# Patient Record
Sex: MALE | Race: WHITE | Employment: OTHER | URBAN - NONMETROPOLITAN AREA
[De-identification: names, ages, dates, MRNs, and addresses within clinical notes are randomized per-mention and may not be internally consistent; named-entity substitution may affect disease eponyms.]

---

## 2017-01-30 ENCOUNTER — OFFICE VISIT (OUTPATIENT)
Dept: PHYSICAL MEDICINE AND REHAB | Age: 62
End: 2017-01-30

## 2017-01-30 VITALS
BODY MASS INDEX: 30.93 KG/M2 | HEIGHT: 67 IN | HEART RATE: 103 BPM | DIASTOLIC BLOOD PRESSURE: 88 MMHG | WEIGHT: 197.09 LBS | SYSTOLIC BLOOD PRESSURE: 143 MMHG

## 2017-01-30 DIAGNOSIS — G82.20 PARAPARESIS OF BOTH LOWER LIMBS (HCC): Primary | ICD-10-CM

## 2017-01-30 DIAGNOSIS — G35 MULTIPLE SCLEROSIS (HCC): ICD-10-CM

## 2017-01-30 PROCEDURE — 99213 OFFICE O/P EST LOW 20 MIN: CPT | Performed by: PHYSICAL MEDICINE & REHABILITATION

## 2017-01-30 RX ORDER — TIZANIDINE 2 MG/1
2 TABLET ORAL 3 TIMES DAILY
COMMUNITY
Start: 2016-12-15

## 2017-01-30 ASSESSMENT — ENCOUNTER SYMPTOMS
BACK PAIN: 0
RESPIRATORY NEGATIVE: 1
GASTROINTESTINAL NEGATIVE: 1
EYES NEGATIVE: 1

## 2017-12-30 ENCOUNTER — APPOINTMENT (OUTPATIENT)
Dept: INTERVENTIONAL RADIOLOGY/VASCULAR | Age: 62
DRG: 175 | End: 2017-12-30
Payer: COMMERCIAL

## 2017-12-30 ENCOUNTER — APPOINTMENT (OUTPATIENT)
Dept: CT IMAGING | Age: 62
DRG: 175 | End: 2017-12-30
Payer: COMMERCIAL

## 2017-12-30 ENCOUNTER — HOSPITAL ENCOUNTER (INPATIENT)
Age: 62
LOS: 2 days | Discharge: NURSING, REHAB OR LONG-TERM CARE | DRG: 175 | End: 2018-01-01
Attending: HOSPITALIST | Admitting: HOSPITALIST
Payer: COMMERCIAL

## 2017-12-30 DIAGNOSIS — I26.99 OTHER PULMONARY EMBOLISM WITHOUT ACUTE COR PULMONALE, UNSPECIFIED CHRONICITY (HCC): ICD-10-CM

## 2017-12-30 DIAGNOSIS — R55 SYNCOPE AND COLLAPSE: Primary | ICD-10-CM

## 2017-12-30 LAB
ANION GAP SERPL CALCULATED.3IONS-SCNC: 19 MEQ/L (ref 8–16)
ANISOCYTOSIS: ABNORMAL
BACTERIA: ABNORMAL /HPF
BASOPHILS # BLD: 0.4 %
BASOPHILS ABSOLUTE: 0 THOU/MM3 (ref 0–0.1)
BILIRUBIN URINE: NEGATIVE
BLOOD, URINE: ABNORMAL
BUN BLDV-MCNC: 26 MG/DL (ref 7–22)
CALCIUM SERPL-MCNC: 9.2 MG/DL (ref 8.5–10.5)
CASTS 2: ABNORMAL /LPF
CASTS UA: ABNORMAL /LPF
CHARACTER, URINE: ABNORMAL
CHLORIDE BLD-SCNC: 101 MEQ/L (ref 98–111)
CO2: 22 MEQ/L (ref 23–33)
COLOR: ABNORMAL
CREAT SERPL-MCNC: 0.6 MG/DL (ref 0.4–1.2)
CRYSTALS, UA: ABNORMAL
D-DIMER QUANTITATIVE: 919 NG/ML FEU (ref 0–500)
EKG ATRIAL RATE: 100 BPM
EKG P AXIS: 48 DEGREES
EKG P-R INTERVAL: 138 MS
EKG Q-T INTERVAL: 368 MS
EKG QRS DURATION: 88 MS
EKG QTC CALCULATION (BAZETT): 474 MS
EKG R AXIS: 35 DEGREES
EKG T AXIS: 55 DEGREES
EKG VENTRICULAR RATE: 100 BPM
EOSINOPHIL # BLD: 2.6 %
EOSINOPHILS ABSOLUTE: 0.2 THOU/MM3 (ref 0–0.4)
EPITHELIAL CELLS, UA: ABNORMAL /HPF
GFR SERPL CREATININE-BSD FRML MDRD: > 90 ML/MIN/1.73M2
GLUCOSE BLD-MCNC: 211 MG/DL (ref 70–108)
GLUCOSE URINE: NEGATIVE MG/DL
HCT VFR BLD CALC: 42.8 % (ref 42–52)
HEMOGLOBIN: 14.6 GM/DL (ref 14–18)
KETONES, URINE: ABNORMAL
LACTIC ACID: 0.7 MMOL/L (ref 0.5–2.2)
LACTIC ACID: 2.9 MMOL/L (ref 0.5–2.2)
LEUKOCYTE ESTERASE, URINE: ABNORMAL
LYMPHOCYTES # BLD: 12.2 %
LYMPHOCYTES ABSOLUTE: 1 THOU/MM3 (ref 1–4.8)
MCH RBC QN AUTO: 29.9 PG (ref 27–31)
MCHC RBC AUTO-ENTMCNC: 34.2 GM/DL (ref 33–37)
MCV RBC AUTO: 87.5 FL (ref 80–94)
MISCELLANEOUS 2: ABNORMAL
MONOCYTES # BLD: 7.6 %
MONOCYTES ABSOLUTE: 0.7 THOU/MM3 (ref 0.4–1.3)
NITRITE, URINE: NEGATIVE
NUCLEATED RED BLOOD CELLS: 0 /100 WBC
OSMOLALITY CALCULATION: 294.1 MOSMOL/KG (ref 275–300)
PDW BLD-RTO: 15.2 % (ref 11.5–14.5)
PH UA: 7.5
PLATELET # BLD: 250 THOU/MM3 (ref 130–400)
PMV BLD AUTO: 7.2 MCM (ref 7.4–10.4)
POTASSIUM SERPL-SCNC: 3.4 MEQ/L (ref 3.5–5.2)
PROCALCITONIN: 0.14 NG/ML (ref 0.01–0.09)
PROTEIN UA: 300
RBC # BLD: 4.89 MILL/MM3 (ref 4.7–6.1)
RBC URINE: ABNORMAL /HPF
RENAL EPITHELIAL, UA: ABNORMAL
SEG NEUTROPHILS: 77.2 %
SEGMENTED NEUTROPHILS ABSOLUTE COUNT: 6.6 THOU/MM3 (ref 1.8–7.7)
SODIUM BLD-SCNC: 142 MEQ/L (ref 135–145)
SPECIFIC GRAVITY, URINE: 1.03 (ref 1–1.03)
TROPONIN T: < 0.01 NG/ML
TSH SERPL DL<=0.05 MIU/L-ACNC: 1.34 UIU/ML (ref 0.4–4.2)
UROBILINOGEN, URINE: 1 EU/DL
WBC # BLD: 8.6 THOU/MM3 (ref 4.8–10.8)
WBC UA: > 200 /HPF
YEAST: ABNORMAL

## 2017-12-30 PROCEDURE — 71275 CT ANGIOGRAPHY CHEST: CPT

## 2017-12-30 PROCEDURE — 84484 ASSAY OF TROPONIN QUANT: CPT

## 2017-12-30 PROCEDURE — 85379 FIBRIN DEGRADATION QUANT: CPT

## 2017-12-30 PROCEDURE — 87040 BLOOD CULTURE FOR BACTERIA: CPT

## 2017-12-30 PROCEDURE — 80048 BASIC METABOLIC PNL TOTAL CA: CPT

## 2017-12-30 PROCEDURE — 6370000000 HC RX 637 (ALT 250 FOR IP): Performed by: HOSPITALIST

## 2017-12-30 PROCEDURE — 81001 URINALYSIS AUTO W/SCOPE: CPT

## 2017-12-30 PROCEDURE — 6360000002 HC RX W HCPCS: Performed by: EMERGENCY MEDICINE

## 2017-12-30 PROCEDURE — 93970 EXTREMITY STUDY: CPT

## 2017-12-30 PROCEDURE — 84145 PROCALCITONIN (PCT): CPT

## 2017-12-30 PROCEDURE — 6360000004 HC RX CONTRAST MEDICATION: Performed by: EMERGENCY MEDICINE

## 2017-12-30 PROCEDURE — 6360000002 HC RX W HCPCS: Performed by: FAMILY MEDICINE

## 2017-12-30 PROCEDURE — 36415 COLL VENOUS BLD VENIPUNCTURE: CPT

## 2017-12-30 PROCEDURE — 6360000002 HC RX W HCPCS: Performed by: HOSPITALIST

## 2017-12-30 PROCEDURE — 2580000003 HC RX 258: Performed by: HOSPITALIST

## 2017-12-30 PROCEDURE — B32T1ZZ COMPUTERIZED TOMOGRAPHY (CT SCAN) OF LEFT PULMONARY ARTERY USING LOW OSMOLAR CONTRAST: ICD-10-PCS | Performed by: RADIOLOGY

## 2017-12-30 PROCEDURE — 87086 URINE CULTURE/COLONY COUNT: CPT

## 2017-12-30 PROCEDURE — 99285 EMERGENCY DEPT VISIT HI MDM: CPT

## 2017-12-30 PROCEDURE — 70450 CT HEAD/BRAIN W/O DYE: CPT

## 2017-12-30 PROCEDURE — 99223 1ST HOSP IP/OBS HIGH 75: CPT | Performed by: HOSPITALIST

## 2017-12-30 PROCEDURE — B32S1ZZ COMPUTERIZED TOMOGRAPHY (CT SCAN) OF RIGHT PULMONARY ARTERY USING LOW OSMOLAR CONTRAST: ICD-10-PCS | Performed by: RADIOLOGY

## 2017-12-30 PROCEDURE — 84443 ASSAY THYROID STIM HORMONE: CPT

## 2017-12-30 PROCEDURE — 85025 COMPLETE CBC W/AUTO DIFF WBC: CPT

## 2017-12-30 PROCEDURE — 93005 ELECTROCARDIOGRAM TRACING: CPT

## 2017-12-30 PROCEDURE — 83605 ASSAY OF LACTIC ACID: CPT

## 2017-12-30 PROCEDURE — 2140000000 HC CCU INTERMEDIATE R&B

## 2017-12-30 RX ORDER — SODIUM CHLORIDE 0.9 % (FLUSH) 0.9 %
10 SYRINGE (ML) INJECTION EVERY 12 HOURS SCHEDULED
Status: DISCONTINUED | OUTPATIENT
Start: 2017-12-30 | End: 2018-01-01 | Stop reason: HOSPADM

## 2017-12-30 RX ORDER — FLUOXETINE HYDROCHLORIDE 20 MG/1
20 CAPSULE ORAL DAILY
Status: DISCONTINUED | OUTPATIENT
Start: 2017-12-31 | End: 2017-12-31

## 2017-12-30 RX ORDER — OXYBUTYNIN CHLORIDE 10 MG/1
5 TABLET, EXTENDED RELEASE ORAL 2 TIMES DAILY
Status: ON HOLD | COMMUNITY
End: 2019-07-08 | Stop reason: ALTCHOICE

## 2017-12-30 RX ORDER — ONDANSETRON 2 MG/ML
4 INJECTION INTRAMUSCULAR; INTRAVENOUS EVERY 6 HOURS PRN
Status: DISCONTINUED | OUTPATIENT
Start: 2017-12-30 | End: 2018-01-01 | Stop reason: HOSPADM

## 2017-12-30 RX ORDER — DOCUSATE SODIUM 100 MG/1
100 CAPSULE, LIQUID FILLED ORAL 2 TIMES DAILY
Status: DISCONTINUED | OUTPATIENT
Start: 2017-12-30 | End: 2018-01-01 | Stop reason: HOSPADM

## 2017-12-30 RX ORDER — BACLOFEN 10 MG/1
20 TABLET ORAL 3 TIMES DAILY
Status: DISCONTINUED | OUTPATIENT
Start: 2017-12-30 | End: 2018-01-01 | Stop reason: HOSPADM

## 2017-12-30 RX ORDER — HYDROCODONE BITARTRATE AND ACETAMINOPHEN 5; 325 MG/1; MG/1
1 TABLET ORAL EVERY 6 HOURS PRN
Status: DISCONTINUED | OUTPATIENT
Start: 2017-12-30 | End: 2018-01-01 | Stop reason: HOSPADM

## 2017-12-30 RX ORDER — ACETAMINOPHEN 325 MG/1
650 TABLET ORAL EVERY 4 HOURS PRN
Status: DISCONTINUED | OUTPATIENT
Start: 2017-12-30 | End: 2018-01-01 | Stop reason: HOSPADM

## 2017-12-30 RX ORDER — DOCUSATE SODIUM 100 MG/1
100 CAPSULE, LIQUID FILLED ORAL DAILY
COMMUNITY

## 2017-12-30 RX ORDER — AMPICILLIN 500 MG/1
500 CAPSULE ORAL 4 TIMES DAILY
Status: ON HOLD | COMMUNITY
End: 2017-12-30 | Stop reason: ALTCHOICE

## 2017-12-30 RX ORDER — GENTAMICIN SULFATE 1 MG/G
CREAM TOPICAL 2 TIMES DAILY
Status: ON HOLD | COMMUNITY
End: 2018-06-08 | Stop reason: ALTCHOICE

## 2017-12-30 RX ORDER — SODIUM CHLORIDE 0.9 % (FLUSH) 0.9 %
10 SYRINGE (ML) INJECTION PRN
Status: DISCONTINUED | OUTPATIENT
Start: 2017-12-30 | End: 2018-01-01 | Stop reason: HOSPADM

## 2017-12-30 RX ORDER — SODIUM CHLORIDE 9 MG/ML
INJECTION, SOLUTION INTRAVENOUS CONTINUOUS
Status: DISCONTINUED | OUTPATIENT
Start: 2017-12-30 | End: 2017-12-30

## 2017-12-30 RX ORDER — HYDROCODONE BITARTRATE AND ACETAMINOPHEN 5; 325 MG/1; MG/1
1 TABLET ORAL EVERY 6 HOURS PRN
Status: ON HOLD | COMMUNITY
End: 2018-06-08 | Stop reason: CLARIF

## 2017-12-30 RX ORDER — AMPICILLIN AND SULBACTAM 2; 1 G/1; G/1
INJECTION, POWDER, FOR SOLUTION INTRAMUSCULAR; INTRAVENOUS EVERY 6 HOURS
Status: ON HOLD | COMMUNITY
Start: 2017-12-30 | End: 2018-01-23

## 2017-12-30 RX ORDER — OXYBUTYNIN CHLORIDE 10 MG/1
10 TABLET, EXTENDED RELEASE ORAL DAILY
Status: DISCONTINUED | OUTPATIENT
Start: 2017-12-31 | End: 2018-01-01 | Stop reason: HOSPADM

## 2017-12-30 RX ORDER — WHEY PROTEIN ISOLATE 6 G-25/7 G
POWDER (GRAM) ORAL 2 TIMES DAILY
Status: ON HOLD | COMMUNITY
End: 2018-11-14

## 2017-12-30 RX ORDER — MORPHINE SULFATE 2 MG/ML
1 INJECTION, SOLUTION INTRAMUSCULAR; INTRAVENOUS ONCE
Status: COMPLETED | OUTPATIENT
Start: 2017-12-30 | End: 2017-12-30

## 2017-12-30 RX ORDER — LACTOBACILLUS RHAMNOSUS GG 10B CELL
1 CAPSULE ORAL 2 TIMES DAILY WITH MEALS
Status: DISCONTINUED | OUTPATIENT
Start: 2017-12-31 | End: 2018-01-01 | Stop reason: HOSPADM

## 2017-12-30 RX ADMIN — AMPICILLIN SODIUM AND SULBACTAM SODIUM 1.5 G: 1; .5 INJECTION, POWDER, FOR SOLUTION INTRAMUSCULAR; INTRAVENOUS at 22:25

## 2017-12-30 RX ADMIN — MORPHINE SULFATE 1 MG: 2 INJECTION, SOLUTION INTRAMUSCULAR; INTRAVENOUS at 22:25

## 2017-12-30 RX ADMIN — ENOXAPARIN SODIUM 90 MG: 100 INJECTION SUBCUTANEOUS at 18:17

## 2017-12-30 RX ADMIN — DOCUSATE SODIUM 100 MG: 100 CAPSULE ORAL at 22:25

## 2017-12-30 RX ADMIN — IOPAMIDOL 85 ML: 755 INJECTION, SOLUTION INTRAVENOUS at 16:06

## 2017-12-30 RX ADMIN — BACLOFEN 20 MG: 10 TABLET ORAL at 22:25

## 2017-12-30 ASSESSMENT — ENCOUNTER SYMPTOMS
RHINORRHEA: 0
EYE REDNESS: 0
NAUSEA: 0
DIARRHEA: 0
ABDOMINAL PAIN: 0
SORE THROAT: 0
WHEEZING: 0
EYE DISCHARGE: 0
SHORTNESS OF BREATH: 0
VOMITING: 0
BACK PAIN: 0
COUGH: 0

## 2017-12-30 ASSESSMENT — PAIN SCALES - GENERAL: PAINLEVEL_OUTOF10: 4

## 2017-12-30 NOTE — H&P
Syncopal episode while waiting for his food earlier today. Patient denied associated headache, dizziness, localized weakness or numbness. No speech or visual disturbances. Patient denied chest pain, shortness of breath. No fever, chills or cough. Patient medical history significant for multiple sclerosis with neurogenic bladder,  Paraparesis. He is currently treated for chronic osteomyelitis in the right ischial wound and urinary tract infection with Unasyn. No  record with the patient reports that he had the PICC line for about a month. In the emergency room d-dimer was elevated, CT of the chest revealed left main  Pulmonary artery thromboembolism extended to the lingula and lower lobe branches. No prior hx of DVT or PE. Past Medical History:          Diagnosis Date    MS (multiple sclerosis) ([facility] [address]     Neurogenic bladder feb. 2012    Dr. Nanci Blackwell placed cather    UTI (urinary tract infection)        Past Surgical History:          Procedure Laterality Date    ANKLE SURGERY  1996    broken ankle    BLADDER SURGERY  2-    Suprapubic catheter placement    TONSILLECTOMY  child       Medications Prior to Admission:      Prior to Admission medications    Medication Sig Start Date End Date Taking? Authorizing Provider   docusate sodium (COLACE) 100 MG capsule Take 100 mg by mouth 2 times daily   Yes Historical Provider, MD   oxybutynin (DITROPAN-XL) 10 MG extended release tablet Take 10 mg by mouth daily   Yes Historical Provider, MD   Resource Instant Protein POWD Take by mouth   Yes Historical Provider, MD   gentamicin (GARAMYCIN) 0.1 % cream Apply topically 3 times daily Apply topically 3 times daily.    Yes Historical Provider, MD   ampicillin (PRINCIPEN) 500 MG capsule Take 500 mg by mouth 4 times daily   Yes Historical Provider, MD   ampicillin-sulbactam (UNASYN) 3 (2-1) g SOLR Infuse intravenously every 6 hours   Yes Historical Provider, MD   sodium chloride 0.9 % SOLN 50 mL with ertapenem 1 GM SOLR 1 g Infuse 1 g intravenously every 24 hours   Yes Historical Provider, MD   HYDROcodone-acetaminophen (NORCO) 5-325 MG per tablet Take 1 tablet by mouth every 6 hours as needed for Pain.    Yes Historical Provider, MD   Diaper Rash Products (PINXAV) OINT Apply topically   Yes Historical Provider, MD   tiZANidine (ZANAFLEX) 2 MG tablet Take 2 mg by mouth every 8 hours as needed  12/15/16  Yes Historical Provider, MD   ascorbic acid (VITAMIN C) 500 MG tablet Take 1 tablet by mouth daily 3/28/16  Yes Estella Nichole MD   Multiple Vitamin (MULTIVITAMIN) tablet Take 1 tablet by mouth daily 3/28/16  Yes Estella Nichole MD   potassium chloride (K-DUR) 10 MEQ tablet Take 1 tablet by mouth daily 3/28/16  Yes Estella Nichole MD   Acetaminophen 650 MG TABS Take 650 mg by mouth every 4 hours as needed 3/28/16  Yes Estella Nichole MD   ondansetron (ZOFRAN) 4 MG/2ML injection Infuse 2 mLs intravenously every 6 hours as needed for Nausea 3/28/16  Yes Estella Nichole MD   lactobacillus (CULTURELLE) capsule Take 1 capsule by mouth 2 times daily (with meals) 3/28/16  Yes Estella Nichole MD   vitamin A 63491 UNITS capsule Take 2 capsules by mouth daily  Patient taking differently: Take 10,000 Units by mouth daily  3/28/16  Yes Estella Nichole MD   zinc sulfate (ZINCATE) 220 MG capsule Take 1 capsule by mouth daily 3/28/16  Yes Estella Nichole MD   vitamin E 400 UNIT capsule Take 1 capsule by mouth daily 3/28/16  Yes Estella Nichole MD   baclofen (LIORESAL) 20 MG tablet Take 1 tablet by mouth 3 times daily 3/28/16  Yes Estella Nichole MD   acetic acid 0.25 % irrigation Irrigate with 100 mLs as directed 3 times daily    Yes Historical Provider, MD   FLUoxetine (PROZAC) 20 MG capsule Take 20 mg by mouth daily   Yes Historical Provider, MD   loperamide (IMODIUM) 2 MG capsule Take 2 mg by mouth 4 times daily as needed for Diarrhea   Yes Historical Provider, No results for input(s): AST, ALT, BILIDIR, BILITOT, ALKPHOS in the last 72 hours. No results for input(s): INR in the last 72 hours. No results for input(s): Yinka Hem in the last 72 hours. Urinalysis:      Lab Results   Component Value Date    NITRU NEGATIVE 12/30/2017    WBCUA > 200 12/30/2017    WBCUA >200 01/20/2012    BACTERIA MODERATE 12/30/2017    RBCUA 3-5 12/30/2017    BLOODU MODERATE 12/30/2017    SPECGRAV 1.020 12/17/2012    GLUCOSEU NEGATIVE 12/30/2017       Radiology:     CXR: I have reviewed the CXR with the following interpretation:   EKG:  I have reviewed the EKG with the following interpretation: sinus non specific T waves abnormalities ? Prolonged     CTA CHEST W WO CONTRAST   Final Result   1. Thromboembolism in the left main pulmonary artery extending into lingula and lower lobe branches. 2. Minimal left basilar atelectasis/infiltrate. Critical findings were discussed with Chica Gagnon on 12/30/2017 at 4:45 PM.      Final report electronically signed by Dr. Ivonne Buckley on 12/30/2017 4:48 PM      CT HEAD WO CONTRAST   Final Result   1. No mass effect or acute hemorrhage. 2. Cerebral atrophy. **This report has been created using voice recognition software. It may contain minor errors which are inherent in voice recognition technology. **      Final report electronically signed by Dr. Ivonne Buckley on 12/30/2017 4:37 PM      VL DUP LOWER EXTREMITY VENOUS BILATERAL    (Results Pending)   VL DUP UPPER EXTREMITY ARTERIES BILATERAL    (Results Pending)            Thank you Denis Vance MD for the opportunity to be involved in this patient's care.     Electronically signed by Vanda Cooper MD on 12/30/2017 at 6:27 PM

## 2017-12-30 NOTE — ED TRIAGE NOTES
Pt to room 7 via EMS. Pt presents to ED following have a brief syncopal episode at [facility] during lunch. Pt states that he was sitting and eating lunch and the next thing he remembers was staff taking pt back to room. Currently pt is alert, oriented. Respirations easy, unlabored. No c/o pain. PICC present in right upper arm .

## 2017-12-30 NOTE — ED PROVIDER NOTES
[facility]  eMERGENCY dEPARTMENT eNCOUnter          CHIEF COMPLAINT       Chief Complaint   Patient presents with    Loss of Consciousness       Nurses Notes reviewed and I agree except as noted in the HPI. HISTORY OF PRESENT ILLNESS    Mary Sweeney is a 63 y.o. male who presents to the Emergency Department for the evaluation of a syncopal episode. The patient states he was sitting up in his wheelchair when he passed out. His nurse took him back to his room and called the squad to pick him up. The nurse states that the patient's left side of his face was drooping and he looked out of it, and it could possibly be a TIA. He denies lightheadedness or dizziness prior to the syncopal episode. He denies nausea or vomiting afterwards. Patient denies chest pain, shortness of breath or headaches at this time. The patient has had MS for 20+ years and has spent the last 5 years at [facility]. He is in a wheel chair and uses a whit lift. The patient has a PICC line in an is getting Unasyn IV medication for his pressure ulcer on his buttocks and his UTI. The patient also has osteomyelitis. The patient states he has sensation in his legs and he denies pain in his legs. The HPI was provided by the patient. REVIEW OF SYSTEMS     Review of Systems   Constitutional: Negative for appetite change, chills, fatigue and fever. HENT: Negative for congestion, ear pain, rhinorrhea and sore throat. Eyes: Negative for discharge, redness and visual disturbance. Respiratory: Negative for cough, shortness of breath and wheezing. Cardiovascular: Negative for chest pain, palpitations and leg swelling. Gastrointestinal: Negative for abdominal pain, diarrhea, nausea and vomiting. Genitourinary: Negative for decreased urine volume, difficulty urinating and dysuria. Musculoskeletal: Negative for arthralgias, back pain, joint swelling and neck pain.    Skin: Positive for wound (pressure ulcer on Take 1 tablet by mouth daily    ONDANSETRON (ZOFRAN) 4 MG/2ML INJECTION    Infuse 2 mLs intravenously every 6 hours as needed for Nausea    OXYBUTYNIN (DITROPAN-XL) 10 MG EXTENDED RELEASE TABLET    Take 10 mg by mouth daily    POTASSIUM CHLORIDE (K-DUR) 10 MEQ TABLET    Take 1 tablet by mouth daily    RESOURCE INSTANT PROTEIN POWD    Take by mouth    SODIUM CHLORIDE 0.9 % SOLN 50 ML WITH ERTAPENEM 1 GM SOLR 1 G    Infuse 1 g intravenously every 24 hours    TIZANIDINE (ZANAFLEX) 2 MG TABLET    Take 2 mg by mouth every 8 hours as needed     VITAMIN A 58784 UNITS CAPSULE    Take 2 capsules by mouth daily    VITAMIN E 400 UNIT CAPSULE    Take 1 capsule by mouth daily    ZINC SULFATE (ZINCATE) 220 MG CAPSULE    Take 1 capsule by mouth daily       ALLERGIES     has No Known Allergies. FAMILY HISTORY     indicated that his mother is . He indicated that his father is . He indicated that his sister is alive. family history includes Arthritis in his sister; Cancer in his mother; Heart Disease in his father. SOCIAL HISTORY      reports that he quit smoking about 36 years ago. He has quit using smokeless tobacco. He reports that he does not drink alcohol or use drugs. PHYSICAL EXAM     INITIAL VITALS:  height is 5' 7\" (1.702 m) and weight is 205 lb (93 kg). His oral temperature is 97.7 °F (36.5 °C). His blood pressure is 114/78 and his pulse is 98. His respiration is 18 and oxygen saturation is 94%. Physical Exam   Constitutional: He is oriented to person, place, and time. He appears well-developed and well-nourished. HENT:   Head: Normocephalic and atraumatic. Right Ear: External ear normal.   Left Ear: External ear normal.   Eyes: Conjunctivae are normal. Right eye exhibits no discharge. Left eye exhibits no discharge. No scleral icterus. Neck: Normal range of motion. Neck supple. No JVD present. Cardiovascular: Normal rate, regular rhythm and normal heart sounds.   Exam reveals no gallop on 12/30/2017 4:48 PM      CT HEAD WO CONTRAST   Final Result   1. No mass effect or acute hemorrhage. 2. Cerebral atrophy. **This report has been created using voice recognition software. It may contain minor errors which are inherent in voice recognition technology. **      Final report electronically signed by Dr. Arianna Wheeler on 12/30/2017 4:37 PM          LABS:     Labs Reviewed   CBC WITH AUTO DIFFERENTIAL - Abnormal; Notable for the following:        Result Value    RDW 15.2 (*)     MPV 7.2 (*)     All other components within normal limits   BASIC METABOLIC PANEL - Abnormal; Notable for the following:     Potassium 3.4 (*)     CO2 22 (*)     Glucose 211 (*)     BUN 26 (*)     All other components within normal limits   LACTIC ACID, PLASMA - Abnormal; Notable for the following:     Lactic Acid 2.9 (*)     All other components within normal limits   PROCALCITONIN - Abnormal; Notable for the following:     Procalcitonin 0.14 (*)     All other components within normal limits   D-DIMER, QUANTITATIVE - Abnormal; Notable for the following:     D-Dimer, Quant 919.00 (*)     All other components within normal limits   ANION GAP - Abnormal; Notable for the following:      Anion Gap 19.0 (*)     All other components within normal limits   URINE WITH REFLEXED MICRO - Abnormal; Notable for the following:     Ketones, Urine TRACE (*)     Blood, Urine MODERATE (*)     Protein,  (*)     Leukocyte Esterase, Urine LARGE (*)     Color, UA DK YELLOW (*)     Character, Urine TURBID (*)     All other components within normal limits   URINE CULTURE, REFLEXED    Narrative:     Source: urine, clean catch       Site:           Current Antibiotics: not stated   TROPONIN   TSH WITHOUT REFLEX   GLOMERULAR FILTRATION RATE, ESTIMATED   OSMOLALITY       EMERGENCY DEPARTMENT COURSE:   Vitals:    Vitals:    12/30/17 1220 12/30/17 1434   BP: 109/81 114/78   Pulse: 94 98   Resp: 18 18   Temp: 97.7 °F (36.5 °C)    TempSrc: Oral    SpO2: 94% 94%   Weight: 205 lb (93 kg)    Height: 5' 7\" (1.702 m)        12:33 PM: The patient was seen and evaluated. Patient is alert and oriented on arrival.  He appeared in no acute distress. Appropriate labs and EKG were ordered. A pulmonary embolism was low on my differential diagnosis but he does have limited mobility and a PICC line . I did not want to inject him with IV dye is not necessary. I did order a d-dimer which was elevated at 919. A CTA was ordered of the chest and this did show pulmonary embolism of the left main pulmonary artery Extending into lingula and lower lobe branches. The patient's white blood cell count is 8.6, his sacral wound is chronic in nature, and his urine most likely has a chronic UTI from the indwelling catheter. I discussed the patient with Dr. Abigail Mariano who graciously accepted to admit the patient. Subcutaneous Lovenox was ordered and Dr. Abigail Mariano will assume care of the patient. MDM:  The patient will be admitted to the hospital for pulmonary embolism    CRITICAL CARE:   None     CONSULTS:  Dr. Bunch Short:  None    FINAL IMPRESSION      1. Syncope and collapse    2. Other pulmonary embolism without acute cor pulmonale, unspecified chronicity ([facility] [address]          DISPOSITION/PLAN   Admit    PATIENT REFERRED TO:  Mandeep Canales MD  [address]  [phone]            DISCHARGE MEDICATIONS:  New Prescriptions    No medications on file       (Please note that portions of this note were completed with a voice recognition program.  Efforts were made to edit the dictations but occasionally words are mis-transcribed.)    The patient was given an opportunity to see the Emergency Attending. The patient voiced understanding that I was a Mid-Level Provider and was in agreement with being seen independently by myself.      Scribe:  Shameka Sin 12/23/16 10:31 AM Scribing for and in the presence of Amanda Rivas CNP.     Signed by: Piper Huff

## 2017-12-31 LAB
ALBUMIN SERPL-MCNC: 3.6 G/DL (ref 3.5–5.1)
AMORPHOUS: ABNORMAL
ANION GAP SERPL CALCULATED.3IONS-SCNC: 13 MEQ/L (ref 8–16)
ANISOCYTOSIS: ABNORMAL
BACTERIA: ABNORMAL /HPF
BASOPHILS # BLD: 0.7 %
BASOPHILS ABSOLUTE: 0 THOU/MM3 (ref 0–0.1)
BILIRUBIN URINE: NEGATIVE
BLOOD, URINE: ABNORMAL
BUN BLDV-MCNC: 24 MG/DL (ref 7–22)
CALCIUM SERPL-MCNC: 8.8 MG/DL (ref 8.5–10.5)
CASTS UA: ABNORMAL /LPF
CHARACTER, URINE: ABNORMAL
CHLORIDE BLD-SCNC: 102 MEQ/L (ref 98–111)
CO2: 25 MEQ/L (ref 23–33)
COLOR: ABNORMAL
CREAT SERPL-MCNC: 0.4 MG/DL (ref 0.4–1.2)
CRYSTALS, UA: ABNORMAL
EOSINOPHIL # BLD: 4.4 %
EOSINOPHILS ABSOLUTE: 0.3 THOU/MM3 (ref 0–0.4)
EPITHELIAL CELLS, UA: ABNORMAL /HPF
GFR SERPL CREATININE-BSD FRML MDRD: > 90 ML/MIN/1.73M2
GLUCOSE BLD-MCNC: 102 MG/DL (ref 70–108)
GLUCOSE BLD-MCNC: 110 MG/DL (ref 70–108)
GLUCOSE BLD-MCNC: 157 MG/DL (ref 70–108)
GLUCOSE URINE: NEGATIVE MG/DL
HCT VFR BLD CALC: 40.2 % (ref 42–52)
HEMOGLOBIN: 13.7 GM/DL (ref 14–18)
KETONES, URINE: NEGATIVE
LEUKOCYTE ESTERASE, URINE: ABNORMAL
LYMPHOCYTES # BLD: 21.2 %
LYMPHOCYTES ABSOLUTE: 1.4 THOU/MM3 (ref 1–4.8)
MCH RBC QN AUTO: 29.7 PG (ref 27–31)
MCHC RBC AUTO-ENTMCNC: 34 GM/DL (ref 33–37)
MCV RBC AUTO: 87.2 FL (ref 80–94)
MONOCYTES # BLD: 9.5 %
MONOCYTES ABSOLUTE: 0.6 THOU/MM3 (ref 0.4–1.3)
MUCUS: ABNORMAL
NITRITE, URINE: NEGATIVE
NUCLEATED RED BLOOD CELLS: 0 /100 WBC
ORGANISM: ABNORMAL
OSMOLALITY CALCULATION: 283.6 MOSMOL/KG (ref 275–300)
PDW BLD-RTO: 15.1 % (ref 11.5–14.5)
PH UA: 6
PHOSPHORUS: 2.9 MG/DL (ref 2.4–4.7)
PLATELET # BLD: 259 THOU/MM3 (ref 130–400)
PMV BLD AUTO: 7.3 MCM (ref 7.4–10.4)
POTASSIUM SERPL-SCNC: 3.1 MEQ/L (ref 3.5–5.2)
PROTEIN UA: 100
RBC # BLD: 4.61 MILL/MM3 (ref 4.7–6.1)
RBC URINE: ABNORMAL /HPF
SEG NEUTROPHILS: 64.2 %
SEGMENTED NEUTROPHILS ABSOLUTE COUNT: 4.2 THOU/MM3 (ref 1.8–7.7)
SODIUM BLD-SCNC: 140 MEQ/L (ref 135–145)
SPECIFIC GRAVITY, URINE: > 1.03 (ref 1–1.03)
TROPONIN T: < 0.01 NG/ML
TROPONIN T: < 0.01 NG/ML
URINE CULTURE REFLEX: ABNORMAL
UROBILINOGEN, URINE: 1 EU/DL
WBC # BLD: 6.6 THOU/MM3 (ref 4.8–10.8)
WBC UA: > 100 /HPF

## 2017-12-31 PROCEDURE — 6370000000 HC RX 637 (ALT 250 FOR IP): Performed by: HOSPITALIST

## 2017-12-31 PROCEDURE — 2580000003 HC RX 258: Performed by: HOSPITALIST

## 2017-12-31 PROCEDURE — 87081 CULTURE SCREEN ONLY: CPT

## 2017-12-31 PROCEDURE — 6370000000 HC RX 637 (ALT 250 FOR IP): Performed by: FAMILY MEDICINE

## 2017-12-31 PROCEDURE — 87086 URINE CULTURE/COLONY COUNT: CPT

## 2017-12-31 PROCEDURE — 81001 URINALYSIS AUTO W/SCOPE: CPT

## 2017-12-31 PROCEDURE — 80069 RENAL FUNCTION PANEL: CPT

## 2017-12-31 PROCEDURE — 51705 CHANGE OF BLADDER TUBE: CPT

## 2017-12-31 PROCEDURE — 36415 COLL VENOUS BLD VENIPUNCTURE: CPT

## 2017-12-31 PROCEDURE — 2140000000 HC CCU INTERMEDIATE R&B

## 2017-12-31 PROCEDURE — 84484 ASSAY OF TROPONIN QUANT: CPT

## 2017-12-31 PROCEDURE — 85025 COMPLETE CBC W/AUTO DIFF WBC: CPT

## 2017-12-31 PROCEDURE — 87147 CULTURE TYPE IMMUNOLOGIC: CPT

## 2017-12-31 PROCEDURE — 99233 SBSQ HOSP IP/OBS HIGH 50: CPT | Performed by: HOSPITALIST

## 2017-12-31 PROCEDURE — 82948 REAGENT STRIP/BLOOD GLUCOSE: CPT

## 2017-12-31 PROCEDURE — 6360000002 HC RX W HCPCS: Performed by: HOSPITALIST

## 2017-12-31 RX ORDER — FLUOXETINE 10 MG/1
10 CAPSULE ORAL DAILY
Status: DISCONTINUED | OUTPATIENT
Start: 2017-12-31 | End: 2018-01-01 | Stop reason: HOSPADM

## 2017-12-31 RX ORDER — POTASSIUM CHLORIDE 750 MG/1
10 TABLET, FILM COATED, EXTENDED RELEASE ORAL DAILY
Status: DISCONTINUED | OUTPATIENT
Start: 2017-12-31 | End: 2018-01-01 | Stop reason: HOSPADM

## 2017-12-31 RX ORDER — POTASSIUM CHLORIDE 750 MG/1
40 TABLET, FILM COATED, EXTENDED RELEASE ORAL
Status: COMPLETED | OUTPATIENT
Start: 2017-12-31 | End: 2017-12-31

## 2017-12-31 RX ADMIN — OXYBUTYNIN CHLORIDE 10 MG: 10 TABLET, FILM COATED, EXTENDED RELEASE ORAL at 09:16

## 2017-12-31 RX ADMIN — AMPICILLIN SODIUM AND SULBACTAM SODIUM 1.5 G: 1; .5 INJECTION, POWDER, FOR SOLUTION INTRAMUSCULAR; INTRAVENOUS at 14:00

## 2017-12-31 RX ADMIN — ENOXAPARIN SODIUM 90 MG: 100 INJECTION SUBCUTANEOUS at 19:31

## 2017-12-31 RX ADMIN — DOCUSATE SODIUM 100 MG: 100 CAPSULE ORAL at 09:16

## 2017-12-31 RX ADMIN — Medication 1 CAPSULE: at 09:14

## 2017-12-31 RX ADMIN — Medication 10 ML: at 19:38

## 2017-12-31 RX ADMIN — FLUOXETINE 10 MG: 20 CAPSULE ORAL at 09:15

## 2017-12-31 RX ADMIN — AMPICILLIN SODIUM AND SULBACTAM SODIUM 1.5 G: 1; .5 INJECTION, POWDER, FOR SOLUTION INTRAMUSCULAR; INTRAVENOUS at 23:19

## 2017-12-31 RX ADMIN — Medication 10 ML: at 09:19

## 2017-12-31 RX ADMIN — BACLOFEN 20 MG: 10 TABLET ORAL at 09:15

## 2017-12-31 RX ADMIN — BACLOFEN 20 MG: 10 TABLET ORAL at 19:37

## 2017-12-31 RX ADMIN — DOCUSATE SODIUM 100 MG: 100 CAPSULE ORAL at 19:37

## 2017-12-31 RX ADMIN — AMPICILLIN SODIUM AND SULBACTAM SODIUM 1.5 G: 1; .5 INJECTION, POWDER, FOR SOLUTION INTRAMUSCULAR; INTRAVENOUS at 09:21

## 2017-12-31 RX ADMIN — POTASSIUM CHLORIDE 40 MEQ: 750 TABLET, FILM COATED, EXTENDED RELEASE ORAL at 12:00

## 2017-12-31 RX ADMIN — POTASSIUM CHLORIDE 10 MEQ: 750 TABLET, FILM COATED, EXTENDED RELEASE ORAL at 09:21

## 2017-12-31 RX ADMIN — Medication 1 CAPSULE: at 15:32

## 2017-12-31 RX ADMIN — ENOXAPARIN SODIUM 90 MG: 100 INJECTION SUBCUTANEOUS at 09:16

## 2017-12-31 RX ADMIN — POTASSIUM CHLORIDE 40 MEQ: 750 TABLET, FILM COATED, EXTENDED RELEASE ORAL at 13:57

## 2017-12-31 RX ADMIN — BACLOFEN 20 MG: 10 TABLET ORAL at 13:12

## 2017-12-31 ASSESSMENT — PAIN SCALES - GENERAL: PAINLEVEL_OUTOF10: 0

## 2017-12-31 NOTE — PROGRESS NOTES
major overnight events. No recurrence of syncope. Patient denied chest pain, shortness of breath. Diet: DIET GENERAL;      Medications:  Reviewed    Infusion Medications    Scheduled Medications    FLUoxetine  10 mg Oral Daily    potassium chloride  10 mEq Oral Daily    potassium replacement protocol   Other RX Placeholder    potassium chloride  40 mEq Oral Q2H    sodium chloride flush  10 mL Intravenous 2 times per day    enoxaparin  1 mg/kg Subcutaneous Q12H    baclofen  20 mg Oral TID    docusate sodium  100 mg Oral BID    lactobacillus  1 capsule Oral BID WC    oxybutynin  10 mg Oral Daily    ampicillin-sulbactam  1.5 g Intravenous Q6H     PRN Meds: sodium chloride flush, acetaminophen, magnesium hydroxide, ondansetron, HYDROcodone-acetaminophen      Intake/Output Summary (Last 24 hours) at 12/31/17 1242  Last data filed at 12/31/17 0351   Gross per 24 hour   Intake              217 ml   Output              300 ml   Net              -83 ml       Diet:  DIET GENERAL;    Exam:  /76   Pulse 103   Temp 99.4 °F (37.4 °C) (Oral)   Resp 17   Ht 5' 7\" (1.702 m)   Wt 208 lb 11.2 oz (94.7 kg)   SpO2 94%   BMI 32.69 kg/m²     Gen: Not in distress. Alert. Head: Normocephalic. Atraumatic. Eyes: Conjunctivae/corneas clear. ENT: Oral mucosa moist  Neck: No JVD. No obvious thyromegaly. CVS: Nml S1S2, no MRG, RRR  Pulmomary: Clear bilaterally. No crackles. No wheezes. Gastrointestinal: Soft, non tender, non distend,  Positive bowel sounds. Musculoskeletal: No edema. Warm, PICC line in th right arm , no swelling  Neuro: paraparesis with atrophy in the LE  Psychiatry: Appropriate affect. Not agitated.           Labs:   Recent Labs      12/30/17   1319  12/31/17   0642   WBC  8.6  6.6   HGB  14.6  13.7*   HCT  42.8  40.2*   PLT  250  259     Recent Labs      12/30/17   1320  12/31/17   0642   NA  142  140   K  3.4*  3.1*   CL  101  102   CO2  22*  25   BUN  26*  24*   CREATININE  0.6  0.4 CALCIUM  9.2  8.8   PHOS   --   2.9     No results for input(s): AST, ALT, BILIDIR, BILITOT, ALKPHOS in the last 72 hours. No results for input(s): INR in the last 72 hours. No results for input(s): Jackie Crane in the last 72 hours. Urinalysis:    Lab Results   Component Value Date    NITRU NEGATIVE 12/30/2017    WBCUA > 200 12/30/2017    WBCUA >200 01/20/2012    BACTERIA MODERATE 12/30/2017    RBCUA 3-5 12/30/2017    BLOODU MODERATE 12/30/2017    SPECGRAV 1.020 12/17/2012    GLUCOSEU NEGATIVE 12/30/2017       Radiology:  VL DUP UPPER EXTREMITY VENOUS BILATERAL   Final Result   Nonocclusive DVT in the right subclavian and axillary veins around the PICC line. No evidence of left upper extremity DVT. No evidence of bilateral upper extremity superficial venous thrombosis. Results were discussed with the patient's nurse, [address] [place] [address] ([facility]), on 12/30/2017 at 11:27 PM.         **This report has been created using voice recognition software. It may contain minor errors which are inherent in voice recognition technology. **      Final report electronically signed by Dr. Terri Braga on 12/30/2017 11:28 PM      VL DUP LOWER EXTREMITY VENOUS BILATERAL   Final Result   No evidence of bilateral lower extremity DVT. Reflux in the left posterior tibial vein. Final report electronically signed by Dr. Terri Braga on 12/30/2017 11:19 PM      CTA CHEST [address]   Final Result   1. Thromboembolism in the left main pulmonary artery extending into lingula and lower lobe branches. 2. Minimal left basilar atelectasis/infiltrate. Critical findings were discussed with Annelise Garrett on 12/30/2017 at 4:45 PM.      Final report electronically signed by Dr. Reyna Buckner on 12/30/2017 4:48 PM      CT HEAD WO CONTRAST   Final Result   1. No mass effect or acute hemorrhage. 2. Cerebral atrophy. **This report has been created using voice recognition software.  It may contain minor errors which are

## 2018-01-01 VITALS
BODY MASS INDEX: 32.8 KG/M2 | HEIGHT: 67 IN | RESPIRATION RATE: 18 BRPM | TEMPERATURE: 98.3 F | WEIGHT: 209 LBS | OXYGEN SATURATION: 97 % | DIASTOLIC BLOOD PRESSURE: 89 MMHG | HEART RATE: 87 BPM | SYSTOLIC BLOOD PRESSURE: 154 MMHG

## 2018-01-01 LAB
ALBUMIN SERPL-MCNC: 3.3 G/DL (ref 3.5–5.1)
ANION GAP SERPL CALCULATED.3IONS-SCNC: 12 MEQ/L (ref 8–16)
ANISOCYTOSIS: ABNORMAL
BASOPHILS # BLD: 1.3 %
BASOPHILS ABSOLUTE: 0.1 THOU/MM3 (ref 0–0.1)
BUN BLDV-MCNC: 22 MG/DL (ref 7–22)
CALCIUM SERPL-MCNC: 8.9 MG/DL (ref 8.5–10.5)
CHLORIDE BLD-SCNC: 107 MEQ/L (ref 98–111)
CO2: 24 MEQ/L (ref 23–33)
CREAT SERPL-MCNC: 0.3 MG/DL (ref 0.4–1.2)
EOSINOPHIL # BLD: 7.2 %
EOSINOPHILS ABSOLUTE: 0.3 THOU/MM3 (ref 0–0.4)
GFR SERPL CREATININE-BSD FRML MDRD: > 90 ML/MIN/1.73M2
GLUCOSE BLD-MCNC: 116 MG/DL (ref 70–108)
GLUCOSE BLD-MCNC: 93 MG/DL (ref 70–108)
GLUCOSE BLD-MCNC: 95 MG/DL (ref 70–108)
HCT VFR BLD CALC: 41.6 % (ref 42–52)
HEMOGLOBIN: 13.8 GM/DL (ref 14–18)
LYMPHOCYTES # BLD: 31 %
LYMPHOCYTES ABSOLUTE: 1.4 THOU/MM3 (ref 1–4.8)
MCH RBC QN AUTO: 29 PG (ref 27–31)
MCHC RBC AUTO-ENTMCNC: 33.2 GM/DL (ref 33–37)
MCV RBC AUTO: 87.4 FL (ref 80–94)
MONOCYTES # BLD: 11.8 %
MONOCYTES ABSOLUTE: 0.5 THOU/MM3 (ref 0.4–1.3)
MRSA SCREEN: NORMAL
NUCLEATED RED BLOOD CELLS: 0 /100 WBC
PDW BLD-RTO: 15.5 % (ref 11.5–14.5)
PHOSPHORUS: 2.7 MG/DL (ref 2.4–4.7)
PLATELET # BLD: 271 THOU/MM3 (ref 130–400)
PMV BLD AUTO: 6.9 MCM (ref 7.4–10.4)
POTASSIUM SERPL-SCNC: 3.8 MEQ/L (ref 3.5–5.2)
RBC # BLD: 4.76 MILL/MM3 (ref 4.7–6.1)
SEG NEUTROPHILS: 48.7 %
SEGMENTED NEUTROPHILS ABSOLUTE COUNT: 2.2 THOU/MM3 (ref 1.8–7.7)
SODIUM BLD-SCNC: 143 MEQ/L (ref 135–145)
WBC # BLD: 4.6 THOU/MM3 (ref 4.8–10.8)

## 2018-01-01 PROCEDURE — 85025 COMPLETE CBC W/AUTO DIFF WBC: CPT

## 2018-01-01 PROCEDURE — 99239 HOSP IP/OBS DSCHRG MGMT >30: CPT | Performed by: HOSPITALIST

## 2018-01-01 PROCEDURE — 6370000000 HC RX 637 (ALT 250 FOR IP): Performed by: FAMILY MEDICINE

## 2018-01-01 PROCEDURE — 80069 RENAL FUNCTION PANEL: CPT

## 2018-01-01 PROCEDURE — 6370000000 HC RX 637 (ALT 250 FOR IP): Performed by: HOSPITALIST

## 2018-01-01 PROCEDURE — 82948 REAGENT STRIP/BLOOD GLUCOSE: CPT

## 2018-01-01 PROCEDURE — 2580000003 HC RX 258: Performed by: HOSPITALIST

## 2018-01-01 PROCEDURE — 36415 COLL VENOUS BLD VENIPUNCTURE: CPT

## 2018-01-01 PROCEDURE — 6360000002 HC RX W HCPCS: Performed by: HOSPITALIST

## 2018-01-01 RX ADMIN — AMPICILLIN SODIUM AND SULBACTAM SODIUM 1.5 G: 1; .5 INJECTION, POWDER, FOR SOLUTION INTRAMUSCULAR; INTRAVENOUS at 15:05

## 2018-01-01 RX ADMIN — Medication 10 ML: at 10:47

## 2018-01-01 RX ADMIN — AMPICILLIN SODIUM AND SULBACTAM SODIUM 1.5 G: 1; .5 INJECTION, POWDER, FOR SOLUTION INTRAMUSCULAR; INTRAVENOUS at 10:48

## 2018-01-01 RX ADMIN — BACLOFEN 20 MG: 10 TABLET ORAL at 15:05

## 2018-01-01 RX ADMIN — POTASSIUM CHLORIDE 10 MEQ: 750 TABLET, FILM COATED, EXTENDED RELEASE ORAL at 10:48

## 2018-01-01 RX ADMIN — Medication 1 CAPSULE: at 10:48

## 2018-01-01 RX ADMIN — DOCUSATE SODIUM 100 MG: 100 CAPSULE ORAL at 10:48

## 2018-01-01 RX ADMIN — BACLOFEN 20 MG: 10 TABLET ORAL at 10:49

## 2018-01-01 RX ADMIN — FLUOXETINE 10 MG: 20 CAPSULE ORAL at 10:49

## 2018-01-01 RX ADMIN — AMPICILLIN SODIUM AND SULBACTAM SODIUM 1.5 G: 1; .5 INJECTION, POWDER, FOR SOLUTION INTRAMUSCULAR; INTRAVENOUS at 04:27

## 2018-01-01 RX ADMIN — ENOXAPARIN SODIUM 90 MG: 100 INJECTION SUBCUTANEOUS at 10:48

## 2018-01-01 RX ADMIN — OXYBUTYNIN CHLORIDE 10 MG: 10 TABLET, FILM COATED, EXTENDED RELEASE ORAL at 10:48

## 2018-01-01 RX ADMIN — Medication 10 ML: at 04:27

## 2018-01-01 ASSESSMENT — PAIN SCALES - GENERAL: PAINLEVEL_OUTOF10: 0

## 2018-01-01 NOTE — DISCHARGE SUMMARY
01/01/2018    CALCIUM 8.9 01/01/2018    PHOS 2.7 01/01/2018         Significant Diagnostic Studies    Radiology:   VL DUP UPPER EXTREMITY VENOUS BILATERAL   Final Result   Nonocclusive DVT in the right subclavian and axillary veins around the PICC line. No evidence of left upper extremity DVT. No evidence of bilateral upper extremity superficial venous thrombosis. Results were discussed with the patient's nurse, [address] [place] [address] ([facility]), on 12/30/2017 at 11:27 PM.         **This report has been created using voice recognition software. It may contain minor errors which are inherent in voice recognition technology. **      Final report electronically signed by Dr. Josefa Will on 12/30/2017 11:28 PM      VL DUP LOWER EXTREMITY VENOUS BILATERAL   Final Result   No evidence of bilateral lower extremity DVT. Reflux in the left posterior tibial vein. Final report electronically signed by Dr. Josefa Will on 12/30/2017 11:19 PM      CTA CHEST [address]   Final Result   1. Thromboembolism in the left main pulmonary artery extending into lingula and lower lobe branches. 2. Minimal left basilar atelectasis/infiltrate. Critical findings were discussed with Liza Hoyt on 12/30/2017 at 4:45 PM.      Final report electronically signed by Dr. Adilia Busby on 12/30/2017 4:48 PM      CT HEAD WO CONTRAST   Final Result   1. No mass effect or acute hemorrhage. 2. Cerebral atrophy. **This report has been created using voice recognition software. It may contain minor errors which are inherent in voice recognition technology. **      Final report electronically signed by Dr. Adilia Busby on 12/30/2017 4:37 PM             Consults:     IP CONSULT TO INFECTIOUS DISEASES    Disposition:  ecf      Condition at Discharge: Stable    Code Status:  Full Code     Patient Instructions:    Discharge lab/important testing/finding that need follow up :     Removal of PICC line as soon as antibiotics Completed  CBC, renal panel in 1 week. Activity: activity as tolerated  Diet: DIET GENERAL;  Dietary Nutrition Supplements: Wound Healing Oral Supplement      Follow-up visits:   MD Tree Carvalho [address]  [phone]               Discharge Medications:      Aline Skiff   Home Medication Instructions WPB:[id number]    Printed on:01/01/18 8839   Medication Information                      Acetaminophen 650 MG TABS  Take 650 mg by mouth every 4 hours as needed             acetic acid 0.25 % irrigation  Irrigate with 100 mLs as directed 3 times daily              ampicillin-sulbactam (UNASYN) 3 (2-1) g SOLR  Infuse intravenously every 6 hours             ascorbic acid (VITAMIN C) 500 MG tablet  Take 1 tablet by mouth daily             baclofen (LIORESAL) 20 MG tablet  Take 1 tablet by mouth 3 times daily             Diaper Rash Products (PINXAV) OINT  Apply topically             docusate sodium (COLACE) 100 MG capsule  Take 100 mg by mouth 2 times daily             FLUoxetine (PROZAC) 20 MG capsule  Take 10 mg by mouth daily              gentamicin (GARAMYCIN) 0.1 % cream  Apply topically 2 times daily Apply topically 2 times daily. HYDROcodone-acetaminophen (NORCO) 5-325 MG per tablet  Take 1 tablet by mouth every 6 hours as needed for Pain.              Incontinence Supplies (BEDSIDE DRAINAGE BAG) MISC  Large 2000cc bedside drainage bag             lactobacillus (CULTURELLE) capsule  Take 1 capsule by mouth 2 times daily (with meals)             loperamide (IMODIUM) 2 MG capsule  Take 2 mg by mouth 4 times daily as needed for Diarrhea             Multiple Vitamin (MULTIVITAMIN) tablet  Take 1 tablet by mouth daily             ondansetron (ZOFRAN) 4 MG/2ML injection  Infuse 2 mLs intravenously every 6 hours as needed for Nausea             oxybutynin (DITROPAN-XL) 10 MG extended release tablet  Take 10 mg by mouth daily             potassium chloride (K-DUR) 10 MEQ tablet  Take 1 tablet by mouth daily             Resource Instant Protein POWD  Take by mouth             rivaroxaban (XARELTO STARTER PACK) 15 & 20 MG Starter Pack  Take 15 mg twice daily for 21 days then take 20 mg daily for total of 3 months             sodium chloride 0.9 % SOLN 50 mL with ertapenem 1 GM SOLR 1 g  Infuse 1 g intravenously every 24 hours             tiZANidine (ZANAFLEX) 2 MG tablet  Take 2 mg by mouth 3 times daily              vitamin A 83230 UNITS capsule  Take 2 capsules by mouth daily             vitamin E 400 UNIT capsule  Take 1 capsule by mouth daily             zinc sulfate (ZINCATE) 220 MG capsule  Take 1 capsule by mouth daily                 Time Spent on discharge is 35 in the examination, evaluation, counseling and review of medications and discharge plan. Signed: Thank you Silver Mascorro MD for the opportunity to be involved in this patient's care. This dictation was generated by voice recognition computer software.  Although all attempts are made to edit the dictation for accuracy, there may be errors in the transcription that are not intended    Electronically signed by Hong Ruelas MD on 1/1/2018 at 2:48 PM

## 2018-01-02 LAB
MRSA SCREEN: NORMAL
URINE CULTURE REFLEX: NORMAL

## 2018-01-03 LAB — VRE CULTURE: NORMAL

## 2018-01-05 LAB
BLOOD CULTURE, ROUTINE: NORMAL
BLOOD CULTURE, ROUTINE: NORMAL

## 2018-01-19 ENCOUNTER — HOSPITAL ENCOUNTER (INPATIENT)
Age: 63
LOS: 4 days | Discharge: NURSING, REHAB OR LONG-TERM CARE | DRG: 070 | End: 2018-01-23
Attending: EMERGENCY MEDICINE | Admitting: INTERNAL MEDICINE
Payer: COMMERCIAL

## 2018-01-19 ENCOUNTER — HOSPITAL ENCOUNTER (EMERGENCY)
Age: 63
Discharge: HOME | DRG: 070 | End: 2018-01-19
Attending: FAMILY MEDICINE
Payer: COMMERCIAL

## 2018-01-19 ENCOUNTER — APPOINTMENT (OUTPATIENT)
Dept: GENERAL RADIOLOGY | Age: 63
DRG: 070 | End: 2018-01-19
Payer: COMMERCIAL

## 2018-01-19 ENCOUNTER — APPOINTMENT (OUTPATIENT)
Dept: CT IMAGING | Age: 63
DRG: 070 | End: 2018-01-19
Payer: COMMERCIAL

## 2018-01-19 VITALS
OXYGEN SATURATION: 96 % | HEART RATE: 85 BPM | TEMPERATURE: 98.2 F | BODY MASS INDEX: 32.89 KG/M2 | SYSTOLIC BLOOD PRESSURE: 153 MMHG | RESPIRATION RATE: 17 BRPM | WEIGHT: 210 LBS | DIASTOLIC BLOOD PRESSURE: 98 MMHG

## 2018-01-19 DIAGNOSIS — R41.82 ALTERED MENTAL STATUS, UNSPECIFIED ALTERED MENTAL STATUS TYPE: Primary | ICD-10-CM

## 2018-01-19 DIAGNOSIS — R47.9 SPEECH DISTURBANCE, UNSPECIFIED TYPE: ICD-10-CM

## 2018-01-19 PROBLEM — G93.41 METABOLIC ENCEPHALOPATHY: Status: ACTIVE | Noted: 2018-01-19

## 2018-01-19 LAB
ALBUMIN SERPL-MCNC: 3.8 G/DL (ref 3.5–5.1)
ALBUMIN SERPL-MCNC: 4 G/DL (ref 3.5–5.1)
ALLEN TEST: POSITIVE
ALP BLD-CCNC: 164 U/L (ref 38–126)
ALP BLD-CCNC: 170 U/L (ref 38–126)
ALT SERPL-CCNC: 16 U/L (ref 11–66)
ALT SERPL-CCNC: 16 U/L (ref 11–66)
AMMONIA: 42 UMOL/L (ref 11–60)
ANION GAP SERPL CALCULATED.3IONS-SCNC: 15 MEQ/L (ref 8–16)
ANION GAP SERPL CALCULATED.3IONS-SCNC: 16 MEQ/L (ref 8–16)
ANISOCYTOSIS: ABNORMAL
ANISOCYTOSIS: ABNORMAL
APTT: 43.2 SECONDS (ref 22–38)
AST SERPL-CCNC: 15 U/L (ref 5–40)
AST SERPL-CCNC: 15 U/L (ref 5–40)
BACTERIA: ABNORMAL
BACTERIA: ABNORMAL
BASE EXCESS (CALCULATED): 0.4 MMOL/L (ref -2.5–2.5)
BASOPHILS # BLD: 0.4 %
BASOPHILS # BLD: 0.6 %
BASOPHILS ABSOLUTE: 0 THOU/MM3 (ref 0–0.1)
BASOPHILS ABSOLUTE: 0 THOU/MM3 (ref 0–0.1)
BILIRUB SERPL-MCNC: 0.3 MG/DL (ref 0.3–1.2)
BILIRUB SERPL-MCNC: 0.3 MG/DL (ref 0.3–1.2)
BILIRUBIN DIRECT: < 0.2 MG/DL (ref 0–0.3)
BILIRUBIN URINE: NEGATIVE
BILIRUBIN URINE: NEGATIVE
BLOOD, URINE: ABNORMAL
BLOOD, URINE: ABNORMAL
BUN BLDV-MCNC: 13 MG/DL (ref 7–22)
BUN BLDV-MCNC: 15 MG/DL (ref 7–22)
CALCIUM SERPL-MCNC: 8.9 MG/DL (ref 8.5–10.5)
CALCIUM SERPL-MCNC: 9.3 MG/DL (ref 8.5–10.5)
CASTS: ABNORMAL /LPF
CHARACTER, URINE: ABNORMAL
CHARACTER, URINE: ABNORMAL
CHLORIDE BLD-SCNC: 101 MEQ/L (ref 98–111)
CHLORIDE BLD-SCNC: 98 MEQ/L (ref 98–111)
CO2: 23 MEQ/L (ref 23–33)
CO2: 24 MEQ/L (ref 23–33)
COLLECTED BY:: NORMAL
COLOR: YELLOW
COLOR: YELLOW
CREAT SERPL-MCNC: 0.3 MG/DL (ref 0.4–1.2)
CREAT SERPL-MCNC: 0.4 MG/DL (ref 0.4–1.2)
CRYSTALS: ABNORMAL
CRYSTALS: ABNORMAL
DEVICE: NORMAL
EKG ATRIAL RATE: 74 BPM
EKG P AXIS: 46 DEGREES
EKG P-R INTERVAL: 142 MS
EKG Q-T INTERVAL: 400 MS
EKG QRS DURATION: 84 MS
EKG QTC CALCULATION (BAZETT): 444 MS
EKG R AXIS: 28 DEGREES
EKG T AXIS: 29 DEGREES
EKG VENTRICULAR RATE: 74 BPM
EOSINOPHIL # BLD: 4 %
EOSINOPHIL # BLD: 4.1 %
EOSINOPHILS ABSOLUTE: 0.2 THOU/MM3 (ref 0–0.4)
EOSINOPHILS ABSOLUTE: 0.3 THOU/MM3 (ref 0–0.4)
EPITHELIAL CELLS, UA: ABNORMAL /HPF
EPITHELIAL CELLS, UA: ABNORMAL /HPF
GFR SERPL CREATININE-BSD FRML MDRD: > 90 ML/MIN/1.73M2
GFR SERPL CREATININE-BSD FRML MDRD: > 90 ML/MIN/1.73M2
GLUCOSE BLD-MCNC: 111 MG/DL (ref 70–108)
GLUCOSE BLD-MCNC: 97 MG/DL (ref 70–108)
GLUCOSE, URINE: NEGATIVE MG/DL
GLUCOSE, URINE: NEGATIVE MG/DL
HCO3: 24 MMOL/L (ref 23–28)
HCT VFR BLD CALC: 44.4 % (ref 42–52)
HCT VFR BLD CALC: 45 % (ref 42–52)
HEMOGLOBIN: 14.5 GM/DL (ref 14–18)
HEMOGLOBIN: 14.7 GM/DL (ref 14–18)
INR BLD: 2.18 (ref 0.85–1.13)
KETONES, URINE: 15
KETONES, URINE: ABNORMAL
LACTIC ACID: 0.8 MMOL/L (ref 0.5–2.2)
LACTIC ACID: 1.3 MMOL/L (ref 0.5–2.2)
LEUKOCYTE ESTERASE, URINE: ABNORMAL
LEUKOCYTE ESTERASE, URINE: ABNORMAL
LIPASE: 33.6 U/L (ref 5.6–51.3)
LYMPHOCYTES # BLD: 21.5 %
LYMPHOCYTES # BLD: 26.7 %
LYMPHOCYTES ABSOLUTE: 1.5 THOU/MM3 (ref 1–4.8)
LYMPHOCYTES ABSOLUTE: 1.6 THOU/MM3 (ref 1–4.8)
MCH RBC QN AUTO: 28.3 PG (ref 27–31)
MCH RBC QN AUTO: 28.5 PG (ref 27–31)
MCHC RBC AUTO-ENTMCNC: 32.6 GM/DL (ref 33–37)
MCHC RBC AUTO-ENTMCNC: 32.6 GM/DL (ref 33–37)
MCV RBC AUTO: 86.8 FL (ref 80–94)
MCV RBC AUTO: 87.5 FL (ref 80–94)
MISCELLANEOUS LAB TEST RESULT: ABNORMAL
MISCELLANEOUS LAB TEST RESULT: ABNORMAL
MONOCYTES # BLD: 10.1 %
MONOCYTES # BLD: 7.7 %
MONOCYTES ABSOLUTE: 0.5 THOU/MM3 (ref 0.4–1.3)
MONOCYTES ABSOLUTE: 0.6 THOU/MM3 (ref 0.4–1.3)
MUCUS: ABNORMAL
NITRITE, URINE: NEGATIVE
NITRITE, URINE: NEGATIVE
NUCLEATED RED BLOOD CELLS: 0 /100 WBC
NUCLEATED RED BLOOD CELLS: 0 /100 WBC
O2 SATURATION: 96 %
OSMOLALITY CALCULATION: 276.4 MOSMOL/KG (ref 275–300)
OSMOLALITY CALCULATION: 278.3 MOSMOL/KG (ref 275–300)
PCO2: 37 MMHG (ref 35–45)
PDW BLD-RTO: 15.6 % (ref 11.5–14.5)
PDW BLD-RTO: 15.7 % (ref 11.5–14.5)
PH BLOOD GAS: 7.43 (ref 7.35–7.45)
PH UA: 6
PH UA: 6
PLATELET # BLD: 349 THOU/MM3 (ref 130–400)
PLATELET # BLD: 354 THOU/MM3 (ref 130–400)
PMV BLD AUTO: 6.5 MCM (ref 7.4–10.4)
PMV BLD AUTO: 6.6 MCM (ref 7.4–10.4)
PO2: 79 MMHG (ref 71–104)
POTASSIUM SERPL-SCNC: 3.7 MEQ/L (ref 3.5–5.2)
POTASSIUM SERPL-SCNC: 3.9 MEQ/L (ref 3.5–5.2)
PROCALCITONIN: 0.06 NG/ML (ref 0.01–0.09)
PROTEIN UA: 100 MG/DL
PROTEIN UA: 100 MG/DL
RBC # BLD: 5.12 MILL/MM3 (ref 4.7–6.1)
RBC # BLD: 5.15 MILL/MM3 (ref 4.7–6.1)
RBC URINE: > 100 /HPF
RBC URINE: > 200 /HPF
RENAL EPITHELIAL, UA: ABNORMAL
SEG NEUTROPHILS: 58.6 %
SEG NEUTROPHILS: 66.3 %
SEGMENTED NEUTROPHILS ABSOLUTE COUNT: 3.5 THOU/MM3 (ref 1.8–7.7)
SEGMENTED NEUTROPHILS ABSOLUTE COUNT: 4.7 THOU/MM3 (ref 1.8–7.7)
SODIUM BLD-SCNC: 138 MEQ/L (ref 135–145)
SODIUM BLD-SCNC: 139 MEQ/L (ref 135–145)
SOURCE, BLOOD GAS: NORMAL
SPECIFIC GRAVITY UA: 1.03 (ref 1–1.03)
SPECIFIC GRAVITY UA: >= 1.03 (ref 1–1.03)
TOTAL CK: 35 U/L (ref 55–170)
TOTAL PROTEIN: 7.2 G/DL (ref 6.1–8)
TOTAL PROTEIN: 7.8 G/DL (ref 6.1–8)
TROPONIN T: < 0.01 NG/ML
UROBILINOGEN, URINE: 0.2 EU/DL
UROBILINOGEN, URINE: 1 EU/DL
WBC # BLD: 5.9 THOU/MM3 (ref 4.8–10.8)
WBC # BLD: 7.1 THOU/MM3 (ref 4.8–10.8)
WBC UA: > 200 /HPF
WBC UA: > 200 /HPF
YEAST: ABNORMAL
YEAST: ABNORMAL

## 2018-01-19 PROCEDURE — A6258 TRANSPARENT FILM >16<=48 IN: HCPCS

## 2018-01-19 PROCEDURE — 70450 CT HEAD/BRAIN W/O DYE: CPT

## 2018-01-19 PROCEDURE — 80053 COMPREHEN METABOLIC PANEL: CPT

## 2018-01-19 PROCEDURE — 82140 ASSAY OF AMMONIA: CPT

## 2018-01-19 PROCEDURE — 84484 ASSAY OF TROPONIN QUANT: CPT

## 2018-01-19 PROCEDURE — 82803 BLOOD GASES ANY COMBINATION: CPT

## 2018-01-19 PROCEDURE — 36600 WITHDRAWAL OF ARTERIAL BLOOD: CPT

## 2018-01-19 PROCEDURE — 85025 COMPLETE CBC W/AUTO DIFF WBC: CPT

## 2018-01-19 PROCEDURE — 71045 X-RAY EXAM CHEST 1 VIEW: CPT

## 2018-01-19 PROCEDURE — 2580000003 HC RX 258: Performed by: FAMILY MEDICINE

## 2018-01-19 PROCEDURE — 82248 BILIRUBIN DIRECT: CPT

## 2018-01-19 PROCEDURE — 99285 EMERGENCY DEPT VISIT HI MDM: CPT

## 2018-01-19 PROCEDURE — 99223 1ST HOSP IP/OBS HIGH 75: CPT | Performed by: INTERNAL MEDICINE

## 2018-01-19 PROCEDURE — 93005 ELECTROCARDIOGRAM TRACING: CPT

## 2018-01-19 PROCEDURE — 85730 THROMBOPLASTIN TIME PARTIAL: CPT

## 2018-01-19 PROCEDURE — 2500000003 HC RX 250 WO HCPCS: Performed by: EMERGENCY MEDICINE

## 2018-01-19 PROCEDURE — 36415 COLL VENOUS BLD VENIPUNCTURE: CPT

## 2018-01-19 PROCEDURE — 81001 URINALYSIS AUTO W/SCOPE: CPT

## 2018-01-19 PROCEDURE — 96361 HYDRATE IV INFUSION ADD-ON: CPT

## 2018-01-19 PROCEDURE — 96374 THER/PROPH/DIAG INJ IV PUSH: CPT

## 2018-01-19 PROCEDURE — 83605 ASSAY OF LACTIC ACID: CPT

## 2018-01-19 PROCEDURE — 6360000002 HC RX W HCPCS: Performed by: FAMILY MEDICINE

## 2018-01-19 PROCEDURE — 85610 PROTHROMBIN TIME: CPT

## 2018-01-19 PROCEDURE — 96365 THER/PROPH/DIAG IV INF INIT: CPT

## 2018-01-19 PROCEDURE — 87081 CULTURE SCREEN ONLY: CPT

## 2018-01-19 PROCEDURE — 83690 ASSAY OF LIPASE: CPT

## 2018-01-19 PROCEDURE — 87147 CULTURE TYPE IMMUNOLOGIC: CPT

## 2018-01-19 PROCEDURE — 87040 BLOOD CULTURE FOR BACTERIA: CPT

## 2018-01-19 PROCEDURE — 1210000002 HC MED SURG R&B - NEUROSCIENCE

## 2018-01-19 PROCEDURE — 87086 URINE CULTURE/COLONY COUNT: CPT

## 2018-01-19 PROCEDURE — 84145 PROCALCITONIN (PCT): CPT

## 2018-01-19 PROCEDURE — 82550 ASSAY OF CK (CPK): CPT

## 2018-01-19 RX ORDER — SODIUM CHLORIDE 9 MG/ML
INJECTION, SOLUTION INTRAVENOUS CONTINUOUS
Status: DISCONTINUED | OUTPATIENT
Start: 2018-01-19 | End: 2018-01-19 | Stop reason: HOSPADM

## 2018-01-19 RX ORDER — ACETIC ACID 0.25 G/100ML
100 IRRIGANT IRRIGATION 3 TIMES DAILY
Status: DISCONTINUED | OUTPATIENT
Start: 2018-01-20 | End: 2018-01-23 | Stop reason: HOSPADM

## 2018-01-19 RX ORDER — BACLOFEN 10 MG/1
20 TABLET ORAL 3 TIMES DAILY
Status: DISCONTINUED | OUTPATIENT
Start: 2018-01-20 | End: 2018-01-23 | Stop reason: HOSPADM

## 2018-01-19 RX ORDER — GENTAMICIN SULFATE 1 MG/G
CREAM TOPICAL 2 TIMES DAILY
Status: DISCONTINUED | OUTPATIENT
Start: 2018-01-20 | End: 2018-01-23 | Stop reason: HOSPADM

## 2018-01-19 RX ORDER — ACETAMINOPHEN 325 MG/1
650 TABLET ORAL EVERY 4 HOURS PRN
Status: DISCONTINUED | OUTPATIENT
Start: 2018-01-19 | End: 2018-01-23 | Stop reason: HOSPADM

## 2018-01-19 RX ORDER — LABETALOL HYDROCHLORIDE 5 MG/ML
10 INJECTION, SOLUTION INTRAVENOUS ONCE
Status: COMPLETED | OUTPATIENT
Start: 2018-01-19 | End: 2018-01-19

## 2018-01-19 RX ORDER — ACETAMINOPHEN 325 MG/1
650 TABLET ORAL EVERY 4 HOURS PRN
Status: DISCONTINUED | OUTPATIENT
Start: 2018-01-19 | End: 2018-01-19 | Stop reason: SDUPTHER

## 2018-01-19 RX ORDER — SODIUM CHLORIDE 0.9 % (FLUSH) 0.9 %
10 SYRINGE (ML) INJECTION EVERY 12 HOURS SCHEDULED
Status: DISCONTINUED | OUTPATIENT
Start: 2018-01-19 | End: 2018-01-23 | Stop reason: HOSPADM

## 2018-01-19 RX ORDER — AMPICILLIN AND SULBACTAM 2; 1 G/1; G/1
3 INJECTION, POWDER, FOR SOLUTION INTRAMUSCULAR; INTRAVENOUS EVERY 6 HOURS
Status: DISCONTINUED | OUTPATIENT
Start: 2018-01-20 | End: 2018-01-19 | Stop reason: ALTCHOICE

## 2018-01-19 RX ORDER — DOCUSATE SODIUM 100 MG/1
100 CAPSULE, LIQUID FILLED ORAL 2 TIMES DAILY
Status: DISCONTINUED | OUTPATIENT
Start: 2018-01-20 | End: 2018-01-23 | Stop reason: HOSPADM

## 2018-01-19 RX ORDER — FLUOXETINE 10 MG/1
10 CAPSULE ORAL DAILY
Status: DISCONTINUED | OUTPATIENT
Start: 2018-01-20 | End: 2018-01-23 | Stop reason: HOSPADM

## 2018-01-19 RX ORDER — 0.9 % SODIUM CHLORIDE 0.9 %
500 INTRAVENOUS SOLUTION INTRAVENOUS ONCE
Status: COMPLETED | OUTPATIENT
Start: 2018-01-19 | End: 2018-01-19

## 2018-01-19 RX ORDER — SODIUM CHLORIDE 0.9 % (FLUSH) 0.9 %
10 SYRINGE (ML) INJECTION PRN
Status: DISCONTINUED | OUTPATIENT
Start: 2018-01-19 | End: 2018-01-23 | Stop reason: HOSPADM

## 2018-01-19 RX ORDER — ONDANSETRON 2 MG/ML
4 INJECTION INTRAMUSCULAR; INTRAVENOUS EVERY 6 HOURS PRN
Status: DISCONTINUED | OUTPATIENT
Start: 2018-01-19 | End: 2018-01-23 | Stop reason: HOSPADM

## 2018-01-19 RX ORDER — LACTOBACILLUS RHAMNOSUS GG 10B CELL
1 CAPSULE ORAL 2 TIMES DAILY WITH MEALS
Status: DISCONTINUED | OUTPATIENT
Start: 2018-01-20 | End: 2018-01-23 | Stop reason: HOSPADM

## 2018-01-19 RX ORDER — ZINC SULFATE 50(220)MG
220 CAPSULE ORAL DAILY
Status: DISCONTINUED | OUTPATIENT
Start: 2018-01-20 | End: 2018-01-23 | Stop reason: HOSPADM

## 2018-01-19 RX ADMIN — SODIUM CHLORIDE: 900 INJECTION INTRAVENOUS at 05:08

## 2018-01-19 RX ADMIN — PIPERACILLIN SODIUM,TAZOBACTAM SODIUM 3.38 G: 3; .375 INJECTION, POWDER, FOR SOLUTION INTRAVENOUS at 05:08

## 2018-01-19 RX ADMIN — LABETALOL HYDROCHLORIDE 10 MG: 5 INJECTION INTRAVENOUS at 20:32

## 2018-01-19 RX ADMIN — SODIUM CHLORIDE 500 ML: 900 INJECTION INTRAVENOUS at 03:57

## 2018-01-19 ASSESSMENT — ENCOUNTER SYMPTOMS
ABDOMINAL PAIN: 0
EYE DISCHARGE: 0
DIARRHEA: 0
COUGH: 1

## 2018-01-19 NOTE — ED PROVIDER NOTES
ADDENDUM:  Care of this patient was assumed from Lexi Felipe MD.  The patient's initial presenting complaint was Altered Mental Status     The patient's initial evaluation and plan have been discussed with the prior provider who initially evaluated the patient. Nursing Notes, Past Medical Hx, Past Surgical Hx, Social Hx, Allergies, and Family Hx were all reviewed. Blood pressure (!) 153/98, pulse 85, temperature 98.2 °F (36.8 °C), temperature source Oral, resp. rate 17, weight 210 lb (95.3 kg), SpO2 96 %.     RESULTS:  Results for orders placed or performed during the hospital encounter of 01/19/18   CBC auto differential   Result Value Ref Range    WBC 7.1 4.8 - 10.8 thou/mm3    RBC 5.15 4.70 - 6.10 mill/mm3    Hemoglobin 14.7 14.0 - 18.0 gm/dl    Hematocrit 45.0 42.0 - 52.0 %    MCV 87.5 80.0 - 94.0 fL    MCH 28.5 27.0 - 31.0 pg    MCHC 32.6 (L) 33.0 - 37.0 gm/dl    RDW 15.6 (H) 11.5 - 14.5 %    Platelets 004 664 - 048 thou/mm3    MPV 6.6 (L) 7.4 - 10.4 mcm    Seg Neutrophils 66.3 %    Lymphocytes 21.5 %    Monocytes 7.7 %    Eosinophils 4.1 %    Basophils 0.4 %    nRBC 0 /100 wbc    Anisocytosis 1+ Absent    Segs Absolute 4.7 1.8 - 7.7 thou/mm3    Lymphocytes # 1.5 1.0 - 4.8 thou/mm3    Monocytes # 0.5 0.4 - 1.3 thou/mm3    Eosinophils # 0.3 0.0 - 0.4 thou/mm3    Basophils # 0.0 0.0 - 0.1 thou/mm3   Protime-INR   Result Value Ref Range    INR 2.18 (H) 0.85 - 1.13   APTT   Result Value Ref Range    aPTT 43.2 (H) 22.0 - 38.0 seconds   Basic Metabolic Panel   Result Value Ref Range    Sodium 138 135 - 145 meq/L    Potassium 3.7 3.5 - 5.2 meq/L    Chloride 98 98 - 111 meq/L    CO2 24 23 - 33 meq/L    Glucose 97 70 - 108 mg/dL    BUN 15 7 - 22 mg/dL    CREATININE 0.3 (L) 0.4 - 1.2 mg/dL    Calcium 9.3 8.5 - 10.5 mg/dL   Hepatic function panel   Result Value Ref Range    Alb 3.8 3.5 - 5.1 g/dL    Total Bilirubin 0.3 0.3 - 1.2 mg/dL    Bilirubin, Direct <0.2 0.0 - 0.3 mg/dL    Alkaline Phosphatase 164 (H)
TempSrc: Oral      SpO2: 96% 96% 98% 98%   Weight: 210 lb (95.3 kg)        Nursing notes reviewed    IV hydration    Shearin from the suprapubic catheter is cloudy and does show pyuria    Given Zosyn IV    WBC normal 7100    Hemoglobin 14.7    Renal function normal    Blood sugar 97    Troponin and CK were both normal    procalcitonin normal    Lactic acid normal    ABG showed good oxygenation and ventilation with normal pH    Chest x-ray shows no evidence of pneumonia    CT scan of the head revealed sinusitis    At this point seems to be medically stable    Return to the nursing home    Continue on his IV antibiotics      CRITICAL CARE:   none    CONSULTS:  None      PROCEDURES:  None    FINAL IMPRESSION      1.  Altered mental status, unspecified altered mental status type          DISPOSITION/PLAN     Discharge    PATIENT REFERRED TO:  Desiree Stacy, [address] [place] [address]  [phone]    In 1 week        DISCHARGE MEDICATIONS:  New Prescriptions    No medications on file       (Please note that portions of this note were completed with a voice recognition program.  Efforts were made to edit the dictations but occasionally words are mis-transcribed.)    MD Carol Oswald MD  01/19/18 [id number]

## 2018-01-19 NOTE — ED NOTES
Pt resting on cot in semi-niño's position. Pt's eyes closed. Breathing easy and unlabored. No visible signs of distress noted. Will continue to monitor for safety and comfort.       Michael Matthews RN  01/19/18 6839

## 2018-01-20 ENCOUNTER — APPOINTMENT (OUTPATIENT)
Dept: MRI IMAGING | Age: 63
DRG: 070 | End: 2018-01-20
Payer: COMMERCIAL

## 2018-01-20 LAB
ANION GAP SERPL CALCULATED.3IONS-SCNC: 12 MEQ/L (ref 8–16)
BUN BLDV-MCNC: 11 MG/DL (ref 7–22)
CALCIUM SERPL-MCNC: 8.9 MG/DL (ref 8.5–10.5)
CHLORIDE BLD-SCNC: 101 MEQ/L (ref 98–111)
CO2: 26 MEQ/L (ref 23–33)
CREAT SERPL-MCNC: 0.3 MG/DL (ref 0.4–1.2)
GFR SERPL CREATININE-BSD FRML MDRD: > 90 ML/MIN/1.73M2
GLUCOSE BLD-MCNC: 102 MG/DL (ref 70–108)
ORGANISM: ABNORMAL
POTASSIUM SERPL-SCNC: 3.4 MEQ/L (ref 3.5–5.2)
SODIUM BLD-SCNC: 139 MEQ/L (ref 135–145)
URINE CULTURE, ROUTINE: ABNORMAL
URINE CULTURE, ROUTINE: ABNORMAL

## 2018-01-20 PROCEDURE — 36415 COLL VENOUS BLD VENIPUNCTURE: CPT

## 2018-01-20 PROCEDURE — 6360000002 HC RX W HCPCS: Performed by: INTERNAL MEDICINE

## 2018-01-20 PROCEDURE — 99232 SBSQ HOSP IP/OBS MODERATE 35: CPT | Performed by: INTERNAL MEDICINE

## 2018-01-20 PROCEDURE — 2500000003 HC RX 250 WO HCPCS: Performed by: INTERNAL MEDICINE

## 2018-01-20 PROCEDURE — 2580000003 HC RX 258: Performed by: INTERNAL MEDICINE

## 2018-01-20 PROCEDURE — 80048 BASIC METABOLIC PNL TOTAL CA: CPT

## 2018-01-20 PROCEDURE — 6360000004 HC RX CONTRAST MEDICATION: Performed by: INTERNAL MEDICINE

## 2018-01-20 PROCEDURE — A9579 GAD-BASE MR CONTRAST NOS,1ML: HCPCS | Performed by: INTERNAL MEDICINE

## 2018-01-20 PROCEDURE — 70553 MRI BRAIN STEM W/O & W/DYE: CPT

## 2018-01-20 PROCEDURE — 1210000002 HC MED SURG R&B - NEUROSCIENCE

## 2018-01-20 PROCEDURE — A6223 GAUZE >16<=48 NO W/SAL W/O B: HCPCS

## 2018-01-20 PROCEDURE — 6370000000 HC RX 637 (ALT 250 FOR IP): Performed by: INTERNAL MEDICINE

## 2018-01-20 RX ORDER — HYDRALAZINE HYDROCHLORIDE 20 MG/ML
10 INJECTION INTRAMUSCULAR; INTRAVENOUS EVERY 6 HOURS PRN
Status: DISCONTINUED | OUTPATIENT
Start: 2018-01-20 | End: 2018-01-23 | Stop reason: HOSPADM

## 2018-01-20 RX ADMIN — Medication 10 ML: at 14:57

## 2018-01-20 RX ADMIN — Medication 1 CAPSULE: at 17:17

## 2018-01-20 RX ADMIN — FLUOXETINE 10 MG: 10 CAPSULE ORAL at 09:31

## 2018-01-20 RX ADMIN — BACLOFEN 20 MG: 10 TABLET ORAL at 21:16

## 2018-01-20 RX ADMIN — GENTAMICIN SULFATE: 1 CREAM TOPICAL at 21:16

## 2018-01-20 RX ADMIN — Medication 10 ML: at 21:17

## 2018-01-20 RX ADMIN — Medication 3 G: at 14:57

## 2018-01-20 RX ADMIN — Medication 3 G: at 03:30

## 2018-01-20 RX ADMIN — GENTAMICIN SULFATE: 1 CREAM TOPICAL at 09:32

## 2018-01-20 RX ADMIN — GADOTERIDOL 20 ML: 279.3 INJECTION, SOLUTION INTRAVENOUS at 14:24

## 2018-01-20 RX ADMIN — BACLOFEN 20 MG: 10 TABLET ORAL at 14:53

## 2018-01-20 RX ADMIN — DOCUSATE SODIUM 100 MG: 100 CAPSULE ORAL at 09:31

## 2018-01-20 RX ADMIN — DOCUSATE SODIUM 100 MG: 100 CAPSULE ORAL at 21:16

## 2018-01-20 RX ADMIN — ZINC SULFATE 220 MG (50 MG) CAPSULE 220 MG: CAPSULE at 09:31

## 2018-01-20 RX ADMIN — Medication 10 ML: at 03:31

## 2018-01-20 RX ADMIN — ACETIC ACID 100 ML: 250 IRRIGANT IRRIGATION at 21:17

## 2018-01-20 RX ADMIN — ACETIC ACID 100 ML: 250 IRRIGANT IRRIGATION at 10:36

## 2018-01-20 RX ADMIN — Medication 10 ML: at 09:31

## 2018-01-20 RX ADMIN — RIVAROXABAN 15 MG: 15 TABLET, FILM COATED ORAL at 09:31

## 2018-01-20 RX ADMIN — Medication 1 CAPSULE: at 09:31

## 2018-01-20 RX ADMIN — ACETIC ACID 100 ML: 250 IRRIGANT IRRIGATION at 16:10

## 2018-01-20 RX ADMIN — Medication 3 G: at 21:16

## 2018-01-20 RX ADMIN — BACLOFEN 20 MG: 10 TABLET ORAL at 09:30

## 2018-01-20 RX ADMIN — RIVAROXABAN 15 MG: 15 TABLET, FILM COATED ORAL at 17:17

## 2018-01-20 RX ADMIN — Medication 3 G: at 09:31

## 2018-01-20 ASSESSMENT — PAIN SCALES - GENERAL: PAINLEVEL_OUTOF10: 0

## 2018-01-20 NOTE — ED PROVIDER NOTES
[facility] 4A      CHIEF COMPLAINT       Chief Complaint   Patient presents with    Altered Mental Status       Nurses Notes reviewed and I agree except as noted in the HPI. HISTORY OF PRESENT ILLNESS   Alvarado Jasso is a 63 y.o. male with a past medical history of MS, PE, chronic osteomyelitis, and recurrent UTIs who presents to the ED via EMS for evaluation of AMS. He currently takes Unasyn q6hrs via his PICC line secondary to chronic osteomyelitis. The patient is on Xarelto due to a PE diagnosed on 12/30. The patient was seen in the ED this morning for similar symptoms. An extensive workup was completed at that time and unremarkable. He was discharged back to the nursing home after the workup was completed. I spoke with the nurse who initiated the patient's current visit and she stated since patient was worked up initially today and discharge back to facility he has had episodes of speech slurring, difficulty getting words out, and erratic behavior, which is not typical of himself. No associated focal neurological deficits were noted. The patient reports that he is feeling confusion, however he denies any symptoms associated with this. He denies any pain at this time, no chest pain, abdominal pain, back pain, or urinary symptoms. No shortness of breath, headaches, or visual changes.          REVIEW OF SYSTEMS     (Bold = Positive)  Constitutional:  Fever, Chills, Anorexia, Weight Loss, Malaise,  Respiratory:  Cough, Hemoptysis, Wheezing, Shortness of Breath   Cardiac:  Chest Pain, Dyspnea on Exertion, Orthopnea, PND, Dyspnea, Palpitations  Gastrointestinal:  Nausea, Vomiting, Diarrhea, Abd Pain, Constipation, GI Bleeding   Musculoskeletal:  Decreased ROM, Pain, Swelling, Stiffness, Weakness  Neurological:  Dizziness, Confusion, Headache, Seizures, Syncope, LOC  Genitourinary:  Discharge, Dysuria, Flank Pain, Frequency, Hematuria  Eyes:  Blurry Vision, Diploplia, Redness, Vision Loss or changes  ENT:  Nasal Congestion, Ear Pain, Mouth Pain, Throat Pain  Psych: Agitation, Confusion, Altered State    PAST MEDICAL HISTORY    has a past medical history of MS (multiple sclerosis) ([facility] [address] Neurogenic bladder; and UTI (urinary tract infection). SURGICAL HISTORY      has a past surgical history that includes Tonsillectomy (child); Ankle surgery (1996); Bladder surgery (2-); and Colonoscopy. CURRENT MEDICATIONS       Current Discharge Medication List      CONTINUE these medications which have NOT CHANGED    Details   rivaroxaban (XARELTO STARTER PACK) 15 & 20 MG Starter Pack Take 15 mg twice daily for 21 days then take 20 mg daily for total of 3 months  Qty: 1 Package, Refills: 0      docusate sodium (COLACE) 100 MG capsule Take 100 mg by mouth 2 times daily      oxybutynin (DITROPAN-XL) 10 MG extended release tablet Take 10 mg by mouth daily      Resource Instant Protein POWD Take by mouth      gentamicin (GARAMYCIN) 0.1 % cream Apply topically 2 times daily Apply topically 2 times daily. ampicillin-sulbactam (UNASYN) 3 (2-1) g SOLR Infuse intravenously every 6 hours      sodium chloride 0.9 % SOLN 50 mL with ertapenem 1 GM SOLR 1 g Infuse 1 g intravenously every 24 hours      HYDROcodone-acetaminophen (NORCO) 5-325 MG per tablet Take 1 tablet by mouth every 6 hours as needed for Pain.       Diaper Rash Products (PINXAV) OINT Apply topically      tiZANidine (ZANAFLEX) 2 MG tablet Take 2 mg by mouth 3 times daily       ascorbic acid (VITAMIN C) 500 MG tablet Take 1 tablet by mouth daily  Qty: 30 tablet, Refills: 3      Multiple Vitamin (MULTIVITAMIN) tablet Take 1 tablet by mouth daily  Refills: 0      potassium chloride (K-DUR) 10 MEQ tablet Take 1 tablet by mouth daily  Qty: 60 tablet, Refills: 3      Acetaminophen 650 MG TABS Take 650 mg by mouth every 4 hours as needed  Qty: 120 tablet, Refills: 3      ondansetron (ZOFRAN) 4 MG/2ML injection Infuse 2 mLs NEGATIVE    pH, UA 6.0 5.0 - 9.0    Protein,  (A) NEGATIVE mg/dl    Urobilinogen, Urine 0.2 0.0 - 1.0 eu/dl    Nitrite, Urine NEGATIVE NEGATIVE    Leukocyte Esterase, Urine MODERATE (A) NEGATIVE    Color, UA YELLOW YELLOW-STR    Character, Urine CLOUDY (A) [id number]-SL.[id number]    RBC, UA > 200 0-2/hpf /hpf    WBC, UA > 200 0-4/hpf /hpf    Epi Cells 0-2 3-5/hpf /hpf    Mucus, UA THREADS NONE SEEN/    Bacteria, UA NONE FEW/NONE S    Casts NONE SEEN NONE SEEN /lpf    Crystals NONE SEEN NONE SEEN    Yeast, UA MOD BUDDING NONE SEEN   Lactic Acid, Plasma   Result Value Ref Range    Lactic Acid 1.3 0.5 - 2.2 mmol/L   Anion Gap   Result Value Ref Range    Anion Gap 15.0 8.0 - 16.0 meq/L   Glomerular Filtration Rate, Estimated   Result Value Ref Range    Est, Glom Filt Rate >90 ml/min/1.73m2   Osmolality   Result Value Ref Range    Osmolality Calc 278.3 275.0 - 300 mOsmol/kg   Ammonia   Result Value Ref Range    Ammonia 42 11 - 60 umol/L       EMERGENCY DEPARTMENT COURSE/ MEDICAL DECISION MAKING   Vitals:    Vitals:    01/19/18 1846 01/19/18 2030 01/19/18 2135 01/19/18 2245   BP: (!) 181/79 (!) 175/98 139/87 (!) 160/79   Pulse: 88 92 80 76   Resp: 18 20 20 18   Temp: 98.8 °F (37.1 °C)   97.9 °F (36.6 °C)   TempSrc: Oral   Oral   SpO2: 96% 97% 94% 96%   Weight: 210 lb (95.3 kg)   205 lb 8 oz (93.2 kg)   Height: 5' 7\" (1.702 m)          Patient was seen and evaluated upon presentation to the emergency department for AMS. The patient presented to the ED in no acute distress, he was alert and orientated on arrival. The patient did not have any focal neurological deficits on exam, however was slluggish to respond but appropriate with a mild speech abnormality. The patient's nursing home staff was contacted, who reported the patient had an episode speech slurring, difficulty getting words out, and erratic behavior. The patient was hypertensive in the ED, due to the speech changes and hypertension, a head CT was completed.  He was given 10mg Labetalol due to his elevated blood pressure with good improvement. His head CT was negative for acute intracranial etiologies. There is no signs of infection present on his lab results. Due to the speech change, the patient will be admitted for additional workup including an MRI with concern for possible CVA. The patient was admitted under the care of Dr. Melvin You [facility]) in stable condition. CRITICAL CARE:   None    CONSULTS:  Dr. Melvin You (Hospitalist)     PROCEDURES:  None    FINAL IMPRESSION      1. Altered mental status, unspecified altered mental status type    2. Speech disturbance, unspecified type          DISPOSITION/PLAN     DISPOSITION     PATIENT REFERRED TO:  MD Tree Castano [address]  [phone]            DISCHARGE MEDICATIONS:  Current Discharge Medication List          (Please note that portions of this note were completed with a voice recognition program.  Efforts were made to edit the dictations but occasionally words are mis-transcribed.)      Scribe: Judy Gonzales 1/19/18 7:05 PM Scribing for and in the presence of DO Nicky. Signed by: Taj Juan, 01/19/18 11:42 PM    Provider:  I personally performed the services described in the documentation, reviewed and edited the documentation which was dictated to the scribe in my presence, and it accurately records my words and actions.     DO Nicky 1/19/18 11:42 PM             DO Nicky  Resident  01/19/18 [id number]

## 2018-01-20 NOTE — PROGRESS NOTES
motion without deformity. Skin: Sacral decubitus  Neurologic:  Grossly non-focal.  Psychiatric: Alert and oriented, thought content appropriate, normal insight  Capillary Refill: Brisk,< 3 seconds   Peripheral Pulses: +2 palpable, equal bilaterally       Labs:   Recent Labs      01/19/18 0358 01/19/18 2032   WBC  7.1  5.9   HGB  14.7  14.5   HCT  45.0  44.4   PLT  349  354     Recent Labs      01/19/18 0358  01/19/18 2032 01/20/18   0827   NA  138  139  139   K  3.7  3.9  3.4*   CL  98  101  101   CO2  24  23  26   BUN  15  13  11   CREATININE  0.3*  0.4  0.3*   CALCIUM  9.3  8.9  8.9     Recent Labs      01/19/18 0358 01/19/18 2032   AST  15  15   ALT  16  16   BILIDIR  <0.2   --    BILITOT  0.3  0.3   ALKPHOS  164*  170*     Recent Labs      01/19/18 0358   INR  2.18*     Recent Labs      01/19/18 0358   CKTOTAL  35*       Urinalysis:      Lab Results   Component Value Date    NITRU NEGATIVE 01/19/2018    WBCUA > 200 01/19/2018    WBCUA >200 01/20/2012    BACTERIA NONE 01/19/2018    RBCUA > 200 01/19/2018    BLOODU LARGE 01/19/2018    SPECGRAV >=1.030 01/19/2018    GLUCOSEU NEGATIVE 12/31/2017       Radiology:  CT Head WO Contrast   Final Result   1. No acute intracranial process. No change from prior study. 2. Pansinusitis which may have both acute and chronic components. Findings also unchanged from earlier study. **This report has been created using voice recognition software. It may contain minor errors which are inherent in voice recognition technology. **      Final report electronically signed by Dr. Nesha Javed on 1/19/2018 8:27 PM      XR CHEST PORTABLE   Final Result   1. Very poor inflation of lungs. Mild/moderate cardiomegaly. Moderate gas distention of the colon, suggesting possible colonic ileus. 2. No acute infiltrates or effusions are seen. **This report has been created using voice recognition software.   It may contain minor errors which are

## 2018-01-20 NOTE — PROGRESS NOTES
Nutrition Assessment    Type and Reason for Visit: Initial, Positive Nutrition Screen (non healing wound)    Nutrition Recommendations: Continue diet,ONS and zinc as ordered. Malnutrition Assessment:  · Malnutrition Status: No malnutrition    Nutrition Diagnosis:   · Problem: Increased nutrient needs  · Etiology: related to Increased demand for energy/nutrients due to     Signs and symptoms:  as evidenced by Presence of wounds    Nutrition Assessment:  · Subjective Assessment: Pt. seen; appetite is good per pt.; chronic stage 3 on buttocks that is not healing well per RN; admit from ECF with AMS and slurred speech; Hx MS; ammonia 42, glucose 102; meds include ATB,colace,culturelle,zinc; BM x2; discussed ONS options with pt. and encourgaed protein for wound healing  · Wound Type: Stage III, Pressure Ulcer (buttocks)  · Current Nutrition Therapies:  · Oral Diet Orders: General   · Oral Diet intake: 51-75%  · Oral Nutrition Supplement (ONS) Orders: Low Calorie, High Protein (Ensure HIgh Protein BID (160 kcals, 16 grams protein/serving))  · ONS intake: Unable to assess (starting today)  · Anthropometric Measures:  · Ht: 5' 7\" (170.2 cm)   · Current Body Wt: 205 lb (93 kg) (1/19 with +2 edema)  · Admission Body Wt: 205 lb (93 kg) (1/19 with +2 edema)  · Usual Body Wt: 209 lb (94.8 kg) (12/30/17 per EMR)  · Ideal Body Wt: 148 lb (67.1 kg), % Ideal Body 141%  · Adjusted Body Wt: 162 lb (73.5 kg), body weight adjusted for Obesity  · BMI Classification: BMI 30.0 - 34.9 Obese Class I (32.3)  · Comparative Standards (Estimated Nutrition Needs):  · Estimated Daily Total Kcal: 2044  · Estimated Daily Protein (g):     Estimated Intake vs Estimated Needs: Intake Less Than Needs    Nutrition Risk Level:  Moderate    Nutrition Interventions:   Continue current diet, Start ONS  Continued Inpatient Monitoring, Education Initiated (encouraged protein sources for wound healing)    Nutrition Evaluation:   · Evaluation: Goals

## 2018-01-20 NOTE — H&P
intravenously every 6 hours    Historical Provider, MD   sodium chloride 0.9 % SOLN 50 mL with ertapenem 1 GM SOLR 1 g Infuse 1 g intravenously every 24 hours    Historical Provider, MD   HYDROcodone-acetaminophen (NORCO) 5-325 MG per tablet Take 1 tablet by mouth every 6 hours as needed for Pain.     Historical Provider, MD   Diaper Rash Products (PINXAV) OINT Apply topically    Historical Provider, MD   tiZANidine (ZANAFLEX) 2 MG tablet Take 2 mg by mouth 3 times daily  12/15/16   Historical Provider, MD   ascorbic acid (VITAMIN C) 500 MG tablet Take 1 tablet by mouth daily 3/28/16   Zack Philip MD   Multiple Vitamin (MULTIVITAMIN) tablet Take 1 tablet by mouth daily 3/28/16   Zack Philip MD   potassium chloride (K-DUR) 10 MEQ tablet Take 1 tablet by mouth daily 3/28/16   Zack Philip MD   Acetaminophen 650 MG TABS Take 650 mg by mouth every 4 hours as needed 3/28/16   Zack Philip MD   ondansetron [facility]) 4 MG/2ML injection Infuse 2 mLs intravenously every 6 hours as needed for Nausea 3/28/16   Zack Philip MD   lactobacillus (CULTURELLE) capsule Take 1 capsule by mouth 2 times daily (with meals) 3/28/16   Zack Philip MD   vitamin A 52465 UNITS capsule Take 2 capsules by mouth daily  Patient taking differently: Take 10,000 Units by mouth daily  3/28/16   Zack Philip MD   zinc sulfate (ZINCATE) 220 MG capsule Take 1 capsule by mouth daily 3/28/16   Zack Philip MD   vitamin E 400 UNIT capsule Take 1 capsule by mouth daily 3/28/16   Zack Philip MD   baclofen (LIORESAL) 20 MG tablet Take 1 tablet by mouth 3 times daily 3/28/16   Zack Philip MD   acetic acid 0.25 % irrigation Irrigate with 100 mLs as directed 3 times daily     Historical Provider, MD   FLUoxetine (PROZAC) 20 MG capsule Take 10 mg by mouth daily     Historical Provider, MD   loperamide (IMODIUM) 2 MG capsule Take 2 mg by mouth 4 times daily as needed for NA  138  139   K  3.7  3.9   CL  98  101   CO2  24  23   BUN  15  13   CREATININE  0.3*  0.4   CALCIUM  9.3  8.9     Recent Labs      01/19/18   0358  01/19/18 2032   AST  15  15   ALT  16  16   BILIDIR  <0.2   --    BILITOT  0.3  0.3   ALKPHOS  164*  170*     Recent Labs      01/19/18   0358   INR  2.18*     Recent Labs      01/19/18   0358   CKTOTAL  35*       Urinalysis:      Lab Results   Component Value Date    NITRU NEGATIVE 01/19/2018    WBCUA > 200 01/19/2018    WBCUA >200 01/20/2012    BACTERIA NONE 01/19/2018    RBCUA > 200 01/19/2018    BLOODU LARGE 01/19/2018    SPECGRAV >=1.030 01/19/2018    GLUCOSEU NEGATIVE 12/31/2017       Radiology:     CXR: I have reviewed the CXR with the following interpretation: no acute disease  EKG:  I have reviewed the EKG with the following interpretation: no acute change    CT Head WO Contrast   Final Result   1. No acute intracranial process. No change from prior study. 2. Pansinusitis which may have both acute and chronic components. Findings also unchanged from earlier study. **This report has been created using voice recognition software. It may contain minor errors which are inherent in voice recognition technology. **      Final report electronically signed by Dr. Edda Gerard on 1/19/2018 8:27 PM      XR CHEST PORTABLE   Final Result   1. Very poor inflation of lungs. Mild/moderate cardiomegaly. Moderate gas distention of the colon, suggesting possible colonic ileus. 2. No acute infiltrates or effusions are seen. **This report has been created using voice recognition software. It may contain minor errors which are inherent in voice recognition technology. **      Final report electronically signed by Dr. Edda Gerard on 1/19/2018 7:58 PM               DVT prophylaxis: [] Lovenox                                 [] SCDs                                 [] SQ Heparin                                 [] Encourage ambulation           [x] Already on Anticoagulation    Code Status: Prior      Disposition:    [] Home       [] TCU       [] Rehab       [] Psych       [x] SNF       [] [place]       [] Other-    ASSESSMENT:    C/Christina Whitley 110[address] Problems    Diagnosis Date Noted    Metabolic encephalopathy [F81.14] 01/19/2018    MS (multiple sclerosis) ([facility] [address] Dorina Santacruz 03/14/2012       PLAN:  1. Altered mental status and speech changes; r/o metabolic encephalopathy vs structural/vascular cause- MRI, MRA, EEG, NEURO CONSULT  2. MS    Thank you Denis Vance MD for the opportunity to be involved in this patient's care.     Electronically signed by Abdulkadir Tapia MD on 1/19/2018 at 10:36 PM

## 2018-01-20 NOTE — ED NOTES
Patient transported to  [facility] by cart in stable condition. Patient monitored on cardiac telemetry. .   IV line is patent.         Jose Wheeler, EMT-P  01/19/18 2931

## 2018-01-21 LAB
ANION GAP SERPL CALCULATED.3IONS-SCNC: 11 MEQ/L (ref 8–16)
ANISOCYTOSIS: ABNORMAL
BASOPHILS # BLD: 1.1 %
BASOPHILS ABSOLUTE: 0.1 THOU/MM3 (ref 0–0.1)
BUN BLDV-MCNC: 11 MG/DL (ref 7–22)
CALCIUM SERPL-MCNC: 8.8 MG/DL (ref 8.5–10.5)
CHLORIDE BLD-SCNC: 103 MEQ/L (ref 98–111)
CO2: 27 MEQ/L (ref 23–33)
CREAT SERPL-MCNC: 0.3 MG/DL (ref 0.4–1.2)
EOSINOPHIL # BLD: 6 %
EOSINOPHILS ABSOLUTE: 0.4 THOU/MM3 (ref 0–0.4)
GFR SERPL CREATININE-BSD FRML MDRD: > 90 ML/MIN/1.73M2
GLUCOSE BLD-MCNC: 100 MG/DL (ref 70–108)
HCT VFR BLD CALC: 42.2 % (ref 42–52)
HEMOGLOBIN: 14.5 GM/DL (ref 14–18)
LYMPHOCYTES # BLD: 26.1 %
LYMPHOCYTES ABSOLUTE: 1.6 THOU/MM3 (ref 1–4.8)
MCH RBC QN AUTO: 30 PG (ref 27–31)
MCHC RBC AUTO-ENTMCNC: 34.4 GM/DL (ref 33–37)
MCV RBC AUTO: 87.2 FL (ref 80–94)
MONOCYTES # BLD: 9.7 %
MONOCYTES ABSOLUTE: 0.6 THOU/MM3 (ref 0.4–1.3)
MRSA SCREEN: NORMAL
NUCLEATED RED BLOOD CELLS: 0 /100 WBC
PDW BLD-RTO: 15.6 % (ref 11.5–14.5)
PLATELET # BLD: 249 THOU/MM3 (ref 130–400)
PMV BLD AUTO: 7.2 MCM (ref 7.4–10.4)
POTASSIUM SERPL-SCNC: 3.4 MEQ/L (ref 3.5–5.2)
RBC # BLD: 4.84 MILL/MM3 (ref 4.7–6.1)
SEG NEUTROPHILS: 57.1 %
SEGMENTED NEUTROPHILS ABSOLUTE COUNT: 3.5 THOU/MM3 (ref 1.8–7.7)
SODIUM BLD-SCNC: 141 MEQ/L (ref 135–145)
WBC # BLD: 6.1 THOU/MM3 (ref 4.8–10.8)

## 2018-01-21 PROCEDURE — 6370000000 HC RX 637 (ALT 250 FOR IP): Performed by: INTERNAL MEDICINE

## 2018-01-21 PROCEDURE — 6360000002 HC RX W HCPCS: Performed by: INTERNAL MEDICINE

## 2018-01-21 PROCEDURE — 2500000003 HC RX 250 WO HCPCS: Performed by: INTERNAL MEDICINE

## 2018-01-21 PROCEDURE — 1210000002 HC MED SURG R&B - NEUROSCIENCE

## 2018-01-21 PROCEDURE — 36415 COLL VENOUS BLD VENIPUNCTURE: CPT

## 2018-01-21 PROCEDURE — 80048 BASIC METABOLIC PNL TOTAL CA: CPT

## 2018-01-21 PROCEDURE — 85025 COMPLETE CBC W/AUTO DIFF WBC: CPT

## 2018-01-21 PROCEDURE — 2580000003 HC RX 258: Performed by: INTERNAL MEDICINE

## 2018-01-21 PROCEDURE — 99232 SBSQ HOSP IP/OBS MODERATE 35: CPT | Performed by: INTERNAL MEDICINE

## 2018-01-21 PROCEDURE — 99223 1ST HOSP IP/OBS HIGH 75: CPT | Performed by: PSYCHIATRY & NEUROLOGY

## 2018-01-21 RX ADMIN — Medication 3 G: at 01:43

## 2018-01-21 RX ADMIN — Medication 1 CAPSULE: at 08:38

## 2018-01-21 RX ADMIN — Medication 3 G: at 16:21

## 2018-01-21 RX ADMIN — ACETIC ACID 100 ML: 250 IRRIGANT IRRIGATION at 21:31

## 2018-01-21 RX ADMIN — ACETIC ACID 100 ML: 250 IRRIGANT IRRIGATION at 16:21

## 2018-01-21 RX ADMIN — DOCUSATE SODIUM 100 MG: 100 CAPSULE ORAL at 21:31

## 2018-01-21 RX ADMIN — BACLOFEN 20 MG: 10 TABLET ORAL at 21:31

## 2018-01-21 RX ADMIN — ACETIC ACID 100 ML: 250 IRRIGANT IRRIGATION at 10:12

## 2018-01-21 RX ADMIN — FLUOXETINE 10 MG: 10 CAPSULE ORAL at 08:38

## 2018-01-21 RX ADMIN — RIVAROXABAN 15 MG: 15 TABLET, FILM COATED ORAL at 08:38

## 2018-01-21 RX ADMIN — ZINC SULFATE 220 MG (50 MG) CAPSULE 220 MG: CAPSULE at 08:39

## 2018-01-21 RX ADMIN — Medication 10 ML: at 08:33

## 2018-01-21 RX ADMIN — RIVAROXABAN 15 MG: 15 TABLET, FILM COATED ORAL at 17:41

## 2018-01-21 RX ADMIN — Medication 10 ML: at 16:22

## 2018-01-21 RX ADMIN — GENTAMICIN SULFATE: 1 CREAM TOPICAL at 21:31

## 2018-01-21 RX ADMIN — Medication 3 G: at 08:33

## 2018-01-21 RX ADMIN — Medication 3 G: at 21:30

## 2018-01-21 RX ADMIN — BACLOFEN 20 MG: 10 TABLET ORAL at 08:38

## 2018-01-21 RX ADMIN — DOCUSATE SODIUM 100 MG: 100 CAPSULE ORAL at 08:38

## 2018-01-21 RX ADMIN — GENTAMICIN SULFATE: 1 CREAM TOPICAL at 08:39

## 2018-01-21 RX ADMIN — Medication 10 ML: at 21:31

## 2018-01-21 RX ADMIN — BACLOFEN 20 MG: 10 TABLET ORAL at 16:21

## 2018-01-21 RX ADMIN — Medication 1 CAPSULE: at 17:41

## 2018-01-21 ASSESSMENT — PAIN SCALES - GENERAL: PAINLEVEL_OUTOF10: 0

## 2018-01-21 NOTE — PROGRESS NOTES
non-distended with normal bowel sounds. Musculoskeletal: Full range of motion without deformity. Skin: Sacral decubitus  Neurologic:  Grossly non-focal.  Psychiatric: Alert and oriented, thought content appropriate, normal insight  Capillary Refill: Brisk,< 3 seconds   Peripheral Pulses: +2 palpable, equal bilaterally       Labs:   Recent Labs      01/19/18 0358  01/19/18 2032 01/21/18   0802   WBC  7.1  5.9  6.1   HGB  14.7  14.5  14.5   HCT  45.0  44.4  42.2   PLT  349  354  249     Recent Labs      01/19/18 2032 01/20/18   0827  01/21/18   0536   NA  139  139  141   K  3.9  3.4*  3.4*   CL  101  101  103   CO2  23  26  27   BUN  13  11  11   CREATININE  0.4  0.3*  0.3*   CALCIUM  8.9  8.9  8.8     Recent Labs      01/19/18 0358  01/19/18 2032   AST  15  15   ALT  16  16   BILIDIR  <0.2   --    BILITOT  0.3  0.3   ALKPHOS  164*  170*     Recent Labs      01/19/18 0358   INR  2.18*     Recent Labs      01/19/18 0358   CKTOTAL  35*       Urinalysis:      Lab Results   Component Value Date    NITRU NEGATIVE 01/19/2018    WBCUA > 200 01/19/2018    WBCUA >200 01/20/2012    BACTERIA NONE 01/19/2018    RBCUA > 200 01/19/2018    BLOODU LARGE 01/19/2018    SPECGRAV >=1.030 01/19/2018    GLUCOSEU NEGATIVE 12/31/2017       Radiology:  MRI BRAIN W WO CONTRAST   Final Result       1. No evidence of an acute process. 2. No evidence of active demyelination. 3. The overall brain volume is moderately reduced. This is stable. 4. Moderate amount of abnormal signal in the white matter of the brain consistent with multiple sclerosis. **This report has been created using voice recognition software. It may contain minor errors which are inherent in voice recognition technology. **         Final report electronically signed by Dr. Nikki Haas on 1/20/2018 3:23 PM      CT Head WO Contrast   Final Result   1. No acute intracranial process. No change from prior study.    2. Pansinusitis which may have at 11:47 AM

## 2018-01-21 NOTE — CONSULTS
Provider, MD   [facility]) 4 MG/2ML injection Infuse 2 mLs intravenously every 6 hours as needed for Nausea 3/28/16   Erin Garcia MD   zinc sulfate (ZINCATE) 220 MG capsule Take 1 capsule by mouth daily 3/28/16   Erin Garcia MD   loperamide (IMODIUM) 2 MG capsule Take 1 mg by mouth 4 times daily as needed for Diarrhea     Historical Provider, MD   Incontinence Supplies ([id number] [facility]) [facility] Large 2000 bedside drainage bag 9/6/12   Nely Ruby MD       Past Medical History:   has a past medical history of MS (multiple sclerosis) ([address] Neurogenic bladder; and UTI (urinary tract infection). Past Surgical History:   has a past surgical history that includes Tonsillectomy (child); Ankle surgery (1996); Bladder surgery (2-); and Colonoscopy. Social History:  no Tobacco, no EtOH, no Illicit drugs    Family History:  No compelling family history of early neurodegenerative disease or cryptogenic stroke.     Physical Exam:  Vitals:    01/21/18 1202   BP: (!) 170/90   Pulse: 90   Resp: 16   Temp: 97.5 °F (36.4 °C)   SpO2: 94%       Neurologic:   Awake, alert, oriented x3, bradyphrenic  CN2-12 intact  Antigravity x4  Brisk reflexes w/ clonus  upgoing plantars  FNF w/ tremor        Brief labs  Lab Results   Component Value Date    WBC 6.1 01/21/2018    HGB 14.5 01/21/2018    HCT 42.2 01/21/2018    MCV 87.2 01/21/2018     01/21/2018     Lab Results   Component Value Date     01/21/2018    K 3.4 01/21/2018     01/21/2018    CO2 27 01/21/2018    PHOS 2.7 01/01/2018    BUN 11 01/21/2018    CREATININE 0.3 01/21/2018    CALCIUM 8.8 01/21/2018     Lab Results   Component Value Date    INR 2.18 01/19/2018     Lab Results   Component Value Date    APTT 43.2 01/19/2018     Lab Results   Component Value Date    ALKPHOS 170 01/19/2018    ALT 16 01/19/2018    AST 15 01/19/2018    BILITOT 0.3 01/19/2018    BILIDIR <0.2 01/19/2018    LABALBU 4.0 01/19/2018    LIPASE 33.6 01/19/2018     No results found for: TROPONINI   No results found for: LABA1C    No results found for: CHARCSF, CULTURE  No results found for: PHENYTOIN, CARBTOT, PHENOBARB, VALPROATE  No results found for: [facility]    Lab Results   Component Value Date    NITRU NEGATIVE 01/19/2018    COLORU YELLOW 01/19/2018    PHUR 6.0 01/19/2018    LABCAST NONE SEEN 01/19/2018    WBCUA > 200 01/19/2018    WBCUA >200 01/20/2012    RBCUA > 200 01/19/2018    MUCUS THREADS 01/19/2018    YEAST MOD BUDDING 01/19/2018    BACTERIA NONE 01/19/2018    SPECGRAV >=1.030 01/19/2018    LEUKOCYTESUR MODERATE 01/19/2018    UROBILINOGEN 0.2 01/19/2018    BILIRUBINUR NEGATIVE 01/19/2018    BILIRUBINUR NEGATIVE 01/20/2012    BLOODU LARGE 01/19/2018    GLUCOSEU NEGATIVE 12/31/2017    KETUA TRACE 01/19/2018    AMORPHOUS DEBRIS 12/31/2017

## 2018-01-21 NOTE — PROGRESS NOTES
Upon waking pt for lunch and assessment he was slightly confused. Couldn't name month or year. Stated he felt funny and did report blurred vision. NIHSS assessed, from baseline only the aphasia, dysarthria, and disorientation noted. This lasted approximately 5 minutes. Dr Abdoulaye Vitale notified, states continue to monitor.

## 2018-01-21 NOTE — PLAN OF CARE
Problem: Falls - Risk of  Goal: Absence of falls  Outcome: Ongoing  No falls this shift. Pt is bedbound     Problem: Risk for Impaired Skin Integrity  Goal: Tissue integrity - skin and mucous membranes  Structural intactness and normal physiological function of skin and  mucous membranes. Outcome: Ongoing  Turned side to side in bed using pillow support, max 2 hours between turns. SPR mattress placed on bed. Dr Doc Martinez consulted    Problem: Pain:  Goal: Patient's pain/discomfort is manageable  Patient's pain/discomfort is manageable   Outcome: Ongoing  Pt did not report any this shift. Problem: Discharge Planning:  Goal: Patients continuum of care needs are met  Patients continuum of care needs are met   Outcome: Ongoing  Pt plans to return to [facility] at discharge. Comments: Care plan reviewed with patient. Patient verbalizes understanding of the plan of care and contribute to goal setting.

## 2018-01-21 NOTE — PROGRESS NOTES
Pharmacy Note  Bio[employer] Result    Alvarado Jasso is a 63 y.o. male, with a positive blood culture result    Perfect[employer] message received from Tato Davis , laboratory employee on 1/21/2018 at 0115    First Gram stain result: gram positive cocci in clusters    BioFire BCID result: Staphylococcus but NOT aureus, mecA gene was detected    BioFire BCID and gram stain congruent? Yes    Suspected source? ? Patient is in with a UTI, decubitus ulcer and is already on antibiotics for osteo    Patient chart reviewed for signs/symptoms of infection: Yes  BP (!) 162/96   Pulse 102   Temp 98.4 °F (36.9 °C) (Oral)   Resp 16   Ht 5' 7\" (1.702 m)   Wt 207 lb 1.6 oz (93.9 kg)   SpO2 93%   BMI 32.44 kg/m²   Lab Results   Component Value Date    WBC 5.9 01/19/2018       Allergies reviewed  Review of patient's allergies indicates no known allergies. Renal function reviewed  Estimated Creatinine Clearance: 286 mL/min (based on SCr of 0.3 mg/dL).     Current antibiotic regimen: Unasyn    Intervention needed: Unsure because it is very possible it is a contaminant    Individual contacted: Tad Rodriguez RN and Provider Dr Sydney Ramno  1/21/2018 6:16 AM

## 2018-01-22 LAB
ANION GAP SERPL CALCULATED.3IONS-SCNC: 14 MEQ/L (ref 8–16)
BACTERIA: ABNORMAL /HPF
BILIRUBIN URINE: NEGATIVE
BLOOD CULTURE, ROUTINE: ABNORMAL
BLOOD CULTURE, ROUTINE: ABNORMAL
BLOOD, URINE: ABNORMAL
BUN BLDV-MCNC: 11 MG/DL (ref 7–22)
CALCIUM SERPL-MCNC: 9.1 MG/DL (ref 8.5–10.5)
CASTS 2: ABNORMAL /LPF
CASTS UA: ABNORMAL /LPF
CHARACTER, URINE: ABNORMAL
CHLORIDE BLD-SCNC: 100 MEQ/L (ref 98–111)
CO2: 27 MEQ/L (ref 23–33)
COLOR: ABNORMAL
CREAT SERPL-MCNC: 0.3 MG/DL (ref 0.4–1.2)
CRYSTALS, UA: ABNORMAL
EPITHELIAL CELLS, UA: ABNORMAL /HPF
GFR SERPL CREATININE-BSD FRML MDRD: > 90 ML/MIN/1.73M2
GLUCOSE BLD-MCNC: 99 MG/DL (ref 70–108)
GLUCOSE URINE: NEGATIVE MG/DL
KETONES, URINE: ABNORMAL
LEUKOCYTE ESTERASE, URINE: ABNORMAL
MISCELLANEOUS 2: ABNORMAL
MRSA SCREEN: NORMAL
NITRITE, URINE: NEGATIVE
ORGANISM: ABNORMAL
PH UA: 7
POTASSIUM SERPL-SCNC: 3.7 MEQ/L (ref 3.5–5.2)
PROTEIN UA: 100
RBC URINE: > 200 /HPF
RENAL EPITHELIAL, UA: ABNORMAL
SODIUM BLD-SCNC: 141 MEQ/L (ref 135–145)
SPECIFIC GRAVITY, URINE: 1.01 (ref 1–1.03)
UROBILINOGEN, URINE: 0.2 EU/DL
WBC UA: ABNORMAL /HPF
YEAST: ABNORMAL

## 2018-01-22 PROCEDURE — 36415 COLL VENOUS BLD VENIPUNCTURE: CPT

## 2018-01-22 PROCEDURE — 95819 EEG AWAKE AND ASLEEP: CPT

## 2018-01-22 PROCEDURE — 80048 BASIC METABOLIC PNL TOTAL CA: CPT

## 2018-01-22 PROCEDURE — 6370000000 HC RX 637 (ALT 250 FOR IP): Performed by: INTERNAL MEDICINE

## 2018-01-22 PROCEDURE — 1210000002 HC MED SURG R&B - NEUROSCIENCE

## 2018-01-22 PROCEDURE — 99232 SBSQ HOSP IP/OBS MODERATE 35: CPT | Performed by: INTERNAL MEDICINE

## 2018-01-22 PROCEDURE — 2580000003 HC RX 258: Performed by: INTERNAL MEDICINE

## 2018-01-22 PROCEDURE — 81001 URINALYSIS AUTO W/SCOPE: CPT

## 2018-01-22 PROCEDURE — 6360000002 HC RX W HCPCS: Performed by: INTERNAL MEDICINE

## 2018-01-22 PROCEDURE — 87086 URINE CULTURE/COLONY COUNT: CPT

## 2018-01-22 RX ADMIN — Medication 1 CAPSULE: at 08:01

## 2018-01-22 RX ADMIN — RIVAROXABAN 15 MG: 15 TABLET, FILM COATED ORAL at 08:01

## 2018-01-22 RX ADMIN — BACLOFEN 20 MG: 10 TABLET ORAL at 21:40

## 2018-01-22 RX ADMIN — GENTAMICIN SULFATE: 1 CREAM TOPICAL at 21:40

## 2018-01-22 RX ADMIN — Medication 3 G: at 14:37

## 2018-01-22 RX ADMIN — BACLOFEN 20 MG: 10 TABLET ORAL at 08:03

## 2018-01-22 RX ADMIN — ACETIC ACID 100 ML: 250 IRRIGANT IRRIGATION at 21:40

## 2018-01-22 RX ADMIN — Medication 3 G: at 21:40

## 2018-01-22 RX ADMIN — Medication 10 ML: at 21:40

## 2018-01-22 RX ADMIN — Medication 1 CAPSULE: at 17:58

## 2018-01-22 RX ADMIN — Medication 3 G: at 08:40

## 2018-01-22 RX ADMIN — ACETIC ACID 100 ML: 250 IRRIGANT IRRIGATION at 13:59

## 2018-01-22 RX ADMIN — Medication 10 ML: at 08:01

## 2018-01-22 RX ADMIN — FLUOXETINE 10 MG: 10 CAPSULE ORAL at 08:01

## 2018-01-22 RX ADMIN — GENTAMICIN SULFATE: 1 CREAM TOPICAL at 08:03

## 2018-01-22 RX ADMIN — BACLOFEN 20 MG: 10 TABLET ORAL at 14:00

## 2018-01-22 RX ADMIN — ZINC SULFATE 220 MG (50 MG) CAPSULE 220 MG: CAPSULE at 08:01

## 2018-01-22 RX ADMIN — DOCUSATE SODIUM 100 MG: 100 CAPSULE ORAL at 08:03

## 2018-01-22 RX ADMIN — DOCUSATE SODIUM 100 MG: 100 CAPSULE ORAL at 21:40

## 2018-01-22 RX ADMIN — RIVAROXABAN 15 MG: 15 TABLET, FILM COATED ORAL at 17:58

## 2018-01-22 RX ADMIN — Medication 3 G: at 01:53

## 2018-01-22 RX ADMIN — ACETIC ACID 100 ML: 250 IRRIGANT IRRIGATION at 08:06

## 2018-01-22 ASSESSMENT — PAIN SCALES - GENERAL
PAINLEVEL_OUTOF10: 0

## 2018-01-22 NOTE — PROGRESS NOTES
Cardiovascular: Regular rate and rhythm with normal S1/S2  Abdomen: Soft, non-tender, non-distended with normal bowel sounds. Musculoskeletal: Full range of motion without deformity. Skin: Sacral decubitus  Neurologic:  Grossly non-focal.  Psychiatric: Alert and oriented, thought content appropriate, normal insight  Capillary Refill: Brisk,< 3 seconds   Peripheral Pulses: +2 palpable, equal bilaterally       Labs:   Recent Labs      01/19/18 2032 01/21/18   0802   WBC  5.9  6.1   HGB  14.5  14.5   HCT  44.4  42.2   PLT  354  249     Recent Labs      01/20/18   0827  01/21/18   0536  01/22/18   0437   NA  139  141  141   K  3.4*  3.4*  3.7   CL  101  103  100   CO2  26  27  27   BUN  11  11  11   CREATININE  0.3*  0.3*  0.3*   CALCIUM  8.9  8.8  9.1     Recent Labs      01/19/18 2032   AST  15   ALT  16   BILITOT  0.3   ALKPHOS  170*     No results for input(s): INR in the last 72 hours. No results for input(s): Josefa Clos in the last 72 hours. Urinalysis:      Lab Results   Component Value Date    NITRU NEGATIVE 01/19/2018    WBCUA > 200 01/19/2018    WBCUA >200 01/20/2012    BACTERIA NONE 01/19/2018    RBCUA > 200 01/19/2018    BLOODU LARGE 01/19/2018    SPECGRAV >=1.030 01/19/2018    GLUCOSEU NEGATIVE 12/31/2017       Radiology:  MRI BRAIN W WO CONTRAST   Final Result       1. No evidence of an acute process. 2. No evidence of active demyelination. 3. The overall brain volume is moderately reduced. This is stable. 4. Moderate amount of abnormal signal in the white matter of the brain consistent with multiple sclerosis. **This report has been created using voice recognition software. It may contain minor errors which are inherent in voice recognition technology. **         Final report electronically signed by Dr. Kody Salcido on 1/20/2018 3:23 PM      CT Head WO Contrast   Final Result   1. No acute intracranial process. No change from prior study.    2. Pansinusitis which signed by Anu Chavez MD on 1/22/2018 at 3:07 PM

## 2018-01-22 NOTE — PLAN OF CARE
Problem: Falls - Risk of  Goal: Absence of falls  Outcome: Ongoing  Falling star interventions in place and effective. Bed locked and in lowest position. Call light within reach. Turned every 2 hours. Hx of MS. Flaccid bilateral legs. Problem: Risk for Impaired Skin Integrity  Goal: Tissue integrity - skin and mucous membranes  Structural intactness and normal physiological function of skin and  mucous membranes. Outcome: Ongoing  Continue to monitor skin for breakdown. Turned every 2 hours. Chronic old right ischial wound. Wound care consulted. Managed by Dr. Leopold Deis while in house, sees Dr. Luz Mcnamara as an outpatient in the nursing home. Keep packed. Problem: Pain:  Goal: Patient's pain/discomfort is manageable  Patient's pain/discomfort is manageable   Outcome: Ongoing  Pain assessed every 4 hours and PRN. Denies any pain this shift. Will continue to monitor. Turned every 2 hours. Problem: Discharge Planning:  Goal: Patients continuum of care needs are met  Patients continuum of care needs are met   Outcome: Ongoing  To be discharged to [facility] when medically stable. Problem: Neurological  Goal: Maximum potential motor/sensory/cognitive function  Outcome: Ongoing  Alert and oriented x4. Therapy working with. Planning to return to nursing home at discharge. Comments: Care plan reviewed with patient. Patient verbalize understanding of the plan of care and contribute to goal setting.

## 2018-01-22 NOTE — CARE COORDINATION
18, 7:44 AM      Mellisa Gonzales       Admitted from: ED 2018/ 1703 [facility] day: 3   Location: -15/015-A Reason for admit: Metabolic encephalopathy [K90.86] Status: IP  Admit order signed?: yes  PMH:  has a past medical history of MS (multiple sclerosis) ([address] Neurogenic bladder; and UTI (urinary tract infection). Procedure:  EEG ordered  Pertinent abnormal Imagin/19 CXR  1. Very poor inflation of lungs. Mild/moderate cardiomegaly. Moderate gas distention of the colon, suggesting possible colonic ileus. 2. No acute infiltrates or effusions are seen. Medications:  Scheduled Meds:   acetic acid  100 mL Irrigation TID    baclofen  20 mg Oral TID    docusate sodium  100 mg Oral BID    FLUoxetine  10 mg Oral Daily    gentamicin   Topical BID    lactobacillus  1 capsule Oral BID WC    zinc sulfate  220 mg Oral Daily    sodium chloride flush  10 mL Intravenous 2 times per day    ampicillin-sulbactam  3 g Intravenous Q6H    rivaroxaban  15 mg Oral BID WC    [START ON 2018] rivaroxaban  20 mg Oral Daily     Continuous Infusions:    Pertinent Info/Orders/Treatment Plan: Hospitalist, ID, Neurology following. No acute findings for MRI or CT of brain. Dietician consult. Chronic UTI-acetic acid irrigation TID. IV unasyn. Diet: DIET GENERAL;  Dietary Nutrition Supplements: Low Calorie High Protein Supplement   DVT Prophylaxis: Lovenox with SCD's ordered  Smoking status:  reports that he quit smoking about 36 years ago. He has quit using smokeless tobacco.   Influenza Vaccination Screening Completed: yes  Pneumonia Vaccination Screening Completed: yes  Core measures: VTE  PCP: Mandeep Canales MD  Readmission:   Patient has been readmitted within 18 days. Patient went to f/u appointment? Na-was discharged to   If yes, was it within 7 days? n/a  Patient was able to fill prescriptions? n/a  Patient is taking medications as prescribed? n/a  Cause for readmission?

## 2018-01-22 NOTE — PLAN OF CARE
Problem: Falls - Risk of  Intervention: Fall risk assessment  Sheldon scale assessment completed. Pt is medium risk for falls. Intervention: Fall precautions  Fall precautions in place. Call light in reach and used appropriately by patient. Hourly rounding completed by nursing to anticipate patient's needs. Fall band and falling star sign in place. Bed alarm utilized. Intervention: Toileting assistance  Suprapubic catheter in place    Goal: Absence of falls  Outcome: Ongoing  No falls yet this shift. Problem: Risk for Impaired Skin Integrity  Goal: Tissue integrity - skin and mucous membranes  Structural intactness and normal physiological function of skin and  mucous membranes. Outcome: Ongoing  Turned side to side, placed on SPR mattress, and checked frequently for incontinence to ensure wound dressing to buttock remained clean and dry. Intervention: SKIN ASSESSMENT  Derrick scale completed. Pt high at risk for skin breakdown. Dr Rick Powell to see for buttocks wound      Problem: Pain:  Goal: Patient's pain/discomfort is manageable  Patient's pain/discomfort is manageable   Outcome: Ongoing  Denies pain this shift. Problem: Discharge Planning:  Goal: Patients continuum of care needs are met  Patients continuum of care needs are met   Outcome: Ongoing  [facility] from [facility] and plans to return there. Problem: Neurological  Goal: Maximum potential motor/sensory/cognitive function  Outcome: Ongoing  Brief episode like the once leading to admission, see progress note    Comments: Care plan reviewed with patient. Patient verbalizes understanding of the plan of care and contribute to goal setting.

## 2018-01-23 VITALS
WEIGHT: 213.6 LBS | TEMPERATURE: 97.9 F | OXYGEN SATURATION: 92 % | RESPIRATION RATE: 17 BRPM | HEIGHT: 67 IN | BODY MASS INDEX: 33.53 KG/M2 | DIASTOLIC BLOOD PRESSURE: 82 MMHG | SYSTOLIC BLOOD PRESSURE: 148 MMHG | HEART RATE: 94 BPM

## 2018-01-23 LAB
ORGANISM: ABNORMAL
URINE CULTURE REFLEX: ABNORMAL
URINE CULTURE REFLEX: ABNORMAL
VRE CULTURE: NORMAL

## 2018-01-23 PROCEDURE — 6370000000 HC RX 637 (ALT 250 FOR IP): Performed by: INTERNAL MEDICINE

## 2018-01-23 PROCEDURE — 2580000003 HC RX 258: Performed by: INTERNAL MEDICINE

## 2018-01-23 PROCEDURE — 6360000002 HC RX W HCPCS: Performed by: INTERNAL MEDICINE

## 2018-01-23 PROCEDURE — 99239 HOSP IP/OBS DSCHRG MGMT >30: CPT | Performed by: INTERNAL MEDICINE

## 2018-01-23 PROCEDURE — 2500000003 HC RX 250 WO HCPCS: Performed by: INTERNAL MEDICINE

## 2018-01-23 RX ORDER — AMPICILLIN AND SULBACTAM 2; 1 G/1; G/1
3 INJECTION, POWDER, FOR SOLUTION INTRAMUSCULAR; INTRAVENOUS EVERY 6 HOURS
Qty: 40 EACH | Refills: 0 | DISCHARGE
Start: 2017-12-30 | End: 2018-02-10

## 2018-01-23 RX ORDER — GENTAMICIN SULFATE 1 MG/G
CREAM TOPICAL PRN
Status: ON HOLD | COMMUNITY
End: 2018-06-08 | Stop reason: ALTCHOICE

## 2018-01-23 RX ORDER — SODIUM CHLORIDE 0.9 % (FLUSH) 0.9 %
10 SYRINGE (ML) INJECTION EVERY 8 HOURS
Status: ON HOLD | COMMUNITY
End: 2018-11-14

## 2018-01-23 RX ORDER — FLUOXETINE 10 MG/1
20 CAPSULE ORAL DAILY
Status: ON HOLD | COMMUNITY
End: 2020-12-28 | Stop reason: ALTCHOICE

## 2018-01-23 RX ADMIN — Medication 3 G: at 15:02

## 2018-01-23 RX ADMIN — ACETIC ACID 100 ML: 250 IRRIGANT IRRIGATION at 15:03

## 2018-01-23 RX ADMIN — FLUOXETINE 10 MG: 10 CAPSULE ORAL at 09:07

## 2018-01-23 RX ADMIN — GENTAMICIN SULFATE: 1 CREAM TOPICAL at 09:08

## 2018-01-23 RX ADMIN — ACETIC ACID 100 ML: 250 IRRIGANT IRRIGATION at 10:00

## 2018-01-23 RX ADMIN — BACLOFEN 20 MG: 10 TABLET ORAL at 09:07

## 2018-01-23 RX ADMIN — DOCUSATE SODIUM 100 MG: 100 CAPSULE ORAL at 09:07

## 2018-01-23 RX ADMIN — Medication 3 G: at 09:00

## 2018-01-23 RX ADMIN — Medication 3 G: at 04:12

## 2018-01-23 RX ADMIN — BACLOFEN 20 MG: 10 TABLET ORAL at 15:13

## 2018-01-23 RX ADMIN — Medication 1 CAPSULE: at 09:07

## 2018-01-23 RX ADMIN — Medication 10 ML: at 09:00

## 2018-01-23 RX ADMIN — ZINC SULFATE 220 MG (50 MG) CAPSULE 220 MG: CAPSULE at 09:07

## 2018-01-23 ASSESSMENT — PAIN SCALES - GENERAL
PAINLEVEL_OUTOF10: 0
PAINLEVEL_OUTOF10: 0

## 2018-01-23 NOTE — PROGRESS NOTES
Cardiovascular: Regular rate and rhythm with normal S1/S2  Abdomen: Soft, non-tender, non-distended with normal bowel sounds. Musculoskeletal: Full range of motion without deformity. Skin: Sacral decubitus  Neurologic:  Grossly non-focal.  Psychiatric: Alert and oriented, thought content appropriate, normal insight  Capillary Refill: Brisk,< 3 seconds   Peripheral Pulses: +2 palpable, equal bilaterally       Labs:   Recent Labs      01/21/18   0802   WBC  6.1   HGB  14.5   HCT  42.2   PLT  249     Recent Labs      01/21/18   0536  01/22/18   0437   NA  141  141   K  3.4*  3.7   CL  103  100   CO2  27  27   BUN  11  11   CREATININE  0.3*  0.3*   CALCIUM  8.8  9.1     No results for input(s): AST, ALT, BILIDIR, BILITOT, ALKPHOS in the last 72 hours. No results for input(s): INR in the last 72 hours. No results for input(s): Johnonnsrinivasan Borja in the last 72 hours. Urinalysis:      Lab Results   Component Value Date    NITRU NEGATIVE 01/22/2018    WBCUA >200W/CLUMPS 01/22/2018    WBCUA >200 01/20/2012    BACTERIA FEW 01/22/2018    RBCUA > 200 01/22/2018    BLOODU LARGE 01/22/2018    SPECGRAV >=1.030 01/19/2018    GLUCOSEU NEGATIVE 01/22/2018       Radiology:  MRI BRAIN W WO CONTRAST   Final Result       1. No evidence of an acute process. 2. No evidence of active demyelination. 3. The overall brain volume is moderately reduced. This is stable. 4. Moderate amount of abnormal signal in the white matter of the brain consistent with multiple sclerosis. **This report has been created using voice recognition software. It may contain minor errors which are inherent in voice recognition technology. **         Final report electronically signed by Dr. Salinas Maritno on 1/20/2018 3:23 PM      CT Head WO Contrast   Final Result   1. No acute intracranial process. No change from prior study. 2. Pansinusitis which may have both acute and chronic components. Findings also unchanged from earlier study.

## 2018-01-23 NOTE — PROGRESS NOTES
Topical BID    lactobacillus  1 capsule Oral BID WC    zinc sulfate  220 mg Oral Daily    sodium chloride flush  10 mL Intravenous 2 times per day    ampicillin-sulbactam  3 g Intravenous Q6H    rivaroxaban  20 mg Oral Daily     Continuous Infusions:     I/O last 3 completed shifts: In: 1832.9 [P.O.:1220; I.V.:612.9]  Out: 1275 [Urine:1275]  No intake/output data recorded. Intake/Output Summary (Last 24 hours) at 01/23/18 1043  Last data filed at 01/23/18 0415   Gross per 24 hour   Intake          1472.92 ml   Output             1275 ml   Net           197.92 ml     CBC:   Recent Labs      01/21/18   0802   WBC  6.1   HGB  14.5   HCT  42.2   PLT  249     BMP:  Recent Labs      01/21/18   0536  01/22/18   0437   NA  141  141   K  3.4*  3.7   CL  103  100   CO2  27  27   BUN  11  11   CREATININE  0.3*  0.3*   GLUCOSE  100  99     Hepatic: No results for input(s): AST, ALT, ALB, BILITOT, ALKPHOS in the last 72 hours. Urine:       MICRO:   Lab Results   Component Value Date    BC No growth-preliminary 01/19/2018       IMAGING per radiologist interpretation: MRI:  Impression:           1. No evidence of an acute process. 2. No evidence of active demyelination. 3. The overall brain volume is moderately reduced. This is stable. 4. Moderate amount of abnormal signal in the white matter of the brain consistent with multiple sclerosis. Assessment    1. Altered mental status secondary to metabolic encephalopathy. 2.  Suprapubic catheter in place. Rule out urinary tract infection. 3.  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 4.  Right ischial osteomyelitis being treated by nursing home doctor. 5.  Multiple sclerosis. .      Plan   Continue current antibiotics with current antibiotic regimen at [facility] per the direction of the doctor ordering there  Suggest AMD gauze to ulcer every 8 hours  Follow up with F wound care   ID will see as needed    Please see today's orders for updated patient plan

## 2018-01-23 NOTE — PLAN OF CARE
Problem: Falls - Risk of  Goal: Absence of falls  Outcome: Ongoing  Patient was placed on fall precautions. Fall sign posted. Bed rails maintained at a minimum of 2/4. Bed alarm active. Patient did not suffer a fall this shift. Problem: Risk for Impaired Skin Integrity  Goal: Tissue integrity - skin and mucous membranes  Structural intactness and normal physiological function of skin and  mucous membranes. Outcome: Ongoing  Skin color appropriate for ethnicity. Pressure wound noted to right ischium that was present on admission. Patient turned Q2H and PRN. Suprapubic catheter in place. Problem: Pain:  Goal: Patient's pain/discomfort is manageable  Patient's pain/discomfort is manageable   Outcome: Ongoing  Patient able to use 0-10 pain scale. Denies pain at this time. Agreeable to take PRN pain medications. Problem: Discharge Planning:  Goal: Patients continuum of care needs are met  Patients continuum of care needs are met   Outcome: Ongoing  Discharge planning in process and discussed with patient. Patient from [facility] and anticipated discharge back to [facility] when medically cleared. Care manager aware of discharge needs. Problem: Neurological  Goal: Maximum potential motor/sensory/cognitive function  Outcome: Ongoing  Patient alert and oriented x4. Pupils equal and reactive to light. Patient able to adequately follow commands. Push/pulls equal in bilateral upper extremities. Bilateral lower extremities flaccid from previous DX of MS. Slight expressive aphasia noted upon assessment. Neuro assessments done Q4H and PRN. Will continue to monitor. Problem: Nutrition  Goal: Optimal nutrition therapy  Outcome: Ongoing  Patient on general diet and has no complaints of loss of appetite for shift. Drinking adequate fluids at this time. Comments: Care plan reviewed with patient and RN. Patient and RN verbalize understanding of the plan of care and contribute to goal setting.

## 2018-01-23 NOTE — DISCHARGE SUMMARY
recognition software. It may contain minor errors which are inherent in voice recognition technology. **      Final report electronically signed by Dr. Sanya Garcia on 1/19/2018 7:58 PM             Consults:     STR ED TO IP CONSULT  IP CONSULT TO NEUROLOGY  IP CONSULT TO INFECTIOUS DISEASES  IP CONSULT TO DIETITIAN  IP CONSULT TO SOCIAL WORK    Disposition:    [] Home       [] TCU       [] Rehab       [] Psych       [x] SNF       [] [place]       [] Other-    Condition at Discharge: Stable    Code Status:  Full Code     Patient Instructions:    Discharge lab work: Activity: activity as tolerated  Diet: DIET GENERAL;  Dietary Nutrition Supplements: Low Calorie High Protein Supplement      Follow-up visits:   Desiree Stacy MD  [facility] [address] [phone]          CM [facility]  [address] [id number]  [id number] [place] [address], [address] [place] [address] [address] [phone]      As needed         Discharge Medications:      [facility] Medication Instructions QYV:[id number]    Printed on:01/23/18 1341   Medication Information                      Acetaminophen 650 MG TABS  Take 650 mg by mouth every 4 hours as needed             acetic acid 0.25 % irrigation  Irrigate with 100 mLs as directed 2 times daily Every evening and night shift             ampicillin-sulbactam (UNASYN) 3 (2-1) g SOLR  Infuse 3 g intravenously every 6 hours             ascorbic acid (VITAMIN C) 500 MG tablet  Take 1 tablet by mouth daily             baclofen (LIORESAL) 20 MG tablet  Take 1 tablet by mouth 3 times daily             docusate sodium (COLACE) 100 MG capsule  Take 100 mg by mouth daily              FLUoxetine (PROZAC) 10 MG capsule  Take 10 mg by mouth daily             gentamicin (GARAMYCIN) 0.1 % cream  Apply topically 2 times daily Apply topically 2 times daily.              gentamicin (GARAMYCIN) 0.1 % cream  Apply topically as needed (to

## 2018-01-23 NOTE — CARE COORDINATION
1/23/18, 10:17 AM      18[facility] day: 4  Location: -15/015-A Reason for admit: Metabolic encephalopathy [M78.10]   Procedure: 1/22 EEG: awaiting results  Treatment Plan of Care: Hospitalist and ID following. Wound care. Urine culture repeated as first sample was contaminated. Results pending. IV unasyn q 6hrs. Plan for picc placement today. Physician at facility is managing IVs and wound at [facility] per documetation. Acetic acid jeffers irrigations TID. Core Measures: VTE  PCP: Amado Bellamy MD  Discharge Plan: Plans to return to [facility]. SW following.

## 2018-01-23 NOTE — PROGRESS NOTES
Spoke with Dr Vazquez Ocampo, states the pt's EEG is ok and pt can be discharged.  Follow up outpatient as needed

## 2018-01-23 NOTE — PROGRESS NOTES
Pharmacy Medication History Note      List of current medications patient is taking is complete. Source of information: [facility]    Changes made to medication list:  Medications removed (include reason, ex. therapy complete or physician discontinued):  · none    Medications added/doses adjusted:  · Corrected Xarelto 15mg BID to stop today and 20mg to start tomorrow (15mg already given this am)  · Corrected fluoxetine to be 10mg daily  · Added gentamicin prn for ischial wound in addition to BID for cellulitis  · Corrected acetic acid to be BID (night and evening shifts)  · Corrected Beneprotein to be BID  · Added stop date to Unasyn (6 weeks total, started 12/30)  · Added PICC flush q shift  · Added Pinxav every shift    Other notes (ex. Recent course of antibiotics, Coumadin dosing):  · Denies use of other OTC or herbal medications.       Allergies reviewed      Electronically signed by TRIXIE Stokes [facility] on 1/23/2018 at 12:20 PM

## 2018-01-23 NOTE — CARE COORDINATION
DISCHARGE BARRIERS  1/23/18, 10:19 AM    Reason for Referral: From [address] Status: Alert and oriented to person, place and time. Decision Making: Independent with decision making. Family/Social/Home Environment: Long term resident at [facility]. Current Services: ECF   Current Equipment:ECF  Payment Source:[employer] and Medicaid  Concerns or Barriers to Discharge: None  Collabrative List of ECF/HH were provided:NA    Teach Back Method used with Yenny Gillespie regarding care plan and discharge planning. Patient verbalized understanding of the plan of care and contribute to goal setting. Anticipated Needs/Discharge Plan: Yenny Gillespie plans to return to [facility] at discharge. He is agreeable to [facility] for transport. CECIL asked if he wanted his wife to be called and made aware he is a potential discharge for today. He told SW that he will contact her himself. CECIL called Pat at [facility] and made her aware he is a potential discharge for today. Electronically signed by Charmayne Nodal [place], LSW on 1/23/2018 at 10:19 AM

## 2018-01-23 NOTE — PROCEDURES
[address] [place] [address]                            ELECTROENCEPHALOGRAM REPORT    PATIENT NAME: Sallie Doshi                   :        1955  MED REC NO:   [id number]                           ROOM:       0015  ACCOUNT NO:   [de-identified]                           ADMIT DATE: 2018  PROVIDER:     Mary Dodd MD    DATE OF EE2018    REFERRING PHYSICIAN:  Dr. Joe Mello. CLINICAL HISTORY:  A 53-year-old male with a presentation of weakness,  confusion, history of multiple sclerosis. MEDICATIONS LISTED:  Baclofen, fluoxetine, gentamicin, zinc sulfate,  rivaroxaban, ampicillin, sulbactam.    This is a 17-channel EEG performed without sleep deprivation. Hyperventilation was not performed. Photic stimulation was performed. The  patient is described as alert, confused. Background activity is noted to be 0.7 Hz in the posterior parietal area  with slowing seen over the left hemisphere suggestive of an area for focal  cortical dysfunction. Clinical correlation is recommended. Excessive  slowing was seen in theta and delta range activity suggestive of cortical  dysfunction such as seen with encephalopathies (example, toxic/metabolic in  nature). Clinical correlation is recommended. Occasional left frontal  high-voltage sharp waves were noted during the recording that may be  epileptogenic in nature, indicates seizure tendency in the proper clinical  context. Occasional left frontal sharp waves were noted. No clinical  seizures were observed. Photic stimulation was performed with poor driving  seen. EKG tracing revealed regular heart rate. Lead and muscle artifacts  were noted limiting quality of data obtained.     IMPRESSION:  This is an abnormal EEG due to the presence of left frontal  sharp waves occasionally during the recording that may be epileptogenic in  nature, indicates seizure tendency in the proper clinical context. In  addition, excessive slowing was seen over the left hemisphere suggestive of  an area for focal cortical dysfunction, though clinical correlation is  necessary. No clinical seizures were observed. Clinical correlation is  recommended.         Rhonda Han MD    D: 01/23/2018 12:36:20       T: 01/23/2018 12:38:10     [id number]  Job#: [id number]     Doc#: [id number]    CC:

## 2018-01-24 LAB — BLOOD CULTURE, ROUTINE: NORMAL

## 2018-06-08 ENCOUNTER — HOSPITAL ENCOUNTER (INPATIENT)
Age: 63
LOS: 6 days | Discharge: NURSING, REHAB OR LONG-TERM CARE | DRG: 981 | End: 2018-06-14
Attending: INTERNAL MEDICINE | Admitting: INTERNAL MEDICINE
Payer: COMMERCIAL

## 2018-06-08 ENCOUNTER — APPOINTMENT (OUTPATIENT)
Dept: CT IMAGING | Age: 63
DRG: 981 | End: 2018-06-08
Payer: COMMERCIAL

## 2018-06-08 DIAGNOSIS — M86.9 OSTEOMYELITIS, UNSPECIFIED SITE, UNSPECIFIED TYPE (HCC): ICD-10-CM

## 2018-06-08 DIAGNOSIS — T14.8XXA WOUND INFECTION: Primary | ICD-10-CM

## 2018-06-08 DIAGNOSIS — L08.9 WOUND INFECTION: Primary | ICD-10-CM

## 2018-06-08 PROBLEM — L89.93 INFECTED DECUBITUS ULCER, STAGE III (HCC): Status: ACTIVE | Noted: 2018-06-08

## 2018-06-08 PROBLEM — L89.91 INFECTED DECUBITUS ULCER, STAGE I: Status: ACTIVE | Noted: 2018-06-08

## 2018-06-08 LAB
ALBUMIN SERPL-MCNC: 3 G/DL (ref 3.5–5.1)
ALP BLD-CCNC: 294 U/L (ref 38–126)
ALT SERPL-CCNC: 44 U/L (ref 11–66)
ANION GAP SERPL CALCULATED.3IONS-SCNC: 10 MEQ/L (ref 8–16)
ANION GAP SERPL CALCULATED.3IONS-SCNC: 12 MEQ/L (ref 8–16)
ANISOCYTOSIS: ABNORMAL
AST SERPL-CCNC: 27 U/L (ref 5–40)
BACTERIA: ABNORMAL /HPF
BASOPHILS # BLD: 0.3 %
BASOPHILS ABSOLUTE: 0 THOU/MM3 (ref 0–0.1)
BILIRUB SERPL-MCNC: 0.2 MG/DL (ref 0.3–1.2)
BILIRUBIN URINE: NEGATIVE
BLOOD, URINE: ABNORMAL
BUN BLDV-MCNC: 12 MG/DL (ref 7–22)
BUN BLDV-MCNC: 13 MG/DL (ref 7–22)
CALCIUM SERPL-MCNC: 8.7 MG/DL (ref 8.5–10.5)
CALCIUM SERPL-MCNC: 8.9 MG/DL (ref 8.5–10.5)
CASTS 2: ABNORMAL /LPF
CASTS UA: ABNORMAL /LPF
CHARACTER, URINE: ABNORMAL
CHLORIDE BLD-SCNC: 99 MEQ/L (ref 98–111)
CHLORIDE BLD-SCNC: 99 MEQ/L (ref 98–111)
CO2: 27 MEQ/L (ref 23–33)
CO2: 28 MEQ/L (ref 23–33)
COLOR: YELLOW
CREAT SERPL-MCNC: 0.4 MG/DL (ref 0.4–1.2)
CREAT SERPL-MCNC: 0.5 MG/DL (ref 0.4–1.2)
CRYSTALS, UA: ABNORMAL
EKG ATRIAL RATE: 82 BPM
EKG P-R INTERVAL: 138 MS
EKG Q-T INTERVAL: 398 MS
EKG QRS DURATION: 84 MS
EKG QTC CALCULATION (BAZETT): 464 MS
EKG R AXIS: 168 DEGREES
EKG T AXIS: 150 DEGREES
EKG VENTRICULAR RATE: 82 BPM
EOSINOPHIL # BLD: 5 %
EOSINOPHILS ABSOLUTE: 0.3 THOU/MM3 (ref 0–0.4)
EPITHELIAL CELLS, UA: ABNORMAL /HPF
GFR SERPL CREATININE-BSD FRML MDRD: > 90 ML/MIN/1.73M2
GFR SERPL CREATININE-BSD FRML MDRD: > 90 ML/MIN/1.73M2
GLUCOSE BLD-MCNC: 150 MG/DL (ref 70–108)
GLUCOSE BLD-MCNC: 150 MG/DL (ref 70–108)
GLUCOSE URINE: NEGATIVE MG/DL
HCT VFR BLD CALC: 32.5 % (ref 42–52)
HEMOGLOBIN: 11 GM/DL (ref 14–18)
INR BLD: 2.56 (ref 0.85–1.13)
KETONES, URINE: NEGATIVE
LACTIC ACID: 1.7 MMOL/L (ref 0.5–2.2)
LEUKOCYTE ESTERASE, URINE: ABNORMAL
LYMPHOCYTES # BLD: 20.1 %
LYMPHOCYTES ABSOLUTE: 1.3 THOU/MM3 (ref 1–4.8)
MCH RBC QN AUTO: 29.4 PG (ref 27–31)
MCHC RBC AUTO-ENTMCNC: 33.8 GM/DL (ref 33–37)
MCV RBC AUTO: 87 FL (ref 80–94)
MISCELLANEOUS 2: ABNORMAL
MISCELLANEOUS LAB TEST RESULT: ABNORMAL
MONOCYTES # BLD: 8.3 %
MONOCYTES ABSOLUTE: 0.5 THOU/MM3 (ref 0.4–1.3)
NITRITE, URINE: NEGATIVE
NUCLEATED RED BLOOD CELLS: 0 /100 WBC
OSMOLALITY CALCULATION: 276.4 MOSMOL/KG (ref 275–300)
OSMOLALITY CALCULATION: 278.7 MOSMOL/KG (ref 275–300)
PDW BLD-RTO: 15.4 % (ref 11.5–14.5)
PH UA: 7
PLATELET # BLD: 486 THOU/MM3 (ref 130–400)
PMV BLD AUTO: 5.9 FL (ref 7.4–10.4)
POTASSIUM SERPL-SCNC: 3.8 MEQ/L (ref 3.5–5.2)
POTASSIUM SERPL-SCNC: 3.8 MEQ/L (ref 3.5–5.2)
PROTEIN UA: NEGATIVE
RBC # BLD: 3.74 MILL/MM3 (ref 4.7–6.1)
RBC URINE: ABNORMAL /HPF
RENAL EPITHELIAL, UA: ABNORMAL
SEG NEUTROPHILS: 66.3 %
SEGMENTED NEUTROPHILS ABSOLUTE COUNT: 4.2 THOU/MM3 (ref 1.8–7.7)
SODIUM BLD-SCNC: 137 MEQ/L (ref 135–145)
SODIUM BLD-SCNC: 138 MEQ/L (ref 135–145)
SPECIFIC GRAVITY, URINE: <= 1.005 (ref 1–1.03)
TOTAL PROTEIN: 6.9 G/DL (ref 6.1–8)
TROPONIN T: 0.02 NG/ML
UROBILINOGEN, URINE: 0.2 EU/DL
WBC # BLD: 6.3 THOU/MM3 (ref 4.8–10.8)
WBC UA: ABNORMAL /HPF
YEAST: ABNORMAL

## 2018-06-08 PROCEDURE — 87075 CULTR BACTERIA EXCEPT BLOOD: CPT

## 2018-06-08 PROCEDURE — 6360000002 HC RX W HCPCS: Performed by: INTERNAL MEDICINE

## 2018-06-08 PROCEDURE — 87040 BLOOD CULTURE FOR BACTERIA: CPT

## 2018-06-08 PROCEDURE — 87086 URINE CULTURE/COLONY COUNT: CPT

## 2018-06-08 PROCEDURE — 84484 ASSAY OF TROPONIN QUANT: CPT

## 2018-06-08 PROCEDURE — 99223 1ST HOSP IP/OBS HIGH 75: CPT | Performed by: INTERNAL MEDICINE

## 2018-06-08 PROCEDURE — 85610 PROTHROMBIN TIME: CPT

## 2018-06-08 PROCEDURE — 87186 SC STD MICRODIL/AGAR DIL: CPT

## 2018-06-08 PROCEDURE — 93005 ELECTROCARDIOGRAM TRACING: CPT | Performed by: INTERNAL MEDICINE

## 2018-06-08 PROCEDURE — 83605 ASSAY OF LACTIC ACID: CPT

## 2018-06-08 PROCEDURE — 6370000000 HC RX 637 (ALT 250 FOR IP): Performed by: INTERNAL MEDICINE

## 2018-06-08 PROCEDURE — 36415 COLL VENOUS BLD VENIPUNCTURE: CPT

## 2018-06-08 PROCEDURE — 2580000003 HC RX 258: Performed by: INTERNAL MEDICINE

## 2018-06-08 PROCEDURE — 96365 THER/PROPH/DIAG IV INF INIT: CPT

## 2018-06-08 PROCEDURE — 87205 SMEAR GRAM STAIN: CPT

## 2018-06-08 PROCEDURE — 80053 COMPREHEN METABOLIC PANEL: CPT

## 2018-06-08 PROCEDURE — 99284 EMERGENCY DEPT VISIT MOD MDM: CPT

## 2018-06-08 PROCEDURE — 87147 CULTURE TYPE IMMUNOLOGIC: CPT

## 2018-06-08 PROCEDURE — 87184 SC STD DISK METHOD PER PLATE: CPT

## 2018-06-08 PROCEDURE — 96375 TX/PRO/DX INJ NEW DRUG ADDON: CPT

## 2018-06-08 PROCEDURE — 87077 CULTURE AEROBIC IDENTIFY: CPT

## 2018-06-08 PROCEDURE — 85025 COMPLETE CBC W/AUTO DIFF WBC: CPT

## 2018-06-08 PROCEDURE — 99222 1ST HOSP IP/OBS MODERATE 55: CPT | Performed by: SURGERY

## 2018-06-08 PROCEDURE — 80048 BASIC METABOLIC PNL TOTAL CA: CPT

## 2018-06-08 PROCEDURE — 81001 URINALYSIS AUTO W/SCOPE: CPT

## 2018-06-08 PROCEDURE — 1200000003 HC TELEMETRY R&B

## 2018-06-08 PROCEDURE — 87070 CULTURE OTHR SPECIMN AEROBIC: CPT

## 2018-06-08 RX ORDER — FAMOTIDINE 20 MG/1
20 TABLET, FILM COATED ORAL 2 TIMES DAILY
Status: DISCONTINUED | OUTPATIENT
Start: 2018-06-08 | End: 2018-06-14 | Stop reason: HOSPADM

## 2018-06-08 RX ORDER — SODIUM CHLORIDE 9 MG/ML
INJECTION, SOLUTION INTRAVENOUS CONTINUOUS
Status: DISCONTINUED | OUTPATIENT
Start: 2018-06-08 | End: 2018-06-14 | Stop reason: HOSPADM

## 2018-06-08 RX ORDER — 0.9 % SODIUM CHLORIDE 0.9 %
1000 INTRAVENOUS SOLUTION INTRAVENOUS ONCE
Status: COMPLETED | OUTPATIENT
Start: 2018-06-08 | End: 2018-06-08

## 2018-06-08 RX ORDER — ONDANSETRON 4 MG/1
4 TABLET, FILM COATED ORAL EVERY 6 HOURS PRN
Status: ON HOLD | COMMUNITY
End: 2018-06-14 | Stop reason: HOSPADM

## 2018-06-08 RX ORDER — ONDANSETRON 2 MG/ML
4 INJECTION INTRAMUSCULAR; INTRAVENOUS EVERY 6 HOURS PRN
Status: DISCONTINUED | OUTPATIENT
Start: 2018-06-08 | End: 2018-06-14 | Stop reason: HOSPADM

## 2018-06-08 RX ORDER — VITAMIN E 268 MG
400 CAPSULE ORAL DAILY
Status: DISCONTINUED | OUTPATIENT
Start: 2018-06-08 | End: 2018-06-14 | Stop reason: HOSPADM

## 2018-06-08 RX ORDER — OXYBUTYNIN CHLORIDE 10 MG/1
10 TABLET, EXTENDED RELEASE ORAL DAILY
Status: DISCONTINUED | OUTPATIENT
Start: 2018-06-08 | End: 2018-06-14 | Stop reason: HOSPADM

## 2018-06-08 RX ORDER — FLUOXETINE 10 MG/1
10 CAPSULE ORAL DAILY
Status: DISCONTINUED | OUTPATIENT
Start: 2018-06-08 | End: 2018-06-14 | Stop reason: HOSPADM

## 2018-06-08 RX ORDER — MORPHINE SULFATE 2 MG/ML
INJECTION, SOLUTION INTRAMUSCULAR; INTRAVENOUS
Status: DISPENSED
Start: 2018-06-08 | End: 2018-06-09

## 2018-06-08 RX ORDER — TIZANIDINE 4 MG/1
2 TABLET ORAL 3 TIMES DAILY
Status: DISCONTINUED | OUTPATIENT
Start: 2018-06-08 | End: 2018-06-14 | Stop reason: HOSPADM

## 2018-06-08 RX ORDER — MORPHINE SULFATE 2 MG/ML
2 INJECTION, SOLUTION INTRAMUSCULAR; INTRAVENOUS ONCE
Status: COMPLETED | OUTPATIENT
Start: 2018-06-08 | End: 2018-06-08

## 2018-06-08 RX ORDER — ZINC SULFATE 50(220)MG
220 CAPSULE ORAL DAILY
Status: DISCONTINUED | OUTPATIENT
Start: 2018-06-09 | End: 2018-06-14 | Stop reason: HOSPADM

## 2018-06-08 RX ORDER — POTASSIUM CHLORIDE 7.45 MG/ML
10 INJECTION INTRAVENOUS PRN
Status: DISCONTINUED | OUTPATIENT
Start: 2018-06-08 | End: 2018-06-14 | Stop reason: HOSPADM

## 2018-06-08 RX ORDER — ASCORBIC ACID 500 MG
500 TABLET ORAL DAILY
Status: DISCONTINUED | OUTPATIENT
Start: 2018-06-08 | End: 2018-06-14 | Stop reason: HOSPADM

## 2018-06-08 RX ORDER — SODIUM CHLORIDE 0.9 % (FLUSH) 0.9 %
10 SYRINGE (ML) INJECTION EVERY 12 HOURS SCHEDULED
Status: DISCONTINUED | OUTPATIENT
Start: 2018-06-08 | End: 2018-06-14 | Stop reason: HOSPADM

## 2018-06-08 RX ORDER — MORPHINE SULFATE 2 MG/ML
2 INJECTION, SOLUTION INTRAMUSCULAR; INTRAVENOUS ONCE
Status: DISCONTINUED | OUTPATIENT
Start: 2018-06-08 | End: 2018-06-08

## 2018-06-08 RX ORDER — BACLOFEN 10 MG/1
20 TABLET ORAL 3 TIMES DAILY
Status: DISCONTINUED | OUTPATIENT
Start: 2018-06-08 | End: 2018-06-14 | Stop reason: HOSPADM

## 2018-06-08 RX ORDER — OXYCODONE HYDROCHLORIDE AND ACETAMINOPHEN 5; 325 MG/1; MG/1
2 TABLET ORAL EVERY 6 HOURS PRN
Status: DISCONTINUED | OUTPATIENT
Start: 2018-06-08 | End: 2018-06-14 | Stop reason: HOSPADM

## 2018-06-08 RX ORDER — DOCUSATE SODIUM 100 MG/1
100 CAPSULE, LIQUID FILLED ORAL DAILY
Status: DISCONTINUED | OUTPATIENT
Start: 2018-06-08 | End: 2018-06-14 | Stop reason: HOSPADM

## 2018-06-08 RX ORDER — SODIUM CHLORIDE 0.9 % (FLUSH) 0.9 %
10 SYRINGE (ML) INJECTION PRN
Status: DISCONTINUED | OUTPATIENT
Start: 2018-06-08 | End: 2018-06-14 | Stop reason: HOSPADM

## 2018-06-08 RX ORDER — POTASSIUM CHLORIDE 20 MEQ/1
40 TABLET, EXTENDED RELEASE ORAL PRN
Status: DISCONTINUED | OUTPATIENT
Start: 2018-06-08 | End: 2018-06-14 | Stop reason: HOSPADM

## 2018-06-08 RX ORDER — OXYCODONE HYDROCHLORIDE AND ACETAMINOPHEN 5; 325 MG/1; MG/1
2 TABLET ORAL EVERY 4 HOURS PRN
Status: ON HOLD | COMMUNITY
End: 2018-11-14

## 2018-06-08 RX ORDER — POTASSIUM CHLORIDE 750 MG/1
10 TABLET, FILM COATED, EXTENDED RELEASE ORAL DAILY
Status: DISCONTINUED | OUTPATIENT
Start: 2018-06-08 | End: 2018-06-14 | Stop reason: HOSPADM

## 2018-06-08 RX ORDER — ACETAMINOPHEN 325 MG/1
650 TABLET ORAL EVERY 4 HOURS PRN
Status: DISCONTINUED | OUTPATIENT
Start: 2018-06-08 | End: 2018-06-14 | Stop reason: HOSPADM

## 2018-06-08 RX ORDER — POTASSIUM CHLORIDE 20MEQ/15ML
40 LIQUID (ML) ORAL PRN
Status: DISCONTINUED | OUTPATIENT
Start: 2018-06-08 | End: 2018-06-14 | Stop reason: HOSPADM

## 2018-06-08 RX ORDER — PNV NO.95/FERROUS FUM/FOLIC AC 28MG-0.8MG
1 TABLET ORAL DAILY
Status: ON HOLD | COMMUNITY
End: 2018-11-14

## 2018-06-08 RX ORDER — LACTOBACILLUS RHAMNOSUS GG 10B CELL
1 CAPSULE ORAL 2 TIMES DAILY WITH MEALS
Status: DISCONTINUED | OUTPATIENT
Start: 2018-06-08 | End: 2018-06-14 | Stop reason: HOSPADM

## 2018-06-08 RX ORDER — LISINOPRIL 10 MG/1
10 TABLET ORAL DAILY
Status: ON HOLD | COMMUNITY
End: 2018-11-13 | Stop reason: ALTCHOICE

## 2018-06-08 RX ADMIN — POTASSIUM CHLORIDE 10 MEQ: 750 TABLET, FILM COATED, EXTENDED RELEASE ORAL at 18:12

## 2018-06-08 RX ADMIN — FAMOTIDINE 20 MG: 20 TABLET ORAL at 20:58

## 2018-06-08 RX ADMIN — TIZANIDINE 2 MG: 4 TABLET ORAL at 20:59

## 2018-06-08 RX ADMIN — PIPERACILLIN SODIUM AND TAZOBACTAM SODIUM 3.38 G: 3; .375 INJECTION, POWDER, LYOPHILIZED, FOR SOLUTION INTRAVENOUS at 15:53

## 2018-06-08 RX ADMIN — FLUOXETINE 10 MG: 10 CAPSULE ORAL at 18:11

## 2018-06-08 RX ADMIN — MORPHINE SULFATE 2 MG: 2 INJECTION, SOLUTION INTRAMUSCULAR; INTRAVENOUS at 15:54

## 2018-06-08 RX ADMIN — Medication 1 CAPSULE: at 18:11

## 2018-06-08 RX ADMIN — OXYCODONE HYDROCHLORIDE AND ACETAMINOPHEN 2 TABLET: 5; 325 TABLET ORAL at 18:11

## 2018-06-08 RX ADMIN — MEROPENEM 1 G: 1 INJECTION, POWDER, FOR SOLUTION INTRAVENOUS at 22:30

## 2018-06-08 RX ADMIN — Medication 10 ML: at 20:59

## 2018-06-08 RX ADMIN — AMIKACIN SULFATE 325 MG: 250 INJECTION, SOLUTION INTRAMUSCULAR; INTRAVENOUS at 15:13

## 2018-06-08 RX ADMIN — VITAMIN E CAP 400 UNIT 400 UNITS: 400 CAP at 18:12

## 2018-06-08 RX ADMIN — BACLOFEN 20 MG: 10 TABLET ORAL at 20:59

## 2018-06-08 RX ADMIN — Medication 500 MG: at 18:11

## 2018-06-08 RX ADMIN — OXYBUTYNIN CHLORIDE 10 MG: 10 TABLET, FILM COATED, EXTENDED RELEASE ORAL at 18:11

## 2018-06-08 RX ADMIN — Medication 20000 UNITS: at 18:11

## 2018-06-08 RX ADMIN — RIVAROXABAN 20 MG: 20 TABLET, FILM COATED ORAL at 18:12

## 2018-06-08 RX ADMIN — SODIUM CHLORIDE 1000 ML: 9 INJECTION, SOLUTION INTRAVENOUS at 12:30

## 2018-06-08 RX ADMIN — MORPHINE SULFATE 2 MG: 2 INJECTION, SOLUTION INTRAMUSCULAR; INTRAVENOUS at 12:28

## 2018-06-08 RX ADMIN — DOCUSATE SODIUM 100 MG: 100 CAPSULE, LIQUID FILLED ORAL at 18:12

## 2018-06-08 ASSESSMENT — ENCOUNTER SYMPTOMS
BACK PAIN: 0
VOMITING: 0
SORE THROAT: 0
RHINORRHEA: 0
DIARRHEA: 0
SHORTNESS OF BREATH: 0
BLOOD IN STOOL: 0
COUGH: 0
WHEEZING: 0
BACK PAIN: 1
NAUSEA: 0
EYE DISCHARGE: 0
ABDOMINAL DISTENTION: 0
ABDOMINAL PAIN: 0
PHOTOPHOBIA: 0
EYE REDNESS: 0

## 2018-06-08 ASSESSMENT — PAIN SCALES - GENERAL
PAINLEVEL_OUTOF10: 3
PAINLEVEL_OUTOF10: 8
PAINLEVEL_OUTOF10: 4
PAINLEVEL_OUTOF10: 7
PAINLEVEL_OUTOF10: 7
PAINLEVEL_OUTOF10: 4
PAINLEVEL_OUTOF10: 5
PAINLEVEL_OUTOF10: 4

## 2018-06-08 ASSESSMENT — PAIN DESCRIPTION - PAIN TYPE
TYPE: ACUTE PAIN

## 2018-06-08 ASSESSMENT — PAIN DESCRIPTION - LOCATION
LOCATION: COCCYX

## 2018-06-08 ASSESSMENT — PAIN DESCRIPTION - ONSET
ONSET: ON-GOING
ONSET: AWAKENED FROM SLEEP

## 2018-06-08 ASSESSMENT — PAIN DESCRIPTION - ORIENTATION
ORIENTATION: RIGHT

## 2018-06-08 ASSESSMENT — PAIN DESCRIPTION - DESCRIPTORS
DESCRIPTORS: ACHING

## 2018-06-08 ASSESSMENT — PAIN DESCRIPTION - FREQUENCY
FREQUENCY: CONTINUOUS
FREQUENCY: CONTINUOUS

## 2018-06-09 PROBLEM — R79.1 ELEVATED INR: Status: ACTIVE | Noted: 2018-06-09

## 2018-06-09 LAB
ALBUMIN SERPL-MCNC: 2.8 G/DL (ref 3.5–5.1)
ALP BLD-CCNC: 293 U/L (ref 38–126)
ALT SERPL-CCNC: 39 U/L (ref 11–66)
ANION GAP SERPL CALCULATED.3IONS-SCNC: 11 MEQ/L (ref 8–16)
ANISOCYTOSIS: ABNORMAL
APTT: 43.3 SECONDS (ref 22–38)
AST SERPL-CCNC: 28 U/L (ref 5–40)
BASOPHILS # BLD: 0.9 %
BASOPHILS ABSOLUTE: 0 THOU/MM3 (ref 0–0.1)
BILIRUB SERPL-MCNC: < 0.2 MG/DL (ref 0.3–1.2)
BUN BLDV-MCNC: 11 MG/DL (ref 7–22)
CALCIUM SERPL-MCNC: 8.1 MG/DL (ref 8.5–10.5)
CHLORIDE BLD-SCNC: 105 MEQ/L (ref 98–111)
CO2: 26 MEQ/L (ref 23–33)
CREAT SERPL-MCNC: 0.3 MG/DL (ref 0.4–1.2)
EOSINOPHIL # BLD: 5.9 %
EOSINOPHILS ABSOLUTE: 0.3 THOU/MM3 (ref 0–0.4)
GFR SERPL CREATININE-BSD FRML MDRD: > 90 ML/MIN/1.73M2
GLUCOSE BLD-MCNC: 86 MG/DL (ref 70–108)
HCT VFR BLD CALC: 29.7 % (ref 42–52)
HEMOGLOBIN: 9.9 GM/DL (ref 14–18)
INR BLD: 2.04 (ref 0.85–1.13)
LYMPHOCYTES # BLD: 20.4 %
LYMPHOCYTES ABSOLUTE: 1 THOU/MM3 (ref 1–4.8)
MAGNESIUM: 1.9 MG/DL (ref 1.6–2.4)
MCH RBC QN AUTO: 29 PG (ref 27–31)
MCHC RBC AUTO-ENTMCNC: 33.4 GM/DL (ref 33–37)
MCV RBC AUTO: 86.9 FL (ref 80–94)
MONOCYTES # BLD: 11.3 %
MONOCYTES ABSOLUTE: 0.5 THOU/MM3 (ref 0.4–1.3)
NUCLEATED RED BLOOD CELLS: 0 /100 WBC
ORGANISM: ABNORMAL
PDW BLD-RTO: 14.9 % (ref 11.5–14.5)
PLATELET # BLD: 362 THOU/MM3 (ref 130–400)
PMV BLD AUTO: 6 FL (ref 7.4–10.4)
POTASSIUM REFLEX MAGNESIUM: 4 MEQ/L (ref 3.5–5.2)
PREALBUMIN: 9 MG/DL (ref 20–40)
RBC # BLD: 3.42 MILL/MM3 (ref 4.7–6.1)
SEG NEUTROPHILS: 61.5 %
SEGMENTED NEUTROPHILS ABSOLUTE COUNT: 3 THOU/MM3 (ref 1.8–7.7)
SODIUM BLD-SCNC: 142 MEQ/L (ref 135–145)
TOTAL PROTEIN: 6.2 G/DL (ref 6.1–8)
URINE CULTURE REFLEX: ABNORMAL
WBC # BLD: 4.8 THOU/MM3 (ref 4.8–10.8)

## 2018-06-09 PROCEDURE — G8987 SELF CARE CURRENT STATUS: HCPCS

## 2018-06-09 PROCEDURE — 87186 SC STD MICRODIL/AGAR DIL: CPT

## 2018-06-09 PROCEDURE — 6370000000 HC RX 637 (ALT 250 FOR IP): Performed by: INTERNAL MEDICINE

## 2018-06-09 PROCEDURE — 85025 COMPLETE CBC W/AUTO DIFF WBC: CPT

## 2018-06-09 PROCEDURE — 87184 SC STD DISK METHOD PER PLATE: CPT

## 2018-06-09 PROCEDURE — 2580000003 HC RX 258: Performed by: SURGERY

## 2018-06-09 PROCEDURE — 93010 ELECTROCARDIOGRAM REPORT: CPT | Performed by: INTERNAL MEDICINE

## 2018-06-09 PROCEDURE — 6360000002 HC RX W HCPCS: Performed by: INTERNAL MEDICINE

## 2018-06-09 PROCEDURE — 85730 THROMBOPLASTIN TIME PARTIAL: CPT

## 2018-06-09 PROCEDURE — 87075 CULTR BACTERIA EXCEPT BLOOD: CPT

## 2018-06-09 PROCEDURE — 99231 SBSQ HOSP IP/OBS SF/LOW 25: CPT | Performed by: SURGERY

## 2018-06-09 PROCEDURE — 83735 ASSAY OF MAGNESIUM: CPT

## 2018-06-09 PROCEDURE — 87070 CULTURE OTHR SPECIMN AEROBIC: CPT

## 2018-06-09 PROCEDURE — 85610 PROTHROMBIN TIME: CPT

## 2018-06-09 PROCEDURE — 6360000002 HC RX W HCPCS: Performed by: SURGERY

## 2018-06-09 PROCEDURE — 80053 COMPREHEN METABOLIC PANEL: CPT

## 2018-06-09 PROCEDURE — 2580000003 HC RX 258: Performed by: INTERNAL MEDICINE

## 2018-06-09 PROCEDURE — 99232 SBSQ HOSP IP/OBS MODERATE 35: CPT | Performed by: INTERNAL MEDICINE

## 2018-06-09 PROCEDURE — 84134 ASSAY OF PREALBUMIN: CPT

## 2018-06-09 PROCEDURE — 97110 THERAPEUTIC EXERCISES: CPT

## 2018-06-09 PROCEDURE — 36415 COLL VENOUS BLD VENIPUNCTURE: CPT

## 2018-06-09 PROCEDURE — 87147 CULTURE TYPE IMMUNOLOGIC: CPT

## 2018-06-09 PROCEDURE — G8989 SELF CARE D/C STATUS: HCPCS

## 2018-06-09 PROCEDURE — 1200000003 HC TELEMETRY R&B

## 2018-06-09 PROCEDURE — 97165 OT EVAL LOW COMPLEX 30 MIN: CPT

## 2018-06-09 PROCEDURE — 87205 SMEAR GRAM STAIN: CPT

## 2018-06-09 PROCEDURE — 87077 CULTURE AEROBIC IDENTIFY: CPT

## 2018-06-09 PROCEDURE — G8988 SELF CARE GOAL STATUS: HCPCS

## 2018-06-09 PROCEDURE — 36592 COLLECT BLOOD FROM PICC: CPT

## 2018-06-09 RX ORDER — MORPHINE SULFATE 2 MG/ML
2 INJECTION, SOLUTION INTRAMUSCULAR; INTRAVENOUS ONCE
Status: COMPLETED | OUTPATIENT
Start: 2018-06-09 | End: 2018-06-09

## 2018-06-09 RX ORDER — LIDOCAINE HYDROCHLORIDE AND EPINEPHRINE BITARTRATE 20; .01 MG/ML; MG/ML
20 INJECTION, SOLUTION SUBCUTANEOUS ONCE
Status: DISCONTINUED | OUTPATIENT
Start: 2018-06-09 | End: 2018-06-13

## 2018-06-09 RX ADMIN — BACLOFEN 20 MG: 10 TABLET ORAL at 16:11

## 2018-06-09 RX ADMIN — SODIUM CHLORIDE: 9 INJECTION, SOLUTION INTRAVENOUS at 15:45

## 2018-06-09 RX ADMIN — Medication 1 CAPSULE: at 16:11

## 2018-06-09 RX ADMIN — MORPHINE SULFATE 2 MG: 2 INJECTION, SOLUTION INTRAMUSCULAR; INTRAVENOUS at 14:56

## 2018-06-09 RX ADMIN — BACLOFEN 20 MG: 10 TABLET ORAL at 20:43

## 2018-06-09 RX ADMIN — OXYCODONE HYDROCHLORIDE AND ACETAMINOPHEN 2 TABLET: 5; 325 TABLET ORAL at 11:46

## 2018-06-09 RX ADMIN — MEROPENEM 1 G: 1 INJECTION, POWDER, FOR SOLUTION INTRAVENOUS at 16:11

## 2018-06-09 RX ADMIN — Medication 500 MG: at 08:48

## 2018-06-09 RX ADMIN — OXYBUTYNIN CHLORIDE 10 MG: 10 TABLET, FILM COATED, EXTENDED RELEASE ORAL at 08:48

## 2018-06-09 RX ADMIN — Medication 1 CAPSULE: at 08:49

## 2018-06-09 RX ADMIN — VITAMIN E CAP 400 UNIT 400 UNITS: 400 CAP at 08:48

## 2018-06-09 RX ADMIN — OXYCODONE HYDROCHLORIDE AND ACETAMINOPHEN 2 TABLET: 5; 325 TABLET ORAL at 18:09

## 2018-06-09 RX ADMIN — TIZANIDINE 2 MG: 4 TABLET ORAL at 08:51

## 2018-06-09 RX ADMIN — Medication 20000 UNITS: at 08:48

## 2018-06-09 RX ADMIN — DOCUSATE SODIUM 100 MG: 100 CAPSULE, LIQUID FILLED ORAL at 08:48

## 2018-06-09 RX ADMIN — Medication 10 ML: at 20:45

## 2018-06-09 RX ADMIN — POTASSIUM CHLORIDE 10 MEQ: 750 TABLET, FILM COATED, EXTENDED RELEASE ORAL at 08:48

## 2018-06-09 RX ADMIN — OXYCODONE HYDROCHLORIDE AND ACETAMINOPHEN 2 TABLET: 5; 325 TABLET ORAL at 03:53

## 2018-06-09 RX ADMIN — ACETAMINOPHEN 650 MG: 325 TABLET ORAL at 08:49

## 2018-06-09 RX ADMIN — FAMOTIDINE 20 MG: 20 TABLET ORAL at 20:45

## 2018-06-09 RX ADMIN — TIZANIDINE 2 MG: 4 TABLET ORAL at 20:43

## 2018-06-09 RX ADMIN — TIZANIDINE 2 MG: 4 TABLET ORAL at 16:20

## 2018-06-09 RX ADMIN — MEROPENEM 1 G: 1 INJECTION, POWDER, FOR SOLUTION INTRAVENOUS at 08:52

## 2018-06-09 RX ADMIN — MEROPENEM 1 G: 1 INJECTION, POWDER, FOR SOLUTION INTRAVENOUS at 22:19

## 2018-06-09 RX ADMIN — PHYTONADIONE 10 MG: 10 INJECTION, EMULSION INTRAMUSCULAR; INTRAVENOUS; SUBCUTANEOUS at 11:45

## 2018-06-09 RX ADMIN — Medication 220 MG: at 08:48

## 2018-06-09 RX ADMIN — BACLOFEN 20 MG: 10 TABLET ORAL at 08:49

## 2018-06-09 RX ADMIN — FLUOXETINE 10 MG: 10 CAPSULE ORAL at 08:49

## 2018-06-09 RX ADMIN — RIVAROXABAN 20 MG: 20 TABLET, FILM COATED ORAL at 16:10

## 2018-06-09 RX ADMIN — FAMOTIDINE 20 MG: 20 TABLET ORAL at 08:48

## 2018-06-09 ASSESSMENT — PAIN SCALES - GENERAL
PAINLEVEL_OUTOF10: 3
PAINLEVEL_OUTOF10: 8
PAINLEVEL_OUTOF10: 7
PAINLEVEL_OUTOF10: 6
PAINLEVEL_OUTOF10: 8
PAINLEVEL_OUTOF10: 4

## 2018-06-09 ASSESSMENT — ENCOUNTER SYMPTOMS
RHINORRHEA: 0
SHORTNESS OF BREATH: 0
ABDOMINAL DISTENTION: 0
ABDOMINAL PAIN: 0
BACK PAIN: 0
CHEST TIGHTNESS: 0

## 2018-06-09 ASSESSMENT — PAIN DESCRIPTION - ORIENTATION: ORIENTATION: RIGHT

## 2018-06-09 ASSESSMENT — PAIN DESCRIPTION - LOCATION: LOCATION: COCCYX

## 2018-06-10 LAB
APTT: 38.9 SECONDS (ref 22–38)
HCT VFR BLD CALC: 31.3 % (ref 42–52)
HEMOGLOBIN: 10.3 GM/DL (ref 14–18)
MCH RBC QN AUTO: 28.7 PG (ref 27–31)
MCHC RBC AUTO-ENTMCNC: 33 GM/DL (ref 33–37)
MCV RBC AUTO: 87 FL (ref 80–94)
PDW BLD-RTO: 14.7 % (ref 11.5–14.5)
PLATELET # BLD: 383 THOU/MM3 (ref 130–400)
PMV BLD AUTO: 5.9 FL (ref 7.4–10.4)
PRO-BNP: 255.3 PG/ML (ref 0–900)
RBC # BLD: 3.6 MILL/MM3 (ref 4.7–6.1)
TROPONIN T: < 0.01 NG/ML
WBC # BLD: 5.1 THOU/MM3 (ref 4.8–10.8)

## 2018-06-10 PROCEDURE — 85027 COMPLETE CBC AUTOMATED: CPT

## 2018-06-10 PROCEDURE — 6370000000 HC RX 637 (ALT 250 FOR IP): Performed by: INTERNAL MEDICINE

## 2018-06-10 PROCEDURE — 99232 SBSQ HOSP IP/OBS MODERATE 35: CPT | Performed by: INTERNAL MEDICINE

## 2018-06-10 PROCEDURE — 1200000003 HC TELEMETRY R&B

## 2018-06-10 PROCEDURE — 6360000002 HC RX W HCPCS: Performed by: INTERNAL MEDICINE

## 2018-06-10 PROCEDURE — 36415 COLL VENOUS BLD VENIPUNCTURE: CPT

## 2018-06-10 PROCEDURE — 2580000003 HC RX 258: Performed by: INTERNAL MEDICINE

## 2018-06-10 PROCEDURE — 99232 SBSQ HOSP IP/OBS MODERATE 35: CPT | Performed by: SURGERY

## 2018-06-10 PROCEDURE — 85730 THROMBOPLASTIN TIME PARTIAL: CPT

## 2018-06-10 PROCEDURE — 84484 ASSAY OF TROPONIN QUANT: CPT

## 2018-06-10 PROCEDURE — 83880 ASSAY OF NATRIURETIC PEPTIDE: CPT

## 2018-06-10 RX ORDER — HEPARIN SODIUM 1000 [USP'U]/ML
80 INJECTION, SOLUTION INTRAVENOUS; SUBCUTANEOUS PRN
Status: DISCONTINUED | OUTPATIENT
Start: 2018-06-10 | End: 2018-06-10 | Stop reason: DRUGHIGH

## 2018-06-10 RX ORDER — HEPARIN SODIUM 1000 [USP'U]/ML
40 INJECTION, SOLUTION INTRAVENOUS; SUBCUTANEOUS PRN
Status: DISCONTINUED | OUTPATIENT
Start: 2018-06-10 | End: 2018-06-10 | Stop reason: DRUGHIGH

## 2018-06-10 RX ORDER — HEPARIN SODIUM 10000 [USP'U]/100ML
18 INJECTION, SOLUTION INTRAVENOUS CONTINUOUS
Status: DISCONTINUED | OUTPATIENT
Start: 2018-06-10 | End: 2018-06-11

## 2018-06-10 RX ORDER — HEPARIN SODIUM 1000 [USP'U]/ML
80 INJECTION, SOLUTION INTRAVENOUS; SUBCUTANEOUS ONCE
Status: COMPLETED | OUTPATIENT
Start: 2018-06-10 | End: 2018-06-10

## 2018-06-10 RX ADMIN — OXYCODONE HYDROCHLORIDE AND ACETAMINOPHEN 2 TABLET: 5; 325 TABLET ORAL at 12:58

## 2018-06-10 RX ADMIN — TIZANIDINE 2 MG: 4 TABLET ORAL at 09:58

## 2018-06-10 RX ADMIN — BACLOFEN 20 MG: 10 TABLET ORAL at 09:57

## 2018-06-10 RX ADMIN — FAMOTIDINE 20 MG: 20 TABLET ORAL at 09:57

## 2018-06-10 RX ADMIN — BACLOFEN 20 MG: 10 TABLET ORAL at 14:37

## 2018-06-10 RX ADMIN — DOCUSATE SODIUM 100 MG: 100 CAPSULE, LIQUID FILLED ORAL at 09:57

## 2018-06-10 RX ADMIN — SODIUM CHLORIDE: 9 INJECTION, SOLUTION INTRAVENOUS at 05:59

## 2018-06-10 RX ADMIN — Medication 20000 UNITS: at 09:58

## 2018-06-10 RX ADMIN — HEPARIN SODIUM 18 UNITS/KG/HR: 10000 INJECTION, SOLUTION INTRAVENOUS at 18:35

## 2018-06-10 RX ADMIN — MEROPENEM 1 G: 1 INJECTION, POWDER, FOR SOLUTION INTRAVENOUS at 22:12

## 2018-06-10 RX ADMIN — SODIUM CHLORIDE: 9 INJECTION, SOLUTION INTRAVENOUS at 22:14

## 2018-06-10 RX ADMIN — TIZANIDINE 2 MG: 4 TABLET ORAL at 14:37

## 2018-06-10 RX ADMIN — BACLOFEN 20 MG: 10 TABLET ORAL at 22:12

## 2018-06-10 RX ADMIN — TIZANIDINE 2 MG: 4 TABLET ORAL at 22:13

## 2018-06-10 RX ADMIN — MEROPENEM 1 G: 1 INJECTION, POWDER, FOR SOLUTION INTRAVENOUS at 14:41

## 2018-06-10 RX ADMIN — Medication 220 MG: at 09:57

## 2018-06-10 RX ADMIN — Medication 500 MG: at 09:57

## 2018-06-10 RX ADMIN — HEPARIN SODIUM 8100 UNITS: 1000 INJECTION, SOLUTION INTRAVENOUS; SUBCUTANEOUS at 18:32

## 2018-06-10 RX ADMIN — FLUOXETINE 10 MG: 10 CAPSULE ORAL at 09:57

## 2018-06-10 RX ADMIN — OXYCODONE HYDROCHLORIDE AND ACETAMINOPHEN 2 TABLET: 5; 325 TABLET ORAL at 04:17

## 2018-06-10 RX ADMIN — OXYCODONE HYDROCHLORIDE AND ACETAMINOPHEN 2 TABLET: 5; 325 TABLET ORAL at 18:41

## 2018-06-10 RX ADMIN — Medication 1 CAPSULE: at 09:57

## 2018-06-10 RX ADMIN — OXYBUTYNIN CHLORIDE 10 MG: 10 TABLET, FILM COATED, EXTENDED RELEASE ORAL at 09:57

## 2018-06-10 RX ADMIN — POTASSIUM CHLORIDE 10 MEQ: 750 TABLET, FILM COATED, EXTENDED RELEASE ORAL at 09:57

## 2018-06-10 RX ADMIN — Medication 1 CAPSULE: at 18:32

## 2018-06-10 RX ADMIN — MEROPENEM 1 G: 1 INJECTION, POWDER, FOR SOLUTION INTRAVENOUS at 05:59

## 2018-06-10 RX ADMIN — VITAMIN E CAP 400 UNIT 400 UNITS: 400 CAP at 09:57

## 2018-06-10 RX ADMIN — FAMOTIDINE 20 MG: 20 TABLET ORAL at 22:12

## 2018-06-10 ASSESSMENT — PAIN DESCRIPTION - ONSET: ONSET: ON-GOING

## 2018-06-10 ASSESSMENT — PAIN SCALES - GENERAL
PAINLEVEL_OUTOF10: 7
PAINLEVEL_OUTOF10: 8
PAINLEVEL_OUTOF10: 7
PAINLEVEL_OUTOF10: 3

## 2018-06-10 ASSESSMENT — PAIN DESCRIPTION - LOCATION: LOCATION: BUTTOCKS

## 2018-06-10 ASSESSMENT — ENCOUNTER SYMPTOMS
CHEST TIGHTNESS: 0
SHORTNESS OF BREATH: 0
ABDOMINAL PAIN: 0
RHINORRHEA: 0
BACK PAIN: 0
ABDOMINAL DISTENTION: 0

## 2018-06-10 ASSESSMENT — PAIN DESCRIPTION - DESCRIPTORS: DESCRIPTORS: ACHING

## 2018-06-10 ASSESSMENT — PAIN DESCRIPTION - FREQUENCY: FREQUENCY: CONTINUOUS

## 2018-06-10 ASSESSMENT — PAIN DESCRIPTION - PAIN TYPE: TYPE: ACUTE PAIN

## 2018-06-10 ASSESSMENT — PAIN DESCRIPTION - ORIENTATION: ORIENTATION: RIGHT

## 2018-06-11 ENCOUNTER — APPOINTMENT (OUTPATIENT)
Dept: NON INVASIVE DIAGNOSTICS | Age: 63
DRG: 981 | End: 2018-06-11
Payer: COMMERCIAL

## 2018-06-11 LAB
AEROBIC CULTURE: ABNORMAL
ANAEROBIC CULTURE: ABNORMAL
APTT: 102.7 SECONDS (ref 22–38)
APTT: 164.1 SECONDS (ref 22–38)
APTT: 39.8 SECONDS (ref 22–38)
APTT: > 200 SECONDS (ref 22–38)
GRAM STAIN RESULT: ABNORMAL
GRAM STAIN RESULT: ABNORMAL
INR BLD: 1.22 (ref 0.85–1.13)
LV EF: 53 %
LVEF MODALITY: NORMAL
MAGNESIUM: 2 MG/DL (ref 1.6–2.4)
ORGANISM: ABNORMAL
POTASSIUM SERPL-SCNC: 4.2 MEQ/L (ref 3.5–5.2)
TROPONIN T: < 0.01 NG/ML

## 2018-06-11 PROCEDURE — 2580000003 HC RX 258: Performed by: INTERNAL MEDICINE

## 2018-06-11 PROCEDURE — 6360000002 HC RX W HCPCS

## 2018-06-11 PROCEDURE — 6360000002 HC RX W HCPCS: Performed by: INTERNAL MEDICINE

## 2018-06-11 PROCEDURE — 93306 TTE W/DOPPLER COMPLETE: CPT

## 2018-06-11 PROCEDURE — 85730 THROMBOPLASTIN TIME PARTIAL: CPT

## 2018-06-11 PROCEDURE — 84132 ASSAY OF SERUM POTASSIUM: CPT

## 2018-06-11 PROCEDURE — 85610 PROTHROMBIN TIME: CPT

## 2018-06-11 PROCEDURE — 3430000000 HC RX DIAGNOSTIC RADIOPHARMACEUTICAL: Performed by: INTERNAL MEDICINE

## 2018-06-11 PROCEDURE — 99232 SBSQ HOSP IP/OBS MODERATE 35: CPT | Performed by: SURGERY

## 2018-06-11 PROCEDURE — 36415 COLL VENOUS BLD VENIPUNCTURE: CPT

## 2018-06-11 PROCEDURE — A9500 TC99M SESTAMIBI: HCPCS | Performed by: INTERNAL MEDICINE

## 2018-06-11 PROCEDURE — 6370000000 HC RX 637 (ALT 250 FOR IP): Performed by: INTERNAL MEDICINE

## 2018-06-11 PROCEDURE — 93017 CV STRESS TEST TRACING ONLY: CPT

## 2018-06-11 PROCEDURE — 51702 INSERT TEMP BLADDER CATH: CPT

## 2018-06-11 PROCEDURE — 83735 ASSAY OF MAGNESIUM: CPT

## 2018-06-11 PROCEDURE — 78452 HT MUSCLE IMAGE SPECT MULT: CPT

## 2018-06-11 PROCEDURE — 84484 ASSAY OF TROPONIN QUANT: CPT

## 2018-06-11 PROCEDURE — 99232 SBSQ HOSP IP/OBS MODERATE 35: CPT | Performed by: INTERNAL MEDICINE

## 2018-06-11 PROCEDURE — 1200000003 HC TELEMETRY R&B

## 2018-06-11 RX ORDER — HEPARIN SODIUM 1000 [USP'U]/ML
40 INJECTION, SOLUTION INTRAVENOUS; SUBCUTANEOUS ONCE
Status: COMPLETED | OUTPATIENT
Start: 2018-06-11 | End: 2018-06-11

## 2018-06-11 RX ORDER — HEPARIN SODIUM 10000 [USP'U]/100ML
11 INJECTION, SOLUTION INTRAVENOUS CONTINUOUS
Status: DISCONTINUED | OUTPATIENT
Start: 2018-06-11 | End: 2018-06-13

## 2018-06-11 RX ADMIN — OXYCODONE HYDROCHLORIDE AND ACETAMINOPHEN 2 TABLET: 5; 325 TABLET ORAL at 14:43

## 2018-06-11 RX ADMIN — Medication 1 CAPSULE: at 16:52

## 2018-06-11 RX ADMIN — HEPARIN SODIUM 13 UNITS/KG/HR: 10000 INJECTION, SOLUTION INTRAVENOUS at 16:45

## 2018-06-11 RX ADMIN — HEPARIN SODIUM 4050 UNITS: 1000 INJECTION, SOLUTION INTRAVENOUS; SUBCUTANEOUS at 16:45

## 2018-06-11 RX ADMIN — FAMOTIDINE 20 MG: 20 TABLET ORAL at 20:27

## 2018-06-11 RX ADMIN — HEPARIN SODIUM 14.2 UNITS/KG/HR: 10000 INJECTION, SOLUTION INTRAVENOUS at 02:26

## 2018-06-11 RX ADMIN — HEPARIN SODIUM 13 UNITS/KG/HR: 10000 INJECTION, SOLUTION INTRAVENOUS at 13:00

## 2018-06-11 RX ADMIN — Medication 31.4 MILLICURIE: at 09:55

## 2018-06-11 RX ADMIN — MEROPENEM 1 G: 1 INJECTION, POWDER, FOR SOLUTION INTRAVENOUS at 23:15

## 2018-06-11 RX ADMIN — TIZANIDINE 2 MG: 4 TABLET ORAL at 20:25

## 2018-06-11 RX ADMIN — MEROPENEM 1 G: 1 INJECTION, POWDER, FOR SOLUTION INTRAVENOUS at 15:10

## 2018-06-11 RX ADMIN — OXYCODONE HYDROCHLORIDE AND ACETAMINOPHEN 2 TABLET: 5; 325 TABLET ORAL at 00:33

## 2018-06-11 RX ADMIN — TIZANIDINE 2 MG: 4 TABLET ORAL at 13:10

## 2018-06-11 RX ADMIN — Medication 10.7 MILLICURIE: at 08:43

## 2018-06-11 RX ADMIN — BACLOFEN 20 MG: 10 TABLET ORAL at 20:25

## 2018-06-11 RX ADMIN — MEROPENEM 1 G: 1 INJECTION, POWDER, FOR SOLUTION INTRAVENOUS at 06:02

## 2018-06-11 RX ADMIN — BACLOFEN 20 MG: 10 TABLET ORAL at 13:08

## 2018-06-11 RX ADMIN — OXYBUTYNIN CHLORIDE 10 MG: 10 TABLET, FILM COATED, EXTENDED RELEASE ORAL at 13:08

## 2018-06-11 RX ADMIN — OXYCODONE HYDROCHLORIDE AND ACETAMINOPHEN 2 TABLET: 5; 325 TABLET ORAL at 20:58

## 2018-06-11 RX ADMIN — SODIUM CHLORIDE: 9 INJECTION, SOLUTION INTRAVENOUS at 14:04

## 2018-06-11 ASSESSMENT — PAIN DESCRIPTION - LOCATION
LOCATION: BUTTOCKS

## 2018-06-11 ASSESSMENT — PAIN SCALES - GENERAL
PAINLEVEL_OUTOF10: 7
PAINLEVEL_OUTOF10: 4
PAINLEVEL_OUTOF10: 4
PAINLEVEL_OUTOF10: 7
PAINLEVEL_OUTOF10: 4
PAINLEVEL_OUTOF10: 5

## 2018-06-11 ASSESSMENT — PAIN DESCRIPTION - DESCRIPTORS
DESCRIPTORS: ACHING

## 2018-06-11 ASSESSMENT — ENCOUNTER SYMPTOMS
RHINORRHEA: 0
BACK PAIN: 0
ABDOMINAL PAIN: 0
CHEST TIGHTNESS: 0
SHORTNESS OF BREATH: 0
ABDOMINAL DISTENTION: 0

## 2018-06-11 ASSESSMENT — PAIN DESCRIPTION - ORIENTATION
ORIENTATION: RIGHT

## 2018-06-11 ASSESSMENT — PAIN DESCRIPTION - PAIN TYPE
TYPE: ACUTE PAIN
TYPE: ACUTE PAIN;CHRONIC PAIN
TYPE: ACUTE PAIN;CHRONIC PAIN

## 2018-06-11 ASSESSMENT — PAIN DESCRIPTION - ONSET: ONSET: ON-GOING

## 2018-06-11 ASSESSMENT — PAIN DESCRIPTION - FREQUENCY
FREQUENCY: CONTINUOUS

## 2018-06-12 ENCOUNTER — APPOINTMENT (OUTPATIENT)
Dept: MRI IMAGING | Age: 63
DRG: 981 | End: 2018-06-12
Payer: COMMERCIAL

## 2018-06-12 LAB
ABO: NORMAL
AEROBIC CULTURE: ABNORMAL
ANAEROBIC CULTURE: ABNORMAL
ANION GAP SERPL CALCULATED.3IONS-SCNC: 13 MEQ/L (ref 8–16)
ANTIBODY SCREEN: NORMAL
APTT: 40.1 SECONDS (ref 22–38)
APTT: 79.7 SECONDS (ref 22–38)
APTT: 98.8 SECONDS (ref 22–38)
BUN BLDV-MCNC: 8 MG/DL (ref 7–22)
CALCIUM SERPL-MCNC: 8.6 MG/DL (ref 8.5–10.5)
CHLORIDE BLD-SCNC: 102 MEQ/L (ref 98–111)
CO2: 26 MEQ/L (ref 23–33)
CREAT SERPL-MCNC: 0.3 MG/DL (ref 0.4–1.2)
GFR SERPL CREATININE-BSD FRML MDRD: > 90 ML/MIN/1.73M2
GLUCOSE BLD-MCNC: 98 MG/DL (ref 70–108)
GRAM STAIN RESULT: ABNORMAL
HCT VFR BLD CALC: 31.5 % (ref 42–52)
HEMOGLOBIN: 10.7 GM/DL (ref 14–18)
MCH RBC QN AUTO: 29.4 PG (ref 27–31)
MCHC RBC AUTO-ENTMCNC: 33.9 GM/DL (ref 33–37)
MCV RBC AUTO: 86.9 FL (ref 80–94)
ORGANISM: ABNORMAL
ORGANISM: ABNORMAL
PDW BLD-RTO: 15.1 % (ref 11.5–14.5)
PLATELET # BLD: 430 THOU/MM3 (ref 130–400)
PLATELET # BLD: 508 THOU/MM3 (ref 130–400)
PMV BLD AUTO: 5.9 FL (ref 7.4–10.4)
POTASSIUM SERPL-SCNC: 3.7 MEQ/L (ref 3.5–5.2)
RBC # BLD: 3.62 MILL/MM3 (ref 4.7–6.1)
RH FACTOR: NORMAL
SODIUM BLD-SCNC: 141 MEQ/L (ref 135–145)
WBC # BLD: 5.8 THOU/MM3 (ref 4.8–10.8)

## 2018-06-12 PROCEDURE — 86900 BLOOD TYPING SEROLOGIC ABO: CPT

## 2018-06-12 PROCEDURE — 85730 THROMBOPLASTIN TIME PARTIAL: CPT

## 2018-06-12 PROCEDURE — 6360000002 HC RX W HCPCS: Performed by: INTERNAL MEDICINE

## 2018-06-12 PROCEDURE — 99233 SBSQ HOSP IP/OBS HIGH 50: CPT | Performed by: SURGERY

## 2018-06-12 PROCEDURE — 85049 AUTOMATED PLATELET COUNT: CPT

## 2018-06-12 PROCEDURE — 6370000000 HC RX 637 (ALT 250 FOR IP): Performed by: INTERNAL MEDICINE

## 2018-06-12 PROCEDURE — 2580000003 HC RX 258: Performed by: INTERNAL MEDICINE

## 2018-06-12 PROCEDURE — 1200000003 HC TELEMETRY R&B

## 2018-06-12 PROCEDURE — 86901 BLOOD TYPING SEROLOGIC RH(D): CPT

## 2018-06-12 PROCEDURE — 72197 MRI PELVIS W/O & W/DYE: CPT

## 2018-06-12 PROCEDURE — 36415 COLL VENOUS BLD VENIPUNCTURE: CPT

## 2018-06-12 PROCEDURE — 6360000004 HC RX CONTRAST MEDICATION: Performed by: INTERNAL MEDICINE

## 2018-06-12 PROCEDURE — 80048 BASIC METABOLIC PNL TOTAL CA: CPT

## 2018-06-12 PROCEDURE — A9579 GAD-BASE MR CONTRAST NOS,1ML: HCPCS | Performed by: INTERNAL MEDICINE

## 2018-06-12 PROCEDURE — 99233 SBSQ HOSP IP/OBS HIGH 50: CPT | Performed by: INTERNAL MEDICINE

## 2018-06-12 PROCEDURE — 6370000000 HC RX 637 (ALT 250 FOR IP): Performed by: SURGERY

## 2018-06-12 PROCEDURE — 86850 RBC ANTIBODY SCREEN: CPT

## 2018-06-12 PROCEDURE — 85027 COMPLETE CBC AUTOMATED: CPT

## 2018-06-12 RX ORDER — POLYETHYLENE GLYCOL 3350 17 G/17G
238 POWDER, FOR SOLUTION ORAL DAILY
Status: COMPLETED | OUTPATIENT
Start: 2018-06-12 | End: 2018-06-12

## 2018-06-12 RX ADMIN — MEROPENEM 1 G: 1 INJECTION, POWDER, FOR SOLUTION INTRAVENOUS at 22:36

## 2018-06-12 RX ADMIN — MEROPENEM 1 G: 1 INJECTION, POWDER, FOR SOLUTION INTRAVENOUS at 06:06

## 2018-06-12 RX ADMIN — POTASSIUM CHLORIDE 10 MEQ: 750 TABLET, FILM COATED, EXTENDED RELEASE ORAL at 09:43

## 2018-06-12 RX ADMIN — TIZANIDINE 2 MG: 4 TABLET ORAL at 13:59

## 2018-06-12 RX ADMIN — Medication 220 MG: at 09:38

## 2018-06-12 RX ADMIN — OXYCODONE HYDROCHLORIDE AND ACETAMINOPHEN 2 TABLET: 5; 325 TABLET ORAL at 16:12

## 2018-06-12 RX ADMIN — BISACODYL 40 MG: 5 TABLET, DELAYED RELEASE ORAL at 17:33

## 2018-06-12 RX ADMIN — SODIUM CHLORIDE: 9 INJECTION, SOLUTION INTRAVENOUS at 20:23

## 2018-06-12 RX ADMIN — VITAMIN E CAP 400 UNIT 400 UNITS: 400 CAP at 09:38

## 2018-06-12 RX ADMIN — OXYCODONE HYDROCHLORIDE AND ACETAMINOPHEN 2 TABLET: 5; 325 TABLET ORAL at 03:43

## 2018-06-12 RX ADMIN — GADOTERIDOL 20 ML: 279.3 INJECTION, SOLUTION INTRAVENOUS at 15:17

## 2018-06-12 RX ADMIN — BACLOFEN 20 MG: 10 TABLET ORAL at 20:48

## 2018-06-12 RX ADMIN — POLYETHYLENE GLYCOL 3350 238 G: 17 POWDER, FOR SOLUTION ORAL at 17:28

## 2018-06-12 RX ADMIN — FAMOTIDINE 20 MG: 20 TABLET ORAL at 20:48

## 2018-06-12 RX ADMIN — FLUOXETINE 10 MG: 10 CAPSULE ORAL at 09:38

## 2018-06-12 RX ADMIN — MEROPENEM 1 G: 1 INJECTION, POWDER, FOR SOLUTION INTRAVENOUS at 13:59

## 2018-06-12 RX ADMIN — BACLOFEN 20 MG: 10 TABLET ORAL at 13:59

## 2018-06-12 RX ADMIN — Medication 500 MG: at 09:38

## 2018-06-12 RX ADMIN — HEPARIN SODIUM 11 UNITS/KG/HR: 10000 INJECTION, SOLUTION INTRAVENOUS at 16:12

## 2018-06-12 RX ADMIN — DOCUSATE SODIUM 100 MG: 100 CAPSULE, LIQUID FILLED ORAL at 09:43

## 2018-06-12 RX ADMIN — TIZANIDINE 2 MG: 4 TABLET ORAL at 09:38

## 2018-06-12 RX ADMIN — TIZANIDINE 2 MG: 4 TABLET ORAL at 20:48

## 2018-06-12 RX ADMIN — OXYCODONE HYDROCHLORIDE AND ACETAMINOPHEN 2 TABLET: 5; 325 TABLET ORAL at 09:39

## 2018-06-12 RX ADMIN — FAMOTIDINE 20 MG: 20 TABLET ORAL at 09:38

## 2018-06-12 RX ADMIN — Medication 20000 UNITS: at 09:38

## 2018-06-12 RX ADMIN — Medication 1 CAPSULE: at 09:39

## 2018-06-12 RX ADMIN — OXYBUTYNIN CHLORIDE 10 MG: 10 TABLET, FILM COATED, EXTENDED RELEASE ORAL at 09:38

## 2018-06-12 RX ADMIN — Medication 1 CAPSULE: at 16:12

## 2018-06-12 RX ADMIN — BACLOFEN 20 MG: 10 TABLET ORAL at 09:38

## 2018-06-12 RX ADMIN — SODIUM CHLORIDE: 9 INJECTION, SOLUTION INTRAVENOUS at 03:43

## 2018-06-12 ASSESSMENT — PAIN SCALES - GENERAL
PAINLEVEL_OUTOF10: 5
PAINLEVEL_OUTOF10: 6
PAINLEVEL_OUTOF10: 7
PAINLEVEL_OUTOF10: 4
PAINLEVEL_OUTOF10: 0
PAINLEVEL_OUTOF10: 7
PAINLEVEL_OUTOF10: 7
PAINLEVEL_OUTOF10: 5

## 2018-06-12 ASSESSMENT — ENCOUNTER SYMPTOMS
RHINORRHEA: 0
NAUSEA: 0
CHEST TIGHTNESS: 0
GASTROINTESTINAL NEGATIVE: 1
ABDOMINAL DISTENTION: 0
RESPIRATORY NEGATIVE: 1
ABDOMINAL PAIN: 0
EYES NEGATIVE: 1
BACK PAIN: 0
COUGH: 0
DIARRHEA: 0
SHORTNESS OF BREATH: 0

## 2018-06-12 ASSESSMENT — PAIN DESCRIPTION - DESCRIPTORS: DESCRIPTORS: ACHING

## 2018-06-12 ASSESSMENT — PAIN DESCRIPTION - ORIENTATION: ORIENTATION: RIGHT

## 2018-06-12 ASSESSMENT — PAIN DESCRIPTION - PAIN TYPE: TYPE: ACUTE PAIN

## 2018-06-12 ASSESSMENT — PAIN DESCRIPTION - LOCATION
LOCATION: HIP
LOCATION: HIP

## 2018-06-13 ENCOUNTER — ANESTHESIA (OUTPATIENT)
Dept: OPERATING ROOM | Age: 63
DRG: 981 | End: 2018-06-13
Payer: COMMERCIAL

## 2018-06-13 ENCOUNTER — ANESTHESIA EVENT (OUTPATIENT)
Dept: OPERATING ROOM | Age: 63
DRG: 981 | End: 2018-06-13
Payer: COMMERCIAL

## 2018-06-13 VITALS
TEMPERATURE: 98.8 F | SYSTOLIC BLOOD PRESSURE: 110 MMHG | RESPIRATION RATE: 1 BRPM | DIASTOLIC BLOOD PRESSURE: 65 MMHG | OXYGEN SATURATION: 100 %

## 2018-06-13 PROBLEM — M86.651: Status: ACTIVE | Noted: 2018-04-13

## 2018-06-13 LAB — SEDIMENTATION RATE, ERYTHROCYTE: 101 MM/HR (ref 0–10)

## 2018-06-13 PROCEDURE — 2580000003 HC RX 258: Performed by: INTERNAL MEDICINE

## 2018-06-13 PROCEDURE — 97605 NEG PRS WND THER DME<=50SQCM: CPT

## 2018-06-13 PROCEDURE — 44188 LAP COLOSTOMY: CPT | Performed by: SURGERY

## 2018-06-13 PROCEDURE — 6370000000 HC RX 637 (ALT 250 FOR IP): Performed by: INTERNAL MEDICINE

## 2018-06-13 PROCEDURE — 6360000002 HC RX W HCPCS: Performed by: SURGERY

## 2018-06-13 PROCEDURE — 2780000010 HC IMPLANT OTHER: Performed by: SURGERY

## 2018-06-13 PROCEDURE — 51702 INSERT TEMP BLADDER CATH: CPT

## 2018-06-13 PROCEDURE — A4388 DRAINABLE PCH W EX WEAR BARR: HCPCS | Performed by: SURGERY

## 2018-06-13 PROCEDURE — 8E0W4CZ ROBOTIC ASSISTED PROCEDURE OF TRUNK REGION, PERCUTANEOUS ENDOSCOPIC APPROACH: ICD-10-PCS | Performed by: SURGERY

## 2018-06-13 PROCEDURE — 6360000002 HC RX W HCPCS: Performed by: INTERNAL MEDICINE

## 2018-06-13 PROCEDURE — 3700000000 HC ANESTHESIA ATTENDED CARE: Performed by: SURGERY

## 2018-06-13 PROCEDURE — 3600000019 HC SURGERY ROBOT ADDTL 15MIN: Performed by: SURGERY

## 2018-06-13 PROCEDURE — 7100000000 HC PACU RECOVERY - FIRST 15 MIN: Performed by: SURGERY

## 2018-06-13 PROCEDURE — 36415 COLL VENOUS BLD VENIPUNCTURE: CPT

## 2018-06-13 PROCEDURE — 3600000009 HC SURGERY ROBOT BASE: Performed by: SURGERY

## 2018-06-13 PROCEDURE — 0D1K4Z4 BYPASS ASCENDING COLON TO CUTANEOUS, PERCUTANEOUS ENDOSCOPIC APPROACH: ICD-10-PCS | Performed by: SURGERY

## 2018-06-13 PROCEDURE — 1200000003 HC TELEMETRY R&B

## 2018-06-13 PROCEDURE — S2900 ROBOTIC SURGICAL SYSTEM: HCPCS | Performed by: SURGERY

## 2018-06-13 PROCEDURE — 2500000003 HC RX 250 WO HCPCS: Performed by: SURGERY

## 2018-06-13 PROCEDURE — A4407 EXT WEAR OST SKN BARR <=4SQ": HCPCS | Performed by: SURGERY

## 2018-06-13 PROCEDURE — 6360000002 HC RX W HCPCS: Performed by: SPECIALIST

## 2018-06-13 PROCEDURE — 7100000001 HC PACU RECOVERY - ADDTL 15 MIN: Performed by: SURGERY

## 2018-06-13 PROCEDURE — 85651 RBC SED RATE NONAUTOMATED: CPT

## 2018-06-13 PROCEDURE — 2500000003 HC RX 250 WO HCPCS: Performed by: SPECIALIST

## 2018-06-13 PROCEDURE — 2720000010 HC SURG SUPPLY STERILE: Performed by: SURGERY

## 2018-06-13 PROCEDURE — A5063 DRAIN OSTOMY POUCH W/FLANGE: HCPCS | Performed by: SURGERY

## 2018-06-13 PROCEDURE — 99233 SBSQ HOSP IP/OBS HIGH 50: CPT | Performed by: INTERNAL MEDICINE

## 2018-06-13 PROCEDURE — 3700000001 HC ADD 15 MINUTES (ANESTHESIA): Performed by: SURGERY

## 2018-06-13 DEVICE — RELOAD STPL H35XL60MM BLU REG B FORM NAT ARTC ECHELON: Type: IMPLANTABLE DEVICE | Site: ABDOMEN | Status: FUNCTIONAL

## 2018-06-13 RX ORDER — MORPHINE SULFATE 2 MG/ML
2 INJECTION, SOLUTION INTRAMUSCULAR; INTRAVENOUS
Status: ACTIVE | OUTPATIENT
Start: 2018-06-13 | End: 2018-06-14

## 2018-06-13 RX ORDER — ONDANSETRON 2 MG/ML
INJECTION INTRAMUSCULAR; INTRAVENOUS PRN
Status: DISCONTINUED | OUTPATIENT
Start: 2018-06-13 | End: 2018-06-13 | Stop reason: SDUPTHER

## 2018-06-13 RX ORDER — PROPOFOL 10 MG/ML
INJECTION, EMULSION INTRAVENOUS PRN
Status: DISCONTINUED | OUTPATIENT
Start: 2018-06-13 | End: 2018-06-13 | Stop reason: SDUPTHER

## 2018-06-13 RX ORDER — INDOCYANINE GREEN AND WATER 25 MG
KIT INJECTION PRN
Status: DISCONTINUED | OUTPATIENT
Start: 2018-06-13 | End: 2018-06-13 | Stop reason: SDUPTHER

## 2018-06-13 RX ORDER — MIDAZOLAM HYDROCHLORIDE 1 MG/ML
INJECTION INTRAMUSCULAR; INTRAVENOUS PRN
Status: DISCONTINUED | OUTPATIENT
Start: 2018-06-13 | End: 2018-06-13 | Stop reason: SDUPTHER

## 2018-06-13 RX ORDER — MORPHINE SULFATE 4 MG/ML
4 INJECTION, SOLUTION INTRAMUSCULAR; INTRAVENOUS
Status: DISPENSED | OUTPATIENT
Start: 2018-06-13 | End: 2018-06-14

## 2018-06-13 RX ORDER — LIDOCAINE HYDROCHLORIDE 20 MG/ML
INJECTION, SOLUTION INFILTRATION; PERINEURAL PRN
Status: DISCONTINUED | OUTPATIENT
Start: 2018-06-13 | End: 2018-06-13 | Stop reason: SDUPTHER

## 2018-06-13 RX ORDER — BUPIVACAINE HYDROCHLORIDE AND EPINEPHRINE 5; 5 MG/ML; UG/ML
INJECTION, SOLUTION EPIDURAL; INTRACAUDAL; PERINEURAL PRN
Status: DISCONTINUED | OUTPATIENT
Start: 2018-06-13 | End: 2018-06-13 | Stop reason: HOSPADM

## 2018-06-13 RX ORDER — FENTANYL CITRATE 50 UG/ML
INJECTION, SOLUTION INTRAMUSCULAR; INTRAVENOUS PRN
Status: DISCONTINUED | OUTPATIENT
Start: 2018-06-13 | End: 2018-06-13 | Stop reason: SDUPTHER

## 2018-06-13 RX ORDER — GLYCOPYRROLATE 1 MG/5 ML
SYRINGE (ML) INTRAVENOUS PRN
Status: DISCONTINUED | OUTPATIENT
Start: 2018-06-13 | End: 2018-06-13 | Stop reason: SDUPTHER

## 2018-06-13 RX ADMIN — PROPOFOL 100 MG: 10 INJECTION, EMULSION INTRAVENOUS at 11:49

## 2018-06-13 RX ADMIN — FENTANYL CITRATE 50 MCG: 50 INJECTION INTRAMUSCULAR; INTRAVENOUS at 11:37

## 2018-06-13 RX ADMIN — PHENYLEPHRINE HYDROCHLORIDE 100 MCG: 10 INJECTION INTRAVENOUS at 12:01

## 2018-06-13 RX ADMIN — FLUOXETINE 10 MG: 10 CAPSULE ORAL at 09:52

## 2018-06-13 RX ADMIN — INDOCYANINE GREEN AND WATER 7.5 MG: KIT at 13:03

## 2018-06-13 RX ADMIN — Medication 500 MG: at 09:52

## 2018-06-13 RX ADMIN — FENTANYL CITRATE 50 MCG: 50 INJECTION INTRAMUSCULAR; INTRAVENOUS at 11:49

## 2018-06-13 RX ADMIN — Medication 220 MG: at 09:52

## 2018-06-13 RX ADMIN — MORPHINE SULFATE 4 MG: 4 INJECTION INTRAVENOUS at 23:34

## 2018-06-13 RX ADMIN — POTASSIUM CHLORIDE 10 MEQ: 750 TABLET, FILM COATED, EXTENDED RELEASE ORAL at 09:57

## 2018-06-13 RX ADMIN — Medication 10 ML: at 20:26

## 2018-06-13 RX ADMIN — BACLOFEN 20 MG: 10 TABLET ORAL at 09:52

## 2018-06-13 RX ADMIN — FENTANYL CITRATE 25 MCG: 50 INJECTION INTRAMUSCULAR; INTRAVENOUS at 13:33

## 2018-06-13 RX ADMIN — PHENYLEPHRINE HYDROCHLORIDE 100 MCG: 10 INJECTION INTRAVENOUS at 13:22

## 2018-06-13 RX ADMIN — MIDAZOLAM HYDROCHLORIDE 1 MG: 1 INJECTION, SOLUTION INTRAMUSCULAR; INTRAVENOUS at 11:37

## 2018-06-13 RX ADMIN — OXYCODONE HYDROCHLORIDE AND ACETAMINOPHEN 2 TABLET: 5; 325 TABLET ORAL at 15:34

## 2018-06-13 RX ADMIN — TIZANIDINE 2 MG: 4 TABLET ORAL at 15:35

## 2018-06-13 RX ADMIN — FAMOTIDINE 20 MG: 20 TABLET ORAL at 09:57

## 2018-06-13 RX ADMIN — BACLOFEN 20 MG: 10 TABLET ORAL at 15:34

## 2018-06-13 RX ADMIN — BACLOFEN 20 MG: 10 TABLET ORAL at 20:25

## 2018-06-13 RX ADMIN — MEROPENEM 1 G: 1 INJECTION, POWDER, FOR SOLUTION INTRAVENOUS at 23:00

## 2018-06-13 RX ADMIN — FAMOTIDINE 20 MG: 20 TABLET ORAL at 20:25

## 2018-06-13 RX ADMIN — PHENYLEPHRINE HYDROCHLORIDE 100 MCG: 10 INJECTION INTRAVENOUS at 13:36

## 2018-06-13 RX ADMIN — VITAMIN E CAP 400 UNIT 400 UNITS: 400 CAP at 09:52

## 2018-06-13 RX ADMIN — Medication 0.1 MG: at 11:37

## 2018-06-13 RX ADMIN — PHENYLEPHRINE HYDROCHLORIDE 200 MCG: 10 INJECTION INTRAVENOUS at 11:57

## 2018-06-13 RX ADMIN — MIDAZOLAM HYDROCHLORIDE 1 MG: 1 INJECTION, SOLUTION INTRAMUSCULAR; INTRAVENOUS at 11:49

## 2018-06-13 RX ADMIN — PHENYLEPHRINE HYDROCHLORIDE 100 MCG: 10 INJECTION INTRAVENOUS at 13:20

## 2018-06-13 RX ADMIN — Medication 1 CAPSULE: at 09:52

## 2018-06-13 RX ADMIN — PHENYLEPHRINE HYDROCHLORIDE 200 MCG: 10 INJECTION INTRAVENOUS at 12:07

## 2018-06-13 RX ADMIN — LIDOCAINE HYDROCHLORIDE 100 MG: 20 INJECTION, SOLUTION INFILTRATION; PERINEURAL at 11:49

## 2018-06-13 RX ADMIN — MEROPENEM 1 G: 1 INJECTION, POWDER, FOR SOLUTION INTRAVENOUS at 06:53

## 2018-06-13 RX ADMIN — PROPOFOL 100 MG: 10 INJECTION, EMULSION INTRAVENOUS at 11:51

## 2018-06-13 RX ADMIN — TIZANIDINE 2 MG: 4 TABLET ORAL at 10:05

## 2018-06-13 RX ADMIN — ONDANSETRON HYDROCHLORIDE 4 MG: 4 INJECTION, SOLUTION INTRAMUSCULAR; INTRAVENOUS at 13:17

## 2018-06-13 RX ADMIN — MORPHINE SULFATE 4 MG: 4 INJECTION INTRAVENOUS at 19:41

## 2018-06-13 RX ADMIN — Medication 1 CAPSULE: at 16:29

## 2018-06-13 RX ADMIN — MEROPENEM 1 G: 1 INJECTION, POWDER, FOR SOLUTION INTRAVENOUS at 16:24

## 2018-06-13 RX ADMIN — TIZANIDINE 2 MG: 4 TABLET ORAL at 20:25

## 2018-06-13 RX ADMIN — OXYBUTYNIN CHLORIDE 10 MG: 10 TABLET, FILM COATED, EXTENDED RELEASE ORAL at 09:52

## 2018-06-13 RX ADMIN — SODIUM CHLORIDE: 9 INJECTION, SOLUTION INTRAVENOUS at 11:55

## 2018-06-13 RX ADMIN — Medication 20000 UNITS: at 09:52

## 2018-06-13 ASSESSMENT — PULMONARY FUNCTION TESTS
PIF_VALUE: 0
PIF_VALUE: 36
PIF_VALUE: 21
PIF_VALUE: 36
PIF_VALUE: 22
PIF_VALUE: 36
PIF_VALUE: 0
PIF_VALUE: 36
PIF_VALUE: 36
PIF_VALUE: 22
PIF_VALUE: 36
PIF_VALUE: 22
PIF_VALUE: 36
PIF_VALUE: 21
PIF_VALUE: 19
PIF_VALUE: 36
PIF_VALUE: 36
PIF_VALUE: 33
PIF_VALUE: 21
PIF_VALUE: 36
PIF_VALUE: 1
PIF_VALUE: 36
PIF_VALUE: 36
PIF_VALUE: 21
PIF_VALUE: 21
PIF_VALUE: 36
PIF_VALUE: 14
PIF_VALUE: 34
PIF_VALUE: 36
PIF_VALUE: 3
PIF_VALUE: 23
PIF_VALUE: 19
PIF_VALUE: 14
PIF_VALUE: 36
PIF_VALUE: 14
PIF_VALUE: 36
PIF_VALUE: 1
PIF_VALUE: 21
PIF_VALUE: 36
PIF_VALUE: 36
PIF_VALUE: 20
PIF_VALUE: 22
PIF_VALUE: 35
PIF_VALUE: 21
PIF_VALUE: 0
PIF_VALUE: 14
PIF_VALUE: 21
PIF_VALUE: 36
PIF_VALUE: 36
PIF_VALUE: 19
PIF_VALUE: 0
PIF_VALUE: 14
PIF_VALUE: 36
PIF_VALUE: 14
PIF_VALUE: 35
PIF_VALUE: 22
PIF_VALUE: 10
PIF_VALUE: 36
PIF_VALUE: 22
PIF_VALUE: 36
PIF_VALUE: 21
PIF_VALUE: 36
PIF_VALUE: 22
PIF_VALUE: 36
PIF_VALUE: 21
PIF_VALUE: 1
PIF_VALUE: 22
PIF_VALUE: 36
PIF_VALUE: 7
PIF_VALUE: 36
PIF_VALUE: 14
PIF_VALUE: 36
PIF_VALUE: 21
PIF_VALUE: 27
PIF_VALUE: 36
PIF_VALUE: 21
PIF_VALUE: 0
PIF_VALUE: 20
PIF_VALUE: 36
PIF_VALUE: 22
PIF_VALUE: 21
PIF_VALUE: 35
PIF_VALUE: 36
PIF_VALUE: 22
PIF_VALUE: 36
PIF_VALUE: 36
PIF_VALUE: 21
PIF_VALUE: 23
PIF_VALUE: 20
PIF_VALUE: 36
PIF_VALUE: 14
PIF_VALUE: 33
PIF_VALUE: 23
PIF_VALUE: 21
PIF_VALUE: 14
PIF_VALUE: 36
PIF_VALUE: 36
PIF_VALUE: 2
PIF_VALUE: 9
PIF_VALUE: 0
PIF_VALUE: 36

## 2018-06-13 ASSESSMENT — ENCOUNTER SYMPTOMS
SHORTNESS OF BREATH: 0
ABDOMINAL DISTENTION: 0
NAUSEA: 0
ABDOMINAL PAIN: 0
DIARRHEA: 0
COUGH: 0
GASTROINTESTINAL NEGATIVE: 1
BACK PAIN: 1
RESPIRATORY NEGATIVE: 1
EYES NEGATIVE: 1

## 2018-06-13 ASSESSMENT — PAIN SCALES - GENERAL
PAINLEVEL_OUTOF10: 9
PAINLEVEL_OUTOF10: 8
PAINLEVEL_OUTOF10: 0
PAINLEVEL_OUTOF10: 8
PAINLEVEL_OUTOF10: 9

## 2018-06-14 VITALS
SYSTOLIC BLOOD PRESSURE: 149 MMHG | RESPIRATION RATE: 20 BRPM | BODY MASS INDEX: 35 KG/M2 | OXYGEN SATURATION: 90 % | TEMPERATURE: 98.6 F | WEIGHT: 223 LBS | HEIGHT: 67 IN | DIASTOLIC BLOOD PRESSURE: 72 MMHG | HEART RATE: 110 BPM

## 2018-06-14 LAB
ANION GAP SERPL CALCULATED.3IONS-SCNC: 15 MEQ/L (ref 8–16)
BLOOD CULTURE, ROUTINE: NORMAL
BLOOD CULTURE, ROUTINE: NORMAL
BUN BLDV-MCNC: 9 MG/DL (ref 7–22)
CALCIUM SERPL-MCNC: 8.3 MG/DL (ref 8.5–10.5)
CHLORIDE BLD-SCNC: 104 MEQ/L (ref 98–111)
CO2: 22 MEQ/L (ref 23–33)
CREAT SERPL-MCNC: 0.2 MG/DL (ref 0.4–1.2)
GFR SERPL CREATININE-BSD FRML MDRD: > 90 ML/MIN/1.73M2
GLUCOSE BLD-MCNC: 92 MG/DL (ref 70–108)
HCT VFR BLD CALC: 27.8 % (ref 42–52)
HCT VFR BLD CALC: 30.7 % (ref 42–52)
HEMOGLOBIN: 10.2 GM/DL (ref 14–18)
HEMOGLOBIN: 9.2 GM/DL (ref 14–18)
MCH RBC QN AUTO: 29 PG (ref 27–31)
MCHC RBC AUTO-ENTMCNC: 33.3 GM/DL (ref 33–37)
MCV RBC AUTO: 87.1 FL (ref 80–94)
PDW BLD-RTO: 15.8 % (ref 11.5–14.5)
PLATELET # BLD: 435 THOU/MM3 (ref 130–400)
PMV BLD AUTO: 5.5 FL (ref 7.4–10.4)
POTASSIUM SERPL-SCNC: 3.8 MEQ/L (ref 3.5–5.2)
RBC # BLD: 3.52 MILL/MM3 (ref 4.7–6.1)
SODIUM BLD-SCNC: 141 MEQ/L (ref 135–145)
WBC # BLD: 7.8 THOU/MM3 (ref 4.8–10.8)

## 2018-06-14 PROCEDURE — 85018 HEMOGLOBIN: CPT

## 2018-06-14 PROCEDURE — 85027 COMPLETE CBC AUTOMATED: CPT

## 2018-06-14 PROCEDURE — 85014 HEMATOCRIT: CPT

## 2018-06-14 PROCEDURE — 99024 POSTOP FOLLOW-UP VISIT: CPT | Performed by: SURGERY

## 2018-06-14 PROCEDURE — 99239 HOSP IP/OBS DSCHRG MGMT >30: CPT | Performed by: INTERNAL MEDICINE

## 2018-06-14 PROCEDURE — 80048 BASIC METABOLIC PNL TOTAL CA: CPT

## 2018-06-14 PROCEDURE — 6370000000 HC RX 637 (ALT 250 FOR IP): Performed by: INTERNAL MEDICINE

## 2018-06-14 PROCEDURE — 6360000002 HC RX W HCPCS: Performed by: SURGERY

## 2018-06-14 PROCEDURE — 36592 COLLECT BLOOD FROM PICC: CPT

## 2018-06-14 PROCEDURE — 6360000002 HC RX W HCPCS

## 2018-06-14 PROCEDURE — 6360000002 HC RX W HCPCS: Performed by: INTERNAL MEDICINE

## 2018-06-14 PROCEDURE — 2580000003 HC RX 258: Performed by: INTERNAL MEDICINE

## 2018-06-14 PROCEDURE — 36415 COLL VENOUS BLD VENIPUNCTURE: CPT

## 2018-06-14 RX ORDER — HEPARIN SODIUM 10000 [USP'U]/100ML
11 INJECTION, SOLUTION INTRAVENOUS CONTINUOUS
Refills: 0 | Status: ON HOLD | DISCHARGE
Start: 2018-06-14 | End: 2018-11-14 | Stop reason: ALTCHOICE

## 2018-06-14 RX ORDER — HEPARIN SODIUM 10000 [USP'U]/100ML
11 INJECTION, SOLUTION INTRAVENOUS CONTINUOUS
Status: DISCONTINUED | OUTPATIENT
Start: 2018-06-14 | End: 2018-06-14 | Stop reason: HOSPADM

## 2018-06-14 RX ORDER — ONDANSETRON 2 MG/ML
4 INJECTION INTRAMUSCULAR; INTRAVENOUS EVERY 6 HOURS PRN
Qty: 84 ML | Status: ON HOLD | DISCHARGE
Start: 2018-06-14 | End: 2018-11-14

## 2018-06-14 RX ORDER — FAMOTIDINE 20 MG/1
20 TABLET, FILM COATED ORAL 2 TIMES DAILY
Qty: 60 TABLET | Refills: 3 | DISCHARGE
Start: 2018-06-14

## 2018-06-14 RX ADMIN — BACLOFEN 20 MG: 10 TABLET ORAL at 13:40

## 2018-06-14 RX ADMIN — TIZANIDINE 2 MG: 4 TABLET ORAL at 13:40

## 2018-06-14 RX ADMIN — Medication 220 MG: at 09:09

## 2018-06-14 RX ADMIN — MEROPENEM 1 G: 1 INJECTION, POWDER, FOR SOLUTION INTRAVENOUS at 06:33

## 2018-06-14 RX ADMIN — OXYCODONE HYDROCHLORIDE AND ACETAMINOPHEN 2 TABLET: 5; 325 TABLET ORAL at 11:43

## 2018-06-14 RX ADMIN — VITAMIN E CAP 400 UNIT 400 UNITS: 400 CAP at 09:09

## 2018-06-14 RX ADMIN — TIZANIDINE 2 MG: 4 TABLET ORAL at 09:10

## 2018-06-14 RX ADMIN — OXYCODONE HYDROCHLORIDE AND ACETAMINOPHEN 2 TABLET: 5; 325 TABLET ORAL at 18:47

## 2018-06-14 RX ADMIN — MORPHINE SULFATE 4 MG: 4 INJECTION INTRAVENOUS at 05:00

## 2018-06-14 RX ADMIN — Medication 1 CAPSULE: at 19:02

## 2018-06-14 RX ADMIN — Medication 1 CAPSULE: at 09:10

## 2018-06-14 RX ADMIN — MORPHINE SULFATE 4 MG: 4 INJECTION INTRAVENOUS at 09:07

## 2018-06-14 RX ADMIN — BACLOFEN 20 MG: 10 TABLET ORAL at 09:10

## 2018-06-14 RX ADMIN — MORPHINE SULFATE 4 MG: 4 INJECTION INTRAVENOUS at 13:40

## 2018-06-14 RX ADMIN — Medication 20000 UNITS: at 09:09

## 2018-06-14 RX ADMIN — FAMOTIDINE 20 MG: 20 TABLET ORAL at 09:10

## 2018-06-14 RX ADMIN — DOCUSATE SODIUM 100 MG: 100 CAPSULE, LIQUID FILLED ORAL at 09:11

## 2018-06-14 RX ADMIN — HEPARIN SODIUM 11 UNITS/KG/HR: 10000 INJECTION, SOLUTION INTRAVENOUS at 16:49

## 2018-06-14 RX ADMIN — FLUOXETINE 10 MG: 10 CAPSULE ORAL at 09:10

## 2018-06-14 RX ADMIN — MEROPENEM 1 G: 1 INJECTION, POWDER, FOR SOLUTION INTRAVENOUS at 13:41

## 2018-06-14 RX ADMIN — OXYBUTYNIN CHLORIDE 10 MG: 10 TABLET, FILM COATED, EXTENDED RELEASE ORAL at 09:10

## 2018-06-14 RX ADMIN — Medication 500 MG: at 09:09

## 2018-06-14 ASSESSMENT — ENCOUNTER SYMPTOMS
EYES NEGATIVE: 1
COUGH: 0
BACK PAIN: 1
GASTROINTESTINAL NEGATIVE: 1
DIARRHEA: 0
RESPIRATORY NEGATIVE: 1
ABDOMINAL DISTENTION: 0
NAUSEA: 0
ABDOMINAL PAIN: 0
SHORTNESS OF BREATH: 0

## 2018-06-14 ASSESSMENT — PAIN SCALES - GENERAL
PAINLEVEL_OUTOF10: 7
PAINLEVEL_OUTOF10: 8
PAINLEVEL_OUTOF10: 8
PAINLEVEL_OUTOF10: 7
PAINLEVEL_OUTOF10: 6

## 2018-09-05 ENCOUNTER — APPOINTMENT (OUTPATIENT)
Dept: CT IMAGING | Age: 63
End: 2018-09-05
Payer: COMMERCIAL

## 2018-09-05 ENCOUNTER — HOSPITAL ENCOUNTER (EMERGENCY)
Age: 63
Discharge: HOME | End: 2018-09-05
Attending: EMERGENCY MEDICINE
Payer: COMMERCIAL

## 2018-09-05 ENCOUNTER — APPOINTMENT (OUTPATIENT)
Dept: GENERAL RADIOLOGY | Age: 63
End: 2018-09-05
Payer: COMMERCIAL

## 2018-09-05 VITALS
TEMPERATURE: 98.4 F | SYSTOLIC BLOOD PRESSURE: 111 MMHG | BODY MASS INDEX: 36.02 KG/M2 | WEIGHT: 230 LBS | HEART RATE: 82 BPM | RESPIRATION RATE: 15 BRPM | OXYGEN SATURATION: 94 % | DIASTOLIC BLOOD PRESSURE: 78 MMHG

## 2018-09-05 DIAGNOSIS — R40.0 SOMNOLENCE: Primary | ICD-10-CM

## 2018-09-05 LAB
ALBUMIN SERPL-MCNC: 3.6 G/DL (ref 3.5–5.1)
ALLEN TEST: POSITIVE
ALP BLD-CCNC: 163 U/L (ref 38–126)
ALT SERPL-CCNC: 10 U/L (ref 11–66)
AMORPHOUS: ABNORMAL
ANION GAP SERPL CALCULATED.3IONS-SCNC: 14 MEQ/L (ref 8–16)
AST SERPL-CCNC: 14 U/L (ref 5–40)
BACTERIA: ABNORMAL
BASE EXCESS (CALCULATED): -0.2 MMOL/L (ref -2.5–2.5)
BASOPHILS # BLD: 0.8 %
BASOPHILS ABSOLUTE: 0.1 THOU/MM3 (ref 0–0.1)
BILIRUB SERPL-MCNC: 0.3 MG/DL (ref 0.3–1.2)
BILIRUBIN URINE: NEGATIVE
BLOOD, URINE: ABNORMAL
BUN BLDV-MCNC: 20 MG/DL (ref 7–22)
CALCIUM SERPL-MCNC: 9.9 MG/DL (ref 8.5–10.5)
CASTS: ABNORMAL /LPF
CHARACTER, URINE: ABNORMAL
CHLORIDE BLD-SCNC: 102 MEQ/L (ref 98–111)
CO2: 24 MEQ/L (ref 23–33)
COLLECTED BY:: NORMAL
COLOR: YELLOW
CREAT SERPL-MCNC: 0.4 MG/DL (ref 0.4–1.2)
CRYSTALS: ABNORMAL
DEVICE: NORMAL
EOSINOPHIL # BLD: 4.9 %
EOSINOPHILS ABSOLUTE: 0.3 THOU/MM3 (ref 0–0.4)
EPITHELIAL CELLS, UA: ABNORMAL /HPF
ERYTHROCYTE [DISTWIDTH] IN BLOOD BY AUTOMATED COUNT: 16.7 % (ref 11.5–14.5)
ERYTHROCYTE [DISTWIDTH] IN BLOOD BY AUTOMATED COUNT: 53.9 FL (ref 35–45)
GFR SERPL CREATININE-BSD FRML MDRD: > 90 ML/MIN/1.73M2
GLUCOSE BLD-MCNC: 102 MG/DL (ref 70–108)
GLUCOSE, URINE: NEGATIVE MG/DL
HCO3: 24 MMOL/L (ref 23–28)
HCT VFR BLD CALC: 36.9 % (ref 42–52)
HEMOGLOBIN: 11.8 GM/DL (ref 14–18)
IFIO2: 2
IMMATURE GRANS (ABS): 0.02 THOU/MM3 (ref 0–0.07)
IMMATURE GRANULOCYTES: 0.3 %
KETONES, URINE: NEGATIVE
LACTIC ACID, SEPSIS: 1.2 MMOL/L (ref 0.5–1.9)
LEUKOCYTE EST, POC: ABNORMAL
LYMPHOCYTES # BLD: 24.1 %
LYMPHOCYTES ABSOLUTE: 1.6 THOU/MM3 (ref 1–4.8)
MCH RBC QN AUTO: 28.2 PG (ref 26–33)
MCHC RBC AUTO-ENTMCNC: 32 GM/DL (ref 32.2–35.5)
MCV RBC AUTO: 88.1 FL (ref 80–94)
MONOCYTES # BLD: 10.3 %
MONOCYTES ABSOLUTE: 0.7 THOU/MM3 (ref 0.4–1.3)
NITRITE, URINE: NEGATIVE
NUCLEATED RED BLOOD CELLS: 0 /100 WBC
O2 SATURATION: 98 %
OSMOLALITY CALCULATION: 282.2 MOSMOL/KG (ref 275–300)
PCO2: 39 MMHG (ref 35–45)
PH BLOOD GAS: 7.41 (ref 7.35–7.45)
PH UA: >= 9
PLATELET # BLD: 291 THOU/MM3 (ref 130–400)
PMV BLD AUTO: 8.5 FL (ref 9.4–12.4)
PO2: 100 MMHG (ref 71–104)
POTASSIUM REFLEX MAGNESIUM: 4 MEQ/L (ref 3.5–5.2)
PRO-BNP: 20.1 PG/ML (ref 0–900)
PROCALCITONIN: 0.08 NG/ML (ref 0.01–0.09)
PROTEIN UA: 30 MG/DL
RBC # BLD: 4.19 MILL/MM3 (ref 4.7–6.1)
RBC URINE: ABNORMAL /HPF
RENAL EPITHELIAL, UA: ABNORMAL
SEG NEUTROPHILS: 59.6 %
SEGMENTED NEUTROPHILS ABSOLUTE COUNT: 3.9 THOU/MM3 (ref 1.8–7.7)
SODIUM BLD-SCNC: 140 MEQ/L (ref 135–145)
SOURCE, BLOOD GAS: NORMAL
SPECIFIC GRAVITY UA: 1.01 (ref 1–1.03)
TOTAL PROTEIN: 7.6 G/DL (ref 6.1–8)
UROBILINOGEN, URINE: 0.2 EU/DL
WBC # BLD: 6.5 THOU/MM3 (ref 4.8–10.8)
WBC UA: ABNORMAL /HPF
YEAST: ABNORMAL

## 2018-09-05 PROCEDURE — 84145 PROCALCITONIN (PCT): CPT

## 2018-09-05 PROCEDURE — 36415 COLL VENOUS BLD VENIPUNCTURE: CPT

## 2018-09-05 PROCEDURE — 80053 COMPREHEN METABOLIC PANEL: CPT

## 2018-09-05 PROCEDURE — 71045 X-RAY EXAM CHEST 1 VIEW: CPT

## 2018-09-05 PROCEDURE — 36600 WITHDRAWAL OF ARTERIAL BLOOD: CPT

## 2018-09-05 PROCEDURE — 81001 URINALYSIS AUTO W/SCOPE: CPT

## 2018-09-05 PROCEDURE — 83605 ASSAY OF LACTIC ACID: CPT

## 2018-09-05 PROCEDURE — 82803 BLOOD GASES ANY COMBINATION: CPT

## 2018-09-05 PROCEDURE — 85025 COMPLETE CBC W/AUTO DIFF WBC: CPT

## 2018-09-05 PROCEDURE — 99285 EMERGENCY DEPT VISIT HI MDM: CPT

## 2018-09-05 PROCEDURE — 70450 CT HEAD/BRAIN W/O DYE: CPT

## 2018-09-05 PROCEDURE — 83880 ASSAY OF NATRIURETIC PEPTIDE: CPT

## 2018-09-05 NOTE — ED PROVIDER NOTES
[facility]  eMERGENCY dEPARTMENT eNCOUnter          CHIEF COMPLAINT       Chief Complaint   Patient presents with    Altered Mental Status       Nurses Notes reviewed and I agree except as noted in the HPI. HISTORY OF PRESENT ILLNESS    Sri Hall is a 66 y.o. male who presents to the Emergency Department for the evaluation of AMS. The patient resides at [facility]. Per [facility], the patient has an increase of AMS and are concerned with sepsis. Per EMS report, the patient reported to have high HR and low BP at [facility]. The patient has a previous history of MS with contractions of lower extremities. The patient was sleeping upon arrival of initial encounter. He was easily aroused by name. REVIEW OF SYSTEMS     Review of Systems   Unable to perform ROS: Acuity of condition       PAST MEDICAL HISTORY    has a past medical history of Dysphagia; History of pulmonary embolism; History of urinary tract infection; Hypertension; Major depressive disorder, single episode; MS (multiple sclerosis) ([address] Neurogenic bladder; Osteomyelitis ([facility] [address] and UTI (urinary tract infection). SURGICAL HISTORY      has a past surgical history that includes Tonsillectomy (child); Ankle surgery (1996); Bladder surgery (2-); Colonoscopy; and pr lap,surg,colectomy,w/end colost & closur (N/A, 6/13/2018).     CURRENT MEDICATIONS       Previous Medications    ACETAMINOPHEN 650 MG TABS    Take 650 mg by mouth every 4 hours as needed    ASCORBIC ACID (VITAMIN C) 500 MG TABLET    Take 1 tablet by mouth daily    BACLOFEN (LIORESAL) 20 MG TABLET    Take 1 tablet by mouth 3 times daily    DOCUSATE SODIUM (COLACE) 100 MG CAPSULE    Take 100 mg by mouth daily     FAMOTIDINE (PEPCID) 20 MG TABLET    Take 1 tablet by mouth 2 times daily    FLUOXETINE (PROZAC) 10 MG CAPSULE    Take 10 mg by mouth daily    HEPARIN SOD, PORCINE, IN D5W (HEPARIN, PORCINE,) 100 UNIT/ML SOLN INFUSION    Infuse 1,113.2 Units/hr intravenously continuous    LACTOBACILLUS (CULTURELLE) CAPSULE    Take 1 capsule by mouth 2 times daily (with meals)    LISINOPRIL (PRINIVIL;ZESTRIL) 10 MG TABLET    Take 10 mg by mouth daily    LOPERAMIDE (IMODIUM) 2 MG CAPSULE    Take 1 mg by mouth 4 times daily as needed for Diarrhea     MEROPENEM (MERREM) INFUSION    Infuse 1,000 mg intravenously every 8 hours Compound per protocol. MULTIPLE VITAMIN (MULTIVITAMIN) TABLET    Take 1 tablet by mouth daily    OMEGA-3 FATTY ACIDS (FISH OIL OMEGA-3) 1000 MG CAPS    Take 1 capsule by mouth daily    ONDANSETRON (ZOFRAN) 4 MG/2ML INJECTION    Infuse 2 mLs intravenously every 6 hours as needed for Nausea    OXYBUTYNIN (DITROPAN-XL) 10 MG EXTENDED RELEASE TABLET    Take 10 mg by mouth daily For bladder spasms    OXYCODONE-ACETAMINOPHEN (PERCOCET) 5-325 MG PER TABLET    Take 2 tablets by mouth every 4 hours as needed for Pain (pt may have 1 or 2 tabs PRN pain). .    RESOURCE INSTANT PROTEIN POWD    Take by mouth 2 times daily 1 scoop mixed in juice twice daily. RIVAROXABAN (XARELTO) 20 MG TABS TABLET    Take 1 tablet by mouth Daily with supper    SODIUM CHLORIDE FLUSH 0.9 % INJECTION    Infuse 10 mLs intravenously every 8 hours Every shift    TIZANIDINE (ZANAFLEX) 2 MG TABLET    Take 2 mg by mouth 3 times daily     VITAMIN A 23092 UNITS CAPSULE    Take 2 capsules by mouth daily    VITAMIN E 400 UNIT CAPSULE    Take 1 capsule by mouth daily       ALLERGIES     has No Known Allergies. FAMILY HISTORY     indicated that his mother is . He indicated that his father is . He indicated that his sister is alive. He indicated that the status of his paternal grandmother is unknown.    family history includes Arthritis in his sister; Cancer in his mother; Heart Disease (age of onset: 48) in his father and paternal grandmother. SOCIAL HISTORY      reports that he quit smoking about 36 years ago.  He has quit using smokeless tobacco. He reports that he does not drink alcohol or use drugs. PHYSICAL EXAM     INITIAL VITALS:  weight is 230 lb (104.3 kg). His oral temperature is 98.4 °F (36.9 °C). His blood pressure is 111/78 and his pulse is 82. His respiration is 15 and oxygen saturation is 94%. Physical Exam   Constitutional: He is oriented to person, place, and time. He appears well-developed and well-nourished. Non-toxic appearance. The patient was sleeping upon arrival of initial encounter. He was easily aroused by name. Alert and Oriented x3. HENT:   Head: Normocephalic and atraumatic. Right Ear: Tympanic membrane and external ear normal.   Left Ear: Tympanic membrane and external ear normal.   Nose: Nose normal.   Mouth/Throat: Oropharynx is clear and moist and mucous membranes are normal. No oropharyngeal exudate, posterior oropharyngeal edema or posterior oropharyngeal erythema. Eyes: Conjunctivae and EOM are normal.   Neck: Normal range of motion. Neck supple. No JVD present. Cardiovascular: Normal rate, regular rhythm, normal heart sounds, intact distal pulses and normal pulses. Exam reveals no gallop and no friction rub. No murmur heard. Pulmonary/Chest: Effort normal and breath sounds normal. No respiratory distress. He has no decreased breath sounds. He has no wheezes. He has no rhonchi. He has no rales. Abdominal: Soft. Bowel sounds are normal. He exhibits no distension. There is no tenderness. There is no rebound, no guarding and no CVA tenderness. Colostomy bag. No drainage or erythema around the area. Suprapubic catheter intact. Musculoskeletal: Normal range of motion. He exhibits no edema. Neurological: He is alert and oriented to person, place, and time. He exhibits normal muscle tone. Coordination normal.   Skin: Skin is warm and dry. No rash noted. He is not diaphoretic. Nursing note and vitals reviewed.       DIFFERENTIAL DIAGNOSIS:   AMS, Sepsis, UTI    DIAGNOSTIC RESULTS     EKG: All EKG's are interpreted by the [facility]AL[facility]ONIN   LACTATE, SEPSIS   ANION GAP   OSMOLALITY   GLOMERULAR FILTRATION RATE, ESTIMATED       EMERGENCY DEPARTMENT COURSE:   Vitals:    Vitals:    09/05/18 0749 09/05/18 0938   BP: 104/69 111/78   Pulse: 84 82   Resp:  15   Temp: 98.4 °F (36.9 °C)    TempSrc: Oral    SpO2: 92% 94%   Weight: 230 lb (104.3 kg)        8:18 AM: The patient was seen and evaluated. MDM:  The patient was seen within the ED today for the evaluation of AMS. The patient arrived in no acute distress and in stable condition. Within the department, I observed the patient's vital signs to be within acceptable range. On exam, I appreciated Colostomy bag. No drainage or erythema around the area. Suprapubic catheter intact. Radiological studies within the department revealed no acute findings. Laboratory work was reassuring. I observed the patient's condition to remain stable during the duration of his stay. He was discharged in stable condition, and the patient will return to the ED if his symptoms become more severe in nature or otherwise change. I advised the patient to follow-up with PCP in 1 day. I also discussed return to ED precautions with the patient who verbalized understanding. CRITICAL CARE:   None     CONSULTS:  None    PROCEDURES:  None     FINAL IMPRESSION      1. Somnolence          DISPOSITION/PLAN   Discharge    PATIENT REFERRED TO:  Radha Marti MD  [address]  [phone]    In 1 day  RE-CHECK AND FURTHER TESTING AS NEEDED      DISCHARGE MEDICATIONS:  New Prescriptions    No medications on file       (Please note that portions of this note were completed with a voice recognition program.  Efforts were made to edit the dictations but occasionally words are mis-transcribed.)    Scribe:  Justin Velasquez 9/5/18 8:18 AM Scribing for and in the presence of Jose Perez DO.     Signed by: Taj Sultana, 09/05/18 9:53 AM    Provider:  I personally performed the services described in the documentation, reviewed and edited the documentation which was dictated to the scribe in my presence, and it accurately records my words and actions.     Haley Wild DO 9/5/18 9:53 AM       Haley Wild DO  09/05/18 7227

## 2018-09-05 NOTE — ED TRIAGE NOTES
Pt. Arrives to room 8 by EMS from [facility]. Patient reported to have high HR/Low BP at [facility]. Patient states he is thirsty / no other complaints. Patient has suprapubic catheter. Patient has stable VS on arrival to room 8. Patient HR is WNL. Patient O2 Saturation appropriate. SR up x2.

## 2018-11-13 ENCOUNTER — HOSPITAL ENCOUNTER (INPATIENT)
Age: 63
LOS: 3 days | Discharge: NURSING, REHAB OR LONG-TERM CARE | DRG: 673 | End: 2018-11-16
Attending: EMERGENCY MEDICINE | Admitting: INTERNAL MEDICINE
Payer: COMMERCIAL

## 2018-11-13 ENCOUNTER — APPOINTMENT (OUTPATIENT)
Dept: CT IMAGING | Age: 63
DRG: 673 | End: 2018-11-13
Payer: COMMERCIAL

## 2018-11-13 ENCOUNTER — APPOINTMENT (OUTPATIENT)
Dept: GENERAL RADIOLOGY | Age: 63
DRG: 673 | End: 2018-11-13
Payer: COMMERCIAL

## 2018-11-13 DIAGNOSIS — N30.01 ACUTE CYSTITIS WITH HEMATURIA: ICD-10-CM

## 2018-11-13 DIAGNOSIS — A41.9 SEPSIS, DUE TO UNSPECIFIED ORGANISM: Primary | ICD-10-CM

## 2018-11-13 PROBLEM — N39.0 URINARY TRACT INFECTION: Status: ACTIVE | Noted: 2018-11-13

## 2018-11-13 LAB
ALBUMIN SERPL-MCNC: 3.1 G/DL (ref 3.5–5.1)
ALP BLD-CCNC: 236 U/L (ref 38–126)
ALT SERPL-CCNC: 92 U/L (ref 11–66)
AMORPHOUS: ABNORMAL
ANION GAP SERPL CALCULATED.3IONS-SCNC: 13 MEQ/L (ref 8–16)
APTT: 40.8 SECONDS (ref 22–38)
AST SERPL-CCNC: 76 U/L (ref 5–40)
BACTERIA: ABNORMAL
BASOPHILS # BLD: 0.3 %
BASOPHILS ABSOLUTE: 0 THOU/MM3 (ref 0–0.1)
BILIRUB SERPL-MCNC: 0.8 MG/DL (ref 0.3–1.2)
BILIRUBIN DIRECT: 0.5 MG/DL (ref 0–0.3)
BILIRUBIN URINE: ABNORMAL
BLOOD, URINE: ABNORMAL
BUN BLDV-MCNC: 22 MG/DL (ref 7–22)
CALCIUM SERPL-MCNC: 9.1 MG/DL (ref 8.5–10.5)
CASTS: ABNORMAL /LPF
CHARACTER, URINE: ABNORMAL
CHLORIDE BLD-SCNC: 98 MEQ/L (ref 98–111)
CO2: 23 MEQ/L (ref 23–33)
COLOR: ABNORMAL
CREAT SERPL-MCNC: 0.5 MG/DL (ref 0.4–1.2)
CRYSTALS: ABNORMAL
EKG ATRIAL RATE: 107 BPM
EKG P AXIS: 45 DEGREES
EKG P-R INTERVAL: 130 MS
EKG Q-T INTERVAL: 350 MS
EKG QRS DURATION: 84 MS
EKG QTC CALCULATION (BAZETT): 467 MS
EKG R AXIS: 44 DEGREES
EKG T AXIS: 55 DEGREES
EKG VENTRICULAR RATE: 107 BPM
EOSINOPHIL # BLD: 0 %
EOSINOPHILS ABSOLUTE: 0 THOU/MM3 (ref 0–0.4)
EPITHELIAL CELLS, UA: ABNORMAL /HPF
ERYTHROCYTE [DISTWIDTH] IN BLOOD BY AUTOMATED COUNT: 17.2 % (ref 11.5–14.5)
ERYTHROCYTE [DISTWIDTH] IN BLOOD BY AUTOMATED COUNT: 57.4 FL (ref 35–45)
FLU A ANTIGEN: NEGATIVE
FLU B ANTIGEN: NEGATIVE
GFR SERPL CREATININE-BSD FRML MDRD: > 90 ML/MIN/1.73M2
GLUCOSE BLD-MCNC: 144 MG/DL (ref 70–108)
GLUCOSE, URINE: NEGATIVE MG/DL
HCT VFR BLD CALC: 40.1 % (ref 42–52)
HEMOGLOBIN: 12.5 GM/DL (ref 14–18)
ICTOTEST: NEGATIVE
IMMATURE GRANS (ABS): 0.07 THOU/MM3 (ref 0–0.07)
IMMATURE GRANULOCYTES: 0.6 %
INR BLD: 2.41 (ref 0.85–1.13)
KETONES, URINE: 40
LACTIC ACID: 1.7 MMOL/L (ref 0.5–2.2)
LEUKOCYTE ESTERASE, URINE: ABNORMAL
LYMPHOCYTES # BLD: 8.1 %
LYMPHOCYTES ABSOLUTE: 0.9 THOU/MM3 (ref 1–4.8)
MCH RBC QN AUTO: 28.7 PG (ref 26–33)
MCHC RBC AUTO-ENTMCNC: 31.2 GM/DL (ref 32.2–35.5)
MCV RBC AUTO: 92 FL (ref 80–94)
MONOCYTES # BLD: 6.3 %
MONOCYTES ABSOLUTE: 0.7 THOU/MM3 (ref 0.4–1.3)
MUCUS: ABNORMAL
NITRITE, URINE: POSITIVE
NUCLEATED RED BLOOD CELLS: 0 /100 WBC
OSMOLALITY CALCULATION: 274.1 MOSMOL/KG (ref 275–300)
PH UA: 6.5
PLATELET # BLD: 223 THOU/MM3 (ref 130–400)
PMV BLD AUTO: 9.3 FL (ref 9.4–12.4)
POTASSIUM SERPL-SCNC: 3.6 MEQ/L (ref 3.5–5.2)
PROTEIN UA: 100 MG/DL
RBC # BLD: 4.36 MILL/MM3 (ref 4.7–6.1)
RBC URINE: > 100 /HPF
SEG NEUTROPHILS: 84.7 %
SEGMENTED NEUTROPHILS ABSOLUTE COUNT: 9.2 THOU/MM3 (ref 1.8–7.7)
SODIUM BLD-SCNC: 134 MEQ/L (ref 135–145)
SPECIFIC GRAVITY UA: 1.02 (ref 1–1.03)
TOTAL PROTEIN: 7.6 G/DL (ref 6.1–8)
UROBILINOGEN, URINE: 1 EU/DL
WBC # BLD: 10.9 THOU/MM3 (ref 4.8–10.8)
WBC UA: > 100 /HPF

## 2018-11-13 PROCEDURE — 71045 X-RAY EXAM CHEST 1 VIEW: CPT

## 2018-11-13 PROCEDURE — 87184 SC STD DISK METHOD PER PLATE: CPT

## 2018-11-13 PROCEDURE — 87086 URINE CULTURE/COLONY COUNT: CPT

## 2018-11-13 PROCEDURE — 36415 COLL VENOUS BLD VENIPUNCTURE: CPT

## 2018-11-13 PROCEDURE — 99223 1ST HOSP IP/OBS HIGH 75: CPT | Performed by: INTERNAL MEDICINE

## 2018-11-13 PROCEDURE — 85025 COMPLETE CBC W/AUTO DIFF WBC: CPT

## 2018-11-13 PROCEDURE — 6360000002 HC RX W HCPCS: Performed by: INTERNAL MEDICINE

## 2018-11-13 PROCEDURE — 80053 COMPREHEN METABOLIC PANEL: CPT

## 2018-11-13 PROCEDURE — 70450 CT HEAD/BRAIN W/O DYE: CPT

## 2018-11-13 PROCEDURE — 81001 URINALYSIS AUTO W/SCOPE: CPT

## 2018-11-13 PROCEDURE — 87804 INFLUENZA ASSAY W/OPTIC: CPT

## 2018-11-13 PROCEDURE — 87186 SC STD MICRODIL/AGAR DIL: CPT

## 2018-11-13 PROCEDURE — 2580000003 HC RX 258: Performed by: INTERNAL MEDICINE

## 2018-11-13 PROCEDURE — 1200000003 HC TELEMETRY R&B

## 2018-11-13 PROCEDURE — 99285 EMERGENCY DEPT VISIT HI MDM: CPT

## 2018-11-13 PROCEDURE — 2580000003 HC RX 258: Performed by: EMERGENCY MEDICINE

## 2018-11-13 PROCEDURE — 74176 CT ABD & PELVIS W/O CONTRAST: CPT

## 2018-11-13 PROCEDURE — 85730 THROMBOPLASTIN TIME PARTIAL: CPT

## 2018-11-13 PROCEDURE — 87077 CULTURE AEROBIC IDENTIFY: CPT

## 2018-11-13 PROCEDURE — 6360000002 HC RX W HCPCS: Performed by: EMERGENCY MEDICINE

## 2018-11-13 PROCEDURE — 87040 BLOOD CULTURE FOR BACTERIA: CPT

## 2018-11-13 PROCEDURE — 6370000000 HC RX 637 (ALT 250 FOR IP): Performed by: INTERNAL MEDICINE

## 2018-11-13 PROCEDURE — 2709999900 HC NON-CHARGEABLE SUPPLY

## 2018-11-13 PROCEDURE — 96360 HYDRATION IV INFUSION INIT: CPT

## 2018-11-13 PROCEDURE — 93005 ELECTROCARDIOGRAM TRACING: CPT | Performed by: EMERGENCY MEDICINE

## 2018-11-13 PROCEDURE — 87801 DETECT AGNT MULT DNA AMPLI: CPT

## 2018-11-13 PROCEDURE — 82248 BILIRUBIN DIRECT: CPT

## 2018-11-13 PROCEDURE — 83605 ASSAY OF LACTIC ACID: CPT

## 2018-11-13 PROCEDURE — 93010 ELECTROCARDIOGRAM REPORT: CPT | Performed by: INTERNAL MEDICINE

## 2018-11-13 PROCEDURE — 85610 PROTHROMBIN TIME: CPT

## 2018-11-13 RX ORDER — ASCORBIC ACID 500 MG
500 TABLET ORAL DAILY
Status: DISCONTINUED | OUTPATIENT
Start: 2018-11-13 | End: 2018-11-16 | Stop reason: HOSPADM

## 2018-11-13 RX ORDER — BACLOFEN 20 MG/1
20 TABLET ORAL 2 TIMES DAILY
Status: ON HOLD | COMMUNITY
End: 2018-11-16 | Stop reason: HOSPADM

## 2018-11-13 RX ORDER — 0.9 % SODIUM CHLORIDE 0.9 %
2000 INTRAVENOUS SOLUTION INTRAVENOUS ONCE
Status: COMPLETED | OUTPATIENT
Start: 2018-11-13 | End: 2018-11-13

## 2018-11-13 RX ORDER — SODIUM CHLORIDE 9 MG/ML
INJECTION, SOLUTION INTRAVENOUS CONTINUOUS
Status: ACTIVE | OUTPATIENT
Start: 2018-11-13 | End: 2018-11-14

## 2018-11-13 RX ORDER — SODIUM CHLORIDE 0.9 % (FLUSH) 0.9 %
10 SYRINGE (ML) INJECTION PRN
Status: DISCONTINUED | OUTPATIENT
Start: 2018-11-13 | End: 2018-11-16 | Stop reason: HOSPADM

## 2018-11-13 RX ORDER — LISINOPRIL 10 MG/1
10 TABLET ORAL DAILY
Status: DISCONTINUED | OUTPATIENT
Start: 2018-11-13 | End: 2018-11-16 | Stop reason: HOSPADM

## 2018-11-13 RX ORDER — POTASSIUM CHLORIDE 7.45 MG/ML
10 INJECTION INTRAVENOUS PRN
Status: DISCONTINUED | OUTPATIENT
Start: 2018-11-13 | End: 2018-11-16 | Stop reason: HOSPADM

## 2018-11-13 RX ORDER — LACTOBACILLUS RHAMNOSUS GG 10B CELL
1 CAPSULE ORAL 2 TIMES DAILY WITH MEALS
Status: DISCONTINUED | OUTPATIENT
Start: 2018-11-13 | End: 2018-11-16 | Stop reason: HOSPADM

## 2018-11-13 RX ORDER — LOPERAMIDE HYDROCHLORIDE 2 MG/1
2 CAPSULE ORAL 4 TIMES DAILY PRN
Status: DISCONTINUED | OUTPATIENT
Start: 2018-11-13 | End: 2018-11-14

## 2018-11-13 RX ORDER — SODIUM CHLORIDE 0.9 % (FLUSH) 0.9 %
10 SYRINGE (ML) INJECTION EVERY 12 HOURS SCHEDULED
Status: DISCONTINUED | OUTPATIENT
Start: 2018-11-13 | End: 2018-11-16 | Stop reason: HOSPADM

## 2018-11-13 RX ORDER — OXYCODONE HYDROCHLORIDE AND ACETAMINOPHEN 5; 325 MG/1; MG/1
2 TABLET ORAL EVERY 4 HOURS PRN
Status: DISCONTINUED | OUTPATIENT
Start: 2018-11-13 | End: 2018-11-13

## 2018-11-13 RX ORDER — VITAMIN E 268 MG
400 CAPSULE ORAL DAILY
Status: DISCONTINUED | OUTPATIENT
Start: 2018-11-13 | End: 2018-11-16 | Stop reason: HOSPADM

## 2018-11-13 RX ORDER — WHEY PROTEIN ISOLATE 6 G-25/7 G
1 POWDER (GRAM) ORAL 2 TIMES DAILY
Status: DISCONTINUED | OUTPATIENT
Start: 2018-11-13 | End: 2018-11-13

## 2018-11-13 RX ORDER — OXYBUTYNIN CHLORIDE 10 MG/1
10 TABLET, EXTENDED RELEASE ORAL DAILY
Status: DISCONTINUED | OUTPATIENT
Start: 2018-11-13 | End: 2018-11-16 | Stop reason: HOSPADM

## 2018-11-13 RX ORDER — OMEGA-3-ACID ETHYL ESTERS 1 G/1
1000 CAPSULE, LIQUID FILLED ORAL DAILY
Status: DISCONTINUED | OUTPATIENT
Start: 2018-11-13 | End: 2018-11-14

## 2018-11-13 RX ORDER — FLUOXETINE 10 MG/1
10 CAPSULE ORAL DAILY
Status: DISCONTINUED | OUTPATIENT
Start: 2018-11-13 | End: 2018-11-16 | Stop reason: HOSPADM

## 2018-11-13 RX ORDER — POTASSIUM CHLORIDE 20 MEQ/1
40 TABLET, EXTENDED RELEASE ORAL PRN
Status: DISCONTINUED | OUTPATIENT
Start: 2018-11-13 | End: 2018-11-16 | Stop reason: HOSPADM

## 2018-11-13 RX ORDER — OXYCODONE HYDROCHLORIDE AND ACETAMINOPHEN 5; 325 MG/1; MG/1
2 TABLET ORAL EVERY 4 HOURS PRN
Status: DISCONTINUED | OUTPATIENT
Start: 2018-11-13 | End: 2018-11-14

## 2018-11-13 RX ORDER — MULTIVITAMIN WITH FOLIC ACID 400 MCG
1 TABLET ORAL DAILY
Status: DISCONTINUED | OUTPATIENT
Start: 2018-11-13 | End: 2018-11-16 | Stop reason: HOSPADM

## 2018-11-13 RX ORDER — ONDANSETRON 2 MG/ML
4 INJECTION INTRAMUSCULAR; INTRAVENOUS EVERY 4 HOURS PRN
Status: DISCONTINUED | OUTPATIENT
Start: 2018-11-13 | End: 2018-11-16 | Stop reason: HOSPADM

## 2018-11-13 RX ORDER — 0.9 % SODIUM CHLORIDE 0.9 %
1000 INTRAVENOUS SOLUTION INTRAVENOUS ONCE
Status: COMPLETED | OUTPATIENT
Start: 2018-11-13 | End: 2018-11-13

## 2018-11-13 RX ORDER — POTASSIUM CHLORIDE 20MEQ/15ML
40 LIQUID (ML) ORAL PRN
Status: DISCONTINUED | OUTPATIENT
Start: 2018-11-13 | End: 2018-11-16 | Stop reason: HOSPADM

## 2018-11-13 RX ORDER — BACLOFEN 10 MG/1
20 TABLET ORAL 3 TIMES DAILY
Status: DISCONTINUED | OUTPATIENT
Start: 2018-11-13 | End: 2018-11-16 | Stop reason: HOSPADM

## 2018-11-13 RX ORDER — FAMOTIDINE 20 MG/1
20 TABLET, FILM COATED ORAL 2 TIMES DAILY
Status: DISCONTINUED | OUTPATIENT
Start: 2018-11-13 | End: 2018-11-16 | Stop reason: HOSPADM

## 2018-11-13 RX ORDER — OXYCODONE HYDROCHLORIDE AND ACETAMINOPHEN 5; 325 MG/1; MG/1
1 TABLET ORAL EVERY 4 HOURS PRN
Status: DISCONTINUED | OUTPATIENT
Start: 2018-11-13 | End: 2018-11-14

## 2018-11-13 RX ORDER — TIZANIDINE 4 MG/1
2 TABLET ORAL 3 TIMES DAILY
Status: DISCONTINUED | OUTPATIENT
Start: 2018-11-13 | End: 2018-11-16 | Stop reason: HOSPADM

## 2018-11-13 RX ORDER — POTASSIUM CHLORIDE 750 MG/1
10 CAPSULE, EXTENDED RELEASE ORAL DAILY
Status: ON HOLD | COMMUNITY
End: 2019-06-20 | Stop reason: HOSPADM

## 2018-11-13 RX ORDER — ONDANSETRON 2 MG/ML
4 INJECTION INTRAMUSCULAR; INTRAVENOUS EVERY 6 HOURS PRN
Status: DISCONTINUED | OUTPATIENT
Start: 2018-11-13 | End: 2018-11-13 | Stop reason: SDUPTHER

## 2018-11-13 RX ORDER — SODIUM CHLORIDE 0.9 % (FLUSH) 0.9 %
10 SYRINGE (ML) INJECTION EVERY 8 HOURS
Status: DISCONTINUED | OUTPATIENT
Start: 2018-11-13 | End: 2018-11-13

## 2018-11-13 RX ORDER — DOCUSATE SODIUM 100 MG/1
100 CAPSULE, LIQUID FILLED ORAL DAILY
Status: DISCONTINUED | OUTPATIENT
Start: 2018-11-13 | End: 2018-11-16 | Stop reason: HOSPADM

## 2018-11-13 RX ORDER — ACETAMINOPHEN 325 MG/1
650 TABLET ORAL EVERY 4 HOURS PRN
Status: DISCONTINUED | OUTPATIENT
Start: 2018-11-13 | End: 2018-11-16 | Stop reason: HOSPADM

## 2018-11-13 RX ADMIN — Medication 1 CAPSULE: at 18:06

## 2018-11-13 RX ADMIN — CEFEPIME HYDROCHLORIDE 2 G: 2 INJECTION, POWDER, FOR SOLUTION INTRAVENOUS at 11:02

## 2018-11-13 RX ADMIN — FLUOXETINE 10 MG: 10 CAPSULE ORAL at 17:28

## 2018-11-13 RX ADMIN — TIZANIDINE 2 MG: 4 TABLET ORAL at 21:09

## 2018-11-13 RX ADMIN — BACLOFEN 20 MG: 10 TABLET ORAL at 21:08

## 2018-11-13 RX ADMIN — ACETAMINOPHEN 650 MG: 325 TABLET ORAL at 18:43

## 2018-11-13 RX ADMIN — Medication 500000 UNITS: at 21:11

## 2018-11-13 RX ADMIN — OMEGA-3-ACID ETHYL ESTERS 1000 MG: 1 CAPSULE, LIQUID FILLED ORAL at 17:27

## 2018-11-13 RX ADMIN — OXYBUTYNIN CHLORIDE 10 MG: 10 TABLET, FILM COATED, EXTENDED RELEASE ORAL at 17:27

## 2018-11-13 RX ADMIN — Medication 500 MG: at 17:27

## 2018-11-13 RX ADMIN — SODIUM CHLORIDE 1000 ML: 9 INJECTION, SOLUTION INTRAVENOUS at 09:42

## 2018-11-13 RX ADMIN — Medication 20000 UNITS: at 21:07

## 2018-11-13 RX ADMIN — VITAMIN E CAP 400 UNIT 400 UNITS: 400 CAP at 17:27

## 2018-11-13 RX ADMIN — DOCUSATE SODIUM 100 MG: 100 CAPSULE, LIQUID FILLED ORAL at 17:28

## 2018-11-13 RX ADMIN — SODIUM CHLORIDE: 9 INJECTION, SOLUTION INTRAVENOUS at 12:54

## 2018-11-13 RX ADMIN — SODIUM CHLORIDE 2000 ML: 9 INJECTION, SOLUTION INTRAVENOUS at 10:57

## 2018-11-13 RX ADMIN — THERA TABS 1 TABLET: TAB at 17:27

## 2018-11-13 RX ADMIN — FAMOTIDINE 20 MG: 20 TABLET ORAL at 21:10

## 2018-11-13 RX ADMIN — LISINOPRIL 10 MG: 10 TABLET ORAL at 17:28

## 2018-11-13 RX ADMIN — MEROPENEM 1 G: 1 INJECTION, POWDER, FOR SOLUTION INTRAVENOUS at 20:32

## 2018-11-13 RX ADMIN — VANCOMYCIN HYDROCHLORIDE 1500 MG: 1 INJECTION, POWDER, LYOPHILIZED, FOR SOLUTION INTRAVENOUS at 14:15

## 2018-11-13 RX ADMIN — RIVAROXABAN 20 MG: 20 TABLET, FILM COATED ORAL at 18:07

## 2018-11-13 ASSESSMENT — PAIN SCALES - GENERAL
PAINLEVEL_OUTOF10: 0
PAINLEVEL_OUTOF10: 0

## 2018-11-13 NOTE — H&P
1996    broken ankle    BLADDER SURGERY  2-    Suprapubic catheter placement    COLONOSCOPY      AL LAP,SURG,COLECTOMY,W/END COLOST & CLOSUR N/A 6/13/2018    ROBOT DIVERTING COLOSTOMY performed by Valencia López MD at [facility]  child         Medications prior to admission      Prior to Admission medications    Medication Sig Start Date End Date Taking? Authorizing Provider   potassium chloride (MICRO-K) 10 MEQ extended release capsule Take 10 mEq by mouth daily   Yes Historical Provider, MD   ACETIC ACID IR Irrigate with 2 % as directed   Yes Historical Provider, MD   famotidine (PEPCID) 20 MG tablet Take 1 tablet by mouth 2 times daily 6/14/18  Yes Chapis Gabriel MD   meropenem [facility]) infusion Infuse 1,000 mg intravenously every 8 hours Compound per protocol. 6/14/18  Yes Chapis Gabriel MD   Omega-3 Fatty Acids (FISH OIL OMEGA-3) 1000 MG CAPS Take 1 capsule by mouth daily   Yes Historical Provider, MD   FLUoxetine (PROZAC) 10 MG capsule Take 20 mg by mouth daily    Yes Historical Provider, MD   rivaroxaban (XARELTO) 20 MG TABS tablet Take 1 tablet by mouth Daily with supper  Patient taking differently: Take 20 mg by mouth daily (with breakfast)  1/23/18  Yes Miah Wong MD   docusate sodium (COLACE) 100 MG capsule Take 100 mg by mouth daily    Yes Historical Provider, MD   oxybutynin (DITROPAN-XL) 10 MG extended release tablet Take 5 mg by mouth 2 times daily For bladder spasms   Yes Historical Provider, MD   Resource Instant Protein POWD Take by mouth 2 times daily 1 scoop mixed in juice twice daily.    Yes Historical Provider, MD   tiZANidine (ZANAFLEX) 2 MG tablet Take 2 mg by mouth 3 times daily  12/15/16  Yes Historical Provider, MD   ascorbic acid (VITAMIN C) 500 MG tablet Take 1 tablet by mouth daily 3/28/16  Yes Geneva Mccord MD   Multiple Vitamin (MULTIVITAMIN) tablet Take 1 tablet by mouth daily 3/28/16  Yes Geneva Mccord MD   lactobacillus (CULTURELLE) capsule Take 1 atrophy and are stable. INFARCT/WHITE MATTER DISEASE: 1. There is no acute large vessel infarct. 2. There is stable mild white matter disease within the periventricular deep white matter. INTRACRANIAL HEMORRHAGE: None. MASS/MASS EFFECT/MIDLINE SHIFT: None. INTRA-AXIAL/EXTRA-AXIAL FLUID COLLECTIONS: None. INTRAORBITAL CONTENTS: Unremarkable. OTHER: 1. Atheromatous calcification distal vertebral and intracranial internal carotid arteries bilaterally. SKULL/SCALP: Unremarkable. PARANASAL SINUSES: 1. Mild mucosal thickening ethmoid air cells and right frontal sinus. 1. There is no acute intracranial abnormality. 2. Additional findings as described in the body the report. **This report has been created using voice recognition software. It may contain minor errors which are inherent in voice recognition technology. ** Final report electronically signed by Dr. Lin Remy on 11/13/2018 10:13 AM    Xr Chest Portable    Result Date: 11/13/2018  PROCEDURE: XR CHEST PORTABLE CLINICAL INFORMATION: r/o infectious process, . Fever. Altered mental status. COMPARISON: Chest x-ray 9/5/2018. TECHNIQUE: AP upright view of the chest. FINDINGS: There are stable low lung volumes. The heart size is normal.  The mediastinum is not widened. There are no pulmonary infiltrates or effusions. There is some chronic linear atelectasis in the left midlung zone. The pulmonary vessels are normal.     Stable radiographic appearance of the chest. No evidence of an acute process. **This report has been created using voice recognition software. It may contain minor errors which are inherent in voice recognition technology. ** Final report electronically signed by Dr. Sofía Giraldo on 11/13/2018 9:55 AM      Assessment/plan     ASSESSMENT:  1. Sepsis secondary to catheter related urinary tract infection associated with infectious encephalopathy  2. Dehydration, abnormal liver function tests  3. Right ischial ulcer,   4.  Chronic

## 2018-11-13 NOTE — ED NOTES
Pt transported to [facility] on cart in stable condition. Floor contacted before transport. Spoke with Liliana Palafox.      Katheryn Kuhn  11/13/18 [id number]

## 2018-11-13 NOTE — ED PROVIDER NOTES
[facility]  Emergency Department Encounter  Emergency Medicine Resident     Pt Name: Nicole Huertas  MRN: [id number]  [place] 1955  Date of evaluation: 11/13/18  PCP:  Nohemi Jerez MD    [address] [place] [address]       Chief Complaint   Patient presents with    Fever    Altered Mental Status       HISTORY OF PRESENT ILLNESS  (Location/Symptom, Timing/Onset, Context/Setting, Quality, Duration, Modifying Factors, Severity.)      Nicole Huertas is a 66 y.o. male who presents From [facility] care with fever and altered mental status. Patient is nonambulatory due to prior history of MS. Per EMS patient is normally alert, oriented, awake. Patient reportedly found this morning confused as well as febrile. Patient concerning for sepsis. Patient with history of frequent urinary tract infections. Patient with suprapubic catheter in place as well as colostomy bag. Patient with known sacral decubitus ulcer with chronic osteomyelitis which was treated with extended course of meropenem at skilled nursing facility, Which patient seems to have finished as he does not have meropenem on his skilled nursing facility medication list.  Patient denies any pain. Patient denies any chest pain, shortness of breath, abdominal pain. Patient is on Xarelto for prior pulmonary embolism. PAST MEDICAL / SURGICAL / SOCIAL / FAMILY HISTORY      has a past medical history of Dysphagia; History of pulmonary embolism; History of urinary tract infection; Hypertension; Major depressive disorder, single episode; MS (multiple sclerosis) ([address] Neurogenic bladder; Osteomyelitis ([address] and UTI (urinary tract infection). has a past surgical history that includes Tonsillectomy (child); Ankle surgery (1996); Bladder surgery (2-); Colonoscopy; and pr lap,surg,colectomy,w/end colost & closur (N/A, 6/13/2018).     Social History     Social History    Marital status:      Spouse name: Solomon Garza Number of children: 2    Years of education: N/A     Occupational History    Not on file. Social History Main Topics    Smoking status: Former Smoker     Quit date: 1/1/1982    Smokeless tobacco: Former User    Alcohol use No      Comment: \"quit alcohol a few years ago\"    Drug use: No    Sexual activity: Yes     Partners: Female     Other Topics Concern    Not on file     Social History Narrative    No narrative on file       Family History   Problem Relation Age of Onset    Cancer Mother         Breast    Heart Disease Father 48    Arthritis Sister     Heart Disease Paternal Grandmother 48       Allergies:  Patient has no known allergies. Home Medications:  Prior to Admission medications    Medication Sig Start Date End Date Taking? Authorizing Provider   Heparin Sod, Porcine, in D5W (HEPARIN, PORCINE,) 100 UNIT/ML SOLN infusion Infuse 1,113.2 Units/hr intravenously continuous 6/14/18   Evins Goodell, MD   ondansetron [facility]) 4 MG/2ML injection Infuse 2 mLs intravenously every 6 hours as needed for Nausea 6/14/18   Evins Goodell, MD   famotidine (PEPCID) 20 MG tablet Take 1 tablet by mouth 2 times daily 6/14/18   Evins Goodell, MD   meropenem [facility]) infusion Infuse 1,000 mg intravenously every 8 hours Compound per protocol. 6/14/18   Evins Goodell, MD   Omega-3 Fatty Acids (FISH OIL OMEGA-3) 1000 MG CAPS Take 1 capsule by mouth daily    Historical Provider, MD   lisinopril (PRINIVIL;ZESTRIL) 10 MG tablet Take 10 mg by mouth daily    Historical Provider, MD   oxyCODONE-acetaminophen (PERCOCET) 5-325 MG per tablet Take 2 tablets by mouth every 4 hours as needed for Pain (pt may have 1 or 2 tabs PRN pain). .    Historical Provider, MD   sodium chloride flush 0.9 % injection Infuse 10 mLs intravenously every 8 hours Every shift    Historical Provider, MD   FLUoxetine (PROZAC) 10 MG capsule Take 10 mg by mouth daily    Historical Provider, MD   rivaroxaban (XARELTO) 20 MG TABS tablet Take 1 tablet by mouth Daily with potassium chloride 10 mEq/100 mL IVPB (Peripheral Line)    magnesium sulfate 1 g in dextrose 5 % 100 mL IVPB    cefepime (MAXIPIME) 2 g IVPB minibag    0.9 % sodium chloride infusion       DDX: Sepsis, UTI, pneumonia, skin infection    DIAGNOSTIC RESULTS / EMERGENCY DEPARTMENT COURSE / MDM     LABS:  Results for orders placed or performed during the hospital encounter of 11/13/18   Rapid influenza A/B antigens   Result Value Ref Range    Flu A Antigen NEGATIVE NEGATIVE    Flu B Antigen NEGATIVE NEGATIVE   CBC Auto Differential   Result Value Ref Range    WBC 10.9 (H) 4.8 - 10.8 thou/mm3    RBC 4.36 (L) 4.70 - 6.10 mill/mm3    Hemoglobin 12.5 (L) 14.0 - 18.0 gm/dl    Hematocrit 40.1 (L) 42.0 - 52.0 %    MCV 92.0 80.0 - 94.0 fL    MCH 28.7 26.0 - 33.0 pg    MCHC 31.2 (L) 32.2 - 35.5 gm/dl    RDW-CV 17.2 (H) 11.5 - 14.5 %    RDW-SD 57.4 (H) 35.0 - 45.0 fL    Platelets 248 509 - 493 thou/mm3    MPV 9.3 (L) 9.4 - 12.4 fL    Seg Neutrophils 84.7 %    Lymphocytes 8.1 %    Monocytes 6.3 %    Eosinophils 0.0 %    Basophils 0.3 %    Immature Granulocytes 0.6 %    Segs Absolute 9.2 (H) 1.8 - 7.7 thou/mm3    Lymphocytes # 0.9 (L) 1.0 - 4.8 thou/mm3    Monocytes # 0.7 0.4 - 1.3 thou/mm3    Eosinophils # 0.0 0.0 - 0.4 thou/mm3    Basophils # 0.0 0.0 - 0.1 thou/mm3    Immature Grans (Abs) 0.07 0.00 - 0.07 thou/mm3    nRBC 0 /100 wbc   Basic Metabolic Panel   Result Value Ref Range    Sodium 134 (L) 135 - 145 meq/L    Potassium 3.6 3.5 - 5.2 meq/L    Chloride 98 98 - 111 meq/L    CO2 23 23 - 33 meq/L    Glucose 144 (H) 70 - 108 mg/dL    BUN 22 7 - 22 mg/dL    CREATININE 0.5 0.4 - 1.2 mg/dL    Calcium 9.1 8.5 - 10.5 mg/dL   Hepatic Function Panel   Result Value Ref Range    Alb 3.1 (L) 3.5 - 5.1 g/dL    Total Bilirubin 0.8 0.3 - 1.2 mg/dL    Bilirubin, Direct 0.5 (H) 0.0 - 0.3 mg/dL    Alkaline Phosphatase 236 (H) 38 - 126 U/L    AST 76 (H) 5 - 40 U/L    ALT 92 (H) 11 - 66 U/L    Total Protein 7.6 6.1 - 8.0 g/dL   Protime-INR CONSULT  PHARMACY TO DOSE VANCOMYCIN    CRITICAL CARE:  None    FINAL IMPRESSION      1. Sepsis, due to unspecified organism ([facility] [address]    2. Acute cystitis with hematuria          DISPOSITION / PLAN     DISPOSITION Admitted 11/13/2018 10:39:44 AM      PATIENT REFERRED TO:  MD Tree Hopper [address] [place] [address]  [phone]            DISCHARGE MEDICATIONS:  Current Discharge Medication List          Sterling Kothari DO  Emergency Medicine Resident    Pre-hypertension/Hypertension: You have been informed that you may have pre-hypertension or Hypertension based on a blood pressure reading in the Emergency Department. I recommend you call the primary care provider listed on your discharge instructions or a physician of your choice this week to arrange follow up for further evaluation of possible pre-hypertension or Hypertension. (Please note that portions of this note were completed with a voice recognition program.  Efforts were made to edit the dictations but occasionally words are mis-transcribed.  Whenever words are used in this note in any gender, they shall be construed as though they were used in the gender appropriate to the circumstances; and whenever words are used in this note in the singular or plural form, they shall be construed as though they were used in the form appropriate to the circumstances.)       Sterling Kothari DO  Resident  11/13/18 [id number]

## 2018-11-14 LAB
ACINETOBACTER BAUMANNII FILM ARRAY: NOT DETECTED
ALBUMIN SERPL-MCNC: 2.5 G/DL (ref 3.5–5.1)
ALP BLD-CCNC: 208 U/L (ref 38–126)
ALT SERPL-CCNC: 59 U/L (ref 11–66)
ANION GAP SERPL CALCULATED.3IONS-SCNC: 10 MEQ/L (ref 8–16)
AST SERPL-CCNC: 41 U/L (ref 5–40)
BILIRUB SERPL-MCNC: 0.7 MG/DL (ref 0.3–1.2)
BILIRUBIN DIRECT: 0.3 MG/DL (ref 0–0.3)
BOTTLE TYPE: NORMAL
BUN BLDV-MCNC: 15 MG/DL (ref 7–22)
CALCIUM SERPL-MCNC: 8.3 MG/DL (ref 8.5–10.5)
CANDIDA ALBICANS FILM ARRAY: NOT DETECTED
CANDIDA GLABRATA FILM ARRAY: NOT DETECTED
CANDIDA KRUSEI FILM ARRAY: NOT DETECTED
CANDIDA PARAPSILOSIS FILM ARRAY: NOT DETECTED
CANDIDA TROPICALIS FILM ARRAY: NOT DETECTED
CARBAPENEM RESITANT FILM ARRAY: NORMAL
CHLORIDE BLD-SCNC: 102 MEQ/L (ref 98–111)
CO2: 20 MEQ/L (ref 23–33)
CREAT SERPL-MCNC: 0.3 MG/DL (ref 0.4–1.2)
ENTERBACTER CLOACAE FILM ARRAY: NOT DETECTED
ENTERBACTERIACEAE FILM ARRAY: NOT DETECTED
ENTEROCOCCUS FILM ARRAY: NOT DETECTED
ERYTHROCYTE [DISTWIDTH] IN BLOOD BY AUTOMATED COUNT: 16.7 % (ref 11.5–14.5)
ERYTHROCYTE [DISTWIDTH] IN BLOOD BY AUTOMATED COUNT: 54.8 FL (ref 35–45)
ESCHERICHIA COLI FILM ARRAY: NOT DETECTED
GFR SERPL CREATININE-BSD FRML MDRD: > 90 ML/MIN/1.73M2
GLUCOSE BLD-MCNC: 109 MG/DL (ref 70–108)
HAEMOPHILUS INFLUENZA FILM ARRAY: NOT DETECTED
HCT VFR BLD CALC: 33.5 % (ref 42–52)
HEMOGLOBIN: 10.5 GM/DL (ref 14–18)
KLEBSIELLA OXYTOCA FILM ARRAY: NOT DETECTED
KLEBSIELLA PNEUMONIAE FILM ARRAY: NOT DETECTED
LISTERIA MONOCYTOGENES FILM ARRAY: NOT DETECTED
MAGNESIUM: 2.1 MG/DL (ref 1.6–2.4)
MCH RBC QN AUTO: 28 PG (ref 26–33)
MCHC RBC AUTO-ENTMCNC: 31.3 GM/DL (ref 32.2–35.5)
MCV RBC AUTO: 89.3 FL (ref 80–94)
METHICILLIN RESISTANT FILM ARRAY: NORMAL
NEISSERIA MENIGITIDIS FILM ARRAY: NOT DETECTED
PLATELET # BLD: 163 THOU/MM3 (ref 130–400)
PMV BLD AUTO: 9.1 FL (ref 9.4–12.4)
POTASSIUM REFLEX MAGNESIUM: 3 MEQ/L (ref 3.5–5.2)
PREALBUMIN: 10.1 MG/DL (ref 20–40)
PROTEUS FILM ARRAY: NOT DETECTED
PSEUDOMONAS AERUGINOSA FILM ARRAY: NOT DETECTED
RBC # BLD: 3.75 MILL/MM3 (ref 4.7–6.1)
SERRATIA MARCESCENS FILM ARRAY: NOT DETECTED
SODIUM BLD-SCNC: 132 MEQ/L (ref 135–145)
SOURCE OF BLOOD CULTURE: NORMAL
STAPH AUREUS FILM ARRAY: NOT DETECTED
STAPHYLOCOCCUS FILM ARRAY: NOT DETECTED
STREP AGALACTIAE FILM ARRAY: NOT DETECTED
STREP PNEUMONIAE FILM ARRAY: NOT DETECTED
STREP PYOCGENES FILM ARRAY: NOT DETECTED
STREPTOCOCCUS FILM ARRAY: NOT DETECTED
TOTAL PROTEIN: 6.7 G/DL (ref 6.1–8)
VANCOMYCIN RESISTANT FILM ARRAY: NORMAL
WBC # BLD: 8.5 THOU/MM3 (ref 4.8–10.8)

## 2018-11-14 PROCEDURE — 6370000000 HC RX 637 (ALT 250 FOR IP): Performed by: INTERNAL MEDICINE

## 2018-11-14 PROCEDURE — 99232 SBSQ HOSP IP/OBS MODERATE 35: CPT | Performed by: INTERNAL MEDICINE

## 2018-11-14 PROCEDURE — 83735 ASSAY OF MAGNESIUM: CPT

## 2018-11-14 PROCEDURE — 1200000003 HC TELEMETRY R&B

## 2018-11-14 PROCEDURE — 92610 EVALUATE SWALLOWING FUNCTION: CPT

## 2018-11-14 PROCEDURE — 2580000003 HC RX 258: Performed by: INTERNAL MEDICINE

## 2018-11-14 PROCEDURE — 6360000002 HC RX W HCPCS: Performed by: INTERNAL MEDICINE

## 2018-11-14 PROCEDURE — 84134 ASSAY OF PREALBUMIN: CPT

## 2018-11-14 PROCEDURE — 80076 HEPATIC FUNCTION PANEL: CPT

## 2018-11-14 PROCEDURE — 85027 COMPLETE CBC AUTOMATED: CPT

## 2018-11-14 PROCEDURE — 80048 BASIC METABOLIC PNL TOTAL CA: CPT

## 2018-11-14 PROCEDURE — 36415 COLL VENOUS BLD VENIPUNCTURE: CPT

## 2018-11-14 RX ORDER — ZINC SULFATE 50(220)MG
220 CAPSULE ORAL DAILY
COMMUNITY
End: 2019-01-02

## 2018-11-14 RX ORDER — POTASSIUM CHLORIDE 7.45 MG/ML
10 INJECTION INTRAVENOUS
Status: COMPLETED | OUTPATIENT
Start: 2018-11-14 | End: 2018-11-14

## 2018-11-14 RX ADMIN — POTASSIUM CHLORIDE 10 MEQ: 7.46 INJECTION, SOLUTION INTRAVENOUS at 16:02

## 2018-11-14 RX ADMIN — Medication 500000 UNITS: at 20:10

## 2018-11-14 RX ADMIN — MEROPENEM 1 G: 1 INJECTION, POWDER, FOR SOLUTION INTRAVENOUS at 12:34

## 2018-11-14 RX ADMIN — SODIUM CHLORIDE: 9 INJECTION, SOLUTION INTRAVENOUS at 03:42

## 2018-11-14 RX ADMIN — Medication 500000 UNITS: at 12:34

## 2018-11-14 RX ADMIN — BACLOFEN 20 MG: 10 TABLET ORAL at 12:34

## 2018-11-14 RX ADMIN — VANCOMYCIN HYDROCHLORIDE 1500 MG: 1 INJECTION, POWDER, LYOPHILIZED, FOR SOLUTION INTRAVENOUS at 00:37

## 2018-11-14 RX ADMIN — OXYBUTYNIN CHLORIDE 10 MG: 10 TABLET, FILM COATED, EXTENDED RELEASE ORAL at 09:57

## 2018-11-14 RX ADMIN — FAMOTIDINE 20 MG: 20 TABLET ORAL at 20:10

## 2018-11-14 RX ADMIN — POTASSIUM CHLORIDE 10 MEQ: 7.46 INJECTION, SOLUTION INTRAVENOUS at 20:06

## 2018-11-14 RX ADMIN — THERA TABS 1 TABLET: TAB at 09:57

## 2018-11-14 RX ADMIN — POTASSIUM CHLORIDE 10 MEQ: 7.46 INJECTION, SOLUTION INTRAVENOUS at 09:58

## 2018-11-14 RX ADMIN — Medication 1 CAPSULE: at 09:58

## 2018-11-14 RX ADMIN — DOCUSATE SODIUM 100 MG: 100 CAPSULE, LIQUID FILLED ORAL at 09:56

## 2018-11-14 RX ADMIN — OXYCODONE HYDROCHLORIDE AND ACETAMINOPHEN 2 TABLET: 5; 325 TABLET ORAL at 05:27

## 2018-11-14 RX ADMIN — RIVAROXABAN 20 MG: 20 TABLET, FILM COATED ORAL at 16:14

## 2018-11-14 RX ADMIN — VITAMIN E CAP 400 UNIT 400 UNITS: 400 CAP at 09:57

## 2018-11-14 RX ADMIN — POTASSIUM CHLORIDE 10 MEQ: 7.46 INJECTION, SOLUTION INTRAVENOUS at 11:48

## 2018-11-14 RX ADMIN — TIZANIDINE 2 MG: 4 TABLET ORAL at 12:34

## 2018-11-14 RX ADMIN — Medication 1 CAPSULE: at 16:16

## 2018-11-14 RX ADMIN — POTASSIUM CHLORIDE 10 MEQ: 7.46 INJECTION, SOLUTION INTRAVENOUS at 14:00

## 2018-11-14 RX ADMIN — BACLOFEN 20 MG: 10 TABLET ORAL at 20:09

## 2018-11-14 RX ADMIN — Medication 500000 UNITS: at 09:56

## 2018-11-14 RX ADMIN — Medication 500000 UNITS: at 16:14

## 2018-11-14 RX ADMIN — BACLOFEN 20 MG: 10 TABLET ORAL at 09:57

## 2018-11-14 RX ADMIN — TIZANIDINE 2 MG: 4 TABLET ORAL at 09:57

## 2018-11-14 RX ADMIN — TIZANIDINE 2 MG: 4 TABLET ORAL at 20:10

## 2018-11-14 RX ADMIN — FAMOTIDINE 20 MG: 20 TABLET ORAL at 09:56

## 2018-11-14 RX ADMIN — LISINOPRIL 10 MG: 10 TABLET ORAL at 09:56

## 2018-11-14 RX ADMIN — Medication 20000 UNITS: at 09:57

## 2018-11-14 RX ADMIN — Medication 500 MG: at 20:10

## 2018-11-14 RX ADMIN — MEROPENEM 1 G: 1 INJECTION, POWDER, FOR SOLUTION INTRAVENOUS at 03:21

## 2018-11-14 RX ADMIN — MEROPENEM 1 G: 1 INJECTION, POWDER, FOR SOLUTION INTRAVENOUS at 20:05

## 2018-11-14 RX ADMIN — FLUOXETINE 10 MG: 10 CAPSULE ORAL at 09:56

## 2018-11-14 RX ADMIN — POTASSIUM CHLORIDE 10 MEQ: 7.46 INJECTION, SOLUTION INTRAVENOUS at 17:59

## 2018-11-14 RX ADMIN — OMEGA-3-ACID ETHYL ESTERS 1000 MG: 1 CAPSULE, LIQUID FILLED ORAL at 09:56

## 2018-11-14 ASSESSMENT — PAIN SCALES - GENERAL
PAINLEVEL_OUTOF10: 0
PAINLEVEL_OUTOF10: 5
PAINLEVEL_OUTOF10: 0
PAINLEVEL_OUTOF10: 7

## 2018-11-14 NOTE — PROGRESS NOTES
Hospitalist Progress Note    Patient:  Peña Ortiz      Unit/Bed:6K-07/007-A    YOB: 1955    MRN: [id number]       Acct: [de-identified]     PCP: Veronica Dowell MD    Date of Admission: 11/13/2018    Assessment/Plan:      Sepsis secondary to catheter associated urinary tract infection:   Not sure if Pompa's catheter was changed, apparently was done in the emergency room, don't have documentation,  Awaiting for urine cultures and blood cultures, will repeat blood cultures tomorrow on the abdominal wall edema caused from this exam    Metabolic/septic encephalopathy-  Improving    Essential hypertension continue home medications    Decubitus ulcer on right ischial region, Continues to heal- wound dressing, ID on board    Neurogenic bladder secondary to multiple sclerosis, suprapubic catheter    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 Documented to have fever of 101.4 and was little tachycardic with heart rate of 1028 and blood pressure was around 102/65. Hospital Course: Suspected to have sepsis secondary to UTI and was started on broad-spectrum antibiotics vancomycin and meropenem along with IV hydration and infectious disease was also consulted. Patient does have chronic decubitus ulcer in the iischial area. Blood cultures positive for gram-negative bacilli.    We will repeat blood intact and able to move Upper extremities, unable to lift a shoulders, unable to move both lower extremities, wasting present  Extremities - peripheral pulses normal, no pedal edema,  Capillary refill less than 3 sec  Skin - normal coloration and turgor, no rashes  Suprapubic Pompa present        Labs:   Recent Labs      11/13/18 0922 11/14/18   0536   WBC  10.9*  8.5   HGB  12.5*  10.5*   HCT  40.1*  33.5*   PLT  223  163     Recent Labs      11/13/18 0922 11/14/18   0536   NA  134*  132*   K  3.6  3.0*   CL  98  102   CO2  23  20*   BUN  22  15   CREATININE  0.5  0.3*   CALCIUM  9.1  8.3*     Recent Labs      11/13/18 0922 11/14/18   0536   AST  76*  41*   ALT  92*  59   BILIDIR  0.5*  0.3   BILITOT  0.8  0.7   ALKPHOS  236*  208*     Recent Labs      11/13/18 0922   INR  2.41*     No results for input(s): CKTOTAL, TROPONINI in the last 72 hours. Microbiology:      Urinalysis:    Lab Results   Component Value Date    NITRU POSITIVE 11/13/2018    WBCUA > 100 11/13/2018    WBCUA >200 01/20/2012    BACTERIA MODERATE 11/13/2018    RBCUA > 100 11/13/2018    BLOODU LARGE 11/13/2018    SPECGRAV 1.019 11/13/2018    GLUCOSEU NEGATIVE 06/08/2018       Radiology:  CT ABDOMEN PELVIS WO CONTRAST Additional Contrast? None   Final Result       1. The urinary bladder is decompressed with a Pompa catheter however there are 3 large bladder stones within its lumen. 2. No evidence of hydronephrosis. 3. Small left-sided pleural effusion. 4. Cardiomegaly. **This report has been created using voice recognition software. It may contain minor errors which are inherent in voice recognition technology. **      Final report electronically signed by Dr Anshu Long on 11/13/2018 3:51 PM      CT Head WO Contrast   Final Result   1. There is no acute intracranial abnormality. 2. Additional findings as described in the body the report. **This report has been created using voice recognition software. It may contain minor errors which are inherent in voice recognition technology. **      Final report electronically signed by Dr. Lin Remy on 11/13/2018 10:13 AM      XR CHEST PORTABLE   Final Result   Stable radiographic appearance of the chest. No evidence of an acute process. **This report has been created using voice recognition software. It may contain minor errors which are inherent in voice recognition technology. **      Final report electronically signed by Dr. Sofía Giraldo on 11/13/2018 9:55 AM          DVT prophylaxis: [] Lovenox                                 [] SCDs                                 [] SQ Heparin                                 [] Encourage ambulation           [x] Already on Anticoagulation     Code Status: Full Code    PT/OT Eval Status: none    Tele:   [x] yes             [] no    Active Hospital Problems    Diagnosis Date Noted    Urinary tract infection [N39.0] 11/13/2018    Sepsis ([address] [A41.9]     Dehydration [E86.0]     Abnormal liver function test [R94.5]     Chronic depression [F32.9]     Essential hypertension [I10]     History of pulmonary embolism [Z86.711]     Other dysphagia [R13.19]     Pressure injury of skin of back [L89.109]        Electronically signed by Adriane Malone MD on 11/14/2018 at 2:33 PM

## 2018-11-14 NOTE — PROGRESS NOTES
Patient had slight increase in LOC. Able to recall date of birth. Remains disoriented to place and time. IV in left wrist remains patent. No signs of infection or infiltration. Small amount of semi formed stool collected from stoma. Stoma cleaned and remains pink/red and moist. Sanguinous drainage around suprapubic catheter still present. Sponge drain put in place around catheter. Additional 225ml of tea colored output collected from catheter.  Further assessment date remains unchanged      [facility]/NS

## 2018-11-14 NOTE — PROGRESS NOTES
[facility]  SPEECH THERAPY  STRZ RENAL TELEMETRY 6K  Bedside Swallowing Evaluation      SLP Individual Minutes  Time In: 6627  Time Out: 1033  Minutes: 10  Timed Code Treatment Minutes: 0 Minutes       Date: 2018  Patient Name: Geovanny Guadarrama      CSN: [id number]   : 1955  (66 y.o.)  Gender: male   Referring Physician: Dr. Jolly Gregory   Diagnosis: UTI  Secondary Diagnosis:  Dysphagia   History of Present Illness/Injury: The patient is a 66 y.o.  male with a history of multiple sclerosis and neurogenic bladder who resides a nursing home, with a history of a chronic indwelling suprapubic Pompa catheter who presented to the hospital with altered mental status and fever today. ST to assess current swallowing function and determine safest oral diet. Past Medical History:   Diagnosis Date    Dysphagia     oropharyngeal    History of pulmonary embolism     History of urinary tract infection     Hypertension     Major depressive disorder, single episode     MS (multiple sclerosis) [facility]     [facility]    [facility])     UTI (urinary tract infection)        Pain:  Did not state     Current Diet: NPO- awaiting ST eval     Respiratory Status: [x] Independent [] Nasal Cannula [] Oxygen Mask      [] Tracheostomy [] Other:     [] Ventilator/Settings:    Behavioral Observation: [x] Alert [x] Oriented [] Confused [] Lethargic      [] Dysarthric [] Limited Direction Following [] Agitated      [] Other:    ORAL MECHANISM EVALUATION:         Comments:  Facial / Labial [x]WFL [] Impaired []DNT    Lingual [x]WFL [] Impaired []DNT    Dentition []WFL [x] Impaired []DNT Sparse    Velum []WFL [] Impaired [x]DNT    Vocal Quality [x]WFL [] Impaired []DNT    Sensation []WFL [] Impaired [x]DNT    Cough []WFL [] Impaired [x]DNT    Other: []WFL [] Impaired []DNT    Other: []WFL [] Impaired []DNT        PATIENT WAS EVALUATED USING:  Ice chips, pudding,

## 2018-11-14 NOTE — DISCHARGE INSTR - COC
Continuity of Care Form    Patient Name: Harinder Mejia   :  1955  MRN:  [id number]    Admit date:  2018  Discharge date:  18    Code Status Order: Full Code   Advance Directives:     Admitting Physician:  Dayne Singh MD  PCP: Emmanuel Montanez MD    Discharging Nurse:  [facility] Unit/Room#: 6K-07/007-A  Discharging Unit Phone Number: [phone]    Emergency Contact:   Extended Emergency Contact Information  Primary Emergency Contact: [facility]  Address: [address] [place] [address], [address] [place] [address] Phone: [phone]  Mobile Phone: [phone]  Relation: Spouse    Past Surgical History:  Past Surgical History:   Procedure Laterality Date   [address]    broken ankle    BLADDER SURGERY  2012    Suprapubic catheter placement    COLONOSCOPY      UT LAP,SURG,COLECTOMY,W/END COLOST & CLOSUR N/A 2018    ROBOT DIVERTING COLOSTOMY performed by [facility]ie [facility]erson [facility] at [facility]  child       Immunization History:   Immunization History   Administered Date(s) Administered    Influenza Virus Vaccine 2012, 10/01/2017    Pneumococcal Vaccine, unspecified formulation 2013       Active Problems:  Patient Active Problem List   Diagnosis Code    Neurogenic bladder N31.9    Multiple sclerosis ([address] G35    MS (multiple sclerosis) ([address] G35    Urinary retention R33.9    Indwelling catheter present on admission Z96.0    Cystostomy malfunction ([address] N98.408    Decubitus ulcer of coccyx L89.159    Decubitus ulcer, stage 3 ([address] L89.93    Malnutrition ([address] E46    Decubitus ulcer of right perineal ischial region, stage 3 ([address] L89.313    Multiple sclerosis ([address] G35    Pulmonary embolism on left ([address] H46.92    Metabolic encephalopathy M66.27    Speech disturbance R47.9    Infected decubitus ulcer, stage I L89.91, L08.9    Infected decubitus ulcer, stage III (HCC) L89.93, L08.9   

## 2018-11-15 ENCOUNTER — APPOINTMENT (OUTPATIENT)
Dept: GENERAL RADIOLOGY | Age: 63
DRG: 673 | End: 2018-11-15
Payer: COMMERCIAL

## 2018-11-15 LAB
ALBUMIN SERPL-MCNC: 2.5 G/DL (ref 3.5–5.1)
ALP BLD-CCNC: 243 U/L (ref 38–126)
ALT SERPL-CCNC: 58 U/L (ref 11–66)
ANION GAP SERPL CALCULATED.3IONS-SCNC: 16 MEQ/L (ref 8–16)
AST SERPL-CCNC: 39 U/L (ref 5–40)
BILIRUB SERPL-MCNC: 0.5 MG/DL (ref 0.3–1.2)
BILIRUBIN DIRECT: 0.3 MG/DL (ref 0–0.3)
BUN BLDV-MCNC: 10 MG/DL (ref 7–22)
CALCIUM SERPL-MCNC: 8.7 MG/DL (ref 8.5–10.5)
CHLORIDE BLD-SCNC: 102 MEQ/L (ref 98–111)
CO2: 18 MEQ/L (ref 23–33)
CREAT SERPL-MCNC: 0.3 MG/DL (ref 0.4–1.2)
ERYTHROCYTE [DISTWIDTH] IN BLOOD BY AUTOMATED COUNT: 16.3 % (ref 11.5–14.5)
ERYTHROCYTE [DISTWIDTH] IN BLOOD BY AUTOMATED COUNT: 53.1 FL (ref 35–45)
GFR SERPL CREATININE-BSD FRML MDRD: > 90 ML/MIN/1.73M2
GLUCOSE BLD-MCNC: 87 MG/DL (ref 70–108)
HCT VFR BLD CALC: 36.8 % (ref 42–52)
HEMOGLOBIN: 11.5 GM/DL (ref 14–18)
MCH RBC QN AUTO: 28 PG (ref 26–33)
MCHC RBC AUTO-ENTMCNC: 31.3 GM/DL (ref 32.2–35.5)
MCV RBC AUTO: 89.5 FL (ref 80–94)
PLATELET # BLD: 163 THOU/MM3 (ref 130–400)
PMV BLD AUTO: 9.5 FL (ref 9.4–12.4)
POTASSIUM SERPL-SCNC: 3.3 MEQ/L (ref 3.5–5.2)
RBC # BLD: 4.11 MILL/MM3 (ref 4.7–6.1)
SODIUM BLD-SCNC: 136 MEQ/L (ref 135–145)
TOTAL PROTEIN: 7 G/DL (ref 6.1–8)
WBC # BLD: 8.3 THOU/MM3 (ref 4.8–10.8)

## 2018-11-15 PROCEDURE — 6370000000 HC RX 637 (ALT 250 FOR IP): Performed by: INTERNAL MEDICINE

## 2018-11-15 PROCEDURE — 85027 COMPLETE CBC AUTOMATED: CPT

## 2018-11-15 PROCEDURE — 92526 ORAL FUNCTION THERAPY: CPT

## 2018-11-15 PROCEDURE — 99232 SBSQ HOSP IP/OBS MODERATE 35: CPT | Performed by: INTERNAL MEDICINE

## 2018-11-15 PROCEDURE — 72170 X-RAY EXAM OF PELVIS: CPT

## 2018-11-15 PROCEDURE — 36415 COLL VENOUS BLD VENIPUNCTURE: CPT

## 2018-11-15 PROCEDURE — 87040 BLOOD CULTURE FOR BACTERIA: CPT

## 2018-11-15 PROCEDURE — 2580000003 HC RX 258: Performed by: INTERNAL MEDICINE

## 2018-11-15 PROCEDURE — 80053 COMPREHEN METABOLIC PANEL: CPT

## 2018-11-15 PROCEDURE — 6360000002 HC RX W HCPCS: Performed by: INTERNAL MEDICINE

## 2018-11-15 PROCEDURE — 82248 BILIRUBIN DIRECT: CPT

## 2018-11-15 PROCEDURE — 0JB90ZZ EXCISION OF BUTTOCK SUBCUTANEOUS TISSUE AND FASCIA, OPEN APPROACH: ICD-10-PCS | Performed by: INTERNAL MEDICINE

## 2018-11-15 PROCEDURE — 1200000003 HC TELEMETRY R&B

## 2018-11-15 PROCEDURE — 6370000000 HC RX 637 (ALT 250 FOR IP): Performed by: UROLOGY

## 2018-11-15 RX ORDER — GINSENG 100 MG
CAPSULE ORAL 3 TIMES DAILY
Status: DISCONTINUED | OUTPATIENT
Start: 2018-11-15 | End: 2018-11-16 | Stop reason: HOSPADM

## 2018-11-15 RX ORDER — POTASSIUM CHLORIDE 20 MEQ/1
40 TABLET, EXTENDED RELEASE ORAL ONCE
Status: COMPLETED | OUTPATIENT
Start: 2018-11-15 | End: 2018-11-15

## 2018-11-15 RX ADMIN — BACLOFEN 20 MG: 10 TABLET ORAL at 12:52

## 2018-11-15 RX ADMIN — MEROPENEM 1 G: 1 INJECTION, POWDER, FOR SOLUTION INTRAVENOUS at 12:36

## 2018-11-15 RX ADMIN — VITAMIN E CAP 400 UNIT 400 UNITS: 400 CAP at 08:59

## 2018-11-15 RX ADMIN — Medication 10 ML: at 20:57

## 2018-11-15 RX ADMIN — OXYBUTYNIN CHLORIDE 10 MG: 10 TABLET, FILM COATED, EXTENDED RELEASE ORAL at 08:52

## 2018-11-15 RX ADMIN — TIZANIDINE 2 MG: 4 TABLET ORAL at 22:00

## 2018-11-15 RX ADMIN — POTASSIUM CHLORIDE 40 MEQ: 20 TABLET, EXTENDED RELEASE ORAL at 08:55

## 2018-11-15 RX ADMIN — Medication 500000 UNITS: at 12:36

## 2018-11-15 RX ADMIN — TIZANIDINE 2 MG: 4 TABLET ORAL at 12:53

## 2018-11-15 RX ADMIN — MEROPENEM 1 G: 1 INJECTION, POWDER, FOR SOLUTION INTRAVENOUS at 03:57

## 2018-11-15 RX ADMIN — FAMOTIDINE 20 MG: 20 TABLET ORAL at 08:55

## 2018-11-15 RX ADMIN — LISINOPRIL 10 MG: 10 TABLET ORAL at 08:53

## 2018-11-15 RX ADMIN — Medication 500000 UNITS: at 08:55

## 2018-11-15 RX ADMIN — DOCUSATE SODIUM 100 MG: 100 CAPSULE, LIQUID FILLED ORAL at 08:57

## 2018-11-15 RX ADMIN — BACLOFEN 20 MG: 10 TABLET ORAL at 08:52

## 2018-11-15 RX ADMIN — Medication 500000 UNITS: at 20:57

## 2018-11-15 RX ADMIN — ACETAMINOPHEN 650 MG: 325 TABLET ORAL at 20:57

## 2018-11-15 RX ADMIN — Medication 20000 UNITS: at 08:54

## 2018-11-15 RX ADMIN — BACITRACIN: 500 OINTMENT TOPICAL at 20:57

## 2018-11-15 RX ADMIN — MEROPENEM 1 G: 1 INJECTION, POWDER, FOR SOLUTION INTRAVENOUS at 20:58

## 2018-11-15 RX ADMIN — THERA TABS 1 TABLET: TAB at 08:52

## 2018-11-15 RX ADMIN — FAMOTIDINE 20 MG: 20 TABLET ORAL at 20:57

## 2018-11-15 RX ADMIN — Medication 500 MG: at 08:52

## 2018-11-15 RX ADMIN — Medication 1 CAPSULE: at 08:58

## 2018-11-15 RX ADMIN — FLUOXETINE 10 MG: 10 CAPSULE ORAL at 08:55

## 2018-11-15 RX ADMIN — Medication 500000 UNITS: at 16:36

## 2018-11-15 RX ADMIN — TIZANIDINE 2 MG: 4 TABLET ORAL at 09:00

## 2018-11-15 RX ADMIN — Medication 1 CAPSULE: at 16:35

## 2018-11-15 RX ADMIN — Medication 10 ML: at 08:55

## 2018-11-15 RX ADMIN — BACITRACIN: 500 OINTMENT TOPICAL at 12:40

## 2018-11-15 RX ADMIN — BACLOFEN 20 MG: 10 TABLET ORAL at 20:57

## 2018-11-15 ASSESSMENT — PAIN SCALES - GENERAL
PAINLEVEL_OUTOF10: 0
PAINLEVEL_OUTOF10: 0
PAINLEVEL_OUTOF10: 2
PAINLEVEL_OUTOF10: 0
PAINLEVEL_OUTOF10: 0
PAINLEVEL_OUTOF10: 4
PAINLEVEL_OUTOF10: 1

## 2018-11-15 ASSESSMENT — PAIN DESCRIPTION - LOCATION: LOCATION: BACK

## 2018-11-15 ASSESSMENT — PAIN DESCRIPTION - ORIENTATION: ORIENTATION: LOWER

## 2018-11-15 ASSESSMENT — PAIN DESCRIPTION - PAIN TYPE: TYPE: ACUTE PAIN

## 2018-11-15 NOTE — CONSULTS
Family History:   Family History   Problem Relation Age of Onset    Cancer Mother         Breast    Heart Disease Father 48    Arthritis Sister     Heart Disease Paternal Grandmother 48       ROS:  Constitutional: Negative for chills, fatigue, fever, or weight loss. Eyes: Denies reported visual changes. ENT: Denies headache, difficulty swallowing, earache, and nosebleeds. Cardiovascular: Negative for chest pain, palpitations, tachycardia or edema. Respiratory: Denies cough or SOB. GI:The patient denies abdominal or flank pain, anorexia, nausea or vomiting. : see HPI. Musculoskeletal: Patient denies low back pain or painful or reduced range of ROM. Neurological: The patient denies any symptoms of neurological impairment or TIA. Psychiatric: Denies anxiety or depression. Skin: Denies rash or lesions. All remaining ROS negative. PHYSICAL EXAM:  VITALS:  /72   Pulse 100   Temp 97.4 °F (36.3 °C) (Oral)   Resp 18   Ht 5' 7\" (1.702 m)   Wt 226 lb 1.6 oz (102.6 kg)   SpO2 95%   BMI 35.41 kg/m² . Nursing note and vitals reviewed. Constitutional: Alert and oriented times x3, no acute distress, and cooperative to examination with appropriate mood and affect. HEENT:   Head:         Normocephalic and atraumatic. Mouth/Throat:          Mucous membranes are normal.   Eyes:         EOM are normal. No scleral icterus. Nose:    The external appearance of the nose is normal  Ears: The ears appear normal to external inspection. Cardiovascular:       Normal rate, regular rhythm. Pulmonary/Chest:  Normal respiratory rate and rhthym. No use of accessory muscles. Lungs clear bilaterally. Abdominal:          Soft. No tenderness.  Active bowel sounds             Genitalia:    Phimosis present, mild scrotal swelling   Catheter site without erythema or discharge, gauze in place   Normal uncircumcised penis  Urethral meatus is normal in size and location  Scrotal contents normal to inspection and palpation   Normal testes palpated bilaterally  Musculoskeletal:    Normal range of motion. He exhibits no edema or tenderness of lower extremities. Extremities:    No cyanosis, clubbing, or edema present. Neurological:    Alert and oriented. DATA:  CBC:   Lab Results   Component Value Date    WBC 8.3 11/15/2018    RBC 4.11 11/15/2018    RBC 4.20 01/20/2012    HGB 11.5 11/15/2018    HCT 36.8 11/15/2018    MCV 89.5 11/15/2018    MCH 28.0 11/15/2018    MCHC 31.3 11/15/2018    RDW 15.8 06/14/2018     11/15/2018    MPV 9.5 11/15/2018     BMP:    Lab Results   Component Value Date     11/15/2018    K 3.3 11/15/2018    K 3.0 11/14/2018     11/15/2018    CO2 18 11/15/2018    BUN 10 11/15/2018    CREATININE 0.3 11/15/2018    CALCIUM 8.7 11/15/2018    LABGLOM >90 11/15/2018    GLUCOSE 87 11/15/2018    GLUCOSE 85 01/20/2012     BUN/Creatinine:    Lab Results   Component Value Date    BUN 10 11/15/2018    CREATININE 0.3 11/15/2018     Magnesium:    Lab Results   Component Value Date    MG 2.1 11/14/2018     Phosphorus:    Lab Results   Component Value Date    PHOS 2.7 01/01/2018     PT/INR:    Lab Results   Component Value Date    INR 2.41 11/13/2018     U/A:    Lab Results   Component Value Date    COLORU DARK YELLOW 11/13/2018    PHUR 6.5 11/13/2018    LABCAST NONE SEEN 11/13/2018    WBCUA > 100 11/13/2018    WBCUA >200 01/20/2012    RBCUA > 100 11/13/2018    MUCUS NONE SEEN 11/13/2018    YEAST NONE SEEN 09/05/2018    BACTERIA MODERATE 11/13/2018    SPECGRAV 1.019 11/13/2018    LEUKOCYTESUR LARGE 11/13/2018    UROBILINOGEN 1.0 11/13/2018    BILIRUBINUR SMALL 11/13/2018    BILIRUBINUR NEGATIVE 01/20/2012    BLOODU LARGE 11/13/2018    GLUCOSEU NEGATIVE 06/08/2018    AMORPHOUS DEBRIS 11/13/2018       Imaging: The patient has had a CT Without  Contrast which I have reviewed along with its accompanying report.   The study demonstrates multiple bladder stones    IMPRESSION:     Chronic UTI  Long term suprapubic catheter placement  Bladder stone  Gross hematuria  Neurogenic bladder secondary to MS    Plan:      Pelvic xray  Follow-up in office for cystoscopy   Continue suprapubic catheter care  Need documentation of past catheter replacements from nursing home and ER    Thank you for including us in the care of HAROLDO Veliz  11/15/18 11:08 AM  Urology

## 2018-11-15 NOTE — PROCEDURES
Procedure Note Debridement of ulcer    Pt Name: Rafael Harper  MRN: [id number]  [id number]  YOB: 1955  Admit Date: 11/13/2018  8:55 AM  Date of evaluation: 11/15/2018  Primary Care Physician: Jeovanny Stone MD   6K-07/007-A       Assessment:    1.  Sepsis. 2.  Complicated UTI. 3.  Right ischial stage IV ulcer present on admission, status post  treatment for right ischial osteomyelitis. 4.  Past history of UTI. 5.  Altered mental status secondary to metabolic encephalopathy. 6.  History of past infection with acinetobacter, pseudomonas, and  enterococcus. 7.  Malnutrition. 8.  Multiple sclerosis. 9.  Mildly elevated liver enzymes. 10. Left leg ulcer   11. uro - lithiasis   12.  Bacteremia with providentia    · Patient Active Problem List:  ·    Neurogenic bladder  ·    Multiple sclerosis (HCC)  ·    MS (multiple sclerosis) (HCC)  ·    Urinary retention  ·    Indwelling catheter present on admission  ·    Cystostomy malfunction ([address]  ·    Decubitus ulcer of coccyx  ·    Decubitus ulcer, stage 3 (HCC)  ·    Malnutrition ([address]  ·    Decubitus ulcer of right perineal ischial region, stage 3 ([address]  ·    Multiple sclerosis ([address]  ·    Pulmonary embolism on left (HCC)  ·    Metabolic encephalopathy  ·    Speech disturbance  ·    Infected decubitus ulcer, stage I  ·    Infected decubitus ulcer, stage III (HCC)  ·    Wound infection  ·    Elevated INR  ·    Chronic osteomyelitis, pelvis, right (HCC)  ·    Osteomyelitis (HCC)  ·    Urinary tract infection  ·    Sepsis (HCC)  ·    Dehydration  ·    Abnormal liver function test  ·    Chronic depression  ·    Essential hypertension  ·    History of pulmonary embolism  ·    Other dysphagia  ·    Pressure injury of skin of back  ·       Pre debridement Right ischial ulcer 1.37x0.52  X 0.2        Post debridement ulcer 3.71 x 2.4 cms x0.3    Procedure Note: Debridement of the Site: right ischial ulcer     This procedure has been fully reviewed with the

## 2018-11-15 NOTE — PROGRESS NOTES
Spoke with Dr Filomena Matute regarding consult. He stated they would not do anything in house and to follow up with Dr Octavia Brewer in the office for bladder stones.  This RN told him about the drainage from the suprapubic catheter site, new order for betadine and antibiotic ointment TID

## 2018-11-16 ENCOUNTER — TELEPHONE (OUTPATIENT)
Dept: UROLOGY | Age: 63
End: 2018-11-16

## 2018-11-16 VITALS
WEIGHT: 215.3 LBS | HEART RATE: 104 BPM | HEIGHT: 67 IN | TEMPERATURE: 98.2 F | BODY MASS INDEX: 33.79 KG/M2 | SYSTOLIC BLOOD PRESSURE: 128 MMHG | DIASTOLIC BLOOD PRESSURE: 68 MMHG | OXYGEN SATURATION: 94 % | RESPIRATION RATE: 16 BRPM

## 2018-11-16 LAB
ANION GAP SERPL CALCULATED.3IONS-SCNC: 12 MEQ/L (ref 8–16)
BLOOD CULTURE, ROUTINE: ABNORMAL
BUN BLDV-MCNC: 12 MG/DL (ref 7–22)
CALCIUM SERPL-MCNC: 8.7 MG/DL (ref 8.5–10.5)
CHLORIDE BLD-SCNC: 101 MEQ/L (ref 98–111)
CO2: 25 MEQ/L (ref 23–33)
CREAT SERPL-MCNC: 0.3 MG/DL (ref 0.4–1.2)
ERYTHROCYTE [DISTWIDTH] IN BLOOD BY AUTOMATED COUNT: 16.1 % (ref 11.5–14.5)
ERYTHROCYTE [DISTWIDTH] IN BLOOD BY AUTOMATED COUNT: 50.7 FL (ref 35–45)
GFR SERPL CREATININE-BSD FRML MDRD: > 90 ML/MIN/1.73M2
GLUCOSE BLD-MCNC: 119 MG/DL (ref 70–108)
HCT VFR BLD CALC: 36.4 % (ref 42–52)
HEMOGLOBIN: 11.7 GM/DL (ref 14–18)
MCH RBC QN AUTO: 27.9 PG (ref 26–33)
MCHC RBC AUTO-ENTMCNC: 32.1 GM/DL (ref 32.2–35.5)
MCV RBC AUTO: 86.7 FL (ref 80–94)
ORGANISM: ABNORMAL
PLATELET # BLD: 196 THOU/MM3 (ref 130–400)
PMV BLD AUTO: 8.3 FL (ref 9.4–12.4)
POTASSIUM SERPL-SCNC: 3.5 MEQ/L (ref 3.5–5.2)
RBC # BLD: 4.2 MILL/MM3 (ref 4.7–6.1)
SODIUM BLD-SCNC: 138 MEQ/L (ref 135–145)
URINE CULTURE, ROUTINE: ABNORMAL
URINE CULTURE, ROUTINE: ABNORMAL
VITAMIN D 25-HYDROXY: 14 NG/ML (ref 30–100)
WBC # BLD: 8.1 THOU/MM3 (ref 4.8–10.8)

## 2018-11-16 PROCEDURE — 2580000003 HC RX 258: Performed by: INTERNAL MEDICINE

## 2018-11-16 PROCEDURE — 80048 BASIC METABOLIC PNL TOTAL CA: CPT

## 2018-11-16 PROCEDURE — 76937 US GUIDE VASCULAR ACCESS: CPT

## 2018-11-16 PROCEDURE — 99239 HOSP IP/OBS DSCHRG MGMT >30: CPT | Performed by: INTERNAL MEDICINE

## 2018-11-16 PROCEDURE — 05H833Z INSERTION OF INFUSION DEVICE INTO LEFT AXILLARY VEIN, PERCUTANEOUS APPROACH: ICD-10-PCS | Performed by: INTERNAL MEDICINE

## 2018-11-16 PROCEDURE — 82306 VITAMIN D 25 HYDROXY: CPT

## 2018-11-16 PROCEDURE — 36568 INSJ PICC <5 YR W/O IMAGING: CPT

## 2018-11-16 PROCEDURE — 6370000000 HC RX 637 (ALT 250 FOR IP): Performed by: INTERNAL MEDICINE

## 2018-11-16 PROCEDURE — C1751 CATH, INF, PER/CENT/MIDLINE: HCPCS

## 2018-11-16 PROCEDURE — 36415 COLL VENOUS BLD VENIPUNCTURE: CPT

## 2018-11-16 PROCEDURE — 6360000002 HC RX W HCPCS: Performed by: INTERNAL MEDICINE

## 2018-11-16 PROCEDURE — 85027 COMPLETE CBC AUTOMATED: CPT

## 2018-11-16 RX ORDER — LISINOPRIL 10 MG/1
10 TABLET ORAL DAILY
Qty: 30 TABLET | Refills: 3 | Status: ON HOLD | DISCHARGE
Start: 2018-11-17 | End: 2019-06-20 | Stop reason: HOSPADM

## 2018-11-16 RX ORDER — ERGOCALCIFEROL (VITAMIN D2) 1250 MCG
50000 CAPSULE ORAL WEEKLY
Qty: 30 CAPSULE | Status: ON HOLD | DISCHARGE
Start: 2018-11-23 | End: 2020-12-28 | Stop reason: ALTCHOICE

## 2018-11-16 RX ORDER — ERGOCALCIFEROL 1.25 MG/1
50000 CAPSULE ORAL WEEKLY
Status: DISCONTINUED | OUTPATIENT
Start: 2018-11-16 | End: 2018-11-16 | Stop reason: HOSPADM

## 2018-11-16 RX ORDER — OYSTER SHELL CALCIUM WITH VITAMIN D 500; 200 MG/1; [IU]/1
1 TABLET, FILM COATED ORAL 2 TIMES DAILY
Qty: 30 TABLET | Refills: 3 | DISCHARGE
Start: 2018-11-16

## 2018-11-16 RX ORDER — LIDOCAINE HYDROCHLORIDE 10 MG/ML
5 INJECTION, SOLUTION EPIDURAL; INFILTRATION; INTRACAUDAL; PERINEURAL ONCE
Status: DISCONTINUED | OUTPATIENT
Start: 2018-11-16 | End: 2018-11-16 | Stop reason: HOSPADM

## 2018-11-16 RX ORDER — GINSENG 100 MG
CAPSULE ORAL
Refills: 3 | DISCHARGE
Start: 2018-11-16 | End: 2018-11-26

## 2018-11-16 RX ORDER — SODIUM CHLORIDE 0.9 % (FLUSH) 0.9 %
10 SYRINGE (ML) INJECTION PRN
Status: DISCONTINUED | OUTPATIENT
Start: 2018-11-16 | End: 2018-11-16 | Stop reason: HOSPADM

## 2018-11-16 RX ORDER — SODIUM CHLORIDE 0.9 % (FLUSH) 0.9 %
10 SYRINGE (ML) INJECTION EVERY 12 HOURS SCHEDULED
Status: DISCONTINUED | OUTPATIENT
Start: 2018-11-16 | End: 2018-11-16 | Stop reason: HOSPADM

## 2018-11-16 RX ORDER — POTASSIUM CHLORIDE 20 MEQ/1
40 TABLET, EXTENDED RELEASE ORAL 2 TIMES DAILY WITH MEALS
Status: DISCONTINUED | OUTPATIENT
Start: 2018-11-16 | End: 2018-11-16 | Stop reason: HOSPADM

## 2018-11-16 RX ORDER — OYSTER SHELL CALCIUM WITH VITAMIN D 500; 200 MG/1; [IU]/1
1 TABLET, FILM COATED ORAL 2 TIMES DAILY
Status: DISCONTINUED | OUTPATIENT
Start: 2018-11-16 | End: 2018-11-16 | Stop reason: HOSPADM

## 2018-11-16 RX ADMIN — FAMOTIDINE 20 MG: 20 TABLET ORAL at 10:22

## 2018-11-16 RX ADMIN — VITAMIN E CAP 400 UNIT 400 UNITS: 400 CAP at 10:06

## 2018-11-16 RX ADMIN — Medication 500000 UNITS: at 10:06

## 2018-11-16 RX ADMIN — Medication 500000 UNITS: at 13:36

## 2018-11-16 RX ADMIN — BACLOFEN 20 MG: 10 TABLET ORAL at 10:07

## 2018-11-16 RX ADMIN — Medication 20000 UNITS: at 10:06

## 2018-11-16 RX ADMIN — POTASSIUM CHLORIDE 40 MEQ: 20 TABLET, EXTENDED RELEASE ORAL at 10:21

## 2018-11-16 RX ADMIN — BACITRACIN: 500 OINTMENT TOPICAL at 13:36

## 2018-11-16 RX ADMIN — TIZANIDINE 2 MG: 4 TABLET ORAL at 13:36

## 2018-11-16 RX ADMIN — CALCIUM CARBONATE-VITAMIN D TAB 500 MG-200 UNIT 1 TABLET: 500-200 TAB at 10:07

## 2018-11-16 RX ADMIN — BACLOFEN 20 MG: 10 TABLET ORAL at 13:35

## 2018-11-16 RX ADMIN — Medication 10 ML: at 10:11

## 2018-11-16 RX ADMIN — ERGOCALCIFEROL 50000 UNITS: 1.25 CAPSULE ORAL at 10:06

## 2018-11-16 RX ADMIN — Medication 10 ML: at 10:08

## 2018-11-16 RX ADMIN — BACITRACIN: 500 OINTMENT TOPICAL at 10:08

## 2018-11-16 RX ADMIN — THERA TABS 1 TABLET: TAB at 10:08

## 2018-11-16 RX ADMIN — TIZANIDINE 2 MG: 4 TABLET ORAL at 10:09

## 2018-11-16 RX ADMIN — LISINOPRIL 10 MG: 10 TABLET ORAL at 10:07

## 2018-11-16 RX ADMIN — FLUOXETINE 10 MG: 10 CAPSULE ORAL at 10:07

## 2018-11-16 RX ADMIN — Medication 500 MG: at 10:06

## 2018-11-16 RX ADMIN — Medication 1 CAPSULE: at 10:07

## 2018-11-16 RX ADMIN — OXYBUTYNIN CHLORIDE 10 MG: 10 TABLET, FILM COATED, EXTENDED RELEASE ORAL at 10:08

## 2018-11-16 RX ADMIN — MEROPENEM 1 G: 1 INJECTION, POWDER, FOR SOLUTION INTRAVENOUS at 10:23

## 2018-11-16 RX ADMIN — Medication 10 ML: at 10:10

## 2018-11-16 RX ADMIN — MEROPENEM 1 G: 1 INJECTION, POWDER, FOR SOLUTION INTRAVENOUS at 03:59

## 2018-11-16 RX ADMIN — DOCUSATE SODIUM 100 MG: 100 CAPSULE, LIQUID FILLED ORAL at 10:22

## 2018-11-16 ASSESSMENT — PAIN SCALES - GENERAL
PAINLEVEL_OUTOF10: 0
PAINLEVEL_OUTOF10: 0

## 2018-11-16 NOTE — PROGRESS NOTES
pseudomonas, and  enterococcus. 7.  Malnutrition. 8.  Multiple sclerosis. 9.  Mildly elevated liver enzymes. 10. Left leg ulcer   11. uro - lithiasis   12. Bacteremia with providentia    Patient Active Problem List:     Neurogenic bladder     Multiple sclerosis ([address]     MS (multiple sclerosis) ([address]     Urinary retention     Indwelling catheter present on admission     Cystostomy malfunction ([address]     Decubitus ulcer of coccyx     Decubitus ulcer, stage 3 ([address]     Malnutrition ([address]     Decubitus ulcer of right perineal ischial region, stage 3 (HCC)     Multiple sclerosis ([address]     Pulmonary embolism on left (HCC)     Metabolic encephalopathy     Speech disturbance     Infected decubitus ulcer, stage I     Infected decubitus ulcer, stage III (HCC)     Wound infection     Elevated INR     Chronic osteomyelitis, pelvis, right (HCC)     Osteomyelitis (HCC)     Urinary tract infection     Sepsis (HCC)     Dehydration     Abnormal liver function test     Chronic depression     Essential hypertension     History of pulmonary embolism     Other dysphagia     Pressure injury of skin of back      Plan:  Continue current antibiotic with Merrem changed to cefepime 2 gram q 12 for 2 weeks   With final stop date to be decided after his bladder surgery on 11/26   See wound care order to the nursing home    follow urology plans  For baldder stones  - infection increases stone formation and vice versa    D/w on call urologist     D/w rn    Labs, cultures, and radiographs reviewed. Changes made as necessary. D/w Patient's R.N. and specific instructions given and infectious disease issues addressed. Will follow the patient closely with you. Also please see the orders for updated patient care orders written by ID team.    Thank you for involving us in this patient's care. I will be discussing this case with the treating physicians. Please don't hesitate to call me if any questions, issues  or concerns      Please see orders for

## 2018-11-16 NOTE — PLAN OF CARE
Problem: Skin Integrity:  Goal: Will show no infection signs and symptoms  Will show no infection signs and symptoms   Outcome: Ongoing  No fever, merrem and bacitracin   Goal: Absence of new skin breakdown  Absence of new skin breakdown   Outcome: Ongoing  Turn every 2h, support with pillows, wound and ostomy bed     Problem: Discharge Planning:  Goal: Participates in care planning  Participates in care planning   Outcome: Ongoing  Return to [facility]    Problem: Cardiac Output - Decreased:  Goal: Hemodynamic stability will improve  Hemodynamic stability will improve   Outcome: Ongoing  VSS    Problem: Fluid Volume - Imbalance:  Goal: Absence of imbalanced fluid volume signs and symptoms  Absence of imbalanced fluid volume signs and symptoms   Outcome: Ongoing  Continue to monitor I/O's    Problem: Gas Exchange - Impaired:  Goal: Levels of oxygenation will improve  Levels of oxygenation will improve   Outcome: Ongoing  On room air, lungs sounds clear throughout    Problem: Nutrition Deficit:  Goal: Ability to achieve adequate nutritional intake will improve  Ability to achieve adequate nutritional intake will improve   Advanced to general diet, tolerating well. Problem: Pain:  Goal: Pain level will decrease  Pain level will decrease    Outcome: Ongoing  Denies. Will continue to monitor    Problem: Falls - Risk of:  Goal: Will remain free from falls  Will remain free from falls   No falls noted this shift. Continue falling star program. Bed alarm on, bed in low position. Call light and personal belongings in reach. Patient uses call light appropriately. Problem: Pain:  Goal: Pain level will decrease  Pain level will decrease    Outcome: Ongoing  Denies. Will continue to monitor    Comments: Care plan reviewed with patient. Patient verbalizes understanding of the plan of care and contribute to goal setting.
Problem: Skin Integrity:  Goal: Will show no infection signs and symptoms  Will show no infection signs and symptoms   Outcome: Ongoing  Pt getting IV antibiotics, will continue to assess for improvements. Goal: Absence of new skin breakdown  Absence of new skin breakdown   Outcome: Ongoing  Pt has open sore on coccyx, placing rolled gauze between crack. Will continue to turn every two hours and PRN. Problem: Discharge Planning:  Goal: Participates in care planning  Participates in care planning   Outcome: Ongoing  Pt is active in plan of care, continue to update as much as possible with pt having confusion. Goal: Discharged to appropriate level of care  Discharged to appropriate level of care   Outcome: Ongoing  Pt plans to return to [facility] upon discharge. Problem: Cardiac Output - Decreased:  Goal: Hemodynamic stability will improve  Hemodynamic stability will improve   Outcome: Ongoing  Pt remains hemodynamically stable at this time, continue to assess. Refer to flowsheet. Problem: Gas Exchange - Impaired:  Goal: Levels of oxygenation will improve  Levels of oxygenation will improve   Outcome: Ongoing  Pt remains adequately on room air, will continue to assess need for supplementation. Problem: Nutrition Deficit:  Goal: Ability to achieve adequate nutritional intake will improve  Ability to achieve adequate nutritional intake will improve   Outcome: Ongoing  Nutritional intake appears adequate, will continue to assess intake and output. Problem: Pain:  Goal: Pain level will decrease  Pain level will decrease   Outcome: Ongoing  Pt denies pain at this time, will continue to assess using 0-10 pain scale. Problem: Falls - Risk of:  Goal: Will remain free from falls  Will remain free from falls   Outcome: Ongoing  Pt remains free from falls at this time, call light and bedside table within reach. Bed in lowest position and alarm on, nonskid socks on bilaterally.      Comments: Care plan
Problem: Skin Integrity:  Goal: Will show no infection signs and symptoms  Will show no infection signs and symptoms   Outcome: Ongoing  Turning every 2 hours and prn, rolled AMD gauze to right ischium     Problem: Discharge Planning:  Goal: Participates in care planning  Participates in care planning   Outcome: Ongoing  Return to [facility] when medically stable     Problem: Cardiac Output - Decreased:  Goal: Hemodynamic stability will improve  Hemodynamic stability will improve   Outcome: Ongoing  VSS    Problem: Fluid Volume - Imbalance:  Goal: Absence of imbalanced fluid volume signs and symptoms  Absence of imbalanced fluid volume signs and symptoms   Outcome: Ongoing  Monitoring intake and output, jeffers in place     Problem: Gas Exchange - Impaired:  Goal: Levels of oxygenation will improve  Levels of oxygenation will improve   Outcome: Ongoing  On room air, lungs clear     Problem: Nutrition Deficit:  Goal: Ability to achieve adequate nutritional intake will improve  Ability to achieve adequate nutritional intake will improve   Outcome: Ongoing  Tolerating dysphagia 3 diet, thin liquids     Problem: Pain:  Goal: Pain level will decrease  Pain level will decrease   Outcome: Ongoing  Denies so far, will continue to assess. Percocet given per MAR    Problem: Falls - Risk of:  Goal: Will remain free from falls  Will remain free from falls   Outcome: Ongoing  No falls noted this shift. Continue falling star program. Bed alarm on, bed in low position. Call light and personal belongings in reach. Patient uses call light appropriately. Comments: Care plan reviewed with patient. Patient verbalize understanding of the plan of care and contribute to goal setting.
in reach. Bed in lowest position. Side rails up x2. Bed alarm set. Patient visually checked on hourly rounds. Problem: Pain:  Goal: Pain level will decrease  Pain level will decrease    Outcome: Ongoing  Pt complains of back pain this shift. PRN tylenol on MAR. Also repositioned. Comments: Care plan reviewed with patient. Patient verbalizes understanding of the plan of care and contribute to goal setting.
performed. Comments: Care plan reviewed with patient. The patient contributes and verbalizes understanding with plan of care.
setting.

## 2018-11-16 NOTE — DISCHARGE SUMMARY
Hospital Medicine Discharge Summary      Patient Identification:   Antonina Edmonds   : 1955  MRN: [id number]   Account: [de-identified]      Patient's PCP: Elle Farrar MD    Admit Date: 2018     Discharge Date:   2018     Admitting Physician: Mike Marie MD     Discharge Physician: Tati Garvin MD     Discharge Diagnoses:    Sepsis secondary to indwelling catheter associated urinary tract infection, Providencia Staurtii  Multiple bladder calculi  Hypokalemia  Vitamin D deficiency  Metabolic/septic encephalopathy  Essential hypertension  Decubitus ulcer on right ischial region stage IV healing, status post diverting colostomy  Neurogenic bladder secondary to multiple sclerosis, suprapubic [place]  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 Hospital Course:   Antonina Edmonds is a 66 y.o. male admitted to [facility] on 2018 for Altered mental status and fever 101.4  Patient was transferred from nursing home for further evaluation as the patient was having change in mental status and was confused and was having fever. Documented to have fever of 101.4 and was little tachycardic with heart rate of 1028 and blood pressure was around 102/65. Patient was suspected to have sepsis secondary to urinary tract infection and was empirically started on broad-spectrum antibiotics vancomycin and meropenem along with aggressive hydration and infectious disease was also consulted. Patient has a chronic decubitus ulcer in the right ischial area with osteomyelitis, stage IV in the past, currently healing.   Blood cultures and urine cultures were positive for Providencia Staurti and urine cultures were

## 2018-11-16 NOTE — PROGRESS NOTES
Midline insertion Procedure Note    Sabrina Leo   Admitted- 11/13/2018  8:55 AM  Admission diagnosis- Urinary tract infection [N39.0]      Attending Physician- Kristina Grimes MD  Ordering Physician-same  Indication for Insertion: Antibiotic Therapy    Catheter Insertion Date- 11/16/2018   Catheter Brand-BioFlo Midline   Lot Number- [id number]  Gauge-5  Lumen-dual    Insertion Site- VINICIUS Basilic  Vein Diameter- 3 mm  Catheter Length- 14 cm  Internal Length- 14 cm  Exposed Catheter Length- 0cm   Midline Tip Terminates in the Axillary- Yes  Upper Arm Circumference- 30cm  Easy insertion- Yes  Able to Aspirate blood- Yes  Easy Flush- Yes    Midline insertion successful- Yes  Ultrasound- yes    Okay To Use Midline- Yes    Electronically signed by Ramsey Maloney RN on 11/16/2018 at 12:05 PM

## 2018-11-16 NOTE — CARE COORDINATION
11/16/18, 11:24 AM    Discharge plan discussed by  and . Discharge plan reviewed with patient/ family. Patient/ family verbalize understanding of discharge plan and are in agreement with plan. Understanding was demonstrated using the teach back method. Patient is anticipated discharge today or over the weekend returning to [facility]. CECIL updated [facility] and informed her of possible q8 merrem at discharge and Cheryl Ortiz stated that would be okay. Blue envelope on chart along with transport forms.

## 2018-11-18 LAB — BLOOD CULTURE, ROUTINE: NORMAL

## 2018-11-20 ENCOUNTER — TELEPHONE (OUTPATIENT)
Dept: UROLOGY | Age: 63
End: 2018-11-20

## 2018-11-20 LAB
BLOOD CULTURE, ROUTINE: NORMAL
BLOOD CULTURE, ROUTINE: NORMAL

## 2018-11-21 ENCOUNTER — ANESTHESIA (OUTPATIENT)
Dept: OPERATING ROOM | Age: 63
End: 2018-11-21
Payer: COMMERCIAL

## 2018-11-21 ENCOUNTER — ANESTHESIA EVENT (OUTPATIENT)
Dept: OPERATING ROOM | Age: 63
End: 2018-11-21
Payer: COMMERCIAL

## 2018-11-21 ENCOUNTER — HOSPITAL ENCOUNTER (OUTPATIENT)
Age: 63
Setting detail: OUTPATIENT SURGERY
Discharge: NURSING, REHAB OR LONG-TERM CARE | End: 2018-11-21
Attending: UROLOGY | Admitting: UROLOGY
Payer: COMMERCIAL

## 2018-11-21 VITALS
BODY MASS INDEX: 32.18 KG/M2 | RESPIRATION RATE: 14 BRPM | HEART RATE: 101 BPM | DIASTOLIC BLOOD PRESSURE: 81 MMHG | WEIGHT: 205 LBS | HEIGHT: 67 IN | OXYGEN SATURATION: 97 % | TEMPERATURE: 97.8 F | SYSTOLIC BLOOD PRESSURE: 135 MMHG

## 2018-11-21 DIAGNOSIS — N31.9 NEUROGENIC BLADDER: Primary | ICD-10-CM

## 2018-11-21 PROCEDURE — 2580000003 HC RX 258: Performed by: UROLOGY

## 2018-11-21 RX ORDER — LABETALOL HYDROCHLORIDE 5 MG/ML
5 INJECTION, SOLUTION INTRAVENOUS EVERY 10 MIN PRN
Status: DISCONTINUED | OUTPATIENT
Start: 2018-11-21 | End: 2018-11-21 | Stop reason: HOSPADM

## 2018-11-21 RX ORDER — ONDANSETRON 2 MG/ML
4 INJECTION INTRAMUSCULAR; INTRAVENOUS
Status: DISCONTINUED | OUTPATIENT
Start: 2018-11-21 | End: 2018-11-21

## 2018-11-21 RX ORDER — MORPHINE SULFATE 2 MG/ML
2 INJECTION, SOLUTION INTRAMUSCULAR; INTRAVENOUS
Status: DISCONTINUED | OUTPATIENT
Start: 2018-11-21 | End: 2018-11-21 | Stop reason: HOSPADM

## 2018-11-21 RX ORDER — PROMETHAZINE HYDROCHLORIDE 25 MG/ML
6.25 INJECTION, SOLUTION INTRAMUSCULAR; INTRAVENOUS
Status: DISCONTINUED | OUTPATIENT
Start: 2018-11-21 | End: 2018-11-21 | Stop reason: HOSPADM

## 2018-11-21 RX ORDER — FENTANYL CITRATE 50 UG/ML
25 INJECTION, SOLUTION INTRAMUSCULAR; INTRAVENOUS EVERY 5 MIN PRN
Status: DISCONTINUED | OUTPATIENT
Start: 2018-11-21 | End: 2018-11-21 | Stop reason: HOSPADM

## 2018-11-21 RX ORDER — ONDANSETRON 2 MG/ML
4 INJECTION INTRAMUSCULAR; INTRAVENOUS EVERY 4 HOURS PRN
Status: DISCONTINUED | OUTPATIENT
Start: 2018-11-21 | End: 2018-11-21 | Stop reason: HOSPADM

## 2018-11-21 RX ORDER — FENTANYL CITRATE 50 UG/ML
50 INJECTION, SOLUTION INTRAMUSCULAR; INTRAVENOUS EVERY 5 MIN PRN
Status: DISCONTINUED | OUTPATIENT
Start: 2018-11-21 | End: 2018-11-21 | Stop reason: HOSPADM

## 2018-11-21 RX ORDER — SODIUM CHLORIDE 9 MG/ML
INJECTION, SOLUTION INTRAVENOUS CONTINUOUS
Status: DISCONTINUED | OUTPATIENT
Start: 2018-11-21 | End: 2018-11-21 | Stop reason: HOSPADM

## 2018-11-21 RX ORDER — CEPHALEXIN 500 MG/1
500 CAPSULE ORAL 4 TIMES DAILY
Qty: 12 CAPSULE | Refills: 0 | Status: SHIPPED | OUTPATIENT
Start: 2018-11-21 | End: 2018-11-24

## 2018-11-21 RX ORDER — HYDRALAZINE HYDROCHLORIDE 20 MG/ML
5 INJECTION INTRAMUSCULAR; INTRAVENOUS EVERY 10 MIN PRN
Status: DISCONTINUED | OUTPATIENT
Start: 2018-11-21 | End: 2018-11-21 | Stop reason: HOSPADM

## 2018-11-21 RX ORDER — KETOROLAC TROMETHAMINE 30 MG/ML
15 INJECTION, SOLUTION INTRAMUSCULAR; INTRAVENOUS EVERY 6 HOURS PRN
Status: DISCONTINUED | OUTPATIENT
Start: 2018-11-21 | End: 2018-11-21 | Stop reason: HOSPADM

## 2018-11-21 RX ORDER — HYDROCODONE BITARTRATE AND ACETAMINOPHEN 5; 325 MG/1; MG/1
2 TABLET ORAL EVERY 4 HOURS PRN
Status: DISCONTINUED | OUTPATIENT
Start: 2018-11-21 | End: 2018-11-21 | Stop reason: HOSPADM

## 2018-11-21 RX ORDER — HYDROCODONE BITARTRATE AND ACETAMINOPHEN 5; 325 MG/1; MG/1
1 TABLET ORAL EVERY 4 HOURS PRN
Qty: 10 TABLET | Refills: 0 | Status: SHIPPED | OUTPATIENT
Start: 2018-11-21 | End: 2018-11-24

## 2018-11-21 RX ORDER — MORPHINE SULFATE 2 MG/ML
4 INJECTION, SOLUTION INTRAMUSCULAR; INTRAVENOUS
Status: DISCONTINUED | OUTPATIENT
Start: 2018-11-21 | End: 2018-11-21 | Stop reason: HOSPADM

## 2018-11-21 RX ORDER — SODIUM CHLORIDE 0.9 % (FLUSH) 0.9 %
10 SYRINGE (ML) INJECTION 2 TIMES DAILY
Status: DISCONTINUED | OUTPATIENT
Start: 2018-11-21 | End: 2018-11-21 | Stop reason: HOSPADM

## 2018-11-21 RX ORDER — HYDROCODONE BITARTRATE AND ACETAMINOPHEN 5; 325 MG/1; MG/1
1 TABLET ORAL EVERY 4 HOURS PRN
Status: DISCONTINUED | OUTPATIENT
Start: 2018-11-21 | End: 2018-11-21 | Stop reason: HOSPADM

## 2018-11-21 RX ORDER — BACLOFEN 20 MG/1
20 TABLET ORAL 3 TIMES DAILY
COMMUNITY
End: 2019-01-02

## 2018-11-21 RX ORDER — ATROPA BELLADONNA AND OPIUM 16.2; 3 MG/1; MG/1
30 SUPPOSITORY RECTAL EVERY 8 HOURS PRN
Status: DISCONTINUED | OUTPATIENT
Start: 2018-11-21 | End: 2018-11-21 | Stop reason: HOSPADM

## 2018-11-21 RX ADMIN — SODIUM CHLORIDE: 9 INJECTION, SOLUTION INTRAVENOUS at 16:18

## 2018-11-21 ASSESSMENT — PAIN SCALES - GENERAL: PAINLEVEL_OUTOF10: 0

## 2018-11-21 NOTE — ANESTHESIA PRE PROCEDURE
oxybutynin (DITROPAN-XL) 10 MG extended release tablet Take 5 mg by mouth 2 times daily For bladder spasms    Historical Provider, MD   tiZANidine (ZANAFLEX) 2 MG tablet Take 2 mg by mouth 3 times daily  12/15/16   Historical Provider, MD   ascorbic acid (VITAMIN C) 500 MG tablet Take 1 tablet by mouth daily 3/28/16   Addie Wen MD   Multiple Vitamin (MULTIVITAMIN) tablet Take 1 tablet by mouth daily 3/28/16   Addie Wen MD   lactobacillus (CULTURELLE) capsule Take 1 capsule by mouth 2 times daily (with meals) 3/28/16   Addie Wen MD   vitamin A 47357 UNITS capsule Take 2 capsules by mouth daily 3/28/16   Addie Wen MD   vitamin E 400 UNIT capsule Take 1 capsule by mouth daily 3/28/16   Addie Wen MD       Current medications:    No current outpatient prescriptions on file. No current facility-administered medications for this visit. Allergies:  No Known Allergies    Problem List:    Patient Active Problem List   Diagnosis Code    Neurogenic bladder N31.9    Multiple sclerosis ([address] G35    MS (multiple sclerosis) ([address] G35    Urinary retention R33.9    Indwelling catheter present on admission Z96.0    Cystostomy malfunction ([address] U19.030    Decubitus ulcer of coccyx L89.159    Decubitus ulcer, stage 3 ([address] L89.93    Malnutrition ([address] E46    Decubitus ulcer of right perineal ischial region, stage 3 ([address] L89.313    Multiple sclerosis ([address] G35    Pulmonary embolism on left ([address] F11.23    Metabolic encephalopathy T33.06    Speech disturbance R47.9    Infected decubitus ulcer, stage I L89.91, L08.9    Infected decubitus ulcer, stage III (HCC) L89.93, L08.9    Wound infection T14. 8XXA, L08.9    Elevated INR R79.1    Chronic osteomyelitis, pelvis, right (HCC) M86.651    Osteomyelitis (HCC) M86.9    Urinary tract infection N39.0    Sepsis (HCC) A41.9    Dehydration E86.0    Abnormal liver function test R94.5    Chronic depression F32.9    Essential hypertension I10    History of pulmonary embolism Z86.711    Other dysphagia R13.19    Pressure injury of skin of back L89.109    Sepsis due to gram-negative UTI ([facility]) A41.50, N39.0    Bladder calculi N21.0       Past Medical History:        Diagnosis Date    Dysphagia     oropharyngeal    History of pulmonary embolism     History of urinary tract infection     Hypertension     Major depressive disorder, single episode     [facility]     Neurogenic bladder feb. 2012    Dr. Irving Chroman placed cather    Osteomyelitis [facility])     UTI (urinary tract infection)        Past Surgical History:        Procedure Laterality Date    ANKLE SURGERY  1996    broken ankle    BLADDER SURGERY  2-    Suprapubic catheter placement    COLONOSCOPY      PA LAP,SURG,COLECTOMY,W/END COLOST & CLOSUR N/A 6/13/2018    ROBOT DIVERTING COLOSTOMY performed by Angel Luis Mistry MD at [address]       Social History:    Social History   Substance Use Topics    Smoking status: Former Smoker     Quit date: 1/1/1982    Smokeless tobacco: Former User    Alcohol use No      Comment: \"quit alcohol a few years ago\"                                Counseling given: Not Answered      Vital Signs (Current): There were no vitals filed for this visit.                                            BP Readings from Last 3 Encounters:   11/21/18 135/81   11/16/18 128/68   09/05/18 111/78       NPO Status:                                                                                 BMI:   Wt Readings from Last 3 Encounters:   11/16/18 215 lb 4.8 oz (97.7 kg)   09/05/18 230 lb (104.3 kg)   06/10/18 223 lb (101.2 kg)     There is no height or weight on file to calculate BMI.    CBC:   Lab Results   Component Value Date    WBC 8.1 11/16/2018    RBC 4.20 11/16/2018    RBC 4.20 01/20/2012    HGB 11.7 11/16/2018    HCT 36.4 11/16/2018    MCV 86.7 11/16/2018    RDW 15.8 06/14/2018  11/16/2018       CMP:   Lab Results   Component Value Date     11/16/2018    K 3.5 11/16/2018    K 3.0 11/14/2018     11/16/2018    CO2 25 11/16/2018    BUN 12 11/16/2018    CREATININE 0.3 11/16/2018    LABGLOM >90 11/16/2018    GLUCOSE 119 11/16/2018    GLUCOSE 85 01/20/2012    PROT 7.0 11/15/2018    CALCIUM 8.7 11/16/2018    BILITOT 0.5 11/15/2018    ALKPHOS 243 11/15/2018    AST 39 11/15/2018    ALT 58 11/15/2018       POC Tests: No results for input(s): POCGLU, POCNA, POCK, POCCL, POCBUN, POCHEMO, POCHCT in the last 72 hours. Coags:   Lab Results   Component Value Date    INR 2.41 11/13/2018    APTT 40.8 11/13/2018       HCG (If Applicable): No results found for: PREGTESTUR, PREGSERUM, HCG, HCGQUANT     ABGs: No results found for: PHART, PO2ART, GSV3NEQ, NOJ1NZV, BEART, K8LOJUNN     Type & Screen (If Applicable):  Lab Results   Component Value Date    [facility] POS 06/12/2018       Anesthesia Evaluation  Patient summary reviewed and Nursing notes reviewed no history of anesthetic complications:   Airway: Mallampati: III  TM distance: >3 FB   Neck ROM: limited  Mouth opening: > = 3 FB Dental:          Pulmonary:   (+) decreased breath sounds,            Patient did not smoke on day of surgery. ROS comment: h/o PE   Cardiovascular:  Exercise tolerance: poor (<4 METS),   (+) hypertension:,       ECG reviewed                        Neuro/Psych:   (+) neuromuscular disease: multiple sclerosis, psychiatric history:             ROS comment: Metabolic encephalopathy    Pt has been confined to a power wheelchair since 2012 secondary to the MS. Pt denies any respiratory complications. He is able to move from the bed to his wheelchair but he is not able to ambulate at all. GI/Hepatic/Renal:             Endo/Other:              Pt had no PAT visit        ROS comment: Neurogenic bladder  Decubitus ulcer Abdominal:   (+) obese,     Abdomen: soft.     Vascular:

## 2018-11-21 NOTE — PROGRESS NOTES
Admit to sds. Fall risk band applied to the patient. No one with the patient. Dr Larsen states to use the Mid line in the right arm.

## 2018-11-29 NOTE — OP NOTE
Surgery not performed. Patient with CHF exacerbation, admitted through ED to medicine service. Patient not seen by bruce.

## 2018-12-11 ENCOUNTER — TELEPHONE (OUTPATIENT)
Dept: UROLOGY | Age: 63
End: 2018-12-11

## 2018-12-11 NOTE — TELEPHONE ENCOUNTER
DO NOT TAKE ASPIRIN, PLAVIX, FISH OIL, VITAMIN A, VITAMIN E, MULTIVITAMIN, COUMADIN, OR MOTRIN-LIKE DRUGS 5 DAYS PRIOR TO SURGERY AND 3 DAYS FOLLOWING  HOLD XARELTO 3-DAYS PRIOR     Bita Swift 1955 Diagnosis: Bladder Stone    Surgical Physician: Dr. Bing Butler have been scheduled for the procedure marked below:      Surgery: Cystolitholapaxy possible dorsal slit foreskin           Date: 01-     Anesthesia: Anesthesiologist (General/Spinal)     Place of Service: [facility] [place]         Please be at the Outpatient Department Second Floor Same Day Surgery by:  2:30pm        INSTRUCTIONS AS MARKED BELOW:    1. If you are having (General/Spinal) Anesthesia:  DO NOT eat or drink anything after midnight before surgery. 2.  Please ensure to bring a  with you the day of the surgery to transport you home. 3.  Please bring a current medication list, photo ID and insurance card(s) with you  4. Okay to take Tylenol  5. PLEASE BRING THIS LETTER WITH YOU AND SHOW IT TO THE  AT [place] [id number]. 6.  may assist with your surgery  7. Does patient have a Pace Maker? No  8. Please send a copy to the Family Dr: Crow Razo MD  9. Take blood pressure medication as directed, if taken in the morning take with a sip of water.     Date: 12/11/2018

## 2018-12-13 PROBLEM — N39.0 URINARY TRACT INFECTION: Status: RESOLVED | Noted: 2018-11-13 | Resolved: 2018-12-13

## 2019-01-02 RX ORDER — GINSENG 100 MG
CAPSULE ORAL DAILY
Status: ON HOLD | COMMUNITY
End: 2020-12-28 | Stop reason: ALTCHOICE

## 2019-01-02 RX ORDER — IBUPROFEN 200 MG
TABLET ORAL NIGHTLY
COMMUNITY
End: 2019-06-10

## 2019-01-03 ENCOUNTER — TELEPHONE (OUTPATIENT)
Dept: UROLOGY | Age: 64
End: 2019-01-03

## 2019-01-04 LAB
ALBUMIN SERPL-MCNC: NORMAL G/DL
ALP BLD-CCNC: NORMAL U/L
ALT SERPL-CCNC: NORMAL U/L
ANION GAP SERPL CALCULATED.3IONS-SCNC: NORMAL MMOL/L
AST SERPL-CCNC: NORMAL U/L
BASOPHILS ABSOLUTE: 0.3 /ΜL
BASOPHILS RELATIVE PERCENT: 1 %
BILIRUB SERPL-MCNC: NORMAL MG/DL (ref 0.1–1.4)
BUN BLDV-MCNC: 14 MG/DL
CALCIUM SERPL-MCNC: 8.6 MG/DL
CHLORIDE BLD-SCNC: 100 MMOL/L
CO2: 26 MMOL/L
CREAT SERPL-MCNC: 0.4 MG/DL
EOSINOPHILS ABSOLUTE: 0.3 /ΜL
EOSINOPHILS RELATIVE PERCENT: 5.8 %
GFR CALCULATED: 265
GLUCOSE BLD-MCNC: 121 MG/DL
HCT VFR BLD CALC: 37.9 % (ref 41–53)
HEMOGLOBIN: 12.6 G/DL (ref 13.5–17.5)
LYMPHOCYTES ABSOLUTE: 1.4 /ΜL
LYMPHOCYTES RELATIVE PERCENT: 26.6 %
MCH RBC QN AUTO: 28.7 PG
MCHC RBC AUTO-ENTMCNC: 33.3 G/DL
MCV RBC AUTO: 86.2 FL
MONOCYTES ABSOLUTE: 0.4 /ΜL
MONOCYTES RELATIVE PERCENT: 8.3 %
NEUTROPHILS ABSOLUTE: 3.1 /ΜL
NEUTROPHILS RELATIVE PERCENT: 58.3 %
PDW BLD-RTO: 17.8 %
PLATELET # BLD: 288 K/ΜL
PMV BLD AUTO: 6.7 FL
POTASSIUM SERPL-SCNC: 4 MMOL/L
RBC # BLD: 4.4 10^6/ΜL
SODIUM BLD-SCNC: 136 MMOL/L
TOTAL PROTEIN: NORMAL
WBC # BLD: 5.3 10^3/ML

## 2019-01-07 ENCOUNTER — TELEPHONE (OUTPATIENT)
Dept: UROLOGY | Age: 64
End: 2019-01-07

## 2019-01-09 ENCOUNTER — ANESTHESIA EVENT (OUTPATIENT)
Dept: OPERATING ROOM | Age: 64
End: 2019-01-09
Payer: MEDICAID

## 2019-01-09 ENCOUNTER — ANESTHESIA (OUTPATIENT)
Dept: OPERATING ROOM | Age: 64
End: 2019-01-09
Payer: MEDICAID

## 2019-01-09 ENCOUNTER — HOSPITAL ENCOUNTER (OUTPATIENT)
Age: 64
Setting detail: OUTPATIENT SURGERY
Discharge: NURSING, REHAB OR LONG-TERM CARE | End: 2019-01-09
Attending: UROLOGY | Admitting: UROLOGY
Payer: MEDICAID

## 2019-01-09 VITALS
SYSTOLIC BLOOD PRESSURE: 146 MMHG | OXYGEN SATURATION: 95 % | DIASTOLIC BLOOD PRESSURE: 65 MMHG | HEART RATE: 100 BPM | WEIGHT: 206.4 LBS | RESPIRATION RATE: 18 BRPM | TEMPERATURE: 98.6 F | BODY MASS INDEX: 32.33 KG/M2

## 2019-01-09 VITALS
RESPIRATION RATE: 2 BRPM | DIASTOLIC BLOOD PRESSURE: 83 MMHG | OXYGEN SATURATION: 99 % | SYSTOLIC BLOOD PRESSURE: 151 MMHG

## 2019-01-09 DIAGNOSIS — G89.18 POST-OP PAIN: Primary | ICD-10-CM

## 2019-01-09 LAB — INR BLD: 1.07 (ref 0.85–1.13)

## 2019-01-09 PROCEDURE — 3600000013 HC SURGERY LEVEL 3 ADDTL 15MIN: Performed by: UROLOGY

## 2019-01-09 PROCEDURE — 82365 CALCULUS SPECTROSCOPY: CPT

## 2019-01-09 PROCEDURE — 2709999900 HC NON-CHARGEABLE SUPPLY: Performed by: UROLOGY

## 2019-01-09 PROCEDURE — 2500000003 HC RX 250 WO HCPCS: Performed by: ANESTHESIOLOGY

## 2019-01-09 PROCEDURE — 85610 PROTHROMBIN TIME: CPT

## 2019-01-09 PROCEDURE — 52318 REMOVE BLADDER STONE: CPT | Performed by: UROLOGY

## 2019-01-09 PROCEDURE — 7100000011 HC PHASE II RECOVERY - ADDTL 15 MIN: Performed by: UROLOGY

## 2019-01-09 PROCEDURE — 6360000002 HC RX W HCPCS: Performed by: ANESTHESIOLOGY

## 2019-01-09 PROCEDURE — 3700000000 HC ANESTHESIA ATTENDED CARE: Performed by: UROLOGY

## 2019-01-09 PROCEDURE — 2580000003 HC RX 258: Performed by: ANESTHESIOLOGY

## 2019-01-09 PROCEDURE — 3600000003 HC SURGERY LEVEL 3 BASE: Performed by: UROLOGY

## 2019-01-09 PROCEDURE — 7100000010 HC PHASE II RECOVERY - FIRST 15 MIN: Performed by: UROLOGY

## 2019-01-09 PROCEDURE — 2580000003 HC RX 258

## 2019-01-09 PROCEDURE — 36415 COLL VENOUS BLD VENIPUNCTURE: CPT

## 2019-01-09 PROCEDURE — 3700000001 HC ADD 15 MINUTES (ANESTHESIA): Performed by: UROLOGY

## 2019-01-09 PROCEDURE — 7100000000 HC PACU RECOVERY - FIRST 15 MIN: Performed by: UROLOGY

## 2019-01-09 PROCEDURE — 7100000001 HC PACU RECOVERY - ADDTL 15 MIN: Performed by: UROLOGY

## 2019-01-09 PROCEDURE — 6360000002 HC RX W HCPCS

## 2019-01-09 RX ORDER — ONDANSETRON 2 MG/ML
4 INJECTION INTRAMUSCULAR; INTRAVENOUS
Status: DISCONTINUED | OUTPATIENT
Start: 2019-01-09 | End: 2019-01-09 | Stop reason: HOSPADM

## 2019-01-09 RX ORDER — PROPOFOL 10 MG/ML
INJECTION, EMULSION INTRAVENOUS PRN
Status: DISCONTINUED | OUTPATIENT
Start: 2019-01-09 | End: 2019-01-09 | Stop reason: SDUPTHER

## 2019-01-09 RX ORDER — FENTANYL CITRATE 50 UG/ML
25 INJECTION, SOLUTION INTRAMUSCULAR; INTRAVENOUS EVERY 5 MIN PRN
Status: DISCONTINUED | OUTPATIENT
Start: 2019-01-09 | End: 2019-01-10 | Stop reason: HOSPADM

## 2019-01-09 RX ORDER — CIPROFLOXACIN 500 MG/1
500 TABLET, FILM COATED ORAL 2 TIMES DAILY
Qty: 10 TABLET | Refills: 0 | Status: SHIPPED | OUTPATIENT
Start: 2019-01-09 | End: 2019-01-14

## 2019-01-09 RX ORDER — ROCURONIUM BROMIDE 10 MG/ML
INJECTION, SOLUTION INTRAVENOUS PRN
Status: DISCONTINUED | OUTPATIENT
Start: 2019-01-09 | End: 2019-01-09 | Stop reason: SDUPTHER

## 2019-01-09 RX ORDER — LIDOCAINE HYDROCHLORIDE 20 MG/ML
INJECTION, SOLUTION INFILTRATION; PERINEURAL PRN
Status: DISCONTINUED | OUTPATIENT
Start: 2019-01-09 | End: 2019-01-09 | Stop reason: SDUPTHER

## 2019-01-09 RX ORDER — SODIUM CHLORIDE 9 MG/ML
INJECTION, SOLUTION INTRAVENOUS CONTINUOUS
Status: DISCONTINUED | OUTPATIENT
Start: 2019-01-09 | End: 2019-01-10 | Stop reason: HOSPADM

## 2019-01-09 RX ORDER — GLYCOPYRROLATE 1 MG/5 ML
SYRINGE (ML) INTRAVENOUS PRN
Status: DISCONTINUED | OUTPATIENT
Start: 2019-01-09 | End: 2019-01-09 | Stop reason: SDUPTHER

## 2019-01-09 RX ORDER — NEOSTIGMINE METHYLSULFATE 1 MG/ML
INJECTION, SOLUTION INTRAVENOUS PRN
Status: DISCONTINUED | OUTPATIENT
Start: 2019-01-09 | End: 2019-01-09 | Stop reason: SDUPTHER

## 2019-01-09 RX ORDER — HYDROCODONE BITARTRATE AND ACETAMINOPHEN 5; 325 MG/1; MG/1
1 TABLET ORAL EVERY 4 HOURS PRN
Status: DISCONTINUED | OUTPATIENT
Start: 2019-01-09 | End: 2019-01-10 | Stop reason: HOSPADM

## 2019-01-09 RX ORDER — ONDANSETRON 2 MG/ML
4 INJECTION INTRAMUSCULAR; INTRAVENOUS EVERY 6 HOURS PRN
Status: DISCONTINUED | OUTPATIENT
Start: 2019-01-09 | End: 2019-01-10 | Stop reason: HOSPADM

## 2019-01-09 RX ORDER — HYDRALAZINE HYDROCHLORIDE 20 MG/ML
5 INJECTION INTRAMUSCULAR; INTRAVENOUS EVERY 10 MIN PRN
Status: DISCONTINUED | OUTPATIENT
Start: 2019-01-09 | End: 2019-01-10 | Stop reason: HOSPADM

## 2019-01-09 RX ORDER — SODIUM CHLORIDE 9 MG/ML
INJECTION, SOLUTION INTRAVENOUS CONTINUOUS PRN
Status: DISCONTINUED | OUTPATIENT
Start: 2019-01-09 | End: 2019-01-09 | Stop reason: SDUPTHER

## 2019-01-09 RX ORDER — FENTANYL CITRATE 50 UG/ML
INJECTION, SOLUTION INTRAMUSCULAR; INTRAVENOUS PRN
Status: DISCONTINUED | OUTPATIENT
Start: 2019-01-09 | End: 2019-01-09 | Stop reason: SDUPTHER

## 2019-01-09 RX ORDER — ONDANSETRON 2 MG/ML
INJECTION INTRAMUSCULAR; INTRAVENOUS PRN
Status: DISCONTINUED | OUTPATIENT
Start: 2019-01-09 | End: 2019-01-09 | Stop reason: SDUPTHER

## 2019-01-09 RX ORDER — DEXAMETHASONE SODIUM PHOSPHATE 4 MG/ML
INJECTION, SOLUTION INTRA-ARTICULAR; INTRALESIONAL; INTRAMUSCULAR; INTRAVENOUS; SOFT TISSUE PRN
Status: DISCONTINUED | OUTPATIENT
Start: 2019-01-09 | End: 2019-01-09 | Stop reason: SDUPTHER

## 2019-01-09 RX ORDER — FENTANYL CITRATE 50 UG/ML
50 INJECTION, SOLUTION INTRAMUSCULAR; INTRAVENOUS EVERY 5 MIN PRN
Status: DISCONTINUED | OUTPATIENT
Start: 2019-01-09 | End: 2019-01-10 | Stop reason: HOSPADM

## 2019-01-09 RX ORDER — HYDROCODONE BITARTRATE AND ACETAMINOPHEN 5; 325 MG/1; MG/1
2 TABLET ORAL EVERY 4 HOURS PRN
Status: DISCONTINUED | OUTPATIENT
Start: 2019-01-09 | End: 2019-01-10 | Stop reason: HOSPADM

## 2019-01-09 RX ORDER — OXYBUTYNIN CHLORIDE 5 MG/1
5 TABLET ORAL 3 TIMES DAILY PRN
Status: DISCONTINUED | OUTPATIENT
Start: 2019-01-09 | End: 2019-01-10 | Stop reason: HOSPADM

## 2019-01-09 RX ORDER — LABETALOL HYDROCHLORIDE 5 MG/ML
5 INJECTION, SOLUTION INTRAVENOUS EVERY 10 MIN PRN
Status: DISCONTINUED | OUTPATIENT
Start: 2019-01-09 | End: 2019-01-10 | Stop reason: HOSPADM

## 2019-01-09 RX ORDER — HYDROCODONE BITARTRATE AND ACETAMINOPHEN 5; 325 MG/1; MG/1
1 TABLET ORAL EVERY 6 HOURS PRN
Qty: 10 TABLET | Refills: 0 | Status: SHIPPED | OUTPATIENT
Start: 2019-01-09 | End: 2019-01-12

## 2019-01-09 RX ADMIN — LIDOCAINE HYDROCHLORIDE 5 ML: 20 INJECTION, SOLUTION INFILTRATION; PERINEURAL at 20:03

## 2019-01-09 RX ADMIN — PROPOFOL 120 MG: 10 INJECTION, EMULSION INTRAVENOUS at 20:03

## 2019-01-09 RX ADMIN — ONDANSETRON HYDROCHLORIDE 4 MG: 4 INJECTION, SOLUTION INTRAMUSCULAR; INTRAVENOUS at 20:11

## 2019-01-09 RX ADMIN — FENTANYL CITRATE 100 MCG: 50 INJECTION INTRAMUSCULAR; INTRAVENOUS at 20:03

## 2019-01-09 RX ADMIN — CEFTRIAXONE SODIUM 1 G: 1 INJECTION, POWDER, FOR SOLUTION INTRAMUSCULAR; INTRAVENOUS at 20:08

## 2019-01-09 RX ADMIN — ROCURONIUM BROMIDE 30 MG: 10 INJECTION INTRAVENOUS at 20:03

## 2019-01-09 RX ADMIN — NEOSTIGMINE METHYLSULFATE 4 MG: 1 INJECTION, SOLUTION INTRAVENOUS at 21:48

## 2019-01-09 RX ADMIN — DEXAMETHASONE SODIUM PHOSPHATE 4 MG: 4 INJECTION, SOLUTION INTRAMUSCULAR; INTRAVENOUS at 20:11

## 2019-01-09 RX ADMIN — SODIUM CHLORIDE: 9 INJECTION, SOLUTION INTRAVENOUS at 19:57

## 2019-01-09 RX ADMIN — SODIUM CHLORIDE: 9 INJECTION, SOLUTION INTRAVENOUS at 15:44

## 2019-01-09 RX ADMIN — SODIUM CHLORIDE: 9 INJECTION, SOLUTION INTRAVENOUS at 21:45

## 2019-01-09 RX ADMIN — Medication 0.6 MG: at 21:48

## 2019-01-09 ASSESSMENT — PULMONARY FUNCTION TESTS
PIF_VALUE: 24
PIF_VALUE: 24
PIF_VALUE: 23
PIF_VALUE: 24
PIF_VALUE: 24
PIF_VALUE: 29
PIF_VALUE: 26
PIF_VALUE: 3
PIF_VALUE: 22
PIF_VALUE: 23
PIF_VALUE: 23
PIF_VALUE: 3
PIF_VALUE: 22
PIF_VALUE: 23
PIF_VALUE: 24
PIF_VALUE: 22
PIF_VALUE: 24
PIF_VALUE: 2
PIF_VALUE: 24
PIF_VALUE: 2
PIF_VALUE: 24
PIF_VALUE: 25
PIF_VALUE: 24
PIF_VALUE: 22
PIF_VALUE: 23
PIF_VALUE: 21
PIF_VALUE: 24
PIF_VALUE: 23
PIF_VALUE: 24
PIF_VALUE: 1
PIF_VALUE: 24
PIF_VALUE: 23
PIF_VALUE: 24
PIF_VALUE: 25
PIF_VALUE: 6
PIF_VALUE: 25
PIF_VALUE: 22
PIF_VALUE: 23
PIF_VALUE: 21
PIF_VALUE: 27
PIF_VALUE: 1
PIF_VALUE: 24
PIF_VALUE: 24
PIF_VALUE: 2
PIF_VALUE: 24
PIF_VALUE: 22
PIF_VALUE: 25
PIF_VALUE: 22
PIF_VALUE: 24
PIF_VALUE: 24
PIF_VALUE: 23
PIF_VALUE: 23
PIF_VALUE: 25
PIF_VALUE: 24
PIF_VALUE: 23
PIF_VALUE: 29
PIF_VALUE: 24
PIF_VALUE: 22
PIF_VALUE: 24
PIF_VALUE: 24
PIF_VALUE: 26
PIF_VALUE: 24
PIF_VALUE: 24
PIF_VALUE: 1
PIF_VALUE: 22
PIF_VALUE: 25
PIF_VALUE: 24
PIF_VALUE: 23
PIF_VALUE: 0
PIF_VALUE: 24
PIF_VALUE: 24
PIF_VALUE: 23
PIF_VALUE: 25
PIF_VALUE: 22
PIF_VALUE: 6
PIF_VALUE: 24
PIF_VALUE: 21
PIF_VALUE: 22
PIF_VALUE: 23
PIF_VALUE: 23
PIF_VALUE: 29
PIF_VALUE: 23
PIF_VALUE: 21
PIF_VALUE: 25
PIF_VALUE: 25
PIF_VALUE: 24
PIF_VALUE: 23
PIF_VALUE: 24
PIF_VALUE: 25
PIF_VALUE: 24
PIF_VALUE: 24
PIF_VALUE: 25
PIF_VALUE: 25
PIF_VALUE: 24
PIF_VALUE: 25
PIF_VALUE: 30
PIF_VALUE: 24
PIF_VALUE: 25
PIF_VALUE: 24
PIF_VALUE: 24
PIF_VALUE: 22
PIF_VALUE: 23
PIF_VALUE: 23
PIF_VALUE: 24
PIF_VALUE: 23
PIF_VALUE: 21
PIF_VALUE: 3
PIF_VALUE: 21
PIF_VALUE: 24
PIF_VALUE: 22
PIF_VALUE: 34
PIF_VALUE: 21
PIF_VALUE: 22
PIF_VALUE: 24
PIF_VALUE: 25
PIF_VALUE: 23
PIF_VALUE: 24
PIF_VALUE: 25
PIF_VALUE: 23
PIF_VALUE: 24
PIF_VALUE: 21
PIF_VALUE: 24

## 2019-01-09 ASSESSMENT — PAIN SCALES - GENERAL
PAINLEVEL_OUTOF10: 0

## 2019-01-15 LAB — STONE ANALYSIS: NORMAL

## 2019-02-10 ENCOUNTER — APPOINTMENT (OUTPATIENT)
Dept: GENERAL RADIOLOGY | Age: 64
DRG: 466 | End: 2019-02-10
Payer: MEDICARE

## 2019-02-10 ENCOUNTER — HOSPITAL ENCOUNTER (INPATIENT)
Age: 64
LOS: 2 days | Discharge: NURSING, REHAB OR LONG-TERM CARE | DRG: 466 | End: 2019-02-12
Attending: EMERGENCY MEDICINE | Admitting: INTERNAL MEDICINE
Payer: MEDICARE

## 2019-02-10 DIAGNOSIS — L89.153 PRESSURE INJURY OF SACRAL REGION, STAGE 3 (HCC): ICD-10-CM

## 2019-02-10 DIAGNOSIS — N39.0 URINARY TRACT INFECTION WITHOUT HEMATURIA, SITE UNSPECIFIED: Primary | ICD-10-CM

## 2019-02-10 PROBLEM — A41.9 SEPSIS DUE TO URINARY TRACT INFECTION (HCC): Status: ACTIVE | Noted: 2019-02-10

## 2019-02-10 PROBLEM — L89.90 DECUBITUS ULCER: Status: ACTIVE | Noted: 2019-02-10

## 2019-02-10 LAB
ALBUMIN SERPL-MCNC: 3.7 G/DL (ref 3.5–5.1)
ALP BLD-CCNC: 169 U/L (ref 38–126)
ALT SERPL-CCNC: 29 U/L (ref 11–66)
AMORPHOUS: ABNORMAL
ANION GAP SERPL CALCULATED.3IONS-SCNC: 17 MEQ/L (ref 8–16)
APTT: 42.5 SECONDS (ref 22–38)
AST SERPL-CCNC: 39 U/L (ref 5–40)
AVERAGE GLUCOSE: 81 MG/DL (ref 70–126)
BACTERIA: ABNORMAL /HPF
BASOPHILS # BLD: 0.3 %
BASOPHILS ABSOLUTE: 0.1 THOU/MM3 (ref 0–0.1)
BILIRUB SERPL-MCNC: 0.4 MG/DL (ref 0.3–1.2)
BILIRUBIN DIRECT: < 0.2 MG/DL (ref 0–0.3)
BILIRUBIN URINE: NEGATIVE
BLOOD, URINE: ABNORMAL
BUN BLDV-MCNC: 16 MG/DL (ref 7–22)
CALCIUM SERPL-MCNC: 9.4 MG/DL (ref 8.5–10.5)
CASTS 2: ABNORMAL /LPF
CASTS UA: ABNORMAL /LPF
CHARACTER, URINE: ABNORMAL
CHLORIDE BLD-SCNC: 99 MEQ/L (ref 98–111)
CO2: 21 MEQ/L (ref 23–33)
COLOR: YELLOW
CREAT SERPL-MCNC: 0.5 MG/DL (ref 0.4–1.2)
CRYSTALS, UA: ABNORMAL
CRYSTALS, UA: ABNORMAL
EKG ATRIAL RATE: 131 BPM
EKG P AXIS: 51 DEGREES
EKG P-R INTERVAL: 138 MS
EKG Q-T INTERVAL: 282 MS
EKG QRS DURATION: 80 MS
EKG QTC CALCULATION (BAZETT): 416 MS
EKG R AXIS: 47 DEGREES
EKG T AXIS: 73 DEGREES
EKG VENTRICULAR RATE: 131 BPM
EOSINOPHIL # BLD: 0 %
EOSINOPHILS ABSOLUTE: 0 THOU/MM3 (ref 0–0.4)
EPITHELIAL CELLS, UA: ABNORMAL /HPF
ERYTHROCYTE [DISTWIDTH] IN BLOOD BY AUTOMATED COUNT: 15.4 % (ref 11.5–14.5)
ERYTHROCYTE [DISTWIDTH] IN BLOOD BY AUTOMATED COUNT: 50.6 FL (ref 35–45)
FLU A ANTIGEN: NEGATIVE
FLU B ANTIGEN: NEGATIVE
GFR SERPL CREATININE-BSD FRML MDRD: > 90 ML/MIN/1.73M2
GLUCOSE BLD-MCNC: 114 MG/DL (ref 70–108)
GLUCOSE BLD-MCNC: 126 MG/DL (ref 70–108)
GLUCOSE BLD-MCNC: 132 MG/DL (ref 70–108)
GLUCOSE BLD-MCNC: 184 MG/DL (ref 70–108)
GLUCOSE URINE: NEGATIVE MG/DL
HBA1C MFR BLD: 4.7 % (ref 4.4–6.4)
HCT VFR BLD CALC: 38.5 % (ref 42–52)
HEMOGLOBIN: 12.6 GM/DL (ref 14–18)
IMMATURE GRANS (ABS): 0.2 THOU/MM3 (ref 0–0.07)
IMMATURE GRANULOCYTES: 0.9 %
INR BLD: 2.05 (ref 0.85–1.13)
KETONES, URINE: ABNORMAL
LACTIC ACID, SEPSIS: 1.2 MMOL/L (ref 0.5–1.9)
LACTIC ACID, SEPSIS: 3.6 MMOL/L (ref 0.5–1.9)
LEUKOCYTE ESTERASE, URINE: ABNORMAL
LIPASE: 16.4 U/L (ref 5.6–51.3)
LYMPHOCYTES # BLD: 4.1 %
LYMPHOCYTES ABSOLUTE: 0.9 THOU/MM3 (ref 1–4.8)
MAGNESIUM: 1.8 MG/DL (ref 1.6–2.4)
MCH RBC QN AUTO: 29.5 PG (ref 26–33)
MCHC RBC AUTO-ENTMCNC: 32.7 GM/DL (ref 32.2–35.5)
MCV RBC AUTO: 90.2 FL (ref 80–94)
MISCELLANEOUS 2: ABNORMAL
MISCELLANEOUS LAB TEST RESULT: ABNORMAL
MONOCYTES # BLD: 7.3 %
MONOCYTES ABSOLUTE: 1.6 THOU/MM3 (ref 0.4–1.3)
MRSA SCREEN RT-PCR: NEGATIVE
MUCUS: ABNORMAL
NITRITE, URINE: POSITIVE
NUCLEATED RED BLOOD CELLS: 0 /100 WBC
OSMOLALITY CALCULATION: 279.8 MOSMOL/KG (ref 275–300)
PH UA: 7
PLATELET # BLD: 287 THOU/MM3 (ref 130–400)
PMV BLD AUTO: 9 FL (ref 9.4–12.4)
POTASSIUM SERPL-SCNC: 3.8 MEQ/L (ref 3.5–5.2)
PRO-BNP: 509.3 PG/ML (ref 0–900)
PROTEIN UA: 30
RBC # BLD: 4.27 MILL/MM3 (ref 4.7–6.1)
RBC URINE: ABNORMAL /HPF
RENAL EPITHELIAL, UA: ABNORMAL
SEG NEUTROPHILS: 87.4 %
SEGMENTED NEUTROPHILS ABSOLUTE COUNT: 19.4 THOU/MM3 (ref 1.8–7.7)
SODIUM BLD-SCNC: 137 MEQ/L (ref 135–145)
SPECIFIC GRAVITY, URINE: 1.02 (ref 1–1.03)
TOTAL PROTEIN: 7.5 G/DL (ref 6.1–8)
TROPONIN T: < 0.01 NG/ML
TSH SERPL DL<=0.05 MIU/L-ACNC: 0.5 UIU/ML (ref 0.4–4.2)
UROBILINOGEN, URINE: 0.2 EU/DL
VANCOMYCIN RESISTANT ENTEROCOCCUS: POSITIVE
WBC # BLD: 22.2 THOU/MM3 (ref 4.8–10.8)
WBC UA: > 200 /HPF
YEAST: ABNORMAL

## 2019-02-10 PROCEDURE — 84484 ASSAY OF TROPONIN QUANT: CPT

## 2019-02-10 PROCEDURE — 83690 ASSAY OF LIPASE: CPT

## 2019-02-10 PROCEDURE — 83605 ASSAY OF LACTIC ACID: CPT

## 2019-02-10 PROCEDURE — 87641 MR-STAPH DNA AMP PROBE: CPT

## 2019-02-10 PROCEDURE — 87804 INFLUENZA ASSAY W/OPTIC: CPT

## 2019-02-10 PROCEDURE — 93010 ELECTROCARDIOGRAM REPORT: CPT | Performed by: INTERNAL MEDICINE

## 2019-02-10 PROCEDURE — 36415 COLL VENOUS BLD VENIPUNCTURE: CPT

## 2019-02-10 PROCEDURE — 87147 CULTURE TYPE IMMUNOLOGIC: CPT

## 2019-02-10 PROCEDURE — 82948 REAGENT STRIP/BLOOD GLUCOSE: CPT

## 2019-02-10 PROCEDURE — 87077 CULTURE AEROBIC IDENTIFY: CPT

## 2019-02-10 PROCEDURE — 71045 X-RAY EXAM CHEST 1 VIEW: CPT

## 2019-02-10 PROCEDURE — 81001 URINALYSIS AUTO W/SCOPE: CPT

## 2019-02-10 PROCEDURE — 83880 ASSAY OF NATRIURETIC PEPTIDE: CPT

## 2019-02-10 PROCEDURE — 87070 CULTURE OTHR SPECIMN AEROBIC: CPT

## 2019-02-10 PROCEDURE — 83735 ASSAY OF MAGNESIUM: CPT

## 2019-02-10 PROCEDURE — 2709999900 HC NON-CHARGEABLE SUPPLY

## 2019-02-10 PROCEDURE — 87081 CULTURE SCREEN ONLY: CPT

## 2019-02-10 PROCEDURE — 6370000000 HC RX 637 (ALT 250 FOR IP): Performed by: INTERNAL MEDICINE

## 2019-02-10 PROCEDURE — 99285 EMERGENCY DEPT VISIT HI MDM: CPT

## 2019-02-10 PROCEDURE — 87186 SC STD MICRODIL/AGAR DIL: CPT

## 2019-02-10 PROCEDURE — 83036 HEMOGLOBIN GLYCOSYLATED A1C: CPT

## 2019-02-10 PROCEDURE — 2060000000 HC ICU INTERMEDIATE R&B

## 2019-02-10 PROCEDURE — 99223 1ST HOSP IP/OBS HIGH 75: CPT | Performed by: INTERNAL MEDICINE

## 2019-02-10 PROCEDURE — 80048 BASIC METABOLIC PNL TOTAL CA: CPT

## 2019-02-10 PROCEDURE — 87040 BLOOD CULTURE FOR BACTERIA: CPT

## 2019-02-10 PROCEDURE — 85025 COMPLETE CBC W/AUTO DIFF WBC: CPT

## 2019-02-10 PROCEDURE — 2580000003 HC RX 258: Performed by: INTERNAL MEDICINE

## 2019-02-10 PROCEDURE — 6360000002 HC RX W HCPCS: Performed by: INTERNAL MEDICINE

## 2019-02-10 PROCEDURE — 85730 THROMBOPLASTIN TIME PARTIAL: CPT

## 2019-02-10 PROCEDURE — 87205 SMEAR GRAM STAIN: CPT

## 2019-02-10 PROCEDURE — 93005 ELECTROCARDIOGRAM TRACING: CPT | Performed by: EMERGENCY MEDICINE

## 2019-02-10 PROCEDURE — 87086 URINE CULTURE/COLONY COUNT: CPT

## 2019-02-10 PROCEDURE — 84443 ASSAY THYROID STIM HORMONE: CPT

## 2019-02-10 PROCEDURE — 87500 VANOMYCIN DNA AMP PROBE: CPT

## 2019-02-10 PROCEDURE — 85610 PROTHROMBIN TIME: CPT

## 2019-02-10 PROCEDURE — 6370000000 HC RX 637 (ALT 250 FOR IP): Performed by: EMERGENCY MEDICINE

## 2019-02-10 PROCEDURE — 2580000003 HC RX 258: Performed by: EMERGENCY MEDICINE

## 2019-02-10 PROCEDURE — 80076 HEPATIC FUNCTION PANEL: CPT

## 2019-02-10 RX ORDER — POTASSIUM CHLORIDE 7.45 MG/ML
10 INJECTION INTRAVENOUS PRN
Status: DISCONTINUED | OUTPATIENT
Start: 2019-02-10 | End: 2019-02-12 | Stop reason: HOSPADM

## 2019-02-10 RX ORDER — SODIUM CHLORIDE 9 MG/ML
INJECTION, SOLUTION INTRAVENOUS CONTINUOUS
Status: DISCONTINUED | OUTPATIENT
Start: 2019-02-10 | End: 2019-02-12 | Stop reason: HOSPADM

## 2019-02-10 RX ORDER — LISINOPRIL 10 MG/1
10 TABLET ORAL DAILY
Status: DISCONTINUED | OUTPATIENT
Start: 2019-02-11 | End: 2019-02-12 | Stop reason: HOSPADM

## 2019-02-10 RX ORDER — SODIUM CHLORIDE 0.9 % (FLUSH) 0.9 %
10 SYRINGE (ML) INJECTION PRN
Status: DISCONTINUED | OUTPATIENT
Start: 2019-02-10 | End: 2019-02-12 | Stop reason: HOSPADM

## 2019-02-10 RX ORDER — DEXTROSE MONOHYDRATE 50 MG/ML
100 INJECTION, SOLUTION INTRAVENOUS PRN
Status: DISCONTINUED | OUTPATIENT
Start: 2019-02-10 | End: 2019-02-12 | Stop reason: HOSPADM

## 2019-02-10 RX ORDER — POTASSIUM CHLORIDE 20 MEQ/1
40 TABLET, EXTENDED RELEASE ORAL PRN
Status: DISCONTINUED | OUTPATIENT
Start: 2019-02-10 | End: 2019-02-12 | Stop reason: HOSPADM

## 2019-02-10 RX ORDER — OXYBUTYNIN CHLORIDE 5 MG/1
5 TABLET ORAL 2 TIMES DAILY
Status: DISCONTINUED | OUTPATIENT
Start: 2019-02-10 | End: 2019-02-12 | Stop reason: HOSPADM

## 2019-02-10 RX ORDER — FLUOXETINE HYDROCHLORIDE 20 MG/1
20 CAPSULE ORAL DAILY
Status: DISCONTINUED | OUTPATIENT
Start: 2019-02-11 | End: 2019-02-12 | Stop reason: HOSPADM

## 2019-02-10 RX ORDER — SODIUM CHLORIDE 0.9 % (FLUSH) 0.9 %
10 SYRINGE (ML) INJECTION EVERY 12 HOURS SCHEDULED
Status: DISCONTINUED | OUTPATIENT
Start: 2019-02-10 | End: 2019-02-12 | Stop reason: HOSPADM

## 2019-02-10 RX ORDER — 0.9 % SODIUM CHLORIDE 0.9 %
1000 INTRAVENOUS SOLUTION INTRAVENOUS ONCE
Status: COMPLETED | OUTPATIENT
Start: 2019-02-10 | End: 2019-02-10

## 2019-02-10 RX ORDER — 0.9 % SODIUM CHLORIDE 0.9 %
500 INTRAVENOUS SOLUTION INTRAVENOUS ONCE
Status: COMPLETED | OUTPATIENT
Start: 2019-02-10 | End: 2019-02-10

## 2019-02-10 RX ORDER — DOCUSATE SODIUM 100 MG/1
100 CAPSULE, LIQUID FILLED ORAL DAILY
Status: DISCONTINUED | OUTPATIENT
Start: 2019-02-11 | End: 2019-02-12 | Stop reason: HOSPADM

## 2019-02-10 RX ORDER — ACETAMINOPHEN 325 MG/1
650 TABLET ORAL ONCE
Status: COMPLETED | OUTPATIENT
Start: 2019-02-10 | End: 2019-02-10

## 2019-02-10 RX ORDER — ACETAMINOPHEN 325 MG/1
650 TABLET ORAL EVERY 4 HOURS PRN
Status: DISCONTINUED | OUTPATIENT
Start: 2019-02-10 | End: 2019-02-12 | Stop reason: HOSPADM

## 2019-02-10 RX ORDER — LACTOBACILLUS RHAMNOSUS GG 10B CELL
1 CAPSULE ORAL 2 TIMES DAILY WITH MEALS
Status: DISCONTINUED | OUTPATIENT
Start: 2019-02-10 | End: 2019-02-12 | Stop reason: HOSPADM

## 2019-02-10 RX ORDER — FAMOTIDINE 20 MG/1
20 TABLET, FILM COATED ORAL 2 TIMES DAILY
Status: DISCONTINUED | OUTPATIENT
Start: 2019-02-10 | End: 2019-02-12 | Stop reason: HOSPADM

## 2019-02-10 RX ORDER — FAMOTIDINE 20 MG/1
20 TABLET, FILM COATED ORAL 2 TIMES DAILY
Status: DISCONTINUED | OUTPATIENT
Start: 2019-02-10 | End: 2019-02-10 | Stop reason: SDUPTHER

## 2019-02-10 RX ORDER — DEXTROSE MONOHYDRATE 25 G/50ML
12.5 INJECTION, SOLUTION INTRAVENOUS PRN
Status: DISCONTINUED | OUTPATIENT
Start: 2019-02-10 | End: 2019-02-12 | Stop reason: HOSPADM

## 2019-02-10 RX ORDER — POTASSIUM CHLORIDE 20MEQ/15ML
40 LIQUID (ML) ORAL PRN
Status: DISCONTINUED | OUTPATIENT
Start: 2019-02-10 | End: 2019-02-12 | Stop reason: HOSPADM

## 2019-02-10 RX ORDER — NICOTINE POLACRILEX 4 MG
15 LOZENGE BUCCAL PRN
Status: DISCONTINUED | OUTPATIENT
Start: 2019-02-10 | End: 2019-02-12 | Stop reason: HOSPADM

## 2019-02-10 RX ORDER — MULTIVITAMIN WITH FOLIC ACID 400 MCG
1 TABLET ORAL DAILY
Status: DISCONTINUED | OUTPATIENT
Start: 2019-02-11 | End: 2019-02-12 | Stop reason: HOSPADM

## 2019-02-10 RX ORDER — 0.9 % SODIUM CHLORIDE 0.9 %
1000 INTRAVENOUS SOLUTION INTRAVENOUS ONCE
Status: DISCONTINUED | OUTPATIENT
Start: 2019-02-10 | End: 2019-02-12 | Stop reason: HOSPADM

## 2019-02-10 RX ORDER — TIZANIDINE 4 MG/1
2 TABLET ORAL 3 TIMES DAILY
Status: DISCONTINUED | OUTPATIENT
Start: 2019-02-10 | End: 2019-02-10

## 2019-02-10 RX ORDER — ONDANSETRON 2 MG/ML
4 INJECTION INTRAMUSCULAR; INTRAVENOUS EVERY 6 HOURS PRN
Status: DISCONTINUED | OUTPATIENT
Start: 2019-02-10 | End: 2019-02-12 | Stop reason: HOSPADM

## 2019-02-10 RX ORDER — ASCORBIC ACID 500 MG
500 TABLET ORAL DAILY
Status: DISCONTINUED | OUTPATIENT
Start: 2019-02-11 | End: 2019-02-12 | Stop reason: HOSPADM

## 2019-02-10 RX ADMIN — SODIUM CHLORIDE 1000 ML: 9 INJECTION, SOLUTION INTRAVENOUS at 10:13

## 2019-02-10 RX ADMIN — SODIUM CHLORIDE: 9 INJECTION, SOLUTION INTRAVENOUS at 17:14

## 2019-02-10 RX ADMIN — SODIUM CHLORIDE 500 ML: 9 INJECTION, SOLUTION INTRAVENOUS at 15:14

## 2019-02-10 RX ADMIN — TIZANIDINE 2 MG: 4 TABLET ORAL at 14:12

## 2019-02-10 RX ADMIN — FAMOTIDINE 20 MG: 20 TABLET, FILM COATED ORAL at 20:31

## 2019-02-10 RX ADMIN — ACETAMINOPHEN 650 MG: 325 TABLET ORAL at 11:12

## 2019-02-10 RX ADMIN — PIPERACILLIN SODIUM,TAZOBACTAM SODIUM 3.38 G: 3; .375 INJECTION, POWDER, FOR SOLUTION INTRAVENOUS at 14:07

## 2019-02-10 RX ADMIN — SODIUM CHLORIDE: 9 INJECTION, SOLUTION INTRAVENOUS at 13:06

## 2019-02-10 RX ADMIN — OXYBUTYNIN CHLORIDE 5 MG: 5 TABLET ORAL at 20:31

## 2019-02-10 RX ADMIN — RIVAROXABAN 20 MG: 20 TABLET, FILM COATED ORAL at 17:10

## 2019-02-10 RX ADMIN — Medication 1 CAPSULE: at 17:13

## 2019-02-10 RX ADMIN — PIPERACILLIN SODIUM,TAZOBACTAM SODIUM 3.38 G: 3; .375 INJECTION, POWDER, FOR SOLUTION INTRAVENOUS at 20:31

## 2019-02-10 ASSESSMENT — PAIN SCALES - GENERAL
PAINLEVEL_OUTOF10: 0

## 2019-02-10 ASSESSMENT — ENCOUNTER SYMPTOMS
VOICE CHANGE: 0
CHEST TIGHTNESS: 0
CHOKING: 0
NAUSEA: 0
SHORTNESS OF BREATH: 0
BACK PAIN: 0
DIARRHEA: 0
BLOOD IN STOOL: 0
VOMITING: 0
RHINORRHEA: 0
ABDOMINAL PAIN: 0
ABDOMINAL DISTENTION: 0
CONSTIPATION: 0
EYE DISCHARGE: 0
EYE ITCHING: 0
TROUBLE SWALLOWING: 0
SINUS PRESSURE: 0
SORE THROAT: 0
EYE PAIN: 0
EYE REDNESS: 0
COUGH: 0
PHOTOPHOBIA: 0
WHEEZING: 0

## 2019-02-11 LAB
ANION GAP SERPL CALCULATED.3IONS-SCNC: 12 MEQ/L (ref 8–16)
BASOPHILS # BLD: 0.2 %
BASOPHILS ABSOLUTE: 0 THOU/MM3 (ref 0–0.1)
BUN BLDV-MCNC: 14 MG/DL (ref 7–22)
CALCIUM SERPL-MCNC: 8.3 MG/DL (ref 8.5–10.5)
CHLORIDE BLD-SCNC: 103 MEQ/L (ref 98–111)
CO2: 21 MEQ/L (ref 23–33)
CREAT SERPL-MCNC: 0.3 MG/DL (ref 0.4–1.2)
EOSINOPHIL # BLD: 0.9 %
EOSINOPHILS ABSOLUTE: 0.2 THOU/MM3 (ref 0–0.4)
ERYTHROCYTE [DISTWIDTH] IN BLOOD BY AUTOMATED COUNT: 15.5 % (ref 11.5–14.5)
ERYTHROCYTE [DISTWIDTH] IN BLOOD BY AUTOMATED COUNT: 51.7 FL (ref 35–45)
GFR SERPL CREATININE-BSD FRML MDRD: > 90 ML/MIN/1.73M2
GLUCOSE BLD-MCNC: 104 MG/DL (ref 70–108)
GLUCOSE BLD-MCNC: 122 MG/DL (ref 70–108)
HCT VFR BLD CALC: 33.7 % (ref 42–52)
HEMOGLOBIN: 10.9 GM/DL (ref 14–18)
IMMATURE GRANS (ABS): 0.1 THOU/MM3 (ref 0–0.07)
IMMATURE GRANULOCYTES: 0.6 %
LACTIC ACID: 0.6 MMOL/L (ref 0.5–2.2)
LYMPHOCYTES # BLD: 11.9 %
LYMPHOCYTES ABSOLUTE: 2 THOU/MM3 (ref 1–4.8)
MAGNESIUM: 1.9 MG/DL (ref 1.6–2.4)
MCH RBC QN AUTO: 29.3 PG (ref 26–33)
MCHC RBC AUTO-ENTMCNC: 32.3 GM/DL (ref 32.2–35.5)
MCV RBC AUTO: 90.6 FL (ref 80–94)
MONOCYTES # BLD: 5.3 %
MONOCYTES ABSOLUTE: 0.9 THOU/MM3 (ref 0.4–1.3)
NUCLEATED RED BLOOD CELLS: 0 /100 WBC
ORGANISM: ABNORMAL
PLATELET # BLD: 224 THOU/MM3 (ref 130–400)
PMV BLD AUTO: 8.6 FL (ref 9.4–12.4)
POTASSIUM REFLEX MAGNESIUM: 3.4 MEQ/L (ref 3.5–5.2)
RBC # BLD: 3.72 MILL/MM3 (ref 4.7–6.1)
SEG NEUTROPHILS: 81.1 %
SEGMENTED NEUTROPHILS ABSOLUTE COUNT: 13.8 THOU/MM3 (ref 1.8–7.7)
SODIUM BLD-SCNC: 136 MEQ/L (ref 135–145)
URINE CULTURE REFLEX: ABNORMAL
WBC # BLD: 17 THOU/MM3 (ref 4.8–10.8)

## 2019-02-11 PROCEDURE — 83735 ASSAY OF MAGNESIUM: CPT

## 2019-02-11 PROCEDURE — 6360000002 HC RX W HCPCS: Performed by: INTERNAL MEDICINE

## 2019-02-11 PROCEDURE — 2580000003 HC RX 258: Performed by: INTERNAL MEDICINE

## 2019-02-11 PROCEDURE — 80048 BASIC METABOLIC PNL TOTAL CA: CPT

## 2019-02-11 PROCEDURE — 2060000000 HC ICU INTERMEDIATE R&B

## 2019-02-11 PROCEDURE — 85025 COMPLETE CBC W/AUTO DIFF WBC: CPT

## 2019-02-11 PROCEDURE — 83605 ASSAY OF LACTIC ACID: CPT

## 2019-02-11 PROCEDURE — 82948 REAGENT STRIP/BLOOD GLUCOSE: CPT

## 2019-02-11 PROCEDURE — 99233 SBSQ HOSP IP/OBS HIGH 50: CPT | Performed by: INTERNAL MEDICINE

## 2019-02-11 PROCEDURE — 99253 IP/OBS CNSLTJ NEW/EST LOW 45: CPT | Performed by: NURSE PRACTITIONER

## 2019-02-11 PROCEDURE — 36415 COLL VENOUS BLD VENIPUNCTURE: CPT

## 2019-02-11 PROCEDURE — 2709999900 HC NON-CHARGEABLE SUPPLY

## 2019-02-11 PROCEDURE — 6370000000 HC RX 637 (ALT 250 FOR IP): Performed by: INTERNAL MEDICINE

## 2019-02-11 RX ORDER — POTASSIUM CHLORIDE 20 MEQ/1
40 TABLET, EXTENDED RELEASE ORAL ONCE
Status: COMPLETED | OUTPATIENT
Start: 2019-02-11 | End: 2019-02-11

## 2019-02-11 RX ADMIN — PIPERACILLIN SODIUM,TAZOBACTAM SODIUM 3.38 G: 3; .375 INJECTION, POWDER, FOR SOLUTION INTRAVENOUS at 22:30

## 2019-02-11 RX ADMIN — OXYBUTYNIN CHLORIDE 5 MG: 5 TABLET ORAL at 09:29

## 2019-02-11 RX ADMIN — OXYBUTYNIN CHLORIDE 5 MG: 5 TABLET ORAL at 20:02

## 2019-02-11 RX ADMIN — LISINOPRIL 10 MG: 10 TABLET ORAL at 09:29

## 2019-02-11 RX ADMIN — SODIUM CHLORIDE: 9 INJECTION, SOLUTION INTRAVENOUS at 04:03

## 2019-02-11 RX ADMIN — RIVAROXABAN 20 MG: 20 TABLET, FILM COATED ORAL at 18:19

## 2019-02-11 RX ADMIN — Medication 500 MG: at 09:29

## 2019-02-11 RX ADMIN — THERA TABS 1 TABLET: TAB at 09:29

## 2019-02-11 RX ADMIN — PIPERACILLIN SODIUM,TAZOBACTAM SODIUM 3.38 G: 3; .375 INJECTION, POWDER, FOR SOLUTION INTRAVENOUS at 05:39

## 2019-02-11 RX ADMIN — Medication 10 ML: at 20:02

## 2019-02-11 RX ADMIN — PIPERACILLIN SODIUM,TAZOBACTAM SODIUM 3.38 G: 3; .375 INJECTION, POWDER, FOR SOLUTION INTRAVENOUS at 14:11

## 2019-02-11 RX ADMIN — Medication 1 CAPSULE: at 09:29

## 2019-02-11 RX ADMIN — POTASSIUM CHLORIDE 40 MEQ: 20 TABLET, EXTENDED RELEASE ORAL at 05:39

## 2019-02-11 RX ADMIN — SODIUM CHLORIDE: 9 INJECTION, SOLUTION INTRAVENOUS at 14:56

## 2019-02-11 RX ADMIN — FLUOXETINE HYDROCHLORIDE 20 MG: 20 CAPSULE ORAL at 09:29

## 2019-02-11 RX ADMIN — Medication 1 CAPSULE: at 18:19

## 2019-02-11 RX ADMIN — FAMOTIDINE 20 MG: 20 TABLET, FILM COATED ORAL at 20:02

## 2019-02-11 RX ADMIN — FAMOTIDINE 20 MG: 20 TABLET, FILM COATED ORAL at 09:29

## 2019-02-11 RX ADMIN — DOCUSATE SODIUM 100 MG: 100 CAPSULE, LIQUID FILLED ORAL at 09:29

## 2019-02-11 ASSESSMENT — PAIN SCALES - GENERAL: PAINLEVEL_OUTOF10: 0

## 2019-02-12 VITALS
WEIGHT: 204.8 LBS | SYSTOLIC BLOOD PRESSURE: 168 MMHG | HEIGHT: 70 IN | RESPIRATION RATE: 18 BRPM | TEMPERATURE: 98.5 F | OXYGEN SATURATION: 96 % | DIASTOLIC BLOOD PRESSURE: 85 MMHG | HEART RATE: 86 BPM | BODY MASS INDEX: 29.32 KG/M2

## 2019-02-12 LAB
AEROBIC CULTURE: ABNORMAL
AEROBIC CULTURE: ABNORMAL
ERYTHROCYTE [DISTWIDTH] IN BLOOD BY AUTOMATED COUNT: 15.6 % (ref 11.5–14.5)
ERYTHROCYTE [DISTWIDTH] IN BLOOD BY AUTOMATED COUNT: 53.6 FL (ref 35–45)
GLUCOSE BLD-MCNC: 103 MG/DL (ref 70–108)
GLUCOSE BLD-MCNC: 95 MG/DL (ref 70–108)
GRAM STAIN RESULT: ABNORMAL
HCT VFR BLD CALC: 36.5 % (ref 42–52)
HEMOGLOBIN: 11.1 GM/DL (ref 14–18)
MCH RBC QN AUTO: 28.9 PG (ref 26–33)
MCHC RBC AUTO-ENTMCNC: 30.4 GM/DL (ref 32.2–35.5)
MCV RBC AUTO: 95.1 FL (ref 80–94)
ORGANISM: ABNORMAL
PLATELET # BLD: 220 THOU/MM3 (ref 130–400)
PMV BLD AUTO: 8.2 FL (ref 9.4–12.4)
POTASSIUM SERPL-SCNC: 3.6 MEQ/L (ref 3.5–5.2)
RBC # BLD: 3.84 MILL/MM3 (ref 4.7–6.1)
WBC # BLD: 6.8 THOU/MM3 (ref 4.8–10.8)

## 2019-02-12 PROCEDURE — 84132 ASSAY OF SERUM POTASSIUM: CPT

## 2019-02-12 PROCEDURE — 99239 HOSP IP/OBS DSCHRG MGMT >30: CPT | Performed by: INTERNAL MEDICINE

## 2019-02-12 PROCEDURE — 82948 REAGENT STRIP/BLOOD GLUCOSE: CPT

## 2019-02-12 PROCEDURE — 2709999900 HC NON-CHARGEABLE SUPPLY

## 2019-02-12 PROCEDURE — 2580000003 HC RX 258: Performed by: INTERNAL MEDICINE

## 2019-02-12 PROCEDURE — 6370000000 HC RX 637 (ALT 250 FOR IP): Performed by: INTERNAL MEDICINE

## 2019-02-12 PROCEDURE — 36415 COLL VENOUS BLD VENIPUNCTURE: CPT

## 2019-02-12 PROCEDURE — 6360000002 HC RX W HCPCS: Performed by: INTERNAL MEDICINE

## 2019-02-12 PROCEDURE — 85027 COMPLETE CBC AUTOMATED: CPT

## 2019-02-12 RX ORDER — SULFAMETHOXAZOLE AND TRIMETHOPRIM 400; 80 MG/1; MG/1
1 TABLET ORAL DAILY
Refills: 0 | DISCHARGE
Start: 2019-02-12 | End: 2019-02-19

## 2019-02-12 RX ADMIN — SODIUM CHLORIDE: 9 INJECTION, SOLUTION INTRAVENOUS at 12:25

## 2019-02-12 RX ADMIN — PIPERACILLIN SODIUM,TAZOBACTAM SODIUM 3.38 G: 3; .375 INJECTION, POWDER, FOR SOLUTION INTRAVENOUS at 04:19

## 2019-02-12 RX ADMIN — FAMOTIDINE 20 MG: 20 TABLET, FILM COATED ORAL at 08:26

## 2019-02-12 RX ADMIN — Medication 500 MG: at 08:25

## 2019-02-12 RX ADMIN — Medication 1 CAPSULE: at 08:26

## 2019-02-12 RX ADMIN — FLUOXETINE HYDROCHLORIDE 20 MG: 20 CAPSULE ORAL at 08:26

## 2019-02-12 RX ADMIN — THERA TABS 1 TABLET: TAB at 08:25

## 2019-02-12 RX ADMIN — SODIUM CHLORIDE: 9 INJECTION, SOLUTION INTRAVENOUS at 04:19

## 2019-02-12 RX ADMIN — LISINOPRIL 10 MG: 10 TABLET ORAL at 08:25

## 2019-02-12 RX ADMIN — DOCUSATE SODIUM 100 MG: 100 CAPSULE, LIQUID FILLED ORAL at 08:26

## 2019-02-12 RX ADMIN — OXYBUTYNIN CHLORIDE 5 MG: 5 TABLET ORAL at 08:25

## 2019-02-14 LAB
MRSA SCREEN: ABNORMAL
ORGANISM: ABNORMAL

## 2019-02-16 LAB
BLOOD CULTURE, ROUTINE: NORMAL
BLOOD CULTURE, ROUTINE: NORMAL

## 2019-03-21 ENCOUNTER — OUTSIDE SERVICES (OUTPATIENT)
Dept: UROLOGY | Age: 64
End: 2019-03-21
Payer: MEDICARE

## 2019-03-21 DIAGNOSIS — N31.9 NEUROGENIC BLADDER DISORDER: Primary | ICD-10-CM

## 2019-03-21 DIAGNOSIS — Z93.59 CHRONIC SUPRAPUBIC CATHETER (HCC): ICD-10-CM

## 2019-03-26 PROCEDURE — 99307 SBSQ NF CARE SF MDM 10: CPT | Performed by: NURSE PRACTITIONER

## 2019-06-10 ENCOUNTER — HOSPITAL ENCOUNTER (INPATIENT)
Age: 64
LOS: 10 days | Discharge: NURSING, REHAB OR LONG-TERM CARE | DRG: 662 | End: 2019-06-20
Attending: INTERNAL MEDICINE | Admitting: INTERNAL MEDICINE
Payer: COMMERCIAL

## 2019-06-10 ENCOUNTER — APPOINTMENT (OUTPATIENT)
Dept: CT IMAGING | Age: 64
DRG: 662 | End: 2019-06-10
Payer: COMMERCIAL

## 2019-06-10 DIAGNOSIS — R55 SYNCOPE AND COLLAPSE: ICD-10-CM

## 2019-06-10 DIAGNOSIS — I95.9 HYPOTENSION, UNSPECIFIED HYPOTENSION TYPE: ICD-10-CM

## 2019-06-10 DIAGNOSIS — N39.0 URINARY TRACT INFECTION WITHOUT HEMATURIA, SITE UNSPECIFIED: Primary | ICD-10-CM

## 2019-06-10 PROBLEM — E87.8 ELECTROLYTE IMBALANCE: Status: ACTIVE | Noted: 2019-06-10

## 2019-06-10 PROBLEM — R74.01 ELEVATED TRANSAMINASE LEVEL: Status: ACTIVE | Noted: 2019-06-10

## 2019-06-10 PROBLEM — D64.9 ANEMIA: Status: ACTIVE | Noted: 2019-06-10

## 2019-06-10 LAB
ALBUMIN SERPL-MCNC: 2.4 G/DL (ref 3.5–5.1)
ALBUMIN SERPL-MCNC: 2.6 G/DL (ref 3.5–5.1)
ALP BLD-CCNC: 327 U/L (ref 38–126)
ALP BLD-CCNC: 342 U/L (ref 38–126)
ALT SERPL-CCNC: 65 U/L (ref 11–66)
ALT SERPL-CCNC: 75 U/L (ref 11–66)
AMMONIA: 44 UMOL/L (ref 11–60)
AMORPHOUS: ABNORMAL
ANION GAP SERPL CALCULATED.3IONS-SCNC: 16 MEQ/L (ref 8–16)
APTT: 37.5 SECONDS (ref 22–38)
AST SERPL-CCNC: 45 U/L (ref 5–40)
AST SERPL-CCNC: 56 U/L (ref 5–40)
BACTERIA: ABNORMAL /HPF
BASOPHILS # BLD: 0.1 %
BASOPHILS ABSOLUTE: 0 THOU/MM3 (ref 0–0.1)
BILIRUB SERPL-MCNC: 0.6 MG/DL (ref 0.3–1.2)
BILIRUB SERPL-MCNC: 0.8 MG/DL (ref 0.3–1.2)
BILIRUBIN DIRECT: 0.4 MG/DL (ref 0–0.3)
BILIRUBIN DIRECT: 0.6 MG/DL (ref 0–0.3)
BILIRUBIN URINE: ABNORMAL
BLOOD, URINE: ABNORMAL
BUN BLDV-MCNC: 43 MG/DL (ref 7–22)
CALCIUM SERPL-MCNC: 8.7 MG/DL (ref 8.5–10.5)
CASTS UA: ABNORMAL /LPF
CHARACTER, URINE: ABNORMAL
CHLORIDE BLD-SCNC: 98 MEQ/L (ref 98–111)
CO2: 19 MEQ/L (ref 23–33)
COLOR: YELLOW
CREAT SERPL-MCNC: 1.2 MG/DL (ref 0.4–1.2)
CRYSTALS, UA: ABNORMAL
EKG ATRIAL RATE: 109 BPM
EKG P AXIS: 41 DEGREES
EKG P-R INTERVAL: 126 MS
EKG Q-T INTERVAL: 334 MS
EKG QRS DURATION: 84 MS
EKG QTC CALCULATION (BAZETT): 449 MS
EKG R AXIS: 37 DEGREES
EKG T AXIS: 48 DEGREES
EKG VENTRICULAR RATE: 109 BPM
EOSINOPHIL # BLD: 0.2 %
EOSINOPHILS ABSOLUTE: 0 THOU/MM3 (ref 0–0.4)
EPITHELIAL CELLS, UA: ABNORMAL /HPF
ERYTHROCYTE [DISTWIDTH] IN BLOOD BY AUTOMATED COUNT: 15.7 % (ref 11.5–14.5)
ERYTHROCYTE [DISTWIDTH] IN BLOOD BY AUTOMATED COUNT: 53.2 FL (ref 35–45)
GFR SERPL CREATININE-BSD FRML MDRD: 61 ML/MIN/1.73M2
GLUCOSE BLD-MCNC: 128 MG/DL (ref 70–108)
GLUCOSE URINE: NEGATIVE MG/DL
HCT VFR BLD CALC: 29.8 % (ref 42–52)
HEMOGLOBIN: 9.3 GM/DL (ref 14–18)
ICTOTEST: NEGATIVE
IMMATURE GRANS (ABS): 0.07 THOU/MM3 (ref 0–0.07)
IMMATURE GRANULOCYTES: 0.5 %
INR BLD: 1.81 (ref 0.85–1.13)
KETONES, URINE: 15
LACTIC ACID, SEPSIS: 0.9 MMOL/L (ref 0.5–1.9)
LACTIC ACID, SEPSIS: 1.1 MMOL/L (ref 0.5–1.9)
LACTIC ACID, SEPSIS: 1.4 MMOL/L (ref 0.5–1.9)
LEUKOCYTE ESTERASE, URINE: ABNORMAL
LYMPHOCYTES # BLD: 4.2 %
LYMPHOCYTES ABSOLUTE: 0.6 THOU/MM3 (ref 1–4.8)
MAGNESIUM: 2.4 MG/DL (ref 1.6–2.4)
MCH RBC QN AUTO: 29 PG (ref 26–33)
MCHC RBC AUTO-ENTMCNC: 31.2 GM/DL (ref 32.2–35.5)
MCV RBC AUTO: 92.8 FL (ref 80–94)
MONOCYTES # BLD: 9.4 %
MONOCYTES ABSOLUTE: 1.3 THOU/MM3 (ref 0.4–1.3)
NITRITE, URINE: NEGATIVE
NUCLEATED RED BLOOD CELLS: 0 /100 WBC
OSMOLALITY CALCULATION: 278.8 MOSMOL/KG (ref 275–300)
PH UA: 5.5 (ref 5–9)
PLATELET # BLD: 241 THOU/MM3 (ref 130–400)
PMV BLD AUTO: 8.6 FL (ref 9.4–12.4)
POTASSIUM SERPL-SCNC: 3.9 MEQ/L (ref 3.5–5.2)
PRO-BNP: 203.6 PG/ML (ref 0–900)
PROCALCITONIN: 7.53 NG/ML (ref 0.01–0.09)
PROTEIN UA: >= 300
RBC # BLD: 3.21 MILL/MM3 (ref 4.7–6.1)
RBC URINE: ABNORMAL /HPF
RENAL EPITHELIAL, UA: ABNORMAL
SEG NEUTROPHILS: 85.6 %
SEGMENTED NEUTROPHILS ABSOLUTE COUNT: 12 THOU/MM3 (ref 1.8–7.7)
SODIUM BLD-SCNC: 133 MEQ/L (ref 135–145)
SPECIFIC GRAVITY, URINE: 1.02 (ref 1–1.03)
TOTAL PROTEIN: 7.2 G/DL (ref 6.1–8)
TOTAL PROTEIN: 7.4 G/DL (ref 6.1–8)
TROPONIN T: < 0.01 NG/ML
TROPONIN T: < 0.01 NG/ML
UROBILINOGEN, URINE: 1 EU/DL (ref 0–1)
WBC # BLD: 14 THOU/MM3 (ref 4.8–10.8)
WBC UA: ABNORMAL /HPF
YEAST: ABNORMAL

## 2019-06-10 PROCEDURE — 2580000003 HC RX 258: Performed by: PHYSICIAN ASSISTANT

## 2019-06-10 PROCEDURE — 99285 EMERGENCY DEPT VISIT HI MDM: CPT

## 2019-06-10 PROCEDURE — 87086 URINE CULTURE/COLONY COUNT: CPT

## 2019-06-10 PROCEDURE — 83735 ASSAY OF MAGNESIUM: CPT

## 2019-06-10 PROCEDURE — 83605 ASSAY OF LACTIC ACID: CPT

## 2019-06-10 PROCEDURE — 87184 SC STD DISK METHOD PER PLATE: CPT

## 2019-06-10 PROCEDURE — 85730 THROMBOPLASTIN TIME PARTIAL: CPT

## 2019-06-10 PROCEDURE — 6360000004 HC RX CONTRAST MEDICATION: Performed by: PHYSICIAN ASSISTANT

## 2019-06-10 PROCEDURE — 81001 URINALYSIS AUTO W/SCOPE: CPT

## 2019-06-10 PROCEDURE — 6360000002 HC RX W HCPCS: Performed by: PHYSICIAN ASSISTANT

## 2019-06-10 PROCEDURE — 2500000003 HC RX 250 WO HCPCS: Performed by: INTERNAL MEDICINE

## 2019-06-10 PROCEDURE — 99223 1ST HOSP IP/OBS HIGH 75: CPT | Performed by: INTERNAL MEDICINE

## 2019-06-10 PROCEDURE — 80048 BASIC METABOLIC PNL TOTAL CA: CPT

## 2019-06-10 PROCEDURE — 93005 ELECTROCARDIOGRAM TRACING: CPT | Performed by: PHYSICIAN ASSISTANT

## 2019-06-10 PROCEDURE — 36415 COLL VENOUS BLD VENIPUNCTURE: CPT

## 2019-06-10 PROCEDURE — 80076 HEPATIC FUNCTION PANEL: CPT

## 2019-06-10 PROCEDURE — 6360000002 HC RX W HCPCS: Performed by: INTERNAL MEDICINE

## 2019-06-10 PROCEDURE — 85610 PROTHROMBIN TIME: CPT

## 2019-06-10 PROCEDURE — 2709999900 HC NON-CHARGEABLE SUPPLY

## 2019-06-10 PROCEDURE — 71275 CT ANGIOGRAPHY CHEST: CPT

## 2019-06-10 PROCEDURE — 87077 CULTURE AEROBIC IDENTIFY: CPT

## 2019-06-10 PROCEDURE — 70450 CT HEAD/BRAIN W/O DYE: CPT

## 2019-06-10 PROCEDURE — 83880 ASSAY OF NATRIURETIC PEPTIDE: CPT

## 2019-06-10 PROCEDURE — 93010 ELECTROCARDIOGRAM REPORT: CPT | Performed by: NUCLEAR MEDICINE

## 2019-06-10 PROCEDURE — 82140 ASSAY OF AMMONIA: CPT

## 2019-06-10 PROCEDURE — 84145 PROCALCITONIN (PCT): CPT

## 2019-06-10 PROCEDURE — 87040 BLOOD CULTURE FOR BACTERIA: CPT

## 2019-06-10 PROCEDURE — 6370000000 HC RX 637 (ALT 250 FOR IP): Performed by: INTERNAL MEDICINE

## 2019-06-10 PROCEDURE — 85025 COMPLETE CBC W/AUTO DIFF WBC: CPT

## 2019-06-10 PROCEDURE — 2140000000 HC CCU INTERMEDIATE R&B

## 2019-06-10 PROCEDURE — 2580000003 HC RX 258: Performed by: INTERNAL MEDICINE

## 2019-06-10 PROCEDURE — 84484 ASSAY OF TROPONIN QUANT: CPT

## 2019-06-10 PROCEDURE — 87186 SC STD MICRODIL/AGAR DIL: CPT

## 2019-06-10 RX ORDER — ERGOCALCIFEROL (VITAMIN D2) 1250 MCG
50000 CAPSULE ORAL WEEKLY
Status: DISCONTINUED | OUTPATIENT
Start: 2019-06-10 | End: 2019-06-10

## 2019-06-10 RX ORDER — SODIUM CHLORIDE 0.9 % (FLUSH) 0.9 %
10 SYRINGE (ML) INJECTION PRN
Status: DISCONTINUED | OUTPATIENT
Start: 2019-06-10 | End: 2019-06-20 | Stop reason: HOSPADM

## 2019-06-10 RX ORDER — MULTIVITAMIN WITH FOLIC ACID 400 MCG
1 TABLET ORAL DAILY
Status: DISCONTINUED | OUTPATIENT
Start: 2019-06-11 | End: 2019-06-20 | Stop reason: HOSPADM

## 2019-06-10 RX ORDER — ACETIC ACID 0.25 G/100ML
IRRIGANT IRRIGATION 2 TIMES DAILY
Status: DISCONTINUED | OUTPATIENT
Start: 2019-06-10 | End: 2019-06-20 | Stop reason: HOSPADM

## 2019-06-10 RX ORDER — POTASSIUM CHLORIDE 750 MG/1
10 TABLET, FILM COATED, EXTENDED RELEASE ORAL DAILY
Status: DISCONTINUED | OUTPATIENT
Start: 2019-06-11 | End: 2019-06-18

## 2019-06-10 RX ORDER — LACTOBACILLUS RHAMNOSUS GG 10B CELL
1 CAPSULE ORAL 2 TIMES DAILY WITH MEALS
Status: DISCONTINUED | OUTPATIENT
Start: 2019-06-10 | End: 2019-06-10 | Stop reason: SDUPTHER

## 2019-06-10 RX ORDER — LACTOBACILLUS RHAMNOSUS GG 10B CELL
1 CAPSULE ORAL DAILY
Status: DISCONTINUED | OUTPATIENT
Start: 2019-06-11 | End: 2019-06-20 | Stop reason: HOSPADM

## 2019-06-10 RX ORDER — AMOXICILLIN AND CLAVULANATE POTASSIUM 500; 125 MG/1; MG/1
1 TABLET, FILM COATED ORAL 3 TIMES DAILY
Status: ON HOLD | COMMUNITY
End: 2019-06-20 | Stop reason: HOSPADM

## 2019-06-10 RX ORDER — GINSENG 100 MG
CAPSULE ORAL DAILY
Status: DISCONTINUED | OUTPATIENT
Start: 2019-06-10 | End: 2019-06-20 | Stop reason: HOSPADM

## 2019-06-10 RX ORDER — SODIUM CHLORIDE 9 MG/ML
INJECTION, SOLUTION INTRAVENOUS CONTINUOUS
Status: DISCONTINUED | OUTPATIENT
Start: 2019-06-10 | End: 2019-06-10 | Stop reason: HOSPADM

## 2019-06-10 RX ORDER — LINEZOLID 2 MG/ML
600 INJECTION, SOLUTION INTRAVENOUS EVERY 12 HOURS
Status: DISCONTINUED | OUTPATIENT
Start: 2019-06-10 | End: 2019-06-11

## 2019-06-10 RX ORDER — ASCORBIC ACID 500 MG
500 TABLET ORAL DAILY
Status: DISCONTINUED | OUTPATIENT
Start: 2019-06-11 | End: 2019-06-20 | Stop reason: HOSPADM

## 2019-06-10 RX ORDER — ACETAMINOPHEN 325 MG/1
650 TABLET ORAL EVERY 4 HOURS PRN
Status: DISCONTINUED | OUTPATIENT
Start: 2019-06-10 | End: 2019-06-10

## 2019-06-10 RX ORDER — VITAMIN E 268 MG
400 CAPSULE ORAL DAILY
Status: DISCONTINUED | OUTPATIENT
Start: 2019-06-11 | End: 2019-06-20 | Stop reason: HOSPADM

## 2019-06-10 RX ORDER — OXYBUTYNIN CHLORIDE 5 MG/1
5 TABLET, EXTENDED RELEASE ORAL 2 TIMES DAILY
Status: DISCONTINUED | OUTPATIENT
Start: 2019-06-10 | End: 2019-06-20

## 2019-06-10 RX ORDER — SODIUM CHLORIDE 0.9 % (FLUSH) 0.9 %
10 SYRINGE (ML) INJECTION EVERY 12 HOURS SCHEDULED
Status: DISCONTINUED | OUTPATIENT
Start: 2019-06-10 | End: 2019-06-20 | Stop reason: HOSPADM

## 2019-06-10 RX ORDER — OYSTER SHELL CALCIUM WITH VITAMIN D 500; 200 MG/1; [IU]/1
1 TABLET, FILM COATED ORAL 2 TIMES DAILY
Status: DISCONTINUED | OUTPATIENT
Start: 2019-06-10 | End: 2019-06-20 | Stop reason: HOSPADM

## 2019-06-10 RX ORDER — FAMOTIDINE 20 MG/1
20 TABLET, FILM COATED ORAL 2 TIMES DAILY
Status: DISCONTINUED | OUTPATIENT
Start: 2019-06-10 | End: 2019-06-20 | Stop reason: HOSPADM

## 2019-06-10 RX ORDER — 0.9 % SODIUM CHLORIDE 0.9 %
500 INTRAVENOUS SOLUTION INTRAVENOUS ONCE
Status: COMPLETED | OUTPATIENT
Start: 2019-06-10 | End: 2019-06-10

## 2019-06-10 RX ORDER — ACETAMINOPHEN 325 MG/1
650 TABLET ORAL EVERY 4 HOURS PRN
Status: DISCONTINUED | OUTPATIENT
Start: 2019-06-10 | End: 2019-06-20 | Stop reason: HOSPADM

## 2019-06-10 RX ORDER — DOCUSATE SODIUM 100 MG/1
100 CAPSULE, LIQUID FILLED ORAL DAILY
Status: DISCONTINUED | OUTPATIENT
Start: 2019-06-11 | End: 2019-06-20 | Stop reason: HOSPADM

## 2019-06-10 RX ORDER — FLUOXETINE HYDROCHLORIDE 20 MG/1
20 CAPSULE ORAL DAILY
Status: DISCONTINUED | OUTPATIENT
Start: 2019-06-10 | End: 2019-06-10 | Stop reason: ALTCHOICE

## 2019-06-10 RX ORDER — TIZANIDINE 4 MG/1
2 TABLET ORAL 3 TIMES DAILY
Status: DISCONTINUED | OUTPATIENT
Start: 2019-06-10 | End: 2019-06-20 | Stop reason: HOSPADM

## 2019-06-10 RX ORDER — ASCORBIC ACID 500 MG
500 TABLET ORAL DAILY
Status: DISCONTINUED | OUTPATIENT
Start: 2019-06-10 | End: 2019-06-10 | Stop reason: SDUPTHER

## 2019-06-10 RX ORDER — SODIUM CHLORIDE 9 MG/ML
INJECTION, SOLUTION INTRAVENOUS CONTINUOUS
Status: DISCONTINUED | OUTPATIENT
Start: 2019-06-10 | End: 2019-06-10

## 2019-06-10 RX ADMIN — CEFTRIAXONE SODIUM 1 G: 1 INJECTION, POWDER, FOR SOLUTION INTRAMUSCULAR; INTRAVENOUS at 16:25

## 2019-06-10 RX ADMIN — CALCIUM CARBONATE-VITAMIN D TAB 500 MG-200 UNIT 1 TABLET: 500-200 TAB at 20:28

## 2019-06-10 RX ADMIN — ACETAMINOPHEN 650 MG: 325 TABLET ORAL at 23:05

## 2019-06-10 RX ADMIN — SODIUM CHLORIDE: 9 INJECTION, SOLUTION INTRAVENOUS at 15:09

## 2019-06-10 RX ADMIN — LINEZOLID 600 MG: 600 INJECTION, SOLUTION INTRAVENOUS at 21:16

## 2019-06-10 RX ADMIN — ACETIC ACID: 250 IRRIGANT IRRIGATION at 21:16

## 2019-06-10 RX ADMIN — SODIUM CHLORIDE 500 ML: 9 INJECTION, SOLUTION INTRAVENOUS at 12:15

## 2019-06-10 RX ADMIN — MEROPENEM 1 G: 1 INJECTION, POWDER, FOR SOLUTION INTRAVENOUS at 19:52

## 2019-06-10 RX ADMIN — IOPAMIDOL 80 ML: 755 INJECTION, SOLUTION INTRAVENOUS at 14:07

## 2019-06-10 RX ADMIN — BACITRACIN: 500 OINTMENT TOPICAL at 20:28

## 2019-06-10 RX ADMIN — SODIUM CHLORIDE 500 ML: 9 INJECTION, SOLUTION INTRAVENOUS at 14:30

## 2019-06-10 RX ADMIN — OXYBUTYNIN 5 MG: 5 TABLET, FILM COATED, EXTENDED RELEASE ORAL at 20:28

## 2019-06-10 RX ADMIN — FAMOTIDINE 20 MG: 20 TABLET, FILM COATED ORAL at 20:28

## 2019-06-10 RX ADMIN — TIZANIDINE 2 MG: 4 TABLET ORAL at 20:27

## 2019-06-10 ASSESSMENT — ENCOUNTER SYMPTOMS
SORE THROAT: 0
NAUSEA: 0
BACK PAIN: 0
DIARRHEA: 0
RHINORRHEA: 0
COLOR CHANGE: 0
SHORTNESS OF BREATH: 0
ABDOMINAL PAIN: 0
EYE DISCHARGE: 0
VOMITING: 0
WHEEZING: 0
COUGH: 1
CONSTIPATION: 0
EYE REDNESS: 0

## 2019-06-10 ASSESSMENT — PAIN DESCRIPTION - LOCATION: LOCATION: BACK

## 2019-06-10 ASSESSMENT — PAIN SCALES - GENERAL
PAINLEVEL_OUTOF10: 2
PAINLEVEL_OUTOF10: 0

## 2019-06-10 ASSESSMENT — PAIN DESCRIPTION - PAIN TYPE: TYPE: ACUTE PAIN

## 2019-06-10 ASSESSMENT — PAIN DESCRIPTION - ORIENTATION: ORIENTATION: UPPER

## 2019-06-10 NOTE — ED NOTES
Bed: 008A  Expected date: 6/10/19  Expected time: 11:41 AM  Means of arrival: [facility]  Comments:     Boone Michaels RN  06/10/19 1140

## 2019-06-10 NOTE — H&P
History & Physical        Patient:  Zoila Cheney  YOB: 1955    MRN: [id number]     Acct: [de-identified]    PCP: Tj Keita MD    Date of Admission: 6/10/2019    Date of Service: Pt seen/examined on 6/10/2019   and Admitted to Inpatient with expected LOS greater than two midnights due to medical therapy. Chief Complaint:   Chief Complaint   Patient presents with    Loss of Consciousness       History Of Present Illness:      66 y.o. male who presented to [facility] with reported loss of consciousness. History is limited. Patient resides at [facility]. Patient has history of MS. It is reported that patient is weak, hypotensive, and had LOC. Denies chest pain and shortness of breath. Denies N/V/D/C. Patient evaluated in ED and found to be hypotensive with BP 80/58 and septic. Patient admitted at this time. Past Medical History:          Diagnosis Date    Dysphagia     oropharyngeal    History of pulmonary embolism     History of urinary tract infection     Hx of blood clots     Pulmonary embolis    Hypertension     Major depressive disorder, single episode     MS (multiple sclerosis) ([facility] [address]     Neurogenic bladder feb. 2012    Dr. Armond Dickerson placed cather    [facility])     UTI (urinary tract infection)        Past Surgical History:          Procedure Laterality Date    ANKLE SURGERY  1996    broken ankle    BLADDER SURGERY  2-    Suprapubic catheter placement    COLONOSCOPY      ME LAP,SURG,COLECTOMY,W/END COLOST & CLOSUR N/A 6/13/2018    ROBOT DIVERTING COLOSTOMY performed by Mignon Dunn MD at [facility]       Medications Prior to Admission:      Prior to Admission medications    Medication Sig Start Date End Date Taking?  Authorizing Provider   Ascorbic Acid (VITAMIN C PO) Take 500 mg by mouth daily    Yes Historical Provider, MD   amoxicillin-clavulanate (AUGMENTIN) 500-125 MG per tablet Take 1 tablet by mouth 3 times daily Started taking on 6/8/19. End date is 6/15/19   Yes Historical Provider, MD   Probiotic Product (ADWOA-BID PROBIOTIC PO) Take 1 tablet by mouth 2 times daily    Yes Historical Provider, MD   bacitracin 500 UNIT/GM ointment Apply topically daily suprapubic cath site   Yes Historical Provider, MD   lisinopril (PRINIVIL;ZESTRIL) 10 MG tablet Take 1 tablet by mouth daily . hold if SBP less than 110 mm hg 11/17/18  Yes Bruno Rubi MD   calcium-vitamin D (Mayford Fuentes) 500-200 MG-UNIT per tablet Take 1 tablet by mouth 2 times daily 11/16/18  Yes Bruno Rubi MD   Cranberry-Vitamin C-Inulin (UTI-STAT PO) Take 30 mLs by mouth 3 times daily   Yes Historical Provider, MD   potassium chloride (MICRO-K) 10 MEQ extended release capsule Take 10 mEq by mouth daily   Yes Historical Provider, MD   ACETIC ACID IR 2 % Indications: use 60ml via irrigation every evening and night shift for catheter patency.     Yes Historical Provider, MD   famotidine (PEPCID) 20 MG tablet Take 1 tablet by mouth 2 times daily 6/14/18  Yes Maggy Red MD   FLUoxetine (PROZAC) 10 MG capsule Take 20 mg by mouth daily    Yes Historical Provider, MD   rivaroxaban (XARELTO) 20 MG TABS tablet Take 1 tablet by mouth Daily with supper 1/23/18  Yes Suleiman Caro MD   docusate sodium (COLACE) 100 MG capsule Take 100 mg by mouth daily    Yes Historical Provider, MD   oxybutynin (DITROPAN-XL) 10 MG extended release tablet Take 5 mg by mouth 2 times daily For bladder spasms   Yes Historical Provider, MD   tiZANidine (ZANAFLEX) 2 MG tablet Take 2 mg by mouth 3 times daily  12/15/16  Yes Historical Provider, MD   ascorbic acid (VITAMIN C) 500 MG tablet Take 1 tablet by mouth daily 3/28/16  Yes Marianna Saravia MD   Multiple Vitamin (MULTIVITAMIN) tablet Take 1 tablet by mouth daily 3/28/16  Yes Marianna Saravia MD   vitamin A 96026 UNITS capsule Take 2 capsules by mouth daily 3/28/16  Yes Marianna Saravia MD   vitamin E 400 UNIT capsule Take 1 capsule by mouth daily 3/28/16  Yes Vladimir Whaley MD   Acetaminophen (TYLENOL) 325 MG CAPS Take 325 mg by mouth Give 2 tablets by mouth every 4 hours as needed for Temp 100 or above    Historical Provider, MD   vitamin D (ERGOCALCIFEROL) 51287 units capsule Take 1 capsule by mouth once a week for 8 doses 11/23/18 1/12/19  Cottie Councilman, MD       Allergies:  Patient has no known allergies. Social History:      The patient currently lives [facility]:   reports that he quit smoking about 37 years ago. He has quit using smokeless tobacco.  ETOH:   reports that he does not drink alcohol. Family History:      Reviewed in detail and negative for DM and CVA. Positive as follows:        Problem Relation Age of Onset    Cancer Mother         Breast    Heart Disease Father 48    Arthritis Sister     Heart Disease Paternal Grandmother 48       Diet:  Diet NPO Effective Now    REVIEW OF SYSTEMS:   Pertinent positives as noted in the HPI. All other systems reviewed and negative. PHYSICAL EXAM:    /67   Pulse 89   Temp 99 °F (37.2 °C) (Oral)   Resp 18   Wt 210 lb (95.3 kg)   SpO2 95%   BMI 30.13 kg/m²     General appearance:  No apparent distress, appears stated age and cooperative. HEENT:  Normal cephalic, atraumatic without obvious deformity. Pupils equal, round, and reactive to light. Extra ocular muscles intact. Conjunctivae/corneas clear. Neck: Supple, with full range of motion. No jugular venous distention. Trachea midline. Respiratory:  Normal respiratory effort. Clear to auscultation, bilaterally without Rales/Wheezes/Rhonchi. Cardiovascular:  Regular rate and rhythm with normal S1/S2 without murmurs, rubs or gallops. Abdomen: Soft, non-tender, non-distended with normal bowel sounds. Musculoskeletal:  No clubbing, cyanosis or edema bilaterally. Full range of motion without deformity. Skin: Skin color, texture, turgor normal.  No rashes or lesions.   Neurologic: Neurovascularly intact without any focal sensory/motor deficits. Cranial nerves: II-XII intact, grossly non-focal. Noted exception to lower extremities as patient has weakness bilaterally  Psychiatric:  Alert and oriented, follows commands  Capillary Refill: Brisk,< 3 seconds   Peripheral Pulses: +2 palpable, equal bilaterally       Labs:     Recent Labs     06/10/19  1232   WBC 14.0*   HGB 9.3*   HCT 29.8*        Recent Labs     06/10/19  1232   *   K 3.9   CL 98   CO2 19*   BUN 43*   CREATININE 1.2   CALCIUM 8.7     Recent Labs     06/10/19  1232   AST 56*   ALT 75*   BILIDIR 0.6*   BILITOT 0.8   ALKPHOS 342*     Recent Labs     06/10/19  1232   INR 1.81*     No results for input(s): Fadumo Diaz in the last 72 hours. Urinalysis:      Lab Results   Component Value Date    NITRU NEGATIVE 06/10/2019    WBCUA 50-75 06/10/2019    WBCUA >200 01/20/2012    BACTERIA FEW 06/10/2019    RBCUA 25-50 06/10/2019    BLOODU LARGE 06/10/2019    SPECGRAV 1.019 11/13/2018    GLUCOSEU NEGATIVE 06/10/2019       Radiology:         CT Head WO Contrast   Final Result   No acute intracranial findings. **This report has been created using voice recognition software. It may contain minor errors which are inherent in voice recognition technology. **      Final report electronically signed by Dr. Bella Mckeon on 6/10/2019 2:24 PM      CTA Chest W WO Contrast   Final Result      No acute pulmonary embolism. Mild atelectasis in the left lung base. **This report has been created using voice recognition software. It may contain minor errors which are inherent in voice recognition technology. **      Final report electronically signed by Dr. Bella Mckeon on 6/10/2019 2:32 PM               DVT prophylaxis: [] Lovenox                                 [] SCDs                                 [] SQ Heparin                                 [] Encourage ambulation           [x] Already on Anticoagulation    Code Status: Full Code      PT/OT Eval Status: Encouraged, if warranted    Disposition:    [] Home       [] TCU       [] Rehab       [] Psych       [] SNF       [x] [place]       [] Other-    ASSESSMENT:    C/Christina Whitley 1106 Problems    Diagnosis Date Noted    [facility] [A41.9]      Priority: High    Electrolyte imbalance [E87.8] 06/10/2019    Elevated transaminase level [R74.0] 06/10/2019    Anemia [D64.9] 06/10/2019    UTI (urinary tract infection) [N39.0] 06/10/2019    Chronic depression [F32.9]     Essential hypertension [I10]     History of pulmonary embolism [Z86.711]     Dehydration [E86.0]     Malnutrition ([facility] [address] [E46] 12/17/2012    Multiple sclerosis ([facility] [address] Chloe Many 02/14/2012       PLAN:    Admit to Telemetry Unit  Diet advance as tolerated  IVF hydration as tolerated  Analgesics PRN  Antiemetics PRN  DVT prophylaxis  PT/OT encouraged, if warranted  IS  Troponin trend  EKG in AM  Neurolgy consult, apprec chart recs  Empiric antibiotics  Cultures pending  Will need to re-evaluate home medications in morning  Continue to monitor closely         Thank you Erik Shane MD for the opportunity to be involved in this patient's care.     Electronically signed by La Osorio DO on 6/10/2019 at 6:07 PM

## 2019-06-10 NOTE — ED TRIAGE NOTES
Patient comes to the ED from the [facility]. States he has not been feeling well for the past several days. Currently being treated per UTI. Pt has indwelling catheter. Patient states he remembers waking up in his room. Per report. Patient had a syncopal episode. Report states Hypotensive. On arrival to room patient is alert and oriented. Denies pain. Patient placed on O2 per Sat 91%. IV placed with fluids per BP 80/58. Call light in place.   Pt updated for plan of care

## 2019-06-10 NOTE — PROGRESS NOTES
I talked to Jaclyn Vásquez, nurse from [facility]. Updated her about what we were doing for patient. Usually uses whit lift and motorized wheelchair, eats normal food. Does not usually wear teeth, pills whole with water. Wound on right buttocks, irrigating with normal saline, patting dry, optacel ag to wound bed and cut to size, packed it. Skin prep around area, cover with 2x2 and secured with tape. Flush suprapubic cath 2 times/day acetic acid solution. MS follows with dr. Emilie Guevara for it. Colostomy, harder stool.      Greyson's nurse: Jaclyn Vásquez [phone]

## 2019-06-10 NOTE — ED NOTES
Pt transported to [facility] on cart in stable condition. Floor contacted before transport. Spoke with Maty Guzman.      Krista Bellamy  06/10/19 [id number]

## 2019-06-10 NOTE — ED NOTES
Checked on patient. Repositioned for comfort. Patient has family to the bedside. Patient is alert and oriented. Denies pain. IV infusing without complication. VS as documented.   SR up x2     Fatmata Baca RN  06/10/19 1920 contact guard

## 2019-06-10 NOTE — ED PROVIDER NOTES
[address] [facility] emergency department encounter      [address] [facility]       Chief Complaint   Patient presents with    Loss of Consciousness       Nurses Notes reviewed and I agreeexcept as noted in the HPI. HISTORY OF PRESENT ILLNESS    Samira Fields is a 66 y.o. male with a past medical history of PE, UTI, HTN, and MS. Patient presents to the ED via EMS from his nursing home for evaluation after a syncopal episode today. Patient states that he was diagnosed with a UTI 2 days ago and was started on Augmentin. He states that he has been laying in bed due to not feeling well after being diagnosed with the UTI. He states that he felt better today so he went to eat in the dinning room. Patient states that he then had a syncopal episode in his wheelchair. Patient denies having headache, dizziness, or lightheadedness before the syncopal episode. He states that he lost consciousness for only a few seconds. When they got him back to his room, patient was reportedly hypotensive but alert. His BP here on arrival was 80/57 and then 77/56. Patient reports associated mild cough. He denies headache, dizziness, lightheadedness, chest pain, shortness of breath, abdominal pain, vision changes, hearing changes, fever, or new weakness, numbness, or tingling. No further complaints at the time of the initial encounter. REVIEW OF SYSTEMS      Review of Systems   Constitutional: Negative for chills, fatigue and fever. HENT: Negative for congestion, ear pain, rhinorrhea and sore throat. Eyes: Negative for discharge and redness. Respiratory: Positive for cough (mild). Negative for shortness of breath and wheezing. Cardiovascular: Negative for chest pain and palpitations. Gastrointestinal: Negative for abdominal pain, constipation, diarrhea, nausea and vomiting. Genitourinary: Negative for decreased urine volume, difficulty urinating and dysuria.    Musculoskeletal: Negative for arthralgias, back pain, shift for catheter patency. famotidine (PEPCID) 20 MG tablet Take 1 tablet by mouth 2 times daily  Qty: 60 tablet, Refills: 3      FLUoxetine (PROZAC) 10 MG capsule Take 20 mg by mouth daily       rivaroxaban (XARELTO) 20 MG TABS tablet Take 1 tablet by mouth Daily with supper  Qty: 30 tablet, Refills: 0      docusate sodium (COLACE) 100 MG capsule Take 100 mg by mouth daily       oxybutynin (DITROPAN-XL) 10 MG extended release tablet Take 5 mg by mouth 2 times daily For bladder spasms      tiZANidine (ZANAFLEX) 2 MG tablet Take 2 mg by mouth 3 times daily       !! ascorbic acid (VITAMIN C) 500 MG tablet Take 1 tablet by mouth daily  Qty: 30 tablet, Refills: 3      Multiple Vitamin (MULTIVITAMIN) tablet Take 1 tablet by mouth daily  Refills: 0      vitamin A 43974 UNITS capsule Take 2 capsules by mouth daily  Qty: 30 capsule, Refills: 3      vitamin E 400 UNIT capsule Take 1 capsule by mouth daily  Qty: 30 capsule, Refills: 3      Acetaminophen (TYLENOL) 325 MG CAPS Take 325 mg by mouth Give 2 tablets by mouth every 4 hours as needed for Temp 100 or above      vitamin D (ERGOCALCIFEROL) 32267 units capsule Take 1 capsule by mouth once a week for 8 doses  Qty: 30 capsule       !! - Potential duplicate medications found. Please discuss with provider. ALLERGIES     has No Known Allergies. FAMILY HISTORY     indicated that his mother is . He indicated that his father is . He indicated that his sister is alive. He indicated that the status of his paternal grandmother is unknown.   family history includes Arthritis in his sister; Cancer in his mother; Heart Disease (age of onset: 48) in his father and paternal grandmother. SOCIAL HISTORY    reports that he quit smoking about 37 years ago. He has quit using smokeless tobacco. He reports that he does not drink alcohol or use drugs. PHYSICAL EXAM     INITIAL VITALS:  height is 5' 7\" (1.702 m) and weight is 209 lb 11.2 oz (95.1 kg).  His oral temperature is 99.8 °F (37.7 °C). His blood pressure is 142/77 (abnormal) and his pulse is 114. His respiration is 17 and oxygen saturation is 92%. Physical Exam   Constitutional: He is oriented to person, place, and time. He appears well-developed and well-nourished. No distress. HENT:   Head: Normocephalic and atraumatic. Right Ear: External ear normal.   Left Ear: External ear normal.   Nose: Nose normal.   Mouth/Throat: Oropharynx is clear and moist.   Eyes: Pupils are equal, round, and reactive to light. Conjunctivae and EOM are normal. Right eye exhibits no discharge. Left eye exhibits no discharge. No scleral icterus. Neck: Normal range of motion. Neck supple. Cardiovascular: Normal rate, regular rhythm and normal heart sounds. Exam reveals no gallop and no friction rub. No murmur heard. Pulmonary/Chest: Effort normal and breath sounds normal. No stridor. No respiratory distress. He has no wheezes. He has no rales. He exhibits no tenderness. Abdominal: Soft. Bowel sounds are normal. He exhibits no distension and no mass. There is no tenderness. There is no rebound and no guarding. No hernia. Musculoskeletal: Normal range of motion. He exhibits no edema. Lymphadenopathy:     He has no cervical adenopathy. Neurological: He is alert and oriented to person, place, and time. No cranial nerve deficit. He exhibits normal muscle tone. Coordination normal. GCS eye subscore is 4. GCS verbal subscore is 5. GCS motor subscore is 6. There were no noted cranial nerve or focal neurologic deficits. Cranial nerves II through XII are grossly intact. Skin: Skin is warm and dry. No rash noted. He is not diaphoretic. No erythema. No pallor. Psychiatric: He has a normal mood and affect. His behavior is normal. Thought content normal.   Nursing note and vitals reviewed.             DIFFERENTIAL DIAGNOSIS:   Including but not limited to: UTI, sepsis, hypotension, anemia, CVA, TIA    DIAGNOSTIC RESULTS Direct 0.6 (*)     Alkaline Phosphatase 342 (*)     AST 56 (*)     ALT 75 (*)     All other components within normal limits   URINE WITH REFLEXED MICRO - Abnormal; Notable for the following components:    Bilirubin Urine SMALL (*)     Ketones, Urine 15 (*)     Blood, Urine LARGE (*)     Protein, UA >= 300 (*)     Leukocyte Esterase, Urine MODERATE (*)     Character, Urine TURBID (*)     All other components within normal limits   GLOMERULAR FILTRATION RATE, ESTIMATED - Abnormal; Notable for the following components:    Est, Glom Filt Rate 61 (*)     All other components within normal limits   PROCALCITONIN - Abnormal; Notable for the following components:    Procalcitonin 7.53 (*)     All other components within normal limits   HEPATIC FUNCTION PANEL - Abnormal; Notable for the following components:    Alb 2.4 (*)     Bilirubin, Direct 0.4 (*)     Alkaline Phosphatase 327 (*)     AST 45 (*)     All other components within normal limits   PROTIME-INR - Abnormal; Notable for the following components:    INR 1.37 (*)     All other components within normal limits   CBC WITH AUTO DIFFERENTIAL - Abnormal; Notable for the following components:    RBC 3.44 (*)     Hemoglobin 10.0 (*)     Hematocrit 31.7 (*)     MCHC 31.5 (*)     RDW-CV 15.9 (*)     RDW-SD 53.1 (*)     MPV 8.8 (*)     Segs Absolute 9.2 (*)     Lymphocytes # 0.3 (*)     Monocytes # 0.3 (*)     All other components within normal limits   CALCIUM, IONIZED - Abnormal; Notable for the following components:    Calcium, Ion 1.02 (*)     All other components within normal limits   PHOSPHORUS - Abnormal; Notable for the following components:    Phosphorus 1.9 (*)     All other components within normal limits   BASIC METABOLIC PANEL W/ REFLEX TO MG FOR LOW K - Abnormal; Notable for the following components:    Sodium 132 (*)     Chloride 97 (*)     CO2 18 (*)     Glucose 123 (*)     BUN 38 (*)     All other components within normal limits   ANION GAP - Abnormal; Notable for the following components:    Anion Gap 17.0 (*)     All other components within normal limits   BASIC METABOLIC PANEL W/ REFLEX TO MG FOR LOW K - Abnormal; Notable for the following components:    Sodium 131 (*)     CO2 20 (*)     Glucose 133 (*)     Calcium 8.2 (*)     All other components within normal limits   CBC - Abnormal; Notable for the following components:    WBC 12.6 (*)     RBC 3.11 (*)     Hemoglobin 8.8 (*)     Hematocrit 27.0 (*)     RDW-CV 15.8 (*)     RDW-SD 50.1 (*)     MPV 9.3 (*)     All other components within normal limits   COMPREHENSIVE METABOLIC PANEL - Abnormal; Notable for the following components:    AST 41 (*)     Alkaline Phosphatase 386 (*)     Alb 2.3 (*)     All other components within normal limits   PHOSPHORUS - Abnormal; Notable for the following components:    Phosphorus 2.0 (*)     All other components within normal limits   VANCOMYCIN, TROUGH - Abnormal; Notable for the following components:    Vancomycin Tr 26.6 (*)     All other components within normal limits   BASIC METABOLIC PANEL W/ REFLEX TO MG FOR LOW K - Abnormal; Notable for the following components:    Sodium 134 (*)     Glucose 113 (*)     All other components within normal limits   CULTURE BLOOD #1    Narrative:     Source: blood-Adult-suboptimal <5.5oz./set volume       Site: Line:          Current   Antibiotics: not stated   CULTURE BLOOD #2    Narrative:     Source: blood-Adult-suboptimal <5.5oz./set volume       Site: Line:          Current   Antibiotics: not stated   CULTURE, VIRAL RESPIRATORY   CULTURE BLOOD #1    Narrative:     Source: blood-Adult-suboptimal <5.5oz./set volume       Site: Peripheral Vein            Current Antibiotics: not stated   CULTURE BLOOD #2    Narrative:     Source: blood-Adult-suboptimal <5.5oz./set volume       Site: Peripheral Vein            Current Antibiotics: not stated   GRAM STAIN   BRAIN NATRIURETIC PEPTIDE   APTT   TROPONIN   MAGNESIUM   LACTATE, SEPSIS Mild atelectasis in the left lung base. CT head without contrast revealed no acute intracranial findings. I explained my proposed course of treatment with the patient, and he was amenable to my admission decision. Dr. Anthony Self, Hospitalist, was consulted and kindly agreed to admit the patient for further evaluation and management. The patient remained stable and maintained stable vital signs until admission to the floor. CRITICAL CARE:   None    CONSULTS:  Discussed the case with my attending physician in the Emergency Department, who agreed with my workup, treatment, and disposition decisions. Dr. Anthony Self, Hospitalist - kindly agreed to admit the patient for further evaluation and management    PROCEDURES:  None    FINAL IMPRESSION      1. Urinary tract infection without hematuria, site unspecified    2. Hypotension, unspecified hypotension type    3. Syncope and collapse          DISPOSITION/PLAN   Admit under Hospitalist services    PATIENT REFERRED TO:  MD Aram Dumont [address]  [address] [facility] [address]  [phone]          CM Yangberg  [address] [address]  [phone]          DISCHARGE MEDICATIONS:     Current Discharge Medication List          (Please note that portions of this note were completed with a voice recognition program.  Efforts were made to edit thedictations but occasionally words are mis-transcribed.)    Scribe:  Ariane Holbrook 6/10/19 12:16 PM Scribing for and in the presence of Tamara Sterling PA-C. Signed by: Taj Sheffield, 6/10/19 2:27 PM    Provider:  Baby Rebecca performed the services described in the documentation, reviewed and edited the documentation which was dictated to the scribe in my presence, and it accurately records my words and actions.     Oscar Bermudez PA-C 06/15/19 2:27 PM    DEIDRE Peguero PA-C  06/15/19 [id number]

## 2019-06-10 NOTE — ED TRIAGE NOTES
Patient resting. Denies needs at this time. Patient is alert and oriented. SR up x2. Call light in reach of the patient.

## 2019-06-11 ENCOUNTER — APPOINTMENT (OUTPATIENT)
Dept: MRI IMAGING | Age: 64
DRG: 662 | End: 2019-06-11
Payer: COMMERCIAL

## 2019-06-11 LAB
ANION GAP SERPL CALCULATED.3IONS-SCNC: 17 MEQ/L (ref 8–16)
APTT: 33.7 SECONDS (ref 22–38)
BASOPHILS # BLD: 0.3 %
BASOPHILS ABSOLUTE: 0 THOU/MM3 (ref 0–0.1)
BUN BLDV-MCNC: 38 MG/DL (ref 7–22)
CALCIUM IONIZED: 1.02 MMOL/L (ref 1.12–1.32)
CALCIUM SERPL-MCNC: 8.7 MG/DL (ref 8.5–10.5)
CHLORIDE BLD-SCNC: 97 MEQ/L (ref 98–111)
CO2: 18 MEQ/L (ref 23–33)
CREAT SERPL-MCNC: 0.9 MG/DL (ref 0.4–1.2)
EKG ATRIAL RATE: 114 BPM
EKG P AXIS: 47 DEGREES
EKG P-R INTERVAL: 128 MS
EKG Q-T INTERVAL: 340 MS
EKG QRS DURATION: 88 MS
EKG QTC CALCULATION (BAZETT): 468 MS
EKG R AXIS: 35 DEGREES
EKG T AXIS: 52 DEGREES
EKG VENTRICULAR RATE: 114 BPM
EOSINOPHIL # BLD: 0 %
EOSINOPHILS ABSOLUTE: 0 THOU/MM3 (ref 0–0.4)
ERYTHROCYTE [DISTWIDTH] IN BLOOD BY AUTOMATED COUNT: 15.9 % (ref 11.5–14.5)
ERYTHROCYTE [DISTWIDTH] IN BLOOD BY AUTOMATED COUNT: 53.1 FL (ref 35–45)
GFR SERPL CREATININE-BSD FRML MDRD: 86 ML/MIN/1.73M2
GLUCOSE BLD-MCNC: 123 MG/DL (ref 70–108)
HCT VFR BLD CALC: 31.7 % (ref 42–52)
HEMOGLOBIN: 10 GM/DL (ref 14–18)
IMMATURE GRANS (ABS): 0.07 THOU/MM3 (ref 0–0.07)
IMMATURE GRANULOCYTES: 0.7 %
INR BLD: 1.37 (ref 0.85–1.13)
LACTIC ACID: 1.3 MMOL/L (ref 0.5–2.2)
LYMPHOCYTES # BLD: 3.1 %
LYMPHOCYTES ABSOLUTE: 0.3 THOU/MM3 (ref 1–4.8)
MAGNESIUM: 2.2 MG/DL (ref 1.6–2.4)
MCH RBC QN AUTO: 29.1 PG (ref 26–33)
MCHC RBC AUTO-ENTMCNC: 31.5 GM/DL (ref 32.2–35.5)
MCV RBC AUTO: 92.2 FL (ref 80–94)
MONOCYTES # BLD: 2.6 %
MONOCYTES ABSOLUTE: 0.3 THOU/MM3 (ref 0.4–1.3)
NUCLEATED RED BLOOD CELLS: 0 /100 WBC
PHOSPHORUS: 1.9 MG/DL (ref 2.4–4.7)
PLATELET # BLD: 207 THOU/MM3 (ref 130–400)
PLATELET ESTIMATE: ADEQUATE
PMV BLD AUTO: 8.8 FL (ref 9.4–12.4)
POTASSIUM REFLEX MAGNESIUM: 3.9 MEQ/L (ref 3.5–5.2)
RBC # BLD: 3.44 MILL/MM3 (ref 4.7–6.1)
SCAN OF BLOOD SMEAR: NORMAL
SEG NEUTROPHILS: 93.3 %
SEGMENTED NEUTROPHILS ABSOLUTE COUNT: 9.2 THOU/MM3 (ref 1.8–7.7)
SODIUM BLD-SCNC: 132 MEQ/L (ref 135–145)
TROPONIN T: < 0.01 NG/ML
WBC # BLD: 9.9 THOU/MM3 (ref 4.8–10.8)

## 2019-06-11 PROCEDURE — 99233 SBSQ HOSP IP/OBS HIGH 50: CPT | Performed by: NURSE PRACTITIONER

## 2019-06-11 PROCEDURE — 93005 ELECTROCARDIOGRAM TRACING: CPT | Performed by: INTERNAL MEDICINE

## 2019-06-11 PROCEDURE — 83735 ASSAY OF MAGNESIUM: CPT

## 2019-06-11 PROCEDURE — 80048 BASIC METABOLIC PNL TOTAL CA: CPT

## 2019-06-11 PROCEDURE — 6360000004 HC RX CONTRAST MEDICATION: Performed by: PSYCHIATRY & NEUROLOGY

## 2019-06-11 PROCEDURE — 2580000003 HC RX 258: Performed by: INTERNAL MEDICINE

## 2019-06-11 PROCEDURE — 36415 COLL VENOUS BLD VENIPUNCTURE: CPT

## 2019-06-11 PROCEDURE — 84100 ASSAY OF PHOSPHORUS: CPT

## 2019-06-11 PROCEDURE — 2140000000 HC CCU INTERMEDIATE R&B

## 2019-06-11 PROCEDURE — 93010 ELECTROCARDIOGRAM REPORT: CPT | Performed by: NUCLEAR MEDICINE

## 2019-06-11 PROCEDURE — 85025 COMPLETE CBC W/AUTO DIFF WBC: CPT

## 2019-06-11 PROCEDURE — 83605 ASSAY OF LACTIC ACID: CPT

## 2019-06-11 PROCEDURE — 70553 MRI BRAIN STEM W/O & W/DYE: CPT

## 2019-06-11 PROCEDURE — A9579 GAD-BASE MR CONTRAST NOS,1ML: HCPCS | Performed by: PSYCHIATRY & NEUROLOGY

## 2019-06-11 PROCEDURE — 82330 ASSAY OF CALCIUM: CPT

## 2019-06-11 PROCEDURE — 2709999900 HC NON-CHARGEABLE SUPPLY

## 2019-06-11 PROCEDURE — 6370000000 HC RX 637 (ALT 250 FOR IP): Performed by: INTERNAL MEDICINE

## 2019-06-11 PROCEDURE — 85610 PROTHROMBIN TIME: CPT

## 2019-06-11 PROCEDURE — 6360000002 HC RX W HCPCS: Performed by: INTERNAL MEDICINE

## 2019-06-11 PROCEDURE — 95819 EEG AWAKE AND ASLEEP: CPT

## 2019-06-11 PROCEDURE — 85730 THROMBOPLASTIN TIME PARTIAL: CPT

## 2019-06-11 PROCEDURE — 99222 1ST HOSP IP/OBS MODERATE 55: CPT | Performed by: PSYCHIATRY & NEUROLOGY

## 2019-06-11 PROCEDURE — 87255 GENET VIRUS ISOLATE HSV: CPT

## 2019-06-11 RX ORDER — ONDANSETRON 2 MG/ML
4 INJECTION INTRAMUSCULAR; INTRAVENOUS EVERY 6 HOURS PRN
Status: DISCONTINUED | OUTPATIENT
Start: 2019-06-11 | End: 2019-06-20 | Stop reason: HOSPADM

## 2019-06-11 RX ADMIN — CALCIUM CARBONATE-VITAMIN D TAB 500 MG-200 UNIT 1 TABLET: 500-200 TAB at 12:54

## 2019-06-11 RX ADMIN — OXYBUTYNIN 5 MG: 5 TABLET, FILM COATED, EXTENDED RELEASE ORAL at 20:34

## 2019-06-11 RX ADMIN — MEROPENEM 1 G: 1 INJECTION, POWDER, FOR SOLUTION INTRAVENOUS at 05:01

## 2019-06-11 RX ADMIN — FAMOTIDINE 20 MG: 20 TABLET, FILM COATED ORAL at 12:54

## 2019-06-11 RX ADMIN — VITAMIN E CAP 400 UNIT 400 UNITS: 400 CAP at 12:53

## 2019-06-11 RX ADMIN — TIZANIDINE 2 MG: 4 TABLET ORAL at 20:33

## 2019-06-11 RX ADMIN — THERA TABS 1 TABLET: TAB at 12:53

## 2019-06-11 RX ADMIN — DOCUSATE SODIUM 100 MG: 100 CAPSULE, LIQUID FILLED ORAL at 12:54

## 2019-06-11 RX ADMIN — ACETIC ACID: 250 IRRIGANT IRRIGATION at 10:34

## 2019-06-11 RX ADMIN — Medication 500 MG: at 12:53

## 2019-06-11 RX ADMIN — CALCIUM CARBONATE-VITAMIN D TAB 500 MG-200 UNIT 1 TABLET: 500-200 TAB at 22:33

## 2019-06-11 RX ADMIN — GADOTERIDOL 15 ML: 279.3 INJECTION, SOLUTION INTRAVENOUS at 09:54

## 2019-06-11 RX ADMIN — Medication 20000 UNITS: at 12:53

## 2019-06-11 RX ADMIN — OXYBUTYNIN 5 MG: 5 TABLET, FILM COATED, EXTENDED RELEASE ORAL at 18:05

## 2019-06-11 RX ADMIN — BACITRACIN: 500 OINTMENT TOPICAL at 10:34

## 2019-06-11 RX ADMIN — ACETAMINOPHEN 650 MG: 325 TABLET ORAL at 20:33

## 2019-06-11 RX ADMIN — RIVAROXABAN 20 MG: 20 TABLET, FILM COATED ORAL at 20:33

## 2019-06-11 RX ADMIN — FAMOTIDINE 20 MG: 20 TABLET, FILM COATED ORAL at 20:33

## 2019-06-11 RX ADMIN — TIZANIDINE 2 MG: 4 TABLET ORAL at 12:53

## 2019-06-11 RX ADMIN — Medication 10 ML: at 12:54

## 2019-06-11 RX ADMIN — POTASSIUM CHLORIDE 10 MEQ: 750 TABLET, FILM COATED, EXTENDED RELEASE ORAL at 12:53

## 2019-06-11 RX ADMIN — Medication 1 CAPSULE: at 12:54

## 2019-06-11 RX ADMIN — Medication 10 ML: at 20:34

## 2019-06-11 RX ADMIN — ACETIC ACID: 250 IRRIGANT IRRIGATION at 20:34

## 2019-06-11 ASSESSMENT — PAIN SCALES - GENERAL
PAINLEVEL_OUTOF10: 0
PAINLEVEL_OUTOF10: 3
PAINLEVEL_OUTOF10: 0

## 2019-06-11 ASSESSMENT — PAIN DESCRIPTION - PAIN TYPE: TYPE: ACUTE PAIN

## 2019-06-11 ASSESSMENT — PAIN DESCRIPTION - ORIENTATION: ORIENTATION: UPPER

## 2019-06-11 ASSESSMENT — PAIN DESCRIPTION - LOCATION: LOCATION: BACK

## 2019-06-11 NOTE — PROGRESS NOTES
Hospitalist Progress Note    Patient:  Marcus Byrnes      Unit/Bed:3B-31/031-A    YOB: 1955    MRN: [id number]       Acct: [de-identified]     PCP: Khari Toro MD    Date of Admission: 6/10/2019    Assessment/Plan:    1. Syncopal episode--see #2; ACS ruled out by 3 (-) troponins; on Xarelto for PE hx; fullay awake and oriented; likely 2nd to hypotension, unable to do orthostatic BP's  2. Metabolic encephalopathy--CT head (-) acute; appreciate neuro input; await MRI brain and EEG; fully awake and alert   3. Sepsis with hypotension, tachycardia, tachypnea, leukocytosis 2nd to UTI (POA) with Providencia stuartii (colony count > 100,000, Proteus mirabilis (colony count > 100,000) likely 2nd to [facility] cath--lactic acid x 4 normal; procal at 7.53; Merrem 6/10; Zyvox 6/10; acetic acid irrigation; will stop Merrem and Zyvox and add Rocephin (S) 6/12; was being tx with Augmentin at [facility]; WBC's normal now; T max 100  4. Azotemia--improving; receiving 0.9 NS at 100 ml/hr; monitor  5. Hyponatremia--monitor  6. Atelectasis LLL--IS~will need assistance  7. Chronic normo anemia--stable  8. Metabolic acidosis--monitor  9. Neurogenic bladder 2nd to MS--follows with Dr Kristofer Samuel and Palma Porras; [facility] changes SP cath every month with last change June 4, 2019 per chart review   10. Chronic Alk Phos elevation  11. Hx PE--on Xarelto  12.  Hx MS--lives at [facility]    Expected discharge date:  TBD    Disposition:    [] Home       [] TCU       [] Rehab       [] Psych       [x] SNF       [] [place]       [] Other-    Chief Complaint: \"I had a syncopal episode\"    Hospital Course: presents to the ED via EMS for evaluation after a syncopal episode; was diagnosed with a UTI 2 days ago and was started on Augmentin June 8; states he has been laying in bed due to not feeling well; felt better on Lora 10 so he went to eat in the dinning room; stated he then had a syncopal episode in his wheelchair; denies having headache, dizziness or lightheadedness before the syncopal episode; when the [facility] staff got him back to his room patient was hypotensive but alert; pressure in ED was 80/57 and then 77/56; reports associated mild cough; denies headache, dizziness, lightheadedness, chest pain, shortness of breath, abdominal pain, vision changes, hearing loss, fever, or new weakness, numbness or tingling; in ED he was given total 1L 0.9 NS bolus; he is currently awake and alert; denies pain; he has a colostomy and SP cath. Subjective (past 24 hours): offers no complaints; ate his breakfast     Medications:  Reviewed    Infusion Medications   Scheduled Medications    sodium chloride flush  10 mL Intravenous 2 times per day    acetic acid   Irrigation BID    vitamin C  500 mg Oral Daily    bacitracin   Topical Daily    calcium-vitamin D  1 tablet Oral BID    docusate sodium  100 mg Oral Daily    famotidine  20 mg Oral BID    multivitamin  1 tablet Oral Daily    oxybutynin  5 mg Oral BID    potassium chloride  10 mEq Oral Daily    lactobacillus  1 capsule Oral Daily    vitamin E  400 Units Oral Daily    vitamin A  20,000 Units Oral Daily    tiZANidine  2 mg Oral TID    rivaroxaban  20 mg Oral Dinner    linezolid  600 mg Intravenous Q12H    meropenem  1 g Intravenous Q8H     PRN Meds: sodium chloride flush, acetaminophen      Intake/Output Summary (Last 24 hours) at 6/11/2019 0624  Last data filed at 6/11/2019 5695  Gross per 24 hour   Intake 617.4 ml   Output 675 ml   Net -57.6 ml       Diet:  DIET GENERAL;    Exam:  BP (!) 102/58   Pulse 104   Temp 98.5 °F (36.9 °C) (Oral)   Resp 16   Ht 5' 7\" (1.702 m)   Wt 205 lb 6.4 oz (93.2 kg)   SpO2 94%   BMI 32.17 kg/m²     General appearance: No apparent distress, appears older than stated age and cooperative. HEENT: Pupils equal, round, and reactive to light. Conjunctivae/corneas clear. Neck: Supple, with full range of motion. No jugular venous distention. Trachea midline. Respiratory:  Normal respiratory effort. Clear to auscultation anterior aspect. Cardiovascular: Regular rate and rhythm with normal S1/S2 without murmurs, rubs or gallops. Abdomen: Soft, non-tender, non-distended with normal bowel sounds. (+) colostomy and SP cath noted  Musculoskeletal: passive and active ROM x 4 extremities; (+) contractures  Skin: Skin color, texture, turgor normal.    Neurologic:  Neurovascularly intact without any focal sensory/motor deficits. Cranial nerves: II-XII intact, grossly non-focal. Hnd grasps firm and equal  Psychiatric: Alert and oriented x 3, thought content appropriate at this time  Capillary Refill: Brisk,< 3 seconds   Peripheral Pulses: +2 palpable, equal bilaterally       Labs:   Recent Labs     06/10/19  1232 06/11/19  0416   WBC 14.0* 9.9   HGB 9.3* 10.0*   HCT 29.8* 31.7*    207     Recent Labs     06/10/19  1232 06/11/19  0416   * 132*   K 3.9 3.9   CL 98 97*   CO2 19* 18*   BUN 43* 38*   CREATININE 1.2 0.9   CALCIUM 8.7 8.7   PHOS  --  1.9*     Recent Labs     06/10/19  1232 06/10/19  2102   AST 56* 45*   ALT 75* 65   BILIDIR 0.6* 0.4*   BILITOT 0.8 0.6   ALKPHOS 342* 327*     Recent Labs     06/10/19  1232 06/11/19  0416   INR 1.81* 1.37*     No results for input(s): Cristian Hayes in the last 72 hours. Microbiology:    Urine cx (-) prelim. Blood cx x 2 (-) prelim. Urinalysis:      Lab Results   Component Value Date    NITRU NEGATIVE 06/10/2019    WBCUA 50-75 06/10/2019    WBCUA >200 01/20/2012    BACTERIA FEW 06/10/2019    RBCUA 25-50 06/10/2019    BLOODU LARGE 06/10/2019    SPECGRAV 1.019 11/13/2018    GLUCOSEU NEGATIVE 06/10/2019       Radiology:    CXR:  There are low lung volumes. Borderline cardiomegaly is stable.     Mild scarring inferiorly on the left appears stable. No gross infiltrate.      Impression  No gross acute infiltrate. CT head:  No acute intracranial findings.  Generalized volume loss and patchy white matter changes which may be related to underlying demyelinating process or small vessel ischemic disease. Findings likely in keeping with known provided history of multiple   sclerosis. There is no acute hemorrhage or midline shift. Ventricles are within normal limits for age. Intracranial vascular calcifications. .  Paranasal sinuses are clear. Mastoid air cells are patent. Skull: Unremarkable. Soft tissues: Unremarkable.     Impression  No acute intracranial findings. CTA chest:  Thyroid gland is unremarkable. Thoracic aorta is normal caliber. The pulmonary arterial vessels are adequately opacified. There is no acute pulmonary arterial embolism. No lymphadenopathy. No cardiomegaly. Patchy atelectasis left lung base. No pleural or pericardial effusion. No acute osseous findings. Osteopenia and degenerative changes thoracic spine. Upper abdominal images are unremarkable.     Impression     No acute pulmonary embolism.     Mild atelectasis in the left lung base.         DVT prophylaxis: [] Lovenox                                 [] SCDs                                 [] SQ Heparin                                 [] Encourage ambulation           [x] Already on Anticoagulation~Xarelto     Code Status: Full Code    PT/OT Eval Status: order    Tele:   [x] yes             [] no    Active Hospital Problems    Diagnosis Date Noted    Electrolyte imbalance [E87.8] 06/10/2019    Elevated transaminase level [R74.0] 06/10/2019    Anemia [D64.9] 06/10/2019    UTI (urinary tract infection) [N39.0] 06/10/2019    Sepsis ([facility] [address] [A41.9]     Chronic depression [F32.9]     Essential hypertension [I10]     History of pulmonary embolism [Z86.711]     Dehydration [E86.0]     Malnutrition ([facility] [address] [E46] 12/17/2012    Multiple sclerosis ([facility] [address] Benjamin Shields 02/14/2012       Electronically signed by TONO Christianson CNP on 6/11/2019 at 6:24 AM

## 2019-06-11 NOTE — PROGRESS NOTES
[facility] Laboratory Technician Worksheet      EEG Date: 2019    Name: Nikolas Cade   : 1955   Age: 66 y.o. SEX: male    ROOM: [facility] MRN: [id number]           CSN: [id number]      Ordering Provider: Piper Lantigua  EEG Number: [id number] Time of Test:  7848    Hand: Right   Sedation: no    H.V. Done: No NOT DONE Photic: Yes    Sleep: Yes  Drowsy: Yes   Sleep Deprived: No    Seizures observed: no    Technician Impression:2    Mentality: lethargic      Clinical History:UTI FOR 2 DAYS PASSED OUT IN CHAIR AT [facility]    Past Medical History:       Diagnosis Date    Dysphagia     oropharyngeal    History of pulmonary embolism     History of urinary tract infection     Hx of blood clots     Pulmonary embolis    Hypertension     Major depressive disorder, single episode     MS (multiple sclerosis) ([facility] [address]     Neurogenic bladder 2012    Dr. BLUE [facility] placed cather    [facility])     UTI (urinary tract infection)        Scheduled Meds:   [START ON 2019] cefTRIAXone (ROCEPHIN) IV  1 g Intravenous Q24H    sodium chloride flush  10 mL Intravenous 2 times per day    acetic acid   Irrigation BID    vitamin C  500 mg Oral Daily    bacitracin   Topical Daily    calcium-vitamin D  1 tablet Oral BID    docusate sodium  100 mg Oral Daily    famotidine  20 mg Oral BID    multivitamin  1 tablet Oral Daily    oxybutynin  5 mg Oral BID    potassium chloride  10 mEq Oral Daily    lactobacillus  1 capsule Oral Daily    vitamin E  400 Units Oral Daily    vitamin A  20,000 Units Oral Daily    tiZANidine  2 mg Oral TID    rivaroxaban  20 mg Oral Dinner     Continuous Infusions:  PRN Meds:.ondansetron, sodium chloride flush, acetaminophen    Technician: Rolando Perez 2019

## 2019-06-11 NOTE — PROGRESS NOTES
Discussed the importance and correct technique of cough and deep breathing exercises. Patient demonstrated deep breathing. Instructed patient on the important usage of incentive spirometry. Instructed patient on how to use, and patient demonstrated via teach back method. Patient was able to reach 850 ml on IS. Repeated x5. Instructed patient that we will do this with our q4hr assessments. Patient verbalized understanding. Pt called this afternoon reporting severe diarrhea after starting metformin and the new insulin Dr Charmayne Malm prescribed. Reports taking metformin XR 1000mg BID, started at the full dose rather than low dose and increasing gradually. We discussed that the GI s/e are due to metformin, not the insulin. I advised her to d/c metformin until the loose stools resolve - then resume at 500mg once a day and gradually work up as tolerated by 500 increments to full 1000mg BID dose. She also said she was planning to crush the pills since they are so large but I advised her not to do that with the XR metformin. She voiced understanding. She had to miss work on account of her symptoms and may require a doctor's note.

## 2019-06-11 NOTE — PROGRESS NOTES
Clinical Pharmacist Note    Patient had urine culture done at [facility] prior to admission but results not transferring to [facility]. From [facility]. Urine culture collected 6/8/19. Final results 6/11/19.      Providencia stuartii (colony count > 100,000)  Sens: ceftriaxone, amikacin, cefoxitin, cefuroxime  Res: gentamicin, tobramycin, cipro, levofloxacin, bactrim     Proteus mirabilis (colony count > 100,000)  Sens: ceftriaxone, amikacin, gentamicin, cefoxitin  Res: cipro, levofloxacin, bactrim   Inter: cefuroxime       Yessenia Samayoa, [place], BCPS, [facility] 6/11/2019 9:30 AM

## 2019-06-11 NOTE — CARE COORDINATION
6/11/19, 1:02 PM    DISCHARGE BARRIERS    SW spoke with patient, he confirmed plans to return to [facility].

## 2019-06-11 NOTE — CARE COORDINATION
6/11/19, 9:53 AM    DISCHARGE BARRIERS     SW attempted to see patient. He is currently off the floor. CECIL spoke with Luba Puckett at [facility], she reports patient is a pre-cert to return.

## 2019-06-11 NOTE — CONSULTS
NEUROLOGY CONSULT NOTE      Requesting Physician: TONO Price *    Reason for Consult:  Evaluate for loss of consciousness      History of Present Illness:  Josefina Hartman is a 66 y.o. male admitted to [facility] on 6/10/2019. ? He has a past medical history of dysphagia, PE, UTI, HTN, and MS. ? Patient presents to the ED via EMS for evaluation after a syncopal episode. Patient was diagnosed with a UTI 2 days ago and was started on Augmentin. He states he has been laying in bed due to not feeling well. He felt better today so he went to eat in the dinning room. Patient states he then had a syncopal episode in his wheelchair. Patient denies having headache, dizziness or lightheadedness before the syncopal episode. When they got him back to his room, patient was hypotensive but alert. His pressure here was 80/57 and then 77/56. Symptoms were moderate and constant. Onset was sudden. Modifiers are he is being treated for UTI. Frequency is 2 events. He has had a similar event in the past and was found to have pulmonary embolism. He is on xarelto. He denies headache, dizziness, lightheadedness, chest pain, shortness of breath, abdominal pain, vision changes, hearing loss, fever, or new weakness, numbness or tingling. ?Reports no chest pain. No shortness of breath with exertion. Reports no neck pain. No vision changes. No dysphagia. No fever. No rash. No weight loss. History provided by patient and review of medical record. Past Medical History:        Diagnosis Date    Dysphagia     oropharyngeal    History of pulmonary embolism     History of urinary tract infection     Hx of blood clots     Pulmonary embolis    Hypertension     Major depressive disorder, single episode     MS [facility] sclerosis) ([facility] [address]     Neurogenic bladder feb. 2012    Dr. Cherelle Alvarez [facility])     UTI (urinary tract infection)            Procedure Laterality Date    ANKLE SURGERY  1996    broken ankle    BLADDER SURGERY  2012    Suprapubic catheter placement    COLONOSCOPY      NE LAP,SURG,COLECTOMY,W/END COLOST & CLOSUR N/A 2018    ROBOT DIVERTING COLOSTOMY performed by Saniya Wagner MD at [address] [facility]  child       Allergies:    No Known Allergies     Current Medications:     ondansetron (ZOFRAN) injection 4 mg Q6H PRN   sodium chloride flush 0.9 % injection 10 mL 2 times per day   sodium chloride flush 0.9 % injection 10 mL PRN   acetaminophen (TYLENOL) tablet 650 mg Q4H PRN   acetic acid 0.25 % irrigation BID   vitamin C (ASCORBIC ACID) tablet 500 mg Daily   bacitracin ointment Daily   calcium-vitamin D (OSCAL-500) 500-200 MG-UNIT per tablet 1 tablet BID   docusate sodium (COLACE) capsule 100 mg Daily   famotidine (PEPCID) tablet 20 mg BID   multivitamin 1 tablet Daily   oxybutynin (DITROPAN-XL) extended release tablet 5 mg BID   potassium chloride (KLOR-CON) extended release tablet 10 mEq Daily   lactobacillus (CULTURELLE) capsule 1 capsule Daily   vitamin E capsule 400 Units Daily   vitamin A capsule 20,000 Units Daily   tiZANidine (ZANAFLEX) tablet 2 mg TID   rivaroxaban (XARELTO) tablet 20 mg Dinner   linezolid (ZYVOX) IVPB 600 mg Q12H   meropenem (MERREM) 1 g in sodium chloride 0.9 % 100 mL IVPB (mini-bag) Q8H        Social History:  Social History     Tobacco Use   Smoking Status Former Smoker    Last attempt to quit: 1982    Years since quittin.4   Smokeless Tobacco Former User     Social History     Substance and Sexual Activity   Alcohol Use No    Comment: \"quit alcohol a few years ago\"     Social History     Substance and Sexual Activity   Drug Use No         Family History:       Problem Relation Age of Onset    Cancer Mother         Breast    Heart Disease Father 48    Arthritis Sister     Heart Disease Paternal Grandmother 48       Review of Systems:  All systems reviewed are negative except what is mentioned in history of present illness. Physical Exam:  BP (!) 102/58   Pulse 104   Temp 98.5 °F (36.9 °C) (Oral)   Resp 16   Ht 5' 7\" (1.702 m)   Wt 205 lb 6.4 oz (93.2 kg)   SpO2 94%   BMI 32.17 kg/m²  I Body mass index is 32.17 kg/m². I Wt Readings from Last 1 Encounters:   06/11/19 205 lb 6.4 oz (93.2 kg)         General appearance - alert, chronically ill appearing, and in no distress, oriented to person, place, and time and over weight. He is laying in bed watching television  Mental status -Level of Alertness: awake  Orientation: person, place, time  Memory: normal  Fund of Knowledge: normal  Attention/Concentration: normal  Language: normal. Mood is normal  Neck - supple, no significant adenopathy, carotids upstroke normal bilaterally, no carotid bruits. There is no LAP in the neck. There is no thyroid enlargement. Neurological -   Cranial Kmdral-HT-RQU:   Cranial nerve II: Normal. There is full visual fields  Cranial nerve III: Pupils: equal, round, reactive to light   Cranial nerves III, IV, VI: Extraocular Movements: intact   Cranial nerve V: Facial sensation: intact   Cranial nerve VII:Facial strength: intact   Cranial nerve VIII: Hearing: intact   Cranial nerve IX: Palate Elevation intact bilaterally  Cranial nerve XI: Shoulder shrug intact bilaterally  Cranial nerve XII: Tongue midline   neck supple without rigidity  DTR's are decreased?distal and symmetric  Babinski sign is positive? on right. Motor exam is 5/5 in the bilateral upper and -4/5 in bilateral lower extremities. Hypotonia in bilateral lower extremities. There is muscle atrophy in bilateral lower extremities. There is no muscle fasciculation. Sensory is intact forlight touch? .   Coordination: finger to nose intact  Gait and station not tested  Abnormal movement none.   Skin - no rashes or lesions  Superficial temporal artery pulses are normal.   Musculoskeletal: Has no hand arthritis, no limitation of ROM in any of the four extremities, except bilateral lower extremities. no joint tenderness, deformity or swelling. There is no leg edema. The Heart was regular in rate and rhythm. No heart murmur  ? Labs:    CBC: Recent Labs     06/10/19  1232 06/11/19  0416   WBC 14.0* 9.9   HGB 9.3* 10.0*    207   MCV 92.8 92.2   MCH 29.0 29.1   MCHC 31.2* 31.5*     CMP:  Recent Labs     06/10/19  1232 06/11/19  0416   * 132*   K 3.9 3.9   CL 98 97*   CO2 19* 18*   BUN 43* 38*   CREATININE 1.2 0.9   LABGLOM 61* 86*   GLUCOSE 128* 123*   CALCIUM 8.7 8.7     Liver: Recent Labs     06/10/19  2102   AST 45*   ALT 65   ALKPHOS 327*   PROT 7.2   LABALBU 2.4*   BILITOT 0.6       Results for orders placed during the hospital encounter of 01/19/18   MRI BRAIN W WO CONTRAST    Narrative PROCEDURE: MRI BRAIN W WO CONTRAST    CLINICAL INFORMATIONaltered speech. History of MS.    COMPARISON: Brain MRI 3/25/2016. TECHNIQUE: Multiplanar and multiple spin echo T1 and T2-weighted images were obtained through the brain before and after the administration of intravenous contrast. High resolution sagittal FLAIR images were also obtained. FINDINGS:    Motion artifact does degrade the quality of some of these images. These are the best images possible at this time. Current lesion burden of white matter: moderate. Interval since the most recent exam: 22 months. Interval new lesion development: None. .  Number of pathologically enhancing lesions of the white matter: None. .    The diffusion-weighted images are normal. The brain volume is moderately reduced. There are no intra-or extra-axial collections. There is no hydrocephalus, midline shift or mass effect. On the FLAIR and T2-weighted sequences, there is a moderate amount of abnormal signal in the white matter of the brain consistent with chronic small vessel ischemic changes. This predominantly involves the deep white matter but foci are also present in   the subcortical white matter.  No new white matter lesions are present. On the gradient echo T2-weighted images, there is no susceptibility artifact present. There is no abnormal enhancement in the brain. The major intracranial vascular flow voids are present. The midline craniocervical junction structures are normal.  The brainstem and pituitary gland are normal.    There is near complete opacification of the maxillary sinuses and ethmoid air cells. There is also mucosal thickening in the sphenoid sinus. Impression    1. No evidence of an acute process. 2. No evidence of active demyelination. 3. The overall brain volume is moderately reduced. This is stable. 4. Moderate amount of abnormal signal in the white matter of the brain consistent with multiple sclerosis. **This report has been created using voice recognition software. It may contain minor errors which are inherent in voice recognition technology. **      Final report electronically signed by Dr. Willam Dinh on 1/20/2018 3:23 PM     Reviewed   Results for orders placed during the hospital encounter of 06/10/19   CT Head WO Contrast    Narrative PROCEDURE: CT HEAD WO CONTRAST    CLINICAL INFORMATION: syncopal episode. COMPARISON: November 13, 2018 TECHNIQUE: Noncontrast 5 mm axial images were obtained through the brain. All CT scans at this facility use dose modulation, iterative reconstruction, and/or weight-based dosing when appropriate to reduce radiation dose to as low as reasonably achievable. FINDINGS:    No acute intracranial findings. Generalized volume loss and patchy white matter changes which may be related to underlying demyelinating process or small vessel ischemic disease. Findings likely in keeping with known provided history of multiple   sclerosis. There is no acute hemorrhage or midline shift. Ventricles are within normal limits for age. Intracranial vascular calcifications. .  Paranasal sinuses are clear. Mastoid air cells are patent.   Skull: Unremarkable. Soft tissues: Unremarkable. Impression No acute intracranial findings. **This report has been created using voice recognition software. It may contain minor errors which are inherent in voice recognition technology. **    Final report electronically signed by Dr. Camacho Cain on 6/10/2019 2:24 PM         We reviewed the patient records and available information in the EHR       Impression:    Principal Problem:    Sepsis ([address]  Active Problems:    Multiple sclerosis ([address]    Malnutrition ([address]    Dehydration    Chronic depression    Essential hypertension    History of pulmonary embolism    Electrolyte imbalance    Elevated transaminase level    Anemia    UTI (urinary tract infection)  Resolved Problems:    * No resolved hospital problems. *  This is a 66year old male with history of multiple sclerosis secondary progressive who presents with episode of loss of consciousness. He was sitting in his wheel chair when his symptoms occurred. He denies any history of seizure. He reports he was recently found to have UTI and was started on Augmentin 2 days ago. Symptoms/presentation is likely related to infection/metabolic causes. We will arrange for him to undergo MRI brain W/WO contrast to evaluate for demyelination, ischemic, or organic causes of his symptoms. He would also benefit from EEG to evaluate for seizure tendency. After detailed discussion with patient we agreed on the following plan. Recommendations:                                              1. MRI Brain with and without contrast evaluate for MS, with LOC  2. EEG  3. Correct metabolic derangement/ treat infection   4. Supportive care. 5. DVT prophylaxis. 6. Consider cardiac work-up if there is recurrence of symptoms despite treatment. 7. Physical therapy  8. Occupational Therapy  9. Case was discussed with primary service. It was my pleasure to evaluate Gene Perrin today. Please call with questions.

## 2019-06-11 NOTE — DISCHARGE INSTR - COC
Continuity of Care Form    Patient Name: Myranda Lynn   :  1955  MRN:  [id number]    Admit date:  6/10/2019  Discharge date:  19 to [place]    Code Status Order: Full Code   Advance Directives:   [facility] Documentation     Date/Time Healthcare Directive Type of Healthcare Directive Copy in [address] Agent's Name Healthcare Agent's Phone Number    06/10/19 3746  No, patient does not have an advance directive for healthcare treatment -- -- -- -- --          Admitting Physician:  Larry Quezada DO  PCP: Linda Bunch MD    Discharging Nurse: [facility] Unit/Room#: 3B-31/031-A  Discharging Unit Phone Number: [phone]    Emergency Contact:   Extended Emergency Contact Information  Primary Emergency Contact: [facility]  Address: [address] [place] [address] Phone: [phone]  Mobile Phone: [phone]  Relation: Spouse  Secondary Emergency Contact: Kirtland Ahumada [address] Phone: [phone]  Relation: Child    Past Surgical History:  Past Surgical History:   Procedure Laterality Date   [address]    broken ankle    BLADDER SURGERY  2012    Suprapubic catheter placement    COLONOSCOPY      CYSTO/URETERO/PYELOSCOPY, CALCULUS TX Left 2019    CYSTOSCOPY, LEFT STENT insertion, bladder irragation with bladder stones performed by Samm Dalal MD at [place] N/A 2018    ROBOT DIVERTING COLOSTOMY performed by Sinai Gordillo MD at [facility]  child       Immunization History:   Immunization History   Administered Date(s) Administered    Influenza Virus Vaccine 2012, 10/01/2017, 10/06/2018    Pneumococcal Vaccine, unspecified formulation 2013       Active Problems:  Patient Active Problem List   Diagnosis Code    Neurogenic bladder N31.9    Multiple sclerosis ([address] G35    MS (multiple sclerosis) ([address] G35    Urinary retention R33.9    Indwelling catheter present on admission Z96.0    Cystostomy malfunction ([address] M51.943    Decubitus ulcer of coccyx L89.159    Decubitus ulcer, stage 3 ([facility]) L89.93    Malnutrition ([address] E46    Decubitus ulcer of right perineal ischial region, stage 3 ([address] L89.313    Multiple sclerosis ([address] G35    Pulmonary embolism on left ([address] I55.93    Metabolic encephalopathy F22.93    Speech disturbance R47.9    Infected decubitus ulcer, stage I L89.91, L08.9    Infected decubitus ulcer, stage III ([facility]) L89.93, L08.9    Wound infection T14. 8XXA, L08.9    Elevated INR R79.1    Chronic osteomyelitis, pelvis, right ([facility]) M86.651    Osteomyelitis ([facility]) M86.9    Urinary tract infection without hematuria N39.0    Sepsis ([facility]) A41.9    Dehydration E86.0    Abnormal liver function test R94.5    Chronic depression F32.9    Essential hypertension I10    History of pulmonary embolism Z86.711    Other dysphagia R13.19    Pressure injury of skin of back L89.109    Sepsis due to gram-negative UTI ([facility]) A41.50, N39.0    Bladder stones N21.0    Sepsis due to urinary tract infection ([facility]) A41.9, N39.0    Decubitus ulcer L89.90    Electrolyte imbalance E87.8    Elevated transaminase level R74.0    Anemia D64.9    UTI (urinary tract infection) N39.0       Isolation/Infection:   Isolation          Contact        Patient Infection Status     Infection Encounter Level?  Onset Date Added Added By Resolved Resolved By Review Date    MRSA No 02/10/19 02/12/19 MRSA Screening Culture Only       Buttock 2/2019  Rectal 2/2019    VRE No 02/10/19 02/10/19 VRE Screen by PCR       PCR 2/2019    MRSA No  08/05/13 Eve Henderson RN 01/22/18 Vanessa Gore RN     Urine            Nurse Assessment:  Last Vital Signs: /71   Pulse 83   Temp 98.3 °F (36.8 °C) (Oral)   Resp 18   Ht 5' 7\" (1.702 m)   Wt 209 lb 11.2 oz (95.1 kg)   SpO2 95%   BMI 32.84 kg/m²     Last documented pain score (0-10 scale): Pain Level: 0  Last Weight:   Wt Readings from Last 1 Encounters:   06/15/19 209 lb 11.2 oz (95.1 kg)     Mental Status:  oriented, alert and coherent    IV Access:  - PICC - site  R Upper Arm, insertion date: 6/20/19    Nursing Mobility/ADLs:  Walking   Dependent  Transfer  Dependent  Bathing  Dependent  Dressing  Dependent  Toileting  Dependent  Feeding  Independent  Med Admin  Assisted  Med Delivery   whole    Wound Care Documentation and Therapy:  Wound 06/08/18 Other (Comment) Ischium Right (Active)   Number of days: 367       Wound 11/13/18 Other (Comment) (Active)   Number of days: 210       Wound 02/10/19 Buttocks Right (Active)   Wound Pressure Unstageable 6/11/2019  4:55 AM   Dressing Status Other (Comment) 6/11/2019  4:55 AM   Dressing/Treatment Open to air 6/11/2019  4:55 AM   Number of days: 121        Elimination:  Continence:   · Bowel: No, colostomy  · Bladder: No, urostomy  Urinary Catheter: Insertion Date: chronic   Colostomy/Ileostomy/Ileal Conduit: Yes  Colostomy LLQ-Stomal Appliance: 2 piece  Colostomy LLQ-Stoma  Assessment: Moist, Red  Colostomy LLQ-Peristomal Assessment: Clean, Intact  Colostomy LLQ-Stool Appearance: Formed  Colostomy LLQ-Stool Color: Brown    Date of Last BM: 6/20/19    Intake/Output Summary (Last 24 hours) at 6/20/2019 1810  Last data filed at 6/20/2019 1446  Gross per 24 hour   Intake 1045 ml   Output 2275 ml   Net -1230 ml     I/O last 3 completed shifts: In: 2005 [P.O.:1285; I.V.:720]  Out: 2275 [Urine:2275]    Safety Concerns: At Risk for Falls    Impairments/Disabilities:      fall risk, Hx. MS Limited movement to legs    Nutrition Therapy:  Current Nutrition Therapy:   - Oral Diet:  General    Routes of Feeding: Oral  Liquids:  Thin Liquids  Daily Fluid Restriction: no  Last Modified Barium Swallow with Video (Video Swallowing Test): not done    Treatments at the Time of Hospital Discharge:   Respiratory Treatments: see MAR  Oxygen Therapy:  is not on home oxygen therapy. Ventilator:    - No ventilator support    Rehab Therapies: none  Weight Bearing Status/Restrictions: No weight bearing restirctions, unable to walk. Other Medical Equipment (for information only, NOT a DME order):  wheelchair and hospital bed, lift equipment  Other Treatments: wound dressing changes. Cleanse wound/ulcer with Normal Saline Solution (NSS) with each dressing change (Order #[id number]) on 6/11/19  Right buttock wound - Pack with Aquacell. Cover with dry gauze and secure with paper tape. Change every 2  days and change  cover dressing PRN for saturation  Turn patient every 2 hours. Patient's personal belongings (please select all that are sent with patient):  Glasses    RN SIGNATURE:  Electronically signed by Marck Martinez RN on 6/20/19 at 6:16 PM    CASE MANAGEMENT/SOCIAL WORK SECTION    Inpatient Status Date: 06/10/2019    Readmission Risk Assessment Score:  Readmission Risk              Risk of Unplanned Readmission:        26           Discharging to Facility/ Agency   · Name: [facility]  · Address: [address] [place] [address], [address] [place] [address] [address] [address]  · Phone: [phone]   · Fax: [phone]    Dialysis Facility (if applicable)   · Name:  · Address:  · Dialysis Schedule:  · Phone:  · Fax:    / signature: Electronically signed by KI Cortés, SEGUNDOW on 6/11/19 at 1:04 PM    PHYSICIAN SECTION    Prognosis: Fair    Condition at Discharge: Stable    Rehab Potential (if transferring to Rehab): Poor    Recommended Labs or Other Treatments After Discharge:     Physician Certification: I certify the above information and transfer of Iza Gutierrez  is necessary for the continuing treatment of the diagnosis listed and that he requires  for less 30 days.      Update Admission H&P: Changes in H&P as follows - ureteral stent    PHYSICIAN SIGNATURE:  Electronically signed by Lorri Espana MD on 6/20/19 at 6:39 PM

## 2019-06-11 NOTE — PLAN OF CARE
Problem: Falls - Risk of:  Goal: Will remain free from falls  Description  Will remain free from falls  Outcome: Ongoing  Note:   Patient remains free from falls this shift. Continue to implement fall risk interventions including fall band, call light within reach, bed in lowest position, fall sign posted. Goal: Absence of physical injury  Description  Absence of physical injury  Outcome: Ongoing  Note:   Patient remains free from injury this shift. Continue with safety interventions. Problem: Risk for Impaired Skin Integrity  Goal: Tissue integrity - skin and mucous membranes  Description  Structural intactness and normal physiological function of skin and  mucous membranes. Outcome: Ongoing  Note:   Patient has wound on right buttock that is present on admission. Wound/Ostomy nurse has been consulted. Will follow their recommendations. Problem: SAFETY  Goal: Free from accidental physical injury  Outcome: Ongoing  Note:   See fall risk above. Problem: PAIN  Goal: Patient's pain/discomfort is manageable  Outcome: Ongoing  Note:   Patient denies acute pain this shift. He does report some discomfort in back, states that this is chronic. Continue to monitor. Problem: SKIN INTEGRITY  Goal: Skin integrity is maintained or improved  Outcome: Ongoing  Note:   Turn patient q2 hours to prevent new breakdown, floating heels off of bed, inspect skin with assessments. Problem: KNOWLEDGE DEFICIT  Goal: Patient/S.O. demonstrates understanding of disease process, treatment plan, medications, and discharge instructions. Outcome: Ongoing  Note:   Patient verbalizes understanding of disease process, treatment plan and medications at this time. Continue to encourage patient to ask questions if necessary. Problem: DISCHARGE BARRIERS  Goal: Patient's continuum of care needs are met  Outcome: Ongoing  Note:   Plan to discharge patient back to [facility] at the end of stay.  Coordinate with Social work and Care coordinator. Care plan reviewed with patient. Patient verbalizes understanding of the plan of care and contributes to goal setting.

## 2019-06-11 NOTE — CARE COORDINATION
19, 2:21 PM      Mellisa Gonzales       Admitted from: ED 6/10/2019/ 1541 Wit  day: 1   Location: --A Reason for admit: Sepsis ([facility] [A41.9] Status: IP  Admit order signed?: yes  PMH:  has a past medical history of Dysphagia, History of pulmonary embolism, History of urinary tract infection, Hx of blood clots, Hypertension, Major depressive disorder, single episode, MS (multiple sclerosis) ([address] Neurogenic bladder, Osteomyelitis ([address] and UTI (urinary tract infection). Procedure: None  Pertinent abnormal Imagin/11 MRI of brain - Moderate amount of abnormal signal in the white matter of the brain consistent with MS. Medications:  Scheduled Meds:   [START ON 2019] cefTRIAXone (ROCEPHIN) IV  1 g Intravenous Q24H    sodium chloride flush  10 mL Intravenous 2 times per day    acetic acid   Irrigation BID    vitamin C  500 mg Oral Daily    bacitracin   Topical Daily    calcium-vitamin D  1 tablet Oral BID    docusate sodium  100 mg Oral Daily    famotidine  20 mg Oral BID    multivitamin  1 tablet Oral Daily    oxybutynin  5 mg Oral BID    potassium chloride  10 mEq Oral Daily    lactobacillus  1 capsule Oral Daily    vitamin E  400 Units Oral Daily    vitamin A  20,000 Units Oral Daily    tiZANidine  2 mg Oral TID    rivaroxaban  20 mg Oral Dinner     Continuous Infusions:   Pertinent Info/Orders/Treatment Plan:  Pt admitted through ED with complaints of weakness, LOC at [facility]. Pt found to be hypotensive in ED with BP of 80/58. Pt has history of MS as well. Consulting Neurology. UA - small bilirubin, positive ketones, large blood, positive proteins, moderate leukocytes, turbid. Started iv Zyvox and Merrem. Troponin negative x3. Procal 7.53. WBC 14. Lactic acid level - 1.1, 1.4 and 0.9. Diet: DIET GENERAL;   Smoking status:  reports that he quit smoking about 37 years ago.  He has quit using smokeless tobacco.   PCP: Cirilo Owusu MD  Readmission: No  Readmission Risk Score: 26%    Discharge Planning  Current Residence:  Nursing Home  Living Arrangements:  Other (Comment)(ECF)   Support Systems:  Spouse/Significant Other, Children  Current Services PTA:     Potential Assistance Needed:  N/A  Potential Assistance Purchasing Medications:  No  Does patient want to participate in local refill/ meds to beds program?  No  Type of Home Care Services:  None  Patient expects to be discharged to:  [facility]  Expected Discharge date:  06/13/19  Follow Up Appointment: Best Day/ Time: Wednesday PM    Discharge Plan: Pt is long term resident of [facility].  following.       Evaluation: yes

## 2019-06-12 LAB
ANION GAP SERPL CALCULATED.3IONS-SCNC: 12 MEQ/L (ref 8–16)
BASOPHILS # BLD: 0.3 %
BASOPHILS ABSOLUTE: 0 THOU/MM3 (ref 0–0.1)
BUN BLDV-MCNC: 28 MG/DL (ref 7–22)
CALCIUM IONIZED: 1 MMOL/L (ref 1.12–1.32)
CALCIUM SERPL-MCNC: 8.9 MG/DL (ref 8.5–10.5)
CHLORIDE BLD-SCNC: 101 MEQ/L (ref 98–111)
CO2: 20 MEQ/L (ref 23–33)
CREAT SERPL-MCNC: 0.8 MG/DL (ref 0.4–1.2)
EOSINOPHIL # BLD: 0.4 %
EOSINOPHILS ABSOLUTE: 0 THOU/MM3 (ref 0–0.4)
ERYTHROCYTE [DISTWIDTH] IN BLOOD BY AUTOMATED COUNT: 15.9 % (ref 11.5–14.5)
ERYTHROCYTE [DISTWIDTH] IN BLOOD BY AUTOMATED COUNT: 53 FL (ref 35–45)
GFR SERPL CREATININE-BSD FRML MDRD: > 90 ML/MIN/1.73M2
GLUCOSE BLD-MCNC: 130 MG/DL (ref 70–108)
HCT VFR BLD CALC: 31.1 % (ref 42–52)
HEMOGLOBIN: 10 GM/DL (ref 14–18)
IMMATURE GRANS (ABS): 0.09 THOU/MM3 (ref 0–0.07)
IMMATURE GRANULOCYTES: 0.9 %
LYMPHOCYTES # BLD: 5.7 %
LYMPHOCYTES ABSOLUTE: 0.6 THOU/MM3 (ref 1–4.8)
MAGNESIUM: 2 MG/DL (ref 1.6–2.4)
MCH RBC QN AUTO: 29.1 PG (ref 26–33)
MCHC RBC AUTO-ENTMCNC: 32.2 GM/DL (ref 32.2–35.5)
MCV RBC AUTO: 90.4 FL (ref 80–94)
MONOCYTES # BLD: 6.3 %
MONOCYTES ABSOLUTE: 0.6 THOU/MM3 (ref 0.4–1.3)
NUCLEATED RED BLOOD CELLS: 0 /100 WBC
PHOSPHORUS: 1.8 MG/DL (ref 2.4–4.7)
PLATELET # BLD: 189 THOU/MM3 (ref 130–400)
PMV BLD AUTO: 8.7 FL (ref 9.4–12.4)
POTASSIUM REFLEX MAGNESIUM: 4 MEQ/L (ref 3.5–5.2)
RBC # BLD: 3.44 MILL/MM3 (ref 4.7–6.1)
SEG NEUTROPHILS: 86.4 %
SEGMENTED NEUTROPHILS ABSOLUTE COUNT: 8.9 THOU/MM3 (ref 1.8–7.7)
SODIUM BLD-SCNC: 133 MEQ/L (ref 135–145)
WBC # BLD: 10.3 THOU/MM3 (ref 4.8–10.8)

## 2019-06-12 PROCEDURE — 6370000000 HC RX 637 (ALT 250 FOR IP): Performed by: INTERNAL MEDICINE

## 2019-06-12 PROCEDURE — 99233 SBSQ HOSP IP/OBS HIGH 50: CPT | Performed by: NURSE PRACTITIONER

## 2019-06-12 PROCEDURE — 94669 MECHANICAL CHEST WALL OSCILL: CPT

## 2019-06-12 PROCEDURE — 36415 COLL VENOUS BLD VENIPUNCTURE: CPT

## 2019-06-12 PROCEDURE — 2140000000 HC CCU INTERMEDIATE R&B

## 2019-06-12 PROCEDURE — 80048 BASIC METABOLIC PNL TOTAL CA: CPT

## 2019-06-12 PROCEDURE — 85025 COMPLETE CBC W/AUTO DIFF WBC: CPT

## 2019-06-12 PROCEDURE — 82330 ASSAY OF CALCIUM: CPT

## 2019-06-12 PROCEDURE — 2500000003 HC RX 250 WO HCPCS: Performed by: NURSE PRACTITIONER

## 2019-06-12 PROCEDURE — 83735 ASSAY OF MAGNESIUM: CPT

## 2019-06-12 PROCEDURE — 2580000003 HC RX 258: Performed by: INTERNAL MEDICINE

## 2019-06-12 PROCEDURE — 84100 ASSAY OF PHOSPHORUS: CPT

## 2019-06-12 PROCEDURE — 2709999900 HC NON-CHARGEABLE SUPPLY

## 2019-06-12 PROCEDURE — 97165 OT EVAL LOW COMPLEX 30 MIN: CPT

## 2019-06-12 PROCEDURE — 6360000002 HC RX W HCPCS: Performed by: NURSE PRACTITIONER

## 2019-06-12 PROCEDURE — 2580000003 HC RX 258: Performed by: NURSE PRACTITIONER

## 2019-06-12 RX ADMIN — TIZANIDINE 2 MG: 4 TABLET ORAL at 19:35

## 2019-06-12 RX ADMIN — BACITRACIN: 500 OINTMENT TOPICAL at 09:47

## 2019-06-12 RX ADMIN — TIZANIDINE 2 MG: 4 TABLET ORAL at 14:05

## 2019-06-12 RX ADMIN — VITAMIN E CAP 400 UNIT 400 UNITS: 400 CAP at 09:46

## 2019-06-12 RX ADMIN — Medication 1 CAPSULE: at 09:46

## 2019-06-12 RX ADMIN — FAMOTIDINE 20 MG: 20 TABLET, FILM COATED ORAL at 19:35

## 2019-06-12 RX ADMIN — DOCUSATE SODIUM 100 MG: 100 CAPSULE, LIQUID FILLED ORAL at 09:47

## 2019-06-12 RX ADMIN — Medication 10 ML: at 19:36

## 2019-06-12 RX ADMIN — POTASSIUM CHLORIDE 10 MEQ: 750 TABLET, FILM COATED, EXTENDED RELEASE ORAL at 09:46

## 2019-06-12 RX ADMIN — CALCIUM CARBONATE-VITAMIN D TAB 500 MG-200 UNIT 1 TABLET: 500-200 TAB at 09:47

## 2019-06-12 RX ADMIN — FAMOTIDINE 20 MG: 20 TABLET, FILM COATED ORAL at 09:47

## 2019-06-12 RX ADMIN — CALCIUM CARBONATE-VITAMIN D TAB 500 MG-200 UNIT 1 TABLET: 500-200 TAB at 19:35

## 2019-06-12 RX ADMIN — Medication 20000 UNITS: at 11:30

## 2019-06-12 RX ADMIN — ACETIC ACID: 250 IRRIGANT IRRIGATION at 19:35

## 2019-06-12 RX ADMIN — TIZANIDINE 2 MG: 4 TABLET ORAL at 09:46

## 2019-06-12 RX ADMIN — ACETIC ACID: 250 IRRIGANT IRRIGATION at 09:47

## 2019-06-12 RX ADMIN — OXYBUTYNIN 5 MG: 5 TABLET, FILM COATED, EXTENDED RELEASE ORAL at 19:35

## 2019-06-12 RX ADMIN — THERA TABS 1 TABLET: TAB at 09:46

## 2019-06-12 RX ADMIN — Medication 10 ML: at 09:48

## 2019-06-12 RX ADMIN — Medication 500 MG: at 09:46

## 2019-06-12 RX ADMIN — OXYBUTYNIN 5 MG: 5 TABLET, FILM COATED, EXTENDED RELEASE ORAL at 09:46

## 2019-06-12 RX ADMIN — RIVAROXABAN 20 MG: 20 TABLET, FILM COATED ORAL at 17:03

## 2019-06-12 RX ADMIN — SODIUM PHOSPHATE, MONOBASIC, MONOHYDRATE 14 MMOL: 276; 142 INJECTION, SOLUTION INTRAVENOUS at 11:30

## 2019-06-12 RX ADMIN — ACETAMINOPHEN 650 MG: 325 TABLET ORAL at 04:13

## 2019-06-12 RX ADMIN — CEFTRIAXONE SODIUM 1 G: 1 INJECTION, POWDER, FOR SOLUTION INTRAMUSCULAR; INTRAVENOUS at 09:47

## 2019-06-12 RX ADMIN — ACETAMINOPHEN 650 MG: 325 TABLET ORAL at 17:07

## 2019-06-12 ASSESSMENT — PAIN SCALES - GENERAL
PAINLEVEL_OUTOF10: 0

## 2019-06-12 NOTE — PROGRESS NOTES
Hospitalist Progress Note    Patient:  Germán Hernandez      Unit/Bed:3B-31/031-A    YOB: 1955    MRN: [id number]       Acct: [de-identified]     PCP: Romy Roman MD    Date of Admission: 6/10/2019    Assessment/Plan:    1. Syncopal episode--see #2; ACS ruled out by 3 (-) troponins; on Xarelto for PE hx; fullay awake and oriented; likely 2nd to hypotension, unable to do orthostatic BP's  2. Metabolic encephalopathy--CT head (-) acute; appreciate neuro input;  MRI brain (-) acute ; EEG (-) epileptiform activity; fully awake and alert   3. Sepsis with hypotension, tachycardia, tachypnea, leukocytosis 2nd to UTI (POA) with Providencia stuartii (colony count > 100,000, Proteus mirabilis (colony count > 100,000) likely 2nd to [facility] cath--lactic acid x 4 normal; procal at 7.53; acetic acid irrigation; Rocephin (S) 6/12; was being tx with Augmentin at [facility]; WBC's normal now; T max 102.4 past 24 hours (afebrile now); no blood cx's obtained with T max  4. Azotemia--improving; receiving 0.9 NS at 100 ml/hr; monitor  5. Hypophosphatemia--replace per protocol; monitor  6. Hyponatremia--improving; monitor  7. Atelectasis LLL--IS~will need assistance  8. Chronic normo anemia--stable  9. Metabolic acidosis--improved; monitor  10. Neurogenic bladder 2nd to MS--follows with Dr Jeanne Dia and Raymond Loza; [facility] changes SP cath every month with last change June 4, 2019 per chart review   11. Chronic Alk Phos elevation  12. Hx PE--on Xarelto  13. Hx MS--lives at [facility]  14.  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 HEENT:  Conjunctivae/corneas clear. Neck: Supple, with full range of motion. No jugular venous distention. Trachea midline. Respiratory:  Normal respiratory effort. Clear to auscultation anterior aspect. Cardiovascular: Regular rate and rhythm with normal S1/S2 without murmurs, rubs or gallops. Abdomen: Soft, non-tender, non-distended with normal bowel sounds. (+) colostomy and SP cath noted  Musculoskeletal: passive and active ROM x 4 extremities; (+) contractures  Skin: Skin color, texture, turgor normal.    Neurologic:  Neurovascularly intact without any focal sensory/motor deficits. Cranial nerves: II-XII intact, grossly non-focal. Hnd grasps firm and equal  Psychiatric: Alert and oriented x 3, thought content appropriate at this time  Capillary Refill: Brisk,< 3 seconds   Peripheral Pulses: +2 palpable, equal bilaterally       Labs:   Recent Labs     06/10/19  1232 06/11/19  0416 06/12/19  0400   WBC 14.0* 9.9 10.3   HGB 9.3* 10.0* 10.0*   HCT 29.8* 31.7* 31.1*    207 189     Recent Labs     06/10/19  1232 06/11/19  0416 06/12/19  0400   * 132* 133*   K 3.9 3.9 4.0   CL 98 97* 101   CO2 19* 18* 20*   BUN 43* 38* 28*   CREATININE 1.2 0.9 0.8   CALCIUM 8.7 8.7 8.9   PHOS  --  1.9* 1.8*     Recent Labs     06/10/19  1232 06/10/19  2102   AST 56* 45*   ALT 75* 65   BILIDIR 0.6* 0.4*   BILITOT 0.8 0.6   ALKPHOS 342* 327*     Recent Labs     06/10/19  1232 06/11/19  0416   INR 1.81* 1.37*     No results for input(s): Madonna Nicholson in the last 72 hours. Microbiology:    Urine cx (-) prelim. Blood cx x 2 (-) prelim.     Urinalysis:      Lab Results   Component Value Date    NITRU NEGATIVE 06/10/2019    WBCUA 50-75 06/10/2019    WBCUA >200 01/20/2012    BACTERIA FEW 06/10/2019    RBCUA 25-50 06/10/2019    BLOODU LARGE 06/10/2019    SPECGRAV 1.019 11/13/2018    GLUCOSEU NEGATIVE 06/10/2019       Radiology:    CXR:  There are low lung volumes. Borderline cardiomegaly is stable.     Mild scarring inferiorly on the left appears stable. No gross infiltrate.      Impression  No gross acute infiltrate. CT head:  No acute intracranial findings. Generalized volume loss and patchy white matter changes which may be related to underlying demyelinating process or small vessel ischemic disease. Findings likely in keeping with known provided history of multiple   sclerosis. There is no acute hemorrhage or midline shift. Ventricles are within normal limits for age. Intracranial vascular calcifications. .  Paranasal sinuses are clear. Mastoid air cells are patent. Skull: Unremarkable. Soft tissues: Unremarkable.     Impression  No acute intracranial findings. CTA chest:  Thyroid gland is unremarkable. Thoracic aorta is normal caliber. The pulmonary arterial vessels are adequately opacified. There is no acute pulmonary arterial embolism. No lymphadenopathy. No cardiomegaly. Patchy atelectasis left lung base. No pleural or pericardial effusion. No acute osseous findings. Osteopenia and degenerative changes thoracic spine. Upper abdominal images are unremarkable.     Impression     No acute pulmonary embolism.     Mild atelectasis in the left lung base. MRI brain:  Motion artifact does degrade the quality of some of these images. These are the best images possible at this time.     The diffusion-weighted images are normal. The brain volume is moderately reduced. There are no intra-or extra-axial collections. Richie Cerise is no hydrocephalus, midline shift or mass effect.     On the FLAIR and T2-weighted sequences, there is a moderate amount of abnormal signal in the deep white matter of the brain. Many of these radiate outward from the ventricular margins.  This also involves the white matter of the temporal lobes. The   patient has a history of multiple sclerosis. This is stable. There are stable small old lacunar type infarcts in the right basal ganglia. There is an old cortical infarct with associated volume loss in the posterior right frontal lobe.     On the gradient echo T2-weighted images, there is no susceptibility artifact present.     There is no abnormal enhancement in the brain. The major intracranial vascular flow voids are present.  The midline craniocervical junction structures are normal.  The brainstem and pituitary gland are normal.      Impression     1. No evidence of an acute infarct or active demyelination. 2. 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 3. Moderate amount of abnormal signal in the white matter of the brain consistent with multiple sclerosis and/or chronic small vessel ischemic changes. EEG:  This is an abnormal EEG due to the excessive slowing seen  in theta and delta range activity suggestive of cortical dysfunction  such as seen with encephalopathy. However, there was no evidence of  epileptiform activity appreciated.     DVT prophylaxis: [] Lovenox                                 [] SCDs                                 [] SQ Heparin                                 [] Encourage ambulation           [x] Already on Anticoagulation~Xarelto     Code Status: Full Code    PT/OT Eval Status: order    Tele:   [x] yes             [] no    Active Hospital Problems    Diagnosis Date Noted    Syncope and collapse [R55]     Electrolyte imbalance [E87.8] 06/10/2019    Elevated transaminase level [R74.0] 06/10/2019    Anemia [D64.9] 06/10/2019    UTI (urinary tract infection) [N39.0] 06/10/2019    Sepsis ([facility] [A41.9]     Chronic depression [F32.9]     Essential hypertension [I10]     History of pulmonary embolism [Z86.711]     Dehydration [E86.0]     Malnutrition ([address] Everlene Body 12/17/2012    Multiple sclerosis ([facility] Jose Kaye 02/14/2012       Electronically signed by TONO Pope CNP on 6/12/2019 at 5:57 AM

## 2019-06-12 NOTE — PROGRESS NOTES
Mariah Mixon 62  INPATIENT OCCUPATIONAL THERAPY  [facility] CCU-STEPDOWN 3B  EVALUATION    Time:   Time In: 9933  Time Out: 1358  Timed Code Treatment Minutes: 0 Minutes  Minutes: 13          Date: 2019  Patient Name: Gene Perrin,   Gender: male      MRN: [id number]  : 1955  (66 y.o.)  Referring Practitioner: Chris Bahena CNP  Diagnosis: sepsis  Additional Pertinent Hx: 66 y.o. male with a past medical history of dysphagia, PE, UTI, HTN, and MS. Patient presents to the ED via EMS for evaluation after a syncopal episode. Patient was diagnosed with a UTI 2 days ago and was started on Augmentin. He states he has been laying in bed due to not feeling well. He felt better today so he went to eat in the dinning room. Patient states he then had a syncopal episode in his wheelchair. Patient denies having headache, dizziness or lightheadedness before the syncopal episode. When they got him back to his room, patient was hypotensive but alert. Restrictions/Precautions:  Restrictions/Precautions: Fall Risk  Position Activity Restriction  Other position/activity restrictions: MS    Subjective  Patient assessed for rehabilitation services?: Yes    Subjective: Pt sleeping in bed but arouses to name. Pt stating he wasn't doing \"to bad\"     Pain:  Pain Assessment  Patient Currently in Pain: Denies    Social/Functional History:  Type of Home: Facility([facility] for approx 6 years )  Home Equipment: Electric scooter           ADL Assistance: Needs assistance  Ambulation Assistance: (non-ambulatory )  Transfer Assistance: (dependent for transfers with use of whit lift. )          Additional Comments: Pt reporting he is able to feed himself and complete his own grooming tasks. Pt requires assistance for dressing and for colosotmy care as well.      Cognition/Orientation:     Overall Cognitive Status: WNL    ADL;s:  Feeding: Setup, Stand by assistance(sitting up in bed )  Grooming: Minimal assistance(to comb hair with right UE d/t decreased active movement )       Functional Mobility:  Bed mobility  Comment: Pt dependent for transfers with use of whit lift     Functional Mobility  Functional Mobility Comments: Pt dependent for transfers with use of whit lift      Balance:  Standing Balance  Comment: Pt dependent for transfers with use of whit lift     Transfers:  Transfer Comments: Pt dependent for transfers with use of whit lift        Upper Extremity Assessment:Hand Dominance: Right  LUE General AROM: WFL with decreased coordination throughout movement s  RUE General AROM: shoulder flexion of approx 80 degrees     LUE Strength  LUE Strength Comment: bilateral UE general weakness with MMT of 4-/5 in biceps/triceps   RUE Strength  RUE Strength Comment: bilateral UE general weakness with MMT of 4-/5 in biceps/triceps            Activity Tolerance: Patient Tolerated treatment well       Assessment:  Assessment: Pt demo decreased activit movement of right UE at shoulder with decreased gross coordination throughout UEs as well making it difficult for pt to grasp onto and manipulate eating utensisl. pt would benefit from skilled OT services to educated on AE or compensatory tech to use for icnresed ease of self feeding. Performance deficits / Impairments: Decreased ADL status, Decreased ROM  Prognosis: Fair  REQUIRES OT FOLLOW UP: Yes  Decision Making: Low Complexity  Safety Devices in place: Yes  Type of devices:  All fall risk precautions in place, Left in bed, Bed alarm in place, Call light within reach, Patient at risk for falls, Nurse notified           Discharge Recommendations:  ECF without OT    Patient Education:  Patient Education: OT POC  Barriers to Learning: none     Equipment Recommendations:  Equipment Needed: No    Plan:  Times per week: 1-2 visits   Current Treatment Recommendations: Self-Care / ADL, Safety Education & Training    Goals:  Patient goals : pt did not state   Short term goals  Time Frame for Short term goals: 2 weeks   Short term goal 1: Pt to complete hair care with SBA   Short term goal 2: Pt to be educated on varying types of AE or compensatory tech to use durign self feeding to increase his ease of   Long term goals  Time Frame for Long term goals : not est d/t ELOS     Following session, patient left in safe position with all fall risk precautions in place.

## 2019-06-12 NOTE — PLAN OF CARE
Problem: Falls - Risk of:  Goal: Will remain free from falls  Description  Will remain free from falls  Outcome: Ongoing  Note:   Patient remains free from falls this shift. Continue to implement fall risk interventions such as  non skid socks when out of bed, call light and table within reach, bed alarm, fall band, fall sign posted outside room. Problem: Falls - Risk of:  Goal: Absence of physical injury  Description  Absence of physical injury  Outcome: Ongoing  Note:   Please see above. Problem: Risk for Impaired Skin Integrity  Goal: Tissue integrity - skin and mucous membranes  Description  Structural intactness and normal physiological function of skin and  mucous membranes. Outcome: Ongoing  Note:   Patient does have impaired skin integrity. Wound to right buttock. Wound care consulted. They did see patient and made recommendations for dressing the wound. Change Q2days. Dressing is clean, dry and intact at this time. Continue to monitor with assessments. Report any new skin breakdown to physician and wound care team.      Problem: SAFETY  Goal: Free from accidental physical injury  Outcome: Ongoing  Note:   Please see above under fall risk. Problem: PAIN  Goal: Patient's pain/discomfort is manageable  Outcome: Ongoing  Note:   Patient does report some chronic pain, controlling with PRN Tylenol and non- pharmacological interventions such as repositioning. Problem: SKIN INTEGRITY  Goal: Skin integrity is maintained or improved  Outcome: Ongoing  Note:   Please see above. Problem: KNOWLEDGE DEFICIT  Goal: Patient/S.O. demonstrates understanding of disease process, treatment plan, medications, and discharge instructions. Outcome: Ongoing  Note:   Patient verbalizes understanding of plan of care. Continue to encourage questions and offer education.       Problem: DISCHARGE BARRIERS  Goal: Patient's continuum of care needs are met  Outcome: Ongoing  Note:   Plan for patient to return to [address] at the end of stay. Collaborate with Care Coordinator and . Care plan reviewed with patient. Patient verbalizes understanding of the plan of care and contributes to goal setting.

## 2019-06-12 NOTE — PROCEDURES
[address] [place] [address]                          ELECTROENCEPHALOGRAM REPORT    PATIENT NAME: Lena Lawrence                   :        1955  MED REC NO:   [id number]                           ROOM:       0031  ACCOUNT NO:   [de-identified]                           ADMIT DATE: 06/10/2019  PROVIDER:     Salome Bates. Dick Sandoval MD    DATE OF EE2019    REFERRING PROVIDER:  Salome Bates. Dick Sandoval MD    CLINICAL HISTORY:  A 81-year-old male with a presentation of passing out  episode, evaluate for seizure. EEG FINDINGS:  This is a 17-channel EEG performed without sleep  deprivation. Hyperventilation was not performed. Photic stimulation  was performed. The patient is described as alert. The background of activity was noted to be 7 Hz in the posterior  parietal area, symmetric with low voltage seen. Excessive slowing was  seen in theta, and occasional delta range activity suggestive of  cortical dysfunction such as seen with encephalopathy. Hyperventilation  was not performed. Photic stimulation was performed without  abnormality. Light stages of sleep are seen during the recording. The  patient would be drowsy during parts of recording. EKG tracing revealed  regular heart rate. IMPRESSION:  This is an abnormal EEG due to the excessive slowing seen  in theta and delta range activity suggestive of cortical dysfunction  such as seen with encephalopathy. However, there was no evidence of  epileptiform activity appreciated.         João Thapa MD    D: 2019 8:51:41       T: 2019 8:58:53     [email or web address]  Job#: [id number]     Doc#: [id number]    CC:

## 2019-06-12 NOTE — CARE COORDINATION
6/12/19, 11:47 AM      Francesca Barber day: 2  Location: [facility]/031-A Reason for admit: Sepsis [facility]) [A41.9]   Procedure: 6/11 EEG - results pending  Treatment Plan of Care: Hospitalist, Neurology following. Had a max temp of 102.4F overnight. WBC 10.3. Continues with iv rocephin. PCP: Erik Shane MD  Readmission Risk Score: 27%  Discharge Plan: Planning return to [facility], needs a precert.  following.

## 2019-06-13 ENCOUNTER — APPOINTMENT (OUTPATIENT)
Dept: GENERAL RADIOLOGY | Age: 64
DRG: 662 | End: 2019-06-13
Payer: COMMERCIAL

## 2019-06-13 LAB
ANION GAP SERPL CALCULATED.3IONS-SCNC: 17 MEQ/L (ref 8–16)
BASOPHILS # BLD: 0.3 %
BASOPHILS ABSOLUTE: 0 THOU/MM3 (ref 0–0.1)
BUN BLDV-MCNC: 21 MG/DL (ref 7–22)
CALCIUM IONIZED: 1.03 MMOL/L (ref 1.12–1.32)
CALCIUM SERPL-MCNC: 8.7 MG/DL (ref 8.5–10.5)
CHLORIDE BLD-SCNC: 94 MEQ/L (ref 98–111)
CO2: 17 MEQ/L (ref 23–33)
CREAT SERPL-MCNC: 0.7 MG/DL (ref 0.4–1.2)
EOSINOPHIL # BLD: 0.1 %
EOSINOPHILS ABSOLUTE: 0 THOU/MM3 (ref 0–0.4)
ERYTHROCYTE [DISTWIDTH] IN BLOOD BY AUTOMATED COUNT: 15.8 % (ref 11.5–14.5)
ERYTHROCYTE [DISTWIDTH] IN BLOOD BY AUTOMATED COUNT: 50.8 FL (ref 35–45)
GFR SERPL CREATININE-BSD FRML MDRD: > 90 ML/MIN/1.73M2
GLUCOSE BLD-MCNC: 122 MG/DL (ref 70–108)
HCT VFR BLD CALC: 29.8 % (ref 42–52)
HEMOGLOBIN: 9.6 GM/DL (ref 14–18)
IMMATURE GRANS (ABS): 0.36 THOU/MM3 (ref 0–0.07)
IMMATURE GRANULOCYTES: 3.2 %
LYMPHOCYTES # BLD: 4.4 %
LYMPHOCYTES ABSOLUTE: 0.5 THOU/MM3 (ref 1–4.8)
MAGNESIUM: 1.7 MG/DL (ref 1.6–2.4)
MCH RBC QN AUTO: 28.6 PG (ref 26–33)
MCHC RBC AUTO-ENTMCNC: 32.2 GM/DL (ref 32.2–35.5)
MCV RBC AUTO: 88.7 FL (ref 80–94)
MONOCYTES # BLD: 6.3 %
MONOCYTES ABSOLUTE: 0.7 THOU/MM3 (ref 0.4–1.3)
NUCLEATED RED BLOOD CELLS: 0 /100 WBC
ORGANISM: ABNORMAL
ORGANISM: ABNORMAL
PHOSPHORUS: 2 MG/DL (ref 2.4–4.7)
PLATELET # BLD: 194 THOU/MM3 (ref 130–400)
PLATELET ESTIMATE: ADEQUATE
PMV BLD AUTO: 8.8 FL (ref 9.4–12.4)
POTASSIUM REFLEX MAGNESIUM: 3.8 MEQ/L (ref 3.5–5.2)
RBC # BLD: 3.36 MILL/MM3 (ref 4.7–6.1)
SCAN OF BLOOD SMEAR: NORMAL
SEG NEUTROPHILS: 85.7 %
SEGMENTED NEUTROPHILS ABSOLUTE COUNT: 9.6 THOU/MM3 (ref 1.8–7.7)
SODIUM BLD-SCNC: 128 MEQ/L (ref 135–145)
URINE CULTURE REFLEX: ABNORMAL
WBC # BLD: 11.2 THOU/MM3 (ref 4.8–10.8)

## 2019-06-13 PROCEDURE — 80048 BASIC METABOLIC PNL TOTAL CA: CPT

## 2019-06-13 PROCEDURE — 2500000003 HC RX 250 WO HCPCS

## 2019-06-13 PROCEDURE — 6370000000 HC RX 637 (ALT 250 FOR IP): Performed by: INTERNAL MEDICINE

## 2019-06-13 PROCEDURE — 85025 COMPLETE CBC W/AUTO DIFF WBC: CPT

## 2019-06-13 PROCEDURE — 71045 X-RAY EXAM CHEST 1 VIEW: CPT

## 2019-06-13 PROCEDURE — 2709999900 HC NON-CHARGEABLE SUPPLY

## 2019-06-13 PROCEDURE — 87040 BLOOD CULTURE FOR BACTERIA: CPT

## 2019-06-13 PROCEDURE — 83735 ASSAY OF MAGNESIUM: CPT

## 2019-06-13 PROCEDURE — 97110 THERAPEUTIC EXERCISES: CPT

## 2019-06-13 PROCEDURE — 2580000003 HC RX 258: Performed by: NURSE PRACTITIONER

## 2019-06-13 PROCEDURE — 36415 COLL VENOUS BLD VENIPUNCTURE: CPT

## 2019-06-13 PROCEDURE — 2580000003 HC RX 258

## 2019-06-13 PROCEDURE — 94669 MECHANICAL CHEST WALL OSCILL: CPT

## 2019-06-13 PROCEDURE — 82330 ASSAY OF CALCIUM: CPT

## 2019-06-13 PROCEDURE — 6360000002 HC RX W HCPCS: Performed by: NURSE PRACTITIONER

## 2019-06-13 PROCEDURE — 99255 IP/OBS CONSLTJ NEW/EST HI 80: CPT | Performed by: NURSE PRACTITIONER

## 2019-06-13 PROCEDURE — 84100 ASSAY OF PHOSPHORUS: CPT

## 2019-06-13 PROCEDURE — 2580000003 HC RX 258: Performed by: INTERNAL MEDICINE

## 2019-06-13 PROCEDURE — 99233 SBSQ HOSP IP/OBS HIGH 50: CPT | Performed by: NURSE PRACTITIONER

## 2019-06-13 PROCEDURE — 51705 CHANGE OF BLADDER TUBE: CPT | Performed by: NURSE PRACTITIONER

## 2019-06-13 PROCEDURE — 97535 SELF CARE MNGMENT TRAINING: CPT

## 2019-06-13 PROCEDURE — 2580000003 HC RX 258: Performed by: PHYSICIAN ASSISTANT

## 2019-06-13 PROCEDURE — 6370000000 HC RX 637 (ALT 250 FOR IP): Performed by: PHYSICIAN ASSISTANT

## 2019-06-13 PROCEDURE — 2140000000 HC CCU INTERMEDIATE R&B

## 2019-06-13 RX ORDER — SODIUM CHLORIDE 1000 MG
1 TABLET, SOLUBLE MISCELLANEOUS 2 TIMES DAILY WITH MEALS
Status: DISCONTINUED | OUTPATIENT
Start: 2019-06-13 | End: 2019-06-20 | Stop reason: HOSPADM

## 2019-06-13 RX ORDER — SODIUM CHLORIDE 9 MG/ML
INJECTION, SOLUTION INTRAVENOUS CONTINUOUS
Status: DISCONTINUED | OUTPATIENT
Start: 2019-06-13 | End: 2019-06-14

## 2019-06-13 RX ADMIN — BACITRACIN: 500 OINTMENT TOPICAL at 09:00

## 2019-06-13 RX ADMIN — THERA TABS 1 TABLET: TAB at 09:00

## 2019-06-13 RX ADMIN — SODIUM CHLORIDE TAB 1 GM 1 G: 1 TAB at 17:45

## 2019-06-13 RX ADMIN — Medication 10 ML: at 09:00

## 2019-06-13 RX ADMIN — Medication 1 CAPSULE: at 09:00

## 2019-06-13 RX ADMIN — TIZANIDINE 2 MG: 4 TABLET ORAL at 09:00

## 2019-06-13 RX ADMIN — SODIUM CHLORIDE: 9 INJECTION, SOLUTION INTRAVENOUS at 21:56

## 2019-06-13 RX ADMIN — ACETAMINOPHEN 650 MG: 325 TABLET ORAL at 11:40

## 2019-06-13 RX ADMIN — ACETIC ACID: 250 IRRIGANT IRRIGATION at 11:44

## 2019-06-13 RX ADMIN — Medication 500 MG: at 09:00

## 2019-06-13 RX ADMIN — ACETAMINOPHEN 650 MG: 325 TABLET ORAL at 04:09

## 2019-06-13 RX ADMIN — FAMOTIDINE 20 MG: 20 TABLET, FILM COATED ORAL at 11:40

## 2019-06-13 RX ADMIN — CALCIUM CARBONATE-VITAMIN D TAB 500 MG-200 UNIT 1 TABLET: 500-200 TAB at 09:00

## 2019-06-13 RX ADMIN — VITAMIN E CAP 400 UNIT 400 UNITS: 400 CAP at 14:00

## 2019-06-13 RX ADMIN — POTASSIUM CHLORIDE 10 MEQ: 750 TABLET, FILM COATED, EXTENDED RELEASE ORAL at 09:00

## 2019-06-13 RX ADMIN — VANCOMYCIN HYDROCHLORIDE 1500 MG: 5 INJECTION, POWDER, LYOPHILIZED, FOR SOLUTION INTRAVENOUS at 10:00

## 2019-06-13 RX ADMIN — SODIUM CHLORIDE: 9 INJECTION, SOLUTION INTRAVENOUS at 06:09

## 2019-06-13 RX ADMIN — OXYBUTYNIN 5 MG: 5 TABLET, FILM COATED, EXTENDED RELEASE ORAL at 09:00

## 2019-06-13 RX ADMIN — SODIUM CHLORIDE TAB 1 GM 1 G: 1 TAB at 09:00

## 2019-06-13 RX ADMIN — DOCUSATE SODIUM 100 MG: 100 CAPSULE, LIQUID FILLED ORAL at 09:00

## 2019-06-13 RX ADMIN — VANCOMYCIN HYDROCHLORIDE 1500 MG: 5 INJECTION, POWDER, LYOPHILIZED, FOR SOLUTION INTRAVENOUS at 21:56

## 2019-06-13 RX ADMIN — TIZANIDINE 2 MG: 4 TABLET ORAL at 14:00

## 2019-06-13 RX ADMIN — Medication 20000 UNITS: at 09:00

## 2019-06-13 RX ADMIN — CEFTRIAXONE SODIUM 1 G: 1 INJECTION, POWDER, FOR SOLUTION INTRAMUSCULAR; INTRAVENOUS at 09:00

## 2019-06-13 RX ADMIN — OXYBUTYNIN 5 MG: 5 TABLET, FILM COATED, EXTENDED RELEASE ORAL at 21:22

## 2019-06-13 RX ADMIN — CALCIUM CARBONATE-VITAMIN D TAB 500 MG-200 UNIT 1 TABLET: 500-200 TAB at 21:22

## 2019-06-13 RX ADMIN — ACETIC ACID: 250 IRRIGANT IRRIGATION at 21:22

## 2019-06-13 RX ADMIN — TIZANIDINE 2 MG: 4 TABLET ORAL at 21:22

## 2019-06-13 RX ADMIN — FAMOTIDINE 20 MG: 20 TABLET, FILM COATED ORAL at 21:22

## 2019-06-13 RX ADMIN — SODIUM PHOSPHATE, MONOBASIC, MONOHYDRATE 14 MMOL: 276; 142 INJECTION, SOLUTION INTRAVENOUS at 11:41

## 2019-06-13 RX ADMIN — RIVAROXABAN 20 MG: 20 TABLET, FILM COATED ORAL at 17:45

## 2019-06-13 ASSESSMENT — PAIN SCALES - GENERAL
PAINLEVEL_OUTOF10: 0
PAINLEVEL_OUTOF10: 3
PAINLEVEL_OUTOF10: 0
PAINLEVEL_OUTOF10: 0

## 2019-06-13 NOTE — PROGRESS NOTES
Pharmacy Note  Vancomycin Consult    Daniel Jimenez is a 66 y.o. male started on Vancomycin for MRSA in UC; consult received from [facility] to manage therapy. Also receiving the following antibiotics: Rocephin. Patient Active Problem List   Diagnosis    Neurogenic bladder    Multiple sclerosis (HCC)    MS (multiple sclerosis) ([address]    Urinary retention    Indwelling catheter present on admission    Cystostomy malfunction ([address]    Decubitus ulcer of coccyx    Decubitus ulcer, stage 3 ([address]    Malnutrition ([address]    Decubitus ulcer of right perineal ischial region, stage 3 ([address]    Multiple sclerosis ([address]    Pulmonary embolism on left (HCC)    Metabolic encephalopathy    Speech disturbance    Infected decubitus ulcer, stage I    Infected decubitus ulcer, stage III (HCC)    Wound infection    Elevated INR    Chronic osteomyelitis, pelvis, right (HCC)    Osteomyelitis (HCC)    Urinary tract infection without hematuria    Sepsis (HCC)    Dehydration    Abnormal liver function test    Chronic depression    Essential hypertension    History of pulmonary embolism    Other dysphagia    Pressure injury of skin of back    Sepsis due to gram-negative UTI (HCC)    Bladder stones    Sepsis due to urinary tract infection (HCC)    Decubitus ulcer    Electrolyte imbalance    Elevated transaminase level    Anemia    UTI (urinary tract infection)    Syncope and collapse       Allergies:  Patient has no known allergies.      Temp max: 103.3    Recent Labs     06/12/19  0400 06/13/19  0420   BUN 28* 21       Recent Labs     06/12/19  0400 06/13/19  0420   CREATININE 0.8 0.7       Recent Labs     06/12/19  0400 06/13/19  0420   WBC 10.3 11.2*         Intake/Output Summary (Last 24 hours) at 6/13/2019 0841  Last data filed at 6/13/2019 0625  Gross per 24 hour   Intake 1646.49 ml   Output 400 ml   Net 1246.49 ml       Culture Date Source Results   6/10/19 UC MRSA, Proteus mirabilis   6/10/19 BCx2 ngtd       Ht Readings from Last 1 Encounters:   06/11/19 5' 7\" (1.702 m)        Wt Readings from Last 1 Encounters:   06/13/19 203 lb 12.8 oz (92.4 kg)         Body mass index is 31.92 kg/m². CrCl: ~120 mL/min      Assessment/Plan:  Will initiate vancomycin 1500 mg IV every 12 hours. Timing of trough level will be determined based on culture results, renal function, and clinical response. Thank you for the consult. Will continue to follow.

## 2019-06-13 NOTE — CONSULTS
Inpatient consult to Urology  Consult performed by: TONO Kirk CNP  Consult ordered by: TONO Turner CNP            [place] [id number]  Dept: [phone]  Loc: [phone]  Visit Date: 6/10/2019    Urology Consult Note    Reason for Consult:  SP catheter  Requesting Physician:  TONO Brenner CNP    History Obtained From:  patient, electronic medical record    Chief Complaint: Loss of consciousness    HISTORY OF PRESENT ILLNESS:                The patient is a 66 y.o. male with significant past medical history of neurogenic bladder and multiple sclerosis who presents from [address] [place] [address] following a reported loss of consciousness on 6/10/19. He was positive for UTI on arrival to hospital.  He has a suprapubic catheter previously placed for neurogenic bladder with last change at [address] [place] [address] reported on 6/4/19. Urology was consulted to assess the SP cath due to reported leakage around the catheter and from his native urethra. I previously saw Shahid Blanton at [address] [place] [address] on 3/21 in which he was doing well. He states at the nursing home he was weak with hypotension 80's and 1 episode of witnessed loss of consciousness. He was admitted for sepsis and being treated with IV antibiotics for + Staph A., and Proteus Delroy.  TMax 103.3 6/12/19. Today afebrile. Shahid Blanton is here by himself. History limited. Past Medical History:        Diagnosis Date    Dysphagia     oropharyngeal    History of pulmonary embolism     History of urinary tract infection     Hx of blood clots     Pulmonary embolis    Hypertension     Major depressive disorder, single episode     [facility]     Neurogenic bladder feb. 2012    Dr. Michelle Ash placed cather    Osteomyelitis [facility])     UTI (urinary tract infection)      Past Surgical History:        Procedure Laterality Date    ANKLE SURGERY  1996    broken ankle  BLADDER SURGERY  2012    Suprapubic catheter placement    COLONOSCOPY      VA LAP,SURG,COLECTOMY,W/END COLOST & CLOSUR N/A 2018    ROBOT DIVERTING COLOSTOMY performed by Qasim Pino MD at [address]  child     Allergies:  Patient has no known allergies.   Social History:  Social History     Socioeconomic History    Marital status:      Spouse name: Behzad Gaytan Number of children: 2    Years of education: Not on file    Highest education level: Not on file   Occupational History    Not on file   Social Needs    Financial resource strain: Not on file    Food insecurity:     Worry: Not on file     Inability: Not on file    Transportation needs:     Medical: Not on file     Non-medical: Not on file   Tobacco Use    Smoking status: Former Smoker     Last attempt to quit: 1982     Years since quittin.4    Smokeless tobacco: Former User   Substance and Sexual Activity    Alcohol use: No     Comment: \"quit alcohol a few years ago\"    Drug use: No    Sexual activity: Yes     Partners: Female   Lifestyle    Physical activity:     Days per week: Not on file     Minutes per session: Not on file    Stress: Not on file   Relationships    Social connections:     Talks on phone: Not on file     Gets together: Not on file     Attends Judaism service: Not on file     Active member of club or organization: Not on file     Attends meetings of clubs or organizations: Not on file     Relationship status: Not on file    Intimate partner violence:     Fear of current or ex partner: Not on file     Emotionally abused: Not on file     Physically abused: Not on file     Forced sexual activity: Not on file   Other Topics Concern    Not on file   Social History Narrative    Not on file     Family History:       Problem Relation Age of Onset    Cancer Mother         Breast    Heart Disease Father 48    Arthritis Sister     Heart Disease Paternal Grandmother 48 ROS:  Constitutional: Negative for chills, fatigue, fever, or weight loss. Eyes: Denies reported visual changes. ENT: Denies headache, difficulty swallowing, earache, and nosebleeds. Cardiovascular: Negative for chest pain, palpitations, tachycardia or edema. Respiratory: Denies cough or SOB. GI:The patient denies abdominal or flank pain, anorexia, nausea or vomiting. : see HPI. Musculoskeletal: Patient denies low back pain or painful or reduced range of ROM. Neurological: The patient denies any symptoms of neurological impairment or TIA. Psychiatric: Denies anxiety or depression. Skin: Denies rash or lesions. All remaining ROS negative. PHYSICAL EXAM:  VITALS:  /72   Pulse 95   Temp 97.7 °F (36.5 °C) (Oral)   Resp 16   Ht 5' 7\" (1.702 m)   Wt 203 lb 12.8 oz (92.4 kg)   SpO2 96%   BMI 31.92 kg/m² . Nursing note and vitals reviewed. Constitutional: Alert and oriented times x3, no acute distress, and cooperative to examination with appropriate mood and affect. HEENT:   Head:         Normocephalic and atraumatic. Mouth/Throat:          Mucous membranes are normal.   Eyes:         EOM are normal. No scleral icterus. Nose:    The external appearance of the nose is normal  Ears: The ears appear normal to external inspection. Cardiovascular:       Normal rate, regular rhythm. Pulmonary/Chest:  Normal respiratory rate and rhthym. No use of accessory muscles. Lungs clear bilaterally. Abdominal:          Soft. No tenderness. Active bowel sounds. LLQ colostomy intact - stoma pink and moist, suprapubic catheter intact draining clear, yellow urine with significant leakage around catheter. Genitalia:    Normal uncircumcised penis  Urethral meatus is normal in size and location  Scrotal contents normal to inspection and palpation   Normal testes palpated bilaterally  Musculoskeletal:    Normal range of motion. He exhibits no edema or tenderness of lower extremities. Extremities:    No cyanosis, clubbing, or edema present. Neurological:    Alert and oriented. DATA:  CBC:   Lab Results   Component Value Date    WBC 11.2 06/13/2019    RBC 3.36 06/13/2019    RBC 4.20 01/20/2012    HGB 9.6 06/13/2019    HCT 29.8 06/13/2019    MCV 88.7 06/13/2019    MCH 28.6 06/13/2019    MCHC 32.2 06/13/2019    RDW 17.8 01/04/2019     06/13/2019    MPV 8.8 06/13/2019     BMP:    Lab Results   Component Value Date     06/13/2019    K 3.8 06/13/2019    CL 94 06/13/2019    CO2 17 06/13/2019    BUN 21 06/13/2019    CREATININE 0.7 06/13/2019    CALCIUM 8.7 06/13/2019    LABGLOM >90 06/13/2019    GLUCOSE 122 06/13/2019    GLUCOSE 85 01/20/2012     BUN/Creatinine:    Lab Results   Component Value Date    BUN 21 06/13/2019    CREATININE 0.7 06/13/2019     Magnesium:    Lab Results   Component Value Date    MG 1.7 06/13/2019     Phosphorus:    Lab Results   Component Value Date    PHOS 2.0 06/13/2019     PT/INR:    Lab Results   Component Value Date    INR 1.37 06/11/2019     U/A:    Lab Results   Component Value Date    COLORU YELLOW 06/10/2019    PHUR 5.5 06/10/2019    LABCAST NONE SEEN 11/13/2018    WBCUA 50-75 06/10/2019    WBCUA >200 01/20/2012    RBCUA 25-50 06/10/2019    MUCUS NONE SEEN 02/10/2019    YEAST NONE SEEN 06/10/2019    BACTERIA FEW 06/10/2019    SPECGRAV 1.019 11/13/2018    LEUKOCYTESUR MODERATE 06/10/2019    UROBILINOGEN 1.0 06/10/2019    BILIRUBINUR SMALL 06/10/2019    BILIRUBINUR NEGATIVE 01/20/2012    BLOODU LARGE 06/10/2019    GLUCOSEU NEGATIVE 06/10/2019    AMORPHOUS URATES 06/10/2019       Imaging: The patient has had a Chest X-ray which I have independently reviewed along with its accompanying report. The study demonstrates:    Impression       No acute pneumonia or pulmonary edema. Stable mild elevation of the left hemidiaphragm with mild left basilar atelectasis.      IMPRESSION:     UTI  Sepsis 2/2 above  SP catheter malfunction  Neurogenic

## 2019-06-13 NOTE — CARE COORDINATION
6/13/19, 1:08 PM      Anup Milner day: 3  Location: -31/031-A Reason for admit: Sepsis [facility]) [A41.9]   Procedure: None  Treatment Plan of Care: Hospitalist following. Temp max of 103.3F overnight. MRSA in the urine. Continues with iv fluids, iv Rocephin, adding iv Vanc. Consulting ID. Consulting Urology for suprapubic cath leaking and frequent UTI's, as well as pt urinating from penis. PCP: Emeka Mendoza MD  Readmission Risk Score: 25%  Discharge Plan: Plan is for pt to return to [facility] when medically stable.  following.

## 2019-06-13 NOTE — PROGRESS NOTES
[address] [place] [address]  Occupational Therapy  Daily Note  Time:   Time In: 9573  Time Out: 9075  Timed Code Treatment Minutes: 24 Minutes  Minutes: 24          Date: 2019  Patient Name: Eliud Blake,   Gender: male      Room: [facility]/031-A  MRN: [id number]  : 1955  (66 y.o.)  Referring Practitioner: FABRIZIO Bahena CNP  Diagnosis: sepsis  Additional Pertinent Hx: 66 y.o. male with a past medical history of dysphagia, PE, UTI, HTN, and MS. Patient presents to the ED via EMS for evaluation after a syncopal episode. Patient was diagnosed with a UTI 2 days ago and was started on Augmentin. He states he has been laying in bed due to not feeling well. He felt better today so he went to eat in the dinning room. Patient states he then had a syncopal episode in his wheelchair. Patient denies having headache, dizziness or lightheadedness before the syncopal episode. When they got him back to his room, patient was hypotensive but alert. Restrictions/Precautions:  Restrictions/Precautions: Fall Risk  Position Activity Restriction  Other position/activity restrictions: MS    SUBJECTIVE: Pt sleeping in bed upon arrival, easily woke, agreeable    PAIN: Denies    COGNITION: WFL    ADL:   Grooming: Minimal Assistance. While seated in bed- Set up- washing face, mouth wash. Pt demo difficulty raising LUE to reach face and mouth. Min A- Combing hair Pt unable to comb back of head. Pt demo decreased AROM in BUE. Pt states he is R handed but has been completing many ADLs with L hand d/t decreased coordination in R hand. BALANCE:  Unable to assess    BED MOBILITY:  Unable to asses    TRANSFERS:  Unable to assess    FUNCTIONAL MOBILITY:  Unable to assess    ADDITIONAL ACTIVITIES:  Pt completed x10 table slides with BUE, Pt demo shoulder abduction when completing table slides. PROM Exercises- shoulder flexion, shoulder abduction, shoulder external rotation x10 BUE with short RB between exercises. Pt very tight and would benefit from continued PROM, Pt denies need of foam to add to utensils or pencils as holding onto objects is not his concern, coordination is. ASSESSMENT:  Activity Tolerance:  Patient tolerance of  treatment: good. Discharge Recommendations: ECF without OT(Recommend restorative aid at [facility] [place] for BUE PROM )  Equipment Recommendations: Equipment Needed: No  Plan: Times per week: No further OT goals addresses and recommendations made for next level of care  Current Treatment Recommendations: Self-Care / ADL, Safety Education & Training    Patient Education  Patient Education: Adaptive ADL Techniques and Importance of Increasing Activity    Goals  Short term goals  Time Frame for Short term goals: 2 weeks   Short term goal 1: Pt to complete hair care with SBA NOT MET  Short term goal 2: Pt to be educated on varying types of AE or compensatory tech to use durign self feeding to increase his ease of MET  Long term goals  Time Frame for Long term goals : not est d/t ELOS     Following session, patient left in safe position with all fall risk precautions in place.

## 2019-06-13 NOTE — PROGRESS NOTES
Hospitalist Progress Note    Patient:  Germán Hernandez      Unit/Bed:3B31/031-A    YOB: 1955    MRN: [id number]       Acct: [de-identified]     PCP: Romy Roman MD    Date of Admission: 6/10/2019    Assessment/Plan:    1. Syncopal episode--see #2; ACS ruled out by 3 (-) troponins; on Xarelto for PE hx; fully awake and oriented; likely 2nd to hypotension, unable to do orthostatic BP's  2. Metabolic encephalopathy--CT head (-) acute; appreciate neuro input; awake and alert MRI brain (-) acute ; EEG (-) epileptiform activity; fully awake and alert   3. Sepsis with hypotension, tachycardia, tachypnea, leukocytosis 2nd to UTI (POA) with Providencia stuartii (colony count > 100,000, Proteus mirabilis (colony count > 100,000) from ECF cx and from here with (+) MRSA and Proteus mirabilis (POA) likely 2nd to [facility] cath--lactic acid x 4 normal; procal at 7.53; acetic acid irrigation; Rocephin 6/12 (S) to Proteus (colony count now <10,000); Vanc added 6/13 to cover MRSA (colony count <10,000)--T max 103.3 past 24 hours; consult urology as pt leaking urine; PCXR from 6/13 (-) pneumonia; consult ID  4. Acute Hypoxic Respiratory Failure--on supplemental 02; wean as able; has Acapella  5. Azotemia--improving; receiving 0.9 NS at 100 ml/hr; monitor  6. Hypophosphatemia--replace per protocol; monitor  7. Hypocalcemia--replace per protocol  8. Hyponatremia--down to 128; monitor  9. Atelectasis LLL--IS/Acapella~will need assistance  10. Chronic normo anemia--stable  11. Metabolic acidosis--down to 17 (20); monitor  12. Neurogenic bladder 2nd to MS--follows with Dr Jeanne Dia and Raymond Loza; [facility] changes SP cath every month with last change June 4, 2019 per chart review   13. Chronic Alk Phos elevation  14. Hx PE--on Xarelto  15. Hx MS--lives at [place] [id number].  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    Expected discharge date:  TBD    Disposition:    [] Home       [] mg Oral Daily    famotidine  20 mg Oral BID    multivitamin  1 tablet Oral Daily    oxybutynin  5 mg Oral BID    potassium chloride  10 mEq Oral Daily    lactobacillus  1 capsule Oral Daily    vitamin E  400 Units Oral Daily    vitamin A  20,000 Units Oral Daily    tiZANidine  2 mg Oral TID    rivaroxaban  20 mg Oral Dinner     PRN Meds: ondansetron, sodium chloride flush, acetaminophen      Intake/Output Summary (Last 24 hours) at 6/13/2019 0604  Last data filed at 6/13/2019 0454  Gross per 24 hour   Intake 1646.49 ml   Output 350 ml   Net 1296.49 ml       Diet:  DIET GENERAL;    Exam:  /60   Pulse 123   Temp 99.6 °F (37.6 °C) (Oral)   Resp 20   Ht 5' 7\" (1.702 m)   Wt 205 lb (93 kg)   SpO2 93%   BMI 32.11 kg/m²     General appearance: No apparent distress, appears older than stated age and cooperative. Chronically ill appearing  HEENT:  Conjunctivae/corneas clear. Neck: Supple, with full range of motion. No jugular venous distention. Trachea midline. Respiratory:  Normal respiratory effort. Clear to auscultation anterior aspect. Cardiovascular: Regular rate and rhythm with normal S1/S2 without murmurs, rubs or gallops. Abdomen: Soft, non-tender, non-distended with normal bowel sounds. (+) colostomy and SP cath noted  Musculoskeletal: passive and active ROM x 4 extremities; (+) contractures  Skin: Skin color, texture, turgor normal.    Neurologic:  Neurovascularly intact without any focal sensory/motor deficits.  Cranial nerves: II-XII intact, grossly non-focal.  Psychiatric: Alert and oriented x 3, thought content appropriate at this time  Capillary Refill: Brisk,< 3 seconds   Peripheral Pulses: +2 palpable, equal bilaterally       Labs:   Recent Labs     06/11/19  0416 06/12/19  0400 06/13/19  0420   WBC 9.9 10.3 11.2*   HGB 10.0* 10.0* 9.6*   HCT 31.7* 31.1* 29.8*    189 194     Recent Labs     06/11/19  0416 06/12/19  0400 06/13/19  0420   * 133* 128*   K 3.9 4.0 3.8   CL 97* 101 94*   CO2 18* 20* 17*   BUN 38* 28* 21   CREATININE 0.9 0.8 0.7   CALCIUM 8.7 8.9 8.7   PHOS 1.9* 1.8* 2.0*     Recent Labs     06/10/19  1232 06/10/19  2102   AST 56* 45*   ALT 75* 65   BILIDIR 0.6* 0.4*   BILITOT 0.8 0.6   ALKPHOS 342* 327*     Recent Labs     06/10/19  1232 06/11/19  0416   INR 1.81* 1.37*     No results for input(s): Nikolas Pong in the last 72 hours. Microbiology:    Urine cx (+) Proteus mirabilis and MRSA  Blood cx x 2 (-) prelim. Urinalysis:      Lab Results   Component Value Date    NITRU NEGATIVE 06/10/2019    WBCUA 50-75 06/10/2019    WBCUA >200 01/20/2012    BACTERIA FEW 06/10/2019    RBCUA 25-50 06/10/2019    BLOODU LARGE 06/10/2019    SPECGRAV 1.019 11/13/2018    GLUCOSEU NEGATIVE 06/10/2019       Radiology:    CXR from 6/13/2019:  Lines/tubes/devices: none  Lungs/pleura: There is stable mild elevation of the left hemidiaphragm with mild left basilar atelectasis. No pneumonia, pulmonary edema, or obvious mass. No pleural effusion. No pneumothorax. Heart: There is borderline cardiomegaly. Mediastinum/grady: No obvious mass or adenopathy. Skeleton: No significant bone or joint abnormality.      Impression     No acute pneumonia or pulmonary edema. Stable mild elevation of the left hemidiaphragm with mild left basilar atelectasis. CT head:  No acute intracranial findings. Generalized volume loss and patchy white matter changes which may be related to underlying demyelinating process or small vessel ischemic disease. Findings likely in keeping with known provided history of multiple   sclerosis. There is no acute hemorrhage or midline shift. Ventricles are within normal limits for age. Intracranial vascular calcifications. .  Paranasal sinuses are clear. Mastoid air cells are patent. Skull: Unremarkable. Soft tissues: Unremarkable.     Impression  No acute intracranial findings. CTA chest:  Thyroid gland is unremarkable.   Thoracic aorta is normal caliber. The pulmonary arterial vessels are adequately opacified. There is no acute pulmonary arterial embolism. No lymphadenopathy. No cardiomegaly. Patchy atelectasis left lung base. No pleural or pericardial effusion. No acute osseous findings. Osteopenia and degenerative changes thoracic spine. Upper abdominal images are unremarkable.     Impression     No acute pulmonary embolism.     Mild atelectasis in the left lung base. MRI brain:  Motion artifact does degrade the quality of some of these images. These are the best images possible at this time.     The diffusion-weighted images are normal. The brain volume is moderately reduced. There are no intra-or extra-axial collections. Daisha Faribault is no hydrocephalus, midline shift or mass effect.     On the FLAIR and T2-weighted sequences, there is a moderate amount of abnormal signal in the deep white matter of the brain. Many of these radiate outward from the ventricular margins. This also involves the white matter of the temporal lobes. The   patient has a history of multiple sclerosis. This is stable. There are stable small old lacunar type infarcts in the right basal ganglia. There is an old cortical infarct with associated volume loss in the posterior right frontal lobe.     On the gradient echo T2-weighted images, there is no susceptibility artifact present.     There is no abnormal enhancement in the brain. The major intracranial vascular flow voids are present.  The midline craniocervical junction structures are normal.  The brainstem and pituitary gland are normal.      Impression     1. No evidence of an acute infarct or active demyelination. 2. 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 3. Moderate amount of abnormal signal in the white matter of the brain consistent with multiple sclerosis and/or chronic small vessel ischemic changes.     EEG:  This is an abnormal EEG due to the excessive slowing seen  in theta and delta range activity suggestive of cortical dysfunction  such as seen with encephalopathy. However, there was no evidence of  epileptiform activity appreciated.     DVT prophylaxis: [] Lovenox                                 [] SCDs                                 [] SQ Heparin                                 [] Encourage ambulation           [x] Already on Anticoagulation~Xarelto     Code Status: Full Code    PT/OT Eval Status: order    Tele:   [x] yes             [] no    Active Hospital Problems    Diagnosis Date Noted    Syncope and collapse [R55]     Electrolyte imbalance [E87.8] 06/10/2019    Elevated transaminase level [R74.0] 06/10/2019    Anemia [D64.9] 06/10/2019    UTI (urinary tract infection) [N39.0] 06/10/2019    Sepsis ([facility] [A41.9]     Chronic depression [F32.9]     Essential hypertension [I10]     History of pulmonary embolism [Z86.711]     Dehydration [E86.0]     Malnutrition ([facility] [E46] 12/17/2012    Multiple sclerosis ([facility] Jose Kaye 02/14/2012       Electronically signed by TONO Pope CNP on 6/13/2019 at 6:04 AM

## 2019-06-13 NOTE — CONSULTS
Meds:   sodium chloride  1 g Oral BID WC    sodium phosphate IVPB  14 mmol Intravenous Once    vancomycin (VANCOCIN) intermittent dosing (placeholder)   Other RX Placeholder    vancomycin  1,500 mg Intravenous Q12H    phosphorus replacement protocol   Other RX Placeholder    cefTRIAXone (ROCEPHIN) IV  1 g Intravenous Q24H    sodium chloride flush  10 mL Intravenous 2 times per day    acetic acid   Irrigation BID    vitamin C  500 mg Oral Daily    bacitracin   Topical Daily    calcium-vitamin D  1 tablet Oral BID    docusate sodium  100 mg Oral Daily    famotidine  20 mg Oral BID    multivitamin  1 tablet Oral Daily    oxybutynin  5 mg Oral BID    potassium chloride  10 mEq Oral Daily    lactobacillus  1 capsule Oral Daily    vitamin E  400 Units Oral Daily    vitamin A  20,000 Units Oral Daily    tiZANidine  2 mg Oral TID    rivaroxaban  20 mg Oral Dinner     Continuous Infusions:   sodium chloride 75 mL/hr at 06/13/19 0609     PRN Meds:ondansetron, sodium chloride flush, acetaminophen  Allergies:   ALLERGIES:    Patient has no known allergies. SOCIAL HISTORY:     TOBACCO:   reports that he quit smoking about 37 years ago. He has quit using smokeless tobacco.     ETOH:   reports that he does not drink alcohol. Patient currently lives in a nursing home      FAMILY HISTORY:         Problem Relation Age of Onset    Cancer Mother         Breast    Heart Disease Father 48    Arthritis Sister     Heart Disease Paternal Grandmother 48       REVIEW OF SYSTEMS:     Constitutional:+fever, no night sweats,+ fatigue. Head: no head ache , no head injury, no migranes. Eye: no blurring of vision, no double vision.   Ears: no hearing difficulty, no tinnitus  Mouth/throat: no ulceration, dental caries , dysphagia  Lungs: no cough no chest pain  CVS: no palpitation, no chest pain, no shortness of breath  GI: no abdominal pain, no nausea , no vomiting, no constipation  JENNIFER:has suprapubic order.  Musculoskeletal: weakness  Endocrine: no polyuria, polydipsia, no cold or heat intolerance  Hematology: no anemia, no easy brusing or bleeding, no hx of clotting disorder  Dermatology: no skin rash, no eczema, no pruritis,       PHYSICAL EXAM:     /72   Pulse 95   Temp 97.7 °F (36.5 °C) (Oral)   Resp 16   Ht 5' 7\" (1.702 m)   Wt 203 lb 12.8 oz (92.4 kg)   SpO2 96%   BMI 31.92 kg/m²   General:  Awake, alert, chronically sick looking. HEENT: pink conjunctiva, unicteric sclera, moist oral mucosa. Chest:  Bilateral air entry. Cardiovascular:  RRR ,S1S2, no murmur or gallop. Abdomen:  Soft, non tender to palpation. Extremities: no edema. He has open wound on the right ischial   Skin:  Warm and dry. CNS:oriented. LABS:     CBC:   Recent Labs     06/11/19 0416 06/12/19  0400 06/13/19  0420   WBC 9.9 10.3 11.2*   HGB 10.0* 10.0* 9.6*    189 194     BMP:    Recent Labs     06/11/19  0416 06/12/19  0400 06/13/19  0420   * 133* 128*   K 3.9 4.0 3.8   CL 97* 101 94*   CO2 18* 20* 17*   BUN 38* 28* 21   CREATININE 0.9 0.8 0.7   GLUCOSE 123* 130* 122*     Calcium:  Recent Labs     06/13/19  0420   CALCIUM 8.7     Ionized Calcium:No results for input(s): IONCA in the last 72 hours.   Magnesium:  Recent Labs     06/13/19  0420   MG 1.7     Phosphorus:  Recent Labs     06/13/19  0420   PHOS 2.0*    INR:   Recent Labs     06/11/19 0416   INR 1.37*     Hepatic:   Recent Labs     06/10/19  2102   ALKPHOS 327*   ALT 65   AST 45*   PROT 7.2   BILITOT 0.6   BILIDIR 0.4*   LABALBU 2.4*     Amylase and Lipase:  Recent Labs     06/11/19 0416   LACTA 1.3     Lactic Acid:   Recent Labs     06/11/19 0416   LACTA 1.3       Micro:   Lab Results   Component Value Date    BC No growth-preliminary 06/10/2019    BC No growth-preliminary 06/10/2019       Problem list of patient      Patient Active Problem List   Diagnosis Code    Neurogenic bladder N31.9    [facility] G35    MS (multiple sclerosis) ([facility] G35    Urinary retention R33.9    Indwelling catheter present on admission Z96.0    Cystostomy malfunction ([facility] N85.849    Decubitus ulcer of coccyx L89.159    Decubitus ulcer, stage 3 ([facility]) L89.93    Malnutrition ([facility] E46    Decubitus ulcer of right perineal ischial region, stage 3 ([facility]) L89.313    Multiple sclerosis ([facility] G35    Pulmonary embolism on left ([facility]) B15.14    Metabolic encephalopathy U71.73    Speech disturbance R47.9    Infected decubitus ulcer, stage I L89.91, L08.9    Infected decubitus ulcer, stage III ([facility]) L89.93, L08.9    Wound infection T14. 8XXA, L08.9    Elevated INR R79.1    Chronic osteomyelitis, pelvis, right ([facility]) M86.651    Osteomyelitis ([facility]) M86.9    Urinary tract infection without hematuria N39.0    Sepsis ([facility]) A41.9    Dehydration E86.0    Abnormal liver function test R94.5    Chronic depression F32.9    Essential hypertension I10    History of pulmonary embolism Z86.711    Other dysphagia R13.19    Pressure injury of skin of back L89.109    Sepsis due to gram-negative UTI ([facility]) A41.50, N39.0    Bladder stones N21.0    Sepsis due to urinary tract infection ([facility]) A41.9, N39.0    Decubitus ulcer L89.90    Electrolyte imbalance E87.8    Elevated transaminase level R74.0    Anemia D64.9    UTI (urinary tract infection) N39.0    Syncope and collapse R55           Impression and Recommendation:   Syncopal episode  UTI related to chronic suprapubic catheter. It has been exchanged. Will continue current treatment. Will transition to oral.  Non healing right ischial stage 4 wound: continue daily packing and as needed. MS with functional quadriparesis  Discussed with urology and nursing staff   Thank you TONO Singh * for allowing me to participate in this patient's care.     Herrera Lopes MD,FACP 6/13/2019 3:13 PM

## 2019-06-13 NOTE — PLAN OF CARE
Problem: Falls - Risk of:  Goal: Will remain free from falls  Description  Will remain free from falls  6/13/2019 0155 by Roberto Dacosta RN  Outcome: Ongoing  Note:   Patient remains free from falls this shift. Continue to implement fall risk interventions including call light within reach, fall sign posted outside door, purposeful rounding. Problem: Risk for Impaired Skin Integrity  Goal: Tissue integrity - skin and mucous membranes  Description  Structural intactness and normal physiological function of skin and  mucous membranes. 6/13/2019 0155 by Roberto Dacosta RN  Outcome: Ongoing  Note:   Patient does have wound to right buttock. Wound/ostomy is following. Orders in for dressing changes every two days, continue with this. Dressing change due today on dayshift. Report any new skin breakdown to wound and ostomy and physician. Q2hr turns. Problem: SAFETY  Goal: Free from accidental physical injury  6/13/2019 0155 by Roberto Dacosta RN  Outcome: Ongoing  Note:   Please see above under fall prevention      Problem: SKIN INTEGRITY  Goal: Skin integrity is maintained or improved  6/13/2019 0155 by Roberto Dacosta RN  Outcome: Ongoing  Note:   Please see above under tissue integrity. Problem: KNOWLEDGE DEFICIT  Goal: Patient/S.O. demonstrates understanding of disease process, treatment plan, medications, and discharge instructions. 6/13/2019 0155 by Roberto Dacosta RN  Outcome: Ongoing  Note:   Patient asks appropriate questions regarding treatment. Continue to encourage participation in care. Problem: DISCHARGE BARRIERS  Goal: Patient's continuum of care needs are met  6/13/2019 0155 by Roberto Dacosta RN  Outcome: Ongoing  Note:   Plan for patient to [facility] at the end of stay. Continue to collaborate with nursing care coordinator and .       Problem: Pain:  Goal: Pain level will decrease  Description  Pain level will decrease  6/13/2019 0155 by Jame Malone

## 2019-06-13 NOTE — CARE COORDINATION
6/13/19, 11:07 AM    DISCHARGE BARRIERS  Attempted to speak with patient regarding discharge plan. He was sleeping. SW will try again later today. Called and spoke with Aimee Duncan at [facility] to ask what they need to do a precert. PT signed off due to patient being bed bound and a whit lift. She will look into it and call SW back.

## 2019-06-14 LAB
ALBUMIN SERPL-MCNC: 2.3 G/DL (ref 3.5–5.1)
ALP BLD-CCNC: 386 U/L (ref 38–126)
ALT SERPL-CCNC: 62 U/L (ref 11–66)
ANION GAP SERPL CALCULATED.3IONS-SCNC: 13 MEQ/L (ref 8–16)
AST SERPL-CCNC: 41 U/L (ref 5–40)
BILIRUB SERPL-MCNC: 0.7 MG/DL (ref 0.3–1.2)
BUN BLDV-MCNC: 16 MG/DL (ref 7–22)
CALCIUM SERPL-MCNC: 8.2 MG/DL (ref 8.5–10.5)
CHLORIDE BLD-SCNC: 98 MEQ/L (ref 98–111)
CO2: 20 MEQ/L (ref 23–33)
CREAT SERPL-MCNC: 0.8 MG/DL (ref 0.4–1.2)
ERYTHROCYTE [DISTWIDTH] IN BLOOD BY AUTOMATED COUNT: 15.8 % (ref 11.5–14.5)
ERYTHROCYTE [DISTWIDTH] IN BLOOD BY AUTOMATED COUNT: 50.1 FL (ref 35–45)
GFR SERPL CREATININE-BSD FRML MDRD: > 90 ML/MIN/1.73M2
GLUCOSE BLD-MCNC: 133 MG/DL (ref 70–108)
HCT VFR BLD CALC: 27 % (ref 42–52)
HEMOGLOBIN: 8.8 GM/DL (ref 14–18)
MCH RBC QN AUTO: 28.3 PG (ref 26–33)
MCHC RBC AUTO-ENTMCNC: 32.6 GM/DL (ref 32.2–35.5)
MCV RBC AUTO: 86.8 FL (ref 80–94)
PHOSPHORUS: 2 MG/DL (ref 2.4–4.7)
PLATELET # BLD: 226 THOU/MM3 (ref 130–400)
PMV BLD AUTO: 9.3 FL (ref 9.4–12.4)
POTASSIUM REFLEX MAGNESIUM: 4 MEQ/L (ref 3.5–5.2)
POTASSIUM SERPL-SCNC: 4 MEQ/L (ref 3.5–5.2)
RBC # BLD: 3.11 MILL/MM3 (ref 4.7–6.1)
SODIUM BLD-SCNC: 131 MEQ/L (ref 135–145)
TOTAL PROTEIN: 6.5 G/DL (ref 6.1–8)
WBC # BLD: 12.6 THOU/MM3 (ref 4.8–10.8)

## 2019-06-14 PROCEDURE — 6370000000 HC RX 637 (ALT 250 FOR IP): Performed by: INTERNAL MEDICINE

## 2019-06-14 PROCEDURE — 2580000003 HC RX 258: Performed by: NURSE PRACTITIONER

## 2019-06-14 PROCEDURE — 2140000000 HC CCU INTERMEDIATE R&B

## 2019-06-14 PROCEDURE — 99233 SBSQ HOSP IP/OBS HIGH 50: CPT | Performed by: NURSE PRACTITIONER

## 2019-06-14 PROCEDURE — 2500000003 HC RX 250 WO HCPCS: Performed by: NURSE PRACTITIONER

## 2019-06-14 PROCEDURE — 2709999900 HC NON-CHARGEABLE SUPPLY

## 2019-06-14 PROCEDURE — 84100 ASSAY OF PHOSPHORUS: CPT

## 2019-06-14 PROCEDURE — 36415 COLL VENOUS BLD VENIPUNCTURE: CPT

## 2019-06-14 PROCEDURE — 2580000003 HC RX 258: Performed by: PHYSICIAN ASSISTANT

## 2019-06-14 PROCEDURE — 80053 COMPREHEN METABOLIC PANEL: CPT

## 2019-06-14 PROCEDURE — 80048 BASIC METABOLIC PNL TOTAL CA: CPT

## 2019-06-14 PROCEDURE — 6360000002 HC RX W HCPCS: Performed by: NURSE PRACTITIONER

## 2019-06-14 PROCEDURE — 80202 ASSAY OF VANCOMYCIN: CPT

## 2019-06-14 PROCEDURE — 2580000003 HC RX 258: Performed by: INTERNAL MEDICINE

## 2019-06-14 PROCEDURE — 6370000000 HC RX 637 (ALT 250 FOR IP): Performed by: PHYSICIAN ASSISTANT

## 2019-06-14 PROCEDURE — 85027 COMPLETE CBC AUTOMATED: CPT

## 2019-06-14 RX ADMIN — VITAMIN E CAP 400 UNIT 400 UNITS: 400 CAP at 09:51

## 2019-06-14 RX ADMIN — Medication 10 ML: at 21:51

## 2019-06-14 RX ADMIN — VANCOMYCIN HYDROCHLORIDE 1500 MG: 5 INJECTION, POWDER, LYOPHILIZED, FOR SOLUTION INTRAVENOUS at 21:51

## 2019-06-14 RX ADMIN — CALCIUM CARBONATE-VITAMIN D TAB 500 MG-200 UNIT 1 TABLET: 500-200 TAB at 09:52

## 2019-06-14 RX ADMIN — SODIUM CHLORIDE TAB 1 GM 1 G: 1 TAB at 17:25

## 2019-06-14 RX ADMIN — ACETIC ACID: 250 IRRIGANT IRRIGATION at 21:09

## 2019-06-14 RX ADMIN — ACETAMINOPHEN 650 MG: 325 TABLET ORAL at 01:28

## 2019-06-14 RX ADMIN — TIZANIDINE 2 MG: 4 TABLET ORAL at 13:11

## 2019-06-14 RX ADMIN — TIZANIDINE 2 MG: 4 TABLET ORAL at 21:09

## 2019-06-14 RX ADMIN — CEFTRIAXONE SODIUM 1 G: 1 INJECTION, POWDER, FOR SOLUTION INTRAMUSCULAR; INTRAVENOUS at 09:52

## 2019-06-14 RX ADMIN — DOCUSATE SODIUM 100 MG: 100 CAPSULE, LIQUID FILLED ORAL at 09:52

## 2019-06-14 RX ADMIN — ACETAMINOPHEN 650 MG: 325 TABLET ORAL at 21:11

## 2019-06-14 RX ADMIN — Medication 20000 UNITS: at 09:51

## 2019-06-14 RX ADMIN — ACETIC ACID: 250 IRRIGANT IRRIGATION at 10:25

## 2019-06-14 RX ADMIN — THERA TABS 1 TABLET: TAB at 09:52

## 2019-06-14 RX ADMIN — FAMOTIDINE 20 MG: 20 TABLET, FILM COATED ORAL at 21:11

## 2019-06-14 RX ADMIN — Medication 500 MG: at 09:51

## 2019-06-14 RX ADMIN — SODIUM CHLORIDE: 9 INJECTION, SOLUTION INTRAVENOUS at 13:07

## 2019-06-14 RX ADMIN — CALCIUM CARBONATE-VITAMIN D TAB 500 MG-200 UNIT 1 TABLET: 500-200 TAB at 21:08

## 2019-06-14 RX ADMIN — BACITRACIN: 500 OINTMENT TOPICAL at 10:25

## 2019-06-14 RX ADMIN — TIZANIDINE 2 MG: 4 TABLET ORAL at 09:51

## 2019-06-14 RX ADMIN — SODIUM PHOSPHATE, MONOBASIC, MONOHYDRATE 14 MMOL: 276; 142 INJECTION, SOLUTION INTRAVENOUS at 13:07

## 2019-06-14 RX ADMIN — OXYBUTYNIN 5 MG: 5 TABLET, FILM COATED, EXTENDED RELEASE ORAL at 09:52

## 2019-06-14 RX ADMIN — VANCOMYCIN HYDROCHLORIDE 1500 MG: 5 INJECTION, POWDER, LYOPHILIZED, FOR SOLUTION INTRAVENOUS at 10:25

## 2019-06-14 RX ADMIN — OXYBUTYNIN 5 MG: 5 TABLET, FILM COATED, EXTENDED RELEASE ORAL at 21:08

## 2019-06-14 RX ADMIN — POTASSIUM CHLORIDE 10 MEQ: 750 TABLET, FILM COATED, EXTENDED RELEASE ORAL at 09:52

## 2019-06-14 RX ADMIN — FAMOTIDINE 20 MG: 20 TABLET, FILM COATED ORAL at 09:52

## 2019-06-14 RX ADMIN — SODIUM CHLORIDE TAB 1 GM 1 G: 1 TAB at 09:52

## 2019-06-14 RX ADMIN — Medication 1 CAPSULE: at 09:52

## 2019-06-14 RX ADMIN — RIVAROXABAN 20 MG: 20 TABLET, FILM COATED ORAL at 17:25

## 2019-06-14 ASSESSMENT — PAIN SCALES - GENERAL
PAINLEVEL_OUTOF10: 0
PAINLEVEL_OUTOF10: 0
PAINLEVEL_OUTOF10: 2

## 2019-06-14 NOTE — PROGRESS NOTES
Hospitalist Progress Note    Patient:  Diane Olson      Unit/Bed:3B-31/031-A    YOB: 1955    MRN: [id number]       Acct: [de-identified]     PCP: Azeb Jovel MD    Date of Admission: 6/10/2019    Assessment/Plan:    1. Syncopal episode--see #2; ACS ruled out by 3 (-) troponins; on Xarelto for PE hx; fully awake and oriented; likely 2nd to hypotension, unable to do orthostatic BP's  2. Metabolic encephalopathy--CT head (-) acute; appreciate neuro input; awake and alert MRI brain (-) acute ; EEG (-) epileptiform activity; fully awake and alert   3. Sepsis with hypotension, tachycardia, tachypnea, leukocytosis 2nd to UTI (POA) with Providencia stuartii (colony count > 100,000, Proteus mirabilis (colony count > 100,000) from ECF cx and from here with (+) MRSA and Proteus mirabilis (POA) likely 2nd to [facility] cath-- acetic acid irrigation; Rocephin 6/12 (S) to Proteus (colony count now <10,000); Vanc added 6/13 to cover MRSA (colony count <10,000); T max 102.8 past 24 hours; appreciate urology and ID input; PCXR from 6/13 (-) pneumonia; SP cath exchanged 6/13 with a 18Fr and instilled 20 ml into balloon; no further leaking; on 0.9 NS at 75 ml/hr  4. Acute Hypoxic Respiratory Failure--on supplemental 02; wean as able; has Acapella  5. Azotemia--resolved; monitor  6. Hypophosphatemia--replace per protocol; monitor  7. Hypocalcemia--replaced per protocol  8. Hyponatremia--improved to 131; monitor  9. Atelectasis LLL--IS/Acapella~will need assistance  10. Chronic normo anemia--stable; likely HA  11. Metabolic acidosis--improved; monitor  12. Neurogenic bladder 2nd to MS--follows with Dr Melanie Meadows and Alona Hurd; [facility] changes SP cath every month with last change June 4, 2019 per chart review; exchanged 6/13   13. 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 Chronic Alk Phos elevation  15. Hx PE--on Xarelto  16. Hx MS--lives at [facility]  17.  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    Expected discharge date:  TBD    Disposition:    [] Home       [] TCU       [] Rehab       [] Psych       [x] SNF       [] [place]       [] Other-    Chief Complaint: \"I had a syncopal episode\"    Hospital Course: presents to the ED via EMS for evaluation after a syncopal episode; was diagnosed with a UTI 2 days ago and was started on Augmentin June 8; states he has been laying in bed due to not feeling well; felt better on Lora 10 so he went to eat in the dinning room; stated he then had a syncopal episode in his wheelchair; denies having headache, dizziness or lightheadedness before the syncopal episode; when the [facility] staff got him back to his room patient was hypotensive but alert; pressure in ED was 80/57 and then 77/56; reports associated mild cough; denies headache, dizziness, lightheadedness, chest pain, shortness of breath, abdominal pain, vision changes, hearing loss, fever, or new weakness, numbness or tingling; in ED he was given total 1L 0.9 NS bolus; he is currently awake and alert; denies pain; he has a colostomy and SP cath.      6/12: had a fever early this AM; offers no complaints; fully awake and alert; we discussed that his MRI brain did not show anything acute; watch closely in case ID consulted is warranted      6/13: continues to spike fevers early AM; U/A now growing MRSA so Vanc added, still with Proteus (S) to Rocephin; leaking urine per RN so will have urology see     6/14: spiked fever early AM and ice packs were applied; also became tachycardic but improved with resolution of fever; ID saw; urology saw and replaced SP cath with 18 FR and no further leaking noted around cath     Subjective (past 24 hours): offers no complaints; eating his breakfast; was sleeping but awoke easily and alert x 4     Medications:  Reviewed    Infusion Medications    sodium chloride 75 mL/hr at 06/13/19 3556     Scheduled intact without any focal sensory/motor deficits. Cranial nerves: II-XII intact, grossly non-focal.  Psychiatric: Alert and oriented x 3, thought content appropriate at this time  Capillary Refill: Brisk,< 3 seconds   Peripheral Pulses: +2 palpable, equal bilaterally       Labs:   Recent Labs     06/12/19  0400 06/13/19  0420 06/14/19  0347   WBC 10.3 11.2* 12.6*   HGB 10.0* 9.6* 8.8*   HCT 31.1* 29.8* 27.0*    194 226     Recent Labs     06/12/19  0400 06/13/19  0420 06/14/19  0347   * 128* 131*   K 4.0 3.8 4.0    94* 98   CO2 20* 17* 20*   BUN 28* 21 16   CREATININE 0.8 0.7 0.8   CALCIUM 8.9 8.7 8.2*   PHOS 1.8* 2.0*  --      Recent Labs     06/14/19 0347   AST 41*   ALT 62   BILITOT 0.7   ALKPHOS 386*     No results for input(s): INR in the last 72 hours. No results for input(s): Shira Dark in the last 72 hours. Microbiology:    Urine cx (+) Proteus mirabilis and MRSA  Blood cx x 4 (-) prelim. Urinalysis:      Lab Results   Component Value Date    NITRU NEGATIVE 06/10/2019    WBCUA 50-75 06/10/2019    WBCUA >200 01/20/2012    BACTERIA FEW 06/10/2019    RBCUA 25-50 06/10/2019    BLOODU LARGE 06/10/2019    SPECGRAV 1.019 11/13/2018    GLUCOSEU NEGATIVE 06/10/2019       Radiology:    CXR from 6/13/2019:  Lines/tubes/devices: none  Lungs/pleura: There is stable mild elevation of the left hemidiaphragm with mild left basilar atelectasis. No pneumonia, pulmonary edema, or obvious mass. No pleural effusion. No pneumothorax. Heart: There is borderline cardiomegaly. Mediastinum/grady: No obvious mass or adenopathy. Skeleton: No significant bone or joint abnormality.      Impression     No acute pneumonia or pulmonary edema. Stable mild elevation of the left hemidiaphragm with mild left basilar atelectasis. CT head:  No acute intracranial findings.  Generalized volume loss and patchy white matter changes which may be related to underlying demyelinating process or small vessel ischemic disease. Findings likely in keeping with known provided history of multiple   sclerosis. There is no acute hemorrhage or midline shift. Ventricles are within normal limits for age. Intracranial vascular calcifications. .  Paranasal sinuses are clear. Mastoid air cells are patent. Skull: Unremarkable. Soft tissues: Unremarkable.     Impression  No acute intracranial findings. CTA chest:  Thyroid gland is unremarkable. Thoracic aorta is normal caliber. The pulmonary arterial vessels are adequately opacified. There is no acute pulmonary arterial embolism. No lymphadenopathy. No cardiomegaly. Patchy atelectasis left lung base. No pleural or pericardial effusion. No acute osseous findings. Osteopenia and degenerative changes thoracic spine. Upper abdominal images are unremarkable.     Impression     No acute pulmonary embolism.     Mild atelectasis in the left lung base. MRI brain:  Motion artifact does degrade the quality of some of these images. These are the best images possible at this time.     The diffusion-weighted images are normal. The brain volume is moderately reduced. There are no intra-or extra-axial collections. Arely Peaches is no hydrocephalus, midline shift or mass effect.     On the FLAIR and T2-weighted sequences, there is a moderate amount of abnormal signal in the deep white matter of the brain. Many of these radiate outward from the ventricular margins. This also involves the white matter of the temporal lobes. The   patient has a history of multiple sclerosis. This is stable. There are stable small old lacunar type infarcts in the right basal ganglia. There is an old cortical infarct with associated volume loss in the posterior right frontal lobe.     On the gradient echo T2-weighted images, there is no susceptibility artifact present.     There is no abnormal enhancement in the brain.  The major intracranial vascular flow voids are present.  The midline craniocervical junction structures are normal.  The brainstem and pituitary gland are normal.      Impression     1. No evidence of an acute infarct or active demyelination. 2. 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 3. Moderate amount of abnormal signal in the white matter of the brain consistent with multiple sclerosis and/or chronic small vessel ischemic changes. EEG:  This is an abnormal EEG due to the excessive slowing seen  in theta and delta range activity suggestive of cortical dysfunction  such as seen with encephalopathy. However, there was no evidence of  epileptiform activity appreciated.     DVT prophylaxis: [] Lovenox                                 [] SCDs                                 [] SQ Heparin                                 [] Encourage ambulation           [x] Already on Anticoagulation~Xarelto     Code Status: Full Code    PT/OT Eval Status: order    Tele:   [x] yes SR HR 92             [] no    Active Hospital Problems    Diagnosis Date Noted    Hypotension [I95.9]     Syncope and collapse [R55]     Electrolyte imbalance [E87.8] 06/10/2019    Elevated transaminase level [R74.0] 06/10/2019    Anemia [D64.9] 06/10/2019    UTI (urinary tract infection) [N39.0] 06/10/2019    Sepsis ([facility] [address] [A41.9]     Chronic depression [F32.9]     Essential hypertension [I10]     History of pulmonary embolism [Z86.711]     Dehydration [E86.0]     Malnutrition ([address] [E46] 12/17/2012    Multiple sclerosis ([facility] [address] Sweetie Trevino 02/14/2012       Electronically signed by TONO Sandhu CNP on 6/14/2019 at 6:32 AM

## 2019-06-14 NOTE — PLAN OF CARE
Problem: Falls - Risk of:  Goal: Will remain free from falls  Description  Will remain free from falls  Outcome: Ongoing  Note:   Pt free from falls this shift. Bed in lowest position, side rails up x2, call light in reach. Problem: Risk for Impaired Skin Integrity  Goal: Tissue integrity - skin and mucous membranes  Description  Structural intactness and normal physiological function of skin and  mucous membranes. Outcome: Ongoing  Note:   Assisting pt with turn and pillow support. Wound to right buttock, dressing changed, clean, dry, and intact. Problem: DISCHARGE BARRIERS  Goal: Patient's continuum of care needs are met  Outcome: Ongoing  Note:   Planning to go back to [facility] upon discharge. Problem: Pain:  Goal: Control of acute pain  Description  Control of acute pain  Outcome: Ongoing  Note:   Ongoing assessment & interventions provided throughout shift. Reminded patient to report any pain, pressure, or shortness of breath to the nurse. Care plan reviewed with patient. Patient verbalize understanding of the plan of care and contribute to goal setting.

## 2019-06-14 NOTE — PROGRESS NOTES
Informed Dr. Carlene Morley that the patient has started to look puffy and has developed scotol swelling. Dr. Carlene Morley stated to d/c the fluids. Orders placed in Epic.

## 2019-06-15 LAB
ANION GAP SERPL CALCULATED.3IONS-SCNC: 10 MEQ/L (ref 8–16)
BUN BLDV-MCNC: 10 MG/DL (ref 7–22)
CALCIUM SERPL-MCNC: 8.5 MG/DL (ref 8.5–10.5)
CHLORIDE BLD-SCNC: 101 MEQ/L (ref 98–111)
CO2: 23 MEQ/L (ref 23–33)
CREAT SERPL-MCNC: 0.7 MG/DL (ref 0.4–1.2)
GFR SERPL CREATININE-BSD FRML MDRD: > 90 ML/MIN/1.73M2
GLUCOSE BLD-MCNC: 113 MG/DL (ref 70–108)
GLUCOSE BLD-MCNC: 143 MG/DL (ref 70–108)
MAGNESIUM: 1.9 MG/DL (ref 1.6–2.4)
PHOSPHORUS: 3.4 MG/DL (ref 2.4–4.7)
POTASSIUM REFLEX MAGNESIUM: 3.5 MEQ/L (ref 3.5–5.2)
SODIUM BLD-SCNC: 134 MEQ/L (ref 135–145)
VANCOMYCIN TROUGH: 26.6 UG/ML (ref 5–15)

## 2019-06-15 PROCEDURE — 6360000002 HC RX W HCPCS: Performed by: NURSE PRACTITIONER

## 2019-06-15 PROCEDURE — 2140000000 HC CCU INTERMEDIATE R&B

## 2019-06-15 PROCEDURE — 6370000000 HC RX 637 (ALT 250 FOR IP): Performed by: INTERNAL MEDICINE

## 2019-06-15 PROCEDURE — 2709999900 HC NON-CHARGEABLE SUPPLY

## 2019-06-15 PROCEDURE — 6370000000 HC RX 637 (ALT 250 FOR IP): Performed by: PHYSICIAN ASSISTANT

## 2019-06-15 PROCEDURE — 6360000002 HC RX W HCPCS: Performed by: COUNSELOR

## 2019-06-15 PROCEDURE — 80048 BASIC METABOLIC PNL TOTAL CA: CPT

## 2019-06-15 PROCEDURE — 99232 SBSQ HOSP IP/OBS MODERATE 35: CPT | Performed by: INTERNAL MEDICINE

## 2019-06-15 PROCEDURE — 83735 ASSAY OF MAGNESIUM: CPT

## 2019-06-15 PROCEDURE — 84100 ASSAY OF PHOSPHORUS: CPT

## 2019-06-15 PROCEDURE — 36415 COLL VENOUS BLD VENIPUNCTURE: CPT

## 2019-06-15 PROCEDURE — 2580000003 HC RX 258: Performed by: INTERNAL MEDICINE

## 2019-06-15 PROCEDURE — 2580000003 HC RX 258: Performed by: COUNSELOR

## 2019-06-15 PROCEDURE — 82948 REAGENT STRIP/BLOOD GLUCOSE: CPT

## 2019-06-15 PROCEDURE — 2500000003 HC RX 250 WO HCPCS: Performed by: INTERNAL MEDICINE

## 2019-06-15 PROCEDURE — 2580000003 HC RX 258: Performed by: NURSE PRACTITIONER

## 2019-06-15 RX ADMIN — OXYBUTYNIN 5 MG: 5 TABLET, FILM COATED, EXTENDED RELEASE ORAL at 10:33

## 2019-06-15 RX ADMIN — METOPROLOL TARTRATE 12.5 MG: 25 TABLET ORAL at 20:54

## 2019-06-15 RX ADMIN — FAMOTIDINE 20 MG: 20 TABLET, FILM COATED ORAL at 21:05

## 2019-06-15 RX ADMIN — OXYBUTYNIN 5 MG: 5 TABLET, FILM COATED, EXTENDED RELEASE ORAL at 20:54

## 2019-06-15 RX ADMIN — CALCIUM CARBONATE-VITAMIN D TAB 500 MG-200 UNIT 1 TABLET: 500-200 TAB at 20:54

## 2019-06-15 RX ADMIN — ACETIC ACID: 250 IRRIGANT IRRIGATION at 14:07

## 2019-06-15 RX ADMIN — CALCIUM CARBONATE-VITAMIN D TAB 500 MG-200 UNIT 1 TABLET: 500-200 TAB at 10:32

## 2019-06-15 RX ADMIN — BACITRACIN: 500 OINTMENT TOPICAL at 10:35

## 2019-06-15 RX ADMIN — TIZANIDINE 2 MG: 4 TABLET ORAL at 20:54

## 2019-06-15 RX ADMIN — Medication 10 ML: at 10:35

## 2019-06-15 RX ADMIN — Medication 500 MG: at 10:33

## 2019-06-15 RX ADMIN — SODIUM CHLORIDE TAB 1 GM 1 G: 1 TAB at 18:25

## 2019-06-15 RX ADMIN — FAMOTIDINE 20 MG: 20 TABLET, FILM COATED ORAL at 10:31

## 2019-06-15 RX ADMIN — Medication 1 CAPSULE: at 10:33

## 2019-06-15 RX ADMIN — CEFTRIAXONE SODIUM 1 G: 1 INJECTION, POWDER, FOR SOLUTION INTRAMUSCULAR; INTRAVENOUS at 10:44

## 2019-06-15 RX ADMIN — Medication 20000 UNITS: at 10:32

## 2019-06-15 RX ADMIN — RIVAROXABAN 20 MG: 20 TABLET, FILM COATED ORAL at 18:25

## 2019-06-15 RX ADMIN — VANCOMYCIN HYDROCHLORIDE 1250 MG: 5 INJECTION, POWDER, LYOPHILIZED, FOR SOLUTION INTRAVENOUS at 14:08

## 2019-06-15 RX ADMIN — TIZANIDINE 2 MG: 4 TABLET ORAL at 10:31

## 2019-06-15 RX ADMIN — VITAMIN E CAP 400 UNIT 400 UNITS: 400 CAP at 10:33

## 2019-06-15 RX ADMIN — ACETIC ACID: 250 IRRIGANT IRRIGATION at 21:07

## 2019-06-15 RX ADMIN — ACETAMINOPHEN 650 MG: 325 TABLET ORAL at 21:05

## 2019-06-15 RX ADMIN — TIZANIDINE 2 MG: 4 TABLET ORAL at 14:06

## 2019-06-15 RX ADMIN — Medication 10 ML: at 20:56

## 2019-06-15 RX ADMIN — DOCUSATE SODIUM 100 MG: 100 CAPSULE, LIQUID FILLED ORAL at 10:31

## 2019-06-15 RX ADMIN — SODIUM CHLORIDE TAB 1 GM 1 G: 1 TAB at 10:33

## 2019-06-15 RX ADMIN — POTASSIUM CHLORIDE 10 MEQ: 750 TABLET, FILM COATED, EXTENDED RELEASE ORAL at 10:31

## 2019-06-15 RX ADMIN — THERA TABS 1 TABLET: TAB at 10:33

## 2019-06-15 ASSESSMENT — PAIN SCALES - GENERAL
PAINLEVEL_OUTOF10: 0
PAINLEVEL_OUTOF10: 0

## 2019-06-15 NOTE — PROGRESS NOTES
Pharmacy Vancomycin Consult     Vancomycin Day: 3  Current Dosing: vanc 1500mg q12h    Temp max:  102.8    Recent Labs     06/13/19  0420 06/14/19  0347   BUN 21 16       Recent Labs     06/13/19  0420 06/14/19  0347   CREATININE 0.7 0.8       Recent Labs     06/13/19  0420 06/14/19  0347   WBC 11.2* 12.6*         Intake/Output Summary (Last 24 hours) at 6/15/2019 0026  Last data filed at 6/14/2019 2121  Gross per 24 hour   Intake 3213.87 ml   Output 1750 ml   Net 1463.87 ml       Culture Date Source Results   6/10/19 UC MRSA, Proteus mirabilis   6/10/19 BCx2 ngtd        Ht Readings from Last 1 Encounters:   06/11/19 5' 7\" (1.702 m)        Wt Readings from Last 1 Encounters:   06/13/19 203 lb 12.8 oz (92.4 kg)         Body mass index is 31.92 kg/m². Estimated Creatinine Clearance: 105 mL/min (based on SCr of 0.8 mg/dL). Trough: 26.6    Assessment/Plan:  SCr stable, good UO on days yesterday. Will decrease dose to 1250mg q12h with next dose at 1200 today as patient is still febrile.   Plan to monitor UO closely and recheck trough     Gali Montenegro RPh, BCPS, BCGP  6/15/2019     12:30 AM

## 2019-06-15 NOTE — PLAN OF CARE
Problem: Falls - Risk of:  Goal: Will remain free from falls  Description  Will remain free from falls  Note:   Patient has had no falls this shift. Patient is wheelchair bound, no push or pulls present in his feet, bed alarm on. Problem: Risk for Impaired Skin Integrity  Goal: Tissue integrity - skin and mucous membranes  Description  Structural intactness and normal physiological function of skin and  mucous membranes. Note:   Skin-wound on buttocks, packing and dressing change, wound ostomy following. Mucous membranes intact, patient can feed self     Problem: Pain:  Goal: Pain level will decrease  Description  Pain level will decrease  Note:   Patient has some chronic back discomfort from the bed and being repositioned with pillows. Pain goal 0/10 and it was met this shift    Care plan reviewed with patient. Patient verbalize understanding of the plan of care and contribute to goal setting.

## 2019-06-15 NOTE — PROGRESS NOTES
Hospitalist Progress Note    Patient:  Bernette Oppenheim      Unit/Bed:3B-31/031-A    YOB: 1955    MRN: [id number]       Acct: [de-identified]     PCP: Johana Garcia MD    Date of Admission: 6/10/2019    Assessment/Plan:    Syncope on admission, suspected to be secondary to hypotension, unable to do orthostatic blood pressures, currently blood pressure good, ruled out acute coronary syndrome, no PE, on anticoagulation with Xarelto, no acute stroke, no seizure    Sepsis secondary to catheter associated urinary tract infection-Proteus and MRSA, appreciate infectious disease and urology, catheter exchanged on 6/13, blood cultures negative   -On ceftriaxone and vancomycin,     Acute metabolic encephalopathy resolved suspected to be secondary to sepsis    Acute respiratory failure hypoxic-weaned off oxygen    Hyponatremia improving so no pneumonia  Hypocalcemia   Hypophosphatemia    Essential hypertension hold lisinopril, start metoprolol low-dose 12.5 mg twice daily    Atelectasis left lower lobe    Metabolic acidosis improved    Neurogenic bladder secondary to multiple sclerosis-   Chronic indwelling Pompa  Decubitus wound  In right buttock/ischial- stage 4- POA, continue dressing and packing  Hx of pulmonary embolism - on xarelto  Multiple Sclerosis  With lower leg weakness- unable to move  Hx of Stroke - right BG and cortex of right frontal lobe        Expected discharge date:  tomorrow    Disposition:    [] Home       [] TCU       [] Rehab       [] Psych       [x] SNF       [] [place]       [] Other-    Chief Complaint:  Syncope    Patient was transferred to the emergency department by EMS for evaluation for syncope episode. Patient is wheelchair-bound/bedbound secondary to his multiple sclerosis and has a chronic indwelling Pompa and decubitus ulcer stage IV in the right ischial area. 2 days ago apparently was diagnosed to have UTI and started on Augmentin.   Patient went to the dining room in his wheelchair on the day of presentation and apparently passed out and did not have any headache or dizziness or lightheadedness. Patient's blood pressure was low in the 80s and in the emergency room was around 80/57. Hospital Course: Patient was aggressively hydrated and home medications lisinopril 10 mg was held . CT scan of the chest was negative for PE. Labs showed patient was hyponatremic with metabolic acidosis with sodium of 133 and bicarb 19, and there was no obvious diarrhea. Patient was given broad-spectrum antibiotics initially ceftriaxone and meropenem and linezolid. After being admitted to the hospital patient had fever 101.5. The second day after admission antibiotics were held and because of fever was started back on ceftriaxone and subsequently urine cultures were positive for MRSA along with Proteus and vancomycin was added on 6/13. Infectious disease and urology were consulted. Suprapubic catheter was exchanged on 6/13. Patient's blood pressure has improved and IV fluids were discontinued. Patient's fever has resolved. If no fever for the next 24 hours most likely discharge on antibiotics as per ID. Blood pressure trending up along with tachycardia, start metoprolol 12.5 mg and increase as tolerated. Continue wound care for the decubitus wound.     Subjective (past 24 hours): Feeling better, denies any chest pain or shortness of breath, tolerating food      Medications:  Reviewed    Infusion Medications   Scheduled Medications    vancomycin  1,250 mg Intravenous Q12H    sodium chloride  1 g Oral BID     vancomycin (VANCOCIN) intermittent dosing (placeholder)   Other RX Placeholder    phosphorus replacement protocol   Other RX Placeholder    cefTRIAXone (ROCEPHIN) IV  1 g Intravenous Q24H    sodium chloride flush  10 mL Intravenous 2 times per day    acetic acid   Irrigation BID    vitamin C  500 mg Oral Daily    bacitracin   Topical Daily    has been created using voice recognition software. It may contain minor errors which are inherent in voice recognition technology. **      Final report electronically signed by Dr. Marnie Bonner on 6/10/2019 2:32 PM          DVT prophylaxis: [] Lovenox                                 [] SCDs                                 [] SQ Heparin                                 [] Encourage ambulation           [x] Already on Anticoagulation     Code Status: Full Code    PT/OT Eval Status: ongoing    Tele:   [x] yes             [] no    Active Hospital Problems    Diagnosis Date Noted    Hypotension [I95.9]     Syncope and collapse [R55]     Electrolyte imbalance [E87.8] 06/10/2019    Elevated transaminase level [R74.0] 06/10/2019    Anemia [D64.9] 06/10/2019    UTI (urinary tract infection) [N39.0] 06/10/2019    Sepsis ([address] [A41.9]     Chronic depression [F32.9]     Essential hypertension [I10]     History of pulmonary embolism [Z86.711]     Dehydration [E86.0]     Malnutrition ([address] Onghadae Cheryl 12/17/2012    Multiple sclerosis ([facility] [address] Rosy Tapia 02/14/2012       Electronically signed by Charmayne Jakes, MD on 6/15/2019 at 2:35 PM

## 2019-06-16 LAB
ANION GAP SERPL CALCULATED.3IONS-SCNC: 8 MEQ/L (ref 8–16)
BLOOD CULTURE, ROUTINE: NORMAL
BLOOD CULTURE, ROUTINE: NORMAL
BUN BLDV-MCNC: 9 MG/DL (ref 7–22)
CALCIUM SERPL-MCNC: 8.5 MG/DL (ref 8.5–10.5)
CHLORIDE BLD-SCNC: 103 MEQ/L (ref 98–111)
CO2: 23 MEQ/L (ref 23–33)
CREAT SERPL-MCNC: 0.8 MG/DL (ref 0.4–1.2)
ERYTHROCYTE [DISTWIDTH] IN BLOOD BY AUTOMATED COUNT: 16 % (ref 11.5–14.5)
ERYTHROCYTE [DISTWIDTH] IN BLOOD BY AUTOMATED COUNT: 52.4 FL (ref 35–45)
GFR SERPL CREATININE-BSD FRML MDRD: > 90 ML/MIN/1.73M2
GLUCOSE BLD-MCNC: 110 MG/DL (ref 70–108)
HCT VFR BLD CALC: 27.3 % (ref 42–52)
HEMOGLOBIN: 8.7 GM/DL (ref 14–18)
MAGNESIUM: 1.8 MG/DL (ref 1.6–2.4)
MCH RBC QN AUTO: 28.6 PG (ref 26–33)
MCHC RBC AUTO-ENTMCNC: 31.9 GM/DL (ref 32.2–35.5)
MCV RBC AUTO: 89.8 FL (ref 80–94)
PLATELET # BLD: 302 THOU/MM3 (ref 130–400)
PMV BLD AUTO: 8.8 FL (ref 9.4–12.4)
POTASSIUM SERPL-SCNC: 3.8 MEQ/L (ref 3.5–5.2)
RBC # BLD: 3.04 MILL/MM3 (ref 4.7–6.1)
SODIUM BLD-SCNC: 134 MEQ/L (ref 135–145)
WBC # BLD: 12 THOU/MM3 (ref 4.8–10.8)

## 2019-06-16 PROCEDURE — 2140000000 HC CCU INTERMEDIATE R&B

## 2019-06-16 PROCEDURE — 83735 ASSAY OF MAGNESIUM: CPT

## 2019-06-16 PROCEDURE — 6370000000 HC RX 637 (ALT 250 FOR IP): Performed by: INTERNAL MEDICINE

## 2019-06-16 PROCEDURE — 6370000000 HC RX 637 (ALT 250 FOR IP): Performed by: PHYSICIAN ASSISTANT

## 2019-06-16 PROCEDURE — 80202 ASSAY OF VANCOMYCIN: CPT

## 2019-06-16 PROCEDURE — 80048 BASIC METABOLIC PNL TOTAL CA: CPT

## 2019-06-16 PROCEDURE — 6360000002 HC RX W HCPCS: Performed by: COUNSELOR

## 2019-06-16 PROCEDURE — 6360000002 HC RX W HCPCS: Performed by: NURSE PRACTITIONER

## 2019-06-16 PROCEDURE — 85027 COMPLETE CBC AUTOMATED: CPT

## 2019-06-16 PROCEDURE — 36415 COLL VENOUS BLD VENIPUNCTURE: CPT

## 2019-06-16 PROCEDURE — 2580000003 HC RX 258: Performed by: NURSE PRACTITIONER

## 2019-06-16 PROCEDURE — 2580000003 HC RX 258: Performed by: INTERNAL MEDICINE

## 2019-06-16 PROCEDURE — 2709999900 HC NON-CHARGEABLE SUPPLY

## 2019-06-16 PROCEDURE — 99232 SBSQ HOSP IP/OBS MODERATE 35: CPT | Performed by: INTERNAL MEDICINE

## 2019-06-16 PROCEDURE — 2580000003 HC RX 258: Performed by: COUNSELOR

## 2019-06-16 RX ADMIN — Medication 10 ML: at 00:47

## 2019-06-16 RX ADMIN — BACITRACIN: 500 OINTMENT TOPICAL at 08:25

## 2019-06-16 RX ADMIN — VANCOMYCIN HYDROCHLORIDE 1250 MG: 5 INJECTION, POWDER, LYOPHILIZED, FOR SOLUTION INTRAVENOUS at 00:47

## 2019-06-16 RX ADMIN — ACETAMINOPHEN 650 MG: 325 TABLET ORAL at 18:07

## 2019-06-16 RX ADMIN — TIZANIDINE 2 MG: 4 TABLET ORAL at 18:08

## 2019-06-16 RX ADMIN — THERA TABS 1 TABLET: TAB at 08:17

## 2019-06-16 RX ADMIN — Medication 10 ML: at 08:18

## 2019-06-16 RX ADMIN — TIZANIDINE 2 MG: 4 TABLET ORAL at 08:16

## 2019-06-16 RX ADMIN — CEFTRIAXONE SODIUM 1 G: 1 INJECTION, POWDER, FOR SOLUTION INTRAMUSCULAR; INTRAVENOUS at 08:37

## 2019-06-16 RX ADMIN — FAMOTIDINE 20 MG: 20 TABLET, FILM COATED ORAL at 22:14

## 2019-06-16 RX ADMIN — OXYBUTYNIN 5 MG: 5 TABLET, FILM COATED, EXTENDED RELEASE ORAL at 22:13

## 2019-06-16 RX ADMIN — ACETIC ACID: 250 IRRIGANT IRRIGATION at 08:18

## 2019-06-16 RX ADMIN — RIVAROXABAN 20 MG: 20 TABLET, FILM COATED ORAL at 18:09

## 2019-06-16 RX ADMIN — VITAMIN E CAP 400 UNIT 400 UNITS: 400 CAP at 08:17

## 2019-06-16 RX ADMIN — ACETIC ACID: 250 IRRIGANT IRRIGATION at 22:14

## 2019-06-16 RX ADMIN — VANCOMYCIN HYDROCHLORIDE 1250 MG: 5 INJECTION, POWDER, LYOPHILIZED, FOR SOLUTION INTRAVENOUS at 18:07

## 2019-06-16 RX ADMIN — SODIUM CHLORIDE TAB 1 GM 1 G: 1 TAB at 18:09

## 2019-06-16 RX ADMIN — OXYBUTYNIN 5 MG: 5 TABLET, FILM COATED, EXTENDED RELEASE ORAL at 08:16

## 2019-06-16 RX ADMIN — SODIUM CHLORIDE TAB 1 GM 1 G: 1 TAB at 08:16

## 2019-06-16 RX ADMIN — METOPROLOL TARTRATE 25 MG: 25 TABLET ORAL at 08:17

## 2019-06-16 RX ADMIN — CALCIUM CARBONATE-VITAMIN D TAB 500 MG-200 UNIT 1 TABLET: 500-200 TAB at 22:13

## 2019-06-16 RX ADMIN — DOCUSATE SODIUM 100 MG: 100 CAPSULE, LIQUID FILLED ORAL at 08:16

## 2019-06-16 RX ADMIN — Medication 500 MG: at 08:17

## 2019-06-16 RX ADMIN — METOPROLOL TARTRATE 25 MG: 25 TABLET ORAL at 22:14

## 2019-06-16 RX ADMIN — Medication 1 CAPSULE: at 08:18

## 2019-06-16 RX ADMIN — Medication 10 ML: at 22:14

## 2019-06-16 RX ADMIN — POTASSIUM CHLORIDE 10 MEQ: 750 TABLET, FILM COATED, EXTENDED RELEASE ORAL at 08:16

## 2019-06-16 RX ADMIN — FAMOTIDINE 20 MG: 20 TABLET, FILM COATED ORAL at 08:16

## 2019-06-16 RX ADMIN — CALCIUM CARBONATE-VITAMIN D TAB 500 MG-200 UNIT 1 TABLET: 500-200 TAB at 08:17

## 2019-06-16 RX ADMIN — Medication 20000 UNITS: at 08:16

## 2019-06-16 ASSESSMENT — PAIN SCALES - GENERAL
PAINLEVEL_OUTOF10: 5
PAINLEVEL_OUTOF10: 0

## 2019-06-16 NOTE — PROGRESS NOTES
Hospitalist Progress Note    Patient:  Gene Perrin      Unit/Bed:3B-31/031-A    YOB: 1955    MRN: [id number]       Acct: [de-identified]     PCP: Chico Rasmussen MD    Date of Admission: 6/10/2019    Assessment/Plan:    Syncope on admission, suspected to be secondary to hypotension, unable to do orthostatic blood pressures, currently blood pressure good, ruled out acute coronary syndrome, no PE, on anticoagulation with Xarelto, no acute stroke, no seizure    Sepsis secondary to catheter associated urinary tract infection-Proteus and MRSA, appreciate infectious disease and urology, catheter exchanged on 6/13, blood cultures negative   -On ceftriaxone and vancomycin  6/16-had fever last night, 100.9, will monitor 1 more day if persistent fever, will review antibiotics again, if no more fever most likely discharge    Acute metabolic encephalopathy resolved suspected to be secondary to sepsis    Acute respiratory failure hypoxic-weaned off oxygen    Hyponatremia improving so no pneumonia  Hypocalcemia   Hypophosphatemia    Essential hypertension hold lisinopril, start metoprolol low-dose 12.5 mg twice daily    Atelectasis left lower lobe    Metabolic acidosis improved    Neurogenic bladder secondary to multiple sclerosis-   Chronic indwelling Pompa  Decubitus wound  In right buttock/ischial- stage 4- POA, continue dressing and packing  Hx of pulmonary embolism - on xarelto  Multiple Sclerosis  With lower leg weakness- unable to move  Hx of Stroke - right BG and cortex of right frontal lobe        Expected discharge date:  tomorrow    Disposition:    [] Home       [] TCU       [] Rehab       [] Psych       [x] SNF       [] [place]       [] Other-    Chief Complaint:  Syncope    Patient was transferred to the emergency department by EMS for evaluation for syncope episode.   Patient is wheelchair-bound/bedbound secondary to his multiple sclerosis and has a chronic indwelling Pompa and decubitus ulcer stage IV in the right ischial area. 2 days ago apparently was diagnosed to have UTI and started on Augmentin. Patient went to the dining room in his wheelchair on the day of presentation and apparently passed out and did not have any headache or dizziness or lightheadedness. Patient's blood pressure was low in the 80s and in the emergency room was around 80/57. Hospital Course: Patient was aggressively hydrated and home medications lisinopril 10 mg was held . CT scan of the chest was negative for PE. Labs showed patient was hyponatremic with metabolic acidosis with sodium of 133 and bicarb 19, and there was no obvious diarrhea. Patient was given broad-spectrum antibiotics initially ceftriaxone and meropenem and linezolid. After being admitted to the hospital patient had fever 101.5. The second day after admission antibiotics were held and because of fever was started back on ceftriaxone and subsequently urine cultures were positive for MRSA along with Proteus and vancomycin was added on 6/13. Infectious disease and urology were consulted. Suprapubic catheter was exchanged on 6/13. Patient's blood pressure has improved and IV fluids were discontinued. Patient's fever has resolved. If no fever for the next 24 hours most likely discharge on antibiotics as per ID. Blood pressure trending up along with tachycardia, start metoprolol 12.5 mg and increase as tolerated. Continue wound care for the decubitus wound.     6/16-was planning to discharge today but patient had fever of 101 last night, not sure about the source, will wait for 24 more hours and if no fever will discharge on antibiotics if persistently having fevers will need further work-up    Subjective (past 24 hours): Feeling better and denies any chills or shaking, no cough or shortness of breath, tolerating food      Medications:  Reviewed    Infusion Medications   Scheduled Medications    metoprolol tartrate  25 mg Oral BID  vancomycin  1,250 mg Intravenous Q12H    sodium chloride  1 g Oral BID WC    vancomycin (VANCOCIN) intermittent dosing (placeholder)   Other RX Placeholder    phosphorus replacement protocol   Other RX Placeholder    cefTRIAXone (ROCEPHIN) IV  1 g Intravenous Q24H    sodium chloride flush  10 mL Intravenous 2 times per day    acetic acid   Irrigation BID    vitamin C  500 mg Oral Daily    bacitracin   Topical Daily    calcium-vitamin D  1 tablet Oral BID    docusate sodium  100 mg Oral Daily    famotidine  20 mg Oral BID    multivitamin  1 tablet Oral Daily    oxybutynin  5 mg Oral BID    potassium chloride  10 mEq Oral Daily    lactobacillus  1 capsule Oral Daily    vitamin E  400 Units Oral Daily    vitamin A  20,000 Units Oral Daily    tiZANidine  2 mg Oral TID    rivaroxaban  20 mg Oral Dinner     PRN Meds: ondansetron, sodium chloride flush, acetaminophen      Intake/Output Summary (Last 24 hours) at 6/16/2019 1024  Last data filed at 6/16/2019 0337  Gross per 24 hour   Intake 683 ml   Output 750 ml   Net -67 ml       Diet:  DIET GENERAL;    Exam:  BP (!) 150/72   Pulse 105   Temp 98.7 °F (37.1 °C) (Axillary)   Resp 18   Ht 5' 7\" (1.702 m)   Wt 209 lb 11.2 oz (95.1 kg)   SpO2 95%   BMI 32.84 kg/m²     Physical Examination:   General appearance - alert, awake  and in no distress at rest  HEENT: Normocephalic, Atraumatic, pupils reactive, mucous membranes moist, short neck  Chest - moving equally to respiration,symmetric air entry, clear to auscultation  Heart - normal rate, regular rhythm, normal S1, S2, no murmurs, rubs, clicks or gallops  Abdomen - soft, nontender, distended, BS+ colostomy +   suprapubic cathter+  Neurological - alert, oriented fairly well, normal speech, sensations intact and able to move upper extremities, poor hand  b/l, unable to move lower legs, waisting +  Extremities - peripheral pulses normal, no pedal edema,  Capillary refill less than 3 sec  Skin - normal coloration and turgor, no rashes      Labs:   Recent Labs     06/14/19  0347 06/16/19  0410   WBC 12.6* 12.0*   HGB 8.8* 8.7*   HCT 27.0* 27.3*    302     Recent Labs     06/14/19  0347 06/15/19  0439 06/16/19  0410   * 134* 134*   K 4.0  4.0 3.5 3.8   CL 98 101 103   CO2 20* 23 23   BUN 16 10 9   CREATININE 0.8 0.7 0.8   CALCIUM 8.2* 8.5 8.5   PHOS 2.0* 3.4  --      Recent Labs     06/14/19 0347   AST 41*   ALT 62   BILITOT 0.7   ALKPHOS 386*     No results for input(s): INR in the last 72 hours. No results for input(s): Clarance Everett in the last 72 hours. Microbiology:      Urinalysis:      Lab Results   Component Value Date    NITRU NEGATIVE 06/10/2019    WBCUA 50-75 06/10/2019    WBCUA >200 01/20/2012    BACTERIA FEW 06/10/2019    RBCUA 25-50 06/10/2019    BLOODU LARGE 06/10/2019    SPECGRAV 1.019 11/13/2018    GLUCOSEU NEGATIVE 06/10/2019       Radiology:  XR CHEST PORTABLE   Final Result      No acute pneumonia or pulmonary edema. Stable mild elevation of the left hemidiaphragm with mild left basilar atelectasis. **This report has been created using voice recognition software. It may contain minor errors which are inherent in voice recognition technology. **      Final report electronically signed by Dr. Raymundo Duncan on 6/13/2019 7:01 AM      MRI Brain W WO Contrast   Final Result       1. No evidence of an acute infarct or active demyelination. 2. 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 3. Moderate amount of abnormal signal in the white matter of the brain consistent with multiple sclerosis and/or chronic small vessel ischemic changes. **This report has been created using voice recognition software. It may contain minor errors which are inherent in voice recognition technology. **         Final report electronically signed by Dr. David Arana on 6/11/2019 10:37 AM      CT Head WO Contrast Final Result   No acute intracranial findings. **This report has been created using voice recognition software. It may contain minor errors which are inherent in voice recognition technology. **      Final report electronically signed by Dr. Benjamín Tyson on 6/10/2019 2:24 PM      CTA Chest W WO Contrast   Final Result      No acute pulmonary embolism. Mild atelectasis in the left lung base. **This report has been created using voice recognition software. It may contain minor errors which are inherent in voice recognition technology. **      Final report electronically signed by Dr. Benjamín Tyson on 6/10/2019 2:32 PM          DVT prophylaxis: [] Lovenox                                 [] SCDs                                 [] SQ Heparin                                 [] Encourage ambulation           [x] Already on Anticoagulation     Code Status: Full Code    PT/OT Eval Status: ongoing    Tele:   [x] yes             [] no    Active Hospital Problems    Diagnosis Date Noted    Hypotension [I95.9]     Syncope and collapse [R55]     Electrolyte imbalance [E87.8] 06/10/2019    Elevated transaminase level [R74.0] 06/10/2019    Anemia [D64.9] 06/10/2019    UTI (urinary tract infection) [N39.0] 06/10/2019    Sepsis ([facility] [address] [A41.9]     Chronic depression [F32.9]     Essential hypertension [I10]     History of pulmonary embolism [Z86.711]     Dehydration [E86.0]     Malnutrition ([address] Анна Palmer 12/17/2012    Multiple sclerosis ([facility] [address] Ronny Eisenmenger 02/14/2012       Electronically signed by Shabbir Rivera MD on 6/16/2019 at 10:24 AM

## 2019-06-17 ENCOUNTER — APPOINTMENT (OUTPATIENT)
Dept: CT IMAGING | Age: 64
DRG: 662 | End: 2019-06-17
Payer: COMMERCIAL

## 2019-06-17 ENCOUNTER — APPOINTMENT (OUTPATIENT)
Dept: GENERAL RADIOLOGY | Age: 64
DRG: 662 | End: 2019-06-17
Payer: COMMERCIAL

## 2019-06-17 LAB
ANION GAP SERPL CALCULATED.3IONS-SCNC: 11 MEQ/L (ref 8–16)
BACTERIA: ABNORMAL /HPF
BASOPHILS # BLD: 0.2 %
BASOPHILS ABSOLUTE: 0 THOU/MM3 (ref 0–0.1)
BILIRUBIN URINE: NEGATIVE
BLOOD, URINE: ABNORMAL
BUN BLDV-MCNC: 10 MG/DL (ref 7–22)
CALCIUM SERPL-MCNC: 8.1 MG/DL (ref 8.5–10.5)
CASTS 2: ABNORMAL /LPF
CASTS UA: ABNORMAL /LPF
CHARACTER, URINE: ABNORMAL
CHLORIDE BLD-SCNC: 102 MEQ/L (ref 98–111)
CO2: 20 MEQ/L (ref 23–33)
COLOR: YELLOW
CREAT SERPL-MCNC: 0.7 MG/DL (ref 0.4–1.2)
CRYSTALS, UA: ABNORMAL
EOSINOPHIL # BLD: 0.5 %
EOSINOPHILS ABSOLUTE: 0.1 THOU/MM3 (ref 0–0.4)
EPITHELIAL CELLS, UA: ABNORMAL /HPF
ERYTHROCYTE [DISTWIDTH] IN BLOOD BY AUTOMATED COUNT: 16.2 % (ref 11.5–14.5)
ERYTHROCYTE [DISTWIDTH] IN BLOOD BY AUTOMATED COUNT: 53.8 FL (ref 35–45)
GFR SERPL CREATININE-BSD FRML MDRD: > 90 ML/MIN/1.73M2
GLUCOSE BLD-MCNC: 123 MG/DL (ref 70–108)
GLUCOSE URINE: NEGATIVE MG/DL
HCT VFR BLD CALC: 26.5 % (ref 42–52)
HEMOCCULT STL QL: NEGATIVE
HEMOGLOBIN: 8.3 GM/DL (ref 14–18)
IMMATURE GRANS (ABS): 0.14 THOU/MM3 (ref 0–0.07)
IMMATURE GRANULOCYTES: 1.1 %
KETONES, URINE: NEGATIVE
LEUKOCYTE ESTERASE, URINE: ABNORMAL
LYMPHOCYTES # BLD: 4.4 %
LYMPHOCYTES ABSOLUTE: 0.6 THOU/MM3 (ref 1–4.8)
MCH RBC QN AUTO: 28.5 PG (ref 26–33)
MCHC RBC AUTO-ENTMCNC: 31.3 GM/DL (ref 32.2–35.5)
MCV RBC AUTO: 91.1 FL (ref 80–94)
MISCELLANEOUS 2: ABNORMAL
MONOCYTES # BLD: 4.8 %
MONOCYTES ABSOLUTE: 0.6 THOU/MM3 (ref 0.4–1.3)
NITRITE, URINE: NEGATIVE
NUCLEATED RED BLOOD CELLS: 0 /100 WBC
PH UA: 6.5 (ref 5–9)
PLATELET # BLD: 318 THOU/MM3 (ref 130–400)
PMV BLD AUTO: 9 FL (ref 9.4–12.4)
POTASSIUM SERPL-SCNC: 3.8 MEQ/L (ref 3.5–5.2)
PROTEIN UA: NEGATIVE
RBC # BLD: 2.91 MILL/MM3 (ref 4.7–6.1)
RBC URINE: ABNORMAL /HPF
RENAL EPITHELIAL, UA: ABNORMAL
SEG NEUTROPHILS: 89 %
SEGMENTED NEUTROPHILS ABSOLUTE COUNT: 11.6 THOU/MM3 (ref 1.8–7.7)
SODIUM BLD-SCNC: 133 MEQ/L (ref 135–145)
SPECIFIC GRAVITY, URINE: 1 (ref 1–1.03)
UROBILINOGEN, URINE: 0.2 EU/DL (ref 0–1)
VANCOMYCIN TROUGH: 21.9 UG/ML (ref 5–15)
VIRAL CULTURE,RAPID,RESPIRATOR: NORMAL
WBC # BLD: 13 THOU/MM3 (ref 4.8–10.8)
WBC UA: > 200 /HPF

## 2019-06-17 PROCEDURE — 87070 CULTURE OTHR SPECIMN AEROBIC: CPT

## 2019-06-17 PROCEDURE — 80048 BASIC METABOLIC PNL TOTAL CA: CPT

## 2019-06-17 PROCEDURE — 99233 SBSQ HOSP IP/OBS HIGH 50: CPT | Performed by: INTERNAL MEDICINE

## 2019-06-17 PROCEDURE — 81001 URINALYSIS AUTO W/SCOPE: CPT

## 2019-06-17 PROCEDURE — 87186 SC STD MICRODIL/AGAR DIL: CPT

## 2019-06-17 PROCEDURE — 71045 X-RAY EXAM CHEST 1 VIEW: CPT

## 2019-06-17 PROCEDURE — 2500000003 HC RX 250 WO HCPCS: Performed by: INTERNAL MEDICINE

## 2019-06-17 PROCEDURE — 74176 CT ABD & PELVIS W/O CONTRAST: CPT

## 2019-06-17 PROCEDURE — 6370000000 HC RX 637 (ALT 250 FOR IP): Performed by: PHYSICIAN ASSISTANT

## 2019-06-17 PROCEDURE — 85025 COMPLETE CBC W/AUTO DIFF WBC: CPT

## 2019-06-17 PROCEDURE — 2140000000 HC CCU INTERMEDIATE R&B

## 2019-06-17 PROCEDURE — 87086 URINE CULTURE/COLONY COUNT: CPT

## 2019-06-17 PROCEDURE — 2580000003 HC RX 258: Performed by: COUNSELOR

## 2019-06-17 PROCEDURE — 2709999900 HC NON-CHARGEABLE SUPPLY

## 2019-06-17 PROCEDURE — 6370000000 HC RX 637 (ALT 250 FOR IP): Performed by: INTERNAL MEDICINE

## 2019-06-17 PROCEDURE — 6360000002 HC RX W HCPCS: Performed by: NURSE PRACTITIONER

## 2019-06-17 PROCEDURE — 82272 OCCULT BLD FECES 1-3 TESTS: CPT

## 2019-06-17 PROCEDURE — 87147 CULTURE TYPE IMMUNOLOGIC: CPT

## 2019-06-17 PROCEDURE — 87040 BLOOD CULTURE FOR BACTERIA: CPT

## 2019-06-17 PROCEDURE — 87077 CULTURE AEROBIC IDENTIFY: CPT

## 2019-06-17 PROCEDURE — 36415 COLL VENOUS BLD VENIPUNCTURE: CPT

## 2019-06-17 PROCEDURE — 87205 SMEAR GRAM STAIN: CPT

## 2019-06-17 PROCEDURE — 87075 CULTR BACTERIA EXCEPT BLOOD: CPT

## 2019-06-17 PROCEDURE — 6360000002 HC RX W HCPCS: Performed by: INTERNAL MEDICINE

## 2019-06-17 PROCEDURE — 6360000002 HC RX W HCPCS: Performed by: COUNSELOR

## 2019-06-17 PROCEDURE — 2580000003 HC RX 258: Performed by: NURSE PRACTITIONER

## 2019-06-17 PROCEDURE — 2580000003 HC RX 258: Performed by: INTERNAL MEDICINE

## 2019-06-17 RX ORDER — FUROSEMIDE 10 MG/ML
20 INJECTION INTRAMUSCULAR; INTRAVENOUS ONCE
Status: COMPLETED | OUTPATIENT
Start: 2019-06-17 | End: 2019-06-17

## 2019-06-17 RX ORDER — IBUPROFEN 400 MG/1
400 TABLET ORAL EVERY 6 HOURS PRN
Status: DISCONTINUED | OUTPATIENT
Start: 2019-06-17 | End: 2019-06-17

## 2019-06-17 RX ADMIN — Medication 20000 UNITS: at 10:26

## 2019-06-17 RX ADMIN — TIZANIDINE 2 MG: 4 TABLET ORAL at 01:19

## 2019-06-17 RX ADMIN — VANCOMYCIN HYDROCHLORIDE 1500 MG: 5 INJECTION, POWDER, LYOPHILIZED, FOR SOLUTION INTRAVENOUS at 20:13

## 2019-06-17 RX ADMIN — THERA TABS 1 TABLET: TAB at 10:27

## 2019-06-17 RX ADMIN — DOCUSATE SODIUM 100 MG: 100 CAPSULE, LIQUID FILLED ORAL at 10:26

## 2019-06-17 RX ADMIN — TIZANIDINE 2 MG: 4 TABLET ORAL at 10:25

## 2019-06-17 RX ADMIN — OXYBUTYNIN 5 MG: 5 TABLET, FILM COATED, EXTENDED RELEASE ORAL at 10:27

## 2019-06-17 RX ADMIN — Medication 500 MG: at 10:26

## 2019-06-17 RX ADMIN — FUROSEMIDE 20 MG: 10 INJECTION, SOLUTION INTRAMUSCULAR; INTRAVENOUS at 17:15

## 2019-06-17 RX ADMIN — TIZANIDINE 2 MG: 4 TABLET ORAL at 20:14

## 2019-06-17 RX ADMIN — ACETAMINOPHEN 650 MG: 325 TABLET ORAL at 02:49

## 2019-06-17 RX ADMIN — RIVAROXABAN 20 MG: 20 TABLET, FILM COATED ORAL at 17:15

## 2019-06-17 RX ADMIN — SODIUM CHLORIDE TAB 1 GM 1 G: 1 TAB at 10:26

## 2019-06-17 RX ADMIN — Medication 10 ML: at 17:15

## 2019-06-17 RX ADMIN — ACETAMINOPHEN 650 MG: 325 TABLET ORAL at 20:13

## 2019-06-17 RX ADMIN — FAMOTIDINE 20 MG: 20 TABLET, FILM COATED ORAL at 20:13

## 2019-06-17 RX ADMIN — BACITRACIN: 500 OINTMENT TOPICAL at 10:29

## 2019-06-17 RX ADMIN — TIZANIDINE 2 MG: 4 TABLET ORAL at 14:31

## 2019-06-17 RX ADMIN — OXYBUTYNIN 5 MG: 5 TABLET, FILM COATED, EXTENDED RELEASE ORAL at 20:14

## 2019-06-17 RX ADMIN — ACETIC ACID: 250 IRRIGANT IRRIGATION at 10:29

## 2019-06-17 RX ADMIN — VANCOMYCIN HYDROCHLORIDE 1250 MG: 5 INJECTION, POWDER, LYOPHILIZED, FOR SOLUTION INTRAVENOUS at 00:10

## 2019-06-17 RX ADMIN — Medication 1 CAPSULE: at 10:27

## 2019-06-17 RX ADMIN — FAMOTIDINE 20 MG: 20 TABLET, FILM COATED ORAL at 10:26

## 2019-06-17 RX ADMIN — ACETIC ACID: 250 IRRIGANT IRRIGATION at 20:14

## 2019-06-17 RX ADMIN — VITAMIN E CAP 400 UNIT 400 UNITS: 400 CAP at 10:26

## 2019-06-17 RX ADMIN — METOPROLOL TARTRATE 25 MG: 25 TABLET ORAL at 10:27

## 2019-06-17 RX ADMIN — Medication 10 ML: at 20:14

## 2019-06-17 RX ADMIN — Medication 10 ML: at 10:28

## 2019-06-17 RX ADMIN — CALCIUM CARBONATE-VITAMIN D TAB 500 MG-200 UNIT 1 TABLET: 500-200 TAB at 10:27

## 2019-06-17 RX ADMIN — METOPROLOL TARTRATE 25 MG: 25 TABLET ORAL at 20:13

## 2019-06-17 RX ADMIN — POTASSIUM CHLORIDE 10 MEQ: 750 TABLET, FILM COATED, EXTENDED RELEASE ORAL at 10:26

## 2019-06-17 RX ADMIN — CEFTRIAXONE SODIUM 1 G: 1 INJECTION, POWDER, FOR SOLUTION INTRAMUSCULAR; INTRAVENOUS at 10:28

## 2019-06-17 RX ADMIN — SODIUM CHLORIDE TAB 1 GM 1 G: 1 TAB at 17:15

## 2019-06-17 RX ADMIN — CALCIUM CARBONATE-VITAMIN D TAB 500 MG-200 UNIT 1 TABLET: 500-200 TAB at 20:14

## 2019-06-17 RX ADMIN — MICONAZOLE NITRATE: 20 POWDER TOPICAL at 20:15

## 2019-06-17 ASSESSMENT — PAIN SCALES - GENERAL: PAINLEVEL_OUTOF10: 0

## 2019-06-17 NOTE — PROGRESS NOTES
Progress note: Infectious diseases    Patient - Carolyne Washington,  Age - 66 y.o.    - 1955      Room Number - 3B-31/031-A   MRN -  [id number]   Acct # - [de-identified]  Date of Admission -  6/10/2019 11:44 AM    SUBJECTIVE:   He feels better. he had fever last night    OBJECTIVE   VITALS    height is 5' 7\" (1.702 m) and weight is 209 lb 11.2 oz (95.1 kg). His oral temperature is 97.3 °F (36.3 °C). His blood pressure is 118/60 and his pulse is 69. His respiration is 18 and oxygen saturation is 96%. Wt Readings from Last 3 Encounters:   06/15/19 209 lb 11.2 oz (95.1 kg)   19 204 lb 12.8 oz (92.9 kg)   19 206 lb 6.4 oz (93.6 kg)       I/O (24 Hours)    Intake/Output Summary (Last 24 hours) at 2019 1542  Last data filed at 2019 1326  Gross per 24 hour   Intake 1085.42 ml   Output 1800 ml   Net -714.58 ml       General Appearance  Awake, alert, oriented, chronically sick looking. HEENT - normocephalic, atraumatic, pink conjunctiva,  anicteric sclera  Neck - Supple, no mass  Lungs -  Bilateral  air entry, diminished breath sound  Cardiovascular - Heart sounds are normal.  Regular rate and rhythm without murmur, gallop or rub.   Abdomen - soft, not distended, nontender,   Neurologic - oriented  Skin - No bruising or bleeding  Extremities - ischial wound (POA)    MEDICATIONS:      vancomycin  1,500 mg Intravenous Q18H    furosemide  20 mg Intravenous Once    metoprolol tartrate  25 mg Oral BID    sodium chloride  1 g Oral BID WC    vancomycin (VANCOCIN) intermittent dosing (placeholder)   Other RX Placeholder    phosphorus replacement protocol   Other RX Placeholder    cefTRIAXone (ROCEPHIN) IV  1 g Intravenous Q24H    sodium chloride flush  10 mL Intravenous 2 times per day    acetic acid   Irrigation BID    vitamin C  500 mg Oral Daily    bacitracin   Topical Daily    calcium-vitamin D  1 tablet Oral BID    docusate sodium  100 mg Oral Daily    famotidine  20 mg Oral BID    multivitamin  1 tablet Oral Daily    oxybutynin  5 mg Oral BID    potassium chloride  10 mEq Oral Daily    lactobacillus  1 capsule Oral Daily    vitamin E  400 Units Oral Daily    vitamin A  20,000 Units Oral Daily    tiZANidine  2 mg Oral TID    rivaroxaban  20 mg Oral Dinner       ondansetron, sodium chloride flush, acetaminophen      LABS:     CBC:   Recent Labs     06/16/19  0410 06/17/19  0421   WBC 12.0* 13.0*   HGB 8.7* 8.3*    318     BMP:    Recent Labs     06/15/19  0439 06/16/19  0410 06/17/19  0421   * 134* 133*   K 3.5 3.8 3.8    103 102   CO2 23 23 20*   BUN 10 9 10   CREATININE 0.7 0.8 0.7   GLUCOSE 113* 110* 123*     Calcium:  Recent Labs     06/17/19  0421   CALCIUM 8.1*     Ionized Calcium:No results for input(s): IONCA in the last 72 hours. Magnesium:  Recent Labs     06/16/19  0410   MG 1.8     Phosphorus:  Recent Labs     06/15/19  0439   PHOS 3.4     BNP:No results for input(s): BNP in the last 72 hours.   Glucose:  Recent Labs     06/15/19  1619   POCGLU 143*        CULTURES:   UA:   Recent Labs     06/17/19  1015   PHUR 6.5   COLORU YELLOW   PROTEINU NEGATIVE   BLOODU MODERATE*   RBCUA 23670*   WBCUA > 200   BACTERIA FEW   NITRU NEGATIVE   GLUCOSEU NEGATIVE   BILIRUBINUR NEGATIVE   UROBILINOGEN 0.2   KETUA NEGATIVE     Micro:   Lab Results   Component Value Date    BC No growth-preliminary 06/13/2019    BC No growth-preliminary 06/13/2019       Problem list of patient:     Patient Active Problem List   Diagnosis Code    Neurogenic bladder N31.9    Multiple sclerosis ([facility] G35    MS (multiple sclerosis) ([facility] [address] G35    Urinary retention R33.9    Indwelling catheter present on admission Z96.0    Cystostomy malfunction ([facility] [address] W52.149    Decubitus ulcer of coccyx L89.159    Decubitus ulcer, stage 3 ([facility] [address] L89.93    Malnutrition ([facility] [address] E46    Decubitus ulcer of right perineal ischial region, stage 3 (HCC) L89.313    Multiple sclerosis ([address] G35    Pulmonary embolism on left ([facility]) C31.51    Metabolic encephalopathy V15.76    Speech disturbance R47.9    Infected decubitus ulcer, stage I L89.91, L08.9    Infected decubitus ulcer, stage III (HCC) L89.93, L08.9    Wound infection T14. 8XXA, L08.9    Elevated INR R79.1    Chronic osteomyelitis, pelvis, right (HCC) M86.651    Osteomyelitis (HCC) M86.9    Urinary tract infection without hematuria N39.0    Sepsis ([facility]) A41.9    Dehydration E86.0    Abnormal liver function test R94.5    Chronic depression F32.9    Essential hypertension I10    History of pulmonary embolism Z86.711    Other dysphagia R13.19    Pressure injury of skin of back L89.109    Sepsis due to gram-negative UTI ([facility]) A41.50, N39.0    Bladder stones N21.0    Sepsis due to urinary tract infection ([facility]) A41.9, N39.0    Decubitus ulcer L89.90    Electrolyte imbalance E87.8    Elevated transaminase level R74.0    Anemia D64.9    UTI (urinary tract infection) N39.0    Syncope and collapse R55    Hypotension I95.9         ASSESSMENT/PLAN   Chronic cystitis related to chronic Pompa: The Pompa has been changed  He is on antibiotics    MS with functional quadriparesis  Fever:  If it persistswill scan his abdomen and pelvis to make sure he does not have obstruction or stones      Elisa Reynoso MD, FACP 6/17/2019 3:42 PM

## 2019-06-17 NOTE — PROGRESS NOTES
Pharmacy Vancomycin Consult     Vancomycin Day: 5  Current Dosing: vanc 1250mg q12h    Temp max:  100    Recent Labs     06/15/19  0439 06/16/19  0410   BUN 10 9       Recent Labs     06/15/19  0439 06/16/19  0410   CREATININE 0.7 0.8       Recent Labs     06/14/19  0347 06/16/19  0410   WBC 12.6* 12.0*         Intake/Output Summary (Last 24 hours) at 6/17/2019 0116  Last data filed at 6/16/2019 2311  Gross per 24 hour   Intake 1181 ml   Output 900 ml   Net 281 ml       Culture Date Source Results   6/10/19 UC MRSA, Proteus mirabilis   6/10/19 BCx2 ngtd        Ht Readings from Last 1 Encounters:   06/11/19 5' 7\" (1.702 m)        Wt Readings from Last 1 Encounters:   06/15/19 209 lb 11.2 oz (95.1 kg)         Body mass index is 32.84 kg/m². Estimated Creatinine Clearance: 107 mL/min (based on SCr of 0.8 mg/dL). Trough: 21.9    Assessment/Plan:  Will reduce dose to 1500mg q18h as patient still has high trough but is febrile.       Gali Montenegro RPh, BCPS, BCGP  6/17/2019     1:18 AM

## 2019-06-17 NOTE — CARE COORDINATION
6/17/19, 2:24 PM      Erin Eduardo day: 7  Location: -31/031-A Reason for admit: Sepsis [facility]) [A41.9]   Procedure: 6/13 Replaced suprapubic cath  Treatment Plan of Care: Hospitalist, Urology and ID following. Pt still spiking temps up to 102.9F overnight. WBC 13.0. Spoke with Dr. Kathleen Quintanilla and plans to culture wound, and repeat blood cultures today. Asking Dr. Susan Espinosa to reconsult. Repeated UA - Moderate blood, large leukocytes. Turbid. IV Rocephin. Lasix iv x1 today. Starting iv Vanc. PCP: Romy Roman MD  Readmission Risk Score: 27%  Discharge Plan: Spoke with Dr. Kathleen Quintanilla, feels pt will need 2-3 more days at least. [place] QL done, pt would be a candidate if physicians feel appropriate.

## 2019-06-17 NOTE — PROGRESS NOTES
Hospitalist Progress Note    Patient:  Katiuska Carbone      Unit/Bed:3B-31/031-A    YOB: 1955    MRN: [id number]       Acct: [de-identified]     PCP: Kristofer Helton MD    Date of Admission: 6/10/2019    Assessment/Plan:    Syncope on admission, suspected to be secondary to hypotension, unable to do orthostatic blood pressures, currently blood pressure good, ruled out acute coronary syndrome, no PE, on anticoagulation with Xarelto, no acute stroke, no seizure    Sepsis secondary to catheter associated urinary tract infection-Proteus and MRSA, appreciate infectious disease and urology, catheter exchanged on 6/13, blood cultures negative   -On ceftriaxone and vancomycin  6/16-had fever last night, 100.9, will monitor 1 more day if persistent fever, will review antibiotics again, if no more fever most likely discharge  6/17-having persistent fevers 102.9 early this morning, repeat blood cultures, urine cultures, chest x-ray, discussed with ID, if persistent fever planning for CT abdomen    Acute metabolic encephalopathy resolved suspected to be secondary to sepsis    Acute respiratory failure hypoxic-weaned off oxygen    Hyponatremia improving so no pneumonia  Hypocalcemia   Hypophosphatemia    Essential hypertension hold lisinopril, start metoprolol low-dose 12.5 mg twice daily    Atelectasis left lower lobe    Metabolic acidosis improved    Neurogenic bladder secondary to multiple sclerosis-   Chronic indwelling Pompa  Decubitus wound  In right buttock/ischial- stage 4- POA, continue dressing and packing  Hx of pulmonary embolism - on xarelto  Multiple Sclerosis  With lower leg weakness- unable to move  Hx of Stroke - right BG and cortex of right frontal lobe        Expected discharge date: 2 to 3 days    Disposition:    [] Home       [] TCU       [] Rehab       [] Psych       [x] SNF       [] [facility]       [] Other-    Chief Complaint:  Syncope    Patient was transferred to the emergency department by EMS for evaluation for syncope episode. Patient is wheelchair-bound/bedbound secondary to his multiple sclerosis and has a chronic indwelling Pompa and decubitus ulcer stage IV in the right ischial area. 2 days ago apparently was diagnosed to have UTI and started on Augmentin. Patient went to the dining room in his wheelchair on the day of presentation and apparently passed out and did not have any headache or dizziness or lightheadedness. Patient's blood pressure was low in the 80s and in the emergency room was around 80/57. Hospital Course: Patient was aggressively hydrated and home medications lisinopril 10 mg was held . CT scan of the chest was negative for PE. Labs showed patient was hyponatremic with metabolic acidosis with sodium of 133 and bicarb 19, and there was no obvious diarrhea. Patient was given broad-spectrum antibiotics initially ceftriaxone and meropenem and linezolid. After being admitted to the hospital patient had fever 101.5. The second day after admission antibiotics were held and because of fever was started back on ceftriaxone and subsequently urine cultures were positive for MRSA along with Proteus and vancomycin was added on 6/13. Infectious disease and urology were consulted. Suprapubic catheter was exchanged on 6/13. Patient's blood pressure has improved and IV fluids were discontinued. Patient's fever has resolved. If no fever for the next 24 hours most likely discharge on antibiotics as per ID. Blood pressure trending up along with tachycardia, start metoprolol 12.5 mg and increase as tolerated. Continue wound care for the decubitus wound.     6/16-was planning to discharge today but patient had fever of 101 last night, not sure about the source, will wait for 24 more hours and if no fever will discharge on antibiotics if persistently having fevers will need further work-up    6/17-having recurrent fever still, 102.9 this morning, discussed awake  and in no distress at rest  HEENT: Normocephalic, Atraumatic, pupils reactive, mucous membranes moist, short neck  Chest - moving equally to respiration,symmetric air entry, clear to auscultation  Heart - normal rate, regular rhythm, normal S1, S2, no murmurs, rubs, clicks or gallops  Abdomen - soft, nontender, distended, BS+ colostomy +   suprapubic cathter+  Neurological - alert, oriented fairly well, normal speech, sensations intact and able to move upper extremities, poor hand  b/l, unable to move lower legs, waisting +  Extremities - peripheral pulses normal, no pedal edema,  Capillary refill less than 3 sec  Skin - normal coloration and turgor, no rashes      Labs:   Recent Labs     06/16/19 0410 06/17/19  0421   WBC 12.0* 13.0*   HGB 8.7* 8.3*   HCT 27.3* 26.5*    318     Recent Labs     06/15/19  0439 06/16/19 0410 06/17/19  0421   * 134* 133*   K 3.5 3.8 3.8    103 102   CO2 23 23 20*   BUN 10 9 10   CREATININE 0.7 0.8 0.7   CALCIUM 8.5 8.5 8.1*   PHOS 3.4  --   --      No results for input(s): AST, ALT, BILIDIR, BILITOT, ALKPHOS in the last 72 hours. No results for input(s): INR in the last 72 hours. No results for input(s): [facility] [place] in the last 72 hours. Microbiology:      Urinalysis:      Lab Results   Component Value Date    NITRU NEGATIVE 06/10/2019    WBCUA 50-75 06/10/2019    WBCUA >200 01/20/2012    BACTERIA FEW 06/10/2019    RBCUA 25-50 06/10/2019    BLOODU LARGE 06/10/2019    SPECGRAV 1.019 11/13/2018    GLUCOSEU NEGATIVE 06/10/2019       Radiology:  XR CHEST PORTABLE   Final Result      No acute pneumonia or pulmonary edema. Stable mild elevation of the left hemidiaphragm with mild left basilar atelectasis. **This report has been created using voice recognition software. It may contain minor errors which are inherent in voice recognition technology. **      Final report electronically signed by Dr. Bel Jimenes on 6/13/2019 7:01 AM      MRI  Chronic depression [F32.9]     Essential hypertension [I10]     History of pulmonary embolism [Z86.711]     Dehydration [E86.0]     Malnutrition ([facility] [address] [E46] 12/17/2012    Multiple sclerosis ([facility] Makenzie Gutierrez 02/14/2012       Electronically signed by Kel Alva MD on 6/17/2019 at 8:15 AM

## 2019-06-18 LAB
ANION GAP SERPL CALCULATED.3IONS-SCNC: 10 MEQ/L (ref 8–16)
BLOOD CULTURE, ROUTINE: NORMAL
BLOOD CULTURE, ROUTINE: NORMAL
BUN BLDV-MCNC: 9 MG/DL (ref 7–22)
CALCIUM SERPL-MCNC: 8.4 MG/DL (ref 8.5–10.5)
CHLORIDE BLD-SCNC: 99 MEQ/L (ref 98–111)
CO2: 24 MEQ/L (ref 23–33)
CREAT SERPL-MCNC: 0.6 MG/DL (ref 0.4–1.2)
ERYTHROCYTE [DISTWIDTH] IN BLOOD BY AUTOMATED COUNT: 16 % (ref 11.5–14.5)
ERYTHROCYTE [DISTWIDTH] IN BLOOD BY AUTOMATED COUNT: 52.4 FL (ref 35–45)
GFR SERPL CREATININE-BSD FRML MDRD: > 90 ML/MIN/1.73M2
GLUCOSE BLD-MCNC: 101 MG/DL (ref 70–108)
HCT VFR BLD CALC: 26.6 % (ref 42–52)
HEMOGLOBIN: 8.6 GM/DL (ref 14–18)
MCH RBC QN AUTO: 28.9 PG (ref 26–33)
MCHC RBC AUTO-ENTMCNC: 32.3 GM/DL (ref 32.2–35.5)
MCV RBC AUTO: 89.3 FL (ref 80–94)
PLATELET # BLD: 377 THOU/MM3 (ref 130–400)
PMV BLD AUTO: 8.9 FL (ref 9.4–12.4)
POTASSIUM SERPL-SCNC: 3.3 MEQ/L (ref 3.5–5.2)
PROCALCITONIN: 4.17 NG/ML (ref 0.01–0.09)
RBC # BLD: 2.98 MILL/MM3 (ref 4.7–6.1)
SEDIMENTATION RATE, ERYTHROCYTE: 120 MM/HR (ref 0–10)
SODIUM BLD-SCNC: 133 MEQ/L (ref 135–145)
WBC # BLD: 10 THOU/MM3 (ref 4.8–10.8)

## 2019-06-18 PROCEDURE — 2580000003 HC RX 258: Performed by: NURSE PRACTITIONER

## 2019-06-18 PROCEDURE — 2580000003 HC RX 258: Performed by: INTERNAL MEDICINE

## 2019-06-18 PROCEDURE — 2140000000 HC CCU INTERMEDIATE R&B

## 2019-06-18 PROCEDURE — 6370000000 HC RX 637 (ALT 250 FOR IP): Performed by: PHYSICIAN ASSISTANT

## 2019-06-18 PROCEDURE — 6370000000 HC RX 637 (ALT 250 FOR IP): Performed by: INTERNAL MEDICINE

## 2019-06-18 PROCEDURE — 6370000000 HC RX 637 (ALT 250 FOR IP): Performed by: NURSE PRACTITIONER

## 2019-06-18 PROCEDURE — 85027 COMPLETE CBC AUTOMATED: CPT

## 2019-06-18 PROCEDURE — 2580000003 HC RX 258: Performed by: COUNSELOR

## 2019-06-18 PROCEDURE — 2709999900 HC NON-CHARGEABLE SUPPLY

## 2019-06-18 PROCEDURE — 84145 PROCALCITONIN (PCT): CPT

## 2019-06-18 PROCEDURE — 99232 SBSQ HOSP IP/OBS MODERATE 35: CPT | Performed by: NURSE PRACTITIONER

## 2019-06-18 PROCEDURE — 85651 RBC SED RATE NONAUTOMATED: CPT

## 2019-06-18 PROCEDURE — 36415 COLL VENOUS BLD VENIPUNCTURE: CPT

## 2019-06-18 PROCEDURE — 80048 BASIC METABOLIC PNL TOTAL CA: CPT

## 2019-06-18 PROCEDURE — 6360000002 HC RX W HCPCS: Performed by: NURSE PRACTITIONER

## 2019-06-18 PROCEDURE — 6360000002 HC RX W HCPCS: Performed by: COUNSELOR

## 2019-06-18 PROCEDURE — 99232 SBSQ HOSP IP/OBS MODERATE 35: CPT | Performed by: INTERNAL MEDICINE

## 2019-06-18 RX ORDER — TAMSULOSIN HYDROCHLORIDE 0.4 MG/1
0.4 CAPSULE ORAL DAILY
Status: DISCONTINUED | OUTPATIENT
Start: 2019-06-18 | End: 2019-06-20 | Stop reason: HOSPADM

## 2019-06-18 RX ORDER — POTASSIUM CHLORIDE 750 MG/1
40 TABLET, FILM COATED, EXTENDED RELEASE ORAL DAILY
Status: DISCONTINUED | OUTPATIENT
Start: 2019-06-18 | End: 2019-06-20 | Stop reason: HOSPADM

## 2019-06-18 RX ADMIN — OXYBUTYNIN 5 MG: 5 TABLET, FILM COATED, EXTENDED RELEASE ORAL at 20:39

## 2019-06-18 RX ADMIN — TIZANIDINE 2 MG: 4 TABLET ORAL at 10:30

## 2019-06-18 RX ADMIN — THERA TABS 1 TABLET: TAB at 10:32

## 2019-06-18 RX ADMIN — Medication 10 ML: at 20:39

## 2019-06-18 RX ADMIN — VITAMIN E CAP 400 UNIT 400 UNITS: 400 CAP at 10:32

## 2019-06-18 RX ADMIN — TIZANIDINE 2 MG: 4 TABLET ORAL at 20:39

## 2019-06-18 RX ADMIN — VANCOMYCIN HYDROCHLORIDE 1500 MG: 5 INJECTION, POWDER, LYOPHILIZED, FOR SOLUTION INTRAVENOUS at 14:04

## 2019-06-18 RX ADMIN — FAMOTIDINE 20 MG: 20 TABLET, FILM COATED ORAL at 10:29

## 2019-06-18 RX ADMIN — BACITRACIN: 500 OINTMENT TOPICAL at 10:37

## 2019-06-18 RX ADMIN — TAMSULOSIN HYDROCHLORIDE 0.4 MG: 0.4 CAPSULE ORAL at 14:04

## 2019-06-18 RX ADMIN — Medication 20000 UNITS: at 10:31

## 2019-06-18 RX ADMIN — FAMOTIDINE 20 MG: 20 TABLET, FILM COATED ORAL at 20:39

## 2019-06-18 RX ADMIN — CEFTRIAXONE SODIUM 1 G: 1 INJECTION, POWDER, FOR SOLUTION INTRAMUSCULAR; INTRAVENOUS at 11:39

## 2019-06-18 RX ADMIN — METOPROLOL TARTRATE 25 MG: 25 TABLET ORAL at 10:31

## 2019-06-18 RX ADMIN — Medication 500 MG: at 10:32

## 2019-06-18 RX ADMIN — OXYBUTYNIN 5 MG: 5 TABLET, FILM COATED, EXTENDED RELEASE ORAL at 10:31

## 2019-06-18 RX ADMIN — ACETIC ACID: 250 IRRIGANT IRRIGATION at 20:39

## 2019-06-18 RX ADMIN — MICONAZOLE NITRATE: 20 POWDER TOPICAL at 20:39

## 2019-06-18 RX ADMIN — DOCUSATE SODIUM 100 MG: 100 CAPSULE, LIQUID FILLED ORAL at 10:29

## 2019-06-18 RX ADMIN — CALCIUM CARBONATE-VITAMIN D TAB 500 MG-200 UNIT 1 TABLET: 500-200 TAB at 10:31

## 2019-06-18 RX ADMIN — POTASSIUM CHLORIDE 40 MEQ: 750 TABLET, FILM COATED, EXTENDED RELEASE ORAL at 10:29

## 2019-06-18 RX ADMIN — METOPROLOL TARTRATE 25 MG: 25 TABLET ORAL at 20:39

## 2019-06-18 RX ADMIN — MICONAZOLE NITRATE: 20 POWDER TOPICAL at 10:35

## 2019-06-18 RX ADMIN — Medication 10 ML: at 10:36

## 2019-06-18 RX ADMIN — SODIUM CHLORIDE TAB 1 GM 1 G: 1 TAB at 10:33

## 2019-06-18 RX ADMIN — CALCIUM CARBONATE-VITAMIN D TAB 500 MG-200 UNIT 1 TABLET: 500-200 TAB at 20:39

## 2019-06-18 RX ADMIN — ACETIC ACID: 250 IRRIGANT IRRIGATION at 10:35

## 2019-06-18 RX ADMIN — TIZANIDINE 2 MG: 4 TABLET ORAL at 14:04

## 2019-06-18 RX ADMIN — SODIUM CHLORIDE TAB 1 GM 1 G: 1 TAB at 16:25

## 2019-06-18 RX ADMIN — Medication 1 CAPSULE: at 10:32

## 2019-06-18 ASSESSMENT — PAIN SCALES - GENERAL: PAINLEVEL_OUTOF10: 0

## 2019-06-18 NOTE — PROGRESS NOTES
Hospitalist Progress Note    Patient:  Germán Hernandez      Unit/Bed:3B-31/031-A    YOB: 1955    MRN: [id number]       Acct: [de-identified]     PCP: Romy Roman MD    Date of Admission: 6/10/2019    Assessment/Plan:      Sepsis secondary to catheter associated urinary tract infection-Proteus and MRSA, appreciate infectious disease and urology, catheter exchanged on 6/13, blood cultures negative   -On ceftriaxone and vancomycin  6/16-had fever last night, 100.9, will monitor 1 more day if persistent fever, will review antibiotics again, if no more fever most likely discharge  6/17-having persistent fevers 102.9 early this morning, repeat blood cultures, urine cultures, chest x-ray, discussed with ID, if persistent fever planning for CT abdomen  6/18-CT abdomen shows left ureteral stone and mild hydronephrosis urology consulted, infectious disease suspects that he might need a stent, to get the infection cleared and removal of the stone     Left ureteral stone with hydronephrosis-urology consulted     Syncope on admission, suspected to be secondary to hypotension, unable to do orthostatic blood pressures, currently blood pressure good, ruled out acute coronary syndrome, no PE, on anticoagulation with Xarelto, no acute stroke, no seizure      Acute metabolic encephalopathy resolved suspected to be secondary to sepsis    Acute respiratory failure hypoxic-weaned off oxygen    Hyponatremia improving so no pneumonia  Hypocalcemia   Hypophosphatemia    Essential hypertension hold lisinopril, start metoprolol low-dose 12.5 mg twice daily    Atelectasis left lower lobe    Metabolic acidosis improved    Neurogenic bladder secondary to multiple sclerosis-   Chronic indwelling Pompa  Decubitus wound  In right buttock/ischial- stage 4- POA, continue dressing and packing  Hx of pulmonary embolism - on xarelto  Multiple Sclerosis  With lower leg weakness- unable to move  Hx of Stroke - right BG and cortex of right frontal lobe        Expected discharge date: 2 to 3 days    Disposition:    [] Home       [] TCU       [] Rehab       [] Psych       [x] SNF       [] [facility]       [] Other-    Chief Complaint:  Syncope    Patient was transferred to the emergency department by EMS for evaluation for syncope episode. Patient is wheelchair-bound/bedbound secondary to his multiple sclerosis and has a chronic indwelling Pompa and decubitus ulcer stage IV in the right ischial area. 2 days ago apparently was diagnosed to have UTI and started on Augmentin. Patient went to the dining room in his wheelchair on the day of presentation and apparently passed out and did not have any headache or dizziness or lightheadedness. Patient's blood pressure was low in the 80s and in the emergency room was around 80/57. Hospital Course: Patient was aggressively hydrated and home medications lisinopril 10 mg was held . CT scan of the chest was negative for PE. Labs showed patient was hyponatremic with metabolic acidosis with sodium of 133 and bicarb 19, and there was no obvious diarrhea. Patient was given broad-spectrum antibiotics initially ceftriaxone and meropenem and linezolid. After being admitted to the hospital patient had fever 101.5. The second day after admission antibiotics were held and because of fever was started back on ceftriaxone and subsequently urine cultures were positive for MRSA along with Proteus and vancomycin was added on 6/13. Infectious disease and urology were consulted. Suprapubic catheter was exchanged on 6/13. Patient's blood pressure has improved and IV fluids were discontinued. Patient's fever has resolved. If no fever for the next 24 hours most likely discharge on antibiotics as per ID. Blood pressure trending up along with tachycardia, start metoprolol 12.5 mg and increase as tolerated. Continue wound care for the decubitus wound.     6/16-was planning to discharge today but vitamin A  20,000 Units Oral Daily    tiZANidine  2 mg Oral TID     PRN Meds: ondansetron, sodium chloride flush, acetaminophen      Intake/Output Summary (Last 24 hours) at 6/18/2019 1612  Last data filed at 6/18/2019 1356  Gross per 24 hour   Intake 1520.04 ml   Output 3200 ml   Net -1679.96 ml       Diet:  DIET GENERAL;  Diet NPO, After Midnight    Exam:  /66   Pulse 80   Temp 98.5 °F (36.9 °C) (Axillary)   Resp 18   Ht 5' 7\" (1.702 m)   Wt 209 lb 11.2 oz (95.1 kg)   SpO2 94%   BMI 32.84 kg/m²     Physical Examination:   General appearance - alert, awake  and in no distress at rest  HEENT: Normocephalic, Atraumatic, pupils reactive, mucous membranes moist, short neck  Chest - moving equally to respiration,symmetric air entry, clear to auscultation  Heart - normal rate, regular rhythm, normal S1, S2, no murmurs, rubs, clicks or gallops  Abdomen - soft, nontender, distended, BS+ colostomy +   suprapubic cathter+  Neurological - alert, oriented fairly well, normal speech, sensations intact and able to move upper extremities, poor hand  b/l, unable to move lower legs, waisting +  Extremities - peripheral pulses normal, no pedal edema,  Capillary refill less than 3 sec  Skin - normal coloration and turgor, no rashes      Labs:   Recent Labs     06/16/19  0410 06/17/19  0421 06/18/19  0340   WBC 12.0* 13.0* 10.0   HGB 8.7* 8.3* 8.6*   HCT 27.3* 26.5* 26.6*    318 377     Recent Labs     06/16/19  0410 06/17/19  0421 06/18/19  0340   * 133* 133*   K 3.8 3.8 3.3*    102 99   CO2 23 20* 24   BUN 9 10 9   CREATININE 0.8 0.7 0.6   CALCIUM 8.5 8.1* 8.4*     No results for input(s): AST, ALT, BILIDIR, BILITOT, ALKPHOS in the last 72 hours. No results for input(s): INR in the last 72 hours. No results for input(s): Evern Jefferson Davis in the last 72 hours.     Microbiology:      Urinalysis:      Lab Results   Component Value Date    NITRU NEGATIVE 06/17/2019    WBCUA > 200 06/17/2019 45 Stella Huizar >200 01/20/2012    BACTERIA FEW 06/17/2019    RBCUA 26137 06/17/2019    BLOODU MODERATE 06/17/2019    SPECGRAV 1.019 11/13/2018    GLUCOSEU NEGATIVE 06/17/2019       Radiology:  CT ABDOMEN PELVIS WO CONTRAST Additional Contrast? None   Final Result   1. Left proximal ureteral calculus producing moderate left hydronephrosis. 2. Bilateral nonobstructing renal calculi and small cortical cystic changes. 3. Stable left lower quadrant ostomy. 4. Right gluteal decubitus ulcer extending to the margin of the right ischial tuberosity. 5. Bibasilar atelectasis with small effusions. 6. Areas of groundglass infiltrate of the bilateral lungs. **This report has been created using voice recognition software. It may contain minor errors which are inherent in voice recognition technology. **      Final report electronically signed by Dr. Denny Martins on 6/18/2019 3:48 AM      XR CHEST PORTABLE   Final Result   1. Poor inflation lungs. Mild cardiomegaly. 2. Mild left basilar atelectasis/pneumonia. No effusion. 3. Overall appearance of chest slightly worse than prior. **This report has been created using voice recognition software. It may contain minor errors which are inherent in voice recognition technology. **      Final report electronically signed by Dr. Pooja Gonzalez on 6/17/2019 9:24 AM      XR CHEST PORTABLE   Final Result      No acute pneumonia or pulmonary edema. Stable mild elevation of the left hemidiaphragm with mild left basilar atelectasis. **This report has been created using voice recognition software. It may contain minor errors which are inherent in voice recognition technology. **      Final report electronically signed by Dr. Vin Domingo on 6/13/2019 7:01 AM      MRI Brain W WO Contrast   Final Result       1. No evidence of an acute infarct or active demyelination.    2. Small old infarcts in the right basal ganglia and in the cortex of the 02/14/2012       Electronically signed by Charmayne Jakes, MD on 6/18/2019 at 4:12 PM

## 2019-06-18 NOTE — PROGRESS NOTES
[address]  [address] [address] [facility] [address]  [address] [address]  Dept: [phone]  Loc: [phone]  Visit Date: 6/10/2019  Urology Progress Note    Chief Complaint: Syncope    Subjective:   Patient is resting in bed, SP catheter draining clear, yellow urine without difficulty, +flatus, +BM, tolerating regular diet, denies any nausea or vomiting. There are complaints of no pain at this time.         Vitals:  BP (!) 140/65   Pulse 85   Temp 98.3 °F (36.8 °C) (Oral)   Resp 18   Ht 5' 7\" (1.702 m)   Wt 209 lb 11.2 oz (95.1 kg)   SpO2 92%   BMI 32.84 kg/m²   Temp  Av.3 °F (36.8 °C)  Min: 97.3 °F (36.3 °C)  Max: 100.5 °F (38.1 °C)    Intake/Output Summary (Last 24 hours) at 2019 1121  Last data filed at 2019 0520  Gross per 24 hour   Intake 1574.42 ml   Output 3800 ml   Net -2225.58 ml       Social History     Socioeconomic History    Marital status:      Spouse name: Cuate Zarco Number of children: 2    Years of education: Not on file    Highest education level: Not on file   Occupational History    Not on file   Social Needs    Financial resource strain: Not on file    Food insecurity:     Worry: Not on file     Inability: Not on file    Transportation needs:     Medical: Not on file     Non-medical: Not on file   Tobacco Use    Smoking status: Former Smoker     Last attempt to quit: 1982     Years since quittin.4    Smokeless tobacco: Former User   Substance and Sexual Activity    Alcohol use: No     Comment: \"quit alcohol a few years ago\"    Drug use: No    Sexual activity: Yes     Partners: Female   Lifestyle    Physical activity:     Days per week: Not on file     Minutes per session: Not on file    Stress: Not on file   Relationships    Social connections:     Talks on phone: Not on file     Gets together: Not on file     Attends Christianity service: Not on file     Active member of club or organization: Not on file Attends meetings of clubs or organizations: Not on file     Relationship status: Not on file    Intimate partner violence:     Fear of current or ex partner: Not on file     Emotionally abused: Not on file     Physically abused: Not on file     Forced sexual activity: Not on file   Other Topics Concern    Not on file   Social History Narrative    Not on file     Family History   Problem Relation Age of Onset    Cancer Mother         Breast    Heart Disease Father 48    Arthritis Sister     Heart Disease Paternal Grandmother 48     No Known Allergies    Objective:    Constitutional: Alert and oriented times x3, no acute distress, and cooperative to examination with appropriate mood and affect. HEENT:   Head:         Normocephalic and atraumatic. Mucous membranes are normal.   Eyes:         EOM are normal. No scleral icterus. Nose:    The external appearance of the nose is normal  Ears: The ears appear normal to external inspection. Cardiovascular:       Normal rate, regular rhythm. Pulmonary/Chest:  Normal respiratory rate and rhthym. No use of accessory muscles. Lungs clear bilaterally. Abdominal:          Soft. No tenderness. Active bowel sounds. Ostomy pink and moist.  SP cath intact draining clear, yellow urine. Musculoskeletal:    He exhibits no edema or tenderness of lower extremities. Extremities:    No cyanosis, clubbing, or edema present. Neurological:    Alert and oriented. Labs:  WBC:    Lab Results   Component Value Date    WBC 10.0 06/18/2019     Hemoglobin/Hematocrit:    Lab Results   Component Value Date    HGB 8.6 06/18/2019    HCT 26.6 06/18/2019     BMP:    Lab Results   Component Value Date     06/18/2019    K 3.3 06/18/2019    K 3.5 06/15/2019    CL 99 06/18/2019    CO2 24 06/18/2019    BUN 9 06/18/2019    LABALBU 2.3 06/14/2019    CREATININE 0.6 06/18/2019    CALCIUM 8.4 06/18/2019    LABGLOM >90 06/18/2019     Imaging:    The patient has had a CT Without and With Contrast which I have independently reviewed along with its accompanying report. The study demonstrates:    Impression   1. Left proximal ureteral calculus producing moderate left hydronephrosis.           2. Bilateral nonobstructing renal calculi and small cortical cystic changes.           3. Stable left lower quadrant ostomy. 4. Right gluteal decubitus ulcer extending to the margin of the right ischial tuberosity. 5. Bibasilar atelectasis with small effusions.           6. Areas of groundglass infiltrate of the bilateral lungs. Impression:    UTI  Sepsis  SP catheter malfunction - resolved  Neurogenic bladder  Bilateral renal calculi  Left ureteral obstruction, 7 mm calculus  Left hydronephrosis    Plan: We were asked to return to evaluate patient due to persistent fevers despite Abx treatment. A CT Abd & Pelvis w/o contrast consequently found a left ureteral obstruction secondary to ureteral calculus with associated hydronephrosis. This could be a reason for persistent fever and unresolved infection. Plan for cystoscopy, left ureteroscopy with stone manipulation and possible left ureteral stent placement per Dr. Rocio Reilly. I described the procedure in detail and also described the associated risks and benefits at length. We discussed possible alternative therapies. We discussed the risks and benefits of not undergoing therapy. Patient understands these risks and benefits and desires to proceed. Post-op expectations were discussed; stent pain, urinary frequency and urgency secondary to the stent, dysuria which should improve 1-2 days after procedure, and intermittent hematuria can be expected as long as stent is in place.     NPO after midnight    Surgery tomorrow 0730 with Dr. Aniket Rodriguez, APRN  06/18/19 11:21 AM  Urology

## 2019-06-18 NOTE — PROGRESS NOTES
Progress note: Infectious diseases    Patient - Van Ramirez,  Age - 66 y.o.    - 1955      Room Number - 3B-31/031-A   MRN -  [id number]   Acct # - [de-identified]  Date of Admission -  6/10/2019 11:44 AM    SUBJECTIVE:   He has been no fever. His CT scan shows obstructing left ureteric stone. He has nonhealing ischial wound. No abscess was noted. OBJECTIVE   VITALS    height is 5' 7\" (1.702 m) and weight is 209 lb 11.2 oz (95.1 kg). His oral temperature is 98.4 °F (36.9 °C). His blood pressure is 157/70 (abnormal) and his pulse is 92. His respiration is 20 and oxygen saturation is 90%. Wt Readings from Last 3 Encounters:   06/15/19 209 lb 11.2 oz (95.1 kg)   19 204 lb 12.8 oz (92.9 kg)   19 206 lb 6.4 oz (93.6 kg)       I/O (24 Hours)    Intake/Output Summary (Last 24 hours) at 2019 0930  Last data filed at 2019 0520  Gross per 24 hour   Intake 1574.42 ml   Output 3800 ml   Net -2225.58 ml       General Appearance  Awake, alert, oriented, chronically sick looking. HEENT - normocephalic, atraumatic, pink conjunctiva,  anicteric sclera  Neck - Supple, no mass  Lungs -  Bilateral  air entry, diminished breath sound  Cardiovascular - Heart sounds are normal.  Regular rate and rhythm without murmur, gallop or rub.   Abdomen - soft, not distended, nontender,   Neurologic - oriented  Skin - No bruising or bleeding  Extremities - ischial wound (POA)    MEDICATIONS:      potassium chloride  40 mEq Oral Daily    vancomycin  1,500 mg Intravenous Q18H    miconazole   Topical BID    metoprolol tartrate  25 mg Oral BID    sodium chloride  1 g Oral BID WC    vancomycin (VANCOCIN) intermittent dosing (placeholder)   Other RX Placeholder    phosphorus replacement protocol   Other RX Placeholder    cefTRIAXone (ROCEPHIN) IV  1 g Intravenous Q24H    sodium chloride flush  10 mL Intravenous 2 times per day    acetic acid   Irrigation BID    vitamin C  500 mg Oral Daily    bacitracin   Topical Daily    calcium-vitamin D  1 tablet Oral BID    docusate sodium  100 mg Oral Daily    famotidine  20 mg Oral BID    multivitamin  1 tablet Oral Daily    oxybutynin  5 mg Oral BID    lactobacillus  1 capsule Oral Daily    vitamin E  400 Units Oral Daily    vitamin A  20,000 Units Oral Daily    tiZANidine  2 mg Oral TID       ondansetron, sodium chloride flush, acetaminophen      LABS:     CBC:   Recent Labs     06/16/19  0410 06/17/19  0421 06/18/19  0340   WBC 12.0* 13.0* 10.0   HGB 8.7* 8.3* 8.6*    318 377     BMP:    Recent Labs     06/16/19  0410 06/17/19  0421 06/18/19  0340   * 133* 133*   K 3.8 3.8 3.3*    102 99   CO2 23 20* 24   BUN 9 10 9   CREATININE 0.8 0.7 0.6   GLUCOSE 110* 123* 101     Calcium:  Recent Labs     06/18/19  0340   CALCIUM 8.4*     Ionized Calcium:No results for input(s): IONCA in the last 72 hours. Magnesium:  Recent Labs     06/16/19  0410   MG 1.8     Phosphorus:  No results for input(s): PHOS in the last 72 hours. BNP:No results for input(s): BNP in the last 72 hours.   Glucose:  Recent Labs     06/15/19  1619   POCGLU 143*        CULTURES:   UA:   Recent Labs     06/17/19  1015   PHUR 6.5   COLORU YELLOW   PROTEINU NEGATIVE   BLOODU MODERATE*   RBCUA 95538*   WBCUA > 200   BACTERIA FEW   NITRU NEGATIVE   GLUCOSEU NEGATIVE   BILIRUBINUR NEGATIVE   UROBILINOGEN 0.2   KETUA NEGATIVE     Micro:   Lab Results   Component Value Date    BC No growth-preliminary 06/17/2019       Problem list of patient:     Patient Active Problem List   Diagnosis Code    Neurogenic bladder N31.9    Multiple sclerosis ([facility] G35    MS (multiple sclerosis) ([facility] G35    Urinary retention R33.9    Indwelling catheter present on admission Z96.0    Cystostomy malfunction ([facility] Z43.011    Decubitus ulcer of coccyx L89.159    Decubitus ulcer, stage 3 ([facility] L89.93    Malnutrition ([facility] E46    Decubitus ulcer of right perineal ischial region, stage 3 ([facility]) L89.313    Multiple sclerosis ([facility] G35    Pulmonary embolism on left ([facility]) N92.36    Metabolic encephalopathy J01.54    Speech disturbance R47.9    Infected decubitus ulcer, stage I L89.91, L08.9    Infected decubitus ulcer, stage III ([facility]) L89.93, L08.9    Wound infection T14. 8XXA, L08.9    Elevated INR R79.1    Chronic osteomyelitis, pelvis, right ([facility]) M86.651    Osteomyelitis ([facility]) M86.9    Urinary tract infection without hematuria N39.0    Sepsis ([facility]) A41.9    Dehydration E86.0    Abnormal liver function test R94.5    Chronic depression F32.9    Essential hypertension I10    History of pulmonary embolism Z86.711    Other dysphagia R13.19    Pressure injury of skin of back L89.109    Sepsis due to gram-negative UTI ([facility]) A41.50, N39.0    Bladder stones N21.0    Sepsis due to urinary tract infection ([facility]) A41.9, N39.0    Decubitus ulcer L89.90    Electrolyte imbalance E87.8    Elevated transaminase level R74.0    Anemia D64.9    UTI (urinary tract infection) N39.0    Syncope and collapse R55    Hypotension I95.9         ASSESSMENT/PLAN   Chronic cystitis related to chronic Pompa: The Pompa has been changed  He is on antibiotics . CT scan of the abdomen and pelvis shows left ureteric stone obstructing. Will consult urology. He may need stent. MS with functional quadriparesis. Continue current IV antibiotics.   Discussed with hospitalist.  Lela Coronel MD, FACP 6/18/2019 9:30 AM

## 2019-06-18 NOTE — PLAN OF CARE
Problem: Falls - Risk of:  Goal: Will remain free from falls  Description  Will remain free from falls  Outcome: Ongoing  Note:   Patient free of falls. Call light within reach. Bed in lowest position. Bed alarm on. Hourly rounding continues. Patient complaint with use of call light for needs. Problem: Risk for Impaired Skin Integrity  Goal: Tissue integrity - skin and mucous membranes  Description  Structural intactness and normal physiological function of skin and  mucous membranes. Outcome: Ongoing  Note:   Wound to right buttocks/hip. Dressing is clean, dry, and intact. Will continue to monitor and turn q2 hours while in bed. Problem: DISCHARGE BARRIERS  Goal: Patient's continuum of care needs are met  Outcome: Ongoing  Note:   Patient plans to go back to [facility] upon discharge. Problem: Pain:  Goal: Pain level will decrease  Description  Pain level will decrease  Outcome: Ongoing  Note:   Denies pain this shift. Rates pain 0/10 on pain scale. Will continue to monitor. Care plan reviewed with patient. Patient verbalize understanding of the plan of care and contribute to goal setting.

## 2019-06-19 ENCOUNTER — ANESTHESIA (OUTPATIENT)
Dept: OPERATING ROOM | Age: 64
DRG: 662 | End: 2019-06-19
Payer: COMMERCIAL

## 2019-06-19 ENCOUNTER — ANESTHESIA EVENT (OUTPATIENT)
Dept: OPERATING ROOM | Age: 64
DRG: 662 | End: 2019-06-19
Payer: COMMERCIAL

## 2019-06-19 VITALS
OXYGEN SATURATION: 99 % | DIASTOLIC BLOOD PRESSURE: 71 MMHG | RESPIRATION RATE: 3 BRPM | TEMPERATURE: 98.6 F | SYSTOLIC BLOOD PRESSURE: 108 MMHG

## 2019-06-19 LAB
ANION GAP SERPL CALCULATED.3IONS-SCNC: 12 MEQ/L (ref 8–16)
BUN BLDV-MCNC: 10 MG/DL (ref 7–22)
CALCIUM SERPL-MCNC: 8.7 MG/DL (ref 8.5–10.5)
CHLORIDE BLD-SCNC: 102 MEQ/L (ref 98–111)
CO2: 23 MEQ/L (ref 23–33)
CREAT SERPL-MCNC: 0.7 MG/DL (ref 0.4–1.2)
GFR SERPL CREATININE-BSD FRML MDRD: > 90 ML/MIN/1.73M2
GLUCOSE BLD-MCNC: 117 MG/DL (ref 70–108)
MAGNESIUM: 1.7 MG/DL (ref 1.6–2.4)
POTASSIUM SERPL-SCNC: 4.3 MEQ/L (ref 3.5–5.2)
SODIUM BLD-SCNC: 137 MEQ/L (ref 135–145)
URINE CULTURE REFLEX: NORMAL

## 2019-06-19 PROCEDURE — 7100000001 HC PACU RECOVERY - ADDTL 15 MIN: Performed by: UROLOGY

## 2019-06-19 PROCEDURE — 2580000003 HC RX 258: Performed by: NURSE ANESTHETIST, CERTIFIED REGISTERED

## 2019-06-19 PROCEDURE — 0T774DZ DILATION OF LEFT URETER WITH INTRALUMINAL DEVICE, PERCUTANEOUS ENDOSCOPIC APPROACH: ICD-10-PCS | Performed by: UROLOGY

## 2019-06-19 PROCEDURE — 6360000002 HC RX W HCPCS: Performed by: COUNSELOR

## 2019-06-19 PROCEDURE — 3700000000 HC ANESTHESIA ATTENDED CARE: Performed by: UROLOGY

## 2019-06-19 PROCEDURE — 6370000000 HC RX 637 (ALT 250 FOR IP): Performed by: NURSE PRACTITIONER

## 2019-06-19 PROCEDURE — 7100000000 HC PACU RECOVERY - FIRST 15 MIN: Performed by: UROLOGY

## 2019-06-19 PROCEDURE — 0TCB4ZZ EXTIRPATION OF MATTER FROM BLADDER, PERCUTANEOUS ENDOSCOPIC APPROACH: ICD-10-PCS | Performed by: UROLOGY

## 2019-06-19 PROCEDURE — C1758 CATHETER, URETERAL: HCPCS | Performed by: UROLOGY

## 2019-06-19 PROCEDURE — 80048 BASIC METABOLIC PNL TOTAL CA: CPT

## 2019-06-19 PROCEDURE — 99233 SBSQ HOSP IP/OBS HIGH 50: CPT | Performed by: INTERNAL MEDICINE

## 2019-06-19 PROCEDURE — 2580000003 HC RX 258: Performed by: INTERNAL MEDICINE

## 2019-06-19 PROCEDURE — 2140000000 HC CCU INTERMEDIATE R&B

## 2019-06-19 PROCEDURE — 2580000003 HC RX 258: Performed by: NURSE PRACTITIONER

## 2019-06-19 PROCEDURE — C2617 STENT, NON-COR, TEM W/O DEL: HCPCS | Performed by: UROLOGY

## 2019-06-19 PROCEDURE — 83735 ASSAY OF MAGNESIUM: CPT

## 2019-06-19 PROCEDURE — 6370000000 HC RX 637 (ALT 250 FOR IP): Performed by: INTERNAL MEDICINE

## 2019-06-19 PROCEDURE — 2709999900 HC NON-CHARGEABLE SUPPLY: Performed by: UROLOGY

## 2019-06-19 PROCEDURE — 3700000001 HC ADD 15 MINUTES (ANESTHESIA): Performed by: UROLOGY

## 2019-06-19 PROCEDURE — 6360000002 HC RX W HCPCS: Performed by: NURSE PRACTITIONER

## 2019-06-19 PROCEDURE — 0TJB8ZZ INSPECTION OF BLADDER, VIA NATURAL OR ARTIFICIAL OPENING ENDOSCOPIC: ICD-10-PCS | Performed by: UROLOGY

## 2019-06-19 PROCEDURE — 6360000002 HC RX W HCPCS: Performed by: NURSE ANESTHETIST, CERTIFIED REGISTERED

## 2019-06-19 PROCEDURE — 6370000000 HC RX 637 (ALT 250 FOR IP): Performed by: PHYSICIAN ASSISTANT

## 2019-06-19 PROCEDURE — 3600000003 HC SURGERY LEVEL 3 BASE: Performed by: UROLOGY

## 2019-06-19 PROCEDURE — 2580000003 HC RX 258: Performed by: COUNSELOR

## 2019-06-19 PROCEDURE — 3600000013 HC SURGERY LEVEL 3 ADDTL 15MIN: Performed by: UROLOGY

## 2019-06-19 PROCEDURE — 36415 COLL VENOUS BLD VENIPUNCTURE: CPT

## 2019-06-19 PROCEDURE — C1769 GUIDE WIRE: HCPCS | Performed by: UROLOGY

## 2019-06-19 DEVICE — VARIABLE LENGTH URETERAL STENT
Type: IMPLANTABLE DEVICE | Site: URETER | Status: FUNCTIONAL
Brand: CONTOUR VL™

## 2019-06-19 RX ORDER — LABETALOL 20 MG/4 ML (5 MG/ML) INTRAVENOUS SYRINGE
10 EVERY 10 MIN PRN
Status: DISCONTINUED | OUTPATIENT
Start: 2019-06-19 | End: 2019-06-19 | Stop reason: HOSPADM

## 2019-06-19 RX ORDER — ONDANSETRON 2 MG/ML
INJECTION INTRAMUSCULAR; INTRAVENOUS PRN
Status: DISCONTINUED | OUTPATIENT
Start: 2019-06-19 | End: 2019-06-19 | Stop reason: SDUPTHER

## 2019-06-19 RX ORDER — DEXAMETHASONE SODIUM PHOSPHATE 4 MG/ML
INJECTION, SOLUTION INTRA-ARTICULAR; INTRALESIONAL; INTRAMUSCULAR; INTRAVENOUS; SOFT TISSUE PRN
Status: DISCONTINUED | OUTPATIENT
Start: 2019-06-19 | End: 2019-06-19 | Stop reason: SDUPTHER

## 2019-06-19 RX ORDER — SODIUM CHLORIDE 9 MG/ML
INJECTION, SOLUTION INTRAVENOUS CONTINUOUS PRN
Status: DISCONTINUED | OUTPATIENT
Start: 2019-06-19 | End: 2019-06-19 | Stop reason: SDUPTHER

## 2019-06-19 RX ORDER — FENTANYL CITRATE 50 UG/ML
INJECTION, SOLUTION INTRAMUSCULAR; INTRAVENOUS PRN
Status: DISCONTINUED | OUTPATIENT
Start: 2019-06-19 | End: 2019-06-19 | Stop reason: SDUPTHER

## 2019-06-19 RX ORDER — LIDOCAINE HYDROCHLORIDE 20 MG/ML
INJECTION, SOLUTION INTRAVENOUS PRN
Status: DISCONTINUED | OUTPATIENT
Start: 2019-06-19 | End: 2019-06-19 | Stop reason: SDUPTHER

## 2019-06-19 RX ORDER — FENTANYL CITRATE 50 UG/ML
50 INJECTION, SOLUTION INTRAMUSCULAR; INTRAVENOUS EVERY 5 MIN PRN
Status: DISCONTINUED | OUTPATIENT
Start: 2019-06-19 | End: 2019-06-19 | Stop reason: HOSPADM

## 2019-06-19 RX ORDER — PHENYLEPHRINE HYDROCHLORIDE 10 MG/ML
INJECTION INTRAVENOUS PRN
Status: DISCONTINUED | OUTPATIENT
Start: 2019-06-19 | End: 2019-06-19 | Stop reason: SDUPTHER

## 2019-06-19 RX ORDER — MORPHINE SULFATE 2 MG/ML
2 INJECTION, SOLUTION INTRAMUSCULAR; INTRAVENOUS EVERY 5 MIN PRN
Status: DISCONTINUED | OUTPATIENT
Start: 2019-06-19 | End: 2019-06-19 | Stop reason: HOSPADM

## 2019-06-19 RX ORDER — PROPOFOL 10 MG/ML
INJECTION, EMULSION INTRAVENOUS PRN
Status: DISCONTINUED | OUTPATIENT
Start: 2019-06-19 | End: 2019-06-19 | Stop reason: SDUPTHER

## 2019-06-19 RX ADMIN — METOPROLOL TARTRATE 25 MG: 25 TABLET ORAL at 20:51

## 2019-06-19 RX ADMIN — PHENYLEPHRINE HYDROCHLORIDE 100 MCG: 10 INJECTION INTRAVENOUS at 07:40

## 2019-06-19 RX ADMIN — TIZANIDINE 2 MG: 4 TABLET ORAL at 13:01

## 2019-06-19 RX ADMIN — CALCIUM CARBONATE-VITAMIN D TAB 500 MG-200 UNIT 1 TABLET: 500-200 TAB at 09:24

## 2019-06-19 RX ADMIN — MICONAZOLE NITRATE: 20 POWDER TOPICAL at 20:51

## 2019-06-19 RX ADMIN — PHENYLEPHRINE HYDROCHLORIDE 100 MCG: 10 INJECTION INTRAVENOUS at 07:54

## 2019-06-19 RX ADMIN — DEXAMETHASONE SODIUM PHOSPHATE 4 MG: 4 INJECTION, SOLUTION INTRAMUSCULAR; INTRAVENOUS at 07:44

## 2019-06-19 RX ADMIN — OXYBUTYNIN 5 MG: 5 TABLET, FILM COATED, EXTENDED RELEASE ORAL at 09:24

## 2019-06-19 RX ADMIN — SODIUM CHLORIDE: 9 INJECTION, SOLUTION INTRAVENOUS at 07:33

## 2019-06-19 RX ADMIN — TIZANIDINE 2 MG: 4 TABLET ORAL at 09:23

## 2019-06-19 RX ADMIN — CEFTRIAXONE SODIUM 1 G: 1 INJECTION, POWDER, FOR SOLUTION INTRAMUSCULAR; INTRAVENOUS at 13:01

## 2019-06-19 RX ADMIN — FENTANYL CITRATE 25 MCG: 50 INJECTION INTRAMUSCULAR; INTRAVENOUS at 07:49

## 2019-06-19 RX ADMIN — SODIUM CHLORIDE TAB 1 GM 1 G: 1 TAB at 09:24

## 2019-06-19 RX ADMIN — MICONAZOLE NITRATE: 20 POWDER TOPICAL at 09:25

## 2019-06-19 RX ADMIN — OXYBUTYNIN 5 MG: 5 TABLET, FILM COATED, EXTENDED RELEASE ORAL at 20:51

## 2019-06-19 RX ADMIN — FENTANYL CITRATE 25 MCG: 50 INJECTION INTRAMUSCULAR; INTRAVENOUS at 07:45

## 2019-06-19 RX ADMIN — METOPROLOL TARTRATE 25 MG: 25 TABLET ORAL at 09:24

## 2019-06-19 RX ADMIN — THERA TABS 1 TABLET: TAB at 09:24

## 2019-06-19 RX ADMIN — BACITRACIN: 500 OINTMENT TOPICAL at 09:25

## 2019-06-19 RX ADMIN — CALCIUM CARBONATE-VITAMIN D TAB 500 MG-200 UNIT 1 TABLET: 500-200 TAB at 20:51

## 2019-06-19 RX ADMIN — TAMSULOSIN HYDROCHLORIDE 0.4 MG: 0.4 CAPSULE ORAL at 09:23

## 2019-06-19 RX ADMIN — ONDANSETRON HYDROCHLORIDE 4 MG: 4 INJECTION, SOLUTION INTRAMUSCULAR; INTRAVENOUS at 08:15

## 2019-06-19 RX ADMIN — Medication 1 CAPSULE: at 09:24

## 2019-06-19 RX ADMIN — ACETIC ACID: 250 IRRIGANT IRRIGATION at 20:51

## 2019-06-19 RX ADMIN — Medication 10 ML: at 13:01

## 2019-06-19 RX ADMIN — PHENYLEPHRINE HYDROCHLORIDE 100 MCG: 10 INJECTION INTRAVENOUS at 07:46

## 2019-06-19 RX ADMIN — Medication 10 ML: at 20:51

## 2019-06-19 RX ADMIN — ACETIC ACID: 250 IRRIGANT IRRIGATION at 09:25

## 2019-06-19 RX ADMIN — FENTANYL CITRATE 25 MCG: 50 INJECTION INTRAMUSCULAR; INTRAVENOUS at 07:40

## 2019-06-19 RX ADMIN — PROPOFOL 30 MG: 10 INJECTION, EMULSION INTRAVENOUS at 07:50

## 2019-06-19 RX ADMIN — Medication 500 MG: at 09:23

## 2019-06-19 RX ADMIN — FAMOTIDINE 20 MG: 20 TABLET, FILM COATED ORAL at 20:51

## 2019-06-19 RX ADMIN — FENTANYL CITRATE 25 MCG: 50 INJECTION INTRAMUSCULAR; INTRAVENOUS at 07:54

## 2019-06-19 RX ADMIN — VANCOMYCIN HYDROCHLORIDE 1500 MG: 5 INJECTION, POWDER, LYOPHILIZED, FOR SOLUTION INTRAVENOUS at 09:25

## 2019-06-19 RX ADMIN — POTASSIUM CHLORIDE 40 MEQ: 750 TABLET, FILM COATED, EXTENDED RELEASE ORAL at 09:24

## 2019-06-19 RX ADMIN — VITAMIN E CAP 400 UNIT 400 UNITS: 400 CAP at 09:23

## 2019-06-19 RX ADMIN — Medication 10 ML: at 09:25

## 2019-06-19 RX ADMIN — SODIUM CHLORIDE TAB 1 GM 1 G: 1 TAB at 17:01

## 2019-06-19 RX ADMIN — DOCUSATE SODIUM 100 MG: 100 CAPSULE, LIQUID FILLED ORAL at 09:24

## 2019-06-19 RX ADMIN — PROPOFOL 120 MG: 10 INJECTION, EMULSION INTRAVENOUS at 07:40

## 2019-06-19 RX ADMIN — TIZANIDINE 2 MG: 4 TABLET ORAL at 20:52

## 2019-06-19 RX ADMIN — FAMOTIDINE 20 MG: 20 TABLET, FILM COATED ORAL at 09:24

## 2019-06-19 RX ADMIN — LIDOCAINE HYDROCHLORIDE 60 MG: 20 INJECTION, SOLUTION INTRAVENOUS at 07:40

## 2019-06-19 RX ADMIN — Medication 20000 UNITS: at 09:23

## 2019-06-19 ASSESSMENT — PULMONARY FUNCTION TESTS
PIF_VALUE: 3
PIF_VALUE: 1
PIF_VALUE: 4
PIF_VALUE: 3
PIF_VALUE: 3
PIF_VALUE: 4
PIF_VALUE: 21
PIF_VALUE: 4
PIF_VALUE: 4
PIF_VALUE: 1
PIF_VALUE: 4
PIF_VALUE: 3
PIF_VALUE: 3
PIF_VALUE: 1
PIF_VALUE: 3
PIF_VALUE: 4
PIF_VALUE: 5
PIF_VALUE: 1
PIF_VALUE: 4
PIF_VALUE: 3
PIF_VALUE: 3
PIF_VALUE: 0
PIF_VALUE: 2
PIF_VALUE: 3
PIF_VALUE: 4
PIF_VALUE: 26
PIF_VALUE: 5
PIF_VALUE: 4
PIF_VALUE: 3
PIF_VALUE: 4
PIF_VALUE: 2
PIF_VALUE: 1
PIF_VALUE: 1
PIF_VALUE: 17
PIF_VALUE: 0
PIF_VALUE: 2
PIF_VALUE: 0
PIF_VALUE: 3
PIF_VALUE: 3
PIF_VALUE: 4

## 2019-06-19 ASSESSMENT — PAIN SCALES - GENERAL
PAINLEVEL_OUTOF10: 0

## 2019-06-19 ASSESSMENT — PAIN DESCRIPTION - PAIN TYPE: TYPE: SURGICAL PAIN

## 2019-06-19 ASSESSMENT — PAIN DESCRIPTION - LOCATION: LOCATION: PENIS

## 2019-06-19 NOTE — BRIEF OP NOTE
Brief Postoperative Note  ______________________________________________________________    Patient: Chio Wallace  YOB: 1955  MRN: [id number]  Date of Procedure: 6/19/2019    Pre-Op Diagnosis: LEFTURETERAL STONE    Post-Op Diagnosis: Same;BLADDER STONES       Procedure(s):  CYSTOSCOPY,REMOVAL OF BLADDER STONES; LEFT STENT INSERTION    Anesthesia: General    Surgeon(s):  Jacques Osborne MD    Assistant: Donis Gonzalez    Estimated Blood Loss (mL): less than 50     Complications: None    Specimens:   * No specimens in log *    Implants:  Implant Name Type Inv. Item Serial No.  Lot No. LRB No. Used   STENT URET DBL PIGTL MULTI 6FR [id number] Stent:Urological STENT URET DBL PIGTL MULTI 6FR [id number]  [place] [id number] Left 1         Drains:   Colostomy LLQ (Active)   Stomal Appliance 2 piece 6/10/2019 11:00 PM   Stoma  Assessment Moist;Red 6/19/2019  4:25 AM   Peristomal Assessment Clean; Intact 6/19/2019  4:25 AM   Stool Appearance Formed; Soft 6/17/2019  1:26 PM   Stool Color Brown 6/19/2019  4:25 AM   Stool Amount Large 6/16/2019  8:49 AM   Output (mL) 175 ml 6/18/2019  9:12 PM       Suprapubic Catheter Double-lumen 18 fr (Active)   Site Assessment Pink 6/17/2019  8:24 PM   Dressing Status Clean;Dry; Intact 6/17/2019  8:24 PM   Dressing Type Dry dressing 6/10/2019  9:38 PM   Urine Color Yellow 6/18/2019  9:12 PM   Urine Appearance Clear 6/18/2019  9:12 PM   Output (mL) 325 mL 6/19/2019  4:27 AM       Findings: 4 BLADDER STONES; LEFT MID URETERAL STONE    Dat Spence MD  Date: 6/19/2019  Time: 8:08 AM

## 2019-06-19 NOTE — ANESTHESIA PRE PROCEDURE
capsule Take 100 mg by mouth daily    Yes Historical Provider, MD   oxybutynin (DITROPAN-XL) 10 MG extended release tablet Take 5 mg by mouth 2 times daily For bladder spasms   Yes Historical Provider, MD   tiZANidine (ZANAFLEX) 2 MG tablet Take 2 mg by mouth 3 times daily  12/15/16  Yes Historical Provider, MD   ascorbic acid (VITAMIN C) 500 MG tablet Take 1 tablet by mouth daily 3/28/16  Yes Luana Etienne MD   Multiple Vitamin (MULTIVITAMIN) tablet Take 1 tablet by mouth daily 3/28/16  Yes Luana Etienne MD   vitamin A 23241 UNITS capsule Take 2 capsules by mouth daily 3/28/16  Yes uLana Etienne MD   vitamin E 400 UNIT capsule Take 1 capsule by mouth daily 3/28/16  Yes Luana Etienne MD   Acetaminophen (TYLENOL) 325 MG CAPS Take 325 mg by mouth Give 2 tablets by mouth every 4 hours as needed for Temp 100 or above    Historical Provider, MD   vitamin D (ERGOCALCIFEROL) 16993 units capsule Take 1 capsule by mouth once a week for 8 doses 11/23/18 1/12/19  Aidee Hurley MD       Current medications:    Current Facility-Administered Medications   Medication Dose Route Frequency Provider Last Rate Last Dose    potassium chloride (KLOR-CON) extended release tablet 40 mEq  40 mEq Oral Daily [employer], PA-C   40 mEq at 06/18/19 1029    tamsulosin (FLOMAX) capsule 0.4 mg  0.4 mg Oral Daily TONO Malik CNP   0.4 mg at 06/18/19 1404    vancomycin (VANCOCIN) 1,500 mg in dextrose 5 % 500 mL IVPB  1,500 mg Intravenous Q18H Kartik Dumas RPH   Stopped at 06/18/19 1615    miconazole (MICOTIN) 2 % powder   Topical BID Aidee Hurley MD        metoprolol tartrate (LOPRESSOR) tablet 25 mg  25 mg Oral BID Luther Severs, MD   25 mg at 06/18/19 2039    sodium chloride tablet 1 g  1 g Oral BID  TANYA HardinC   1 g at 06/18/19 1625    vancomycin (VANCOCIN) intermittent dosing (placeholder)   Other RX Placeholder TONO Garcia CNP        phosphorus replacement protocol   Other RX Placeholder TONO Singh CNP        ondansetron (ZOFRAN) injection 4 mg  4 mg Intravenous Q6H PRN TONO Peraza CNP        cefTRIAXone (ROCEPHIN) 1 g IVPB in 50 mL D5W minibag  1 g Intravenous Q24H TONO Singh CNP   Stopped at 06/18/19 1212    sodium chloride flush 0.9 % injection 10 mL  10 mL Intravenous 2 times per day Rolin Frances, DO   10 mL at 06/18/19 2039    sodium chloride flush 0.9 % injection 10 mL  10 mL Intravenous PRN Rolin Frances, DO   10 mL at 06/17/19 1715    acetaminophen (TYLENOL) tablet 650 mg  650 mg Oral Q4H PRN Rolin Frances, DO   650 mg at 06/17/19 2013    acetic acid 0.25 % irrigation   Irrigation BID Rolin Frances, DO        vitamin C (ASCORBIC ACID) tablet 500 mg  500 mg Oral Daily Rolin Frances, DO   500 mg at 06/18/19 1032    bacitracin ointment   Topical Daily Rolin Frances, DO        calcium-vitamin D (OSCAL-500) 500-200 MG-UNIT per tablet 1 tablet  1 tablet Oral BID Rolin Frances, DO   1 tablet at 06/18/19 2039    docusate sodium (COLACE) capsule 100 mg  100 mg Oral Daily Rolin Frances, DO   100 mg at 06/18/19 1029    famotidine (PEPCID) tablet 20 mg  20 mg Oral BID Rolin Frances, DO   20 mg at 06/18/19 2039    multivitamin 1 tablet  1 tablet Oral Daily Rolin Frances, DO   1 tablet at 06/18/19 1032    oxybutynin (DITROPAN-XL) extended release tablet 5 mg  5 mg Oral BID Rolin Frances, DO   5 mg at 06/18/19 2039    lactobacillus (CULTURELLE) capsule 1 capsule  1 capsule Oral Daily Rolin Frances, DO   1 capsule at 06/18/19 1032    vitamin E capsule 400 Units  400 Units Oral Daily Rolin Frances, DO   400 Units at 06/18/19 1032    vitamin A capsule 20,000 Units  20,000 Units Oral Daily Rolin Frances, DO   20,000 Units at 06/18/19 1031    tiZANidine (ZANAFLEX) tablet 2 mg  2 mg Oral TID Rolin Frances, DO   2 mg at 06/18/19 2039       Allergies:  No Known  Neurogenic bladder 2012    Dr. Michelle Ash placed cather    Osteomyelitis [facility])     UTI (urinary tract infection)        Past Surgical History:        Procedure Laterality Date    ANKLE SURGERY      broken ankle    BLADDER SURGERY  2012    Suprapubic catheter placement    COLONOSCOPY      RI LAP,SURG,COLECTOMY,W/END COLOST & CLOSUR N/A 2018    ROBOT DIVERTING COLOSTOMY performed by Sabina Hodges MD at [facility]       Social History:    Social History     Tobacco Use    Smoking status: Former Smoker     Last attempt to quit: 1982     Years since quittin.4    Smokeless tobacco: Former User   Substance Use Topics    Alcohol use: No     Comment: \"quit alcohol a few years ago\"                                Counseling given: Not Answered      Vital Signs (Current):   Vitals:    19 1641 19 2030 19 2324 19 0422   BP: (!) 116/55 (!) 164/75 123/65 139/78   Pulse: 96 106 91 102   Resp:    Temp: 98.4 °F (36.9 °C) 98.6 °F (37 °C) 98.3 °F (36.8 °C) 98.7 °F (37.1 °C)   TempSrc: Oral Oral Oral Oral   SpO2: 94% 94% 92% 91%   Weight:       Height:                                                  BP Readings from Last 3 Encounters:   19 139/78   19 (!) 168/85   19 (!) 146/65       NPO Status:                                                                                 BMI:   Wt Readings from Last 3 Encounters:   06/15/19 209 lb 11.2 oz (95.1 kg)   19 204 lb 12.8 oz (92.9 kg)   19 206 lb 6.4 oz (93.6 kg)     Body mass index is 32.84 kg/m².     CBC:   Lab Results   Component Value Date    WBC 10.0 2019    RBC 2.98 2019    RBC 4.20 2012    HGB 8.6 2019    HCT 26.6 2019    MCV 89.3 2019    RDW 17.8 2019     2019       CMP:   Lab Results   Component Value Date     2019    K 4.3 2019    K 3.5 06/15/2019     2019    CO2 23 06/19/2019    BUN 10 06/19/2019    CREATININE 0.7 06/19/2019    LABGLOM >90 06/19/2019    GLUCOSE 117 06/19/2019    GLUCOSE 85 01/20/2012    PROT 6.5 06/14/2019    CALCIUM 8.7 06/19/2019    BILITOT 0.7 06/14/2019    ALKPHOS 386 06/14/2019    AST 41 06/14/2019    ALT 62 06/14/2019       POC Tests: No results for input(s): POCGLU, POCNA, POCK, POCCL, POCBUN, POCHEMO, POCHCT in the last 72 hours. Coags:   Lab Results   Component Value Date    INR 1.37 06/11/2019    APTT 33.7 06/11/2019       HCG (If Applicable): No results found for: PREGTESTUR, PREGSERUM, HCG, HCGQUANT     ABGs: No results found for: PHART, PO2ART, QTA7INV, NBH1JCB, BEART, U2XMUZTT     Type & Screen (If Applicable):  Lab Results   Component Value Date    LABRH POS 06/12/2018       Anesthesia Evaluation    Airway: Mallampati: II  TM distance: >3 FB   Neck ROM: full  Mouth opening: > = 3 FB Dental:          Pulmonary:   (+) decreased breath sounds,                             Cardiovascular:    (+) hypertension:,         Rhythm: regular                      Neuro/Psych:   (+) neuromuscular disease: multiple sclerosis, psychiatric history:            GI/Hepatic/Renal:   (+) morbid obesity          Endo/Other:                     Abdominal:   (+) obese,         Vascular:                                        Anesthesia Plan      general     ASA 4     (Code status discussed full code)  Induction: intravenous. MIPS: Postoperative opioids intended and Prophylactic antiemetics administered. Anesthetic plan and risks discussed with patient. Plan discussed with CRNA.                   Annita Herbert MD   6/19/2019

## 2019-06-19 NOTE — PROGRESS NOTES
Hospitalist Progress Note    Patient:  Kaur Piña      Unit/Bed:3B-31/031-A    YOB: 1955    MRN: [id number]       Acct: [de-identified]     PCP: Meaghan Lynn MD    Date of Admission: 6/10/2019    Reason for admission: SEPSIS DUE TO UTI    Expected discharge date:  ? Disposition: SNF    Hospital Course: pt with multiple sclerosis, bed to chair, presented with fever; urine growing 2 pathogens. Has hydronephrosis left, probable etiol for recurrent fever. To have cysto today. In the Last 24 hours: No new events in the past 24 hours.       Medications:  Reviewed    Infusion Medications   Scheduled Medications    potassium chloride  40 mEq Oral Daily    tamsulosin  0.4 mg Oral Daily    vancomycin  1,500 mg Intravenous Q18H    miconazole   Topical BID    metoprolol tartrate  25 mg Oral BID    sodium chloride  1 g Oral BID WC    vancomycin (VANCOCIN) intermittent dosing (placeholder)   Other RX Placeholder    phosphorus replacement protocol   Other RX Placeholder    cefTRIAXone (ROCEPHIN) IV  1 g Intravenous Q24H    sodium chloride flush  10 mL Intravenous 2 times per day    acetic acid   Irrigation BID    vitamin C  500 mg Oral Daily    bacitracin   Topical Daily    calcium-vitamin D  1 tablet Oral BID    docusate sodium  100 mg Oral Daily    famotidine  20 mg Oral BID    multivitamin  1 tablet Oral Daily    oxybutynin  5 mg Oral BID    lactobacillus  1 capsule Oral Daily    vitamin E  400 Units Oral Daily    vitamin A  20,000 Units Oral Daily    tiZANidine  2 mg Oral TID     PRN Meds: ondansetron, sodium chloride flush, acetaminophen      Intake/Output Summary (Last 24 hours) at 6/19/2019 0552  Last data filed at 6/19/2019 0427  Gross per 24 hour   Intake 1091.04 ml   Output 1800 ml   Net -708.96 ml       Diet:  Diet NPO, After Midnight    Exam:  /78   Pulse 102   Temp 98.7 °F (37.1 °C) (Oral)   Resp 22   Ht 5' 7\" (1.702 m)   Wt 209 lb 11.2 oz (95.1 kg)   SpO2 91%   BMI 32.84 kg/m²     Physical Examination: General appearance - chronically ill appearing  Mental status - alert, oriented to person, place, and time  Neck - supple, no significant adenopathy, no JVD, or carotid bruits  Chest - clear to auscultation, no wheezes, rales or rhonchi, symmetric air entry  Heart - no JVD, tachycardic  Abdomen - soft, nontender, nondistended, no masses or organomegaly  Suprapubic noted  Neurological - screening mental status exam normal, limited movement legs  Musculoskeletal - full range of motion without pain  Extremities - no pedal edema noted  Skin - has sacral decub, poa, not examined as it is packed    Labs:   Recent Labs     06/17/19  0421 06/18/19  0340   WBC 13.0* 10.0   HGB 8.3* 8.6*   HCT 26.5* 26.6*    377     Recent Labs     06/17/19  0421 06/18/19  0340 06/19/19  0401   * 133* 137   K 3.8 3.3* 4.3    99 102   CO2 20* 24 23   BUN 10 9 10   CREATININE 0.7 0.6 0.7   CALCIUM 8.1* 8.4* 8.7     No results for input(s): AST, ALT, BILIDIR, BILITOT, ALKPHOS in the last 72 hours. No results for input(s): INR in the last 72 hours. No results for input(s): Jen Sky in the last 72 hours. Microbiology:      Urinalysis:      Lab Results   Component Value Date    NITRU NEGATIVE 06/17/2019    WBCUA > 200 06/17/2019    WBCUA >200 01/20/2012    BACTERIA FEW 06/17/2019    RBCUA 09947 06/17/2019    BLOODU MODERATE 06/17/2019    SPECGRAV 1.019 11/13/2018    GLUCOSEU NEGATIVE 06/17/2019       Radiology:  Ct Abdomen Pelvis Wo Contrast Additional Contrast? None    Result Date: 6/18/2019  PROCEDURE: CT ABDOMEN PELVIS WO CONTRAST CLINICAL INFORMATION: INFECTION, ABDOMEN-PELVIS, Hx bladder stones . COMPARISON: November 13, 2018 TECHNIQUE: Axial 5 mm CT images were obtained through the abdomen and pelvis. No contrast was given. Coronal reconstructions were obtained.  All CT scans at this facility use dose modulation, iterative reconstruction, and/or software. It may contain minor errors which are inherent in voice recognition technology. ** Final report electronically signed by Dr. Tana Alexander on 6/18/2019 3:48 AM    Ct Head Wo Contrast    Result Date: 6/10/2019  PROCEDURE: CT HEAD WO CONTRAST CLINICAL INFORMATION: syncopal episode. COMPARISON: November 13, 2018 TECHNIQUE: Noncontrast 5 mm axial images were obtained through the brain. All CT scans at this facility use dose modulation, iterative reconstruction, and/or weight-based dosing when appropriate to reduce radiation dose to as low as reasonably achievable. FINDINGS: No acute intracranial findings. Generalized volume loss and patchy white matter changes which may be related to underlying demyelinating process or small vessel ischemic disease. Findings likely in keeping with known provided history of multiple sclerosis. There is no acute hemorrhage or midline shift. Ventricles are within normal limits for age. Intracranial vascular calcifications. . Paranasal sinuses are clear. Mastoid air cells are patent. Skull: Unremarkable. Soft tissues: Unremarkable. No acute intracranial findings. **This report has been created using voice recognition software. It may contain minor errors which are inherent in voice recognition technology. ** Final report electronically signed by Dr. Dc Rm on 6/10/2019 2:24 PM    Cta Chest W Wo Contrast    Result Date: 6/10/2019  PROCEDURE: CTA CHEST W WO CONTRAST CLINICAL INFORMATION: syncopal episode, history of PE. COMPARISON: No prior study. TECHNIQUE: 1.5 mm axial images were obtained through the chest after the administration of IV contrast.  A non-contrast localizer was obtained. 3D reconstructions were performed on the scanner to include sagittal and bilateral oblique images through the  chest. 80 mL Isovue-370 were administered intravenously.  All CT scans at this facility use dose modulation, iterative reconstruction, and/or weight-based dosing when appropriate to reduce radiation dose to as low as reasonably achievable. FINDINGS: Thyroid gland is unremarkable. Thoracic aorta is normal caliber. The pulmonary arterial vessels are adequately opacified. There is no acute pulmonary arterial embolism. No lymphadenopathy. No cardiomegaly. Patchy atelectasis left lung base. No pleural or pericardial effusion. No acute osseous findings. Osteopenia and degenerative changes thoracic spine. Upper abdominal images are unremarkable. No acute pulmonary embolism. Mild atelectasis in the left lung base. **This report has been created using voice recognition software. It may contain minor errors which are inherent in voice recognition technology. ** Final report electronically signed by Dr. Carter Wilson on 6/10/2019 2:32 PM    Xr Chest Portable    Result Date: 6/17/2019  PROCEDURE: XR CHEST PORTABLE CLINICAL INFORMATION: persistent fever COMPARISON: 6/13/2019 TECHNIQUE: A single mobile view of the chest was obtained. 1. Poor inflation lungs. Mild cardiomegaly. 2. Mild left basilar atelectasis/pneumonia. No effusion. 3. Overall appearance of chest slightly worse than prior. **This report has been created using voice recognition software. It may contain minor errors which are inherent in voice recognition technology. ** Final report electronically signed by Dr. Ileana Pretty on 6/17/2019 9:24 AM    Xr Chest Portable    Result Date: 6/13/2019  PROCEDURE: XR CHEST PORTABLE CLINICAL INFORMATION: shortness of breath; fever. COMPARISON: Chest x-ray dated 2/10/2019 TECHNIQUE: AP Portable chest xray FINDINGS: Lines/tubes/devices: none Lungs/pleura: There is stable mild elevation of the left hemidiaphragm with mild left basilar atelectasis. No pneumonia, pulmonary edema, or obvious mass. No pleural effusion. No pneumothorax. Heart: There is borderline cardiomegaly. Mediastinum/grady: No obvious mass or adenopathy. Skeleton: No significant bone or joint abnormality.      No acute pneumonia or pulmonary edema. Stable mild elevation of the left hemidiaphragm with mild left basilar atelectasis. **This report has been created using voice recognition software. It may contain minor errors which are inherent in voice recognition technology. ** Final report electronically signed by Dr. Lary Baldwin on 6/13/2019 7:01 AM    Mri Brain W Wo Contrast    Result Date: 6/11/2019  PROCEDURE: MRI BRAIN W WO CONTRAST CLINICAL INFORMATIONCONFUSION/DELIRIUM, ALTERED LOC, UNEXPLAINED, multiple slcerosis. Altered mental status. History of multiple sclerosis. COMPARISON: Brain MRI 1/20/2018. Head CT 6/10/2019. TECHNIQUE: Multiplanar and multiple spin echo T1 and T2-weighted images were obtained through the brain before and after the administration of intravenous contrast. FINDINGS: Motion artifact does degrade the quality of some of these images. These are the best images possible at this time. The diffusion-weighted images are normal. The brain volume is moderately reduced. There are no intra-or extra-axial collections. There is no hydrocephalus, midline shift or mass effect. On the FLAIR and T2-weighted sequences, there is a moderate amount of abnormal signal in the deep white matter of the brain. Many of these radiate outward from the ventricular margins. This also involves the white matter of the temporal lobes. The patient has a history of multiple sclerosis. This is stable. There are stable small old lacunar type infarcts in the right basal ganglia. There is an old cortical infarct with associated volume loss in the posterior right frontal lobe. On the gradient echo T2-weighted images, there is no susceptibility artifact present. There is no abnormal enhancement in the brain. The major intracranial vascular flow voids are present. The midline craniocervical junction structures are normal.  The brainstem and pituitary gland are normal.      1. No evidence of an acute infarct or active demyelination.  2. 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 3. Moderate amount of abnormal signal in the white matter of the brain consistent with multiple sclerosis and/or chronic small vessel ischemic changes. **This report has been created using voice recognition software. It may contain minor errors which are inherent in voice recognition technology. ** Final report electronically signed by Dr. Sandra Ivory on 6/11/2019 10:37 AM       DVT prophylaxis: Already on Anticoagulation     Code Status: Full Code    PT/OT Eval Status: na  Tele:  Yes    Assessment/Plan:    Active Hospital Problems    Diagnosis Date Noted    Sepsis associated hypotension ([address] [A41.9, I95.9]     Persistent fever [R50.9]     Ureteral stone with hydronephrosis [N13.2]     Hypotension [I95.9]     Syncope and collapse [R55]     Electrolyte imbalance [E87.8] 06/10/2019    Elevated transaminase level [R74.0] 06/10/2019    Anemia [D64.9] 06/10/2019    UTI (urinary tract infection) [N39.0] 06/10/2019    Sepsis secondary to UTI ([address] [A41.9, N39.0] 02/10/2019    Sepsis ([address] [A41.9]     Chronic depression [F32.9]     Essential hypertension [I10]     History of pulmonary embolism [Z86.711]     Dehydration [E86.0]     Acute metabolic encephalopathy [E29.43] 01/19/2018    Malnutrition ([address] [E46] 12/17/2012    Multiple sclerosis ([address] Ruthine Omayra 02/14/2012       1.  Sepsis due to uti- cysto today        Electronically signed by Patricia Lloyd MD on 6/19/2019 at 5:52 AM

## 2019-06-19 NOTE — CARE COORDINATION
6/19/19, 10:37 AM      [facility] day: 9  Location: -31/031-A Reason for admit: Sepsis [facility]) [A41.9]   Procedure: 6/13 Replaced suprapubic cath  6/19 Cystoscopy, removal of bladder stones, left stent insertion  Treatment Plan of Care: Hospitalist, ID and Urology following. No fever overnight (first night since admission). IV Rocephin, iv Vanc. PCP: Cm Oconnell MD  Readmission Risk Score: 23%  Discharge Plan: Planning return to [facility] when medically stable and fevers resolve. [place] eval if fevers persist after surgery.

## 2019-06-19 NOTE — ANESTHESIA POSTPROCEDURE EVALUATION
Department of Anesthesiology  Postprocedure Note    Patient: Armand Mckeon  MRN: [id number]  YOB: 1955  Date of evaluation: 6/19/2019  Time:  9:33 AM     Procedure Summary     Date:  06/19/19 Room / Location:  [facility] / Nursery [facility]    Anesthesia Start:  0678 Anesthesia Stop:  Edmond Collins    Procedure:  CYSTOSCOPY, LEFT STENT insertion, bladder irragation with bladder stones (Left ) Diagnosis:  (URETERAL STONE)    Surgeon:  Lisa Maloney MD Responsible Provider:  Jelly Stevens MD    Anesthesia Type:  general ASA Status:  4          Anesthesia Type: general    Quinn Phase I: Quinn Score: 9    Quinn Phase II:      Last vitals: Reviewed and per EMR flowsheets.        Anesthesia Post Evaluation    Patient location during evaluation: PACU  Patient participation: complete - patient participated  Level of consciousness: awake  Airway patency: patent  Nausea & Vomiting: no vomiting and no nausea  Complications: no  Cardiovascular status: hemodynamically stable  Respiratory status: acceptable and nasal cannula  Hydration status: stable

## 2019-06-19 NOTE — OP NOTE
[address] [id number]                                OPERATIVE REPORT    PATIENT NAME: Rufus Rangel                   :        1955  MED REC NO:   [id number]                           ROOM:       [id number]  ACCOUNT NO:   [de-identified]                           ADMIT DATE: 06/10/2019  PROVIDER:     ALBINO Taylor OF PROCEDURE:  2019    PREOPERATIVE DIAGNOSES:  Left proximal ureteral calculus. POSTOPERATIVE DIAGNOSES:  1. Left mid ureteral calculus. 2.  Bladder calculi. 3.  Phimosis. PROCEDURES PERFORMED:  1. Cystoscopy. 2.  Evacuation of bladder calculi. 3.  Insertion of 6-Azerbaijani multi-length double-J stent. ANESTHESIA:  General.    SURGEON:  Samuel Dunaway MD    INDICATIONS:  The patient is a 44-year-old white male who has multiple  sclerosis. He was recently found to have bacteremia. During his  treatment, he continued to have fevers and positive cultures. CT scan  of the abdomen and pelvis without contrast was obtained on 2019,  which shows a proximal left ureteral calculus with hydronephrosis  proximal to this. The stone measures approximately 3 x 2 mm. There are  also bilateral renal calculi. Based on the above, I recommended  placement of a double J-stent until his infection can be completely  eradicated, at which time definitive therapy of the ureteral calculus  will be undertaken. I discussed the procedure namely cystoscopy with  stent insertion with the patient. He understands the procedure, common  side effects, potential complications, follow up, etc.  He agrees to  proceed. OPERATIVE NOTE:  After informed consent was signed and the patient  properly identified, the patient was taken to the operating room and  placed on the operating room table in supine position where he was given  general anesthesia. LMA was used for an airway.   He was placed in the  dorsal lithotomy position. Suprapubic tube that is in place was clamped  with a Yaima clamp. Genitalia were prepped and draped in the usual  sterile manner. The patient does have moderate phimosis. The foreskin  was retracted enough to see the urethral meatus. A 22-Cypriot cystoscope  sheath and 30-degree lens were used to inspect the urethra. The  anterior urethra appears normal.  Passing through the membranous  urethra, prostatic urethra is nonobstructing. The bladder was entered  and inspected circumferentially. The suprapubic tube is seen at the  dome of the bladder. The bladder mucosa appears mildly erythematous,  scattered throughout the bladder due to catheter reaction and urinary  tract infection. Trigone appears normal with ureteral orifices in their  normal position. Fluoroscopy was positioned over the patient. As I  looked around the floor of the bladder, four small stones, the largest  being approximately 6 to 7 mm were seen in the bladder. The stones were  irrigated through the cystoscopic sheath and removed from the patient. Next, attempts to pass a 0.035-inch dual flex wire into the distal  ureter were unsuccessful. As such, a 5-Cypriot Pollack catheter with an  angled ZIPwire _____ inches in diameter were passed through the working  channel of the cystoscope, cannulated the left ureteral orifice and with  fluoroscopic guidance and a torque device to manipulate the wire. The  wire went around the tortous left distal ureter and then in the mid  ureter, the light calcification consistent with the stone was seen, and  manipulation of the ZIPwire allowed passage around the stone into the  renal pelvis, followed by the 5-Cypriot open-ended ureteral catheter. The ZIPwire was then exchanged for the dual flex wire. The open-end  ureteral catheter was removed.   A 6-Cypriot multi-length double J-stent  was then advanced over the wire and under fluoroscopic guidance was  positioned in the left renal pelvis. The pusher was used to advance the  stent to the level of the bladder neck at which time the wire was  removed. Excellent curling of the stent was noted proximally in the  renal pelvis and distally in the bladder. The bladder was drained. The  scope and sheaths were removed. The clamp was removed from the SP tube. This completed the procedure. The patient tolerated the procedure well  with no apparent complications. No blood loss is noted. The patient  returned to PACU.         Demetrice Robertson M.D.    D: 06/19/2019 8:18:13       T: 06/19/2019 9:02:22     [email or web address]  Job#: [id number]     Doc#: [id number]    CC:

## 2019-06-20 ENCOUNTER — APPOINTMENT (OUTPATIENT)
Dept: GENERAL RADIOLOGY | Age: 64
DRG: 662 | End: 2019-06-20
Payer: COMMERCIAL

## 2019-06-20 ENCOUNTER — TELEPHONE (OUTPATIENT)
Dept: UROLOGY | Age: 64
End: 2019-06-20

## 2019-06-20 VITALS
RESPIRATION RATE: 18 BRPM | SYSTOLIC BLOOD PRESSURE: 135 MMHG | DIASTOLIC BLOOD PRESSURE: 71 MMHG | HEIGHT: 67 IN | BODY MASS INDEX: 32.91 KG/M2 | OXYGEN SATURATION: 95 % | WEIGHT: 209.7 LBS | HEART RATE: 83 BPM | TEMPERATURE: 98.3 F

## 2019-06-20 LAB — VANCOMYCIN TROUGH: 17.6 UG/ML (ref 5–15)

## 2019-06-20 PROCEDURE — 36568 INSJ PICC <5 YR W/O IMAGING: CPT

## 2019-06-20 PROCEDURE — 6370000000 HC RX 637 (ALT 250 FOR IP): Performed by: INTERNAL MEDICINE

## 2019-06-20 PROCEDURE — 2580000003 HC RX 258: Performed by: INTERNAL MEDICINE

## 2019-06-20 PROCEDURE — 6360000002 HC RX W HCPCS: Performed by: COUNSELOR

## 2019-06-20 PROCEDURE — 6360000002 HC RX W HCPCS: Performed by: NURSE PRACTITIONER

## 2019-06-20 PROCEDURE — 6370000000 HC RX 637 (ALT 250 FOR IP): Performed by: PHYSICIAN ASSISTANT

## 2019-06-20 PROCEDURE — 80202 ASSAY OF VANCOMYCIN: CPT

## 2019-06-20 PROCEDURE — 2580000003 HC RX 258: Performed by: NURSE PRACTITIONER

## 2019-06-20 PROCEDURE — 36415 COLL VENOUS BLD VENIPUNCTURE: CPT

## 2019-06-20 PROCEDURE — 71045 X-RAY EXAM CHEST 1 VIEW: CPT

## 2019-06-20 PROCEDURE — 2580000003 HC RX 258: Performed by: COUNSELOR

## 2019-06-20 PROCEDURE — 76937 US GUIDE VASCULAR ACCESS: CPT

## 2019-06-20 PROCEDURE — 6360000002 HC RX W HCPCS: Performed by: INTERNAL MEDICINE

## 2019-06-20 PROCEDURE — 99232 SBSQ HOSP IP/OBS MODERATE 35: CPT | Performed by: NURSE PRACTITIONER

## 2019-06-20 PROCEDURE — 99238 HOSP IP/OBS DSCHRG MGMT 30/<: CPT | Performed by: INTERNAL MEDICINE

## 2019-06-20 PROCEDURE — 2709999900 HC NON-CHARGEABLE SUPPLY

## 2019-06-20 PROCEDURE — C1751 CATH, INF, PER/CENT/MIDLINE: HCPCS

## 2019-06-20 PROCEDURE — 6370000000 HC RX 637 (ALT 250 FOR IP): Performed by: NURSE PRACTITIONER

## 2019-06-20 RX ORDER — SODIUM CHLORIDE 0.9 % (FLUSH) 0.9 %
10 SYRINGE (ML) INJECTION EVERY 12 HOURS SCHEDULED
Status: DISCONTINUED | OUTPATIENT
Start: 2019-06-20 | End: 2019-06-20 | Stop reason: SDUPTHER

## 2019-06-20 RX ORDER — SODIUM CHLORIDE 0.9 % (FLUSH) 0.9 %
10 SYRINGE (ML) INJECTION PRN
Status: DISCONTINUED | OUTPATIENT
Start: 2019-06-20 | End: 2019-06-20 | Stop reason: SDUPTHER

## 2019-06-20 RX ORDER — SODIUM CHLORIDE 1000 MG
1 TABLET, SOLUBLE MISCELLANEOUS 2 TIMES DAILY WITH MEALS
Qty: 90 TABLET | Refills: 3 | DISCHARGE
Start: 2019-06-20

## 2019-06-20 RX ORDER — LIDOCAINE HYDROCHLORIDE 10 MG/ML
5 INJECTION, SOLUTION EPIDURAL; INFILTRATION; INTRACAUDAL; PERINEURAL ONCE
Status: DISCONTINUED | OUTPATIENT
Start: 2019-06-20 | End: 2019-06-20 | Stop reason: HOSPADM

## 2019-06-20 RX ORDER — POTASSIUM CHLORIDE 1500 MG/1
40 TABLET, FILM COATED, EXTENDED RELEASE ORAL DAILY
Qty: 60 TABLET | Refills: 3 | Status: ON HOLD | DISCHARGE
Start: 2019-06-21 | End: 2022-07-27 | Stop reason: HOSPADM

## 2019-06-20 RX ADMIN — TAMSULOSIN HYDROCHLORIDE 0.4 MG: 0.4 CAPSULE ORAL at 09:27

## 2019-06-20 RX ADMIN — Medication 10 ML: at 09:54

## 2019-06-20 RX ADMIN — SODIUM CHLORIDE TAB 1 GM 1 G: 1 TAB at 17:37

## 2019-06-20 RX ADMIN — RIVAROXABAN 10 MG: 10 TABLET, FILM COATED ORAL at 09:38

## 2019-06-20 RX ADMIN — BACITRACIN: 500 OINTMENT TOPICAL at 09:23

## 2019-06-20 RX ADMIN — Medication 1 CAPSULE: at 09:27

## 2019-06-20 RX ADMIN — VANCOMYCIN HYDROCHLORIDE 1500 MG: 5 INJECTION, POWDER, LYOPHILIZED, FOR SOLUTION INTRAVENOUS at 03:41

## 2019-06-20 RX ADMIN — ACETIC ACID: 250 IRRIGANT IRRIGATION at 09:23

## 2019-06-20 RX ADMIN — METOPROLOL TARTRATE 25 MG: 25 TABLET ORAL at 09:28

## 2019-06-20 RX ADMIN — TIZANIDINE 2 MG: 4 TABLET ORAL at 16:07

## 2019-06-20 RX ADMIN — TIZANIDINE 2 MG: 4 TABLET ORAL at 09:28

## 2019-06-20 RX ADMIN — POTASSIUM CHLORIDE 40 MEQ: 750 TABLET, FILM COATED, EXTENDED RELEASE ORAL at 09:28

## 2019-06-20 RX ADMIN — THERA TABS 1 TABLET: TAB at 09:28

## 2019-06-20 RX ADMIN — Medication 500 MG: at 09:27

## 2019-06-20 RX ADMIN — CEFEPIME HYDROCHLORIDE 2 G: 2 INJECTION, POWDER, FOR SOLUTION INTRAVENOUS at 17:39

## 2019-06-20 RX ADMIN — Medication 20000 UNITS: at 09:27

## 2019-06-20 RX ADMIN — DOCUSATE SODIUM 100 MG: 100 CAPSULE, LIQUID FILLED ORAL at 09:28

## 2019-06-20 RX ADMIN — VITAMIN E CAP 400 UNIT 400 UNITS: 400 CAP at 09:27

## 2019-06-20 RX ADMIN — FAMOTIDINE 20 MG: 20 TABLET, FILM COATED ORAL at 09:28

## 2019-06-20 RX ADMIN — Medication 10 ML: at 16:03

## 2019-06-20 RX ADMIN — CEFTRIAXONE SODIUM 1 G: 1 INJECTION, POWDER, FOR SOLUTION INTRAMUSCULAR; INTRAVENOUS at 16:02

## 2019-06-20 RX ADMIN — Medication 10 ML: at 17:40

## 2019-06-20 RX ADMIN — SODIUM CHLORIDE TAB 1 GM 1 G: 1 TAB at 09:27

## 2019-06-20 RX ADMIN — CALCIUM CARBONATE-VITAMIN D TAB 500 MG-200 UNIT 1 TABLET: 500-200 TAB at 09:28

## 2019-06-20 RX ADMIN — MICONAZOLE NITRATE: 20 POWDER TOPICAL at 09:27

## 2019-06-20 NOTE — PROGRESS NOTES
Hospitalist Progress Note    Patient:  Lena Living      Unit/Bed:3B-31/031-A    YOB: 1955    MRN: [id number]       Acct: [de-identified]     PCP: Abby Keller MD    Date of Admission: 6/10/2019    Reason for admission: SEPSIS DUE TO UTI    Expected discharge date:  ? Disposition: SNF    Hospital Course: pt with multiple sclerosis, bed to chair, presented with fever; urine growing 2 pathogens. Has hydronephrosis left, probable etiol for recurrent fever.   Had cysto 6.19, bladder stones evacuated, stent left urieter  In the Last 24 hours: See above        Medications:  Reviewed    Infusion Medications   Scheduled Medications    rivaroxaban  10 mg Oral Daily    potassium chloride  40 mEq Oral Daily    tamsulosin  0.4 mg Oral Daily    vancomycin  1,500 mg Intravenous Q18H    miconazole   Topical BID    metoprolol tartrate  25 mg Oral BID    sodium chloride  1 g Oral BID WC    vancomycin (VANCOCIN) intermittent dosing (placeholder)   Other RX Placeholder    phosphorus replacement protocol   Other RX Placeholder    cefTRIAXone (ROCEPHIN) IV  1 g Intravenous Q24H    sodium chloride flush  10 mL Intravenous 2 times per day    acetic acid   Irrigation BID    vitamin C  500 mg Oral Daily    bacitracin   Topical Daily    calcium-vitamin D  1 tablet Oral BID    docusate sodium  100 mg Oral Daily    famotidine  20 mg Oral BID    multivitamin  1 tablet Oral Daily    lactobacillus  1 capsule Oral Daily    vitamin E  400 Units Oral Daily    vitamin A  20,000 Units Oral Daily    tiZANidine  2 mg Oral TID     PRN Meds: ondansetron, sodium chloride flush, acetaminophen      Intake/Output Summary (Last 24 hours) at 6/20/2019 0525  Last data filed at 6/19/2019 2031  Gross per 24 hour   Intake 2005 ml   Output 1720 ml   Net 285 ml       Diet:  DIET GENERAL;    Exam:  BP (!) 149/71   Pulse 81   Temp 97.9 °F (36.6 °C) (Oral)   Resp 20   Ht 5' 7\" (1.702 m)   Wt 209 lb 11.2 oz (95.1 kg)   SpO2 93%   BMI 32.84 kg/m²     Physical Examination: General appearance - chronically ill appearing  Mental status - alert, oriented to person, place, and time  Neck - supple, no significant adenopathy, no JVD, or carotid bruits  Chest - clear to auscultation, no wheezes, rales or rhonchi, symmetric air entry  Heart - no JVD, tachycardic  Abdomen - soft, nontender, nondistended, no masses or organomegaly  Suprapubic noted  Neurological - screening mental status exam normal, limited movement legs  Musculoskeletal - full range of motion without pain  Extremities - no pedal edema noted  Skin - has sacral decub, poa, not examined as it is packed    Labs:   Recent Labs     06/18/19  0340   WBC 10.0   HGB 8.6*   HCT 26.6*        Recent Labs     06/18/19  0340 06/19/19  0401   * 137   K 3.3* 4.3   CL 99 102   CO2 24 23   BUN 9 10   CREATININE 0.6 0.7   CALCIUM 8.4* 8.7     No results for input(s): AST, ALT, BILIDIR, BILITOT, ALKPHOS in the last 72 hours. No results for input(s): INR in the last 72 hours. No results for input(s): Friant Reasoner in the last 72 hours. Microbiology:      Urinalysis:      Lab Results   Component Value Date    NITRU NEGATIVE 06/17/2019    WBCUA > 200 06/17/2019    WBCUA >200 01/20/2012    BACTERIA FEW 06/17/2019    RBCUA 54025 06/17/2019    BLOODU MODERATE 06/17/2019    SPECGRAV 1.019 11/13/2018    GLUCOSEU NEGATIVE 06/17/2019       Radiology:  Ct Abdomen Pelvis Wo Contrast Additional Contrast? None    Result Date: 6/18/2019  PROCEDURE: CT ABDOMEN PELVIS WO CONTRAST CLINICAL INFORMATION: INFECTION, ABDOMEN-PELVIS, Hx bladder stones . COMPARISON: November 13, 2018 TECHNIQUE: Axial 5 mm CT images were obtained through the abdomen and pelvis. No contrast was given. Coronal reconstructions were obtained.  All CT scans at this facility use dose modulation, iterative reconstruction, and/or weight-based dosing when appropriate to reduce radiation dose to as low as reasonably achievable. FINDINGS:  There is areas of groundglass infiltrate in the right middle and lower lobe. There are subtle groundglass changes in the left lingula and anterior lower lobe with basilar atelectasis present and small bilateral effusions left greater than right seen. The noncontrast appearance of the liver pancreas and spleen are negative for acute pathology. There are postcholecystectomy changes present. There is no biliary obstruction. There are bilateral renal calculi. Left kidney demonstrates mild to moderate hydronephrosis with a proximal left ureteral stone seen measuring 2.3 x 1.8 mm. There is a suprapubic catheter present within the bladder which is decompressed limiting detail. The right kidney contains several punctate nonobstructing stones. Small cortical cystic changes are stable. There is a left lower quadrant ostomy. Postsurgical changes of the bowel are noted. Findings appear similar to the prior study. There is redemonstration of scattered atherosclerosis without evidence of aneurysm. There are chronic degenerative skeletal changes with no acute appearing abnormality. There is no visualized aneurysm. Bilateral sclerotic changes of the femoral heads are seen correlate with prior bone infarct changes. The previous bladder calculi are no longer visualized. In the right gluteal soft tissues there is a decubitus ulcer which extends to the margin of the right ischial tuberosity. 1. Left proximal ureteral calculus producing moderate left hydronephrosis. 2. Bilateral nonobstructing renal calculi and small cortical cystic changes. 3. Stable left lower quadrant ostomy. 4. Right gluteal decubitus ulcer extending to the margin of the right ischial tuberosity. 5. Bibasilar atelectasis with small effusions. 6. Areas of groundglass infiltrate of the bilateral lungs. **This report has been created using voice recognition software.  It may contain minor errors which are inherent in voice recognition technology. ** Final report electronically signed by Dr. Jude Johns on 6/18/2019 3:48 AM    Ct Head Wo Contrast    Result Date: 6/10/2019  PROCEDURE: CT HEAD WO CONTRAST CLINICAL INFORMATION: syncopal episode. COMPARISON: November 13, 2018 TECHNIQUE: Noncontrast 5 mm axial images were obtained through the brain. All CT scans at this facility use dose modulation, iterative reconstruction, and/or weight-based dosing when appropriate to reduce radiation dose to as low as reasonably achievable. FINDINGS: No acute intracranial findings. Generalized volume loss and patchy white matter changes which may be related to underlying demyelinating process or small vessel ischemic disease. Findings likely in keeping with known provided history of multiple sclerosis. There is no acute hemorrhage or midline shift. Ventricles are within normal limits for age. Intracranial vascular calcifications. . Paranasal sinuses are clear. Mastoid air cells are patent. Skull: Unremarkable. Soft tissues: Unremarkable. No acute intracranial findings. **This report has been created using voice recognition software. It may contain minor errors which are inherent in voice recognition technology. ** Final report electronically signed by Dr. Hugh Burger on 6/10/2019 2:24 PM    Cta Chest W Wo Contrast    Result Date: 6/10/2019  PROCEDURE: CTA CHEST W WO CONTRAST CLINICAL INFORMATION: syncopal episode, history of PE. COMPARISON: No prior study. TECHNIQUE: 1.5 mm axial images were obtained through the chest after the administration of IV contrast.  A non-contrast localizer was obtained. 3D reconstructions were performed on the scanner to include sagittal and bilateral oblique images through the  chest. 80 mL Isovue-370 were administered intravenously. All CT scans at this facility use dose modulation, iterative reconstruction, and/or weight-based dosing when appropriate to reduce radiation dose to as low as reasonably achievable. FINDINGS: Thyroid gland is unremarkable. Thoracic aorta is normal caliber. The pulmonary arterial vessels are adequately opacified. There is no acute pulmonary arterial embolism. No lymphadenopathy. No cardiomegaly. Patchy atelectasis left lung base. No pleural or pericardial effusion. No acute osseous findings. Osteopenia and degenerative changes thoracic spine. Upper abdominal images are unremarkable. No acute pulmonary embolism. Mild atelectasis in the left lung base. **This report has been created using voice recognition software. It may contain minor errors which are inherent in voice recognition technology. ** Final report electronically signed by Dr. Mariama Vinson on 6/10/2019 2:32 PM    Xr Chest Portable    Result Date: 6/17/2019  PROCEDURE: XR CHEST PORTABLE CLINICAL INFORMATION: persistent fever COMPARISON: 6/13/2019 TECHNIQUE: A single mobile view of the chest was obtained. 1. Poor inflation lungs. Mild cardiomegaly. 2. Mild left basilar atelectasis/pneumonia. No effusion. 3. Overall appearance of chest slightly worse than prior. **This report has been created using voice recognition software. It may contain minor errors which are inherent in voice recognition technology. ** Final report electronically signed by Dr. Samara Matthew on 6/17/2019 9:24 AM    Xr Chest Portable    Result Date: 6/13/2019  PROCEDURE: XR CHEST PORTABLE CLINICAL INFORMATION: shortness of breath; fever. COMPARISON: Chest x-ray dated 2/10/2019 TECHNIQUE: AP Portable chest xray FINDINGS: Lines/tubes/devices: none Lungs/pleura: There is stable mild elevation of the left hemidiaphragm with mild left basilar atelectasis. No pneumonia, pulmonary edema, or obvious mass. No pleural effusion. No pneumothorax. Heart: There is borderline cardiomegaly. Mediastinum/grady: No obvious mass or adenopathy. Skeleton: No significant bone or joint abnormality. No acute pneumonia or pulmonary edema.  Stable mild elevation of the left hemidiaphragm with mild left basilar atelectasis. **This report has been created using voice recognition software. It may contain minor errors which are inherent in voice recognition technology. ** Final report electronically signed by Dr. Yumiko Garza on 6/13/2019 7:01 AM    Mri Brain W Wo Contrast    Result Date: 6/11/2019  PROCEDURE: MRI BRAIN W WO CONTRAST CLINICAL INFORMATIONCONFUSION/DELIRIUM, ALTERED LOC, UNEXPLAINED, multiple slcerosis. Altered mental status. History of multiple sclerosis. COMPARISON: Brain MRI 1/20/2018. Head CT 6/10/2019. TECHNIQUE: Multiplanar and multiple spin echo T1 and T2-weighted images were obtained through the brain before and after the administration of intravenous contrast. FINDINGS: Motion artifact does degrade the quality of some of these images. These are the best images possible at this time. The diffusion-weighted images are normal. The brain volume is moderately reduced. There are no intra-or extra-axial collections. There is no hydrocephalus, midline shift or mass effect. On the FLAIR and T2-weighted sequences, there is a moderate amount of abnormal signal in the deep white matter of the brain. Many of these radiate outward from the ventricular margins. This also involves the white matter of the temporal lobes. The patient has a history of multiple sclerosis. This is stable. There are stable small old lacunar type infarcts in the right basal ganglia. There is an old cortical infarct with associated volume loss in the posterior right frontal lobe. On the gradient echo T2-weighted images, there is no susceptibility artifact present. There is no abnormal enhancement in the brain. The major intracranial vascular flow voids are present. The midline craniocervical junction structures are normal.  The brainstem and pituitary gland are normal.      1. No evidence of an acute infarct or active demyelination.  2. 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 3. Moderate amount of abnormal signal in the white matter of the brain consistent with multiple sclerosis and/or chronic small vessel ischemic changes. **This report has been created using voice recognition software. It may contain minor errors which are inherent in voice recognition technology. ** Final report electronically signed by Dr. Tristen Smith on 6/11/2019 10:37 AM       DVT prophylaxis:  scds  Code Status: Full Code       PT/OT Eval Status: na  Tele:  Yes    Assessment/Plan:    Active Hospital Problems    Diagnosis Date Noted    Sepsis associated hypotension ([address] [A41.9, I95.9]     Persistent fever [R50.9]     Ureteral stone with hydronephrosis [N13.2]     Hypotension [I95.9]     Syncope and collapse [R55]     Electrolyte imbalance [E87.8] 06/10/2019    Elevated transaminase level [R74.0] 06/10/2019    Anemia [D64.9] 06/10/2019    UTI (urinary tract infection) [N39.0] 06/10/2019    Sepsis secondary to UTI ([address] [A41.9, N39.0] 02/10/2019    Sepsis ([address] [A41.9]     Chronic depression [F32.9]     Essential hypertension [I10]     History of pulmonary embolism [Z86.711]     Dehydration [E86.0]     Acute metabolic encephalopathy [F79.92] 01/19/2018    Malnutrition ([address] [E46] 12/17/2012    Multiple sclerosis ([address] Wenceslao Cramp 02/14/2012       Sepsis due to uti- cysto as above  Sacral decub- Dr Sylvester Sow monitoring  [place]?   STARTED BY UROLOGY      Electronically signed by Chapincito Valle MD on 6/20/2019 at 5:25 AM

## 2019-06-20 NOTE — PROGRESS NOTES
Vancomycin Day: 8  Vancomycin 1500mg q18h    Patient's labs, cultures, vitals, and vancomycin regimen reviewed. No changes today.   Vancomycin + ceftriaxone for MRSA and Proteus  UO adequate  Serum creatinine stable  Trough = 17.6 mcg/mL - no change in current dose or interval      Culture Date Source Results   6/17/19 UC MRSA, Proteus mirabilis   6/10/19 BCx2 ngtd   6/17/19 Sacrum GNB, yeast, Staph coag+   6/17/19 BC x 2 NGTD    6/17/19  [facility]  NG

## 2019-06-20 NOTE — PROGRESS NOTES
PICC Procedure Note    Jame Romeo   Admitted- 6/10/2019 11:44 AM  Admission diagnosis- Sepsis [facility]) [A41.9]      Attending Physician- Ronaldo Freitas MD  Ordering Physician- [facility]  Indication for Insertion: Poor Vascular Access    Catheter Insertion Date- 6/20/2019   Lot Number- [id number]  Gauge-5  Lumen-dual    Insertion Site- TYLER Basilic  Vein Diameter- 3 mm  Catheter Length- 39 cm  Internal Length- 39 cm  Exposed Catheter Length- 0cm   Upper Arm Circumference- 33cm  Tip Confirmation System Bundle met- No: Chest xray  If X-ray required, Tip Location- Cavoatrial junction  Radiologist- Dr Gabriel Trindiad    PICC insertion successful- Yes  Ultrasound- yes    Okay To Use PICC- Yes    Electronically signed by Irma Perera RN on 6/20/2019 at 4:14 PM

## 2019-06-20 NOTE — DISCHARGE SUMMARY
Hospital Medicine Discharge Summary      Patient Identification:   Diane Olson   : 1955  MRN: [id number]   Account: [de-identified]      Patient's PCP: Azeb Jovel MD    Admit Date: 6/10/2019     Discharge Date:   2019      Admitting Physician: Natalie Flores DO     Discharge Physician: Adin Soto MD     Discharge Diagnoses: Active Hospital Problems    Diagnosis Date Noted    Sepsis associated hypotension (HCC) [A41.9, I95.9]     Persistent fever [R50.9]     Ureteral stone with hydronephrosis [N13.2]     Hypotension [I95.9]     Syncope and collapse [R55]     Electrolyte imbalance [E87.8] 06/10/2019    Elevated transaminase level [R74.0] 06/10/2019    Anemia [D64.9] 06/10/2019    UTI (urinary tract infection) [N39.0] 06/10/2019    Sepsis secondary to UTI ([address] [A41.9, N39.0] 02/10/2019    Sepsis ([address] [A41.9]     Chronic depression [F32.9]     Essential hypertension [I10]     History of pulmonary embolism [Z86.711]     Dehydration [E86.0]     Acute metabolic encephalopathy [N87.95] 2018    Malnutrition ([address] [E46] 2012    Multiple sclerosis ([facility] Lisandrostaciceleste York 2012       The patient was seen and examined on day of discharge and this discharge summary is in conjunction with any daily progress note from day of discharge. Hospital Course:   Diane Olson is a 66 y.o. male admitted to [address] [place] [address] on 6/10/2019 for septic shock. The pt has multiple sclerosis, is bedridden, a resident of a nursing home. He has a sacral decubitus which waxes and wanes in severity. He presented to ER febrile and hypotensive and was admitted. The pt was seen by consultants below. He was treated with broad spectrum antibiotics. Urine grew Proteus and MRSA. He also has a large deep sacral decubitus which could have been the source of sepsis.     He was found to have left hydronephrosis and bladder stones; these were evacuated and he had a left ureteral stent. He is being transferred to [place] for long term antibiotics. I have discussed his hospitalization with Dr Wilmer Sharma. .              Exam:     Vitals:  Vitals:    06/20/19 0813 06/20/19 1223 06/20/19 1610 06/20/19 1642   BP: (!) 144/70 132/76 (!) 148/80 135/71   Pulse: 93 80 76 83   Resp: 18 18 16 18   Temp: 97.8 °F (36.6 °C) 96.8 °F (36 °C) 98.4 °F (36.9 °C) 98.3 °F (36.8 °C)   TempSrc: Oral Oral Oral Oral   SpO2: 94% 96% 93% 95%   Weight:       Height:         Weight: Weight: 209 lb 11.2 oz (95.1 kg)     24 hour intake/output:    Intake/Output Summary (Last 24 hours) at 6/20/2019 1721  Last data filed at 6/20/2019 1446  Gross per 24 hour   Intake 1045 ml   Output 2275 ml   Net -1230 ml         General appearance:  No apparent distress, appears stated age and cooperative. Chronically ill appearing    HEENT:  Normal cephalic, atraumatic without obvious deformity. Pupils equal, round, and reactive to light. Extra ocular muscles intact. Conjunctivae/corneas clear. Neck: Supple, with full range of motion. No jugular venous distention. Trachea midline. Respiratory:  Normal respiratory effort. Clear to auscultation, bilaterally without Rales/Wheezes/Rhonchi. Cardiovascular:  Regular rate and rhythm with normal S1/S2 without murmurs, rubs or gallops. Abdomen: Soft, non-tender, non-distended with normal bowel sounds. Musculoskeletal:  No clubbing, cyanosis or edema bilaterally. Full range of motion without deformity. Skin:  Large sacral  Neurologic:  No change since prior exam    Psychiatric:  Alert and oriented, thought content appropriate, normal insight        Labs:  For convenience and continuity at follow-up the following most recent labs are provided:      CBC:    Lab Results   Component Value Date    WBC 10.0 06/18/2019    HGB 8.6 06/18/2019    HCT 26.6 06/18/2019     06/18/2019       Renal:    Lab Results   Component Value Date     06/19/2019    K 4.3 06/19/2019    K 3.5 06/15/2019     06/19/2019    CO2 23 06/19/2019    BUN 10 06/19/2019    CREATININE 0.7 06/19/2019    CALCIUM 8.7 06/19/2019    PHOS 3.4 06/15/2019         Significant Diagnostic Studies    Radiology:   XR CHEST PORTABLE   Final Result   1. Very poor inflation of lungs. Suggestion of mild cardiomegaly. Mild atelectasis/pneumonia left lung base. No effusion. 2. Right arm PICC line, catheter tip at cavoatrial junction. **This report has been created using voice recognition software. It may contain minor errors which are inherent in voice recognition technology. **      Final report electronically signed by Dr. Carlin Peralta on 6/20/2019 3:41 PM      CT ABDOMEN PELVIS WO CONTRAST Additional Contrast? None   Final Result   1. Left proximal ureteral calculus producing moderate left hydronephrosis. 2. Bilateral nonobstructing renal calculi and small cortical cystic changes. 3. Stable left lower quadrant ostomy. 4. Right gluteal decubitus ulcer extending to the margin of the right ischial tuberosity. 5. Bibasilar atelectasis with small effusions. 6. Areas of groundglass infiltrate of the bilateral lungs. **This report has been created using voice recognition software. It may contain minor errors which are inherent in voice recognition technology. **      Final report electronically signed by Dr. Naman Dasilva on 6/18/2019 3:48 AM      XR CHEST PORTABLE   Final Result   1. Poor inflation lungs. Mild cardiomegaly. 2. Mild left basilar atelectasis/pneumonia. No effusion. 3. Overall appearance of chest slightly worse than prior. **This report has been created using voice recognition software. It may contain minor errors which are inherent in voice recognition technology. **      Final report electronically signed by Dr. Carlin Peralta on 6/17/2019 9:24 AM      XR CHEST PORTABLE   Final Result      No acute pneumonia or pulmonary edema.    Stable mild elevation of the left hemidiaphragm with mild left basilar atelectasis. **This report has been created using voice recognition software. It may contain minor errors which are inherent in voice recognition technology. **      Final report electronically signed by Dr. Edith Schafer on 6/13/2019 7:01 AM      MRI Brain W WO Contrast   Final Result       1. No evidence of an acute infarct or active demyelination. 2. 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 3. Moderate amount of abnormal signal in the white matter of the brain consistent with multiple sclerosis and/or chronic small vessel ischemic changes. **This report has been created using voice recognition software. It may contain minor errors which are inherent in voice recognition technology. **         Final report electronically signed by Dr. David Wagoner on 6/11/2019 10:37 AM      CT Head WO Contrast   Final Result   No acute intracranial findings. **This report has been created using voice recognition software. It may contain minor errors which are inherent in voice recognition technology. **      Final report electronically signed by Dr. Annie Long on 6/10/2019 2:24 PM      CTA Chest W WO Contrast   Final Result      No acute pulmonary embolism. Mild atelectasis in the left lung base. **This report has been created using voice recognition software. It may contain minor errors which are inherent in voice recognition technology. **      Final report electronically signed by Dr. Annie Long on 6/10/2019 2:32 PM             Consults:     IP CONSULT TO NEUROLOGY  IP CONSULT TO SOCIAL WORK  IP CONSULT TO UROLOGY  IP CONSULT TO PHARMACY  IP CONSULT TO INFECTIOUS DISEASES  IP CONSULT TO UROLOGY  IP CONSULT TO UROLOGY    Disposition: [place]  Condition at Discharge: Stable    Code Status:  Full Code     Patient Instructions:    Discharge lab work:    Activity: bedrest  Diet: DIET GENERAL;      Follow-up visits:   MD Aram [address]  [facility] [address]  [phone]          [facility]  [address]  [address]  [phone]             Discharge Medications:      Roseanne Teran   Home Medication Instructions NPU:[id number]    Printed on:06/20/19 1727   Medication Information                      Acetaminophen (TYLENOL) 325 MG CAPS  Take 325 mg by mouth Give 2 tablets by mouth every 4 hours as needed for Temp 100 or above             ACETIC ACID IR  2 % Indications: use 60ml via irrigation every evening and night shift for catheter patency. Ascorbic Acid (VITAMIN C PO)  Take 500 mg by mouth daily              ascorbic acid (VITAMIN C) 500 MG tablet  Take 1 tablet by mouth daily             bacitracin 500 UNIT/GM ointment  Apply topically daily suprapubic cath site             calcium-vitamin D (OSCAL-500) 500-200 MG-UNIT per tablet  Take 1 tablet by mouth 2 times daily             cefepime (MAXIPIME) infusion  Infuse 2,000 mg intravenously every 12 hours Compound per protocol             Cranberry-Vitamin C-Inulin (UTI-STAT PO)  Take 30 mLs by mouth 3 times daily             docusate sodium (COLACE) 100 MG capsule  Take 100 mg by mouth daily              famotidine (PEPCID) 20 MG tablet  Take 1 tablet by mouth 2 times daily             FLUoxetine (PROZAC) 10 MG capsule  Take 20 mg by mouth daily              metoprolol tartrate (LOPRESSOR) 25 MG tablet  Take 1 tablet by mouth 2 times daily             miconazole (MICOTIN) 2 % powder  Apply topically 2 times daily.              Multiple Vitamin (MULTIVITAMIN) tablet  Take 1 tablet by mouth daily             oxybutynin (DITROPAN-XL) 10 MG extended release tablet  Take 5 mg by mouth 2 times daily For bladder spasms             potassium chloride (KLOR-CON M) 20 MEQ TBCR extended release tablet  Take 2 tablets by mouth daily             Probiotic Product (ADWOA-BID PROBIOTIC PO)  Take 1 tablet by mouth 2 times daily              rivaroxaban (XARELTO) 10 MG TABS tablet  Take 1 tablet by mouth daily             sodium chloride 1 g tablet  Take 1 tablet by mouth 2 times daily (with meals)             tiZANidine (ZANAFLEX) 2 MG tablet  Take 2 mg by mouth 3 times daily              vancomycin (VANCOCIN) infusion  Infuse 1,500 mg intravenously Q18H Compound per protocol. vitamin A 82690 UNITS capsule  Take 2 capsules by mouth daily             vitamin D (ERGOCALCIFEROL) 98383 units capsule  Take 1 capsule by mouth once a week for 8 doses                 Time Spent on discharge is more than 30 minutes in the examination, evaluation, counseling and review of medications and discharge plan. Signed: Thank you Miracle Frias MD for the opportunity to be involved in this patient's care.     Electronically signed by Modena Cockayne, MD on 6/20/2019 at 5:21 PM

## 2019-06-20 NOTE — PROGRESS NOTES
Progress note: Infectious diseases    Patient - Viry Doshi,  Age - 66 y.o.    - 1955      Room Number - 3B-31/031-A   MRN -  [id number]   Acct # - [de-identified]  Date of Admission -  6/10/2019 11:44 AM    SUBJECTIVE:   Fever is better after the ureteric stent  OBJECTIVE   VITALS    height is 5' 7\" (1.702 m) and weight is 209 lb 11.2 oz (95.1 kg). His oral temperature is 97.8 °F (36.6 °C). His blood pressure is 144/70 (abnormal) and his pulse is 93. His respiration is 18 and oxygen saturation is 94%. Wt Readings from Last 3 Encounters:   06/15/19 209 lb 11.2 oz (95.1 kg)   19 204 lb 12.8 oz (92.9 kg)   19 206 lb 6.4 oz (93.6 kg)       I/O (24 Hours)    Intake/Output Summary (Last 24 hours) at 2019 1024  Last data filed at 2019  Gross per 24 hour   Intake 1555 ml   Output 1620 ml   Net -65 ml       General Appearance  Awake, alert, oriented, chronically sick looking. HEENT - normocephalic, atraumatic, pink conjunctiva,  anicteric sclera  Neck - Supple, no mass  Lungs -  Bilateral  air entry, diminished breath sound  Cardiovascular - Heart sounds are normal.  Regular rate and rhythm without murmur, gallop or rub.   Abdomen - soft, not distended, nontender,   Neurologic - oriented  Skin - No bruising or bleeding  Extremities - ischial wound (POA)    MEDICATIONS:      rivaroxaban  10 mg Oral Daily    potassium chloride  40 mEq Oral Daily    tamsulosin  0.4 mg Oral Daily    vancomycin  1,500 mg Intravenous Q18H    miconazole   Topical BID    metoprolol tartrate  25 mg Oral BID    sodium chloride  1 g Oral BID WC    vancomycin (VANCOCIN) intermittent dosing (placeholder)   Other RX Placeholder    phosphorus replacement protocol   Other RX Placeholder    cefTRIAXone (ROCEPHIN) IV  1 g Intravenous Q24H    sodium chloride flush  10 mL Intravenous 2 times per day    acetic acid Irrigation BID    vitamin C  500 mg Oral Daily    bacitracin   Topical Daily    calcium-vitamin D  1 tablet Oral BID    docusate sodium  100 mg Oral Daily    famotidine  20 mg Oral BID    multivitamin  1 tablet Oral Daily    lactobacillus  1 capsule Oral Daily    vitamin E  400 Units Oral Daily    vitamin A  20,000 Units Oral Daily    tiZANidine  2 mg Oral TID       ondansetron, sodium chloride flush, acetaminophen      LABS:     CBC:   Recent Labs     06/18/19  0340   WBC 10.0   HGB 8.6*        BMP:    Recent Labs     06/18/19  0340 06/19/19  0401   * 137   K 3.3* 4.3   CL 99 102   CO2 24 23   BUN 9 10   CREATININE 0.6 0.7   GLUCOSE 101 117*     Calcium:  Recent Labs     06/19/19  0401   CALCIUM 8.7     Ionized Calcium:No results for input(s): IONCA in the last 72 hours. Magnesium:  Recent Labs     06/19/19  0401   MG 1.7        CULTURES:   UA:   No results for input(s): SPECGRAV, PHUR, COLORU, CLARITYU, MUCUS, PROTEINU, BLOODU, RBCUA, WBCUA, BACTERIA, NITRU, GLUCOSEU, BILIRUBINUR, UROBILINOGEN, KETUA, LABCAST, LABCASTTY, AMORPHOS in the last 72 hours.     Invalid input(s): CRYSTALS  Micro:   Lab Results   Component Value Date    BC No growth-preliminary 06/17/2019       Problem list of patient:     Patient Active Problem List   Diagnosis Code    Neurogenic bladder N31.9    Multiple sclerosis ([facility] G35    MS (multiple sclerosis) ([address] G35    Urinary retention R33.9    Indwelling catheter present on admission Z96.0    Cystostomy malfunction ([address] M52.547    Decubitus ulcer of coccyx L89.159    Decubitus ulcer, stage 3 ([address] L89.93    Malnutrition ([address] E46    Decubitus ulcer of right perineal ischial region, stage 3 ([address] L89.313    Multiple sclerosis ([address] G35    Pulmonary embolism on left ([address] I26.99    Acute metabolic encephalopathy L59.53    Speech disturbance R47.9    Infected decubitus ulcer, stage I L89.91, L08.9    Infected decubitus ulcer, stage III ([facility] L89.93, L08.9    Wound infection T14. 8XXA, L08.9    Elevated INR R79.1    Chronic osteomyelitis, pelvis, right (HCC) M86.651    Osteomyelitis (HCC) M86.9    Urinary tract infection without hematuria N39.0    Sepsis ([facility]) A41.9    Dehydration E86.0    Abnormal liver function test R94.5    Chronic depression F32.9    Essential hypertension I10    History of pulmonary embolism Z86.711    Other dysphagia R13.19    Pressure injury of skin of back L89.109    Sepsis due to gram-negative UTI ([facility]) A41.50, N39.0    Bladder stones N21.0    Sepsis secondary to UTI ([address] A41.9, N39.0    Decubitus ulcer L89.90    Electrolyte imbalance E87.8    Elevated transaminase level R74.0    Anemia D64.9    UTI (urinary tract infection) N39.0    Syncope and collapse R55    Hypotension I95.9    Sepsis associated hypotension ([facility]) A41.9, I95.9    Persistent fever R50.9    Ureteral stone with hydronephrosis N13.2         ASSESSMENT/PLAN   Chronic cystitis related to chronic Pompa: The Pompa has been changed  He is on antibiotics .    Obstructing ureteric stone s/p stent  Continue iv antibiotic  picc line  Plan for buck Sweeney MD, FACP 6/20/2019 10:24 AM

## 2019-06-20 NOTE — CARE COORDINATION
6/20/19, 2:23 PM      Tor Catching day: 10  Location: [facility]031-A Reason for admit: Sepsis [facility]) [A41.9]   Procedure: 6/13 Replaced suprapubic cath  6/19 Cystoscopy, removal of bladder stones, left stent insertion  Treatment Plan of Care: Hospitalist, ID and Urology following. Discussed with Dr. Edna Le and Dr. Gloria Marquez today, ok to go to [place] if insurance will approve. Nayeli from [facility] to start precert today. PCP: Kristina Montero MD  Readmission Risk Score: 24%  Discharge Plan: Planning either [facility] or back to [facility].

## 2019-06-20 NOTE — TELEPHONE ENCOUNTER
Pt will need scheduled for Cystoscopy, possible left ureteroscopy, possible laser lithotripsy, possible basket retrieval of stone fragments, possible left ureteral stent placement by Dr. Miriam Khalil in approximately 2 weeks after treatment of acute illness.

## 2019-06-20 NOTE — PROGRESS NOTES
Urology Progress Note    Chief Complaint: Syncope  Reason for consultation:  L ureteral calculus, sp catheter    Subjective:     Pt resting in bed. Denies complaints at this time. No flank pain. No chest pain. No sob. Reports passing gas and stool via colostomy. Pompa with light clear yellow urine. No hematuria. No N/V. Tolerating diet.               Vitals:  BP (!) 144/70   Pulse 93   Temp 97.8 °F (36.6 °C) (Oral)   Resp 18   Ht 5' 7\" (1.702 m)   Wt 209 lb 11.2 oz (95.1 kg)   SpO2 94%   BMI 32.84 kg/m²   Temp  Av.1 °F (36.7 °C)  Min: 97.8 °F (36.6 °C)  Max: 98.4 °F (36.9 °C)    Intake/Output Summary (Last 24 hours) at 2019 1154  Last data filed at 2019 2031  Gross per 24 hour   Intake 1555 ml   Output 1620 ml   Net -65 ml       Social History     Socioeconomic History    Marital status:      Spouse name: Juancarlos Kebede Number of children: 2    Years of education: Not on file    Highest education level: Not on file   Occupational History    Not on file   Social Needs    Financial resource strain: Not on file    Food insecurity:     Worry: Not on file     Inability: Not on file    Transportation needs:     Medical: Not on file     Non-medical: Not on file   Tobacco Use    Smoking status: Former Smoker     Last attempt to quit: 1982     Years since quittin.4    Smokeless tobacco: Former User   Substance and Sexual Activity    Alcohol use: No     Comment: \"quit alcohol a few years ago\"    Drug use: No    Sexual activity: Yes     Partners: Female   Lifestyle    Physical activity:     Days per week: Not on file     Minutes per session: Not on file    Stress: Not on file   Relationships    Social connections:     Talks on phone: Not on file     Gets together: Not on file     Attends [employer] service: Not on file     Active member of club or organization: Not on file     Attends meetings of clubs or organizations: Not on file     Relationship status: Not on file   Marian Intimate partner violence:     Fear of current or ex partner: Not on file     Emotionally abused: Not on file     Physically abused: Not on file     Forced sexual activity: Not on file   Other Topics Concern    Not on file   Social History Narrative    Not on file     Family History   Problem Relation Age of Onset    Cancer Mother         Breast    Heart Disease Father 48    Arthritis Sister     Heart Disease Paternal Grandmother 48     No Known Allergies      Constitutional: Alert and oriented times x3, no acute distress, and cooperative to examination with appropriate mood and affect. HEENT:   Head:         Normocephalic and atraumatic. Mucous membranes are normal.   Eyes:         EOM are normal. No scleral icterus. Nose:    The external appearance of the nose is normal  Ears: The ears appear normal to external inspection. Cardiovascular:       Normal rate, regular rhythm. Pulmonary/Chest:  Normal respiratory rate and rhthym. No use of accessory muscles. Lungs clear bilaterally. Abdominal:          Soft. No tenderness. Active bowel sounds. SP catheter draining clear yellow urine. Musculoskeletal:    Normal range of motion. He exhibits no edema or tenderness of lower extremities. Extremities:    No cyanosis, clubbing, or edema present. Neurological:    Alert and oriented.      Labs:  WBC:    Lab Results   Component Value Date    WBC 10.0 06/18/2019     Hemoglobin/Hematocrit:    Lab Results   Component Value Date    HGB 8.6 06/18/2019    HCT 26.6 06/18/2019     BMP:    Lab Results   Component Value Date     06/19/2019    K 4.3 06/19/2019    K 3.5 06/15/2019     06/19/2019    CO2 23 06/19/2019    BUN 10 06/19/2019    LABALBU 2.3 06/14/2019    CREATININE 0.7 06/19/2019    CALCIUM 8.7 06/19/2019    LABGLOM >90 06/19/2019       Impression:  UTI--MRSA and Proteus--on Rocephin and Vanc   Sepsis  SP catheter malfunction  Neurogenic bladder  Bilateral renal calculi  Left ureteral obstruction, 7 mm calculus  Left hydronephrosis  MS    Plan:    POD # 1  Cystoscopy. 2.  Evacuation of bladder calculi. 3.  Insertion of 6-Burkin[place] multi-length double-J stent by Dr. Tangela Mohr 6/19/19. Pt doing well postoperatively. No hematuria or flank pain. Tolerating diet. Passing air and stool via ostomy. Will need definitive treatment of ureteral calculus in follow-up. Office staff to facilitate scheduling of definitive stone treatment by Dr. Tangela Mohr. Urology signing off. Call prn.       TONO Salguero-CNP  06/20/19 11:54 AM  Urology

## 2019-06-21 ENCOUNTER — TELEPHONE (OUTPATIENT)
Dept: UROLOGY | Age: 64
End: 2019-06-21

## 2019-06-21 DIAGNOSIS — N20.0 KIDNEY STONE: ICD-10-CM

## 2019-06-21 DIAGNOSIS — Z01.818 PRE-OP TESTING: Primary | ICD-10-CM

## 2019-06-21 NOTE — TELEPHONE ENCOUNTER
Called [place] to see if CPT code 34214 required pre cert.  No pre cert is required per Jennifer 6/21/19 12:02 pm.

## 2019-06-21 NOTE — CARE COORDINATION
6/21/19, 7:19 AM    Discharge plan discussed by  and . Discharge plan reviewed with patient/ family. Patient/ family verbalize understanding of discharge plan and are in agreement with plan. Understanding was demonstrated using the teach back method. Arvin Chi was discharged yesterday to [address] [facility] and made Urban Begun in admissions for [facility] aware.

## 2019-06-21 NOTE — TELEPHONE ENCOUNTER
Patient is scheduled for his second surgery on 7/8/19 with Dr. Belinda Azul. Surgery consent on arrival. Patient will need uranalysis done prior to surgery. Pre op testing done. Patient is still in the hospital and will be notified of surgery date upon discharge.

## 2019-06-21 NOTE — TELEPHONE ENCOUNTER
DO NOT TAKE ASPIRIN, PLAVIX, FISH OIL, GLUCOSAMINE CHONDROITIN, MULTIVITAMINS, VITAMIN A, VITAMIN E, VITAMIN B,VITAMIN D,CALCIUM D, COUMADIN, OR MOTRIN-LIKE DRUGS 7 DAYS PRIOR TO SURGERY AND 3 DAYS FOLLOWING     Shellie Noel 1955 Diagnosis:Kidney Stone    Surgical Physician: Dr. Joanne Mauro have been scheduled for the procedure marked below:      Surgery: Cystoscopy, left ureteroscopy,possible laser lithotripsy,possible basket retrieval of stone fragments, and possible left ureteral                             stent. Date: 7/8/19     Anesthesia: Anesthesiologist (General/Spinal)     Place of Service: [address] [place] [address]         Please be at the Outpatient Department Second Floor Same Day Surgery by:7:00 am        INSTRUCTIONS AS MARKED BELOW:    1.  DO NOT eat or drink anything after midnight before surgery. 2.  We prefer you shower or bathe with an antibacterial soap (Dial) the morning of surgery. 3.  Please ensure to have a  with you to transport you home. 4.  Please bring a current medication list, photo ID and insurance card(s) with you  5. Okay to take Tylenol  6. If you take Glucophage or Metformin, hold 48-hours prior to surgery  7. Take blood pressure medication as directed, if taken in the morning take with a small sip of water  8. PLEASE BRING THIS LETTER WITH YOU AND SHOW IT TO THE  AT [place] [address]. 9.  may assist with your surgery  10. Does patient have a Pace Maker? No  11. Stop the Xarelto 3 days prior  12. Do the uranalysis 7-10 days prior to surgery date. Order included.   13. Please send a copy to the Family Dr: Devendra Valera MD      Date: 6/21/2019

## 2019-06-22 LAB — BLOOD CULTURE, ROUTINE: NORMAL

## 2019-06-25 ENCOUNTER — TELEPHONE (OUTPATIENT)
Dept: UROLOGY | Age: 64
End: 2019-06-25

## 2019-06-25 NOTE — TELEPHONE ENCOUNTER
Patient is scheduled for his second surgery per your request in 2 weeks on 7/8/19. Patient was discharged from [facility] to [address]. Should I keep the surgery set for 7/8/19 or wait until he is out of [place].  Please advise

## 2019-06-25 NOTE — TELEPHONE ENCOUNTER
Spoke with [facility] and patient will not be there on 7/8/19. Faxed to [facility] Attn: Lyla Mccall the instructions for the surgery which she stated she would give the patient.  He will remain on the surgery schedule for 7/8/19 arrival of 7:00 am.

## 2019-06-25 NOTE — TELEPHONE ENCOUNTER
Suman Cabrera, if he is going to be in [facility] until then I will go ahead and do it at that time. Otherwise, we will schedule him as an outpatient.

## 2019-07-05 LAB
BILIRUBIN URINE: NORMAL MG/DL
BLOOD, URINE: NORMAL
CLARITY: CLEAR
COLOR: YELLOW
GLUCOSE URINE: NEGATIVE
KETONES, URINE: NEGATIVE
LEUKOCYTE ESTERASE, URINE: NORMAL
NITRITE, URINE: NEGATIVE
PH UA: 6 (ref 4.5–8)
PROTEIN UA: NEGATIVE
SPECIFIC GRAVITY UA: 1.02 (ref 1–1.03)
UROBILINOGEN, URINE: NORMAL

## 2019-07-07 ENCOUNTER — ANESTHESIA EVENT (OUTPATIENT)
Dept: OPERATING ROOM | Age: 64
End: 2019-07-07
Payer: COMMERCIAL

## 2019-07-08 ENCOUNTER — ANESTHESIA (OUTPATIENT)
Dept: OPERATING ROOM | Age: 64
End: 2019-07-08
Payer: COMMERCIAL

## 2019-07-08 ENCOUNTER — HOSPITAL ENCOUNTER (OUTPATIENT)
Age: 64
Setting detail: OUTPATIENT SURGERY
Discharge: NURSING, REHAB OR LONG-TERM CARE | End: 2019-07-08
Attending: UROLOGY | Admitting: UROLOGY
Payer: COMMERCIAL

## 2019-07-08 VITALS
WEIGHT: 202 LBS | DIASTOLIC BLOOD PRESSURE: 72 MMHG | BODY MASS INDEX: 31.71 KG/M2 | OXYGEN SATURATION: 98 % | TEMPERATURE: 97.3 F | HEIGHT: 67 IN | RESPIRATION RATE: 18 BRPM | SYSTOLIC BLOOD PRESSURE: 146 MMHG | HEART RATE: 81 BPM

## 2019-07-08 VITALS
RESPIRATION RATE: 14 BRPM | SYSTOLIC BLOOD PRESSURE: 125 MMHG | OXYGEN SATURATION: 100 % | DIASTOLIC BLOOD PRESSURE: 78 MMHG

## 2019-07-08 PROBLEM — N39.0 SEPSIS SECONDARY TO UTI (HCC): Status: RESOLVED | Noted: 2019-02-10 | Resolved: 2019-07-08

## 2019-07-08 PROBLEM — E87.8 ELECTROLYTE IMBALANCE: Status: RESOLVED | Noted: 2019-06-10 | Resolved: 2019-07-08

## 2019-07-08 PROBLEM — N39.0 UTI (URINARY TRACT INFECTION): Status: RESOLVED | Noted: 2019-06-10 | Resolved: 2019-07-08

## 2019-07-08 PROBLEM — A41.9 SEPSIS SECONDARY TO UTI (HCC): Status: RESOLVED | Noted: 2019-02-10 | Resolved: 2019-07-08

## 2019-07-08 LAB
AEROBIC CULTURE: ABNORMAL
ANAEROBIC CULTURE: ABNORMAL
GRAM STAIN RESULT: ABNORMAL
ORGANISM: ABNORMAL
ORGANISM: ABNORMAL

## 2019-07-08 PROCEDURE — 3700000000 HC ANESTHESIA ATTENDED CARE: Performed by: UROLOGY

## 2019-07-08 PROCEDURE — C1894 INTRO/SHEATH, NON-LASER: HCPCS | Performed by: UROLOGY

## 2019-07-08 PROCEDURE — 2709999900 HC NON-CHARGEABLE SUPPLY: Performed by: UROLOGY

## 2019-07-08 PROCEDURE — 3700000001 HC ADD 15 MINUTES (ANESTHESIA): Performed by: UROLOGY

## 2019-07-08 PROCEDURE — 7100000000 HC PACU RECOVERY - FIRST 15 MIN: Performed by: UROLOGY

## 2019-07-08 PROCEDURE — 3600000003 HC SURGERY LEVEL 3 BASE: Performed by: UROLOGY

## 2019-07-08 PROCEDURE — 3600000013 HC SURGERY LEVEL 3 ADDTL 15MIN: Performed by: UROLOGY

## 2019-07-08 PROCEDURE — 7100000010 HC PHASE II RECOVERY - FIRST 15 MIN: Performed by: UROLOGY

## 2019-07-08 PROCEDURE — 7100000011 HC PHASE II RECOVERY - ADDTL 15 MIN: Performed by: UROLOGY

## 2019-07-08 PROCEDURE — 7100000001 HC PACU RECOVERY - ADDTL 15 MIN: Performed by: UROLOGY

## 2019-07-08 PROCEDURE — 6360000002 HC RX W HCPCS: Performed by: NURSE ANESTHETIST, CERTIFIED REGISTERED

## 2019-07-08 PROCEDURE — 2580000003 HC RX 258: Performed by: UROLOGY

## 2019-07-08 PROCEDURE — 6360000002 HC RX W HCPCS: Performed by: UROLOGY

## 2019-07-08 PROCEDURE — C1769 GUIDE WIRE: HCPCS | Performed by: UROLOGY

## 2019-07-08 PROCEDURE — 6360000004 HC RX CONTRAST MEDICATION: Performed by: UROLOGY

## 2019-07-08 PROCEDURE — 2720000010 HC SURG SUPPLY STERILE: Performed by: UROLOGY

## 2019-07-08 PROCEDURE — 2500000003 HC RX 250 WO HCPCS: Performed by: NURSE ANESTHETIST, CERTIFIED REGISTERED

## 2019-07-08 RX ORDER — SODIUM CHLORIDE 9 MG/ML
INJECTION, SOLUTION INTRAVENOUS CONTINUOUS
Status: DISCONTINUED | OUTPATIENT
Start: 2019-07-08 | End: 2019-07-08 | Stop reason: HOSPADM

## 2019-07-08 RX ORDER — DEXAMETHASONE SODIUM PHOSPHATE 4 MG/ML
INJECTION, SOLUTION INTRA-ARTICULAR; INTRALESIONAL; INTRAMUSCULAR; INTRAVENOUS; SOFT TISSUE PRN
Status: DISCONTINUED | OUTPATIENT
Start: 2019-07-08 | End: 2019-07-08 | Stop reason: SDUPTHER

## 2019-07-08 RX ORDER — LABETALOL 20 MG/4 ML (5 MG/ML) INTRAVENOUS SYRINGE
10 EVERY 10 MIN PRN
Status: DISCONTINUED | OUTPATIENT
Start: 2019-07-08 | End: 2019-07-08 | Stop reason: HOSPADM

## 2019-07-08 RX ORDER — CEFAZOLIN SODIUM 1 G/50ML
1 INJECTION, SOLUTION INTRAVENOUS
Status: COMPLETED | OUTPATIENT
Start: 2019-07-08 | End: 2019-07-08

## 2019-07-08 RX ORDER — FENTANYL CITRATE 50 UG/ML
INJECTION, SOLUTION INTRAMUSCULAR; INTRAVENOUS PRN
Status: DISCONTINUED | OUTPATIENT
Start: 2019-07-08 | End: 2019-07-08 | Stop reason: SDUPTHER

## 2019-07-08 RX ORDER — EPHEDRINE SULFATE/0.9% NACL/PF 50 MG/5 ML
SYRINGE (ML) INTRAVENOUS PRN
Status: DISCONTINUED | OUTPATIENT
Start: 2019-07-08 | End: 2019-07-08 | Stop reason: SDUPTHER

## 2019-07-08 RX ORDER — MORPHINE SULFATE 2 MG/ML
2 INJECTION, SOLUTION INTRAMUSCULAR; INTRAVENOUS EVERY 5 MIN PRN
Status: DISCONTINUED | OUTPATIENT
Start: 2019-07-08 | End: 2019-07-08 | Stop reason: HOSPADM

## 2019-07-08 RX ORDER — PROPOFOL 10 MG/ML
INJECTION, EMULSION INTRAVENOUS PRN
Status: DISCONTINUED | OUTPATIENT
Start: 2019-07-08 | End: 2019-07-08 | Stop reason: SDUPTHER

## 2019-07-08 RX ORDER — KETOROLAC TROMETHAMINE 30 MG/ML
INJECTION, SOLUTION INTRAMUSCULAR; INTRAVENOUS PRN
Status: DISCONTINUED | OUTPATIENT
Start: 2019-07-08 | End: 2019-07-08 | Stop reason: SDUPTHER

## 2019-07-08 RX ORDER — PHENYLEPHRINE HYDROCHLORIDE 10 MG/ML
INJECTION INTRAVENOUS PRN
Status: DISCONTINUED | OUTPATIENT
Start: 2019-07-08 | End: 2019-07-08 | Stop reason: SDUPTHER

## 2019-07-08 RX ORDER — ONDANSETRON 2 MG/ML
INJECTION INTRAMUSCULAR; INTRAVENOUS PRN
Status: DISCONTINUED | OUTPATIENT
Start: 2019-07-08 | End: 2019-07-08 | Stop reason: SDUPTHER

## 2019-07-08 RX ORDER — LIDOCAINE HYDROCHLORIDE 20 MG/ML
INJECTION, SOLUTION INTRAVENOUS PRN
Status: DISCONTINUED | OUTPATIENT
Start: 2019-07-08 | End: 2019-07-08 | Stop reason: SDUPTHER

## 2019-07-08 RX ORDER — FENTANYL CITRATE 50 UG/ML
50 INJECTION, SOLUTION INTRAMUSCULAR; INTRAVENOUS EVERY 5 MIN PRN
Status: DISCONTINUED | OUTPATIENT
Start: 2019-07-08 | End: 2019-07-08 | Stop reason: HOSPADM

## 2019-07-08 RX ADMIN — ONDANSETRON HYDROCHLORIDE 4 MG: 4 INJECTION, SOLUTION INTRAMUSCULAR; INTRAVENOUS at 10:00

## 2019-07-08 RX ADMIN — PROPOFOL 120 MG: 10 INJECTION, EMULSION INTRAVENOUS at 09:03

## 2019-07-08 RX ADMIN — LIDOCAINE HYDROCHLORIDE 50 MG: 20 INJECTION, SOLUTION INTRAVENOUS at 09:04

## 2019-07-08 RX ADMIN — SODIUM CHLORIDE: 9 INJECTION, SOLUTION INTRAVENOUS at 08:21

## 2019-07-08 RX ADMIN — Medication 10 MG: at 09:07

## 2019-07-08 RX ADMIN — SODIUM CHLORIDE: 9 INJECTION, SOLUTION INTRAVENOUS at 09:58

## 2019-07-08 RX ADMIN — Medication 10 MG: at 09:05

## 2019-07-08 RX ADMIN — DEXAMETHASONE SODIUM PHOSPHATE 8 MG: 4 INJECTION, SOLUTION INTRAMUSCULAR; INTRAVENOUS at 09:06

## 2019-07-08 RX ADMIN — PHENYLEPHRINE HYDROCHLORIDE 200 MCG: 10 INJECTION INTRAVENOUS at 09:08

## 2019-07-08 RX ADMIN — KETOROLAC TROMETHAMINE 30 MG: 30 INJECTION, SOLUTION INTRAMUSCULAR; INTRAVENOUS at 10:00

## 2019-07-08 RX ADMIN — PHENYLEPHRINE HYDROCHLORIDE 200 MCG: 10 INJECTION INTRAVENOUS at 09:18

## 2019-07-08 RX ADMIN — Medication 10 MG: at 09:09

## 2019-07-08 RX ADMIN — PHENYLEPHRINE HYDROCHLORIDE 200 MCG: 10 INJECTION INTRAVENOUS at 09:06

## 2019-07-08 RX ADMIN — FENTANYL CITRATE 100 MCG: 50 INJECTION INTRAMUSCULAR; INTRAVENOUS at 08:59

## 2019-07-08 RX ADMIN — LIDOCAINE HYDROCHLORIDE 50 MG: 20 INJECTION, SOLUTION INTRAVENOUS at 09:03

## 2019-07-08 RX ADMIN — CEFAZOLIN SODIUM 1 G: 1 INJECTION, SOLUTION INTRAVENOUS at 09:06

## 2019-07-08 RX ADMIN — PHENYLEPHRINE HYDROCHLORIDE 200 MCG: 10 INJECTION INTRAVENOUS at 09:10

## 2019-07-08 ASSESSMENT — PAIN - FUNCTIONAL ASSESSMENT: PAIN_FUNCTIONAL_ASSESSMENT: 0-10

## 2019-07-08 ASSESSMENT — PULMONARY FUNCTION TESTS
PIF_VALUE: 9
PIF_VALUE: 10
PIF_VALUE: 10
PIF_VALUE: 28
PIF_VALUE: 10
PIF_VALUE: 31
PIF_VALUE: 34
PIF_VALUE: 9
PIF_VALUE: 9
PIF_VALUE: 27
PIF_VALUE: 10
PIF_VALUE: 29
PIF_VALUE: 28
PIF_VALUE: 28
PIF_VALUE: 10
PIF_VALUE: 29
PIF_VALUE: 21
PIF_VALUE: 29
PIF_VALUE: 30
PIF_VALUE: 4
PIF_VALUE: 9
PIF_VALUE: 8
PIF_VALUE: 4
PIF_VALUE: 3
PIF_VALUE: 10
PIF_VALUE: 9
PIF_VALUE: 31
PIF_VALUE: 10
PIF_VALUE: 10
PIF_VALUE: 9
PIF_VALUE: 29
PIF_VALUE: 10
PIF_VALUE: 29
PIF_VALUE: 9
PIF_VALUE: 30
PIF_VALUE: 30
PIF_VALUE: 9
PIF_VALUE: 9
PIF_VALUE: 10
PIF_VALUE: 9
PIF_VALUE: 30
PIF_VALUE: 11
PIF_VALUE: 9
PIF_VALUE: 10
PIF_VALUE: 10
PIF_VALUE: 11
PIF_VALUE: 9
PIF_VALUE: 28
PIF_VALUE: 28
PIF_VALUE: 10
PIF_VALUE: 30
PIF_VALUE: 9
PIF_VALUE: 9
PIF_VALUE: 26
PIF_VALUE: 28
PIF_VALUE: 10
PIF_VALUE: 7
PIF_VALUE: 29
PIF_VALUE: 10
PIF_VALUE: 8
PIF_VALUE: 30
PIF_VALUE: 27
PIF_VALUE: 3

## 2019-07-08 ASSESSMENT — PAIN SCALES - GENERAL
PAINLEVEL_OUTOF10: 0

## 2019-07-08 NOTE — ANESTHESIA PRE PROCEDURE
Department of Anesthesiology  Preprocedure Note       Name:  Irma Mcknight   Age:  60 y.o.  :  1955                                          MRN:  [id number]         Date:  2019      Surgeon: Eyad Luna):  Parish Jaramillo MD    Procedure: CYSTO, POSS LEFT URETEROSCOPY, LASER LITHOTRIPSY, POSS BASKET RETRIEVAL OF STONE FRAGMENTS AND POSS LEFT URETERAL STENT (N/A )    Medications prior to admission:   Prior to Admission medications    Medication Sig Start Date End Date Taking? Authorizing Provider   rivaroxaban (XARELTO) 10 MG TABS tablet Take 1 tablet by mouth daily 19  Yes Suzanne Martinez MD   metoprolol tartrate (LOPRESSOR) 25 MG tablet Take 1 tablet by mouth 2 times daily 19  Yes Suzanne Martinez MD   miconazole (MICOTIN) 2 % powder Apply topically 2 times daily. 19  Yes Suzanne Martinez MD   potassium chloride (KLOR-CON M) 20 MEQ TBCR extended release tablet Take 2 tablets by mouth daily 19  Yes Suzanne Martinez MD   sodium chloride 1 g tablet Take 1 tablet by mouth 2 times daily (with meals) 19  Yes Suzanne Martinez MD   Ascorbic Acid (VITAMIN C PO) Take 500 mg by mouth daily    Yes Historical Provider, MD   Probiotic Product (ADWOA-BID PROBIOTIC PO) Take 1 tablet by mouth 2 times daily    Yes Historical Provider, MD   Acetaminophen (TYLENOL) 325 MG CAPS Take 325 mg by mouth Give 2 tablets by mouth every 4 hours as needed for Temp 100 or above   Yes Historical Provider, MD   bacitracin 500 UNIT/GM ointment Apply topically daily suprapubic cath site   Yes Historical Provider, MD   calcium-vitamin D (OSCAL-500) 500-200 MG-UNIT per tablet Take 1 tablet by mouth 2 times daily 18  Yes Elia Vega MD   ACETIC ACID IR 2 % Indications: use 60ml via irrigation every evening and night shift for catheter patency.     Yes Historical Provider, MD   famotidine (PEPCID) 20 MG tablet Take 1 tablet by mouth 2 times daily 18  Yes Karen Grimes MD   docusate sodium (COLACE) 100 MG capsule Take 100 mg by mouth daily    Yes Historical Provider, MD   tiZANidine (ZANAFLEX) 2 MG tablet Take 2 mg by mouth 3 times daily  12/15/16  Yes Historical Provider, MD   ascorbic acid (VITAMIN C) 500 MG tablet Take 1 tablet by mouth daily 3/28/16  Yes Demond Hancock MD   Multiple Vitamin (MULTIVITAMIN) tablet Take 1 tablet by mouth daily 3/28/16  Yes Demond Hancock MD   vitamin A 67145 UNITS capsule Take 2 capsules by mouth daily 3/28/16  Yes Demond Hancock MD   vitamin D (ERGOCALCIFEROL) 46270 units capsule Take 1 capsule by mouth once a week for 8 doses 11/23/18 1/12/19  Hairnder Johnson MD   Cranberry-Vitamin C-Inulin (UTI-STAT PO) Take 30 mLs by mouth 3 times daily    Historical Provider, MD   FLUoxetine (PROZAC) 10 MG capsule Take 20 mg by mouth daily     Historical Provider, MD       Current medications:    Current Facility-Administered Medications   Medication Dose Route Frequency Provider Last Rate Last Dose    0.9 % sodium chloride infusion   Intravenous Continuous Tracy Farias  mL/hr at 07/08/19 0821      ceFAZolin (ANCEF) 1 g in dextrose 5 % 50 mL IVPB (premix)  1 g Intravenous 25 [facility] Tracy Farias MD           Allergies:  No Known Allergies    Problem List:    Patient Active Problem List   Diagnosis Code    Neurogenic bladder N31.9    Multiple sclerosis ([address] G35    MS (multiple sclerosis) ([address] G35    Urinary retention R33.9    Indwelling catheter present on admission Z96.0    Cystostomy malfunction ([address] J48.280    Decubitus ulcer of coccyx L89.159    Decubitus ulcer, stage 3 ([address] L89.93    Malnutrition ([address] E46    Decubitus ulcer of right perineal ischial region, stage 3 ([address] L89.313    Multiple sclerosis ([address] G35    Pulmonary embolism on left ([address] I26.99    Acute metabolic encephalopathy N59.49    Speech disturbance R47.9    Infected decubitus ulcer, stage I L89.91, L08.9    Infected decubitus ulcer, stage III ([address] L89.93, L08.9   

## 2019-07-08 NOTE — ANESTHESIA PRE PROCEDURE
Department of Anesthesiology  Preprocedure Note       Name:  Tonio Wagner   Age:  60 y.o.  :  1955                                          MRN:  [id number]         Date:  2019      Surgeon: Lora Diallo):  Naomi Randhawa MD    Procedure: CYSTO, POSS LEFT URETEROSCOPY, LASER LITHOTRIPSY, POSS BASKET RETRIEVAL OF STONE FRAGMENTS AND POSS LEFT URETERAL STENT (N/A )    Medications prior to admission:   Prior to Admission medications    Medication Sig Start Date End Date Taking? Authorizing Provider   rivaroxaban (XARELTO) 10 MG TABS tablet Take 1 tablet by mouth daily 19  Yes Sajan Canseco MD   metoprolol tartrate (LOPRESSOR) 25 MG tablet Take 1 tablet by mouth 2 times daily 19  Yes Sajan Canseco MD   miconazole (MICOTIN) 2 % powder Apply topically 2 times daily. 19  Yes Sajan Canseco MD   potassium chloride (KLOR-CON M) 20 MEQ TBCR extended release tablet Take 2 tablets by mouth daily 19  Yes Sajan Canseco MD   sodium chloride 1 g tablet Take 1 tablet by mouth 2 times daily (with meals) 19  Yes Sajan Canseco MD   Ascorbic Acid (VITAMIN C PO) Take 500 mg by mouth daily    Yes Historical Provider, MD   Probiotic Product (ADWOA-BID PROBIOTIC PO) Take 1 tablet by mouth 2 times daily    Yes Historical Provider, MD   Acetaminophen (TYLENOL) 325 MG CAPS Take 325 mg by mouth Give 2 tablets by mouth every 4 hours as needed for Temp 100 or above   Yes Historical Provider, MD   bacitracin 500 UNIT/GM ointment Apply topically daily suprapubic cath site   Yes Historical Provider, MD   calcium-vitamin D (OSCAL-500) 500-200 MG-UNIT per tablet Take 1 tablet by mouth 2 times daily 18  Yes Jo Ann Henderson MD   ACETIC ACID IR 2 % Indications: use 60ml via irrigation every evening and night shift for catheter patency.     Yes Historical Provider, MD   famotidine (PEPCID) 20 MG tablet Take 1 tablet by mouth 2 times daily 18  Yes Deandra Mcknight MD   docusate sodium (COLACE) 100 MG capsule  Smoking status: Former Smoker     Last attempt to quit: 1982     Years since quittin.5    Smokeless tobacco: Former User   Substance Use Topics    Alcohol use: No     Comment: \"quit alcohol a few years ago\"                                Counseling given: Not Answered      Vital Signs (Current):   Vitals:    19 0822   BP: 138/73   Pulse: 67   Resp: 16   Temp: 96.3 °F (35.7 °C)   TempSrc: Oral   SpO2: 98%   Weight: 202 lb (91.6 kg)   Height: 5' 7\" (1.702 m)                                              BP Readings from Last 3 Encounters:   19 138/73   19 135/71   19 108/71       NPO Status: Time of last liquid consumption:                         Time of last solid consumption: 1800                        Date of last liquid consumption: 19                        Date of last solid food consumption: 19    BMI:   Wt Readings from Last 3 Encounters:   19 202 lb (91.6 kg)   06/15/19 209 lb 11.2 oz (95.1 kg)   19 204 lb 12.8 oz (92.9 kg)     Body mass index is 31.64 kg/m². CBC:   Lab Results   Component Value Date    WBC 7.1 2019    RBC 3.37 2019    RBC 4.20 2012    HGB 9.5 2019    HCT 29.4 2019    MCV 87.3 2019    RDW 16.6 2019     2019       CMP:   Lab Results   Component Value Date     2019    K 4.1 2019    K 3.5 06/15/2019     2019    CO2 27 2019    BUN 15 2019    CREATININE 0.52 2019    AGRATIO 0.7 2019    LABGLOM >90 2019    GLUCOSE 105 2019    PROT 6.9 2019    CALCIUM 8.90 2019    BILITOT 0.4 2019    ALKPHOS 178 2019    AST 12 2019    ALT 16 2019       POC Tests: No results for input(s): POCGLU, POCNA, POCK, POCCL, POCBUN, POCHEMO, POCHCT in the last 72 hours.     Coags:   Lab Results   Component Value Date    INR 1.37 2019    APTT 33.7 2019       HCG (If Applicable): No results found for: PREGTESTUR, PREGSERUM, HCG, HCGQUANT     ABGs: No results found for: PHART, PO2ART, YDB6BNT, OCQ4FBB, BEART, M8QNIRDX     Type & Screen (If Applicable):  Lab Results   Component Value Date    LABRH POS 06/12/2018       Anesthesia Evaluation    Airway: Mallampati: II  TM distance: >3 FB   Neck ROM: full  Mouth opening: > = 3 FB Dental:          Pulmonary:   (+) decreased breath sounds,                             Cardiovascular:    (+) hypertension:,         Rhythm: regular                      Neuro/Psych:   (+) neuromuscular disease: multiple sclerosis, psychiatric history:            GI/Hepatic/Renal:   (+) morbid obesity          Endo/Other:                     Abdominal:   (+) obese,         Vascular:                                        Anesthesia Plan      general     ASA 4       Induction: intravenous. MIPS: Postoperative opioids intended and Prophylactic antiemetics administered. Anesthetic plan and risks discussed with patient. Plan discussed with CRNA.                   Estee Rodríguez MD   7/8/2019

## 2019-07-08 NOTE — H&P
History of pulmonary embolism      History of urinary tract infection      Hx of blood clots       Pulmonary embolis    Hypertension      Major depressive disorder, single episode      MS [facility]) [facility]      Neurogenic bladder 2012     Dr. Skyler Nazario placed cather    Osteomyelitis [facility])      UTI (urinary tract infection)           Past Surgical History:    Past Surgical History             Procedure Laterality Date    ANKLE SURGERY        broken ankle    BLADDER SURGERY   2012     Suprapubic catheter placement    COLONOSCOPY        HI LAP,SURG,COLECTOMY,W/END COLOST & CLOSUR N/A 2018     ROBOT DIVERTING COLOSTOMY performed by Layla Rahman MD at [facility] child         Allergies:  Patient has no known allergies. Social History:  Social History               Socioeconomic History    Marital status:        Spouse name: Brianna Gutierrez Number of children: 2    Years of education: Not on file    Highest education level: Not on file   Occupational History    Not on file   Social Needs    Financial resource strain: Not on file    Food insecurity:       Worry: Not on file       Inability: Not on file    Transportation needs:       Medical: Not on file       Non-medical: Not on file   Tobacco Use    Smoking status: Former Smoker       Last attempt to quit: 1982       Years since quittin.4    Smokeless tobacco: Former User   Substance and Sexual Activity    Alcohol use:  No       Comment: \"quit alcohol a few years ago\"    Drug use: No    Sexual activity: Yes       Partners: Female   Lifestyle    Physical activity:       Days per week: Not on file       Minutes per session: Not on file    Stress: Not on file   Relationships    Social connections:       Talks on phone: Not on file       Gets together: Not on file       Attends Taoist service: Not on file       Active member of club or organization: Not on file       Attends meetings of

## 2019-07-30 ENCOUNTER — OUTSIDE SERVICES (OUTPATIENT)
Dept: PHYSICAL MEDICINE AND REHAB | Age: 64
End: 2019-07-30
Payer: COMMERCIAL

## 2019-07-30 DIAGNOSIS — G35 MULTIPLE SCLEROSIS, SECONDARY PROGRESSIVE (HCC): ICD-10-CM

## 2019-07-30 PROCEDURE — 99305 1ST NF CARE MODERATE MDM 35: CPT | Performed by: PHYSICAL MEDICINE & REHABILITATION

## 2019-08-12 NOTE — PROGRESS NOTES
Allergies:    No Known Allergies     Current Medications:   No current facility-administered medications for this visit.      Social History:  Social History     Socioeconomic History    Marital status:      Spouse name: Evelyn Lancaster Number of children: 2    Years of education: Not on file    Highest education level: Not on file   Occupational History    Not on file   Social Needs    Financial resource strain: Not on file    Food insecurity:     Worry: Not on file     Inability: Not on file    Transportation needs:     Medical: Not on file     Non-medical: Not on file   Tobacco Use    Smoking status: Former Smoker     Last attempt to quit: 1982     Years since quittin.6    Smokeless tobacco: Former User   Substance and Sexual Activity    Alcohol use: No     Comment: \"quit alcohol a few years ago\"    Drug use: No    Sexual activity: Yes     Partners: Female   Lifestyle    Physical activity:     Days per week: Not on file     Minutes per session: Not on file    Stress: Not on file   Relationships    Social connections:     Talks on phone: Not on file     Gets together: Not on file     Attends Christianity service: Not on file     Active member of club or organization: Not on file     Attends meetings of clubs or organizations: Not on file     Relationship status: Not on file    Intimate partner violence:     Fear of current or ex partner: Not on file     Emotionally abused: Not on file     Physically abused: Not on file     Forced sexual activity: Not on file   Other Topics Concern    Not on file   Social History Narrative    Not on file       Family History:       Problem Relation Age of Onset    Cancer Mother         Breast    Heart Disease Father 48    Arthritis Sister     Heart Disease Paternal Grandmother 50       Review of Systems:  CONSTITUTIONAL:  negative  EYES:  negative  HEENT:  negative  RESPIRATORY:  negative  CARDIOVASCULAR:  negative  GASTROINTESTINAL:

## 2019-08-30 ENCOUNTER — OUTSIDE SERVICES (OUTPATIENT)
Dept: PHYSICAL MEDICINE AND REHAB | Age: 64
End: 2019-08-30
Payer: COMMERCIAL

## 2019-08-30 DIAGNOSIS — G35 MULTIPLE SCLEROSIS (HCC): ICD-10-CM

## 2019-08-30 PROCEDURE — 99308 SBSQ NF CARE LOW MDM 20: CPT | Performed by: PHYSICAL MEDICINE & REHABILITATION

## 2019-09-09 ASSESSMENT — ENCOUNTER SYMPTOMS
RESPIRATORY NEGATIVE: 1
GASTROINTESTINAL NEGATIVE: 1
EYES NEGATIVE: 1

## 2019-10-03 ENCOUNTER — APPOINTMENT (OUTPATIENT)
Dept: GENERAL RADIOLOGY | Age: 64
End: 2019-10-03
Payer: COMMERCIAL

## 2019-10-03 ENCOUNTER — HOSPITAL ENCOUNTER (EMERGENCY)
Age: 64
Discharge: HOME | End: 2019-10-03
Payer: COMMERCIAL

## 2019-10-03 VITALS
WEIGHT: 202 LBS | SYSTOLIC BLOOD PRESSURE: 113 MMHG | BODY MASS INDEX: 31.71 KG/M2 | RESPIRATION RATE: 18 BRPM | DIASTOLIC BLOOD PRESSURE: 76 MMHG | HEART RATE: 96 BPM | HEIGHT: 67 IN | OXYGEN SATURATION: 95 % | TEMPERATURE: 98.6 F

## 2019-10-03 DIAGNOSIS — S72.8X2A OTHER FRACTURE OF LEFT FEMUR, INITIAL ENCOUNTER FOR CLOSED FRACTURE (HCC): Primary | ICD-10-CM
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
## 2019-10-03 LAB
ALBUMIN SERPL-MCNC: 3.1 G/DL (ref 3.5–5.1)
ALP BLD-CCNC: 220 U/L (ref 38–126)
ALT SERPL-CCNC: 37 U/L (ref 11–66)
ANION GAP SERPL CALCULATED.3IONS-SCNC: 10 MEQ/L (ref 8–16)
AST SERPL-CCNC: 22 U/L (ref 5–40)
BASOPHILS # BLD: 0.9 %
BASOPHILS ABSOLUTE: 0.1 THOU/MM3 (ref 0–0.1)
BILIRUB SERPL-MCNC: 0.3 MG/DL (ref 0.3–1.2)
BILIRUBIN DIRECT: < 0.2 MG/DL (ref 0–0.3)
BUN BLDV-MCNC: 21 MG/DL (ref 7–22)
CALCIUM SERPL-MCNC: 9 MG/DL (ref 8.5–10.5)
CHLORIDE BLD-SCNC: 98 MEQ/L (ref 98–111)
CO2: 26 MEQ/L (ref 23–33)
CREAT SERPL-MCNC: 0.5 MG/DL (ref 0.4–1.2)
EOSINOPHIL # BLD: 5.1 %
EOSINOPHILS ABSOLUTE: 0.3 THOU/MM3 (ref 0–0.4)
ERYTHROCYTE [DISTWIDTH] IN BLOOD BY AUTOMATED COUNT: 16.8 % (ref 11.5–14.5)
ERYTHROCYTE [DISTWIDTH] IN BLOOD BY AUTOMATED COUNT: 53.1 FL (ref 35–45)
GFR SERPL CREATININE-BSD FRML MDRD: > 90 ML/MIN/1.73M2
GLUCOSE BLD-MCNC: 135 MG/DL (ref 70–108)
HCT VFR BLD CALC: 31.8 % (ref 42–52)
HEMOGLOBIN: 9.3 GM/DL (ref 14–18)
IMMATURE GRANS (ABS): 0.01 THOU/MM3 (ref 0–0.07)
IMMATURE GRANULOCYTES: 0.2 %
INR BLD: 1.52 (ref 0.85–1.13)
LYMPHOCYTES # BLD: 21.3 %
LYMPHOCYTES ABSOLUTE: 1.2 THOU/MM3 (ref 1–4.8)
MCH RBC QN AUTO: 25.3 PG (ref 26–33)
MCHC RBC AUTO-ENTMCNC: 29.2 GM/DL (ref 32.2–35.5)
MCV RBC AUTO: 86.6 FL (ref 80–94)
MONOCYTES # BLD: 10.5 %
MONOCYTES ABSOLUTE: 0.6 THOU/MM3 (ref 0.4–1.3)
NUCLEATED RED BLOOD CELLS: 0 /100 WBC
OSMOLALITY CALCULATION: 273.2 MOSMOL/KG (ref 275–300)
PLATELET # BLD: 296 THOU/MM3 (ref 130–400)
PMV BLD AUTO: 8.1 FL (ref 9.4–12.4)
POTASSIUM SERPL-SCNC: 4.1 MEQ/L (ref 3.5–5.2)
RBC # BLD: 3.67 MILL/MM3 (ref 4.7–6.1)
SEG NEUTROPHILS: 62 %
SEGMENTED NEUTROPHILS ABSOLUTE COUNT: 3.5 THOU/MM3 (ref 1.8–7.7)
SODIUM BLD-SCNC: 134 MEQ/L (ref 135–145)
TOTAL PROTEIN: 7.7 G/DL (ref 6.1–8)
WBC # BLD: 5.7 THOU/MM3 (ref 4.8–10.8)

## 2019-10-03 PROCEDURE — 80053 COMPREHEN METABOLIC PANEL: CPT

## 2019-10-03 PROCEDURE — 6370000000 HC RX 637 (ALT 250 FOR IP): Performed by: PHYSICIAN ASSISTANT

## 2019-10-03 PROCEDURE — 2709999900 HC NON-CHARGEABLE SUPPLY

## 2019-10-03 PROCEDURE — 36415 COLL VENOUS BLD VENIPUNCTURE: CPT

## 2019-10-03 PROCEDURE — 82248 BILIRUBIN DIRECT: CPT

## 2019-10-03 PROCEDURE — 99282 EMERGENCY DEPT VISIT SF MDM: CPT

## 2019-10-03 PROCEDURE — 73560 X-RAY EXAM OF KNEE 1 OR 2: CPT

## 2019-10-03 PROCEDURE — 85025 COMPLETE CBC W/AUTO DIFF WBC: CPT

## 2019-10-03 PROCEDURE — 85610 PROTHROMBIN TIME: CPT

## 2019-10-03 PROCEDURE — L1830 KO IMMOB CANVAS LONG PRE OTS: HCPCS

## 2019-10-03 PROCEDURE — 73552 X-RAY EXAM OF FEMUR 2/>: CPT

## 2019-10-03 RX ORDER — HYDROCODONE BITARTRATE AND ACETAMINOPHEN 5; 325 MG/1; MG/1
1 TABLET ORAL ONCE
Status: COMPLETED | OUTPATIENT
Start: 2019-10-03 | End: 2019-10-03

## 2019-10-03 RX ORDER — HYDROCODONE BITARTRATE AND ACETAMINOPHEN 5; 325 MG/1; MG/1
1 TABLET ORAL EVERY 6 HOURS PRN
Qty: 10 TABLET | Refills: 0 | Status: SHIPPED | OUTPATIENT
Start: 2019-10-03 | End: 2019-10-06

## 2019-10-03 RX ADMIN — HYDROCODONE BITARTRATE AND ACETAMINOPHEN 1 TABLET: 5; 325 TABLET ORAL at 17:11

## 2019-10-03 ASSESSMENT — ENCOUNTER SYMPTOMS
BACK PAIN: 0
VOMITING: 0
NAUSEA: 0
CONSTIPATION: 0
SHORTNESS OF BREATH: 0
ABDOMINAL PAIN: 0
COLOR CHANGE: 0
DIARRHEA: 0

## 2019-10-03 ASSESSMENT — PAIN SCALES - GENERAL
PAINLEVEL_OUTOF10: 7
PAINLEVEL_OUTOF10: 7
PAINLEVEL_OUTOF10: 4
PAINLEVEL_OUTOF10: 5
PAINLEVEL_OUTOF10: 4

## 2019-10-03 ASSESSMENT — PAIN DESCRIPTION - LOCATION: LOCATION: LEG

## 2019-10-03 ASSESSMENT — PAIN DESCRIPTION - PAIN TYPE: TYPE: ACUTE PAIN

## 2019-10-03 ASSESSMENT — PAIN DESCRIPTION - ORIENTATION: ORIENTATION: LEFT

## 2020-02-20 ENCOUNTER — OUTSIDE SERVICES (OUTPATIENT)
Dept: UROLOGY | Age: 65
End: 2020-02-20
Payer: COMMERCIAL

## 2020-02-20 VITALS
DIASTOLIC BLOOD PRESSURE: 84 MMHG | SYSTOLIC BLOOD PRESSURE: 150 MMHG | TEMPERATURE: 97.3 F | HEART RATE: 86 BPM | RESPIRATION RATE: 18 BRPM

## 2020-02-20 PROCEDURE — 99309 SBSQ NF CARE MODERATE MDM 30: CPT | Performed by: NURSE PRACTITIONER

## 2020-02-20 RX ORDER — OXYBUTYNIN CHLORIDE 5 MG/1
5 TABLET, EXTENDED RELEASE ORAL DAILY
Qty: 360 TABLET | Refills: 0 | Status: ON HOLD
Start: 2020-02-20 | End: 2022-04-25

## 2020-02-20 NOTE — PROGRESS NOTES
[address] [facility] E[facility] Visit  [address] [id number]  Dept: [phone]  Loc: [phone]  Visit Date: 2/20/2020      HPI:     Jamal Velasquez is a 60 y.o. with past medical history of kidney stones, neurogenic bladder, and multiple sclerosis who presents today in follow-up for evaluation of SP tube with complaints of leakage around catheter and reported hypergranulation tissue. He denies any problems with his suprapubic catheter. He believes it is draining well. Denies hematuria, flank pain, abdominal pain, suprapubic pain, or fever. Wound care has been following and treating with silver nitrate and algenate dressing. He reportedly has been having spasms with urinary leakage around catheter and from native urethra. Hx is obtained from the patient and medical record. Current Outpatient Medications   Medication Sig Dispense Refill    rivaroxaban (XARELTO) 10 MG TABS tablet Take 1 tablet by mouth daily 30 tablet     metoprolol tartrate (LOPRESSOR) 25 MG tablet Take 1 tablet by mouth 2 times daily 60 tablet 3    miconazole (MICOTIN) 2 % powder Apply topically 2 times daily.  45 g 1    potassium chloride (KLOR-CON M) 20 MEQ TBCR extended release tablet Take 2 tablets by mouth daily 60 tablet 3    sodium chloride 1 g tablet Take 1 tablet by mouth 2 times daily (with meals) 90 tablet 3    Probiotic Product (ADWOA-BID PROBIOTIC PO) Take 1 tablet by mouth 2 times daily       Acetaminophen (TYLENOL) 325 MG CAPS Take 325 mg by mouth Give 2 tablets by mouth every 4 hours as needed for Temp 100 or above      bacitracin 500 UNIT/GM ointment Apply topically daily suprapubic cath site      calcium-vitamin D (OSCAL-500) 500-200 MG-UNIT per tablet Take 1 tablet by mouth 2 times daily 30 tablet 3    vitamin D (ERGOCALCIFEROL) 98174 units capsule Take 1 capsule by mouth once a week for 8 doses 30 capsule     Cranberry-Vitamin pain, extremity pain or weakness. No rashes.  symptoms per HPI. The remainder of the review of symptoms is negative. Objective:     PE:   Vitals:    02/20/20 0845   BP: (!) 150/84   Pulse: 86   Resp: 18   Temp: 97.3 °F (36.3 °C)       Constitutional: Alert and oriented times 3, no acute distress and cooperative to examination with appropriate mood and affect. HENT:   Head:        Normocephalic and atraumatic. Mouth/Throat:         Mucous membranes are normal.   Eyes:         EOM are normal. No scleral icterus. PERRLA. Neck:        Supple, symmetrical, trachea midline  Cardiovascular:        Normal rate, regular rhythm, S1 S2 heart sounds. No murmurs, rub, or gallops. Pulses:       Radial pulses are 2+/4 bilateral and equal. Posterior tibialis 2+/4 bilateral and equal  Pulmonary/Chest:      Chest symmetric with normal A/P diameter,  CTA with no wheezes, rales, or rhonchi noted. Normal respiratory rate and rhthym. No use of accessory muscles. Abdominal:         Soft. Round. No tenderness. Bowel sounds present. 20 [place] SP catheter intact and site moist with scant drainage. Very small hypergranulation tissue noted but healing well using silver nitrate. Calcium alginate and dry occlusive dressing intact. Musculoskeletal:         Limited ROM lower extremities. Extremities: No cyanosis, clubbing, or edema present. Neurological:        Alert and oriented. Psychiatric:        Normal mood and affect. Labs   Patients recent PSA values are as follows  Lab Results   Component Value Date    PSA 2.66 (H) 01/20/2012        Recent BUN/Creatinine:  Lab Results   Component Value Date    BUN 21 10/03/2019    CREATININE 0.5 10/03/2019       Radiology  No recent imaging for review     Assessment & Plan:     Andre Florentino was seen today for consultation.     Diagnoses and all orders for this visit:    Neurogenic bladder disorder    Chronic suprapubic catheter ([address]    Stas Vallejo is doing well today.      Continue recommended SP wound treatment per Wound Care of hypergranulation tissue at [facility] site. Site healing well. Agree with current treatment of wound. Keep dressing dry. Continue SP catheter changes every 4-6 weeks per ECF    Start Oxybutynin XL 5 mg PO daily for neurogenic bladder. Return in about 1 year (around 2/20/2021) for neurogenic bladder.     Lyndon Coronado, APRN-CNP  Urology

## 2020-12-27 ENCOUNTER — HOSPITAL ENCOUNTER (INPATIENT)
Age: 65
LOS: 3 days | Discharge: NURSING, REHAB OR LONG-TERM CARE | DRG: 698 | End: 2020-12-30
Attending: EMERGENCY MEDICINE | Admitting: INTERNAL MEDICINE
Payer: COMMERCIAL

## 2020-12-27 ENCOUNTER — APPOINTMENT (OUTPATIENT)
Dept: GENERAL RADIOLOGY | Age: 65
DRG: 698 | End: 2020-12-27
Payer: COMMERCIAL

## 2020-12-27 DIAGNOSIS — T83.511A URINARY TRACT INFECTION ASSOCIATED WITH INDWELLING URETHRAL CATHETER, INITIAL ENCOUNTER (HCC): ICD-10-CM

## 2020-12-27 DIAGNOSIS — N39.0 URINARY TRACT INFECTION ASSOCIATED WITH INDWELLING URETHRAL CATHETER, INITIAL ENCOUNTER (HCC): ICD-10-CM

## 2020-12-27 DIAGNOSIS — A41.9 SEPSIS, DUE TO UNSPECIFIED ORGANISM, UNSPECIFIED WHETHER ACUTE ORGAN DYSFUNCTION PRESENT (HCC): Primary | ICD-10-CM

## 2020-12-27 LAB
ALBUMIN SERPL-MCNC: 3.6 G/DL (ref 3.5–5.1)
ALP BLD-CCNC: 161 U/L (ref 38–126)
ALT SERPL-CCNC: 58 U/L (ref 11–66)
AMORPHOUS: ABNORMAL
ANION GAP SERPL CALCULATED.3IONS-SCNC: 11 MEQ/L (ref 8–16)
AST SERPL-CCNC: 43 U/L (ref 5–40)
BACTERIA: ABNORMAL /HPF
BASOPHILS # BLD: 0.4 %
BASOPHILS ABSOLUTE: 0.1 THOU/MM3 (ref 0–0.1)
BILIRUB SERPL-MCNC: 0.4 MG/DL (ref 0.3–1.2)
BILIRUBIN DIRECT: < 0.2 MG/DL (ref 0–0.3)
BILIRUBIN URINE: NEGATIVE
BLOOD, URINE: ABNORMAL
BUN BLDV-MCNC: 13 MG/DL (ref 7–22)
CALCIUM SERPL-MCNC: 9 MG/DL (ref 8.5–10.5)
CASTS 2: ABNORMAL /LPF
CASTS UA: ABNORMAL /LPF
CHARACTER, URINE: ABNORMAL
CHLORIDE BLD-SCNC: 103 MEQ/L (ref 98–111)
CO2: 23 MEQ/L (ref 23–33)
COLOR: YELLOW
CREAT SERPL-MCNC: 0.3 MG/DL (ref 0.4–1.2)
CRYSTALS, UA: ABNORMAL
EOSINOPHIL # BLD: 0.2 %
EOSINOPHILS ABSOLUTE: 0 THOU/MM3 (ref 0–0.4)
EPITHELIAL CELLS, UA: ABNORMAL /HPF
ERYTHROCYTE [DISTWIDTH] IN BLOOD BY AUTOMATED COUNT: 14.6 % (ref 11.5–14.5)
ERYTHROCYTE [DISTWIDTH] IN BLOOD BY AUTOMATED COUNT: 50.3 FL (ref 35–45)
GFR SERPL CREATININE-BSD FRML MDRD: > 90 ML/MIN/1.73M2
GLUCOSE BLD-MCNC: 161 MG/DL (ref 70–108)
GLUCOSE URINE: NEGATIVE MG/DL
HCT VFR BLD CALC: 45.6 % (ref 42–52)
HEMOGLOBIN: 14.8 GM/DL (ref 14–18)
IMMATURE GRANS (ABS): 0.11 THOU/MM3 (ref 0–0.07)
IMMATURE GRANULOCYTES: 0.6 %
KETONES, URINE: ABNORMAL
LACTIC ACID: 3.1 MMOL/L (ref 0.5–2.2)
LEUKOCYTE ESTERASE, URINE: ABNORMAL
LIPASE: 20 U/L (ref 5.6–51.3)
LYMPHOCYTES # BLD: 6.9 %
LYMPHOCYTES ABSOLUTE: 1.3 THOU/MM3 (ref 1–4.8)
MCH RBC QN AUTO: 30.8 PG (ref 26–33)
MCHC RBC AUTO-ENTMCNC: 32.5 GM/DL (ref 32.2–35.5)
MCV RBC AUTO: 94.8 FL (ref 80–94)
MISCELLANEOUS 2: ABNORMAL
MONOCYTES # BLD: 7.8 %
MONOCYTES ABSOLUTE: 1.5 THOU/MM3 (ref 0.4–1.3)
MUCUS: ABNORMAL
NITRITE, URINE: POSITIVE
NUCLEATED RED BLOOD CELLS: 0 /100 WBC
OSMOLALITY CALCULATION: 277.4 MOSMOL/KG (ref 275–300)
PH UA: 6.5 (ref 5–9)
PLATELET # BLD: 193 THOU/MM3 (ref 130–400)
PMV BLD AUTO: 9 FL (ref 9.4–12.4)
POTASSIUM SERPL-SCNC: 3.9 MEQ/L (ref 3.5–5.2)
PROCALCITONIN: 0.22 NG/ML (ref 0.01–0.09)
PROCALCITONIN: 0.25 NG/ML (ref 0.01–0.09)
PROTEIN UA: 100
RBC # BLD: 4.81 MILL/MM3 (ref 4.7–6.1)
RBC URINE: ABNORMAL /HPF
RENAL EPITHELIAL, UA: ABNORMAL
SARS-COV-2, NAAT: NOT DETECTED
SEG NEUTROPHILS: 84.1 %
SEGMENTED NEUTROPHILS ABSOLUTE COUNT: 15.7 THOU/MM3 (ref 1.8–7.7)
SODIUM BLD-SCNC: 137 MEQ/L (ref 135–145)
SPECIFIC GRAVITY, URINE: 1.02 (ref 1–1.03)
TOTAL PROTEIN: 7.1 G/DL (ref 6.1–8)
TROPONIN T: < 0.01 NG/ML
UROBILINOGEN, URINE: 1 EU/DL (ref 0–1)
WBC # BLD: 18.7 THOU/MM3 (ref 4.8–10.8)
WBC UA: > 200 /HPF
YEAST: ABNORMAL

## 2020-12-27 PROCEDURE — 96365 THER/PROPH/DIAG IV INF INIT: CPT

## 2020-12-27 PROCEDURE — 84145 PROCALCITONIN (PCT): CPT

## 2020-12-27 PROCEDURE — 82248 BILIRUBIN DIRECT: CPT

## 2020-12-27 PROCEDURE — 84484 ASSAY OF TROPONIN QUANT: CPT

## 2020-12-27 PROCEDURE — 36415 COLL VENOUS BLD VENIPUNCTURE: CPT

## 2020-12-27 PROCEDURE — 99223 1ST HOSP IP/OBS HIGH 75: CPT | Performed by: INTERNAL MEDICINE

## 2020-12-27 PROCEDURE — 2580000003 HC RX 258: Performed by: EMERGENCY MEDICINE

## 2020-12-27 PROCEDURE — 85025 COMPLETE CBC W/AUTO DIFF WBC: CPT

## 2020-12-27 PROCEDURE — 81001 URINALYSIS AUTO W/SCOPE: CPT

## 2020-12-27 PROCEDURE — 6370000000 HC RX 637 (ALT 250 FOR IP): Performed by: INTERNAL MEDICINE

## 2020-12-27 PROCEDURE — 83690 ASSAY OF LIPASE: CPT

## 2020-12-27 PROCEDURE — 2580000003 HC RX 258: Performed by: INTERNAL MEDICINE

## 2020-12-27 PROCEDURE — U0002 COVID-19 LAB TEST NON-CDC: HCPCS

## 2020-12-27 PROCEDURE — 93005 ELECTROCARDIOGRAM TRACING: CPT

## 2020-12-27 PROCEDURE — 2140000000 HC CCU INTERMEDIATE R&B

## 2020-12-27 PROCEDURE — 6370000000 HC RX 637 (ALT 250 FOR IP)

## 2020-12-27 PROCEDURE — 99285 EMERGENCY DEPT VISIT HI MDM: CPT

## 2020-12-27 PROCEDURE — 87086 URINE CULTURE/COLONY COUNT: CPT

## 2020-12-27 PROCEDURE — 83605 ASSAY OF LACTIC ACID: CPT

## 2020-12-27 PROCEDURE — 6360000002 HC RX W HCPCS: Performed by: EMERGENCY MEDICINE

## 2020-12-27 PROCEDURE — 80053 COMPREHEN METABOLIC PANEL: CPT

## 2020-12-27 PROCEDURE — 71045 X-RAY EXAM CHEST 1 VIEW: CPT

## 2020-12-27 PROCEDURE — 87040 BLOOD CULTURE FOR BACTERIA: CPT

## 2020-12-27 PROCEDURE — 6360000002 HC RX W HCPCS: Performed by: INTERNAL MEDICINE

## 2020-12-27 RX ORDER — M-VIT,TX,IRON,MINS/CALC/FOLIC 27MG-0.4MG
1 TABLET ORAL DAILY
Status: DISCONTINUED | OUTPATIENT
Start: 2020-12-28 | End: 2020-12-30 | Stop reason: HOSPADM

## 2020-12-27 RX ORDER — SODIUM CHLORIDE 1000 MG
1 TABLET, SOLUBLE MISCELLANEOUS 2 TIMES DAILY WITH MEALS
Status: DISCONTINUED | OUTPATIENT
Start: 2020-12-27 | End: 2020-12-30 | Stop reason: HOSPADM

## 2020-12-27 RX ORDER — 0.9 % SODIUM CHLORIDE 0.9 %
1000 INTRAVENOUS SOLUTION INTRAVENOUS ONCE
Status: COMPLETED | OUTPATIENT
Start: 2020-12-27 | End: 2020-12-27

## 2020-12-27 RX ORDER — POLYETHYLENE GLYCOL 3350 17 G/17G
17 POWDER, FOR SOLUTION ORAL DAILY PRN
Status: DISCONTINUED | OUTPATIENT
Start: 2020-12-27 | End: 2020-12-30 | Stop reason: HOSPADM

## 2020-12-27 RX ORDER — ACETAMINOPHEN 650 MG/1
650 SUPPOSITORY RECTAL EVERY 6 HOURS PRN
Status: DISCONTINUED | OUTPATIENT
Start: 2020-12-27 | End: 2020-12-30 | Stop reason: HOSPADM

## 2020-12-27 RX ORDER — ACETAMINOPHEN 500 MG
1000 TABLET ORAL ONCE
Status: COMPLETED | OUTPATIENT
Start: 2020-12-27 | End: 2020-12-27

## 2020-12-27 RX ORDER — SODIUM CHLORIDE 0.9 % (FLUSH) 0.9 %
10 SYRINGE (ML) INJECTION EVERY 12 HOURS SCHEDULED
Status: DISCONTINUED | OUTPATIENT
Start: 2020-12-27 | End: 2020-12-30 | Stop reason: HOSPADM

## 2020-12-27 RX ORDER — ACETAMINOPHEN 500 MG
TABLET ORAL
Status: COMPLETED
Start: 2020-12-27 | End: 2020-12-27

## 2020-12-27 RX ORDER — FAMOTIDINE 20 MG/1
20 TABLET, FILM COATED ORAL 2 TIMES DAILY
Status: DISCONTINUED | OUTPATIENT
Start: 2020-12-27 | End: 2020-12-30 | Stop reason: HOSPADM

## 2020-12-27 RX ORDER — SODIUM CHLORIDE 0.9 % (FLUSH) 0.9 %
10 SYRINGE (ML) INJECTION PRN
Status: DISCONTINUED | OUTPATIENT
Start: 2020-12-27 | End: 2020-12-30 | Stop reason: HOSPADM

## 2020-12-27 RX ORDER — SODIUM CHLORIDE 9 MG/ML
INJECTION, SOLUTION INTRAVENOUS CONTINUOUS
Status: DISCONTINUED | OUTPATIENT
Start: 2020-12-27 | End: 2020-12-29

## 2020-12-27 RX ORDER — POTASSIUM CHLORIDE 750 MG/1
40 TABLET, FILM COATED, EXTENDED RELEASE ORAL DAILY
Status: DISCONTINUED | OUTPATIENT
Start: 2020-12-28 | End: 2020-12-30 | Stop reason: HOSPADM

## 2020-12-27 RX ORDER — PROMETHAZINE HYDROCHLORIDE 25 MG/1
12.5 TABLET ORAL EVERY 6 HOURS PRN
Status: DISCONTINUED | OUTPATIENT
Start: 2020-12-27 | End: 2020-12-30 | Stop reason: HOSPADM

## 2020-12-27 RX ORDER — VITAMIN E 268 MG
400 CAPSULE ORAL DAILY
Status: ON HOLD | COMMUNITY
End: 2022-04-22

## 2020-12-27 RX ORDER — TIZANIDINE 4 MG/1
2 TABLET ORAL 3 TIMES DAILY
Status: DISCONTINUED | OUTPATIENT
Start: 2020-12-27 | End: 2020-12-30 | Stop reason: HOSPADM

## 2020-12-27 RX ORDER — ACETAMINOPHEN 325 MG/1
650 TABLET ORAL EVERY 6 HOURS PRN
Status: DISCONTINUED | OUTPATIENT
Start: 2020-12-27 | End: 2020-12-30 | Stop reason: HOSPADM

## 2020-12-27 RX ORDER — OYSTER SHELL CALCIUM WITH VITAMIN D 500; 200 MG/1; [IU]/1
1 TABLET, FILM COATED ORAL 2 TIMES DAILY
Status: DISCONTINUED | OUTPATIENT
Start: 2020-12-27 | End: 2020-12-30 | Stop reason: HOSPADM

## 2020-12-27 RX ORDER — ONDANSETRON 2 MG/ML
4 INJECTION INTRAMUSCULAR; INTRAVENOUS EVERY 6 HOURS PRN
Status: DISCONTINUED | OUTPATIENT
Start: 2020-12-27 | End: 2020-12-30 | Stop reason: HOSPADM

## 2020-12-27 RX ORDER — NYSTATIN 10B UNIT
POWDER (EA) MISCELLANEOUS 2 TIMES DAILY
Status: ON HOLD | COMMUNITY
End: 2022-04-22

## 2020-12-27 RX ORDER — OXYBUTYNIN CHLORIDE 5 MG/1
5 TABLET, EXTENDED RELEASE ORAL DAILY
Status: DISCONTINUED | OUTPATIENT
Start: 2020-12-28 | End: 2020-12-30 | Stop reason: HOSPADM

## 2020-12-27 RX ORDER — FLUOXETINE HYDROCHLORIDE 20 MG/1
20 CAPSULE ORAL DAILY
Status: DISCONTINUED | OUTPATIENT
Start: 2020-12-28 | End: 2020-12-28

## 2020-12-27 RX ORDER — ASCORBIC ACID 500 MG
500 TABLET ORAL DAILY
Status: DISCONTINUED | OUTPATIENT
Start: 2020-12-28 | End: 2020-12-28

## 2020-12-27 RX ADMIN — VANCOMYCIN HYDROCHLORIDE 1750 MG: 5 INJECTION, POWDER, LYOPHILIZED, FOR SOLUTION INTRAVENOUS at 11:33

## 2020-12-27 RX ADMIN — SODIUM CHLORIDE 1000 ML: 9 INJECTION, SOLUTION INTRAVENOUS at 10:31

## 2020-12-27 RX ADMIN — FAMOTIDINE 20 MG: 20 TABLET, FILM COATED ORAL at 20:42

## 2020-12-27 RX ADMIN — CALCIUM CARBONATE-VITAMIN D TAB 500 MG-200 UNIT 1 TABLET: 500-200 TAB at 20:42

## 2020-12-27 RX ADMIN — TIZANIDINE 2 MG: 4 TABLET ORAL at 16:17

## 2020-12-27 RX ADMIN — SODIUM CHLORIDE: 9 INJECTION, SOLUTION INTRAVENOUS at 17:01

## 2020-12-27 RX ADMIN — Medication 1000 MG: at 10:44

## 2020-12-27 RX ADMIN — SODIUM CHLORIDE TAB 1 GM 1 G: 1 TAB at 16:17

## 2020-12-27 RX ADMIN — SODIUM CHLORIDE 1000 ML: 9 INJECTION, SOLUTION INTRAVENOUS at 15:16

## 2020-12-27 RX ADMIN — TIZANIDINE 2 MG: 4 TABLET ORAL at 20:42

## 2020-12-27 RX ADMIN — CEFTRIAXONE SODIUM 1 G: 1 INJECTION, POWDER, FOR SOLUTION INTRAMUSCULAR; INTRAVENOUS at 10:44

## 2020-12-27 RX ADMIN — SODIUM CHLORIDE, PRESERVATIVE FREE 10 ML: 5 INJECTION INTRAVENOUS at 20:47

## 2020-12-27 RX ADMIN — CEFEPIME HYDROCHLORIDE 2 G: 2 INJECTION, POWDER, FOR SOLUTION INTRAVENOUS at 16:18

## 2020-12-27 RX ADMIN — ACETAMINOPHEN 1000 MG: 500 TABLET ORAL at 10:44

## 2020-12-27 RX ADMIN — METOPROLOL TARTRATE 25 MG: 25 TABLET ORAL at 20:42

## 2020-12-27 ASSESSMENT — ENCOUNTER SYMPTOMS
VOICE CHANGE: 0
COUGH: 0
SHORTNESS OF BREATH: 0
BACK PAIN: 0
CHEST TIGHTNESS: 0
DIARRHEA: 0
ABDOMINAL PAIN: 0
NAUSEA: 0
VOMITING: 0
BLOOD IN STOOL: 0
TROUBLE SWALLOWING: 0

## 2020-12-27 NOTE — ED PROVIDER NOTES
[facility] [address] [address] EMERGENCY DEPT    EMERGENCY MEDICINE     Pt Name: Rusty Wang  MRN: [id number]  [place] 1955  Date of evaluation: 12/27/2020  Provider: Rafa Barton DO, [address]       Chief Complaint   Patient presents with    Fever       HISTORY OF PRESENT ILLNESS    Rusty Wang is a pleasant 63 y.o. male for evaluation of sepsis. The patient is a nursing home resident, states today is developed generalized fatigue and weakness and overall not feeling well. Did have some chills and a fever was noted by the facility and therefore the patient was sent to the ED. He has an indwelling Pompa catheter secondary to multiple sclerosis and neurogenic bladder. He denies any specific symptoms over course the last several days, including any shortness of breath, cough, urinary complaints, back pain or facial pain. No headaches, no myalgias. Denies any loss of taste or smell. States he was diagnosed with Covid in November but recovered with no complications. Triage notes and Nursing notes were reviewed by myself. Any discrepancies are addressed above. PAST MEDICAL HISTORY     Past Medical History:   Diagnosis Date    Dysphagia     oropharyngeal    History of pulmonary embolism     History of urinary tract infection     Hx of blood clots     Pulmonary embolis    Hypertension     Major depressive disorder, single episode     MS (multiple sclerosis) ([facility] [address]     Neurogenic bladder feb. 2012    [facility])     UTI (urinary tract infection)        SURGICAL HISTORY       Past Surgical History:   Procedure Laterality Date    ANKLE SURGERY  1996    broken ankle    BLADDER SURGERY  2-    Suprapubic catheter placement    COLONOSCOPY      CYSTO/URETERO/PYELOSCOPY, CALCULUS TX Left 6/19/2019    CYSTOSCOPY, LEFT STENT insertion, bladder irragation with bladder stones performed by Gae Mcardle, MD at [address] [place] [address] visual disturbance. Respiratory: Negative for cough, chest tightness and shortness of breath. Cardiovascular: Negative for chest pain and leg swelling. Gastrointestinal: Negative for abdominal pain, blood in stool, diarrhea, nausea and vomiting. Genitourinary: Negative for dysuria, frequency and hematuria. Musculoskeletal: Negative for back pain and neck pain. Skin: Negative for rash and wound. Neurological: Negative for speech difficulty, weakness, numbness and headaches. Psychiatric/Behavioral: Negative for confusion. Except as noted above the remainder of the review of systems was reviewed and is. PHYSICAL EXAM    (up to 7 for level 4, 8 or more for level 5)     ED Triage Vitals   BP Temp Temp src Pulse Resp SpO2 Height Weight   -- -- -- -- -- -- -- --       Physical Exam  Vitals signs and nursing note reviewed. Constitutional:       General: He is not in acute distress. Appearance: He is well-developed. He is not ill-appearing, toxic-appearing or diaphoretic. Comments: Chronically ill-appearing but not in any acute distress. Febrile and tachycardic on arrival   HENT:      Head: Normocephalic and atraumatic. Eyes:      General: No scleral icterus. Conjunctiva/sclera: Conjunctivae normal.      Right eye: Right conjunctiva is not injected. Left eye: Left conjunctiva is not injected. Pupils: Pupils are equal, round, and reactive to light. Neck:      Musculoskeletal: Normal range of motion and neck supple. Thyroid: No thyromegaly. Trachea: No tracheal deviation. Cardiovascular:      Rate and Rhythm: Normal rate and regular rhythm. Heart sounds: Normal heart sounds. No murmur. No friction rub. No gallop. Pulmonary:      Effort: Pulmonary effort is normal. No respiratory distress. Breath sounds: Normal breath sounds. No stridor. No wheezing or rales. Abdominal:      General: Bowel sounds are normal. There is no distension. Palpations: Abdomen is soft. There is no mass. Tenderness: There is no abdominal tenderness. There is no guarding or rebound. Comments: Negative Morgan's sign  Nontender McBurney's Point  Negative Rovsig's sign  No bruising or echymosis of abdomen  Colostomy bag with stool contents and gas present    No skin breakdown in decubital area   Musculoskeletal:         General: No tenderness. Comments: Negative George's Sign bilaterally   Lymphadenopathy:      Cervical: No cervical adenopathy. Skin:     General: Skin is warm and dry. Coloration: Skin is not pale. Findings: No erythema or rash. Neurological:      Mental Status: He is alert and oriented to person, place, and time. Cranial Nerves: No cranial nerve deficit. Motor: No abnormal muscle tone. Coordination: Coordination normal.      Comments: No nystagmus   Psychiatric:         Behavior: Behavior normal.         Thought Content: Thought content normal.         DIAGNOSTIC RESULTS     EKG:(none if blank)  All EKG's are interpreted by [facility] Phys[facility] who either signs or Co-signs this chart in the absence of a cardiologist.    EKG shows sinus tachycardia, rate of 112, normal LA, QRS, and QTc intervals. RADIOLOGY: (none if blank)   Interpretation per the Radiologistbelow, if available at the time of this note:    XR CHEST PORTABLE   Final Result   1. Abnormal density at the left lung base which could represent atelectasis or infiltrate. 2. Borderline cardiomegaly. 3. Otherwise negative chest x-ray. .               **This report has been created using voice recognition software. It may contain minor errors which are inherent in voice recognition technology. **      Final report electronically signed by DR Cory Lunsford on 12/27/2020 10:20 AM          LABS:  Labs Reviewed   CBC WITH AUTO DIFFERENTIAL - Abnormal; Notable for the following components:       Result Value    WBC 18.7 (*)     MCV 94.8 (*) RDW-CV 14.6 (*)     RDW-SD 50.3 (*)     MPV 9.0 (*)     Segs Absolute 15.7 (*)     Monocytes Absolute 1.5 (*)     Immature Grans (Abs) 0.11 (*)     All other components within normal limits   BASIC METABOLIC PANEL - Abnormal; Notable for the following components:    Glucose 161 (*)     CREATININE 0.3 (*)     All other components within normal limits   HEPATIC FUNCTION PANEL - Abnormal; Notable for the following components:    Alkaline Phosphatase 161 (*)     AST 43 (*)     All other components within normal limits   LACTIC ACID, PLASMA - Abnormal; Notable for the following components:    Lactic Acid 3.1 (*)     All other components within normal limits   PROCALCITONIN - Abnormal; Notable for the following components:    Procalcitonin 0.25 (*)     All other components within normal limits   URINE WITH REFLEXED MICRO - Abnormal; Notable for the following components:    Ketones, Urine TRACE (*)     Blood, Urine MODERATE (*)     Protein,  (*)     Nitrite, Urine POSITIVE (*)     Leukocyte Esterase, Urine LARGE (*)     Character, Urine TURBID (*)     All other components within normal limits   CULTURE, BLOOD 1    Narrative:     Epic Plan - [id number]   CULTURE, BLOOD 2    Narrative:     Epic Plan - [id number]   CULTURE, URINE   CULTURE, BLOOD 1    Narrative:     Epic Plan - [id number]   CULTURE, REFLEXED, URINE    Narrative:     Epic Plan - [id number]   LIPASE   TROPONIN   ANION GAP   GLOMERULAR FILTRATION RATE, ESTIMATED   OSMOLALITY   PROCALCITONIN    Narrative:     Epic Plan - [id number]   URINALYSIS WITH MICROSCOPIC   COVID-19    Narrative:     Constantino Arnoldsey - [id number]       All other labs were within normal range or not returned as of this dictation. Please note, any cultures that may have been sent were not resulted at the time of this patient visit.     EMERGENCY DEPARTMENT COURSE andMedical Decision Making:     MDM/   Patient presents with sepsis in the setting of elevated lactic acid, leukocytosis, fever and tachycardia. Source appears to be urinary given his indwelling Pompa catheter. There is some indication of questionable pulmonary source as well, although the patient does not have any cough at this time. Looking at the records, he has a history of MRSA. Is given history of Rocephin and vancomycin, IV fluids and will be admitted for further evaluation care. Catheter dereje be changed        The patient was evaluated during the COVID-19 pandemic. At the time of presentation to the ED, a [facility] emergency is in effect due to widespread infections and high community spread. There is a significant strain on healthcare resources due to the pandemic. ED Medications administered this visit:    Medications   vancomycin (VANCOCIN) 1,750 mg in dextrose 5 % 500 mL IVPB (has no administration in time range)   0.9 % sodium chloride bolus (1,000 mLs Intravenous New Bag 12/27/20 1031)   cefTRIAXone (ROCEPHIN) 1 g IVPB in 50 mL D5W minibag (0 g Intravenous Stopped 12/27/20 1121)   acetaminophen (TYLENOL) tablet 1,000 mg (1,000 mg Oral Given 12/27/20 1044)         Procedures: (None if blank)       CLINICAL       1. Sepsis, due to unspecified organism, unspecified whether acute organ dysfunction present ([facility] [address]    2. Urinary tract infection associated with indwelling urethral catheter, initial encounter [facility])          DISPOSITION/PLAN   DISPOSITION Decision To Admit 12/27/2020 11:23:15 AM      PATIENT REFERRED TO:  No follow-up provider specified.     DISCHARGE MEDICATIONS:  New Prescriptions    No medications on file              (Please note that portions of this note were completed with a voice recognition program.  Efforts were made to edit the dictations but occasionallywords are mis-transcribed.)      Lidia Castro DO,FACEP (electronically signed)  Attending Physician, Emergency [address] [place] [address] DO Lalit  12/27/20 1128

## 2020-12-27 NOTE — H&P
History & Physical        Patient:  Sofía Salazar  YOB: 1955    MRN: [id number]     Acct: [de-identified]    PCP: Lanre Velasquez MD    Date of Admission: 12/27/2020    Date of Service: Pt seen/examined on 12/27/2020   and Admitted to Inpatient with expected LOS greater than two midnights due to medical therapy. Chief Complaint:  Fevers, chills, increased muscle spasms, fatigue       History Of Present Illness:      63 y.o. male who presented to [facility] with sudden onset this morning with fevers, chills, increased muscle spasms and just not feeling right. Pt is a resident at [facility], has a pmh of functional paraplegia 2/2 to MS. Has known decubitus ulcers that don't look infected. Has a suprapubic catheter, pt unsure when last time it was exchanged (pt also unsure how often it gets exchanged). Pt denies any symptoms of chest pain, sob, cough, congestion, sore throat, abdominal pain, diarrhea, nausea or vomiting. In the ED, Temp 102, , WBC 18.7 and LA 3.1. Elevated Procal. UA + Nitrites and LE with many bacteria. Pt was given 1 dose IV Vanc and Rocephin and admitted for sepsis. Past Medical History:          Diagnosis Date    Dysphagia     oropharyngeal    History of pulmonary embolism     History of urinary tract infection     Hx of blood clots     Pulmonary embolis    Hypertension     Major depressive disorder, single episode     [facility] (multiple sclerosis) ([facility] [address]     Neurogenic bladder feb. 2012    Dr. Holcomb Drilling placed cather    Osteomyelitis [facility])     UTI (urinary tract infection)        Past Surgical History:          Procedure Laterality Date    ANKLE SURGERY  1996    broken ankle    BLADDER SURGERY  2-    Suprapubic catheter placement    COLONOSCOPY      CYSTO/URETERO/PYELOSCOPY, CALCULUS TX Left 6/19/2019    CYSTOSCOPY, LEFT STENT insertion, bladder irragation with bladder stones performed by Eliud Raymundo MD at [address] [place] [address] CYSTO/URETERO/PYELOSCOPY, CALCULUS TX N/A 7/8/2019    CYSTO, LEFT URETERAL STENT REMOVAL, LEFT URETEROSCOPY, LASER LITHOTRIPSY, BASKET RETRIEVAL OF STONE FRAGMENTS performed by Ashtyn Sosa MD at [address] [facility] N/A 6/13/2018    ROBOT DIVERTING COLOSTOMY performed by Milagros Olguin MD at [facility]       Medications Prior to Admission:      Prior to Admission medications    Medication Sig Start Date End Date Taking? Authorizing Provider   oxybutynin (DITROPAN XL) 5 MG extended release tablet Take 1 tablet by mouth daily 2/20/20 2/19/21  Gypsy Ort, APRN - CNP   rivaroxaban (XARELTO) 10 MG TABS tablet Take 1 tablet by mouth daily 6/21/19   Venecia Tejada MD   metoprolol tartrate (LOPRESSOR) 25 MG tablet Take 1 tablet by mouth 2 times daily 6/20/19   Venecia Tejada MD   miconazole (MICOTIN) 2 % powder Apply topically 2 times daily.  6/20/19   Venecia Tejada MD   potassium chloride (KLOR-CON M) 20 MEQ TBCR extended release tablet Take 2 tablets by mouth daily 6/21/19   Venecia Tejada MD   sodium chloride 1 g tablet Take 1 tablet by mouth 2 times daily (with meals) 6/20/19   Venecia Tejada MD   Probiotic Product (ADWOA-BID PROBIOTIC PO) Take 1 tablet by mouth 2 times daily     Historical Provider, MD   Acetaminophen (TYLENOL) 325 MG CAPS Take 325 mg by mouth Give 2 tablets by mouth every 4 hours as needed for Temp 100 or above    Historical Provider, MD   bacitracin 500 UNIT/GM ointment Apply topically daily suprapubic cath site    Historical Provider, MD   calcium-vitamin D (OSCAL-500) 500-200 MG-UNIT per tablet Take 1 tablet by mouth 2 times daily 11/16/18   Morgan Pickard MD   vitamin D (ERGOCALCIFEROL) 49611 units capsule Take 1 capsule by mouth once a week for 8 doses 11/23/18 1/12/19  Morgan Pickard MD   Cranberry-Vitamin C-Inulin (UTI-STAT PO) Take 30 mLs by mouth 3 times daily    Historical Provider, MD   ACETIC ACID IR 2 % Indications: use 60ml via irrigation every evening and night shift for catheter patency. Historical Provider, MD   famotidine (PEPCID) 20 MG tablet Take 1 tablet by mouth 2 times daily 18   Anika Cordoba MD   FLUoxetine (PROZAC) 10 MG capsule Take 20 mg by mouth daily     Historical Provider, MD   docusate sodium (COLACE) 100 MG capsule Take 100 mg by mouth daily     Historical Provider, MD   tiZANidine (ZANAFLEX) 2 MG tablet Take 2 mg by mouth 3 times daily  12/15/16   Historical Provider, MD   ascorbic acid (VITAMIN C) 500 MG tablet Take 1 tablet by mouth daily 3/28/16   Chantelle Shepherd MD   Multiple Vitamin (MULTIVITAMIN) tablet Take 1 tablet by mouth daily 3/28/16   Chantelle Shepherd MD   vitamin A 81054 UNITS capsule Take 2 capsules by mouth daily 3/28/16   Chantelle Shepherd MD       Allergies:  Patient has no known allergies.     Social History:     Social History     Socioeconomic History    Marital status:      Spouse name: Rocio Sood Number of children: 2    Years of education: None    Highest education level: None   Occupational History    None   Social Needs    Financial resource strain: None    Food insecurity     Worry: None     Inability: None    Transportation needs     Medical: None     Non-medical: None   Tobacco Use    Smoking status: Former Smoker     Quit date: 1982     Years since quittin.0    Smokeless tobacco: Former User   Substance and Sexual Activity    Alcohol use: No     Comment: \"quit alcohol a few years ago\"    Drug use: No    Sexual activity: Yes     Partners: Female   Lifestyle    Physical activity     Days per week: None     Minutes per session: None    Stress: None   Relationships    Social connections     Talks on phone: None     Gets together: None     Attends Religion service: None     Active member of club or organization: None     Attends meetings of clubs or organizations: None     Relationship status: None    Intimate partner violence     Fear of current or ex partner: None     Emotionally abused: None     Physically abused: None     Forced sexual activity: None   Other Topics Concern    None   Social History Narrative    None       Family History:     Reviewed in detail and negative for DM, CAD, Cancer, CVA. Positive as follows:        Problem Relation Age of Onset    Cancer Mother         Breast    Heart Disease Father 48    Arthritis Sister     Heart Disease Paternal Grandmother 48       Diet:  No diet orders on file    REVIEW OF SYSTEMS:   Pertinent positives as noted in the HPI. All other systems reviewed and negative. PHYSICAL EXAM:    /72   Pulse 106   Temp 99.6 °F (37.6 °C)   Resp 20   Ht 5' 7\" (1.702 m)   Wt 210 lb (95.3 kg)   SpO2 94%   BMI 32.89 kg/m²     General appearance:  Chronically ill appearing elderly male in no acute distress   HEENT:  Normal cephalic, atraumatic without obvious deformity. Pupils equal, round, and reactive to light. Extra ocular muscles intact. Conjunctivae/corneas clear. Neck: Supple, with full range of motion. No jugular venous distention. Trachea midline. Respiratory:  Normal respiratory effort. Clear to auscultation, bilaterally without Rales/Wheezes/Rhonchi. Cardiovascular:  Regular rate and rhythm with normal S1/S2 without murmurs, rubs or gallops. Abdomen: Soft, non-tender, colostomy bag and suprapubic catheter   Musculoskeletal:  No clubbing, cyanosis or edema bilaterally. Full range of motion without deformity. Skin: decubitus ulcer, no erythema, swelling or drainage   Neurologic:  Neurovascularly intact without any focal sensory/motor deficits.  Cranial nerves: II-XII intact, grossly non-focal.  Psychiatric:  Alert and oriented, thought content appropriate, normal insight  Capillary Refill: Brisk,< 3 seconds   Peripheral Pulses: +2 palpable, equal bilaterally       Labs:     Recent Labs     12/27/20  1010   WBC 18.7*   HGB 14.8   HCT 45.6        Recent Labs 12/27/20  1010      K 3.9      CO2 23   BUN 13   CREATININE 0.3*   CALCIUM 9.0     Recent Labs     12/27/20  1010   AST 43*   ALT 58   BILIDIR <0.2   BILITOT 0.4   ALKPHOS 161*     No results for input(s): INR in the last 72 hours. No results for input(s): Davi Demetrio in the last 72 hours. Urinalysis:      Lab Results   Component Value Date    NITRU POSITIVE 12/27/2020    WBCUA > 200 12/27/2020    WBCUA >200 01/20/2012    BACTERIA MANY 12/27/2020    RBCUA 15-25 12/27/2020    BLOODU MODERATE 12/27/2020    SPECGRAV 1.017 07/05/2019    GLUCOSEU NEGATIVE 12/27/2020       Radiology:     CXR: I have reviewed the CXR with the following interpretation: see below   EKG:  I have reviewed the EKG with the following interpretation: Sinus Tachycardia     XR CHEST PORTABLE   Final Result   1. Abnormal density at the left lung base which could represent atelectasis or infiltrate. 2. Borderline cardiomegaly. 3. Otherwise negative chest x-ray. .               **This report has been created using voice recognition software. It may contain minor errors which are inherent in voice recognition technology. **      Final report electronically signed by DR Roseann Wray on 12/27/2020 10:20 AM            ASSESSMENT:    Active Hospital Problems    Diagnosis Date Noted    Sepsis ([facility] [address] [A41.9] 12/27/2020       Assessment/Plan:    1. Sepsis (POA): 3/4 SIRS (febrile, tachy, elevated WBC count) with elevated LA. Source likely genitourinary. UA grossly dirty. Has a suprapubic catheter, pt unsure when last time it was exchanged (pt also unsure how often it gets exchanged). Consult Urology. Cont IV Cefepime, follow-up Ux and Bx. IV fluids. 2. UTI Associated with Suprapubic Catheter: neurogenic bladder and MS with suprapubic catheter. Unsure when it was exchanged last. Will consult Urology. Cont plan as aobve. 3. Dehydration: pt tachycardic with hemoconcentrated hemoglobin. NS bolus and start on IV at 100 cc/hr.      4. 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 5. [facility]     6. Hx Decubitus Ulcer: does not appear infection. Consult wound care. Frequent turning. Thank you Berny Dawson MD for the opportunity to be involved in this patient's care.     Electronically signed by Sonia Schultz MD on 12/27/2020 at 12:15 PM

## 2020-12-28 ENCOUNTER — APPOINTMENT (OUTPATIENT)
Dept: CT IMAGING | Age: 65
DRG: 698 | End: 2020-12-28
Payer: COMMERCIAL

## 2020-12-28 ENCOUNTER — TELEPHONE (OUTPATIENT)
Dept: UROLOGY | Age: 65
End: 2020-12-28

## 2020-12-28 LAB
ANION GAP SERPL CALCULATED.3IONS-SCNC: 10 MEQ/L (ref 8–16)
BACTERIA: ABNORMAL
BASOPHILS # BLD: 0.5 %
BASOPHILS ABSOLUTE: 0 THOU/MM3 (ref 0–0.1)
BILIRUBIN URINE: NEGATIVE
BLOOD, URINE: ABNORMAL
BUN BLDV-MCNC: 10 MG/DL (ref 7–22)
CALCIUM SERPL-MCNC: 9.1 MG/DL (ref 8.5–10.5)
CASTS: ABNORMAL /LPF
CASTS: ABNORMAL /LPF
CHARACTER, URINE: CLEAR
CHLORIDE BLD-SCNC: 104 MEQ/L (ref 98–111)
CO2: 21 MEQ/L (ref 23–33)
COLOR: YELLOW
CREAT SERPL-MCNC: 0.3 MG/DL (ref 0.4–1.2)
CRYSTALS: ABNORMAL
EKG ATRIAL RATE: 112 BPM
EKG P AXIS: 47 DEGREES
EKG P-R INTERVAL: 134 MS
EKG Q-T INTERVAL: 332 MS
EKG QRS DURATION: 84 MS
EKG QTC CALCULATION (BAZETT): 453 MS
EKG R AXIS: 43 DEGREES
EKG T AXIS: 64 DEGREES
EKG VENTRICULAR RATE: 112 BPM
EOSINOPHIL # BLD: 3.9 %
EOSINOPHILS ABSOLUTE: 0.3 THOU/MM3 (ref 0–0.4)
EPITHELIAL CELLS, UA: ABNORMAL /HPF
ERYTHROCYTE [DISTWIDTH] IN BLOOD BY AUTOMATED COUNT: 14.6 % (ref 11.5–14.5)
ERYTHROCYTE [DISTWIDTH] IN BLOOD BY AUTOMATED COUNT: 55.2 FL (ref 35–45)
GFR SERPL CREATININE-BSD FRML MDRD: > 90 ML/MIN/1.73M2
GLUCOSE BLD-MCNC: 107 MG/DL (ref 70–108)
GLUCOSE, URINE: NEGATIVE MG/DL
HCT VFR BLD CALC: 45.8 % (ref 42–52)
HEMOGLOBIN: 13.9 GM/DL (ref 14–18)
IMMATURE GRANS (ABS): 0.02 THOU/MM3 (ref 0–0.07)
IMMATURE GRANULOCYTES: 0.2 %
KETONES, URINE: NEGATIVE
LACTIC ACID: 0.9 MMOL/L (ref 0.5–2.2)
LEUKOCYTE ESTERASE, URINE: ABNORMAL
LYMPHOCYTES # BLD: 19.3 %
LYMPHOCYTES ABSOLUTE: 1.6 THOU/MM3 (ref 1–4.8)
MAGNESIUM: 2.1 MG/DL (ref 1.6–2.4)
MCH RBC QN AUTO: 31.2 PG (ref 26–33)
MCHC RBC AUTO-ENTMCNC: 30.3 GM/DL (ref 32.2–35.5)
MCV RBC AUTO: 102.9 FL (ref 80–94)
MISCELLANEOUS LAB TEST RESULT: ABNORMAL
MONOCYTES # BLD: 6.4 %
MONOCYTES ABSOLUTE: 0.5 THOU/MM3 (ref 0.4–1.3)
NITRITE, URINE: NEGATIVE
NUCLEATED RED BLOOD CELLS: 0 /100 WBC
ORGANISM: ABNORMAL
PH UA: 7 (ref 5–9)
PLATELET # BLD: 145 THOU/MM3 (ref 130–400)
PMV BLD AUTO: 8.9 FL (ref 9.4–12.4)
POTASSIUM SERPL-SCNC: 4 MEQ/L (ref 3.5–5.2)
PROTEIN UA: 30 MG/DL
RBC # BLD: 4.45 MILL/MM3 (ref 4.7–6.1)
RBC URINE: ABNORMAL /HPF
RENAL EPITHELIAL, UA: ABNORMAL
SEG NEUTROPHILS: 69.7 %
SEGMENTED NEUTROPHILS ABSOLUTE COUNT: 5.8 THOU/MM3 (ref 1.8–7.7)
SODIUM BLD-SCNC: 135 MEQ/L (ref 135–145)
SPECIFIC GRAVITY UA: 1.01 (ref 1–1.03)
URINE CULTURE REFLEX: ABNORMAL
UROBILINOGEN, URINE: 0.2 EU/DL (ref 0–1)
WBC # BLD: 8.3 THOU/MM3 (ref 4.8–10.8)
WBC UA: ABNORMAL /HPF
YEAST: ABNORMAL

## 2020-12-28 PROCEDURE — 83735 ASSAY OF MAGNESIUM: CPT

## 2020-12-28 PROCEDURE — 83605 ASSAY OF LACTIC ACID: CPT

## 2020-12-28 PROCEDURE — 6370000000 HC RX 637 (ALT 250 FOR IP): Performed by: INTERNAL MEDICINE

## 2020-12-28 PROCEDURE — 81001 URINALYSIS AUTO W/SCOPE: CPT

## 2020-12-28 PROCEDURE — 2580000003 HC RX 258: Performed by: INTERNAL MEDICINE

## 2020-12-28 PROCEDURE — 74176 CT ABD & PELVIS W/O CONTRAST: CPT

## 2020-12-28 PROCEDURE — 99253 IP/OBS CNSLTJ NEW/EST LOW 45: CPT | Performed by: NURSE PRACTITIONER

## 2020-12-28 PROCEDURE — 80048 BASIC METABOLIC PNL TOTAL CA: CPT

## 2020-12-28 PROCEDURE — 87186 SC STD MICRODIL/AGAR DIL: CPT

## 2020-12-28 PROCEDURE — 6360000002 HC RX W HCPCS: Performed by: INTERNAL MEDICINE

## 2020-12-28 PROCEDURE — 85025 COMPLETE CBC W/AUTO DIFF WBC: CPT

## 2020-12-28 PROCEDURE — 36415 COLL VENOUS BLD VENIPUNCTURE: CPT

## 2020-12-28 PROCEDURE — 2140000000 HC CCU INTERMEDIATE R&B

## 2020-12-28 PROCEDURE — 94760 N-INVAS EAR/PLS OXIMETRY 1: CPT

## 2020-12-28 PROCEDURE — 51705 CHANGE OF BLADDER TUBE: CPT | Performed by: NURSE PRACTITIONER

## 2020-12-28 PROCEDURE — 87077 CULTURE AEROBIC IDENTIFY: CPT

## 2020-12-28 PROCEDURE — 99232 SBSQ HOSP IP/OBS MODERATE 35: CPT | Performed by: FAMILY MEDICINE

## 2020-12-28 PROCEDURE — 87086 URINE CULTURE/COLONY COUNT: CPT

## 2020-12-28 RX ORDER — ACETAMINOPHEN 325 MG/1
650 TABLET ORAL EVERY 4 HOURS PRN
COMMUNITY

## 2020-12-28 RX ORDER — ACETIC ACID 0.25 G/100ML
30 IRRIGANT IRRIGATION 2 TIMES DAILY
Status: ON HOLD | COMMUNITY
End: 2022-06-18 | Stop reason: HOSPADM

## 2020-12-28 RX ORDER — IBUPROFEN 400 MG/1
400 TABLET ORAL EVERY 4 HOURS PRN
Status: ON HOLD | COMMUNITY
End: 2022-07-26 | Stop reason: HOSPADM

## 2020-12-28 RX ORDER — OXYCODONE HYDROCHLORIDE AND ACETAMINOPHEN 5; 325 MG/1; MG/1
1 TABLET ORAL EVERY 6 HOURS PRN
Status: ON HOLD | COMMUNITY
End: 2020-12-30 | Stop reason: HOSPADM

## 2020-12-28 RX ADMIN — CALCIUM CARBONATE-VITAMIN D TAB 500 MG-200 UNIT 1 TABLET: 500-200 TAB at 20:48

## 2020-12-28 RX ADMIN — TIZANIDINE 2 MG: 4 TABLET ORAL at 14:36

## 2020-12-28 RX ADMIN — FAMOTIDINE 20 MG: 20 TABLET, FILM COATED ORAL at 09:20

## 2020-12-28 RX ADMIN — SODIUM CHLORIDE, PRESERVATIVE FREE 10 ML: 5 INJECTION INTRAVENOUS at 20:52

## 2020-12-28 RX ADMIN — Medication 6 MG: at 09:20

## 2020-12-28 RX ADMIN — SODIUM CHLORIDE, PRESERVATIVE FREE 10 ML: 5 INJECTION INTRAVENOUS at 09:24

## 2020-12-28 RX ADMIN — CALCIUM CARBONATE-VITAMIN D TAB 500 MG-200 UNIT 1 TABLET: 500-200 TAB at 09:20

## 2020-12-28 RX ADMIN — MULTIPLE VITAMINS W/ MINERALS TAB 1 TABLET: TAB at 09:20

## 2020-12-28 RX ADMIN — OXYCODONE HYDROCHLORIDE AND ACETAMINOPHEN 500 MG: 500 TABLET ORAL at 09:20

## 2020-12-28 RX ADMIN — SODIUM CHLORIDE TAB 1 GM 1 G: 1 TAB at 09:20

## 2020-12-28 RX ADMIN — OXYBUTYNIN CHLORIDE 5 MG: 5 TABLET, EXTENDED RELEASE ORAL at 09:21

## 2020-12-28 RX ADMIN — TIZANIDINE 2 MG: 4 TABLET ORAL at 20:48

## 2020-12-28 RX ADMIN — METOPROLOL TARTRATE 25 MG: 25 TABLET ORAL at 09:20

## 2020-12-28 RX ADMIN — POTASSIUM CHLORIDE 40 MEQ: 750 TABLET, EXTENDED RELEASE ORAL at 09:20

## 2020-12-28 RX ADMIN — CEFEPIME HYDROCHLORIDE 2 G: 2 INJECTION, POWDER, FOR SOLUTION INTRAVENOUS at 17:16

## 2020-12-28 RX ADMIN — TIZANIDINE 2 MG: 4 TABLET ORAL at 09:20

## 2020-12-28 RX ADMIN — CEFEPIME HYDROCHLORIDE 2 G: 2 INJECTION, POWDER, FOR SOLUTION INTRAVENOUS at 04:12

## 2020-12-28 RX ADMIN — METOPROLOL TARTRATE 25 MG: 25 TABLET ORAL at 20:52

## 2020-12-28 RX ADMIN — FLUOXETINE HYDROCHLORIDE 20 MG: 20 CAPSULE ORAL at 09:21

## 2020-12-28 RX ADMIN — SODIUM CHLORIDE: 9 INJECTION, SOLUTION INTRAVENOUS at 01:14

## 2020-12-28 RX ADMIN — SODIUM CHLORIDE TAB 1 GM 1 G: 1 TAB at 17:16

## 2020-12-28 RX ADMIN — RIVAROXABAN 10 MG: 10 TABLET, FILM COATED ORAL at 17:16

## 2020-12-28 RX ADMIN — FAMOTIDINE 20 MG: 20 TABLET, FILM COATED ORAL at 20:48

## 2020-12-28 NOTE — CONSULTS
Urology Consult Note      Reason for Consult:  MS with suprapubic catheter, doesnt remeber last time it was exchanged  Requesting Physician:  Dr. Salvatore Jackson    History Obtained From:  patient, electronic medical record    Chief Complaint: fever    HISTORY OF PRESENT ILLNESS:                The patient is a 63 y.o. male with significant past medical history of as listed below who presents with fevers that started yesterday morning. He resides at [facility]. He reports having COVID in the past few months. He has hx of MS with neurogenic bladder and chronic SP Catheter. On Admission his WBC was 18.7, Lactic acid 3.1. CRT 0.3. Urine off of old catheter shows mixed growth, he was started on Cefepime IV. Temp in ED was 102, heart rate 115. Last seen by our office, last February. He undergoes SP exchanges by [facility] staff. He is unsure last time it was done, he reports its done whenever it starts clogging up. Today he reports feeling well. No fevers over night. Urine is yellow, cloudy with sediment in tubing and malodorous. CT was ordered to evaluate for any obstructing stones. Pt denies flank pain but does report some mid back pain, mild. Past Medical History:        Diagnosis Date    Dysphagia     oropharyngeal    History of pulmonary embolism     History of urinary tract infection     Hx of blood clots     Pulmonary embolis    Hypertension     Major depressive disorder, single episode     MS (multiple sclerosis) ([address]     Neurogenic bladder feb. 2012    Dr. Ferdinand Closs placed cather    Osteomyelitis [facility])     UTI (urinary tract infection)      Past Surgical History:        Procedure Laterality Date    ANKLE SURGERY  1996    broken ankle    BLADDER SURGERY  2-    Suprapubic catheter placement    COLONOSCOPY      CYSTO/URETERO/PYELOSCOPY, CALCULUS TX Left 6/19/2019    CYSTOSCOPY, LEFT STENT insertion, bladder irragation with bladder stones performed by Ashtyn Sosa MD at [address] CYSTO/URETERO/PYELOSCOPY, CALCULUS TX N/A 2019    CYSTO, LEFT URETERAL STENT REMOVAL, LEFT URETEROSCOPY, LASER LITHOTRIPSY, BASKET RETRIEVAL OF STONE FRAGMENTS performed by Daniel Lewis MD at [facility] N/A 2018    ROBOT DIVERTING COLOSTOMY performed by Alek Zheng MD at [facility]  child     Allergies:  Patient has no known allergies.     Social History     Socioeconomic History    Marital status:      Spouse name: Mehul Berrios Number of children: 2    Years of education: Not on file    Highest education level: Not on file   Occupational History    Not on file   Social Needs    Financial resource strain: Not on file    Food insecurity     Worry: Not on file     Inability: Not on file   [employer] needs     Medical: Not on file     Non-medical: Not on file   Tobacco Use    Smoking status: Former Smoker     Quit date: 1982     Years since quittin.0    Smokeless tobacco: Former User   Substance and Sexual Activity    Alcohol use: No     Comment: \"quit alcohol a few years ago\"    Drug use: No    Sexual activity: Yes     Partners: Female   Lifestyle    Physical activity     Days per week: Not on file     Minutes per session: Not on file    Stress: Not on file   Relationships    Social connections     Talks on phone: Not on file     Gets together: Not on file     Attends Rastafari service: Not on file     Active member of club or organization: Not on file     Attends meetings of clubs or organizations: Not on file     Relationship status: Not on file    Intimate partner violence     Fear of current or ex partner: Not on file     Emotionally abused: Not on file     Physically abused: Not on file     Forced sexual activity: Not on file   Other Topics Concern    Not on file   Social History Narrative    Not on file       Family History:       Problem Relation Age of Onset    Cancer Mother         Breast    Heart Disease Father 48    Arthritis Sister     Heart Disease Paternal Grandmother 48       ROS:  Constitutional: +fever  Eyes: Denies reported visual changes. ENT: Denies headache, difficulty swallowing, earache, and nosebleeds. Cardiovascular: Negative for chest pain, palpitations, tachycardia or edema. Respiratory: Denies cough or SOB. GI:The patient denies abdominal or flank pain, anorexia, nausea or vomiting. : see HPI. Musculoskeletal: Patient denies low back pain or painful or reduced range of ROM. Neurological: The patient denies any symptoms of neurological impairment or TIA. Psychiatric: Denies anxiety or depression. Skin: Denies rash or lesions. All remaining ROS negative. PHYSICAL EXAM:  VITALS:  BP (!) 186/83   Pulse 96   Temp 98.2 °F (36.8 °C) (Oral)   Resp 16   Ht 5' 7\" (1.702 m)   Wt 210 lb (95.3 kg)   SpO2 97%   BMI 32.89 kg/m² . Nursing note and vitals reviewed. Constitutional: Alert and oriented times x3, no acute distress, and cooperative to examination with appropriate mood and affect. HEENT:   Head:         Normocephalic and atraumatic. Mouth/Throat:          Mucous membranes are normal.   Eyes:         EOM are normal. No scleral icterus. Nose:    The external appearance of the nose is normal  Ears: The ears appear normal to external inspection. Cardiovascular:       Normal rate, regular rhythm. Pulmonary/Chest:  Normal respiratory rate and rhthym. No use of accessory muscles. Lungs clear bilaterally. Abdominal:          Soft. No tenderness. Active bowel sounds    Round. Colostomy in left lower quadrant       SP catheter in place, site is clean. No erythema. Neurological:    Alert and oriented.      DATA:  CBC:   Lab Results   Component Value Date    WBC 8.3 12/28/2020    RBC 4.45 12/28/2020    RBC 4.20 01/20/2012    HGB 13.9 12/28/2020    HCT 45.8 12/28/2020    .9 12/28/2020    MCH 31.2 12/28/2020    MCHC 30.3 12/28/2020    RDW 16.6 06/21/2019  12/28/2020    MPV 8.9 12/28/2020     BMP:    Lab Results   Component Value Date     12/28/2020    K 4.0 12/28/2020    K 3.5 06/15/2019     12/28/2020    CO2 21 12/28/2020    BUN 10 12/28/2020    CREATININE 0.3 12/28/2020    CALCIUM 9.1 12/28/2020    LABGLOM >90 12/28/2020    GLUCOSE 107 12/28/2020    GLUCOSE 105 06/25/2019     BUN/Creatinine:    Lab Results   Component Value Date    BUN 10 12/28/2020    CREATININE 0.3 12/28/2020     Magnesium:    Lab Results   Component Value Date    MG 2.1 12/28/2020     Phosphorus:    Lab Results   Component Value Date    PHOS 3.4 06/15/2019     PT/INR:    Lab Results   Component Value Date    INR 1.52 10/03/2019     U/A:    Lab Results   Component Value Date    COLORU YELLOW 12/27/2020    PHUR 6.5 12/27/2020    LABCAST NONE SEEN 11/13/2018    WBCUA > 200 12/27/2020    WBCUA >200 01/20/2012    RBCUA 15-25 12/27/2020    MUCUS NONE SEEN 12/27/2020    YEAST NONE SEEN 12/27/2020    BACTERIA MANY 12/27/2020    CLARITYU Clear 07/05/2019    SPECGRAV 1.017 07/05/2019    LEUKOCYTESUR LARGE 12/27/2020    UROBILINOGEN 1.0 12/27/2020    BILIRUBINUR NEGATIVE 12/27/2020    BILIRUBINUR NEGATIVE 01/20/2012    BLOODU MODERATE 12/27/2020    GLUCOSEU NEGATIVE 12/27/2020    AMORPHOUS DEBRIS 12/27/2020       Imaging: The patient has had a CT Stone Protocol which I have reviewed along with its accompanying report. The study demonstrates :     FINDINGS:    Limitations:   Solid organs and hollow viscera evaluation limited without contrast   Lung bases   Limited images of lung bases demonstrate pleural thickening in left lung base. Also demonstrate elevation right hemidiaphragm so the entire liver volumes   Conclusion imaging. Coronary artery calcific atherosclerosis.       Abdomen pelvis   There is hepatic steatosis. Prior cholecystectomy. Spleen is normal.   Pancreas is mildly fatty infiltrated. Adrenals are normal.   Punctate calcification interpolar left kidney.  Tiny

## 2020-12-28 NOTE — CARE COORDINATION
DISASTER CHARTING    12/28/20, 11:25 AM EST    DISCHARGE ONGOING EVALUATION:     [facility] day: 1  Location: [facility]36036-A Reason for admit: Sepsis [facility]) [A41.9]   Barriers to Discharge: Admitted through ED with fever, chills, increased muscle spasms and fatigue. Pt has suprapubic cath in place, unsure when exchanged. Consulting Urology. UA - moderate blood, positive nitrites, large leukocytes, many bacteria. Cultures are showing mixed growth. Cefepime iv q12hr. PCP: Narcisa March MD  Patient Goals/Plan/Treatment Preferences: Pt is from [facility]. SW consulted and following.

## 2020-12-28 NOTE — PROGRESS NOTES
Pharmacy Medication History Note      List of current medications patient is taking is complete. Source of information: [facility]    Changes made to medication list:  Medications removed (include reason, ex. therapy complete or physician discontinued):  Acetic acid - wrong product  Acetaminophen caps - wrong product  Ascorbic acid - therapy complete  Bacitracin ointment - therapy complete  Cranberry - therapy complete  Fluoxetine - therapy complete  Probiotic - duplicate therapy    Medications added/doses adjusted:  ibuprofen  Percocet  Acetic acid 0.25%  acetaminophen    Other notes (ex. Recent course of antibiotics, Coumadin dosing):  Patient finished recent prescription of ceftin on 12/17/2020  Denies use of other OTC or herbal medications.       Allergies reviewed      Electronically signed by Ella Cordon, [facility] on 12/28/2020 at 3:54 PM

## 2020-12-28 NOTE — DISCHARGE INSTR - COC
admission Z96.0    Cystostomy malfunction ([facility] [id number]    Decubitus ulcer of coccyx L89.159    Decubitus ulcer, stage 3 ([facility]) L89.93    Malnutrition ([facility] E46    Decubitus ulcer of right perineal ischial region, stage 3 ([facility] L89.313    Pulmonary embolism on left ([facility]) I26.99    Acute metabolic encephalopathy C01.36    Speech disturbance R47.9    Infected decubitus ulcer, stage I L89.91, L08.9    Infected decubitus ulcer, stage III ([facility]) L89.93, L08.9    Wound infection T14. 8XXA, L08.9    Elevated INR R79.1    Chronic osteomyelitis, pelvis, right ([facility]) M86.651    Osteomyelitis ([facility]) M86.9    Urinary tract infection without hematuria N39.0    Abnormal liver function test R94.5    Chronic depression F32.9    Essential hypertension I10    History of pulmonary embolism Z86.711    Other dysphagia R13.19    Pressure injury of skin of back L89.109    Decubitus ulcer L89.90    Elevated transaminase level R74.01    Anemia D64.9    Sepsis due to methicillin resistant Staphylococcus aureus (MRSA) ([facility]) A41.02    Sepsis ([facility] A41.9       Isolation/Infection:   Isolation            Contact          Patient Infection Status       Infection Onset Added Last Indicated Last Indicated By Review Planned Expiration Resolved Resolved By    CRE (Carbapenem-Resistant Enterobacteriaceae)  07/08/19 07/08/19 Jose Carlos Gore RN        Pseudomonas Aeruginosa- sacrum swab - confirmed by [facility] (1-) 6/2019    MRSA 02/10/19 02/12/19 06/17/19 Aerobic & Anaerobic Culture        Buttock 2/2019  Rectal 2/2019  Urine 6/2019  Sacrum 6/2019    VRE 02/10/19 02/10/19 02/10/19 VRE Screen by PCR        PCR 2/2019    Resolved    COVID-19 Rule Out 12/27/20 12/27/20 12/27/20 COVID-19 (Ordered)   12/27/20 Rule-Out Test Resulted    MRSA  08/05/13 08/05/13 Milana Lee RN   01/22/18 Jose Carlos Gore RN    Urine  Sacrum swab 6/2019              Nurse Assessment:  Last Vital Signs: BP (!) 186/83   Pulse 96   Temp 98.2 °F (36.8 °C) (Oral)   Resp 16   Ht 5' 7\" (1.702 m)   Wt 210 lb (95.3 kg)   SpO2 97%   BMI 32.89 kg/m²     Last documented pain score (0-10 scale): Pain Level: (Fever)  Last Weight:   Wt Readings from Last 1 Encounters:   12/27/20 210 lb (95.3 kg)     Mental Status:  oriented    IV Access:  - None    Nursing Mobility/ADLs:  Walking   Dependent  Transfer  Dependent  Bathing  Dependent  Dressing  Dependent  Toileting  Dependent  Feeding  Independent  Med Admin  Assisted  Med Delivery   whole    Wound Care Documentation and Therapy:  Wound 06/08/18 Other (Comment) Ischium Right (Active)   Number of days: 933       Wound 11/13/18 Other (Comment) (Active)   Number of days: 775       Wound 02/10/19 Buttocks Right (Active)   Number of days: 687        Elimination:  Continence:   · Bowel: No  · Bladder: Yes  Urinary Catheter:  suprapubic catheter    Colostomy/Ileostomy/Ileal Conduit: Yes       Date of Last BM: 12/29/2020    Intake/Output Summary (Last 24 hours) at 12/28/2020 1048  Last data filed at 12/28/2020 0939  Gross per 24 hour   Intake 2704 ml   Output 1675 ml   Net 1029 ml     I/O last 3 completed shifts: In: 2704 [I.V.:2704]  Out: 675 [Urine:675]    Safety Concerns:     None    Impairments/Disabilities:      None    Nutrition Therapy:  Current Nutrition Therapy:   - Oral Diet:  General    Routes of Feeding: Oral  Liquids: No Restrictions  Daily Fluid Restriction: no  Last Modified Barium Swallow with Video (Video Swallowing Test): not done    Treatments at the Time of Hospital Discharge:   Respiratory Treatments: n/a  Oxygen Therapy:  is not on home oxygen therapy.   Ventilator:    - No ventilator support    Rehab Therapies: Physical Therapy and Occupational Therapy  Weight Bearing Status/Restrictions: No weight bearing restirctions  Other Medical Equipment (for information only, NOT a DME order):  hospital bed  Other Treatments: n/a    Patient's personal belongings (please select all that are sent with patient):  None    RN SIGNATURE:  Electronically signed by Adin Avelar RN on 12/29/20 at 2:31 PM EST    CASE MANAGEMENT/SOCIAL WORK SECTION    Inpatient Status Date: 12/27/2020    Readmission Risk Assessment Score:  Readmission Risk              Risk of Unplanned Readmission:        13           Discharging to Facility/ Agency   · Name: [facility]  · [facility], [address] [place] [address], [address]  · Phone:[phone]  · Fax:[phone]    Dialysis Facility (if applicable)   · Name:  · Address:  · Dialysis Schedule:  · Phone:  · Fax:    / signature: Electronically signed by TRISTIAN Martinez on 12/28/20 at 10:49 AM EST    PHYSICIAN SECTION    Prognosis: Fair    Condition at Discharge: Stable    Rehab Potential (if transferring to Rehab): Fair    Recommended Labs or Other Treatments After Discharge: BMP, CBC    Physician Certification: I certify the above information and transfer of Des Car  is necessary for the continuing treatment of the diagnosis listed and that he requires [facility] for greater 30 days.      Update Admission H&P: Changes in H&P as follows - Continue IV Cefepime for 6 more days     PHYSICIAN SIGNATURE:  Electronically signed by Rizwana Romeo MD on 12/30/20 at 10:33 AM EST

## 2020-12-28 NOTE — TELEPHONE ENCOUNTER
Needs virtual visit with dr Karlos Godfrey as outpatient to discuss cystolitholapaxy and SP Catheter exchange once discharged from hospital    Resides at [facility]

## 2020-12-28 NOTE — CARE COORDINATION
La12/28/20, 10:30 AM EST  Discharge Planning Evaluation  Social work consult received, patient from [facility]. Patient preference is to return to [facility]. The patient's current payor source at the facility is [facility]. Medicare skilled days available: NA  Insurance precert:  yes  Spoke with Maine Ulloa at the facility. Patient bed hold: yes  Anticipated transport plan: ambulance  Do they require COVID 19 test to return to [facility]:one has been complete, do not need another unless becomes symptomatic.    Is there a required time frame which which COVID test needs done:NA

## 2020-12-28 NOTE — PROGRESS NOTES
Hospitalist Progress Note    Patient:  Lisa Rose      Unit/Bed:3B-36/036-A    YOB: 1955    MRN: [id number]       Acct: [de-identified]     PCP: Anju Duarte MD    Date of Admission: 12/27/2020    Assessment/Plan:    1. Sepsis (POA): 3/4 SIRS (febrile, tachy, elevated WBC count) with elevated LA. Source likely genitourinary. UA grossly dirty. Has a suprapubic catheter, pt unsure when last time it was exchanged (pt also unsure how often it gets exchanged). Consult Urology. Cont IV Cefepime, follow-up Ux and Bx. IV fluids.      2. UTI Associated with Suprapubic Catheter: neurogenic bladder and MS with suprapubic catheter. Urology following. Appreciate recs. May need repositioning tomorrow. Continue oxybutynin 5mg qd.      3. Dehydration: IVF     4. 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      5. Hx of PE: [facility]      6. Hx Decubitus Ulcer: does not appear infection. Consult wound care. Frequent turning. Expected discharge date:  days    Disposition:    [] Home       [] TCU       [] Rehab       [] Psych       [x] SNF       [x] [place]       [] Other-    Chief Complaint: fever, chills  Hospital Course: see hpi     Subjective (past 24 hours):   Doing well.  No acute events overnight    Medications:  Reviewed    Infusion Medications    sodium chloride 100 mL/hr at 12/28/20 0114     Scheduled Medications    calcium-vitamin D  1 tablet Oral BID    famotidine  20 mg Oral BID    metoprolol tartrate  25 mg Oral BID    therapeutic multivitamin-minerals  1 tablet Oral Daily    oxybutynin  5 mg Oral Daily    potassium chloride  40 mEq Oral Daily    rivaroxaban  10 mg Oral Daily    sodium chloride  1 g Oral BID WC    tiZANidine  2 mg Oral TID    cefepime  2 g Intravenous Q12H    sodium chloride flush  10 mL Intravenous 2 times per day    vitamin A  20,000 Units Oral Daily     PRN Meds: sodium chloride flush, promethazine **OR** ondansetron, polyethylene glycol, acetaminophen **OR** acetaminophen      Intake/Output Summary (Last 24 hours) at 12/28/2020 1711  Last data filed at 12/28/2020 1412  Gross per 24 hour   Intake 4096 ml   Output 1925 ml   Net 2171 ml       Diet:  DIET GENERAL;    Exam:  BP (!) 166/83   Pulse 86   Temp 98.2 °F (36.8 °C) (Oral)   Resp 18   Ht 5' 7\" (1.702 m)   Wt 210 lb (95.3 kg)   SpO2 96%   BMI 32.89 kg/m²     General appearance: No apparent distress, appears stated age and cooperative. HEENT: Pupils equal, round, and reactive to light. Conjunctivae/corneas clear. Neck: Supple, with full range of motion. No jugular venous distention. Trachea midline. Respiratory:  Normal respiratory effort. Clear to auscultation, bilaterally without Rales/Wheezes/Rhonchi. Cardiovascular: Regular rate and rhythm with normal S1/S2 without murmurs, rubs or gallops. Abdomen: Soft, colostomy bag and suprapubic cath  Musculoskeletal: passive and active ROM x 4 extremities. Skin: Skin color, texture, turgor normal.    Neurologic:  Neurovascularly intact without any focal sensory/motor deficits. Cranial nerves: II-XII intact, grossly non-focal.  Psychiatric: Alert and oriented, thought content appropriate  Capillary Refill: Brisk,< 3 seconds   Peripheral Pulses: +2 palpable, equal bilaterally       Labs:   Recent Labs     12/27/20  1010 12/28/20  0551   WBC 18.7* 8.3   HGB 14.8 13.9*   HCT 45.6 45.8    145     Recent Labs     12/27/20  1010 12/28/20  0551    135   K 3.9 4.0    104   CO2 23 21*   BUN 13 10   CREATININE 0.3* 0.3*   CALCIUM 9.0 9.1     Recent Labs     12/27/20  1010   AST 43*   ALT 58   BILIDIR <0.2   BILITOT 0.4   ALKPHOS 161*     No results for input(s): INR in the last 72 hours. No results for input(s): Tenna Pinta in the last 72 hours.   Recent Labs     12/27/20  1010   PROCAL 0.22*  0.25*       Microbiology:      Urinalysis:      Lab Results   Component Value Date    NITRU NEGATIVE 12/28/2020    WBCUA 15-25 12/28/2020    WBCUA >200 01/20/2012    BACTERIA NONE SEEN 12/28/2020    RBCUA 10-15 12/28/2020    BLOODU MODERATE 12/28/2020    SPECGRAV 1.008 12/28/2020    GLUCOSEU NEGATIVE 12/27/2020       Radiology:  CT ABDOMEN PELVIS WO CONTRAST   Final Result   There is no evidence of bowel obstruction. There are no acute abdominal or pelvic abnormalities. **This report has been created using voice recognition software. It may contain minor errors which are inherent in voice recognition technology. **      Final report electronically signed by Dr. Blanca Paula on 12/28/2020 8:53 AM      XR CHEST PORTABLE   Final Result   1. Abnormal density at the left lung base which could represent atelectasis or infiltrate. 2. Borderline cardiomegaly. 3. Otherwise negative chest x-ray. .               **This report has been created using voice recognition software. It may contain minor errors which are inherent in voice recognition technology. **      Final report electronically signed by DR Sandrita Alegria on 12/27/2020 10:20 AM          DVT prophylaxis: [] Lovenox                                 [] SCDs                                 [] SQ Heparin                                 [] Encourage ambulation           [] Already on Anticoagulation     Code Status: Full Code    Tele:   [] yes             [] no    Active Hospital Problems    Diagnosis Date Noted    Sepsis [facility]) [A41.9] 12/27/2020       Electronically signed by Eric Black MD on 12/28/2020 at 5:11 PM

## 2020-12-29 LAB
ALBUMIN SERPL-MCNC: 3.4 G/DL (ref 3.5–5.1)
ALP BLD-CCNC: 146 U/L (ref 38–126)
ALT SERPL-CCNC: 94 U/L (ref 11–66)
ANION GAP SERPL CALCULATED.3IONS-SCNC: 9 MEQ/L (ref 8–16)
AST SERPL-CCNC: 53 U/L (ref 5–40)
BASOPHILS # BLD: 0.7 %
BASOPHILS ABSOLUTE: 0 THOU/MM3 (ref 0–0.1)
BILIRUB SERPL-MCNC: 0.3 MG/DL (ref 0.3–1.2)
BUN BLDV-MCNC: 10 MG/DL (ref 7–22)
CALCIUM SERPL-MCNC: 9 MG/DL (ref 8.5–10.5)
CHLORIDE BLD-SCNC: 105 MEQ/L (ref 98–111)
CO2: 25 MEQ/L (ref 23–33)
CREAT SERPL-MCNC: 0.3 MG/DL (ref 0.4–1.2)
EOSINOPHIL # BLD: 8.2 %
EOSINOPHILS ABSOLUTE: 0.5 THOU/MM3 (ref 0–0.4)
ERYTHROCYTE [DISTWIDTH] IN BLOOD BY AUTOMATED COUNT: 14.4 % (ref 11.5–14.5)
ERYTHROCYTE [DISTWIDTH] IN BLOOD BY AUTOMATED COUNT: 50.1 FL (ref 35–45)
GFR SERPL CREATININE-BSD FRML MDRD: > 90 ML/MIN/1.73M2
GLUCOSE BLD-MCNC: 105 MG/DL (ref 70–108)
HCT VFR BLD CALC: 42.5 % (ref 42–52)
HEMOGLOBIN: 13.6 GM/DL (ref 14–18)
IMMATURE GRANS (ABS): 0.03 THOU/MM3 (ref 0–0.07)
IMMATURE GRANULOCYTES: 0.5 %
LYMPHOCYTES # BLD: 25.7 %
LYMPHOCYTES ABSOLUTE: 1.4 THOU/MM3 (ref 1–4.8)
MCH RBC QN AUTO: 30.7 PG (ref 26–33)
MCHC RBC AUTO-ENTMCNC: 32 GM/DL (ref 32.2–35.5)
MCV RBC AUTO: 95.9 FL (ref 80–94)
MONOCYTES # BLD: 8.2 %
MONOCYTES ABSOLUTE: 0.5 THOU/MM3 (ref 0.4–1.3)
NUCLEATED RED BLOOD CELLS: 0 /100 WBC
PLATELET # BLD: 163 THOU/MM3 (ref 130–400)
PMV BLD AUTO: 8.9 FL (ref 9.4–12.4)
POTASSIUM SERPL-SCNC: 3.8 MEQ/L (ref 3.5–5.2)
PROCALCITONIN: 0.2 NG/ML (ref 0.01–0.09)
RBC # BLD: 4.43 MILL/MM3 (ref 4.7–6.1)
SEG NEUTROPHILS: 56.7 %
SEGMENTED NEUTROPHILS ABSOLUTE COUNT: 3.2 THOU/MM3 (ref 1.8–7.7)
SODIUM BLD-SCNC: 139 MEQ/L (ref 135–145)
TOTAL PROTEIN: 6.8 G/DL (ref 6.1–8)
WBC # BLD: 5.6 THOU/MM3 (ref 4.8–10.8)

## 2020-12-29 PROCEDURE — 2580000003 HC RX 258: Performed by: INTERNAL MEDICINE

## 2020-12-29 PROCEDURE — 99232 SBSQ HOSP IP/OBS MODERATE 35: CPT | Performed by: FAMILY MEDICINE

## 2020-12-29 PROCEDURE — 80053 COMPREHEN METABOLIC PANEL: CPT

## 2020-12-29 PROCEDURE — 36415 COLL VENOUS BLD VENIPUNCTURE: CPT

## 2020-12-29 PROCEDURE — 6360000002 HC RX W HCPCS: Performed by: INTERNAL MEDICINE

## 2020-12-29 PROCEDURE — 84145 PROCALCITONIN (PCT): CPT

## 2020-12-29 PROCEDURE — 2140000000 HC CCU INTERMEDIATE R&B

## 2020-12-29 PROCEDURE — 6370000000 HC RX 637 (ALT 250 FOR IP): Performed by: INTERNAL MEDICINE

## 2020-12-29 PROCEDURE — 85025 COMPLETE CBC W/AUTO DIFF WBC: CPT

## 2020-12-29 PROCEDURE — 6370000000 HC RX 637 (ALT 250 FOR IP): Performed by: FAMILY MEDICINE

## 2020-12-29 RX ORDER — AMLODIPINE BESYLATE 5 MG/1
5 TABLET ORAL ONCE
Status: COMPLETED | OUTPATIENT
Start: 2020-12-29 | End: 2020-12-29

## 2020-12-29 RX ADMIN — TIZANIDINE 2 MG: 4 TABLET ORAL at 14:17

## 2020-12-29 RX ADMIN — CALCIUM CARBONATE-VITAMIN D TAB 500 MG-200 UNIT 1 TABLET: 500-200 TAB at 07:52

## 2020-12-29 RX ADMIN — CEFEPIME HYDROCHLORIDE 2 G: 2 INJECTION, POWDER, FOR SOLUTION INTRAVENOUS at 16:15

## 2020-12-29 RX ADMIN — AMLODIPINE BESYLATE 5 MG: 5 TABLET ORAL at 09:14

## 2020-12-29 RX ADMIN — CEFEPIME HYDROCHLORIDE 2 G: 2 INJECTION, POWDER, FOR SOLUTION INTRAVENOUS at 05:25

## 2020-12-29 RX ADMIN — TIZANIDINE 2 MG: 4 TABLET ORAL at 07:52

## 2020-12-29 RX ADMIN — SODIUM CHLORIDE TAB 1 GM 1 G: 1 TAB at 16:52

## 2020-12-29 RX ADMIN — MULTIPLE VITAMINS W/ MINERALS TAB 1 TABLET: TAB at 07:52

## 2020-12-29 RX ADMIN — Medication 6 MG: at 07:52

## 2020-12-29 RX ADMIN — OXYBUTYNIN CHLORIDE 5 MG: 5 TABLET, EXTENDED RELEASE ORAL at 07:52

## 2020-12-29 RX ADMIN — FAMOTIDINE 20 MG: 20 TABLET, FILM COATED ORAL at 20:41

## 2020-12-29 RX ADMIN — CALCIUM CARBONATE-VITAMIN D TAB 500 MG-200 UNIT 1 TABLET: 500-200 TAB at 20:41

## 2020-12-29 RX ADMIN — TIZANIDINE 2 MG: 4 TABLET ORAL at 20:41

## 2020-12-29 RX ADMIN — SODIUM CHLORIDE, PRESERVATIVE FREE 10 ML: 5 INJECTION INTRAVENOUS at 20:41

## 2020-12-29 RX ADMIN — RIVAROXABAN 10 MG: 10 TABLET, FILM COATED ORAL at 16:52

## 2020-12-29 RX ADMIN — METOPROLOL TARTRATE 25 MG: 25 TABLET ORAL at 07:52

## 2020-12-29 RX ADMIN — METOPROLOL TARTRATE 25 MG: 25 TABLET ORAL at 20:41

## 2020-12-29 RX ADMIN — SODIUM CHLORIDE TAB 1 GM 1 G: 1 TAB at 07:52

## 2020-12-29 RX ADMIN — FAMOTIDINE 20 MG: 20 TABLET, FILM COATED ORAL at 07:53

## 2020-12-29 RX ADMIN — POTASSIUM CHLORIDE 40 MEQ: 750 TABLET, EXTENDED RELEASE ORAL at 07:53

## 2020-12-29 NOTE — PROGRESS NOTES
Hospitalist Progress Note    Patient:  Claudio Kc      Unit/Bed:3B-36/036-A    YOB: 1955    MRN: [id number]       Acct: [de-identified]     PCP: Raoul Joshi MD    Date of Admission: 12/27/2020    Assessment/Plan:    1. Sepsis (POA): 3/4 SIRS (febrile, tachy, elevated WBC count) with elevated LA. Source likely genitourinary. UA grossly dirty. Has a suprapubic catheter, pt unsure when last time it was exchanged (pt also unsure how often it gets exchanged). Consult Urology. Cont IV Cefepime, follow-up Ux and Bx. IV fluids. Repeat UA ordered and has some signs of infection. Pt has had resistant bacteria in his urine in the past and came in septic. Improved with cefepime. Will await urine cx results for repeat UA and continue cefepime for now. If urine culture results are not indicative of a bacteria, then we will have to decide to complete cefepime course for UTI in hosp vs deciding on an agent for his ECF.      2. UTI Associated with Suprapubic Catheter: neurogenic bladder and MS with suprapubic catheter. Urology following. Appreciate recs. No further recs for the catheter. Continue oxybutynin 5mg qd.      3. Dehydration: IVF     4. 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      5. Hx of PE: resume [facility]      6. Hx Decubitus Ulcer: does not appear infection. Consult wound care. Frequent turning. Expected discharge date:  1-2 days. Disposition:    [] Home       [] TCU       [] Rehab       [] Psych       [x] SNF       [x] [place]       [] Other-    Chief Complaint: fever, chills  Hospital Course: see hpi     Subjective (past 24 hours):   Doing well. No acute events overnight  Concern for antimicrobial as initial UA mixed growth but pt with sepsis on arrival. Now improved with cefepime but repeat UA after cath exchange is minimally infected. Will await another 24hrs for urine cx results.      Medications:  Reviewed    Infusion Medications     Scheduled Medications    calcium-vitamin D  1 tablet Oral BID    famotidine  20 mg Oral BID    metoprolol tartrate  25 mg Oral BID    therapeutic multivitamin-minerals  1 tablet Oral Daily    oxybutynin  5 mg Oral Daily    potassium chloride  40 mEq Oral Daily    rivaroxaban  10 mg Oral Daily    sodium chloride  1 g Oral BID WC    tiZANidine  2 mg Oral TID    cefepime  2 g Intravenous Q12H    sodium chloride flush  10 mL Intravenous 2 times per day    vitamin A  20,000 Units Oral Daily     PRN Meds: sodium chloride flush, promethazine **OR** ondansetron, polyethylene glycol, acetaminophen **OR** acetaminophen      Intake/Output Summary (Last 24 hours) at 12/29/2020 1327  Last data filed at 12/29/2020 0500  Gross per 24 hour   Intake 3421 ml   Output 2000 ml   Net 1421 ml       Diet:  DIET GENERAL;    Exam:  BP (!) 150/71   Pulse 80   Temp 97.8 °F (36.6 °C) (Oral)   Resp 16   Ht 5' 7\" (1.702 m)   Wt 210 lb (95.3 kg)   SpO2 97%   BMI 32.89 kg/m²     General appearance: No apparent distress, appears stated age and cooperative. HEENT: Pupils equal, round, and reactive to light. Conjunctivae/corneas clear. Neck: Supple, with full range of motion. No jugular venous distention. Trachea midline. Respiratory:  Normal respiratory effort. Clear to auscultation, bilaterally without Rales/Wheezes/Rhonchi. Cardiovascular: Regular rate and rhythm with normal S1/S2 without murmurs, rubs or gallops. Abdomen: Soft, colostomy bag and suprapubic cath  Musculoskeletal: passive and active ROM x 4 extremities. Skin: Skin color, texture, turgor normal.    Neurologic:  Neurovascularly intact without any focal sensory/motor deficits.  Cranial nerves: II-XII intact, grossly non-focal.  Psychiatric: Alert and oriented, thought content appropriate  Capillary Refill: Brisk,< 3 seconds   Peripheral Pulses: +2 palpable, equal bilaterally       Labs:   Recent Labs     12/27/20  1010 12/28/20  0551 12/29/20  0348   WBC 18.7* 8.3 5.6   HGB 14.8 13.9* 13.6*   HCT 45.6 45.8 42.5    145 163     Recent Labs     12/27/20  1010 12/28/20  0551 12/29/20  0347    135 139   K 3.9 4.0 3.8    104 105   CO2 23 21* 25   BUN 13 10 10   CREATININE 0.3* 0.3* 0.3*   CALCIUM 9.0 9.1 9.0     Recent Labs     12/27/20  1010 12/29/20  0347   AST 43* 53*   ALT 58 94*   BILIDIR <0.2  --    BILITOT 0.4 0.3   ALKPHOS 161* 146*     No results for input(s): INR in the last 72 hours. No results for input(s): Lewanda Bene in the last 72 hours. Recent Labs     12/27/20  1010 12/29/20  0347   PROCAL 0.22*  0.25* 0.20*       Microbiology:      Urinalysis:      Lab Results   Component Value Date    NITRU NEGATIVE 12/28/2020    WBCUA 15-25 12/28/2020    WBCUA >200 01/20/2012    BACTERIA NONE SEEN 12/28/2020    RBCUA 10-15 12/28/2020    BLOODU MODERATE 12/28/2020    SPECGRAV 1.008 12/28/2020    GLUCOSEU NEGATIVE 12/27/2020       Radiology:  CT ABDOMEN PELVIS WO CONTRAST   Final Result   There is no evidence of bowel obstruction. There are no acute abdominal or pelvic abnormalities. **This report has been created using voice recognition software. It may contain minor errors which are inherent in voice recognition technology. **      Final report electronically signed by Dr. Maryan Vines on 12/28/2020 8:53 AM      XR CHEST PORTABLE   Final Result   1. Abnormal density at the left lung base which could represent atelectasis or infiltrate. 2. Borderline cardiomegaly. 3. Otherwise negative chest x-ray. .               **This report has been created using voice recognition software. It may contain minor errors which are inherent in voice recognition technology. **      Final report electronically signed by DR Tyree Dover on 12/27/2020 10:20 AM          DVT prophylaxis: [] Lovenox                                 [] SCDs                                 [] SQ Heparin                                 [] Encourage ambulation           [] Already on Anticoagulation Code Status: Full Code    Tele:   [] yes             [] no    Active Hospital Problems    Diagnosis Date Noted    Sepsis [facility]) [A41.9] 12/27/2020       Electronically signed by Elda Maza MD on 12/29/2020 at 1:27 PM

## 2020-12-29 NOTE — CARE COORDINATION
12/29/20, 2:31 PM EST    Patient goals/plan/ treatment preferences discussed by  and . Patient goals/plan/ treatment preferences reviewed with patient/ family. Patient/ family verbalize understanding of discharge plan and are in agreement with goal/plan/treatment preferences. Understanding was demonstrated using the teach back method. AVS provided by RN at time of discharge, which includes all necessary medical information pertaining to the patients current course of illness, treatment, post-discharge goals of care, and treatment preferences. Services After Discharge  Services At/After Discharge: PT, OT, Nursing Services, In ambulance, Aide Services([facility] Ambulance)     Tricia Singh will be discharged back to [facility] today. He will not be a precert per Giovani Escobedo at the facility. He will be transported by ambulance. Discharge instructions and transport forms are attached to patients blue discharge packet. Spoke with Rocky at [facility] to make her aware of discharge. Tricia Singh is aware of discharge. Update 2:49pm: Discharge has been cancelled per SRAVANI [facility] RN. She called [facility] to cancel transport. CECIL called Rocky at [facility] to make her aware transport has been cancelled.

## 2020-12-29 NOTE — CARE COORDINATION
DISASTER CHARTING    12/29/20, 2:02 PM EST    DISCHARGE ONGOING EVALUATION:     [facility] day: 2  Location: [facility]/036-A Reason for admit: Sepsis [facility]) [A41.9]   Barriers to Discharge: Suprapubic cath exchanged yesterday. BP this am was 196/88. UA showed moderate blood, moderate leukocytes. Cultures still negative at this time. Cefepime iv q12hr. Per Hospitalist, plan return to ECF in 1-2 days. PCP: Nikolas Zapien MD  Patient Goals/Plan/Treatment Preferences: Pt is from [facility]. SW following.

## 2020-12-30 VITALS
HEIGHT: 67 IN | DIASTOLIC BLOOD PRESSURE: 58 MMHG | RESPIRATION RATE: 20 BRPM | WEIGHT: 213 LBS | BODY MASS INDEX: 33.43 KG/M2 | SYSTOLIC BLOOD PRESSURE: 111 MMHG | HEART RATE: 86 BPM | OXYGEN SATURATION: 95 % | TEMPERATURE: 98 F

## 2020-12-30 LAB
ALBUMIN SERPL-MCNC: 3.1 G/DL (ref 3.5–5.1)
ALP BLD-CCNC: 146 U/L (ref 38–126)
ALT SERPL-CCNC: 77 U/L (ref 11–66)
ANION GAP SERPL CALCULATED.3IONS-SCNC: 12 MEQ/L (ref 8–16)
AST SERPL-CCNC: 39 U/L (ref 5–40)
BASOPHILS # BLD: 0.9 %
BASOPHILS ABSOLUTE: 0.1 THOU/MM3 (ref 0–0.1)
BILIRUB SERPL-MCNC: 0.3 MG/DL (ref 0.3–1.2)
BUN BLDV-MCNC: 11 MG/DL (ref 7–22)
CALCIUM SERPL-MCNC: 9 MG/DL (ref 8.5–10.5)
CHLORIDE BLD-SCNC: 100 MEQ/L (ref 98–111)
CO2: 22 MEQ/L (ref 23–33)
CREAT SERPL-MCNC: 0.4 MG/DL (ref 0.4–1.2)
EOSINOPHIL # BLD: 8.2 %
EOSINOPHILS ABSOLUTE: 0.5 THOU/MM3 (ref 0–0.4)
ERYTHROCYTE [DISTWIDTH] IN BLOOD BY AUTOMATED COUNT: 14.4 % (ref 11.5–14.5)
ERYTHROCYTE [DISTWIDTH] IN BLOOD BY AUTOMATED COUNT: 50.6 FL (ref 35–45)
GFR SERPL CREATININE-BSD FRML MDRD: > 90 ML/MIN/1.73M2
GLUCOSE BLD-MCNC: 112 MG/DL (ref 70–108)
HCT VFR BLD CALC: 43.5 % (ref 42–52)
HEMOGLOBIN: 14 GM/DL (ref 14–18)
IMMATURE GRANS (ABS): 0.05 THOU/MM3 (ref 0–0.07)
IMMATURE GRANULOCYTES: 0.9 %
LYMPHOCYTES # BLD: 27.3 %
LYMPHOCYTES ABSOLUTE: 1.5 THOU/MM3 (ref 1–4.8)
MCH RBC QN AUTO: 31 PG (ref 26–33)
MCHC RBC AUTO-ENTMCNC: 32.2 GM/DL (ref 32.2–35.5)
MCV RBC AUTO: 96.5 FL (ref 80–94)
MONOCYTES # BLD: 8.8 %
MONOCYTES ABSOLUTE: 0.5 THOU/MM3 (ref 0.4–1.3)
NUCLEATED RED BLOOD CELLS: 0 /100 WBC
PLATELET # BLD: 185 THOU/MM3 (ref 130–400)
PMV BLD AUTO: 9 FL (ref 9.4–12.4)
POTASSIUM SERPL-SCNC: 3.7 MEQ/L (ref 3.5–5.2)
RBC # BLD: 4.51 MILL/MM3 (ref 4.7–6.1)
SEG NEUTROPHILS: 53.9 %
SEGMENTED NEUTROPHILS ABSOLUTE COUNT: 3 THOU/MM3 (ref 1.8–7.7)
SODIUM BLD-SCNC: 134 MEQ/L (ref 135–145)
TOTAL PROTEIN: 6.8 G/DL (ref 6.1–8)
WBC # BLD: 5.6 THOU/MM3 (ref 4.8–10.8)

## 2020-12-30 PROCEDURE — 99239 HOSP IP/OBS DSCHRG MGMT >30: CPT | Performed by: PHYSICIAN ASSISTANT

## 2020-12-30 PROCEDURE — 85025 COMPLETE CBC W/AUTO DIFF WBC: CPT

## 2020-12-30 PROCEDURE — 2580000003 HC RX 258: Performed by: INTERNAL MEDICINE

## 2020-12-30 PROCEDURE — 6370000000 HC RX 637 (ALT 250 FOR IP): Performed by: INTERNAL MEDICINE

## 2020-12-30 PROCEDURE — 36415 COLL VENOUS BLD VENIPUNCTURE: CPT

## 2020-12-30 PROCEDURE — 6360000002 HC RX W HCPCS: Performed by: INTERNAL MEDICINE

## 2020-12-30 PROCEDURE — 80053 COMPREHEN METABOLIC PANEL: CPT

## 2020-12-30 RX ADMIN — CALCIUM CARBONATE-VITAMIN D TAB 500 MG-200 UNIT 1 TABLET: 500-200 TAB at 09:28

## 2020-12-30 RX ADMIN — TIZANIDINE 2 MG: 4 TABLET ORAL at 09:28

## 2020-12-30 RX ADMIN — Medication 6 MG: at 09:28

## 2020-12-30 RX ADMIN — SODIUM CHLORIDE, PRESERVATIVE FREE 10 ML: 5 INJECTION INTRAVENOUS at 09:29

## 2020-12-30 RX ADMIN — MULTIPLE VITAMINS W/ MINERALS TAB 1 TABLET: TAB at 09:28

## 2020-12-30 RX ADMIN — POTASSIUM CHLORIDE 40 MEQ: 750 TABLET, EXTENDED RELEASE ORAL at 09:28

## 2020-12-30 RX ADMIN — CEFEPIME HYDROCHLORIDE 2 G: 2 INJECTION, POWDER, FOR SOLUTION INTRAVENOUS at 03:39

## 2020-12-30 RX ADMIN — FAMOTIDINE 20 MG: 20 TABLET, FILM COATED ORAL at 09:28

## 2020-12-30 RX ADMIN — SODIUM CHLORIDE TAB 1 GM 1 G: 1 TAB at 09:28

## 2020-12-30 RX ADMIN — OXYBUTYNIN CHLORIDE 5 MG: 5 TABLET, EXTENDED RELEASE ORAL at 09:29

## 2020-12-30 RX ADMIN — METOPROLOL TARTRATE 25 MG: 25 TABLET ORAL at 09:28

## 2020-12-30 NOTE — PROGRESS NOTES
Returned to [employer] [facility] [employer] at this time [address]. Report called to Nurse.  No furter questions

## 2020-12-31 ENCOUNTER — TELEPHONE (OUTPATIENT)
Dept: UROLOGY | Age: 65
End: 2020-12-31

## 2020-12-31 LAB
ORGANISM: ABNORMAL
URINE CULTURE, ROUTINE: ABNORMAL
URINE CULTURE, ROUTINE: ABNORMAL

## 2020-12-31 RX ORDER — AMPICILLIN 500 MG/1
500 CAPSULE ORAL 4 TIMES DAILY
Qty: 28 CAPSULE | Refills: 0
Start: 2020-12-31 | End: 2021-01-07

## 2020-12-31 RX ORDER — AMPICILLIN 500 MG/1
500 CAPSULE ORAL 4 TIMES DAILY
Qty: 40 CAPSULE | Refills: 0 | OUTPATIENT
Start: 2020-12-31 | End: 2020-12-31 | Stop reason: SDUPTHER

## 2020-12-31 NOTE — TELEPHONE ENCOUNTER
I called and spoke with Livia Matthew at [facility] and gave her verbal orders for Ampicillin 500 mg four times a day PO for 7 days. Ok to keep cefepime as he was discharged from hospital on.

## 2020-12-31 NOTE — TELEPHONE ENCOUNTER
I called and spoke with Cherie Jenkins at [facility] to see if she can add a sensitivity to cefepime. She stated that she is able to add sensitivity.

## 2020-12-31 NOTE — TELEPHONE ENCOUNTER
Ok to keep cefepime as he was discharged from hospital on, I will also start on ampicillin po. Is pharmacy correct?

## 2021-01-01 LAB
BLOOD CULTURE, ROUTINE: NORMAL
BLOOD CULTURE, ROUTINE: NORMAL

## 2021-01-02 NOTE — DISCHARGE SUMMARY
Hospitalist Discharge Summary    Patient: Apurva Mendoza  YOB: 1955  MRN: [id number]   Acct: [de-identified]    Primary Care Physician: Geoffrey Uriostegui MD    Admit date  12/27/2020    Discharge date: 12/30/2020   Disposition: ECF      Discharge Assessment and Plan:    1. Sepsis (POA): Resolved. 2/2 UTI, see below.      2. UTI Associated with Suprapubic Catheter: neurogenic bladder and MS with suprapubic catheter. Urology consult appreciated, SP cath exchanged. Treated with IV cefepime which is continued at discharge to complete 10 day IV Abx course. Follow up with PCP/Urology.      4. 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      5. Hx of PE: resume [address]      6. Hx Decubitus Ulcer: does not appear infection. Continue wound care at [facility]      Chief Complaint on presentation: Fevers, chills, increased muscle spasms, fatigue     Initial H&P / Hospital Course: HPI: \"63 y.o. male who presented to [address] [place] [address] with sudden onset this morning with fevers, chills, increased muscle spasms and just not feeling right. Pt is a resident at [facility], has a pmh of functional paraplegia 2/2 to MS. Has known decubitus ulcers that don't look infected. Has a suprapubic catheter, pt unsure when last time it was exchanged (pt also unsure how often it gets exchanged). Pt denies any symptoms of chest pain, sob, cough, congestion, sore throat, abdominal pain, diarrhea, nausea or vomiting.     In the ED, Temp 102, , WBC 18.7 and LA 3.1. Elevated Procal. UA + Nitrites and LE with many bacteria. Pt was given 1 dose IV Vanc and Rocephin and admitted for sepsis. \"     Subjective (day of discharge): Note, I took over car day of planned discharge. Patient has been treated with IV Cefepime for sepsis d/t UTI, responded to treatment well. IV cefepime continued for another 7 days at discharge to complete 10 day course. Patient is feeling well. Denies fever/chills, SOB, CP, abd pain, n/v/d.  Patient responded well to medical management and is discharged in stable condition with appropriate follow up. Physical Exam:-  Vitals: No data found. Weight: Weight: 213 lb (96.6 kg)   24 hour intake/output: No intake or output data in the 24 hours ending 01/01/21 1925    General appearance: No apparent distress, appears stated age and cooperative. HEENT: Normal cephalic, atraumatic without obvious deformity. Pupils equal, round, and reactive to light. Extra ocular muscles intact. Conjunctivae/corneas clear. Neck: Supple, with full range of motion. No jugular venous distention. Trachea midline. Respiratory:  Normal respiratory effort. Clear to auscultation, bilaterally without Rales/Wheezes/Rhonchi. Cardiovascular: Regular rate and rhythm with normal S1/S2 without murmurs, rubs or gallops. Abdomen: Soft, non-tender, non-distended with normal bowel sounds. Musculoskeletal:  No clubbing, cyanosis or edema bilaterally. Skin: Skin color, texture, turgor normal.  No rashes or lesions. Neurologic:  Neurovascularly intact without any focal sensory/motor deficits.  Cranial nerves: II-XII intact, grossly non-focal.  Psychiatric: Alert and oriented, thought content appropriate, normal insight  Capillary Refill: Brisk,< 3 seconds   Peripheral Pulses: +2 palpable, equal bilaterally     Labs :  Recent Results (from the past 72 hour(s))   CBC auto differential    Collection Time: 12/30/20  4:32 AM   Result Value Ref Range    WBC 5.6 4.8 - 10.8 thou/mm3    RBC 4.51 (L) 4.70 - 6.10 mill/mm3    Hemoglobin 14.0 14.0 - 18.0 gm/dl    Hematocrit 43.5 42.0 - 52.0 %    MCV 96.5 (H) 80.0 - 94.0 fL    MCH 31.0 26.0 - 33.0 pg    MCHC 32.2 32.2 - 35.5 gm/dl    RDW-CV 14.4 11.5 - 14.5 %    RDW-SD 50.6 (H) 35.0 - 45.0 fL    Platelets 453 391 - 259 thou/mm3    MPV 9.0 (L) 9.4 - 12.4 fL    Seg Neutrophils 53.9 %    Lymphocytes 27.3 %    Monocytes 8.8 %    Eosinophils 8.2 %    Basophils 0.9 %    Immature Granulocytes 0.9 %    Segs :  Lab Results   Component Value Date    LABAERO  06/17/2019     Culture also yielded heavy mixed growth of multiple enteric  gram negative bacilli, including swarming Proteus species. If a true mixed infection is suspected, then broad spectrum  empiric antibiotic therapy is indicated. At least one isolate is resistant in vitro to current  regimen. LABAERO  06/17/2019     moderate growth  This isolate is a carbapenemase . ID  consultation may be helpful in some clinical circumstances. CONTACT isolation required. Phenotypic screen test (modified Carbapenem Inactivation  Method or mCIM) for carbapenemase production is positive and  PCR is not detected; results indicate a potentially new  carbapenemase variant or novel mechanism. Carbapenemase testing performed at [facility] (1-), Farrah Welch [address]12. LABAERO  06/17/2019     light growth  This is a MRSA (Methicillin Resistant Staphylococcus  aureus)isolate. Isolates of MRSA (ORSA) Methicillin (Oxacillin) Resistant  Staphylococcus aureus (coagulase positive) require patient  be placed in CONTACT isolation. Methicillin(Oxacillin)resistant strains of staphylococci  (MRSA)or(MRSE)should be considered resistant to all classes  of cephalosporins, penems and beta-lactams. In the treatment of gram positive infections, GENTAMICIN  should be CONSIDERED a SYNERGYSTIC agent ONLY. Lab Results   Component Value Date    LABANAE  06/17/2019     Culture overgrown with Proteus sp. No further evaluation of  this specimen is possible. If a true mixed aerobic and  anaerobic infection is suspected, then broad spectrum empiric  antibiotic therapy is indicated and should include coverage  for anaerobic organisms.          Urinalysis:     Lab Results   Component Value Date    NITRU NEGATIVE 12/28/2020    WBCUA 15-25 12/28/2020    WBCUA >200 01/20/2012    BACTERIA NONE SEEN 12/28/2020    RBCUA 10-15 12/28/2020    BLOODU MODERATE 12/28/2020    SPECGRAV 1.008 12/28/2020 report has been created using voice recognition software. It may contain minor errors which are inherent in voice recognition technology. ** Final report electronically signed by Dr. Doug Parekh on 12/28/2020 8:53 AM    Xr Chest Portable    Result Date: 12/27/2020  PROCEDURE: XR CHEST PORTABLE CLINICAL INFORMATION: fever. COMPARISON: Chest x-ray dated 20 June 2019. TECHNIQUE: AP upright view of the chest. FINDINGS: There is borderline cardiomegaly. The mediastinum is not widened. There is atelectasis or infiltrate at the left lung base. The pulmonary vascularity is normal. No suspicious osseous lesions are present. 1. Abnormal density at the left lung base which could represent atelectasis or infiltrate. 2. Borderline cardiomegaly. 3. Otherwise negative chest x-ray. . **This report has been created using voice recognition software. It may contain minor errors which are inherent in voice recognition technology. ** Final report electronically signed by DR Lisbet Gauthier on 12/27/2020 10:20 AM       Consults:   IP CONSULT TO UROLOGY  IP CONSULT TO SOCIAL WORK    Discharge Medications:      Medication List      START taking these medications    cefepime  infusion  Commonly known as: MAXIPIME  Infuse 2,000 mg intravenously every 12 hours for 6 days Compound per protocol        CONTINUE taking these medications    acetaminophen 325 MG tablet  Commonly known as: TYLENOL     acetic acid 0.25 % irrigation     calcium-vitamin D 500-200 MG-UNIT per tablet  Commonly known as: OSCAL-500  Take 1 tablet by mouth 2 times daily     docusate sodium 100 MG capsule  Commonly known as: COLACE     famotidine 20 MG tablet  Commonly known as: PEPCID  Take 1 tablet by mouth 2 times daily     ibuprofen 400 MG tablet  Commonly known as: ADVIL;MOTRIN     LACTOBACILLUS PO     metoprolol tartrate 25 MG tablet  Commonly known as: LOPRESSOR  Take 1 tablet by mouth 2 times daily     miconazole 2 % powder  Commonly known as: MICOTIN  Apply topically

## 2021-01-26 ENCOUNTER — VIRTUAL VISIT (OUTPATIENT)
Dept: UROLOGY | Age: 66
End: 2021-01-26

## 2021-01-26 DIAGNOSIS — N31.9 NEUROGENIC BLADDER DISORDER: Primary | ICD-10-CM

## 2021-01-26 RX ORDER — CLINDAMYCIN PHOSPHATE 150 MG/ML
600 INJECTION, SOLUTION INTRAVENOUS EVERY 6 HOURS
COMMUNITY
End: 2021-08-31 | Stop reason: ALTCHOICE

## 2021-01-26 RX ORDER — HYDROCODONE BITARTRATE AND ACETAMINOPHEN 5; 325 MG/1; MG/1
1 TABLET ORAL EVERY 4 HOURS PRN
Status: ON HOLD | COMMUNITY
Start: 2020-12-07 | End: 2022-04-22

## 2021-01-26 NOTE — PROGRESS NOTES
Gera De Jesus is a 63 y.o. male evaluated via telephone on 1/26/2021. Consent:  He and/or health care decision maker is aware that that he may receive a bill for this telephone service, depending on his insurance coverage, and has provided verbal consent to proceed: Yes      Documentation:  I communicated with the patient and/or health care decision maker about bladder stones. Details of this discussion including any medical advice provided:     Plan:  Cystoscopy, cystolithalopaxy , suprapubic catheter exchange (45)      Summary of Previous Records: The patient is a 63 y.o. male with significant past medical history of as listed below who presents with fevers that started yesterday morning. He resides at [facility]. He reports having COVID in the past few months. He has hx of MS with neurogenic bladder and chronic SP Catheter. On Admission his WBC was 18.7, Lactic acid 3.1. CRT 0.3. Urine off of old catheter shows mixed growth, he was started on Cefepime IV. Temp in ED was 102, heart rate 115.      Last seen by our office, last February. He undergoes SP exchanges by ECF staff. He is unsure last time it was done, he reports its done whenever it starts clogging up. Today he reports feeling well. No fevers over night. Urine is yellow, cloudy with sediment in tubing and malodorous. CT was ordered to evaluate for any obstructing stones. Pt denies flank pain but does report some mid back pain, mild. I affirm this is a Patient Initiated Episode with a Patient who has not had a related appointment within my department in the past 7 days or scheduled within the next 24 hours.     Patient identification was verified at the start of the visit: Yes    Total Time: minutes: 11-20 minutes    Note: not billable if this call serves to triage the patient into an appointment for the relevant concern      Lore Fajardo

## 2021-02-18 ENCOUNTER — TELEPHONE (OUTPATIENT)
Dept: UROLOGY | Age: 66
End: 2021-02-18

## 2021-02-18 DIAGNOSIS — Z01.818 PREOP TESTING: ICD-10-CM

## 2021-02-18 DIAGNOSIS — N31.9 NEUROGENIC BLADDER DISORDER: Primary | ICD-10-CM

## 2021-02-18 DIAGNOSIS — Z93.59 CHRONIC SUPRAPUBIC CATHETER (HCC): ICD-10-CM

## 2021-02-18 NOTE — TELEPHONE ENCOUNTER
SURGERY 826  [address] [place], [place] [address] [facility] [address] [facility], [address]      Phone *[phone] *[phone]   Surgical Scheduling Direct Line Phone *[phone] Fax *[phone] [facility] 1955 male    [address] [place] [address]   Marital Status:          Home Phone: [phone]      Cell Phone:    Telephone Information:   Mobile [phone]          Surgeon: Dr. Tory Talbot  Surgery Date: 02-   Time: 10:30am    Procedure: Cystoscopy, Cystolitholapaxy, suprapubic catheter exchange    Diagnosis: Neurogenic bladder/ Bladder stone     Important Medical History: In Epic   Patient in Nursing home    Special Inst/Equip:     CPT Codes:    16526, 31629, 62697  Latex Allergy:  No     Cardiac Device:  No     Anesthesia:  Anesthesiologist (General/Spinal)          Admission Type:  Same Day                             Admit Prior to Day of Surgery: No    Case Location:  Main OR           Preadmission Testing:Phone Call              PAT Date and Time:______________________________________________________    PAT Confirmation #: ______________________________________________________    Post Op Visit: ___________________________________________________________    Need Preop Cardiac Clearance: No    Does Patient have Cardiologist/physician?      None    Surgery Confirmation #: __________________________________________________    : ________________________   Date: __________________________     [employer] Name: [facility]

## 2021-02-18 NOTE — TELEPHONE ENCOUNTER
DO NOT TAKE ASPIRIN, IBUPROFEN, MOTRIN-LIKE DRUGS AND ANY MULTIVITAMINS OR OVER THE COUNTER SUPPLEMENTS 5 DAYS PRIOR TO SURGERY AND 3 DAYS FOLLOWING    HOLD XARELTO 3-DAYS PRIOR TO SURGERY     Jenniferjustin Pina 1955 Diagnosis: Neurogenic bladder/ Bladder stone    Surgical Physician: Dr. Aldair Tomlin have been scheduled for the procedure marked below:      Surgery: Cystoscopy, cystolitholapaxy, suprapubic catheter exchange        Date: 02-     Anesthesia: Anesthesiologist (General/Spinal)     Place of Service: [facility] Second Floor Same Day Surgery         Arrive to same day surgery by:  8:30am  (Surgery time is subject to change)      INSTRUCTIONS AS MARKED BELOW:    1. DO NOT eat or drink anything after midnight before surgery. 2. We prefer you shower or bathe with an antibacterial soap (Dial) the morning of surgery. 3.  Please ensure to have a  with you to transport you home. 4.  Please bring a current medication list, photo ID and insurance card(s) with you  5. Okay to take Tylenol  6. Take blood pressure or heart medication as directed, if taken in the morning take with a small sip of water  7. The office will call you in 1-2 days after your procedure to schedule a follow up. Covid-19 screening done so we have results before 02-.     (Covid-19 screening is date sensitive and can only be done 5 to 7 days prior to your procedure)    Date: 2/18/2021

## 2021-02-18 NOTE — TELEPHONE ENCOUNTER
Patient scheduled for surgery with Dr. Luann Eckert on 02- at 10:30am with an arrival of 8:30am.  Preop orders and instructions faxed to [facility].   Surgery consent to be signed on arrival.

## 2021-02-22 ENCOUNTER — TELEPHONE (OUTPATIENT)
Dept: UROLOGY | Age: 66
End: 2021-02-22

## 2021-02-22 ENCOUNTER — PREP FOR PROCEDURE (OUTPATIENT)
Dept: UROLOGY | Age: 66
End: 2021-02-22

## 2021-02-22 RX ORDER — SODIUM CHLORIDE 9 MG/ML
INJECTION, SOLUTION INTRAVENOUS CONTINUOUS
Status: CANCELLED | OUTPATIENT
Start: 2021-02-26

## 2021-02-22 NOTE — TELEPHONE ENCOUNTER
[facility] Urology Surgery Preop Check Off      PREOP check list      Surgeon Dr. Ravin Judge       Patient Name  Chavez Felipe     1955   MRN   [id number]     [address]   2021  Diagnosis/Indication for Surgery  Neurogenic bladder/ Bladder stone   Procedure Cysto, cystolitholapaxy, suprapubic catheter exchange    Allergies   No Known Allergies     Covid 2021 Negative    Urine Culture  Suprapubic catheter  Blood thinners  Xarelto     EKG  2020  Sinus tachycardia  Nonspecific T wave abnormality  Abnormal ECG  When compared with ECG of 2019 05:09,  No significant change was found    CXR  2020  1. Abnormal density at the left lung base which could represent atelectasis or infiltrate. 2. Borderline cardiomegaly.    3. Otherwise negative chest x-ray       Clearance:  Cardiac- none  Medical - none      Pre-Admission to surgery-

## 2021-02-23 ENCOUNTER — TELEPHONE (OUTPATIENT)
Dept: FAMILY MEDICINE CLINIC | Age: 66
End: 2021-02-23

## 2021-02-23 NOTE — TELEPHONE ENCOUNTER
----- Message from [employer] DO sent at 2/23/2021  2:54 PM EST -----  Please let pt know that COVID testing is negative.

## 2021-02-25 NOTE — PROGRESS NOTES
Patient contacted regarding COVID-19 screen. Following questions asked and answered by Carrillo Guthrie at [facility]     In the last month, have you been in contact with someone who was confirmed or suspected to have Coronavirus/COVID-19:  Patient stated NO      Pt was informed can be a visitor allowed. Please bring masks. Do you or family members have any of the following symptoms:  Cough-no   Muscle pain-no   Shortness of breath-no   Fever-no   Weakness-no  Severe headache-no   Sore throat-no   Respiratory symptoms-no    Have you traveled internationally in the last month?  No     Have you been to the emergency room recently-no

## 2021-02-26 ENCOUNTER — HOSPITAL ENCOUNTER (OUTPATIENT)
Age: 66
Setting detail: OUTPATIENT SURGERY
Discharge: OTHER INSTITUTION | End: 2021-02-26
Attending: UROLOGY | Admitting: UROLOGY
Payer: COMMERCIAL

## 2021-02-26 ENCOUNTER — ANESTHESIA (OUTPATIENT)
Dept: OPERATING ROOM | Age: 66
End: 2021-02-26
Payer: COMMERCIAL

## 2021-02-26 ENCOUNTER — ANESTHESIA EVENT (OUTPATIENT)
Dept: OPERATING ROOM | Age: 66
End: 2021-02-26
Payer: COMMERCIAL

## 2021-02-26 VITALS
SYSTOLIC BLOOD PRESSURE: 140 MMHG | RESPIRATION RATE: 12 BRPM | DIASTOLIC BLOOD PRESSURE: 79 MMHG | OXYGEN SATURATION: 100 % | TEMPERATURE: 96.8 F

## 2021-02-26 VITALS
OXYGEN SATURATION: 97 % | DIASTOLIC BLOOD PRESSURE: 68 MMHG | WEIGHT: 204 LBS | BODY MASS INDEX: 31.95 KG/M2 | HEART RATE: 99 BPM | RESPIRATION RATE: 18 BRPM | SYSTOLIC BLOOD PRESSURE: 142 MMHG | TEMPERATURE: 98.4 F

## 2021-02-26 LAB — INR BLD: 1.47 (ref 0.85–1.13)

## 2021-02-26 PROCEDURE — 3700000000 HC ANESTHESIA ATTENDED CARE: Performed by: UROLOGY

## 2021-02-26 PROCEDURE — 87081 CULTURE SCREEN ONLY: CPT

## 2021-02-26 PROCEDURE — 2580000003 HC RX 258: Performed by: UROLOGY

## 2021-02-26 PROCEDURE — 2580000003 HC RX 258: Performed by: NURSE ANESTHETIST, CERTIFIED REGISTERED

## 2021-02-26 PROCEDURE — 3600000013 HC SURGERY LEVEL 3 ADDTL 15MIN: Performed by: UROLOGY

## 2021-02-26 PROCEDURE — 3600000003 HC SURGERY LEVEL 3 BASE: Performed by: UROLOGY

## 2021-02-26 PROCEDURE — 7100000011 HC PHASE II RECOVERY - ADDTL 15 MIN: Performed by: UROLOGY

## 2021-02-26 PROCEDURE — 2709999900 HC NON-CHARGEABLE SUPPLY: Performed by: UROLOGY

## 2021-02-26 PROCEDURE — 6360000002 HC RX W HCPCS: Performed by: NURSE ANESTHETIST, CERTIFIED REGISTERED

## 2021-02-26 PROCEDURE — 7100000010 HC PHASE II RECOVERY - FIRST 15 MIN: Performed by: UROLOGY

## 2021-02-26 PROCEDURE — 3700000001 HC ADD 15 MINUTES (ANESTHESIA): Performed by: UROLOGY

## 2021-02-26 PROCEDURE — C1769 GUIDE WIRE: HCPCS | Performed by: UROLOGY

## 2021-02-26 PROCEDURE — 2720000010 HC SURG SUPPLY STERILE: Performed by: UROLOGY

## 2021-02-26 PROCEDURE — 7100000001 HC PACU RECOVERY - ADDTL 15 MIN: Performed by: UROLOGY

## 2021-02-26 PROCEDURE — 85610 PROTHROMBIN TIME: CPT

## 2021-02-26 PROCEDURE — 7100000000 HC PACU RECOVERY - FIRST 15 MIN: Performed by: UROLOGY

## 2021-02-26 PROCEDURE — 6360000002 HC RX W HCPCS: Performed by: UROLOGY

## 2021-02-26 PROCEDURE — 2500000003 HC RX 250 WO HCPCS: Performed by: NURSE ANESTHETIST, CERTIFIED REGISTERED

## 2021-02-26 PROCEDURE — 36415 COLL VENOUS BLD VENIPUNCTURE: CPT

## 2021-02-26 RX ORDER — FENTANYL CITRATE 50 UG/ML
INJECTION, SOLUTION INTRAMUSCULAR; INTRAVENOUS PRN
Status: DISCONTINUED | OUTPATIENT
Start: 2021-02-26 | End: 2021-02-26 | Stop reason: SDUPTHER

## 2021-02-26 RX ORDER — LIDOCAINE HCL/PF 100 MG/5ML
SYRINGE (ML) INJECTION PRN
Status: DISCONTINUED | OUTPATIENT
Start: 2021-02-26 | End: 2021-02-26 | Stop reason: SDUPTHER

## 2021-02-26 RX ORDER — AMOXICILLIN AND CLAVULANATE POTASSIUM 875; 125 MG/1; MG/1
1 TABLET, FILM COATED ORAL 2 TIMES DAILY
Qty: 20 TABLET | Refills: 0 | Status: SHIPPED | OUTPATIENT
Start: 2021-02-26 | End: 2021-03-08

## 2021-02-26 RX ORDER — FENTANYL CITRATE 50 UG/ML
50 INJECTION, SOLUTION INTRAMUSCULAR; INTRAVENOUS EVERY 5 MIN PRN
Status: DISCONTINUED | OUTPATIENT
Start: 2021-02-26 | End: 2021-02-26 | Stop reason: HOSPADM

## 2021-02-26 RX ORDER — SODIUM CHLORIDE 9 MG/ML
INJECTION, SOLUTION INTRAVENOUS CONTINUOUS
Status: DISCONTINUED | OUTPATIENT
Start: 2021-02-26 | End: 2021-02-26 | Stop reason: HOSPADM

## 2021-02-26 RX ORDER — MEPERIDINE HYDROCHLORIDE 25 MG/ML
12.5 INJECTION INTRAMUSCULAR; INTRAVENOUS; SUBCUTANEOUS EVERY 5 MIN PRN
Status: DISCONTINUED | OUTPATIENT
Start: 2021-02-26 | End: 2021-02-26 | Stop reason: HOSPADM

## 2021-02-26 RX ORDER — LABETALOL 20 MG/4 ML (5 MG/ML) INTRAVENOUS SYRINGE
5 EVERY 10 MIN PRN
Status: DISCONTINUED | OUTPATIENT
Start: 2021-02-26 | End: 2021-02-26 | Stop reason: HOSPADM

## 2021-02-26 RX ORDER — SODIUM CHLORIDE 9 MG/ML
INJECTION, SOLUTION INTRAVENOUS CONTINUOUS PRN
Status: DISCONTINUED | OUTPATIENT
Start: 2021-02-26 | End: 2021-02-26 | Stop reason: SDUPTHER

## 2021-02-26 RX ORDER — ONDANSETRON 2 MG/ML
INJECTION INTRAMUSCULAR; INTRAVENOUS PRN
Status: DISCONTINUED | OUTPATIENT
Start: 2021-02-26 | End: 2021-02-26 | Stop reason: SDUPTHER

## 2021-02-26 RX ORDER — ONDANSETRON 2 MG/ML
4 INJECTION INTRAMUSCULAR; INTRAVENOUS
Status: DISCONTINUED | OUTPATIENT
Start: 2021-02-26 | End: 2021-02-26 | Stop reason: HOSPADM

## 2021-02-26 RX ORDER — PROPOFOL 10 MG/ML
INJECTION, EMULSION INTRAVENOUS PRN
Status: DISCONTINUED | OUTPATIENT
Start: 2021-02-26 | End: 2021-02-26 | Stop reason: SDUPTHER

## 2021-02-26 RX ADMIN — PHENYLEPHRINE HYDROCHLORIDE 200 MCG: 10 INJECTION INTRAVENOUS at 14:01

## 2021-02-26 RX ADMIN — PHENYLEPHRINE HYDROCHLORIDE 100 MCG: 10 INJECTION INTRAVENOUS at 14:05

## 2021-02-26 RX ADMIN — PHENYLEPHRINE HYDROCHLORIDE 200 MCG: 10 INJECTION INTRAVENOUS at 14:00

## 2021-02-26 RX ADMIN — Medication 60 MG: at 13:56

## 2021-02-26 RX ADMIN — PHENYLEPHRINE HYDROCHLORIDE 200 MCG: 10 INJECTION INTRAVENOUS at 15:01

## 2021-02-26 RX ADMIN — SODIUM CHLORIDE: 9 INJECTION, SOLUTION INTRAVENOUS at 13:49

## 2021-02-26 RX ADMIN — ONDANSETRON HYDROCHLORIDE 4 MG: 4 INJECTION, SOLUTION INTRAMUSCULAR; INTRAVENOUS at 14:44

## 2021-02-26 RX ADMIN — PHENYLEPHRINE HYDROCHLORIDE 200 MCG: 10 INJECTION INTRAVENOUS at 14:17

## 2021-02-26 RX ADMIN — PHENYLEPHRINE HYDROCHLORIDE 100 MCG: 10 INJECTION INTRAVENOUS at 14:08

## 2021-02-26 RX ADMIN — PHENYLEPHRINE HYDROCHLORIDE 200 MCG: 10 INJECTION INTRAVENOUS at 14:23

## 2021-02-26 RX ADMIN — FENTANYL CITRATE 50 MCG: 50 INJECTION, SOLUTION INTRAMUSCULAR; INTRAVENOUS at 13:56

## 2021-02-26 RX ADMIN — CEFAZOLIN 2 G: 10 INJECTION, POWDER, FOR SOLUTION INTRAVENOUS at 14:04

## 2021-02-26 RX ADMIN — PHENYLEPHRINE HYDROCHLORIDE 200 MCG: 10 INJECTION INTRAVENOUS at 14:03

## 2021-02-26 RX ADMIN — PROPOFOL 130 MG: 10 INJECTION, EMULSION INTRAVENOUS at 13:56

## 2021-02-26 RX ADMIN — PHENYLEPHRINE HYDROCHLORIDE 200 MCG: 10 INJECTION INTRAVENOUS at 14:46

## 2021-02-26 RX ADMIN — SODIUM CHLORIDE: 9 INJECTION, SOLUTION INTRAVENOUS at 11:58

## 2021-02-26 RX ADMIN — PHENYLEPHRINE HYDROCHLORIDE 200 MCG: 10 INJECTION INTRAVENOUS at 15:07

## 2021-02-26 RX ADMIN — SODIUM CHLORIDE: 9 INJECTION, SOLUTION INTRAVENOUS at 14:41

## 2021-02-26 ASSESSMENT — PULMONARY FUNCTION TESTS
PIF_VALUE: 13
PIF_VALUE: 12
PIF_VALUE: 7
PIF_VALUE: 12
PIF_VALUE: 9
PIF_VALUE: 13
PIF_VALUE: 1
PIF_VALUE: 13
PIF_VALUE: 12
PIF_VALUE: 9
PIF_VALUE: 10
PIF_VALUE: 12
PIF_VALUE: 12
PIF_VALUE: 13
PIF_VALUE: 12
PIF_VALUE: 9
PIF_VALUE: 12
PIF_VALUE: 12
PIF_VALUE: 10
PIF_VALUE: 12
PIF_VALUE: 24
PIF_VALUE: 12
PIF_VALUE: 9
PIF_VALUE: 12
PIF_VALUE: 18
PIF_VALUE: 10
PIF_VALUE: 12
PIF_VALUE: 13
PIF_VALUE: 24
PIF_VALUE: 8
PIF_VALUE: 13
PIF_VALUE: 13
PIF_VALUE: 9
PIF_VALUE: 8
PIF_VALUE: 10
PIF_VALUE: 9
PIF_VALUE: 12

## 2021-02-26 ASSESSMENT — PAIN DESCRIPTION - ORIENTATION: ORIENTATION: RIGHT

## 2021-02-26 ASSESSMENT — PAIN SCALES - GENERAL: PAINLEVEL_OUTOF10: 3

## 2021-02-26 NOTE — H&P
Gelacio Alva MD  History and Physical    Patient:  Jackie Vitale  MRN: [id number]  YOB: 1955    HISTORY OF PRESENT ILLNESS:     The patient is a 63 y.o. male who presents with bladder stones. Here for procedure. Patient's old records, notes and chart reviewed and summarized above. Gelacio Alva MD independently reviewed the images and verified the radiology reports from:    No results found. Past Medical History:    Past Medical History:   Diagnosis Date    Dysphagia     oropharyngeal    History of pulmonary embolism     History of urinary tract infection     Hx of blood clots     Pulmonary embolis    Hypertension     Major depressive disorder, single episode     [facility]) [facility]     Neurogenic bladder feb. 2012    Dr. Michael Oconnor placed cather    Osteomyelitis [facility])     UTI (urinary tract infection)        Past Surgical History:    Past Surgical History:   Procedure Laterality Date    ANKLE SURGERY  1996    broken ankle    BLADDER SURGERY  2-    Suprapubic catheter placement    COLONOSCOPY      CYSTO/URETERO/PYELOSCOPY, CALCULUS TX Left 6/19/2019    CYSTOSCOPY, LEFT STENT insertion, bladder irragation with bladder stones performed [facility]att [facility] [address] N/A 7/8/2019    CYSTO, LEFT URETERAL STENT REMOVAL, LEFT URETEROSCOPY, LASER LITHOTRIPSY, BASKET RETRIEVAL OF STONE FRAGMENTS performed by Bebeto Watt MD at [address] [employer] N/A 6/13/2018    ROBOT DIVERTING COLOSTOMY performed by Rivas Herman MD at [facility]       Medications Prior to Admission:    Prior to Admission medications    Medication Sig Start Date End Date Taking? Authorizing Provider   HYDROcodone-acetaminophen (NORCO) 5-325 MG per tablet Take 1 tablet by mouth every 4 hours as needed.  12/7/20   Historical Provider, MD   clindamycin (CLEOCIN) 300 MG/2ML injection Inject 600 mg into the muscle every 6 hours    Historical Provider, MD   acetaminophen (TYLENOL) 325 MG tablet Take 650 mg by mouth every 4 hours as needed for Pain or Fever    Historical Provider, MD   acetic acid 0.25 % irrigation Irrigate with 30 mLs as directed 2 times daily Irrigate with as directed daily. Historical Provider, MD   ibuprofen (ADVIL;MOTRIN) 400 MG tablet Take 400 mg by mouth every 4 hours as needed for Fever (for temp > 100.5 not alleviated by acetaminophen)    Historical Provider, MD   LACTOBACILLUS PO Take 1 capsule by mouth daily    Historical Provider, MD   vitamin E 400 UNIT capsule Take 400 Units by mouth daily    Historical Provider, MD   nystatin (MYCOSTATIN) POWD powder Apply topically 2 times daily    Historical Provider, MD   oxybutynin (DITROPAN XL) 5 MG extended release tablet Take 1 tablet by mouth daily 2/20/20 2/19/21  TONO Hobbs - CNP   rivaroxaban (XARELTO) 10 MG TABS tablet Take 1 tablet by mouth daily 6/21/19   David Salamanca MD   metoprolol tartrate (LOPRESSOR) 25 MG tablet Take 1 tablet by mouth 2 times daily 6/20/19   David Salamanca MD   miconazole (MICOTIN) 2 % powder Apply topically 2 times daily.  6/20/19   David Salamanca MD   potassium chloride (KLOR-CON M) 20 MEQ TBCR extended release tablet Take 2 tablets by mouth daily 6/21/19   David Salamanca MD   sodium chloride 1 g tablet Take 1 tablet by mouth 2 times daily (with meals) 6/20/19   David Salamanca MD   calcium-vitamin D ([place]) 500-200 MG-UNIT per tablet Take 1 tablet by mouth 2 times daily 11/16/18   Raj Dunham MD   famotidine (PEPCID) 20 MG tablet Take 1 tablet by mouth 2 times daily 6/14/18   Ronny Greco MD   docusate sodium (COLACE) 100 MG capsule Take 100 mg by mouth daily     Historical Provider, MD   tiZANidine (ZANAFLEX) 2 MG tablet Take 2 mg by mouth 3 times daily 0600;1400;2200 12/15/16   Historical Provider, MD   Multiple Vitamin (MULTIVITAMIN) tablet Take 1 tablet by mouth daily 3/28/16 Maine Joseph MD   vitamin A 75100 UNITS capsule Take 2 capsules by mouth daily 3/28/16   Maine Joseph MD       Allergies:  Patient has no known allergies.     Social History:    Social History     Socioeconomic History    Marital status:      Spouse name: Ofelia Floyd Number of children: 2    Years of education: Not on file    Highest education level: Not on file   Occupational History    Not on file   Social Needs    Financial resource strain: Not on file    Food insecurity     Worry: Not on file     Inability: Not on file   [employer] needs     Medical: Not on file     Non-medical: Not on file   Tobacco Use    Smoking status: Former Smoker     Quit date: 1982     Years since quittin.1    Smokeless tobacco: Former User   Substance and Sexual Activity    Alcohol use: No     Comment: \"quit alcohol a few years ago\"    Drug use: No    Sexual activity: Yes     Partners: Female   Lifestyle    Physical activity     Days per week: Not on file     Minutes per session: Not on file    Stress: Not on file   Relationships    Social connections     Talks on phone: Not on file     Gets together: Not on file     Attends Bahai service: Not on file     Active member of club or organization: Not on file     Attends meetings of clubs or organizations: Not on file     Relationship status: Not on file    Intimate partner violence     Fear of current or ex partner: Not on file     Emotionally abused: Not on file     Physically abused: Not on file     Forced sexual activity: Not on file   Other Topics Concern    Not on file   Social History Narrative    Not on file       Family History:    Family History   Problem Relation Age of Onset    Cancer Mother         Breast    Heart Disease Father 48    Arthritis Sister     Heart Disease Paternal Grandmother 48       REVIEW OF SYSTEMS:  Constitutional: negative  Eyes: negative  Respiratory: negative  Cardiovascular: negative  Gastrointestinal: negative  Genitourinary: no acute issues  Musculoskeletal: negative  Skin: negative   Neurological: negative  Hematological/Lymphatic: negative  Psychological: negative    Physical Exam:      No data found. Constitutional: Patient in no acute distress; Neuro: alert and oriented to person place and time. Psych: Mood and affect normal.  Skin: Normal  Lungs: Respiratory effort normal, CTA  Cardiovascular:  Normal peripheral pulses; no murmur. Normal rhythm  Abdomen: Soft, non-tender, non-distended with no CVA, flank pain, hepatosplenomegaly or hernia. Kidneys normal.  Bladder non-tender and not distended. LABS:   No results for input(s): WBC, HGB, HCT, MCV, PLT in the last 72 hours. No results for input(s): NA, K, CL, CO2, PHOS, BUN, CREATININE in the last 72 hours. Invalid input(s): CA  Lab Results   Component Value Date    PSA 2.66 (H) 01/20/2012         Urinalysis: No results for input(s): COLORU, PHUR, LABCAST, WBCUA, RBCUA, MUCUS, TRICHOMONAS, YEAST, BACTERIA, CLARITYU, SPECGRAV, LEUKOCYTESUR, UROBILINOGEN, Melia Salvia in the last 72 hours.     Invalid input(s): NITRATE, GLUCOSEUKETONESUAMORPHOUS     -----------------------------------------------------------------      Assessment and Plan     Impression:    Patient Active Problem List   Diagnosis    Neurogenic bladder    Multiple sclerosis ([facility]    MS (multiple sclerosis) ([facility]    Urinary retention    Indwelling catheter present on admission    Cystostomy malfunction ([facility]    Decubitus ulcer of coccyx    Decubitus ulcer, stage 3 ([address]    Malnutrition ([address]    Decubitus ulcer of right perineal ischial region, stage 3 ([address]    Pulmonary embolism on left ([address]    Acute metabolic encephalopathy    Speech disturbance    Infected decubitus ulcer, stage I    Infected decubitus ulcer, stage III (HCC)    Wound infection    Elevated INR    Chronic osteomyelitis, pelvis, right ([address]    [facility] ([facility]

## 2021-02-26 NOTE — ANESTHESIA POSTPROCEDURE EVALUATION
Department of Anesthesiology  Postprocedure Note    Patient: Rusty Wang  MRN: [id number]  YOB: 1955  Date of evaluation: 2/26/2021  Time:  5:22 PM     Procedure Summary     Date: 02/26/21 Room / Location: [facility] OR 08 / [address] OR    Anesthesia Start: 5364 Anesthesia Stop: 2205    Procedure: Stacey Gunning, SUPRAPUBIC CATHETER EXCHANGE (N/A ) Diagnosis: (NEUROGENIC BLADDER, BLADDER STONE)    Surgeons: Rafael Calderon MD Responsible Provider: Chris [facility] [employer],     Anesthesia Type: general ASA Status: 3          Anesthesia Type: general    Quinn Phase I: Quinn Score: 10    Quinn Phase II:      Last vitals: Reviewed and per EMR flowsheets. Anesthesia Post Evaluation    Patient location during evaluation: PACU  Patient participation: complete - patient participated  Level of consciousness: awake and alert  Airway patency: patent  Nausea & Vomiting: no nausea and no vomiting  Complications: no  Cardiovascular status: hemodynamically stable  Respiratory status: acceptable  Hydration status: euvolemic      [address]  POST-ANESTHESIA NOTE       Name:  Rusty Wang                                         Age:  63 y.o.   MRN:  [id number]      Last Vitals:  BP (!) 105/55   Pulse 77   Temp 97.5 °F (36.4 °C) (Temporal)   Resp 16   Wt 204 lb (92.5 kg)   SpO2 95%   BMI 31.95 kg/m²   Patient Vitals for the past 4 hrs:   BP Temp Temp src Pulse Resp SpO2   02/26/21 1655 (!) 105/55 -- -- 77 16 95 %   02/26/21 1640 120/65 -- -- 70 10 95 %   02/26/21 1635 (!) 114/55 -- -- 76 18 95 %   02/26/21 1633 -- -- -- 80 -- --   02/26/21 1630 (!) 123/58 -- -- 81 18 97 %   02/26/21 1625 (!) 125/57 -- -- 88 18 97 %   02/26/21 1615 (!) 136/59 -- -- 84 19 98 %   02/26/21 1600 (!) 152/67 -- -- 75 13 96 %   02/26/21 1545 (!) 152/73 -- -- 77 17 96 %   02/26/21 1535 (!) 153/75 -- -- 73 15 97 %   02/26/21 1530 (!) 146/72 -- -- 76 14 97 %   02/26/21 1525 -- -- -- 75 -- --   02/26/21 1522 (!) 147/72 97.5 °F (36.4 °C) Temporal 73 14 97 %       Level of Consciousness:  Awake    Respiratory:  Stable    Oxygen Saturation:  Stable    Cardiovascular:  Stable    Hydration:  Adequate    PONV:  Stable    Post-op Pain:  Adequate analgesia    Post-op Assessment:  No apparent anesthetic complications    Additional Follow-Up / Treatment / Comment:  None    Mandy Sylvester MD  February 26, 2021   5:22 PM

## 2021-02-26 NOTE — PROGRESS NOTES
[address] AFTER GENERAL ANESTHESIA. SEE FLOWSHEET . HAS  IRRIGATION RUNNING IN SUPRAPUBIC CATHETER AND OUT THE URETHRAL CATHETER. CONSTANT LEAK OF IRRIGATION AROUND THE URETHRAL INSERTION SITE. VISHAL JENKINS SURGERY NURSE HERE AT BEDSIDE,   [address]. IRRIGATED EASILY WITH SMALL THREAD LIKE CLOT. [address] KEYS NOTIFIED. SAID JUST DC IRRIGATION AND PUT TO HIDALGO BAG SO HAS A BAG TO SUPRAPUBIC  CATHETER AND URETHRAL . PT .WASHED UP AND CHUX CHANGED UNDERNEATH,  1550 RESTS CALM AND QUIET. POSITIONED FOR COMFORT. BEAR HUGGER PLACED FOR COMFORT. DRAINAGE HAS STOPPED  1610 PT. COMPLAINS OF RIGHT LOWER JAW PAIN. NO OTHER PAIN ANYWHERE NOT RADIATING. NO OTHER SYMPTOMS. DR. Jaquelin Gamez NOTIFIED AND HERE AT BEDSIDE TO CHECK PT.  EVALUATED PT. AND SAID THINKS IS FROM  THE JAW THRUST TO PLACE THE AIRWAY IN THE OR. PT. HAS LARGE ROUND THICK NECK . SAID SHOULD RESOLVE WITH TIME. OK TO RELEASE FROM PACU TO [facility].  1625 DR. MASSIMO GALVAN'S CNP HERE TO ASSESS OUTPUT IN THE SUPRAPUBIC AND URETHRAL CATHETERS. TOOK A PICTURE FOR SHAUNA KEYS. [address] NOTIFY DR. KEYS THAT PT. GOING TO [facility] TO CHECK HIM BEFORE HE LEAVES. PT . HAS NO COMPLAINTS AT THIS TIME.   1645 TO [facility]. REPORT TO KELTON JENKINS. CALL LIGHT PLACED IN PT. HAND.   NO FAMILY HERE. PT. [address] HAS MS.

## 2021-02-26 NOTE — FLOWSHEET NOTE
Pt returned to [facility] room 15. Vitals and assessment as charted. 0.9 infusing, @500ml to count from PACU. Pt taking water. Pt verbalized understanding of discharge criteria and call light use. Call light in reach.

## 2021-02-26 NOTE — ANESTHESIA PRE PROCEDURE
Department of Anesthesiology  Preprocedure Note       Name:  Luh Henley   Age:  63 y.o.  :  1955                                          MRN:  [id number]         Date:  2021      Surgeon: Mario Coppola):  Dov Phipps MD    Procedure: Procedure(s):  CYSTOSCOPY, CYSTOLITHOLAPAXY, SUPRAPUBIC CATHETER EXCHANGE    Medications prior to admission:   Prior to Admission medications    Medication Sig Start Date End Date Taking? Authorizing Provider   HYDROcodone-acetaminophen (NORCO) 5-325 MG per tablet Take 1 tablet by mouth every 4 hours as needed. 20   Historical Provider, MD   clindamycin (CLEOCIN) 300 MG/2ML injection Inject 600 mg into the muscle every 6 hours    Historical Provider, MD   acetaminophen (TYLENOL) 325 MG tablet Take 650 mg by mouth every 4 hours as needed for Pain or Fever    Historical Provider, MD   acetic acid 0.25 % irrigation Irrigate with 30 mLs as directed 2 times daily Irrigate with as directed daily. Historical Provider, MD   ibuprofen (ADVIL;MOTRIN) 400 MG tablet Take 400 mg by mouth every 4 hours as needed for Fever (for temp > 100.5 not alleviated by acetaminophen)    Historical Provider, MD   LACTOBACILLUS PO Take 1 capsule by mouth daily    Historical Provider, MD   vitamin E 400 UNIT capsule Take 400 Units by mouth daily    Historical Provider, MD   nystatin (MYCOSTATIN) POWD powder Apply topically 2 times daily    Historical Provider, MD   oxybutynin (DITROPAN XL) 5 MG extended release tablet Take 1 tablet by mouth daily 20  Carmelo Restrepo APRN - CNP   rivaroxaban (XARELTO) 10 MG TABS tablet Take 1 tablet by mouth daily 19   Eliseo Pierre MD   metoprolol tartrate (LOPRESSOR) 25 MG tablet Take 1 tablet by mouth 2 times daily 19   Eliseo Pierre MD   miconazole (MICOTIN) 2 % powder Apply topically 2 times daily.  19   Eliseo Pierre MD   potassium chloride (KLOR-CON M) 20 MEQ TBCR extended release tablet Take 2 tablets by mouth daily 19 Dick Garibay MD   sodium chloride 1 g tablet Take 1 tablet by mouth 2 times daily (with meals) 6/20/19   Dick Garibay MD   calcium-vitamin D (Jaquelin Fidel) 500-200 MG-UNIT per tablet Take 1 tablet by mouth 2 times daily 11/16/18   Rama Fransisca Saint, MD   famotidine (PEPCID) 20 MG tablet Take 1 tablet by mouth 2 times daily 6/14/18   Wilmer Jacques MD   docusate sodium (COLACE) 100 MG capsule Take 100 mg by mouth daily     Historical Provider, MD   tiZANidine (ZANAFLEX) 2 MG tablet Take 2 mg by mouth 3 times daily 0600;1400;2200 12/15/16   Historical Provider, MD   Multiple Vitamin (MULTIVITAMIN) tablet Take 1 tablet by mouth daily 3/28/16   Lexi Michael MD   vitamin A 43267 UNITS capsule Take 2 capsules by mouth daily 3/28/16   Lexi Michael MD       Current medications:    Current Facility-Administered Medications   Medication Dose Route Frequency Provider Last Rate Last Admin    0.9 % sodium chloride infusion   Intravenous Continuous Moustapha Haynes MD        ceFAZolin (ANCEF) 2000 mg in dextrose 5 % 50 mL IVPB  2,000 mg Intravenous 30 Min Pre-Op Moustapha Haynes MD           Allergies:  No Known Allergies    Problem List:    Patient Active Problem List   Diagnosis Code    Neurogenic bladder N31.9    Multiple sclerosis ([facility] G35    MS (multiple sclerosis) ([facility] [facility] G35    Urinary retention R33.9    Indwelling catheter present on admission Z96.0    Cystostomy malfunction ([facility] [facility] S90.095    Decubitus ulcer of coccyx L89.159    Decubitus ulcer, stage 3 ([address] L89.93    Malnutrition ([address] E46    Decubitus ulcer of right perineal ischial region, stage 3 ([address] L89.313    Pulmonary embolism on left ([address] I26.99    Acute metabolic encephalopathy E21.51    Speech disturbance R47.9    Infected decubitus ulcer, stage I L89.91, L08.9    Infected decubitus ulcer, stage III ([address] L89.93, L08.9    Wound infection T14. 8XXA, L08.9    Elevated INR R79.1    Chronic osteomyelitis, pelvis, right ([address] M86.651    Osteomyelitis ([facility] M86.9    Urinary tract infection without hematuria N39.0    Abnormal liver function test R94.5    Chronic depression F32.9    Essential hypertension I10    History of pulmonary embolism Z86.711    Other dysphagia R13.19    Pressure injury of skin of back L89.109    Decubitus ulcer L89.90    Elevated transaminase level R74.01    Anemia D64.9    Sepsis due to methicillin resistant Staphylococcus aureus (MRSA) ([facility]) A41.02    Sepsis ([facility] A41.9       Past Medical History:        Diagnosis Date    Dysphagia     oropharyngeal    History of pulmonary embolism     History of urinary tract infection     Hx of blood clots     Pulmonary embolis    Hypertension     Major depressive disorder, single episode     MS (multiple sclerosis) ([facility]     Neurogenic bladder 2012    Dr. Fahad Yan [facility])     UTI (urinary tract infection)        Past Surgical History:        Procedure Laterality Date    ANKLE SURGERY      broken ankle    BLADDER SURGERY  2012    Suprapubic catheter placement    COLONOSCOPY      CYSTO/URETERO/PYELOSCOPY, CALCULUS TX Left 2019    CYSTOSCOPY, LEFT STENT insertion, bladder irragation with bladder stones performed by Javy Peña MD at [address] [place] [address] 1898 N/A 2019    CYSTO, LEFT URETERAL STENT REMOVAL, LEFT URETEROSCOPY, LASER LITHOTRIPSY, BASKET RETRIEVAL OF STONE FRAGMENTS performed by Javy Peña MD at [facility] N/A 2018    ROBOT DIVERTING COLOSTOMY performed by Dilia Mcgarry MD at [place] [address]  child       Social History:    Social History     Tobacco Use    Smoking status: Former Smoker     Quit date: 1982     Years since quittin.1    Smokeless tobacco: Former User   Substance Use Topics    Alcohol use: No     Comment: \"quit alcohol a few years ago\"                                Counseling given: Not Answered      Vital Signs (Current): There were no vitals filed for this visit. BP Readings from Last 3 Encounters:   12/30/20 (!) 111/58   02/20/20 (!) 150/84   10/03/19 113/76       NPO Status:                                                                                 BMI:   Wt Readings from Last 3 Encounters:   12/30/20 213 lb (96.6 kg)   10/03/19 202 lb (91.6 kg)   07/08/19 202 lb (91.6 kg)     There is no height or weight on file to calculate BMI.    CBC:   Lab Results   Component Value Date    WBC 5.6 12/30/2020    RBC 4.51 12/30/2020    RBC 4.20 01/20/2012    HGB 14.0 12/30/2020    HCT 43.5 12/30/2020    MCV 96.5 12/30/2020    RDW 16.6 06/21/2019     12/30/2020       CMP:   Lab Results   Component Value Date     12/30/2020    K 3.7 12/30/2020    K 3.5 06/15/2019     12/30/2020    CO2 22 12/30/2020    BUN 11 12/30/2020    CREATININE 0.4 12/30/2020    AGRATIO 0.7 06/21/2019    LABGLOM >90 12/30/2020    GLUCOSE 112 12/30/2020    GLUCOSE 105 06/25/2019    PROT 6.8 12/30/2020    CALCIUM 9.0 12/30/2020    BILITOT 0.3 12/30/2020    ALKPHOS 146 12/30/2020    AST 39 12/30/2020    ALT 77 12/30/2020       POC Tests: No results for input(s): POCGLU, POCNA, POCK, POCCL, POCBUN, POCHEMO, POCHCT in the last 72 hours.     Coags:   Lab Results   Component Value Date    INR 1.52 10/03/2019    APTT 33.7 06/11/2019       HCG (If Applicable): No results found for: PREGTESTUR, PREGSERUM, HCG, HCGQUANT     ABGs: No results found for: PHART, PO2ART, VKA6SLL, VCK3XHE, BEART, N1FORBMW     Type & Screen (If Applicable):  Lab Results   Component Value Date    LABRH POS 06/12/2018       Drug/Infectious Status (If Applicable):  No results found for: HIV, HEPCAB    COVID-19 Screening (If Applicable):   Lab Results   Component Value Date    COVID19 NOT DETECTED 12/27/2020         Anesthesia Evaluation  Patient summary reviewed and Nursing notes reviewed no history of anesthetic complications:   Airway: Mallampati: II        Dental:          Pulmonary:Negative Pulmonary ROS and normal exam  breath sounds clear to auscultation                             Cardiovascular:  Exercise tolerance: poor (<4 METS),   (+) hypertension:,       ECG reviewed                        Neuro/Psych:   (+) neuromuscular disease: multiple sclerosis, psychiatric history:            GI/Hepatic/Renal: Neg GI/Hepatic/Renal ROS            Endo/Other: Negative Endo/Other ROS             Pt had no PAT visit       Abdominal:           Vascular:   + DVT, PE. Anesthesia Plan      general     ASA 3       Induction: intravenous. MIPS: Postoperative opioids intended and Prophylactic antiemetics administered. Anesthetic plan and risks discussed with patient. Plan discussed with CRNA.                   [facility], DO   2/26/2021

## 2021-02-26 NOTE — PROGRESS NOTES
Report called to [facility] at [facility]     Patient off floor via lacp to return to [facility].

## 2021-02-28 LAB
MRSA SCREEN: NORMAL

## 2021-02-28 NOTE — OP NOTE
Patient:  Jaun Narayanan  MRN: [id number]  YOB: 1955    FACILITY: [place], [place]    DATE:2/26/2021    SURGEON: Adrianna Goel MD     ASSISTANT:none    PREOPERATIVE DIAGNOSIS: NEUROGENIC BLADDER, BLADDER STONE    POSTOPERATIVE DIAGNOSIS: as above    PROCEDURE PERFORMED: CYSTOSCOPY, CYSTOLITHOLAPAXY for stone > 3.5 cm, SUPRAPUBIC CATHETER EXCHANGE, difficult jeffers catheter placement    ANESTHESIA: General    ESTIMATED BLOOD LOSS: 20 (units unknown)     COMPLICATIONS: None immediate    DRAINS: 20 Nigerian suprapubic catheter, 16 [place] tip urethral jeffers    SPECIMENS: none    INDICATIONS FOR PROCEDURE:  The patient is a 63 y.o. male who presents today with NEUROGENIC BLADDER, BLADDER STONE here for CYSTOSCOPY, CYSTOLITHOLAPAXY, SUPRAPUBIC CATHETER EXCHANGE. After risks, benefits and alternatives of the procedure were discussed with the patient, the patient elected to proceed. NARRATIVE SUMMARY OF THE PROCEDURE:  The patient was brought back to the operating room. They were laid in the supine position and induced under general anesthesia. The genitals were prepped and draped in usual sterile surgical fashion after being placed in dorsal lithotomy position. A timeout was taken per protocol with everyone in agreement. Rigid cystoscope with continuous flow sheet was assembled using a 30 degree lens and we attempted to pass this through the urethra. We were unable to traverse the high riding bladder neck. There was patency of the bladder neck but the angulation was difficult to traverse with our large scope. Therefore we elected to perform cystolitholapaxy through the patient's suprapubic tract. We passed our continuous flow sheath and 1000 µm laser through the suprapubic tract and located the large stone on the floor of the bladder. This was broken into many small fragments. Apica evacuator was used to remove and evacuate large stones.   We then attempted to pass a jeffers through the urethra for CBI. This was difficult to place. Flexible cystoscopy was performed and we did enter into the bladder. There were small stones in the urethra making catheter placement difficult. Pompa catheter was placed over a wire. The urethra was normal without any lesions. The bladder had significant catheter cystitis but was otherwise normal.  We placed a suprapubic catheter as well . the patient tolerated the procedure well. The scope was removed intact and without any issues. This concluded the case. Plan  The patient will be discharged with his suprapubic catheter and urethral Pompa catheter draining. We will run CBI in the recovery room. He will follow-up this week for urethral Pompa catheter removal or he may have it removed in his facility.  He will need monthly suprapubic catheter exchanges

## 2021-03-01 ENCOUNTER — TELEPHONE (OUTPATIENT)
Dept: UROLOGY | Age: 66
End: 2021-03-01

## 2021-03-01 DIAGNOSIS — N21.0 BLADDER STONE: Primary | ICD-10-CM

## 2021-03-01 NOTE — TELEPHONE ENCOUNTER
Jamison- URETHRAL jeffers catheter removal Tuesday morning in office. OR, facility can remove it if they like (DO NOT REMOVE SP TUBE. He still needs monthly changes of this).  Follow up six months in office with APRVIN Godfrey

## 2021-03-11 ENCOUNTER — OUTSIDE SERVICES (OUTPATIENT)
Dept: UROLOGY | Age: 66
End: 2021-03-11
Payer: COMMERCIAL

## 2021-03-11 ENCOUNTER — TELEPHONE (OUTPATIENT)
Dept: UROLOGY | Age: 66
End: 2021-03-11

## 2021-03-11 VITALS
HEART RATE: 92 BPM | TEMPERATURE: 97.4 F | SYSTOLIC BLOOD PRESSURE: 144 MMHG | RESPIRATION RATE: 20 BRPM | DIASTOLIC BLOOD PRESSURE: 60 MMHG

## 2021-03-11 DIAGNOSIS — N31.9 NEUROGENIC BLADDER: Primary | ICD-10-CM

## 2021-03-11 PROCEDURE — 99307 SBSQ NF CARE SF MDM 10: CPT | Performed by: NURSE PRACTITIONER

## 2021-03-11 NOTE — PROGRESS NOTES
[address] [facility] Visit  [address] [address]  Dept: [phone]  Loc: [phone]  Visit Date: 3/11/2021      HPI:     Claudio Kc is a 63 y.o. with past medical history of kidney stones, neurogenic bladder, and multiple sclerosis who presents today in follow-up for evaluation for neurogenic bladder. This is a chronic problem. He denies any problems with his suprapubic catheter. He believes it is draining well. Denies hematuria, flank pain, abdominal pain, suprapubic pain, or fever. He is s/p cystoscopy, cystolitholapaxy, for stone > 3.5 cm, SP catheter exchange and difficult jeffers catheter placement by Dr Weston Felder on 2-. Urethral catheter was removed by Nursing home staff. Pt reports he is doing well since surgery. Eating good. No fevers. Catheter being irrigated BID with acetic acid. Hx is obtained from the patient and medical record. Current Outpatient Medications   Medication Sig Dispense Refill    HYDROcodone-acetaminophen (NORCO) 5-325 MG per tablet Take 1 tablet by mouth every 4 hours as needed.  clindamycin (CLEOCIN) 300 MG/2ML injection Inject 600 mg into the muscle every 6 hours      acetaminophen (TYLENOL) 325 MG tablet Take 650 mg by mouth every 4 hours as needed for Pain or Fever      acetic acid 0.25 % irrigation Irrigate with 30 mLs as directed 2 times daily Irrigate with as directed daily.       ibuprofen (ADVIL;MOTRIN) 400 MG tablet Take 400 mg by mouth every 4 hours as needed for Fever (for temp > 100.5 not alleviated by acetaminophen)      LACTOBACILLUS PO Take 1 capsule by mouth daily      vitamin E 400 UNIT capsule Take 400 Units by mouth daily      nystatin (MYCOSTATIN) POWD powder Apply topically 2 times daily      oxybutynin (DITROPAN XL) 5 MG extended release tablet Take 1 tablet by mouth daily 360 tablet 0    metoprolol tartrate (LOPRESSOR) 25 MG tablet Take 1 tablet by mouth 2 times daily 60 tablet 3    miconazole (MICOTIN) 2 % powder Apply topically 2 times daily. 45 g 1    potassium chloride (KLOR-CON M) 20 MEQ TBCR extended release tablet Take 2 tablets by mouth daily 60 tablet 3    sodium chloride 1 g tablet Take 1 tablet by mouth 2 times daily (with meals) 90 tablet 3    calcium-vitamin D (OSCAL-500) 500-200 MG-UNIT per tablet Take 1 tablet by mouth 2 times daily 30 tablet 3    famotidine (PEPCID) 20 MG tablet Take 1 tablet by mouth 2 times daily 60 tablet 3    docusate sodium (COLACE) 100 MG capsule Take 100 mg by mouth daily       tiZANidine (ZANAFLEX) 2 MG tablet Take 2 mg by mouth 3 times daily 0600;1400;2200      Multiple Vitamin (MULTIVITAMIN) tablet Take 1 tablet by mouth daily  0    vitamin A 05170 UNITS capsule Take 2 capsules by mouth daily 30 capsule 3     No current facility-administered medications for this visit. Past Medical History  Lorri Iglesias  has a past medical history of Dysphagia, History of pulmonary embolism, History of urinary tract infection, Hx of blood clots, Hypertension, Major depressive disorder, single episode, MS (multiple sclerosis) ([facility] Neurogenic bladder, Osteomyelitis ([facility] and UTI (urinary tract infection). Past Surgical History  The patient  has a past surgical history that includes Tonsillectomy (child); Ankle surgery (1996); Bladder surgery (2-); Colonoscopy; pr lap,surg,colectomy,w/end colost & closur (N/A, 6/13/2018); cysto/uretero/pyeloscopy, calculus tx (Left, 6/19/2019); cysto/uretero/pyeloscopy, calculus tx (N/A, 7/8/2019); and Cystoscopy (N/A, 2/26/2021). Family History  This patient's family history includes Arthritis in his sister; Cancer in his mother; Heart Disease (age of onset: 48) in his father and paternal grandmother. Social History  Lorri Iglesias  reports that he quit smoking about 39 years ago. He has quit using smokeless tobacco. He reports that he does not drink alcohol or use drugs. Acetic Acid irrigations BID    KUB prior to follow up in September. Call [facility] with any questions or concerns.     Tate Tomlin, APRN-CNP  Urology

## 2021-06-25 ENCOUNTER — HOSPITAL ENCOUNTER (EMERGENCY)
Age: 66
Discharge: HOME | End: 2021-06-25
Attending: EMERGENCY MEDICINE
Payer: COMMERCIAL

## 2021-06-25 VITALS
OXYGEN SATURATION: 95 % | RESPIRATION RATE: 16 BRPM | BODY MASS INDEX: 31.39 KG/M2 | TEMPERATURE: 97.4 F | HEART RATE: 69 BPM | SYSTOLIC BLOOD PRESSURE: 150 MMHG | DIASTOLIC BLOOD PRESSURE: 81 MMHG | HEIGHT: 67 IN | WEIGHT: 200 LBS

## 2021-06-25 DIAGNOSIS — S03.40XA TMJ (SPRAIN OF TEMPOROMANDIBULAR JOINT), INITIAL ENCOUNTER: Primary | ICD-10-CM

## 2021-06-25 LAB
ALBUMIN SERPL-MCNC: 3.9 G/DL (ref 3.5–5.1)
ALP BLD-CCNC: 153 U/L (ref 38–126)
ALT SERPL-CCNC: 30 U/L (ref 11–66)
ANION GAP SERPL CALCULATED.3IONS-SCNC: 9 MEQ/L (ref 8–16)
AST SERPL-CCNC: 24 U/L (ref 5–40)
BASOPHILS # BLD: 0.8 %
BASOPHILS ABSOLUTE: 0 THOU/MM3 (ref 0–0.1)
BILIRUB SERPL-MCNC: 0.3 MG/DL (ref 0.3–1.2)
BUN BLDV-MCNC: 10 MG/DL (ref 7–22)
CALCIUM SERPL-MCNC: 9.8 MG/DL (ref 8.5–10.5)
CHLORIDE BLD-SCNC: 108 MEQ/L (ref 98–111)
CO2: 27 MEQ/L (ref 23–33)
CREAT SERPL-MCNC: 0.5 MG/DL (ref 0.4–1.2)
EOSINOPHIL # BLD: 4.8 %
EOSINOPHILS ABSOLUTE: 0.3 THOU/MM3 (ref 0–0.4)
ERYTHROCYTE [DISTWIDTH] IN BLOOD BY AUTOMATED COUNT: 15.5 % (ref 11.5–14.5)
ERYTHROCYTE [DISTWIDTH] IN BLOOD BY AUTOMATED COUNT: 52 FL (ref 35–45)
GFR SERPL CREATININE-BSD FRML MDRD: > 90 ML/MIN/1.73M2
GLUCOSE BLD-MCNC: 97 MG/DL (ref 70–108)
HCT VFR BLD CALC: 50.1 % (ref 42–52)
HEMOGLOBIN: 15.6 GM/DL (ref 14–18)
IMMATURE GRANS (ABS): 0.02 THOU/MM3 (ref 0–0.07)
IMMATURE GRANULOCYTES: 0.3 %
LYMPHOCYTES # BLD: 26 %
LYMPHOCYTES ABSOLUTE: 1.5 THOU/MM3 (ref 1–4.8)
MCH RBC QN AUTO: 28.7 PG (ref 26–33)
MCHC RBC AUTO-ENTMCNC: 31.1 GM/DL (ref 32.2–35.5)
MCV RBC AUTO: 92.3 FL (ref 80–94)
MONOCYTES # BLD: 7.6 %
MONOCYTES ABSOLUTE: 0.4 THOU/MM3 (ref 0.4–1.3)
NUCLEATED RED BLOOD CELLS: 0 /100 WBC
OSMOLALITY CALCULATION: 285.8 MOSMOL/KG (ref 275–300)
PLATELET # BLD: 230 THOU/MM3 (ref 130–400)
PMV BLD AUTO: 8.6 FL (ref 9.4–12.4)
POTASSIUM REFLEX MAGNESIUM: 4.7 MEQ/L (ref 3.5–5.2)
RBC # BLD: 5.43 MILL/MM3 (ref 4.7–6.1)
SEG NEUTROPHILS: 60.5 %
SEGMENTED NEUTROPHILS ABSOLUTE COUNT: 3.6 THOU/MM3 (ref 1.8–7.7)
SODIUM BLD-SCNC: 144 MEQ/L (ref 135–145)
TOTAL PROTEIN: 7.7 G/DL (ref 6.1–8)
WBC # BLD: 5.9 THOU/MM3 (ref 4.8–10.8)

## 2021-06-25 PROCEDURE — 99285 EMERGENCY DEPT VISIT HI MDM: CPT

## 2021-06-25 PROCEDURE — 6370000000 HC RX 637 (ALT 250 FOR IP): Performed by: NURSE PRACTITIONER

## 2021-06-25 PROCEDURE — 80053 COMPREHEN METABOLIC PANEL: CPT

## 2021-06-25 PROCEDURE — 85025 COMPLETE CBC W/AUTO DIFF WBC: CPT

## 2021-06-25 PROCEDURE — 36415 COLL VENOUS BLD VENIPUNCTURE: CPT

## 2021-06-25 RX ORDER — IBUPROFEN 200 MG
600 TABLET ORAL ONCE
Status: COMPLETED | OUTPATIENT
Start: 2021-06-25 | End: 2021-06-25

## 2021-06-25 RX ORDER — IBUPROFEN 600 MG/1
600 TABLET ORAL EVERY 8 HOURS PRN
Qty: 10 TABLET | Refills: 0 | Status: CANCELLED | OUTPATIENT
Start: 2021-06-25

## 2021-06-25 RX ADMIN — IBUPROFEN 600 MG: 200 TABLET, FILM COATED ORAL at 09:42

## 2021-06-25 ASSESSMENT — PAIN DESCRIPTION - DESCRIPTORS: DESCRIPTORS: ACHING

## 2021-06-25 ASSESSMENT — ENCOUNTER SYMPTOMS
SORE THROAT: 0
SHORTNESS OF BREATH: 0
WHEEZING: 0
NAUSEA: 0
COUGH: 0
ABDOMINAL PAIN: 0
ABDOMINAL DISTENTION: 0
VOMITING: 0
EYE PAIN: 0
DIARRHEA: 0
RHINORRHEA: 0
EYE DISCHARGE: 0

## 2021-06-25 ASSESSMENT — PAIN SCALES - GENERAL
PAINLEVEL_OUTOF10: 4
PAINLEVEL_OUTOF10: 4

## 2021-06-25 ASSESSMENT — PAIN DESCRIPTION - ORIENTATION: ORIENTATION: RIGHT;LOWER

## 2021-06-25 ASSESSMENT — PAIN DESCRIPTION - PAIN TYPE: TYPE: ACUTE PAIN

## 2021-06-25 ASSESSMENT — PAIN DESCRIPTION - LOCATION: LOCATION: JAW

## 2021-06-25 NOTE — ED TRIAGE NOTES
Patient presents via EMS to ER with complaints of right lower jaw pain that radiates to right ear that started 2 days ago. Patient is resident of [facility].

## 2021-06-25 NOTE — ED PROVIDER NOTES
(multiple sclerosis) ([facility] [address]     Neurogenic bladder feb. 2012    Dr. Dustin Arndt placed cather    Osteomyelitis [facility])     UTI (urinary tract infection)      Past Surgical History:   Procedure Laterality Date    ANKLE SURGERY  1996    broken ankle    BLADDER SURGERY  2-    Suprapubic catheter placement    COLONOSCOPY      CYSTO/URETERO/PYELOSCOPY, CALCULUS TX Left 6/19/2019    CYSTOSCOPY, LEFT STENT insertion, bladder irragation with bladder stones performed by Eduar Helm MD at [address], [place] [address] N/A 7/8/2019    CYSTO, LEFT URETERAL STENT REMOVAL, LEFT URETEROSCOPY, LASER LITHOTRIPSY, BASKET RETRIEVAL OF STONE FRAGMENTS performed by Eduar Helm MD at [address] 2/26/2021    CYSTOSCOPY, CYSTOLITHOLAPAXY, SUPRAPUBIC CATHETER EXCHANGE performed by Gautam Sarmiento MD at [email or web address] N/A 6/13/2018    ROBOT DIVERTING COLOSTOMY performed by Leydi Dunn MD at [facility]   No current facility-administered medications for this encounter. Current Outpatient Medications:     HYDROcodone-acetaminophen (NORCO) 5-325 MG per tablet, Take 1 tablet by mouth every 4 hours as needed. , Disp: , Rfl:     clindamycin (CLEOCIN) 300 MG/2ML injection, Inject 600 mg into the muscle every 6 hours, Disp: , Rfl:     acetaminophen (TYLENOL) 325 MG tablet, Take 650 mg by mouth every 4 hours as needed for Pain or Fever, Disp: , Rfl:     acetic acid 0.25 % irrigation, Irrigate with 30 mLs as directed 2 times daily Irrigate with as directed daily. , Disp: , Rfl:     ibuprofen (ADVIL;MOTRIN) 400 MG tablet, Take 400 mg by mouth every 4 hours as needed for Fever (for temp > 100.5 not alleviated by acetaminophen), Disp: , Rfl:     LACTOBACILLUS PO, Take 1 capsule by mouth daily, Disp: , Rfl:     vitamin E 400 UNIT capsule, Take 400 Units by mouth daily, Disp: , Rfl:     nystatin (MYCOSTATIN) POWD powder, Apply topically 2 times daily, Disp: , Rfl:     oxybutynin (DITROPAN XL) 5 MG extended release tablet, Take 1 tablet by mouth daily, Disp: 360 tablet, Rfl: 0    metoprolol tartrate (LOPRESSOR) 25 MG tablet, Take 1 tablet by mouth 2 times daily, Disp: 60 tablet, Rfl: 3    miconazole (MICOTIN) 2 % powder, Apply topically 2 times daily. , Disp: 45 g, Rfl: 1    potassium chloride (KLOR-CON M) 20 MEQ TBCR extended release tablet, Take 2 tablets by mouth daily, Disp: 60 tablet, Rfl: 3    sodium chloride 1 g tablet, Take 1 tablet by mouth 2 times daily (with meals), Disp: 90 tablet, Rfl: 3    calcium-vitamin D (OSCAL-500) 500-200 MG-UNIT per tablet, Take 1 tablet by mouth 2 times daily, Disp: 30 tablet, Rfl: 3    famotidine (PEPCID) 20 MG tablet, Take 1 tablet by mouth 2 times daily, Disp: 60 tablet, Rfl: 3    docusate sodium (COLACE) 100 MG capsule, Take 100 mg by mouth daily , Disp: , Rfl:     tiZANidine (ZANAFLEX) 2 MG tablet, Take 2 mg by mouth 3 times daily 0600;1400;2200, Disp: , Rfl:     Multiple Vitamin (MULTIVITAMIN) tablet, Take 1 tablet by mouth daily, Disp: , Rfl: 0    vitamin A 83317 UNITS capsule, Take 2 capsules by mouth daily, Disp: 30 capsule, Rfl: 3      SOCIAL HISTORY     Social History     Social History Narrative    Not on file     Social History     Tobacco Use    Smoking status: Former Smoker     Quit date: 1982     Years since quittin.5    Smokeless tobacco: Former User   Vaping Use    Vaping Use: Never used   Substance Use Topics    Alcohol use: No     Comment: \"quit alcohol a few years ago\"    Drug use: No         ALLERGIES   No Known Allergies      FAMILY HISTORY     Family History   Problem Relation Age of Onset    Cancer Mother         Breast    Heart Disease Father 48    Arthritis Sister     Heart Disease Paternal Grandmother 48         PREVIOUS RECORDS   Previous records reviewed:  Today    PHYSICAL EXAM     ED Triage Vitals [21 0709]   BP Temp Temp Source Pulse Resp SpO2 Height Weight   (!) 162/84 97.4 °F (36.3 °C) Oral 69 16 95 % 5' 7\" (1.702 m) 200 lb (90.7 kg)     Initial vital signs and nursing assessment reviewed and normal. Body mass index is 31.32 kg/m². Pulsoximetry is normal per my interpretation. Additional Vital Signs:  Vitals:    06/25/21 0945   BP: (!) 150/81   Pulse: 69   Resp: 16   Temp:    SpO2: 95%       Physical Exam  Constitutional:       Appearance: Normal appearance. HENT:      Head: Normocephalic. Right Ear: External ear normal.      Left Ear: External ear normal.      Nose: Nose normal.      Mouth/Throat:      Comments: No crepitus, erythema, or bony deformity noted. Patient noted with poor oral hygiene with a dry tongue. Patient is adenturous with no signs of oral infection   Eyes:      Conjunctiva/sclera: Conjunctivae normal.      Pupils: Pupils are equal, round, and reactive to light. Cardiovascular:      Rate and Rhythm: Normal rate and regular rhythm. Pulses: Normal pulses. Heart sounds: Normal heart sounds. Pulmonary:      Effort: Pulmonary effort is normal.      Breath sounds: Normal breath sounds. Abdominal:      General: Bowel sounds are normal.      Palpations: Abdomen is soft. Musculoskeletal:         General: Normal range of motion. Cervical back: Normal range of motion and neck supple. Skin:     General: Skin is warm and dry. Capillary Refill: Capillary refill takes less than 2 seconds. Neurological:      General: No focal deficit present. Mental Status: He is alert. MEDICAL DECISION MAKING   Initial Assessment:   Patient presented to the ED with right jaw pain that is worse with opening mouth and with eating. On exam there is no erythema or tenderness on palpation that would be consistent with mastoiditis. Patient has no signs of oral infection at this time.   This on patient's presentation and physical exam it appears patient is having TMJ sprain instructed to eat soft foods and to follow-up with PCP as needed. Plan:  Patient will be discharged back to nursing home with instructions to follow-up with PCP          ED RESULTS   Laboratory results:  Labs Reviewed   CBC WITH AUTO DIFFERENTIAL - Abnormal; Notable for the following components:       Result Value    MCHC 31.1 (*)     RDW-CV 15.5 (*)     RDW-SD 52.0 (*)     MPV 8.6 (*)     All other components within normal limits   COMPREHENSIVE METABOLIC PANEL W/ REFLEX TO MG FOR LOW K - Abnormal; Notable for the following components:    Alkaline Phosphatase 153 (*)     All other components within normal limits   ANION GAP   GLOMERULAR FILTRATION RATE, ESTIMATED   OSMOLALITY       Radiologic studies results:  No orders to display       ED Medications administered this visit:   Medications   ibuprofen (ADVIL;MOTRIN) tablet 600 mg (600 mg Oral Given 6/25/21 0942)         ED COURSE      Strict return precautions and follow up instructions were discussed with the patient prior to discharge, with which the patient agrees. MEDICATION CHANGES     Discharge Medication List as of 6/25/2021 10:04 AM            FINAL DISPOSITION     Final diagnoses:   TMJ (sprain of temporomandibular joint), initial encounter     Condition: condition: good  Dispo: Discharge to home      This transcription was electronically signed. Parts of this transcriptions may have been dictated by use of voice recognition software and electronically transcribed, and parts may have been transcribed with the assistance of an ED scribe. The transcription may contain errors not detected in proofreading. Please refer to my supervising physician's documentation if my documentation differs.     Electronically Signed: Shirley Oliva MD, 06/25/21, 3:56 PM       Shirley Oliva MD  Resident  06/25/21 1441

## 2021-07-02 ENCOUNTER — HOSPITAL ENCOUNTER (EMERGENCY)
Age: 66
Discharge: NURSING, REHAB OR LONG-TERM CARE | End: 2021-07-02
Attending: EMERGENCY MEDICINE
Payer: COMMERCIAL

## 2021-07-02 ENCOUNTER — APPOINTMENT (OUTPATIENT)
Dept: CT IMAGING | Age: 66
End: 2021-07-02
Payer: COMMERCIAL

## 2021-07-02 VITALS
RESPIRATION RATE: 17 BRPM | OXYGEN SATURATION: 95 % | BODY MASS INDEX: 31.32 KG/M2 | HEART RATE: 76 BPM | WEIGHT: 200 LBS | DIASTOLIC BLOOD PRESSURE: 79 MMHG | SYSTOLIC BLOOD PRESSURE: 140 MMHG | TEMPERATURE: 97.8 F

## 2021-07-02 DIAGNOSIS — R68.84 JAW PAIN: Primary | ICD-10-CM

## 2021-07-02 LAB
ALBUMIN SERPL-MCNC: 4.1 G/DL (ref 3.5–5.1)
ALP BLD-CCNC: 156 U/L (ref 38–126)
ALT SERPL-CCNC: 26 U/L (ref 11–66)
ANION GAP SERPL CALCULATED.3IONS-SCNC: 15 MEQ/L (ref 8–16)
AST SERPL-CCNC: 26 U/L (ref 5–40)
BASOPHILS # BLD: 0.7 %
BASOPHILS ABSOLUTE: 0 THOU/MM3 (ref 0–0.1)
BILIRUB SERPL-MCNC: 0.4 MG/DL (ref 0.3–1.2)
BUN BLDV-MCNC: 18 MG/DL (ref 7–22)
C-REACTIVE PROTEIN: 4.29 MG/DL (ref 0–1)
CALCIUM SERPL-MCNC: 10 MG/DL (ref 8.5–10.5)
CHLORIDE BLD-SCNC: 101 MEQ/L (ref 98–111)
CO2: 27 MEQ/L (ref 23–33)
CREAT SERPL-MCNC: 0.3 MG/DL (ref 0.4–1.2)
EOSINOPHIL # BLD: 4.9 %
EOSINOPHILS ABSOLUTE: 0.3 THOU/MM3 (ref 0–0.4)
ERYTHROCYTE [DISTWIDTH] IN BLOOD BY AUTOMATED COUNT: 15.5 % (ref 11.5–14.5)
ERYTHROCYTE [DISTWIDTH] IN BLOOD BY AUTOMATED COUNT: 50.2 FL (ref 35–45)
GFR SERPL CREATININE-BSD FRML MDRD: > 90 ML/MIN/1.73M2
GLUCOSE BLD-MCNC: 74 MG/DL (ref 70–108)
HCT VFR BLD CALC: 53.1 % (ref 42–52)
HEMOGLOBIN: 16.6 GM/DL (ref 14–18)
IMMATURE GRANS (ABS): 0.01 THOU/MM3 (ref 0–0.07)
IMMATURE GRANULOCYTES: 0.2 %
LYMPHOCYTES # BLD: 26.3 %
LYMPHOCYTES ABSOLUTE: 1.6 THOU/MM3 (ref 1–4.8)
MCH RBC QN AUTO: 28.5 PG (ref 26–33)
MCHC RBC AUTO-ENTMCNC: 31.3 GM/DL (ref 32.2–35.5)
MCV RBC AUTO: 91.1 FL (ref 80–94)
MONOCYTES # BLD: 8.8 %
MONOCYTES ABSOLUTE: 0.5 THOU/MM3 (ref 0.4–1.3)
NUCLEATED RED BLOOD CELLS: 0 /100 WBC
OSMOLALITY CALCULATION: 285.5 MOSMOL/KG (ref 275–300)
PLATELET # BLD: 214 THOU/MM3 (ref 130–400)
PMV BLD AUTO: 9.5 FL (ref 9.4–12.4)
POTASSIUM REFLEX MAGNESIUM: 4.8 MEQ/L (ref 3.5–5.2)
RBC # BLD: 5.83 MILL/MM3 (ref 4.7–6.1)
SEDIMENTATION RATE, ERYTHROCYTE: 36 MM/HR (ref 0–10)
SEG NEUTROPHILS: 59.1 %
SEGMENTED NEUTROPHILS ABSOLUTE COUNT: 3.6 THOU/MM3 (ref 1.8–7.7)
SODIUM BLD-SCNC: 143 MEQ/L (ref 135–145)
TOTAL PROTEIN: 8.1 G/DL (ref 6.1–8)
WBC # BLD: 6.1 THOU/MM3 (ref 4.8–10.8)

## 2021-07-02 PROCEDURE — 70491 CT SOFT TISSUE NECK W/DYE: CPT

## 2021-07-02 PROCEDURE — 99285 EMERGENCY DEPT VISIT HI MDM: CPT

## 2021-07-02 PROCEDURE — 96375 TX/PRO/DX INJ NEW DRUG ADDON: CPT

## 2021-07-02 PROCEDURE — 6360000002 HC RX W HCPCS

## 2021-07-02 PROCEDURE — 86140 C-REACTIVE PROTEIN: CPT

## 2021-07-02 PROCEDURE — 80053 COMPREHEN METABOLIC PANEL: CPT

## 2021-07-02 PROCEDURE — 85651 RBC SED RATE NONAUTOMATED: CPT

## 2021-07-02 PROCEDURE — 96374 THER/PROPH/DIAG INJ IV PUSH: CPT

## 2021-07-02 PROCEDURE — 85025 COMPLETE CBC W/AUTO DIFF WBC: CPT

## 2021-07-02 PROCEDURE — 6360000004 HC RX CONTRAST MEDICATION: Performed by: EMERGENCY MEDICINE

## 2021-07-02 PROCEDURE — 6360000002 HC RX W HCPCS: Performed by: EMERGENCY MEDICINE

## 2021-07-02 PROCEDURE — 36415 COLL VENOUS BLD VENIPUNCTURE: CPT

## 2021-07-02 RX ORDER — ONDANSETRON 2 MG/ML
4 INJECTION INTRAMUSCULAR; INTRAVENOUS ONCE
Status: COMPLETED | OUTPATIENT
Start: 2021-07-02 | End: 2021-07-02

## 2021-07-02 RX ORDER — MORPHINE SULFATE 4 MG/ML
4 INJECTION, SOLUTION INTRAMUSCULAR; INTRAVENOUS ONCE
Status: COMPLETED | OUTPATIENT
Start: 2021-07-02 | End: 2021-07-02

## 2021-07-02 RX ORDER — KETOROLAC TROMETHAMINE 30 MG/ML
30 INJECTION, SOLUTION INTRAMUSCULAR; INTRAVENOUS ONCE
Status: COMPLETED | OUTPATIENT
Start: 2021-07-02 | End: 2021-07-02

## 2021-07-02 RX ORDER — KETOROLAC TROMETHAMINE 30 MG/ML
INJECTION, SOLUTION INTRAMUSCULAR; INTRAVENOUS
Status: COMPLETED
Start: 2021-07-02 | End: 2021-07-02

## 2021-07-02 RX ADMIN — KETOROLAC TROMETHAMINE 30 MG: 30 INJECTION, SOLUTION INTRAMUSCULAR at 15:42

## 2021-07-02 RX ADMIN — MORPHINE SULFATE 4 MG: 4 INJECTION, SOLUTION INTRAMUSCULAR; INTRAVENOUS at 15:42

## 2021-07-02 RX ADMIN — ONDANSETRON 4 MG: 2 INJECTION INTRAMUSCULAR; INTRAVENOUS at 15:42

## 2021-07-02 RX ADMIN — IOPAMIDOL 80 ML: 755 INJECTION, SOLUTION INTRAVENOUS at 16:09

## 2021-07-02 RX ADMIN — KETOROLAC TROMETHAMINE 30 MG: 30 INJECTION, SOLUTION INTRAMUSCULAR; INTRAVENOUS at 15:42

## 2021-07-02 ASSESSMENT — ENCOUNTER SYMPTOMS
SHORTNESS OF BREATH: 0
ABDOMINAL PAIN: 0
WHEEZING: 0
CHEST TIGHTNESS: 0
BACK PAIN: 0
CHOKING: 0
STRIDOR: 0
COUGH: 0
COLOR CHANGE: 0
APNEA: 0
ABDOMINAL DISTENTION: 0
VOMITING: 0
BLOOD IN STOOL: 0
FACIAL SWELLING: 0

## 2021-07-02 ASSESSMENT — PAIN DESCRIPTION - FREQUENCY: FREQUENCY: INTERMITTENT

## 2021-07-02 ASSESSMENT — PAIN DESCRIPTION - ONSET: ONSET: ON-GOING

## 2021-07-02 ASSESSMENT — PAIN DESCRIPTION - ORIENTATION: ORIENTATION: RIGHT

## 2021-07-02 ASSESSMENT — PAIN DESCRIPTION - PROGRESSION: CLINICAL_PROGRESSION: GRADUALLY WORSENING

## 2021-07-02 ASSESSMENT — PAIN DESCRIPTION - LOCATION: LOCATION: JAW

## 2021-07-02 ASSESSMENT — PAIN SCALES - GENERAL
PAINLEVEL_OUTOF10: 3
PAINLEVEL_OUTOF10: 8

## 2021-07-02 ASSESSMENT — PAIN DESCRIPTION - DESCRIPTORS: DESCRIPTORS: ACHING

## 2021-07-02 ASSESSMENT — PAIN DESCRIPTION - PAIN TYPE: TYPE: ACUTE PAIN

## 2021-07-02 NOTE — ED NOTES
ED nurse-to-nurse bedside report    Chief Complaint   Patient presents with    Jaw Pain      LOC: alert and orientated to name, place, date  Vital signs   Vitals:    07/02/21 1257   BP: 130/81   Pulse: 71   Resp: 14   Temp: 97.8 °F (36.6 °C)   TempSrc: Oral   SpO2: 99%   Weight: 200 lb (90.7 kg)      Pain:    Pain Interventions: N/a  Pain Goal: N/a  Oxygen: NA    Current needs required room air   Telemetry: Yes  LDAs:   Peripheral IV 07/02/21 Left Forearm (Active)   Site Assessment Clean;Dry; Intact 07/02/21 1446   Line Status Specimen collected 07/02/21 1446   Dressing Status Clean;Dry; Intact 07/02/21 1446   Dressing Intervention New 07/02/21 1446     Continuous Infusions:   Mobility: Requires assistance * 2  Sheldon Fall Risk Score: No flowsheet data found.   Fall Interventions: Side rails up x2, call light in reach  Report given to: ALICE Jack  07/02/21 2624

## 2021-07-02 NOTE — ED PROVIDER NOTES
[facility] [address] [address] EMERGENCY DEPT    EMERGENCY MEDICINE     Pt Name: Hanh Ewing  MRN: [id number]  [place] 1955  Date of evaluation: 7/2/2021  Provider: Idris Burns DO, [address]       Chief Complaint   Patient presents with    Jaw Pain       HISTORY OF PRESENT ILLNESS    Hanh Ewing is a pleasant 63 y.o. male who presents to the emergency department from [facility]. According to the patient he had been experience jaw pain for over a few weeks. He had been in the ED for jaw pain on June 25. The pain continue slowly getting worse until today. Patient cannot tolerate eating, drinking. Patient also have some headaches. According to patient he had been taking clindamycin and baclofen. Patient denies any fever, chills, shortness of breath, palpitation,chest pain. Triage notes and Nursing notes were reviewed by myself. Any discrepancies are addressed above. PAST MEDICAL HISTORY     Past Medical History:   Diagnosis Date    Dysphagia     oropharyngeal    History of pulmonary embolism     History of urinary tract infection     Hx of blood clots     Pulmonary embolis    Hypertension     Major depressive disorder, single episode     MS (multiple sclerosis) ([address]     Neurogenic bladder feb. 2012    Dr. Pako Todd placed cather    [facility])     UTI (urinary tract infection)        SURGICAL HISTORY       Past Surgical History:   Procedure Laterality Date    ANKLE SURGERY  1996    broken ankle    BLADDER SURGERY  2-    Suprapubic catheter placement    COLONOSCOPY      CYSTO/URETERO/PYELOSCOPY, CALCULUS TX Left 6/19/2019    CYSTOSCOPY, LEFT STENT insertion, bladder irragation with bladder stones performed by Abby Don MD at [address] [place] [address] 189 N/A 7/8/2019    CYSTO, LEFT URETERAL STENT REMOVAL, LEFT URETEROSCOPY, LASER LITHOTRIPSY, BASKET RETRIEVAL OF STONE FRAGMENTS performed by Abby Don MD at [address] [facility] N/A 2/26/2021    CYSTOSCOPY, CYSTOLITHOLAPAXY, SUPRAPUBIC CATHETER EXCHANGE performed by Garry Cedillo MD at [address] N/A 6/13/2018    ROBOT DIVERTING COLOSTOMY performed by Deepak Lawler MD at [address] [facility]  child       [address]       Discharge Medication List as of 7/2/2021  4:56 PM      CONTINUE these medications which have NOT CHANGED    Details   HYDROcodone-acetaminophen (NORCO) 5-325 MG per tablet Take 1 tablet by mouth every 4 hours as needed. Historical Med      clindamycin (CLEOCIN) 300 MG/2ML injection Inject 600 mg into the muscle every 6 hoursHistorical Med      acetaminophen (TYLENOL) 325 MG tablet Take 650 mg by mouth every 4 hours as needed for Pain or FeverHistorical Med      acetic acid 0.25 % irrigation Irrigate with 30 mLs as directed 2 times daily Irrigate with as directed daily. , Irrigation, 2 TIMES DAILY, Historical Med      ibuprofen (ADVIL;MOTRIN) 400 MG tablet Take 400 mg by mouth every 4 hours as needed for Fever (for temp > 100.5 not alleviated by acetaminophen)Historical Med      LACTOBACILLUS PO Take 1 capsule by mouth dailyHistorical Med      vitamin E 400 UNIT capsule Take 400 Units by mouth dailyHistorical Med      nystatin (MYCOSTATIN) POWD powder Apply topically 2 times dailyHistorical Med      oxybutynin (DITROPAN XL) 5 MG extended release tablet Take 1 tablet by mouth daily, Disp-360 tablet, R-0NO PRINT      metoprolol tartrate (LOPRESSOR) 25 MG tablet Take 1 tablet by mouth 2 times daily, Disp-60 tablet, R-3DC to SNF      miconazole (MICOTIN) 2 % powder Apply topically 2 times daily. , Disp-45 g, R-1, DC to SNF      potassium chloride (KLOR-CON M) 20 MEQ TBCR extended release tablet Take 2 tablets by mouth daily, Disp-60 tablet, R-3DC to SNF      sodium chloride 1 g tablet Take 1 tablet by mouth 2 times daily (with meals), Disp-90 tablet, R-3DC to SNF      calcium-vitamin D (OSCAL-500) 500-200 MG-UNIT per tablet Take 1 tablet by mouth 2 times daily, Disp-30 tablet, R-3DC to SNF      famotidine (PEPCID) 20 MG tablet Take 1 tablet by mouth 2 times daily, Disp-60 tablet, R-3DC to SNF      docusate sodium (COLACE) 100 MG capsule Take 100 mg by mouth daily Historical Med      tiZANidine (ZANAFLEX) 2 MG tablet Take 2 mg by mouth 3 times daily 0600;1400;2200Historical Med      Multiple Vitamin (MULTIVITAMIN) tablet Take 1 tablet by mouth daily, R-0      vitamin A 36943 UNITS capsule Take 2 capsules by mouth daily, Disp-30 capsule, R-3             ALLERGIES     Patient has no known allergies. FAMILY HISTORY       Family History   Problem Relation Age of Onset    Cancer Mother         Breast    Heart Disease Father 48    Arthritis Sister     Heart Disease Paternal Grandmother 48        SOCIAL HISTORY       Social History     Socioeconomic History    Marital status:      Spouse name: Kimberly Garrett Number of children: 2    Years of education: None    Highest education level: None   Occupational History    None   Tobacco Use    Smoking status: Former Smoker     Quit date: 1982     Years since quittin.5    Smokeless tobacco: Former User   Vaping Use    Vaping Use: Never used   Substance and Sexual Activity    Alcohol use: No     Comment: \"quit alcohol a few years ago\"    Drug use: No    Sexual activity: Yes     Partners: Female   Other Topics Concern    None   Social History Narrative    None     Social Determinants of Health     Financial Resource Strain:     Difficulty of Paying Living Expenses:    Food Insecurity:     Worried About Running Out of Food in the Last Year:     Ran Out of Food in the Last Year:    Transportation Needs:     Lack of Transportation (Medical):      Lack of Transportation (Non-Medical):    Physical Activity:     Days of Exercise per Week:     Minutes of Exercise per Session:    Stress:     Feeling of Stress :    Social Connections:     Frequency of Communication with Friends and Family:     Frequency of Social Gatherings with Friends and Family:     Attends Zoroastrianism Services:     Active Member of Clubs or Organizations:     Attends Club or Organization Meetings:     Marital Status:    Intimate Partner Violence:     Fear of Current or Ex-Partner:     Emotionally Abused:     Physically Abused:     Sexually Abused:        REVIEW OF SYSTEMS     Review of Systems   Constitutional: Positive for appetite change. Negative for activity change, chills, diaphoresis, fatigue and fever. Decreased appetite   HENT: Positive for mouth sores. Negative for congestion, ear discharge, ear pain, facial swelling, hearing loss, nosebleeds and postnasal drip. Respiratory: Negative for apnea, cough, choking, chest tightness, shortness of breath, wheezing and stridor. Cardiovascular: Negative for chest pain, palpitations and leg swelling. Gastrointestinal: Negative for abdominal distention, abdominal pain, blood in stool and vomiting. Musculoskeletal: Negative for arthralgias and back pain. Patient is bed bound   Skin: Positive for pallor. Negative for color change and rash. Except as noted above the remainder of the review of systems was reviewed and is. PHYSICAL EXAM    (up to 7 for level 4, 8 or more for level 5)     ED Triage Vitals [07/02/21 1257]   BP Temp Temp Source Pulse Resp SpO2 Height Weight   130/81 97.8 °F (36.6 °C) Oral 71 14 99 % -- 200 lb (90.7 kg)       Physical Exam  Constitutional:       General: He is not in acute distress. Appearance: Normal appearance. He is obese. He is ill-appearing. HENT:      Head: Normocephalic and atraumatic. Right Ear: Tympanic membrane, ear canal and external ear normal.      Left Ear: Tympanic membrane, ear canal and external ear normal.      Nose: Nose normal. No congestion or rhinorrhea. Mouth/Throat:      Mouth: Mucous membranes are moist.      Pharynx: Posterior oropharyngeal erythema present. Comments: Painful with tongue movement  Eyes:      Extraocular Movements: Extraocular movements intact. Conjunctiva/sclera: Conjunctivae normal.      Pupils: Pupils are equal, round, and reactive to light. Neck:      Comments: Neck enlargement  Cardiovascular:      Rate and Rhythm: Normal rate and regular rhythm. Pulses: Normal pulses. Heart sounds: Normal heart sounds. Pulmonary:      Effort: No respiratory distress. Breath sounds: Normal breath sounds. No stridor. No wheezing, rhonchi or rales. Abdominal:      General: Bowel sounds are normal. There is no distension. Palpations: Abdomen is soft. Tenderness: There is no abdominal tenderness. Musculoskeletal:      Cervical back: Normal range of motion and neck supple. Skin:     General: Skin is dry. Capillary Refill: Capillary refill takes less than 2 seconds. Coloration: Skin is pale. Findings: No lesion. Neurological:      General: No focal deficit present. Mental Status: He is alert and oriented to person, place, and time. Mental status is at baseline. Psychiatric:         Mood and Affect: Mood normal.         Behavior: Behavior normal.         Thought Content: Thought content normal.         Judgment: Judgment normal.         DIAGNOSTIC RESULTS     EKG:(none if blank)  All E[facility]'s are [facility] who either signs or Co-signs this chart in the absence of a cardiologist.    RADIOLOGY: (none if blank)   Interpretation per the Radiologistbelow, if available at the time of this note:    CT SOFT TISSUE NECK W CONTRAST   Final Result   No acute process            **This report has been created using voice recognition software. It may contain minor errors which are inherent in voice recognition technology. **      Final report electronically signed by Dr. Sourav Aguilar on 7/2/2021 4:41 PM          LABS:  Labs Reviewed   CBC WITH AUTO DIFFERENTIAL - Abnormal; Notable for the following components:       Result Value    Hematocrit 53.1 (*)     MCHC 31.3 (*)     RDW-CV 15.5 (*)     RDW-SD 50.2 (*)     All other components within normal limits   COMPREHENSIVE METABOLIC PANEL W/ REFLEX TO MG FOR LOW K - Abnormal; Notable for the following components:    CREATININE 0.3 (*)     Alkaline Phosphatase 156 (*)     Total Protein 8.1 (*)     All other components within normal limits   SEDIMENTATION RATE - Abnormal; Notable for the following components:    Sed Rate 36 (*)     All other components within normal limits   C-REACTIVE PROTEIN - Abnormal; Notable for the following components:    CRP 4.29 (*)     All other components within normal limits   ANION GAP   OSMOLALITY   GLOMERULAR FILTRATION RATE, ESTIMATED       All other labs were within normal range or not returned as of this dictation. Please note, any cultures that may have been sent were not resulted at the time of this patient visit. EMERGENCY DEPARTMENT COURSE andMedical Decision Making:     MDM  /   1. Right jaw pain  My differential diagnosises but not limit tosoft tissue infection, TMJ, Candida. Plan:  -CT head and neck: No acute process, no sign of abscesses,   -CBC: Within normal limits, no leukocytosis  -CMP  - C reactive protein  -Toradol for pain control        Strict returnprecautions and follow up instructions were discussed with the patient with which the patient agrees      The patient was evaluated during the COVID-19 pandemic. The patient was screened today during their presentation for commonly recognized symptoms and signs of COVID-19 infection. Their evaluation, treatment and testing was consistent with current guidelines for patients who present with complaints or symptoms that may be related to COVID-19.       ED Medications administered this visit:    Medications   morphine injection 4 mg (4 mg Intravenous Given 7/2/21 1542)   ketorolac (TORADOL) injection 30 mg (30 mg Intravenous Given 7/2/21 1542) ondansetron (ZOFRAN) injection 4 mg (4 mg Intravenous Given 7/2/21 5648)   iopamidol (ISOVUE-370) 76 % injection 80 mL (80 mLs Intravenous Given 7/2/21 1603)         Procedures: (None if blank)       CLINICAL       1. Jaw pain          DISPOSITION/PLAN   DISPOSITION Decision To Discharge 07/02/2021 06:09:38 PM  Discharge home.   On the day of discharge patient is stable    PATIENT REFERRED TO:  Joey Ortiz MD  [address] [place] [address]  [phone]            DISCHARGE MEDICATIONS:  Discharge Medication List as of 7/2/2021  4:56 PM                 (Please note that portions of this note were completed with a voice recognition program.  Efforts were made to edit the dictations but occasionallywords are mis-transcribed.)      Galina Reveles DO,FACELANCE (electronically signed)  Attending Physician, Emergency Department       Sydnee Hayes DO  Resident  07/02/21 130 Constantino Ricketts DO  Resident  07/04/21 0726

## 2021-07-02 NOTE — ED PROVIDER NOTES
[address], Pt Name: Narinder Gold  MRN: [id number]  [place] 1955  Date of evaluation: 9/12/20      I personally saw and examined the patient. I have reviewed and agree with the Resident findings, including all diagnostic interpretations and treatment plans as written. I was present for the key portion of any procedures performed and the inclusive time noted in any critical care statement. History: This is a 62-year-old male who is living in a nursing home environment as he is debilitated from longstanding multiple sclerosis. He has been having intermittent issues with pain in the right jaw. He was seen in the emergency department a few months ago with a very similar presentation. Recurred today was more severe earlier. All of his teeth have been extracted. He has not had any swelling in the area. Has not had any lymphadenopathy. Was concern for possible dental infection or abscess that might be causing his pain. There is no evidence of that on the CT of the soft tissue of the neck. There is no fever or leukocytosis. He is advised to have follow-up in 3 to 5 days.                 Zuleima Fuller DO  07/02/21 1914

## 2021-07-02 NOTE — ED NOTES
[facility] to transport back to [facility].       Seven Zapien RN  07/02/21 1229

## 2021-07-02 NOTE — ED NOTES
Bed: 006A  Expected date: 7/2/21  Expected time: 12:44 PM  Means of arrival: [facility]  Comments:     Jamse Habermann, RN  07/02/21 5243

## 2021-08-18 ENCOUNTER — HOSPITAL ENCOUNTER (EMERGENCY)
Age: 66
Discharge: HOME | End: 2021-08-18
Attending: EMERGENCY MEDICINE
Payer: COMMERCIAL

## 2021-08-18 ENCOUNTER — APPOINTMENT (OUTPATIENT)
Dept: CT IMAGING | Age: 66
End: 2021-08-18
Payer: COMMERCIAL

## 2021-08-18 VITALS
BODY MASS INDEX: 28.25 KG/M2 | HEART RATE: 89 BPM | WEIGHT: 180 LBS | OXYGEN SATURATION: 97 % | DIASTOLIC BLOOD PRESSURE: 70 MMHG | HEIGHT: 67 IN | TEMPERATURE: 97.3 F | SYSTOLIC BLOOD PRESSURE: 136 MMHG | RESPIRATION RATE: 16 BRPM

## 2021-08-18 DIAGNOSIS — R40.0 SOMNOLENCE: Primary | ICD-10-CM

## 2021-08-18 LAB
ANION GAP SERPL CALCULATED.3IONS-SCNC: 9 MEQ/L (ref 8–16)
BASE EXCESS MIXED: 2.6 MMOL/L (ref -2–3)
BASOPHILS # BLD: 0.7 %
BASOPHILS ABSOLUTE: 0 THOU/MM3 (ref 0–0.1)
BUN BLDV-MCNC: 23 MG/DL (ref 7–22)
CALCIUM SERPL-MCNC: 9.3 MG/DL (ref 8.5–10.5)
CARBAMAZEPINE, TOTAL: 9.7 MCG/ML (ref 2–10)
CHLORIDE BLD-SCNC: 105 MEQ/L (ref 98–111)
CO2: 28 MEQ/L (ref 23–33)
COLLECTED BY:: ABNORMAL
CREAT SERPL-MCNC: 0.4 MG/DL (ref 0.4–1.2)
EOSINOPHIL # BLD: 5.6 %
EOSINOPHILS ABSOLUTE: 0.2 THOU/MM3 (ref 0–0.4)
ERYTHROCYTE [DISTWIDTH] IN BLOOD BY AUTOMATED COUNT: 16.1 % (ref 11.5–14.5)
ERYTHROCYTE [DISTWIDTH] IN BLOOD BY AUTOMATED COUNT: 55.7 FL (ref 35–45)
GFR SERPL CREATININE-BSD FRML MDRD: > 90 ML/MIN/1.73M2
GLUCOSE BLD-MCNC: 151 MG/DL (ref 70–108)
HCO3, MIXED: 28 MMOL/L (ref 23–28)
HCT VFR BLD CALC: 42.5 % (ref 42–52)
HEMOGLOBIN: 12.8 GM/DL (ref 14–18)
IMMATURE GRANS (ABS): 0.01 THOU/MM3 (ref 0–0.07)
IMMATURE GRANULOCYTES: 0.2 %
LYMPHOCYTES # BLD: 35.4 %
LYMPHOCYTES ABSOLUTE: 1.5 THOU/MM3 (ref 1–4.8)
MCH RBC QN AUTO: 28.6 PG (ref 26–33)
MCHC RBC AUTO-ENTMCNC: 30.1 GM/DL (ref 32.2–35.5)
MCV RBC AUTO: 94.9 FL (ref 80–94)
MONOCYTES # BLD: 10.2 %
MONOCYTES ABSOLUTE: 0.4 THOU/MM3 (ref 0.4–1.3)
NUCLEATED RED BLOOD CELLS: 0 /100 WBC
O2 SAT, MIXED: 90 %
OSMOLALITY CALCULATION: 289.7 MOSMOL/KG (ref 275–300)
PCO2, MIXED VENOUS: 45 MMHG (ref 41–51)
PH, MIXED: 7.4 (ref 7.31–7.41)
PLATELET # BLD: 176 THOU/MM3 (ref 130–400)
PMV BLD AUTO: 9 FL (ref 9.4–12.4)
PO2 MIXED: 59 MMHG (ref 25–40)
POTASSIUM REFLEX MAGNESIUM: 4 MEQ/L (ref 3.5–5.2)
RBC # BLD: 4.48 MILL/MM3 (ref 4.7–6.1)
SEG NEUTROPHILS: 47.9 %
SEGMENTED NEUTROPHILS ABSOLUTE COUNT: 2 THOU/MM3 (ref 1.8–7.7)
SODIUM BLD-SCNC: 142 MEQ/L (ref 135–145)
WBC # BLD: 4.1 THOU/MM3 (ref 4.8–10.8)

## 2021-08-18 PROCEDURE — 99284 EMERGENCY DEPT VISIT MOD MDM: CPT

## 2021-08-18 PROCEDURE — 36415 COLL VENOUS BLD VENIPUNCTURE: CPT

## 2021-08-18 PROCEDURE — 80156 ASSAY CARBAMAZEPINE TOTAL: CPT

## 2021-08-18 PROCEDURE — 80048 BASIC METABOLIC PNL TOTAL CA: CPT

## 2021-08-18 PROCEDURE — 70450 CT HEAD/BRAIN W/O DYE: CPT

## 2021-08-18 PROCEDURE — 82803 BLOOD GASES ANY COMBINATION: CPT

## 2021-08-18 PROCEDURE — 85025 COMPLETE CBC W/AUTO DIFF WBC: CPT

## 2021-08-18 ASSESSMENT — ENCOUNTER SYMPTOMS
EYE DISCHARGE: 0
COUGH: 0
ABDOMINAL DISTENTION: 0
SORE THROAT: 0
WHEEZING: 0
SHORTNESS OF BREATH: 0
RHINORRHEA: 0
ABDOMINAL PAIN: 0
NAUSEA: 0
EYE PAIN: 0
DIARRHEA: 0
VOMITING: 0

## 2021-08-18 NOTE — ED NOTES
[facility] called at this time for pt transport to [facility]. Will call back with ETA at a later time.        [facility]  08/18/21 5496

## 2021-08-18 NOTE — ED NOTES
Pt resting in bed with call light in reach, states no further needs at this time. Pt breaths easy and unlabored, no distress noted at this time.        [facility]  08/18/21 1700

## 2021-08-18 NOTE — ED PROVIDER NOTES
[address] [address]          Pt Name: Latrice Medel  MRN: [id number]  [place] 1955  Date of evaluation: 8/18/2021  Treating Resident Physician: Martinez Watson MD  Supervising Physician: Judith Turner DO    CHIEF COMPLAINT       Chief Complaint   Patient presents with    Altered Mental Status     History obtained from the patient. HISTORY OF PRESENT ILLNESS    HPI  Latrice Medel is a 61 y.o. male who presents to the emergency department for evaluation of altered mental status. Patient has increased somnolence today per report of EMS. Patient has recently started Tegretol due to trigeminal neuralgia. Per report of patient's and EMS patient has been more somnolent since starting Tegretol. The patient has no other acute complaints at this time. REVIEW OF SYSTEMS   Review of Systems   Constitutional: Positive for fatigue. Negative for fever. HENT: Negative for congestion, ear pain, rhinorrhea and sore throat. Eyes: Negative for pain and discharge. Respiratory: Negative for cough, shortness of breath and wheezing. Cardiovascular: Negative for chest pain, palpitations and leg swelling. Gastrointestinal: Negative for abdominal distention, abdominal pain, diarrhea, nausea and vomiting. Genitourinary: Negative for difficulty urinating, flank pain and frequency. Musculoskeletal: Negative for arthralgias. Neurological: Negative for dizziness, tremors, syncope, weakness and numbness. PAST MEDICAL AND SURGICAL HISTORY     Past Medical History:   Diagnosis Date    Dysphagia     oropharyngeal    History of pulmonary embolism     History of urinary tract infection     Hx of blood clots     Pulmonary embolis    Hypertension     Major depressive disorder, single episode     [facility]     Neurogenic bladder feb. 2012    Dr. Jairon Hinson [facility])     UTI (urinary tract Disp: 360 tablet, Rfl: 0    metoprolol tartrate (LOPRESSOR) 25 MG tablet, Take 1 tablet by mouth 2 times daily, Disp: 60 tablet, Rfl: 3    miconazole (MICOTIN) 2 % powder, Apply topically 2 times daily. , Disp: 45 g, Rfl: 1    potassium chloride (KLOR-CON M) 20 MEQ TBCR extended release tablet, Take 2 tablets by mouth daily, Disp: 60 tablet, Rfl: 3    sodium chloride 1 g tablet, Take 1 tablet by mouth 2 times daily (with meals), Disp: 90 tablet, Rfl: 3    calcium-vitamin D (OSCAL-500) 500-200 MG-UNIT per tablet, Take 1 tablet by mouth 2 times daily, Disp: 30 tablet, Rfl: 3    famotidine (PEPCID) 20 MG tablet, Take 1 tablet by mouth 2 times daily, Disp: 60 tablet, Rfl: 3    docusate sodium (COLACE) 100 MG capsule, Take 100 mg by mouth daily , Disp: , Rfl:     tiZANidine (ZANAFLEX) 2 MG tablet, Take 2 mg by mouth 3 times daily 0600;1400;2200, Disp: , Rfl:     Multiple Vitamin (MULTIVITAMIN) tablet, Take 1 tablet by mouth daily, Disp: , Rfl: 0    vitamin A 27440 UNITS capsule, Take 2 capsules by mouth daily, Disp: 30 capsule, Rfl: 3      SOCIAL HISTORY     Social History     Social History Narrative    Not on file     Social History     Tobacco Use    Smoking status: Former Smoker     Quit date: 1982     Years since quittin.6    Smokeless tobacco: Former User   Vaping Use    Vaping Use: Never used   Substance Use Topics    Alcohol use: No     Comment: \"quit alcohol a few years ago\"    Drug use: No         ALLERGIES   No Known Allergies      FAMILY HISTORY     Family History   Problem Relation Age of Onset    Cancer Mother         Breast    Heart Disease Father 48    Arthritis Sister     Heart Disease Paternal Grandmother 48         PREVIOUS RECORDS   Previous records reviewed: today        PHYSICAL EXAM     ED Triage Vitals [21 1357]   BP Temp Temp Source Pulse Resp SpO2 Height Weight   117/81 97.3 °F (36.3 °C) Oral 88 16 94 % 5' 7\" (1.702 m) 180 lb (81.6 kg)     Initial vital signs and nursing assessment reviewed and normal. Body mass index is 28.19 kg/m². Pulsoximetry is normal per my interpretation. Additional Vital Signs:  Vitals:    08/18/21 1659   BP: 136/70   Pulse: 89   Resp: 16   Temp:    SpO2: 97%       Physical Exam  Constitutional:       Appearance: Normal appearance. HENT:      Head: Normocephalic. Right Ear: External ear normal.      Left Ear: External ear normal.      Nose: Nose normal.      Mouth/Throat:      Mouth: Mucous membranes are moist.      Pharynx: Oropharynx is clear. Eyes:      Conjunctiva/sclera: Conjunctivae normal.      Pupils: Pupils are equal, round, and reactive to light. Cardiovascular:      Rate and Rhythm: Normal rate and regular rhythm. Pulses: Normal pulses. Heart sounds: Normal heart sounds. Pulmonary:      Effort: Pulmonary effort is normal.      Breath sounds: Normal breath sounds. Abdominal:      General: Bowel sounds are normal.      Palpations: Abdomen is soft. Musculoskeletal:         General: Normal range of motion. Cervical back: Normal range of motion and neck supple. Skin:     General: Skin is warm and dry. Capillary Refill: Capillary refill takes less than 2 seconds. Neurological:      General: No focal deficit present. Mental Status: He is alert. Mental status is at baseline. GCS: GCS eye subscore is 4. GCS verbal subscore is 5. GCS motor subscore is 6. MEDICAL DECISION MAKING   Initial Assessment:   Patient is a 43-year-old male with past medical history of MS and trigeminal neuralgia who presents to the ED with concern for nursing home that he has had altered mental status increased fatigue today. Patient recently started Tegretol for trigeminal neuralgia. Patient physical exam is awake and alert at baseline with GCS of 15. Patient has no other acute abnormalities noted on exam and diagnostic studies.   Patient differential diagnosis includes but is not limited to altered mental status, somnolence, dehydration, encephalopathy, electrolyte imbalance, TIA, and CVA. Patient will be discharged to nursing home with precautions and instructed to follow-up with PCP. Plan:    Discharged back to nursing home facility and have patient follow-up outpatient with PCP. ED RESULTS   Laboratory results:  Labs Reviewed   BLOOD GAS, VENOUS - Abnormal; Notable for the following components:       Result Value    PO2, Mixed 59 (*)     All other components within normal limits   CBC WITH AUTO DIFFERENTIAL - Abnormal; Notable for the following components:    WBC 4.1 (*)     RBC 4.48 (*)     Hemoglobin 12.8 (*)     MCV 94.9 (*)     MCHC 30.1 (*)     RDW-CV 16.1 (*)     RDW-SD 55.7 (*)     MPV 9.0 (*)     All other components within normal limits   BASIC METABOLIC PANEL W/ REFLEX TO MG FOR LOW K - Abnormal; Notable for the following components:    Glucose 151 (*)     BUN 23 (*)     All other components within normal limits   CARBAMAZEPINE LEVEL, TOTAL   ANION GAP   OSMOLALITY   GLOMERULAR FILTRATION RATE, ESTIMATED       Radiologic studies results:  CT HEAD WO CONTRAST   Final Result   1. No acute intracranial hemorrhage or mass effect. 2. Moderate diffuse atrophy is again seen. 3. 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 **This report has been created using voice recognition software. It may contain minor errors which are inherent in voice recognition technology. **      Final report electronically signed by Dr. Lulú Lemus on 8/18/2021 4:55 PM          ED Medications administered this visit: Medications - No data to display      ED COURSE        Strict return precautions and follow up instructions were discussed with the patient prior to discharge, with which the patient agrees.       MEDICATION CHANGES     Discharge Medication List as of 8/18/2021  6:04 PM            FINAL DISPOSITION     Final diagnoses:   Somnolence     Condition: condition: stable  Dispo: Discharge to home      This transcription was electronically signed. Parts of this transcriptions may have been dictated by use of voice recognition software and electronically transcribed, and parts may have been transcribed with the assistance of an ED scribe. The transcription may contain errors not detected in proofreading. Please refer to my supervising physician's documentation if my documentation differs.     Electronically Signed: Hiren Dalal MD, 08/18/21, 7:49 PM       Hiren Dalal MD  Resident  08/18/21 9908

## 2021-08-18 NOTE — ED NOTES
Bed: 029A  Expected date: 8/18/21  Expected time: 1:47 PM  Means of arrival: [facility]  Comments:     Celestine Kingston RN  08/18/21 2240

## 2021-08-18 NOTE — ED NOTES
Pt to ED via [facility] for complaint of altered mental status from staff at [facility] where pt resides. Staff state pt recently Tegretol 8/16/21, with one episode of altered mental status following initial dose. Staff states worsening altered mental status today. [facility] states pt A&O X4 upon arrival, pt A&O X4 upon arrival to ED. Pt states he \"feels fine\" at this time, denies pain or any other symptoms. Pt states, \"I guess so\", when asked if he felt confused or tired earlier as staff states pt became altered. No distress noted at this time, pt breaths easy and unlabored. Call light in reach.        [facility]  08/18/21 8239

## 2021-08-18 NOTE — ED NOTES
Pt repositioned in bed and padding with blanket applied to right leg, pt states no further needs. No distress noted, pt breaths easy and unlabored. Call light left within reach.        [facility]  08/18/21 1531

## 2021-08-31 ENCOUNTER — INITIAL CONSULT (OUTPATIENT)
Dept: NEUROLOGY | Age: 66
End: 2021-08-31
Payer: COMMERCIAL

## 2021-08-31 VITALS
DIASTOLIC BLOOD PRESSURE: 70 MMHG | OXYGEN SATURATION: 97 % | SYSTOLIC BLOOD PRESSURE: 110 MMHG | HEART RATE: 86 BPM | HEIGHT: 67 IN | BODY MASS INDEX: 28.19 KG/M2

## 2021-08-31 DIAGNOSIS — G35 MULTIPLE SCLEROSIS (HCC): Primary | ICD-10-CM

## 2021-08-31 DIAGNOSIS — G82.20 PARAPARESIS OF BOTH LOWER LIMBS (HCC): ICD-10-CM

## 2021-08-31 DIAGNOSIS — R51.9 RIGHT-SIDED FACE PAIN: ICD-10-CM

## 2021-08-31 PROCEDURE — 99244 OFF/OP CNSLTJ NEW/EST MOD 40: CPT | Performed by: PSYCHIATRY & NEUROLOGY

## 2021-08-31 PROCEDURE — G8417 CALC BMI ABV UP PARAM F/U: HCPCS | Performed by: PSYCHIATRY & NEUROLOGY

## 2021-08-31 PROCEDURE — G8427 DOCREV CUR MEDS BY ELIG CLIN: HCPCS | Performed by: PSYCHIATRY & NEUROLOGY

## 2021-08-31 RX ORDER — RIVAROXABAN 10 MG/1
10 TABLET, FILM COATED ORAL
COMMUNITY
End: 2022-07-19 | Stop reason: ALTCHOICE

## 2021-08-31 RX ORDER — PREGABALIN 25 MG/1
25 CAPSULE ORAL 2 TIMES DAILY
Qty: 60 CAPSULE | Refills: 3 | Status: ON HOLD | OUTPATIENT
Start: 2021-08-31 | End: 2022-04-25

## 2021-08-31 RX ORDER — GABAPENTIN 600 MG/1
600 TABLET ORAL 3 TIMES DAILY
COMMUNITY

## 2021-08-31 RX ORDER — BACLOFEN 10 MG/1
10 TABLET ORAL 3 TIMES DAILY
COMMUNITY

## 2021-08-31 RX ORDER — ZINC SULFATE 50(220)MG
50 CAPSULE ORAL DAILY
Status: ON HOLD | COMMUNITY
End: 2022-04-22

## 2021-08-31 NOTE — PATIENT INSTRUCTIONS
1. Proceed with MRI orbits, face and neck re: right face pain  2. Start Lyrica 25 mg two times a day  3. Continue following with wound clinic  4. You need to take calcium and vitamin D over the counter  5. Call with any new symptoms or concerns. 6. Follow up in 2 months.

## 2021-08-31 NOTE — LETTER
[address] [facility] Neurology  [facility] [address] [place] [id number]  Phone: [phone]  Fax: [phone]    Gali Edwards MD    September 6, 2021     Rui Keene, [id number] [id number]    Patient: Bri White   MR Number: [id number]   YOB: 1955   Date of Visit: 8/31/2021       Dear Rui Keene: Thank you for referring Yuval Langley to me for evaluation/treatment. Below are the relevant portions of my assessment and plan of care. If you have questions, please do not hesitate to call me. I look forward to following Jelena Beltran along with you.     Sincerely,    MD Gali Coy MD

## 2021-09-06 NOTE — PROGRESS NOTES
Chief Complaint   Patient presents with    Consultation     Multiple Sclerosis       Shemar Tate is a 61 y.o. male who presents today for evaluation of multiple sclerosis, diagnosed in 12. Diagnosed was made with MRI brain and spinal tap. Initial presenting symptoms was left sided weakness. He was first diagnosed as relapsing-remitting, and now he is progressively gradually getting worse. He denies any specific relapses recently. Current symptoms include bladder retention, bilateral leg weakness. He has been wheelchair bound for the past 15 years, he has a suprapubic catheter in place. He has had optic neuritis in the past. He is not currently on any immune modifying medications. Medications tried in the past include Beta seron. He has been off immune modifying medications for years. He has had solumedrol infusions in the past that he has responded well to. MRI brain done W/WO contrast in 6/2019 showed No evidence of an acute infarct or active demyelination. 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 Moderate amount of abnormal signal in the white matter of the brain consistent with multiple sclerosis and/or chronic small vessel ischemic changes. He does have history of wounds on his buttocks and is following with wound clinic. He denies chest pain. No shortness of breath, no neck pain. No vision changes. No dysphagia. No fever. No rash. No weight loss. History provided by patient. Past Medical History:   Diagnosis Date    Dysphagia     oropharyngeal    History of pulmonary embolism     History of urinary tract infection     Hx of blood clots     Pulmonary embolis    Hypertension     Major depressive disorder, single episode     MS (multiple sclerosis) ([facility] [address]     Neurogenic bladder feb. 2012    Dr. Salma Reza [facility])     UTI (urinary tract infection)        Patient Active Problem List   Diagnosis    Social Determinants of Health     Financial Resource Strain:     Difficulty of Paying Living Expenses:    Food Insecurity:     Worried About Running Out of Food in the Last Year:     [address] in the Last Year:    Transportation Needs:     Lack of Transportation (Medical):  Lack of Transportation (Non-Medical):    Physical Activity:     Days of Exercise per Week:     Minutes of Exercise per Session:    Stress:     Feeling of Stress :    Social Connections:     Frequency of Communication with Friends and Family:     Frequency of Social Gatherings with Friends and Family:     Attends [employer] Services:     Active Member of Clubs or Organizations:     Attends Club or Organization Meetings:     Marital Status:    Intimate Partner Violence:     Fear of Current or Ex-Partner:     Emotionally Abused:     Physically Abused:     Sexually Abused:        Family History   Problem Relation Age of Onset    Cancer Mother         Breast    Heart Disease Father 48    Arthritis Sister     Heart Disease Paternal Grandmother 48         I reviewed the past medical history, allergies, medications, social history and family history. Review of Systems   All systems reviewed, and are all negative, except what is mentioned in HPI      Vitals:    08/31/21 1020   BP: 110/70   Site: Right Upper Arm   Position: Sitting   Cuff Size: Medium Adult   Pulse: 86   SpO2: 97%   Height: 5' 7\" (1.702 m)       Physical Examination:  General appearance - alert, well appearing, and in no distress, oriented to person, place, and time and over weight, the patient is wheel chair bound and is wearing a mask. Mental status- Level of Alertness: awake  Orientation: person, place, time  Memory: normal  Fund of Knowledge: normal  Attention/Concentration: normal  Language: normal. Mood is normal.   Neck - supple, no significant adenopathy, carotids upstroke normal bilaterally. There is no axillary lymphadenopathy.    There is no carotid bruit. No neck lymphadenopathy. No thyroid enlargement   Neurological -   Cranial Sbmiwo-HR-ZYM:.   Cranial nerve II: Normal   Cranial nerve III: Pupils: equal, round, reactive to light  Cranial nerves III, IV, VI: Extraocular Movements: intact   Cranial nerve V: Facial sensation: intact   Cranial nerve VII:Facial strength: intact   Cranial nerve VIII: Hearing: intact   Cranial nerve IX: Palate Elevation intact bilaterally  Cranial nerve XI: Shoulder shrug intact bilaterally  Cranial nerve XII: Tongue midline   neck supple without rigidity, there is  limitation of range of motion of the neck, bilaterally. DTR's are decreased distal and symmetric  Babinski sign negative  Motor exam is 5/5 in the bilateral upper and 0/5 in bilateral lower extremities. He has increased muscle tone in bilateral lower extremities . There is  muscle atrophy of his bilateral lower extremities. Sensory is intact for light touch in the upper trunk and arms, there is numbness in the lower extremities. Coordination: finger to nose, intact on right, ataxic on left  Gait and station, not tested, he is in a wheelchair   Abnormal movement none, vibration decreased distal   Skin - warm, dry to touch, normal coloration, no rashes, no suspicious skin lesions  Superficial temporal artery pulses are normal.   There is no limitation of range of motion of the neck. There is no resting tremor, no pin rolling, no bradykinesia, no Hypohonia, normal blink rate. Musculoskeletal: Has no hand arthritis, no limitation of ROM in any of the four extremities, except bilateral lower extremities. There is no leg edema . The Heart was regular in rate and rhythm. No heart murmur  Chest Clear, with good effort. MRI Brain W WO Contrast  Narrative  PROCEDURE: MRI BRAIN W WO CONTRAST  CLINICAL INFORMATIONCONFUSION/DELIRIUM, ALTERED LOC, UNEXPLAINED, multiple slcerosis. Altered mental status. History of multiple sclerosis.   COMPARISON: Brain MRI 1/20/2018. Head CT 6/10/2019. TECHNIQUE: Multiplanar and multiple spin echo T1 and T2-weighted images were obtained through the brain before and after the administration of intravenous contrast.  FINDINGS:  Motion artifact does degrade the quality of some of these images. These are the best images possible at this time. The diffusion-weighted images are normal. The brain volume is moderately reduced. There are no intra-or extra-axial collections. There is no hydrocephalus, midline shift or mass effect. On the FLAIR and T2-weighted sequences, there is a moderate amount of abnormal signal in the deep white matter of the brain. Many of these radiate outward from the ventricular margins. This also involves the white matter of the temporal lobes. The  patient has a history of multiple sclerosis. This is stable. There are stable small old lacunar type infarcts in the right basal ganglia. There is an old cortical infarct with associated volume loss in the posterior right frontal lobe. On the gradient echo T2-weighted images, there is no susceptibility artifact present. There is no abnormal enhancement in the brain. The major intracranial vascular flow voids are present. The midline craniocervical junction structures are normal.  The brainstem and pituitary gland are normal.  Impression  1. No evidence of an acute infarct or active demyelination. 2. 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 3. Moderate amount of abnormal signal in the white matter of the brain consistent with multiple sclerosis and/or chronic small vessel ischemic changes. **This report has been created using voice recognition software. It may contain minor errors which are inherent in voice recognition technology. **  Final report electronically signed by Dr. Landon Turner on 6/11/2019 10:37 AM      CT HEAD WO CONTRAST ( I reviewed the CT brain and agree with interpretation.)    Narrative  PROCEDURE: CT HEAD WO CONTRAST  CLINICAL INFORMATION: ams  COMPARISON: 6/10/2019  TECHNIQUE:  Axial CT images were obtained through the head without contrast. Coronal and sagittal reformatted images were rendered. All CT scans at this facility use dose modulation, iterative reconstruction, and/or weight-based dosing when appropriate  to reduce radiation dose to as low as reasonably achievable. FINDINGS:  There is moderate diffuse atrophy. No acute intracranial hemorrhage or mass effect is seen. There is no midline shift. There are chronic appearing focal areas of low attenuation within the right basal ganglia region likely representing sequela from prior remote infarcts. The basal cisterns and posterior fossa appear otherwise unremarkable. Marked vascular calcifications along the parasellar carotid and vertebral arteries are seen. The visualized paranasal sinuses appear well-pneumatized. No acute abnormalities of the calvarium are seen. Impression  1. No acute intracranial hemorrhage or mass effect. 2. Moderate diffuse atrophy is again seen. 3. 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 **This report has been created using voice recognition software. It may contain minor errors which are inherent in voice recognition technology. **    Final report electronically signed by Dr. Ewa Cheema on 8/18/2021 4:55 PM    We reviewed the patient records from referring provider and available information in the EHR       ASSESSMENT:      Diagnosis Orders   1. Multiple sclerosis (HCC)     2. Paraparesis of both lower limbs ([address]     3. Right-sided face pain        This is a 43-year-old male who presents with history of multiple sclerosis, diagnosed in 12. He was first diagnosed as relapsing remitting, he is clinically in secondary progressive multiple sclerosis, with relapse.  He is gradually getting worse. He does have bladder retention, bilateral leg weakness. Has been wheelchair-bound for at least the past 15 years and he has a suprapubic catheter in place. He has been on Betaseron in the past.  I reviewed the CT Brain 8/2021, and agree with interpretation, I also reviewed the patient pertinent labs and records in the EHR and from other providers. Has been off all immune modifying medication for many years. His last MRI brain with and without contrast was done 6/2019 showed no evidence of acute infarct or active demyelination. He is scheduled to undergo an MRI of the face orbits and neck regarding right facial pain. We will also start him on Lyrica in addition to his current medications to help with the right facial pain. He does have history of open wounds and follows with would clinic that will limit starting him on immune modifiers for his condition. After detailed discussion with the patient we agreed on the following plan. Plan    1. Proceed with MRI orbits, face and neck re: right face pain  2. Start Lyrica 25 mg two times a day  3. Continue following with wound clinic  4. You need to take calcium and vitamin D over the counter  5. Call with any new symptoms or concerns. 6. Follow up in 2 months.        Total Time spent  63 minutes    Issa Ventura MD

## 2021-11-12 ENCOUNTER — OFFICE VISIT (OUTPATIENT)
Dept: NEUROLOGY | Age: 66
End: 2021-11-12
Payer: COMMERCIAL

## 2021-11-12 VITALS
WEIGHT: 185 LBS | DIASTOLIC BLOOD PRESSURE: 80 MMHG | HEIGHT: 67 IN | OXYGEN SATURATION: 99 % | SYSTOLIC BLOOD PRESSURE: 132 MMHG | HEART RATE: 80 BPM | BODY MASS INDEX: 29.03 KG/M2

## 2021-11-12 DIAGNOSIS — G82.20 PARAPARESIS OF BOTH LOWER LIMBS (HCC): ICD-10-CM

## 2021-11-12 DIAGNOSIS — R51.9 RIGHT-SIDED FACE PAIN: ICD-10-CM

## 2021-11-12 DIAGNOSIS — G35 MULTIPLE SCLEROSIS (HCC): Primary | ICD-10-CM

## 2021-11-12 PROCEDURE — 1036F TOBACCO NON-USER: CPT | Performed by: NURSE PRACTITIONER

## 2021-11-12 PROCEDURE — G8427 DOCREV CUR MEDS BY ELIG CLIN: HCPCS | Performed by: NURSE PRACTITIONER

## 2021-11-12 PROCEDURE — G8417 CALC BMI ABV UP PARAM F/U: HCPCS | Performed by: NURSE PRACTITIONER

## 2021-11-12 PROCEDURE — 3017F COLORECTAL CA SCREEN DOC REV: CPT | Performed by: NURSE PRACTITIONER

## 2021-11-12 PROCEDURE — G8484 FLU IMMUNIZE NO ADMIN: HCPCS | Performed by: NURSE PRACTITIONER

## 2021-11-12 PROCEDURE — 99213 OFFICE O/P EST LOW 20 MIN: CPT | Performed by: NURSE PRACTITIONER

## 2021-11-12 RX ORDER — ERYTHROMYCIN 5 MG/G
OINTMENT OPHTHALMIC NIGHTLY
COMMUNITY

## 2021-11-12 RX ORDER — PREDNISONE 10 MG/1
10 TABLET ORAL DAILY
Status: ON HOLD | COMMUNITY
End: 2022-04-22

## 2021-11-12 RX ORDER — PREDNISONE 1 MG/1
5 TABLET ORAL DAILY
Status: ON HOLD | COMMUNITY
End: 2022-04-25

## 2021-11-12 NOTE — PATIENT INSTRUCTIONS
1. Proceed with MRI orbits, face and neck re: right face pain  2. Continue with Lyrica 25 mg two times a day  3. Continue following with wound clinic  4. You need to take calcium and vitamin D over the counter  5. Call with any new symptoms or concerns. 6. Follow up in 2-3 months.

## 2021-11-12 NOTE — PROGRESS NOTES
NEUROLOGY OUT PATIENT FOLLOW UP NOTE:  11/12/202111:50 AM    Bo Garcia is here for follow up for multiple sclerosis, right sided face pain. ROS:  Respiratory : no cough, no shortness of breath  Cardiac: no chest pain. No palpitations. Renal : no flank pain, no hematuria    Skin: no rash      No Known Allergies    Current Outpatient Medications:     erythromycin (ROMYCIN) 5 MG/GM ophthalmic ointment, 2 times daily, Disp: , Rfl:     predniSONE (DELTASONE) 10 MG tablet, Take 10 mg by mouth daily, Disp: , Rfl:     predniSONE (DELTASONE) 5 MG tablet, Take 5 mg by mouth daily, Disp: , Rfl:     Ascorbic Acid (VITAMIN C ER PO), Take by mouth, Disp: , Rfl:     baclofen (LIORESAL) 10 MG tablet, Take 10 mg by mouth 3 times daily, Disp: , Rfl:     gabapentin (NEURONTIN) 300 MG capsule, Take 600 mg by mouth 3 times daily. , Disp: , Rfl:     rivaroxaban (XARELTO) 10 MG TABS tablet, Take 10 mg by mouth, Disp: , Rfl:     pregabalin (LYRICA) 25 MG capsule, Take 1 capsule by mouth 2 times daily for 91 days. , Disp: 60 capsule, Rfl: 3    HYDROcodone-acetaminophen (NORCO) 5-325 MG per tablet, Take 1 tablet by mouth every 4 hours as needed. , Disp: , Rfl:     acetaminophen (TYLENOL) 325 MG tablet, Take 650 mg by mouth every 4 hours as needed for Pain or Fever, Disp: , Rfl:     acetic acid 0.25 % irrigation, Irrigate with 30 mLs as directed 2 times daily Irrigate with as directed daily. , Disp: , Rfl:     ibuprofen (ADVIL;MOTRIN) 400 MG tablet, Take 400 mg by mouth every 4 hours as needed for Fever (for temp > 100.5 not alleviated by acetaminophen), Disp: , Rfl:     LACTOBACILLUS PO, Take 1 capsule by mouth daily, Disp: , Rfl:     vitamin E 400 UNIT capsule, Take 400 Units by mouth daily, Disp: , Rfl:     metoprolol tartrate (LOPRESSOR) 25 MG tablet, Take 1 tablet by mouth 2 times daily, Disp: 60 tablet, Rfl: 3    miconazole (MICOTIN) 2 % powder, Apply topically 2 times daily. , Disp: 45 g, Rfl: 1    potassium chloride (KLOR-CON M) 20 MEQ TBCR extended release tablet, Take 2 tablets by mouth daily, Disp: 60 tablet, Rfl: 3    sodium chloride 1 g tablet, Take 1 tablet by mouth 2 times daily (with meals), Disp: 90 tablet, Rfl: 3    calcium-vitamin D (OSCAL-500) 500-200 MG-UNIT per tablet, Take 1 tablet by mouth 2 times daily, Disp: 30 tablet, Rfl: 3    famotidine (PEPCID) 20 MG tablet, Take 1 tablet by mouth 2 times daily, Disp: 60 tablet, Rfl: 3    docusate sodium (COLACE) 100 MG capsule, Take 100 mg by mouth daily , Disp: , Rfl:     tiZANidine (ZANAFLEX) 2 MG tablet, Take 2 mg by mouth 3 times daily 0600;1400;2200, Disp: , Rfl:     Multiple Vitamin (MULTIVITAMIN) tablet, Take 1 tablet by mouth daily, Disp: , Rfl: 0    vitamin A 55569 UNITS capsule, Take 2 capsules by mouth daily, Disp: 30 capsule, Rfl: 3    dextrose 5 % SOLN 50 mL with ceFEPIme 2 g SOLR 2,000 mg, Infuse 2,000 mg intravenously every 12 hours (Patient not taking: Reported on 11/12/2021), Disp: , Rfl:     zinc sulfate (ZINCATE) 220 (50 Zn) MG capsule, Take 50 mg by mouth daily (Patient not taking: Reported on 11/12/2021), Disp: , Rfl:     nystatin (MYCOSTATIN) POWD powder, Apply topically 2 times daily (Patient not taking: Reported on 11/12/2021), Disp: , Rfl:     oxybutynin (DITROPAN XL) 5 MG extended release tablet, Take 1 tablet by mouth daily, Disp: 360 tablet, Rfl: 0    I reviewed the past medical history, allergies, medications, social history and family history. PE:   Vitals:    11/12/21 1144   BP: 132/80   Site: Left Upper Arm   Position: Sitting   Cuff Size: Medium Adult   Pulse: 80   SpO2: 99%   Weight: 185 lb (83.9 kg)   Height: 5' 7\" (1.702 m)     General Appearance:  awake, alert, oriented, in no acute distress  Gen: NAD, Language is Intact. Skin: no rash, lesion, dry  to touch. warm  Head: no rash, no icterus  Neck: There is no carotid bruits. The Neck is supple. There is no neck lymphadenopathy.  There is LROM of neck bilaterally, worse to the right  Neuro: CN 2-12 grossly intact with no focal deficits. Power 5/5 in the bilateral upper and 0/5 in bilateral lower extremities, He has increased muscle tone in bilateral lower extremities . There is  muscle atrophy of his bilateral lower extremities. Reflexes are  symmetric. Long tracts are decreased distally. Cerebellar exam is  intact on right, ataxic on left. Sensory exam is intact to light touch. Gait is not tested, he is in power wheelchair  Musculoskeletal:  Has no hand arthritis, no limitation of ROM in any of the four extremities, except bilateral lower extremities.   Lower extremities no edema          DATA:      Results for orders placed or performed during the hospital encounter of 08/18/21   Carbamazepine level, total   Result Value Ref Range    Carbamazepine, Total 9.7 2.0 - 10.0 mcg/ml   Blood gas, venous   Result Value Ref Range    PH MIXED 7.40 7.31 - 7.41    PCO2, MIXED VENOUS 45 41 - 51 mmhg    PO2, Mixed 59 (H) 25 - 40 mmhg    HCO3, Mixed 28 23 - 28 mmol/l    Base Exc, Mixed 2.6 -2.0 - 3.0 mmol/l    O2 Sat, Mixed 90 %    COLLECTED BY: [id number]    CBC Auto Differential   Result Value Ref Range    WBC 4.1 (L) 4.8 - 10.8 thou/mm3    RBC 4.48 (L) 4.70 - 6.10 mill/mm3    Hemoglobin 12.8 (L) 14.0 - 18.0 gm/dl    Hematocrit 42.5 42.0 - 52.0 %    MCV 94.9 (H) 80.0 - 94.0 fL    MCH 28.6 26.0 - 33.0 pg    MCHC 30.1 (L) 32.2 - 35.5 gm/dl    RDW-CV 16.1 (H) 11.5 - 14.5 %    RDW-SD 55.7 (H) 35.0 - 45.0 fL    Platelets 521 982 - 057 thou/mm3    MPV 9.0 (L) 9.4 - 12.4 fL    Seg Neutrophils 47.9 %    Lymphocytes 35.4 %    Monocytes 10.2 %    Eosinophils 5.6 %    Basophils 0.7 %    Immature Granulocytes 0.2 %    Segs Absolute 2.0 1.8 - 7.7 thou/mm3    Lymphocytes Absolute 1.5 1.0 - 4.8 thou/mm3    Monocytes Absolute 0.4 0.4 - 1.3 thou/mm3    Eosinophils Absolute 0.2 0.0 - 0.4 thou/mm3    Basophils Absolute 0.0 0.0 - 0.1 thou/mm3    Immature Grans (Abs) 0.01 0.00 - 0.07 thou/mm3 nRBC 0 /100 wbc   Basic Metabolic Panel w/ Reflex to MG   Result Value Ref Range    Sodium 142 135 - 145 meq/L    Potassium reflex Magnesium 4.0 3.5 - 5.2 meq/L    Chloride 105 98 - 111 meq/L    CO2 28 23 - 33 meq/L    Glucose 151 (H) 70 - 108 mg/dL    BUN 23 (H) 7 - 22 mg/dL    CREATININE 0.4 0.4 - 1.2 mg/dL    Calcium 9.3 8.5 - 10.5 mg/dL   Anion Gap   Result Value Ref Range    Anion Gap 9.0 8.0 - 16.0 meq/L   Osmolality   Result Value Ref Range    Osmolality Calc 289.7 275.0 - 300.0 mOsmol/kg   Glomerular Filtration Rate, Estimated   Result Value Ref Range    Est, Glom Filt Rate >90 ml/min/1.73m2          Results for orders placed during the hospital encounter of 06/10/19    MRI Brain W WO Contrast    Narrative  PROCEDURE: MRI BRAIN W WO CONTRAST    CLINICAL INFORMATIONCONFUSION/DELIRIUM, ALTERED LOC, UNEXPLAINED, multiple slcerosis. Altered mental status. History of multiple sclerosis. COMPARISON: Brain MRI 1/20/2018. Head CT 6/10/2019. TECHNIQUE: Multiplanar and multiple spin echo T1 and T2-weighted images were obtained through the brain before and after the administration of intravenous contrast.    FINDINGS:    Motion artifact does degrade the quality of some of these images. These are the best images possible at this time. The diffusion-weighted images are normal. The brain volume is moderately reduced. There are no intra-or extra-axial collections. There is no hydrocephalus, midline shift or mass effect. On the FLAIR and T2-weighted sequences, there is a moderate amount of abnormal signal in the deep white matter of the brain. Many of these radiate outward from the ventricular margins. This also involves the white matter of the temporal lobes. The  patient has a history of multiple sclerosis. This is stable. There are stable small old lacunar type infarcts in the right basal ganglia. There is an old cortical infarct with associated volume loss in the posterior right frontal lobe.     On the gradient echo T2-weighted images, there is no susceptibility artifact present. There is no abnormal enhancement in the brain. The major intracranial vascular flow voids are present. The midline craniocervical junction structures are normal.  The brainstem and pituitary gland are normal.    Impression  1. No evidence of an acute infarct or active demyelination. 2. 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 3. Moderate amount of abnormal signal in the white matter of the brain consistent with multiple sclerosis and/or chronic small vessel ischemic changes. **This report has been created using voice recognition software. It may contain minor errors which are inherent in voice recognition technology. **      Final report electronically signed by Dr. Hayden Sweeney on 6/11/2019 10:37 AM    No results found for this or any previous visit. Results for orders placed during the hospital encounter of 08/18/21    CT HEAD WO CONTRAST    Narrative  PROCEDURE: CT HEAD WO CONTRAST    CLINICAL INFORMATION: ams    COMPARISON: 6/10/2019    TECHNIQUE:  Axial CT images were obtained through the head without contrast. Coronal and sagittal reformatted images were rendered. All CT scans at this facility use dose modulation, iterative reconstruction, and/or weight-based dosing when appropriate  to reduce radiation dose to as low as reasonably achievable. FINDINGS:    There is moderate diffuse atrophy. No acute intracranial hemorrhage or mass effect is seen. There is no midline shift. There are chronic appearing focal areas of low attenuation within the right basal ganglia region likely representing sequela from prior remote infarcts. The basal cisterns and posterior fossa appear otherwise unremarkable. Marked vascular calcifications along the parasellar carotid and vertebral arteries are seen.     The visualized paranasal sinuses appear

## 2021-11-27 ENCOUNTER — HOSPITAL ENCOUNTER (EMERGENCY)
Age: 66
Discharge: HOME | End: 2021-11-27
Payer: COMMERCIAL

## 2021-11-27 VITALS
RESPIRATION RATE: 16 BRPM | HEART RATE: 78 BPM | OXYGEN SATURATION: 95 % | SYSTOLIC BLOOD PRESSURE: 163 MMHG | DIASTOLIC BLOOD PRESSURE: 92 MMHG | TEMPERATURE: 98.6 F

## 2021-11-27 DIAGNOSIS — T83.010A SUPRAPUBIC CATHETER DYSFUNCTION, INITIAL ENCOUNTER (HCC): Primary | ICD-10-CM

## 2021-11-27 PROCEDURE — 51702 INSERT TEMP BLADDER CATH: CPT

## 2021-11-27 PROCEDURE — 99283 EMERGENCY DEPT VISIT LOW MDM: CPT

## 2021-11-27 PROCEDURE — 51705 CHANGE OF BLADDER TUBE: CPT

## 2021-11-27 ASSESSMENT — ENCOUNTER SYMPTOMS
SHORTNESS OF BREATH: 0
NAUSEA: 0
SINUS PRESSURE: 0
DIARRHEA: 0
CONSTIPATION: 0
RHINORRHEA: 0
EYE PAIN: 0
ABDOMINAL PAIN: 0
COUGH: 0
VOMITING: 0
WHEEZING: 0
BLOOD IN STOOL: 0

## 2021-11-27 NOTE — ED PROVIDER NOTES
[address] [facility] [address] [address] EMERGENCY DEPT  EMERGENCY MEDICINE     Pt Name: Rafael Rivera  MRN: [id number]  Beverleygfjenise 1955  Date of evaluation: 11/27/2021  PCP:    Alfredo Moy MD  Provider: TONO Baltazar - CNP    CHIEF COMPLAINT       Chief Complaint   Patient presents with    Urinary Catheter Problem           HISTORY OF PRESENT Martin Handler is a 81-year-old male patient that presents to ER from nursing home where they attempted to exchange his suprapubic catheter and were unable to deflate the balloon. He states that the nurse was having trouble getting the [place] catheter to drain, so she flushed it. He states that she still was unable to get it to drain so she decided to change it. He states that she tried multiple times to deflate the balloon with no success. Upon examination this provider is unable to get the balloon to deflate will attempt cutting the balloon line and seeing if we can exchange it that way. Patient denies any other symptoms or pain at this time. Triage notes and Nursing notes were reviewed by myself. Any discrepancies are addressed above. PAST MEDICAL HISTORY     Past Medical History:   Diagnosis Date    Dysphagia     oropharyngeal    History of pulmonary embolism     History of urinary tract infection     Hx of blood clots     Pulmonary embolis    Hypertension     Major depressive disorder, single episode     MS (multiple sclerosis) ([facility] [address]     Neurogenic bladder feb. 2012    Dr. Shine Monte placed cather    [facility])     UTI (urinary tract infection)        SURGICAL HISTORY       Past Surgical History:   Procedure Laterality Date    ANKLE SURGERY  1996    broken ankle    BLADDER SURGERY  2-    Suprapubic catheter placement    COLONOSCOPY      CYSTO/URETERO/PYELOSCOPY, CALCULUS TX Left 6/19/2019    CYSTOSCOPY, LEFT STENT insertion, bladder irragation with bladder stones performed by Brian Mejia MD at [address] [place] [address], CALCULUS [place] N/A 7/8/2019    CYSTO, LEFT URETERAL STENT REMOVAL, LEFT URETEROSCOPY, LASER LITHOTRIPSY, BASKET RETRIEVAL OF STONE FRAGMENTS performed by Pa Velásquez MD at [address] [place] [address] 2/26/2021    CYSTOSCOPY, CYSTOLITHOLAPAXY, SUPRAPUBIC CATHETER EXCHANGE performed by Abigail Taylor MD at [address] N/A 6/13/2018    ROBOT DIVERTING COLOSTOMY performed by Manon Duane, MD at [address] [facility]  child       [address]       Discharge Medication List as of 11/27/2021  4:07 AM      CONTINUE these medications which have NOT CHANGED    Details   erythromycin (ROMYCIN) 5 MG/GM ophthalmic ointment 2 times daily, 2 TIMES DAILY, Historical Med      !! predniSONE (DELTASONE) 10 MG tablet Take 10 mg by mouth dailyHistorical Med      !! predniSONE (DELTASONE) 5 MG tablet Take 5 mg by mouth dailyHistorical Med      Ascorbic Acid (VITAMIN C ER PO) Take by mouthHistorical Med      baclofen (LIORESAL) 10 MG tablet Take 10 mg by mouth 3 times dailyHistorical Med      gabapentin (NEURONTIN) 300 MG capsule Take 600 mg by mouth 3 times daily. Historical Med      dextrose 5 % SOLN 50 mL with ceFEPIme 2 g SOLR 2,000 mg Infuse 2,000 mg intravenously every 12 hoursHistorical Med      rivaroxaban (XARELTO) 10 MG TABS tablet Take 10 mg by mouthHistorical Med      zinc sulfate (ZINCATE) 220 (50 Zn) MG capsule Take 50 mg by mouth dailyHistorical Med      pregabalin (LYRICA) 25 MG capsule Take 1 capsule by mouth 2 times daily for 91 days. , Disp-60 capsule, R-3Normal      HYDROcodone-acetaminophen (NORCO) 5-325 MG per tablet Take 1 tablet by mouth every 4 hours as needed. Historical Med      acetaminophen (TYLENOL) 325 MG tablet Take 650 mg by mouth every 4 hours as needed for Pain or FeverHistorical Med      acetic acid 0.25 % irrigation Irrigate with 30 mLs as directed 2 times daily Irrigate with as directed daily. , Irrigation, 2 TIMES DAILY, Historical Med      ibuprofen (ADVIL;MOTRIN) 400 MG tablet Take 400 mg by mouth every 4 hours as needed for Fever (for temp > 100.5 not alleviated by acetaminophen)Historical Med      LACTOBACILLUS PO Take 1 capsule by mouth dailyHistorical Med      vitamin E 400 UNIT capsule Take 400 Units by mouth dailyHistorical Med      nystatin (MYCOSTATIN) POWD powder Apply topically 2 times dailyHistorical Med      oxybutynin (DITROPAN XL) 5 MG extended release tablet Take 1 tablet by mouth daily, Disp-360 tablet, R-0NO PRINT      metoprolol tartrate (LOPRESSOR) 25 MG tablet Take 1 tablet by mouth 2 times daily, Disp-60 tablet, R-3DC to SNF      miconazole (MICOTIN) 2 % powder Apply topically 2 times daily. , Disp-45 g, R-1, DC to SNF      potassium chloride (KLOR-CON M) 20 MEQ TBCR extended release tablet Take 2 tablets by mouth daily, Disp-60 tablet, R-3DC to SNF      sodium chloride 1 g tablet Take 1 tablet by mouth 2 times daily (with meals), Disp-90 tablet, R-3DC to SNF      calcium-vitamin D (OSCAL-500) 500-200 MG-UNIT per tablet Take 1 tablet by mouth 2 times daily, Disp-30 tablet, R-3DC to SNF      famotidine (PEPCID) 20 MG tablet Take 1 tablet by mouth 2 times daily, Disp-60 tablet, R-3DC to SNF      docusate sodium (COLACE) 100 MG capsule Take 100 mg by mouth daily Historical Med      tiZANidine (ZANAFLEX) 2 MG tablet Take 2 mg by mouth 3 times daily 0600;1400;2200Historical Med      Multiple Vitamin (MULTIVITAMIN) tablet Take 1 tablet by mouth daily, R-0      vitamin A 38864 UNITS capsule Take 2 capsules by mouth daily, Disp-30 capsule, R-3       !! - Potential duplicate medications found. Please discuss with provider.           ALLERGIES     No Known Allergies    FAMILY HISTORY       Family History   Problem Relation Age of Onset    Cancer Mother         Breast    Heart Disease Father 48    Arthritis Sister     Heart Disease Paternal Grandmother 48        SOCIAL HISTORY       Social History     Socioeconomic History    Marital status:      Spouse name: Bianca Gil Number of children: 2    Years of education: Not on file    Highest education level: Not on file   Occupational History    Not on file   Tobacco Use    Smoking status: Former Smoker     Quit date: 1982     Years since quittin.9    Smokeless tobacco: Former User   Vaping Use    Vaping Use: Never used   Substance and Sexual Activity    Alcohol use: No     Comment: \"quit alcohol a few years ago\"    Drug use: No    Sexual activity: Yes     Partners: Female   Other Topics Concern    Not on file   Social History Narrative    Not on file     Social Determinants of Health     Financial Resource Strain:     Difficulty of Paying Living Expenses: Not on file   Food Insecurity:     Worried About Running Out of Food in the Last Year: Not on file    Boo of Food in the Last Year: Not on file   Transportation Needs:     Lack of Transportation (Medical): Not on file    Lack of Transportation (Non-Medical):  Not on file   Physical Activity:     Days of Exercise per Week: Not on file    Minutes of Exercise per Session: Not on file   Stress:     Feeling of Stress : Not on file   Social Connections:     Frequency of Communication with Friends and Family: Not on file    Frequency of Social Gatherings with Friends and Family: Not on file    Attends Rastafarian Services: Not on file    Active Member of [address] [place] [address] or Organizations: Not on file    Attends Club or Organization Meetings: Not on file    Marital Status: Not on file   Intimate Partner Violence:     Fear of Current or Ex-Partner: Not on file    Emotionally Abused: Not on file    Physically Abused: Not on file    Sexually Abused: Not on file   Housing Stability:     Unable to Pay for Housing in the Last Year: Not on file    Number of [place] in the Last Year: Not on file    Unstable Housing in the Last Year: Not on file       REVIEW OF SYSTEMS     Review of Systems   Constitutional: Negative for appetite change, chills, fatigue, fever and unexpected weight change. HENT: Negative for ear pain, rhinorrhea and sinus pressure. Eyes: Negative for pain and visual disturbance. Respiratory: Negative for cough, shortness of breath and wheezing. Cardiovascular: Negative for chest pain, palpitations and leg swelling. Gastrointestinal: Negative for abdominal pain, blood in stool, constipation, diarrhea, nausea and vomiting. Genitourinary: Positive for difficulty urinating (suprapubic catheter not draining). Negative for dysuria, frequency and hematuria. Musculoskeletal: Negative for arthralgias and joint swelling. Skin: Negative for rash. Neurological: Negative for dizziness, syncope, weakness, light-headedness and headaches. Hematological: Does not bruise/bleed easily. Except as noted above the remainder of the review of systems was reviewed and is negative. SCREENINGS                        PHYSICAL EXAM    (up to 7 for level 4, 8 or more for level 5)     ED Triage Vitals [11/27/21 0311]   BP Temp Temp Source Pulse Resp SpO2 Height Weight   (!) 153/85 98.6 °F (37 °C) Oral 73 20 96 % -- --       Physical Exam  Vitals and nursing note reviewed. Constitutional:       Appearance: He is not diaphoretic. HENT:      Head: Normocephalic and atraumatic. Right Ear: External ear normal.      Left Ear: External ear normal.   Eyes:      Conjunctiva/sclera: Conjunctivae normal.   Pulmonary:      Effort: Pulmonary effort is normal. No respiratory distress. Abdominal:      General: There is no distension. Genitourinary:     Comments: Patient has a suprapubic catheter in place that is with obvious sediment around the entrance. Musculoskeletal:      Cervical back: Normal range of motion. Skin:     General: Skin is warm and dry. Neurological:      Mental Status: He is alert.            DIAGNOSTIC RESULTS     EKG:(none if blank)  All EKGs are interpreted by the Emergency Department Physician who DISPOSITION/PLAN   DISPOSITION        PATIENT REFERRED TO:  MD Tree Siddiqi [address] [place] [address]  [phone]            DISCHARGE MEDICATIONS:  Discharge Medication List as of 11/27/2021  4:07 AM                 TONO Almaguer CNP (electronically signed)           TONO Almaguer CNP  12/01/21 6608

## 2021-11-27 NOTE — ED NOTES
Provider placed a 20 fr suprapubic cath, red tinged urine returned, pt tolerated well     Mercy Kulkarni RN  11/27/21 0813

## 2021-12-02 ENCOUNTER — HOSPITAL ENCOUNTER (OUTPATIENT)
Dept: MRI IMAGING | Age: 66
Discharge: HOME | End: 2021-12-02
Payer: COMMERCIAL

## 2021-12-02 DIAGNOSIS — G35 MULTIPLE SCLEROSIS (HCC): ICD-10-CM

## 2021-12-02 DIAGNOSIS — R68.84 JAW PAIN: ICD-10-CM

## 2021-12-02 LAB — POC CREATININE WHOLE BLOOD: 0.5 MG/DL (ref 0.5–1.2)

## 2021-12-02 PROCEDURE — 82565 ASSAY OF CREATININE: CPT

## 2021-12-02 PROCEDURE — 70543 MRI ORBT/FAC/NCK W/O &W/DYE: CPT

## 2021-12-02 PROCEDURE — 6360000004 HC RX CONTRAST MEDICATION: Performed by: FAMILY MEDICINE

## 2021-12-02 PROCEDURE — A9579 GAD-BASE MR CONTRAST NOS,1ML: HCPCS | Performed by: FAMILY MEDICINE

## 2021-12-02 RX ADMIN — GADOTERIDOL 20 ML: 279.3 INJECTION, SOLUTION INTRAVENOUS at 13:40

## 2022-01-13 ENCOUNTER — OUTSIDE SERVICES (OUTPATIENT)
Dept: UROLOGY | Age: 67
End: 2022-01-13
Payer: COMMERCIAL

## 2022-01-13 ENCOUNTER — TELEPHONE (OUTPATIENT)
Dept: UROLOGY | Age: 67
End: 2022-01-13

## 2022-01-13 VITALS
DIASTOLIC BLOOD PRESSURE: 82 MMHG | TEMPERATURE: 97.7 F | OXYGEN SATURATION: 93 % | SYSTOLIC BLOOD PRESSURE: 150 MMHG | RESPIRATION RATE: 16 BRPM | HEART RATE: 85 BPM

## 2022-01-13 DIAGNOSIS — N31.9 NEUROGENIC BLADDER: ICD-10-CM

## 2022-01-13 PROCEDURE — 99307 SBSQ NF CARE SF MDM 10: CPT | Performed by: NURSE PRACTITIONER

## 2022-01-13 NOTE — TELEPHONE ENCOUNTER
Pt at [facility]. Sp catheter  Hx of bladder stones  Recommend fu 1 year if we are not going to nursing homes please schedule.  thanks  rupa prior

## 2022-01-13 NOTE — PROGRESS NOTES
[address] [facility] Visit  [address] [address]  Dept: [phone]  Loc: [phone]  Visit Date: 1/13/2022      HPI:     Gala Dodson is a 61 y.o. with past medical history of kidney stones, neurogenic bladder, and multiple sclerosis who presents today in follow-up for evaluation for neurogenic bladder. This is a chronic problem. He denies any problems with his suprapubic catheter. He believes it is draining well. Denies hematuria, flank pain, abdominal pain, suprapubic pain, or fever. Was in ER in November for malfunction of catheter otherwise no other hospitalizations. He reports it does get clogged if irrigations arent done regularly. He is s/p cystoscopy, cystolitholapaxy, for stone > 3.5 cm, SP catheter exchange and difficult jeffers catheter placement by Dr Tawnya Littlejohn on 2-. Urethral catheter was removed by Nursing home staff. Pt reports he is doing well since surgery. Eating good. No fevers. Catheter being irrigated BID with acetic acid. Recent KUB shows no stones per report. Unable to view images. Hx is obtained from the patient and medical record. Current Outpatient Medications   Medication Sig Dispense Refill    erythromycin (ROMYCIN) 5 MG/GM ophthalmic ointment 2 times daily      predniSONE (DELTASONE) 10 MG tablet Take 10 mg by mouth daily      predniSONE (DELTASONE) 5 MG tablet Take 5 mg by mouth daily      Ascorbic Acid (VITAMIN C ER PO) Take by mouth      baclofen (LIORESAL) 10 MG tablet Take 10 mg by mouth 3 times daily      gabapentin (NEURONTIN) 300 MG capsule Take 600 mg by mouth 3 times daily.       dextrose 5 % SOLN 50 mL with ceFEPIme 2 g SOLR 2,000 mg Infuse 2,000 mg intravenously every 12 hours (Patient not taking: Reported on 11/12/2021)      rivaroxaban (XARELTO) 10 MG TABS tablet Take 10 mg by mouth      zinc sulfate (ZINCATE) 220 (50 Zn) MG capsule Take 50 mg by mouth daily (Patient not taking: Reported on 11/12/2021)      pregabalin (LYRICA) 25 MG capsule Take 1 capsule by mouth 2 times daily for 91 days. 60 capsule 3    HYDROcodone-acetaminophen (NORCO) 5-325 MG per tablet Take 1 tablet by mouth every 4 hours as needed.  acetaminophen (TYLENOL) 325 MG tablet Take 650 mg by mouth every 4 hours as needed for Pain or Fever      acetic acid 0.25 % irrigation Irrigate with 30 mLs as directed 2 times daily Irrigate with as directed daily.  ibuprofen (ADVIL;MOTRIN) 400 MG tablet Take 400 mg by mouth every 4 hours as needed for Fever (for temp > 100.5 not alleviated by acetaminophen)      LACTOBACILLUS PO Take 1 capsule by mouth daily      vitamin E 400 UNIT capsule Take 400 Units by mouth daily      nystatin (MYCOSTATIN) POWD powder Apply topically 2 times daily (Patient not taking: Reported on 11/12/2021)      oxybutynin (DITROPAN XL) 5 MG extended release tablet Take 1 tablet by mouth daily 360 tablet 0    metoprolol tartrate (LOPRESSOR) 25 MG tablet Take 1 tablet by mouth 2 times daily 60 tablet 3    miconazole (MICOTIN) 2 % powder Apply topically 2 times daily. 45 g 1    potassium chloride (KLOR-CON M) 20 MEQ TBCR extended release tablet Take 2 tablets by mouth daily 60 tablet 3    sodium chloride 1 g tablet Take 1 tablet by mouth 2 times daily (with meals) 90 tablet 3    calcium-vitamin D (OSCAL-500) 500-200 MG-UNIT per tablet Take 1 tablet by mouth 2 times daily 30 tablet 3    famotidine (PEPCID) 20 MG tablet Take 1 tablet by mouth 2 times daily 60 tablet 3    docusate sodium (COLACE) 100 MG capsule Take 100 mg by mouth daily       tiZANidine (ZANAFLEX) 2 MG tablet Take 2 mg by mouth 3 times daily 0600;1400;2200      Multiple Vitamin (MULTIVITAMIN) tablet Take 1 tablet by mouth daily  0    vitamin A 42737 UNITS capsule Take 2 capsules by mouth daily 30 capsule 3     No current facility-administered medications for this visit.        Past Medical History  Mellisa Frazier  has a past medical history of Dysphagia, History of pulmonary embolism, History of urinary tract infection, Hx of blood clots, Hypertension, Major depressive disorder, single episode, MS (multiple sclerosis) ([facility] [address] Neurogenic bladder, Osteomyelitis ([facility] [address] and UTI (urinary tract infection). Past Surgical History  The patient  has a past surgical history that includes Tonsillectomy (child); Ankle surgery (1996); Bladder surgery (2-); Colonoscopy; pr lap,surg,colectomy,w/end colost & closur (N/A, 6/13/2018); cysto/uretero/pyeloscopy, calculus tx (Left, 6/19/2019); cysto/uretero/pyeloscopy, calculus tx (N/A, 7/8/2019); and Cystoscopy (N/A, 2/26/2021). Family History  This patient's family history includes Arthritis in his sister; Cancer in his mother; Heart Disease (age of onset: 48) in his father and paternal grandmother. Social History  Mellisa Frazier  reports that he quit smoking about 40 years ago. He has quit using smokeless tobacco. He reports that he does not drink alcohol and does not use drugs. Subjective:     Review of Systems  No problems with ears, nose or throat. No problems with eyes. No chest pain, shortness of breath, abdominal pain, extremity pain or weakness. No rashes.  symptoms per HPI. The remainder of the review of symptoms is negative. Objective:     PE:   Vitals:    01/13/22 0912   BP: (!) 150/82   Pulse: 85   Resp: 16   Temp: 97.7 °F (36.5 °C)   SpO2: 93%       Constitutional: Alert and oriented times 3, no acute distress and cooperative to examination with appropriate mood and affect. HENT:   Head:        Normocephalic and atraumatic. Mouth/Throat:         Mucous membranes are normal.   Eyes:         EOM are normal. No scleral icterus. PERRLA. Neck:        Supple, symmetrical, trachea midline  Cardiovascular:        Normal rate, regular rhythm, S1 S2 heart sounds. No murmurs, rub, or gallops.      Pulmonary/Chest:      Chest symmetric with normal A/P diameter,  CTA with no wheezes, rales, or rhonchi noted. Normal respiratory rate and rhthym. No use of accessory muscles. Abdominal:         Soft. Round. No tenderness. Bowel sounds present. 20 [place] SP catheter intact . Catheter draining yellow urine, with some sediment in tubing. No erythema. Dressing intact. Musculoskeletal:         Limited ROM lower extremities. Extremities: No cyanosis, clubbing, or edema present. Neurological:        Alert and oriented. Psychiatric:        Normal mood and affect. Labs   Patients recent PSA values are as follows  Lab Results   Component Value Date    PSA 2.66 (H) 01/20/2012        Recent BUN/Creatinine:  Lab Results   Component Value Date    BUN 23 08/18/2021    CREATININE 0.4 08/18/2021       Radiology  KUB 8/2021        Assessment & Plan:     Neurogenic bladder  HX of bladder stones    Brenda Hollis is doing well today. Continue SP catheter changes every 4 weeks per nursing home. Per pt sometimes its done sooner due to clogged catheter. Acetic Acid irrigations BID to help with encrustation. KUB in 1 year with follow up. Call sooner with any questions or concerns. PSA for screening purposes recommended.         Chantale Scott, APRN - CNP, APRN-CNP  Urology

## 2022-01-14 ENCOUNTER — TELEPHONE (OUTPATIENT)
Dept: UROLOGY | Age: 67
End: 2022-01-14

## 2022-02-11 ENCOUNTER — OFFICE VISIT (OUTPATIENT)
Dept: NEUROLOGY | Age: 67
End: 2022-02-11
Payer: COMMERCIAL

## 2022-02-11 VITALS
HEART RATE: 65 BPM | OXYGEN SATURATION: 96 % | SYSTOLIC BLOOD PRESSURE: 132 MMHG | HEIGHT: 67 IN | DIASTOLIC BLOOD PRESSURE: 70 MMHG | BODY MASS INDEX: 28.98 KG/M2

## 2022-02-11 DIAGNOSIS — G82.20 PARAPARESIS OF BOTH LOWER LIMBS (HCC): ICD-10-CM

## 2022-02-11 DIAGNOSIS — G35 MULTIPLE SCLEROSIS (HCC): Primary | ICD-10-CM

## 2022-02-11 DIAGNOSIS — R51.9 RIGHT-SIDED FACE PAIN: ICD-10-CM

## 2022-02-11 PROCEDURE — 99213 OFFICE O/P EST LOW 20 MIN: CPT | Performed by: NURSE PRACTITIONER

## 2022-02-11 PROCEDURE — G8484 FLU IMMUNIZE NO ADMIN: HCPCS | Performed by: NURSE PRACTITIONER

## 2022-02-11 PROCEDURE — 3017F COLORECTAL CA SCREEN DOC REV: CPT | Performed by: NURSE PRACTITIONER

## 2022-02-11 PROCEDURE — 1036F TOBACCO NON-USER: CPT | Performed by: NURSE PRACTITIONER

## 2022-02-11 PROCEDURE — G8427 DOCREV CUR MEDS BY ELIG CLIN: HCPCS | Performed by: NURSE PRACTITIONER

## 2022-02-11 PROCEDURE — G8417 CALC BMI ABV UP PARAM F/U: HCPCS | Performed by: NURSE PRACTITIONER

## 2022-02-11 RX ORDER — GENTAMICIN SULFATE 1 MG/G
OINTMENT TOPICAL DAILY
COMMUNITY

## 2022-02-11 NOTE — PROGRESS NOTES
NEUROLOGY OUT PATIENT FOLLOW UP NOTE:  2/11/202211:57 AM    Maddie Jordan is here for follow up for multiple sclerosis, right face pain. ROS:  Respiratory : no cough, no shortness of breath  Cardiac: no chest pain. No palpitations. Renal : no flank pain, no hematuria    Skin: no rash      No Known Allergies    Current Outpatient Medications:     gentamicin (GARAMYCIN) 0.1 % ointment, Apply topically 3 times daily Apply topically 3 times daily. , Disp: , Rfl:     erythromycin (ROMYCIN) 5 MG/GM ophthalmic ointment, 2 times daily, Disp: , Rfl:     Ascorbic Acid (VITAMIN C ER PO), Take by mouth, Disp: , Rfl:     baclofen (LIORESAL) 10 MG tablet, Take 10 mg by mouth 3 times daily, Disp: , Rfl:     gabapentin (NEURONTIN) 300 MG capsule, Take 600 mg by mouth 3 times daily. , Disp: , Rfl:     rivaroxaban (XARELTO) 10 MG TABS tablet, Take 10 mg by mouth, Disp: , Rfl:     pregabalin (LYRICA) 25 MG capsule, Take 1 capsule by mouth 2 times daily for 91 days. , Disp: 60 capsule, Rfl: 3    HYDROcodone-acetaminophen (NORCO) 5-325 MG per tablet, Take 1 tablet by mouth every 4 hours as needed. , Disp: , Rfl:     acetaminophen (TYLENOL) 325 MG tablet, Take 650 mg by mouth every 4 hours as needed for Pain or Fever, Disp: , Rfl:     acetic acid 0.25 % irrigation, Irrigate with 30 mLs as directed 2 times daily Irrigate with as directed daily. , Disp: , Rfl:     ibuprofen (ADVIL;MOTRIN) 400 MG tablet, Take 400 mg by mouth every 4 hours as needed for Fever (for temp > 100.5 not alleviated by acetaminophen), Disp: , Rfl:     LACTOBACILLUS PO, Take 1 capsule by mouth daily, Disp: , Rfl:     vitamin E 400 UNIT capsule, Take 400 Units by mouth daily, Disp: , Rfl:     oxybutynin (DITROPAN XL) 5 MG extended release tablet, Take 1 tablet by mouth daily, Disp: 360 tablet, Rfl: 0    metoprolol tartrate (LOPRESSOR) 25 MG tablet, Take 1 tablet by mouth 2 times daily, Disp: 60 tablet, Rfl: 3    potassium chloride (KLOR-CON M) 20 MEQ TBCR extended release tablet, Take 2 tablets by mouth daily, Disp: 60 tablet, Rfl: 3    sodium chloride 1 g tablet, Take 1 tablet by mouth 2 times daily (with meals), Disp: 90 tablet, Rfl: 3    calcium-vitamin D (OSCAL-500) 500-200 MG-UNIT per tablet, Take 1 tablet by mouth 2 times daily, Disp: 30 tablet, Rfl: 3    famotidine (PEPCID) 20 MG tablet, Take 1 tablet by mouth 2 times daily, Disp: 60 tablet, Rfl: 3    docusate sodium (COLACE) 100 MG capsule, Take 100 mg by mouth daily , Disp: , Rfl:     tiZANidine (ZANAFLEX) 2 MG tablet, Take 2 mg by mouth 3 times daily 0600;1400;2200, Disp: , Rfl:     Multiple Vitamin (MULTIVITAMIN) tablet, Take 1 tablet by mouth daily, Disp: , Rfl: 0    vitamin A 79327 UNITS capsule, Take 2 capsules by mouth daily, Disp: 30 capsule, Rfl: 3    predniSONE (DELTASONE) 10 MG tablet, Take 10 mg by mouth daily (Patient not taking: Reported on 2/11/2022), Disp: , Rfl:     predniSONE (DELTASONE) 5 MG tablet, Take 5 mg by mouth daily (Patient not taking: Reported on 2/11/2022), Disp: , Rfl:     dextrose 5 % SOLN 50 mL with ceFEPIme 2 g SOLR 2,000 mg, Infuse 2,000 mg intravenously every 12 hours (Patient not taking: Reported on 11/12/2021), Disp: , Rfl:     zinc sulfate (ZINCATE) 220 (50 Zn) MG capsule, Take 50 mg by mouth daily (Patient not taking: Reported on 11/12/2021), Disp: , Rfl:     nystatin (MYCOSTATIN) POWD powder, Apply topically 2 times daily (Patient not taking: Reported on 11/12/2021), Disp: , Rfl:     miconazole (MICOTIN) 2 % powder, Apply topically 2 times daily. (Patient not taking: Reported on 2/11/2022), Disp: 45 g, Rfl: 1    I reviewed the past medical history, allergies, medications, social history and family history. PE:   Vitals:    02/11/22 1141   BP: 132/70   Pulse: 65   SpO2: 96%   Height: 5' 7\" (1.702 m)     General Appearance:  awake, alert, oriented, in no acute distress  Gen: NAD, Language is Intact. Skin: no rash, lesion, dry  to touch. warm  Head: no rash, no icterus  Neck: There is no carotid bruits. The Neck is supple. There is no neck lymphadenopathy. There is LROM of neck bilaterally, worse to the right  Neuro: CN 2-12 grossly intact with no focal deficits. Power 5/5 in the bilateral upper and 0/5 in bilateral lower extremities, He has increased muscle tone in bilateral lower extremities . There is  muscle atrophy of his bilateral lower extremities. Reflexes are  symmetric. Long tracts are decreased distally. Cerebellar exam is  intact on right, ataxic on left. Sensory exam is intact to light touch. Gait is not tested, he is in power wheelchair  Musculoskeletal:  Has no hand arthritis, no limitation of ROM in any of the four extremities, except bilateral lower extremities. Lower extremities no edema          DATA:        Results for orders placed or performed during the hospital encounter of 12/02/21   POCT Creatinine   Result Value Ref Range    POC CREATININE WHOLE BLOOD 0.5 0.5 - 1.2 mg/dl            Results for orders placed during the hospital encounter of 06/10/19    MRI Brain W WO Contrast    Narrative  PROCEDURE: MRI BRAIN W WO CONTRAST    CLINICAL INFORMATIONCONFUSION/DELIRIUM, ALTERED LOC, UNEXPLAINED, multiple slcerosis. Altered mental status. History of multiple sclerosis. COMPARISON: Brain MRI 1/20/2018. Head CT 6/10/2019. TECHNIQUE: Multiplanar and multiple spin echo T1 and T2-weighted images were obtained through the brain before and after the administration of intravenous contrast.    FINDINGS:    Motion artifact does degrade the quality of some of these images. These are the best images possible at this time. The diffusion-weighted images are normal. The brain volume is moderately reduced. There are no intra-or extra-axial collections. There is no hydrocephalus, midline shift or mass effect. On the FLAIR and T2-weighted sequences, there is a moderate amount of abnormal signal in the deep white matter of the brain. Many of these radiate outward from the ventricular margins. This also involves the white matter of the temporal lobes. The  patient has a history of multiple sclerosis. This is stable. There are stable small old lacunar type infarcts in the right basal ganglia. There is an old cortical infarct with associated volume loss in the posterior right frontal lobe. On the gradient echo T2-weighted images, there is no susceptibility artifact present. There is no abnormal enhancement in the brain. The major intracranial vascular flow voids are present. The midline craniocervical junction structures are normal.  The brainstem and pituitary gland are normal.    Impression  1. No evidence of an acute infarct or active demyelination. 2. 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 3. Moderate amount of abnormal signal in the white matter of the brain consistent with multiple sclerosis and/or chronic small vessel ischemic changes. **This report has been created using voice recognition software. It may contain minor errors which are inherent in voice recognition technology. **      Final report electronically signed by Dr. Eloise Naidu on 6/11/2019 10:37 AM    No results found for this or any previous visit. Results for orders placed during the hospital encounter of 08/18/21    CT HEAD WO CONTRAST    Narrative  PROCEDURE: CT HEAD WO CONTRAST    CLINICAL INFORMATION: ams    COMPARISON: 6/10/2019    TECHNIQUE:  Axial CT images were obtained through the head without contrast. Coronal and sagittal reformatted images were rendered. All CT scans at this facility use dose modulation, iterative reconstruction, and/or weight-based dosing when appropriate  to reduce radiation dose to as low as reasonably achievable. FINDINGS:    There is moderate diffuse atrophy. No acute intracranial hemorrhage or mass effect is seen. There is no midline shift.     There are chronic appearing focal areas of low attenuation within the right basal ganglia region likely representing sequela from prior remote infarcts. The basal cisterns and posterior fossa appear otherwise unremarkable. Marked vascular calcifications along the parasellar carotid and vertebral arteries are seen. The visualized paranasal sinuses appear well-pneumatized. No acute abnormalities of the calvarium are seen. Impression  1. No acute intracranial hemorrhage or mass effect. 2. Moderate diffuse atrophy is again seen. 3. 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 **This report has been created using voice recognition software. It may contain minor errors which are inherent in voice recognition technology. **    Final report electronically signed by Dr. Yary Lawrence on 8/18/2021 4:55 PM         Assessment:     Diagnosis Orders   1. Multiple sclerosis ([facility])     2. Paraparesis of both lower limbs ([facility] [address]     3. Right-sided face pain          Follow up for multiple sclerosis, paraparesis of both lower limbs, right sided face pain. He is doing better. His face pain has significantly improved since he was started on the lyrica. He is tolerating the medication well, no side effects noted. He denies any new symptoms. He underwent MRI orbits, face and neck that showed no concerning findings. He does have history of open wounds and follows with would clinic that will limit starting him on immune modifiers for his condition.  After detailed discussion with the patient we agreed on the following plan. Plan:  1. Continue with Lyrica 25 mg two times a day  2. Continue following with wound clinic  3. You need to take calcium and vitamin D over the counter  4. Call with any new symptoms or concerns. 5. Follow up in 6 months.      Total time 26 min    Jovanny Sanford, RJ

## 2022-02-11 NOTE — PATIENT INSTRUCTIONS
1. Continue with Lyrica 25 mg two times a day  2. Continue following with wound clinic  3. You need to take calcium and vitamin D over the counter  4. Call with any new symptoms or concerns. 5. Follow up in 6 months.

## 2022-04-22 ENCOUNTER — APPOINTMENT (OUTPATIENT)
Dept: CT IMAGING | Age: 67
DRG: 698 | End: 2022-04-22
Payer: COMMERCIAL

## 2022-04-22 ENCOUNTER — APPOINTMENT (OUTPATIENT)
Dept: MRI IMAGING | Age: 67
DRG: 698 | End: 2022-04-22
Payer: COMMERCIAL

## 2022-04-22 ENCOUNTER — APPOINTMENT (OUTPATIENT)
Dept: GENERAL RADIOLOGY | Age: 67
DRG: 698 | End: 2022-04-22
Payer: COMMERCIAL

## 2022-04-22 ENCOUNTER — HOSPITAL ENCOUNTER (INPATIENT)
Age: 67
LOS: 3 days | Discharge: NURSING, REHAB OR LONG-TERM CARE | DRG: 698 | End: 2022-04-25
Attending: FAMILY MEDICINE | Admitting: INTERNAL MEDICINE
Payer: COMMERCIAL

## 2022-04-22 DIAGNOSIS — A41.9 SEPSIS DUE TO URINARY TRACT INFECTION (HCC): Primary | ICD-10-CM

## 2022-04-22 DIAGNOSIS — N39.0 SEPSIS DUE TO URINARY TRACT INFECTION (HCC): Primary | ICD-10-CM

## 2022-04-22 PROBLEM — R41.82 AMS (ALTERED MENTAL STATUS): Status: ACTIVE | Noted: 2022-04-22

## 2022-04-22 LAB
ALBUMIN SERPL-MCNC: 3.5 G/DL (ref 3.5–5.1)
ALP BLD-CCNC: 154 U/L (ref 38–126)
ALT SERPL-CCNC: 18 U/L (ref 11–66)
ANION GAP SERPL CALCULATED.3IONS-SCNC: 13 MEQ/L (ref 8–16)
APTT: 41.8 SECONDS (ref 22–38)
AST SERPL-CCNC: 20 U/L (ref 5–40)
BACTERIA: ABNORMAL
BASOPHILS # BLD: 0.3 %
BASOPHILS ABSOLUTE: 0.1 THOU/MM3 (ref 0–0.1)
BILIRUB SERPL-MCNC: 0.4 MG/DL (ref 0.3–1.2)
BILIRUBIN URINE: NEGATIVE
BLOOD, URINE: ABNORMAL
BUN BLDV-MCNC: 21 MG/DL (ref 7–22)
CALCIUM SERPL-MCNC: 9.2 MG/DL (ref 8.5–10.5)
CASTS: ABNORMAL /LPF
CASTS: ABNORMAL /LPF
CHARACTER, URINE: ABNORMAL
CHLORIDE BLD-SCNC: 101 MEQ/L (ref 98–111)
CO2: 24 MEQ/L (ref 23–33)
COLOR: YELLOW
CREAT SERPL-MCNC: 0.5 MG/DL (ref 0.4–1.2)
CRYSTALS: ABNORMAL
EKG ATRIAL RATE: 112 BPM
EKG P AXIS: 34 DEGREES
EKG P-R INTERVAL: 130 MS
EKG Q-T INTERVAL: 322 MS
EKG QRS DURATION: 84 MS
EKG QTC CALCULATION (BAZETT): 439 MS
EKG R AXIS: 50 DEGREES
EKG T AXIS: 52 DEGREES
EKG VENTRICULAR RATE: 112 BPM
EOSINOPHIL # BLD: 0 %
EOSINOPHILS ABSOLUTE: 0 THOU/MM3 (ref 0–0.4)
EPITHELIAL CELLS, UA: ABNORMAL /HPF
ERYTHROCYTE [DISTWIDTH] IN BLOOD BY AUTOMATED COUNT: 16.5 % (ref 11.5–14.5)
ERYTHROCYTE [DISTWIDTH] IN BLOOD BY AUTOMATED COUNT: 58.6 FL (ref 35–45)
GFR SERPL CREATININE-BSD FRML MDRD: > 90 ML/MIN/1.73M2
GLUCOSE BLD-MCNC: 246 MG/DL (ref 70–108)
GLUCOSE BLD-MCNC: 311 MG/DL (ref 70–108)
GLUCOSE, URINE: NEGATIVE MG/DL
HCT VFR BLD CALC: 51.7 % (ref 42–52)
HEMOGLOBIN: 15.8 GM/DL (ref 14–18)
IMMATURE GRANS (ABS): 0.17 THOU/MM3 (ref 0–0.07)
IMMATURE GRANULOCYTES: 0.8 %
INR BLD: 1.86 (ref 0.85–1.13)
KETONES, URINE: NEGATIVE
LACTIC ACID, SEPSIS: 1.9 MMOL/L (ref 0.5–1.9)
LACTIC ACID, SEPSIS: 2.8 MMOL/L (ref 0.5–1.9)
LEUKOCYTE ESTERASE, URINE: ABNORMAL
LYMPHOCYTES # BLD: 5 %
LYMPHOCYTES ABSOLUTE: 1 THOU/MM3 (ref 1–4.8)
MCH RBC QN AUTO: 29.8 PG (ref 26–33)
MCHC RBC AUTO-ENTMCNC: 30.6 GM/DL (ref 32.2–35.5)
MCV RBC AUTO: 97.4 FL (ref 80–94)
MISCELLANEOUS LAB TEST RESULT: ABNORMAL
MONOCYTES # BLD: 6.7 %
MONOCYTES ABSOLUTE: 1.4 THOU/MM3 (ref 0.4–1.3)
NITRITE, URINE: POSITIVE
NUCLEATED RED BLOOD CELLS: 0 /100 WBC
OSMOLALITY CALCULATION: 286.8 MOSMOL/KG (ref 275–300)
PH UA: 6.5 (ref 5–9)
PLATELET # BLD: 180 THOU/MM3 (ref 130–400)
PMV BLD AUTO: 8.7 FL (ref 9.4–12.4)
POC CREATININE WHOLE BLOOD: 0.5 MG/DL (ref 0.5–1.2)
POTASSIUM REFLEX MAGNESIUM: 4.8 MEQ/L (ref 3.5–5.2)
PROTEIN UA: 30 MG/DL
RBC # BLD: 5.31 MILL/MM3 (ref 4.7–6.1)
RBC URINE: ABNORMAL /HPF
RENAL EPITHELIAL, UA: ABNORMAL
SEG NEUTROPHILS: 87.2 %
SEGMENTED NEUTROPHILS ABSOLUTE COUNT: 17.7 THOU/MM3 (ref 1.8–7.7)
SODIUM BLD-SCNC: 138 MEQ/L (ref 135–145)
SPECIFIC GRAVITY UA: 1.02 (ref 1–1.03)
TOTAL PROTEIN: 8.1 G/DL (ref 6.1–8)
TROPONIN T: < 0.01 NG/ML
UROBILINOGEN, URINE: 0.2 EU/DL (ref 0–1)
WBC # BLD: 20.3 THOU/MM3 (ref 4.8–10.8)
WBC UA: ABNORMAL /HPF
YEAST: ABNORMAL

## 2022-04-22 PROCEDURE — 94761 N-INVAS EAR/PLS OXIMETRY MLT: CPT

## 2022-04-22 PROCEDURE — 2580000003 HC RX 258: Performed by: STUDENT IN AN ORGANIZED HEALTH CARE EDUCATION/TRAINING PROGRAM

## 2022-04-22 PROCEDURE — 71045 X-RAY EXAM CHEST 1 VIEW: CPT

## 2022-04-22 PROCEDURE — 82565 ASSAY OF CREATININE: CPT

## 2022-04-22 PROCEDURE — 2580000003 HC RX 258

## 2022-04-22 PROCEDURE — 6370000000 HC RX 637 (ALT 250 FOR IP)

## 2022-04-22 PROCEDURE — 6360000002 HC RX W HCPCS: Performed by: STUDENT IN AN ORGANIZED HEALTH CARE EDUCATION/TRAINING PROGRAM

## 2022-04-22 PROCEDURE — 85025 COMPLETE CBC W/AUTO DIFF WBC: CPT

## 2022-04-22 PROCEDURE — 93010 ELECTROCARDIOGRAM REPORT: CPT | Performed by: INTERNAL MEDICINE

## 2022-04-22 PROCEDURE — 80053 COMPREHEN METABOLIC PANEL: CPT

## 2022-04-22 PROCEDURE — 96374 THER/PROPH/DIAG INJ IV PUSH: CPT

## 2022-04-22 PROCEDURE — 70496 CT ANGIOGRAPHY HEAD: CPT

## 2022-04-22 PROCEDURE — 36415 COLL VENOUS BLD VENIPUNCTURE: CPT

## 2022-04-22 PROCEDURE — 96375 TX/PRO/DX INJ NEW DRUG ADDON: CPT

## 2022-04-22 PROCEDURE — 70450 CT HEAD/BRAIN W/O DYE: CPT

## 2022-04-22 PROCEDURE — 87040 BLOOD CULTURE FOR BACTERIA: CPT

## 2022-04-22 PROCEDURE — 6370000000 HC RX 637 (ALT 250 FOR IP): Performed by: STUDENT IN AN ORGANIZED HEALTH CARE EDUCATION/TRAINING PROGRAM

## 2022-04-22 PROCEDURE — 99255 IP/OBS CONSLTJ NEW/EST HI 80: CPT | Performed by: SOCIAL WORKER

## 2022-04-22 PROCEDURE — 72157 MRI CHEST SPINE W/O & W/DYE: CPT

## 2022-04-22 PROCEDURE — 81001 URINALYSIS AUTO W/SCOPE: CPT

## 2022-04-22 PROCEDURE — 82948 REAGENT STRIP/BLOOD GLUCOSE: CPT

## 2022-04-22 PROCEDURE — 93005 ELECTROCARDIOGRAM TRACING: CPT | Performed by: FAMILY MEDICINE

## 2022-04-22 PROCEDURE — 6360000004 HC RX CONTRAST MEDICATION: Performed by: FAMILY MEDICINE

## 2022-04-22 PROCEDURE — 87077 CULTURE AEROBIC IDENTIFY: CPT

## 2022-04-22 PROCEDURE — 6360000004 HC RX CONTRAST MEDICATION

## 2022-04-22 PROCEDURE — 85730 THROMBOPLASTIN TIME PARTIAL: CPT

## 2022-04-22 PROCEDURE — A9579 GAD-BASE MR CONTRAST NOS,1ML: HCPCS

## 2022-04-22 PROCEDURE — 70553 MRI BRAIN STEM W/O & W/DYE: CPT

## 2022-04-22 PROCEDURE — 99285 EMERGENCY DEPT VISIT HI MDM: CPT

## 2022-04-22 PROCEDURE — 70498 CT ANGIOGRAPHY NECK: CPT

## 2022-04-22 PROCEDURE — 87086 URINE CULTURE/COLONY COUNT: CPT

## 2022-04-22 PROCEDURE — 87186 SC STD MICRODIL/AGAR DIL: CPT

## 2022-04-22 PROCEDURE — 2060000000 HC ICU INTERMEDIATE R&B

## 2022-04-22 PROCEDURE — 85610 PROTHROMBIN TIME: CPT

## 2022-04-22 PROCEDURE — 83605 ASSAY OF LACTIC ACID: CPT

## 2022-04-22 PROCEDURE — 72156 MRI NECK SPINE W/O & W/DYE: CPT

## 2022-04-22 PROCEDURE — 84484 ASSAY OF TROPONIN QUANT: CPT

## 2022-04-22 PROCEDURE — 99223 1ST HOSP IP/OBS HIGH 75: CPT | Performed by: INTERNAL MEDICINE

## 2022-04-22 RX ORDER — PREGABALIN 25 MG/1
25 CAPSULE ORAL 2 TIMES DAILY
Status: DISCONTINUED | OUTPATIENT
Start: 2022-04-22 | End: 2022-04-22

## 2022-04-22 RX ORDER — SODIUM CHLORIDE 1000 MG
1 TABLET, SOLUBLE MISCELLANEOUS 2 TIMES DAILY WITH MEALS
Status: DISCONTINUED | OUTPATIENT
Start: 2022-04-22 | End: 2022-04-25 | Stop reason: HOSPADM

## 2022-04-22 RX ORDER — ONDANSETRON 4 MG/1
4 TABLET, ORALLY DISINTEGRATING ORAL EVERY 8 HOURS PRN
Status: DISCONTINUED | OUTPATIENT
Start: 2022-04-22 | End: 2022-04-25 | Stop reason: HOSPADM

## 2022-04-22 RX ORDER — POLYETHYLENE GLYCOL 3350 17 G/17G
17 POWDER, FOR SOLUTION ORAL DAILY PRN
Status: DISCONTINUED | OUTPATIENT
Start: 2022-04-22 | End: 2022-04-25 | Stop reason: HOSPADM

## 2022-04-22 RX ORDER — SODIUM CHLORIDE 0.9 % (FLUSH) 0.9 %
5-40 SYRINGE (ML) INJECTION EVERY 12 HOURS SCHEDULED
Status: DISCONTINUED | OUTPATIENT
Start: 2022-04-22 | End: 2022-04-25 | Stop reason: HOSPADM

## 2022-04-22 RX ORDER — ACETIC ACID 0.25 G/100ML
30 IRRIGANT IRRIGATION 2 TIMES DAILY
Status: DISCONTINUED | OUTPATIENT
Start: 2022-04-22 | End: 2022-04-25 | Stop reason: HOSPADM

## 2022-04-22 RX ORDER — ACETAMINOPHEN 325 MG/1
650 TABLET ORAL EVERY 6 HOURS PRN
Status: DISCONTINUED | OUTPATIENT
Start: 2022-04-22 | End: 2022-04-25 | Stop reason: HOSPADM

## 2022-04-22 RX ORDER — GABAPENTIN 300 MG/1
600 CAPSULE ORAL 3 TIMES DAILY
Status: DISCONTINUED | OUTPATIENT
Start: 2022-04-22 | End: 2022-04-25 | Stop reason: HOSPADM

## 2022-04-22 RX ORDER — POTASSIUM CHLORIDE 750 MG/1
40 TABLET, FILM COATED, EXTENDED RELEASE ORAL DAILY
Status: DISCONTINUED | OUTPATIENT
Start: 2022-04-23 | End: 2022-04-25 | Stop reason: HOSPADM

## 2022-04-22 RX ORDER — OXYBUTYNIN CHLORIDE 5 MG/1
5 TABLET, EXTENDED RELEASE ORAL 2 TIMES DAILY
COMMUNITY

## 2022-04-22 RX ORDER — METOPROLOL TARTRATE 50 MG/1
25 TABLET, FILM COATED ORAL 2 TIMES DAILY
Status: DISCONTINUED | OUTPATIENT
Start: 2022-04-22 | End: 2022-04-25 | Stop reason: HOSPADM

## 2022-04-22 RX ORDER — FAMOTIDINE 20 MG/1
20 TABLET, FILM COATED ORAL 2 TIMES DAILY
Status: DISCONTINUED | OUTPATIENT
Start: 2022-04-22 | End: 2022-04-25 | Stop reason: HOSPADM

## 2022-04-22 RX ORDER — OXYBUTYNIN CHLORIDE 5 MG/1
5 TABLET, EXTENDED RELEASE ORAL DAILY
Status: DISCONTINUED | OUTPATIENT
Start: 2022-04-23 | End: 2022-04-25 | Stop reason: HOSPADM

## 2022-04-22 RX ORDER — ACETAMINOPHEN 500 MG
1000 TABLET ORAL ONCE
Status: COMPLETED | OUTPATIENT
Start: 2022-04-22 | End: 2022-04-22

## 2022-04-22 RX ORDER — 0.9 % SODIUM CHLORIDE 0.9 %
30 INTRAVENOUS SOLUTION INTRAVENOUS ONCE
Status: COMPLETED | OUTPATIENT
Start: 2022-04-22 | End: 2022-04-22

## 2022-04-22 RX ORDER — BACLOFEN 10 MG/1
10 TABLET ORAL 3 TIMES DAILY
Status: DISCONTINUED | OUTPATIENT
Start: 2022-04-22 | End: 2022-04-25 | Stop reason: HOSPADM

## 2022-04-22 RX ORDER — VITAMIN E 268 MG
400 CAPSULE ORAL DAILY
Status: DISCONTINUED | OUTPATIENT
Start: 2022-04-22 | End: 2022-04-25 | Stop reason: HOSPADM

## 2022-04-22 RX ORDER — ERYTHROMYCIN 5 MG/G
OINTMENT OPHTHALMIC DAILY
Status: DISCONTINUED | OUTPATIENT
Start: 2022-04-22 | End: 2022-04-25 | Stop reason: HOSPADM

## 2022-04-22 RX ORDER — ONDANSETRON 2 MG/ML
4 INJECTION INTRAMUSCULAR; INTRAVENOUS EVERY 6 HOURS PRN
Status: DISCONTINUED | OUTPATIENT
Start: 2022-04-22 | End: 2022-04-25 | Stop reason: HOSPADM

## 2022-04-22 RX ORDER — DOCUSATE SODIUM 100 MG/1
100 CAPSULE, LIQUID FILLED ORAL DAILY
Status: DISCONTINUED | OUTPATIENT
Start: 2022-04-23 | End: 2022-04-25 | Stop reason: HOSPADM

## 2022-04-22 RX ORDER — ASCORBIC ACID 500 MG
500 TABLET ORAL DAILY
Status: DISCONTINUED | OUTPATIENT
Start: 2022-04-23 | End: 2022-04-25 | Stop reason: HOSPADM

## 2022-04-22 RX ORDER — SODIUM CHLORIDE 9 MG/ML
INJECTION, SOLUTION INTRAVENOUS CONTINUOUS
Status: DISCONTINUED | OUTPATIENT
Start: 2022-04-22 | End: 2022-04-24

## 2022-04-22 RX ORDER — IBUPROFEN 200 MG
400 TABLET ORAL EVERY 4 HOURS PRN
Status: DISCONTINUED | OUTPATIENT
Start: 2022-04-22 | End: 2022-04-25 | Stop reason: HOSPADM

## 2022-04-22 RX ORDER — ACETAMINOPHEN 650 MG/1
650 SUPPOSITORY RECTAL EVERY 6 HOURS PRN
Status: DISCONTINUED | OUTPATIENT
Start: 2022-04-22 | End: 2022-04-25 | Stop reason: HOSPADM

## 2022-04-22 RX ORDER — OYSTER SHELL CALCIUM WITH VITAMIN D 500; 200 MG/1; [IU]/1
1 TABLET, FILM COATED ORAL 2 TIMES DAILY
Status: ON HOLD | COMMUNITY
End: 2022-04-25

## 2022-04-22 RX ORDER — SODIUM CHLORIDE 0.9 % (FLUSH) 0.9 %
5-40 SYRINGE (ML) INJECTION PRN
Status: DISCONTINUED | OUTPATIENT
Start: 2022-04-22 | End: 2022-04-25 | Stop reason: HOSPADM

## 2022-04-22 RX ORDER — ASPIRIN 81 MG/1
81 TABLET ORAL DAILY
COMMUNITY

## 2022-04-22 RX ORDER — TIZANIDINE 4 MG/1
2 TABLET ORAL 3 TIMES DAILY
Status: DISCONTINUED | OUTPATIENT
Start: 2022-04-22 | End: 2022-04-25 | Stop reason: HOSPADM

## 2022-04-22 RX ORDER — HYDROCODONE BITARTRATE AND ACETAMINOPHEN 5; 325 MG/1; MG/1
1 TABLET ORAL EVERY 4 HOURS PRN
Status: DISCONTINUED | OUTPATIENT
Start: 2022-04-22 | End: 2022-04-22 | Stop reason: ALTCHOICE

## 2022-04-22 RX ORDER — SODIUM CHLORIDE 9 MG/ML
INJECTION, SOLUTION INTRAVENOUS PRN
Status: DISCONTINUED | OUTPATIENT
Start: 2022-04-22 | End: 2022-04-25 | Stop reason: HOSPADM

## 2022-04-22 RX ADMIN — Medication 6 MG: at 21:50

## 2022-04-22 RX ADMIN — WATER 1000 MG: 1 INJECTION INTRAMUSCULAR; INTRAVENOUS; SUBCUTANEOUS at 16:05

## 2022-04-22 RX ADMIN — Medication 400 UNITS: at 21:50

## 2022-04-22 RX ADMIN — GABAPENTIN 600 MG: 300 CAPSULE ORAL at 21:50

## 2022-04-22 RX ADMIN — TIZANIDINE 2 MG: 4 TABLET ORAL at 21:50

## 2022-04-22 RX ADMIN — GADOTERIDOL 20 ML: 279.3 INJECTION, SOLUTION INTRAVENOUS at 15:16

## 2022-04-22 RX ADMIN — SODIUM CHLORIDE 2838 ML: 9 INJECTION, SOLUTION INTRAVENOUS at 16:05

## 2022-04-22 RX ADMIN — Medication 1 TABLET: at 21:59

## 2022-04-22 RX ADMIN — HYDROMORPHONE HYDROCHLORIDE 0.25 MG: 1 INJECTION, SOLUTION INTRAMUSCULAR; INTRAVENOUS; SUBCUTANEOUS at 15:17

## 2022-04-22 RX ADMIN — RIVAROXABAN 10 MG: 10 TABLET, FILM COATED ORAL at 21:50

## 2022-04-22 RX ADMIN — ACETAMINOPHEN 1000 MG: 500 TABLET ORAL at 16:06

## 2022-04-22 RX ADMIN — IOPAMIDOL 80 ML: 755 INJECTION, SOLUTION INTRAVENOUS at 12:26

## 2022-04-22 RX ADMIN — BACLOFEN 10 MG: 10 TABLET ORAL at 21:50

## 2022-04-22 RX ADMIN — SODIUM CHLORIDE: 9 INJECTION, SOLUTION INTRAVENOUS at 21:57

## 2022-04-22 RX ADMIN — FAMOTIDINE 20 MG: 20 TABLET ORAL at 21:49

## 2022-04-22 ASSESSMENT — PAIN SCALES - GENERAL
PAINLEVEL_OUTOF10: 6
PAINLEVEL_OUTOF10: 3
PAINLEVEL_OUTOF10: 6

## 2022-04-22 ASSESSMENT — ENCOUNTER SYMPTOMS
TROUBLE SWALLOWING: 0
VOMITING: 0
NAUSEA: 0
COLOR CHANGE: 0
DIARRHEA: 0
COUGH: 0
SHORTNESS OF BREATH: 0
PHOTOPHOBIA: 0

## 2022-04-22 ASSESSMENT — PAIN DESCRIPTION - LOCATION
LOCATION: BACK
LOCATION: BACK

## 2022-04-22 ASSESSMENT — PAIN - FUNCTIONAL ASSESSMENT
PAIN_FUNCTIONAL_ASSESSMENT: NONE - DENIES PAIN
PAIN_FUNCTIONAL_ASSESSMENT: 0-10

## 2022-04-22 ASSESSMENT — VISUAL ACUITY: OU: 1

## 2022-04-22 ASSESSMENT — PAIN DESCRIPTION - DESCRIPTORS
DESCRIPTORS: SORE
DESCRIPTORS: SORE

## 2022-04-22 NOTE — ED TRIAGE NOTES
Pt to ED due to altered mental status. Nursing home staff states that he went to breakfast this morning in his normal state, went back to his room for a nap and while attempting to help the patient for lunch they noticed the mental change and right sided weakness. Upon arrival pt speech appears clear. There is apparent weakness on the right side. Both arms drift to the bed. Pt states he feels \"off'. \" There appears to be a fever of 101.9

## 2022-04-22 NOTE — CONSULTS
Neurology Stroke Alert Note    Date:4/22/2022       Room:[address] [place] [address]  Patient Tom Blackmon     Date of Birth:[birth year]     Age:66 y.o. Requesting Physician: Maren Sagastume MD     Reason for Consult:  Evaluate for stroke alert    HPI: Veronika Carmichael who is a 61 y.o. male with a history of PE (currently on Xarelto), MS (not currently on treatment) and  HTN who presented to [facility] ED from his nursing facility with complaint of RUE weakness. Pt was last noted normal by staff this morning at 8AM at breakfast and at around 11:30 AM pt was noted to have change in responsiveness and right arm weakness. Pt reports feeling different this morning and noting the weakness in the right arm compared to left. Unable to state whether this was sudden or gradual in onset but it doesn't seem to have gotten worse since getting in the ambulance by his report. He states that he is unable to move lower extremities at baseline due to MS. Code Stroke called by EMS in the field and pt was taken to imaging on arrival for CT Head which revealed no acute findings and a CTA of the Head and Neck which revealed mild to moderate stenosis but no LVO or high grade stenosis. ED course is notable for fever 101.9 and O2 saturation of 88-91% while in imaging. Pt denies vision change, facial droop, speech changes, recent illness symptoms, cough or shortness of breath. Time of symptoms onset/last time seen at baseline:  , sx noted at 11:30AM.  Time of stroke alert:  1217 with 5 min ETA. Time of Neurology arrival:  455 3072. Vascular risk factors:  HTN. Initial Glucose: 311 . Old deficits from prior stroke:  N/A. INR in ED if anticoagulated:  1.86. BP in ED:  114/64. Initial NIHSS: 2.  Modified Ivet Score upon admission:  4. IV tPA administerd:  No.  Time of Initial Imaging Read:  1231. Thrombectomy performed:  No.  Puncture time:  N/A.      Review of Systems   Review of Systems   Constitutional: Negative for activity change, fatigue and fever. HENT: Negative for tinnitus and trouble swallowing. Eyes: Negative for photophobia and visual disturbance. Respiratory: Negative for cough and shortness of breath. Cardiovascular: Negative for chest pain and palpitations. Gastrointestinal: Negative for diarrhea, nausea and vomiting. Genitourinary: Negative for dysuria and flank pain. Musculoskeletal: Negative for neck pain and neck stiffness. Skin: Negative for color change and rash. Neurological: Positive for weakness. Negative for dizziness, facial asymmetry, speech difficulty, numbness and headaches. Psychiatric/Behavioral: Negative for agitation and confusion. Medications     Current Outpatient Medications   Medication Instructions    acetaminophen (TYLENOL) 650 mg, Oral, EVERY 4 HOURS PRN    acetic acid 0.25 % irrigation 30 mLs, Irrigation, 2 TIMES DAILY, Irrigate with as directed daily.  Ascorbic Acid (VITAMIN C ER PO) Oral    baclofen (LIORESAL) 10 mg, Oral, 3 TIMES DAILY    calcium-vitamin D (OSCAL-500) 500-200 MG-UNIT per tablet 1 tablet, Oral, 2 TIMES DAILY    dextrose 5 % SOLN 50 mL with ceFEPIme 2 g SOLR 2,000 mg 2,000 mg, EVERY 12 HOURS SCHEDULED    docusate sodium (COLACE) 100 mg, Oral, DAILY    erythromycin (ROMYCIN) 5 MG/GM ophthalmic ointment 2 TIMES DAILY    famotidine (PEPCID) 20 mg, Oral, 2 TIMES DAILY    gabapentin (NEURONTIN) 600 mg, Oral, 3 TIMES DAILY    gentamicin (GARAMYCIN) 0.1 % ointment Topical, 3 TIMES DAILY, Apply topically 3 times daily.  HYDROcodone-acetaminophen (NORCO) 5-325 MG per tablet 1 tablet, Oral, EVERY 4 HOURS PRN    ibuprofen (ADVIL;MOTRIN) 400 mg, Oral, EVERY 4 HOURS PRN    LACTOBACILLUS PO 1 capsule, Oral, DAILY    metoprolol tartrate (LOPRESSOR) 25 mg, Oral, 2 TIMES DAILY    miconazole (MICOTIN) 2 % powder Apply topically 2 times daily.     Multiple Vitamin (MULTIVITAMIN) tablet 1 tablet, Oral, DAILY    nystatin (MYCOSTATIN) POWD powder 2 TIMES DAILY    oxybutynin (DITROPAN XL) 5 mg, Oral, DAILY    potassium chloride (KLOR-CON M) 20 MEQ TBCR extended release tablet 40 mEq, Oral, DAILY    predniSONE (DELTASONE) 10 mg, DAILY    predniSONE (DELTASONE) 5 mg, DAILY    pregabalin (LYRICA) 25 mg, Oral, 2 TIMES DAILY    sodium chloride 1 g, Oral, 2 TIMES DAILY WITH MEALS    tiZANidine (ZANAFLEX) 2 mg, Oral, 3 TIMES DAILY, 0600;1400;2200    vitamin A 20,000 Units, Oral, DAILY    vitamin E 400 Units, Oral, DAILY    Xarelto 10 mg, Oral    zinc sulfate (ZINCATE) 50 mg, DAILY       Past History    Past Medical History:   has a past medical history of Dysphagia, History of pulmonary embolism, History of urinary tract infection, Hx of blood clots, Hypertension, Major depressive disorder, single episode, MS (multiple sclerosis) (HCC), Neurogenic bladder, Osteomyelitis ([address] and UTI (urinary tract infection). Social History:   reports that he quit smoking about 40 years ago. He has quit using smokeless tobacco. He reports that he does not drink alcohol and does not use drugs.      Family History:   Family History   Problem Relation Age of Onset    Cancer Mother         Breast    Heart Disease Father 48    Arthritis Sister     Heart Disease Paternal Grandmother 48       Physical Examination        NIH Stroke Scale  1a Level of consciousness 0=alert; keenly responsive   1b LOC questions 0=Performs both tasks correctly   1c LOC commands 0=Performs both tasks correctly   2 Best Gaze 0=normal   3 Visual 0=No visual loss   4 Facial Palsy 0=Normal symmetric movement   5a Motor left arm 1=Drift, limb holds 90 (or 45) degrees but drifts down before full 10 seconds: does not hit bed   5b Motor right arm 1=Drift, limb holds 90 (or 45) degrees but drifts down before full 10 seconds: does not hit bed   6a Motor left leg No movement, Baseline   6b Motor right leg No movement, Baseline   7 Limb Ataxia 0=Absent   8 Sensory 0=Normal; no sensory loss   9 Best Language 0=No aphasia, normal   10 Dysarthria 0=Normal   11 Extinction and Inattention 0=No abnormality   TOTAL  1224   Time NIHSS performed: 12    Vitals:  /64   Pulse 114   Temp 101.9 °F (38.8 °C)   Resp 16   Wt 208 lb 9.6 oz (94.6 kg)   SpO2 91%   BMI 32.67 kg/m²   Temp (24hrs), Av.9 °F (38.8 °C), Min:101.9 °F (38.8 °C), Max:101.9 °F (38.8 °C)      I/O (24Hr): No intake or output data in the 24 hours ending 22 1311    Physical Exam  Constitutional:       Appearance: He is ill-appearing. HENT:      Head: Normocephalic and atraumatic. Right Ear: External ear normal.      Left Ear: External ear normal.      Nose: Nose normal.      Mouth/Throat:      Mouth: Mucous membranes are moist.      Pharynx: Oropharynx is clear. Eyes:      Extraocular Movements: Extraocular movements intact. Pupils: Pupils are equal, round, and reactive to light. Cardiovascular:      Rate and Rhythm: Normal rate and regular rhythm. Heart sounds: Normal heart sounds. Pulmonary:      Effort: Pulmonary effort is normal. No respiratory distress. Breath sounds: Normal breath sounds. Abdominal:      General: Abdomen is flat. There is no distension. Tenderness: There is no abdominal tenderness. Musculoskeletal:         General: No deformity or signs of injury. Cervical back: No rigidity or tenderness. Skin:     General: Skin is warm and dry. Neurological:      Mental Status: He is alert and oriented to person, place, and time. Coordination: Finger-nose-finger test: Limited by weakness. Psychiatric:         Mood and Affect: Mood normal.         Speech: Speech normal.         Behavior: Behavior normal.         Judgment: Judgment normal.       Neurologic Exam     Mental Status   Oriented to person, place, and time. Follows 2 step commands. Attention: normal. Concentration: normal.   Speech: speech is normal   Level of consciousness: alert  Knowledge: good. Able to name object. Able to read. Able to repeat. Cranial Nerves     CN II   Visual fields full to confrontation. CN III, IV, VI   Pupils are equal, round, and reactive to light. CN V   Facial sensation intact. CN VII   Facial expression full, symmetric. CN VIII   Hearing: intact    CN IX, X   Palate: symmetric    CN XI   Right sternocleidomastoid strength: normal  Left sternocleidomastoid strength: normal    Motor Exam   Muscle bulk: decreased  Strength:  RUE: antigravity with drift  LUE: antigravity with slight drift  BLE:0/5, chronic     Sensory Exam   Light touch normal.     Gait, Coordination, and Reflexes     Coordination   Finger-nose-finger test: Limited by weakness. Labs/Imaging/Diagnostics   Labs:  CBC:  Recent Labs     04/22/22  1231   WBC 20.3*   RBC 5.31   HGB 15.8   HCT 51.7   MCV 97.4*        CHEMISTRIES:No results for input(s): NA, K, CL, CO2, BUN, CREATININE, GLUCOSE, CA, PHOS, MG in the last 72 hours. PT/INR:No results for input(s): PROTIME, INR in the last 72 hours. APTT:No results for input(s): APTT in the last 72 hours. LIVER PROFILE:No results for input(s): AST, ALT, BILIDIR, BILITOT, ALKPHOS in the last 72 hours. Imaging Last 24 Hours:  CT HEAD WO CONTRAST    Result Date: 4/22/2022  PROCEDURE: CT HEAD WO CONTRAST CLINICAL INFORMATION:Stroke Symptoms. Right-sided weakness. History of multiple sclerosis. COMPARISON: Noncontrast head CT 8/18/2021. TECHNIQUE: 5 mm axial imaging through the head without IV contrast. All CT scans at this facility use dose modulation, iterative reconstruction, and/or weight based dosing when appropriate to reduce the radiation dose to as low as reasonably achievable. FINDINGS: Moderate atrophy with prominence of the ventricular system is stable. No hydrocephalus is observed. Periventricular and subcortical white matter low attenuation suggesting chronic microvascular ischemic disease is unchanged. No acute attenuation abnormality is observed.  No intraparenchymal hemorrhage, mass, mass effect, or shift of the midline structures is identified. No extra-axial fluid collections are observed. The bony calvarium is intact. The paranasal sinuses and mastoid air cells are clear. No acute intracranial process is visualized. COMMUNICATION: The results of this examination were discussed with Dr. Radha Cornelius at 12:40 PM on 4/22/2022. **This report has been created using voice recognition software. It may contain minor errors which are inherent in voice recognition technology. ** Final report electronically signed by Dr Iain Groves on 4/22/2022 12:41 PM    XR CHEST PORTABLE    Result Date: 4/22/2022  PROCEDURE: XR CHEST PORTABLE CLINICAL INFORMATION: Altered mental status. COMPARISON: Chest x-ray 12/27/2020. TECHNIQUE: AP portable chest radiograph performed. FINDINGS: Lines/tubes: None. Heart/mediastinum: The heart size and mediastinal contours are stable. The pulmonary vascularity is unremarkable. Lungs: Low lung volumes are stable. Atelectasis at the lung bases is unchanged. No focal consolidation, pleural effusion, or pneumothorax is observed. Bones: The visualized skeletal structures remain intact. Examination remains limited due to low lung volumes. No interval change is observed. **This report has been created using voice recognition software. It may contain minor errors which are inherent in voice recognition technology. ** Final report electronically signed by Dr Iain Groves on 4/22/2022 12:51 PM        Assessment and Plan:          1. Generalized Weakness, worse on the right- suspected to be recurrence of previous MS symptoms due to underlying infection, not an MS exacerbation.  CTH- no acute process   CTA H&N- No LVO, dissection, aneurysm. No significant flow limitinig stenosis.    · On Xarelto   MRI W&WO of Brain- no acute ischemic infarct, no enhancing MS lesions   MRI W&WO of C-Spine - no enhancing MS lesions,    MRI W&WO of T-Spine - no enhancing MS lesions, stable findings   Febrile at 101.9 on arrival    Recommend Infectious Work Up  o Blood Cultures  o UA with culture  o CXR   Remainder of care per ED, please call with any questions. Discussed plan with patient. Thank you for allowing us to participate in the care of Mumtaz Calhoun. This case was discussed with Dr. Mirian Clemente and he is in agreement with the assessment and plan.       Electronically signed by Jc Duran PA-C on 4/22/22 at 1:11 PM EDT

## 2022-04-22 NOTE — ED NOTES
Pt returned from MRI at this time in stable condition. Pt denies any needs at this time. Call light in reach. Respirations even and unlabored.       Kourtney Gonzales RN  04/22/22 1944

## 2022-04-22 NOTE — H&P
Hospitalist - History & Physical      Patient: Carisa Vines    Unit/Bed:-15/015-A  YOB: 1955  MRN: [id number]   Acct: [de-identified]   PCP: Xavi Sunshine MD    Date of Service: Pt seen/examined on 04/22/22  and Admitted to Inpatient with expected LOS greater than two midnights due to medical therapy. Chief Complaint:  Altered mental status    Assessment and Plan:-  1. Sepsis POA likely secondary to complicated UTI: The patient mets SIRs criteria and was noted to be febrile with temperature 101.9, respiratory rate 16, pulse 114, and blood pressure 95/61 and on room air but placed on comfort oxygen 2 L 95%; the patient had a UA done that showed a positive leukocyte esterase and nitrite; the patient's WBC was noted 20.3; patient was given rocephin and fluids on admission  -Blood cultures ordered and pending  -Urine cultures ordered and pending  -Will repeat UA and urine culture from new suprapubic cathether  -Started the patient on IV rocephin  -Will give maintenance fluids at 100 mL/hr.   -Re-ordered vitamin C which can help in the prophylaxis of UTI's  -Will exchange patient's cathether during this admission  -Daily weights  -Strict I's and O's  -Up with assistance  2. Altered mental status POA likely secondary to UTI: The patient originally came in with altered mental status and had a code stroke called as the patient was found to have more pronounced right sided weakness as opposed to left sided weakness; a code stroke was called as a result; patient had a CT scan, CTA, and MRI that were within normal limits and upon arrival the patient's speech appeared to be cleared and resolved  -Continue to treat UTI as above  3.  Neurogenic bladder s/p urinary catheter placement: The patient had a urinary catheter placed 10 years ago by Dr. Kait Cardenas and typically has it exchanged every 2-3 months which is followed by Dr. Azra Pineda, the patient also had a cystoscopy last year that resulted in the removal of a stone  -Exchange urinary cathether as needed  -Continue oxybutynin  4. History of pulmonary embolism:   -Continue Xarelto 10 mg oral daily  5. Essential hypertension-currently hypotensive: Per the patient the patient's blood pressures typically run but have been on the normotensive/lower side since admission  -Holding metoprolol at this time due to lower blood pressures  6. History of multiple sclerosis: This is followed by [facility]  -Patient was previously on prednisone but is not currently on any medications for multiple sclerosis at this time  -Continue baclofen  7. Chronic pain: The patient reports chronic pain that he notices in his jaw  -Continue tizanidine  -Continue ibuprofen prn  -Continue norco prn  -Continue pregabalin  9. History of cataracts: The patient is known to have a history of cataracts but has no known history of diabetes  -Continue erythromycin ointment  -Continue acetic acetic acid 0.25% irrigation 30 mL eye drops  10. Chronic constipation:   -Continue docusate      History Of Present Illness: This is a 61year old male PMH pulmonary embolism currently on Eliquis, history of chronic UTI's currently on suprapubic cathether that is followed by Dr. Sukh Velazco, essential hypertension, major depressive disorder, multiple sclerosis not currently on medications, neurogenic bladder s/p catheter placement by Dr. Neelam Anders, who is coming in with complaints of altered mental status. The patient was reportedly feeling like himself at about 8 AM this morning at his nursing home [facility], but all of a sudden, the patient started to feel confused and spiked a temperature of 101 degrees Fahrenheit. He denied any fever, chills, chest pain, shortness of breath, nausea, vomiting, diarrhea, or constipation. The patient typically has his suprapubic cathether exchanged every 2-3 months and had it placed about 10 years ago.  The patient has been feeling mostly lethargic over the course of the day, but his mentation was restored by the time he reached the hospital.     In the ER, the patient's vitals were significant for T 101.9, respiratory rate 16, pulse 114, and blood pressure 114/64. The patient had a code stroke called that was later cancelled as the patient had a right sided pronator drift. The patient had overall negative stroke workup including negative CT head, negative CTA, and negative MRI. The patient's WBC was 20.3, glucose was 246, alkaline phosphatase was 154, total protein was 8.1. The patient's blood cultures are currently pending. The patient's EKG ended up showing sinus tachycardia. The patient's lactate in the ER was 2.8. The patient's UA showed large blood in the urine, with a positive nitrite, large leukocyte esterase, and turbid character urine. Past Medical History:        Diagnosis Date    Dysphagia     oropharyngeal    History of pulmonary embolism     History of urinary tract infection     Hx of blood clots     Pulmonary embolis    Hypertension     Major depressive disorder, single episode     [facility]     Neurogenic bladder feb. 2012    Dr. Jarred Meyer placed cather    [facility])     UTI (urinary tract infection)        Past Surgical History:        Procedure Laterality Date    ANKLE SURGERY  1996    broken ankle    BLADDER SURGERY  2-    Suprapubic catheter placement    COLONOSCOPY      CYSTO/URETERO/PYELOSCOPY, CALCULUS TX Left 6/19/2019    CYSTOSCOPY, LEFT STENT insertion, bladder irragation with bladder stones performed by Josefa Finney MD at [address] [place] [address] 1898 N/A 7/8/2019    CYSTO, LEFT URETERAL STENT REMOVAL, LEFT URETEROSCOPY, LASER LITHOTRIPSY, BASKET RETRIEVAL OF STONE FRAGMENTS [facility]    CYSTOSCOPY, CYSTOLITHOLAPAXY, SUPRAPUBIC CATHETER EXCHANGE performed by Cuco Mak MD at [address] [place] [address] VIVIAN N/A 6/13/2018    ROBOT DIVERTING COLOSTOMY performed by Elisabeth Parks MD at [facility] Medications:   No current facility-administered medications on file prior to encounter. Current Outpatient Medications on File Prior to Encounter   Medication Sig Dispense Refill    gentamicin (GARAMYCIN) 0.1 % ointment Apply topically 3 times daily Apply topically 3 times daily.  erythromycin (ROMYCIN) 5 MG/GM ophthalmic ointment 2 times daily      predniSONE (DELTASONE) 10 MG tablet Take 10 mg by mouth daily (Patient not taking: Reported on 2/11/2022)      predniSONE (DELTASONE) 5 MG tablet Take 5 mg by mouth daily (Patient not taking: Reported on 2/11/2022)      Ascorbic Acid (VITAMIN C ER PO) Take by mouth      baclofen (LIORESAL) 10 MG tablet Take 10 mg by mouth 3 times daily      gabapentin (NEURONTIN) 300 MG capsule Take 600 mg by mouth 3 times daily.  dextrose 5 % SOLN 50 mL with ceFEPIme 2 g SOLR 2,000 mg Infuse 2,000 mg intravenously every 12 hours (Patient not taking: Reported on 11/12/2021)      rivaroxaban (XARELTO) 10 MG TABS tablet Take 10 mg by mouth      zinc sulfate (ZINCATE) 220 (50 Zn) MG capsule Take 50 mg by mouth daily (Patient not taking: Reported on 11/12/2021)      pregabalin (LYRICA) 25 MG capsule Take 1 capsule by mouth 2 times daily for 91 days. 60 capsule 3    HYDROcodone-acetaminophen (NORCO) 5-325 MG per tablet Take 1 tablet by mouth every 4 hours as needed.  acetaminophen (TYLENOL) 325 MG tablet Take 650 mg by mouth every 4 hours as needed for Pain or Fever      acetic acid 0.25 % irrigation Irrigate with 30 mLs as directed 2 times daily Irrigate with as directed daily.       ibuprofen (ADVIL;MOTRIN) 400 MG tablet Take 400 mg by mouth every 4 hours as needed for Fever (for temp > 100.5 not alleviated by acetaminophen)      LACTOBACILLUS PO Take 1 capsule by mouth daily      vitamin E 400 UNIT capsule Take 400 Units by mouth daily      nystatin (MYCOSTATIN) POWD powder Apply topically 2 times daily (Patient not taking: Reported on 11/12/2021)      oxybutynin (DITROPAN XL) 5 MG extended release tablet Take 1 tablet by mouth daily 360 tablet 0    metoprolol tartrate (LOPRESSOR) 25 MG tablet Take 1 tablet by mouth 2 times daily 60 tablet 3    miconazole (MICOTIN) 2 % powder Apply topically 2 times daily. (Patient not taking: Reported on 2/11/2022) 45 g 1    potassium chloride (KLOR-CON M) 20 MEQ TBCR extended release tablet Take 2 tablets by mouth daily 60 tablet 3    sodium chloride 1 g tablet Take 1 tablet by mouth 2 times daily (with meals) 90 tablet 3    calcium-vitamin D (OSCAL-500) 500-200 MG-UNIT per tablet Take 1 tablet by mouth 2 times daily 30 tablet 3    famotidine (PEPCID) 20 MG tablet Take 1 tablet by mouth 2 times daily 60 tablet 3    docusate sodium (COLACE) 100 MG capsule Take 100 mg by mouth daily       tiZANidine (ZANAFLEX) 2 MG tablet Take 2 mg by mouth 3 times daily 0600;1400;2200      Multiple Vitamin (MULTIVITAMIN) tablet Take 1 tablet by mouth daily  0    vitamin A 85649 UNITS capsule Take 2 capsules by mouth daily 30 capsule 3       Allergies:    Patient has no known allergies. Social History:    reports that he quit smoking about 40 years ago. He has quit using smokeless tobacco. He reports that he does not drink alcohol and does not use drugs. Family History:       Problem Relation Age of Onset    Cancer Mother         Breast    Heart Disease Father 48    Arthritis Sister     Heart Disease Paternal Grandmother 48       Diet:  ADULT DIET; Regular    Review of systems:   Pertinent positives as noted in the HPI. All other systems reviewed and negative.     PHYSICAL EXAM:  /61   Pulse 106   Temp 99.9 °F (37.7 °C) (Oral)   Resp 17   Wt 208 lb 9.6 oz (94.6 kg)   SpO2 95%   BMI 32.67 kg/m²   General appearance: No apparent distress, appears stated age and cooperative; has some speech abnormalities and trouble forming words but is otherwise alert and oriented times 4  HEENT: Normal cephalic, atraumatic without obvious deformity. Pupils equal, round, and reactive to light. Extra ocular muscles intact. Conjunctivae/corneas clear. Neck: Supple, with full range of motion. No jugular venous distention. Trachea midline. Respiratory:  Normal respiratory effort. Clear to auscultation, bilaterally without Rales/Wheezes/Rhonchi. Cardiovascular: Tachycardic; normal S1 and S2  Abdomen: Soft, non-tender, non-distended with normal bowel sounds. Musculoskeletal:  No clubbing, cyanosis or edema bilaterally. Skin: Skin color, texture, turgor normal.  No rashes or lesions. Neurologic:  Neurovascularly intact without any focal sensory/motor deficits. Cranial nerves: II-XII intact, grossly non-focal.  Psychiatric: Alert and oriented, thought content appropriate, normal insight  Capillary Refill: Brisk,< 3 seconds   Peripheral Pulses: +2 palpable, equal bilaterally     Labs:   Recent Labs     04/22/22  1231   WBC 20.3*   HGB 15.8   HCT 51.7        Recent Labs     04/22/22  1231      K 4.8      CO2 24   BUN 21   CREATININE 0.5   CALCIUM 9.2     Recent Labs     04/22/22  1231   AST 20   ALT 18   BILITOT 0.4   ALKPHOS 154*     Recent Labs     04/22/22  1231   INR 1.86*     No results for input(s): Adali Gold in the last 72 hours. Urinalysis:    Lab Results   Component Value Date    NITRU POSITIVE 04/22/2022    WBCUA 25-50 04/22/2022    WBCUA >200 01/20/2012    BACTERIA MANY 04/22/2022    RBCUA 10-15 04/22/2022    BLOODU LARGE 04/22/2022    SPECGRAV 1.017 04/22/2022    GLUCOSEU NEGATIVE 12/27/2020       Radiology:   MRI BRAIN W WO CONTRAST   Final Result       1. No evidence of acute intracranial abnormality. 2. Stable patchy areas of T2/FLAIR hyperintensity in the supratentorial white matter in keeping with history of multiple sclerosis.  No new/interval lesion or enhancing lesion suggest active demyelination. **This report has been created using voice recognition software. It may contain minor errors which are inherent in voice recognition technology. **         Final report electronically signed by Dr. Sam Dean MD on 4/22/2022 4:13 PM      MRI CERVICAL SPINE W WO CONTRAST   Final Result      1. Motion degraded exam without convincing focal area of abnormal T2 signal or enhancement in the cord. 2. Focal area of cord thinning at the C6 level as evidence for myelomalacia. 3. Multilevel degenerative changes of the cervical spine most pronounced at C3-4 where there is no significant spinal canal stenosis but with left lateral recess narrowing and severe bilateral neural foraminal stenosis in association with uncovertebral    joint degenerative change. **This report has been created using voice recognition software. It may contain minor errors which are inherent in voice recognition technology. **      Final report electronically signed by Dr. Sam Dean MD on 4/22/2022 4:20 PM      MRI THORACIC SPINE W WO CONTRAST   Final Result    Stable lung segment of thinning of the thoracic spinal cord involving the T8-T10 levels with chronic hyperintense T2 signal abnormality as evidence for myelomalacia. No new/interval focal areas of abnormal signal cord signal or enhancement are    identified to suggest active demyelination. **This report has been created using voice recognition software. It may contain minor errors which are inherent in voice recognition technology. **      Final report electronically signed by Dr. Sam Dean MD on 4/22/2022 4:25 PM      CTA HEAD W WO CONTRAST (CODE STROKE)   Final Result       1. No flow-limiting stenosis or aneurysm in the intracranial circulation. 2. Slight mural plaque at the left carotid bulb with 35% stenosis by NASCET criteria.    3. Moderate stenosis at the distal most segments of the bilateral common carotid arteries. 4. Stenosis in the V4 segments of the bilateral vertebral arteries. 5. Mild geographic groundglass opacities in the visual was portions of lungs possibly on the basis of small airway or small vessel disease. **This report has been created using voice recognition software. It may contain minor errors which are inherent in voice recognition technology. **      Final report electronically signed by Dr. Nannette Osorio MD on 4/22/2022 1:20 PM      CTA NECK W WO CONTRAST (CODE STROKE)   Final Result       1. No flow-limiting stenosis or aneurysm in the intracranial circulation. 2. Slight mural plaque at the left carotid bulb with 35% stenosis by NASCET criteria. 3. Moderate stenosis at the distal most segments of the bilateral common carotid arteries. 4. Stenosis in the V4 segments of the bilateral vertebral arteries. 5. Mild geographic groundglass opacities in the visual was portions of lungs possibly on the basis of small airway or small vessel disease. **This report has been created using voice recognition software. It may contain minor errors which are inherent in voice recognition technology. **      Final report electronically signed by Dr. Nannette Osorio MD on 4/22/2022 1:20 PM      XR CHEST PORTABLE   Final Result   Examination remains limited due to low lung volumes. No interval change is observed. **This report has been created using voice recognition software. It may contain minor errors which are inherent in voice recognition technology. **      Final report electronically signed by Dr Georgiana Arellano on 4/22/2022 12:51 PM      CT HEAD WO CONTRAST   Final Result   No acute intracranial process is visualized. COMMUNICATION: The results of this examination were discussed with Dr. Nirmal Jiménez at 12:40 PM on 4/22/2022. **This report has been created using voice recognition software.   It may contain minor errors which are inherent in voice recognition technology. **      Final report electronically signed by Dr Kelsie Ramos on 4/22/2022 12:41 PM        CTA HEAD W [address] (CODE STROKE)    Result Date: 4/22/2022  PROCEDURE: CTA HEAD W WO CONTRAST, CTA NECK W WO CONTRAST CLINICAL INFORMATION: Stroke Symptoms . Right-sided weakness. COMPARISON: CT head from the same date. TECHNIQUE: Helical CT from the aortic arch through the head in arterial phase during fast bolus administration of 80 mL Isovue-370 injected in the right [facility] with multiplanar reconstructions to include volumetric maximum intensity projection sequences. All  CT scans at this facility use dose modulation, iterative reconstruction, and/or weight-based dosing when appropriate to reduce radiation dose to as low as reasonably achievable. FINDINGS: CTA HEAD: There are mural calcifications in the cavernous and supraclinoid segments of internal carotid arteries without flow-limiting stenosis or aneurysm. The bilateral middle cerebral and anterior cerebral arteries are patent without focal abnormality identified. The basilar artery is patent without focal stenosis or visualized aneurysm. The bilateral posterior cerebral and superior cerebellar arteries are patent without focal abnormality identified. Dural venous sinuses appear patent without focal filling defect. No focal areas of abnormal parenchymal enhancement are identified. CTA NECK: There is a typical 3 vessel arch. The brachiocephalic and left subclavian arteries are patent without flow-limiting stenosis. There is calcified and noncalcified mural plaque at the distal most segments of the common carotid arteries with moderate stenosis bilaterally. Calcified and noncalcified mural plaque extends into the left carotid bulb with narrowest luminal diameter measuring 2.8 mm and a point more distal, where the walls are parallel, measuring 4.3 mm.  There is no hemodynamically significant stenosis in the right carotid bulb. Cervical segments of internal carotid arteries are otherwise patent without focal stenosis. The left vertebral artery is dominant. There is mild stenosis in the P1 segment on the left. There is moderate stenosis in the bilateral V4 segments. Vocal cords are closely apposed limiting evaluation of the larynx. Given this caveat, mucosal surfaces of the aerodigestive tract are symmetric without focal nodular thickening or visualized mass. There are few scattered cervical chain lymph nodes without definite cervical lymphadenopathy. The parotid, 7 nuclear and thyroid glands are unremarkable. There are mild geographic areas of groundglass opacity in the visualized portions of the lungs. Visualized osseous structures appear intact. 1. No flow-limiting stenosis or aneurysm in the intracranial circulation. 2. Slight mural plaque at the left carotid bulb with 35% stenosis by NASCET criteria. 3. Moderate stenosis at the distal most segments of the bilateral common carotid arteries. 4. Stenosis in the V4 segments of the bilateral vertebral arteries. 5. Mild geographic groundglass opacities in the visual was portions of lungs possibly on the basis of small airway or small vessel disease. **This report has been created using voice recognition software. It may contain minor errors which are inherent in voice recognition technology. ** Final report electronically signed by Dr. Clary Solomon MD on 4/22/2022 1:20 PM    CT HEAD WO CONTRAST    Result Date: 4/22/2022  PROCEDURE: CT HEAD WO CONTRAST CLINICAL INFORMATION:Stroke Symptoms. Right-sided weakness. History of multiple sclerosis. COMPARISON: Noncontrast head CT 8/18/2021. TECHNIQUE: 5 mm axial imaging through the head without IV contrast. All CT scans at this facility use dose modulation, iterative reconstruction, and/or weight based dosing when appropriate to reduce the radiation dose to as low as reasonably achievable.  FINDINGS: Moderate atrophy with prominence of the ventricular system is stable. No hydrocephalus is observed. Periventricular and subcortical white matter low attenuation suggesting chronic microvascular ischemic disease is unchanged. No acute attenuation abnormality is observed. No intraparenchymal hemorrhage, mass, mass effect, or shift of the midline structures is identified. No extra-axial fluid collections are observed. The bony calvarium is intact. The paranasal sinuses and mastoid air cells are clear. No acute intracranial process is visualized. COMMUNICATION: The results of this examination were discussed with Dr. Annetta Chavarria at 12:40 PM on 4/22/2022. **This report has been created using voice recognition software. It may contain minor errors which are inherent in voice recognition technology. ** Final report electronically signed by Dr Amanda Nickerson on 4/22/2022 12:41 PM    CTA NECK W [address] (CODE STROKE)    Result Date: 4/22/2022  PROCEDURE: CTA HEAD W WO CONTRAST, CTA NECK W WO CONTRAST CLINICAL INFORMATION: Stroke Symptoms . Right-sided weakness. COMPARISON: CT head from the same date. TECHNIQUE: Helical CT from the aortic arch through the head in arterial phase during fast bolus administration of 80 mL Isovue-370 injected in the right [facility] [place] with multiplanar reconstructions to include volumetric maximum intensity projection sequences. All  CT scans at this facility use dose modulation, iterative reconstruction, and/or weight-based dosing when appropriate to reduce radiation dose to as low as reasonably achievable. FINDINGS: CTA HEAD: There are mural calcifications in the cavernous and supraclinoid segments of internal carotid arteries without flow-limiting stenosis or aneurysm. The bilateral middle cerebral and anterior cerebral arteries are patent without focal abnormality identified. The basilar artery is patent without focal stenosis or visualized aneurysm.  The bilateral posterior cerebral and superior cerebellar arteries are patent without focal abnormality identified. Dural venous sinuses appear patent without focal filling defect. No focal areas of abnormal parenchymal enhancement are identified. CTA NECK: There is a typical 3 vessel arch. The brachiocephalic and left subclavian arteries are patent without flow-limiting stenosis. There is calcified and noncalcified mural plaque at the distal most segments of the common carotid arteries with moderate stenosis bilaterally. Calcified and noncalcified mural plaque extends into the left carotid bulb with narrowest luminal diameter measuring 2.8 mm and a point more distal, where the walls are parallel, measuring 4.3 mm. There is no hemodynamically significant stenosis in the right carotid bulb. Cervical segments of internal carotid arteries are otherwise patent without focal stenosis. The left vertebral artery is dominant. There is mild stenosis in the P1 segment on the left. There is moderate stenosis in the bilateral V4 segments. Vocal cords are closely apposed limiting evaluation of the larynx. Given this caveat, mucosal surfaces of the aerodigestive tract are symmetric without focal nodular thickening or visualized mass. There are few scattered cervical chain lymph nodes without definite cervical lymphadenopathy. The parotid, 7 nuclear and thyroid glands are unremarkable. There are mild geographic areas of groundglass opacity in the visualized portions of the lungs. Visualized osseous structures appear intact. 1. No flow-limiting stenosis or aneurysm in the intracranial circulation. 2. Slight mural plaque at the left carotid bulb with 35% stenosis by NASCET criteria. 3. Moderate stenosis at the distal most segments of the bilateral common carotid arteries. 4. Stenosis in the V4 segments of the bilateral vertebral arteries. 5. Mild geographic groundglass opacities in the visual was portions of lungs possibly on the basis of small airway or small vessel disease.  **This report has been created using voice recognition software. It may contain minor errors which are inherent in voice recognition technology. ** Final report electronically signed by Dr. Migdalia Luna MD on 4/22/2022 1:20 PM    MRI CERVICAL SPINE W WO CONTRAST    Result Date: 4/22/2022  PROCEDURE: MRI CERVICAL SPINE W WO CONTRAST CLINICAL INFORMATION: Weakness, Hx MS . COMPARISON: No prior study. TECHNIQUE: Sagittal T1, T2 and STIR sequences were obtained through the cervical spine. Axial fast and echo and gradient echo T2-weighted images were obtained. Postcontrast axial and sagittal T1-weighted images were obtained. 20 mL ProHance was injected  in the right forearm. FINDINGS: Images are motion degraded. There is anatomic vertebral body height and alignment. There is a focal area of hyperintense T1 and T2 signal in the C7 vertebral body as evidence for a hemangioma. Marrow signal is otherwise within normal limits. There is a focal area of thinning of the spinal cord at the C6 level. No convincing focal areas of abnormal T2 signal are identified within the spinal cord. No focal areas of abnormal cord enhancement are identified. Paraspinal soft tissues are unremarkable. At C2-3 there is no significant spinal canal or neuroforaminal stenosis. At C3-4 there is mild left lateral recess narrowing and severe left neural foraminal stenosis and moderate severe right neural foraminal stenosis in association with uncovertebral joint degenerative change. At C4-5 there is no significant spinal canal stenosis. There is moderate bilateral neural foraminal stenosis in association with uncovertebral joint degenerative change and facet hypertrophy. At C5-6 there is no cystic and spinal canal stenosis. There is moderate bilateral neural foraminal stenosis in association with uncovertebral joint degenerative change and mild facet hypertrophy. At C6-7 there is no significant spinal canal stenosis.  There is mild right neural foraminal stenosis in association with uncovertebral joint or change and facet hypertrophy. C7-T1 there is no significant spinal canal or neuroforaminal stenosis. 1. Motion degraded exam without convincing focal area of abnormal T2 signal or enhancement in the cord. 2. Focal area of cord thinning at the C6 level as evidence for myelomalacia. 3. Multilevel degenerative changes of the cervical spine most pronounced at C3-4 where there is no significant spinal canal stenosis but with left lateral recess narrowing and severe bilateral neural foraminal stenosis in association with uncovertebral joint degenerative change. **This report has been created using voice recognition software. It may contain minor errors which are inherent in voice recognition technology. ** Final report electronically signed by Dr. Benjamín Larson MD on 4/22/2022 4:20 PM    MRI THORACIC SPINE W WO CONTRAST    Result Date: 4/22/2022  PROCEDURE: MRI THORACIC SPINE W WO CONTRAST CLINICAL INFORMATION: Weakness, Hx MS. COMPARISON: MR thoracic spine dated 10/5/2016. TECHNIQUE: Sagittal T1, T2 and STIR sequences were obtained through the thoracic spine. Axial T2-weighted images were obtained through the discs. Postcontrast axial and sagittal T1-weighted images were obtained. 20 mL ProHance was injected in the right forearm. FINDINGS:  There is anatomic vertebral body height and alignment. Marrow signal is within normal limits. There is long segment of the cord thinning at the T8-T10 levels, stable compared to prior MRI. There are subtle patchy areas of hyperintense T2 signal ventrally in the cord along the area of thinning, grossly stable compared to prior exam. No new/interval focal areas of abnormal T2 signal identified within the thoracic spinal cord. No focal areas of abnormal enhancement are identified within the cord. Paraspinal soft tissues are unremarkable. There is no significant spinal canal or neural frontal stenosis at any thoracic level.       Stable lung segment of thinning of the thoracic spinal cord involving the T8-T10 levels with chronic hyperintense T2 signal abnormality as evidence for myelomalacia. No new/interval focal areas of abnormal signal cord signal or enhancement are identified to suggest active demyelination. **This report has been created using voice recognition software. It may contain minor errors which are inherent in voice recognition technology. ** Final report electronically signed by Dr. Dov Hunter MD on 4/22/2022 4:25 PM    XR CHEST PORTABLE    Result Date: 4/22/2022  PROCEDURE: XR CHEST PORTABLE CLINICAL INFORMATION: Altered mental status. COMPARISON: Chest x-ray 12/27/2020. TECHNIQUE: AP portable chest radiograph performed. FINDINGS: Lines/tubes: None. Heart/mediastinum: The heart size and mediastinal contours are stable. The pulmonary vascularity is unremarkable. Lungs: Low lung volumes are stable. Atelectasis at the lung bases is unchanged. No focal consolidation, pleural effusion, or pneumothorax is observed. Bones: The visualized skeletal structures remain intact. Examination remains limited due to low lung volumes. No interval change is observed. **This report has been created using voice recognition software. It may contain minor errors which are inherent in voice recognition technology. ** Final report electronically signed by Dr Delmy Hernandez on 4/22/2022 12:51 PM    MRI BRAIN W WO CONTRAST    Result Date: 4/22/2022  PROCEDURE: MRI BRAIN W WO CONTRAST INDICATION:Weakness, Hx of MS. middle status change and right-sided weakness. COMPARISON: MR brain dated 6/11/2019. TECHNIQUE: Multiplanar and multiple spin echo T1 and T2-weighted images were obtained through the brain before and after the administration of intravenous contrast. 20 mL ProHance was injected in the right forearm. FINDINGS: There is stable moderate to severe frontal temporal predominant volume loss.  There is a small focal area of encephalomalacia in the high right paramedian frontal lobe, stable compared to prior exam. There are mild scattered focal areas of T2/FLAIR prolongation in the periventricular, subcortical deep white matter in keeping with history of multiple sclerosis. These are stable compared to prior exam. No new/interval lesion is identified. No focal areas restricted diffusion are present. Following contrast administration, there are no focal areas of abnormal parenchymal or meningeal enhancement identified. The major vascular flow voids appear patent. Patient is status post left-sided lens extraction. Orbits are otherwise unremarkable. Paranasal sinuses and mastoid air cells are clear. 1. No evidence of acute intracranial abnormality. 2. Stable patchy areas of T2/FLAIR hyperintensity in the supratentorial white matter in keeping with history of multiple sclerosis. No new/interval lesion or enhancing lesion suggest active demyelination. **This report has been created using voice recognition software. It may contain minor errors which are inherent in voice recognition technology. ** Final report electronically signed by Dr. Danielle Neri MD on 4/22/2022 4:13 PM        EKG: sinus tachycardia    Electronically signed by Renita Crook DO on 4/22/2022 at 6:38 PM The patient is a 59y Male complaining of abdominal pain.

## 2022-04-22 NOTE — ED PROVIDER NOTES
[address] [address]          Pt Name: Bri White  MRN: [id number]  [place] 1955  Date of evaluation: 4/22/2022  Treating Resident Physician: Lety Mayo MD  Supervising Physician: Dr. Vera Dominguez       Chief Complaint   Patient presents with    Altered Mental Status    Extremity Weakness     History obtained from chart review and the patient. HISTORY OF PRESENT ILLNESS    HPI  Bri White is a 61 y.o. male who presents to the emergency department for evaluation of right sided weakness. Coming from [facility]. He has a h/o MS and residual b/l LE weakness. LKW was 0800 when he was served breakfast. At lunch, RN noticed he had right upper extremity weakness and decreased right  strength. Code stroke was called. He presents here with EMS. The patient has no other acute complaints at this time. REVIEW OF SYSTEMS   Review of Systems   Unable to perform ROS: Acuity of condition         PAST MEDICAL AND SURGICAL HISTORY     Past Medical History:   Diagnosis Date    Dysphagia     oropharyngeal    History of pulmonary embolism     History of urinary tract infection     Hx of blood clots     Pulmonary embolis    Hypertension     Major depressive disorder, single episode     MS (multiple sclerosis) ([facility] [address]     Neurogenic bladder feb. 2012    Dr. Angie Smith placed cather    Osteomyelitis [facility])     UTI (urinary tract infection)      Past Surgical History:   Procedure Laterality Date    ANKLE SURGERY  1996    broken ankle    BLADDER SURGERY  2-    Suprapubic catheter placement    COLONOSCOPY      CYSTO/URETERO/PYELOSCOPY, CALCULUS TX Left 6/19/2019    CYSTOSCOPY, LEFT STENT insertion, bladder irragation with bladder stones performed by Carolin Medel MD at [address] [place] [address] 189 N/A 7/8/2019    CYSTO, LEFT URETERAL STENT REMOVAL, LEFT URETEROSCOPY, LASER LITHOTRIPSY, BASKET RETRIEVAL OF STONE FRAGMENTS performed by Cathi De Leon MD at [address] [facility] Serena Jones 2/26/2021    CYSTOSCOPY, CYSTOLITHOLAPAXY, SUPRAPUBIC CATHETER EXCHANGE performed by Jennifer Penny MD at [address] [employer] N/A 6/13/2018    ROBOT DIVERTING COLOSTOMY performed by Geneva Otero MD at [facility]         MEDICATIONS     Current Facility-Administered Medications:     acetic acid 0.25 % irrigation 30 mL, 30 mL, Irrigation, BID, Rolan Forbes DO    [START ON 4/23/2022] ascorbic acid (VITAMIN C) tablet 500 mg, 500 mg, Oral, Daily, Rolan Forbes DO    baclofen (LIORESAL) tablet 10 mg, 10 mg, Oral, TID, Rolan Forbes DO    calcium-cholecalciferol 500-200 MG-UNIT per tablet 1 tablet, 1 tablet, Oral, BID, Rolan Forbes DO    [START ON 4/23/2022] docusate sodium (COLACE) capsule 100 mg, 100 mg, Oral, Daily, Rolan Forbes DO    erythromycin [facility]) ophthalmic ointment, , Right Eye, Daily, Rolan Forbes DO    famotidine (PEPCID) tablet 20 mg, 20 mg, Oral, BID, Rolan Forbes DO    gabapentin (NEURONTIN) capsule 600 mg, 600 mg, Oral, TID, Rolan Forbes DO    ibuprofen (ADVIL;MOTRIN) tablet 400 mg, 400 mg, Oral, Q4H PRN, Loise Phalen, DO    [Held by provider] metoprolol tartrate (LOPRESSOR) tablet 25 mg, 25 mg, Oral, BID, Rolan Forbes DO    [START ON 4/23/2022] oxybutynin (DITROPAN-XL) extended release tablet 5 mg, 5 mg, Oral, Daily, Rolan Forbes DO    [START ON 4/23/2022] potassium chloride (KLOR-CON) extended release tablet 40 mEq, 40 mEq, Oral, Daily, Rolan Forbes DO    rivaroxaban (XARELTO) tablet 10 mg, 10 mg, Oral, Daily, Rolan Forbes DO    sodium chloride tablet 1 g, 1 g, Oral, BID WC, Rolan Forbes DO    tiZANidine (ZANAFLEX) tablet 2 mg, 2 mg, Oral, TID, Rolan Forbes DO    vitamin A capsule 6 mg, 20,000 Units, Oral, Daily, Rolan Forbes DO    vitamin E capsule 400 Units, 400 Units, Oral, Daily, Rolan Forbes DO    sodium chloride flush 0.9 % injection 5-40 mL, 5-40 mL, IntraVENous, 2 times per day, Rolan Leidy, DO    sodium chloride flush 0.9 % injection 5-40 mL, 5-40 mL, IntraVENous, PRN, Rolan Leidy, DO    0.9 % sodium chloride infusion, , IntraVENous, PRN, Rolan Leidy, DO    ondansetron (ZOFRAN-ODT) disintegrating tablet 4 mg, 4 mg, Oral, Q8H PRN **OR** ondansetron (ZOFRAN) injection 4 mg, 4 mg, IntraVENous, Q6H PRN, Vernona Brick, DO    acetaminophen (TYLENOL) tablet 650 mg, 650 mg, Oral, Q6H PRN **OR** acetaminophen (TYLENOL) suppository 650 mg, 650 mg, Rectal, Q6H PRN, Vernona Brick, DO    polyethylene glycol (GLYCOLAX) packet 17 g, 17 g, Oral, Daily PRN, Rolan Leidy, DO    0.9 % sodium chloride infusion, , IntraVENous, Continuous, Rolan Forbes, DO    [START ON 2022] cefTRIAXone (ROCEPHIN) 1,000 mg in sterile water 10 mL IV syringe, 1,000 mg, IntraVENous, Q24H, Vernona Brick, DO      SOCIAL HISTORY     Social History     Social History Narrative    Not on file     Social History     Tobacco Use    Smoking status: Former Smoker     Quit date: 1982     Years since quittin.3    Smokeless tobacco: Former User   Vaping Use    Vaping Use: Never used   Substance Use Topics    Alcohol use: No     Comment: \"quit alcohol a few years ago\"    Drug use: No         ALLERGIES   No Known Allergies      FAMILY HISTORY     Family History   Problem Relation Age of Onset    Cancer Mother         Breast    Heart Disease Father 48    Arthritis Sister     Heart Disease Paternal Grandmother 48         PREVIOUS RECORDS   Previous records reviewed. PHYSICAL EXAM     ED Triage Vitals [22 1220]   BP Temp Temp src Pulse Resp SpO2 Height Weight   114/64 101.9 °F (38.8 °C) -- 114 16 91 % -- 208 lb 9.6 oz (94.6 kg)     Initial vital signs and nursing assessment reviewed and abnormal from fever, tachycardia. Body mass index is 32.67 kg/m². Pulsoximetry is abnormal per my interpretation.     Additional Vital Signs:  Vitals:    22 BP: (!) 106/58   Pulse: 104   Resp: 18   Temp: 98.8 °F (37.1 °C)   SpO2: 97%       Physical Exam  Vitals reviewed. Constitutional:       General: He is not in acute distress. HENT:      Head: Normocephalic and atraumatic. Mouth/Throat:      Mouth: Mucous membranes are moist.      Pharynx: Oropharynx is clear. Eyes:      General: Vision grossly intact. No visual field deficit. Extraocular Movements: Extraocular movements intact. Right eye: Normal extraocular motion. Left eye: Normal extraocular motion. Pupils: Pupils are equal, round, and reactive to light. Cardiovascular:      Rate and Rhythm: Regular rhythm. Tachycardia present. Heart sounds: Normal heart sounds. Pulmonary:      Effort: Pulmonary effort is normal. No respiratory distress. Abdominal:      Palpations: Abdomen is soft. Tenderness: There is no abdominal tenderness. Musculoskeletal:      Cervical back: Full passive range of motion without pain. Skin:     General: Skin is warm and dry. Capillary Refill: Capillary refill takes less than 2 seconds. Neurological:      Mental Status: He is oriented to person, place, and time and easily aroused. He is lethargic. Cranial Nerves: Cranial nerves are intact. No cranial nerve deficit, dysarthria or facial asymmetry. Sensory: Sensation is intact. Comments: Bilateral upper extremity drift  Bilateral lower extremity weakness  Bilateral abnormal finger to nose test   Psychiatric:         Mood and Affect: Mood normal.         Behavior: Behavior normal.             MEDICAL DECISION MAKING   Initial Assessment: This is a 68-year-old male coming in with right-sided weakness mildly decreased alertness and abnormal coordination with a history of MS and residual bilateral lower extremity weakness. On exam he has an NIHSS of 5 and mildly decreased alertness.   Differential diagnosis includes but is not limited to: CVA, meningitis, encephalitis, metabolic encephalopathy, electrolyte abnormality, multiple sclerosis flareup. Ct imaging was negative. We got a workup on him and he was whisked away to MRI and while he was there workup showed a UTI and significant white count with left shift and lactate 2.8. I started him on rocephin and fluids. He is still drowsy but hemodynamically stable and perfusing well. Given his presentation and labs he is likely septic from a UTI. He will need to be admitted for further treatment. 1. Sepsis due to UTI.   Plan:    Code stroke   UA   Blood cultures   Lactate   CBC, CMP   CT head non con, CTA head and neck   CXR, EKG   Coags   ABx   IVF bolus   Tylenol   Admit to hospitalist service        ED RESULTS   Laboratory results:  Labs Reviewed   CBC WITH AUTO DIFFERENTIAL - Abnormal; Notable for the following components:       Result Value    WBC 20.3 (*)     MCV 97.4 (*)     MCHC 30.6 (*)     RDW-CV 16.5 (*)     RDW-SD 58.6 (*)     MPV 8.7 (*)     Segs Absolute 17.7 (*)     Monocytes Absolute 1.4 (*)     Immature Grans (Abs) 0.17 (*)     All other components within normal limits   COMPREHENSIVE METABOLIC PANEL W/ REFLEX TO MG FOR LOW K - Abnormal; Notable for the following components:    Glucose 246 (*)     Alkaline Phosphatase 154 (*)     Total Protein 8.1 (*)     All other components within normal limits   PROTIME-INR - Abnormal; Notable for the following components:    INR 1.86 (*)     All other components within normal limits   APTT - Abnormal; Notable for the following components:    aPTT 41.8 (*)     All other components within normal limits   URINALYSIS WITH MICROSCOPIC - Abnormal; Notable for the following components:    Blood, Urine LARGE (*)     Protein, UA 30 (*)     Nitrite, Urine POSITIVE (*)     Leukocyte Esterase, Urine LARGE (*)     Character, Urine TURBID (*)     All other components within normal limits   LACTATE, SEPSIS - Abnormal; Notable for the following components:    Lactic Acid, Sepsis 2.8 (*)     All other components within normal limits   POCT GLUCOSE - Abnormal; Notable for the following components:    POC Glucose 311 (*)     All other components within normal limits   CULTURE, BLOOD 1   CULTURE, BLOOD 2   CULTURE, URINE   TROPONIN   LACTATE, SEPSIS   ANION GAP   GLOMERULAR FILTRATION RATE, ESTIMATED   OSMOLALITY   URINALYSIS WITH MICROSCOPIC   BASIC METABOLIC PANEL W/ REFLEX TO MG FOR LOW K   CBC WITH AUTO DIFFERENTIAL   HEMOGLOBIN A1C   POCT CREATININE - URINE   POCT GLUCOSE   POCT GLUCOSE       Radiologic studies results:  MRI BRAIN W WO CONTRAST   Final Result       1. No evidence of acute intracranial abnormality. 2. Stable patchy areas of T2/FLAIR hyperintensity in the supratentorial white matter in keeping with history of multiple sclerosis. No new/interval lesion or enhancing lesion suggest active demyelination. **This report has been created using voice recognition software. It may contain minor errors which are inherent in voice recognition technology. **         Final report electronically signed by Dr. Dov Hunter MD on 4/22/2022 4:13 PM      MRI CERVICAL SPINE W WO CONTRAST   Final Result      1. Motion degraded exam without convincing focal area of abnormal T2 signal or enhancement in the cord. 2. Focal area of cord thinning at the C6 level as evidence for myelomalacia. 3. Multilevel degenerative changes of the cervical spine most pronounced at C3-4 where there is no significant spinal canal stenosis but with left lateral recess narrowing and severe bilateral neural foraminal stenosis in association with uncovertebral    joint degenerative change. **This report has been created using voice recognition software. It may contain minor errors which are inherent in voice recognition technology. **      Final report electronically signed by Dr. Dov Hunter MD on 4/22/2022 4:20 PM      MRI THORACIC SPINE W WO CONTRAST   Final Result    Stable lung segment of thinning of the thoracic spinal cord involving the T8-T10 levels with chronic hyperintense T2 signal abnormality as evidence for myelomalacia. No new/interval focal areas of abnormal signal cord signal or enhancement are    identified to suggest active demyelination. **This report has been created using voice recognition software. It may contain minor errors which are inherent in voice recognition technology. **      Final report electronically signed by Dr. Julio C Horta MD on 4/22/2022 4:25 PM      CTA HEAD W WO CONTRAST (CODE STROKE)   Final Result       1. No flow-limiting stenosis or aneurysm in the intracranial circulation. 2. Slight mural plaque at the left carotid bulb with 35% stenosis by NASCET criteria. 3. Moderate stenosis at the distal most segments of the bilateral common carotid arteries. 4. Stenosis in the V4 segments of the bilateral vertebral arteries. 5. Mild geographic groundglass opacities in the visual was portions of lungs possibly on the basis of small airway or small vessel disease. **This report has been created using voice recognition software. It may contain minor errors which are inherent in voice recognition technology. **      Final report electronically signed by Dr. Julio C Horta MD on 4/22/2022 1:20 PM      CTA NECK W WO CONTRAST (CODE STROKE)   Final Result       1. No flow-limiting stenosis or aneurysm in the intracranial circulation. 2. Slight mural plaque at the left carotid bulb with 35% stenosis by NASCET criteria. 3. Moderate stenosis at the distal most segments of the bilateral common carotid arteries. 4. Stenosis in the V4 segments of the bilateral vertebral arteries. 5. Mild geographic groundglass opacities in the visual was portions of lungs possibly on the basis of small airway or small vessel disease. **This report has been created using voice recognition software.  It may contain minor errors which are inherent in voice recognition technology. **      Final report electronically signed by Dr. Dov Hunter MD on 4/22/2022 1:20 PM      XR CHEST PORTABLE   Final Result   Examination remains limited due to low lung volumes. No interval change is observed. **This report has been created using voice recognition software. It may contain minor errors which are inherent in voice recognition technology. **      Final report electronically signed by Dr Delmy Hernandez on 4/22/2022 12:51 PM      CT HEAD WO CONTRAST   Final Result   No acute intracranial process is visualized. COMMUNICATION: The results of this examination were discussed with Dr. Vanessa Sanabria at 12:40 PM on 4/22/2022. **This report has been created using voice recognition software. It may contain minor errors which are inherent in voice recognition technology. **      Final report electronically signed by Dr Delmy Hernandez on 4/22/2022 12:41 PM          ED Medications administered this visit:   Medications   acetic acid 0.25 % irrigation 30 mL (has no administration in time range)   ascorbic acid (VITAMIN C) tablet 500 mg (has no administration in time range)   baclofen (LIORESAL) tablet 10 mg (has no administration in time range)   calcium-cholecalciferol 500-200 MG-UNIT per tablet 1 tablet (has no administration in time range)   docusate sodium (COLACE) capsule 100 mg (has no administration in time range)   erythromycin (ROMYCIN) ophthalmic ointment (has no administration in time range)   famotidine (PEPCID) tablet 20 mg (has no administration in time range)   gabapentin (NEURONTIN) capsule 600 mg (has no administration in time range)   ibuprofen (ADVIL;MOTRIN) tablet 400 mg (has no administration in time range)   metoprolol tartrate (LOPRESSOR) tablet 25 mg ( Oral Automatically Held 4/25/22 2100)   oxybutynin (DITROPAN-XL) extended release tablet 5 mg (has no administration in time range)   potassium chloride (KLOR-CON) extended release tablet 40 mEq (has no administration in time range)   rivaroxaban (XARELTO) tablet 10 mg (has no administration in time range)   sodium chloride tablet 1 g (has no administration in time range)   tiZANidine (ZANAFLEX) tablet 2 mg (has no administration in time range)   vitamin A capsule 6 mg (has no administration in time range)   vitamin E capsule 400 Units (has no administration in time range)   sodium chloride flush 0.9 % injection 5-40 mL (has no administration in time range)   sodium chloride flush 0.9 % injection 5-40 mL (has no administration in time range)   0.9 % sodium chloride infusion (has no administration in time range)   ondansetron (ZOFRAN-ODT) disintegrating tablet 4 mg (has no administration in time range)     Or   ondansetron (ZOFRAN) injection 4 mg (has no administration in time range)   acetaminophen (TYLENOL) tablet 650 mg (has no administration in time range)     Or   acetaminophen (TYLENOL) suppository 650 mg (has no administration in time range)   polyethylene glycol (GLYCOLAX) packet 17 g (has no administration in time range)   0.9 % sodium chloride infusion (has no administration in time range)   cefTRIAXone (ROCEPHIN) 1,000 mg in sterile water 10 mL IV syringe (has no administration in time range)   iopamidol (ISOVUE-370) 76 % injection 80 mL (80 mLs IntraVENous Given 4/22/22 1226)   acetaminophen (TYLENOL) tablet 1,000 mg (1,000 mg Oral Given 4/22/22 1606)   gadoteridol (PROHANCE) injection 20 mL (20 mLs IntraVENous Given 4/22/22 1516)   0.9 % sodium chloride bolus (2,838 mLs IntraVENous New Bag 4/22/22 1605)   cefTRIAXone (ROCEPHIN) 1,000 mg in sterile water 10 mL IV syringe (1,000 mg IntraVENous Given 4/22/22 1605)   HYDROmorphone (DILAUDID) injection 0.25 mg (0.25 mg IntraVENous Given 4/22/22 1517)         ED COURSE     ED Course as of 04/22/22 2139 Fri Apr 22, 2022   1400 Lactic Acid, Sepsis(!): 2.8 [JT]   1400 CT HEAD WO CONTRAST  IMPRESSION:  No acute intracranial process is visualized. [JT]   1400 XR CHEST PORTABLE     IMPRESSION:  Examination remains limited due to low lung volumes. No interval change is observed. [JT]   1401 CTA NECK W WO CONTRAST (CODE STROKE)  IMPRESSION:     1. No flow-limiting stenosis or aneurysm in the intracranial circulation. 2. Slight mural plaque at the left carotid bulb with 35% stenosis by NASCET criteria. 3. Moderate stenosis at the distal most segments of the bilateral common carotid arteries. 4. Stenosis in the V4 segments of the bilateral vertebral arteries. 5. Mild geographic groundglass opacities in the visual was portions of lungs possibly on the basis of small airway or small vessel disease. [JT]   1408 Urinalysis with Microscopic(!):    Glucose, Urine NEGATIVE   Bilirubin, Urine NEGATIVE   Ketones, Urine NEGATIVE   Specific Gravity, UA 1.017   Blood, Urine LARGE(!)   pH, UA 6.5   Protein, UA 30(!)   Urobilinogen, Urine 0.2   Nitrite, Urine POSITIVE(!)   Leukocyte Esterase, Urine LARGE(!)   Color, UA YELLOW   Character, Urine TURBID(!)   RBC, UA 10-15   WBC, UA 25-50   Epithelial Cells, UA 0-2   Bacteria, UA MANY   Casts NONE SEEN   CRYSTALS,XTAL NONE SEEN   Renal Epithelial, UA NONE SEEN   Yeast, Urine NONE SEEN   Casts NONE SEEN   Miscellaneous Lab Test Result NONE SEEN  Analysis showing signs of a UTI. [JT]   1551 I did contact MRI to ask him if it was possible to begin antibiotics while he is in MRI. They state that this is possible however someone will need to run the antibiotics down there and watch the pump as they are unable to do that. I did ask the charge nurse to begin this process.  [JT]   1613 CBC Auto Differential(!):    WBC 20.3(!)   RBC 5.31   Hemoglobin Quant 15.8   Hematocrit 51.7   MCV 97.4(!)   MCH 29.8   MCHC 30.6(!)   RDW-CV 16.5(!)   RDW-SD 58.6(!)   Platelet Count 771   MPV 8.7(!)   Seg Neutrophils 87.2   Lymphocytes 5.0   Monocytes 6.7   Eosinophils 0.0 Basophils 0.3   Immature Granulocytes 0.8   Segs Absolute 17.7(!)   Lymphocytes Absolute 1.0   Monocytes Absolute 1.4(!)   Eosinophils Absolute 0.0   Basophils Absolute 0.1   Immature Grans (Abs) 0.17(!)   Nucleated Red Blood Cells 0  BC showing a significant white count with neutrophilic predominance. No anemia seen. [JT]      ED Course User Index  [JT] Yenny Mullins MD           MEDICATION CHANGES     Current Discharge Medication List            FINAL DISPOSITION     Final diagnoses:   Sepsis due to urinary tract infection ([facility] [address]     Condition: condition: stable  Dispo: Admit to med/surg floor      This transcription was electronically signed. Parts of this transcriptions may have been dictated by use of voice recognition software and electronically transcribed, and parts may have been transcribed with the assistance of an ED scribe. The transcription may contain errors not detected in proofreading. Please refer to my supervising physician's documentation if my documentation differs.     Electronically Signed: Yenny Mullins MD, 04/22/22, 9:39 PM          Yenny Mullins MD  Resident  04/22/22 0204

## 2022-04-22 NOTE — ED NOTES
Dr. Flor Slaughter verbal for a cancellation of the code stroke.       Nikki Jernigan RN  04/22/22 0959

## 2022-04-22 NOTE — ED NOTES
ED to inpatient nurses report    Chief Complaint   Patient presents with    Altered Mental Status    Extremity Weakness      Present to ED from home  LOC: alert and orientated to name, place, date  Vital signs   Vitals:    04/22/22 1220 04/22/22 1331 04/22/22 1517 04/22/22 1609   BP: 114/64 115/64  101/67   Pulse: 114 111  100   Resp: 16 16 18 14   Temp: 101.9 °F (38.8 °C)      SpO2: 91% 91%  92%   Weight: 208 lb 9.6 oz (94.6 kg)         Oxygen Baseline 94%    Current needs required 2L Nc Bipap/Cpap No  LDAs:   Peripheral IV 04/22/22 Proximal;Right; Anterior Forearm (Active)     Mobility: Requires assistance * 2  Pending ED orders: NA  Present condition: stable      Electronically signed by Fany Wild RN on 4/22/2022 at 5:52 PM       Fany Wild RN  04/22/22 2946

## 2022-04-23 LAB
ANION GAP SERPL CALCULATED.3IONS-SCNC: 8 MEQ/L (ref 8–16)
AVERAGE GLUCOSE: 99 MG/DL (ref 70–126)
BACTERIA: ABNORMAL
BASOPHILS # BLD: 0.3 %
BASOPHILS ABSOLUTE: 0 THOU/MM3 (ref 0–0.1)
BILIRUBIN URINE: NEGATIVE
BLOOD, URINE: ABNORMAL
BUN BLDV-MCNC: 18 MG/DL (ref 7–22)
CALCIUM SERPL-MCNC: 8.9 MG/DL (ref 8.5–10.5)
CASTS: ABNORMAL /LPF
CASTS: ABNORMAL /LPF
CHARACTER, URINE: ABNORMAL
CHLORIDE BLD-SCNC: 102 MEQ/L (ref 98–111)
CO2: 25 MEQ/L (ref 23–33)
COLOR: YELLOW
CREAT SERPL-MCNC: 0.3 MG/DL (ref 0.4–1.2)
CRYSTALS: ABNORMAL
EOSINOPHIL # BLD: 0.8 %
EOSINOPHILS ABSOLUTE: 0.1 THOU/MM3 (ref 0–0.4)
EPITHELIAL CELLS, UA: ABNORMAL /HPF
ERYTHROCYTE [DISTWIDTH] IN BLOOD BY AUTOMATED COUNT: 16.7 % (ref 11.5–14.5)
ERYTHROCYTE [DISTWIDTH] IN BLOOD BY AUTOMATED COUNT: 59.4 FL (ref 35–45)
GFR SERPL CREATININE-BSD FRML MDRD: > 90 ML/MIN/1.73M2
GLUCOSE BLD-MCNC: 97 MG/DL (ref 70–108)
GLUCOSE, URINE: NEGATIVE MG/DL
HBA1C MFR BLD: 5.3 % (ref 4.4–6.4)
HCT VFR BLD CALC: 40.3 % (ref 42–52)
HEMOGLOBIN: 12.4 GM/DL (ref 14–18)
IMMATURE GRANS (ABS): 0.06 THOU/MM3 (ref 0–0.07)
IMMATURE GRANULOCYTES: 0.4 %
KETONES, URINE: NEGATIVE
LEUKOCYTE ESTERASE, URINE: ABNORMAL
LYMPHOCYTES # BLD: 15.2 %
LYMPHOCYTES ABSOLUTE: 2.4 THOU/MM3 (ref 1–4.8)
MCH RBC QN AUTO: 29.7 PG (ref 26–33)
MCHC RBC AUTO-ENTMCNC: 30.8 GM/DL (ref 32.2–35.5)
MCV RBC AUTO: 96.4 FL (ref 80–94)
MISCELLANEOUS LAB TEST RESULT: ABNORMAL
MONOCYTES # BLD: 7.4 %
MONOCYTES ABSOLUTE: 1.2 THOU/MM3 (ref 0.4–1.3)
NITRITE, URINE: POSITIVE
NUCLEATED RED BLOOD CELLS: 0 /100 WBC
OSMOLALITY CALCULATION: 271.9 MOSMOL/KG (ref 275–300)
PH UA: 7 (ref 5–9)
PLATELET # BLD: 159 THOU/MM3 (ref 130–400)
PMV BLD AUTO: 9 FL (ref 9.4–12.4)
POTASSIUM REFLEX MAGNESIUM: 3.9 MEQ/L (ref 3.5–5.2)
PROTEIN UA: 300 MG/DL
RBC # BLD: 4.18 MILL/MM3 (ref 4.7–6.1)
RBC URINE: > 200 /HPF
RENAL EPITHELIAL, UA: ABNORMAL
SEG NEUTROPHILS: 75.9 %
SEGMENTED NEUTROPHILS ABSOLUTE COUNT: 12 THOU/MM3 (ref 1.8–7.7)
SODIUM BLD-SCNC: 135 MEQ/L (ref 135–145)
SPECIFIC GRAVITY UA: > 1.03 (ref 1–1.03)
UROBILINOGEN, URINE: 0.2 EU/DL (ref 0–1)
WBC # BLD: 15.8 THOU/MM3 (ref 4.8–10.8)
WBC UA: > 200 /HPF
YEAST: ABNORMAL

## 2022-04-23 PROCEDURE — 6370000000 HC RX 637 (ALT 250 FOR IP)

## 2022-04-23 PROCEDURE — 83036 HEMOGLOBIN GLYCOSYLATED A1C: CPT

## 2022-04-23 PROCEDURE — 85025 COMPLETE CBC W/AUTO DIFF WBC: CPT

## 2022-04-23 PROCEDURE — 36415 COLL VENOUS BLD VENIPUNCTURE: CPT

## 2022-04-23 PROCEDURE — 80048 BASIC METABOLIC PNL TOTAL CA: CPT

## 2022-04-23 PROCEDURE — 6370000000 HC RX 637 (ALT 250 FOR IP): Performed by: STUDENT IN AN ORGANIZED HEALTH CARE EDUCATION/TRAINING PROGRAM

## 2022-04-23 PROCEDURE — 99233 SBSQ HOSP IP/OBS HIGH 50: CPT | Performed by: INTERNAL MEDICINE

## 2022-04-23 PROCEDURE — 2700000000 HC OXYGEN THERAPY PER DAY

## 2022-04-23 PROCEDURE — 2500000003 HC RX 250 WO HCPCS

## 2022-04-23 PROCEDURE — 6360000002 HC RX W HCPCS

## 2022-04-23 PROCEDURE — 2060000000 HC ICU INTERMEDIATE R&B

## 2022-04-23 PROCEDURE — 2580000003 HC RX 258

## 2022-04-23 RX ADMIN — Medication 6 MG: at 08:52

## 2022-04-23 RX ADMIN — GABAPENTIN 600 MG: 300 CAPSULE ORAL at 14:36

## 2022-04-23 RX ADMIN — DOCUSATE SODIUM 100 MG: 100 CAPSULE, LIQUID FILLED ORAL at 08:52

## 2022-04-23 RX ADMIN — BACLOFEN 10 MG: 10 TABLET ORAL at 08:52

## 2022-04-23 RX ADMIN — FAMOTIDINE 20 MG: 20 TABLET ORAL at 08:52

## 2022-04-23 RX ADMIN — POTASSIUM CHLORIDE 40 MEQ: 750 TABLET, EXTENDED RELEASE ORAL at 08:52

## 2022-04-23 RX ADMIN — Medication 1 TABLET: at 08:52

## 2022-04-23 RX ADMIN — TIZANIDINE 2 MG: 4 TABLET ORAL at 08:52

## 2022-04-23 RX ADMIN — SODIUM CHLORIDE: 9 INJECTION, SOLUTION INTRAVENOUS at 21:14

## 2022-04-23 RX ADMIN — BACLOFEN 10 MG: 10 TABLET ORAL at 21:09

## 2022-04-23 RX ADMIN — OXYCODONE HYDROCHLORIDE AND ACETAMINOPHEN 500 MG: 500 TABLET ORAL at 08:52

## 2022-04-23 RX ADMIN — Medication 400 UNITS: at 08:52

## 2022-04-23 RX ADMIN — BACLOFEN 10 MG: 10 TABLET ORAL at 14:36

## 2022-04-23 RX ADMIN — Medication 1 TABLET: at 21:08

## 2022-04-23 RX ADMIN — GABAPENTIN 600 MG: 300 CAPSULE ORAL at 08:52

## 2022-04-23 RX ADMIN — RIVAROXABAN 10 MG: 10 TABLET, FILM COATED ORAL at 18:04

## 2022-04-23 RX ADMIN — TIZANIDINE 2 MG: 4 TABLET ORAL at 21:07

## 2022-04-23 RX ADMIN — OXYBUTYNIN CHLORIDE 5 MG: 5 TABLET, EXTENDED RELEASE ORAL at 08:52

## 2022-04-23 RX ADMIN — SODIUM CHLORIDE, PRESERVATIVE FREE 10 ML: 5 INJECTION INTRAVENOUS at 21:09

## 2022-04-23 RX ADMIN — ERYTHROMYCIN: 5 OINTMENT OPHTHALMIC at 08:52

## 2022-04-23 RX ADMIN — SODIUM CHLORIDE: 9 INJECTION, SOLUTION INTRAVENOUS at 00:30

## 2022-04-23 RX ADMIN — TIZANIDINE 2 MG: 4 TABLET ORAL at 14:36

## 2022-04-23 RX ADMIN — FAMOTIDINE 20 MG: 20 TABLET ORAL at 21:09

## 2022-04-23 RX ADMIN — SODIUM CHLORIDE 1 G: 1 TABLET ORAL at 08:52

## 2022-04-23 RX ADMIN — SODIUM CHLORIDE 1 G: 1 TABLET ORAL at 18:04

## 2022-04-23 RX ADMIN — CEFTRIAXONE SODIUM 1000 MG: 10 INJECTION, POWDER, FOR SOLUTION INTRAVENOUS at 16:02

## 2022-04-23 RX ADMIN — GABAPENTIN 600 MG: 300 CAPSULE ORAL at 21:07

## 2022-04-23 RX ADMIN — METOPROLOL TARTRATE 25 MG: 50 TABLET, FILM COATED ORAL at 23:49

## 2022-04-23 ASSESSMENT — PAIN SCALES - GENERAL: PAINLEVEL_OUTOF10: 0

## 2022-04-23 ASSESSMENT — PAIN DESCRIPTION - LOCATION: LOCATION: GENERALIZED

## 2022-04-23 NOTE — PROGRESS NOTES
Physician Progress Note      PATIENT:               Marguerite Car  CSN #:                  [id number]  :                       1955  ADMIT DATE:       2022 12:19 PM  [address] [place] DATE:  RESPONDING  PROVIDER #:        Aimee Martínez          QUERY TEXT:    Pt admitted with sepsis due to UTI. Per H&P, pt noted to have \"Altered mental   status POA likely secondary to UTI. \"  If possible, please respond below and   document in the progress notes and discharge summary if you are evaluating and   / or treating any of the following: The medical record reflects the following:  Risk Factors: sepsis due to UTI, age 61  Clinical Indicators: Per H&P, pt noted to have \"Altered mental status POA   likely secondary to UTI. \"  Treatment: IV Rocephin, IVF, Tylenol, labs, imaging    Thank you! Verito Beyer RN, BSN, RHIT, CCDS  Clinical   Options provided:  -- Metabolic encephalopathy  -- Septic encephalopathy  -- Toxic encephalopathy  -- Toxic metabolic encephalopathy  -- Delirium due to, Please specify cause. -- Other - I will add my own diagnosis  -- Disagree - Not applicable / Not valid  -- Disagree - Clinically unable to determine / Unknown  -- Refer to Clinical Documentation Reviewer    PROVIDER RESPONSE TEXT:    This patient has metabolic encephalopathy.     Query created by: Severa Ona on 2022 1:52 PM      Electronically signed by:  Aimee Martínez 2022 1:57 PM

## 2022-04-23 NOTE — PLAN OF CARE
Problem: Discharge Planning  Goal: Discharge to home or other facility with appropriate resources  Outcome: Progressing     Problem: Pain  Goal: Verbalizes/displays adequate comfort level or baseline comfort level  Outcome: Progressing     Problem: Skin/Tissue Integrity  Goal: Absence of new skin breakdown  Description: 1. Monitor for areas of redness and/or skin breakdown  2. Assess vascular access sites hourly  3. Every 4-6 hours minimum:  Change oxygen saturation probe site  4. Every 4-6 hours:  If on nasal continuous positive airway pressure, respiratory therapy assess nares and determine need for appliance change or resting period.   Outcome: Progressing     Problem: Safety - Adult  Goal: Free from fall injury  Outcome: Progressing

## 2022-04-23 NOTE — PROGRESS NOTES
Hospitalist Progress Note    Patient:  Cheng Andrade      Unit/Bed:4K-15/015-A    YOB: 1955    MRN: [id number]       Acct: [de-identified]     PCP: Devorah Corral MD    Date of Admission: 4/22/2022    Assessment/Plan:    1. Sepsis POA likely secondary to complicated UTI from chronic indwelling urinary catheter: The patient mets SIRs criteria and was noted to be febrile with temperature 101.9, respiratory rate 16, pulse 114, and blood pressure 95/61 and on room air but placed on comfort oxygen 2 L 95%; the patient had a UA done that showed a positive leukocyte esterase and nitrite; the patient's WBC was noted 20.3; patient was given rocephin and fluids on admission  -Blood cultures ordered and pending  -Urine cultures ordered and pending  -Repeat UA confirmed patient has UTI with blood and leukocyte esterase and positive nitrite  -Started the patient on IV rocephin; currently on day 2  -Will give maintenance fluids at 100 mL/hr.   -Re-ordered vitamin C which can help in the prophylaxis of UTI's  -Will exchange patient's cathether during this admission  -Daily weights  -Strict I's and O's  -Up with assistance  2. Metabolic encephalopathy POA likely secondary to UTI: The patient originally came in with altered mental status and had a code stroke called as the patient was found to have more pronounced right sided weakness as opposed to left sided weakness; a code stroke was called as a result; patient had a CT scan, CTA, and MRI that were within normal limits and upon arrival the patient's speech appeared to be cleared and resolved  -Continue to treat UTI as above  3.  Neurogenic bladder s/p urinary catheter placement: The patient had a urinary catheter placed 10 years ago by Dr. Nuria Nowak and typically has it exchanged every 2-3 months which is followed by Dr. Arely Dexter, the patient also had a cystoscopy last year that resulted in the removal of a stone  -Exchange urinary cathether as needed  -Continue oxybutynin  4. History of pulmonary embolism:   -Continue Xarelto 10 mg oral daily  5. Essential hypertension-currently hypotensive: Per the patient the patient's blood pressures typically run but have been on the normotensive/lower side since admission  -Holding metoprolol at this time due to lower blood pressures  6. History of multiple sclerosis: [facility]  -Patient was previously on prednisone but is not currently on any medications for multiple sclerosis at this time  -Continue baclofen  7. Chronic pain: The patient reports chronic pain that he notices in his jaw  -Continue tizanidine  -Continue ibuprofen prn  -Continue norco prn  -Continue pregabalin  9. History of cataracts: The patient is known to have a history of cataracts but has no known history of diabetes  -Continue erythromycin ointment  -Continue acetic acetic acid 0.25% irrigation 30 mL eye drops  10. Chronic constipation:   -Continue docusate  11. Sacral wound: This was noticed incidentally on physical exam and the patient tells me that he hasn't been prescribed any medications for multiple sclerosis due to this wound that has been being taken care of by a wound doctor  -Wound ostomy consulted; appreciate recs  -Apply dressings      Expected discharge date:  Possibly Monday? Disposition:    [] Home       [] TCU       [] Rehab       [] Psych       [x] SNF       [] [facility]       [] Other-    Chief Complaint: altered mental status    Hospital Course: This is a 61year old male PMH pulmonary embolism currently on Eliquis, history of chronic UTI's currently on suprapubic cathether that is followed by Dr. Almond Siemens, essential hypertension, major depressive disorder, multiple sclerosis not currently on medications, neurogenic bladder s/p catheter placement by Dr. Indio Wang, who is coming in with complaints of altered mental status.  The patient was reportedly feeling like himself at about 8 AM this morning at his nursing home [facility], but all of a sudden, the patient started to feel confused and spiked a temperature of 101 degrees Fahrenheit. He denied any fever, chills, chest pain, shortness of breath, nausea, vomiting, diarrhea, or constipation. The patient typically has his suprapubic cathether exchanged every 2-3 months and had it placed about 10 years ago. The patient has been feeling mostly lethargic over the course of the day, but his mentation was restored by the time he reached the hospital.      In the ER, the patient's vitals were significant for T 101.9, respiratory rate 16, pulse 114, and blood pressure 114/64. The patient had a code stroke called that was later cancelled as the patient had a right sided pronator drift. The patient had overall negative stroke workup including negative CT head, negative CTA, and negative MRI. The patient's WBC was 20.3, glucose was 246, alkaline phosphatase was 154, total protein was 8.1. The patient's blood cultures are currently pending. The patient's EKG ended up showing sinus tachycardia. The patient's lactate in the ER was 2.8. The patient's UA showed large blood in the urine, with a positive nitrite, large leukocyte esterase, and turbid character urine. The patient's urine culture results at this time are still pending. Subjective (past 24 hours): The patient was not reporting any symptoms at this time. The patient reported that he was not started on any multiple sclerosis medications at this time as he has had a sacral wound that has been being addressed by the wound doctor. ROS (12 point review of systems completed. Pertinent positives noted. Otherwise ROS is negative).     Medications:  Reviewed    Infusion Medications    sodium chloride      sodium chloride 100 mL/hr at 04/23/22 0848     Scheduled Medications    acetic acid  30 mL Irrigation BID    ascorbic acid  500 mg Oral Daily    baclofen  10 mg Oral TID    calcium-cholecalciferol  1 tablet Refill: Brisk,< 3 seconds   Peripheral Pulses: +2 palpable, equal bilaterally       Labs:   Recent Labs     04/22/22  1231 04/23/22  0351   WBC 20.3* 15.8*   HGB 15.8 12.4*   HCT 51.7 40.3*    159     Recent Labs     04/22/22  1231 04/23/22  0351    135   K 4.8 3.9    102   CO2 24 25   BUN 21 18   CREATININE 0.5 0.3*   CALCIUM 9.2 8.9     Recent Labs     04/22/22  1231   AST 20   ALT 18   BILITOT 0.4   ALKPHOS 154*     Recent Labs     04/22/22  1231   INR 1.86*     No results for input(s): Rafael Huntley in the last 72 hours. Microbiology:      Urinalysis:      Lab Results   Component Value Date    NITRU POSITIVE 04/22/2022    WBCUA > 200 04/22/2022    WBCUA >200 01/20/2012    BACTERIA FEW 04/22/2022    RBCUA > 200 04/22/2022    BLOODU LARGE 04/22/2022    SPECGRAV >1.030 04/22/2022    GLUCOSEU NEGATIVE 12/27/2020       Radiology:  MRI BRAIN W WO CONTRAST   Final Result       1. No evidence of acute intracranial abnormality. 2. Stable patchy areas of T2/FLAIR hyperintensity in the supratentorial white matter in keeping with history of multiple sclerosis. No new/interval lesion or enhancing lesion suggest active demyelination. **This report has been created using voice recognition software. It may contain minor errors which are inherent in voice recognition technology. **         Final report electronically signed by Dr. Tawnya Monique MD on 4/22/2022 4:13 PM      MRI CERVICAL SPINE W WO CONTRAST   Final Result      1. Motion degraded exam without convincing focal area of abnormal T2 signal or enhancement in the cord. 2. Focal area of cord thinning at the C6 level as evidence for myelomalacia.    3. Multilevel degenerative changes of the cervical spine most pronounced at C3-4 where there is no significant spinal canal stenosis but with left lateral recess narrowing and severe bilateral neural foraminal stenosis in association with uncovertebral    joint degenerative change. **This report has been created using voice recognition software. It may contain minor errors which are inherent in voice recognition technology. **      Final report electronically signed by Dr. Willie Kyle MD on 4/22/2022 4:20 PM      MRI THORACIC SPINE W WO CONTRAST   Final Result    Stable lung segment of thinning of the thoracic spinal cord involving the T8-T10 levels with chronic hyperintense T2 signal abnormality as evidence for myelomalacia. No new/interval focal areas of abnormal signal cord signal or enhancement are    identified to suggest active demyelination. **This report has been created using voice recognition software. It may contain minor errors which are inherent in voice recognition technology. **      Final report electronically signed by Dr. Willie Kyle MD on 4/22/2022 4:25 PM      CTA HEAD W WO CONTRAST (CODE STROKE)   Final Result       1. No flow-limiting stenosis or aneurysm in the intracranial circulation. 2. Slight mural plaque at the left carotid bulb with 35% stenosis by NASCET criteria. 3. Moderate stenosis at the distal most segments of the bilateral common carotid arteries. 4. Stenosis in the V4 segments of the bilateral vertebral arteries. 5. Mild geographic groundglass opacities in the visual was portions of lungs possibly on the basis of small airway or small vessel disease. **This report has been created using voice recognition software. It may contain minor errors which are inherent in voice recognition technology. **      Final report electronically signed by Dr. Willie Kyle MD on 4/22/2022 1:20 PM      CTA NECK W WO CONTRAST (CODE STROKE)   Final Result       1. No flow-limiting stenosis or aneurysm in the intracranial circulation. 2. Slight mural plaque at the left carotid bulb with 35% stenosis by NASCET criteria.    3. Moderate stenosis at the distal most segments of the bilateral common carotid arteries. 4. Stenosis in the V4 segments of the bilateral vertebral arteries. 5. Mild geographic groundglass opacities in the visual was portions of lungs possibly on the basis of small airway or small vessel disease. **This report has been created using voice recognition software. It may contain minor errors which are inherent in voice recognition technology. **      Final report electronically signed by Dr. Jina Panda MD on 4/22/2022 1:20 PM      XR CHEST PORTABLE   Final Result   Examination remains limited due to low lung volumes. No interval change is observed. **This report has been created using voice recognition software. It may contain minor errors which are inherent in voice recognition technology. **      Final report electronically signed by Dr Sav Smith on 4/22/2022 12:51 PM      CT HEAD WO CONTRAST   Final Result   No acute intracranial process is visualized. COMMUNICATION: The results of this examination were discussed with Dr. Nestor Carlos at 12:40 PM on 4/22/2022. **This report has been created using voice recognition software. It may contain minor errors which are inherent in voice recognition technology. **      Final report electronically signed by Dr Sav Smith on 4/22/2022 12:41 PM          DVT prophylaxis: [] Lovenox                                 [] SCDs                                 [] SQ Heparin                                 [] Encourage ambulation           [x] Already on Anticoagulation     Code Status: Full Code    PT/OT Eval Status: pending    Tele:   [x] yes             [] no    Electronically signed by Aryan Doe DO on 4/23/2022 at 2:14 PM

## 2022-04-23 NOTE — PROGRESS NOTES
Physician Progress Note      PATIENT:               Alexandro Barney  CSN #:                  [id number]  :                       1955  ADMIT DATE:       2022 12:19 PM  [address] [place] DATE:  RESPONDING  PROVIDER #:        Sue Tran          QUERY TEXT:    Attending,    Pt admitted with sepsis due to UTI. Pt noted to have a urinary catheter due   to neurogenic bladder. If possible, please respond below and document in the   progress notes and discharge summary if you are evaluating and/or treating any   of the following: The medical record reflects the following:  Risk Factors: urinary catheter due to neurogenic bladder, MS  Clinical Indicators: UTI in the setting of neurogenic bladder and urinary   catheter  Treatment: IV Rocephin, IVF, Tylenol, labs    Thank you! Cadence Wyatt RN, BSN, IT, CCDS  Clinical   Options provided:  -- UTI due to chronic indwelling urinary catheter, POA  -- UTI not due to indwelling urinary catheter  -- Other - I will add my own diagnosis  -- Disagree - Not applicable / Not valid  -- Disagree - Clinically unable to determine / Unknown  -- Refer to Clinical Documentation Reviewer    PROVIDER RESPONSE TEXT:    UTI is due to the chronic indwelling urinary catheter, POA.     Query created by: Marquita Bravo on 2022 1:48 PM      Electronically signed by:  Sue Tran 2022 1:52 PM

## 2022-04-24 LAB
ANION GAP SERPL CALCULATED.3IONS-SCNC: 9 MEQ/L (ref 8–16)
BUN BLDV-MCNC: 12 MG/DL (ref 7–22)
CALCIUM SERPL-MCNC: 8.4 MG/DL (ref 8.5–10.5)
CHLORIDE BLD-SCNC: 102 MEQ/L (ref 98–111)
CO2: 23 MEQ/L (ref 23–33)
CREAT SERPL-MCNC: 0.2 MG/DL (ref 0.4–1.2)
ERYTHROCYTE [DISTWIDTH] IN BLOOD BY AUTOMATED COUNT: 16.1 % (ref 11.5–14.5)
ERYTHROCYTE [DISTWIDTH] IN BLOOD BY AUTOMATED COUNT: 56.8 FL (ref 35–45)
GFR SERPL CREATININE-BSD FRML MDRD: > 90 ML/MIN/1.73M2
GLUCOSE BLD-MCNC: 101 MG/DL (ref 70–108)
GLUCOSE BLD-MCNC: 147 MG/DL (ref 70–108)
HCT VFR BLD CALC: 42.3 % (ref 42–52)
HEMOGLOBIN: 13.1 GM/DL (ref 14–18)
MCH RBC QN AUTO: 29.5 PG (ref 26–33)
MCHC RBC AUTO-ENTMCNC: 31 GM/DL (ref 32.2–35.5)
MCV RBC AUTO: 95.3 FL (ref 80–94)
PLATELET # BLD: 145 THOU/MM3 (ref 130–400)
PMV BLD AUTO: 8.6 FL (ref 9.4–12.4)
POTASSIUM SERPL-SCNC: 4.6 MEQ/L (ref 3.5–5.2)
RBC # BLD: 4.44 MILL/MM3 (ref 4.7–6.1)
SODIUM BLD-SCNC: 134 MEQ/L (ref 135–145)
WBC # BLD: 7.7 THOU/MM3 (ref 4.8–10.8)

## 2022-04-24 PROCEDURE — 80048 BASIC METABOLIC PNL TOTAL CA: CPT

## 2022-04-24 PROCEDURE — 6370000000 HC RX 637 (ALT 250 FOR IP): Performed by: STUDENT IN AN ORGANIZED HEALTH CARE EDUCATION/TRAINING PROGRAM

## 2022-04-24 PROCEDURE — 99232 SBSQ HOSP IP/OBS MODERATE 35: CPT | Performed by: INTERNAL MEDICINE

## 2022-04-24 PROCEDURE — 36415 COLL VENOUS BLD VENIPUNCTURE: CPT

## 2022-04-24 PROCEDURE — 85027 COMPLETE CBC AUTOMATED: CPT

## 2022-04-24 PROCEDURE — 2060000000 HC ICU INTERMEDIATE R&B

## 2022-04-24 PROCEDURE — 2500000003 HC RX 250 WO HCPCS

## 2022-04-24 PROCEDURE — 6360000002 HC RX W HCPCS

## 2022-04-24 PROCEDURE — 6370000000 HC RX 637 (ALT 250 FOR IP)

## 2022-04-24 PROCEDURE — 2580000003 HC RX 258

## 2022-04-24 PROCEDURE — 6360000002 HC RX W HCPCS: Performed by: STUDENT IN AN ORGANIZED HEALTH CARE EDUCATION/TRAINING PROGRAM

## 2022-04-24 PROCEDURE — 82948 REAGENT STRIP/BLOOD GLUCOSE: CPT

## 2022-04-24 RX ORDER — HYDRALAZINE HYDROCHLORIDE 20 MG/ML
5 INJECTION INTRAMUSCULAR; INTRAVENOUS EVERY 6 HOURS PRN
Status: DISCONTINUED | OUTPATIENT
Start: 2022-04-24 | End: 2022-04-25 | Stop reason: HOSPADM

## 2022-04-24 RX ADMIN — GABAPENTIN 600 MG: 300 CAPSULE ORAL at 09:19

## 2022-04-24 RX ADMIN — OXYCODONE HYDROCHLORIDE AND ACETAMINOPHEN 500 MG: 500 TABLET ORAL at 09:19

## 2022-04-24 RX ADMIN — FAMOTIDINE 20 MG: 20 TABLET ORAL at 09:19

## 2022-04-24 RX ADMIN — METOPROLOL TARTRATE 25 MG: 50 TABLET, FILM COATED ORAL at 20:31

## 2022-04-24 RX ADMIN — GABAPENTIN 600 MG: 300 CAPSULE ORAL at 20:31

## 2022-04-24 RX ADMIN — CEFTRIAXONE SODIUM 1000 MG: 10 INJECTION, POWDER, FOR SOLUTION INTRAVENOUS at 16:54

## 2022-04-24 RX ADMIN — OXYBUTYNIN CHLORIDE 5 MG: 5 TABLET, EXTENDED RELEASE ORAL at 09:19

## 2022-04-24 RX ADMIN — TIZANIDINE 2 MG: 4 TABLET ORAL at 20:31

## 2022-04-24 RX ADMIN — SODIUM CHLORIDE, PRESERVATIVE FREE 10 ML: 5 INJECTION INTRAVENOUS at 09:20

## 2022-04-24 RX ADMIN — ACETIC ACID 30 ML: 250 IRRIGANT IRRIGATION at 12:35

## 2022-04-24 RX ADMIN — ERYTHROMYCIN: 5 OINTMENT OPHTHALMIC at 11:43

## 2022-04-24 RX ADMIN — SODIUM CHLORIDE 1 G: 1 TABLET ORAL at 09:19

## 2022-04-24 RX ADMIN — DOCUSATE SODIUM 100 MG: 100 CAPSULE, LIQUID FILLED ORAL at 09:19

## 2022-04-24 RX ADMIN — TIZANIDINE 2 MG: 4 TABLET ORAL at 14:17

## 2022-04-24 RX ADMIN — Medication 400 UNITS: at 09:19

## 2022-04-24 RX ADMIN — METOPROLOL TARTRATE 25 MG: 50 TABLET, FILM COATED ORAL at 09:20

## 2022-04-24 RX ADMIN — BACLOFEN 10 MG: 10 TABLET ORAL at 20:31

## 2022-04-24 RX ADMIN — HYDRALAZINE HYDROCHLORIDE 5 MG: 20 INJECTION, SOLUTION INTRAMUSCULAR; INTRAVENOUS at 14:42

## 2022-04-24 RX ADMIN — SODIUM CHLORIDE 1 G: 1 TABLET ORAL at 17:09

## 2022-04-24 RX ADMIN — SODIUM CHLORIDE, PRESERVATIVE FREE 10 ML: 5 INJECTION INTRAVENOUS at 20:33

## 2022-04-24 RX ADMIN — Medication 6 MG: at 09:19

## 2022-04-24 RX ADMIN — POTASSIUM CHLORIDE 40 MEQ: 750 TABLET, EXTENDED RELEASE ORAL at 09:19

## 2022-04-24 RX ADMIN — Medication 1 TABLET: at 09:19

## 2022-04-24 RX ADMIN — RIVAROXABAN 10 MG: 10 TABLET, FILM COATED ORAL at 17:09

## 2022-04-24 RX ADMIN — TIZANIDINE 2 MG: 4 TABLET ORAL at 09:19

## 2022-04-24 RX ADMIN — BACLOFEN 10 MG: 10 TABLET ORAL at 14:17

## 2022-04-24 RX ADMIN — Medication 1 TABLET: at 20:31

## 2022-04-24 RX ADMIN — BACLOFEN 10 MG: 10 TABLET ORAL at 09:19

## 2022-04-24 RX ADMIN — FAMOTIDINE 20 MG: 20 TABLET ORAL at 20:34

## 2022-04-24 RX ADMIN — ACETIC ACID 30 ML: 250 IRRIGANT IRRIGATION at 20:32

## 2022-04-24 RX ADMIN — GABAPENTIN 600 MG: 300 CAPSULE ORAL at 14:17

## 2022-04-24 RX ADMIN — SODIUM CHLORIDE: 9 INJECTION, SOLUTION INTRAVENOUS at 07:19

## 2022-04-24 ASSESSMENT — PAIN SCALES - GENERAL: PAINLEVEL_OUTOF10: 0

## 2022-04-24 NOTE — PLAN OF CARE
Problem: Discharge Planning  Goal: Discharge to home or other facility with appropriate resources  Outcome: Progressing  Note: Discussed plan with resident to possibly discharge back to [facility], pt agreeable. Problem: Pain  Goal: Verbalizes/displays adequate comfort level or baseline comfort level  Outcome: Progressing  Note: Pt verbalizes comfort level and has denied pain this shift. Repositioned every 2 hours and as needed. Problem: Skin/Tissue Integrity  Goal: Absence of new skin breakdown  Description: 1. Monitor for areas of redness and/or skin breakdown  2. Assess vascular access sites hourly  3. Every 4-6 hours minimum:  Change oxygen saturation probe site  4. Every 4-6 hours:  If on nasal continuous positive airway pressure, respiratory therapy assess nares and determine need for appliance change or resting period. Outcome: Progressing  Note: No sxs new skin breakdown this shift. Care plan reviewed with patient. Patient verbalizes understanding of the plan of care and contributes to goal setting.

## 2022-04-24 NOTE — PROGRESS NOTES
Hospitalist Progress Note    Patient:  Dariel Agee      Unit/Bed:4K-15/015-A    YOB: 1955    MRN: [id number]       Acct: [de-identified]     PCP: Davi Radford MD    Date of Admission: 4/22/2022    Assessment/Plan:    Complicated UTI -patient has chronic suprapubic catheter. History of UTI in the past . UA on admission was positive for UTI. Urine culture was not collected initially. Suprapubic catheter was exchanged 4/22/22. Urine culture does reveal growing of gram-negative bacilli with > 100,000 CFU. Continue Rocephin 1 g every 24 hours for 5 days. Pending sensitivities. Continue ascorbic acid 500 milligrams daily to prevent UTI. Neurogenic bladder -with chronic suprapubic catheter, placed 10 years ago by Dr. Zulema Villar. He does have it exchanged every 2 to 3 months and currently follows Dr. Brad Mauro. Patient will likely need more frequent urinary cath exchanges. Continue oxybutynin 5 mg daily. Continue acetic acid bladder irrigation twice daily. Essential hypertension -on presentation patient was found to be septic and hypotensive, Lopressor 25 mg twice a day was held. 4/23/22 Resumed Lopressor 25 mg twice a day with holding parameters. 4/24/22 Blood pressure today elevated as high as 183/89. No symptoms of headache / blurry vision / change in vision / dizziness / lightheadedness. Continue to closely monitor. May consider adding PRN Hydralazine if SBP remains elevated > 180. Hyperglycemia -blood glucose on presentation noted to be elevated at 246. Recent hemoglobin A1c 4/23/22 5.3. Glucose this morning mildly elevated at 101. No blood sugars have been checked with meals. POC glucose with meals and at bedtime. No insulin coverage at this time. Closely monitor blood glucose. Multiple sclerosis, history of -patient follows with neurology (Miriam Arndt NP) and is not on any MS treatment.     Continue baclofen     Pulmonary embolism, history of  Continue Xarelto 10 mg daily. Cataracts, history of  Continue erythromycin ointment, continue eyedrops. Altered mental status -resolved. On presentation, it was reported that patient was less alert at his nursing facility with right sided arm weakness. Code stroke was called in the ED and patient was evaluated by neurology. Neurology work-up for stroke was completed, negative findings for acute stroke. Sepsis - resolved. On presentation patient met 3/4 SIRS criteria (febrile temp 101.9 / WBC elevated 20.3 / tachycardic with HR > 90) with UTI as source of infection. S/p 30 cc/kg. Initiated on maintenance fluids. Fluids now off. Expected discharge date: TBD    Disposition:    [] Home       [] TCU       [] Rehab       [] Psych       [x] SNF       [] [facility]       [] Other-    Chief Complaint: AMS    Hospital Course: Per prior HPI: 61year old male PMH pulmonary embolism currently on Eliquis, history of chronic UTI's currently on suprapubic cathether that is followed by Dr. Jose Farmer, essential hypertension, major depressive disorder, multiple sclerosis not currently on medications, neurogenic bladder s/p catheter placement by Dr. Maty Dove, who is coming in with complaints of altered mental status. The patient was reportedly feeling like himself at about 8 AM this morning at his nursing home [facility], but all of a sudden, the patient started to feel confused and spiked a temperature of 101 degrees Fahrenheit. He denied any fever, chills, chest pain, shortness of breath, nausea, vomiting, diarrhea, or constipation. The patient typically has his suprapubic cathether exchanged every 2-3 months and had it placed about 10 years ago. The patient has been feeling mostly lethargic over the course of the day, but his mentation was restored by the time he reached the hospital.  On presentation, patient's UA showed large blood in the urine with a positive nitrite, large leukocyte esterase and turbid urine.   He was started on Rocephin. Subjective (past 24 hours):     Patient seen and examined this morning. Patient eating breakfast.  States that he is feeling better. Denies chest pain or shortness of breath. No acute events overnight      ROS (12 point review of systems completed. Pertinent positives noted. Otherwise ROS is negative). Medications:  Reviewed    Infusion Medications    sodium chloride       Scheduled Medications    acetic acid  30 mL Irrigation BID    ascorbic acid  500 mg Oral Daily    baclofen  10 mg Oral TID    calcium-cholecalciferol  1 tablet Oral BID    docusate sodium  100 mg Oral Daily    erythromycin   Right Eye Daily    famotidine  20 mg Oral BID    gabapentin  600 mg Oral TID    metoprolol tartrate  25 mg Oral BID    oxybutynin  5 mg Oral Daily    potassium chloride  40 mEq Oral Daily    rivaroxaban  10 mg Oral Daily    sodium chloride  1 g Oral BID WC    tiZANidine  2 mg Oral TID    vitamin A  20,000 Units Oral Daily    vitamin E  400 Units Oral Daily    sodium chloride flush  5-40 mL IntraVENous 2 times per day    cefTRIAXone (ROCEPHIN) IV  1,000 mg IntraVENous Q24H     PRN Meds: ibuprofen, sodium chloride flush, sodium chloride, ondansetron **OR** ondansetron, acetaminophen **OR** acetaminophen, polyethylene glycol      Intake/Output Summary (Last 24 hours) at 4/24/2022 1354  Last data filed at 4/24/2022 1330  Gross per 24 hour   Intake 2654.81 ml   Output 3200 ml   Net -545.19 ml       Diet:  ADULT DIET; Regular    Exam:  BP (!) 181/88   Pulse 78   Temp 98.5 °F (36.9 °C) (Oral)   Resp 18   Wt 218 lb 11.1 oz (99.2 kg)   SpO2 95%   BMI 34.25 kg/m²     General appearance: No apparent distress, appears stated age and cooperative. HEENT: Pupils equal, round, and reactive to light. Conjunctivae/corneas clear. Neck: Supple, with full range of motion. No jugular venous distention. Trachea midline. Respiratory:  Normal respiratory effort.  Clear to auscultation, bilaterally without Rales/Wheezes/Rhonchi. Cardiovascular: Regular rate and rhythm with normal S1/S2 without murmurs, rubs or gallops. Abdomen: Soft, non-tender, non-distended with normal bowel sounds. Musculoskeletal: 5/5 bilateral upper extremities, 0/5 bilateral lower extremities  Psychiatric: Alert and oriented      Labs:   Recent Labs     04/22/22  1231 04/23/22  0351 04/24/22  0433   WBC 20.3* 15.8* 7.7   HGB 15.8 12.4* 13.1*   HCT 51.7 40.3* 42.3    159 145     Recent Labs     04/22/22  1231 04/23/22  0351 04/24/22  0433    135 134*   K 4.8 3.9 4.6    102 102   CO2 24 25 23   BUN 21 18 12   CREATININE 0.5 0.3* 0.2*   CALCIUM 9.2 8.9 8.4*     Recent Labs     04/22/22  1231   AST 20   ALT 18   BILITOT 0.4   ALKPHOS 154*     Recent Labs     04/22/22  1231   INR 1.86*     No results for input(s): CKTOTAL, TROPONINI in the last 72 hours. Microbiology:      Urinalysis:      Lab Results   Component Value Date    NITRU POSITIVE 04/22/2022    WBCUA > 200 04/22/2022    WBCUA >200 01/20/2012    BACTERIA FEW 04/22/2022    RBCUA > 200 04/22/2022    BLOODU LARGE 04/22/2022    SPECGRAV >1.030 04/22/2022    GLUCOSEU NEGATIVE 12/27/2020       Radiology:  MRI BRAIN W WO CONTRAST   Final Result       1. No evidence of acute intracranial abnormality. 2. Stable patchy areas of T2/FLAIR hyperintensity in the supratentorial white matter in keeping with history of multiple sclerosis. No new/interval lesion or enhancing lesion suggest active demyelination. **This report has been created using voice recognition software. It may contain minor errors which are inherent in voice recognition technology. **         Final report electronically signed by Dr. Nannette Osorio MD on 4/22/2022 4:13 PM      MRI CERVICAL SPINE W WO CONTRAST   Final Result      1. Motion degraded exam without convincing focal area of abnormal T2 signal or enhancement in the cord.    2. Focal area of cord thinning at the C6 level as evidence for myelomalacia. 3. Multilevel degenerative changes of the cervical spine most pronounced at C3-4 where there is no significant spinal canal stenosis but with left lateral recess narrowing and severe bilateral neural foraminal stenosis in association with uncovertebral    joint degenerative change. **This report has been created using voice recognition software. It may contain minor errors which are inherent in voice recognition technology. **      Final report electronically signed by Dr. Renetta Morrissey MD on 4/22/2022 4:20 PM      MRI THORACIC SPINE W WO CONTRAST   Final Result    Stable lung segment of thinning of the thoracic spinal cord involving the T8-T10 levels with chronic hyperintense T2 signal abnormality as evidence for myelomalacia. No new/interval focal areas of abnormal signal cord signal or enhancement are    identified to suggest active demyelination. **This report has been created using voice recognition software. It may contain minor errors which are inherent in voice recognition technology. **      Final report electronically signed by Dr. Renetta Morrissey MD on 4/22/2022 4:25 PM      CTA HEAD W WO CONTRAST (CODE STROKE)   Final Result       1. No flow-limiting stenosis or aneurysm in the intracranial circulation. 2. Slight mural plaque at the left carotid bulb with 35% stenosis by NASCET criteria. 3. Moderate stenosis at the distal most segments of the bilateral common carotid arteries. 4. Stenosis in the V4 segments of the bilateral vertebral arteries. 5. Mild geographic groundglass opacities in the visual was portions of lungs possibly on the basis of small airway or small vessel disease. **This report has been created using voice recognition software. It may contain minor errors which are inherent in voice recognition technology. **      Final report electronically signed by Dr. Renetta Morrissey MD on 4/22/2022 1:20 PM      CTA NECK W WO CONTRAST (CODE STROKE)   Final Result       1. No flow-limiting stenosis or aneurysm in the intracranial circulation. 2. Slight mural plaque at the left carotid bulb with 35% stenosis by NASCET criteria. 3. Moderate stenosis at the distal most segments of the bilateral common carotid arteries. 4. Stenosis in the V4 segments of the bilateral vertebral arteries. 5. Mild geographic groundglass opacities in the visual was portions of lungs possibly on the basis of small airway or small vessel disease. **This report has been created using voice recognition software. It may contain minor errors which are inherent in voice recognition technology. **      Final report electronically signed by Dr. Kary Graham MD on 4/22/2022 1:20 PM      XR CHEST PORTABLE   Final Result   Examination remains limited due to low lung volumes. No interval change is observed. **This report has been created using voice recognition software. It may contain minor errors which are inherent in voice recognition technology. **      Final report electronically signed by Dr Rannie Shone on 4/22/2022 12:51 PM      CT HEAD WO CONTRAST   Final Result   No acute intracranial process is visualized. COMMUNICATION: The results of this examination were discussed with Dr. Tamie Leon at 12:40 PM on 4/22/2022. **This report has been created using voice recognition software. It may contain minor errors which are inherent in voice recognition technology. **      Final report electronically signed by Dr Rannie Shone on 4/22/2022 12:41 PM          DVT prophylaxis: [] Lovenox                                 [] SCDs                                 [] SQ Heparin                                 [] Encourage ambulation           [x] Already on Anticoagulation Xarelto 10 mg     Code Status: Full Code      Tele:   [x] yes             [] no    Electronically signed by Logan Patel Stephen Amador MD PGY-2 Internal Medicine Resident on 4/24/2022 at 1:54 PM

## 2022-04-25 VITALS
WEIGHT: 218.7 LBS | SYSTOLIC BLOOD PRESSURE: 167 MMHG | OXYGEN SATURATION: 95 % | RESPIRATION RATE: 17 BRPM | HEART RATE: 77 BPM | DIASTOLIC BLOOD PRESSURE: 90 MMHG | BODY MASS INDEX: 34.25 KG/M2 | TEMPERATURE: 98.6 F

## 2022-04-25 LAB
ANION GAP SERPL CALCULATED.3IONS-SCNC: 14 MEQ/L (ref 8–16)
BUN BLDV-MCNC: 10 MG/DL (ref 7–22)
CALCIUM SERPL-MCNC: 8.9 MG/DL (ref 8.5–10.5)
CHLORIDE BLD-SCNC: 101 MEQ/L (ref 98–111)
CO2: 21 MEQ/L (ref 23–33)
CREAT SERPL-MCNC: 0.3 MG/DL (ref 0.4–1.2)
ERYTHROCYTE [DISTWIDTH] IN BLOOD BY AUTOMATED COUNT: 15.9 % (ref 11.5–14.5)
ERYTHROCYTE [DISTWIDTH] IN BLOOD BY AUTOMATED COUNT: 56.1 FL (ref 35–45)
GFR SERPL CREATININE-BSD FRML MDRD: > 90 ML/MIN/1.73M2
GLUCOSE BLD-MCNC: 111 MG/DL (ref 70–108)
GLUCOSE BLD-MCNC: 87 MG/DL (ref 70–108)
GLUCOSE BLD-MCNC: 95 MG/DL (ref 70–108)
HCT VFR BLD CALC: 45.6 % (ref 42–52)
HEMOGLOBIN: 14.3 GM/DL (ref 14–18)
MCH RBC QN AUTO: 29.7 PG (ref 26–33)
MCHC RBC AUTO-ENTMCNC: 31.4 GM/DL (ref 32.2–35.5)
MCV RBC AUTO: 94.8 FL (ref 80–94)
PLATELET # BLD: 163 THOU/MM3 (ref 130–400)
PMV BLD AUTO: 8.7 FL (ref 9.4–12.4)
POTASSIUM SERPL-SCNC: 3.7 MEQ/L (ref 3.5–5.2)
RBC # BLD: 4.81 MILL/MM3 (ref 4.7–6.1)
SODIUM BLD-SCNC: 136 MEQ/L (ref 135–145)
WBC # BLD: 6.3 THOU/MM3 (ref 4.8–10.8)

## 2022-04-25 PROCEDURE — 99238 HOSP IP/OBS DSCHRG MGMT 30/<: CPT | Performed by: INTERNAL MEDICINE

## 2022-04-25 PROCEDURE — 97162 PT EVAL MOD COMPLEX 30 MIN: CPT

## 2022-04-25 PROCEDURE — 6370000000 HC RX 637 (ALT 250 FOR IP)

## 2022-04-25 PROCEDURE — 85027 COMPLETE CBC AUTOMATED: CPT

## 2022-04-25 PROCEDURE — 82948 REAGENT STRIP/BLOOD GLUCOSE: CPT

## 2022-04-25 PROCEDURE — 6370000000 HC RX 637 (ALT 250 FOR IP): Performed by: STUDENT IN AN ORGANIZED HEALTH CARE EDUCATION/TRAINING PROGRAM

## 2022-04-25 PROCEDURE — 80048 BASIC METABOLIC PNL TOTAL CA: CPT

## 2022-04-25 PROCEDURE — 97110 THERAPEUTIC EXERCISES: CPT

## 2022-04-25 PROCEDURE — 2500000003 HC RX 250 WO HCPCS

## 2022-04-25 PROCEDURE — 36415 COLL VENOUS BLD VENIPUNCTURE: CPT

## 2022-04-25 PROCEDURE — 97166 OT EVAL MOD COMPLEX 45 MIN: CPT

## 2022-04-25 PROCEDURE — 2580000003 HC RX 258

## 2022-04-25 RX ORDER — CEFDINIR 300 MG/1
300 CAPSULE ORAL 2 TIMES DAILY
Qty: 20 CAPSULE | Refills: 0 | DISCHARGE
Start: 2022-04-25 | End: 2022-05-05

## 2022-04-25 RX ORDER — AMOXICILLIN AND CLAVULANATE POTASSIUM 875; 125 MG/1; MG/1
1 TABLET, FILM COATED ORAL 2 TIMES DAILY
Qty: 10 TABLET | Refills: 0 | Status: CANCELLED | OUTPATIENT
Start: 2022-04-25 | End: 2022-04-30

## 2022-04-25 RX ORDER — PREGABALIN 50 MG/1
50 CAPSULE ORAL 2 TIMES DAILY
Status: ON HOLD | COMMUNITY
End: 2022-04-25 | Stop reason: HOSPADM

## 2022-04-25 RX ORDER — VITAMIN E 268 MG
400 CAPSULE ORAL DAILY
COMMUNITY

## 2022-04-25 RX ADMIN — GABAPENTIN 600 MG: 300 CAPSULE ORAL at 07:59

## 2022-04-25 RX ADMIN — OXYBUTYNIN CHLORIDE 5 MG: 5 TABLET, EXTENDED RELEASE ORAL at 07:59

## 2022-04-25 RX ADMIN — SODIUM CHLORIDE, PRESERVATIVE FREE 10 ML: 5 INJECTION INTRAVENOUS at 08:42

## 2022-04-25 RX ADMIN — BACLOFEN 10 MG: 10 TABLET ORAL at 07:59

## 2022-04-25 RX ADMIN — TIZANIDINE 2 MG: 4 TABLET ORAL at 08:01

## 2022-04-25 RX ADMIN — Medication 1 TABLET: at 07:59

## 2022-04-25 RX ADMIN — DOCUSATE SODIUM 100 MG: 100 CAPSULE, LIQUID FILLED ORAL at 07:59

## 2022-04-25 RX ADMIN — Medication 400 UNITS: at 07:59

## 2022-04-25 RX ADMIN — ACETIC ACID 30 ML: 250 IRRIGANT IRRIGATION at 10:14

## 2022-04-25 RX ADMIN — SODIUM CHLORIDE 1 G: 1 TABLET ORAL at 07:59

## 2022-04-25 RX ADMIN — OXYCODONE HYDROCHLORIDE AND ACETAMINOPHEN 500 MG: 500 TABLET ORAL at 07:59

## 2022-04-25 RX ADMIN — POTASSIUM CHLORIDE 40 MEQ: 750 TABLET, EXTENDED RELEASE ORAL at 07:59

## 2022-04-25 RX ADMIN — FAMOTIDINE 20 MG: 20 TABLET ORAL at 07:59

## 2022-04-25 RX ADMIN — Medication 6 MG: at 07:59

## 2022-04-25 RX ADMIN — METOPROLOL TARTRATE 25 MG: 50 TABLET, FILM COATED ORAL at 08:00

## 2022-04-25 RX ADMIN — ERYTHROMYCIN: 5 OINTMENT OPHTHALMIC at 10:20

## 2022-04-25 ASSESSMENT — PAIN - FUNCTIONAL ASSESSMENT: PAIN_FUNCTIONAL_ASSESSMENT: ACTIVITIES ARE NOT PREVENTED

## 2022-04-25 NOTE — DISCHARGE SUMMARY
Hospital Medicine Discharge Summary      Patient Identification:   Latrice Medel  : 1955  MRN: [id number]   Account: [de-identified]      Patient's PCP: Key Rahman MD    Admit Date: 2022     Discharge Date: 2022      Admitting Physician: Hussain Neely DO     Discharge Physician: Iman Hernández DO     HPI and Summarized Hospital Course:   Latrice Medel is a 61 y.o. male PMH PMH pulmonary embolism currently on Eliquis, history of chronic UTI's currently on suprapubic cathether that is followed by Dr. Harlan Mesa, essential hypertension, major depressive disorder, multiple sclerosis not currently on medications, neurogenic bladder s/p catheter placement by Dr. Jairon Hinson, who is coming in with complaints of altered mental status. The patient was reportedly feeling like himself at about 8 AM this morning at his nursing home [facility], but all of a sudden, the patient started to feel confused and spiked a temperature of 101 degrees Fahrenheit. He denied any fever, chills, chest pain, shortness of breath, nausea, vomiting, diarrhea, or constipation. The patient typically has his suprapubic cathether exchanged every 2-3 months and had it placed about 10 years ago. The patient has been feeling mostly lethargic over the course of the day, but his mentation was restored by the time he reached the hospital.  On presentation, patient's UA showed large blood in the urine with a positive nitrite, large leukocyte esterase and turbid urine. He was started on Rocephin. The patient was given rocephin for a total of 3 days after which the patient was starting to have improvement in mentation and symptoms. Wound ostomy was consulted to take a look at the patient's sacral wound which was being addressed through the wound clinic doctor at the patient's nursing home.  The patient was discharged on 22 with a 10 day script of omnicef to follow-up with PCP and continue to follow with urology with regards to catheter exchange and neurology with regards to the patient's multiple sclerosis. The patient was stable for discharge - all consultants were contacted and in agreement with plan for discharge. Appropriate follow up appointment was arranged prior to discharge. Please see below or view chart for more details from hospital course. Hospital Diagnoses:    1. Complicated UTI  2. Neurogenic bladder  3. Essential hypertension  4. Hyperglycemia  5. History of multiple sclerosis  6. History of pulmonary embolism  7. History of cataracts  8. Altered mental status-resolve  9. Sepsis-resolved    Please view chart for detailed course and treatment of above hospital diagnoses. The patient was seen and examined on day of discharge and this discharge summary is in conjunction with any daily progress note from day of discharge. Exam:     Vitals:  Vitals:    04/24/22 2347 04/25/22 0503 04/25/22 0745 04/25/22 1200   BP: (!) 150/69 (!) 166/78 (!) 169/78 (!) 167/90   Pulse: 80 76 98 77   Resp: 16 14 16 17   Temp: 99 °F (37.2 °C) 99 °F (37.2 °C) 98.4 °F (36.9 °C) 98.6 °F (37 °C)   TempSrc: Oral Oral Oral    SpO2: 92% 95% 94% 95%   Weight:         Weight: Weight: 218 lb 11.1 oz (99.2 kg)     24 hour intake/output:    Intake/Output Summary (Last 24 hours) at 4/25/2022 1733  Last data filed at 4/25/2022 1318  Gross per 24 hour   Intake 200 ml   Output 2700 ml   Net -2500 ml     General appearance: No apparent distress, appears stated age and cooperative; has chronic catheter in place  HEENT: Pupils equal, round, and reactive to light. Conjunctivae/corneas clear. Neck: Supple, with full range of motion. No jugular venous distention. Trachea midline. Respiratory:  Normal respiratory effort. Clear to auscultation, bilaterally without Rales/Wheezes/Rhonchi. Cardiovascular: Regular rate and rhythm with normal S1/S2 without murmurs, rubs or gallops.   Abdomen: Soft, non-tender, non-distended with normal bowel sounds. Musculoskeletal: 5/5 bilateral upper extremities, 0/5 bilateral lower extremities  Psychiatric: Alert and oriented    Labs: For convenience and continuity at follow-up the following most recent labs are provided:    CBC:    Lab Results   Component Value Date    WBC 6.3 04/25/2022    HGB 14.3 04/25/2022    HCT 45.6 04/25/2022     04/25/2022       Renal:    Lab Results   Component Value Date     04/25/2022    K 3.7 04/25/2022    K 3.9 04/23/2022     04/25/2022    CO2 21 04/25/2022    BUN 10 04/25/2022    CREATININE 0.3 04/25/2022    CALCIUM 8.9 04/25/2022    PHOS 3.4 06/15/2019       Significant Diagnostic Studies    Radiology:   MRI BRAIN W WO CONTRAST   Final Result       1. No evidence of acute intracranial abnormality. 2. Stable patchy areas of T2/FLAIR hyperintensity in the supratentorial white matter in keeping with history of multiple sclerosis. No new/interval lesion or enhancing lesion suggest active demyelination. **This report has been created using voice recognition software. It may contain minor errors which are inherent in voice recognition technology. **         Final report electronically signed by Dr. Migdalia Luna MD on 4/22/2022 4:13 PM      MRI CERVICAL SPINE W WO CONTRAST   Final Result      1. Motion degraded exam without convincing focal area of abnormal T2 signal or enhancement in the cord. 2. Focal area of cord thinning at the C6 level as evidence for myelomalacia. 3. Multilevel degenerative changes of the cervical spine most pronounced at C3-4 where there is no significant spinal canal stenosis but with left lateral recess narrowing and severe bilateral neural foraminal stenosis in association with uncovertebral    joint degenerative change. **This report has been created using voice recognition software. It may contain minor errors which are inherent in voice recognition technology. **      Final report electronically signed by Dr. Julio C Horta MD on 4/22/2022 4:20 PM      MRI THORACIC SPINE W WO CONTRAST   Final Result    Stable lung segment of thinning of the thoracic spinal cord involving the T8-T10 levels with chronic hyperintense T2 signal abnormality as evidence for myelomalacia. No new/interval focal areas of abnormal signal cord signal or enhancement are    identified to suggest active demyelination. **This report has been created using voice recognition software. It may contain minor errors which are inherent in voice recognition technology. **      Final report electronically signed by Dr. Julio C Horta MD on 4/22/2022 4:25 PM      CTA HEAD W WO CONTRAST (CODE STROKE)   Final Result       1. No flow-limiting stenosis or aneurysm in the intracranial circulation. 2. Slight mural plaque at the left carotid bulb with 35% stenosis by NASCET criteria. 3. Moderate stenosis at the distal most segments of the bilateral common carotid arteries. 4. Stenosis in the V4 segments of the bilateral vertebral arteries. 5. Mild geographic groundglass opacities in the visual was portions of lungs possibly on the basis of small airway or small vessel disease. **This report has been created using voice recognition software. It may contain minor errors which are inherent in voice recognition technology. **      Final report electronically signed by Dr. Julio C oHrta MD on 4/22/2022 1:20 PM      CTA NECK W WO CONTRAST (CODE STROKE)   Final Result       1. No flow-limiting stenosis or aneurysm in the intracranial circulation. 2. Slight mural plaque at the left carotid bulb with 35% stenosis by NASCET criteria. 3. Moderate stenosis at the distal most segments of the bilateral common carotid arteries. 4. Stenosis in the V4 segments of the bilateral vertebral arteries.    5. Mild geographic groundglass opacities in the visual was portions of lungs possibly on the basis of small airway or small vessel disease. **This report has been created using voice recognition software. It may contain minor errors which are inherent in voice recognition technology. **      Final report electronically signed by Dr. Reji Turner MD on 4/22/2022 1:20 PM      XR CHEST PORTABLE   Final Result   Examination remains limited due to low lung volumes. No interval change is observed. **This report has been created using voice recognition software. It may contain minor errors which are inherent in voice recognition technology. **      Final report electronically signed by Dr Lashawn Lewis on 4/22/2022 12:51 PM      CT HEAD WO CONTRAST   Final Result   No acute intracranial process is visualized. COMMUNICATION: The results of this examination were discussed with Dr. Suman Bolanos at 12:40 PM on 4/22/2022. **This report has been created using voice recognition software. It may contain minor errors which are inherent in voice recognition technology. **      Final report electronically signed by Dr Lashawn Lewis on 4/22/2022 12:41 PM           Consults:     IP CONSULT TO Constantino Tobin TO NEUROLOGY  IP CONSULT TO SOCIAL WORK    Disposition:    [] Home        [] TCU       [] Rehab       [] Psych       [x] SNF       [] [facility]       [] Other-    Condition at Discharge: Stable    Code Status:  Prior     Patient Instructions:    Discharge lab work: none  Activity: As tolerated  Diet: No diet orders on file      Follow-up visits:   [address] [address] [phone]      [facility] ([place], [place]) physician to follow.        Discharge Medications:        Medication List      START taking these medications    cefdinir 300 MG capsule  Commonly known as: OMNICEF  Take 1 capsule by mouth 2 times daily for 10 days        CONTINUE taking these medications    acetaminophen 325 MG tablet  Commonly known as: TYLENOL     acetic acid 0.25 % irrigation     aspirin 81 MG EC tablet     baclofen 10 MG tablet  Commonly known as: LIORESAL     calcium-vitamin D 500-200 MG-UNIT per tablet  Commonly known as: OSCAL-500  Take 1 tablet by mouth 2 times daily     docusate sodium 100 MG capsule  Commonly known as: COLACE     erythromycin 5 MG/GM ophthalmic ointment  Commonly known as: ROMYCIN     famotidine 20 MG tablet  Commonly known as: PEPCID  Take 1 tablet by mouth 2 times daily     gabapentin 600 MG tablet  Commonly known as: NEURONTIN     gentamicin 0.1 % ointment  Commonly known as: GARAMYCIN     ibuprofen 400 MG tablet  Commonly known as: ADVIL;MOTRIN     LACTOBACILLUS PO     metoprolol tartrate 25 MG tablet  Commonly known as: LOPRESSOR  Take 1 tablet by mouth 2 times daily     multivitamin tablet  Take 1 tablet by mouth daily     oxybutynin 5 MG extended release tablet  Commonly known as: DITROPAN-XL     potassium chloride 20 MEQ Tbcr extended release tablet  Commonly known as: KLOR-CON M  Take 2 tablets by mouth daily     sodium chloride 1 g tablet  Take 1 tablet by mouth 2 times daily (with meals)     tiZANidine 2 MG tablet  Commonly known as: ZANAFLEX     vitamin A 19197 units capsule  Take 2 capsules by mouth daily     VITAMIN C ER PO     vitamin E 400 UNIT capsule     Xarelto 10 MG Tabs tablet  Generic drug: rivaroxaban        STOP taking these medications    pregabalin 50 MG capsule  Commonly known as: LYRICA           Where to Get Your Medications      Information about where to get these medications is not yet available    Ask your nurse or doctor about these medications  · cefdinir 300 MG capsule         Time Spent on discharge is roughly > 30 minutes in the examination, evaluation, counseling and review of medications and discharge plan. Thank you Jayden Garza MD for the opportunity to be involved in this patient's care.  Please do not hesitate to reach out to me for any questions or clarifications needed regarding this patient's care.    Signed:    Electronically signed by Luz العراقي DO on 4/25/2022  Internal Medicine Resident   PGY-1

## 2022-04-25 NOTE — PROGRESS NOTES
[address] [address] Continence Nurse  Consult Note       Bri White  AGE: 61 y.o. GENDER: male  : 1955  UNIT: 4K-15/015-A  TODAY'S DATE:  2022  ADMISSION DATE: 2022 12:19 PM  Subjective:     Reason for [facility] Evaluation and Assessment: chronic ischial wound      Bri White is a 61 y.o. male referred by:   [x] Physician/PA/APRN  [x] Nursing  [] Other:     Wound Identification:  Wound Type: pressure  Contributing Factors: chronic pressure and decreased mobility    Objective:     Derrick Risk Score: Derrick Scale Score: 12    Assessment:     Encounter: Present to pt room for consult of right ischial wound POA. PT rolls to left side for assessement. Wound photo and assessment below. Cleansed wound with normal saline and gauze. Pat dry with clean gauze. Applied saline wet to dry dressing to wound bed and covered with bordered foam dressing. Pt has diverting colostomy he had placed about 2 years ago around when wound developed. Pt follows physician at [facility] for wound. Continue treatment plan per ECF. Pillow placed under left side. Pt on hercules dream air support surface with alternating pressure and microclimate control. Bed in low, call light in reach. 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 Plan:     Treatment Recommendations:   Right ischium: Cleanse wound with normal saline or wound cleanser and gauze. Pat dry with clean gauze. Apply wet to dry dressing to wound bed. Cover with bordered foam dressing. Change dressing twice daily and as needed.     Specialty Bed Required :   [x] Low Air Loss   [x] Pressure Redistribution  [] Fluid Immersion- Dolphin  [] Bariatric  [] RotoProne   [] Other:     Discharge Plan:  Placement for patient upon discharge: ECF  Patient appropriate for Outpatient [address]: as needed

## 2022-04-25 NOTE — PROGRESS NOTES
[address] [facility]  INPATIENT PHYSICAL THERAPY  EVALUATION  [facility] ICU STEPDOWN TELEMETRY 4K - 4K-15/015-A    Time In: 3749  Time Out: 1004  Timed Code Treatment Minutes: 10 Minutes  Minutes: 20          Date: 2022  Patient Name: Eleuterio Fredercik,  Gender:  male        MRN: [id number]  : 1955  (61 y.o.)      Referring Practitioner: Renita Crook DO  Diagnosis: sepsis due to urinary tract infection  Additional Pertinent Hx: Per H&P : This is a 61year old male PMH pulmonary embolism currently on Eliquis, history of chronic UTI's currently on suprapubic cathether that is followed by Dr. Sukh Velazco, essential hypertension, major depressive disorder, multiple sclerosis not currently on medications, neurogenic bladder s/p catheter placement by Dr. Neelam Anders, who is coming in with complaints of altered mental status. The patient was reportedly feeling like himself at about 8 AM this morning at his nursing home [facility], but all of a sudden, the patient started to feel confused and spiked a temperature of 101 degrees Fahrenheit. The patient typically has his suprapubic cathether exchanged every 2-3 months and had it placed about 10 years ago. The patient has been feeling mostly lethargic over the course of the day, but his mentation was restored by the time he reached the hospital. The patient had a code stroke called that was later cancelled as the patient had a right sided pronator drift. The patient had overall negative stroke workup including negative CT head, negative CTA, and negative MRI. Restrictions/Precautions:  Restrictions/Precautions: Contact Precautions,Fall Risk  Position Activity Restriction  Other position/activity restrictions: chronic suprapubic catheter & colostomy bag, MS with B flaccid LE    Subjective:  Chart Reviewed: Yes  Patient assessed for rehabilitation services?: Yes  Family / Caregiver Present: No  Subjective: RN approved session, pt pleasantly agrees.  Pt states he plans to return home to [facility]. Pt states he has not sat up at the EOB in years. Educated on the importance of positioning and stretching to maintain good integreity of joints and limb alignment. General:       Vision Exceptions: Cataracts    Hearing: Exceptions to [facility]  Hearing Exceptions: Hard of hearing/hearing concerns       Pain: 0/10: denies     Vitals: Vitals not assessed per clinical judgement, see nursing flowsheet    Social/Functional History:    Lives With: Other (comment)  Type of Home: Facility  Home Layout: One level  Home Access: Level entry  Home Equipment: Wheelchair-electric          Receives Help From: Other (comment)        Ambulation Assistance: Non-ambulatory  Transfer Assistance: Needs assistance          Additional Comments: Pt is nonambulatory, transfers with a whit lift and assist of staff, and is indep. for mobility with his power w/c. OBJECTIVE:  Range of Motion:  Bilateral Lower Extremity: Impaired - B LE lacking DF with overpressure able to acheive ~neutral position. B knees lacking extension with overpressure. Pt noted to rest R LE in ER, and L LE in IR position. Strength:  Bilateral Lower Extremity: Impaired - 0/5 flaccid at all joints, no volitional muscle activation     Balance:  not assessed this date    Bed Mobility:  Rolling to Left: Maximum Assistance, X 1, with rail, with increased time for completion   Rolling to Right: Maximum Assistance, Dependent, X 1, with increased time for completion    *HOB~ 15degrees    Transfers:  Not Tested  Pt transfers with Ana Garrison at baseline     Ambulation:  Not Tested  Pt is nonambulatory     Exercise:  Patient was guided in 1 set(s) 2 reps of exercise to both lower extremities. manual heel cord and manual hamstring stretch for 20 second hold. Exercises were completed for increased independence with functional mobility. Cues to ensure maintained within a tolerable range and localized to the correct region.      Functional Outcome Measures: Completed  -[facility] Inpatient Mobility without Stair Climbing Raw Score : 6  -PAC Inpatient without Stair Climbing T-Scale Score : 26.48    ASSESSMENT:  Activity Tolerance:  Patient tolerance of  treatment: fair. Pt tolerated evaluation well, required max assist for rolling and noted to have limited ROM at the ankles and knees. Pt noted to rest B LE in a malaligned position, B LE flaccid at all joints. Treatment Initiated: Treatment and education initiated within context of evaluation. Evaluation time included review of current medical information, gathering information related to past medical, social and functional history, completion of standardized testing, formal and informal observation of tasks, assessment of data and development of plan of care and goals. Treatment time included skilled education and facilitation of tasks to increase safety and independence with functional mobility for improved independence and quality of life. Assessment:  Assessment: This patient is a 61 y.o. who presents with sepsis. Patient is at his baseline status of residing at a SNF and transferring with a Sharri Brownie lift with staff and using a power w/c for mobility. The pt is noted to be at his baseline level of functional mobility of nonambulatory with B LE noted to be flaccid. Pt has no PT needs at this time as he is at his baseline level of function, PT will sign off. Therapy Prognosis: Fair    Requires PT Follow-Up: No    Discharge Recommendations:  Discharge Recommendations: [address] with PT    Patient Education:   .     Patient Education  Education Given To: Patient  Education Provided: Plan of Care  Education Method: Demonstration,Verbal  Education Outcome: Verbalized understanding      Equipment Recommendations:  Equipment Needed: No    Plan:  Plan:  (N/A D/C 4/25/22)    Goals:  Patient goals : N/A pt at baseline level of function, D/C 4/25/22          Following session, patient left in safe position with all fall risk precautions in place.

## 2022-04-25 NOTE — PLAN OF CARE
Problem: Discharge Planning  Goal: Discharge to home or other facility with appropriate resources  4/25/2022 0849 by TRISTIAN Bautista  Outcome: Progressing    SW consult received, See SW note 4/25/22

## 2022-04-25 NOTE — FLOWSHEET NOTE
04/25/22 0851   Service Assessment   Patient Orientation Other (see comment)  (answers questions appropriately)   Cognition Alert   History Provided By Patient   Primary Caregiver   [facility])   Support Systems Spouse/Significant Other   Can patient return to prior living arrangement Yes   Ability to make needs known: Good   Financial Resources Medicaid   Discharge Planning   Type of [address]  (LTC vs SNF)   Current Services Prior To Admission Extended [address]   Patient expects to be discharged to: Long-term care   Services At/After Discharge   Mode of Transport at Discharge BLS   Condition of Participation: Discharge Planning   Freedom of Choice list was provided with basic dialogue that supports the patient's individualized plan of care/goals, treatment preferences, and shares the quality data associated with the providers? No  (declines, resident of [facility] and plans to return)     4/25/22, 9:03 AM EDT    DISCHARGE PLANNING EVALUATION    Call to [facility] with [facility], she will get back with SW on how patient was a their facility. 10:40 AM  Call from [facility] with [facility], patient's payor at [facility] is Yony Marcos did ask if pt needing precert, reports if patient is ready for discharge today, he can come back today. Call to [facility], transport set for 1:30pm. CECIL did call nurse and updated on transport time. CM notifying provider on patient being able to return to [facility] today. Call to pt's wife, no answer, no voicemail, will try again later. 4/25/22, 10:42 AM EDT    Patient goals/plan/ treatment preferences discussed by  and . Patient goals/plan/ treatment preferences reviewed with patient/ family. Patient/ family verbalize understanding of discharge plan and are in agreement with goal/plan/treatment preferences.   Understanding was demonstrated using the teach back method. AVS provided by RN at time of discharge, which includes all necessary medical information pertaining to the patients current course of illness, treatment, post-discharge goals of care, and treatment preferences. Pt discharging to [facility] by ambulance transport today at 1:30pm. CECIL did fax transport paperwork to [facility].    11:35 AM  SW did call wife again, no answer, no voicemail.

## 2022-04-25 NOTE — PROGRESS NOTES
[address] ICU STEPDOWN TELEMETRY 4K  EVALUATION    Time:   Time In: 3683  Time Out: 1207  Timed Code Treatment Minutes: 12 Minutes  Minutes: 22          Date: 2022  Patient Name: Justin Mandel,   Gender: male      MRN: [id number]  : 1955  (61 y.o.)  Referring Practitioner: El Navas DO  Diagnosis: sepsis du to urinary tract infection  Additional Pertinent Hx: Per H&P : This is a 61year old male PMH pulmonary embolism currently on Eliquis, history of chronic UTI's currently on suprapubic cathether that is followed by Dr. Tiffanie Walters, essential hypertension, major depressive disorder, multiple sclerosis not currently on medications, neurogenic bladder s/p catheter placement by Dr. Chelita Langley, who is coming in with complaints of altered mental status. The patient was reportedly feeling like himself at about 8 AM this morning at his nursing home [facility], but all of a sudden, the patient started to feel confused and spiked a temperature of 101 degrees Fahrenheit. The patient typically has his suprapubic cathether exchanged every 2-3 months and had it placed about 10 years ago. The patient has been feeling mostly lethargic over the course of the day, but his mentation was restored by the time he reached the hospital. The patient had a code stroke called that was later cancelled as the patient had a right sided pronator drift. The patient had overall negative stroke workup including negative CT head, negative CTA, and negative MRI.     Restrictions/Precautions:  Restrictions/Precautions: Contact Precautions,Fall Risk  Position Activity Restriction  Other position/activity restrictions: chronic suprapubic catheter & colostomy bag, MS with B flaccid LE    Subjective  Chart Reviewed: Yes,Orders,Progress Notes  Patient assessed for rehabilitation services?: Yes  Response to previous treatment: Patient with no complaints from previous session  Family / Caregiver Present: No    Subjective: RN approved of OT session. Pt supine in bed on OT arrival and agreeable to therapy. Pt reported that he completes transfers via whit lift. Does complete own UB ADLs such as grooming and feeding self. Pain: denies    Vitals: Vitals not assessed per clinical judgement, see nursing flowsheet    Social/Functional History:  Lives With: Other (comment)  Type of Home: Facility  Home Layout: One level  Home Access: Level entry  Home Equipment: Wheelchair-electric        Receives Help From: Other (comment)  Ambulation Assistance: Non-ambulatory  Transfer Assistance: Needs assistance          Additional Comments: Pt is nonambulatory, transfers with a whit lift and assist of staff, and is indep. for mobility with his power w/c. VISION:WFL    HEARING:  WFL    COGNITION: WFL    RANGE OF MOTION:  Right Upper Extremity: shoulder flex ~100 degrees; elbow and wrist WFL, demo decrease dexterity   Left Upper Extremity:  shoulder flex ~100 degrees; elbow and wrist WFL, demo decrease dexterity     STRENGTH:  Right Upper Extremity: shoulder flex/ext: 4/5; elbow flex 4/5  Left Upper Extremity:  shoulder flex/ext: 4/5; elbow flex 4/5    SENSATION:   Not tested     ADL:   Feeding: with set-up. for lunch. A to open containers d/t decrease dexterity . BALANCE:  not assessed  pt stated he was not sitting EOB at baseline     BED MOBILITY:  Rolling to Left: Maximum Assistance, X 1, with rail    Rolling to Right: Maximum Assistance, X 1, with rail    Scooting: Dependent with hericules sheet towards HOB     TRANSFERS:  Not assessed - not completed at PLOF     FUNCTIONAL MOBILITY:  Not assessed - not completed at PLOF     Exercise:  Guided patient through BUE AROM this date x10 reps x1 set against gravity in order to increase BUE strength and improve activity tolerance for UB ADL tasks such as grooming and feeding. Activity Tolerance:  Patient tolerance of  treatment: fair. Assessment:  Assessment: Pt admitted to the hospital d/t AMS. Pt demo'ed performance deficits with requiring  assistance for ADL tasks such as feeding and grooming. Pt displayed  decreased endurance, decrease strength, and ability to complete  ADL tasks and mobility at [facility]. Pt requires further skilled OT Services to improve performance in functional tasks and educate on safety awareness for more indep with ADL tasks and mobility to return  home. Performance deficits / Impairments: Decreased functional mobility ,Decreased endurance,Decreased coordination,Decreased posture,Decreased sensation,Decreased ADL status,Decreased ROM,Decreased balance,Decreased vision/visual deficit,Decreased safe awareness,Decreased high-level IADLs,Decreased strength,Decreased cognition,Decreased fine motor control  Prognosis: Fair  REQUIRES OT FOLLOW-UP: Yes  Decision Making: Medium Complexity    Treatment Initiated: Treatment and education initiated within context of evaluation. Evaluation time included review of current medical information, gathering information related to past medical, social and functional history, completion of standardized testing, formal and informal observation of tasks, assessment of data and development of plan of care and goals. Treatment time included skilled education and facilitation of tasks to increase safety and independence with ADL's for improved functional independence and quality of life. Discharge Recommendations:  Long Term Care without OT    Patient Education:     Patient Education  Education Given To: Patient  Education Provided: Role of Therapy,Home Exercise Program    Equipment Recommendations:  Equipment Needed: No    Plan:  Times per Week: 1-2x  Times per Day: Daily  Current Treatment Recommendations: Strengthening,Self-Care / ADL,Safety education & training,Patient/Caregiver education & training,Endurance training.   See long-term goal time frame for expected duration of plan of care.  If no long-term goals established, a short length of stay is anticipated. Goals:  Patient goals : did not state  Short Term Goals  Time Frame for Short term goals: by discharge  Short Term Goal 1: pt will complete grooming and feeding tasks with set up only to increase indep with ADLs  Short Term Goal 2: pt will complete BUE min resisitive exercises with min cuign for technique to increase strength and endurance required for ADLs         Following session, patient left in safe position with all fall risk precautions in place.

## 2022-04-25 NOTE — DISCHARGE INSTR - COC
Continuity of Care Form    Patient Name: Shemar Tate   :  1955  MRN:  [id number]    Admit date:  2022  Discharge date:  2022    Code Status Order: Full Code   Advance Directives:      Admitting Physician:  Ananth Retana DO  PCP: Roseann Boone MD    Discharging Nurse:  [facility] Unit/Room#: 4K-15/015-A  Discharging Unit Phone Number: [phone]    Emergency Contact:   Extended Emergency Contact Information  Primary Emergency Contact: [facility]  Address: [address] [place] [address], [address] [place] [address] [address] Phone: [phone]  Mobile Phone: [phone]  Relation: Spouse  Secondary Emergency Contact: Steve [facility] of [address] [place] [address] Phone: [phone]  Relation: Child    Past Surgical History:  Past Surgical History:   Procedure Laterality Date    ANKLE SURGERY      broken ankle    BLADDER SURGERY  2012    Suprapubic catheter placement    COLONOSCOPY      CYSTO/URETERO/PYELOSCOPY, CALCULUS TX Left 2019    CYSTOSCOPY, LEFT STENT insertion, bladder irragation with bladder stones performed by Janene Wall MD at [address] [place] [address] N/A 2019    CYSTO, LEFT URETERAL STENT REMOVAL, LEFT URETEROSCOPY, LASER LITHOTRIPSY, BASKET RETRIEVAL OF STONE FRAGMENTS performed by Janene Wall MD at [address] [place] [address] 2021    CYSTOSCOPY, CYSTOLITHOLAPAXY, SUPRAPUBIC CATHETER EXCHANGE performed by Roly German MD at [address] [place] [address]. 2018    ROBOT DIVERTING COLOSTOMY performed by Cheryl French MD at [place] [address]  child       Immunization History:   Immunization History   Administered Date(s) Administered    COVID-19, Pfizer Purple top, DILUTE for use, 12+ yrs, 30mcg/0.3mL dose 2020, 2021, 10/11/2021    Hepatitis B Ped/Adol (Engerix-B, Recombivax HB) 1994, 1994    Influenza Virus Vaccine 12/20/2012, 10/01/2017, 10/06/2018    Pneumococcal Conjugate Vaccine 06/08/2013    Pneumococcal Polysaccharide ([id number]) 01/01/2010       Active Problems:  Patient Active Problem List   Diagnosis Code    Neurogenic bladder N31.9    Multiple sclerosis ([facility] G35    MS (multiple sclerosis) ([facility] G35    Urinary retention R33.9    Indwelling catheter present on admission Z96.0    Cystostomy malfunction ([facility] N99.512    Decubitus ulcer of coccyx L89.159    Decubitus ulcer, stage 3 ([facility]) L89.93    Malnutrition ([address] E46    Decubitus ulcer of right perineal ischial region, stage 3 ([address] L89.313    Pulmonary embolism on left ([facility]) Q20.11    Acute metabolic encephalopathy H16.59    Speech disturbance R47.9    Infected decubitus ulcer, stage I L89.91, L08.9    Infected decubitus ulcer, stage III ([facility]) L89.93, L08.9    Wound infection T14. 8XXA, L08.9    Elevated INR R79.1    Chronic osteomyelitis, pelvis, right ([facility]) M86.651    Osteomyelitis ([facility]) M86.9    Urinary tract infection without hematuria N39.0    Abnormal liver function test R94.5    Chronic depression F32. A    Essential hypertension I10    History of pulmonary embolism Z86.711    Other dysphagia R13.19    Pressure injury of skin of back L89.109    Decubitus ulcer L89.90    Elevated transaminase level R74.01    Anemia D64.9    Sepsis due to methicillin resistant Staphylococcus aureus (MRSA) ([facility]) A41.02    Sepsis ([facility] A41.9    AMS (altered mental status) R41.82       Isolation/Infection:   Isolation            Contact          Patient Infection Status       Infection Onset Added Last Indicated Last Indicated By Review Planned Expiration Resolved Resolved By    CRE (Carbapenem-Resistant Enterobacteriaceae)  07/08/19 07/08/19 Bill Gore RN        Pseudomonas Aeruginosa- sacrum swab - confirmed by [facility] (1-RH) 6/2019    VRE 02/10/19 02/10/19 02/10/19 VRE Screen by PCR        PCR 2/2019    Resolved    COVID-19 (Rule Out) 12/27/20 12/27/20 12/27/20 COVID-19 (Ordered)   12/27/20 Rule-Out Test Resulted    MRSA 02/10/19 02/12/19 06/17/19 Aerobic & Anaerobic Culture   04/25/22 Lisa Gore RN    Buttock 2/2019  Rectal 2/2019  Urine 6/2019  Sacrum 6/2019    MRSA  08/05/13 08/05/13 Consuelo Alcaraz RN   01/22/18 Lisa Gore RN    Urine  Sacrum swab 6/2019              Nurse Assessment:  Last Vital Signs: BP (!) 169/78   Pulse 98   Temp 98.4 °F (36.9 °C) (Oral)   Resp 16   Wt 218 lb 11.1 oz (99.2 kg)   SpO2 94%   BMI 34.25 kg/m²     Last documented pain score (0-10 scale): Pain Level: 0  Last Weight:   Wt Readings from Last 1 Encounters:   04/24/22 218 lb 11.1 oz (99.2 kg)     Mental Status:  oriented and alert    IV Access:  - None    Nursing Mobility/ADLs:  Walking   Dependent  Transfer  Dependent  Bathing  Assisted  Dressing  Assisted  Toileting  Dependent  Feeding  Independent  Med Admin  Assisted  Med Delivery   whole    Wound Care Documentation and Therapy:  Wound 02/26/21 Sacrum  (Active)   Number of days: 511       Wound 04/22/22 Buttocks Right Tunneling wound. (Active)   Dressing Status Clean;Dry; Intact 04/25/22 0745   Wound Cleansed Irrigated with saline 04/24/22 1427   Dressing/Treatment Moist to dry;ABD 04/24/22 2000   Wound Assessment Pink/red 04/24/22 2000   Drainage Amount Small 04/24/22 2000   Drainage Description Serosanguinous 04/24/22 2000   Tawny-wound Assessment Blanchable erythema;Fragile 04/24/22 2000   Number of days: 2        Elimination:  Continence:    Bowel: Yes  Bladder: Suprapubic catheter  Urinary Catheter: Last Change Date 4/22/2022    Colostomy/Ileostomy/Ileal Conduit: Yes  Colostomy LLQ-Stomal Appliance: Clean, dry & intact  Colostomy LLQ-Stoma  Assessment: Pink  Colostomy LLQ-Peristomal Assessment: Clean, dry & intact  Colostomy LLQ-Stool Appearance: Formed  Colostomy LLQ-Stool Color: Black,Green  Colostomy LLQ-Stool Amount: Small    Date of Last BM: 4/25    Intake/Output Summary (Last 24 hours) at 4/25/2022 525 [facility] filed at 4/25/2022 0505  Gross per 24 hour   Intake 320 ml   Output 2875 ml   Net -2555 ml     I/O last 3 completed shifts: In: 2654.8 [P.O.:440; I.V.:2214.8]  Out: 5075 [Urine:5075]    Safety Concerns: At Risk for Falls and Aspiration Risk    Impairments/Disabilities:      Paralysis - lower extremities    Nutrition Therapy:  Current Nutrition Therapy:   - Oral Diet:  Carb Control 3 carbs/meal (1500kcals/day)    Routes of Feeding: Oral  Liquids: Thin Liquids  Daily Fluid Restriction: no  Last Modified Barium Swallow with Video (Video Swallowing Test): not done    Treatments at the Time of Hospital Discharge:   Respiratory Treatments: see attached med req  Oxygen Therapy:  is not on home oxygen therapy.   Ventilator:    - No ventilator support    Rehab Therapies: Physical Therapy and Occupational Therapy  Weight Bearing Status/Restrictions: No weight bearing restrictions  Other Medical Equipment (for information only, NOT a DME order):  hospital bed  Other Treatments:     Patient's personal belongings (please select all that are sent with patient):  None    RN SIGNATURE:  Electronically signed by Aamir Hargrove RN on 4/25/22 at 11:54 AM EDT    CASE MANAGEMENT/SOCIAL WORK SECTION    Inpatient Status Date: 4/22/22    Readmission Risk Assessment Score:  Readmission Risk              Risk of Unplanned Readmission:  12           Discharging to Facility/ Agency   Name: [facility], [facility]  [address]  Fax:[phone]    Dialysis Facility (if applicable)   Name:  Address:  Dialysis Schedule:  Phone:  Fax:    / signature: Electronically signed by Felix Mcardle, LSW on 4/25/22 at 10:18 AM EDT    PHYSICIAN SECTION    Prognosis: Good    Condition at Discharge: Stable    Rehab Potential (if transferring to Rehab): Good    Recommended Labs or Other Treatments After Discharge: None    Physician Certification: I certify the above information and transfer of [place]  is necessary for the continuing treatment of the diagnosis listed and that he requires [place] for greater than 30 days.      Update Admission H&P: No change in H&P    PHYSICIAN SIGNATURE:  Electronically signed by Morena Hill DO on 4/25/22 at 10:31 AM EDT

## 2022-04-25 NOTE — PLAN OF CARE
Problem: Discharge Planning  Goal: Discharge to home or other facility with appropriate resources  4/25/2022 0423 by Sangeeta Holman RN  Outcome: Progressing  4/24/2022 1946 by Josue Varma RN  Outcome: Progressing  Note: Discussed plan with resident to possibly discharge back to [facility], pt agreeable. Problem: Pain  Goal: Verbalizes/displays adequate comfort level or baseline comfort level  4/25/2022 0423 by Sangeeta oHlman RN  Outcome: Progressing  4/24/2022 1946 by Josue Varma RN  Outcome: Progressing  Note: Pt verbalizes comfort level and has denied pain this shift. Repositioned every 2 hours and as needed. Problem: Skin/Tissue Integrity  Goal: Absence of new skin breakdown  Description: 1. Monitor for areas of redness and/or skin breakdown  2. Assess vascular access sites hourly  3. Every 4-6 hours minimum:  Change oxygen saturation probe site  4. Every 4-6 hours:  If on nasal continuous positive airway pressure, respiratory therapy assess nares and determine need for appliance change or resting period. 4/25/2022 0423 by Sangeeta Holman RN  Outcome: Progressing  4/24/2022 1946 by Josue Varma RN  Outcome: Progressing  Note: No sxs new skin breakdown this shift.      Problem: Safety - Adult  Goal: Free from fall injury  Outcome: Progressing     Problem: ABCDS Injury Assessment  Goal: Absence of physical injury  Outcome: Progressing

## 2022-04-26 LAB
ORGANISM: ABNORMAL
URINE CULTURE, ROUTINE: ABNORMAL
URINE CULTURE, ROUTINE: ABNORMAL

## 2022-04-28 LAB
BLOOD CULTURE, ROUTINE: NORMAL
BLOOD CULTURE, ROUTINE: NORMAL

## 2022-06-03 ENCOUNTER — TELEPHONE (OUTPATIENT)
Dept: UROLOGY | Age: 67
End: 2022-06-03

## 2022-06-03 NOTE — TELEPHONE ENCOUNTER
Attempted to call pt nurse back at [facility], line was busy and kept ringing.

## 2022-06-03 NOTE — TELEPHONE ENCOUNTER
Pt is on acetic acid irrigation to prevent catheter encrustation and clogging. He has been doing well with this regimen and recommend to continue.

## 2022-06-03 NOTE — TELEPHONE ENCOUNTER
Pt nurse at [facility] was calling regarding flushes for his sp catheter. Pt uses acetic acid 30cc BID. She stated it comes in a big bottle which is only good for 24 hours after opening. She would like to know if he can use saline flushes instead of the acetic acid. She also wants to know what the reason is to him  getting the acetic acid flushes rather than the saline flushes. Please advise, thank you.

## 2022-06-06 NOTE — TELEPHONE ENCOUNTER
SPOKE WITH HESHAM AT [facility] TO INFORM HER THAT ALEXI NEEDS TO CONTINUE WITH THE ACETIC ACID FLUSHES. VERBALIZED UNDERSTANDING.

## 2022-06-11 ENCOUNTER — HOSPITAL ENCOUNTER (INPATIENT)
Age: 67
LOS: 7 days | Discharge: NURSING, REHAB OR LONG-TERM CARE | DRG: 699 | End: 2022-06-18
Attending: INTERNAL MEDICINE | Admitting: INTERNAL MEDICINE
Payer: COMMERCIAL

## 2022-06-11 DIAGNOSIS — R31.0 GROSS HEMATURIA: Primary | ICD-10-CM

## 2022-06-11 DIAGNOSIS — Z79.01 ANTICOAGULANT LONG-TERM USE: ICD-10-CM

## 2022-06-11 LAB
ALBUMIN SERPL-MCNC: 3.4 G/DL (ref 3.5–5.1)
ALP BLD-CCNC: 144 U/L (ref 38–126)
ALT SERPL-CCNC: 15 U/L (ref 11–66)
ANION GAP SERPL CALCULATED.3IONS-SCNC: 12 MEQ/L (ref 8–16)
AST SERPL-CCNC: 20 U/L (ref 5–40)
BACTERIA: ABNORMAL /HPF
BASOPHILS # BLD: 0.9 %
BASOPHILS ABSOLUTE: 0.1 THOU/MM3 (ref 0–0.1)
BILIRUB SERPL-MCNC: 0.2 MG/DL (ref 0.3–1.2)
BILIRUBIN URINE: NEGATIVE
BLOOD, URINE: ABNORMAL
BUN BLDV-MCNC: 20 MG/DL (ref 7–22)
CALCIUM SERPL-MCNC: 9.1 MG/DL (ref 8.5–10.5)
CASTS 2: ABNORMAL /LPF
CASTS UA: ABNORMAL /LPF
CHARACTER, URINE: ABNORMAL
CHLORIDE BLD-SCNC: 102 MEQ/L (ref 98–111)
CO2: 22 MEQ/L (ref 23–33)
COLOR: ABNORMAL
CREAT SERPL-MCNC: 0.3 MG/DL (ref 0.4–1.2)
CRYSTALS, UA: ABNORMAL
EOSINOPHIL # BLD: 5.4 %
EOSINOPHILS ABSOLUTE: 0.4 THOU/MM3 (ref 0–0.4)
EPITHELIAL CELLS, UA: ABNORMAL /HPF
ERYTHROCYTE [DISTWIDTH] IN BLOOD BY AUTOMATED COUNT: 15.8 % (ref 11.5–14.5)
ERYTHROCYTE [DISTWIDTH] IN BLOOD BY AUTOMATED COUNT: 51.4 FL (ref 35–45)
GFR SERPL CREATININE-BSD FRML MDRD: > 90 ML/MIN/1.73M2
GLUCOSE BLD-MCNC: 118 MG/DL (ref 70–108)
GLUCOSE URINE: NEGATIVE MG/DL
HCT VFR BLD CALC: 45.2 % (ref 42–52)
HEMOGLOBIN: 14.7 GM/DL (ref 14–18)
IMMATURE GRANS (ABS): 0.01 THOU/MM3 (ref 0–0.07)
IMMATURE GRANULOCYTES: 0.1 %
KETONES, URINE: NEGATIVE
LEUKOCYTE ESTERASE, URINE: ABNORMAL
LYMPHOCYTES # BLD: 31.1 %
LYMPHOCYTES ABSOLUTE: 2.4 THOU/MM3 (ref 1–4.8)
MCH RBC QN AUTO: 29.3 PG (ref 26–33)
MCHC RBC AUTO-ENTMCNC: 32.5 GM/DL (ref 32.2–35.5)
MCV RBC AUTO: 90.2 FL (ref 80–94)
MISCELLANEOUS 2: ABNORMAL
MONOCYTES # BLD: 13.2 %
MONOCYTES ABSOLUTE: 1 THOU/MM3 (ref 0.4–1.3)
NITRITE, URINE: POSITIVE
NUCLEATED RED BLOOD CELLS: 0 /100 WBC
OSMOLALITY CALCULATION: 275.7 MOSMOL/KG (ref 275–300)
PH UA: 8 (ref 5–9)
PLATELET # BLD: 189 THOU/MM3 (ref 130–400)
PMV BLD AUTO: 9.1 FL (ref 9.4–12.4)
POTASSIUM REFLEX MAGNESIUM: 4.7 MEQ/L (ref 3.5–5.2)
PROTEIN UA: 300
RBC # BLD: 5.01 MILL/MM3 (ref 4.7–6.1)
RBC URINE: > 200 /HPF
RENAL EPITHELIAL, UA: ABNORMAL
SEG NEUTROPHILS: 49.3 %
SEGMENTED NEUTROPHILS ABSOLUTE COUNT: 3.8 THOU/MM3 (ref 1.8–7.7)
SODIUM BLD-SCNC: 136 MEQ/L (ref 135–145)
SPECIFIC GRAVITY, URINE: 1.02 (ref 1–1.03)
TOTAL PROTEIN: 7.5 G/DL (ref 6.1–8)
UROBILINOGEN, URINE: 0.2 EU/DL (ref 0–1)
WBC # BLD: 7.7 THOU/MM3 (ref 4.8–10.8)
WBC UA: > 100 /HPF
YEAST: ABNORMAL

## 2022-06-11 PROCEDURE — 99223 1ST HOSP IP/OBS HIGH 75: CPT | Performed by: INTERNAL MEDICINE

## 2022-06-11 PROCEDURE — 1200000000 HC SEMI PRIVATE

## 2022-06-11 PROCEDURE — 51702 INSERT TEMP BLADDER CATH: CPT

## 2022-06-11 PROCEDURE — 36415 COLL VENOUS BLD VENIPUNCTURE: CPT

## 2022-06-11 PROCEDURE — 81001 URINALYSIS AUTO W/SCOPE: CPT

## 2022-06-11 PROCEDURE — 87186 SC STD MICRODIL/AGAR DIL: CPT

## 2022-06-11 PROCEDURE — 80053 COMPREHEN METABOLIC PANEL: CPT

## 2022-06-11 PROCEDURE — 51700 IRRIGATION OF BLADDER: CPT

## 2022-06-11 PROCEDURE — 87086 URINE CULTURE/COLONY COUNT: CPT

## 2022-06-11 PROCEDURE — 99285 EMERGENCY DEPT VISIT HI MDM: CPT

## 2022-06-11 PROCEDURE — 51701 INSERT BLADDER CATHETER: CPT

## 2022-06-11 PROCEDURE — 87077 CULTURE AEROBIC IDENTIFY: CPT

## 2022-06-11 PROCEDURE — 85025 COMPLETE CBC W/AUTO DIFF WBC: CPT

## 2022-06-11 RX ORDER — ACETAMINOPHEN 325 MG/1
650 TABLET ORAL EVERY 6 HOURS PRN
Status: DISCONTINUED | OUTPATIENT
Start: 2022-06-11 | End: 2022-06-18 | Stop reason: HOSPADM

## 2022-06-11 RX ORDER — DOCUSATE SODIUM 100 MG/1
100 CAPSULE, LIQUID FILLED ORAL DAILY
Status: DISCONTINUED | OUTPATIENT
Start: 2022-06-12 | End: 2022-06-18 | Stop reason: HOSPADM

## 2022-06-11 RX ORDER — ACETIC ACID 0.25 G/100ML
30 IRRIGANT IRRIGATION 2 TIMES DAILY
Status: DISCONTINUED | OUTPATIENT
Start: 2022-06-12 | End: 2022-06-18 | Stop reason: HOSPADM

## 2022-06-11 RX ORDER — TIZANIDINE 4 MG/1
2 TABLET ORAL 3 TIMES DAILY
Status: DISCONTINUED | OUTPATIENT
Start: 2022-06-12 | End: 2022-06-18 | Stop reason: HOSPADM

## 2022-06-11 RX ORDER — GABAPENTIN 600 MG/1
600 TABLET ORAL 3 TIMES DAILY
Status: DISCONTINUED | OUTPATIENT
Start: 2022-06-12 | End: 2022-06-18 | Stop reason: HOSPADM

## 2022-06-11 RX ORDER — ASPIRIN 81 MG/1
81 TABLET ORAL DAILY
Status: DISCONTINUED | OUTPATIENT
Start: 2022-06-12 | End: 2022-06-18 | Stop reason: HOSPADM

## 2022-06-11 RX ORDER — ONDANSETRON 2 MG/ML
4 INJECTION INTRAMUSCULAR; INTRAVENOUS EVERY 6 HOURS PRN
Status: DISCONTINUED | OUTPATIENT
Start: 2022-06-11 | End: 2022-06-18 | Stop reason: HOSPADM

## 2022-06-11 RX ORDER — FAMOTIDINE 20 MG/1
20 TABLET, FILM COATED ORAL 2 TIMES DAILY
Status: DISCONTINUED | OUTPATIENT
Start: 2022-06-12 | End: 2022-06-18 | Stop reason: HOSPADM

## 2022-06-11 RX ORDER — SODIUM CHLORIDE 0.9 % (FLUSH) 0.9 %
10 SYRINGE (ML) INJECTION EVERY 12 HOURS SCHEDULED
Status: DISCONTINUED | OUTPATIENT
Start: 2022-06-12 | End: 2022-06-18 | Stop reason: HOSPADM

## 2022-06-11 RX ORDER — ACETAMINOPHEN 650 MG/1
650 SUPPOSITORY RECTAL EVERY 6 HOURS PRN
Status: DISCONTINUED | OUTPATIENT
Start: 2022-06-11 | End: 2022-06-18 | Stop reason: HOSPADM

## 2022-06-11 RX ORDER — BACLOFEN 10 MG/1
10 TABLET ORAL 3 TIMES DAILY
Status: DISCONTINUED | OUTPATIENT
Start: 2022-06-12 | End: 2022-06-18 | Stop reason: HOSPADM

## 2022-06-11 RX ORDER — SODIUM CHLORIDE 0.9 % (FLUSH) 0.9 %
10 SYRINGE (ML) INJECTION PRN
Status: DISCONTINUED | OUTPATIENT
Start: 2022-06-11 | End: 2022-06-18 | Stop reason: HOSPADM

## 2022-06-11 RX ORDER — POLYETHYLENE GLYCOL 3350 17 G/17G
17 POWDER, FOR SOLUTION ORAL DAILY PRN
Status: DISCONTINUED | OUTPATIENT
Start: 2022-06-11 | End: 2022-06-18 | Stop reason: HOSPADM

## 2022-06-11 RX ORDER — SODIUM CHLORIDE 1000 MG
1 TABLET, SOLUBLE MISCELLANEOUS 2 TIMES DAILY WITH MEALS
Status: DISCONTINUED | OUTPATIENT
Start: 2022-06-12 | End: 2022-06-18 | Stop reason: HOSPADM

## 2022-06-11 RX ORDER — OXYBUTYNIN CHLORIDE 5 MG/1
5 TABLET, EXTENDED RELEASE ORAL 2 TIMES DAILY
Status: DISCONTINUED | OUTPATIENT
Start: 2022-06-12 | End: 2022-06-18 | Stop reason: HOSPADM

## 2022-06-11 RX ORDER — ERYTHROMYCIN 5 MG/G
OINTMENT OPHTHALMIC NIGHTLY
Status: DISCONTINUED | OUTPATIENT
Start: 2022-06-12 | End: 2022-06-18 | Stop reason: HOSPADM

## 2022-06-11 RX ORDER — ONDANSETRON 4 MG/1
4 TABLET, ORALLY DISINTEGRATING ORAL EVERY 8 HOURS PRN
Status: DISCONTINUED | OUTPATIENT
Start: 2022-06-11 | End: 2022-06-18 | Stop reason: HOSPADM

## 2022-06-11 RX ORDER — SODIUM CHLORIDE 9 MG/ML
INJECTION, SOLUTION INTRAVENOUS PRN
Status: DISCONTINUED | OUTPATIENT
Start: 2022-06-11 | End: 2022-06-18 | Stop reason: HOSPADM

## 2022-06-11 ASSESSMENT — ENCOUNTER SYMPTOMS
VOMITING: 0
DIARRHEA: 0
BLOOD IN STOOL: 0
ABDOMINAL DISTENTION: 0
SHORTNESS OF BREATH: 0
CONSTIPATION: 0
ABDOMINAL PAIN: 0

## 2022-06-11 ASSESSMENT — LIFESTYLE VARIABLES: HOW OFTEN DO YOU HAVE A DRINK CONTAINING ALCOHOL: NEVER

## 2022-06-11 ASSESSMENT — PAIN - FUNCTIONAL ASSESSMENT
PAIN_FUNCTIONAL_ASSESSMENT: NONE - DENIES PAIN

## 2022-06-11 NOTE — ED NOTES
In for hourly rounding. Pt resting on cot in position of comfort. Pt remains A&Ox4, resps easy and unlabored. Pt continues to deny pain. Will monitor.      Jimena Brothers RN  06/11/22 9203

## 2022-06-11 NOTE — ED NOTES
In for hourly rounding. Pt resting on cot in position of comfort. Pt remains A&Ox4, resps easy and unlabored. Pt continues to deny pain. Will monitor.      Kika Carrion RN  06/11/22 5076

## 2022-06-11 NOTE — ED NOTES
In for hourly rounding. Pt resting on cot in position of comfort. Pt remains A&Ox4, resps easy and unlabored. Pt continues to deny pain. Attempted to irrigate pt catheter at this time. Few clots extracted. Unable to extract more. Notified provider. Will monitor.      Vergie Kehr, RN  06/11/22 7391

## 2022-06-11 NOTE — ED NOTES
Pt presents to ED via [facility] for c/o hematuria. ECF reports pt has a suprapubic catheter that was replaced throughout the night d/t being unable to urinate. Pt now has dark red, bloody urine. Upon initial assessment, pt is A&Ox4, resps easy and unlabored. Pt currently denies pain. Urine specimen obtained and sent to lab. VS as noted. Awaiting provider assessment. Will monitor.      Darnell Du RN  06/11/22 0997

## 2022-06-12 ENCOUNTER — APPOINTMENT (OUTPATIENT)
Dept: GENERAL RADIOLOGY | Age: 67
DRG: 699 | End: 2022-06-12
Payer: COMMERCIAL

## 2022-06-12 PROBLEM — Z79.01 CURRENT USE OF LONG TERM ANTICOAGULATION: Status: ACTIVE | Noted: 2022-06-12

## 2022-06-12 PROBLEM — E66.811 CLASS 1 OBESITY DUE TO EXCESS CALORIES WITHOUT SERIOUS COMORBIDITY WITH BODY MASS INDEX (BMI) OF 31.0 TO 31.9 IN ADULT: Status: ACTIVE | Noted: 2022-06-12

## 2022-06-12 PROBLEM — E66.09 CLASS 1 OBESITY DUE TO EXCESS CALORIES WITHOUT SERIOUS COMORBIDITY WITH BODY MASS INDEX (BMI) OF 31.0 TO 31.9 IN ADULT: Status: ACTIVE | Noted: 2022-06-12

## 2022-06-12 PROBLEM — Z93.3 COLOSTOMY IN PLACE (HCC): Status: ACTIVE | Noted: 2022-06-12

## 2022-06-12 LAB — LACTIC ACID: 1 MMOL/L (ref 0.5–2)

## 2022-06-12 PROCEDURE — 6370000000 HC RX 637 (ALT 250 FOR IP): Performed by: OPHTHALMOLOGY

## 2022-06-12 PROCEDURE — 6370000000 HC RX 637 (ALT 250 FOR IP): Performed by: INTERNAL MEDICINE

## 2022-06-12 PROCEDURE — 36415 COLL VENOUS BLD VENIPUNCTURE: CPT

## 2022-06-12 PROCEDURE — 6360000002 HC RX W HCPCS: Performed by: OPHTHALMOLOGY

## 2022-06-12 PROCEDURE — 83605 ASSAY OF LACTIC ACID: CPT

## 2022-06-12 PROCEDURE — 99232 SBSQ HOSP IP/OBS MODERATE 35: CPT | Performed by: OPHTHALMOLOGY

## 2022-06-12 PROCEDURE — 1200000000 HC SEMI PRIVATE

## 2022-06-12 PROCEDURE — 74018 RADEX ABDOMEN 1 VIEW: CPT

## 2022-06-12 PROCEDURE — 51702 INSERT TEMP BLADDER CATH: CPT

## 2022-06-12 PROCEDURE — 2580000003 HC RX 258: Performed by: INTERNAL MEDICINE

## 2022-06-12 RX ORDER — ENOXAPARIN SODIUM 100 MG/ML
40 INJECTION SUBCUTANEOUS DAILY
Status: DISCONTINUED | OUTPATIENT
Start: 2022-06-12 | End: 2022-06-13

## 2022-06-12 RX ORDER — METOPROLOL TARTRATE 50 MG/1
50 TABLET, FILM COATED ORAL 2 TIMES DAILY
Status: DISCONTINUED | OUTPATIENT
Start: 2022-06-12 | End: 2022-06-18 | Stop reason: HOSPADM

## 2022-06-12 RX ADMIN — METOPROLOL TARTRATE 50 MG: 50 TABLET, FILM COATED ORAL at 10:51

## 2022-06-12 RX ADMIN — SODIUM CHLORIDE, PRESERVATIVE FREE 10 ML: 5 INJECTION INTRAVENOUS at 22:01

## 2022-06-12 RX ADMIN — ASPIRIN 81 MG: 81 TABLET, COATED ORAL at 10:50

## 2022-06-12 RX ADMIN — OXYBUTYNIN CHLORIDE 5 MG: 5 TABLET, EXTENDED RELEASE ORAL at 22:01

## 2022-06-12 RX ADMIN — GABAPENTIN 600 MG: 600 TABLET, FILM COATED ORAL at 16:15

## 2022-06-12 RX ADMIN — BACLOFEN 10 MG: 10 TABLET ORAL at 16:15

## 2022-06-12 RX ADMIN — GABAPENTIN 600 MG: 600 TABLET, FILM COATED ORAL at 22:01

## 2022-06-12 RX ADMIN — ENOXAPARIN SODIUM 40 MG: 100 INJECTION SUBCUTANEOUS at 13:49

## 2022-06-12 RX ADMIN — SODIUM CHLORIDE 1 G: 1 TABLET ORAL at 10:51

## 2022-06-12 RX ADMIN — TIZANIDINE 2 MG: 4 TABLET ORAL at 16:15

## 2022-06-12 RX ADMIN — FAMOTIDINE 20 MG: 20 TABLET ORAL at 10:51

## 2022-06-12 RX ADMIN — DOCUSATE SODIUM 100 MG: 100 CAPSULE, LIQUID FILLED ORAL at 10:50

## 2022-06-12 RX ADMIN — SODIUM CHLORIDE, PRESERVATIVE FREE 10 ML: 5 INJECTION INTRAVENOUS at 11:02

## 2022-06-12 RX ADMIN — TIZANIDINE 2 MG: 4 TABLET ORAL at 10:52

## 2022-06-12 RX ADMIN — SODIUM CHLORIDE 1 G: 1 TABLET ORAL at 16:15

## 2022-06-12 RX ADMIN — FAMOTIDINE 20 MG: 20 TABLET ORAL at 22:01

## 2022-06-12 RX ADMIN — GABAPENTIN 600 MG: 600 TABLET, FILM COATED ORAL at 10:52

## 2022-06-12 RX ADMIN — TIZANIDINE 2 MG: 4 TABLET ORAL at 22:01

## 2022-06-12 RX ADMIN — Medication 1 TABLET: at 10:50

## 2022-06-12 RX ADMIN — OXYBUTYNIN CHLORIDE 5 MG: 5 TABLET, EXTENDED RELEASE ORAL at 10:51

## 2022-06-12 RX ADMIN — BACLOFEN 10 MG: 10 TABLET ORAL at 22:01

## 2022-06-12 RX ADMIN — Medication 1 TABLET: at 21:00

## 2022-06-12 RX ADMIN — ERYTHROMYCIN: 5 OINTMENT OPHTHALMIC at 22:01

## 2022-06-12 RX ADMIN — BACLOFEN 10 MG: 10 TABLET ORAL at 10:50

## 2022-06-12 ASSESSMENT — ENCOUNTER SYMPTOMS
ALLERGIC/IMMUNOLOGIC NEGATIVE: 1
GASTROINTESTINAL NEGATIVE: 1
EYES NEGATIVE: 1
RESPIRATORY NEGATIVE: 1

## 2022-06-12 ASSESSMENT — PAIN - FUNCTIONAL ASSESSMENT: PAIN_FUNCTIONAL_ASSESSMENT: ACTIVITIES ARE NOT PREVENTED

## 2022-06-12 ASSESSMENT — PAIN DESCRIPTION - ORIENTATION
ORIENTATION: RIGHT
ORIENTATION: LOWER

## 2022-06-12 ASSESSMENT — PAIN SCALES - GENERAL
PAINLEVEL_OUTOF10: 0
PAINLEVEL_OUTOF10: 0
PAINLEVEL_OUTOF10: 1
PAINLEVEL_OUTOF10: 1

## 2022-06-12 ASSESSMENT — PAIN DESCRIPTION - LOCATION
LOCATION: BUTTOCKS
LOCATION: BUTTOCKS

## 2022-06-12 ASSESSMENT — PAIN DESCRIPTION - DESCRIPTORS
DESCRIPTORS: SORE
DESCRIPTORS: SORE

## 2022-06-12 NOTE — H&P
Patient: Nathanael Mejia    Unit/Bed: 7K-26/026-A    YOB: 1955    MRN: [id number]    Acct: [de-identified]     Admitting Diagnosis: Gross hematuria [R31.0]  Anticoagulant long-term use [Z79.01]  Hematuria, gross [R31.0]    Admit Date:  6/11/2022    CC: Gross hematuria x1 day    HPI :  74-year-old male patient who is a nursing home resident. Sent to the ED b/cecause of gross hematuria from the chronic suprapubic catheter. He was noted to be bleeding from the cystostomy site. Patient has MS and a neurogenic bladder. He is on long-term anticoagulation with Xarelto for history of PE. S/p suprapubic catheter change in the ED. Otherwise, patient has no other complaints. Denies any headache, no dizziness, no chest pain, no shortness of breath, no nausea vomiting. Initial vital signs in the ED temperature 36.4 °C, respiratory 16, heart rate 69, blood pressure 131/75, oxygen saturation 97% total.    Initial labs serum sodium 136, potassium 4.7, chloride 102, CO2 22, creatinine 0.3, blood sugar 118, calcium 9.1, osmolality 275.7, albumin 3.4, alkaline phosphatase 144, ALT 15, AST 20, total bilirubin 0.2, total protein 7.5, blood sugar 118. WBC 7.7, hemoglobin 14.7, MCV 90.2, platelets 465. Urinalysis positive for UTI. Review of Systems   Constitutional: Negative. HENT: Negative. Eyes: Negative. Respiratory: Negative. Cardiovascular: Negative. Gastrointestinal: Negative. Endocrine: Negative. Genitourinary: Positive for hematuria. Musculoskeletal: Negative. Skin: Negative. Allergic/Immunologic: Negative. Neurological: Negative. Hematological: Negative. Psychiatric/Behavioral: Negative. All other systems reviewed and are negative.       Past Medical History:        Diagnosis Date    Dysphagia     oropharyngeal    History of pulmonary embolism     History of urinary tract infection     Hx of blood clots     Pulmonary embolis    Hypertension ER PO) Take 500 mg by mouth in the morning and at bedtime     Historical Provider, MD   baclofen (LIORESAL) 10 MG tablet Take 10 mg by mouth 3 times daily    Historical Provider, MD   gabapentin (NEURONTIN) 600 MG tablet Take 600 mg by mouth 3 times daily. Historical Provider, MD   rivaroxaban (XARELTO) 10 MG TABS tablet Take 10 mg by mouth    Historical Provider, MD   acetaminophen (TYLENOL) 325 MG tablet Take 650 mg by mouth every 4 hours as needed for Pain or Fever    Historical Provider, MD   acetic acid 0.25 % irrigation Irrigate with 30 mLs as directed 2 times daily Irrigate with as directed daily.     Historical Provider, MD   ibuprofen (ADVIL;MOTRIN) 400 MG tablet Take 400 mg by mouth every 4 hours as needed for Fever (for temp > 100.5 not alleviated by acetaminophen)    Historical Provider, MD   LACTOBACILLUS PO Take 1 capsule by mouth in the morning and at bedtime     Historical Provider, MD   metoprolol tartrate (LOPRESSOR) 25 MG tablet Take 1 tablet by mouth 2 times daily  Patient taking differently: Take 50 mg by mouth 2 times daily  6/20/19   Lisa Conklin MD   potassium chloride (KLOR-CON M) 20 MEQ TBCR extended release tablet Take 2 tablets by mouth daily 6/21/19   Lisa Conklin MD   sodium chloride 1 g tablet Take 1 tablet by mouth 2 times daily (with meals) 6/20/19   Lisa Conklin MD   calcium-vitamin D (Kathee [place]) 500-200 MG-UNIT per tablet Take 1 tablet by mouth 2 times daily 11/16/18   Justice Barger MD   famotidine (PEPCID) 20 MG tablet Take 1 tablet by mouth 2 times daily 6/14/18   Roberta Navas MD   docusate sodium (COLACE) 100 MG capsule Take 100 mg by mouth daily     Historical Provider, MD   tiZANidine (ZANAFLEX) 2 MG tablet Take 2 mg by mouth 3 times daily 0600;1400;2200 12/15/16   Historical Provider, MD   Multiple Vitamin (MULTIVITAMIN) tablet Take 1 tablet by mouth daily 3/28/16   Johnny Barney MD   vitamin A 89325 UNITS capsule Take 2 capsules by mouth daily 3/28/16 Angelina Covington MD       Allergies:    Patient has no known allergies. Social History:    TOBACCO:   reports that he quit smoking about 40 years ago. He has quit using smokeless tobacco.    ETOH:   reports no history of alcohol use. Family History:        Problem Relation Age of Onset    Cancer Mother         Breast    Heart Disease Father 48    Arthritis Sister     Heart Disease Paternal Grandmother 48       Objective:   BP (!) 140/76   Pulse 93   Temp 97.7 °F (36.5 °C) (Oral)   Resp 18   Wt 200 lb (90.7 kg)   SpO2 92%   BMI 31.32 kg/m²       Intake/Output Summary (Last 24 hours) at 6/12/2022 0704  Last data filed at 6/12/2022 0509  Gross per 24 hour   Intake --   Output 01200 ml   Net -73627 ml       Physical Exam  Vitals and nursing note reviewed. Constitutional:       General: He is not in acute distress. Appearance: Normal appearance. He is obese. He is not ill-appearing, toxic-appearing or diaphoretic. HENT:      Head: Normocephalic and atraumatic. Right Ear: External ear normal.      Left Ear: External ear normal.      Nose: Nose normal. No congestion or rhinorrhea. Mouth/Throat:      Mouth: Mucous membranes are moist.      Pharynx: Oropharynx is clear. No oropharyngeal exudate or posterior oropharyngeal erythema. Eyes:      General: No scleral icterus. Right eye: No discharge. Left eye: No discharge. Extraocular Movements: Extraocular movements intact. Conjunctiva/sclera: Conjunctivae normal.      Pupils: Pupils are equal, round, and reactive to light. Neck:      Vascular: No carotid bruit. Cardiovascular:      Rate and Rhythm: Normal rate and regular rhythm. Pulses: Normal pulses. Heart sounds: Normal heart sounds. No murmur heard. No friction rub. No gallop. Pulmonary:      Effort: Pulmonary effort is normal. No respiratory distress. Breath sounds: No stridor. No wheezing, rhonchi or rales.    Chest:      Chest wall: No tenderness. Abdominal:      General: Bowel sounds are normal. There is no distension. Palpations: Abdomen is soft. There is no mass. Tenderness: There is no abdominal tenderness. There is no right CVA tenderness, left CVA tenderness, guarding or rebound. Hernia: No hernia is present. Genitourinary:     Comments: Suprapubic catheter with mild bleeding  Musculoskeletal:         General: No swelling, tenderness, deformity or signs of injury. Normal range of motion. Cervical back: Normal range of motion and neck supple. No rigidity or tenderness. Right lower leg: No edema. Left lower leg: No edema. Lymphadenopathy:      Cervical: No cervical adenopathy. Skin:     General: Skin is warm. Coloration: Skin is not jaundiced or pale. Findings: No bruising, erythema, lesion or rash. Neurological:      General: No focal deficit present. Mental Status: He is alert and oriented to person, place, and time. Mental status is at baseline. Psychiatric:         Mood and Affect: Mood normal.         Behavior: Behavior normal.         Thought Content: Thought content normal.         Judgment: Judgment normal.     :    Medications:       Continuous Infusions:    PRN Meds:    Data:    CBC:   Recent Labs     06/11/22  1617   WBC 7.7   RBC 5.01   HGB 14.7   HCT 45.2   MCV 90.2          BMP:   Recent Labs     06/11/22  1617      K 4.7      CO2 22*   BUN 20   CREATININE 0.3*       PT/INR: No results for input(s): PROTIME, INR in the last 72 hours. LIVER PROFILE:   Recent Labs     06/11/22  1617   AST 20   ALT 15   BILITOT 0.2*   ALKPHOS 144*        ABG. None. URINALYSIS. Results for John Molina (MRN [id number]) as of 6/11/2022 20:30   Ref.  Range 6/11/2022 14:07   Color, UA Latest Ref Range: STRAW-YELLOW  RED (A)   Glucose, UA Latest Ref Range: NEGATIVE mg/dl NEGATIVE   Bilirubin, Urine Latest Ref Range: NEGATIVE  NEGATIVE   Ketones, Urine Latest Ref Range: NEGATIVE  NEGATIVE   Blood, Urine Latest Ref Range: NEGATIVE  LARGE (A)   pH, UA Latest Ref Range: 5.0 - 9.0  8.0   Protein, UA Latest Ref Range: NEGATIVE  300 (A)   Urobilinogen, Urine Latest Ref Range: 0.0 - 1.0 eu/dl 0.2   Nitrite, Urine Latest Ref Range: NEGATIVE  POSITIVE (A)   Leukocyte Esterase, Urine Latest Ref Range: NEGATIVE  MODERATE (A)   Casts UA Latest Ref Range: NONE SEEN /lpf NONE SEEN   CASTS 2 Latest Ref Range: NONE SEEN /lpf NONE SEEN   WBC, UA Latest Ref Range: 0-4/hpf /hpf > 100   RBC, UA Latest Ref Range: 0-2/hpf /hpf > 200   Epithelial Cells, UA Latest Ref Range: 3-5/hpf /hpf NONE   Renal Epithelial, UA Latest Ref Range: NONE SEEN  NONE SEEN   Bacteria, UA Latest Ref Range: FEW/NONE SEEN /hpf MANY   Yeast, Urine Latest Ref Range: NONE SEEN  NONE SEEN   Crystals, UA Latest Ref Range: NONE SEEN  NONE SEEN   Character, Urine Latest Ref Range: CLEAR-SL CLOUD  TURBID (A)   Specific Gravity, Urine Latest Ref Range: 1.002 - 1.030  1.017   MISCELLANEOUS 2 Unknown NONE SEEN       SEROLOGY  None. TUMOR MARKERS. None. MICROBIOLOGY   None. HISTOPATHOLOGY. None. TOXICOLOGY. None. ENDOSCOPE STUDIES. None. SURGICAL PROCEDURES. None. CARDIAC PROCEDURES. None. IR PROCEDURES. None. RADIOLOGY. Brain MRI with and without contrast-4/22/2022. FINDINGS:  There is stable moderate to severe frontal temporal predominant volume loss. There is a small focal area of encephalomalacia in the high right paramedian frontal lobe,   stable compared to prior exam.     There are mild scattered focal areas of T2/FLAIR prolongation in the periventricular,   subcortical deep white matter in keeping with history of multiple sclerosis. These are stable compared to prior exam.     No new/interval lesion is identified.      No focal areas restricted diffusion are present.     Following contrast administration, there are no focal areas of abnormal parenchymal or   meningeal enhancement identified.     The major vascular flow voids appear patent. Patient is status post left-sided lens extraction. Orbits are otherwise unremarkable. Paranasal sinuses and mastoid air cells are clear.      IMPRESSION:  1. No evidence of acute intracranial abnormality. 2. Stable patchy areas of T2/FLAIR hyperintensity in the supratentorial white matter       in keeping with history of multiple sclerosis. No new/interval lesion or enhancing lesion suggest active demyelination.     Echocardiogram-6/11/2018  SUMMARY:   Technically difficult examination. Ejection fraction was estimated at 50-55%. The right ventricular size was normal with normal systolic function   and wall thickness. ASSESSMENT AND  PLAN. 1.NEUROVASCULAR. MS with chronic paraparesis. 2.PULMONARY. Suspected TAL- Nocturnal pulse oximetry. H/o pulmonary embolism disease. 3.CARDIOVASCULAR. Hypertension-controlled. 4.GI. Functional left-sided diverting colostomy . Abdominal distention-KUB    5. ID. Suprapubic catheter associated UTI hematuria -asymptomatic     No strong indication for antibiotics. 6.ENDO.     TSH and A1c 5.3.      7.MUSCULOSKELETAL. Chronic paraplegia    8. HEM-ONC. H/o PE and DVT     Long-term OAC with Xarelto-on hold    9.UROLOGY. Gross hematuria w/ suprapubic catheter-s/p catheter change and CBI      10.NUTRITION. Obesity with BMI 31.312-needs tight control for weight loss management. 11.PSYCH. Chronic depression. 12.DISPO. Planned d/c to home vs rehab soon.         Electronically signed by Norma Ingram MD on 6/12/2022 at 7:04 AM

## 2022-06-12 NOTE — PLAN OF CARE
Problem: Discharge Planning  Goal: Discharge to home or other facility with appropriate resources  Outcome: Progressing  Flowsheets (Taken 6/11/2022 2251)  Discharge to home or other facility with appropriate resources: Identify barriers to discharge with patient and caregiver     Problem: Safety - Adult  Goal: Free from fall injury  Outcome: Progressing  Flowsheets (Taken 6/12/2022 0221)  Free From Fall Injury: Instruct family/caregiver on patient safety     Problem: Skin/Tissue Integrity  Goal: Absence of new skin breakdown  Description: 1. Monitor for areas of redness and/or skin breakdown  2. Assess vascular access sites hourly  3. Every 4-6 hours minimum:  Change oxygen saturation probe site  4. Every 4-6 hours:  If on nasal continuous positive airway pressure, respiratory therapy assess nares and determine need for appliance change or resting period. Outcome: Progressing  Note: No evidence of skin break down. Pt. Turned and repositioned Q2. Care plan reviewed with patient. Patient verbalize understanding of the plan of care and contribute to goal setting.

## 2022-06-12 NOTE — ED NOTES
In for reassessment. No urine output noted in pt's jeffers catheter bag. Pt noted to be soaked up the back and attends completely saturated. It is noted that pt is leaking around catheter and from penis. Spoke with Dr Dena Lowry, instructed to exchange 3 way catheter for larger one. Catheter exchanged, pt has full linen change and cleansed. Pt tolerated procedure well. Suprapubic catheter draining at this time.       Hiram Mena RN  06/11/22 9294

## 2022-06-12 NOTE — ED NOTES
ED to inpatient nurses report    Chief Complaint   Patient presents with    Hematuria      Present to ED from nursing home  LOC: alert and orientated to name, place, date  Vital signs   Vitals:    06/11/22 1508 06/11/22 1608 06/11/22 1708 06/11/22 1808   BP: (!) 163/79 (!) 155/69 (!) 163/62 (!) 174/74   Pulse: 70 68 69 72   Resp: 16 16 18 18   Temp:       TempSrc:       SpO2: 96% 98% 98% 98%   Weight:          Oxygen Baseline Room Air    Current needs required Room Air Bipap/Cpap No  LDAs:   Peripheral IV 06/11/22 Left Forearm (Active)   Site Assessment Clean, dry & intact 06/11/22 2119   Line Status Blood return noted;Normal saline locked; Flushed 06/11/22 2119   Phlebitis Assessment No symptoms 06/11/22 2119   Infiltration Assessment 0 06/11/22 2119   Alcohol Cap Used No 06/11/22 2119   Dressing Status New dressing applied;Clean, dry & intact 06/11/22 2119   Dressing Type Transparent 06/11/22 2119   Dressing Intervention New 06/11/22 2119     Mobility: Fully dependent  Pending ED orders: NA  Present condition: Pt resting on cot in position of comfort. Pt is A&Ox4, resps easy and unlabored. IV shows no s/s of infection or infiltration. [place] catheter draining at this time.   Person of contract, phone number   Our promise was given to patient    Electronically signed by Denise Morris RN on 6/11/2022 at 9:20 PM       Denise Morris RN  06/11/22 2121

## 2022-06-12 NOTE — PROGRESS NOTES
Hospitalist Progress Note    Patient:  Gregory Mcgill      Unit/Bed:7K-26/026-A    YOB: 1955    MRN: [id number]       Acct: [de-identified]     PCP: Walt Castañeda MD    Date of Admission: 6/11/2022    Chief complaint: blood in urine    Admission history:(per H&P note)   60-year-old male patient who is a nursing home resident. Sent to the ED b/cecause of gross hematuria from the chronic suprapubic catheter. He was noted to be bleeding from the cystostomy site.     Patient has MS and a neurogenic bladder.     He is on long-term anticoagulation with Xarelto for history of PE.      S/p suprapubic catheter change in the ED.     Otherwise, patient has no other complaints. Denies any headache, no dizziness, no chest pain, no shortness of breath, no nausea vomiting.     Initial vital signs in the ED temperature 36.4 °C, respiratory 16, heart rate 69, blood pressure 131/75, oxygen saturation 97% total.     Initial labs serum sodium 136, potassium 4.7, chloride 102, CO2 22, creatinine 0.3, blood sugar 118, calcium 9.1, osmolality 275.7, albumin 3.4, alkaline phosphatase 144, ALT 15, AST 20, total bilirubin 0.2, total protein 7.5, blood sugar 118.     WBC 7.7, hemoglobin 14.7, MCV 90.2, platelets 235.     Urinalysis positive for UTI.     Assessment/Plan:     1.NEUROVASCULAR. MS with chronic paraparesis.       2. PULMONARY. Suspected TAL- Nocturnal pulse oximetry. H/o pulmonary embolism disease.     3.CARDIOVASCULAR. Hypertension-controlled. Parameters to metoprolol     4.GI. Functional left-sided diverting colostomy . Abdominal distention-KUB: moderate amonut of stool: right colon     5. ID. Suprapubic catheter associated UTI hematuria -asymptomatic     No strong indication for antibiotics.     6. ENDO.     TSH and A1c 5.3.       7.MUSCULOSKELETAL. Chronic paraplegia     8. HEM-ONC.      H/o PE and DVT     Long-term OAC with Arn Renea hold: will resume once ok by urology     Continue ASA, DVT prophylaxis for now     9.UROLOGY. Gross hematuria w/ suprapubic catheter-s/p catheter change and CBI: appears clear now    Consult urology     10. NUTRITION. Obesity with BMI 31.312-needs tight control for weight loss management.     11. PSYCH. Chronic depression.     12. DISPO. Planned d/c to [facility]: ? Tomorrow    Subjective (past 24 hours)  Catheter to irrigation: clear: irrigation  No pain  Diet:  ADULT DIET; Regular; Low Sodium (2 gm)      Medications:  Scheduled Meds:   metoprolol tartrate  50 mg Oral BID    acetic acid  30 mL Irrigation BID    aspirin  81 mg Oral Daily    baclofen  10 mg Oral TID    calcium-cholecalciferol  1 tablet Oral BID    docusate sodium  100 mg Oral Daily    erythromycin   Right Eye Nightly    famotidine  20 mg Oral BID    gabapentin  600 mg Oral TID    oxybutynin  5 mg Oral BID    sodium chloride  1 g Oral BID WC    tiZANidine  2 mg Oral TID    sodium chloride flush  10 mL IntraVENous 2 times per day     Continuous Infusions:   sodium chloride       PRN Meds:sodium chloride flush, sodium chloride, ondansetron **OR** ondansetron, polyethylene glycol, acetaminophen **OR** acetaminophen    Objective:    Review of Labs and Diagnostic Testing:  Labs:     Recent Labs     06/11/22  1617   WBC 7.7   HGB 14.7   HCT 45.2        Recent Labs     06/11/22  1617      K 4.7      CO2 22*   BUN 20   CREATININE 0.3*   CALCIUM 9.1     Recent Labs     06/11/22  1617   AST 20   ALT 15   BILITOT 0.2*   ALKPHOS 144*     No results found for: AMYLASE  No results for input(s): INR in the last 72 hours. No results for input(s): TROPONINT in the last 72 hours. No results for input(s): BNP in the last 72 hours.   Recent Labs     06/12/22  0533   LACTA 1.0     Lab Results   Component Value Date    PROCAL 0.20 12/29/2020    PROCAL 0.22 12/27/2020    PROCAL 0.25 12/27/2020     Lab Results   Component Value Date    LABA1C 5.3 04/23/2022       Urinalysis:   Lab Results   Component Value Date    NITRU POSITIVE 06/11/2022    WBCUA > 100 06/11/2022    WBCUA >200 01/20/2012    BACTERIA MANY 06/11/2022    RBCUA > 200 06/11/2022    BLOODU LARGE 06/11/2022    SPECGRAV >1.030 04/22/2022    GLUCOSEU NEGATIVE 06/11/2022     Radiology:   Reviewed: see report for details  CXR: I have reviewed the CXR  EKG:  No acute changes:  XR ABDOMEN (KUB) (SINGLE AP VIEW)   Final Result   1. Moderate amount of stool in the right colon. 2. Dilated small bowel loops in the right lower quadrant. 3. Changes of ankylosing spondylitis in the lumbar spine. 4. Otherwise nonspecific abdomen. **This report has been created using voice recognition software. It may contain minor errors which are inherent in voice recognition technology. **      Final report electronically signed by DR Jhonathan Cui on 6/12/2022 8:00 AM          Physical Exam:  BP (!) 170/72   Pulse (!) 107   Temp 98.5 °F (36.9 °C) (Oral)   Resp 18   Wt 200 lb (90.7 kg)   SpO2 92%   BMI 31.32 kg/m²   General appearance - alert, well appearing, and in no distress   Chest - clear to auscultation, no wheezes, rales or rhonchi, symmetric air entry   Distant Breath Sounds: No   Heart - normal rate, regular rhythm, normal S1, S2, no murmurs, rubs, clicks or gallops   Abdomen - soft, not tender, nondistended, no masses or organomegaly   Obese: No; Protuberant: Bowel sounds heard.  Left sided colostomy, suprapubic catheter site is clean  Neurological - A&O 3 , normal speech,    Musculoskeletal - no joint tenderness,   Extremities - peripheral pulses normal, no pedal edema, no clubbing or cyanosis  24 hour intake/output:    Intake/Output Summary (Last 24 hours) at 6/12/2022 1014  Last data filed at 6/12/2022 0509  Gross per 24 hour   Intake --   Output 81321 ml   Net -90900 ml     Assessment/Plan:    Principal Problem:    Hematuria, gross  Active Problems:    Class 1 obesity due to excess calories without serious comorbidity with body mass index (BMI) of 31.0 to 31.9 in adult    Colostomy in place [facility])    Current use of long term anticoagulation    Neurogenic bladder    Multiple sclerosis (HCC)    Chronic suprapubic catheter ([address]    Pulmonary embolism on left [facility])    Chronic depression    Essential hypertension    History of pulmonary embolism  Resolved Problems:    * No resolved hospital problems.  *       DVT prophylaxis: [x] Lovenox                                 [] SCDs                                 [] SQ Heparin                                 [] Encourage ambulation           [] Already on Anticoagulation  Code Status: Full Code    Nilam Dowd MD on 6/12/2022 at 10:14 AM  Rounding Hospitalist

## 2022-06-12 NOTE — PROGRESS NOTES
Pt. Admitted to 7k with CBI, estimated 600ml left in bag when arriving. Pt. Received 3000mL while on 7k. Pt. Has had an output of 7860 jeffers cath. Notified physician to clarify CBI vs bladder irrigation. Physician order CBI to be held. CBI held at 2250.

## 2022-06-13 LAB
BASOPHILS # BLD: 0.7 %
BASOPHILS ABSOLUTE: 0.1 THOU/MM3 (ref 0–0.1)
EOSINOPHIL # BLD: 3.3 %
EOSINOPHILS ABSOLUTE: 0.2 THOU/MM3 (ref 0–0.4)
ERYTHROCYTE [DISTWIDTH] IN BLOOD BY AUTOMATED COUNT: 16.1 % (ref 11.5–14.5)
ERYTHROCYTE [DISTWIDTH] IN BLOOD BY AUTOMATED COUNT: 54.9 FL (ref 35–45)
HCT VFR BLD CALC: 41.3 % (ref 42–52)
HEMOGLOBIN: 13.2 GM/DL (ref 14–18)
IMMATURE GRANS (ABS): 0.01 THOU/MM3 (ref 0–0.07)
IMMATURE GRANULOCYTES: 0.1 %
LYMPHOCYTES # BLD: 34.8 %
LYMPHOCYTES ABSOLUTE: 2.5 THOU/MM3 (ref 1–4.8)
MCH RBC QN AUTO: 30 PG (ref 26–33)
MCHC RBC AUTO-ENTMCNC: 32 GM/DL (ref 32.2–35.5)
MCV RBC AUTO: 93.9 FL (ref 80–94)
MONOCYTES # BLD: 11.5 %
MONOCYTES ABSOLUTE: 0.8 THOU/MM3 (ref 0.4–1.3)
NUCLEATED RED BLOOD CELLS: 0 /100 WBC
PLATELET # BLD: 182 THOU/MM3 (ref 130–400)
PMV BLD AUTO: 8.9 FL (ref 9.4–12.4)
RBC # BLD: 4.4 MILL/MM3 (ref 4.7–6.1)
SEG NEUTROPHILS: 49.6 %
SEGMENTED NEUTROPHILS ABSOLUTE COUNT: 3.6 THOU/MM3 (ref 1.8–7.7)
WBC # BLD: 7.2 THOU/MM3 (ref 4.8–10.8)

## 2022-06-13 PROCEDURE — 6370000000 HC RX 637 (ALT 250 FOR IP): Performed by: INTERNAL MEDICINE

## 2022-06-13 PROCEDURE — 1200000000 HC SEMI PRIVATE

## 2022-06-13 PROCEDURE — 6370000000 HC RX 637 (ALT 250 FOR IP): Performed by: OPHTHALMOLOGY

## 2022-06-13 PROCEDURE — 51702 INSERT TEMP BLADDER CATH: CPT

## 2022-06-13 PROCEDURE — 85025 COMPLETE CBC W/AUTO DIFF WBC: CPT

## 2022-06-13 PROCEDURE — 36415 COLL VENOUS BLD VENIPUNCTURE: CPT

## 2022-06-13 PROCEDURE — 6370000000 HC RX 637 (ALT 250 FOR IP): Performed by: PHYSICIAN ASSISTANT

## 2022-06-13 PROCEDURE — 2580000003 HC RX 258

## 2022-06-13 PROCEDURE — 99253 IP/OBS CNSLTJ NEW/EST LOW 45: CPT | Performed by: UROLOGY

## 2022-06-13 PROCEDURE — 2580000003 HC RX 258: Performed by: INTERNAL MEDICINE

## 2022-06-13 PROCEDURE — 99233 SBSQ HOSP IP/OBS HIGH 50: CPT | Performed by: PHYSICIAN ASSISTANT

## 2022-06-13 PROCEDURE — 6360000002 HC RX W HCPCS: Performed by: OPHTHALMOLOGY

## 2022-06-13 RX ORDER — 0.9 % SODIUM CHLORIDE 0.9 %
500 INTRAVENOUS SOLUTION INTRAVENOUS ONCE
Status: COMPLETED | OUTPATIENT
Start: 2022-06-13 | End: 2022-06-13

## 2022-06-13 RX ADMIN — METOPROLOL TARTRATE 50 MG: 50 TABLET, FILM COATED ORAL at 08:43

## 2022-06-13 RX ADMIN — SODIUM CHLORIDE 500 ML: 9 INJECTION, SOLUTION INTRAVENOUS at 01:21

## 2022-06-13 RX ADMIN — ENOXAPARIN SODIUM 40 MG: 100 INJECTION SUBCUTANEOUS at 14:43

## 2022-06-13 RX ADMIN — SODIUM CHLORIDE, PRESERVATIVE FREE 10 ML: 5 INJECTION INTRAVENOUS at 08:43

## 2022-06-13 RX ADMIN — BACLOFEN 10 MG: 10 TABLET ORAL at 08:43

## 2022-06-13 RX ADMIN — BACLOFEN 10 MG: 10 TABLET ORAL at 19:58

## 2022-06-13 RX ADMIN — FAMOTIDINE 20 MG: 20 TABLET ORAL at 19:58

## 2022-06-13 RX ADMIN — BACLOFEN 10 MG: 10 TABLET ORAL at 14:43

## 2022-06-13 RX ADMIN — SODIUM CHLORIDE 1 G: 1 TABLET ORAL at 16:57

## 2022-06-13 RX ADMIN — Medication 1 TABLET: at 19:58

## 2022-06-13 RX ADMIN — SODIUM CHLORIDE, PRESERVATIVE FREE 10 ML: 5 INJECTION INTRAVENOUS at 19:59

## 2022-06-13 RX ADMIN — SODIUM CHLORIDE 1 G: 1 TABLET ORAL at 08:43

## 2022-06-13 RX ADMIN — OXYBUTYNIN CHLORIDE 5 MG: 5 TABLET, EXTENDED RELEASE ORAL at 19:58

## 2022-06-13 RX ADMIN — Medication 1 TABLET: at 08:43

## 2022-06-13 RX ADMIN — GABAPENTIN 600 MG: 600 TABLET, FILM COATED ORAL at 14:43

## 2022-06-13 RX ADMIN — GABAPENTIN 600 MG: 600 TABLET, FILM COATED ORAL at 19:58

## 2022-06-13 RX ADMIN — ASPIRIN 81 MG: 81 TABLET, COATED ORAL at 08:43

## 2022-06-13 RX ADMIN — DOCUSATE SODIUM 100 MG: 100 CAPSULE, LIQUID FILLED ORAL at 08:44

## 2022-06-13 RX ADMIN — GABAPENTIN 600 MG: 600 TABLET, FILM COATED ORAL at 08:43

## 2022-06-13 RX ADMIN — FAMOTIDINE 20 MG: 20 TABLET ORAL at 08:43

## 2022-06-13 RX ADMIN — TIZANIDINE 2 MG: 4 TABLET ORAL at 08:43

## 2022-06-13 RX ADMIN — RIVAROXABAN 10 MG: 10 TABLET, FILM COATED ORAL at 19:58

## 2022-06-13 RX ADMIN — OXYBUTYNIN CHLORIDE 5 MG: 5 TABLET, EXTENDED RELEASE ORAL at 08:43

## 2022-06-13 RX ADMIN — ERYTHROMYCIN: 5 OINTMENT OPHTHALMIC at 19:58

## 2022-06-13 RX ADMIN — TIZANIDINE 2 MG: 4 TABLET ORAL at 14:43

## 2022-06-13 ASSESSMENT — PAIN SCALES - GENERAL: PAINLEVEL_OUTOF10: 0

## 2022-06-13 NOTE — DISCHARGE INSTR - COC
Continuity of Care Form    Patient Name: Ignacio Steen   :  1955  MRN:  [id number]    Admit date:  2022  Discharge date:  22    Code Status Order: Full Code   Advance Directives:      Admitting Physician:  Katherine Rubinstein, MD  PCP: Miracle Cody MD    Discharging Nurse: [facility] Unit/Room#: 7K-26/026-A  Discharging Unit Phone Number: ***    Emergency Contact:   Extended Emergency Contact Information  Primary Emergency Contact: [facility]  Address: [address] [place] [address], [address] [facility] Phone: [phone]  Relation: Spouse  Secondary Emergency Contact: [address] Phone: [phone]  Relation: Child    Past Surgical History:  Past Surgical History:   Procedure Laterality Date    ANKLE SURGERY      broken ankle    BLADDER SURGERY  2012    Suprapubic catheter placement    COLONOSCOPY      CYSTO/URETERO/PYELOSCOPY, CALCULUS TX Left 2019    CYSTOSCOPY, LEFT STENT insertion, bladder irragation with bladder stones performed by Pa Velásquez MD at [address] [place] [address] N/A 2019    CYSTO, LEFT URETERAL STENT REMOVAL, LEFT URETEROSCOPY, LASER LITHOTRIPSY, BASKET RETRIEVAL OF STONE FRAGMENTS performed by Pa Velásquez MD at [address] [place] [address] 2021    CYSTOSCOPY, CYSTOLITHOLAPAXY, SUPRAPUBIC CATHETER EXCHANGE performed by Abigail Taylor MD at [address] [place] [address]. 2018    ROBOT DIVERTING COLOSTOMY performed by Manon Duane, MD at [address] [facility]  child       Immunization History:   Immunization History   Administered Date(s) Administered    COVID-19, Pfizer Purple top, DILUTE for use, 12+ yrs, 30mcg/0.3mL dose 2020, 2021, 10/11/2021    Hepatitis B Ped/Adol (Engerix-B, Recombivax HB) 1994, 1994    Influenza Virus Vaccine 2012, 10/01/2017, 10/06/2018    Pneumococcal Conjugate Vaccine 06/08/2013    Pneumococcal Polysaccharide ([id number]) 01/01/2010       Active Problems:  Patient Active Problem List   Diagnosis Code    Neurogenic bladder N31.9    Multiple sclerosis ([address] G35    MS (multiple sclerosis) ([address] G35    Urinary retention R33.9    Chronic suprapubic catheter ([address] Z93.59    Cystostomy malfunction ([address] N99.512    Decubitus ulcer of coccyx L89.159    Decubitus ulcer, stage 3 ([facility]) L89.93    Malnutrition ([address] E46    Decubitus ulcer of right perineal ischial region, stage 3 ([address] L89.313    Pulmonary embolism on left ([facility]) Z38.49    Acute metabolic encephalopathy P51.61    Speech disturbance R47.9    Infected decubitus ulcer, stage I L89.91, L08.9    Infected decubitus ulcer, stage III ([facility]) L89.93, L08.9    Wound infection T14. 8XXA, L08.9    Elevated INR R79.1    Chronic osteomyelitis, pelvis, right ([facility]) M86.651    Osteomyelitis ([facility]) M86.9    Urinary tract infection without hematuria N39.0    Abnormal liver function test R94.5    Chronic depression F32. A    Essential hypertension I10    History of pulmonary embolism Z86.711    Other dysphagia R13.19    Pressure injury of skin of back L89.109    Decubitus ulcer L89.90    Elevated transaminase level R74.01    Anemia D64.9    Sepsis due to methicillin resistant Staphylococcus aureus (MRSA) ([facility]) A41.02    Sepsis ([facility]) A41.9    AMS (altered mental status) R41.82    Hematuria, gross R31.0    Class 1 obesity due to excess calories without serious comorbidity with body mass index (BMI) of 31.0 to 31.9 in adult E66.09, Z68.31    Colostomy in place [facility]) Z93.3    Current use of long term anticoagulation Z79.01       Isolation/Infection:   Isolation            Contact          Patient Infection Status       Infection Onset Added Last Indicated Last Indicated By Review Planned Expiration Resolved Resolved By    ESBL (Extended Spectrum Beta Lactamase)  04/27/22 04/27/22 Ang White RN        E-coli urine 4/2022    CRE (Carbapenem-Resistant Enterobacteriaceae)  07/08/19 07/08/19 Jero Gore RN        Pseudomonas Aeruginosa- sacrum swab - confirmed by [address] 6/2019    VRE 02/10/19 02/10/19 02/10/19 VRE Screen by PCR        PCR 2/2019    Resolved    COVID-19 (Rule Out) 12/27/20 12/27/20 12/27/20 COVID-19 (Ordered)   12/27/20 Rule-Out Test Resulted    MRSA 02/10/19 02/12/19 06/17/19 Aerobic & Anaerobic Culture   04/25/22 Jero Gore RN    Buttock 2/2019  Rectal 2/2019  Urine 6/2019  Sacrum 6/2019    MRSA  08/05/13 08/05/13 Kylie Estevez RN   01/22/18 Jero Gore RN    Urine  Sacrum swab 6/2019              Nurse Assessment:  Last Vital Signs: /63   Pulse (!) 101   Temp 98.5 °F (36.9 °C) (Oral)   Resp 18   Wt 200 lb (90.7 kg)   SpO2 90%   BMI 31.32 kg/m²     Last documented pain score (0-10 scale): Pain Level: 0  Last Weight:   Wt Readings from Last 1 Encounters:   06/11/22 200 lb (90.7 kg)     Mental Status:  {IP PT MENTAL STATUS:97070}    IV Access:  - None    Nursing Mobility/ADLs:  Walking   Dependent  Transfer  Dependent  Bathing  Dependent  Dressing  Dependent  Toileting  Dependent  Feeding  Assisted  Med Admin  Assisted  Med Delivery   whole    Wound Care Documentation and Therapy:  Wound 04/22/22 Buttocks Right Tunneling wound. (Active)   Wound Etiology Pressure Stage 4 06/13/22 0837   Dressing Status New drainage noted 06/12/22 1014   Dressing/Treatment Dry dressing 06/13/22 0837   Wound Assessment Pink/red 06/13/22 0837   Drainage Amount None 06/13/22 0837   Drainage Description Serosanguinous 06/12/22 1014   Odor None 06/13/22 0837   Tawny-wound Assessment Excoriated 06/13/22 0837   Number of days: 51        Elimination:  Continence: Bowel:No  Bladder: No  Urinary Catheter:  Insertion Date: 6/15/22    Colostomy/Ileostomy/Ileal Conduit: Yes       Date of Last BM: 6/18/22    Intake/Output Summary (Last 24 hours) at 6/13/2022 1427  Last data filed at 6/13/2022 1329  Gross per 24 hour   Intake 850 ml   Output 570 ml   Net 280 ml     I/O last 3 completed shifts: In: 56 [P.O.:490]  Out: [address] [Urine:10447]    Safety Concerns:     None    Impairments/Disabilities:      Paralysis -      Nutrition Therapy:  Current Nutrition Therapy:   - Oral Diet:  General    Routes of Feeding: Oral  Liquids: Thin Liquids  Daily Fluid Restriction: no  Last Modified Barium Swallow with Video (Video Swallowing Test): not done    Treatments at the Time of Hospital Discharge:   Respiratory Treatments:   Oxygen Therapy:  is not on home oxygen therapy. Ventilator:    - No ventilator support    Rehab Therapies: Physical Therapy, Occupational Therapy, and Speech/Language Therapy  Weight Bearing Status/Restrictions: No weight bearing restrictions  Other Medical Equipment (for information only, NOT a DME order):     Other Treatments:     Patient's personal belongings (please select all that are sent with patient):  None    RN SIGNATURE:  Electronically signed by Miranda Blankenship RN on 6/18/22 at 3:31 PM EDT    CASE MANAGEMENT/SOCIAL WORK SECTION    Inpatient Status Date: 06/11/22    Readmission Risk Assessment Score:  Readmission Risk              Risk of Unplanned Readmission:  11           Discharging to Facility/ Agency   Name: [facility]   Address: [facility]  [facility]  Phone: [phone]  Fax:     Dialysis Facility (if applicable)   Name:  Address:  Dialysis Schedule:  Phone:  Fax:    / signature: Electronically signed by TRISTIAN Leong on 6/13/2022 at 2:28 PM     PHYSICIAN SECTION    Prognosis: Good    Condition at Discharge: Stable    Rehab Potential (if transferring to Rehab): Good    Recommended Labs or Other Treatments After Discharge: None     Physician Certification: I certify the above information and transfer of Karina Venegas  is necessary for the continuing treatment of the diagnosis listed and that he requires Skilled Nursing Facility for greater 30 days.      Update Admission H&P: No change in H&P    PHYSICIAN SIGNATURE:  Electronically signed by Kathleen Maza PA-C on 6/18/22 at 11:00 AM EDT

## 2022-06-13 NOTE — CARE COORDINATION
6/13/22, 9:56 AM EDT  DISCHARGE PLANNING EVALUATION:    Tora Goltz       Admitted: 6/11/2022/ [address] [place] day: 2   Location: [address] [place] [address]- Reason for admit: Gross hematuria [R31.0]  Anticoagulant long-term use [Z79.01]  Hematuria, gross [R31.0]   PMH:  has a past medical history of Dysphagia, History of pulmonary embolism, History of urinary tract infection, Hx of blood clots, Hypertension, Major depressive disorder, single episode, MS (multiple sclerosis) ([address] Neurogenic bladder, Osteomyelitis ([address] and UTI (urinary tract infection). Procedure: 6/12 KUB: Moderate amount of stool in the right colon. Dilated small bowel loops in the right lower quadrant. Changes of ankylosing spondylitis in the lumbar spine. Otherwise nonspecific abdomen. Barriers to Discharge:  Admitted through ER after presenting from ECF with hematuria after suprapubic catheter exchange. CBI and hand irrigation. Urine culture growing gram negative bacilli. UA showed red color, large blood, 300 protein, moderate leukocytes, + nitrites, turbid. Acetic acid irrigation of catheter 2 x day. PCP: Desiree Pacheco MD  Readmission Risk Score: 18.4 ( )%  Patient's Healthcare Decision Maker: Legal Next of Kin    Patient Goals/Plan/Treatment Preferences: Assessment deferred to  as patient is from SNF. Will follow for potential needs. Transportation/Food Security/Housekeeping Addressed:  No issues identified.

## 2022-06-13 NOTE — PROGRESS NOTES
Comprehensive Nutrition Assessment    Type and Reason for Visit:  Initial,Positive Nutrition Screen,Wound    Nutrition Recommendations/Plan:   1. Continue current diet  2. Trail ONS: Ensure Enlive BID and Sebastian BID     Malnutrition Assessment:  Malnutrition Status:  No malnutrition (06/13/22 0969)    Context:  Acute Illness     Findings of the 6 clinical characteristics of malnutrition:  Energy Intake:  Unable to assess (PO intake documented at 0-50%)  Weight Loss:  No significant weight loss (per EMR)     Body Fat Loss:  No significant body fat loss     Muscle Mass Loss:  No significant muscle mass loss    Fluid Accumulation:  No significant fluid accumulation     Strength:  Not Performed    Nutrition Assessment:      Pt. nutritionally compromised AEB wounds. At risk for further nutrition compromise r/t increased nutrient needs for wound healing (stage 3), Admit with gross hematuria, UTI, acute chronic normocytic anemia underlying medical condition (Hx: PE, blood clots, MS, HTN, depresson). Nutrition Related Findings:    Pt. Report/Treatments/Miscellaneous: Pt sleeping soundly during attempted visit: Will follow up as time allows: from SNF: Stage 3 wound per wound ostomy: No weight loss indicated: PO intake 0-50%. GI Status: No BM  Pertinent Labs: Colace, Lovenox, Romycin, Pepcid, Lopressor   Pertinent Meds: Na 136, BUN 20, Creatinine 0.3, Glucose 118, Hgb 13.2    Wound Type: Stage IV (stage 2 wound right buttock)       Current Nutrition Intake & Therapies:    Average Meal Intake: 26-50%,0%  Average Supplements Intake: None Ordered  ADULT DIET; Regular; Low Sodium (2 gm)    Anthropometric Measures:  Height: 5' 7\" (170.2 cm)  Ideal Body Weight (IBW): 148 lbs (67 kg)    Admission Body Weight: 200 lb (90.7 kg)  Current Body Weight: 200 lb (90.7 kg) (no edema), 135.1 % IBW.  Weight Source: Stated  Current BMI (kg/m2): 31.3        Weight Adjustment For: No Adjustment                 BMI Categories: Obese

## 2022-06-13 NOTE — CARE COORDINATION
6/13/22, 2:25 PM EDT  Discharge Planning Evaluation  Social work consult received, patient from [facility]. Patient/Family preference is to return to [facility] . Spoke with Onur Baez, who confirms plan for return to [facility]  The patient's current payor source at the facility is [facility] ([facility] Medicare skilled days available: n/a  Insurance precert:  yes  Spoke with admissions at the facility.   Patient bed hold: yes  Anticipated transport plan: ambulance  Do they require COVID 19 test to return to ECF: no  Is there a required time frame which which COVID test needs done: n/a  Patient's Healthcare Decision Maker: Legal Next of Kin

## 2022-06-13 NOTE — PROGRESS NOTES
Hospitalist Progress Note      Patient:  Sherrill Shrestha    Unit/Bed:7K-26/026-A  YOB: 1955  MRN: [id number]   Acct: [de-identified]   PCP: Lise Beltran MD  Date of Admission: 6/11/2022    Assessment/Plan:    1. Gross hematuria, likely traumatic s/p SPT change:  2. UTI in setting of SPT: UA positive for nitrates, moderate leukocyte esterase. Many bacteria noted. Urine culture returned growing gram-negative bacilli. Currently not any abx, suspect colonization. Afebrile, no leukocytosis. Hematuria likely result of trauma from #1, resolving with CBI/hand irrigation. Defer abx for now. 3. Acute on chronic normocytic anemia: Likely some mild blood loss contributing from #1. Hemoglobin stable, continue to monitor and transfuse for hemoglobin less than 7.  4. Hx PE: on Xarelto, per urology okay to resume at this time  5. History of MS/neurogenic bladder: Noted  6. Disposition: From [facility], SW has been consulted. Will be a precert. Chief Complaint: Gross hematuria x1 day    Initial H and P:-    \"61year-old male patient who is a nursing home resident. Sent to the ED b/cecause of gross hematuria from the chronic suprapubic catheter. He was noted to be bleeding from the cystostomy site.     Patient has MS and a neurogenic bladder.     He is on long-term anticoagulation with Xarelto for history of PE.      S/p suprapubic catheter change in the ED.     Otherwise, patient has no other complaints.   Denies any headache, no dizziness, no chest pain, no shortness of breath, no nausea vomiting.     Initial vital signs in the ED temperature 36.4 °C, respiratory 16, heart rate 69, blood pressure 131/75, oxygen saturation 97% total.     Initial labs serum sodium 136, potassium 4.7, chloride 102, CO2 22, creatinine 0.3, blood sugar 118, calcium 9.1, osmolality 275.7, albumin 3.4, alkaline phosphatase 144, ALT 15, AST 20, total bilirubin 0.2, total protein 7.5, bilaterally without Rales/Wheezes/Rhonchi. Cardiovascular: Regular rate and rhythm with normal S1/S2 without murmurs, rubs or gallops. Abdomen: Soft, colostomy with hard stool present, non-tender, non-distended with normal bowel sounds. : jeffers catheter with red urine in bag  Musculoskeletal: passive and active ROM x 4 extremities. Skin: Skin color, texture, turgor normal.  No rashes or lesions. Neurologic:  Neurovascularly intact without any focal sensory/motor deficits. Cranial nerves: II-XII intact, grossly non-focal.  Psychiatric: Alert and oriented, thought content appropriate, normal insight  Capillary Refill: Brisk,< 3 seconds   Peripheral Pulses: +2 palpable, equal bilaterally     Labs:   Recent Labs     06/11/22  1617 06/13/22  0203   WBC 7.7 7.2   HGB 14.7 13.2*   HCT 45.2 41.3*    182     Recent Labs     06/11/22  1617      K 4.7      CO2 22*   BUN 20   CREATININE 0.3*   CALCIUM 9.1     Recent Labs     06/11/22  1617   AST 20   ALT 15   BILITOT 0.2*   ALKPHOS 144*     No results for input(s): INR in the last 72 hours. No results for input(s): Kayla Dross in the last 72 hours. Microbiology:    Blood culture #1:   Lab Results   Component Value Date    BC No growth-preliminary No growth  04/22/2022       Blood culture #2:No results found for: Locke Araseli    Organism:  Lab Results   Component Value Date    ORG gram negative bacilli 06/11/2022         Lab Results   Component Value Date    LABGRAM  06/17/2019     Rare segmented neutrophils observed. Rare epithelial cells observed. Rare budding yeast and pseudohyphae observed. Rare gram negative bacilli. MRSA culture only:No results found for: [facility]    Urine culture:   Lab Results   Component Value Date    LABURIN  04/22/2022     Current antibiotic therapy ineffective in vitro for isolate. Per Gardiner  04/22/2022     [place] count: >100,000 CFU/mL This isolate is a probable ESBL .  ID consultation may be helpful in some clinical circumstances. CONTACT isolation required. Respiratory culture: No results found for: CULTRESP    Aerobic and Anaerobic :  Lab Results   Component Value Date    LABAERO  06/17/2019     Culture also yielded heavy mixed growth of multiple enteric  gram negative bacilli, including swarming Proteus species. If a true mixed infection is suspected, then broad spectrum  empiric antibiotic therapy is indicated. At least one isolate is resistant in vitro to current  regimen. LABAERO  06/17/2019     moderate growth  This isolate is a carbapenemase . ID  consultation may be helpful in some clinical circumstances. CONTACT isolation required. Phenotypic screen test (modified Carbapenem Inactivation  Method or mCIM) for carbapenemase production is positive and  PCR is not detected; results indicate a potentially new  carbapenemase variant or novel mechanism. Carbapenemase testing performed at [facility] (-), [address]. LABAERO  06/17/2019     light growth  This is a MRSA (Methicillin Resistant Staphylococcus  aureus)isolate. Isolates of MRSA (ORSA) Methicillin (Oxacillin) Resistant  Staphylococcus aureus (coagulase positive) require patient  be placed in CONTACT isolation. Methicillin(Oxacillin)resistant strains of staphylococci  (MRSA)or(MRSE)should be considered resistant to all classes  of cephalosporins, penems and beta-lactams. In the treatment of gram positive infections, GENTAMICIN  should be CONSIDERED a SYNERGYSTIC agent ONLY. Lab Results   Component Value Date    LABANAE  06/17/2019     Culture overgrown with Proteus sp. No further evaluation of  this specimen is possible. If a true mixed aerobic and  anaerobic infection is suspected, then broad spectrum empiric  antibiotic therapy is indicated and should include coverage  for anaerobic organisms.          Urinalysis:      Lab Results   Component Value Date    NITRU POSITIVE 06/11/2022    WBCUA > 100 06/11/2022    WBCUA >200 01/20/2012    BACTERIA MANY 06/11/2022    RBCUA > 200 06/11/2022    BLOODU LARGE 06/11/2022    SPECGRAV >1.030 04/22/2022    GLUCOSEU NEGATIVE 06/11/2022       Radiology:  XR ABDOMEN (KUB) (SINGLE AP VIEW)   Final Result   1. Moderate amount of stool in the right colon. 2. Dilated small bowel loops in the right lower quadrant. 3. Changes of ankylosing spondylitis in the lumbar spine. 4. Otherwise nonspecific abdomen. **This report has been created using voice recognition software. It may contain minor errors which are inherent in voice recognition technology. **      Final report electronically signed by DR Meri Esteban on 6/12/2022 8:00 AM        XR ABDOMEN (KUB) (SINGLE AP VIEW)    Result Date: 6/12/2022  PROCEDURE: XR ABDOMEN (KUB) (SINGLE AP VIEW) CLINICAL INFORMATION: Abdominal distention w/ a colostomy. COMPARISON: KUB dated 18 April 2016. TECHNIQUE: A supine AP view of the abdomen was obtained. FINDINGS: There is a moderate amount of stool in the right colon. There are dilated small bowel loops in the right lower quadrant. . There  is no gross free air. No suspicious densities are present. There are possible changes of ankylosing spondylitis involving the lumbar spine. .     1. Moderate amount of stool in the right colon. 2. Dilated small bowel loops in the right lower quadrant. 3. Changes of ankylosing spondylitis in the lumbar spine. 4. Otherwise nonspecific abdomen. **This report has been created using voice recognition software. It may contain minor errors which are inherent in voice recognition technology. ** Final report electronically signed by DR Meri Esteban on 6/12/2022 8:00 AM      Electronically signed by Michae Klinefelter, PA-C on 6/13/2022 at 1:26 PM

## 2022-06-13 NOTE — PROGRESS NOTES
[facility] Continence Nurse  Progress Note       Jesus Rai  AGE: 61 y.o. GENDER: male  : 1955  UNIT: 7K-26/026-A  TODAY'S DATE:  2022  ADMISSION DATE: 2022  2:05 PM  Subjective:     Reason for [address] [facility] Evaluation and Assessment: chronic ischial wound      Jesus Rai is a 61 y.o. male referred by:   [] Physician/PA/APRN  [x] Nursing  [] Other:     Wound Identification:  Wound Type: pressure  Contributing Factors: chronic pressure, decreased mobility and incontinence of urine    Objective:     Derrick Risk Score: Derrick Scale Score: 14    Assessment:     Encounter: Present to pt room for consult of right ischial wound POA. Assisted patient to right side for assessement. Wound photo and assessment below. Cleansed wound with normal saline and gauze. Pat dry with clean gauze. Applied saline wet to dry dressing to wound bed and covered with bordered foam dressing. Chux pad changed. Pt has diverting colostomy. Pouching system in place. Staff to change as needed. Pt follows physician at [facility] for wound. Continue treatment plan per ECF. Pillow placed under left side. Pt on hercules dream air support surface with alternating pressure and microclimate control. Bed in low, call light in reach. Wound type: right ischial healing stage 3 pressure injury POA  Wound size: 4.5cm x 1cm x 1.2cm  Undermining or Tunneling: none  Wound assessment/color: red  Drainage amount: moderate  Drainage description: serosanguinous  Odor: none  Margins: attached  Tawny wound: slight maceration  Exposed structure: none    22     Wound type: right ischial healing stage 3 pressure injury POA  Wound size: 4.5cm x 1.5cm x 1.9cm  Undermining or Tunneling: none  Wound assessment/color: red  Drainage amount: moderate  Drainage description: serosanguinous  Odor: none  Margins: attached  Tawny wound: slight maceration  Exposed structure: none      Response to treatment:  Well tolerated by patient.      Plan: Treatment Recommendations:   Right ischium: Cleanse wound with normal saline or wound cleanser and gauze. Pat dry with clean gauze. Apply wet to dry dressing to wound bed. Cover with bordered foam dressing. Change dressing twice daily and as needed.     Specialty Bed Required :   [x] Low Air Loss   [x] Pressure Redistribution  [] Fluid Immersion- Dolphin  [] Bariatric  [] RotoProne   [] Other:     Discharge Plan:  Placement for patient upon discharge: ECF  Patient appropriate for Outpatient [address]: as needed

## 2022-06-13 NOTE — CONSULTS
[address] [facility] [address]  [address] [facility] [address]  Dept: [phone]  Loc: [phone]  Visit Date: 6/11/2022    Urology Consult Note    Reason for Consult:  hematuria  Requesting Physician:  Mingo Salvador    History Obtained From:  Patient, EMR, nurse    Chief Complaint: hematuria    HISTORY OF PRESENT ILLNESS:                The patient is a 61 y.o. male with significant past medical history of see below who presents with hematuria. SPT exchanged in ED. On Xarelto for PE  SPT from [facility], MS  Bleeding began SPT was attempted to be exchanged at [facility] per patient    Past Medical History:        Diagnosis Date    Dysphagia     oropharyngeal    History of pulmonary embolism     History of urinary tract infection     Hx of blood clots     Pulmonary embolis    Hypertension     Major depressive disorder, single episode     MS (multiple sclerosis) ([address]     Neurogenic bladder feb. 2012    Dr. Ambre Higgins placed cather    Osteomyelitis [facility])     UTI (urinary tract infection)      Past Surgical History:        Procedure Laterality Date    ANKLE SURGERY  1996    broken ankle    BLADDER SURGERY  2-    Suprapubic catheter placement    COLONOSCOPY      CYSTO/URETERO/PYELOSCOPY, CALCULUS TX Left 6/19/2019    CYSTOSCOPY, LEFT STENT insertion, bladder irragation with bladder stones performed by Cullen Chaney MD at [address] [facility] N/A 7/8/2019    CYSTO, LEFT URETERAL STENT REMOVAL, LEFT URETEROSCOPY, LASER LITHOTRIPSY, BASKET RETRIEVAL OF STONE FRAGMENTS performed by Cullen [facility]aney MD [facility] 2/26/2021    CYSTOSCOPY, CYSTOLITHOLAPAXY, SUPRAPUBIC CATHETER EXCHANGE performed by Christopher Feliciano MD at [address] [facility] N/A 6/13/2018    ROBOT DIVERTING COLOSTOMY performed by Lisa Gudino MD at [address] [facility]  child     Allergies:  Patient has no known allergies.   Social History:  Social History     Socioeconomic History    Marital status:      Spouse name: Anita Lawler Number of children: 2    Years of education: Not on file    Highest education level: Not on file   Occupational History    Not on file   Tobacco Use    Smoking status: Former Smoker     Quit date: 1982     Years since quittin.4    Smokeless tobacco: Former User   Vaping Use    Vaping Use: Never used   Substance and Sexual Activity    Alcohol use: No     Comment: \"quit alcohol a few years ago\"    Drug use: No    Sexual activity: Yes     Partners: Female   Other Topics Concern    Not on file   Social History Narrative    Not on file     Social Determinants of Health     Financial Resource Strain:     Difficulty of Paying Living Expenses: Not on file   Food Insecurity:     Worried About [address] in the Last Year: Not on file    Boo of Food in the Last Year: Not on file   Transportation Needs:     Lack of Transportation (Medical): Not on file    Lack of Transportation (Non-Medical):  Not on file   Physical Activity:     Days of Exercise per Week: Not on file    Minutes of Exercise per Session: Not on file   Stress:     Feeling of Stress : Not on file   Social Connections:     Frequency of Communication with Friends and Family: Not on file    Frequency of Social Gatherings with Friends and Family: Not on file    Attends Quaker Services: Not on file    Active Member of [address] [place] [address] or Organizations: Not on file    Attends Club or Organization Meetings: Not on file    Marital Status: Not on file   Intimate Partner Violence:     Fear of Current or Ex-Partner: Not on file    Emotionally Abused: Not on file    Physically Abused: Not on file    Sexually Abused: Not on file   Housing Stability:     Unable to Pay for Housing in the Last Year: Not on file    Number of [place] in the Last Year: Not on file    Unstable Housing in the Last Year: Not on file     Family History:       Problem Relation Age of Onset    Cancer Mother         Breast    Heart Disease Father 48    Arthritis Sister     Heart Disease Paternal Grandmother 48       ROS:  Constitutional: Negative for chills, fatigue, fever, or weight loss. Eyes: Denies reported visual changes. ENT: Denies headache, difficulty swallowing, earache, and nosebleeds. Cardiovascular: Negative for chest pain, palpitations, tachycardia or edema. Respiratory: Denies cough or SOB. GI:The patient denies abdominal or flank pain, anorexia, nausea or vomiting. : see HPI. Musculoskeletal: Patient denies low back pain or painful or reduced range of ROM. Neurological: The patient denies any symptoms of neurological impairment or TIA. Psychiatric: Denies anxiety or depression. Skin: Denies rash or lesions. All remaining ROS negative. PHYSICAL EXAM:  VITALS:  BP (!) 106/50   Pulse 96   Temp 98.4 °F (36.9 °C) (Oral)   Resp 18   Wt 200 lb (90.7 kg)   SpO2 90%   BMI 31.32 kg/m² . Nursing note and vitals reviewed. Constitutional: Alert and oriented times x3, no acute distress, and cooperative to examination with appropriate mood and affect. HEENT:   Head:         Normocephalic and atraumatic. Mouth/Throat:          Mucous membranes are normal.   Eyes:         EOM are normal. No scleral icterus. Nose:    The external appearance of the nose is normal  Ears: The ears appear normal to external inspection. Cardiovascular:       Normal rate, regular rhythm. Pulmonary/Chest:  Normal respiratory rate and rhthym. No use of accessory muscles. Lungs clear bilaterally. Abdominal:          Soft. No tenderness. Active bowel sounds. SPT draining cola colored urine  Musculoskeletal:    Normal range of motion. He exhibits no edema or tenderness of lower extremities. Extremities:    No cyanosis, clubbing, or edema present. Neurological:    Alert and oriented.      DATA:  CBC:   Lab Results   Component Value Date    WBC 7.2 06/13/2022    RBC 4.40 06/13/2022    RBC 4.20 01/20/2012    HGB 13.2 06/13/2022    HCT 41.3 06/13/2022    MCV 93.9 06/13/2022    MCH 30.0 06/13/2022    MCHC 32.0 06/13/2022    RDW 16.6 06/21/2019     06/13/2022    MPV 8.9 06/13/2022     BMP:    Lab Results   Component Value Date     06/11/2022    K 4.7 06/11/2022     06/11/2022    CO2 22 06/11/2022    BUN 20 06/11/2022    CREATININE 0.3 06/11/2022    CALCIUM 9.1 06/11/2022    LABGLOM >90 06/11/2022    GLUCOSE 118 06/11/2022    GLUCOSE 105 06/25/2019     BUN/Creatinine:    Lab Results   Component Value Date    BUN 20 06/11/2022    CREATININE 0.3 06/11/2022     Magnesium:    Lab Results   Component Value Date    MG 2.1 12/28/2020     Phosphorus:    Lab Results   Component Value Date    PHOS 3.4 06/15/2019     PT/INR:    Lab Results   Component Value Date    INR 1.86 04/22/2022     U/A:    Lab Results   Component Value Date    COLORU RED 06/11/2022    PHUR 8.0 06/11/2022    LABCAST 0-4 HYALINE 04/22/2022    LABCAST NONE SEEN 04/22/2022    WBCUA > 100 06/11/2022    WBCUA >200 01/20/2012    RBCUA > 200 06/11/2022    MUCUS NONE SEEN 12/27/2020    YEAST NONE SEEN 06/11/2022    BACTERIA MANY 06/11/2022    CLARITYU Clear 07/05/2019    SPECGRAV >1.030 04/22/2022    LEUKOCYTESUR MODERATE 06/11/2022    UROBILINOGEN 0.2 06/11/2022    BILIRUBINUR NEGATIVE 06/11/2022    BILIRUBINUR NEGATIVE 01/20/2012    BLOODU LARGE 06/11/2022    GLUCOSEU NEGATIVE 06/11/2022    AMORPHOUS DEBRIS 12/27/2020       IMPRESSION/Plan:    Ok to continue anticoagulation  Hematuria - began after exchange at [facility]. CBI off, urine is cola colored. Mostly yellow in bag.  620UOP overnight  UTI - Ux with GNB.   Hx of ESBL    Orders for hand irrigation and CBI in nursing communication  Will check color later today, ok for discharge if hematuria doesn't worsen    Thank you for including us in the care of Fabi Debora 134, APRN - CNP, APRN  06/13/22 8:10 AM  Urology  8217 addendum  Hand irrigated with 180ml of sterile solution with cloudy yellow return, no clots, no hematuria  Ok for discharge from urology standpoint

## 2022-06-13 NOTE — PLAN OF CARE
Problem: Discharge Planning  Goal: Discharge to home or other facility with appropriate resources  Outcome: Progressing  Flowsheets (Taken 6/12/2022 2131)  Discharge to home or other facility with appropriate resources: Identify barriers to discharge with patient and caregiver     Problem: Safety - Adult  Goal: Free from fall injury  Outcome: Progressing  Flowsheets (Taken 6/12/2022 0221)  Free From Fall Injury: Instruct family/caregiver on patient safety     Problem: Skin/Tissue Integrity  Goal: Absence of new skin breakdown  Description: 1. Monitor for areas of redness and/or skin breakdown  2. Assess vascular access sites hourly  3. Every 4-6 hours minimum:  Change oxygen saturation probe site  4. Every 4-6 hours:  If on nasal continuous positive airway pressure, respiratory therapy assess nares and determine need for appliance change or resting period. Outcome: Progressing  Note: No evidence of new skin break down. Pt. Turned and repositioned Q2. Problem: Pain  Goal: Verbalizes/displays adequate comfort level or baseline comfort level  Outcome: Progressing  Flowsheets (Taken 6/13/2022 0233)  Verbalizes/displays adequate comfort level or baseline comfort level:   Encourage patient to monitor pain and request assistance   Assess pain using appropriate pain scale   Administer analgesics based on type and severity of pain and evaluate response   Implement non-pharmacological measures as appropriate and evaluate response   Consider cultural and social influences on pain and pain management   Notify Licensed Independent Practitioner if interventions unsuccessful or patient reports new pain   Care plan reviewed with patient. Patient verbalize understanding of the plan of care and contribute to goal setting.

## 2022-06-13 NOTE — PLAN OF CARE
Problem: Discharge Planning  Goal: Discharge to home or other facility with appropriate resources  6/13/2022 1519 by April Tejada RN  Outcome: Progressing  Flowsheets (Taken 6/13/2022 1519)  Discharge to home or other facility with appropriate resources:   Identify barriers to discharge with patient and caregiver   Arrange for needed discharge resources and transportation as appropriate   Identify discharge learning needs (meds, wound care, etc)     Problem: Safety - Adult  Goal: Free from fall injury  6/13/2022 1519 by April Tejada RN  Outcome: Progressing  Flowsheets  Taken 6/13/2022 1519 by April Tejada RN  Free From Fall Injury: Instruct family/caregiver on patient safety       Problem: Pain  Goal: Verbalizes/displays adequate comfort level or baseline comfort level  6/13/2022 1519 by April Tejada RN  Outcome: Progressing  Flowsheets (Taken 6/13/2022 1519)  Verbalizes/displays adequate comfort level or baseline comfort level:   Encourage patient to monitor pain and request assistance   Assess pain using appropriate pain scale   Administer analgesics based on type and severity of pain and evaluate response   Implement non-pharmacological measures as appropriate and evaluate response   Notify Licensed Independent Practitioner if interventions unsuccessful or patient reports new pain     Care plan reviewed with patient and family. Patient and family verbalize understanding of the plan of care and contribute to goal setting.

## 2022-06-14 ENCOUNTER — APPOINTMENT (OUTPATIENT)
Dept: CT IMAGING | Age: 67
DRG: 699 | End: 2022-06-14
Payer: COMMERCIAL

## 2022-06-14 LAB
ANION GAP SERPL CALCULATED.3IONS-SCNC: 11 MEQ/L (ref 8–16)
BUN BLDV-MCNC: 18 MG/DL (ref 7–22)
CALCIUM SERPL-MCNC: 8.7 MG/DL (ref 8.5–10.5)
CHLORIDE BLD-SCNC: 103 MEQ/L (ref 98–111)
CO2: 25 MEQ/L (ref 23–33)
CREAT SERPL-MCNC: 0.2 MG/DL (ref 0.4–1.2)
ERYTHROCYTE [DISTWIDTH] IN BLOOD BY AUTOMATED COUNT: 15.9 % (ref 11.5–14.5)
ERYTHROCYTE [DISTWIDTH] IN BLOOD BY AUTOMATED COUNT: 55.8 FL (ref 35–45)
GFR SERPL CREATININE-BSD FRML MDRD: > 90 ML/MIN/1.73M2
GLUCOSE BLD-MCNC: 90 MG/DL (ref 70–108)
HCT VFR BLD CALC: 44.1 % (ref 42–52)
HEMOGLOBIN: 13.7 GM/DL (ref 14–18)
MCH RBC QN AUTO: 29.8 PG (ref 26–33)
MCHC RBC AUTO-ENTMCNC: 31.1 GM/DL (ref 32.2–35.5)
MCV RBC AUTO: 95.9 FL (ref 80–94)
ORGANISM: ABNORMAL
ORGANISM: ABNORMAL
PLATELET # BLD: 154 THOU/MM3 (ref 130–400)
PMV BLD AUTO: 8.7 FL (ref 9.4–12.4)
POTASSIUM SERPL-SCNC: 3.6 MEQ/L (ref 3.5–5.2)
RBC # BLD: 4.6 MILL/MM3 (ref 4.7–6.1)
SODIUM BLD-SCNC: 139 MEQ/L (ref 135–145)
URINE CULTURE REFLEX: ABNORMAL
URINE CULTURE REFLEX: ABNORMAL
WBC # BLD: 5.5 THOU/MM3 (ref 4.8–10.8)

## 2022-06-14 PROCEDURE — 99232 SBSQ HOSP IP/OBS MODERATE 35: CPT | Performed by: UROLOGY

## 2022-06-14 PROCEDURE — 74178 CT ABD&PLV WO CNTR FLWD CNTR: CPT

## 2022-06-14 PROCEDURE — 6360000004 HC RX CONTRAST MEDICATION: Performed by: UROLOGY

## 2022-06-14 PROCEDURE — 2580000003 HC RX 258: Performed by: INTERNAL MEDICINE

## 2022-06-14 PROCEDURE — 6360000002 HC RX W HCPCS: Performed by: UROLOGY

## 2022-06-14 PROCEDURE — 6370000000 HC RX 637 (ALT 250 FOR IP): Performed by: INTERNAL MEDICINE

## 2022-06-14 PROCEDURE — 1200000000 HC SEMI PRIVATE

## 2022-06-14 PROCEDURE — 6370000000 HC RX 637 (ALT 250 FOR IP): Performed by: OPHTHALMOLOGY

## 2022-06-14 PROCEDURE — 80048 BASIC METABOLIC PNL TOTAL CA: CPT

## 2022-06-14 PROCEDURE — 2580000003 HC RX 258: Performed by: UROLOGY

## 2022-06-14 PROCEDURE — 85027 COMPLETE CBC AUTOMATED: CPT

## 2022-06-14 PROCEDURE — 36415 COLL VENOUS BLD VENIPUNCTURE: CPT

## 2022-06-14 RX ADMIN — ERYTHROMYCIN: 5 OINTMENT OPHTHALMIC at 20:53

## 2022-06-14 RX ADMIN — GABAPENTIN 600 MG: 600 TABLET, FILM COATED ORAL at 15:36

## 2022-06-14 RX ADMIN — SODIUM CHLORIDE, PRESERVATIVE FREE 10 ML: 5 INJECTION INTRAVENOUS at 08:39

## 2022-06-14 RX ADMIN — FAMOTIDINE 20 MG: 20 TABLET ORAL at 20:53

## 2022-06-14 RX ADMIN — SODIUM CHLORIDE 1 G: 1 TABLET ORAL at 08:36

## 2022-06-14 RX ADMIN — BACLOFEN 10 MG: 10 TABLET ORAL at 20:53

## 2022-06-14 RX ADMIN — SODIUM CHLORIDE 1 G: 1 TABLET ORAL at 17:47

## 2022-06-14 RX ADMIN — OXYBUTYNIN CHLORIDE 5 MG: 5 TABLET, EXTENDED RELEASE ORAL at 08:36

## 2022-06-14 RX ADMIN — GABAPENTIN 600 MG: 600 TABLET, FILM COATED ORAL at 20:53

## 2022-06-14 RX ADMIN — METOPROLOL TARTRATE 50 MG: 50 TABLET, FILM COATED ORAL at 20:53

## 2022-06-14 RX ADMIN — BACLOFEN 10 MG: 10 TABLET ORAL at 08:35

## 2022-06-14 RX ADMIN — MEROPENEM 1000 MG: 1 INJECTION, POWDER, FOR SOLUTION INTRAVENOUS at 12:33

## 2022-06-14 RX ADMIN — FAMOTIDINE 20 MG: 20 TABLET ORAL at 08:35

## 2022-06-14 RX ADMIN — BACLOFEN 10 MG: 10 TABLET ORAL at 15:36

## 2022-06-14 RX ADMIN — OXYBUTYNIN CHLORIDE 5 MG: 5 TABLET, EXTENDED RELEASE ORAL at 20:53

## 2022-06-14 RX ADMIN — Medication 1 TABLET: at 08:36

## 2022-06-14 RX ADMIN — DOCUSATE SODIUM 100 MG: 100 CAPSULE, LIQUID FILLED ORAL at 08:36

## 2022-06-14 RX ADMIN — GABAPENTIN 600 MG: 600 TABLET, FILM COATED ORAL at 08:36

## 2022-06-14 RX ADMIN — Medication 1 TABLET: at 20:53

## 2022-06-14 RX ADMIN — TIZANIDINE 2 MG: 4 TABLET ORAL at 15:36

## 2022-06-14 RX ADMIN — TIZANIDINE 2 MG: 4 TABLET ORAL at 20:53

## 2022-06-14 RX ADMIN — TIZANIDINE 2 MG: 4 TABLET ORAL at 08:35

## 2022-06-14 RX ADMIN — MEROPENEM 1000 MG: 1 INJECTION, POWDER, FOR SOLUTION INTRAVENOUS at 17:47

## 2022-06-14 RX ADMIN — IOPAMIDOL 80 ML: 755 INJECTION, SOLUTION INTRAVENOUS at 12:53

## 2022-06-14 RX ADMIN — METOPROLOL TARTRATE 50 MG: 50 TABLET, FILM COATED ORAL at 08:36

## 2022-06-14 RX ADMIN — SODIUM CHLORIDE, PRESERVATIVE FREE 10 ML: 5 INJECTION INTRAVENOUS at 20:53

## 2022-06-14 ASSESSMENT — PAIN SCALES - GENERAL: PAINLEVEL_OUTOF10: 0

## 2022-06-14 NOTE — CARE COORDINATION
6/14/22, 8:27 AM EDT    DISCHARGE ON GOING EVALUATION    [address] day: 3  Location: [facility]26/026-A Reason for admit: Gross hematuria [R31.0]  Anticoagulant long-term use [Z79.01]  Hematuria, gross [R31.0]   Procedure:   6/12 KUB: Moderate amount of stool in the right colon. Dilated small bowel loops in the right lower quadrant. Changes of ankylosing spondylitis in the lumbar spine. Otherwise nonspecific abdomen. Barriers to Discharge: Hospitalist and Urology following. Hgb 13.7 (13.2). [address] jeffers with CBI. Urine culture growing gram negative bacilli. Acetic acid irrigation bid. PCP: Ankit Horta MD  Readmission Risk Score: 19 ( )%  Patient Goals/Plan/Treatment Preferences: Pt is from [facility]. SW following for return.

## 2022-06-14 NOTE — PROGRESS NOTES
Urology Progress Note    Reason for Consult:  hematuria  Requesting Physician:  Mingo Salvador     History Obtained From:  Patient, EMR, nurse     Chief Complaint: hematuria    Subjective: \"    Patient is resting in bed, voiding dark cherry urine, no pain at this time. Hold ASA, Xarelto  22fr 3 way jeffers inserted in urethra  Plug SPT and irrigate through urethra jeffers  CBI WO, titrate to pink  Will evaluate later this AM  NPO midnight           Vitals:  BP (!) 157/74   Pulse 95   Temp 98.1 °F (36.7 °C) (Oral)   Resp 18   Ht 5' 7\" (1.702 m)   Wt 200 lb (90.7 kg)   SpO2 93%   BMI 31.32 kg/m²   Temp  Av.1 °F (36.7 °C)  Min: 97.9 °F (36.6 °C)  Max: 98.2 °F (36.8 °C)    Intake/Output Summary (Last 24 hours) at 2022 6732  Last data filed at 2022 0511  Gross per 24 hour   Intake 1690 ml   Output 1050 ml   Net 640 ml       Social History     Socioeconomic History    Marital status:      Spouse name: Robert Concepcion Number of children: 2    Years of education: Not on file    Highest education level: Not on file   Occupational History    Not on file   Tobacco Use    Smoking status: Former Smoker     Quit date: 1982     Years since quittin.4    Smokeless tobacco: Former User   Vaping Use    Vaping Use: Never used   Substance and Sexual Activity    Alcohol use: No     Comment: \"quit alcohol a few years ago\"    Drug use: No    Sexual activity: Yes     Partners: Female   Other Topics Concern    Not on file   Social History Narrative    Not on file     Social Determinants of Health     Financial Resource Strain:     Difficulty of Paying Living Expenses: Not on file   Food Insecurity:     Worried About Running Out of Food in the Last Year: Not on file    Boo of Food in the Last Year: Not on file   Transportation Needs:     Lack of Transportation (Medical): Not on file    Lack of Transportation (Non-Medical):  Not on file   Physical Activity:     Days of Exercise per Week: Not on file   [employer] of Exercise per Session: Not on file   Stress:     Feeling of Stress : Not on file   Social Connections:     Frequency of Communication with Friends and Family: Not on file    Frequency of Social Gatherings with Friends and Family: Not on file    Attends Caoda[employer] Services: Not on file    Active Member of [address] [place] [address] [employer] or Organizations: Not on file    Attends Club or Organization Meetings: Not on file    Marital Status: Not on file   Intimate Partner Violence:     Fear of Current or Ex-Partner: Not on file    Emotionally Abused: Not on file    Physically Abused: Not on file    Sexually Abused: Not on file   Housing Stability:     Unable to Pay for Housing in the Last Year: Not on file    Number of [place] in the Last Year: Not on file    Unstable Housing in the Last Year: Not on file     Family History   Problem Relation Age of Onset    Cancer Mother         Breast    Heart Disease Father 48    Arthritis Sister     Heart Disease Paternal Grandmother 50     No Known Allergies      Constitutional: Alert and oriented times x3, no acute distress, and cooperative to examination with appropriate mood and affect. HEENT:   Head:         Normocephalic and atraumatic. Mucous membranes are normal.   Eyes:         EOM are normal.  Nose:    The external appearance of the nose is normal  Ears: The ears appear normal to external inspection. Cardiovascular:       Normal rate, regular rhythm. Pulmonary/Chest:  Normal respiratory rate and rhthym. No use of accessory muscles. Lungs clear bilaterally. Abdominal:          Soft. No tenderness. Active bowel sounds. SPT draining dark cherry colored urine  Genitalia:    Urethral leakage  Musculoskeletal:    Normal range of motion. He exhibits no edema or tenderness of lower extremities. Extremities:    No cyanosis, clubbing, or edema present. Neurological:    Alert and oriented.      Labs:  WBC:    Lab Results   Component Value Date

## 2022-06-14 NOTE — PROGRESS NOTES
Hospitalist Progress Note      Patient:  Harriet Kumar    Unit/Bed:7K-26/026-A  YOB: 1955  MRN: [id number]   Acct: [de-identified]   PCP: Kell Abreu MD  Date of Admission: 6/11/2022    Assessment/Plan:    1. Gross hematuria, unclear etiology: initially felt likely traumatic s/p SPT change, however with persistent hematuria. Urology consulted and following. Urethral jeffers placed today and plan for continued irrigation. Additionally will add on Merrem emperically, hx ESBL. Follow Ucx results. CT urogram pending. Hold ASA / Xarelto, resume when okay with urology. Will need to monitor for 24 hours. 2. UTI in setting of SPT: UA positive for nitrates, moderate leukocyte esterase. Many bacteria noted. Urine culture returned growing gram-negative bacilli. Currently not any abx, suspect colonization. Afebrile, no leukocytosis. Hematuria likely result of trauma from #1, resolving with CBI/hand irrigation. Defer abx for now. 6/14: given persistent hematuria will opt to treat with abx, Merrem (hx ESBL). Follow Ucx -> GNB. 3. Acute on chronic normocytic anemia: Likely some mild blood loss contributing from #1. Hemoglobin stable, continue to monitor and transfuse for hemoglobin less than 7.  4. Hx PE: on Xarelto, hold at this time  5. History of MS/neurogenic bladder: Noted  6. Disposition: From [facility], SW has been consulted. Chief Complaint: Gross hematuria x1 day    Initial H and P:-    \"61year-old male patient who is a nursing home resident. Sent to the ED b/cecause of gross hematuria from the chronic suprapubic catheter. He was noted to be bleeding from the cystostomy site.     Patient has MS and a neurogenic bladder.     He is on long-term anticoagulation with Xarelto for history of PE.      S/p suprapubic catheter change in the ED.     Otherwise, patient has no other complaints.   Denies any headache, no dizziness, no chest pain, no shortness of breath, no nausea vomiting.     Initial vital signs in the ED temperature 36.4 °C, respiratory 16, heart rate 69, blood pressure 131/75, oxygen saturation 97% total.     Initial labs serum sodium 136, potassium 4.7, chloride 102, CO2 22, creatinine 0.3, blood sugar 118, calcium 9.1, osmolality 275.7, albumin 3.4, alkaline phosphatase 144, ALT 15, AST 20, total bilirubin 0.2, total protein 7.5, blood sugar 118.     WBC 7.7, hemoglobin 14.7, MCV 90.2, platelets 779.     Urinalysis positive for UTI. \"    6/13: pt lying in bed, no complaints. Urine appears a bright red color. Pt denies CP/SOB/fever. Await urine cx results and adjust abx if necessary. Subjective (past 24 hours):   6/14: pt doing alright, lying in bed. Urine appears light red. Denies any fever/chills. No CP/SOB. Past medical history, family history, social history and allergies reviewed again and is unchanged since admission. ROS (All review of systems completed. Pertinent positives noted.  Otherwise All other systems reviewed and negative.)     Medications:  Reviewed    Infusion Medications    sodium chloride       Scheduled Medications    [Held by provider] rivaroxaban  10 mg Oral Daily    metoprolol tartrate  50 mg Oral BID    acetic acid  30 mL Irrigation BID    [Held by provider] aspirin  81 mg Oral Daily    baclofen  10 mg Oral TID    calcium-cholecalciferol  1 tablet Oral BID    docusate sodium  100 mg Oral Daily    erythromycin   Right Eye Nightly    famotidine  20 mg Oral BID    gabapentin  600 mg Oral TID    oxybutynin  5 mg Oral BID    sodium chloride  1 g Oral BID WC    tiZANidine  2 mg Oral TID    sodium chloride flush  10 mL IntraVENous 2 times per day     PRN Meds: sodium chloride flush, sodium chloride, ondansetron **OR** ondansetron, polyethylene glycol, acetaminophen **OR** acetaminophen      Intake/Output Summary (Last 24 hours) at 6/14/2022 0920  Last data filed at 6/14/2022 0511  Gross per 24 hour   Intake 1690 ml   Output 1050 ml   Net 640 ml       Diet:  ADULT DIET; Regular; Low Sodium (2 gm)  ADULT ORAL NUTRITION SUPPLEMENT; Breakfast, Lunch; Standard High Calorie/High Protein Oral Supplement  ADULT ORAL NUTRITION SUPPLEMENT; Breakfast, Dinner; Wound Healing Oral Supplement  Diet NPO Exceptions are: Sips of Water with Meds    Exam:  BP (!) 157/74   Pulse 95   Temp 98.1 °F (36.7 °C) (Oral)   Resp 18   Ht 5' 7\" (1.702 m)   Wt 200 lb (90.7 kg)   SpO2 93%   BMI 31.32 kg/m²   General appearance: obese, no apparent distress, appears stated age and cooperative. HEENT: Pupils equal, round, and reactive to light. Conjunctivae/corneas clear. Neck: Supple, with full range of motion. No jugular venous distention. Trachea midline. Respiratory:  Normal respiratory effort. Clear to auscultation, bilaterally without Rales/Wheezes/Rhonchi. Cardiovascular: Regular rate and rhythm with normal S1/S2 without murmurs, rubs or gallops. Abdomen: Soft, colostomy with hard stool present, non-tender, non-distended with normal bowel sounds. : jeffers catheter with light red urine in bag, SPT clamped   Musculoskeletal: passive and active ROM x 4 extremities. Skin: Skin color, texture, turgor normal.  No rashes or lesions. Neurologic:  Neurovascularly intact without any focal sensory/motor deficits.  Cranial nerves: II-XII intact, grossly non-focal.  Psychiatric: Alert and oriented, thought content appropriate, normal insight  Capillary Refill: Brisk,< 3 seconds   Peripheral Pulses: +2 palpable, equal bilaterally     Labs:   Recent Labs     06/11/22  1617 06/13/22  0203 06/14/22  0603   WBC 7.7 7.2 5.5   HGB 14.7 13.2* 13.7*   HCT 45.2 41.3* 44.1    182 154     Recent Labs     06/11/22  1617 06/14/22  0603    139   K 4.7 3.6    103   CO2 22* 25   BUN 20 18   CREATININE 0.3* 0.2*   CALCIUM 9.1 8.7     Recent Labs     06/11/22  1617   AST 20   ALT 15   BILITOT 0.2*   ALKPHOS 144*     No results for input(s): INR in the last 72 hours. No results for input(s): Jonas Michaels in the last 72 hours. Microbiology:    Blood culture #1:   Lab Results   Component Value Date    BC No growth-preliminary No growth  04/22/2022       Blood culture #2:No results found for: Sarah Flores    Organism:  Lab Results   Component Value Date    ORG gram negative bacilli 06/11/2022         Lab Results   Component Value Date    LABGRAM  06/17/2019     Rare segmented neutrophils observed. Rare epithelial cells observed. Rare budding yeast and pseudohyphae observed. Rare gram negative bacilli. MRSA culture only:No results found for: [facility]    Urine culture:   Lab Results   Component Value Date    LABURIN  04/22/2022     Current antibiotic therapy ineffective in vitro for isolate. Arsenio Repine  04/22/2022     Wickes count: >100,000 CFU/mL This isolate is a probable ESBL . ID consultation may be helpful in some clinical circumstances. CONTACT isolation required. Respiratory culture: No results found for: CULTRESP    Aerobic and Anaerobic :  Lab Results   Component Value Date    LABAERO  06/17/2019     Culture also yielded heavy mixed growth of multiple enteric  gram negative bacilli, including swarming Proteus species. If a true mixed infection is suspected, then broad spectrum  empiric antibiotic therapy is indicated. At least one isolate is resistant in vitro to current  regimen. LABAERO  06/17/2019     moderate growth  This isolate is a carbapenemase . ID  consultation may be helpful in some clinical circumstances. CONTACT isolation required. Phenotypic screen test (modified Carbapenem Inactivation  Method or mCIM) for carbapenemase production is positive and  PCR is not detected; results indicate a potentially new  carbapenemase variant or novel mechanism. Carbapenemase testing performed at [facility] (1-), Farrah Welch2.       LABAERO  06/17/2019     light growth  This is a MRSA (Methicillin Resistant Staphylococcus  aureus)isolate. Isolates of MRSA (ORSA) Methicillin (Oxacillin) Resistant  Staphylococcus aureus (coagulase positive) require patient  be placed in CONTACT isolation. Methicillin(Oxacillin)resistant strains of staphylococci  (MRSA)or(MRSE)should be considered resistant to all classes  of cephalosporins, penems and beta-lactams. In the treatment of gram positive infections, GENTAMICIN  should be CONSIDERED a SYNERGYSTIC agent ONLY. Lab Results   Component Value Date    LABANAE  06/17/2019     Culture overgrown with Proteus sp. No further evaluation of  this specimen is possible. If a true mixed aerobic and  anaerobic infection is suspected, then broad spectrum empiric  antibiotic therapy is indicated and should include coverage  for anaerobic organisms. Urinalysis:      Lab Results   Component Value Date    NITRU POSITIVE 06/11/2022    WBCUA > 100 06/11/2022    WBCUA >200 01/20/2012    BACTERIA MANY 06/11/2022    RBCUA > 200 06/11/2022    BLOODU LARGE 06/11/2022    SPECGRAV >1.030 04/22/2022    GLUCOSEU NEGATIVE 06/11/2022       Radiology:  XR ABDOMEN (KUB) (SINGLE AP VIEW)   Final Result   1. Moderate amount of stool in the right colon. 2. Dilated small bowel loops in the right lower quadrant. 3. Changes of ankylosing spondylitis in the lumbar spine. 4. Otherwise nonspecific abdomen. **This report has been created using voice recognition software. It may contain minor errors which are inherent in voice recognition technology. **      Final report electronically signed by DR Yumiko Potts on 6/12/2022 8:00 AM      CT UROGRAM    (Results Pending)     XR ABDOMEN (KUB) (SINGLE AP VIEW)    Result Date: 6/12/2022  PROCEDURE: XR ABDOMEN (KUB) (SINGLE AP VIEW) CLINICAL INFORMATION: Abdominal distention w/ a colostomy. COMPARISON: KUB dated 18 April 2016. TECHNIQUE: A supine AP view of the abdomen was obtained.  FINDINGS: There is a moderate amount of stool in the right colon. There are dilated small bowel loops in the right lower quadrant. . There  is no gross free air. No suspicious densities are present. There are possible changes of ankylosing spondylitis involving the lumbar spine. .     1. Moderate amount of stool in the right colon. 2. Dilated small bowel loops in the right lower quadrant. 3. Changes of ankylosing spondylitis in the lumbar spine. 4. Otherwise nonspecific abdomen. **This report has been created using voice recognition software. It may contain minor errors which are inherent in voice recognition technology. ** Final report electronically signed by DR Esau Ariza on 6/12/2022 8:00 AM      Electronically signed by Kristina Guerrero PA-C on 6/14/2022 at 9:20 AM

## 2022-06-14 NOTE — PLAN OF CARE
Problem: Discharge Planning  Goal: Discharge to home or other facility with appropriate resources  Outcome: Progressing  Flowsheets (Taken 6/14/2022 1844)  Discharge to home or other facility with appropriate resources:   Identify discharge learning needs (meds, wound care, etc)   Identify barriers to discharge with patient and caregiver   Refer to discharge planning if patient needs post-hospital services based on physician order or complex needs related to functional status, cognitive ability or social support system   Arrange for needed discharge resources and transportation as appropriate     Problem: Safety - Adult  Goal: Free from fall injury  Outcome: Progressing  Flowsheets (Taken 6/14/2022 1844)  Free From Fall Injury: Instruct family/caregiver on patient safety     Problem: Skin/Tissue Integrity  Goal: Absence of new skin breakdown  Description: 1. Monitor for areas of redness and/or skin breakdown  2. Assess vascular access sites hourly  3. Every 4-6 hours minimum:  Change oxygen saturation probe site  4. Every 4-6 hours:  If on nasal continuous positive airway pressure, respiratory therapy assess nares and determine need for appliance change or resting period. Outcome: Progressing  Note: No new skin impairments noted. Pt understands the importance of frequent repositioning in order to prevent any skin breakdown.       Problem: Pain  Goal: Verbalizes/displays adequate comfort level or baseline comfort level  Outcome: Progressing  Flowsheets (Taken 6/14/2022 1844)  Verbalizes/displays adequate comfort level or baseline comfort level:   Encourage patient to monitor pain and request assistance   Administer analgesics based on type and severity of pain and evaluate response   Consider cultural and social influences on pain and pain management   Assess pain using appropriate pain scale   Implement non-pharmacological measures as appropriate and evaluate response   Notify Licensed Independent Practitioner

## 2022-06-14 NOTE — PLAN OF CARE
Problem: Discharge Planning  Goal: Discharge to home or other facility with appropriate resources  6/14/2022 0105 by Nikia Hurd RN  Outcome: Progressing  Flowsheets (Taken 6/13/2022 1519 by Fidelia Nobles, RN)  Discharge to home or other facility with appropriate resources:   Identify barriers to discharge with patient and caregiver   Arrange for needed discharge resources and transportation as appropriate   Identify discharge learning needs (meds, wound care, etc)     Problem: Safety - Adult  Goal: Free from fall injury  6/14/2022 0105 by Nikia Hurd RN  Outcome: Progressing  Flowsheets (Taken 6/14/2022 0102)  Free From Fall Injury: Instruct family/caregiver on patient safety     Problem: Skin/Tissue Integrity  Goal: Absence of new skin breakdown  Description: 1. Monitor for areas of redness and/or skin breakdown  2. Assess vascular access sites hourly  3. Every 4-6 hours minimum:  Change oxygen saturation probe site  4. Every 4-6 hours:  If on nasal continuous positive airway pressure, respiratory therapy assess nares and determine need for appliance change or resting period. 6/14/2022 0105 by Nikia Hurd RN  Outcome: Progressing  Note: No evidence of new skin breakdown.      Problem: Pain  Goal: Verbalizes/displays adequate comfort level or baseline comfort level  6/14/2022 0105 by Nikia Hurd RN  Outcome: Progressing  Flowsheets (Taken 6/13/2022 1519 by Fidelia Nobles RN)  Verbalizes/displays adequate comfort level or baseline comfort level:   Encourage patient to monitor pain and request assistance   Assess pain using appropriate pain scale   Administer analgesics based on type and severity of pain and evaluate response   Implement non-pharmacological measures as appropriate and evaluate response   Notify Licensed Independent Practitioner if interventions unsuccessful or patient reports new pain     Problem: Nutrition Deficit:  Goal: Optimize nutritional status  Outcome: Progressing  Flowsheets (Taken 6/14/2022 0105)  Nutrient intake appropriate for improving, restoring, or maintaining nutritional needs: Assess nutritional status and recommend course of action   Care plan reviewed with patient. Patient verbalize understanding of the plan of care and contribute to goal setting.

## 2022-06-14 NOTE — CARE COORDINATION
6/14/22, 8:20 AM EDT    DISCHARGE PLANNING EVALUATION      Spoke with [facility] admissions, precert is not required for [facility] for return. Facility can accept when ready for discharge.

## 2022-06-15 ENCOUNTER — APPOINTMENT (OUTPATIENT)
Dept: GENERAL RADIOLOGY | Age: 67
DRG: 699 | End: 2022-06-15
Payer: COMMERCIAL

## 2022-06-15 ENCOUNTER — ANESTHESIA (OUTPATIENT)
Dept: OPERATING ROOM | Age: 67
DRG: 699 | End: 2022-06-15
Payer: COMMERCIAL

## 2022-06-15 ENCOUNTER — ANESTHESIA EVENT (OUTPATIENT)
Dept: OPERATING ROOM | Age: 67
DRG: 699 | End: 2022-06-15
Payer: COMMERCIAL

## 2022-06-15 PROBLEM — N39.0 SEPSIS DUE TO URINARY TRACT INFECTION (HCC): Status: ACTIVE | Noted: 2020-12-27

## 2022-06-15 LAB
ANION GAP SERPL CALCULATED.3IONS-SCNC: 10 MEQ/L (ref 8–16)
BUN BLDV-MCNC: 14 MG/DL (ref 7–22)
CALCIUM SERPL-MCNC: 8.7 MG/DL (ref 8.5–10.5)
CHLORIDE BLD-SCNC: 104 MEQ/L (ref 98–111)
CO2: 26 MEQ/L (ref 23–33)
CREAT SERPL-MCNC: < 0.2 MG/DL (ref 0.4–1.2)
ERYTHROCYTE [DISTWIDTH] IN BLOOD BY AUTOMATED COUNT: 15.9 % (ref 11.5–14.5)
ERYTHROCYTE [DISTWIDTH] IN BLOOD BY AUTOMATED COUNT: 54.5 FL (ref 35–45)
GFR SERPL CREATININE-BSD FRML MDRD: > 90 ML/MIN/1.73M2
GLUCOSE BLD-MCNC: 82 MG/DL (ref 70–108)
HCT VFR BLD CALC: 43.9 % (ref 42–52)
HEMOGLOBIN: 13.7 GM/DL (ref 14–18)
MCH RBC QN AUTO: 29.6 PG (ref 26–33)
MCHC RBC AUTO-ENTMCNC: 31.2 GM/DL (ref 32.2–35.5)
MCV RBC AUTO: 94.8 FL (ref 80–94)
PLATELET # BLD: 159 THOU/MM3 (ref 130–400)
PMV BLD AUTO: 8.8 FL (ref 9.4–12.4)
POTASSIUM SERPL-SCNC: 4 MEQ/L (ref 3.5–5.2)
RBC # BLD: 4.63 MILL/MM3 (ref 4.7–6.1)
SODIUM BLD-SCNC: 140 MEQ/L (ref 135–145)
WBC # BLD: 7.2 THOU/MM3 (ref 4.8–10.8)

## 2022-06-15 PROCEDURE — 6360000002 HC RX W HCPCS: Performed by: UROLOGY

## 2022-06-15 PROCEDURE — 80048 BASIC METABOLIC PNL TOTAL CA: CPT

## 2022-06-15 PROCEDURE — 99232 SBSQ HOSP IP/OBS MODERATE 35: CPT | Performed by: UROLOGY

## 2022-06-15 PROCEDURE — 6370000000 HC RX 637 (ALT 250 FOR IP): Performed by: INTERNAL MEDICINE

## 2022-06-15 PROCEDURE — 3600000002 HC SURGERY LEVEL 2 BASE: Performed by: UROLOGY

## 2022-06-15 PROCEDURE — 6370000000 HC RX 637 (ALT 250 FOR IP): Performed by: OPHTHALMOLOGY

## 2022-06-15 PROCEDURE — 36415 COLL VENOUS BLD VENIPUNCTURE: CPT

## 2022-06-15 PROCEDURE — 2709999900 HC NON-CHARGEABLE SUPPLY: Performed by: UROLOGY

## 2022-06-15 PROCEDURE — 2500000003 HC RX 250 WO HCPCS: Performed by: INTERNAL MEDICINE

## 2022-06-15 PROCEDURE — 6360000002 HC RX W HCPCS: Performed by: NURSE ANESTHETIST, CERTIFIED REGISTERED

## 2022-06-15 PROCEDURE — 3600000012 HC SURGERY LEVEL 2 ADDTL 15MIN: Performed by: UROLOGY

## 2022-06-15 PROCEDURE — 3700000001 HC ADD 15 MINUTES (ANESTHESIA): Performed by: UROLOGY

## 2022-06-15 PROCEDURE — 0TJB8ZZ INSPECTION OF BLADDER, VIA NATURAL OR ARTIFICIAL OPENING ENDOSCOPIC: ICD-10-PCS | Performed by: UROLOGY

## 2022-06-15 PROCEDURE — 3209999900 FLUORO FOR SURGICAL PROCEDURES

## 2022-06-15 PROCEDURE — 85027 COMPLETE CBC AUTOMATED: CPT

## 2022-06-15 PROCEDURE — 1200000000 HC SEMI PRIVATE

## 2022-06-15 PROCEDURE — C1769 GUIDE WIRE: HCPCS | Performed by: UROLOGY

## 2022-06-15 PROCEDURE — 99232 SBSQ HOSP IP/OBS MODERATE 35: CPT

## 2022-06-15 PROCEDURE — 3700000000 HC ANESTHESIA ATTENDED CARE: Performed by: UROLOGY

## 2022-06-15 PROCEDURE — 2580000003 HC RX 258: Performed by: INTERNAL MEDICINE

## 2022-06-15 PROCEDURE — 2580000003 HC RX 258: Performed by: UROLOGY

## 2022-06-15 RX ORDER — LIDOCAINE HYDROCHLORIDE 20 MG/ML
INJECTION, SOLUTION INTRAVENOUS PRN
Status: DISCONTINUED | OUTPATIENT
Start: 2022-06-15 | End: 2022-06-15 | Stop reason: SDUPTHER

## 2022-06-15 RX ORDER — PROPOFOL 10 MG/ML
INJECTION, EMULSION INTRAVENOUS PRN
Status: DISCONTINUED | OUTPATIENT
Start: 2022-06-15 | End: 2022-06-15 | Stop reason: SDUPTHER

## 2022-06-15 RX ORDER — PROPOFOL 10 MG/ML
INJECTION, EMULSION INTRAVENOUS CONTINUOUS PRN
Status: DISCONTINUED | OUTPATIENT
Start: 2022-06-15 | End: 2022-06-15 | Stop reason: SDUPTHER

## 2022-06-15 RX ADMIN — MEROPENEM 1000 MG: 1 INJECTION, POWDER, FOR SOLUTION INTRAVENOUS at 08:41

## 2022-06-15 RX ADMIN — LIDOCAINE HYDROCHLORIDE 80 MG: 20 INJECTION, SOLUTION INTRAVENOUS at 13:29

## 2022-06-15 RX ADMIN — SODIUM CHLORIDE, PRESERVATIVE FREE 10 ML: 5 INJECTION INTRAVENOUS at 08:34

## 2022-06-15 RX ADMIN — FAMOTIDINE 20 MG: 20 TABLET ORAL at 22:22

## 2022-06-15 RX ADMIN — METOPROLOL TARTRATE 50 MG: 50 TABLET, FILM COATED ORAL at 22:21

## 2022-06-15 RX ADMIN — BACLOFEN 10 MG: 10 TABLET ORAL at 22:21

## 2022-06-15 RX ADMIN — ACETIC ACID 30 ML: 250 IRRIGANT IRRIGATION at 22:20

## 2022-06-15 RX ADMIN — SODIUM CHLORIDE: 9 INJECTION, SOLUTION INTRAVENOUS at 01:41

## 2022-06-15 RX ADMIN — Medication 1 TABLET: at 22:20

## 2022-06-15 RX ADMIN — OXYBUTYNIN CHLORIDE 5 MG: 5 TABLET, EXTENDED RELEASE ORAL at 22:21

## 2022-06-15 RX ADMIN — ERYTHROMYCIN: 5 OINTMENT OPHTHALMIC at 22:22

## 2022-06-15 RX ADMIN — GABAPENTIN 600 MG: 600 TABLET, FILM COATED ORAL at 22:20

## 2022-06-15 RX ADMIN — MEROPENEM 1000 MG: 1 INJECTION, POWDER, FOR SOLUTION INTRAVENOUS at 01:42

## 2022-06-15 RX ADMIN — MEROPENEM 1000 MG: 1 INJECTION, POWDER, FOR SOLUTION INTRAVENOUS at 17:54

## 2022-06-15 RX ADMIN — PROPOFOL 60 MCG/KG/MIN: 10 INJECTION, EMULSION INTRAVENOUS at 13:30

## 2022-06-15 RX ADMIN — TIZANIDINE 2 MG: 4 TABLET ORAL at 22:21

## 2022-06-15 RX ADMIN — SODIUM CHLORIDE 1 G: 1 TABLET ORAL at 22:21

## 2022-06-15 RX ADMIN — PROPOFOL 50 MG: 10 INJECTION, EMULSION INTRAVENOUS at 13:30

## 2022-06-15 RX ADMIN — METOPROLOL TARTRATE 50 MG: 50 TABLET, FILM COATED ORAL at 08:32

## 2022-06-15 ASSESSMENT — PAIN SCALES - GENERAL: PAINLEVEL_OUTOF10: 0

## 2022-06-15 NOTE — ANESTHESIA PRE PROCEDURE
Department of Anesthesiology  Preprocedure Note       Name:  Lesley Vines   Age:  61 y.o.  :  1955                                          MRN:  [id number]         Date:  6/15/2022      Surgeon: Jairon Zarate):  Michi Worley MD    Procedure: Procedure(s):  CYSTOSCOPY, LEFT URETERAL STENT INSERTION    Medications prior to admission:   Prior to Admission medications    Medication Sig Start Date End Date Taking? Authorizing Provider   carbamide peroxide (DEBROX) 6.5 % otic solution 5 drops 2 times daily Instill 5 drops in both ears two times daily for ear wax use for 4 days. Yes Historical Provider, MD   vitamin E 400 UNIT capsule Take 400 Units by mouth daily    Historical Provider, MD   aspirin 81 MG EC tablet Take 81 mg by mouth daily    Historical Provider, MD   oxybutynin (DITROPAN-XL) 5 MG extended release tablet Take 5 mg by mouth in the morning and at bedtime    Historical Provider, MD   gentamicin (GARAMYCIN) 0.1 % ointment Apply topically daily Apply to wound bed    Historical Provider, MD   erythromycin (ROMYCIN) 5 MG/GM ophthalmic ointment Place into the right eye nightly (0.25 ribbon to right eye)    Historical Provider, MD   Ascorbic Acid (VITAMIN C ER PO) Take 500 mg by mouth in the morning and at bedtime     Historical Provider, MD   baclofen (LIORESAL) 10 MG tablet Take 10 mg by mouth 3 times daily    Historical Provider, MD   gabapentin (NEURONTIN) 600 MG tablet Take 600 mg by mouth 3 times daily. Historical Provider, MD   rivaroxaban (XARELTO) 10 MG TABS tablet Take 10 mg by mouth    Historical Provider, MD   acetaminophen (TYLENOL) 325 MG tablet Take 650 mg by mouth every 4 hours as needed for Pain or Fever    Historical Provider, MD   acetic acid 0.25 % irrigation Irrigate with 30 mLs as directed 2 times daily Irrigate with as directed daily.     Historical Provider, MD   ibuprofen (ADVIL;MOTRIN) 400 MG tablet Take 400 mg by mouth every 4 hours as needed for Fever (for temp > 100.5 not alleviated by acetaminophen)    Historical Provider, MD   LACTOBACILLUS PO Take 1 capsule by mouth in the morning and at bedtime     Historical Provider, MD   metoprolol tartrate (LOPRESSOR) 25 MG tablet Take 1 tablet by mouth 2 times daily  Patient taking differently: Take 50 mg by mouth 2 times daily  6/20/19   Lebron Leon MD   potassium chloride (KLOR-CON M) 20 MEQ TBCR extended release tablet Take 2 tablets by mouth daily 6/21/19   Lebron Leon MD   sodium chloride 1 g tablet Take 1 tablet by mouth 2 times daily (with meals) 6/20/19   Lebron Leon MD   calcium-vitamin D (Harrie Catching) 500-200 MG-UNIT per tablet Take 1 tablet by mouth 2 times daily 11/16/18   Raissa Rodriguez MD   famotidine (PEPCID) 20 MG tablet Take 1 tablet by mouth 2 times daily 6/14/18   Shawna Watt MD   docusate sodium (COLACE) 100 MG capsule Take 100 mg by mouth daily     Historical Provider, MD   tiZANidine (ZANAFLEX) 2 MG tablet Take 2 mg by mouth 3 times daily 0600;1400;2200 12/15/16   Historical Provider, MD   Multiple Vitamin (MULTIVITAMIN) tablet Take 1 tablet by mouth daily 3/28/16   Roxana Rasmussen MD   vitamin A 81235 UNITS capsule Take 2 capsules by mouth daily 3/28/16   Roxana Rasmussen MD       Current medications:    Current Facility-Administered Medications   Medication Dose Route Frequency Provider Last Rate Last Admin    meropenem (MERREM) 1,000 mg in sodium chloride 0.9 % 100 mL IVPB (mini-bag)  1,000 mg IntraVENous Q8H TONO Del Real - CNP   Stopped at 06/15/22 0911    [Held by provider] rivaroxaban (XARELTO) tablet 10 mg  10 mg Oral Daily Corine Larsen PA-C   10 mg at 06/13/22 1958    metoprolol tartrate (LOPRESSOR) tablet 50 mg  50 mg Oral BID Evan Covington MD   50 mg at 06/15/22 1942    acetic acid 0.25 % irrigation 30 mL  30 mL Irrigation BID Yoon Dubois MD        [Held by provider] aspirin EC tablet 81 mg  81 mg Oral Daily Yoon Dubois MD   81 mg at 06/13/22 0843    baclofen (LIORESAL) tablet 10 mg  10 mg Oral TID Joseluis Bloom MD   10 mg at 06/14/22 2053    calcium-cholecalciferol 500-200 MG-UNIT per tablet 1 tablet  1 tablet Oral BID Joseluis Bloom MD   1 tablet at 06/14/22 2053    docusate sodium (COLACE) capsule 100 mg  100 mg Oral Daily Joseluis Bloom MD   100 mg at 06/14/22 0836    erythromycin [facility]) ophthalmic ointment   Right Eye Nightly Joseluis Bloom MD   Given at 06/14/22 2053    famotidine (PEPCID) tablet 20 mg  20 mg Oral BID Joseluis Bloom MD   20 mg at 06/14/22 2053    gabapentin (NEURONTIN) tablet 600 mg  600 mg Oral TID Joseluis Bloom MD   600 mg at 06/14/22 2053    oxybutynin (DITROPAN-XL) extended release tablet 5 mg  5 mg Oral BID Joseluis Bloom MD   5 mg at 06/14/22 2053    sodium chloride tablet 1 g  1 g Oral BID WC Joseluis Bloom MD   1 g at 06/14/22 1747    tiZANidine (ZANAFLEX) tablet 2 mg  2 mg Oral TID Joseluis Bloom MD   2 mg at 06/14/22 2053    sodium chloride flush 0.9 % injection 10 mL  10 mL IntraVENous 2 times per day Joseluis Bloom MD   10 mL at 06/15/22 0834    sodium chloride flush 0.9 % injection 10 mL  10 mL IntraVENous PRN Joseluis Bloom MD        0.9 % sodium chloride infusion   IntraVENous PRN Joseluis Bloom MD 25 mL/hr at 06/15/22 0141 New Bag at 06/15/22 0141    ondansetron (ZOFRAN-ODT) disintegrating tablet 4 mg  4 mg Oral Q8H PRN Joseluis Bloom MD        Or    ondansetron (ZOFRAN) injection 4 mg  4 mg IntraVENous Q6H PRN Joseluis Bloom MD        polyethylene glycol (GLYCOLAX) packet 17 g  17 g Oral Daily PRN Joseluis Bloom MD        acetaminophen (TYLENOL) tablet 650 mg  650 mg Oral Q6H PRN Joseluis Bloom MD        Or    acetaminophen (TYLENOL) suppository 650 mg  650 mg Rectal Q6H PRN Joseluis Bloom MD           Allergies:  No Known Allergies    Problem List:    Patient Active Problem List   Diagnosis Code    Neurogenic bladder N31.9    Multiple sclerosis ([facility] G35    MS (multiple sclerosis) ([address] G35    Urinary retention R33.9    Chronic suprapubic catheter ([address] Z93.59    Cystostomy malfunction ([address] N99.512    Decubitus ulcer of coccyx L89.159    Decubitus ulcer, stage 3 ([facility]) L89.93    Malnutrition ([address] E46    Decubitus ulcer of right perineal ischial region, stage 3 ([address] L89.313    Pulmonary embolism on left ([facility]) I26.99    Acute metabolic encephalopathy E05.36    Speech disturbance R47.9    Infected decubitus ulcer, stage I L89.91, L08.9    Infected decubitus ulcer, stage III ([facility]) L89.93, L08.9    Wound infection T14. 8XXA, L08.9    Elevated INR R79.1    Chronic osteomyelitis, pelvis, right ([facility]) M86.651    Osteomyelitis ([facility]) M86.9    Urinary tract infection without hematuria N39.0    Abnormal liver function test R94.5    Chronic depression F32. A    Essential hypertension I10    History of pulmonary embolism Z86.711    Other dysphagia R13.19    Pressure injury of skin of back L89.109    Decubitus ulcer L89.90    Elevated transaminase level R74.01    Anemia D64.9    Sepsis due to methicillin resistant Staphylococcus aureus (MRSA) ([facility]) A41.02    Sepsis ([facility]) A41.9    AMS (altered mental status) R41.82    Gross hematuria R31.0    Class 1 obesity due to excess calories without serious comorbidity with body mass index (BMI) of 31.0 to 31.9 in adult E66.09, Z68.31    Colostomy in place ([address] Z93.3    Current use of long term anticoagulation Z79.01       Past Medical History:        Diagnosis Date    Dysphagia     oropharyngeal    History of pulmonary embolism     History of urinary tract infection     Hx of blood clots     Pulmonary embolis    Hypertension     Major depressive disorder, single episode     MS (multiple sclerosis) ([address]     Neurogenic bladder feb. 2012    Dr. Efra Meadows placed [facility])     UTI (urinary tract infection)        Past Surgical History:        Procedure Laterality Date   [address] HCT 44.1 06/14/2022    MCV 95.9 06/14/2022    RDW 16.6 06/21/2019     06/14/2022       CMP:   Lab Results   Component Value Date     06/14/2022    K 3.6 06/14/2022    K 4.7 06/11/2022     06/14/2022    CO2 25 06/14/2022    BUN 18 06/14/2022    CREATININE 0.2 06/14/2022    AGRATIO 0.7 06/21/2019    LABGLOM >90 06/14/2022    GLUCOSE 90 06/14/2022    GLUCOSE 105 06/25/2019    PROT 7.5 06/11/2022    CALCIUM 8.7 06/14/2022    BILITOT 0.2 06/11/2022    ALKPHOS 144 06/11/2022    AST 20 06/11/2022    ALT 15 06/11/2022       POC Tests: No results for input(s): POCGLU, POCNA, POCK, POCCL, POCBUN, POCHEMO, POCHCT in the last 72 hours. Coags:   Lab Results   Component Value Date    INR 1.86 04/22/2022    APTT 41.8 04/22/2022       HCG (If Applicable): No results found for: PREGTESTUR, PREGSERUM, HCG, HCGQUANT     ABGs: No results found for: PHART, PO2ART, BFX5PAU, QOJ6AMB, BEART, K5IAMPWM     Type & Screen (If Applicable):  Lab Results   Component Value Date    LABRH POS 06/12/2018       Drug/Infectious Status (If Applicable):  No results found for: HIV, HEPCAB    COVID-19 Screening (If Applicable):   Lab Results   Component Value Date    COVID19 NOT DETECTED 12/27/2020           Anesthesia Evaluation  Patient summary reviewed  Airway: Mallampati: III  TM distance: >3 FB   Neck ROM: full  Mouth opening: > = 3 FB   Dental:          Pulmonary:                              Cardiovascular:    (+) hypertension:,       ECG reviewed                        Neuro/Psych:   (+) neuromuscular disease: multiple sclerosis, psychiatric history:            GI/Hepatic/Renal:             Endo/Other:                     Abdominal:             Vascular: Other Findings:           Anesthesia Plan      MAC     ASA 3       Induction: intravenous. Anesthetic plan and risks discussed with patient. Plan discussed with RHETT. Maryanne Acevedo.  [facility]   6/15/2022

## 2022-06-15 NOTE — PROGRESS NOTES
Symmetric chest rise  Abdomen soft, non-tender, non-distended. No CVA tenderness. CBI is clear infusing urethral jeffers, SPT clamped  No calf pain. Minimal/no edema in bilateral lower extremities. Skin is warm, dry  Psych: mood, affect normal    Additional Lab/Culture results:     Imaging Reviewed:     TONO Antoine CNP independently reviewed the images and verified the radiology reports from:    XR ABDOMEN (KUB) (SINGLE AP VIEW)    Result Date: 6/12/2022  PROCEDURE: XR ABDOMEN (KUB) (SINGLE AP VIEW) CLINICAL INFORMATION: Abdominal distention w/ a colostomy. COMPARISON: KUB dated 18 April 2016. TECHNIQUE: A supine AP view of the abdomen was obtained. FINDINGS: There is a moderate amount of stool in the right colon. There are dilated small bowel loops in the right lower quadrant. . There  is no gross free air. No suspicious densities are present. There are possible changes of ankylosing spondylitis involving the lumbar spine. .     1. Moderate amount of stool in the right colon. 2. Dilated small bowel loops in the right lower quadrant. 3. Changes of ankylosing spondylitis in the lumbar spine. 4. Otherwise nonspecific abdomen. **This report has been created using voice recognition software. It may contain minor errors which are inherent in voice recognition technology. ** Final report electronically signed by DR Juana Jones on 6/12/2022 8:00 AM      Impression:    Patient Active Problem List   Diagnosis    Neurogenic bladder    Multiple sclerosis ([address]    MS (multiple sclerosis) ([address]    Urinary retention    Chronic suprapubic catheter ([address]    Cystostomy malfunction (HCC)    Decubitus ulcer of coccyx    Decubitus ulcer, stage 3 (HCC)    Malnutrition (HCC)    Decubitus ulcer of right perineal ischial region, stage 3 (HCC)    Pulmonary embolism on left (HCC)    Acute metabolic encephalopathy    Speech disturbance    Infected decubitus ulcer, stage I    Infected decubitus ulcer, stage III ([address]    Wound infection    Elevated INR    Chronic osteomyelitis, pelvis, right (HCC)    Osteomyelitis (HCC)    Urinary tract infection without hematuria    Abnormal liver function test    Chronic depression    Essential hypertension    History of pulmonary embolism    Other dysphagia    Pressure injury of skin of back    Decubitus ulcer    Elevated transaminase level    Anemia    Sepsis due to methicillin resistant Staphylococcus aureus (MRSA) (HCC)    [facility]    AMS (altered mental status)    Gross hematuria    Class 1 obesity due to excess calories without serious comorbidity with body mass index (BMI) of 31.0 to 31.9 in adult    Colostomy in place [facility])    Current use of long term anticoagulation     Narrative   PROCEDURE: CT UROGRAM       CLINICAL INFORMATION: Hematuria.       COMPARISON: CT abdomen and 12/28/2020.       TECHNIQUE:    5 mm axial imaging through the abdomen and pelvis without contrast, 5 mm axial imaging through the abdomen with intravenous contrast (2 minute delay) and 5 mm axial imaging through the abdomen and pelvis with intravenous contrast (8 minute delay). Coronal and sagittal reconstructions of the abdomen and pelvis (8 minute delay) were also performed.       All CT scans at this facility use dose modulation, iterative reconstruction, and/or weight based dosing when appropriate to reduce the radiation dose to as low as reasonably achievable.       CONTRAST: 80 cc Isovue-370.           FINDINGS:        Lung bases: Dependent atelectasis/scarring at the lung bases appears unchanged.       Liver/gallbladder/bilary tree: The gallbladder is surgically absent. No biliary ductal dilatation is observed. Hepatic steatosis is stable. No liver lesions are identified.       Pancreas: Normal.   Spleen : Normal.   Adrenal glands: Normal.       Kidneys/ ureters/ bladder: A calculus in the left renal pelvis measures 4 x 8 mm (series 7, image 44).  Mild dilatation of the left renal pelvis surrounding the calculus without hydronephrosis is observed. A nonobstructing 2 mm calculus in the upper pole    of the left kidney is also visualized (series 7, image 37). Punctate nonobstructing calculi in the upper mid and lower pole of the right kidney measured 1 mm (series 8, image 24, 28, and 32). A stable exophytic simple cyst arising from the upper pole of    the left kidney measures 8 mm (series 7, image 39). A nonenhancing simple cyst in the upper pole of the right kidney measures 10 mm (series 7, image 45). A nonenhancing simple cyst in the lower pole of the right kidney measures 7 mm (series 7, image 53).  The urinary bladder wall appears thickened out of proportion to the degree of underdistention. A suprapubic Pom[place] catheter is noted within the urinary bladder. No urothelial filling defect is observed.       Gastrointestinal:  The appendix is normal. Mild residual fecal material seen throughout the colon. No bowel structure and, free fluid, fluid collection, or free air is observed. The left lower quadrant colostomy is stable. Stranding at the mesenteric    root is again noted.       Retroperitoneum / lymph nodes: The aorta is not dilated. No lymphadenopathy is present.       Pelvis: The prostate gland is not enlarged.       Musculoskeletal: Diffuse osteopenia is visualized. Multilevel degenerative disc disease is noted in the thoracic and lumbar spine. The visualized skeletal structures appear intact.               Impression   1. Nonobstructing bilateral nephrolithiasis. The largest calculus measures 4 x 8 mm and is within the left renal pelvis (series 7, image 44) which is mildly dilated around the calculus however no obstructive uropathy is observed. Additional punctate    bilateral nonobstructing renal calculi are described. Simple bilateral renal cysts are observed.       2. The urinary bladder is thickened out of proportion to the degree of underdistention.  Perivesicular fat stranding is again noted. Correlate with urinalysis and cystoscopy. The urinary bladder is decompressed by a suprapubic catheter and evaluation is    limited due to underdistention.       3. The left lower quadrant colostomy is stable. No bowel obstruction or pericolonic inflammation is observed. Fat stranding at the mesenteric root is unchanged and remains nonspecific. Additional stable chronic findings are discussed.             s/p * Surgery not found * on     Plan:  NPO  Consent   Cont abx    Cystoscopy, left ureteral stent placement per Dr Haresh Torres. I described the procedure in detail and also described the associated risks and benefits at length. We discussed possible alternative therapies. We discussed the risks and benefits of not undergoing therapy. Patient understands these risks and benefits and desires to proceed. Post-op expectations were discussed; stent pain, urinary frequency and urgency secondary to the stent, dysuria which should improve 1-2 days after procedure, and intermittent hematuria can be expected as long as stent is in place.     [id number] [address] Strange for diet after procedure    Ping Branch, TONO - CNP  7:50 AM 6/15/2022

## 2022-06-15 NOTE — PROGRESS NOTES
Red output from jeffers catheter at this time. CBI started. Pt. Tolerating CBI.     0400 Pt. CBI clamped due to output being clear. Pt. Resting in bed with eyes closed. Denies any needs at this time. Call light within reach.

## 2022-06-15 NOTE — OP NOTE
[address] [place] [address]. [place]    DATE: 6/15/2022  Patient:  Humberto Canada  MRN: [id number]  YOB: 1955    SURGEON: Marleny Shipley MD.    ASSISTANT: none    PREOPERATIVE DIAGNOSIS:  left ureteral obstruction    POSTOPERATIVE DIAGNOSIS: left ureteral obstruction    PROCEDURE PERFORMED:  Cystoscopy, attempted left stent placement    ANESTHESIA: General    COMPLICATIONS: high riding bladder neck. We did enter bladder but we were unable to advance wire up ureteral orifice    OR BLOOD LOSS:  Minimal    FLUIDS: Cystalloids per Anesthesia    SPECIMENS:  * No specimens in log *  none    DRAINS: spt and [address] [place] [address]     INDICATIONS FOR PROCEDURE:  The patient is a 61 y.o. male who presents today with Kidney stone [N20.0] here for CYSTOSCOPY. After risks, benefits and alternatives of the procedure were discussed with the patient, the patient elected to proceed. DETAILS OF PROCEDURE:  After informed consent was obtained in the preoperative area, the patient was taken back to the operating room and transferred to the operating table in supine position. Anesthesia was induced and antibiotics were given. The patient was placed in modified dorsal lithotomy position and sterilely prepped and draped in a standard fashion. A timeout occurred. Two patient identifiers were used. We entered the urethra with a 22 [employer] [place] scope. This was difficult due to his high riding bladder neck    The Left ureteral orifice was then visualized. Visualization was very poor from significant bullous edema    we attempted to advance a guidewire up the orifice but it continued to buckle. we attempted > 20 minutes. We elected to abort the procedure. The patient was placed back on cbi as he had very friable mucosa. We will continue cbi and will place a nephrostomy tube on the left side IF he develops pain or sepsis. The patients bladder was drained. All instrumentation was removed.  The patient was then awakened and discharged back to the PACU in good and stable condition.

## 2022-06-15 NOTE — CARE COORDINATION
6/15/22, 7:35 AM EDT    DISCHARGE ON GOING EVALUATION    [facility] day: 4  Location: [facility]26/026-A Reason for admit: Gross hematuria [R31.0]  Anticoagulant long-term use [Z79.01]  Hematuria, gross [R31.0]   Procedure:   6/12 KUB: Moderate amount of stool in the right colon.  Dilated small bowel loops in the right lower quadrant. Changes of ankylosing spondylitis in the lumbar spine. Otherwise nonspecific abdomen. Barriers to Discharge: Hospitalist and Urology following. 3 Way jeffers with CBI, CBI stopped. Urine culture growing gram negative bacilli. Acetic acid irrigation bid. Merrem iv q8hr. PCP: Dominik Hamilton MD  Readmission Risk Score: 18.2 ( )%  Patient Goals/Plan/Treatment Preferences: Pt is from [facility]. SW following for return.

## 2022-06-15 NOTE — PROGRESS NOTES
This RN was called in by primary RN Leatha Talbert to look at [facility]. Upon assessment, CBI clamped. Diluted red urine noted in jeffers catheter. Jeffers catheter was irrigated with 200 of sterile water to evaluate for clots as jeffers not draining properly. Patient tolerated well endorses some suprapubic \"fullness\" with irrigation. 200 of pink tinged irrigant returned. Suprapubic \"fullness\" resolved. No clots noted. Patient CBI restarted. Jeffers draining properly at this time. Patient tolerated well. Call light within reach, bed low to ground with call light in reach. Primary RN at bedside with patient. Denies any further needs at this time.

## 2022-06-15 NOTE — ANESTHESIA POSTPROCEDURE EVALUATION
Department of Anesthesiology  Postprocedure Note    Patient: Cesario Camara  MRN: [id number]  YOB: 1955  Date of evaluation: 6/15/2022  Time:  2:33 PM     Procedure Summary     Date: 06/15/22 Room / Location: [facility] / Michie TANNER Strange    Anesthesia Start: 8445 Anesthesia Stop: 3161    Procedure: CYSTOSCOPY (Left ) Diagnosis:       Kidney stone      (Kidney stone [N20.0])    Surgeons: Wendy Leger MD Responsible Provider: Mallory Patel DO    Anesthesia Type: MAC ASA Status: 3          Anesthesia Type: No value filed. Quinn Phase I:      Quinn Phase II:      Last vitals: Reviewed and per EMR flowsheets.        Anesthesia Post Evaluation    Patient location during evaluation: bedside  Patient participation: complete - patient participated  Level of consciousness: awake  Airway patency: patent  Nausea & Vomiting: no nausea  Complications: no  Cardiovascular status: hemodynamically stable  Respiratory status: acceptable  Hydration status: stable

## 2022-06-15 NOTE — PLAN OF CARE
Problem: Discharge Planning  Goal: Discharge to home or other facility with appropriate resources  6/14/2022 2226 by Joby Matthew RN  Outcome: Progressing  Flowsheets (Taken 6/14/2022 2045)  Discharge to home or other facility with appropriate resources: Identify barriers to discharge with patient and caregiver     Problem: Safety - Adult  Goal: Free from fall injury  6/14/2022 2226 by Joby Matthew RN  Outcome: Progressing  Flowsheets (Taken 6/14/2022 2225)  Free From Fall Injury: Instruct family/caregiver on patient safety     Problem: Skin/Tissue Integrity  Goal: Absence of new skin breakdown  Description: 1. Monitor for areas of redness and/or skin breakdown  2. Assess vascular access sites hourly  3. Every 4-6 hours minimum:  Change oxygen saturation probe site  4. Every 4-6 hours:  If on nasal continuous positive airway pressure, respiratory therapy assess nares and determine need for appliance change or resting period. 6/14/2022 2226 by Joby Matthew RN  Outcome: Progressing  Note: No evidence of new skin breakdown. Pt. Turned and repositioned Q2.       Problem: Pain  Goal: Verbalizes/displays adequate comfort level or baseline comfort level  6/14/2022 2226 by Joby Matthew RN  Outcome: Progressing  Flowsheets (Taken 6/14/2022 1844 by Traci Henderson LPN)  Verbalizes/displays adequate comfort level or baseline comfort level:   Encourage patient to monitor pain and request assistance   Administer analgesics based on type and severity of pain and evaluate response   Consider cultural and social influences on pain and pain management   Assess pain using appropriate pain scale   Implement non-pharmacological measures as appropriate and evaluate response   Notify Licensed Independent Practitioner if interventions unsuccessful or patient reports new pain     Problem: Nutrition Deficit:  Goal: Optimize nutritional status  6/14/2022 2226 by Joby Matthew RN  Outcome: Progressing  Flowsheets (Taken 6/14/2022 1844 by Karlie Hammer LPN)  Nutrient intake appropriate for improving, restoring, or maintaining nutritional needs: Assess nutritional status and recommend course of action   Care plan reviewed with patient. Patient  verbalize understanding of the plan of care and contribute to goal setting.

## 2022-06-15 NOTE — PROGRESS NOTES
Hospitalist Progress Note      Patient:  Guille Antonio    Unit/Bed:7K-26/026-A  YOB: 1955  MRN: [id number]   Acct: [de-identified]   PCP: Eduardo Zaldivar MD  Date of Admission: 6/11/2022    Assessment/Plan:    1. Nonobstructing bilateral nephrolithiasis:  · Initially felt likely traumatic s/p SPT change, however with persistent hematuria. Urology consulted and following. Urethral jeffers placed 06/14 with plan for continued irrigation. CT urogram obtained 6/14 reveling bilateral stones. Urology consulted on admission and following. ASA/Xarelto held, will continue holding until okay to restart per urology. · Patient underwent cystoscopy today, unable to place that per urology note. [address] was initiated on 06/14 empirically as patient with history of ESBL, urine culture revealing E. Coli. Continue Merrem. 2. UTI in setting of SPT:   · UA positive for nitrates, moderate LEs, many bacteria noted. Urine culture returned growing gram-negative bacilli, patient with history of ESBL. It was suspected to be colonization. Patient afebrile, no leukocytosis on admission. Due to persistent hematuria Merrem was initiated on 06/14. · Final urine culture growing E. coli, continue Merrem at this time. 3. Acute on chronic normocytic anemia:  · Likely some mild blood loss contributing from #1. Hemoglobin remained stable, will continue to monitor.   No signs of gross bleeding on examination, transfuse for hemoglobin less than 7.  4. History of PE:  · On Xarelto, currently held in light of #1.  5. History of MS/neurogenic bladder:  · Noted    Chief Complaint: Gross hematuria x1 day    Initial H and P:-    Per chart review: \"\"61year-old male patient who is a nursing home resident. Eltoncatherine Clements to the ED b/cecause of gross hematuria from the chronic suprapubic catheter.  He was noted to be bleeding from the cystostomy site.     Patient has [facility] a neurogenic calcium-cholecalciferol  1 tablet Oral BID    docusate sodium  100 mg Oral Daily    erythromycin   Right Eye Nightly    famotidine  20 mg Oral BID    gabapentin  600 mg Oral TID    oxybutynin  5 mg Oral BID    sodium chloride  1 g Oral BID     tiZANidine  2 mg Oral TID    sodium chloride flush  10 mL IntraVENous 2 times per day     PRN Meds: sodium chloride flush, sodium chloride, ondansetron **OR** ondansetron, polyethylene glycol, acetaminophen **OR** acetaminophen      Intake/Output Summary (Last 24 hours) at 6/15/2022 1740  Last data filed at 6/15/2022 1438  Gross per 24 hour   Intake 565 ml   Output 1250 ml   Net -685 ml       Diet:  Diet NPO Exceptions are: Sips of Water with Meds    Exam:  BP (!) 159/69   Pulse 71   Temp 97.3 °F (36.3 °C) (Oral)   Resp 16   Ht 5' 7\" (1.702 m)   Wt 200 lb (90.7 kg)   SpO2 96%   BMI 31.32 kg/m²   General appearance: No apparent distress, appears stated age and cooperative. HEENT: Pupils equal, round, and reactive to light. Conjunctivae/corneas clear. Neck: Supple, with full range of motion. No jugular venous distention. Trachea midline. Respiratory:  Normal respiratory effort. Clear to auscultation, bilaterally without Rales/Wheezes/Rhonchi. Cardiovascular: Regular rate and rhythm with normal S1/S2 without murmurs, rubs or gallops. Abdomen: Soft, colostomy present, non-tender, non-distended with normal bowel sounds. Musculoskeletal: passive and active ROM x 4 extremities. Skin: Skin color, texture, turgor normal.  No rashes or lesions. Neurologic:  Neurovascularly intact without any focal sensory/motor deficits.  Cranial nerves: II-XII intact, grossly non-focal.  Psychiatric: Alert and oriented, thought content appropriate, normal insight  Capillary Refill: Brisk,< 3 seconds   Peripheral Pulses: +2 palpable, equal bilaterally     Labs:   Recent Labs     06/13/22  0203 06/14/22  0603 06/15/22  1235   WBC 7.2 5.5 7.2   HGB 13.2* 13.7* 13.7*   HCT 41.3* 44.1 43.9    154 159     Recent Labs     06/14/22  0603 06/15/22  1235    140   K 3.6 4.0    104   CO2 25 26   BUN 18 14   CREATININE 0.2* < 0.2*   CALCIUM 8.7 8.7     No results for input(s): AST, ALT, BILIDIR, BILITOT, ALKPHOS in the last 72 hours. No results for input(s): INR in the last 72 hours. No results for input(s): Curlie Lat in the last 72 hours. Microbiology:    Blood culture #1:   Lab Results   Component Value Date    BC No growth-preliminary No growth  04/22/2022       Blood culture #2:No results found for: Cee Ivanoff    Organism:  Lab Results   Component Value Date    [facility] Escherichia coli 06/11/2022    ORG Providencia stuartii 06/11/2022         Lab Results   Component Value Date    LABGRAM  06/17/2019     Rare segmented neutrophils observed. Rare epithelial cells observed. Rare budding yeast and pseudohyphae observed. Rare gram negative bacilli. MRSA culture only:No results found for: [facility]    Urine culture:   Lab Results   Component Value Date    LABURIN  04/22/2022     Current antibiotic therapy ineffective in vitro for isolate. Annel Hero  04/22/2022     [place] count: >100,000 CFU/mL This isolate is a probable ESBL . ID consultation may be helpful in some clinical circumstances. CONTACT isolation required. Respiratory culture: No results found for: CULTRESP    Aerobic and Anaerobic :  Lab Results   Component Value Date    LABAERO  06/17/2019     Culture also yielded heavy mixed growth of multiple enteric  gram negative bacilli, including swarming Proteus species. If a true mixed infection is suspected, then broad spectrum  empiric antibiotic therapy is indicated. At least one isolate is resistant in vitro to current  regimen. LABAERO  06/17/2019     moderate growth  This isolate is a carbapenemase . ID  consultation may be helpful in some clinical circumstances. CONTACT isolation required.   Phenotypic screen test (modified Carbapenem Inactivation  Method or mCIM) for carbapenemase production is positive and  PCR is not detected; results indicate a potentially new  carbapenemase variant or novel mechanism. Carbapenemase testing performed at [facility] (1-), YuriFarrah Virgil. LABAERO  06/17/2019     light growth  This is a MRSA (Methicillin Resistant Staphylococcus  aureus)isolate. Isolates of MRSA (ORSA) Methicillin (Oxacillin) Resistant  Staphylococcus aureus (coagulase positive) require patient  be placed in CONTACT isolation. Methicillin(Oxacillin)resistant strains of staphylococci  (MRSA)or(MRSE)should be considered resistant to all classes  of cephalosporins, penems and beta-lactams. In the treatment of gram positive infections, GENTAMICIN  should be CONSIDERED a SYNERGYSTIC agent ONLY. Lab Results   Component Value Date    LABANAE  06/17/2019     Culture overgrown with Proteus sp. No further evaluation of  this specimen is possible. If a true mixed aerobic and  anaerobic infection is suspected, then broad spectrum empiric  antibiotic therapy is indicated and should include coverage  for anaerobic organisms. Urinalysis:      Lab Results   Component Value Date    NITRU POSITIVE 06/11/2022    WBCUA > 100 06/11/2022    WBCUA >200 01/20/2012    BACTERIA MANY 06/11/2022    RBCUA > 200 06/11/2022    BLOODU LARGE 06/11/2022    SPECGRAV >1.030 04/22/2022    GLUCOSEU NEGATIVE 06/11/2022       Radiology:  FLUORO FOR SURGICAL PROCEDURES   Final Result      CT UROGRAM   Final Result   1. Nonobstructing bilateral nephrolithiasis. The largest calculus measures 4 x 8 mm and is within the left renal pelvis (series 7, image 44) which is mildly dilated around the calculus however no obstructive uropathy is observed. Additional punctate    bilateral nonobstructing renal calculi are described. Simple bilateral renal cysts are observed. 2. The urinary bladder is thickened out of proportion to the degree of underdistention. Perivesicular fat stranding is again noted. Correlate with urinalysis and cystoscopy. The urinary bladder is decompressed by a suprapubic catheter and evaluation is    limited due to underdistention. 3. The left lower quadrant colostomy is stable. No bowel obstruction or pericolonic inflammation is observed. Fat stranding at the mesenteric root is unchanged and remains nonspecific. Additional stable chronic findings are discussed. **This report has been created using voice recognition software. It may contain minor errors which are inherent in voice recognition technology. **      Final report electronically signed by Dr Lazarus Silversmith on 6/14/2022 1:55 PM      XR ABDOMEN (KUB) (SINGLE AP VIEW)   Final Result   1. Moderate amount of stool in the right colon. 2. Dilated small bowel loops in the right lower quadrant. 3. Changes of ankylosing spondylitis in the lumbar spine. 4. Otherwise nonspecific abdomen. **This report has been created using voice recognition software. It may contain minor errors which are inherent in voice recognition technology. **      Final report electronically signed by DR Sho Christensen on 6/12/2022 8:00 AM        XR ABDOMEN (KUB) (SINGLE AP VIEW)    Result Date: 6/12/2022  PROCEDURE: XR ABDOMEN (KUB) (SINGLE AP VIEW) CLINICAL INFORMATION: Abdominal distention w/ a colostomy. COMPARISON: KUB dated 18 April 2016. TECHNIQUE: A supine AP view of the abdomen was obtained. FINDINGS: There is a moderate amount of stool in the right colon. There are dilated small bowel loops in the right lower quadrant. . There  is no gross free air. No suspicious densities are present. There are possible changes of ankylosing spondylitis involving the lumbar spine. .     1. Moderate amount of stool in the right colon. 2. Dilated small bowel loops in the right lower quadrant. 3. Changes of ankylosing spondylitis in the lumbar spine. 4. Otherwise nonspecific abdomen.  **This report has been created using voice recognition software. It may contain minor errors which are inherent in voice recognition technology. ** Final report electronically signed by DR Summer Martinez on 6/12/2022 8:00 AM      Electronically signed by Emeka Peace PA-C on 6/15/2022 at 5:40 PM

## 2022-06-15 NOTE — PROGRESS NOTES
Pt. Had diluted red urine noted in jeffers catheter. Jeffers catheter was irrigated, by [employer], with 200 mL of sterile water to evaluate for clots as jeffers was not draining properly. Pt. Tolerated well endorsed some suprapubic \"fullness\" with irrigation. 200 of pink tinged irrigant returned. Suprapubic \"fullness\" resolved. No clots noted. Pt. CBI restarted, wide open. Jeffers draining properly at this time. 2340: Pt. CBI clamped due to output in jeffers being clear. Pt. Resting in bed with eyes closed. No request at this time. Call light within reach.

## 2022-06-15 NOTE — PROGRESS NOTES
Patient returned from surgery. This RN did not receive report on pt and was not aware that he was returning. IV line hooked to IV but not infusing. CBI running. Patient denies pain.  See flowsheets for VS and assessment

## 2022-06-16 LAB
ANION GAP SERPL CALCULATED.3IONS-SCNC: 12 MEQ/L (ref 8–16)
BUN BLDV-MCNC: 15 MG/DL (ref 7–22)
CALCIUM SERPL-MCNC: 9 MG/DL (ref 8.5–10.5)
CHLORIDE BLD-SCNC: 103 MEQ/L (ref 98–111)
CO2: 25 MEQ/L (ref 23–33)
CREAT SERPL-MCNC: 0.2 MG/DL (ref 0.4–1.2)
ERYTHROCYTE [DISTWIDTH] IN BLOOD BY AUTOMATED COUNT: 15.9 % (ref 11.5–14.5)
ERYTHROCYTE [DISTWIDTH] IN BLOOD BY AUTOMATED COUNT: 53.8 FL (ref 35–45)
GFR SERPL CREATININE-BSD FRML MDRD: > 90 ML/MIN/1.73M2
GLUCOSE BLD-MCNC: 173 MG/DL (ref 70–108)
HCT VFR BLD CALC: 43.3 % (ref 42–52)
HEMOGLOBIN: 13.6 GM/DL (ref 14–18)
MCH RBC QN AUTO: 29.4 PG (ref 26–33)
MCHC RBC AUTO-ENTMCNC: 31.4 GM/DL (ref 32.2–35.5)
MCV RBC AUTO: 93.5 FL (ref 80–94)
PLATELET # BLD: 180 THOU/MM3 (ref 130–400)
PMV BLD AUTO: 9.1 FL (ref 9.4–12.4)
POTASSIUM SERPL-SCNC: 3.7 MEQ/L (ref 3.5–5.2)
RBC # BLD: 4.63 MILL/MM3 (ref 4.7–6.1)
SODIUM BLD-SCNC: 140 MEQ/L (ref 135–145)
WBC # BLD: 6.8 THOU/MM3 (ref 4.8–10.8)

## 2022-06-16 PROCEDURE — 99232 SBSQ HOSP IP/OBS MODERATE 35: CPT | Performed by: UROLOGY

## 2022-06-16 PROCEDURE — 80048 BASIC METABOLIC PNL TOTAL CA: CPT

## 2022-06-16 PROCEDURE — 85027 COMPLETE CBC AUTOMATED: CPT

## 2022-06-16 PROCEDURE — 6360000002 HC RX W HCPCS: Performed by: UROLOGY

## 2022-06-16 PROCEDURE — 1200000000 HC SEMI PRIVATE

## 2022-06-16 PROCEDURE — 36415 COLL VENOUS BLD VENIPUNCTURE: CPT

## 2022-06-16 PROCEDURE — 99232 SBSQ HOSP IP/OBS MODERATE 35: CPT

## 2022-06-16 PROCEDURE — 2580000003 HC RX 258: Performed by: INTERNAL MEDICINE

## 2022-06-16 PROCEDURE — 6370000000 HC RX 637 (ALT 250 FOR IP): Performed by: INTERNAL MEDICINE

## 2022-06-16 PROCEDURE — 2580000003 HC RX 258: Performed by: UROLOGY

## 2022-06-16 PROCEDURE — 6370000000 HC RX 637 (ALT 250 FOR IP): Performed by: OPHTHALMOLOGY

## 2022-06-16 RX ADMIN — METOPROLOL TARTRATE 50 MG: 50 TABLET, FILM COATED ORAL at 08:17

## 2022-06-16 RX ADMIN — ERYTHROMYCIN: 5 OINTMENT OPHTHALMIC at 21:49

## 2022-06-16 RX ADMIN — MEROPENEM 1000 MG: 1 INJECTION, POWDER, FOR SOLUTION INTRAVENOUS at 12:19

## 2022-06-16 RX ADMIN — FAMOTIDINE 20 MG: 20 TABLET ORAL at 21:49

## 2022-06-16 RX ADMIN — GABAPENTIN 600 MG: 600 TABLET, FILM COATED ORAL at 15:14

## 2022-06-16 RX ADMIN — BACLOFEN 10 MG: 10 TABLET ORAL at 08:15

## 2022-06-16 RX ADMIN — GABAPENTIN 600 MG: 600 TABLET, FILM COATED ORAL at 21:49

## 2022-06-16 RX ADMIN — Medication 1 TABLET: at 21:53

## 2022-06-16 RX ADMIN — TIZANIDINE 2 MG: 4 TABLET ORAL at 21:49

## 2022-06-16 RX ADMIN — Medication 1 TABLET: at 08:15

## 2022-06-16 RX ADMIN — DOCUSATE SODIUM 100 MG: 100 CAPSULE, LIQUID FILLED ORAL at 08:14

## 2022-06-16 RX ADMIN — TIZANIDINE 2 MG: 4 TABLET ORAL at 08:18

## 2022-06-16 RX ADMIN — TIZANIDINE 2 MG: 4 TABLET ORAL at 15:46

## 2022-06-16 RX ADMIN — SODIUM CHLORIDE 1 G: 1 TABLET ORAL at 18:18

## 2022-06-16 RX ADMIN — SODIUM CHLORIDE, PRESERVATIVE FREE 10 ML: 5 INJECTION INTRAVENOUS at 08:19

## 2022-06-16 RX ADMIN — OXYBUTYNIN CHLORIDE 5 MG: 5 TABLET, EXTENDED RELEASE ORAL at 21:49

## 2022-06-16 RX ADMIN — MEROPENEM 1000 MG: 1 INJECTION, POWDER, FOR SOLUTION INTRAVENOUS at 18:20

## 2022-06-16 RX ADMIN — OXYBUTYNIN CHLORIDE 5 MG: 5 TABLET, EXTENDED RELEASE ORAL at 08:17

## 2022-06-16 RX ADMIN — BACLOFEN 10 MG: 10 TABLET ORAL at 21:49

## 2022-06-16 RX ADMIN — ACETIC ACID 30 ML: 250 IRRIGANT IRRIGATION at 08:22

## 2022-06-16 RX ADMIN — FAMOTIDINE 20 MG: 20 TABLET ORAL at 08:14

## 2022-06-16 RX ADMIN — GABAPENTIN 600 MG: 600 TABLET, FILM COATED ORAL at 08:17

## 2022-06-16 RX ADMIN — SODIUM CHLORIDE, PRESERVATIVE FREE 10 ML: 5 INJECTION INTRAVENOUS at 21:49

## 2022-06-16 RX ADMIN — METOPROLOL TARTRATE 50 MG: 50 TABLET, FILM COATED ORAL at 21:49

## 2022-06-16 RX ADMIN — BACLOFEN 10 MG: 10 TABLET ORAL at 15:14

## 2022-06-16 RX ADMIN — MEROPENEM 1000 MG: 1 INJECTION, POWDER, FOR SOLUTION INTRAVENOUS at 00:50

## 2022-06-16 RX ADMIN — SODIUM CHLORIDE 1 G: 1 TABLET ORAL at 08:18

## 2022-06-16 ASSESSMENT — PAIN SCALES - GENERAL
PAINLEVEL_OUTOF10: 0

## 2022-06-16 NOTE — PLAN OF CARE
Problem: Safety - Adult  Goal: Free from fall injury  Outcome: Progressing  Note: Pt using call light appropriately to call for assistance with ambulation to the bathroom and to chair. Pt is also compliant with use of non-skid slippers. Pt reports understanding of fall prevention when discussed. Problem: Skin/Tissue Integrity  Goal: Absence of new skin breakdown  Description: 1. Monitor for areas of redness and/or skin breakdown  2. Assess vascular access sites hourly  3. Every 4-6 hours minimum:  Change oxygen saturation probe site  4. Every 4-6 hours:  If on nasal continuous positive airway pressure, respiratory therapy assess nares and determine need for appliance change or resting period.   Outcome: Progressing  Note: Assess skin Q 4 Hours      Problem: Pain  Goal: Verbalizes/displays adequate comfort level or baseline comfort level  Outcome: Progressing  Flowsheets (Taken 6/16/2022 0982)  Verbalizes/displays adequate comfort level or baseline comfort level:   Encourage patient to monitor pain and request assistance   Assess pain using appropriate pain scale   Administer analgesics based on type and severity of pain and evaluate response   Implement non-pharmacological measures as appropriate and evaluate response   Notify Licensed Independent Practitioner if interventions unsuccessful or patient reports new pain     Problem: Nutrition Deficit:  Goal: Optimize nutritional status  Outcome: Progressing  Flowsheets (Taken 6/16/2022 3632)  Nutrient intake appropriate for improving, restoring, or maintaining nutritional needs:   Assess nutritional status and recommend course of action   Monitor oral intake, labs, and treatment plans   Recommend appropriate diets, oral nutritional supplements, and vitamin/mineral supplements   Recommend, monitor, and adjust tube feedings and TPN/PPN based on assessed needs   Provide specific nutrition education to patient or family as appropriate     Problem: ABCDS Injury Assessment  Goal: Absence of physical injury  Outcome: Progressing  Flowsheets (Taken 6/16/2022 0318 by Raymond Dumont RN)  Absence of Physical Injury: Implement safety measures based on patient assessment     Problem: Discharge Planning  Goal: Discharge to home or other facility with appropriate resources  Flowsheets (Taken 6/16/2022 6444)  Discharge to home or other facility with appropriate resources:   Identify barriers to discharge with patient and caregiver   Arrange for needed discharge resources and transportation as appropriate   Identify discharge learning needs (meds, wound care, etc)   Refer to discharge planning if patient needs post-hospital services based on physician order or complex needs related to functional status, cognitive ability or social support system  Note: Came here from [facility] and will go back there

## 2022-06-16 NOTE — PROGRESS NOTES
Romain Mendoza, APRN - CNP  Urology Progress Note    Subjective: Tyree Johnson is a 61 y.o. male. s/p CYSTOSCOPY on 6/11/2022 - 6/15/2022    His/Her current Diet is: ADULT DIET; Regular  ADULT ORAL NUTRITION SUPPLEMENT; Breakfast; Standard High Calorie/High Protein Oral Supplement. Since the previous note, the patient reports the following:  No acute issues overnight. No fevers or chills. No nausea or vomiting. No chest pain or shortness of breath. No calf pain. Pain Controlled. Ambulating. Tolerating PO Diet. Unable to place stent  Will need left neph if fever, CHARLINE, pain develops  Hematuria resolved this AM, ok to restart anticoags donald if stays clear  Continue to hold bladder irrigations    Vitals and Labs:  Patient Vitals for the past 24 hrs:   BP Temp Temp src Pulse Resp SpO2   06/16/22 0800 132/71 98.6 °F (37 °C) Oral 88 16 96 %   06/16/22 0345 124/66 98.7 °F (37.1 °C) Oral 77 15 94 %   06/15/22 2345 114/62 98.8 °F (37.1 °C) Oral (!) 105 15 94 %   06/15/22 2200 (!) 143/69 98.8 °F (37.1 °C) Oral (!) 112 18 91 %   06/15/22 1456 (!) 159/69 97.3 °F (36.3 °C) Oral 71 16 96 %     I/O last 3 completed shifts: In: 6717 [P.O.:975; I.V.:300]  Out: -200     Recent Labs     06/14/22  0603 06/15/22  1235 06/16/22  0935   WBC 5.5 7.2 6.8   HGB 13.7* 13.7* 13.6*   HCT 44.1 43.9 43.3   MCV 95.9* 94.8* 93.5    159 180     Recent Labs     06/14/22  0603 06/15/22  1235    140   K 3.6 4.0    104   CO2 25 26   BUN 18 14   CREATININE 0.2* < 0.2*       No results for input(s): COLORU, PHUR, LABCAST, WBCUA, RBCUA, MUCUS, TRICHOMONAS, YEAST, BACTERIA, CLARITYU, SPECGRAV, LEUKOCYTESUR, UROBILINOGEN, Fleta Balls in the last 72 hours. Invalid input(s): NITRATE, GLUCOSEUKETONESUAMORPHOUS    Physical Exam:  No acute distress. Awake, alert and oriented. Neck is supple  Regular rate and rhythm. Normal peripheral pulses  No accessory muscles of inspiration.  Symmetric chest rise  Abdomen soft, non-tender, non-distended. No CVA tenderness. Urine is yellow  No calf pain. Minimal/no edema in bilateral lower extremities. Skin is warm, dry  Psych: mood, affect normal    Additional Lab/Culture results:     Imaging Reviewed:     TONO Green CNP independently reviewed the images and verified the radiology reports from:    XR ABDOMEN (KUB) (SINGLE AP VIEW)    Result Date: 6/12/2022  PROCEDURE: XR ABDOMEN (KUB) (SINGLE AP VIEW) CLINICAL INFORMATION: Abdominal distention w/ a colostomy. COMPARISON: KUB dated 18 April 2016. TECHNIQUE: A supine AP view of the abdomen was obtained. FINDINGS: There is a moderate amount of stool in the right colon. There are dilated small bowel loops in the right lower quadrant. . There  is no gross free air. No suspicious densities are present. There are possible changes of ankylosing spondylitis involving the lumbar spine. .     1. Moderate amount of stool in the right colon. 2. Dilated small bowel loops in the right lower quadrant. 3. Changes of ankylosing spondylitis in the lumbar spine. 4. Otherwise nonspecific abdomen. **This report has been created using voice recognition software. It may contain minor errors which are inherent in voice recognition technology. ** Final report electronically signed by DR Hernán Pereyra on 6/12/2022 8:00 AM      Impression:    Patient Active Problem List   Diagnosis    Neurogenic bladder    Multiple sclerosis ([address]    MS (multiple sclerosis) ([address]    Urinary retention    Chronic suprapubic catheter ([address]    Cystostomy malfunction ([address]    Decubitus ulcer of coccyx    Decubitus ulcer, stage 3 (HCC)    Malnutrition (HCC)    Decubitus ulcer of right perineal ischial region, stage 3 (HCC)    Pulmonary embolism on left (HCC)    Acute metabolic encephalopathy    Speech disturbance    Infected decubitus ulcer, stage I    Infected decubitus ulcer, stage III (HCC)    Wound infection    Elevated INR    Chronic osteomyelitis, pelvis, right ([address]    Osteomyelitis ([address]    Urinary tract infection without hematuria    Abnormal liver function test    Chronic depression    Essential hypertension    History of pulmonary embolism    Other dysphagia    Pressure injury of skin of back    Decubitus ulcer    Elevated transaminase level    Anemia    Sepsis due to methicillin resistant Staphylococcus aureus (MRSA) ([address]    Sepsis due to urinary tract infection ([address]    AMS (altered mental status)    Gross hematuria    Class 1 obesity due to excess calories without serious comorbidity with body mass index (BMI) of 31.0 to 31.9 in adult    Colostomy in place [facility])    Current use of long term anticoagulation       s/p CYSTOSCOPY on 6/11/2022 - 6/15/2022    Plan:  Unable to place stent  Will need left neph if fever, CHARLINE, pain develops  Hematuria resolved this AM, ok to restart anticoags donald if stays clear  Continue to hold bladder irrigations    Peña Read, TONO - CNP  10:00 AM 6/16/2022

## 2022-06-16 NOTE — PROGRESS NOTES
\"\"61year-old male patient who is a nursing home resident. Yary Ogdens to the ED b/cecause of gross hematuria from the chronic suprapubic catheter.  He was noted to be bleeding from the cystostomy site.     Patient has [facility] a neurogenic bladder.     He is on long-term anticoagulation with Xarelto for history of PE.      S/p suprapubic catheter change in the ED.     Otherwise, patient has no other complaints.  Denies any headache, no dizziness, no chest pain, no shortness of breath, no nausea vomiting.     Initial vital signs in the ED temperature 36.4 °C, respiratory 16, heart rate 69, blood pressure 131/75, oxygen saturation 97% total.     Initial labs serum sodium 136, potassium 4.7, chloride 102, CO2 22, creatinine 0.3, blood sugar 118, calcium 9.1, osmolality 275.7, albumin 3.4, alkaline phosphatase 144, ALT 15, AST 20, total bilirubin 0.2, total protein 7.5, blood sugar 118.     WBC 7.7, hemoglobin 14.7, MCV 90.2, platelets 935.     Urinalysis positive for UTI. \"     6/13: pt lying in bed, no complaints. Urine appears a bright red color. Pt denies CP/SOB/fever. Await urine cx results and adjust abx if necessary. 6/14: pt doing alright, lying in bed. Urine appears light red. Denies any fever/chills. No CP/SOB. 6/15: Patient resting in bed post cystoscopy, in no acute distress. He reports that he is doing well and denies any pain at this time. They were unable to place that during cystoscopy. Patient denies flank pain, back pain, abdominal pain, nausea, vomiting. We will continue Merrem. Subjective (past 24 hours):   6/16: Patient resting in bed in no acute distress. Patient continues to deny any pain at this time. He denies flank pain, abdominal pain, nausea, vomiting. No fevers or chills today. If patient's urine remains clear, may restart anticoagulation tomorrow. Will monitor BMP closely for any signs of renal dysfunction.      Past medical history, family history, social history and allergies reviewed again and is unchanged since admission. ROS (All review of systems completed. Pertinent positives noted. Otherwise All other systems reviewed and negative.)     Medications:  Reviewed    Infusion Medications    sodium chloride 25 mL/hr at 06/15/22 0141     Scheduled Medications    meropenem  1,000 mg IntraVENous Q8H    [Held by provider] rivaroxaban  10 mg Oral Daily    metoprolol tartrate  50 mg Oral BID    [Held by provider] acetic acid  30 mL Irrigation BID    [Held by provider] aspirin  81 mg Oral Daily    baclofen  10 mg Oral TID    calcium-cholecalciferol  1 tablet Oral BID    docusate sodium  100 mg Oral Daily    erythromycin   Right Eye Nightly    famotidine  20 mg Oral BID    gabapentin  600 mg Oral TID    oxybutynin  5 mg Oral BID    sodium chloride  1 g Oral BID WC    tiZANidine  2 mg Oral TID    sodium chloride flush  10 mL IntraVENous 2 times per day     PRN Meds: sodium chloride flush, sodium chloride, ondansetron **OR** ondansetron, polyethylene glycol, acetaminophen **OR** acetaminophen      Intake/Output Summary (Last 24 hours) at 6/16/2022 1650  Last data filed at 6/16/2022 1322  Gross per 24 hour   Intake 1330 ml   Output -1200 ml   Net 2530 ml       Diet:  ADULT DIET; Regular  ADULT ORAL NUTRITION SUPPLEMENT; Breakfast; Standard High Calorie/High Protein Oral Supplement    Exam:  /70   Pulse 84   Temp 98.2 °F (36.8 °C) (Oral)   Resp 16   Ht 5' 7\" (1.702 m)   Wt 200 lb (90.7 kg)   SpO2 95%   BMI 31.32 kg/m²   General appearance: No apparent distress, appears stated age and cooperative. HEENT: Pupils equal, round, and reactive to light. Conjunctivae/corneas clear. Neck: Supple, with full range of motion. No jugular venous distention. Trachea midline. Respiratory:  Normal respiratory effort. Clear to auscultation, bilaterally without Rales/Wheezes/Rhonchi.   Cardiovascular: Regular rate and rhythm with normal S1/S2 without murmurs, rubs or gallops. Abdomen: Soft, colostomy present, non-tender, non-distended with normal bowel sounds. Musculoskeletal: passive and active ROM x 4 extremities. Skin: Skin color, texture, turgor normal.  No rashes or lesions. Neurologic:  Neurovascularly intact without any focal sensory/motor deficits. Cranial nerves: II-XII intact, grossly non-focal.  Psychiatric: Alert and oriented, thought content appropriate, normal insight  Capillary Refill: Brisk,< 3 seconds   Peripheral Pulses: +2 palpable, equal bilaterally     Labs:   Recent Labs     06/14/22  0603 06/15/22  1235 06/16/22  0935   WBC 5.5 7.2 6.8   HGB 13.7* 13.7* 13.6*   HCT 44.1 43.9 43.3    159 180     Recent Labs     06/14/22  0603 06/15/22  1235 06/16/22  0935    140 140   K 3.6 4.0 3.7    104 103   CO2 25 26 25   BUN 18 14 15   CREATININE 0.2* < 0.2* 0.2*   CALCIUM 8.7 8.7 9.0     No results for input(s): AST, ALT, BILIDIR, BILITOT, ALKPHOS in the last 72 hours. No results for input(s): INR in the last 72 hours. No results for input(s): Destiny Shanks in the last 72 hours. Microbiology:    Blood culture #1:   Lab Results   Component Value Date    BC No growth-preliminary No growth  04/22/2022       Blood culture #2:No results found for: Frida Venegas    Organism:  Lab Results   Component Value Date    [facility] Escherichia coli 06/11/2022    ORG Providencia stuartii 06/11/2022         Lab Results   Component Value Date    LABGRAM  06/17/2019     Rare segmented neutrophils observed. Rare epithelial cells observed. Rare budding yeast and pseudohyphae observed. Rare gram negative bacilli. MRSA culture only:No results found for: [address] [place] [address]    Urine culture:   Lab Results   Component Value Date    LABURIN  04/22/2022     Current antibiotic therapy ineffective in vitro for isolate. [place]  04/22/2022     [place] count: >100,000 CFU/mL This isolate is a probable ESBL .  ID consultation may be helpful in some clinical circumstances. CONTACT isolation required. Respiratory culture: No results found for: CULTRESP    Aerobic and Anaerobic :  Lab Results   Component Value Date    LABAERO  06/17/2019     Culture also yielded heavy mixed growth of multiple enteric  gram negative bacilli, including swarming Proteus species. If a true mixed infection is suspected, then broad spectrum  empiric antibiotic therapy is indicated. At least one isolate is resistant in vitro to current  regimen. LABAERO  06/17/2019     moderate growth  This isolate is a carbapenemase . ID  consultation may be helpful in some clinical circumstances. CONTACT isolation required. Phenotypic screen test (modified Carbapenem Inactivation  Method or mCIM) for carbapenemase production is positive and  PCR is not detected; results indicate a potentially new  carbapenemase variant or novel mechanism. Carbapenemase testing performed at [address], [facility] LABAERO  06/17/2019     light growth  This is a MRSA (Methicillin Resistant Staphylococcus  aureus)isolate. Isolates of MRSA (ORSA) Methicillin (Oxacillin) Resistant  Staphylococcus aureus (coagulase positive) require patient  be placed in CONTACT isolation. Methicillin(Oxacillin)resistant strains of staphylococci  (MRSA)or(MRSE)should be considered resistant to all classes  of cephalosporins, penems and beta-lactams. In the treatment of gram positive infections, GENTAMICIN  should be CONSIDERED a SYNERGYSTIC agent ONLY. Lab Results   Component Value Date    LABANAE  06/17/2019     Culture overgrown with Proteus sp. No further evaluation of  this specimen is possible. If a true mixed aerobic and  anaerobic infection is suspected, then broad spectrum empiric  antibiotic therapy is indicated and should include coverage  for anaerobic organisms.          Urinalysis:      Lab Results   Component Value Date    NITRU POSITIVE 06/11/2022    WBCUA > 100 06/11/2022    WBCUA >200 01/20/2012    BACTERIA MANY 06/11/2022    RBCUA > 200 06/11/2022    BLOODU LARGE 06/11/2022    SPECGRAV >1.030 04/22/2022    GLUCOSEU NEGATIVE 06/11/2022       Radiology:  FLUORO FOR SURGICAL PROCEDURES   Final Result      CT UROGRAM   Final Result   1. Nonobstructing bilateral nephrolithiasis. The largest calculus measures 4 x 8 mm and is within the left renal pelvis (series 7, image 44) which is mildly dilated around the calculus however no obstructive uropathy is observed. Additional punctate    bilateral nonobstructing renal calculi are described. Simple bilateral renal cysts are observed. 2. The urinary bladder is thickened out of proportion to the degree of underdistention. Perivesicular fat stranding is again noted. Correlate with urinalysis and cystoscopy. The urinary bladder is decompressed by a suprapubic catheter and evaluation is    limited due to underdistention. 3. The left lower quadrant colostomy is stable. No bowel obstruction or pericolonic inflammation is observed. Fat stranding at the mesenteric root is unchanged and remains nonspecific. Additional stable chronic findings are discussed. **This report has been created using voice recognition software. It may contain minor errors which are inherent in voice recognition technology. **      Final report electronically signed by Dr Reynaldo Brewer on 6/14/2022 1:55 PM      XR ABDOMEN (KUB) (SINGLE AP VIEW)   Final Result   1. Moderate amount of stool in the right colon. 2. Dilated small bowel loops in the right lower quadrant. 3. Changes of ankylosing spondylitis in the lumbar spine. 4. Otherwise nonspecific abdomen. **This report has been created using voice recognition software. It may contain minor errors which are inherent in voice recognition technology. **      Final report electronically signed by DR Jhonathan Cui on 6/12/2022 8:00 AM        XR ABDOMEN (KUB) (SINGLE AP VIEW)    Result Date: 6/12/2022  PROCEDURE: XR ABDOMEN (KUB) (SINGLE AP VIEW) CLINICAL INFORMATION: Abdominal distention w/ a colostomy. COMPARISON: KUB dated 18 April 2016. TECHNIQUE: A supine AP view of the abdomen was obtained. FINDINGS: There is a moderate amount of stool in the right colon. There are dilated small bowel loops in the right lower quadrant. . There  is no gross free air. No suspicious densities are present. There are possible changes of ankylosing spondylitis involving the lumbar spine. .     1. Moderate amount of stool in the right colon. 2. Dilated small bowel loops in the right lower quadrant. 3. Changes of ankylosing spondylitis in the lumbar spine. 4. Otherwise nonspecific abdomen. **This report has been created using voice recognition software. It may contain minor errors which are inherent in voice recognition technology. ** Final report electronically signed by DR Gerson Medel on 6/12/2022 8:00 AM      Electronically signed by Kwasi Mccartney PA-C on 6/16/2022 at 4:50 PM

## 2022-06-16 NOTE — CARE COORDINATION
6/16/22, 7:03 AM EDT    DISCHARGE ON GOING EVALUATION    [address] day: 5  Location: [facility]26/026-A Reason for admit: Gross hematuria [R31.0]  Anticoagulant long-term use [Z79.01]  Hematuria, gross [R31.0]   Procedure:   6/12 KUB: Moderate amount of stool in the right colon.  Dilated small bowel loops in the right lower quadrant. Changes of ankylosing spondylitis in the lumbar spine. Otherwise nonspecific abdomen. 6/15 Cysto, attempted left stent placement by Dr. Cedrick Slade. Barriers to Discharge: Hospitalist and Urology following. [address] jeffers with CBI. Urine culture growing gram negative bacilli. Acetic acid irrigation bid. Merrem iv q8hr.      PCP: Maine Navarrete MD  Readmission Risk Score: 16.4 ( )%  Patient Goals/Plan/Treatment Preferences: Pt is from [facility].  SW following for return.

## 2022-06-17 LAB
ANION GAP SERPL CALCULATED.3IONS-SCNC: 12 MEQ/L (ref 8–16)
BUN BLDV-MCNC: 14 MG/DL (ref 7–22)
CALCIUM SERPL-MCNC: 8.8 MG/DL (ref 8.5–10.5)
CHLORIDE BLD-SCNC: 102 MEQ/L (ref 98–111)
CO2: 24 MEQ/L (ref 23–33)
CREAT SERPL-MCNC: < 0.2 MG/DL (ref 0.4–1.2)
ERYTHROCYTE [DISTWIDTH] IN BLOOD BY AUTOMATED COUNT: 15.9 % (ref 11.5–14.5)
ERYTHROCYTE [DISTWIDTH] IN BLOOD BY AUTOMATED COUNT: 55.8 FL (ref 35–45)
GFR SERPL CREATININE-BSD FRML MDRD: > 90 ML/MIN/1.73M2
GLUCOSE BLD-MCNC: 110 MG/DL (ref 70–108)
HCT VFR BLD CALC: 41.3 % (ref 42–52)
HEMOGLOBIN: 13.1 GM/DL (ref 14–18)
MCH RBC QN AUTO: 30.1 PG (ref 26–33)
MCHC RBC AUTO-ENTMCNC: 31.7 GM/DL (ref 32.2–35.5)
MCV RBC AUTO: 94.9 FL (ref 80–94)
PLATELET # BLD: 173 THOU/MM3 (ref 130–400)
PMV BLD AUTO: 8.6 FL (ref 9.4–12.4)
POTASSIUM SERPL-SCNC: 3.8 MEQ/L (ref 3.5–5.2)
RBC # BLD: 4.35 MILL/MM3 (ref 4.7–6.1)
SODIUM BLD-SCNC: 138 MEQ/L (ref 135–145)
WBC # BLD: 10.3 THOU/MM3 (ref 4.8–10.8)

## 2022-06-17 PROCEDURE — 6360000002 HC RX W HCPCS: Performed by: UROLOGY

## 2022-06-17 PROCEDURE — 51702 INSERT TEMP BLADDER CATH: CPT

## 2022-06-17 PROCEDURE — 2580000003 HC RX 258: Performed by: INTERNAL MEDICINE

## 2022-06-17 PROCEDURE — 6370000000 HC RX 637 (ALT 250 FOR IP): Performed by: PHYSICIAN ASSISTANT

## 2022-06-17 PROCEDURE — 80048 BASIC METABOLIC PNL TOTAL CA: CPT

## 2022-06-17 PROCEDURE — 85027 COMPLETE CBC AUTOMATED: CPT

## 2022-06-17 PROCEDURE — 2580000003 HC RX 258: Performed by: UROLOGY

## 2022-06-17 PROCEDURE — 36415 COLL VENOUS BLD VENIPUNCTURE: CPT

## 2022-06-17 PROCEDURE — 6370000000 HC RX 637 (ALT 250 FOR IP): Performed by: OPHTHALMOLOGY

## 2022-06-17 PROCEDURE — 99232 SBSQ HOSP IP/OBS MODERATE 35: CPT | Performed by: UROLOGY

## 2022-06-17 PROCEDURE — 6370000000 HC RX 637 (ALT 250 FOR IP): Performed by: INTERNAL MEDICINE

## 2022-06-17 PROCEDURE — 1200000000 HC SEMI PRIVATE

## 2022-06-17 PROCEDURE — 99232 SBSQ HOSP IP/OBS MODERATE 35: CPT

## 2022-06-17 RX ADMIN — RIVAROXABAN 10 MG: 10 TABLET, FILM COATED ORAL at 17:30

## 2022-06-17 RX ADMIN — GABAPENTIN 600 MG: 600 TABLET, FILM COATED ORAL at 09:14

## 2022-06-17 RX ADMIN — MEROPENEM 1000 MG: 1 INJECTION, POWDER, FOR SOLUTION INTRAVENOUS at 01:14

## 2022-06-17 RX ADMIN — SODIUM CHLORIDE 1 G: 1 TABLET ORAL at 17:31

## 2022-06-17 RX ADMIN — SODIUM CHLORIDE 1 G: 1 TABLET ORAL at 09:15

## 2022-06-17 RX ADMIN — Medication 1 TABLET: at 20:52

## 2022-06-17 RX ADMIN — POLYETHYLENE GLYCOL 3350 17 G: 17 POWDER, FOR SOLUTION ORAL at 09:13

## 2022-06-17 RX ADMIN — OXYBUTYNIN CHLORIDE 5 MG: 5 TABLET, EXTENDED RELEASE ORAL at 09:13

## 2022-06-17 RX ADMIN — SODIUM CHLORIDE, PRESERVATIVE FREE 10 ML: 5 INJECTION INTRAVENOUS at 09:16

## 2022-06-17 RX ADMIN — SODIUM CHLORIDE, PRESERVATIVE FREE 10 ML: 5 INJECTION INTRAVENOUS at 20:51

## 2022-06-17 RX ADMIN — BACLOFEN 10 MG: 10 TABLET ORAL at 09:14

## 2022-06-17 RX ADMIN — GABAPENTIN 600 MG: 600 TABLET, FILM COATED ORAL at 20:52

## 2022-06-17 RX ADMIN — FAMOTIDINE 20 MG: 20 TABLET ORAL at 09:09

## 2022-06-17 RX ADMIN — OXYBUTYNIN CHLORIDE 5 MG: 5 TABLET, EXTENDED RELEASE ORAL at 20:51

## 2022-06-17 RX ADMIN — TIZANIDINE 2 MG: 4 TABLET ORAL at 14:06

## 2022-06-17 RX ADMIN — FAMOTIDINE 20 MG: 20 TABLET ORAL at 20:52

## 2022-06-17 RX ADMIN — TIZANIDINE 2 MG: 4 TABLET ORAL at 09:13

## 2022-06-17 RX ADMIN — DOCUSATE SODIUM 100 MG: 100 CAPSULE, LIQUID FILLED ORAL at 09:09

## 2022-06-17 RX ADMIN — BACLOFEN 10 MG: 10 TABLET ORAL at 14:07

## 2022-06-17 RX ADMIN — METOPROLOL TARTRATE 50 MG: 50 TABLET, FILM COATED ORAL at 09:14

## 2022-06-17 RX ADMIN — METOPROLOL TARTRATE 50 MG: 50 TABLET, FILM COATED ORAL at 20:51

## 2022-06-17 RX ADMIN — ERYTHROMYCIN: 5 OINTMENT OPHTHALMIC at 20:52

## 2022-06-17 RX ADMIN — Medication 1 TABLET: at 09:14

## 2022-06-17 RX ADMIN — GABAPENTIN 600 MG: 600 TABLET, FILM COATED ORAL at 14:05

## 2022-06-17 RX ADMIN — TIZANIDINE 2 MG: 4 TABLET ORAL at 20:51

## 2022-06-17 RX ADMIN — BACLOFEN 10 MG: 10 TABLET ORAL at 20:51

## 2022-06-17 ASSESSMENT — PAIN SCALES - GENERAL
PAINLEVEL_OUTOF10: 0

## 2022-06-17 NOTE — PROGRESS NOTES
Monica Smith, TONO - CNP  Urology Progress Note    Subjective: Era Median is a 61 y.o. male. s/p CYSTOSCOPY on 6/11/2022 - 6/15/2022    His/Her current Diet is: ADULT DIET; Regular  ADULT ORAL NUTRITION SUPPLEMENT; Breakfast; Standard High Calorie/High Protein Oral Supplement. Since the previous note, the patient reports the following:  No acute issues overnight. No fevers or chills. No nausea or vomiting. No chest pain or shortness of breath. No calf pain. Pain Controlled. Ambulating. Tolerating PO Diet. Unable to place stent  Will need left neph if fever, CHARLINE, pain develops  OK to restart anticoags  Urine is yellow this AM  Discussed with nurse, clamp urethral jeffers, unplug SPT, place jeffers bag to drainage on SPT port  Continue to hold bladder irrigations    Vitals and Labs:  Patient Vitals for the past 24 hrs:   BP Temp Temp src Pulse Resp SpO2   06/17/22 0913 129/63 -- -- (!) 110 -- --   06/17/22 0800 129/63 97.9 °F (36.6 °C) Oral (!) 110 18 94 %   06/17/22 0516 115/64 98.6 °F (37 °C) Oral 94 18 92 %   06/17/22 0100 109/63 99 °F (37.2 °C) Oral 91 16 93 %   06/16/22 2140 (!) 146/73 100.4 °F (38 °C) Oral (!) 105 16 93 %   06/16/22 1546 130/70 98 °F (36.7 °C) Oral 82 16 93 %   06/16/22 1215 128/70 98.2 °F (36.8 °C) Oral 84 16 95 %     I/O last 3 completed shifts: In: 2129 [P.O.:1780; I.V.:10]  Out: -500     Recent Labs     06/15/22  1235 06/16/22  0935 06/17/22  0600   WBC 7.2 6.8 10.3   HGB 13.7* 13.6* 13.1*   HCT 43.9 43.3 41.3*   MCV 94.8* 93.5 94.9*    180 173     Recent Labs     06/15/22  1235 06/16/22  0935 06/17/22  0600    140 138   K 4.0 3.7 3.8    103 102   CO2 26 25 24   BUN 14 15 14   CREATININE < 0.2* 0.2* < 0.2*       No results for input(s): COLORU, PHUR, LABCAST, WBCUA, RBCUA, MUCUS, TRICHOMONAS, YEAST, BACTERIA, CLARITYU, SPECGRAV, LEUKOCYTESUR, UROBILINOGEN, Criss Hem in the last 72 hours.     Invalid input(s): NITRATE, GLUCOSEUKETONESUAMORPHOUS    Physical Exam:  No acute distress. Awake, alert and oriented. Neck is supple  Regular rate and rhythm. Normal peripheral pulses  No accessory muscles of inspiration. Symmetric chest rise  Abdomen soft, non-tender, non-distended. No CVA tenderness. Urine is yellow draining from urethral jeffers, spt plugged  No calf pain. Minimal/no edema in bilateral lower extremities. Skin is warm, dry  Psych: mood, affect normal    Additional Lab/Culture results:     Imaging Reviewed:     TONO Bonds CNP independently reviewed the images and verified the radiology reports from:    XR ABDOMEN (KUB) (SINGLE AP VIEW)    Result Date: 6/12/2022  PROCEDURE: XR ABDOMEN (KUB) (SINGLE AP VIEW) CLINICAL INFORMATION: Abdominal distention w/ a colostomy. COMPARISON: KUB dated 18 April 2016. TECHNIQUE: A supine AP view of the abdomen was obtained. FINDINGS: There is a moderate amount of stool in the right colon. There are dilated small bowel loops in the right lower quadrant. . There  is no gross free air. No suspicious densities are present. There are possible changes of ankylosing spondylitis involving the lumbar spine. .     1. Moderate amount of stool in the right colon. 2. Dilated small bowel loops in the right lower quadrant. 3. Changes of ankylosing spondylitis in the lumbar spine. 4. Otherwise nonspecific abdomen. **This report has been created using voice recognition software. It may contain minor errors which are inherent in voice recognition technology. ** Final report electronically signed by DR April Whitaker on 6/12/2022 8:00 AM      Impression:    Patient Active Problem List   Diagnosis    Neurogenic bladder    Multiple sclerosis ([address]    MS (multiple sclerosis) ([address]    Urinary retention    Chronic suprapubic catheter ([address]    Cystostomy malfunction ([address]    Decubitus ulcer of coccyx    Decubitus ulcer, stage 3 (HCC)    Malnutrition ([address]    Decubitus ulcer of right perineal ischial region, stage 3 ([address]    Pulmonary embolism on left (HCC)    Acute metabolic encephalopathy    Speech disturbance    Infected decubitus ulcer, stage I    Infected decubitus ulcer, stage III (HCC)    Wound infection    Elevated INR    Chronic osteomyelitis, pelvis, right (HCC)    Osteomyelitis (HCC)    Urinary tract infection without hematuria    Abnormal liver function test    Chronic depression    Essential hypertension    History of pulmonary embolism    Other dysphagia    Pressure injury of skin of back    Decubitus ulcer    Elevated transaminase level    Anemia    Sepsis due to methicillin resistant Staphylococcus aureus (MRSA) ([address]    Sepsis due to urinary tract infection ([address]    AMS (altered mental status)    Gross hematuria    Class 1 obesity due to excess calories without serious comorbidity with body mass index (BMI) of 31.0 to 31.9 in adult    Col[facility] in place [facility])    Current use of long term anticoagulation       s/p CYSTOSCOPY on 6/11/2022 - 6/15/2022    Plan:  Unable to place stent, has left renal pelvis stone.   Will check KUB as outpatient in 2 months  Will need left neph if fever, CHARLINE, pain develops  OK to restart anticoags  Urine is yellow this AM  Discussed with nurse, clamp urethral jeffers, unplug SPT, place jeffers bag to drainage on SPT port  Continue to hold bladder irrigations    TONO Juares CNP  9:46 AM 6/17/2022

## 2022-06-17 NOTE — PROGRESS NOTES
Physician Progress Note      PATIENT:               Deepti Jewell  CSN #:                  [id number]  :                       1955  ADMIT DATE:       2022 2:05 PM  [address] [place] DATE:  RESPONDING  PROVIDER #:        Kaity Rivas          QUERY TEXT:    Pt admitted with UTI. Pt noted to have suprapubic catheter. If possible,   please document in the progress notes and discharge summary if you are   evaluating and/or treating any of the following: The medical record reflects the following:    Risk Factors: Suprapubic catheter in place, MS  Clinical Indicators: UA many bacteria, moderate leukocytosis, + nitrite, UC   E. Coli  Treatment: IV Meropenem    Thank you! Jimmy Aguero, UYENR  RN Clinical   P: [phone]  Options provided:  -- UTI due to suprapubic catheter POA  -- UTI not due to suprapubic catheter  -- Other - I will add my own diagnosis  -- Disagree - Not applicable / Not valid  -- Disagree - Clinically unable to determine / Unknown  -- Refer to Clinical Documentation Reviewer    PROVIDER RESPONSE TEXT:    UTI is due to suprapubic catheter POA.     Query created by: Bette Fink on 2022 12:05 PM      Electronically signed by:  Kaity Rivas 2022 12:53 PM

## 2022-06-17 NOTE — PROGRESS NOTES
Comprehensive Nutrition Assessment    Type and Reason for Visit:  Reassess    Nutrition Recommendations/Plan:   1. Continue current diet and ONS  2. Consider adding daily MVI     Malnutrition Assessment:  Malnutrition Status:  No malnutrition (06/13/22 1619)    Context:  Acute Illness     Findings of the 6 clinical characteristics of malnutrition:  Energy Intake:  Unable to assess (PO intake documented at 0-50%)  Weight Loss:  No significant weight loss (per EMR)     Body Fat Loss:  No significant body fat loss     Muscle Mass Loss:  No significant muscle mass loss    Fluid Accumulation:  No significant fluid accumulation     Strength:  Not Performed    Nutrition Assessment:      Pt. nutritionally compromised AEB wounds. At risk for further nutrition compromise r/t increased nutrient needs for wound healing (stage 3), Admit with gross hematuria, UTI, acute chronic normocytic anemia underlying medical condition (Hx: PE, blood clots, MS, HTN, depresson). Nutrition Related Findings:    Pt. Report/Treatments/Miscellaneous: Documented intake %; denies any vomiting; Starting anticoagulation today per PA; continueing to monitor PO intake/appetite and weight. GI Status: BM 6/15  Pertinent Labs: BUN 14, Creatinine <0.2, Glucose 110, Hgb 13.1  Pertinent Meds: Colace, Lovenox, Romycin, Pepcid, Lopressor    Wound Type: Stage IV (right buttock)  Wound ostomy document as healing stage three     Current Nutrition Intake & Therapies:    Average Meal Intake: %  Average Supplements Intake: Unable to assess  ADULT DIET; Regular  ADULT ORAL NUTRITION SUPPLEMENT; Breakfast; Standard High Calorie/High Protein Oral Supplement    Anthropometric Measures:  Height: 5' 7\" (170.2 cm)  Ideal Body Weight (IBW): 148 lbs (67 kg)    Admission Body Weight: 200 lb (90.7 kg)  Current Body Weight: 200 lb (90.7 kg) (no edema), 135.1 % IBW.  Weight Source: Stated  Current BMI (kg/m2): 31.3        Weight Adjustment For: No

## 2022-06-17 NOTE — PROGRESS NOTES
Problem: Safety - Adult  Goal: Free from fall injury  Outcome: Progressing  Note: Pt using call light appropriately to call for assistance with ambulation to the bathroom and to chair. Pt is also compliant with use of non-skid slippers. Pt reports understanding of fall prevention when discussed. Problem: Skin/Tissue Integrity  Goal: Absence of new skin breakdown  Description: 1. Monitor for areas of redness and/or skin breakdown  2. Assess vascular access sites hourly  3. Every 4-6 hours minimum:  Change oxygen saturation probe site  4. Every 4-6 hours:  If on nasal continuous positive airway pressure, respiratory therapy assess nares and determine need for appliance change or resting period.   Outcome: Progressing  Note: Assess skin Q 4 Hours      Problem: Pain  Goal: Verbalizes/displays adequate comfort level or baseline comfort level  Outcome: Progressing  Flowsheets (Taken 6/17/2022 6631)  Verbalizes/displays adequate comfort level or baseline comfort level:   Encourage patient to monitor pain and request assistance   Assess pain using appropriate pain scale   Administer analgesics based on type and severity of pain and evaluate response   Implement non-pharmacological measures as appropriate and evaluate response   Notify Licensed Independent Practitioner if interventions unsuccessful or patient reports new pain     Problem: Nutrition Deficit:  Goal: Optimize nutritional status  Outcome: Progressing  Flowsheets (Taken 6/17/2022 2523)  Nutrient intake appropriate for improving, restoring, or maintaining nutritional needs:   Assess nutritional status and recommend course of action   Monitor oral intake, labs, and treatment plans   Recommend appropriate diets, oral nutritional supplements, and vitamin/mineral supplements   Recommend, monitor, and adjust tube feedings and TPN/PPN based on assessed needs   Provide specific nutrition education to patient or family as appropriate     Problem: ABCDS Injury Assessment  Goal: Absence of physical injury  Outcome: Progressing  Flowsheets (Taken 6/17/2022 0318 by Nelly Tavera RN)  Absence of Physical Injury: Implement safety measures based on patient assessment     Problem: Discharge Planning  Goal: Discharge to home or other facility with appropriate resources  Flowsheets (Taken 6/17/2022 1570)  Discharge to home or other facility with appropriate resources:   Identify barriers to discharge with patient and caregiver   Arrange for needed discharge resources and transportation as appropriate   Identify discharge learning needs (meds, wound care, etc)   Refer to discharge planning if patient needs post-hospital services based on physician order or complex needs related to functional status, cognitive ability or social support system  Note: Came here from [facility] and will go back there

## 2022-06-17 NOTE — PLAN OF CARE
Problem: Discharge Planning  Goal: Discharge to home or other facility with appropriate resources  6/17/2022 0034 by Liliane Naidu RN  Outcome: Progressing  Flowsheets  Taken 6/17/2022 0034  Discharge to home or other facility with appropriate resources:   Arrange for needed discharge resources and transportation as appropriate   Identify barriers to discharge with patient and caregiver   Identify discharge learning needs (meds, wound care, etc)  Taken 6/16/2022 2140  Discharge to home or other facility with appropriate resources: Identify barriers to discharge with patient and caregiver  6/16/2022 1835 by Bryant Judge RN  Flowsheets (Taken 6/16/2022 1835)  Discharge to home or other facility with appropriate resources:   Identify barriers to discharge with patient and caregiver   Arrange for needed discharge resources and transportation as appropriate   Identify discharge learning needs (meds, wound care, etc)   Refer to discharge planning if patient needs post-hospital services based on physician order or complex needs related to functional status, cognitive ability or social support system  Note: Came here from [facility] and will go back there     Problem: Safety - Adult  Goal: Free from fall injury  6/17/2022 0034 by Liliane Naidu RN  Outcome: Progressing  Flowsheets  Taken 6/17/2022 0034 by Liliane Naidu RN  Free From Fall Injury:   Instruct family/caregiver on patient safety   Based on caregiver fall risk screen, instruct family/caregiver to ask for assistance with transferring infant if caregiver noted to have fall risk factors  Taken 6/16/2022 2343 by Sadiq Mai LPN  Free From Fall Injury: Instruct family/caregiver on patient safety  6/16/2022 [address] by Bryant Judge RN  Outcome: Progressing  Note: Pt using call light appropriately to call for assistance with ambulation to the bathroom and to chair. Pt is also compliant with use of non-skid slippers.  Pt reports understanding of fall prevention when discussed. Problem: Skin/Tissue Integrity  Goal: Absence of new skin breakdown  Description: 1. Monitor for areas of redness and/or skin breakdown  2. Assess vascular access sites hourly  3. Every 4-6 hours minimum:  Change oxygen saturation probe site  4. Every 4-6 hours:  If on nasal continuous positive airway pressure, respiratory therapy assess nares and determine need for appliance change or resting period. 6/17/2022 0034 by Desiree Castillo RN  Outcome: Progressing  Note: No new skin breakdown noted at this time.   6/16/2022 1835 by Felizardo Jeans, RN  Outcome: Progressing  Note: Assess skin Q 4 Hours      Problem: Pain  Goal: Verbalizes/displays adequate comfort level or baseline comfort level  6/17/2022 0034 by Desiree Castillo RN  Outcome: Progressing  Flowsheets (Taken 6/17/2022 0034)  Verbalizes/displays adequate comfort level or baseline comfort level:   Encourage patient to monitor pain and request assistance   Assess pain using appropriate pain scale   Administer analgesics based on type and severity of pain and evaluate response   Implement non-pharmacological measures as appropriate and evaluate response  6/16/2022 1835 by Felizardo Jeans, RN  Outcome: Progressing  Flowsheets (Taken 6/16/2022 1835)  Verbalizes/displays adequate comfort level or baseline comfort level:   Encourage patient to monitor pain and request assistance   Assess pain using appropriate pain scale   Administer analgesics based on type and severity of pain and evaluate response   Implement non-pharmacological measures as appropriate and evaluate response   Notify Licensed Independent Practitioner if interventions unsuccessful or patient reports new pain     Problem: Nutrition Deficit:  Goal: Optimize nutritional status  6/17/2022 0034 by Desiree Castillo RN  Outcome: Progressing  Flowsheets (Taken 6/17/2022 0034)  Nutrient intake appropriate for improving, restoring, or maintaining nutritional needs:   Assess nutritional status and recommend course of action   Recommend appropriate diets, oral nutritional supplements, and vitamin/mineral supplements   Order, calculate, and assess calorie counts as needed   Monitor oral intake, labs, and treatment plans  6/16/2022 1836 by Ignacia Clark RN  Outcome: Progressing  Flowsheets (Taken 6/16/2022 1836)  Nutrient intake appropriate for improving, restoring, or maintaining nutritional needs:   Assess nutritional status and recommend course of action   Monitor oral intake, labs, and treatment plans   Recommend appropriate diets, oral nutritional supplements, and vitamin/mineral supplements   Recommend, monitor, and adjust tube feedings and TPN/PPN based on assessed needs   Provide specific nutrition education to patient or family as appropriate     Problem: ABCDS Injury Assessment  Goal: Absence of physical injury  6/17/2022 0034 by Igor Martinez RN  Outcome: Progressing  [address] (Taken 6/16/2022 2343 by Keshawn Sanabria LPN)  Absence of Physical Injury: Implement safety measures based on patient assessment  6/16/2022 1836 by Ignacia Clark RN  Outcome: Progressing  Flowsheets (Taken 6/16/2022 0318 by Igor Martinez RN)  Absence of Physical Injury: Implement safety measures based on patient assessment     Care plan reviewed with patient. Patient verbalizes understanding of the plan of care and contributes to goal setting.

## 2022-06-17 NOTE — CARE COORDINATION
6/17/22, 4:40 PM EDT    DISCHARGE ON GOING EVALUATION    [facility] day: 6  Location: [facility]26/026-A Reason for admit: Gross hematuria [R31.0]  Anticoagulant long-term use [Z79.01]  Hematuria, gross [R31.0]   Procedure:   6/12 KUB: Moderate amount of stool in the right colon.  Dilated small bowel loops in the right lower quadrant. Changes of ankylosing spondylitis in the lumbar spine. Otherwise nonspecific abdomen. 6/15 Cysto, attempted left stent placement by Dr. Boyd Rincon.      Barriers to Discharge: clamp off CBI, suprapubic catheter draining clear urine, Merrem IV, Urine culture: gram negative bacilli. Restart [address] [place] [address], Urology and hospitalist.  PCP: Jarrett Mar MD  Readmission Risk Score: 16.5 ( )%  Patient Goals/Plan/Treatment Preferences: Pt plans return to [facility] with SW following.

## 2022-06-17 NOTE — PROGRESS NOTES
Hospitalist Progress Note      Patient:  Girish Power    Unit/Bed:7K-26/026-A  YOB: 1955  MRN: [id number]   Acct: [de-identified]   PCP: Jean Dao MD  Date of Admission: 6/11/2022    Assessment/Plan:    1. Nonobstructing bilateral nephrolithiasis:  · Initially felt likely traumatic s/p SPT change, however with persistent hematuria. Urology consulted and following. Urethral jeffers placed 06/14 with plan for continued irrigation. CT urogram obtained 6/14 reveling bilateral stones. Urology consulted on admission and following. · Patient underwent cystoscopy 06/15, unable to place stent per urology. Dilcia Silvan was initiated on 06/14 empirically as patient with history of ESBL, urine culture revealing E. Coli. Dilcia Silvan stopped today per urology. Patient's renal function remained stable, no fevers, or pain. Urine remains clear, will restart anticoagulants today. Continue holding bladder irrigations. 2. UTI in setting of SPT:   · UA positive for nitrates, moderate LEs, many bacteria noted. Urine culture returned growing gram-negative bacilli, patient with history of ESBL. It was suspected to be colonization. Patient afebrile, no leukocytosis on admission. Due to persistent hematuria Merrem was initiated on 06/14, stopped today per urology. 3. Acute on chronic normocytic anemia:  · Likely some mild blood loss contributing from #1. Hemoglobin remained stable, will continue to monitor. No signs of gross bleeding on examination, transfuse for hemoglobin less than 7.  4. History of PE:  · On Xarelto, currently held in light of #1.   Hematuria resolved this a.m., if patient's urine stays clear will restart anticoagulation tomorrow, 06/17.  5. History of MS/neurogenic bladder:  · Noted    Chief Complaint: Gross hematuria x1 day    Initial H and P:-    Per chart review: \"\"61year-old male patient who is a nursing home resident. Demetrice Luevano to the ED alexa/henrique of gross hematuria from the chronic suprapubic catheter.  He was noted to be bleeding from the cystostomy site.     Patient has [facility] a neurogenic bladder.     He is on long-term anticoagulation with Xarelto for history of PE.      S/p suprapubic catheter change in the ED.     Otherwise, patient has no other complaints.  Denies any headache, no dizziness, no chest pain, no shortness of breath, no nausea vomiting.     Initial vital signs in the ED temperature 36.4 °C, respiratory 16, heart rate 69, blood pressure 131/75, oxygen saturation 97% total.     Initial labs serum sodium 136, potassium 4.7, chloride 102, CO2 22, creatinine 0.3, blood sugar 118, calcium 9.1, osmolality 275.7, albumin 3.4, alkaline phosphatase 144, ALT 15, AST 20, total bilirubin 0.2, total protein 7.5, blood sugar 118.     WBC 7.7, hemoglobin 14.7, MCV 90.2, platelets 025.     Urinalysis positive for UTI. \"     6/13: pt lying in bed, no complaints. Urine appears a bright red color. Pt denies CP/SOB/fever. Await urine cx results and adjust abx if necessary. 6/14: pt doing alright, lying in bed. Urine appears light red. Denies any fever/chills. No CP/SOB. 6/15: Patient resting in bed post cystoscopy, in no acute distress. He reports that he is doing well and denies any pain at this time. They were unable to place that during cystoscopy. Patient denies flank pain, back pain, abdominal pain, nausea, vomiting. We will continue Merrem. 6/16: Patient resting in bed in no acute distress. Patient continues to deny any pain at this time. He denies flank pain, abdominal pain, nausea, vomiting. No fevers or chills today. If patient's urine remains clear, may restart anticoagulation tomorrow. Will monitor BMP closely for any signs of renal dysfunction. Subjective (past 24 hours):   6/17: Patient resting in bed no acute distress, no acute events overnight. Patient continues to deny flank pain, abdominal pain, nausea, vomiting. Rales/Wheezes/Rhonchi. Cardiovascular: Regular rate and rhythm with normal S1/S2 without murmurs, rubs or gallops. Abdomen: Soft, colostomy present, non-tender, non-distended with normal bowel sounds. Musculoskeletal: passive and active ROM x 4 extremities. Skin: Skin color, texture, turgor normal.  No rashes or lesions. Neurologic:  Neurovascularly intact without any focal sensory/motor deficits. Cranial nerves: II-XII intact, grossly non-focal.  Psychiatric: Alert and oriented, thought content appropriate, normal insight  Capillary Refill: Brisk,< 3 seconds   Peripheral Pulses: +2 palpable, equal bilaterally     Labs:   Recent Labs     06/15/22  1235 06/16/22  0935 06/17/22  0600   WBC 7.2 6.8 10.3   HGB 13.7* 13.6* 13.1*   HCT 43.9 43.3 41.3*    180 173     Recent Labs     06/15/22  1235 06/16/22  0935 06/17/22  0600    140 138   K 4.0 3.7 3.8    103 102   CO2 26 25 24   BUN 14 15 14   CREATININE < 0.2* 0.2* < 0.2*   CALCIUM 8.7 9.0 8.8     No results for input(s): AST, ALT, BILIDIR, BILITOT, ALKPHOS in the last 72 hours. No results for input(s): INR in the last 72 hours. No results for input(s): Curlie Lat in the last 72 hours. Microbiology:    Blood culture #1:   Lab Results   Component Value Date    BC No growth-preliminary No growth  04/22/2022       Blood culture #2:No results found for: Cee Ho    Organism:  Lab Results   Component Value Date    [facility] Escherichia coli 06/11/2022    ORG Providencia stuartii 06/11/2022         Lab Results   Component Value Date    LABGRAM  06/17/2019     Rare segmented neutrophils observed. Rare epithelial cells observed. Rare budding yeast and pseudohyphae observed. Rare gram negative bacilli. MRSA culture only:No results found for: [facility]    Urine culture:   Lab Results   Component Value Date    LABURIN  04/22/2022     Current antibiotic therapy ineffective in vitro for isolate.      LABURIN  04/22/2022     [place] count: >100,000 CFU/mL This isolate is a probable ESBL . ID consultation may be helpful in some clinical circumstances. CONTACT isolation required. Respiratory culture: No results found for: CULTRESP    Aerobic and Anaerobic :  Lab Results   Component Value Date    LABAERO  06/17/2019     Culture also yielded heavy mixed growth of multiple enteric  gram negative bacilli, including swarming Proteus species. If a true mixed infection is suspected, then broad spectrum  empiric antibiotic therapy is indicated. At least one isolate is resistant in vitro to current  regimen. LABAERO  06/17/2019     moderate growth  This isolate is a carbapenemase . ID  consultation may be helpful in some clinical circumstances. CONTACT isolation required. Phenotypic screen test (modified Carbapenem Inactivation  Method or mCIM) for carbapenemase production is positive and  PCR is not detected; results indicate a potentially new  carbapenemase variant or novel mechanism. Carbapenemase testing performed at [address], [facility], [address] LABAERO  06/17/2019     light growth  This is a MRSA (Methicillin Resistant Staphylococcus  aureus)isolate. Isolates of MRSA (ORSA) Methicillin (Oxacillin) Resistant  Staphylococcus aureus (coagulase positive) require patient  be placed in CONTACT isolation. Methicillin(Oxacillin)resistant strains of staphylococci  (MRSA)or(MRSE)should be considered resistant to all classes  of cephalosporins, penems and beta-lactams. In the treatment of gram positive infections, GENTAMICIN  should be CONSIDERED a SYNERGYSTIC agent ONLY. Lab Results   Component Value Date    LABANAE  06/17/2019     Culture overgrown with Proteus sp. No further evaluation of  this specimen is possible. If a true mixed aerobic and  anaerobic infection is suspected, then broad spectrum empiric  antibiotic therapy is indicated and should include coverage  for anaerobic organisms.          Urinalysis: Lab Results   Component Value Date    NITRU POSITIVE 06/11/2022    WBCUA > 100 06/11/2022    WBCUA >200 01/20/2012    BACTERIA MANY 06/11/2022    RBCUA > 200 06/11/2022    BLOODU LARGE 06/11/2022    SPECGRAV >1.030 04/22/2022    GLUCOSEU NEGATIVE 06/11/2022       Radiology:  FLUORO FOR SURGICAL PROCEDURES   Final Result      CT UROGRAM   Final Result   1. Nonobstructing bilateral nephrolithiasis. The largest calculus measures 4 x 8 mm and is within the left renal pelvis (series 7, image 44) which is mildly dilated around the calculus however no obstructive uropathy is observed. Additional punctate    bilateral nonobstructing renal calculi are described. Simple bilateral renal cysts are observed. 2. The urinary bladder is thickened out of proportion to the degree of underdistention. Perivesicular fat stranding is again noted. Correlate with urinalysis and cystoscopy. The urinary bladder is decompressed by a suprapubic catheter and evaluation is    limited due to underdistention. 3. The left lower quadrant colostomy is stable. No bowel obstruction or pericolonic inflammation is observed. Fat stranding at the mesenteric root is unchanged and remains nonspecific. Additional stable chronic findings are discussed. **This report has been created using voice recognition software. It may contain minor errors which are inherent in voice recognition technology. **      Final report electronically signed by Dr Edenilson Amaya on 6/14/2022 1:55 PM      XR ABDOMEN (KUB) (SINGLE AP VIEW)   Final Result   1. Moderate amount of stool in the right colon. 2. Dilated small bowel loops in the right lower quadrant. 3. Changes of ankylosing spondylitis in the lumbar spine. 4. Otherwise nonspecific abdomen. **This report has been created using voice recognition software. It may contain minor errors which are inherent in voice recognition technology. **      Final report electronically signed by  Juana Jones on 6/12/2022 8:00 AM        XR ABDOMEN (KUB) (SINGLE AP VIEW)    Result Date: 6/12/2022  PROCEDURE: XR ABDOMEN (KUB) (SINGLE AP VIEW) CLINICAL INFORMATION: Abdominal distention w/ a colostomy. COMPARISON: KUB dated 18 April 2016. TECHNIQUE: A supine AP view of the abdomen was obtained. FINDINGS: There is a moderate amount of stool in the right colon. There are dilated small bowel loops in the right lower quadrant. . There  is no gross free air. No suspicious densities are present. There are possible changes of ankylosing spondylitis involving the lumbar spine. .     1. Moderate amount of stool in the right colon. 2. Dilated small bowel loops in the right lower quadrant. 3. Changes of ankylosing spondylitis in the lumbar spine. 4. Otherwise nonspecific abdomen. **This report has been created using voice recognition software. It may contain minor errors which are inherent in voice recognition technology. ** Final report electronically signed by DR Juana Jones on 6/12/2022 8:00 AM      Electronically signed by Consuela Lanes, PA-C on 6/17/2022 at 1:19 PM

## 2022-06-18 ENCOUNTER — TELEPHONE (OUTPATIENT)
Dept: UROLOGY | Age: 67
End: 2022-06-18

## 2022-06-18 VITALS
SYSTOLIC BLOOD PRESSURE: 124 MMHG | DIASTOLIC BLOOD PRESSURE: 58 MMHG | OXYGEN SATURATION: 92 % | HEART RATE: 97 BPM | HEIGHT: 67 IN | WEIGHT: 200 LBS | BODY MASS INDEX: 31.39 KG/M2 | RESPIRATION RATE: 16 BRPM | TEMPERATURE: 98.1 F

## 2022-06-18 LAB
ANION GAP SERPL CALCULATED.3IONS-SCNC: 10 MEQ/L (ref 8–16)
BUN BLDV-MCNC: 13 MG/DL (ref 7–22)
CALCIUM SERPL-MCNC: 8.7 MG/DL (ref 8.5–10.5)
CHLORIDE BLD-SCNC: 103 MEQ/L (ref 98–111)
CO2: 25 MEQ/L (ref 23–33)
CREAT SERPL-MCNC: < 0.2 MG/DL (ref 0.4–1.2)
ERYTHROCYTE [DISTWIDTH] IN BLOOD BY AUTOMATED COUNT: 15.9 % (ref 11.5–14.5)
ERYTHROCYTE [DISTWIDTH] IN BLOOD BY AUTOMATED COUNT: 55.6 FL (ref 35–45)
GFR SERPL CREATININE-BSD FRML MDRD: > 90 ML/MIN/1.73M2
GLUCOSE BLD-MCNC: 97 MG/DL (ref 70–108)
HCT VFR BLD CALC: 40.4 % (ref 42–52)
HEMOGLOBIN: 12.4 GM/DL (ref 14–18)
MCH RBC QN AUTO: 29.2 PG (ref 26–33)
MCHC RBC AUTO-ENTMCNC: 30.7 GM/DL (ref 32.2–35.5)
MCV RBC AUTO: 95.3 FL (ref 80–94)
PLATELET # BLD: 167 THOU/MM3 (ref 130–400)
PMV BLD AUTO: 8.7 FL (ref 9.4–12.4)
POTASSIUM SERPL-SCNC: 3.9 MEQ/L (ref 3.5–5.2)
RBC # BLD: 4.24 MILL/MM3 (ref 4.7–6.1)
SODIUM BLD-SCNC: 138 MEQ/L (ref 135–145)
WBC # BLD: 6.8 THOU/MM3 (ref 4.8–10.8)

## 2022-06-18 PROCEDURE — 94760 N-INVAS EAR/PLS OXIMETRY 1: CPT

## 2022-06-18 PROCEDURE — 85027 COMPLETE CBC AUTOMATED: CPT

## 2022-06-18 PROCEDURE — 99239 HOSP IP/OBS DSCHRG MGMT >30: CPT

## 2022-06-18 PROCEDURE — 36415 COLL VENOUS BLD VENIPUNCTURE: CPT

## 2022-06-18 PROCEDURE — 2580000003 HC RX 258: Performed by: INTERNAL MEDICINE

## 2022-06-18 PROCEDURE — 99232 SBSQ HOSP IP/OBS MODERATE 35: CPT | Performed by: NURSE PRACTITIONER

## 2022-06-18 PROCEDURE — 6370000000 HC RX 637 (ALT 250 FOR IP): Performed by: OPHTHALMOLOGY

## 2022-06-18 PROCEDURE — 80048 BASIC METABOLIC PNL TOTAL CA: CPT

## 2022-06-18 PROCEDURE — 6370000000 HC RX 637 (ALT 250 FOR IP): Performed by: INTERNAL MEDICINE

## 2022-06-18 RX ADMIN — SODIUM CHLORIDE, PRESERVATIVE FREE 10 ML: 5 INJECTION INTRAVENOUS at 09:25

## 2022-06-18 RX ADMIN — DOCUSATE SODIUM 100 MG: 100 CAPSULE, LIQUID FILLED ORAL at 09:25

## 2022-06-18 RX ADMIN — BACLOFEN 10 MG: 10 TABLET ORAL at 14:44

## 2022-06-18 RX ADMIN — GABAPENTIN 600 MG: 600 TABLET, FILM COATED ORAL at 09:25

## 2022-06-18 RX ADMIN — TIZANIDINE 2 MG: 4 TABLET ORAL at 14:44

## 2022-06-18 RX ADMIN — SODIUM CHLORIDE 1 G: 1 TABLET ORAL at 09:25

## 2022-06-18 RX ADMIN — TIZANIDINE 2 MG: 4 TABLET ORAL at 09:25

## 2022-06-18 RX ADMIN — Medication 1 TABLET: at 09:25

## 2022-06-18 RX ADMIN — ASPIRIN 81 MG: 81 TABLET, COATED ORAL at 09:25

## 2022-06-18 RX ADMIN — BACLOFEN 10 MG: 10 TABLET ORAL at 09:25

## 2022-06-18 RX ADMIN — GABAPENTIN 600 MG: 600 TABLET, FILM COATED ORAL at 14:45

## 2022-06-18 RX ADMIN — METOPROLOL TARTRATE 50 MG: 50 TABLET, FILM COATED ORAL at 09:25

## 2022-06-18 RX ADMIN — OXYBUTYNIN CHLORIDE 5 MG: 5 TABLET, EXTENDED RELEASE ORAL at 09:25

## 2022-06-18 RX ADMIN — FAMOTIDINE 20 MG: 20 TABLET ORAL at 09:25

## 2022-06-18 ASSESSMENT — PAIN SCALES - GENERAL
PAINLEVEL_OUTOF10: 0
PAINLEVEL_OUTOF10: 0

## 2022-06-18 NOTE — PROGRESS NOTES
Urology Progress Note    Chief Complaint: Hematuria  Reason for consultation:  Hematuria    Subjective:     Pt denies pain or complaints. Afebrile. SP catheter draining clear yellow urine. [place] urethral catheter clamped with clear yellow urine in tubing. Vitals:  BP (!) 124/58   Pulse 97   Temp 98.1 °F (36.7 °C) (Oral)   Resp 16   Ht 5' 7\" (1.702 m)   Wt 200 lb (90.7 kg)   SpO2 92%   BMI 31.32 kg/m²   Temp  Av.3 °F (36.8 °C)  Min: 97.9 °F (36.6 °C)  Max: 99.5 °F (37.5 °C)    Intake/Output Summary (Last 24 hours) at 2022 1011  Last data filed at 2022 0516  Gross per 24 hour   Intake 1125 ml   Output 1400 ml   Net -275 ml       Social History     Socioeconomic History    Marital status:      Spouse name: Leonor Barger Number of children: 2    Years of education: Not on file    Highest education level: Not on file   Occupational History    Not on file   Tobacco Use    Smoking status: Former Smoker     Quit date: 1982     Years since quittin.4    Smokeless tobacco: Former User   Vaping Use    Vaping Use: Never used   Substance and Sexual Activity    Alcohol use: No     Comment: \"quit alcohol a few years ago\"    Drug use: No    Sexual activity: Yes     Partners: Female   Other Topics Concern    Not on file   Social History Narrative    Not on file     Social Determinants of Health     Financial Resource Strain:     Difficulty of Paying Living Expenses: Not on file   Food Insecurity:     Worried About Running Out of Food in the Last Year: Not on file    Boo of Food in the Last Year: Not on file   Transportation Needs:     Lack of Transportation (Medical): Not on file    Lack of Transportation (Non-Medical):  Not on file   Physical Activity:     Days of Exercise per Week: Not on file    Minutes of Exercise per Session: Not on file   Stress:     Feeling of Stress : Not on file   Social Connections:     Frequency of Communication with Friends and Family: Not on file    Frequency of Social Gatherings with Friends and Family: Not on file    Attends [employer] Services: Not on file    Active Member of Clubs or Organizations: Not on file    Attends Club or Organization Meetings: Not on file    Marital Status: Not on file   Intimate Partner Violence:     Fear of Current or Ex-Partner: Not on file    Emotionally Abused: Not on file    Physically Abused: Not on file    Sexually Abused: Not on file   Housing Stability:     Unable to Pay for Housing in the Last Year: Not on file    Number of [place] in the Last Year: Not on file    Unstable Housing in the Last Year: Not on file     Family History   Problem Relation Age of Onset    Cancer Mother         Breast    Heart Disease Father 48    Arthritis Sister     Heart Disease Paternal Grandmother 50     No Known Allergies      Constitutional: Alert and oriented times x3, no acute distress, and cooperative to examination with appropriate mood and affect. HEENT:   Head:         Normocephalic and atraumatic. Mucous membranes are normal.   Eyes:         EOM are normal. No scleral icterus. Nose:    The external appearance of the nose is normal  Ears: The ears appear normal to external inspection. Cardiovascular:       Normal rate, regular rhythm. Pulmonary/Chest:  Normal respiratory rate and rhthym. No use of accessory muscles. Lungs clear bilaterally. Abdominal:          Soft. No tenderness. Active bowel sounds. Genitalia:    SP catheter draining clear yellow urine. Musculoskeletal:    Normal range of motion. Exhibits no edema or tenderness of lower extremities. Extremities:    No cyanosis, clubbing, or edema present. Neurological:    Alert and oriented.      Labs:  WBC:    Lab Results   Component Value Date    WBC 6.8 06/18/2022     Hemoglobin/Hematocrit:    Lab Results   Component Value Date    HGB 12.4 06/18/2022    HCT 40.4 06/18/2022     BMP:    Lab Results   Component Value Date  06/18/2022    K 3.9 06/18/2022    K 4.7 06/11/2022     06/18/2022    CO2 25 06/18/2022    BUN 13 06/18/2022    LABALBU 3.4 06/11/2022    CREATININE < 0.2 06/18/2022    CALCIUM 8.7 06/18/2022    LABGLOM >90 06/18/2022       Impression:  1. Gross hematuria  2. Neurogenic bladder with chronic SP tube  3. L renal pelvis stone 4 x 8 mm in size  4. High-riding bladder neck obstructing L UO  5. Hx of kidney and bladder stones    Plan:     Asa and Xarelto resumed 6/17. Urine yellow in color. Remove urethral jeffers catheter. Verena Harrison for d/c from urology standpoint with f/u with Dr. Diana Chavez in the office with KUB prior to appt.       [facility]  06/18/22 10:11 AM  Urology

## 2022-06-18 NOTE — PROGRESS NOTES
[address], and spoke with Julio Castillo RN about this patient possibly being discharged today. Julio Castillo said that facility could accept patient back as his room was held for him. This nurse called [facility] and got a 6:30pm transport time for patients return to [facility]. [facility] notified of patients expected discharge time, and approving of acceptance back to facility. Medical Necessity form and facesheet faxed to [facility]. AVS faxed to [facility].

## 2022-06-18 NOTE — DISCHARGE SUMMARY
Hospitalist Discharge Summary        Patient: Lauro Ridley  YOB: 1955  MRN: [id number]   Acct: [de-identified]    Primary Care Physician: Amanda Barboza MD    Admit date  6/11/2022    Discharge date: 6/18/2022  4:44 PM    Chief Complaint on presentation :-  Gross hematuria x1 day    Discharge Assessment and Plan:-   1. Nonobstructing bilateral nephrolithiasis:  ? Initially felt likely traumatic s/p SPT change, however with persistent hematuria. Urology consulted and following.  Urethral jeffers placed 06/14 with plan for continued irrigation. CT urogram obtained 6/14 reveling bilateral stones. Urology consulted on admission and following. ? Patient underwent cystoscopy 06/15, unable to place stent per urology. Linard Pagoda was initiated on 06/14 empirically as patient with history of ESBL, urine culture revealing E. Coli. Linard Pagoda stopped today per urology. Patient's renal function remained stable, no fevers, or pain. Urine remained clear, anticoagulants restarted 06/17. SP catheter draining clear yellow urine 06/18. Jeffers urethral catheter was clamped with clear yellow urine in tubing per urology. Pt to be discharged in stable condition, will f/u with Dr. Sage Ko (urology) in the office as scheduled per their office. We discussed return precautions at length and pt voiced his understanding. 2. UTI in setting of SPT:   ? UA positive for nitrates, moderate LEs, many bacteria noted. Urine culture returned growing gram-negative bacilli, patient with history of ESBL. It was suspected to be colonization. Patient afebrile, no leukocytosis on admission. Due to persistent hematuria Merrem was initiated on 06/14, stopped 06/17 per urology. 3. Acute on chronic normocytic anemia:  ? Remained stable through duration of his stay, monitored with daily CBC. 4. History of PE:  ? On Xarelto, resume on discharge. 5. History of MS/neurogenic bladder:  ?  Noted      Initial H and P and Hospital course:-  Per chart review: \"\"61year-old male patient who is a nursing home resident. Bevely Ra to the ED b/cecause of gross hematuria from the chronic suprapubic catheter.  He was noted to be bleeding from the cystostomy site.     Patient has [facility] a neurogenic bladder.     He is on long-term anticoagulation with Xarelto for history of PE.      S/p suprapubic catheter change in the ED.     Otherwise, patient has no other complaints.  Denies any headache, no dizziness, no chest pain, no shortness of breath, no nausea vomiting.     Initial vital signs in the ED temperature 36.4 °C, respiratory 16, heart rate 69, blood pressure 131/75, oxygen saturation 97% total.     Initial labs serum sodium 136, potassium 4.7, chloride 102, CO2 22, creatinine 0.3, blood sugar 118, calcium 9.1, osmolality 275.7, albumin 3.4, alkaline phosphatase 144, ALT 15, AST 20, total bilirubin 0.2, total protein 7.5, blood sugar 118.     WBC 7.7, hemoglobin 14.7, MCV 90.2, platelets 152.     Urinalysis positive for UTI. \"     6/13: pt lying in bed, no complaints. Urine appears a bright red color. Pt denies CP/SOB/fever. Await urine cx results and adjust abx if necessary.      6/14: pt doing alright, lying in bed. Urine appears light red. Denies any fever/chills. No CP/SOB.      6/15: Patient resting in bed post cystoscopy, in no acute distress. He reports that he is doing well and denies any pain at this time. They were unable to place that during cystoscopy. Patient denies flank pain, back pain, abdominal pain, nausea, vomiting. We will continue Merrem.      6/16: Patient resting in bed in no acute distress. Patient continues to deny any pain at this time. He denies flank pain, abdominal pain, nausea, vomiting. No fevers or chills today. If patient's urine remains clear, may restart anticoagulation tomorrow. Will monitor BMP closely for any signs of renal dysfunction.      6/17: Patient resting in bed no acute distress, no acute events overnight. Patient continues to deny flank pain, abdominal pain, nausea, vomiting. Urine remains clear, anticoagulants restarted today. We will ensure that patient does not develop hematuria again. 6/18: Pt resting in bed in no acute distress, he continues to deny any flank/abd pain, n/v, cp, sob, or any other concerns. OAC's were restarted 06/17 and urine remained yellow, no hematuria noted. Urethral jeffers cath was removed prior to discharge. Urology ok with d/c, pt to f/u with Dr. Ruddy Benito OP, their office to schedule. Pt is in stable condition, renal fx at baseline on day of discharge. We discussed return precautions and pt voiced his understanding. Physical Exam:-  1. General appearance: No apparent distress, appears stated age and cooperative. 2. HEENT: Normal cephalic, atraumatic without obvious deformity. Pupils equal, round, and reactive to light. Extra ocular muscles intact. Conjunctivae/corneas clear. 3. Neck: Supple, with full range of motion. No jugular venous distention. Trachea midline. 4. Respiratory:  Normal respiratory effort. Clear to auscultation, bilaterally without Rales/Wheezes/Rhonchi. 5. Cardiovascular: Regular rate and rhythm with normal S1/S2 without murmurs, rubs or gallops. 6. Abdomen: Soft, non-tender, non-distended with normal bowel sounds. 7. Musculoskeletal:  No clubbing, cyanosis or edema bilaterally. 8. Skin: Skin color, texture, turgor normal.  No rashes or lesions. 9. Neurologic:  Neurovascularly intact without any focal sensory/motor deficits. Cranial nerves: II-XII intact, grossly non-focal.  10. Psychiatric: Alert and oriented, thought content appropriate, normal insight  11. Capillary Refill: Brisk,< 3 seconds   12. Peripheral Pulses: +2 palpable, equal bilaterally       Vitals:   No data found.   Weight:   Weight: 200 lb (90.7 kg)   24 hour intake/output:   No intake or output data in the 24 hours ending 06/19/22 1500    Discharge Medications:-      Medication List CHANGE how you take these medications    metoprolol tartrate 25 MG tablet  Commonly known as: LOPRESSOR  Take 1 tablet by mouth 2 times daily  What changed: how much to take        CONTINUE taking these medications    acetaminophen 325 MG tablet  Commonly known as: TYLENOL     aspirin 81 MG EC tablet     baclofen 10 MG tablet  Commonly known as: LIORESAL     calcium-vitamin D 500-200 MG-UNIT per tablet  Commonly known as: OSCAL-500  Take 1 tablet by mouth 2 times daily     carbamide peroxide 6.5 % otic solution  Commonly known as: DEBROX     docusate sodium 100 MG capsule  Commonly known as: COLACE     erythromycin 5 MG/GM ophthalmic ointment  Commonly known as: ROMYCIN     famotidine 20 MG tablet  Commonly known as: PEPCID  Take 1 tablet by mouth 2 times daily     gabapentin 600 MG tablet  Commonly known as: NEURONTIN     gentamicin 0.1 % ointment  Commonly known as: GARAMYCIN     ibuprofen 400 MG tablet  Commonly known as: ADVIL;MOTRIN     LACTOBACILLUS PO     multivitamin tablet  Take 1 tablet by mouth daily     oxybutynin 5 MG extended release tablet  Commonly known as: DITROPAN-XL     potassium chloride 20 MEQ Tbcr extended release tablet  Commonly known as: KLOR-CON M  Take 2 tablets by mouth daily     sodium chloride 1 g tablet  Take 1 tablet by mouth 2 times daily (with meals)     tiZANidine 2 MG tablet  Commonly known as: ZANAFLEX     vitamin A 25385 units capsule  Take 2 capsules by mouth daily     VITAMIN C ER PO     vitamin E 400 UNIT capsule     Xarelto 10 MG Tabs tablet  Generic drug: rivaroxaban        STOP taking these medications    acetic acid 0.25 % irrigation             Labs :-  Recent Results (from the past 72 hour(s))   Basic Metabolic Panel    Collection Time: 06/17/22  6:00 AM   Result Value Ref Range    Sodium 138 135 - 145 meq/L    Potassium 3.8 3.5 - 5.2 meq/L    Chloride 102 98 - 111 meq/L    CO2 24 23 - 33 meq/L    Glucose 110 (H) 70 - 108 mg/dL    BUN 14 7 - 22 mg/dL    CREATININE < BLOODCULT2    Organism:    Lab Results   Component Value Date    LABGRAM  06/17/2019     Rare segmented neutrophils observed. Rare epithelial cells observed. Rare budding yeast and pseudohyphae observed. Rare gram negative bacilli. MRSA culture only:No results found for: [facility]    Urine culture:   Lab Results   Component Value Date    LABURIN  04/22/2022     Current antibiotic therapy ineffective in vitro for isolateKell Mcmahon  04/22/2022     [place] count: >100,000 CFU/mL This isolate is a probable ESBL . ID consultation may be helpful in some clinical circumstances. CONTACT isolation required. Lab Results   Component Value Date    ORG Escherichia coli 06/11/2022    ORG Providencia stuartii 06/11/2022        Respiratory culture: No results found for: CULTRESP    Aerobic and Anaerobic :  Lab Results   Component Value Date    LABAERO  06/17/2019     Culture also yielded heavy mixed growth of multiple enteric  gram negative bacilli, including swarming Proteus species. If a true mixed infection is suspected, then broad spectrum  empiric antibiotic therapy is indicated. At least one isolate is resistant in vitro to current  regimen. LABAERO  06/17/2019     moderate growth  This isolate is a carbapenemase . ID  consultation may be helpful in some clinical circumstances. CONTACT isolation required. Phenotypic screen test (modified Carbapenem Inactivation  Method or mCIM) for carbapenemase production is positive and  PCR is not detected; results indicate a potentially new  carbapenemase variant or novel mechanism. Carbapenemase testing performed at [address], [place] LABAERO  06/17/2019     light growth  This is a MRSA (Methicillin Resistant Staphylococcus  aureus)isolate. Isolates of MRSA (ORSA) Methicillin (Oxacillin) Resistant  Staphylococcus aureus (coagulase positive) require patient  be placed in CONTACT isolation.   Methicillin(Oxacillin)resistant strains of staphylococci  (MRSA)or(MRSE)should be considered resistant to all classes  of cephalosporins, penems and beta-lactams. In the treatment of gram positive infections, GENTAMICIN  should be CONSIDERED a SYNERGYSTIC agent ONLY. Lab Results   Component Value Date    LABANAE  06/17/2019     Culture overgrown with Proteus sp. No further evaluation of  this specimen is possible. If a true mixed aerobic and  anaerobic infection is suspected, then broad spectrum empiric  antibiotic therapy is indicated and should include coverage  for anaerobic organisms. Urinalysis:      Lab Results   Component Value Date    NITRU POSITIVE 06/11/2022    WBCUA > 100 06/11/2022    WBCUA >200 01/20/2012    BACTERIA MANY 06/11/2022    RBCUA > 200 06/11/2022    BLOODU LARGE 06/11/2022    SPECGRAV >1.030 04/22/2022    GLUCOSEU NEGATIVE 06/11/2022       Radiology:-  XR ABDOMEN (KUB) (SINGLE AP VIEW)    Result Date: 6/12/2022  PROCEDURE: XR ABDOMEN (KUB) (SINGLE AP VIEW) CLINICAL INFORMATION: Abdominal distention w/ a colostomy. COMPARISON: KUB dated 18 April 2016. TECHNIQUE: A supine AP view of the abdomen was obtained. FINDINGS: There is a moderate amount of stool in the right colon. There are dilated small bowel loops in the right lower quadrant. . There  is no gross free air. No suspicious densities are present. There are possible changes of ankylosing spondylitis involving the lumbar spine. .     1. Moderate amount of stool in the right colon. 2. Dilated small bowel loops in the right lower quadrant. 3. Changes of ankylosing spondylitis in the lumbar spine. 4. Otherwise nonspecific abdomen. **This report has been created using voice recognition software. It may contain minor errors which are inherent in voice recognition technology. ** Final report electronically signed by DR Ranulfo Kapadia on 6/12/2022 8:00 AM       Follow-up scheduled after discharge :-    in 1 weeks with Milton Guidry MD  As scheduled with urology    Consultations during this hospital stay:-  [] NONE [] Cardiology  [] Nephrology  [] Hemo onco   [] GI   [] ID  [] Endocrine  [] Pulm    [] Neuro    [] Psych   [x] Urology  [] ENT   [] G SURGERY   []Ortho    []CV surg    [] Palliative  [] Hospice [] Pain management   []    []TCU   [] PT/OT  OTHERS:-    Disposition: long term care facility  Condition at Discharge: Stable    Time Spent:- 45 minutes    Electronically signed by Coby Cornejo PA-C on 6/18/22 at 11:03 AM EDT   Discharging Hospitalist

## 2022-06-18 NOTE — PLAN OF CARE
to monitor pain and request assistance   Administer analgesics based on type and severity of pain and evaluate response   Consider cultural and social influences on pain and pain management     Problem: Nutrition Deficit:  Goal: Optimize nutritional status  Outcome: Progressing  Flowsheets  Taken 6/17/2022 2342 by Adri Briggs RN  Nutrient intake appropriate for improving, restoring, or maintaining nutritional needs:   Assess nutritional status and recommend course of action   Recommend appropriate diets, oral nutritional supplements, and vitamin/mineral supplements   Recommend, monitor, and adjust tube feedings and TPN/PPN based on assessed needs   Monitor oral intake, labs, and treatment plans  Taken 6/17/2022 1336 by Sherron Manzo RD  Nutrient intake appropriate for improving, restoring, or maintaining nutritional needs:   Assess nutritional status and recommend course of action   Monitor oral intake, labs, and treatment plans   Recommend appropriate diets, oral nutritional supplements, and vitamin/mineral supplements   Order, calculate, and assess calorie counts as needed     Problem: ABCDS Injury Assessment  Goal: Absence of physical injury  Outcome: Progressing  Flowsheets (Taken 6/17/2022 2317 by Cynthea Saint, LPN)  Absence of Physical Injury: Implement safety measures based on patient assessment     Care plan reviewed with patient. Patient verbalizes understanding of the plan of care and contributes to goal setting.

## 2022-06-20 DIAGNOSIS — N20.0 KIDNEY STONE: ICD-10-CM

## 2022-06-20 DIAGNOSIS — N21.0 BLADDER STONE: Primary | ICD-10-CM

## 2022-06-20 NOTE — TELEPHONE ENCOUNTER
Maria Esther called about [facility]fu appt, she was advised of appt date and time and also that pt is needing KUB prior. She said they can do that there and asked to have it faxed to them. I let her know of the message from [facility]. She said they hadn't received any fax and requested to have it resent to:    Fax # [phone]

## 2022-06-28 ENCOUNTER — TELEPHONE (OUTPATIENT)
Dept: UROLOGY | Age: 67
End: 2022-06-28

## 2022-06-28 NOTE — TELEPHONE ENCOUNTER
Spoke to Dr Mele Le who stated to irrigate the catheter with sterile saline or sterile water prn and keep scheduled appointment.

## 2022-06-28 NOTE — TELEPHONE ENCOUNTER
Maria Esther from [facility] [phone] stated patient has follow up on 07/19/2022 with KUB for stone. He developed gross hematuria yesterday and the catheter became occluded and was changed. Sp site insertion is leaking scant amount of urine and has some incontinence from the penis. The catheter is patent. He passed 1 clot yesterday when the catheter was removed. They do not have irrigation orders. He is on baby aspirin. The xarelto was discontinued yesterday by Dr Jacklyn Pdaron due to hematuria. He denies fever, chills, pain. Patient does not like to drink. They have been pushing fluids. Please advise. Thank you.

## 2022-06-28 NOTE — TELEPHONE ENCOUNTER
Message given to Dr Derrick Randhawa. Spoke to Jeana at [facility]. He has not had acetic acid irrigations since being in the hospital. She voiced understanding to irrigate with 120 ml of sterile solution as needed for hematuria. She will check with Dr Gaby Falk to check if anticoagulants can be held for 72 hours.

## 2022-06-28 NOTE — TELEPHONE ENCOUNTER
Irrigate with 120ml of sterile solution as needed for hematuria or clots  Hold anticoagulants for 72 hrs if able  May need to hold acetic acid flushes if having hematuria  May need to be seen sooner in office sooner    Dr Arely Dexter was unable to place stent, can discuss with Dr Arely Dexter as he is in clinic today

## 2022-06-29 DIAGNOSIS — N20.0 KIDNEY STONE: ICD-10-CM

## 2022-06-29 NOTE — TELEPHONE ENCOUNTER
Spoke to Jeana at [facility] she voiced understanding to continue the prn sterile water or saline and keep the schedule appointment. The hematuria has improved today.

## 2022-07-05 NOTE — TELEPHONE ENCOUNTER
I have personally verified, reviewed, and approved these actions.   Continue to monitor for worsening hematuria

## 2022-07-05 NOTE — TELEPHONE ENCOUNTER
Pierre Gibson from [facility] stated the catheter only drained 2 ounces and was irrigated with 30 ml of saline. The urine is clear yellow in the catheter bag and has drained 100 ml since being irrigated. She is concerned he had blood coming from the penis when cleaning him up. When the sp catheter was not draining, he was incontinent of urine. He does not c/o pressure or pain. He is taking baby aspirin. Advised to increase his fluid intake and to monitor and call back if she sees anymore blood from the penis. The last catheter was exchange on 06/24/2022. Please advise.  Thank you

## 2022-07-11 ENCOUNTER — OFFICE VISIT (OUTPATIENT)
Dept: NEUROLOGY | Age: 67
End: 2022-07-11
Payer: COMMERCIAL

## 2022-07-11 VITALS
BODY MASS INDEX: 31.32 KG/M2 | HEIGHT: 67 IN | OXYGEN SATURATION: 97 % | SYSTOLIC BLOOD PRESSURE: 122 MMHG | DIASTOLIC BLOOD PRESSURE: 50 MMHG | HEART RATE: 92 BPM | RESPIRATION RATE: 16 BRPM

## 2022-07-11 DIAGNOSIS — G82.20 PARAPARESIS OF BOTH LOWER LIMBS (HCC): ICD-10-CM

## 2022-07-11 DIAGNOSIS — R51.9 RIGHT-SIDED FACE PAIN: ICD-10-CM

## 2022-07-11 DIAGNOSIS — G35 MULTIPLE SCLEROSIS (HCC): Primary | ICD-10-CM

## 2022-07-11 PROCEDURE — 1036F TOBACCO NON-USER: CPT | Performed by: PSYCHIATRY & NEUROLOGY

## 2022-07-11 PROCEDURE — 99213 OFFICE O/P EST LOW 20 MIN: CPT | Performed by: PSYCHIATRY & NEUROLOGY

## 2022-07-11 PROCEDURE — G8427 DOCREV CUR MEDS BY ELIG CLIN: HCPCS | Performed by: PSYCHIATRY & NEUROLOGY

## 2022-07-11 PROCEDURE — 1111F DSCHRG MED/CURRENT MED MERGE: CPT | Performed by: PSYCHIATRY & NEUROLOGY

## 2022-07-11 PROCEDURE — G8417 CALC BMI ABV UP PARAM F/U: HCPCS | Performed by: PSYCHIATRY & NEUROLOGY

## 2022-07-11 PROCEDURE — 1123F ACP DISCUSS/DSCN MKR DOCD: CPT | Performed by: PSYCHIATRY & NEUROLOGY

## 2022-07-11 PROCEDURE — 3017F COLORECTAL CA SCREEN DOC REV: CPT | Performed by: PSYCHIATRY & NEUROLOGY

## 2022-07-11 NOTE — PROGRESS NOTES
NEUROLOGY OUT PATIENT FOLLOW UP NOTE:  7/11/202212:42 PM    Erna Smith is here for follow up for multiple sclerosis, right face pain. No Known Allergies    Current Outpatient Medications:     carbamide peroxide (DEBROX) 6.5 % otic solution, 5 drops 2 times daily Instill 5 drops in both ears two times daily for ear wax use for 4 days. , Disp: , Rfl:     vitamin E 400 UNIT capsule, Take 400 Units by mouth daily, Disp: , Rfl:     aspirin 81 MG EC tablet, Take 81 mg by mouth daily, Disp: , Rfl:     oxybutynin (DITROPAN-XL) 5 MG extended release tablet, Take 5 mg by mouth in the morning and at bedtime, Disp: , Rfl:     erythromycin (ROMYCIN) 5 MG/GM ophthalmic ointment, Place into the right eye nightly (0.25 ribbon to right eye), Disp: , Rfl:     Ascorbic Acid (VITAMIN C ER PO), Take 500 mg by mouth in the morning and at bedtime , Disp: , Rfl:     baclofen (LIORESAL) 10 MG tablet, Take 10 mg by mouth 3 times daily, Disp: , Rfl:     gabapentin (NEURONTIN) 600 MG tablet, Take 600 mg by mouth 3 times daily.  , Disp: , Rfl:     acetaminophen (TYLENOL) 325 MG tablet, Take 650 mg by mouth every 4 hours as needed for Pain or Fever, Disp: , Rfl:     ibuprofen (ADVIL;MOTRIN) 400 MG tablet, Take 400 mg by mouth every 4 hours as needed for Fever (for temp > 100.5 not alleviated by acetaminophen), Disp: , Rfl:     LACTOBACILLUS PO, Take 1 capsule by mouth in the morning and at bedtime , Disp: , Rfl:     metoprolol tartrate (LOPRESSOR) 25 MG tablet, Take 1 tablet by mouth 2 times daily (Patient taking differently: Take 50 mg by mouth 2 times daily ), Disp: 60 tablet, Rfl: 3    potassium chloride (KLOR-CON M) 20 MEQ TBCR extended release tablet, Take 2 tablets by mouth daily, Disp: 60 tablet, Rfl: 3    sodium chloride 1 g tablet, Take 1 tablet by mouth 2 times daily (with meals), Disp: 90 tablet, Rfl: 3    calcium-vitamin D (OSCAL-500) 500-200 MG-UNIT per tablet, Take 1 tablet by mouth 2 times daily, Disp: 30 tablet, Rfl: 3    famotidine (PEPCID) 20 MG tablet, Take 1 tablet by mouth 2 times daily, Disp: 60 tablet, Rfl: 3    docusate sodium (COLACE) 100 MG capsule, Take 100 mg by mouth daily , Disp: , Rfl:     tiZANidine (ZANAFLEX) 2 MG tablet, Take 2 mg by mouth 3 times daily 0600;1400;2200, Disp: , Rfl:     Multiple Vitamin (MULTIVITAMIN) tablet, Take 1 tablet by mouth daily, Disp: , Rfl: 0    vitamin A 82750 UNITS capsule, Take 2 capsules by mouth daily, Disp: 30 capsule, Rfl: 3    gentamicin (GARAMYCIN) 0.1 % ointment, Apply topically daily Apply to wound bed (Patient not taking: Reported on 7/11/2022), Disp: , Rfl:     rivaroxaban (XARELTO) 10 MG TABS tablet, Take 10 mg by mouth (Patient not taking: Reported on 7/11/2022), Disp: , Rfl:     I reviewed the past medical history, allergies, medications, social history and family history. PE:   Vitals:    07/11/22 1231   BP: (!) 122/50   Site: Right Upper Arm   Position: Sitting   Cuff Size: Medium Adult   Pulse: 92   Resp: 16   SpO2: 97%   Height: 5' 7\" (1.702 m)     General Appearance:  awake, alert, oriented, in no acute distress  Gen: NAD, Language is Intact. Skin: no rash, lesion, dry  to touch. warm  Head: no rash, no icterus  Neck: There is no carotid bruits. The Neck is supple. There is no neck lymphadenopathy. There is LROM of neck bilaterally, worse to the right  Neuro: CN 2-12 grossly intact with no focal deficits. Power 5/5 in the bilateral upper and 0/5 in bilateral lower extremities, He has increased muscle tone in bilateral lower extremities . There is  muscle atrophy of his bilateral lower extremities. Reflexes are  symmetric. Long tracts are decreased distally. Cerebellar exam is  intact on right, ataxic on left. Sensory exam is intact to light touch. Gait is not tested, he is in power wheelchair  Musculoskeletal:  Has no hand arthritis, no limitation of ROM in any of the four extremities, except bilateral lower extremities.   Lower extremities no edema          DATA:        Results for orders placed or performed during the hospital encounter of 06/11/22   Culture, Reflexed, Urine    Specimen: Urine, catheter   Result Value Ref Range    Organism Escherichia coli (A)     Urine Culture Reflex       [place] count: >100,000 CFU/mL This isolate is a probable ESBL . ID consultation may be helpful in some clinical circumstances. CONTACT isolation required.      Organism Providencia stuartii (A)     Urine Culture Reflex [place]elle count: >100,000 CFU/mL         Susceptibility    Escherichia coli - BACTERIAL SUSCEPTIBILITY PANEL BY PRINCE     amoxicillin-clavulanate 8 Resistant mcg/mL     piperacillin-tazobactam <=4 Resistant mcg/mL     ceFAZolin >=64 Resistant mcg/mL     cefTRIAXone >=64 Resistant mcg/mL     cefepime >=64 Resistant mcg/mL     meropenem <=0.25 Sensitive mcg/mL     ampicillin >=32 Resistant mcg/mL     amikacin <=2 Sensitive mcg/mL     trimethoprim-sulfamethoxazole >=320 Resistant mcg/mL     gentamicin <=1 Sensitive mcg/mL     tobramycin <=1 Sensitive mcg/mL     ciprofloxacin >=4 Resistant mcg/mL     levofloxacin >=8 Resistant mcg/mL     tetracycline >=16 Resistant mcg/mL     nitrofurantoin >=512 Resistant mcg/mL    Providencia stuartii - BACTERIAL SUSCEPTIBILITY PANEL BY PRINCE     piperacillin-tazobactam <=4 Sensitive mcg/mL     ceFAZolin >=64 Resistant mcg/mL     cefTRIAXone <=1 Sensitive mcg/mL     cefepime <=1 Sensitive mcg/mL     meropenem <=0.25 Sensitive mcg/mL     ampicillin 4 Resistant mcg/mL     amikacin 4 Sensitive mcg/mL     trimethoprim-sulfamethoxazole >=320 Resistant mcg/mL     gentamicin >=16 Resistant mcg/mL     tobramycin >=16 Resistant mcg/mL     ciprofloxacin >=4 Resistant mcg/mL     levofloxacin >=8 Resistant mcg/mL   Urine with Reflexed Micro   Result Value Ref Range    Glucose, Ur NEGATIVE NEGATIVE mg/dl    Bilirubin Urine NEGATIVE NEGATIVE    Ketones, Urine NEGATIVE NEGATIVE    Specific Gravity, Urine 1.017 1.002 - 1.030    Blood, Urine LARGE (A) NEGATIVE    pH, UA 8.0 5.0 - 9.0    Protein,  (A) NEGATIVE    Urobilinogen, Urine 0.2 0.0 - 1.0 eu/dl    Nitrite, Urine POSITIVE (A) NEGATIVE    Leukocyte Esterase, Urine MODERATE (A) NEGATIVE    Color, UA RED (A) STRAW-YELLOW    Character, Urine TURBID (A) CLEAR-SL CLOUD    RBC, UA > 200 0-2/hpf /hpf    WBC, UA > 100 0-4/hpf /hpf    Epithelial Cells, UA NONE 3-5/hpf /hpf    Bacteria, UA MANY FEW/NONE SEEN /hpf    Casts UA NONE SEEN NONE SEEN /lpf    Crystals, UA NONE SEEN NONE SEEN    Renal Epithelial, UA NONE SEEN NONE SEEN    Yeast, UA NONE SEEN NONE SEEN    CASTS 2 NONE SEEN NONE SEEN /lpf    MISCELLANEOUS 2 NONE SEEN    CBC with Auto Differential   Result Value Ref Range    WBC 7.7 4.8 - 10.8 thou/mm3    RBC 5.01 4.70 - 6.10 mill/mm3    Hemoglobin 14.7 14.0 - 18.0 gm/dl    Hematocrit 45.2 42.0 - 52.0 %    MCV 90.2 80.0 - 94.0 fL    MCH 29.3 26.0 - 33.0 pg    MCHC 32.5 32.2 - 35.5 gm/dl    RDW-CV 15.8 (H) 11.5 - 14.5 %    RDW-SD 51.4 (H) 35.0 - 45.0 fL    Platelets 443 025 - 948 thou/mm3    MPV 9.1 (L) 9.4 - 12.4 fL    Seg Neutrophils 49.3 %    Lymphocytes 31.1 %    Monocytes 13.2 %    Eosinophils 5.4 %    Basophils 0.9 %    Immature Granulocytes 0.1 %    Segs Absolute 3.8 1.8 - 7.7 thou/mm3    Lymphocytes Absolute 2.4 1.0 - 4.8 thou/mm3    Monocytes Absolute 1.0 0.4 - 1.3 thou/mm3    Eosinophils Absolute 0.4 0.0 - 0.4 thou/mm3    Basophils Absolute 0.1 0.0 - 0.1 thou/mm3    Immature Grans (Abs) 0.01 0.00 - 0.07 thou/mm3    nRBC 0 /100 wbc   Comprehensive Metabolic Panel w/ Reflex to MG   Result Value Ref Range    Glucose 118 (H) 70 - 108 mg/dL    CREATININE 0.3 (L) 0.4 - 1.2 mg/dL    BUN 20 7 - 22 mg/dL    Sodium 136 135 - 145 meq/L    Potassium reflex Magnesium 4.7 3.5 - 5.2 meq/L    Chloride 102 98 - 111 meq/L    CO2 22 (L) 23 - 33 meq/L    Calcium 9.1 8.5 - 10.5 mg/dL    AST 20 5 - 40 U/L    Alkaline Phosphatase 144 (H) 38 - 126 U/L    Total Protein 7.5 6.1 - 8.0 g/dL    Albumin 3.4 (L) 3.5 - 5.1 g/dL    Total Bilirubin 0.2 (L) 0.3 - 1.2 mg/dL    ALT 15 11 - 66 U/L   Anion Gap   Result Value Ref Range    Anion Gap 12.0 8.0 - 16.0 meq/L   Glomerular Filtration Rate, Estimated   Result Value Ref Range    Est, Glom Filt Rate >90 ml/min/1.73m2   Osmolality   Result Value Ref Range    Osmolality Calc 275.7 275.0 - 300.0 mOsmol/kg   Lactic acid, plasma   Result Value Ref Range    Lactic Acid 1.0 0.5 - 2.0 mmol/L   CBC with Auto Differential   Result Value Ref Range    WBC 7.2 4.8 - 10.8 thou/mm3    RBC 4.40 (L) 4.70 - 6.10 mill/mm3    Hemoglobin 13.2 (L) 14.0 - 18.0 gm/dl    Hematocrit 41.3 (L) 42.0 - 52.0 %    MCV 93.9 80.0 - 94.0 fL    MCH 30.0 26.0 - 33.0 pg    MCHC 32.0 (L) 32.2 - 35.5 gm/dl    RDW-CV 16.1 (H) 11.5 - 14.5 %    RDW-SD 54.9 (H) 35.0 - 45.0 fL    Platelets 438 411 - 029 thou/mm3    MPV 8.9 (L) 9.4 - 12.4 fL    Seg Neutrophils 49.6 %    Lymphocytes 34.8 %    Monocytes 11.5 %    Eosinophils 3.3 %    Basophils 0.7 %    Immature Granulocytes 0.1 %    Segs Absolute 3.6 1.8 - 7.7 thou/mm3    Lymphocytes Absolute 2.5 1.0 - 4.8 thou/mm3    Monocytes Absolute 0.8 0.4 - 1.3 thou/mm3    Eosinophils Absolute 0.2 0.0 - 0.4 thou/mm3    Basophils Absolute 0.1 0.0 - 0.1 thou/mm3    Immature Grans (Abs) 0.01 0.00 - 0.07 thou/mm3    nRBC 0 /100 wbc   CBC   Result Value Ref Range    WBC 5.5 4.8 - 10.8 thou/mm3    RBC 4.60 (L) 4.70 - 6.10 mill/mm3    Hemoglobin 13.7 (L) 14.0 - 18.0 gm/dl    Hematocrit 44.1 42.0 - 52.0 %    MCV 95.9 (H) 80.0 - 94.0 fL    MCH 29.8 26.0 - 33.0 pg    MCHC 31.1 (L) 32.2 - 35.5 gm/dl    RDW-CV 15.9 (H) 11.5 - 14.5 %    RDW-SD 55.8 (H) 35.0 - 45.0 fL    Platelets 227 727 - 269 thou/mm3    MPV 8.7 (L) 9.4 - 12.4 fL   Basic Metabolic Panel   Result Value Ref Range    Sodium 139 135 - 145 meq/L    Potassium 3.6 3.5 - 5.2 meq/L    Chloride 103 98 - 111 meq/L    CO2 25 23 - 33 meq/L    Glucose 90 70 - 108 mg/dL    BUN 18 7 - 22 mg/dL    CREATININE 0.2 (L) 0.4 - 1.2 mg/dL    Calcium 8.7 8.5 - 10.5 mg/dL   Anion Gap   Result Value Ref Range    Anion Gap 11.0 8.0 - 16.0 meq/L   Glomerular Filtration Rate, Estimated   Result Value Ref Range    Est, Glom Filt Rate >90 FN/PFW/9.95N0   Basic Metabolic Panel   Result Value Ref Range    Sodium 140 135 - 145 meq/L    Potassium 4.0 3.5 - 5.2 meq/L    Chloride 104 98 - 111 meq/L    CO2 26 23 - 33 meq/L    Glucose 82 70 - 108 mg/dL    BUN 14 7 - 22 mg/dL    CREATININE < 0.2 (L) 0.4 - 1.2 mg/dL    Calcium 8.7 8.5 - 10.5 mg/dL   CBC   Result Value Ref Range    WBC 7.2 4.8 - 10.8 thou/mm3    RBC 4.63 (L) 4.70 - 6.10 mill/mm3    Hemoglobin 13.7 (L) 14.0 - 18.0 gm/dl    Hematocrit 43.9 42.0 - 52.0 %    MCV 94.8 (H) 80.0 - 94.0 fL    MCH 29.6 26.0 - 33.0 pg    MCHC 31.2 (L) 32.2 - 35.5 gm/dl    RDW-CV 15.9 (H) 11.5 - 14.5 %    RDW-SD 54.5 (H) 35.0 - 45.0 fL    Platelets 866 534 - 186 thou/mm3    MPV 8.8 (L) 9.4 - 12.4 fL   Anion Gap   Result Value Ref Range    Anion Gap 10.0 8.0 - 16.0 meq/L   Glomerular Filtration Rate, Estimated   Result Value Ref Range    Est, Glom Filt Rate >90 VR/KFN/9.70Q7   Basic Metabolic Panel   Result Value Ref Range    Sodium 140 135 - 145 meq/L    Potassium 3.7 3.5 - 5.2 meq/L    Chloride 103 98 - 111 meq/L    CO2 25 23 - 33 meq/L    Glucose 173 (H) 70 - 108 mg/dL    BUN 15 7 - 22 mg/dL    CREATININE 0.2 (L) 0.4 - 1.2 mg/dL    Calcium 9.0 8.5 - 10.5 mg/dL   CBC   Result Value Ref Range    WBC 6.8 4.8 - 10.8 thou/mm3    RBC 4.63 (L) 4.70 - 6.10 mill/mm3    Hemoglobin 13.6 (L) 14.0 - 18.0 gm/dl    Hematocrit 43.3 42.0 - 52.0 %    MCV 93.5 80.0 - 94.0 fL    MCH 29.4 26.0 - 33.0 pg    MCHC 31.4 (L) 32.2 - 35.5 gm/dl    RDW-CV 15.9 (H) 11.5 - 14.5 %    RDW-SD 53.8 (H) 35.0 - 45.0 fL    Platelets 345 895 - 796 thou/mm3    MPV 9.1 (L) 9.4 - 12.4 fL   Anion Gap   Result Value Ref Range    Anion Gap 12.0 8.0 - 16.0 meq/L   Glomerular Filtration Rate, Estimated   Result Value Ref Range    Est, Glom Filt Rate >90 HS/LDZ/2.42C7   Basic Metabolic Panel   Result Value Ref Range    Sodium 138 135 - 145 meq/L    Potassium 3.8 3.5 - 5.2 meq/L    Chloride 102 98 - 111 meq/L    CO2 24 23 - 33 meq/L    Glucose 110 (H) 70 - 108 mg/dL    BUN 14 7 - 22 mg/dL    CREATININE < 0.2 (L) 0.4 - 1.2 mg/dL    Calcium 8.8 8.5 - 10.5 mg/dL   CBC   Result Value Ref Range    WBC 10.3 4.8 - 10.8 thou/mm3    RBC 4.35 (L) 4.70 - 6.10 mill/mm3    Hemoglobin 13.1 (L) 14.0 - 18.0 gm/dl    Hematocrit 41.3 (L) 42.0 - 52.0 %    MCV 94.9 (H) 80.0 - 94.0 fL    MCH 30.1 26.0 - 33.0 pg    MCHC 31.7 (L) 32.2 - 35.5 gm/dl    RDW-CV 15.9 (H) 11.5 - 14.5 %    RDW-SD 55.8 (H) 35.0 - 45.0 fL    Platelets 376 015 - 680 thou/mm3    MPV 8.6 (L) 9.4 - 12.4 fL   Anion Gap   Result Value Ref Range    Anion Gap 12.0 8.0 - 16.0 meq/L   Glomerular Filtration Rate, Estimated   Result Value Ref Range    Est, Glom Filt Rate >90 KB/EUE/3.06L5   Basic Metabolic Panel   Result Value Ref Range    Sodium 138 135 - 145 meq/L    Potassium 3.9 3.5 - 5.2 meq/L    Chloride 103 98 - 111 meq/L    CO2 25 23 - 33 meq/L    Glucose 97 70 - 108 mg/dL    BUN 13 7 - 22 mg/dL    CREATININE < 0.2 (L) 0.4 - 1.2 mg/dL    Calcium 8.7 8.5 - 10.5 mg/dL   CBC   Result Value Ref Range    WBC 6.8 4.8 - 10.8 thou/mm3    RBC 4.24 (L) 4.70 - 6.10 mill/mm3    Hemoglobin 12.4 (L) 14.0 - 18.0 gm/dl    Hematocrit 40.4 (L) 42.0 - 52.0 %    MCV 95.3 (H) 80.0 - 94.0 fL    MCH 29.2 26.0 - 33.0 pg    MCHC 30.7 (L) 32.2 - 35.5 gm/dl    RDW-CV 15.9 (H) 11.5 - 14.5 %    RDW-SD 55.6 (H) 35.0 - 45.0 fL    Platelets 117 532 - 555 thou/mm3    MPV 8.7 (L) 9.4 - 12.4 fL   Anion Gap   Result Value Ref Range    Anion Gap 10.0 8.0 - 16.0 meq/L   Glomerular Filtration Rate, Estimated   Result Value Ref Range    Est, Glom Filt Rate >90 ml/min/1.73m2            Results for orders placed during the hospital encounter of 06/10/19    MRI Brain W WO Contrast    Narrative  PROCEDURE: MRI BRAIN W WO CONTRAST    CLINICAL INFORMATIONCONFUSION/DELIRIUM, ALTERED LOC, UNEXPLAINED, multiple slcerosis. Altered mental status. History of multiple sclerosis. COMPARISON: Brain MRI 1/20/2018. Head CT 6/10/2019. TECHNIQUE: Multiplanar and multiple spin echo T1 and T2-weighted images were obtained through the brain before and after the administration of intravenous contrast.    FINDINGS:    Motion artifact does degrade the quality of some of these images. These are the best images possible at this time. The diffusion-weighted images are normal. The brain volume is moderately reduced. There are no intra-or extra-axial collections. There is no hydrocephalus, midline shift or mass effect. On the FLAIR and T2-weighted sequences, there is a moderate amount of abnormal signal in the deep white matter of the brain. Many of these radiate outward from the ventricular margins. This also involves the white matter of the temporal lobes. The  patient has a history of multiple sclerosis. This is stable. There are stable small old lacunar type infarcts in the right basal ganglia. There is an old cortical infarct with associated volume loss in the posterior right frontal lobe. On the gradient echo T2-weighted images, there is no susceptibility artifact present. There is no abnormal enhancement in the brain. The major intracranial vascular flow voids are present. The midline craniocervical junction structures are normal.  The brainstem and pituitary gland are normal.    Impression  1. No evidence of an acute infarct or active demyelination. 2. 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 3. Moderate amount of abnormal signal in the white matter of the brain consistent with multiple sclerosis and/or chronic small vessel ischemic changes. **This report has been created using voice recognition software. It may contain minor errors which are inherent in voice recognition technology. **      Final report electronically signed by Dr. Arnie Mejía on 6/11/2019 10:37 AM    No results found for this or any previous visit. Results for orders placed during the hospital encounter of 08/18/21    CT HEAD WO CONTRAST    Narrative  PROCEDURE: CT HEAD WO CONTRAST    CLINICAL INFORMATION: ams    COMPARISON: 6/10/2019    TECHNIQUE:  Axial CT images were obtained through the head without contrast. Coronal and sagittal reformatted images were rendered. All CT scans at this facility use dose modulation, iterative reconstruction, and/or weight-based dosing when appropriate  to reduce radiation dose to as low as reasonably achievable. FINDINGS:    There is moderate diffuse atrophy. No acute intracranial hemorrhage or mass effect is seen. There is no midline shift. There are chronic appearing focal areas of low attenuation within the right basal ganglia region likely representing sequela from prior remote infarcts. The basal cisterns and posterior fossa appear otherwise unremarkable. Marked vascular calcifications along the parasellar carotid and vertebral arteries are seen. The visualized paranasal sinuses appear well-pneumatized. No acute abnormalities of the calvarium are seen. Impression  1. No acute intracranial hemorrhage or mass effect. 2. Moderate diffuse atrophy is again seen. 3. 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 **This report has been created using voice recognition software. It may contain minor errors which are inherent in voice recognition technology. **    Final report electronically signed by Dr. Julia Garg on 8/18/2021 4:55 PM         Assessment:     Diagnosis Orders   1. Multiple sclerosis (HCC)     2. Paraparesis of both lower limbs ([address]     3. Right-sided face pain         Follow up for Multiple sclerosis, paraparesis of both lower limbs, right sided face pain.  He is doing well with the face

## 2022-07-11 NOTE — PATIENT INSTRUCTIONS
1. Continue with Lyrica 25 mg two times a day  2. You need to take calcium and vitamin D over the counter  3. Call with any new symptoms or concerns. 4. Follow up in 8 months.

## 2022-07-19 ENCOUNTER — OFFICE VISIT (OUTPATIENT)
Dept: UROLOGY | Age: 67
End: 2022-07-19
Payer: COMMERCIAL

## 2022-07-19 VITALS
HEIGHT: 67 IN | WEIGHT: 201 LBS | DIASTOLIC BLOOD PRESSURE: 78 MMHG | BODY MASS INDEX: 31.55 KG/M2 | SYSTOLIC BLOOD PRESSURE: 110 MMHG

## 2022-07-19 DIAGNOSIS — N31.9 NEUROGENIC BLADDER: ICD-10-CM

## 2022-07-19 DIAGNOSIS — N20.0 KIDNEY STONE: Primary | ICD-10-CM

## 2022-07-19 DIAGNOSIS — N21.0 BLADDER STONE: ICD-10-CM

## 2022-07-19 DIAGNOSIS — Z93.59 CHRONIC SUPRAPUBIC CATHETER (HCC): ICD-10-CM

## 2022-07-19 PROCEDURE — 1123F ACP DISCUSS/DSCN MKR DOCD: CPT | Performed by: UROLOGY

## 2022-07-19 PROCEDURE — 99214 OFFICE O/P EST MOD 30 MIN: CPT | Performed by: UROLOGY

## 2022-07-19 RX ORDER — ONDANSETRON 4 MG/1
4 TABLET, FILM COATED ORAL EVERY 8 HOURS PRN
COMMUNITY

## 2022-07-19 NOTE — PROGRESS NOTES
[address] [address]  Dept: [phone]  Dept Fax: [phone]  Loc: [address] [facility] Urology Office Note -     Patient:  Humberto Canada  YOB: 1955    The patient is a 74 y.o. male who presents today for evaluation of the following problems:   Chief Complaint   Patient presents with    Follow-up     Neurogenic bladder and history of bladder stones        History of Present Illness:    Neurogenic bladder  Has chronic suprapubic jeffers    Kidney stones  No pain from stones  Had attempted stent placement last admission- difficult to visualize uo       Gross hematuria  Intermittent  Cysto was negative for cancer recently  Blood thinners are held at this time       Requested/reviewed records from Ge Flores MD office and/or outside [de-identified]    (Patient's old records have been requested, reviewed and pertinent findings summarized in today's note.)    Procedures Today: N/A    Last several PSA's:  Lab Results   Component Value Date    PSA 2.66 (H) 01/20/2012       Last total testosterone:  No results found for: TESTOSTERONE    Urinalysis today:  No results found for this visit on 07/19/22. Last BUN and creatinine:  Lab Results   Component Value Date    BUN 13 06/18/2022     Lab Results   Component Value Date    CREATININE < 0.2 (L) 06/18/2022       Imaging Reviewed during this Office Visit:   Rao Maza MD independently reviewed the images and verified the radiology reports from:    XR ABDOMEN (KUB) (SINGLE AP VIEW)    Result Date: 6/12/2022  PROCEDURE: XR ABDOMEN (KUB) (SINGLE AP VIEW) CLINICAL INFORMATION: Abdominal distention w/ a colostomy. COMPARISON: KUB dated 18 April 2016. TECHNIQUE: A supine AP view of the abdomen was obtained. FINDINGS: There is a moderate amount of stool in the right colon.  There are dilated small bowel loops in the right lower quadrant. . There  is no gross free air. No suspicious densities are present. There are possible changes of ankylosing spondylitis involving the lumbar spine. .     1. Moderate amount of stool in the right colon. 2. Dilated small bowel loops in the right lower quadrant. 3. Changes of ankylosing spondylitis in the lumbar spine. 4. Otherwise nonspecific abdomen. **This report has been created using voice recognition software. It may contain minor errors which are inherent in voice recognition technology. ** Final report electronically signed by DR Keeley Mauro on 6/12/2022 8:00 AM      PAST MEDICAL, FAMILY AND SOCIAL HISTORY:  Past Medical History:   Diagnosis Date    Dysphagia     oropharyngeal    History of pulmonary embolism     History of urinary tract infection     Hx of blood clots     Pulmonary embolis    Hypertension     Major depressive disorder, single episode     MS (multiple sclerosis) (HCC)     Neurogenic bladder feb. 2012    Dr. Farida Sequeira placed cather    [facility])     UTI (urinary tract infection)      Past Surgical History:   Procedure Laterality Date    ANKLE SURGERY  1996    broken ankle    BLADDER SURGERY  2-    Suprapubic catheter placement    COLONOSCOPY      CYSTO/URETERO/PYELOSCOPY, CALCULUS TX Left 6/19/2019    CYSTOSCOPY, LEFT STENT insertion, bladder irragation with bladder stones performed by King Merlos MD at [address] [place] [address] N/A 7/8/2019    CYSTO, LEFT URETERAL STENT REMOVAL, LEFT URETEROSCOPY, LASER LITHOTRIPSY, BASKET RETRIEVAL OF STONE FRAGMENTS performed by King Merlos MD at [address] [place] [address] 2/26/2021    CYSTOSCOPY, CYSTOLITHOLAPAXY, SUPRAPUBIC CATHETER EXCHANGE performed by Digna Gonsalez MD at [facility] [place] [facility] 6/15/2022    CYSTOSCOPY performed by Digna Gonsalez MD at [address] [place] [address]. 6/13/2018    ROBOT DIVERTING COLOSTOMY performed by Loree Infante MD at [place] [facility] TONSILLECTOMY  child     Family History   Problem Relation Age of Onset    Cancer Mother         Breast    Heart Disease Father 48    Arthritis Sister     Heart Disease Paternal Grandmother 47     Outpatient Medications Marked as Taking for the 7/19/22 encounter (Office Visit) with Artur Dove MD   Medication Sig Dispense Refill    ondansetron (ZOFRAN) 4 MG tablet Take 4 mg by mouth every 8 hours as needed for Nausea or Vomiting      carbamide peroxide (DEBROX) 6.5 % otic solution 5 drops 2 times daily Instill 5 drops in both ears two times daily for ear wax use for 4 days. vitamin E 400 UNIT capsule Take 400 Units by mouth daily      aspirin 81 MG EC tablet Take 81 mg by mouth daily      oxybutynin (DITROPAN-XL) 5 MG extended release tablet Take 5 mg by mouth in the morning and at bedtime      gentamicin (GARAMYCIN) 0.1 % ointment Apply topically daily Apply to wound bed      erythromycin (ROMYCIN) 5 MG/GM ophthalmic ointment Place into the right eye nightly (0.25 ribbon to right eye)      Ascorbic Acid (VITAMIN C ER PO) Take 500 mg by mouth in the morning and at bedtime       baclofen (LIORESAL) 10 MG tablet Take 10 mg by mouth 3 times daily      gabapentin (NEURONTIN) 600 MG tablet Take 600 mg by mouth 3 times daily.        acetaminophen (TYLENOL) 325 MG tablet Take 650 mg by mouth every 4 hours as needed for Pain or Fever      ibuprofen (ADVIL;MOTRIN) 400 MG tablet Take 400 mg by mouth every 4 hours as needed for Fever (for temp > 100.5 not alleviated by acetaminophen)      LACTOBACILLUS PO Take 1 capsule by mouth in the morning and at bedtime       metoprolol tartrate (LOPRESSOR) 25 MG tablet Take 1 tablet by mouth 2 times daily (Patient taking differently: Take 50 mg by mouth in the morning and 50 mg before bedtime.) 60 tablet 3    potassium chloride (KLOR-CON M) 20 MEQ TBCR extended release tablet Take 2 tablets by mouth daily 60 tablet 3    sodium chloride 1 g tablet Take 1 tablet by mouth 2 times daily (with meals) 90 tablet 3    calcium-vitamin D (OSCAL-500) 500-200 MG-UNIT per tablet Take 1 tablet by mouth 2 times daily 30 tablet 3    famotidine (PEPCID) 20 MG tablet Take 1 tablet by mouth 2 times daily 60 tablet 3    docusate sodium (COLACE) 100 MG capsule Take 100 mg by mouth daily       tiZANidine (ZANAFLEX) 2 MG tablet Take 2 mg by mouth 3 times daily 0600;1400;2200      Multiple Vitamin (MULTIVITAMIN) tablet Take 1 tablet by mouth daily  0    vitamin A 08312 UNITS capsule Take 2 capsules by mouth daily 30 capsule 3       Patient has no known allergies. Social History     Tobacco Use   Smoking Status Former    Types: Cigarettes    Quit date: 1982    Years since quittin.5   Smokeless Tobacco Former      (If patient a smoker, smoking cessation counseling offered)   Social History     Substance and Sexual Activity   Alcohol Use No    Comment: \"quit alcohol a few years ago\"       REVIEW OF SYSTEMS:  Constitutional: negative  Eyes: negative  Respiratory: negative  Cardiovascular: negative  Gastrointestinal: negative  Genitourinary: see HPI  Musculoskeletal: negative  Skin: negative   Neurological: negative  Hematological/Lymphatic: negative  Psychological: negative        Physical Exam:    This a 74 y.o. male  Vitals:    22 1458   BP: 110/78     Body mass index is 31.48 kg/m². Constitutional: Patient in no acute distress; Spt draining yellow urine    Assessment and Plan        1. Kidney stone    2. Bladder stone    3. Neurogenic bladder    4. Chronic suprapubic catheter (HCC)               Plan:      Neurogenic bladder- Spt changes every month  Bladder stones- No new bladder stones  Gross hematuria- Hematuria is intermittent and from bacterial colonization and irritation from catheter. No need for any further intervention. Ok to restart blood thinners  Kidney stones- not clinically concerning at this time.  Kub negative      Kub in six months      Prescriptions Ordered:  No orders of the defined types were placed in this encounter. Orders Placed:  No orders of the defined types were placed in this encounter.            Beatrice Lino MD

## 2022-07-20 ENCOUNTER — APPOINTMENT (OUTPATIENT)
Dept: CT IMAGING | Age: 67
DRG: 659 | End: 2022-07-20
Payer: COMMERCIAL

## 2022-07-20 ENCOUNTER — APPOINTMENT (OUTPATIENT)
Dept: GENERAL RADIOLOGY | Age: 67
DRG: 659 | End: 2022-07-20
Payer: COMMERCIAL

## 2022-07-20 ENCOUNTER — HOSPITAL ENCOUNTER (INPATIENT)
Age: 67
LOS: 7 days | Discharge: NURSING, REHAB OR LONG-TERM CARE | DRG: 659 | End: 2022-07-27
Attending: EMERGENCY MEDICINE
Payer: COMMERCIAL

## 2022-07-20 DIAGNOSIS — N39.0 SEPSIS SECONDARY TO UTI (HCC): ICD-10-CM

## 2022-07-20 DIAGNOSIS — T83.511A URINARY TRACT INFECTION ASSOCIATED WITH INDWELLING URETHRAL CATHETER, INITIAL ENCOUNTER (HCC): ICD-10-CM

## 2022-07-20 DIAGNOSIS — A41.9 SEPSIS SECONDARY TO UTI (HCC): ICD-10-CM

## 2022-07-20 DIAGNOSIS — R41.82 ALTERED MENTAL STATUS, UNSPECIFIED ALTERED MENTAL STATUS TYPE: ICD-10-CM

## 2022-07-20 DIAGNOSIS — R77.8 TROPONIN LEVEL ELEVATED: ICD-10-CM

## 2022-07-20 DIAGNOSIS — E83.42 HYPOMAGNESEMIA: ICD-10-CM

## 2022-07-20 DIAGNOSIS — N39.0 URINARY TRACT INFECTION ASSOCIATED WITH INDWELLING URETHRAL CATHETER, INITIAL ENCOUNTER (HCC): ICD-10-CM

## 2022-07-20 DIAGNOSIS — E87.6 HYPOKALEMIA: Primary | ICD-10-CM

## 2022-07-20 LAB
ALBUMIN SERPL-MCNC: 3.4 G/DL (ref 3.5–5.1)
ALP BLD-CCNC: 137 U/L (ref 38–126)
ALT SERPL-CCNC: 15 U/L (ref 11–66)
AMMONIA: 34 UMOL/L (ref 11–60)
AMORPHOUS: ABNORMAL
ANION GAP SERPL CALCULATED.3IONS-SCNC: 11 MEQ/L (ref 8–16)
AST SERPL-CCNC: 21 U/L (ref 5–40)
BACTERIA: ABNORMAL /HPF
BASOPHILS # BLD: 0.3 %
BASOPHILS ABSOLUTE: 0 THOU/MM3 (ref 0–0.1)
BILIRUB SERPL-MCNC: 0.4 MG/DL (ref 0.3–1.2)
BILIRUBIN URINE: ABNORMAL
BLOOD, URINE: ABNORMAL
BUN BLDV-MCNC: 29 MG/DL (ref 7–22)
CALCIUM SERPL-MCNC: 9.3 MG/DL (ref 8.5–10.5)
CASTS UA: ABNORMAL /LPF
CHARACTER, URINE: ABNORMAL
CHLORIDE BLD-SCNC: 104 MEQ/L (ref 98–111)
CO2: 24 MEQ/L (ref 23–33)
COLOR: ABNORMAL
CREAT SERPL-MCNC: 0.6 MG/DL (ref 0.4–1.2)
CRYSTALS, UA: ABNORMAL
EKG ATRIAL RATE: 97 BPM
EKG P AXIS: 41 DEGREES
EKG P-R INTERVAL: 138 MS
EKG Q-T INTERVAL: 340 MS
EKG QRS DURATION: 76 MS
EKG QTC CALCULATION (BAZETT): 431 MS
EKG R AXIS: 39 DEGREES
EKG T AXIS: 47 DEGREES
EKG VENTRICULAR RATE: 97 BPM
EOSINOPHIL # BLD: 0.1 %
EOSINOPHILS ABSOLUTE: 0 THOU/MM3 (ref 0–0.4)
EPITHELIAL CELLS, UA: ABNORMAL /HPF
ERYTHROCYTE [DISTWIDTH] IN BLOOD BY AUTOMATED COUNT: 15.9 % (ref 11.5–14.5)
ERYTHROCYTE [DISTWIDTH] IN BLOOD BY AUTOMATED COUNT: 54.2 FL (ref 35–45)
FLU A ANTIGEN: NEGATIVE
FLU B ANTIGEN: NEGATIVE
GFR SERPL CREATININE-BSD FRML MDRD: > 90 ML/MIN/1.73M2
GLUCOSE BLD-MCNC: 131 MG/DL (ref 70–108)
GLUCOSE BLD-MCNC: 146 MG/DL (ref 70–108)
GLUCOSE URINE: NEGATIVE MG/DL
HCT VFR BLD CALC: 44.4 % (ref 42–52)
HEMOGLOBIN: 14.1 GM/DL (ref 14–18)
ICTOTEST: NEGATIVE
IMMATURE GRANS (ABS): 0.06 THOU/MM3 (ref 0–0.07)
IMMATURE GRANULOCYTES: 0.4 %
KETONES, URINE: ABNORMAL
LACTIC ACID, SEPSIS: 1.3 MMOL/L (ref 0.5–1.9)
LACTIC ACID, SEPSIS: 1.9 MMOL/L (ref 0.5–1.9)
LACTIC ACID: 2.1 MMOL/L (ref 0.5–2)
LEUKOCYTE ESTERASE, URINE: ABNORMAL
LIPASE: 13.3 U/L (ref 5.6–51.3)
LYMPHOCYTES # BLD: 10.5 %
LYMPHOCYTES ABSOLUTE: 1.6 THOU/MM3 (ref 1–4.8)
MCH RBC QN AUTO: 29.6 PG (ref 26–33)
MCHC RBC AUTO-ENTMCNC: 31.8 GM/DL (ref 32.2–35.5)
MCV RBC AUTO: 93.1 FL (ref 80–94)
MONOCYTES # BLD: 7.9 %
MONOCYTES ABSOLUTE: 1.2 THOU/MM3 (ref 0.4–1.3)
NITRITE, URINE: POSITIVE
NUCLEATED RED BLOOD CELLS: 0 /100 WBC
OSMOLALITY CALCULATION: 286 MOSMOL/KG (ref 275–300)
PH UA: 8.5 (ref 5–9)
PLATELET # BLD: 249 THOU/MM3 (ref 130–400)
PMV BLD AUTO: 8.8 FL (ref 9.4–12.4)
POTASSIUM SERPL-SCNC: 5.2 MEQ/L (ref 3.5–5.2)
PRO-BNP: 125.2 PG/ML (ref 0–900)
PROCALCITONIN: 2.23 NG/ML (ref 0.01–0.09)
PROTEIN UA: >= 300
RBC # BLD: 4.77 MILL/MM3 (ref 4.7–6.1)
RBC URINE: > 200 /HPF
RENAL EPITHELIAL, UA: ABNORMAL
SARS-COV-2, NAAT: NOT  DETECTED
SEG NEUTROPHILS: 80.8 %
SEGMENTED NEUTROPHILS ABSOLUTE COUNT: 12.7 THOU/MM3 (ref 1.8–7.7)
SODIUM BLD-SCNC: 139 MEQ/L (ref 135–145)
SPECIFIC GRAVITY, URINE: 1.02 (ref 1–1.03)
TOTAL PROTEIN: 7.4 G/DL (ref 6.1–8)
TROPONIN T: 0.03 NG/ML
TROPONIN T: 0.03 NG/ML
UROBILINOGEN, URINE: 1 EU/DL (ref 0–1)
WBC # BLD: 15.7 THOU/MM3 (ref 4.8–10.8)
WBC UA: ABNORMAL /HPF
YEAST: ABNORMAL

## 2022-07-20 PROCEDURE — 36415 COLL VENOUS BLD VENIPUNCTURE: CPT

## 2022-07-20 PROCEDURE — 82948 REAGENT STRIP/BLOOD GLUCOSE: CPT

## 2022-07-20 PROCEDURE — 83880 ASSAY OF NATRIURETIC PEPTIDE: CPT

## 2022-07-20 PROCEDURE — 87077 CULTURE AEROBIC IDENTIFY: CPT

## 2022-07-20 PROCEDURE — 6360000002 HC RX W HCPCS

## 2022-07-20 PROCEDURE — 6370000000 HC RX 637 (ALT 250 FOR IP)

## 2022-07-20 PROCEDURE — 82140 ASSAY OF AMMONIA: CPT

## 2022-07-20 PROCEDURE — 2580000003 HC RX 258: Performed by: EMERGENCY MEDICINE

## 2022-07-20 PROCEDURE — 83690 ASSAY OF LIPASE: CPT

## 2022-07-20 PROCEDURE — 80053 COMPREHEN METABOLIC PANEL: CPT

## 2022-07-20 PROCEDURE — 1200000003 HC TELEMETRY R&B

## 2022-07-20 PROCEDURE — 99285 EMERGENCY DEPT VISIT HI MDM: CPT

## 2022-07-20 PROCEDURE — 70450 CT HEAD/BRAIN W/O DYE: CPT

## 2022-07-20 PROCEDURE — 81001 URINALYSIS AUTO W/SCOPE: CPT

## 2022-07-20 PROCEDURE — 99223 1ST HOSP IP/OBS HIGH 75: CPT | Performed by: INTERNAL MEDICINE

## 2022-07-20 PROCEDURE — 93005 ELECTROCARDIOGRAM TRACING: CPT | Performed by: EMERGENCY MEDICINE

## 2022-07-20 PROCEDURE — 85025 COMPLETE CBC W/AUTO DIFF WBC: CPT

## 2022-07-20 PROCEDURE — 93010 ELECTROCARDIOGRAM REPORT: CPT | Performed by: INTERNAL MEDICINE

## 2022-07-20 PROCEDURE — 87804 INFLUENZA ASSAY W/OPTIC: CPT

## 2022-07-20 PROCEDURE — 84145 PROCALCITONIN (PCT): CPT

## 2022-07-20 PROCEDURE — 87040 BLOOD CULTURE FOR BACTERIA: CPT

## 2022-07-20 PROCEDURE — 1200000000 HC SEMI PRIVATE

## 2022-07-20 PROCEDURE — 74176 CT ABD & PELVIS W/O CONTRAST: CPT

## 2022-07-20 PROCEDURE — 71045 X-RAY EXAM CHEST 1 VIEW: CPT

## 2022-07-20 PROCEDURE — 84484 ASSAY OF TROPONIN QUANT: CPT

## 2022-07-20 PROCEDURE — 87801 DETECT AGNT MULT DNA AMPLI: CPT

## 2022-07-20 PROCEDURE — 87186 SC STD MICRODIL/AGAR DIL: CPT

## 2022-07-20 PROCEDURE — 87635 SARS-COV-2 COVID-19 AMP PRB: CPT

## 2022-07-20 PROCEDURE — 2580000003 HC RX 258: Performed by: STUDENT IN AN ORGANIZED HEALTH CARE EDUCATION/TRAINING PROGRAM

## 2022-07-20 PROCEDURE — 87086 URINE CULTURE/COLONY COUNT: CPT

## 2022-07-20 PROCEDURE — 6360000002 HC RX W HCPCS: Performed by: EMERGENCY MEDICINE

## 2022-07-20 PROCEDURE — 83605 ASSAY OF LACTIC ACID: CPT

## 2022-07-20 PROCEDURE — 2580000003 HC RX 258

## 2022-07-20 RX ORDER — SODIUM CHLORIDE 0.9 % (FLUSH) 0.9 %
5-40 SYRINGE (ML) INJECTION EVERY 12 HOURS SCHEDULED
Status: DISCONTINUED | OUTPATIENT
Start: 2022-07-20 | End: 2022-07-27 | Stop reason: HOSPADM

## 2022-07-20 RX ORDER — SODIUM CHLORIDE 9 MG/ML
INJECTION, SOLUTION INTRAVENOUS CONTINUOUS
Status: DISCONTINUED | OUTPATIENT
Start: 2022-07-20 | End: 2022-07-26

## 2022-07-20 RX ORDER — BACLOFEN 10 MG/1
10 TABLET ORAL 3 TIMES DAILY
Status: DISCONTINUED | OUTPATIENT
Start: 2022-07-20 | End: 2022-07-27 | Stop reason: HOSPADM

## 2022-07-20 RX ORDER — ONDANSETRON 2 MG/ML
4 INJECTION INTRAMUSCULAR; INTRAVENOUS EVERY 6 HOURS PRN
Status: DISCONTINUED | OUTPATIENT
Start: 2022-07-20 | End: 2022-07-27 | Stop reason: HOSPADM

## 2022-07-20 RX ORDER — TIZANIDINE 4 MG/1
2 TABLET ORAL 3 TIMES DAILY
Status: DISCONTINUED | OUTPATIENT
Start: 2022-07-20 | End: 2022-07-27 | Stop reason: HOSPADM

## 2022-07-20 RX ORDER — SODIUM CHLORIDE 0.9 % (FLUSH) 0.9 %
5-40 SYRINGE (ML) INJECTION PRN
Status: DISCONTINUED | OUTPATIENT
Start: 2022-07-20 | End: 2022-07-27 | Stop reason: HOSPADM

## 2022-07-20 RX ORDER — DOCUSATE SODIUM 100 MG/1
100 CAPSULE, LIQUID FILLED ORAL DAILY
Status: DISCONTINUED | OUTPATIENT
Start: 2022-07-21 | End: 2022-07-23

## 2022-07-20 RX ORDER — POLYETHYLENE GLYCOL 3350 17 G/17G
17 POWDER, FOR SOLUTION ORAL DAILY PRN
Status: DISCONTINUED | OUTPATIENT
Start: 2022-07-20 | End: 2022-07-27 | Stop reason: HOSPADM

## 2022-07-20 RX ORDER — GABAPENTIN 600 MG/1
600 TABLET ORAL 3 TIMES DAILY
Status: DISCONTINUED | OUTPATIENT
Start: 2022-07-20 | End: 2022-07-27 | Stop reason: HOSPADM

## 2022-07-20 RX ORDER — 0.9 % SODIUM CHLORIDE 0.9 %
30 INTRAVENOUS SOLUTION INTRAVENOUS ONCE
Status: COMPLETED | OUTPATIENT
Start: 2022-07-20 | End: 2022-07-20

## 2022-07-20 RX ORDER — ENOXAPARIN SODIUM 100 MG/ML
40 INJECTION SUBCUTANEOUS DAILY
Status: DISCONTINUED | OUTPATIENT
Start: 2022-07-20 | End: 2022-07-27 | Stop reason: HOSPADM

## 2022-07-20 RX ORDER — OXYBUTYNIN CHLORIDE 5 MG/1
5 TABLET, EXTENDED RELEASE ORAL 2 TIMES DAILY
Status: DISCONTINUED | OUTPATIENT
Start: 2022-07-20 | End: 2022-07-27 | Stop reason: HOSPADM

## 2022-07-20 RX ORDER — ACETAMINOPHEN 650 MG/1
650 SUPPOSITORY RECTAL EVERY 6 HOURS PRN
Status: DISCONTINUED | OUTPATIENT
Start: 2022-07-20 | End: 2022-07-27 | Stop reason: HOSPADM

## 2022-07-20 RX ORDER — ONDANSETRON 4 MG/1
4 TABLET, ORALLY DISINTEGRATING ORAL EVERY 8 HOURS PRN
Status: DISCONTINUED | OUTPATIENT
Start: 2022-07-20 | End: 2022-07-27 | Stop reason: HOSPADM

## 2022-07-20 RX ORDER — ACETAMINOPHEN 325 MG/1
650 TABLET ORAL EVERY 6 HOURS PRN
Status: DISCONTINUED | OUTPATIENT
Start: 2022-07-20 | End: 2022-07-27 | Stop reason: HOSPADM

## 2022-07-20 RX ORDER — FAMOTIDINE 20 MG/1
20 TABLET, FILM COATED ORAL 2 TIMES DAILY
Status: DISCONTINUED | OUTPATIENT
Start: 2022-07-20 | End: 2022-07-27 | Stop reason: HOSPADM

## 2022-07-20 RX ORDER — SODIUM CHLORIDE 9 MG/ML
INJECTION, SOLUTION INTRAVENOUS PRN
Status: DISCONTINUED | OUTPATIENT
Start: 2022-07-20 | End: 2022-07-27 | Stop reason: HOSPADM

## 2022-07-20 RX ADMIN — ACETAMINOPHEN 325MG 650 MG: 325 TABLET ORAL at 15:42

## 2022-07-20 RX ADMIN — TIZANIDINE 2 MG: 4 TABLET ORAL at 15:42

## 2022-07-20 RX ADMIN — SODIUM CHLORIDE 2736 ML: 9 INJECTION, SOLUTION INTRAVENOUS at 10:32

## 2022-07-20 RX ADMIN — GABAPENTIN 600 MG: 600 TABLET, FILM COATED ORAL at 15:42

## 2022-07-20 RX ADMIN — SODIUM CHLORIDE, PRESERVATIVE FREE 10 ML: 5 INJECTION INTRAVENOUS at 20:28

## 2022-07-20 RX ADMIN — BACLOFEN 10 MG: 10 TABLET ORAL at 15:42

## 2022-07-20 RX ADMIN — ENOXAPARIN SODIUM 40 MG: 100 INJECTION SUBCUTANEOUS at 15:37

## 2022-07-20 RX ADMIN — MEROPENEM 1000 MG: 1 INJECTION, POWDER, FOR SOLUTION INTRAVENOUS at 23:24

## 2022-07-20 RX ADMIN — MEROPENEM 1000 MG: 1 INJECTION, POWDER, FOR SOLUTION INTRAVENOUS at 11:54

## 2022-07-20 RX ADMIN — SODIUM CHLORIDE: 9 INJECTION, SOLUTION INTRAVENOUS at 15:57

## 2022-07-20 ASSESSMENT — PAIN - FUNCTIONAL ASSESSMENT
PAIN_FUNCTIONAL_ASSESSMENT: NONE - DENIES PAIN
PAIN_FUNCTIONAL_ASSESSMENT: NONE - DENIES PAIN

## 2022-07-20 ASSESSMENT — PAIN SCALES - WONG BAKER
WONGBAKER_NUMERICALRESPONSE: 0

## 2022-07-20 NOTE — ED NOTES
ED to inpatient nurses report    Chief Complaint   Patient presents with    Fatigue    Altered Mental Status      Present to ED from nursing home  LOC: alert and orientated to name, place, date  Vital signs   Vitals:    07/20/22 0927 07/20/22 0939 07/20/22 1036 07/20/22 1059   BP: 88/67 115/63 118/73 118/76   Pulse: 96  83 (!) 102   Resp: 22 20 20 20   Temp: 99 °F (37.2 °C)      TempSrc: Oral      SpO2: 90% 94% 98% 96%   Weight: 201 lb (91.2 kg)         Oxygen Baseline 90%    Current needs required 3L NC Bipap/Cpap No  LDAs:   Peripheral IV 07/20/22 Left Antecubital (Active)   Site Assessment Clean, dry & intact 07/20/22 0940   Line Status Flushed; Infusing 07/20/22 0940   Phlebitis Assessment No symptoms 07/20/22 0940   Infiltration Assessment 0 07/20/22 0940   Dressing Status Clean, dry & intact 07/20/22 0940       Peripheral IV 07/20/22 Right Forearm (Active)   Site Assessment Clean, dry & intact 07/20/22 0941   Line Status Blood return noted;Specimen collected; Flushed 07/20/22 0941   Phlebitis Assessment No symptoms 07/20/22 0941   Infiltration Assessment 0 07/20/22 0941   Dressing Status Clean, dry & intact 07/20/22 0941     Mobility: Fully dependent  Pending ED orders: NA  Present condition: stable    C-SSRS    Swallow Screening      Electronically signed by Janine Still RN on 7/20/2022 at 11:54 AM       Janine Still RN  07/20/22 5261

## 2022-07-20 NOTE — ED NOTES
Pt transported to [facility] on cart in stable condition. Floor contacted before transport and spoke with Pa Velaqsuez.      Lorna Harper  07/20/22 1146

## 2022-07-20 NOTE — PROGRESS NOTES
Pt admitted to  5K10 via via cart/stretcher from ED and Nursing Home: [facility] . Complaints: Hypotension and Unresponsive Episode. IV normal saline infusing into the antecubital left, condition patent and no redness at a rate of 999 mls/ hour with about 600 mls in the bag still. IV antibiotic infusing into the writst right, condition patent and no redress. IV site free of s/s of infection or infiltration. Vital signs obtained. Assessment and data collection initiated. Two nurse skin assessment performed by Eileen Agee RN and [facility] RN. Oriented to room. Policies and procedures for 5K explained. All questions answered with no further questions at this time. Fall prevention and safety brochure discussed with patient. Bed alarm on. Call light in reach. Oriented to room. Izzy Ariza RN, RN 7/20/2022 12:53 PM     Explained patients right to have family, representative or physician notified of their admission. Patient has Declined for physician to be notified. Patient has Declined for family/representative to be notified.

## 2022-07-20 NOTE — ED NOTES
Pt appears to be resting on cot. No distress noted. Respirations even and unlabored. Call light in reach. Pt denies any pain. Will monitor.       Alberto Kearns RN  07/20/22 1100

## 2022-07-20 NOTE — ED TRIAGE NOTES
Pt to ED from [facility] due to an \"unresponsive episode\" and hypotension. Upon arrival pt appears alert however slow to respond. Pt appears sweaty and hot to the touch. Pt states that he does not feel well all of a sudden. Nursing home staff mention a new onset of hematuria. EKG done.

## 2022-07-20 NOTE — H&P
Internal Medicine Resident History and Physical          Patient: Brandt Gonsalves  : 1955  MRN: [id number]     Acct: [de-identified]    PCP: Gloria Ocampo MD  Date of Admission: 2022  Date of Service: Pt seen/examined on 22  and Admitted to Inpatient with expected LOS greater than two midnights due to medical therapy. Hospital Problems             Last Modified POA    * (Principal) Acute metabolic encephalopathy  Yes    Sepsis ([address] 2022 Yes     Assessment and Plan:  Severe sepsis 2/2 UTI: Active  Pt has indwelling catheter   3/4 SIRS criteria (fever, tachypnea, tachycardia)  + source (UTI) + signs of end organ damage (elevated troponin)  Lactate 2.1  Plan  Trend lactate  30 cc/kg/hr bolus given in the ER (2,736 mL total)  Maintenance fluids   2 blood cultures pending  Urine culture pending   Meropenem started (day 1)  UTI 2/2 indwelling catheter:  Active  UTI due to indwelling catheter  UA was nitrate positive and had moderate leukocyte esterase  Previous urine culture showed ESBL organisms  Plan  Urology consulted for catheter exchange  Urine culture  Meropenem started (day 1)  Acute metabolic encephalopathy 2/2 UTI: Active  CT head showed no acute abnormality  Treat as above  Elevated troponin 2/2 sepsis: active  Low concern for ACS  Type 2 demand ischemia  EKG showed diffuse, chronic J point elevations   Trend troponin   Primary hypertension: chronic  Home meds (Metoprolol tartrate) were restarted  Multiple sclerosis: chronic  Not on immunosuppressive drugs   Follows with neurology   Suprapubic tunneling wound & ostomy bag: chronic  Wound nurse was consulted     =======================================================================    Chief Complaint:  Altered mental status and fatigue    History Of Present Illness:  Brandt Gonsalves is a 74 y.o. male with PMHx of HTN, PE, UTI, and neurogenic bladder who presents to [address] [place] [address] with altered mental status and fatigue. He was initially alert but very slow to respond. During my exam the pt was very fatigued and could not stay awake to answer my questions. ED course: Vitals: T 99, RR 22, P 96, BP 88/67, 90% on RA. Pt was given fluids. CBC, BNP, UA, and urea were obtained. EKG, CXR, and CT head were ordered. Past Medical History:        Diagnosis Date    Dysphagia     oropharyngeal    History of pulmonary embolism     History of urinary tract infection     Hx of blood clots     Pulmonary embolis    Hypertension     Major depressive disorder, single episode     MS (multiple sclerosis) (HCC)     Neurogenic bladder feb. 2012    Dr. Aurelia Benton placed cather    Osteomyelitis [facility])     UTI (urinary tract infection)        Past Surgical History:        Procedure Laterality Date    ANKLE SURGERY  1996    broken ankle    BLADDER SURGERY  2-    Suprapubic catheter placement    COLONOSCOPY      CYSTO/URETERO/PYELOSCOPY, CALCULUS TX Left 6/19/2019    CYSTOSCOPY, LEFT STENT insertion, bladder irragation with bladder stones performed by Delmy Kruger MD at [address] [place] [address] N/A 7/8/2019    CYSTO, LEFT URETERAL STENT REMOVAL, LEFT URETEROSCOPY, LASER LITHOTRIPSY, BASKET RETRIEVAL OF STONE FRAGMENTS performed by Delmy Kruger MD at [address] [place] [address] 2/26/2021    CYSTOSCOPY, CYSTOLITHOLAPAXY, SUPRAPUBIC CATHETER EXCHANGE performed by Guevara Monteiro MD at [address] [place] [address] 6/15/2022    CYSTOSCOPY performed by Guevara Monteiro MD at [facility]. 6/13/2018    ROBOT DIVERTING COLOSTOMY performed by Adela Giraldo MD at [address]  child       Medications Prior to Admission:   Prior to Admission medications    Medication Sig Start Date End Date Taking?  Authorizing Provider   ondansetron (ZOFRAN) 4 MG tablet Take 4 mg by mouth every 8 hours as needed for Nausea or Vomiting    Historical Provider, MD   carbamide peroxide (DEBROX) 6.5 % otic solution 5 drops 2 times daily Instill 5 drops in both ears two times daily for ear wax use for 4 days. Historical Provider, MD   vitamin E 400 UNIT capsule Take 400 Units by mouth daily    Historical Provider, MD   aspirin 81 MG EC tablet Take 81 mg by mouth daily    Historical Provider, MD   oxybutynin (DITROPAN-XL) 5 MG extended release tablet Take 5 mg by mouth in the morning and at bedtime    Historical Provider, MD   gentamicin (GARAMYCIN) 0.1 % ointment Apply topically daily Apply to wound bed    Historical Provider, MD   erythromycin (ROMYCIN) 5 MG/GM ophthalmic ointment Place into the right eye nightly (0.25 ribbon to right eye)    Historical Provider, MD   Ascorbic Acid (VITAMIN C ER PO) Take 500 mg by mouth in the morning and at bedtime     Historical Provider, MD   baclofen (LIORESAL) 10 MG tablet Take 10 mg by mouth 3 times daily    Historical Provider, MD   gabapentin (NEURONTIN) 600 MG tablet Take 600 mg by mouth 3 times daily.      Historical Provider, MD   acetaminophen (TYLENOL) 325 MG tablet Take 650 mg by mouth every 4 hours as needed for Pain or Fever    Historical Provider, MD   ibuprofen (ADVIL;MOTRIN) 400 MG tablet Take 400 mg by mouth every 4 hours as needed for Fever (for temp > 100.5 not alleviated by acetaminophen)    Historical Provider, MD   LACTOBACILLUS PO Take 1 capsule by mouth in the morning and at bedtime     Historical Provider, MD   metoprolol tartrate (LOPRESSOR) 25 MG tablet Take 1 tablet by mouth 2 times daily 6/20/19   Katie Basurto MD   potassium chloride (KLOR-CON M) 20 MEQ TBCR extended release tablet Take 2 tablets by mouth daily 6/21/19   Katie Basurto MD   sodium chloride 1 g tablet Take 1 tablet by mouth 2 times daily (with meals) 6/20/19   Katie Basurto MD   calcium-vitamin D (OSCAL-500) 500-200 MG-UNIT per tablet Take 1 tablet by mouth 2 times daily 11/16/18   Suzanne Richardson MD   famotidine (PEPCID) 20 MG tablet Take 1 tablet by mouth 2 times daily 6/14/18   Beatriz Mario MD   docusate sodium (COLACE) 100 MG capsule Take 100 mg by mouth daily     Historical Provider, MD   tiZANidine (ZANAFLEX) 2 MG tablet Take 2 mg by mouth 3 times daily 0600;1400;2200 12/15/16   Historical Provider, MD   Multiple Vitamin (MULTIVITAMIN) tablet Take 1 tablet by mouth daily 3/28/16   Tomas Caballero MD   vitamin A 41091 UNITS capsule Take 2 capsules by mouth daily 3/28/16   Tomas Caballero MD       Allergies:  Patient has no known allergies. Social History:    The patient currently lives at [facility]  Tobacco use:   reports that he quit smoking about 40 years ago. His smoking use included cigarettes. He has quit using smokeless tobacco.  Alcohol use:   reports no history of alcohol use. Drug use:  reports no history of drug use. Family History:        Problem Relation Age of Onset    Cancer Mother         Breast    Heart Disease Father 48    Arthritis Sister     Heart Disease Paternal Grandmother 48       Review of Systems:   Unable to obtain 2/2 unresponsive status    Physical Exam:    /61   Pulse 88   Temp 98.1 °F (36.7 °C) (Oral)   Resp 20   Wt 201 lb (91.2 kg)   SpO2 100%   BMI 31.48 kg/m²       General appearance: Deep asleep, no acute distress  Eyes:  Pupils equal, round, and reactive to light. Conjunctivae/corneas clear. HENT: Head normal in appearance. External nares normal.  Oral mucosa moist without lesions. Hearing grossly intact. Neck: Supple, with full range of motion. Trachea midline. No gross JVD appreciated. Respiratory:  Normal respiratory effort. Clear to auscultation, bilaterally without rales or wheezes or rhonchi. Cardiovascular: Normal rate, regular rhythm with normal S1/S2 without murmurs. No lower extremity edema. Abdomen: Soft, non-tender, non-distended with normal bowel sounds. Musculoskeletal: No joint swelling or tenderness. Normal tone. No abnormal movements. Skin: Warm and dry.  No rashes or Status: will be assessed  Diet: ADULT DIET; Regular  DVT prophylaxis: Exoxaparin  Code Status: Full Code  Disposition: admit to hospital     Thank you Joyce Tracy MD for the opportunity to be involved in this patient's care.     Electronically signed by Rubia Maya DO PGY-1 on 7/20/2022 at 2:39 PM.     Case discussed with Attending, Dr. Franky Murray

## 2022-07-20 NOTE — ED PROVIDER NOTES
[facility]  EMERGENCY DEPARTMENT ENCOUNTER      PATIENT NAME: Humberto Canada  MRN: [id number]  : 1955  BECKHAM: 2022  PROVIDER: Pj Klein MD      CHIEF COMPLAINT       Chief Complaint   Patient presents with    Fatigue    Altered Mental Status       Patient is seen and evaluated in a timely fashion. Nurses Notes are reviewed and I agree except as noted in the HPI. HISTORY OF PRESENT ILLNESS    Humberto Canada is a 74 y.o. male who presents to Emergency Department with Fatigue and Altered Mental Status       AMS at [facility] since 08:30 today. Normally AAO x 4. Accucheck 180. BP 70/30 at SNF  He was obtunded on arrival.   Hx of SP catheter, colostomy, PE and MS. He is bed bound. He is arousal and states no pain. This HPI was provided by [facility] staff. REVIEW OF SYSTEMS   N/A     PAST MEDICAL HISTORY    has a past medical history of Dysphagia, History of pulmonary embolism, History of urinary tract infection, Hx of blood clots, Hypertension, Major depressive disorder, single episode, MS (multiple sclerosis) ([address] Neurogenic bladder, Osteomyelitis ([address] and UTI (urinary tract infection). SURGICAL HISTORY      has a past surgical history that includes Tonsillectomy (child); Ankle surgery (); Bladder surgery (2012); Colonoscopy; pr lap,surg,colectomy,w/end colost & closur (N/A, 2018); cysto/uretero/pyeloscopy, calculus tx (Left, 2019); cysto/uretero/pyeloscopy, calculus tx (N/A, 2019); Cystoscopy (N/A, 2021); and Cystoscopy (Left, 6/15/2022).     CURRENT MEDICATIONS       Previous Medications    ACETAMINOPHEN (TYLENOL) 325 MG TABLET    Take 650 mg by mouth every 4 hours as needed for Pain or Fever    ASCORBIC ACID (VITAMIN C ER PO)    Take 500 mg by mouth in the morning and at bedtime     ASPIRIN 81 MG EC TABLET    Take 81 mg by mouth daily    BACLOFEN (LIORESAL) 10 MG TABLET    Take 10 mg by mouth 3 times daily    CALCIUM-VITAMIN D (OSCAL-500) 500-200 MG-UNIT PER TABLET    Take 1 tablet by mouth 2 times daily    CARBAMIDE PEROXIDE (DEBROX) 6.5 % OTIC SOLUTION    5 drops 2 times daily Instill 5 drops in both ears two times daily for ear wax use for 4 days. DOCUSATE SODIUM (COLACE) 100 MG CAPSULE    Take 100 mg by mouth daily     ERYTHROMYCIN (ROMYCIN) 5 MG/GM OPHTHALMIC OINTMENT    Place into the right eye nightly (0.25 ribbon to right eye)    FAMOTIDINE (PEPCID) 20 MG TABLET    Take 1 tablet by mouth 2 times daily    GABAPENTIN (NEURONTIN) 600 MG TABLET    Take 600 mg by mouth 3 times daily. GENTAMICIN (GARAMYCIN) 0.1 % OINTMENT    Apply topically daily Apply to wound bed    IBUPROFEN (ADVIL;MOTRIN) 400 MG TABLET    Take 400 mg by mouth every 4 hours as needed for Fever (for temp > 100.5 not alleviated by acetaminophen)    LACTOBACILLUS PO    Take 1 capsule by mouth in the morning and at bedtime     METOPROLOL TARTRATE (LOPRESSOR) 25 MG TABLET    Take 1 tablet by mouth 2 times daily    MULTIPLE VITAMIN (MULTIVITAMIN) TABLET    Take 1 tablet by mouth daily    ONDANSETRON (ZOFRAN) 4 MG TABLET    Take 4 mg by mouth every 8 hours as needed for Nausea or Vomiting    OXYBUTYNIN (DITROPAN-XL) 5 MG EXTENDED RELEASE TABLET    Take 5 mg by mouth in the morning and at bedtime    POTASSIUM CHLORIDE (KLOR-CON M) 20 MEQ TBCR EXTENDED RELEASE TABLET    Take 2 tablets by mouth daily    SODIUM CHLORIDE 1 G TABLET    Take 1 tablet by mouth 2 times daily (with meals)    TIZANIDINE (ZANAFLEX) 2 MG TABLET    Take 2 mg by mouth 3 times daily 0600;1400;2200    VITAMIN A 66308 UNITS CAPSULE    Take 2 capsules by mouth daily    VITAMIN E 400 UNIT CAPSULE    Take 400 Units by mouth daily       ALLERGIES     has No Known Allergies. FAMILY HISTORY     He indicated that his mother is . He indicated that his father is . He indicated that his sister is alive.  He indicated that the status of his paternal grandmother is unknown.   family history includes Arthritis in his sister; Cancer in his mother; Heart Disease (age of onset: 48) in his father and paternal grandmother. SOCIAL HISTORY      reports that he quit smoking about 40 years ago. His smoking use included cigarettes. He has quit using smokeless tobacco. He reports that he does not drink alcohol and does not use drugs. PHYSICAL EXAM      weight is 201 lb (91.2 kg). His oral temperature is 99 °F (37.2 °C). His blood pressure is 118/76 and his pulse is 102 (abnormal). His respiration is 20 and oxygen saturation is 96%. Physical Exam  Constitutional:       Comments: Lethargic but arousable   HENT:      Head: Normocephalic and atraumatic. Eyes:      General: No scleral icterus. Extraocular Movements: Extraocular movements intact. Pupils: Pupils are equal, round, and reactive to light. Cardiovascular:      Rate and Rhythm: Normal rate. Heart sounds: No murmur heard. No friction rub. Pulmonary:      Effort: Pulmonary effort is normal.      Breath sounds: Normal breath sounds. Abdominal:      Palpations: Abdomen is soft. Comments: Colostomy and SP catheter in place   Musculoskeletal:         General: No tenderness. Cervical back: Normal range of motion and neck supple. Neurological:      Cranial Nerves: No cranial nerve deficit or dysarthria. Sensory: No sensory deficit. Motor: Weakness present. Coordination: Coordination normal.      Deep Tendon Reflexes: Reflexes normal.      Comments: Chronic bilateral lower extremity weakness. ANCILLARY TEST RESULTS   EKG:    Interpreted by me  None    LAB RESULTS:  Results for orders placed or performed during the hospital encounter of 07/20/22   Rapid influenza A/B antigens    Specimen: Nasopharyngeal   Result Value Ref Range    Flu A Antigen Negative NEGATIVE    Flu B Antigen Negative NEGATIVE   COVID-19, Rapid    Specimen: Nasopharyngeal Swab   Result Value Ref Range    SARS-CoV-2, NAAT NOT  DETECTED NOT DETECTED   CBC with Auto Differential   Result Value Ref Range    WBC 15.7 (H) 4.8 - 10.8 thou/mm3    RBC 4.77 4.70 - 6.10 mill/mm3    Hemoglobin 14.1 14.0 - 18.0 gm/dl    Hematocrit 44.4 42.0 - 52.0 %    MCV 93.1 80.0 - 94.0 fL    MCH 29.6 26.0 - 33.0 pg    MCHC 31.8 (L) 32.2 - 35.5 gm/dl    RDW-CV 15.9 (H) 11.5 - 14.5 %    RDW-SD 54.2 (H) 35.0 - 45.0 fL    Platelets 435 463 - 551 thou/mm3    MPV 8.8 (L) 9.4 - 12.4 fL    Seg Neutrophils 80.8 %    Lymphocytes 10.5 %    Monocytes 7.9 %    Eosinophils 0.1 %    Basophils 0.3 %    Immature Granulocytes 0.4 %    Segs Absolute 12.7 (H) 1.8 - 7.7 thou/mm3    Lymphocytes Absolute 1.6 1.0 - 4.8 thou/mm3    Monocytes Absolute 1.2 0.4 - 1.3 thou/mm3    Eosinophils Absolute 0.0 0.0 - 0.4 thou/mm3    Basophils Absolute 0.0 0.0 - 0.1 thou/mm3    Immature Grans (Abs) 0.06 0.00 - 0.07 thou/mm3    nRBC 0 /100 wbc   Comprehensive Metabolic Panel   Result Value Ref Range    Glucose 146 (H) 70 - 108 mg/dL    CREATININE 0.6 0.4 - 1.2 mg/dL    BUN 29 (H) 7 - 22 mg/dL    Sodium 139 135 - 145 meq/L    Potassium 5.2 3.5 - 5.2 meq/L    Chloride 104 98 - 111 meq/L    CO2 24 23 - 33 meq/L    Calcium 9.3 8.5 - 10.5 mg/dL    AST 21 5 - 40 U/L    Alkaline Phosphatase 137 (H) 38 - 126 U/L    Total Protein 7.4 6.1 - 8.0 g/dL    Albumin 3.4 (L) 3.5 - 5.1 g/dL    Total Bilirubin 0.4 0.3 - 1.2 mg/dL    ALT 15 11 - 66 U/L   Lactic Acid   Result Value Ref Range    Lactic Acid 2.1 (H) 0.5 - 2.0 mmol/L   Troponin   Result Value Ref Range    Troponin T 0.026 (A) ng/ml   Ammonia   Result Value Ref Range    Ammonia 34 11 - 60 umol/L   Lipase   Result Value Ref Range    Lipase 13.3 5.6 - 51.3 U/L   Brain Natriuretic Peptide   Result Value Ref Range    Pro-.2 0.0 - 900.0 pg/mL   Procalcitonin   Result Value Ref Range    Procalcitonin 2.23 (H) 0.01 - 0.09 ng/mL   Urine with Reflexed Micro   Result Value Ref Range    Glucose, Ur NEGATIVE NEGATIVE mg/dl    Bilirubin Urine SMALL (A) NEGATIVE    Ketones, Urine TRACE (A) NEGATIVE    Specific Gravity, Urine 1.020 1.002 - 1.030    Blood, Urine LARGE (A) NEGATIVE    pH, UA 8.5 5.0 - 9.0    Protein, UA >= 300 (A) NEGATIVE    Urobilinogen, Urine 1.0 0.0 - 1.0 eu/dl    Nitrite, Urine POSITIVE (A) NEGATIVE    Leukocyte Esterase, Urine MODERATE (A) NEGATIVE    Color, UA RED (A) STRAW-YELLOW    Character, Urine TURBID (A) CLEAR-SL CLOUD    RBC, UA > 200 0-2/hpf /hpf    WBC, UA 10-15 0-4/hpf /hpf    Epithelial Cells, UA 0-2 3-5/hpf /hpf    Amorphous, UA PHOSPHATES NONE SEEN    Bacteria, UA MODERATE FEW/NONE SEEN /hpf    Casts UA NONE SEEN NONE SEEN /lpf    Crystals, UA OXALATE NONE SEEN    Renal Epithelial, UA NONE NONE SEEN    Yeast, UA NONE SEEN NONE SEEN   Bile Acids, Total   Result Value Ref Range    Ictotest NEGATIVE NEGATIVE   Anion Gap   Result Value Ref Range    Anion Gap 11.0 8.0 - 16.0 meq/L   Osmolality   Result Value Ref Range    Osmolality Calc 286.0 275.0 - 300.0 mOsmol/kg   Glomerular Filtration Rate, Estimated   Result Value Ref Range    Est, Glom Filt Rate >90 ml/min/1.73m2   POCT Glucose   Result Value Ref Range    POC Glucose 131 (H) 70 - 108 mg/dl   EKG 12 Lead   Result Value Ref Range    Ventricular Rate 97 BPM    Atrial Rate 97 BPM    P-R Interval 138 ms    QRS Duration 76 ms    Q-T Interval 340 ms    QTc Calculation (Bazett) 431 ms    P Axis 41 degrees    R Axis 39 degrees    T Axis 47 degrees       RADIOLOGY REPORTS  CT HEAD WO CONTRAST   Final Result   No acute intracranial process. . No change from prior study. **This report has been created using voice recognition software. It may contain minor errors which are inherent in voice recognition technology. **      Final report electronically signed by Dr. Nguyen Blevins on 7/20/2022 10:25 AM      XR CHEST PORTABLE   Final Result   1. Very poor inflation of the lungs. Kyphotic positioning of the patient. Borderline heart size. 2. Mild atelectasis/pneumonia left lower lung field. No effusion seen. **This report has been created using voice recognition software. It may contain minor errors which are inherent in voice recognition technology. **      Final report electronically signed by Dr. Lisa Hooper on 7/20/2022 10:10 AM          [facility] ([facility]) AND ED COURSE   Patient is seen and evaluated at 9:49 AM EDT. DDx: sepsis, UTI, pneumonia, seizure, ACS, CVA    He received NS bolus at 30 ml/kg  His UA has UTI. He has history of ESBL. Meropenem is given. He is full code. Admitted by Hospitalist.   Multiple abnormal labs are reported. Consult  Hospitalist    Procedures  None    Medications   0.9 % sodium chloride bolus (2,736 mLs IntraVENous New Bag 7/20/22 1032)   meropenem (MERREM) 1,000 mg in sodium chloride 0.9 % 100 mL IVPB (mini-bag) (has no administration in time range)       Vitals:    07/20/22 0927 07/20/22 0939 07/20/22 1036 07/20/22 1059   BP: 88/67 115/63 118/73 118/76   Pulse: 96  83 (!) 102   Resp: 22 20 20 20   Temp: 99 °F (37.2 °C)      TempSrc: Oral      SpO2: 90% 94% 98% 96%   Weight: 201 lb (91.2 kg)        INAL IMPRESSION AND DISPOSITION      1. Urinary tract infection associated with indwelling urethral catheter, initial encounter ([facility]    2. Sepsis secondary to UTI ([facility]    3. Altered mental status, unspecified altered mental status type        DISPOSITION Admitted 07/20/2022 11:25:56 AM      PATIENT REFERRED TO:  No follow-up provider specified.   DISCHARGE MEDICATIONS:  New Prescriptions    No medications on file     (Please note that portions of this note were completed with a voice recognition program.  Efforts were made to edit the dictations but occasionally words aremis-transcribed.)  MD Colin Adams MD  07/20/22 4639

## 2022-07-21 PROBLEM — N13.9 OBSTRUCTIVE UROPATHY: Status: ACTIVE | Noted: 2022-07-21

## 2022-07-21 PROBLEM — R77.8 TROPONIN LEVEL ELEVATED: Status: ACTIVE | Noted: 2019-06-10

## 2022-07-21 PROBLEM — A41.9 SEPSIS (HCC): Status: RESOLVED | Noted: 2022-07-20 | Resolved: 2022-07-21

## 2022-07-21 PROBLEM — R79.89 TROPONIN LEVEL ELEVATED: Status: ACTIVE | Noted: 2019-06-10

## 2022-07-21 PROBLEM — T83.511A URINARY TRACT INFECTION ASSOCIATED WITH INDWELLING URETHRAL CATHETER (HCC): Status: ACTIVE | Noted: 2018-11-13

## 2022-07-21 LAB
ACINETOBACTER BAUMANNII FILM ARRAY: NOT DETECTED
ANION GAP SERPL CALCULATED.3IONS-SCNC: 10 MEQ/L (ref 8–16)
BASOPHILS # BLD: 0.3 %
BASOPHILS ABSOLUTE: 0.1 THOU/MM3 (ref 0–0.1)
BOTTLE TYPE: ABNORMAL
BUN BLDV-MCNC: 25 MG/DL (ref 7–22)
CALCIUM SERPL-MCNC: 8.6 MG/DL (ref 8.5–10.5)
CANDIDA ALBICANS FILM ARRAY: NOT DETECTED
CANDIDA GLABRATA FILM ARRAY: NOT DETECTED
CANDIDA KRUSEI FILM ARRAY: NOT DETECTED
CANDIDA PARAPSILOSIS FILM ARRAY: NOT DETECTED
CANDIDA TROPICALIS FILM ARRAY: NOT DETECTED
CARBAPENEM RESITANT FILM ARRAY: NOT DETECTED
CHLORIDE BLD-SCNC: 110 MEQ/L (ref 98–111)
CO2: 23 MEQ/L (ref 23–33)
CREAT SERPL-MCNC: 0.5 MG/DL (ref 0.4–1.2)
EKG ATRIAL RATE: 106 BPM
EKG P AXIS: 54 DEGREES
EKG P-R INTERVAL: 140 MS
EKG Q-T INTERVAL: 338 MS
EKG QRS DURATION: 84 MS
EKG QTC CALCULATION (BAZETT): 448 MS
EKG R AXIS: 42 DEGREES
EKG T AXIS: 63 DEGREES
EKG VENTRICULAR RATE: 106 BPM
ENTERBACTER CLOACAE FILM ARRAY: NOT DETECTED
ENTERBACTERIACEAE FILM ARRAY: DETECTED
ENTEROCOCCUS FILM ARRAY: NOT DETECTED
EOSINOPHIL # BLD: 0.1 %
EOSINOPHILS ABSOLUTE: 0 THOU/MM3 (ref 0–0.4)
ERYTHROCYTE [DISTWIDTH] IN BLOOD BY AUTOMATED COUNT: 16.1 % (ref 11.5–14.5)
ERYTHROCYTE [DISTWIDTH] IN BLOOD BY AUTOMATED COUNT: 58.4 FL (ref 35–45)
ESCHERICHIA COLI FILM ARRAY: DETECTED
GFR SERPL CREATININE-BSD FRML MDRD: > 90 ML/MIN/1.73M2
GLUCOSE BLD-MCNC: 113 MG/DL (ref 70–108)
HAEMOPHILUS INFLUENZA FILM ARRAY: NOT DETECTED
HCT VFR BLD CALC: 41.8 % (ref 42–52)
HEMOGLOBIN: 12.6 GM/DL (ref 14–18)
IMMATURE GRANS (ABS): 0.11 THOU/MM3 (ref 0–0.07)
IMMATURE GRANULOCYTES: 0.6 %
KLEBSIELLA OXYTOCA FILM ARRAY: NOT DETECTED
KLEBSIELLA PNEUMONIAE FILM ARRAY: NOT DETECTED
LISTERIA MONOCYTOGENES FILM ARRAY: NOT DETECTED
LYMPHOCYTES # BLD: 7.7 %
LYMPHOCYTES ABSOLUTE: 1.3 THOU/MM3 (ref 1–4.8)
MCH RBC QN AUTO: 29.5 PG (ref 26–33)
MCHC RBC AUTO-ENTMCNC: 30.1 GM/DL (ref 32.2–35.5)
MCV RBC AUTO: 97.9 FL (ref 80–94)
METHICILLIN RESISTANT FILM ARRAY: ABNORMAL
MONOCYTES # BLD: 7.1 %
MONOCYTES ABSOLUTE: 1.2 THOU/MM3 (ref 0.4–1.3)
NEISSERIA MENIGITIDIS FILM ARRAY: NOT DETECTED
NUCLEATED RED BLOOD CELLS: 0 /100 WBC
PLATELET # BLD: 187 THOU/MM3 (ref 130–400)
PLATELET ESTIMATE: ADEQUATE
PMV BLD AUTO: 8.4 FL (ref 9.4–12.4)
POTASSIUM REFLEX MAGNESIUM: 4.3 MEQ/L (ref 3.5–5.2)
PROTEUS FILM ARRAY: NOT DETECTED
PSEUDOMONAS AERUGINOSA FILM ARRAY: NOT DETECTED
RBC # BLD: 4.27 MILL/MM3 (ref 4.7–6.1)
SCAN OF BLOOD SMEAR: NORMAL
SEG NEUTROPHILS: 84.2 %
SEGMENTED NEUTROPHILS ABSOLUTE COUNT: 14.7 THOU/MM3 (ref 1.8–7.7)
SERRATIA MARCESCENS FILM ARRAY: NOT DETECTED
SODIUM BLD-SCNC: 143 MEQ/L (ref 135–145)
SOURCE OF BLOOD CULTURE: ABNORMAL
STAPH AUREUS FILM ARRAY: NOT DETECTED
STAPHYLOCOCCUS FILM ARRAY: NOT DETECTED
STREP AGALACTIAE FILM ARRAY: NOT DETECTED
STREP PNEUMONIAE FILM ARRAY: NOT DETECTED
STREP PYOCGENES FILM ARRAY: NOT DETECTED
STREPTOCOCCUS FILM ARRAY: NOT DETECTED
TOXIC GRANULATION: PRESENT
VANCOMYCIN RESISTANT FILM ARRAY: ABNORMAL
WBC # BLD: 17.5 THOU/MM3 (ref 4.8–10.8)

## 2022-07-21 PROCEDURE — 99254 IP/OBS CNSLTJ NEW/EST MOD 60: CPT | Performed by: UROLOGY

## 2022-07-21 PROCEDURE — 6360000002 HC RX W HCPCS: Performed by: STUDENT IN AN ORGANIZED HEALTH CARE EDUCATION/TRAINING PROGRAM

## 2022-07-21 PROCEDURE — 85025 COMPLETE CBC W/AUTO DIFF WBC: CPT

## 2022-07-21 PROCEDURE — 80048 BASIC METABOLIC PNL TOTAL CA: CPT

## 2022-07-21 PROCEDURE — 99233 SBSQ HOSP IP/OBS HIGH 50: CPT | Performed by: STUDENT IN AN ORGANIZED HEALTH CARE EDUCATION/TRAINING PROGRAM

## 2022-07-21 PROCEDURE — 2580000003 HC RX 258: Performed by: STUDENT IN AN ORGANIZED HEALTH CARE EDUCATION/TRAINING PROGRAM

## 2022-07-21 PROCEDURE — 6370000000 HC RX 637 (ALT 250 FOR IP)

## 2022-07-21 PROCEDURE — XW033N5 INTRODUCTION OF MEROPENEM-VABORBACTAM ANTI-INFECTIVE INTO PERIPHERAL VEIN, PERCUTANEOUS APPROACH, NEW TECHNOLOGY GROUP 5: ICD-10-PCS | Performed by: STUDENT IN AN ORGANIZED HEALTH CARE EDUCATION/TRAINING PROGRAM

## 2022-07-21 PROCEDURE — 6370000000 HC RX 637 (ALT 250 FOR IP): Performed by: STUDENT IN AN ORGANIZED HEALTH CARE EDUCATION/TRAINING PROGRAM

## 2022-07-21 PROCEDURE — 93005 ELECTROCARDIOGRAM TRACING: CPT | Performed by: STUDENT IN AN ORGANIZED HEALTH CARE EDUCATION/TRAINING PROGRAM

## 2022-07-21 PROCEDURE — 87040 BLOOD CULTURE FOR BACTERIA: CPT

## 2022-07-21 PROCEDURE — 93010 ELECTROCARDIOGRAM REPORT: CPT | Performed by: INTERNAL MEDICINE

## 2022-07-21 PROCEDURE — 36415 COLL VENOUS BLD VENIPUNCTURE: CPT

## 2022-07-21 PROCEDURE — 1200000000 HC SEMI PRIVATE

## 2022-07-21 PROCEDURE — 1200000003 HC TELEMETRY R&B

## 2022-07-21 PROCEDURE — 6360000002 HC RX W HCPCS

## 2022-07-21 PROCEDURE — 2500000003 HC RX 250 WO HCPCS: Performed by: STUDENT IN AN ORGANIZED HEALTH CARE EDUCATION/TRAINING PROGRAM

## 2022-07-21 RX ORDER — 0.9 % SODIUM CHLORIDE 0.9 %
250 INTRAVENOUS SOLUTION INTRAVENOUS ONCE
Status: COMPLETED | OUTPATIENT
Start: 2022-07-21 | End: 2022-07-21

## 2022-07-21 RX ORDER — METOPROLOL TARTRATE 5 MG/5ML
5 INJECTION INTRAVENOUS EVERY 6 HOURS PRN
Status: DISCONTINUED | OUTPATIENT
Start: 2022-07-21 | End: 2022-07-26

## 2022-07-21 RX ORDER — LABETALOL 20 MG/4 ML (5 MG/ML) INTRAVENOUS SYRINGE
5 EVERY 6 HOURS PRN
Status: DISCONTINUED | OUTPATIENT
Start: 2022-07-21 | End: 2022-07-21

## 2022-07-21 RX ADMIN — SODIUM CHLORIDE: 9 INJECTION, SOLUTION INTRAVENOUS at 19:42

## 2022-07-21 RX ADMIN — MEROPENEM 1000 MG: 1 INJECTION, POWDER, FOR SOLUTION INTRAVENOUS at 23:20

## 2022-07-21 RX ADMIN — METOPROLOL TARTRATE 5 MG: 5 INJECTION INTRAVENOUS at 17:43

## 2022-07-21 RX ADMIN — ENOXAPARIN SODIUM 40 MG: 100 INJECTION SUBCUTANEOUS at 08:14

## 2022-07-21 RX ADMIN — DOCUSATE SODIUM 100 MG: 100 CAPSULE, LIQUID FILLED ORAL at 08:15

## 2022-07-21 RX ADMIN — METOPROLOL TARTRATE 25 MG: 25 TABLET, FILM COATED ORAL at 08:16

## 2022-07-21 RX ADMIN — METOPROLOL TARTRATE 25 MG: 25 TABLET, FILM COATED ORAL at 19:52

## 2022-07-21 RX ADMIN — SODIUM CHLORIDE 250 ML: 9 INJECTION, SOLUTION INTRAVENOUS at 17:37

## 2022-07-21 RX ADMIN — FAMOTIDINE 20 MG: 20 TABLET ORAL at 08:15

## 2022-07-21 RX ADMIN — SODIUM CHLORIDE: 9 INJECTION, SOLUTION INTRAVENOUS at 15:23

## 2022-07-21 RX ADMIN — FAMOTIDINE 20 MG: 20 TABLET ORAL at 19:52

## 2022-07-21 RX ADMIN — MEROPENEM 1000 MG: 1 INJECTION, POWDER, FOR SOLUTION INTRAVENOUS at 15:23

## 2022-07-21 RX ADMIN — MEROPENEM 1000 MG: 1 INJECTION, POWDER, FOR SOLUTION INTRAVENOUS at 08:13

## 2022-07-21 ASSESSMENT — PAIN SCALES - WONG BAKER
WONGBAKER_NUMERICALRESPONSE: 0
WONGBAKER_NUMERICALRESPONSE: 0
WONGBAKER_NUMERICALRESPONSE: 2
WONGBAKER_NUMERICALRESPONSE: 0
WONGBAKER_NUMERICALRESPONSE: 2
WONGBAKER_NUMERICALRESPONSE: 0
WONGBAKER_NUMERICALRESPONSE: 2

## 2022-07-21 ASSESSMENT — PAIN SCALES - GENERAL
PAINLEVEL_OUTOF10: 0
PAINLEVEL_OUTOF10: 0

## 2022-07-21 NOTE — PLAN OF CARE
Problem: Pain  Goal: Verbalizes/displays adequate comfort level or baseline comfort level  Description: Pain Assessment: None - Denies Pain          Is pain goal met at this time? Yes              7/20/2022 2207 by Becca Gonzales RN  Outcome: Progressing  7/20/2022 1623 by Xavier Wagoner RN  Outcome: Progressing  Flowsheets (Taken 7/20/2022 1623)  Verbalizes/displays adequate comfort level or baseline comfort level:   Encourage patient to monitor pain and request assistance   Assess pain using appropriate pain scale   Implement non-pharmacological measures as appropriate and evaluate response   Administer analgesics based on type and severity of pain and evaluate response     Problem: Discharge Planning  Goal: Discharge to home or other facility with appropriate resources  Description: Plan is for patient to be discharged back to [facility] when medically stable. 7/20/2022 2207 by Becca Gonzales RN  Outcome: Progressing  Flowsheets (Taken 7/20/2022 1950)  Discharge to home or other facility with appropriate resources: Identify barriers to discharge with patient and caregiver  7/20/2022 1623 by Xavier Wagoner RN  Outcome: Progressing  Flowsheets (Taken 7/20/2022 1623)  Discharge to home or other facility with appropriate resources:   Identify barriers to discharge with patient and caregiver   Arrange for needed discharge resources and transportation as appropriate   Identify discharge learning needs (meds, wound care, etc)     Problem: Safety - Adult  Goal: Free from fall injury  Description: All fall precautions in place.  Bed in low position, alarm activated and appropriate use of call light.       7/20/2022 2207 by Becca Gonzales RN  Outcome: Progressing  7/20/2022 1623 by Xavier Wagoner RN  Outcome: Progressing  Flowsheets (Taken 7/20/2022 1623)  Free From Fall Injury: Instruct family/caregiver on patient safety     Problem: ABCDS Injury Assessment  Goal: Absence of physical injury  Description: Patient being turned and repositioned every 2 hours. Wound care consulted for further evaluation of wound. Wound was present on admission from [facility].   7/20/2022 2207 by Mingo Hay RN  Outcome: Progressing  [address] (Taken 7/20/2022 1632 by Lizandro Dias, RN)  Absence of Physical Injury: Implement safety measures based on patient assessment  7/20/2022 1623 by Lizandro Dias RN  Outcome: Progressing  Flowsheets (Taken 7/20/2022 1623)  Absence of Physical Injury: Implement safety measures based on patient assessment

## 2022-07-21 NOTE — CARE COORDINATION
7/21/22, 12:34 PM EDT  Discharge Planning Evaluation  Social work consult received, patient from [facility]. Patient/Family preference is to return to [facility]. The patient's current payor source at the facility is [employer]. Medicare skilled days available: No  Insurance precert:  Yes  Spoke with Pat at the facility.   Patient bed hold: No  Anticipated transport plan: Unsure  Do they require COVID 19 test to return to ECF: Only if symptomatic    Is there a required time frame which which COVID test needs done:No  Patient's Healthcare Decision Maker: Legal Next of Kin

## 2022-07-21 NOTE — PROGRESS NOTES
Patient showed major lethargy and was only reacting to pain. Patient was assessed and deemed to lethargic to take meds. Provider Dr. Elia Ng was made aware and approved. Patient also was not making urine in the suprapubic catheter. Dr. Elia Ng came to assess this and told the nurse to wait on it for now. Will continue to monitor. 0220-Patient was found to be urinating around their suprapubic catheter through penis. Lab also called the nurse to report gram negative bacilli in the blood culture. This was passed on to Dr. Elia Ng. Will continue to monitor.

## 2022-07-21 NOTE — FLOWSHEET NOTE
Pt was in bed and alone at the time of the visit. He was dealing with acute metabolic encephalopathy. He was quiet but wanted prayer to cope and heal. Prayer was appreciated. 07/21/22 1439   Encounter Summary   Encounter Overview/Reason  Initial Encounter   Service Provided For: Patient   Referral/Consult From: [facility] Family members   Last Encounter  07/21/22   Complexity of Encounter Low   Begin Time 1030   End Time  1035   Total Time Calculated 5 min   Spiritual/Emotional needs   Type Spiritual Support   Assessment/Intervention/Outcome   Assessment Calm; Hopeful   Intervention Active listening;Empowerment

## 2022-07-21 NOTE — CONSULTS
[address] [address]  Dept: [phone]  Loc: [phone]  Visit Date: 7/20/2022    Urology Consult Note    Reason for Consult:  patient with septic uti. Needs suprapubic catheter exchanged. Requesting Physician:  Aubree Rich MD    History Obtained From:  patient    Chief Complaint: fever, AMS    HISTORY OF PRESENT ILLNESS:                The patient is a 74 y.o. male with significant past medical history of see  who presents with PMHx of HTN, PE, UTI, and neurogenic bladder who presents to [facility] with altered mental status and fatigue.   He was seen on previous admission for hematuria  Was found to have left renal stone  We attempted ureteral stent and were unable to find UO  We saw patient in office on 7/19, he was doing well, so we elected to watch closely      Past Medical History:        Diagnosis Date    Dysphagia     oropharyngeal    History of pulmonary embolism     History of urinary tract infection     Hx of blood clots     Pulmonary embolis    Hypertension     Major depressive disorder, single episode     MS (multiple sclerosis) ([facility] [address]     Neurogenic bladder 02/2012    Dr. Gordo Steele placed cather    [facility])     UTI (urinary tract infection)      Past Surgical History:        Procedure Laterality Date    ANKLE SURGERY  1996    broken ankle    BLADDER SURGERY  2-    Suprapubic catheter placement    COLONOSCOPY      CYSTO/URETERO/PYELOSCOPY, CALCULUS TX Left 6/19/2019    CYSTOSCOPY, LEFT STENT insertion, bladder irragation with bladder stones performed by Abby Butt MD at [address] [place] [address] N/A 7/8/2019    CYSTO, LEFT URETERAL STENT REMOVAL, LEFT URETEROSCOPY, LASER LITHOTRIPSY, BASKET RETRIEVAL OF STONE FRAGMENTS performed by Abby Butt MD at [address] [place] [address] 2/26/2021    CYSTOSCOPY, CYSTOLITHOLAPAXY, [facility] ROM.  Neurological: The patient denies any symptoms of neurological impairment or TIA. Psychiatric: Denies anxiety or depression. Skin: Denies rash or lesions. All remaining ROS negative. PHYSICAL EXAM:  VITALS:  /65   Pulse 96   Temp 97.8 °F (36.6 °C) (Oral)   Resp 18   Wt 201 lb (91.2 kg)   SpO2 95%   BMI 31.48 kg/m² . Nursing note and vitals reviewed. Constitutional: Alert and oriented times x3, no acute distress, and cooperative to examination with appropriate mood and affect. HEENT:   Head:         Normocephalic and atraumatic. Mouth/Throat:          Mucous membranes are normal.   Eyes:         EOM are normal. No scleral icterus. Nose:    The external appearance of the nose is normal  Ears: The ears appear normal to external inspection. Cardiovascular:       Normal rate, regular rhythm. Pulmonary/Chest:  Normal respiratory rate and rhthym. No use of accessory muscles. Lungs clear bilaterally. Abdominal:          Soft. No tenderness. Active bowel sounds. SPT draining yellow urine             Musculoskeletal:    Normal range of motion. He exhibits no edema or tenderness of lower extremities. Extremities:    No cyanosis, clubbing, or edema present. Neurological:    Alert and oriented.      DATA:  CBC:   Lab Results   Component Value Date/Time    WBC 17.5 07/21/2022 05:31 AM    RBC 4.27 07/21/2022 05:31 AM    RBC 4.20 01/20/2012 09:45 AM    HGB 12.6 07/21/2022 05:31 AM    HCT 41.8 07/21/2022 05:31 AM    MCV 97.9 07/21/2022 05:31 AM    MCH 29.5 07/21/2022 05:31 AM    MCHC 30.1 07/21/2022 05:31 AM    RDW 16.6 06/21/2019 04:40 AM     07/21/2022 05:31 AM    MPV 8.4 07/21/2022 05:31 AM     BMP:    Lab Results   Component Value Date/Time     07/21/2022 05:31 AM    K 4.3 07/21/2022 05:31 AM     07/21/2022 05:31 AM    CO2 23 07/21/2022 05:31 AM    BUN 25 07/21/2022 05:31 AM    CREATININE 0.5 07/21/2022 05:31 AM    CALCIUM 8.6 07/21/2022 05:31 AM    LABGLOM >90 07/21/2022 05:31 AM    GLUCOSE 113 07/21/2022 05:31 AM    GLUCOSE 105 06/25/2019 04:12 AM     BUN/Creatinine:    Lab Results   Component Value Date/Time    BUN 25 07/21/2022 05:31 AM    CREATININE 0.5 07/21/2022 05:31 AM     Magnesium:    Lab Results   Component Value Date/Time    MG 2.1 12/28/2020 05:51 AM     Phosphorus:    Lab Results   Component Value Date/Time    PHOS 3.4 06/15/2019 04:39 AM     PT/INR:    Lab Results   Component Value Date/Time    INR 1.86 04/22/2022 12:31 PM     U/A:    Lab Results   Component Value Date/Time    COLORU RED 07/20/2022 09:40 AM    PHUR 8.5 07/20/2022 09:40 AM    LABCAST 0-4 HYALINE 04/22/2022 11:30 PM    LABCAST NONE SEEN 04/22/2022 11:30 PM    WBCUA 10-15 07/20/2022 09:40 AM    WBCUA >200 01/20/2012 09:00 AM    RBCUA > 200 07/20/2022 09:40 AM    MUCUS NONE SEEN 12/27/2020 09:50 AM    YEAST NONE SEEN 07/20/2022 09:40 AM    BACTERIA MODERATE 07/20/2022 09:40 AM    CLARITYU Clear 07/05/2019 12:00 AM    SPECGRAV >1.030 04/22/2022 11:30 PM    LEUKOCYTESUR MODERATE 07/20/2022 09:40 AM    UROBILINOGEN 1.0 07/20/2022 09:40 AM    BILIRUBINUR SMALL 07/20/2022 09:40 AM    BILIRUBINUR NEGATIVE 01/20/2012 09:00 AM    BLOODU LARGE 07/20/2022 09:40 AM    GLUCOSEU NEGATIVE 07/20/2022 09:40 AM    AMORPHOUS PHOSPHATES 07/20/2022 09:40 AM       Imaging: The patient has had a CT Without and With Contrast which I have independently reviewed along with its accompanying report. The study demonstrates   Narrative   Exam: CT abdomen and pelvis without IV contrast.       Comparison: CT,SR - CT ABDOMEN PELVIS WO CONTRAST - 12/28/2020 08:11 AM    EST       Findings:       Small left pleural effusion. No free air. No solid liver mass. Cholecystectomy. No clinically significant biliary ductal dilation. No splenomegaly or suspicious splenic lesions. No solid or cystic pancreatic mass. No pancreatic ductal dilation. No    acute inflammatory changes.        Adrenal glands without nodule. No suspicious renal mass. Moderate left hydronephrosis secondary to 9 x 8    mm proximal ureteral stone. Punctate bilateral nonobstructing renal    stones. Bladder decompressed by suprapubic catheter. No bowel obstruction. No mucosal thickening. Stable partial left colectomy    with left lower quadrant colostomy. No evidence for acute appendicitis. No adenopathy. No abdominal aortic aneurysm. No suspicious pelvic masses. No acute soft tissue pathology. No acute osseous pathology. Degenerative changes. Ankylosing spondylitis. Bilateral femoral head AVN. Impression   Large obstructing proximal left ureteral stone. IMPRESSION/Plan:    Nurse exchanged SPT yesterday   Ux was sent off fresh SPT  IR to place left nephrostomy tube with stent if able, plan for Monday  Hold anticoagulants - Lovenox was given this AM, he took ASA yesterday    Large left ureteral stone - unable to place ureteral stent last admission, plan for IR to place left nephrostomy tube with antegrade ureteral stent to decompress and drain kidney  Moderate left hydronephrosis - 2/2 to obstructing stone, no pain, CRT normal  Septic UTI - urine sent off old SPT, new Ux pending.   Cont abx    Will plan for stone tx as outpatient  Will follow    Thank you for including us in the care of [address], TONO - CNP, APRN  07/21/22 10:09 AM  Urology

## 2022-07-21 NOTE — PLAN OF CARE
Problem: Discharge Planning  Goal: Discharge to home or other facility with appropriate resources  Description: Plan is for patient to be discharged back to [facility] when medically stable. Outcome: Progressing     Consult received. Please see  note 7/21.

## 2022-07-21 NOTE — CARE COORDINATION
7/21/22, 9:06 AM EDT  DISCHARGE PLANNING EVALUATION:    Luis Azul       Admitted: 7/20/2022/ [facility] day: 1   Location: [facility] Reason for admit: Troponin level elevated [R77.8]  Sepsis secondary to UTI ([address] [A41.9, N39.0]  Altered mental status, unspecified altered mental status type [W89.40]  Acute metabolic encephalopathy [V12.61]  Urinary tract infection associated with indwelling urethral catheter, initial encounter ([address] [T83.511A, N39.0]  Sepsis ([address] [A41.9]   PMH:  has a past medical history of Dysphagia, History of pulmonary embolism, History of urinary tract infection, Hx of blood clots, Hypertension, Major depressive disorder, single episode, MS (multiple sclerosis) ([address] Neurogenic bladder, Osteomyelitis ([address] and UTI (urinary tract infection). Procedure:   7/20 CT abdomen/pelvis: Large obstructing proximal left ureteral stone. 7/20 CT head: No acute intracranial process. . No change from prior study. 7/20 CXR: 1. Very poor inflation of the lungs. Kyphotic positioning of the patient. Borderline heart size. 2. Mild atelectasis/pneumonia left lower lung field. No effusion seen. Barriers to Discharge: Presented from [facility] for unresponsive episode, hypotension, and hematuria. Hospitalist following. Urology consult. Admitted for sepsis/UTI. Chronic jeffers. Suprapubic tunneled wound-wound nurse consult. Urine culture pending. IVF. IV merrem q 8 hours. PCP: Dinora Martinez MD  Readmission Risk Score: 24.6%  Patient's Healthcare Decision Maker: Patient Declined (Legal Next of Kin Remains as Decision Maker)-deferred to  as patient is from [facility]. Patient Goals/Plan/Treatment Preferences: Lender Councilman is from [facility], [address] consulted. Transportation/Food Security/Housekeeping Addressed:  No issues identified.

## 2022-07-21 NOTE — PROGRESS NOTES
respiratory rate of 22 and T-max of 99. Initial work-up was concerning for septicemia secondary to UTI. Eventually patient was found to have  Large ureteral stone, urology was consulted due to inability to place nephrostomy tube in the past IR was consulted by urology. As patient has received AC/AP, IR will need 5 days free from blood thinners. Eventually patient is planned to get IR guided nephrostomy tube placement with a stent on 7/25/2022. Subjective (past 24 hours):   Patient seen and examined. Hemodynamically and vitally stable. Afebrile. Labs reviewed. Leukocytosis. Patient able to maintain eye contact and conversation. Patient is alert oriented x3. He told me that yesterday he was confused but today he thinks he is back to his normal self. Patient denied any chest pain, shortness of breath, leg pain, leg swelling. Currently he is pain-free. He is from nursing home. ROS: reviewed complete ROS unchanged unless otherwise stated in hospital course/subjective portion.        Medications:  Reviewed    Infusion Medications    sodium chloride      sodium chloride 100 mL/hr at 07/21/22 1523     Scheduled Medications    meropenem  1,000 mg IntraVENous Q8H    sodium chloride flush  5-40 mL IntraVENous 2 times per day    [Held by provider] enoxaparin  40 mg SubCUTAneous Daily    [Held by provider] baclofen  10 mg Oral TID    docusate sodium  100 mg Oral Daily    famotidine  20 mg Oral BID    [Held by provider] gabapentin  600 mg Oral TID    metoprolol tartrate  25 mg Oral BID    [Held by provider] oxybutynin  5 mg Oral BID    [Held by provider] tiZANidine  2 mg Oral TID     PRN Meds: sodium chloride flush, sodium chloride, ondansetron **OR** ondansetron, polyethylene glycol, acetaminophen **OR** acetaminophen        Intake/Output Summary (Last 24 hours) at 7/21/2022 1551  Last data filed at 7/21/2022 0622  Gross per 24 hour   Intake 200 ml   Output 200 ml   Net 0 ml       Exam:  /76   Pulse 100 Temp 98.5 °F (36.9 °C) (Oral)   Resp 18   Wt 201 lb (91.2 kg)   SpO2 96%   BMI 31.48 kg/m²     General: Not in apparent distress appears appropriate for the age able to maintain conversation. Eyes:  PERRL. Conjunctivae/corneas clear. HENT: Head normal appearing. Nares normal. Oral mucosa moist.  Hearing intact. Neck: Supple, with full range of motion. Trachea midline. No gross JVD appreciated. Respiratory:  Normal effort. Clear to auscultation, without rales or wheezes or rhonchi. Cardiovascular: Normal rate, regular rhythm with normal S1/S2 without murmurs. No lower extremity edema. Abdomen: Soft, non-tender, non-distended with normal bowel sounds. Musculoskeletal: No joint swelling or tenderness. Normal tone. No abnormal movements. Skin: Warm and dry. No rashes or lesions. Neurologic: Bilateral lower extremity flaccid paralysis, absent reflexes. Psychiatric: Alert and oriented, normal insight and thought content. Capillary Refill: Brisk,< 3 seconds. Peripheral Pulses: +2 palpable, equal bilaterally. Labs:   Recent Labs     07/20/22  0940 07/21/22  0531   WBC 15.7* 17.5*   HGB 14.1 12.6*   HCT 44.4 41.8*    187     Recent Labs     07/20/22  0940 07/21/22  0531    143   K 5.2 4.3    110   CO2 24 23   BUN 29* 25*   CREATININE 0.6 0.5   CALCIUM 9.3 8.6     Recent Labs     07/20/22  0940   AST 21   ALT 15   BILITOT 0.4   ALKPHOS 137*     No results for input(s): INR in the last 72 hours. No results for input(s): Charyl Forget in the last 72 hours.   Recent Labs     07/20/22  0949   PROCAL 2.23*      Lab Results   Component Value Date/Time    NITRU POSITIVE 07/20/2022 09:40 AM    WBCUA 10-15 07/20/2022 09:40 AM    WBCUA >200 01/20/2012 09:00 AM    BACTERIA MODERATE 07/20/2022 09:40 AM    RBCUA > 200 07/20/2022 09:40 AM    BLOODU LARGE 07/20/2022 09:40 AM    SPECGRAV >1.030 04/22/2022 11:30 PM    GLUCOSEU NEGATIVE 07/20/2022 09:40 AM       Radiology (48 hours):  CT ABDOMEN PELVIS WO CONTRAST    Result Date: 7/20/2022  Large obstructing proximal left ureteral stone. This document has been electronically signed by: Leslye Van MD on 07/20/2022 11:48 PM All CTs at this facility use dose modulation techniques and iterative reconstructions, and/or weight-based dosing when appropriate to reduce radiation to a low as reasonably achievable. CT HEAD WO CONTRAST    Result Date: 7/20/2022  No acute intracranial process. . No change from prior study. **This report has been created using voice recognition software. It may contain minor errors which are inherent in voice recognition technology. ** Final report electronically signed by Dr. Sandra Shultz on 7/20/2022 10:25 AM    XR CHEST PORTABLE    Result Date: 7/20/2022  1. Very poor inflation of the lungs. Kyphotic positioning of the patient. Borderline heart size. 2. Mild atelectasis/pneumonia left lower lung field. No effusion seen. **This report has been created using voice recognition software. It may contain minor errors which are inherent in voice recognition technology. ** Final report electronically signed by Dr. Sandra Shultz on 7/20/2022 10:10 AM       DVT prophylaxis:    [] Lovenox  [] SCDs  [] SQ Heparin  [] Encourage ambulation   [] Already on Anticoagulation       Diet: ADULT DIET;  Regular  Code Status: Full Code  PT/OT: ordered   IVF: 100 cc/h    Electronically signed by Sabiha Nguyen MD on 7/21/2022 at 3:51 PM

## 2022-07-21 NOTE — PROGRESS NOTES
[facility] Wound Ostomy Continence Nurse  Progress Note       Rosamaria Naidu  AGE: 74 y.o. GENDER: male  : 1955  UNIT: 5K-10/010-A  TODAY'S DATE:  2022  ADMISSION DATE: 2022  9:22 AM  Subjective:     Reason for [address] Evaluation and Assessment: Patient admitted from nursing home with tunneling wound, suprapubic and colostomy bag. Rosamaria Naidu is a 74 y.o. male referred by:   [] Physician/PA/APRN  [x] Nursing  [] Other:     Wound Identification:  Wound Type: pressure  Contributing Factors: chronic pressure, decreased mobility, and shear force    Objective:     Derrick Risk Score: Derrick Scale Score: 11    Assessment:     Encounter: Present to patient room. Patient in bed upon arrival. Assessment and photo to follow. Patient has established colostomy, with pouch in place. No evidence of leaking. Patient reports pouch was changed upon admission. SPT in place. Evidence of leaking noted on chux pad. Assisted patient onto left side. Old dressing removed. 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 Cleansed wound with normal saline and gauze. Pat dry with clean gauze. Opticell Ag lightly packed in wound, covered with bordered foam dressing. Staff to change every other day. Chux pad and fitted sheet changed. Patient on IsoFlex surface. IsoFlex pump attached to mattress. BLE offloaded with pillows. Quick change pad placed under testicles for urinary incontinence. Patient in bed, call light in reach. Will continue to follow and assess wound. Call with concerns and for wound evolution. Wound type: Chronic stage 4, right ischium  Wound size: 3.5cm x 0.5cm x 0.8cm  Undermining or Tunneling: None  Wound assessment/color: Pink, yellow  Drainage amount: Moderate  Drainage description: Serosanguinous  Odor: None  Margins: Attached  Tawny wound: Epibole, intact  Exposed structure: None    Response to treatment:  Well tolerated by patient.      Plan:     Treatment Recommendations:   Right ischial wound- Clean wound with normal saline and gauze. Pat dry with clean gauze. Apply cut piece of Opticell Ag into wound, leaving a tail for removal. Cover with bordered foam dressing. Change every other day. If Opticell is adhered to wound, wet with saline and allow to form gel. Wipe clean with dry gauze.      Specialty Bed Required :   [] Low Air Loss   [x] Pressure Redistribution  [] Fluid Immersion- Dolphin  [] Bariatric  [] RotoProne   [] Other:     Discharge Plan:  Placement for patient upon discharge: ECF  Patient appropriate for Outpatient [address]: Follow up with wound care provider upon discharge

## 2022-07-21 NOTE — PROGRESS NOTES
This RN spoke with Dr. Rachael Hatchet and due to the patient receiving ASA on 7/20, per radiology protocol there is a 5 day hold for ASA. Pt is scheduled for Monday 8/25/21 at 1100. Pt needs to be NPO 4 hours prior. No Lovenox 24hr prior to the procedure. There should be signed and held orders that will need to be released on 8/25/22 at 0300 please. If there are any questions please call IR at . This RN called and informed Sundra Mercury she stated she understood.

## 2022-07-22 PROBLEM — N39.0 COMPLICATED URINARY TRACT INFECTION: Status: ACTIVE | Noted: 2022-07-22

## 2022-07-22 LAB
ANION GAP SERPL CALCULATED.3IONS-SCNC: 10 MEQ/L (ref 8–16)
BASOPHILS # BLD: 0.3 %
BASOPHILS ABSOLUTE: 0 THOU/MM3 (ref 0–0.1)
BLOOD CULTURE, ROUTINE: ABNORMAL
BUN BLDV-MCNC: 16 MG/DL (ref 7–22)
CALCIUM SERPL-MCNC: 8.5 MG/DL (ref 8.5–10.5)
CHLORIDE BLD-SCNC: 107 MEQ/L (ref 98–111)
CO2: 20 MEQ/L (ref 23–33)
CREAT SERPL-MCNC: 0.4 MG/DL (ref 0.4–1.2)
EOSINOPHIL # BLD: 1.3 %
EOSINOPHILS ABSOLUTE: 0.2 THOU/MM3 (ref 0–0.4)
ERYTHROCYTE [DISTWIDTH] IN BLOOD BY AUTOMATED COUNT: 15.7 % (ref 11.5–14.5)
ERYTHROCYTE [DISTWIDTH] IN BLOOD BY AUTOMATED COUNT: 55.7 FL (ref 35–45)
GFR SERPL CREATININE-BSD FRML MDRD: > 90 ML/MIN/1.73M2
GLUCOSE BLD-MCNC: 103 MG/DL (ref 70–108)
HCT VFR BLD CALC: 41.3 % (ref 42–52)
HEMOGLOBIN: 12.9 GM/DL (ref 14–18)
IMMATURE GRANS (ABS): 0.07 THOU/MM3 (ref 0–0.07)
IMMATURE GRANULOCYTES: 0.6 %
LYMPHOCYTES # BLD: 7.4 %
LYMPHOCYTES ABSOLUTE: 0.9 THOU/MM3 (ref 1–4.8)
MAGNESIUM: 1.8 MG/DL (ref 1.6–2.4)
MCH RBC QN AUTO: 29.5 PG (ref 26–33)
MCHC RBC AUTO-ENTMCNC: 31.2 GM/DL (ref 32.2–35.5)
MCV RBC AUTO: 94.5 FL (ref 80–94)
MONOCYTES # BLD: 6.8 %
MONOCYTES ABSOLUTE: 0.8 THOU/MM3 (ref 0.4–1.3)
NUCLEATED RED BLOOD CELLS: 0 /100 WBC
ORGANISM: ABNORMAL
PLATELET # BLD: 179 THOU/MM3 (ref 130–400)
PMV BLD AUTO: 8.9 FL (ref 9.4–12.4)
POTASSIUM REFLEX MAGNESIUM: 3.5 MEQ/L (ref 3.5–5.2)
RBC # BLD: 4.37 MILL/MM3 (ref 4.7–6.1)
SEG NEUTROPHILS: 83.6 %
SEGMENTED NEUTROPHILS ABSOLUTE COUNT: 9.7 THOU/MM3 (ref 1.8–7.7)
SODIUM BLD-SCNC: 137 MEQ/L (ref 135–145)
TROPONIN T: < 0.01 NG/ML
WBC # BLD: 11.6 THOU/MM3 (ref 4.8–10.8)

## 2022-07-22 PROCEDURE — 99233 SBSQ HOSP IP/OBS HIGH 50: CPT | Performed by: STUDENT IN AN ORGANIZED HEALTH CARE EDUCATION/TRAINING PROGRAM

## 2022-07-22 PROCEDURE — 84484 ASSAY OF TROPONIN QUANT: CPT

## 2022-07-22 PROCEDURE — 6370000000 HC RX 637 (ALT 250 FOR IP): Performed by: STUDENT IN AN ORGANIZED HEALTH CARE EDUCATION/TRAINING PROGRAM

## 2022-07-22 PROCEDURE — 2580000003 HC RX 258: Performed by: STUDENT IN AN ORGANIZED HEALTH CARE EDUCATION/TRAINING PROGRAM

## 2022-07-22 PROCEDURE — 85025 COMPLETE CBC W/AUTO DIFF WBC: CPT

## 2022-07-22 PROCEDURE — 6360000002 HC RX W HCPCS

## 2022-07-22 PROCEDURE — 1200000000 HC SEMI PRIVATE

## 2022-07-22 PROCEDURE — 83735 ASSAY OF MAGNESIUM: CPT

## 2022-07-22 PROCEDURE — 2500000003 HC RX 250 WO HCPCS: Performed by: STUDENT IN AN ORGANIZED HEALTH CARE EDUCATION/TRAINING PROGRAM

## 2022-07-22 PROCEDURE — 2580000003 HC RX 258

## 2022-07-22 PROCEDURE — 36415 COLL VENOUS BLD VENIPUNCTURE: CPT

## 2022-07-22 PROCEDURE — 80048 BASIC METABOLIC PNL TOTAL CA: CPT

## 2022-07-22 PROCEDURE — 6360000002 HC RX W HCPCS: Performed by: STUDENT IN AN ORGANIZED HEALTH CARE EDUCATION/TRAINING PROGRAM

## 2022-07-22 PROCEDURE — 99232 SBSQ HOSP IP/OBS MODERATE 35: CPT | Performed by: UROLOGY

## 2022-07-22 PROCEDURE — 1200000003 HC TELEMETRY R&B

## 2022-07-22 PROCEDURE — 6370000000 HC RX 637 (ALT 250 FOR IP)

## 2022-07-22 RX ORDER — SODIUM CHLORIDE 450 MG/100ML
INJECTION, SOLUTION INTRAVENOUS CONTINUOUS
Status: CANCELLED | OUTPATIENT
Start: 2022-07-22

## 2022-07-22 RX ORDER — MIDAZOLAM HYDROCHLORIDE 1 MG/ML
1 INJECTION INTRAMUSCULAR; INTRAVENOUS ONCE
Status: CANCELLED | OUTPATIENT
Start: 2022-07-22 | End: 2022-07-22

## 2022-07-22 RX ORDER — AMLODIPINE BESYLATE 5 MG/1
5 TABLET ORAL DAILY
Status: DISCONTINUED | OUTPATIENT
Start: 2022-07-22 | End: 2022-07-23

## 2022-07-22 RX ORDER — FENTANYL CITRATE 50 UG/ML
50 INJECTION, SOLUTION INTRAMUSCULAR; INTRAVENOUS ONCE
Status: CANCELLED | OUTPATIENT
Start: 2022-07-22 | End: 2022-07-22

## 2022-07-22 RX ADMIN — METOPROLOL TARTRATE 5 MG: 5 INJECTION INTRAVENOUS at 16:18

## 2022-07-22 RX ADMIN — FAMOTIDINE 20 MG: 20 TABLET ORAL at 20:11

## 2022-07-22 RX ADMIN — MEROPENEM 1000 MG: 1 INJECTION, POWDER, FOR SOLUTION INTRAVENOUS at 06:23

## 2022-07-22 RX ADMIN — AMLODIPINE BESYLATE 5 MG: 5 TABLET ORAL at 18:06

## 2022-07-22 RX ADMIN — MEROPENEM 1000 MG: 1 INJECTION, POWDER, FOR SOLUTION INTRAVENOUS at 23:20

## 2022-07-22 RX ADMIN — METOPROLOL TARTRATE 25 MG: 25 TABLET, FILM COATED ORAL at 10:56

## 2022-07-22 RX ADMIN — SODIUM CHLORIDE, PRESERVATIVE FREE 10 ML: 5 INJECTION INTRAVENOUS at 10:56

## 2022-07-22 RX ADMIN — SODIUM CHLORIDE: 9 INJECTION, SOLUTION INTRAVENOUS at 02:31

## 2022-07-22 RX ADMIN — MEROPENEM 1000 MG: 1 INJECTION, POWDER, FOR SOLUTION INTRAVENOUS at 15:45

## 2022-07-22 RX ADMIN — FAMOTIDINE 20 MG: 20 TABLET ORAL at 10:52

## 2022-07-22 RX ADMIN — ENOXAPARIN SODIUM 40 MG: 100 INJECTION SUBCUTANEOUS at 10:52

## 2022-07-22 RX ADMIN — DOCUSATE SODIUM 100 MG: 100 CAPSULE, LIQUID FILLED ORAL at 10:52

## 2022-07-22 RX ADMIN — METOPROLOL TARTRATE 25 MG: 25 TABLET, FILM COATED ORAL at 20:11

## 2022-07-22 ASSESSMENT — PAIN SCALES - GENERAL
PAINLEVEL_OUTOF10: 0

## 2022-07-22 NOTE — PROGRESS NOTES
Urology Progress Note    Reason for Consult:  patient with septic uti. Needs suprapubic catheter exchanged. Requesting Physician:  Paul Luevano MD     History Obtained From:  patient     Chief Complaint: fever, AMS    Subjective: \"    Patient is resting in bed, voiding yellow urine via SPT, tolerating regular diet, denies any nausea or vomiting. There are complaints of denies pain at this time.     IR to place nephrostomy tube with stent on Monday  Hold anticoagulants per IR recs          Vitals:  BP (!) 160/67   Pulse 95   Temp 98.7 °F (37.1 °C) (Oral)   Resp 18   Wt 201 lb (91.2 kg)   SpO2 94%   BMI 31.48 kg/m²   Temp  Av.7 °F (37.1 °C)  Min: 98.4 °F (36.9 °C)  Max: 99.1 °F (37.3 °C)    Intake/Output Summary (Last 24 hours) at 2022 1030  Last data filed at 2022 2393  Gross per 24 hour   Intake 3586.38 ml   Output 975 ml   Net 2611.38 ml       Social History     Socioeconomic History    Marital status:      Spouse name: Liset Lockett    Number of children: 2    Years of education: Not on file    Highest education level: Not on file   Occupational History    Not on file   Tobacco Use    Smoking status: Former     Types: Cigarettes     Quit date: 1982     Years since quittin.5    Smokeless tobacco: Former   Vaping Use    Vaping Use: Never used   Substance and Sexual Activity    Alcohol use: No     Comment: \"quit alcohol a few years ago\"    Drug use: No    Sexual activity: Yes     Partners: Female   Other Topics Concern    Not on file   Social History Narrative    Not on file     Social Determinants of Health     Financial Resource Strain: Not on file   Food Insecurity: Not on file   Transportation Needs: Not on file   Physical Activity: Not on file   Stress: Not on file   Social Connections: Not on file   Intimate Partner Violence: Not on file   Housing Stability: Not on file     Family History   Problem Relation Age of Onset    Cancer Mother         Breast    Heart Disease Father 48    Arthritis Sister     Heart Disease Paternal Grandmother 48     No Known Allergies      Constitutional: Alert and oriented times x3, no acute distress, and cooperative to examination with appropriate mood and affect. HEENT:   Head:         Normocephalic and atraumatic. Mucous membranes are normal.   Eyes:         EOM are normal.  Nose:    The external appearance of the nose is normal  Ears: The ears appear normal to external inspection. Cardiovascular:       Normal rate, regular rhythm. Pulmonary/Chest:  Normal respiratory rate and rhthym. No use of accessory muscles. Lungs clear bilaterally. Abdominal:          Soft. No tenderness. Active bowel sounds. Genitalia:    SPT draining yellow urine  Musculoskeletal:    Normal range of motion. He exhibits no edema or tenderness of lower extremities. Extremities:    No cyanosis, clubbing, or edema present. Neurological:    Alert and oriented.      Labs:  WBC:    Lab Results   Component Value Date/Time    WBC 11.6 07/22/2022 05:26 AM     Hemoglobin/Hematocrit:    Lab Results   Component Value Date/Time    HGB 12.9 07/22/2022 05:26 AM    HCT 41.3 07/22/2022 05:26 AM     BMP:    Lab Results   Component Value Date/Time     07/22/2022 05:26 AM    K 3.5 07/22/2022 05:26 AM     07/22/2022 05:26 AM    CO2 20 07/22/2022 05:26 AM    BUN 16 07/22/2022 05:26 AM    LABALBU 3.4 07/20/2022 09:40 AM    CREATININE 0.4 07/22/2022 05:26 AM    CALCIUM 8.5 07/22/2022 05:26 AM    LABGLOM >90 07/22/2022 05:26 AM     IMPRESSION/Plan:    Nurse exchanged SPT 7/20  Ux was sent off fresh SPT was discontinued  Ux sent off old SPT with GNB  IR to place left nephrostomy tube with stent if able, plan for Monday  Hold anticoagulants - continue to hold ASA, hold heparin 24 hr prior to IR procedure     Large left ureteral stone - unable to place ureteral stent last admission, plan for IR to place left nephrostomy tube with antegrade ureteral stent to decompress and drain kidney  Moderate left hydronephrosis - 2/2 to obstructing stone, no pain, CRT normal  Septic UTI - urine sent off old SPT, new Ux discontinued  Cont abx per primary     Will plan for stone tx as outpatient  Will follow      TONO Townsend - RJ, TONO  07/22/22 10:30 AM  Urology

## 2022-07-22 NOTE — CONSULTS
CONSULTATION NOTE :ID       Patient - Gregory Mcgill,  Age - 74 y.o.    - 1955      Room Number - 5K-10/010-A   MRN -  [id number]   Acct # - [de-identified]  Date of Admission -  2022  9:22 AM  Patient's PCP: Walt Castañeda MD     Requesting Physician: Chuckie Lazaro MD    REASON FOR CONSULTATION   Sepsis. CHIEF COMPLAINT   Fever and confusion. HISTORY OF PRESENT ILLNESS       This is a very pleasant 74 y.o. male who was admitted to the hospital with a chief complaints of fever and confusion. He is from the nursing home. He has history of multiple sclerosis with functional quadriparesis has suprapubic catheter had history of UTI in the past he was found to have obstructing left ureteric stone with left hydronephrosis. It was not possible to pass a stent and he is going to have left nephrostomy by interventional radiology. He has been on aspirin and the plan is to wait 5 days. He is feeling better since he came to the hospital he has no nausea or vomiting. He has history of stage IV ischial wound for which she had been following at the nursing home. He has hypertension depression and history of neurogenic bladder.     PAST MEDICAL  HISTORY       Past Medical History:   Diagnosis Date    Dysphagia     oropharyngeal    History of pulmonary embolism     History of urinary tract infection     Hx of blood clots     Pulmonary embolis    Hypertension     Major depressive disorder, single episode     MS (multiple sclerosis) ([facility]     Neurogenic bladder 2012    Dr. Paiz Colonel placed cather    Osteomyelitis [facility])     UTI (urinary tract infection)        PAST SURGICAL HISTORY     Past Surgical History:   Procedure Laterality Date    ANKLE SURGERY      broken ankle    BLADDER SURGERY  2012    Suprapubic catheter placement    COLONOSCOPY      CYSTO/URETERO/PYELOSCOPY, CALCULUS TX Left 2019    CYSTOSCOPY, LEFT STENT insertion, bladder irragation with bladder stones performed by Markus Mendez MD at [address] [place] [address] 1898 N/A 7/8/2019    CYSTO, LEFT URETERAL STENT REMOVAL, LEFT URETEROSCOPY, LASER LITHOTRIPSY, BASKET RETRIEVAL OF STONE FRAGMENTS performed by Markus Mendez MD at [facility] 19 N/A 2/26/2021    CYSTOSCOPY, CYSTOLITHOLAPAXY, SUPRAPUBIC CATHETER EXCHANGE performed by Amanda Triana MD at [facility] 19 Left 6/15/2022    CYSTOSCOPY performed by Amanda Triana MD at [address] [place] [address] N/A 6/13/2018    ROBOT DIVERTING COLOSTOMY performed by Anastacio House MD at [address] [facility] [place] [facility]  child         MEDICATIONS:       Scheduled Meds:   meropenem  1,000 mg IntraVENous Q8H    sodium chloride flush  5-40 mL IntraVENous 2 times per day    enoxaparin  40 mg SubCUTAneous Daily    [Held by provider] baclofen  10 mg Oral TID    docusate sodium  100 mg Oral Daily    famotidine  20 mg Oral BID    [Held by provider] gabapentin  600 mg Oral TID    metoprolol tartrate  25 mg Oral BID    [Held by provider] oxybutynin  5 mg Oral BID    [Held by provider] tiZANidine  2 mg Oral TID     Continuous Infusions:   sodium chloride      sodium chloride 100 mL/hr at 07/22/22 0655     PRN Meds:metoprolol, sodium chloride flush, sodium chloride, ondansetron **OR** ondansetron, polyethylene glycol, acetaminophen **OR** acetaminophen  Allergies:   ALLERGIES:    Patient has no known allergies. SOCIAL HISTORY:     TOBACCO:   reports that he quit smoking about 40 years ago. His smoking use included cigarettes. He has quit using smokeless tobacco.     ETOH:   reports no history of alcohol use. Patient currently lives in a nursing home      FAMILY HISTORY:         Problem Relation Age of Onset    Cancer Mother         Breast    Heart Disease Father 48    Arthritis Sister     Heart Disease Paternal Grandmother 48       REVIEW OF SYSTEMS:     Constitutional: no fever, no night sweats, no fatigue, no weight loss.   Head: no head ache , no head injury, no migranes. Eye: no eye discharge, blurring of vision, no double vision,no eye pain. Ears: no hearing difficulty, no tinnitus  Mouth/throat: no ulceration, dental caries , dysphagia, no hoarseness and voice change  Respiratory: no cough no chest pain,no shortness of breath,no wheezing  CVS: no palpitation, no chest pain,   GI: no abdominal pain, no nausea , no vomiting, no constipation,no diarrhea. JENNIFER: no dysuria, frequency and urgency, no hematuria, no kidney stones  Musculoskeletal: no joint pain, swelling , stiffness,  Endocrine: no polyuria, polydipsia, no cold or heat intolerance  Hematology: no anemia, no easy brusing or bleeding, no hx of clotting disorder  Dermatology: no skin rash, no skin lesions, no pruritis,  Neurological:no headaches,no dizziness, no seizure, no numbness. Psychiatry: no depression, no anxiety,no panic attacks, no suicide ideation    PHYSICAL EXAM:     BP (!) 173/76   Pulse 92   Temp 98 °F (36.7 °C) (Oral)   Resp 18   Wt 201 lb (91.2 kg)   SpO2 94%   BMI 31.48 kg/m²   General apperance:  Awake, alert, not in distress. HEENT: Slightly pale conjunctiva, unicteric sclera, moist oral mucosa. Chest: Bilateral air entry  Cardiovascular:  RRR ,S1S2, no murmur or gallop. Abdomen:  Soft, non tender to palpation. Extremities: He has ischial wound present on admission chronic  Skin:  Warm and dry. CNS: Awake and oriented          LABS:     CBC:   Recent Labs     07/20/22  0940 07/21/22  0531 07/22/22  0526   WBC 15.7* 17.5* 11.6*   HGB 14.1 12.6* 12.9*    187 179     BMP:    Recent Labs     07/20/22  0940 07/21/22  0531 07/22/22  0526    143 137   K 5.2 4.3 3.5    110 107   CO2 24 23 20*   BUN 29* 25* 16   CREATININE 0.6 0.5 0.4   GLUCOSE 146* 113* 103     Calcium:  Recent Labs     07/22/22  0526   CALCIUM 8.5     Ionized Calcium:No results for input(s): IONCA in the last 72 hours.   Magnesium:  Recent Labs     07/22/22  0526   MG 1.8 infection T14. 8XXA, L08.9    Elevated INR R79.1    Chronic osteomyelitis, pelvis, right (HCC) M86.651    Osteomyelitis (HCC) M86.9    Urinary tract infection associated with indwelling urethral catheter (HCC) T83.511A, N39.0    Abnormal liver function test R94.5    Chronic depression F32. A    Essential hypertension I10    History of pulmonary embolism Z86.711    Other dysphagia R13.19    Pressure injury of skin of back L89.109    Sepsis secondary to UTI (HCC) A41.9, N39.0    Decubitus ulcer L89.90    Troponin level elevated R77.8    Anemia D64.9    Sepsis due to methicillin resistant Staphylococcus aureus (MRSA) (HCC) A41.02    Sepsis due to urinary tract infection (HCC) A41.9, N39.0    AMS (altered mental status) R41.82    Gross hematuria R31.0    Class 1 obesity due to excess calories without serious comorbidity with body mass index (BMI) of 31.0 to 31.9 in adult E66.09, Z68.31    Colostomy in place [facility]) Z93.3    Current use of long term anticoagulation Z79.01    Obstructive uropathy M83.6    Complicated urinary tract infection N39.0           Impression and Recommendation:   Fever and confusion related to left pyelonephritis from obstructing ureteric stone  History of infection with multidrug-resistant organisms  Continue IV meropenem. He needs to have the nephrostomy prior to his discharge as he is at risk of complications including septic shock. Multiple sclerosis with functional quadriparesis  History of neurogenic bladder has suprapubic catheter. We will continue to follow patient  Infection Control:   Contact isolation  Discharge Planning (Coordination of out patient care: To be determined. Thank you Paula Brothers MD for allowing me to participate in this patient's care.     Jody Hampton MD, MD,FACP 7/22/2022 2:41 PM

## 2022-07-22 NOTE — PLAN OF CARE
Problem: Discharge Planning  Goal: Discharge to home or other facility with appropriate resources  Description: Plan is for patient to be discharged back to [facility] when medically stable. 7/22/2022 0140 by Brenda Robertson RN  Outcome: Not Progressing  Flowsheets (Taken 7/21/2022 1930)  Discharge to home or other facility with appropriate resources:   Identify barriers to discharge with patient and caregiver   Identify discharge learning needs (meds, wound care, etc)   Arrange for needed discharge resources and transportation as appropriate  7/21/2022 1841 by Moody Markham RN  Outcome: Progressing  Flowsheets (Taken 7/21/2022 1841)  Discharge to home or other facility with appropriate resources:   Identify barriers to discharge with patient and caregiver   Arrange for needed discharge resources and transportation as appropriate  Note: Patient is from [facility] and will return there at discharge   7/21/2022 1233 by TRISTIAN Knox  Outcome: Progressing     Problem: Pain  Goal: Verbalizes/displays adequate comfort level or baseline comfort level  Description: Pain Assessment: None - Denies Pain          Is pain goal met at this time? Yes              Outcome: Not Progressing  Flowsheets (Taken 7/21/2022 1930)  Verbalizes/displays adequate comfort level or baseline comfort level:   Encourage patient to monitor pain and request assistance   Assess pain using appropriate pain scale   Administer analgesics based on type and severity of pain and evaluate response   Implement non-pharmacological measures as appropriate and evaluate response     Problem: Safety - Adult  Goal: Free from fall injury  Description: All fall precautions in place.  Bed in low position, alarm activated and appropriate use of call light.       7/22/2022 0140 by Brenda Robertson RN  Outcome: Not Progressing  Flowsheets (Taken 7/21/2022 1930)  Free From Fall Injury: Instruct family/caregiver on patient safety  7/21/2022 1841 by [facility] TRISTIAN Campos  Outcome: Progressing     Problem: Safety - Adult  Goal: Free from fall injury  Description: All fall precautions in place. Bed in low position, alarm activated and appropriate use of call light.       7/22/2022 0140 by Denice Maynard RN  Outcome: Not Progressing  Flowsheets (Taken 7/21/2022 1930)  Free From Fall Injury: Instruct family/caregiver on patient safety  7/21/2022 1841 by Nanci Rose RN  Outcome: Progressing  Flowsheets (Taken 7/21/2022 1841)  Free From Fall Injury: Instruct family/caregiver on patient safety     Problem: ABCDS Injury Assessment  Goal: Absence of physical injury  Description: Patient being turned and repositioned every 2 hours. Wound care consulted for further evaluation of wound. Wound was present on admission from [facility].   Outcome: Not Progressing

## 2022-07-22 NOTE — PLAN OF CARE
Problem: Pain  Goal: Verbalizes/displays adequate comfort level or baseline comfort level  Description: Pain Assessment: None - Denies Pain          Is pain goal met at this time? Yes              Outcome: Progressing  Flowsheets (Taken 7/22/2022 0800)  Verbalizes/displays adequate comfort level or baseline comfort level: Encourage patient to monitor pain and request assistance     Problem: Discharge Planning  Goal: Discharge to home or other facility with appropriate resources  Description: Plan is for patient to be discharged back to [facility] when medically stable. Outcome: Progressing     Problem: Safety - Adult  Goal: Free from fall injury  Description: All fall precautions in place. Bed in low position, alarm activated and appropriate use of call light. Outcome: Progressing     Problem: ABCDS Injury Assessment  Goal: Absence of physical injury  Description: Patient being turned and repositioned every 2 hours. Wound care consulted for further evaluation of wound. Wound was present on admission from [facility].   Outcome: Progressing

## 2022-07-22 NOTE — DISCHARGE INSTR - COC
Continuity of Care Form    Patient Name: Ama Hodge   :  1955  MRN:  [id number]    Admit date:  2022  Discharge date:  2022    Code Status Order: Full Code   Advance Directives:     Admitting Physician:  Ariel Lincoln MD  PCP: Jackie Stiles MD    Discharging Nurse: Caryn Pardo RN  [address] Unit/Room#: 5K-10/010-A  Discharging Unit Phone Number: [phone]    Emergency Contact:   Extended Emergency Contact Information  Primary Emergency Contact: [facility]  Address: [address] [place] [address], [id number] [place] [address] Zygmunt Heys [address] Phone: [phone]  Mobile Phone: [phone]  Relation: Spouse  Secondary Emergency Contact: [facility] [address] [place] [address] Phone: [phone]  Relation: Child    Past Surgical History:  Past Surgical History:   Procedure Laterality Date    ANKLE SURGERY      broken ankle    BLADDER SURGERY  2012    Suprapubic catheter placement    COLONOSCOPY      CYSTO/URETERO/PYELOSCOPY, CALCULUS TX Left 2019    CYSTOSCOPY, LEFT STENT insertion, bladder irragation with bladder stones performed by Cullen Chaney MD at [address] [place] [address] N/A 2019    CYSTO, LEFT URETERAL STENT REMOVAL, LEFT URETEROSCOPY, LASER LITHOTRIPSY, BASKET RETRIEVAL OF STONE FRAGMENTS performed by Cullen Chaney MD at [address] [place] [address] 2021    CYSTOSCOPY, CYSTOLITHOLAPAXY, SUPRAPUBIC CATHETER EXCHANGE performed by Christopher Feliciano MD at [address] 6/15/2022    CYSTOSCOPY performed by Christopher Feliciano MD at [address] [place] [address]  N/A 2018    ROBOT DIVERTING COLOSTOMY performed by Lisa Gudino MD at [address] [place] [address],[address]  child       Immunization History:   Immunization History   Administered Date(s) Administered    COVID-19, PFIZER PURPLE top, DILUTE for use, (age 15 y+), 30mcg/0.3mL 2020, 2021, 10/11/2021    Hepatitis B Ped/Adol (Engerix-B, Recombivax HB) 04/05/1994, 09/20/1994    Influenza Virus Vaccine 12/20/2012, 10/01/2017, 10/06/2018    Pneumococcal Conjugate Vaccine 06/08/2013    Pneumococcal Polysaccharide (Paysolstl60) 01/01/2010       Active Problems:  Patient Active Problem List   Diagnosis Code    Neurogenic bladder N31.9    Multiple sclerosis ([address] G35    MS (multiple sclerosis) ([address] G35    Urinary retention R33.9    Chronic suprapubic catheter ([address] Z93.59    Cystostomy malfunction ([address] N99.512    Decubitus ulcer of coccyx L89.159    Decubitus ulcer, stage 3 ([facility]) L89.93    Malnutrition ([address] E46    Decubitus ulcer of right perineal ischial region, stage 3 ([address] L89.313    Pulmonary embolism on left ([facility]) Z78.03    Acute metabolic encephalopathy T20.24    Speech disturbance R47.9    Infected decubitus ulcer, stage I L89.91, L08.9    Infected decubitus ulcer, stage III ([facility]) L89.93, L08.9    Wound infection T14. 8XXA, L08.9    Elevated INR R79.1    Chronic osteomyelitis, pelvis, right ([facility]) M86.651    Osteomyelitis ([facility]) M86.9    Urinary tract infection associated with indwelling urethral catheter ([facility]) T83.511A, N39.0    Abnormal liver function test R94.5    Chronic depression F32. A    Essential hypertension I10    History of pulmonary embolism Z86.711    Other dysphagia R13.19    Pressure injury of skin of back L89.109    Sepsis secondary to UTI (HCC) A41.9, N39.0    Decubitus ulcer L89.90    Troponin level elevated R77.8    Anemia D64.9    Sepsis due to methicillin resistant Staphylococcus aureus (MRSA) (HCC) A41.02    Sepsis due to urinary tract infection (HCC) A41.9, N39.0    AMS (altered mental status) R41.82    Gross hematuria R31.0    Class 1 obesity due to excess calories without serious comorbidity with body mass index (BMI) of 31.0 to 31.9 in adult E66.09, Z68.31    Colostomy in place [facility]) Z93.3    Current use of long term anticoagulation Z79.01    Obstructive uropathy N13.9       Isolation/Infection:   Isolation Contact          Patient Infection Status       Infection Onset Added Last Indicated Last Indicated By Review Planned Expiration Resolved Resolved By    ESBL (Extended Spectrum Beta Lactamase)  04/27/22 06/11/22 Culture, Reflexed, Urine        E-coli urine 4/2022, 6/2022    CRE (Carbapenem-Resistant Enterobacteriaceae)  07/08/19 07/08/19 Chacorta Gore RN        Pseudomonas Aeruginosa- sacrum swab - confirmed by [address] 6/2019    VRE 02/10/19 02/10/19 02/10/19 VRE Screen by PCR        PCR 2/2019    Resolved    COVID-19 (Rule Out) 12/27/20 12/27/20 12/27/20 COVID-19 (Ordered)   12/27/20 Rule-Out Test Resulted    MRSA 02/10/19 02/12/19 06/17/19 Aerobic & Anaerobic Culture   04/25/22 Chacorta Gore RN    Buttock 2/2019  Rectal 2/2019  Urine 6/2019  Sacrum 6/2019    MRSA  08/05/13 08/05/13 Lisette Vance RN   01/22/18 Chacorta Gore RN    Urine  Sacrum swab 6/2019              Nurse Assessment:  Last Vital Signs: BP (!) 160/67   Pulse 95   Temp 98.7 °F (37.1 °C) (Oral)   Resp 18   Wt 201 lb (91.2 kg)   SpO2 94%   BMI 31.48 kg/m²     Last documented pain score (0-10 scale): Pain Level: 0  Last Weight:   Wt Readings from Last 1 Encounters:   07/20/22 201 lb (91.2 kg)     Mental Status:  oriented, alert, and coherent    IV Access:  - None    Nursing Mobility/ADLs:  Walking   Dependent  Transfer  Dependent  Bathing  Dependent  Dressing  Dependent  Toileting  Dependent  Feeding  Assisted  Med Admin  Assisted  Med Delivery   whole    Wound Care Documentation and Therapy:  Wound 04/22/22 Buttocks Right Tunneling wound. (Active)   Dressing Status Clean; Intact;Dry 07/21/22 1930   Wound Cleansed Soap and water 07/21/22 1604   Dressing/Treatment Tape/Soft cloth adhesive tape;ABD 07/21/22 1930   Wound Assessment Other (Comment) 07/21/22 1930   Drainage Amount None 07/21/22 1930   Drainage Description Thin;Brown 07/21/22 1604   Odor Mild 07/21/22 1604   Number of days: 90 Elimination:  Continence: Bowel: colostomy  Bladder: ***  Urinary Catheter: {Urinary Catheter:[id number]}   Colostomy/Ileostomy/Ileal Conduit: Yes  Colostomy LLQ-Stomal Appliance: 2 piece, Clean, dry & intact  Colostomy LLQ-Stoma  Assessment: Pink, Red  Colostomy LLQ-Peristomal Assessment: Clean, dry & intact  Colostomy LLQ-Stool Appearance: Formed  Colostomy LLQ-Stool Color: Brown  Colostomy LLQ-Stool Amount: Small    Date of Last BM: 07/26/2022    Intake/Output Summary (Last 24 hours) at 7/22/2022 0749  Last data filed at 7/22/2022 0655  Gross per 24 hour   Intake 3586.38 ml   Output 975 ml   Net 2611.38 ml     I/O last 3 completed shifts: In: 3586.4 [I.V.:3186.4; IV Piggyback:400]  Out: 1175 [SCBCM:1042]    Safety Concerns: At Risk for Falls    Impairments/Disabilities:      None    Nutrition Therapy:  Current Nutrition Therapy:   - Oral Diet:  General    Routes of Feeding: Oral  Liquids: No Restrictions  Daily Fluid Restriction: no  Last Modified Barium Swallow with Video (Video Swallowing Test): not done    Treatments at the Time of Hospital Discharge:   Respiratory Treatments: ***  Oxygen Therapy:  is on oxygen at *** L/min per nasal cannula. Ventilator:    - No ventilator support    Rehab Therapies: Physical Therapy and Occupational Therapy  Weight Bearing Status/Restrictions: Non-weight bearing on {Right/Left:16} leg  Other Medical Equipment (for information only, NOT a DME order):  {EQUIPMENT:308450570}  Other Treatments: ***    Patient's personal belongings (please select all that are sent with patient):  {[facility] DME Belongings:959783765}    RN SIGNATURE:  Electronically signed by Ryan Etienne RN on 7/26/22 at 6:41 PM EDT    CASE MANAGEMENT/SOCIAL WORK SECTION    Inpatient Status Date: 7/20/2022    Readmission Risk Assessment Score:  Readmission Risk              Risk of Unplanned Readmission:  [id number]           Discharging to Facility/ Agency   Name: [facility]  Address: [address] [place], [place]. [address]  Phone: [phone]  Fax: [phone]    Dialysis Facility (if applicable)   Name:  Address:  Dialysis Schedule:  Phone:  Fax:    / signature: Electronically signed by TRISTIAN Alvarado on 7/22/22 at 7:49 AM EDT    PHYSICIAN SECTION    Prognosis: Good    Condition at Discharge: Stable    Rehab Potential (if transferring to Rehab):     Recommended Labs or Other Treatments After Discharge:   BMP in a week with results to PCP  Return to SNF  Wound care  The patient will be scheduled for a repeat nephrostogram in a few days with possible nephrostomy removal if the stent is functioning well with IR. Patient will receive 5 more days of antibiotics with Ertapenem starting 3/12/0459    Physician Certification: I certify the above information and transfer of Ryan Fam  is necessary for the continuing treatment of the diagnosis listed and that he requires [facility] for greater 30 days.      Update Admission H&P: No change in H&P    PHYSICIAN SIGNATURE:  Electronically signed by Addie Almendarez PA-C on 7/27/22 at 10:22 AM EDT

## 2022-07-22 NOTE — CARE COORDINATION
7/22/22, 11:14 AM EDT    DISCHARGE PLANNING EVALUATION    Received a call from Pat with [facility], she states patient will NOT need a new precert to return to facility.

## 2022-07-22 NOTE — PROGRESS NOTES
Hospitalist Progress Note    Patient:  Lauro Ridley    YOB: 1955  Unit/Bed:5K-10/010-A  MRN: [id number]    Acct: [de-identified]   PCP: Amanda Barboza MD    Date of Admission: 7/20/2022      Assessment/Plan:  Sepsis secondary to complicated UTI. History of ESBL. Continue meropenem. Consult placed for Infectious disease. Continue normal saline 100 mill per hour. We will continue antibiotics for 5 to 7 days. Awaiting culture and sensitivity. Patient has 1 out of 2 blood culture grewing E coli likely ESBL. Monitor hemodynamics. Complicated UTI secondary to obstructing left nephrolithiasis. Appreciate urology recommendations. IR was consulted, plan to put nephrostomy tube on 7/25/2022, 11 AM.  Aspirin on hold. Lovenox 24 Hr prior to procedure. Nephrolithiasis. Outpatient stone treatment. Outpatient urology follow-up. Acute encephalopathy secondary to septicemia. Resolved. Continue to monitor  Neurogenic bladder secondary to multiple sclerosis has chronic indwelling catheter. Wound care for care site. Stage IV decubitus ulcer. Wound care for care site. NSTEMI likely type II MI secondary to demand ischemia from septicemia. Chest pain free, troponin negative now. On morning of 24th july will stop lovenox and NPO at [address] [place] [address] as patient is scheduled for IR guided nephrostomy tube placement with stent. Expected discharge date:  07/26/2022    Disposition:   [] Home  [] TCU  [] Rehab  [] Psych  [x] SNF  [] [facility]  [] Other-    ===================================================================      Chief Complaint: [address] Course: 29-year-old gentleman with past medical history of MS currently not on immunomodulators, essential hypertension, recurrent UTI with ESBL organisms, neurogenic bladder presented to [facility] due to altered mental status.   In ED he was found hypotensive with blood pressure of 88/67 with respiratory rate of 22 and T-max of 99. Initial work-up was concerning for septicemia secondary to UTI. Eventually patient was found to have  Large ureteral stone, urology was consulted due to inability to place nephrostomy tube in the past IR was consulted by urology. As patient has received AC/AP, IR will need 5 days free from blood thinners. Eventually patient is planned to get IR guided nephrostomy tube placement with a stent on 7/25/2022. Subjective (past 24 hours):   Patient seen and examined. Hemodynamically and vitally stable. Afebrile. BP is 160/67, HR 95, T max 98.7. Labs reviewed. Troponin negative from yesterday. WBC down to 11.6 from 17.5. Patient able to maintain eye contact and conversation. Patient is alert oriented x3. Denied any complaints. He is from nursing home, no precert needed. ROS: reviewed complete ROS unchanged unless otherwise stated in hospital course/subjective portion.        Medications:  Reviewed    Infusion Medications    sodium chloride      sodium chloride 100 mL/hr at 07/22/22 5852     Scheduled Medications    meropenem  1,000 mg IntraVENous Q8H    sodium chloride flush  5-40 mL IntraVENous 2 times per day    enoxaparin  40 mg SubCUTAneous Daily    [Held by provider] baclofen  10 mg Oral TID    docusate sodium  100 mg Oral Daily    famotidine  20 mg Oral BID    [Held by provider] gabapentin  600 mg Oral TID    metoprolol tartrate  25 mg Oral BID    [Held by provider] oxybutynin  5 mg Oral BID    [Held by provider] tiZANidine  2 mg Oral TID     PRN Meds: metoprolol, sodium chloride flush, sodium chloride, ondansetron **OR** ondansetron, polyethylene glycol, acetaminophen **OR** acetaminophen        Intake/Output Summary (Last 24 hours) at 7/22/2022 1229  Last data filed at 7/22/2022 0655  Gross per 24 hour   Intake 3586.38 ml   Output 975 ml   Net 2611.38 ml         Exam:  BP (!) 160/67   Pulse 95   Temp 98.7 °F (37.1 °C) (Oral)   Resp 18   Wt 201 lb (91.2 kg)   SpO2 94%   BMI 31.48 kg/m²     General: Not in apparent distress appears appropriate for the age able to maintain conversation. Eyes:  PERRL. Conjunctivae/corneas clear. HENT: Head normal appearing. Nares normal. Oral mucosa moist.  Hearing intact. Neck: Supple, with full range of motion. Trachea midline. No gross JVD appreciated. Respiratory:  Normal effort. Clear to auscultation, without rales or wheezes or rhonchi. Cardiovascular: Normal rate, regular rhythm with normal S1/S2 without murmurs. No lower extremity edema. Abdomen: Soft, non-tender, non-distended with normal bowel sounds. Musculoskeletal: No joint swelling or tenderness. Normal tone. No abnormal movements. Skin: Warm and dry. No rashes or lesions. Stage 4 pelvic ischial ulcer. Neurologic: Bilateral lower extremity flaccid paralysis, absent reflexes. Psychiatric: Alert and oriented, normal insight and thought content. Capillary Refill: Brisk,< 3 seconds. Peripheral Pulses: +2 palpable, equal bilaterally. Labs:   Recent Labs     07/20/22  0940 07/21/22  0531 07/22/22  0526   WBC 15.7* 17.5* 11.6*   HGB 14.1 12.6* 12.9*   HCT 44.4 41.8* 41.3*    187 179       Recent Labs     07/20/22  0940 07/21/22  0531 07/22/22  0526    143 137   K 5.2 4.3 3.5    110 107   CO2 24 23 20*   BUN 29* 25* 16   CREATININE 0.6 0.5 0.4   CALCIUM 9.3 8.6 8.5       Recent Labs     07/20/22  0940   AST 21   ALT 15   BILITOT 0.4   ALKPHOS 137*       No results for input(s): INR in the last 72 hours. No results for input(s): Cami Fuchs in the last 72 hours.   Recent Labs     07/20/22  0949   PROCAL 2.23*        Lab Results   Component Value Date/Time    NITRU POSITIVE 07/20/2022 09:40 AM    WBCUA 10-15 07/20/2022 09:40 AM    WBCUA >200 01/20/2012 09:00 AM    BACTERIA MODERATE 07/20/2022 09:40 AM    RBCUA > 200 07/20/2022 09:40 AM    BLOODU LARGE 07/20/2022 09:40 AM    SPECGRAV >1.030 04/22/2022 11:30 PM    GLUCOSEU NEGATIVE 07/20/2022 09:40 AM       Radiology (48 hours):  CT ABDOMEN PELVIS WO CONTRAST    Result Date: 7/20/2022  Large obstructing proximal left ureteral stone. This document has been electronically signed by: Parviz Alicea MD on 07/20/2022 11:48 PM All CTs at this facility use dose modulation techniques and iterative reconstructions, and/or weight-based dosing when appropriate to reduce radiation to a low as reasonably achievable. CT HEAD WO CONTRAST    Result Date: 7/20/2022  No acute intracranial process. . No change from prior study. **This report has been created using voice recognition software. It may contain minor errors which are inherent in voice recognition technology. ** Final report electronically signed by Dr. Jessica Ward on 7/20/2022 10:25 AM    XR CHEST PORTABLE    Result Date: 7/20/2022  1. Very poor inflation of the lungs. Kyphotic positioning of the patient. Borderline heart size. 2. Mild atelectasis/pneumonia left lower lung field. No effusion seen. **This report has been created using voice recognition software. It may contain minor errors which are inherent in voice recognition technology. ** Final report electronically signed by Dr. Jessica Ward on 7/20/2022 10:10 AM       DVT prophylaxis:    [x] Lovenox, will hold 24 hr before procedure. Hold on morning of July 24th.   [] SCDs  [] SQ Heparin  [] Encourage ambulation   [] Already on Anticoagulation       Diet: ADULT DIET;  Regular  Code Status: Full Code  PT/OT: ordered   IVF: 100 cc/h    Electronically signed by Yary Manning MD on 7/22/2022 at 12:29 PM

## 2022-07-23 PROBLEM — E87.6 HYPOKALEMIA: Status: ACTIVE | Noted: 2022-07-23

## 2022-07-23 LAB
ANION GAP SERPL CALCULATED.3IONS-SCNC: 12 MEQ/L (ref 8–16)
BASOPHILS # BLD: 0.3 %
BASOPHILS ABSOLUTE: 0 THOU/MM3 (ref 0–0.1)
BUN BLDV-MCNC: 9 MG/DL (ref 7–22)
CALCIUM SERPL-MCNC: 8.3 MG/DL (ref 8.5–10.5)
CHLORIDE BLD-SCNC: 102 MEQ/L (ref 98–111)
CO2: 21 MEQ/L (ref 23–33)
CREAT SERPL-MCNC: 0.4 MG/DL (ref 0.4–1.2)
EOSINOPHIL # BLD: 0.6 %
EOSINOPHILS ABSOLUTE: 0.1 THOU/MM3 (ref 0–0.4)
ERYTHROCYTE [DISTWIDTH] IN BLOOD BY AUTOMATED COUNT: 15.1 % (ref 11.5–14.5)
ERYTHROCYTE [DISTWIDTH] IN BLOOD BY AUTOMATED COUNT: 50 FL (ref 35–45)
GFR SERPL CREATININE-BSD FRML MDRD: > 90 ML/MIN/1.73M2
GLUCOSE BLD-MCNC: 112 MG/DL (ref 70–108)
HCT VFR BLD CALC: 38.6 % (ref 42–52)
HEMOGLOBIN: 12.3 GM/DL (ref 14–18)
IMMATURE GRANS (ABS): 0.06 THOU/MM3 (ref 0–0.07)
IMMATURE GRANULOCYTES: 0.6 %
LYMPHOCYTES # BLD: 8.8 %
LYMPHOCYTES ABSOLUTE: 0.9 THOU/MM3 (ref 1–4.8)
MAGNESIUM: 1.7 MG/DL (ref 1.6–2.4)
MAGNESIUM: 2.2 MG/DL (ref 1.6–2.4)
MCH RBC QN AUTO: 29.1 PG (ref 26–33)
MCHC RBC AUTO-ENTMCNC: 31.9 GM/DL (ref 32.2–35.5)
MCV RBC AUTO: 91.3 FL (ref 80–94)
MONOCYTES # BLD: 8 %
MONOCYTES ABSOLUTE: 0.8 THOU/MM3 (ref 0.4–1.3)
NUCLEATED RED BLOOD CELLS: 0 /100 WBC
ORGANISM: ABNORMAL
PLATELET # BLD: 178 THOU/MM3 (ref 130–400)
PMV BLD AUTO: 8.6 FL (ref 9.4–12.4)
POTASSIUM REFLEX MAGNESIUM: 3.3 MEQ/L (ref 3.5–5.2)
POTASSIUM SERPL-SCNC: 3.8 MEQ/L (ref 3.5–5.2)
RBC # BLD: 4.23 MILL/MM3 (ref 4.7–6.1)
SEG NEUTROPHILS: 81.7 %
SEGMENTED NEUTROPHILS ABSOLUTE COUNT: 8.2 THOU/MM3 (ref 1.8–7.7)
SODIUM BLD-SCNC: 135 MEQ/L (ref 135–145)
TROPONIN T: < 0.01 NG/ML
URINE CULTURE REFLEX: ABNORMAL
WBC # BLD: 10 THOU/MM3 (ref 4.8–10.8)

## 2022-07-23 PROCEDURE — 2500000003 HC RX 250 WO HCPCS: Performed by: STUDENT IN AN ORGANIZED HEALTH CARE EDUCATION/TRAINING PROGRAM

## 2022-07-23 PROCEDURE — 6370000000 HC RX 637 (ALT 250 FOR IP): Performed by: STUDENT IN AN ORGANIZED HEALTH CARE EDUCATION/TRAINING PROGRAM

## 2022-07-23 PROCEDURE — 36415 COLL VENOUS BLD VENIPUNCTURE: CPT

## 2022-07-23 PROCEDURE — 6360000002 HC RX W HCPCS: Performed by: STUDENT IN AN ORGANIZED HEALTH CARE EDUCATION/TRAINING PROGRAM

## 2022-07-23 PROCEDURE — 6360000002 HC RX W HCPCS

## 2022-07-23 PROCEDURE — 1200000000 HC SEMI PRIVATE

## 2022-07-23 PROCEDURE — 80048 BASIC METABOLIC PNL TOTAL CA: CPT

## 2022-07-23 PROCEDURE — 83735 ASSAY OF MAGNESIUM: CPT

## 2022-07-23 PROCEDURE — 6370000000 HC RX 637 (ALT 250 FOR IP)

## 2022-07-23 PROCEDURE — 85025 COMPLETE CBC W/AUTO DIFF WBC: CPT

## 2022-07-23 PROCEDURE — 97166 OT EVAL MOD COMPLEX 45 MIN: CPT

## 2022-07-23 PROCEDURE — 1200000003 HC TELEMETRY R&B

## 2022-07-23 PROCEDURE — 84132 ASSAY OF SERUM POTASSIUM: CPT

## 2022-07-23 PROCEDURE — 2580000003 HC RX 258: Performed by: STUDENT IN AN ORGANIZED HEALTH CARE EDUCATION/TRAINING PROGRAM

## 2022-07-23 PROCEDURE — 97535 SELF CARE MNGMENT TRAINING: CPT

## 2022-07-23 PROCEDURE — 84484 ASSAY OF TROPONIN QUANT: CPT

## 2022-07-23 PROCEDURE — 99233 SBSQ HOSP IP/OBS HIGH 50: CPT | Performed by: STUDENT IN AN ORGANIZED HEALTH CARE EDUCATION/TRAINING PROGRAM

## 2022-07-23 PROCEDURE — 97110 THERAPEUTIC EXERCISES: CPT

## 2022-07-23 RX ORDER — LANOLIN ALCOHOL/MO/W.PET/CERES
3 CREAM (GRAM) TOPICAL NIGHTLY PRN
Status: DISCONTINUED | OUTPATIENT
Start: 2022-07-23 | End: 2022-07-27 | Stop reason: HOSPADM

## 2022-07-23 RX ORDER — HYDROCHLOROTHIAZIDE 25 MG/1
25 TABLET ORAL DAILY
Status: DISCONTINUED | OUTPATIENT
Start: 2022-07-23 | End: 2022-07-24

## 2022-07-23 RX ORDER — HYDRALAZINE HYDROCHLORIDE 20 MG/ML
10 INJECTION INTRAMUSCULAR; INTRAVENOUS ONCE
Status: COMPLETED | OUTPATIENT
Start: 2022-07-23 | End: 2022-07-23

## 2022-07-23 RX ORDER — AMLODIPINE BESYLATE 10 MG/1
10 TABLET ORAL DAILY
Status: DISCONTINUED | OUTPATIENT
Start: 2022-07-24 | End: 2022-07-27 | Stop reason: HOSPADM

## 2022-07-23 RX ORDER — LABETALOL 20 MG/4 ML (5 MG/ML) INTRAVENOUS SYRINGE
10 ONCE
Status: DISCONTINUED | OUTPATIENT
Start: 2022-07-23 | End: 2022-07-23

## 2022-07-23 RX ORDER — MAGNESIUM SULFATE IN WATER 40 MG/ML
2000 INJECTION, SOLUTION INTRAVENOUS ONCE
Status: COMPLETED | OUTPATIENT
Start: 2022-07-23 | End: 2022-07-23

## 2022-07-23 RX ORDER — SENNA AND DOCUSATE SODIUM 50; 8.6 MG/1; MG/1
1 TABLET, FILM COATED ORAL 2 TIMES DAILY
Status: COMPLETED | OUTPATIENT
Start: 2022-07-23 | End: 2022-07-24

## 2022-07-23 RX ORDER — POTASSIUM CHLORIDE 20 MEQ/1
40 TABLET, EXTENDED RELEASE ORAL 2 TIMES DAILY WITH MEALS
Status: COMPLETED | OUTPATIENT
Start: 2022-07-23 | End: 2022-07-23

## 2022-07-23 RX ORDER — METOPROLOL TARTRATE 50 MG/1
50 TABLET, FILM COATED ORAL 2 TIMES DAILY
Status: DISCONTINUED | OUTPATIENT
Start: 2022-07-24 | End: 2022-07-24

## 2022-07-23 RX ADMIN — HYDRALAZINE HYDROCHLORIDE 10 MG: 20 INJECTION INTRAMUSCULAR; INTRAVENOUS at 05:05

## 2022-07-23 RX ADMIN — MEROPENEM 1000 MG: 1 INJECTION, POWDER, FOR SOLUTION INTRAVENOUS at 06:54

## 2022-07-23 RX ADMIN — METOPROLOL TARTRATE 25 MG: 25 TABLET, FILM COATED ORAL at 09:51

## 2022-07-23 RX ADMIN — MAGNESIUM SULFATE HEPTAHYDRATE 2000 MG: 40 INJECTION, SOLUTION INTRAVENOUS at 19:55

## 2022-07-23 RX ADMIN — AMLODIPINE BESYLATE 5 MG: 5 TABLET ORAL at 10:49

## 2022-07-23 RX ADMIN — MEROPENEM 1000 MG: 1 INJECTION, POWDER, FOR SOLUTION INTRAVENOUS at 15:04

## 2022-07-23 RX ADMIN — POTASSIUM CHLORIDE 40 MEQ: 1500 TABLET, EXTENDED RELEASE ORAL at 17:30

## 2022-07-23 RX ADMIN — DOCUSATE SODIUM 50 MG AND SENNOSIDES 8.6 MG 1 TABLET: 8.6; 5 TABLET, FILM COATED ORAL at 09:51

## 2022-07-23 RX ADMIN — Medication 3 MG: at 19:57

## 2022-07-23 RX ADMIN — SODIUM CHLORIDE: 9 INJECTION, SOLUTION INTRAVENOUS at 12:11

## 2022-07-23 RX ADMIN — ACETAMINOPHEN 325MG 650 MG: 325 TABLET ORAL at 04:14

## 2022-07-23 RX ADMIN — METOPROLOL TARTRATE 5 MG: 5 INJECTION INTRAVENOUS at 15:50

## 2022-07-23 RX ADMIN — METOPROLOL TARTRATE 25 MG: 25 TABLET, FILM COATED ORAL at 19:57

## 2022-07-23 RX ADMIN — METOPROLOL TARTRATE 25 MG: 25 TABLET, FILM COATED ORAL at 03:01

## 2022-07-23 RX ADMIN — MAGNESIUM SULFATE HEPTAHYDRATE 2000 MG: 40 INJECTION, SOLUTION INTRAVENOUS at 10:00

## 2022-07-23 RX ADMIN — METOPROLOL TARTRATE 5 MG: 5 INJECTION INTRAVENOUS at 00:56

## 2022-07-23 RX ADMIN — HYDROCHLOROTHIAZIDE 25 MG: 25 TABLET ORAL at 17:30

## 2022-07-23 RX ADMIN — MEROPENEM 1000 MG: 1 INJECTION, POWDER, FOR SOLUTION INTRAVENOUS at 23:06

## 2022-07-23 RX ADMIN — DOCUSATE SODIUM 50 MG AND SENNOSIDES 8.6 MG 1 TABLET: 8.6; 5 TABLET, FILM COATED ORAL at 19:57

## 2022-07-23 RX ADMIN — POTASSIUM CHLORIDE 40 MEQ: 1500 TABLET, EXTENDED RELEASE ORAL at 09:51

## 2022-07-23 RX ADMIN — ENOXAPARIN SODIUM 40 MG: 100 INJECTION SUBCUTANEOUS at 09:51

## 2022-07-23 RX ADMIN — FAMOTIDINE 20 MG: 20 TABLET ORAL at 09:51

## 2022-07-23 RX ADMIN — FAMOTIDINE 20 MG: 20 TABLET ORAL at 19:58

## 2022-07-23 ASSESSMENT — PAIN DESCRIPTION - LOCATION: LOCATION: HAND;FINGER (COMMENT WHICH ONE)

## 2022-07-23 ASSESSMENT — PAIN DESCRIPTION - PAIN TYPE: TYPE: ACUTE PAIN

## 2022-07-23 ASSESSMENT — PAIN DESCRIPTION - ORIENTATION: ORIENTATION: RIGHT

## 2022-07-23 ASSESSMENT — PAIN DESCRIPTION - ONSET: ONSET: SUDDEN

## 2022-07-23 ASSESSMENT — PAIN - FUNCTIONAL ASSESSMENT: PAIN_FUNCTIONAL_ASSESSMENT: ACTIVITIES ARE NOT PREVENTED

## 2022-07-23 ASSESSMENT — PAIN SCALES - WONG BAKER: WONGBAKER_NUMERICALRESPONSE: 2

## 2022-07-23 ASSESSMENT — PAIN DESCRIPTION - FREQUENCY: FREQUENCY: INTERMITTENT

## 2022-07-23 ASSESSMENT — PAIN SCALES - GENERAL
PAINLEVEL_OUTOF10: 0
PAINLEVEL_OUTOF10: 5

## 2022-07-23 ASSESSMENT — PAIN DESCRIPTION - DESCRIPTORS: DESCRIPTORS: ACHING;DISCOMFORT

## 2022-07-23 NOTE — PROGRESS NOTES
Progress note: Infectious diseases    Patient - Sherrill Shrestha,  Age - 74 y.o.    - 1955      Room Number - 5K-10/010-A   MRN -  [id number]   Acct # - [de-identified]  Date of Admission -  2022  9:22 AM    SUBJECTIVE:   No new issues. OBJECTIVE   VITALS    weight is 201 lb (91.2 kg). His oral temperature is 97.4 °F (36.3 °C). His blood pressure is 165/77 (abnormal) and his pulse is 97. His respiration is 18 and oxygen saturation is 94%.        Wt Readings from Last 3 Encounters:   22 201 lb (91.2 kg)   22 201 lb (91.2 kg)   22 200 lb (90.7 kg)       I/O (24 Hours)    Intake/Output Summary (Last 24 hours) at 2022 1424  Last data filed at 2022 1242  Gross per 24 hour   Intake 600 ml   Output 3630 ml   Net -3030 ml       General Appearance  Awake, alert, oriented,  not  In acute distress  HEENT - normocephalic, atraumatic, slighlty pale  conjunctiva,  anicteric sclera  Neck - Supple, no mass  Lungs -  Bilateral   air entry, no rhonchi, no wheeze  Cardiovascular - Heart sounds are normal.     Abdomen - soft, not distended, nontender, suprapubic catheter  Neurologic -oriented,quadriparesis  Skin - No bruising or bleeding  Extremities - No edema, no cyanosis, clubbing     MEDICATIONS:      sennosides-docusate sodium  1 tablet Oral BID    potassium chloride  40 mEq Oral BID     amLODIPine  5 mg Oral Daily    meropenem  1,000 mg IntraVENous Q8H    sodium chloride flush  5-40 mL IntraVENous 2 times per day    enoxaparin  40 mg SubCUTAneous Daily    [Held by provider] baclofen  10 mg Oral TID    famotidine  20 mg Oral BID    [Held by provider] gabapentin  600 mg Oral TID    metoprolol tartrate  25 mg Oral BID    [Held by provider] oxybutynin  5 mg Oral BID    [Held by provider] tiZANidine  2 mg Oral TID      sodium chloride      sodium chloride 75 mL/hr at 22 1211     metoprolol, sodium chloride flush, sodium chloride, ondansetron **OR** ondansetron, polyethylene glycol, acetaminophen **OR** acetaminophen      LABS:     CBC:   Recent Labs     07/21/22  0531 07/22/22  0526 07/23/22  0542   WBC 17.5* 11.6* 10.0   HGB 12.6* 12.9* 12.3*    179 178     BMP:    Recent Labs     07/21/22  0531 07/22/22  0526 07/23/22  0542    137 135   K 4.3 3.5 3.3*    107 102   CO2 23 20* 21*   BUN 25* 16 9   CREATININE 0.5 0.4 0.4   GLUCOSE 113* 103 112*     Calcium:  Recent Labs     07/23/22  0542   CALCIUM 8.3*     Ionized Calcium:No results for input(s): IONCA in the last 72 hours. Magnesium:  Recent Labs     07/23/22  0542   MG 1.7        CULTURES:   UA: No results for input(s): SPECGRAV, PHUR, COLORU, CLARITYU, MUCUS, PROTEINU, BLOODU, RBCUA, WBCUA, BACTERIA, NITRU, GLUCOSEU, BILIRUBINUR, UROBILINOGEN, KETUA, LABCAST, LABCASTTY, AMORPHOS in the last 72 hours. Invalid input(s): CRYSTALS  Micro:   Lab Results   Component Value Date/Time    BC No growth 24 hours. 07/21/2022 10:25 PM            Problem list of patient:     Patient Active Problem List   Diagnosis Code    Neurogenic bladder N31.9    Multiple sclerosis ([address] G35    MS (multiple sclerosis) ([address] G35    Urinary retention R33.9    Chronic suprapubic catheter ([address] Z93.59    Cystostomy malfunction ([address] N99.512    Decubitus ulcer of coccyx L89.159    Decubitus ulcer, stage 3 ([facility]) L89.93    Malnutrition ([address] E46    Decubitus ulcer of right perineal ischial region, stage 3 ([address] L89.313    Pulmonary embolism on left ([facility]) V46.48    Acute metabolic encephalopathy F35.29    Speech disturbance R47.9    Infected decubitus ulcer, stage I L89.91, L08.9    Infected decubitus ulcer, stage III ([facility]) L89.93, L08.9    Wound infection T14. 8XXA, L08.9    Elevated INR R79.1    Chronic osteomyelitis, pelvis, right ([facility]) M86.651    Osteomyelitis ([facility]) M86.9    Urinary tract infection associated with indwelling urethral catheter ([facility]) T83.511A, N39.0

## 2022-07-23 NOTE — PROGRESS NOTES
Elisekalepatricia Mixon 62  INPATIENT OCCUPATIONAL THERAPY  [facility] ONC MED 5K  EVALUATION    Time:    Time In: 815  Time Out: 6971  Timed Code Treatment Minutes: 25 Minutes  Minutes: 40          Date: 2022  Patient Name: Yoon Porter,   Gender: male      MRN: [id number]  : 1955  (74 y.o.)  Referring Practitioner: Dr. Emi Degroot MD     Additional Pertinent Hx: Pt with past medical history of MS currently not on immunomodulators, essential hypertension, recurrent UTI with ESBL organisms, neurogenic bladder presented to [facility] due to altered mental status. In ED he was found hypotensive with blood pressure of 88/67 with respiratory rate of 22 and T-max of 99. Initial work-up was concerning for septicemia secondary to UTI. Eventually patient was found to have  Large ureteral stone, urology was consulted due to inability to place nephrostomy tube in the past IR was consulted by urology. As patient has received AC/AP, IR will need 5 days free from blood thinners. Eventually patient is planned to get IR guided nephrostomy tube placement with a stent on 2022. Restrictions/Precautions:  Restrictions/Precautions: Fall Risk    Subjective  Chart Reviewed: Yes, Orders, History and Physical    Subjective: Pleasant and cooperative  Comments: RN approved session. Pt asked to do some self care this AM. He was not C/O pain. He refused breakfast and stated that he doesn't generally eat much for breakfast.    Pain: Denies.     Vitals: Vitals not assessed per clinical judgement, see nursing flowsheet    Social/Functional History:  Lives With: Alone  Type of Home: Facility [facility]  Home Layout: One level  Home Access: Level entry  Home Equipment: Wheelchair-electric   Bathroom Shower/Tub: Walk-in shower  Bathroom Toilet: Bedside commode  Bathroom Equipment: Shower chair, Hand-held shower    Receives Help From: Other (comment) (staff as available)  ADL Assistance: Needs assistance  Homemaking Assistance: Needs assistance  Homemaking Responsibilities: No  Transfer Assistance: Needs assistance    Active : No  Occupation: On disability  Leisure & Hobbies: Watching TV or following the news  Additional Comments: Pt was having to use a whit lift for transfers into the electric W/C. He has not been able to do any standing. He participates in therapy and completes exercises. VISION:WFL    HEARING:  WFL    COGNITION: Decreased Problem Solving    RANGE OF MOTION:  Bilateral Upper Extremity:  Impaired - Limited shoulder flexion 0-90 and horizontal adduction 0-100 degrees; elbow flexion 0-100 degrees. Slightly less movement in RUE compared to LUE   Limited fist in B hands with able to make R hand close 70% and L hand 80%. Coordination: held onto canister of shaving cream with his R hand but not use his thumb or finger to operate it. STRENGTH:  Bilateral Upper Extremity:  Not Tested    SENSATION:   Numbness reported in his R hand. ADL:   Grooming: Minimal Assistance. Pt shaved himself with help provided for handing him the wrung out washcloth or dispensing shaving cream out of canister . Min cues for thoroughness. Pt used his L hand for all of the tasks. BALANCE:  Pt refused at this time. BED MOBILITY:  Not Tested    TRANSFERS:  Not able to demonstrate. Exercise:  Pt completed AROM/AAROM to BUE lying in bed in all planes and joints. Exercises completed to decrease muscle tightness and increase his endurance and strength for ease of doing ADLs. Activity Tolerance:  Patient tolerance of  treatment: fair. UE exercises completed while in supine. He needed rest breaks between activities. Assessment:  Assessment: Patient would benefit from continued skilled OT services to address above deficits. He presents with troponin level elevated. Pt has hx of MS and has had a suprapubic catheter. Pt had confusion this admission and was found to have UTI and a kidney stone.   Pt is scheduled for L nephrostomy tube and stent placement on 7/25. Pt was having help with his transfers while at [facility] using a whit lift device. Pt did self care but has help with parts requiring B hands. Pt feeds self and does grooming using his L hand. Pt has help from staff to take showers. Pt demonstrated doing his shaving with MIN A provided while he used his L hand to hold onto the razor. Performance deficits / Impairments: Decreased functional mobility , Decreased ADL status, Decreased ROM, Decreased strength, Decreased endurance, Decreased sensation, Decreased coordination  Prognosis: Fair  REQUIRES OT FOLLOW-UP: Yes  Decision Making: Medium Complexity    Treatment Initiated: Treatment and education initiated within context of evaluation. Evaluation time included review of current medical information, gathering information related to past medical, social and functional history, completion of standardized testing, formal and informal observation of tasks, assessment of data and development of plan of care and goals. Treatment time included skilled education and facilitation of tasks to increase safety and independence with ADL's for improved functional independence and quality of life. Pt tolerate session while taking rest breaks between activities. Pt was provided verbal cues to use the mirror when shaving.       Discharge Recommendations:  Patient would benefit from continued therapy after discharge, [address] with OT    Patient Education:     Patient Education  Education Given To: Patient  Education Provided: Role of Therapy, Plan of Care, ADL Adaptive Strategies  Education Method: Verbal  Barriers to Learning: None  Education Outcome: Verbalized understanding, Continued education needed    Equipment Recommendations:  Equipment Needed: Yes  Other: may benefit from adapted  or universal cuff for grooming and/or feeding    Plan:  Times per Week: 3x  Current Treatment Recommendations: ROM, Endurance training, Self-Care / ADL, Equipment evaluation, education, & procurement  Plan Comment: Pt would benefit from continued skilled OT services when medically stable and discharged from Acute. OT while at [facility] is recommended. Specific Instructions for Next Treatment: ADLs and adaptations; UE exercises. See long-term goal time frame for expected duration of plan of care. If no long-term goals established, a short length of stay is anticipated. Goals:  Patient goals : \"I want to be able to use my hands to do more activities. \" pt states. Short Term Goals  Time Frame for Short term goals: By discharge  Short Term Goal 1: Pt will demonstrate grooming and/or feeding with setup A while using any adaptations needed to increase his independence with self care. Short Term Goal 2: Pt will complete BUE ROM exercises as tolerated to decrease his muscle tightness for ease of doing self care. Short Term Goal 3: Pt will complete transfers with OTR when appropriate if pt able to tolerate in preparation for doing activities while out of bed. Additional Goals?: No         Following session, patient left in safe position with all fall risk precautions in place.

## 2022-07-23 NOTE — PLAN OF CARE
Problem: Discharge Planning  Goal: Discharge to home or other facility with appropriate resources  Description: Plan is for patient to be discharged back to [facility] when medically stable. Recent Flowsheet Documentation  Taken 7/22/2022 2000 by Dylan Berrios RN  Discharge to home or other facility with appropriate resources:   Identify barriers to discharge with patient and caregiver   Identify discharge learning needs (meds, wound care, etc)  7/22/2022 1849 by Ramila Singleton RN  Outcome: Progressing     Problem: Pain  Goal: Verbalizes/displays adequate comfort level or baseline comfort level  Description: Pain Assessment: None - Denies Pain          Is pain goal met at this time? Yes              7/22/2022 1849 by Ramila Singleton RN  Outcome: Progressing  Flowsheets (Taken 7/22/2022 0800)  Verbalizes/displays adequate comfort level or baseline comfort level: Encourage patient to monitor pain and request assistance     Problem: Safety - Adult  Goal: Free from fall injury  Description: All fall precautions in place. Bed in low position, alarm activated and appropriate use of call light. Recent Flowsheet Documentation  Taken 7/22/2022 2022 by Dylan Berrios RN  Free From Fall Injury: Instruct family/caregiver on patient safety  7/22/2022 1849 by Ramila Singleton RN  Outcome: Progressing     Problem: ABCDS Injury Assessment  Goal: Absence of physical injury  Description: Patient being turned and repositioned every 2 hours. Wound care consulted for further evaluation of wound. Wound was present on admission from [facility].   Recent Flowsheet Documentation  Taken 7/22/2022 2022 by Dylan Berrios RN  Absence of Physical Injury: Implement safety measures based on patient assessment  7/22/2022 1849 by Ramila Singleton RN  Outcome: Progressing

## 2022-07-23 NOTE — PROGRESS NOTES
Hospitalist Progress Note    Patient:  Harriet Kumar    YOB: 1955  Unit/Bed:5K-10/010-A  MRN: [id number]    Acct: [de-identified]   PCP: Kell Abreu MD    Date of Admission: 7/20/2022      Assessment/Plan:  Sepsis secondary to complicated UTI. History of ESBL. Continue meropenem. ID on board. Eating and drinking well. Decrease fluids to 75 cc. Monitor hemodynamics. Urine culture growing providencia Stuartii, Proteus Mirabilis, and E coli. Complicated UTI secondary to obstructing left nephrolithiasis. Appreciate urology recommendations. IR planning to put nephrostomy tube on 7/25/2022, 11 AM.  Aspirin on hold. Lovenox 24 Hr prior to procedure. Nephrolithiasis. Outpatient stone treatment. Outpatient urology follow-up. Acute encephalopathy secondary to septicemia. Resolved. Continue to monitor  Neurogenic bladder secondary to multiple sclerosis has chronic indwelling catheter. Wound care for care site. Stage IV decubitus ulcer. Wound care for care site. NSTEMI likely type II MI secondary to demand ischemia from septicemia. Chest pain free, troponin negative. Colostomy left sided- from pressure ulcer. Patient has decreased output with one hard small stool. Will change bowel regimen, added senokot. Will add lactulose as well. Hypokalemia likely from low magnessium- Will replace with KCL 40 mEq BID. Recheck at 7 Pm. Hypomagnesemia. Mg 1.7. Replace with 2g Mgso4 IV. Recheck at 7 Pm. On morning of 24th july will stop lovenox and NPO at [address] [place] [address] as patient is scheduled for IR guided nephrostomy tube placement with stent.            Expected discharge date:  07/26/2022    Disposition:   [] Home  [] TCU  [] Rehab  [] Psych  [x] SNF  [] [facility]  [] Other-    ===================================================================      Chief Complaint: [address] Course: 82-year-old gentleman with past medical history of MS currently not on immunomodulators, essential hypertension, recurrent UTI with ESBL organisms, neurogenic bladder presented to [facility] due to altered mental status. In ED he was found hypotensive with blood pressure of 88/67 with respiratory rate of 22 and T-max of 99. Initial work-up was concerning for septicemia secondary to UTI. Eventually patient was found to have  Large ureteral stone, urology was consulted due to inability to place nephrostomy tube in the past IR was consulted by urology. As patient has received AC/AP, IR will need 5 days free from blood thinners. Eventually patient is planned to get IR guided nephrostomy tube placement with a stent on 7/25/2022. Subjective (past 24 hours):   Patient seen and examined at bedside, denied any abdominal or flank pain, he told me that he has not slept well and he needs something to sleep at night. Has colostomy and suprapubic catheter. Decreased colostomy output will add bowel regimen. Overnight events noted. Discussed with nursing staff. His blood pressure remained high up to  maximum of 206/95 received IV lopressor 50 mg, along with extra PO dose of lopressor 5 mg in addition to scheduled. Most recent blood pressure is  160/75. Yesterday amlodipine was added. Will keep an eye on Blood pressure as we do not want to bring down too much given current source of infection. Will treat with caution. Remained afebrile. T max 98.4. Labs reviewed. Troponin negative, K 3.3, Mg 1.7 leucocytosis resolved. Patient coherent with conversation. He is from nursing home, no precert needed. Is and Os not charted correctly. Patient has to work with PT/OT. ROS: reviewed complete ROS unchanged unless otherwise stated in hospital course/subjective portion.        Medications:  Reviewed    Infusion Medications    sodium chloride      sodium chloride 100 mL/hr at 07/22/22 0655     Scheduled Medications    senna-docusate  1 tablet Oral BID    potassium chloride  40 mEq Oral BID WC 07/22/22  0526 07/23/22  0542   WBC 17.5* 11.6* 10.0   HGB 12.6* 12.9* 12.3*   HCT 41.8* 41.3* 38.6*    179 178       Recent Labs     07/21/22  0531 07/22/22  0526 07/23/22  0542    137 135   K 4.3 3.5 3.3*    107 102   CO2 23 20* 21*   BUN 25* 16 9   CREATININE 0.5 0.4 0.4   CALCIUM 8.6 8.5 8.3*       Recent Labs     07/20/22  0940   AST 21   ALT 15   BILITOT 0.4   ALKPHOS 137*       No results for input(s): INR in the last 72 hours. No results for input(s): Ardelle Chalk in the last 72 hours. Recent Labs     07/20/22  0949   PROCAL 2.23*        Lab Results   Component Value Date/Time    NITRU POSITIVE 07/20/2022 09:40 AM    WBCUA 10-15 07/20/2022 09:40 AM    WBCUA >200 01/20/2012 09:00 AM    BACTERIA MODERATE 07/20/2022 09:40 AM    RBCUA > 200 07/20/2022 09:40 AM    BLOODU LARGE 07/20/2022 09:40 AM    SPECGRAV >1.030 04/22/2022 11:30 PM    GLUCOSEU NEGATIVE 07/20/2022 09:40 AM       Radiology (48 hours):  CT ABDOMEN PELVIS WO CONTRAST    Result Date: 7/20/2022  Large obstructing proximal left ureteral stone. This document has been electronically signed by: Krishna Clinton MD on 07/20/2022 11:48 PM All CTs at this facility use dose modulation techniques and iterative reconstructions, and/or weight-based dosing when appropriate to reduce radiation to a low as reasonably achievable. CT HEAD WO CONTRAST    Result Date: 7/20/2022  No acute intracranial process. . No change from prior study. **This report has been created using voice recognition software. It may contain minor errors which are inherent in voice recognition technology. ** Final report electronically signed by Dr. Brown Messina on 7/20/2022 10:25 AM    XR CHEST PORTABLE    Result Date: 7/20/2022  1. Very poor inflation of the lungs. Kyphotic positioning of the patient. Borderline heart size. 2. Mild atelectasis/pneumonia left lower lung field. No effusion seen. **This report has been created using voice recognition software. It may contain minor errors which are inherent in voice recognition technology. ** Final report electronically signed by Dr. Haven North on 7/20/2022 10:10 AM       DVT prophylaxis:    [x] Lovenox, will hold 24 hr before procedure. Hold on morning of July 24th.   [] SCDs  [] SQ Heparin  [] Encourage ambulation   [] Already on Anticoagulation       Diet: ADULT DIET; Regular  Code Status: Full Code  PT/OT: ordered but has not worked yet. Discussed with nurse.    IVF: 76 cc/h    Electronically signed by Collin Wyatt MD on 7/23/2022 at 7:47 AM

## 2022-07-23 NOTE — PLAN OF CARE
Problem: Pain  Goal: Verbalizes/displays adequate comfort level or baseline comfort level  Description: Pain Assessment: None - Denies Pain  Problem: Safety - Adult  Goal: Free from fall injury  Description: All fall precautions in place. Bed in low position, alarm activated and appropriate use of call light. Outcome: Progressing  Flowsheets (Taken 7/23/2022 1636)  Free From Fall Injury: Instruct family/caregiver on patient safety     Problem: ABCDS Injury Assessment  Goal: Absence of physical injury  Description: Patient being turned and repositioned every 2 hours. Wound care consulted for further evaluation of wound. Wound was present on admission from [facility]. Outcome: Progressing  Flowsheets (Taken 7/23/2022 1636)  Absence of Physical Injury: Implement safety measures based on patient assessment     Is pain goal met at this time? Yes  Outcome: Progressing  Flowsheets (Taken 7/23/2022 1636)  Verbalizes/displays adequate comfort level or baseline comfort level:   Encourage patient to monitor pain and request assistance   Assess pain using appropriate pain scale   Implement non-pharmacological measures as appropriate and evaluate response     Problem: Discharge Planning  Goal: Discharge to home or other facility with appropriate resources  Description: Plan is for patient to be discharged back to [facility] when medically stable. Outcome: Progressing  Flowsheets (Taken 7/23/2022 1636)  Discharge to home or other facility with appropriate resources:   Identify barriers to discharge with patient and caregiver   Arrange for needed discharge resources and transportation as appropriate   Identify discharge learning needs (meds, wound care, etc)  Care plan reviewed with patient. Patient verbalizes understanding of the plan of care and contributes to goal setting.

## 2022-07-24 LAB
MAGNESIUM: 2 MG/DL (ref 1.6–2.4)
TROPONIN T: 0.01 NG/ML
TROPONIN T: 0.02 NG/ML

## 2022-07-24 PROCEDURE — 2580000003 HC RX 258: Performed by: STUDENT IN AN ORGANIZED HEALTH CARE EDUCATION/TRAINING PROGRAM

## 2022-07-24 PROCEDURE — 6360000002 HC RX W HCPCS: Performed by: STUDENT IN AN ORGANIZED HEALTH CARE EDUCATION/TRAINING PROGRAM

## 2022-07-24 PROCEDURE — 36415 COLL VENOUS BLD VENIPUNCTURE: CPT

## 2022-07-24 PROCEDURE — 1200000000 HC SEMI PRIVATE

## 2022-07-24 PROCEDURE — 1200000003 HC TELEMETRY R&B

## 2022-07-24 PROCEDURE — 84484 ASSAY OF TROPONIN QUANT: CPT

## 2022-07-24 PROCEDURE — 6370000000 HC RX 637 (ALT 250 FOR IP): Performed by: STUDENT IN AN ORGANIZED HEALTH CARE EDUCATION/TRAINING PROGRAM

## 2022-07-24 PROCEDURE — 6370000000 HC RX 637 (ALT 250 FOR IP)

## 2022-07-24 PROCEDURE — 83735 ASSAY OF MAGNESIUM: CPT

## 2022-07-24 PROCEDURE — 99233 SBSQ HOSP IP/OBS HIGH 50: CPT | Performed by: STUDENT IN AN ORGANIZED HEALTH CARE EDUCATION/TRAINING PROGRAM

## 2022-07-24 RX ORDER — CARVEDILOL 6.25 MG/1
6.25 TABLET ORAL 2 TIMES DAILY WITH MEALS
Status: DISCONTINUED | OUTPATIENT
Start: 2022-07-24 | End: 2022-07-26

## 2022-07-24 RX ORDER — HYDROCHLOROTHIAZIDE 25 MG/1
25 TABLET ORAL DAILY
Status: DISCONTINUED | OUTPATIENT
Start: 2022-07-25 | End: 2022-07-26

## 2022-07-24 RX ORDER — CARVEDILOL 3.12 MG/1
3.12 TABLET ORAL 2 TIMES DAILY WITH MEALS
Status: DISCONTINUED | OUTPATIENT
Start: 2022-07-24 | End: 2022-07-24

## 2022-07-24 RX ADMIN — DOCUSATE SODIUM 50 MG AND SENNOSIDES 8.6 MG 1 TABLET: 8.6; 5 TABLET, FILM COATED ORAL at 09:28

## 2022-07-24 RX ADMIN — AMLODIPINE BESYLATE 10 MG: 10 TABLET ORAL at 09:28

## 2022-07-24 RX ADMIN — MEROPENEM 1000 MG: 1 INJECTION, POWDER, FOR SOLUTION INTRAVENOUS at 23:01

## 2022-07-24 RX ADMIN — DOCUSATE SODIUM 50 MG AND SENNOSIDES 8.6 MG 1 TABLET: 8.6; 5 TABLET, FILM COATED ORAL at 21:26

## 2022-07-24 RX ADMIN — MEROPENEM 1000 MG: 1 INJECTION, POWDER, FOR SOLUTION INTRAVENOUS at 14:52

## 2022-07-24 RX ADMIN — CARVEDILOL 6.25 MG: 6.25 TABLET, FILM COATED ORAL at 11:10

## 2022-07-24 RX ADMIN — MEROPENEM 1000 MG: 1 INJECTION, POWDER, FOR SOLUTION INTRAVENOUS at 06:37

## 2022-07-24 RX ADMIN — FAMOTIDINE 20 MG: 20 TABLET ORAL at 21:26

## 2022-07-24 RX ADMIN — FAMOTIDINE 20 MG: 20 TABLET ORAL at 09:28

## 2022-07-24 ASSESSMENT — PAIN SCALES - GENERAL
PAINLEVEL_OUTOF10: 0

## 2022-07-24 NOTE — PLAN OF CARE
Problem: Pain  Goal: Verbalizes/displays adequate comfort level or baseline comfort level  Description: Pain Assessment: None - Denies Pain  Problem: Safety - Adult  Goal: Free from fall injury  Description: All fall precautions in place. Bed in low position, alarm activated and appropriate use of call light. Outcome: Progressing  Free From Fall Injury: Instruct family/caregiver on patient safety     Problem: ABCDS Injury Assessment  Goal: Absence of physical injury  Description: Patient being turned and repositioned every 2 hours. Wound care consulted for further evaluation of wound. Wound was present on admission from [facility]. Outcome: Progressing  Absence of Physical Injury: Implement safety measures based on patient assessment     Is pain goal met at this time? Yes  Outcome: Progressing  Verbalizes/displays adequate comfort level or baseline comfort level:   Encourage patient to monitor pain and request assistance   Assess pain using appropriate pain scale   Implement non-pharmacological measures as appropriate and evaluate response     Problem: Discharge Planning  Goal: Discharge to home or other facility with appropriate resources  Description: Plan is for patient to be discharged back to [facility] when medically stable. Outcome: Progressing  Discharge to home or other facility with appropriate resources:   Identify barriers to discharge with patient and caregiver   Arrange for needed discharge resources and transportation as appropriate   Identify discharge learning needs (meds, wound care, etc)    Care plan reviewed with patient. Patient verbalizes understanding of the plan of care and contributes to goal setting.

## 2022-07-24 NOTE — PLAN OF CARE
Problem: Pain  Goal: Verbalizes/displays adequate comfort level or baseline comfort level  Description: Pain Assessment: None - Denies Pain  Is pain goal met at this time? Yes    7/23/2022 2256 by Tanmay Gonzalez RN  Outcome: Progressing     Problem: Discharge Planning  Goal: Discharge to home or other facility with appropriate resources  Description: Plan is for patient to be discharged back to [facility] when medically stable. 7/23/2022 2256 by Tanmay Gonzalez RN  Outcome: Progressing  Flowsheets (Taken 7/23/2022 1945)  Discharge to home or other facility with appropriate resources: Identify barriers to discharge with patient and caregiver     Problem: Safety - Adult  Goal: Free from fall injury  Description: All fall precautions in place. Bed in low position, alarm activated and appropriate use of call light. All fall precautions in place. Bed in low position, alarm activated and appropriate use of call light.  7/23/2022 2256 by Tanmay Gonzalez RN  Outcome: Progressing     Problem: ABCDS Injury Assessment  Goal: Absence of physical injury  Description: Patient being turned and repositioned every 2 hours. Wound care consulted for further evaluation of wound. Wound was present on admission from [facility]. 7/23/2022 2256 by Tanmay Gonzalez RN  Outcome: Progressing     Skin assessment completed. Patient turned every 2 hours and as needed. No skin breakdown this shift. Care plan reviewed with patient. Patient verbalizes understanding of the plan of care and contributes to goal setting.   Electronically signed by Tanmay Gonzalez RN on 7/23/2022 at 10:57 PM

## 2022-07-24 NOTE — PROGRESS NOTES
Hospitalist Progress Note    Patient:  Fatmata Thomas    YOB: 1955  Unit/Bed:5K-10/010-A  MRN: [id number]    Acct: [de-identified]   PCP: Kee Damon MD    Date of Admission: 7/20/2022      Assessment/Plan:  Sepsis secondary to complicated UTI. Resolved. History of ESBL. Continue meropenem day 4. ID on board. Eating and drinking well. 50 cc. Monitor hemodynamics. Urine culture growing providencia Stuartii, Proteus Mirabilis, and E coli ESBL. Complicated UTI secondary to obstructing left nephrolithiasis. Resolving, Appreciate urology recommendations. IR planning to put nephrostomy tube on 7/25/2022, 11 AM.  Aspirin on hold. Lovenox on hold. NPO at midnight. Essential hypertension. Elevated, amlodipine was increased to 10 mg, lopressor was changed to coreg 12.5, HZT was started. Monitor. Nephrolithiasis. Outpatient stone treatment. Outpatient urology follow-up. Acute encephalopathy secondary to septicemia. Resolved. Continue to monitor  Neurogenic bladder secondary to multiple sclerosis has chronic indwelling catheter. Wound care for care site. Stage IV decubitus ulcer. Wound care for care site. NSTEMI likely type II MI secondary to demand ischemia from septicemia. Chest pain free, trend troponin. Colostomy left sided-  bowel regimen. Hypokalemia likely from low magnessium- Resolved. If low will replace and monitor. BMP   Hypomagnesemia. Resolved. Expected discharge date:  07/25/2022    Disposition:   [] Home  [] TCU  [] Rehab  [] Psych  [x] SNF  [] [facility]  [] Other-    ===================================================================      Chief Complaint: [address] Course: 82-year-old gentleman with past medical history of MS currently not on immunomodulators, essential hypertension, recurrent UTI with ESBL organisms, neurogenic bladder presented to [facility] due to altered mental status.   In ED he was found hypotensive with blood pressure of 88/67 with respiratory rate of 22 and T-max of 99. Initial work-up was concerning for septicemia secondary to UTI. Eventually patient was found to have  Large ureteral stone, urology was consulted due to inability to place nephrostomy tube in the past IR was consulted by urology. As patient has received AC/AP, IR will need 5 days free from blood thinners. Eventually patient is planned to get IR guided nephrostomy tube placement with a stent on 7/25/2022. Subjective (past 24 hours):   Patient seen and examined ,   He was in the bed. Told me he slept well. Most recent vitals bp 140/65, Pulse 105, RR 18, Temp 98.2. On amlodipine 10 mg, Lopressor was switched to coreg 6.25 and started on HZT 25 mg.    Remained afebrile. Labs reviewed. Troponin 0.017, K 3.8,  He is from nursing home, no precert needed. Is and Os not charted correctly. Patient working with Pt/ot  Blood cultures remained negative. ROS: reviewed complete ROS unchanged unless otherwise stated in hospital course/subjective portion.        Medications:  Reviewed    Infusion Medications    sodium chloride      sodium chloride 75 mL/hr at 07/23/22 1211     Scheduled Medications    [START ON 7/25/2022] hydroCHLOROthiazide  25 mg Oral Daily    carvedilol  6.25 mg Oral BID WC    sennosides-docusate sodium  1 tablet Oral BID    amLODIPine  10 mg Oral Daily    meropenem  1,000 mg IntraVENous Q8H    sodium chloride flush  5-40 mL IntraVENous 2 times per day    [Held by provider] enoxaparin  40 mg SubCUTAneous Daily    [Held by provider] baclofen  10 mg Oral TID    famotidine  20 mg Oral BID    [Held by provider] gabapentin  600 mg Oral TID    [Held by provider] oxybutynin  5 mg Oral BID    [Held by provider] tiZANidine  2 mg Oral TID     PRN Meds: melatonin, metoprolol, sodium chloride flush, sodium chloride, ondansetron **OR** ondansetron, polyethylene glycol, acetaminophen **OR** acetaminophen        Intake/Output Summary (Last 24 hours) at 7/24/2022 1318  Last data filed at 7/24/2022 0915  Gross per 24 hour   Intake 200 ml   Output 2780 ml   Net -2580 ml         Exam:  BP (!) 140/65   Pulse (!) 105   Temp 98.2 °F (36.8 °C) (Oral)   Resp 18   Wt 201 lb (91.2 kg)   SpO2 94%   BMI 31.48 kg/m²     General: Not in apparent distress appears appropriate for the age able to maintain conversation. Eyes:  PERRL. Conjunctivae/corneas clear. HENT: Head normal appearing. Nares normal. Oral mucosa moist.  Hearing intact. Neck: Supple, with full range of motion. Trachea midline. No gross JVD appreciated. Respiratory:  Normal effort. Clear to auscultation, without rales or wheezes or rhonchi. Cardiovascular: Normal rate, regular rhythm with normal S1/S2 without murmurs. No lower extremity edema. Abdomen: Soft, non-tender, non-distended with normal bowel sounds. Has colostomy bag. Musculoskeletal: No joint swelling or tenderness. Normal tone. No abnormal movements. Skin: Warm and dry. No rashes or lesions. Stage 4 pelvic ischial ulcer. Neurologic: Bilateral lower extremity flaccid paralysis, absent reflexes. Psychiatric: Alert and oriented, normal insight and thought content. Capillary Refill: Brisk,< 3 seconds. Peripheral Pulses: +2 palpable, equal bilaterally. Labs:   Recent Labs     07/22/22  0526 07/23/22  0542   WBC 11.6* 10.0   HGB 12.9* 12.3*   HCT 41.3* 38.6*    178       Recent Labs     07/22/22  0526 07/23/22  0542 07/23/22  1904    135  --    K 3.5 3.3* 3.8    102  --    CO2 20* 21*  --    BUN 16 9  --    CREATININE 0.4 0.4  --    CALCIUM 8.5 8.3*  --        No results for input(s): AST, ALT, BILIDIR, BILITOT, ALKPHOS in the last 72 hours. No results for input(s): INR in the last 72 hours. No results for input(s): Magdalene Parsons in the last 72 hours. No results for input(s): PROCAL in the last 72 hours.      Lab Results   Component Value Date/Time    NITRU POSITIVE 07/20/2022 09:40 MD on 7/24/2022 at 1:18 PM

## 2022-07-25 ENCOUNTER — APPOINTMENT (OUTPATIENT)
Dept: INTERVENTIONAL RADIOLOGY/VASCULAR | Age: 67
DRG: 659 | End: 2022-07-25
Payer: COMMERCIAL

## 2022-07-25 PROBLEM — E83.42 HYPOMAGNESEMIA: Status: ACTIVE | Noted: 2022-07-25

## 2022-07-25 LAB
ANION GAP SERPL CALCULATED.3IONS-SCNC: 9 MEQ/L (ref 8–16)
APTT: 31.5 SECONDS (ref 22–38)
BASOPHILS # BLD: 0.5 %
BASOPHILS ABSOLUTE: 0 THOU/MM3 (ref 0–0.1)
BLOOD CULTURE, ROUTINE: NORMAL
BUN BLDV-MCNC: 10 MG/DL (ref 7–22)
CALCIUM SERPL-MCNC: 8.6 MG/DL (ref 8.5–10.5)
CHLORIDE BLD-SCNC: 103 MEQ/L (ref 98–111)
CO2: 24 MEQ/L (ref 23–33)
CREAT SERPL-MCNC: 0.3 MG/DL (ref 0.4–1.2)
CREAT SERPL-MCNC: 0.4 MG/DL (ref 0.4–1.2)
EKG ATRIAL RATE: 85 BPM
EKG P AXIS: 28 DEGREES
EKG P-R INTERVAL: 134 MS
EKG Q-T INTERVAL: 394 MS
EKG QRS DURATION: 82 MS
EKG QTC CALCULATION (BAZETT): 468 MS
EKG R AXIS: 22 DEGREES
EKG T AXIS: 54 DEGREES
EKG VENTRICULAR RATE: 85 BPM
EOSINOPHIL # BLD: 1.9 %
EOSINOPHILS ABSOLUTE: 0.1 THOU/MM3 (ref 0–0.4)
ERYTHROCYTE [DISTWIDTH] IN BLOOD BY AUTOMATED COUNT: 15.2 % (ref 11.5–14.5)
ERYTHROCYTE [DISTWIDTH] IN BLOOD BY AUTOMATED COUNT: 15.4 % (ref 11.5–14.5)
ERYTHROCYTE [DISTWIDTH] IN BLOOD BY AUTOMATED COUNT: 50.2 FL (ref 35–45)
ERYTHROCYTE [DISTWIDTH] IN BLOOD BY AUTOMATED COUNT: 51.4 FL (ref 35–45)
GFR SERPL CREATININE-BSD FRML MDRD: > 90 ML/MIN/1.73M2
GFR SERPL CREATININE-BSD FRML MDRD: > 90 ML/MIN/1.73M2
GLUCOSE BLD-MCNC: 105 MG/DL (ref 70–108)
HCT VFR BLD CALC: 38.7 % (ref 42–52)
HCT VFR BLD CALC: 39.2 % (ref 42–52)
HEMOGLOBIN: 12.5 GM/DL (ref 14–18)
HEMOGLOBIN: 12.7 GM/DL (ref 14–18)
IMMATURE GRANS (ABS): 0.05 THOU/MM3 (ref 0–0.07)
IMMATURE GRANULOCYTES: 0.7 %
INR BLD: 1.15 (ref 0.85–1.13)
LV EF: 58 %
LVEF MODALITY: NORMAL
LYMPHOCYTES # BLD: 14.1 %
LYMPHOCYTES ABSOLUTE: 1 THOU/MM3 (ref 1–4.8)
MCH RBC QN AUTO: 29.1 PG (ref 26–33)
MCH RBC QN AUTO: 29.3 PG (ref 26–33)
MCHC RBC AUTO-ENTMCNC: 32.3 GM/DL (ref 32.2–35.5)
MCHC RBC AUTO-ENTMCNC: 32.4 GM/DL (ref 32.2–35.5)
MCV RBC AUTO: 89.9 FL (ref 80–94)
MCV RBC AUTO: 90.8 FL (ref 80–94)
MONOCYTES # BLD: 11.9 %
MONOCYTES ABSOLUTE: 0.9 THOU/MM3 (ref 0.4–1.3)
NUCLEATED RED BLOOD CELLS: 0 /100 WBC
PLATELET # BLD: 192 THOU/MM3 (ref 130–400)
PLATELET # BLD: 204 THOU/MM3 (ref 130–400)
PMV BLD AUTO: 8.7 FL (ref 9.4–12.4)
PMV BLD AUTO: 8.9 FL (ref 9.4–12.4)
POTASSIUM REFLEX MAGNESIUM: 3.6 MEQ/L (ref 3.5–5.2)
RBC # BLD: 4.26 MILL/MM3 (ref 4.7–6.1)
RBC # BLD: 4.36 MILL/MM3 (ref 4.7–6.1)
SEG NEUTROPHILS: 70.9 %
SEGMENTED NEUTROPHILS ABSOLUTE COUNT: 5.2 THOU/MM3 (ref 1.8–7.7)
SODIUM BLD-SCNC: 136 MEQ/L (ref 135–145)
TROPONIN T: 0.03 NG/ML
WBC # BLD: 7.4 THOU/MM3 (ref 4.8–10.8)
WBC # BLD: 8 THOU/MM3 (ref 4.8–10.8)

## 2022-07-25 PROCEDURE — 93010 ELECTROCARDIOGRAM REPORT: CPT | Performed by: INTERNAL MEDICINE

## 2022-07-25 PROCEDURE — 6370000000 HC RX 637 (ALT 250 FOR IP)

## 2022-07-25 PROCEDURE — 0T778DZ DILATION OF LEFT URETER WITH INTRALUMINAL DEVICE, VIA NATURAL OR ARTIFICIAL OPENING ENDOSCOPIC: ICD-10-PCS | Performed by: RADIOLOGY

## 2022-07-25 PROCEDURE — 6370000000 HC RX 637 (ALT 250 FOR IP): Performed by: STUDENT IN AN ORGANIZED HEALTH CARE EDUCATION/TRAINING PROGRAM

## 2022-07-25 PROCEDURE — 2580000003 HC RX 258: Performed by: STUDENT IN AN ORGANIZED HEALTH CARE EDUCATION/TRAINING PROGRAM

## 2022-07-25 PROCEDURE — 1200000000 HC SEMI PRIVATE

## 2022-07-25 PROCEDURE — 50695 PLMT URETERAL STENT PRQ: CPT

## 2022-07-25 PROCEDURE — 84484 ASSAY OF TROPONIN QUANT: CPT

## 2022-07-25 PROCEDURE — 85025 COMPLETE CBC W/AUTO DIFF WBC: CPT

## 2022-07-25 PROCEDURE — 6360000002 HC RX W HCPCS: Performed by: STUDENT IN AN ORGANIZED HEALTH CARE EDUCATION/TRAINING PROGRAM

## 2022-07-25 PROCEDURE — 99233 SBSQ HOSP IP/OBS HIGH 50: CPT | Performed by: STUDENT IN AN ORGANIZED HEALTH CARE EDUCATION/TRAINING PROGRAM

## 2022-07-25 PROCEDURE — 2709999900 IR GUIDED NEPHROSTOMY CATH PLACEMENT

## 2022-07-25 PROCEDURE — C2617 STENT, NON-COR, TEM W/O DEL: HCPCS

## 2022-07-25 PROCEDURE — 1200000003 HC TELEMETRY R&B

## 2022-07-25 PROCEDURE — 36415 COLL VENOUS BLD VENIPUNCTURE: CPT

## 2022-07-25 PROCEDURE — 0T9130Z DRAINAGE OF LEFT KIDNEY WITH DRAINAGE DEVICE, PERCUTANEOUS APPROACH: ICD-10-PCS | Performed by: RADIOLOGY

## 2022-07-25 PROCEDURE — 6360000004 HC RX CONTRAST MEDICATION: Performed by: RADIOLOGY

## 2022-07-25 PROCEDURE — 85027 COMPLETE CBC AUTOMATED: CPT

## 2022-07-25 PROCEDURE — 82565 ASSAY OF CREATININE: CPT

## 2022-07-25 PROCEDURE — 93005 ELECTROCARDIOGRAM TRACING: CPT | Performed by: STUDENT IN AN ORGANIZED HEALTH CARE EDUCATION/TRAINING PROGRAM

## 2022-07-25 PROCEDURE — 85610 PROTHROMBIN TIME: CPT

## 2022-07-25 PROCEDURE — 85730 THROMBOPLASTIN TIME PARTIAL: CPT

## 2022-07-25 PROCEDURE — 99223 1ST HOSP IP/OBS HIGH 75: CPT | Performed by: INTERNAL MEDICINE

## 2022-07-25 PROCEDURE — 6360000002 HC RX W HCPCS: Performed by: RADIOLOGY

## 2022-07-25 PROCEDURE — 93306 TTE W/DOPPLER COMPLETE: CPT

## 2022-07-25 PROCEDURE — 2580000003 HC RX 258: Performed by: RADIOLOGY

## 2022-07-25 PROCEDURE — 80048 BASIC METABOLIC PNL TOTAL CA: CPT

## 2022-07-25 RX ORDER — MIDAZOLAM HYDROCHLORIDE 1 MG/ML
1 INJECTION INTRAMUSCULAR; INTRAVENOUS ONCE
Status: COMPLETED | OUTPATIENT
Start: 2022-07-25 | End: 2022-07-25

## 2022-07-25 RX ORDER — MIDAZOLAM HYDROCHLORIDE 1 MG/ML
INJECTION INTRAMUSCULAR; INTRAVENOUS
Status: COMPLETED | OUTPATIENT
Start: 2022-07-25 | End: 2022-07-25

## 2022-07-25 RX ORDER — FENTANYL CITRATE 50 UG/ML
INJECTION, SOLUTION INTRAMUSCULAR; INTRAVENOUS
Status: COMPLETED | OUTPATIENT
Start: 2022-07-25 | End: 2022-07-25

## 2022-07-25 RX ORDER — SODIUM CHLORIDE 450 MG/100ML
INJECTION, SOLUTION INTRAVENOUS CONTINUOUS
Status: DISCONTINUED | OUTPATIENT
Start: 2022-07-25 | End: 2022-07-25

## 2022-07-25 RX ORDER — CEFAZOLIN SODIUM 1 G/50ML
1000 INJECTION, SOLUTION INTRAVENOUS
Status: DISCONTINUED | OUTPATIENT
Start: 2022-07-25 | End: 2022-07-25 | Stop reason: SDUPTHER

## 2022-07-25 RX ORDER — FENTANYL CITRATE 50 UG/ML
50 INJECTION, SOLUTION INTRAMUSCULAR; INTRAVENOUS ONCE
Status: COMPLETED | OUTPATIENT
Start: 2022-07-25 | End: 2022-07-25

## 2022-07-25 RX ADMIN — FENTANYL CITRATE 25 MCG: 50 INJECTION, SOLUTION INTRAMUSCULAR; INTRAVENOUS at 08:40

## 2022-07-25 RX ADMIN — CARVEDILOL 6.25 MG: 6.25 TABLET, FILM COATED ORAL at 10:05

## 2022-07-25 RX ADMIN — CARVEDILOL 6.25 MG: 6.25 TABLET, FILM COATED ORAL at 19:01

## 2022-07-25 RX ADMIN — MIDAZOLAM 0.5 MG: 1 INJECTION INTRAMUSCULAR; INTRAVENOUS at 08:26

## 2022-07-25 RX ADMIN — FENTANYL CITRATE 50 MCG: 50 INJECTION, SOLUTION INTRAMUSCULAR; INTRAVENOUS at 08:20

## 2022-07-25 RX ADMIN — FAMOTIDINE 20 MG: 20 TABLET ORAL at 20:02

## 2022-07-25 RX ADMIN — MEROPENEM 1000 MG: 1 INJECTION, POWDER, FOR SOLUTION INTRAVENOUS at 22:43

## 2022-07-25 RX ADMIN — MIDAZOLAM HYDROCHLORIDE 1 MG: 1 INJECTION, SOLUTION INTRAMUSCULAR; INTRAVENOUS at 08:20

## 2022-07-25 RX ADMIN — SODIUM CHLORIDE: 9 INJECTION, SOLUTION INTRAVENOUS at 12:13

## 2022-07-25 RX ADMIN — SODIUM CHLORIDE: 4.5 INJECTION, SOLUTION INTRAVENOUS at 07:42

## 2022-07-25 RX ADMIN — FAMOTIDINE 20 MG: 20 TABLET ORAL at 10:05

## 2022-07-25 RX ADMIN — AMLODIPINE BESYLATE 10 MG: 10 TABLET ORAL at 10:05

## 2022-07-25 RX ADMIN — HYDROCHLOROTHIAZIDE 25 MG: 25 TABLET ORAL at 10:05

## 2022-07-25 RX ADMIN — MEROPENEM 1000 MG: 1 INJECTION, POWDER, FOR SOLUTION INTRAVENOUS at 15:05

## 2022-07-25 RX ADMIN — FENTANYL CITRATE 25 MCG: 50 INJECTION, SOLUTION INTRAMUSCULAR; INTRAVENOUS at 08:26

## 2022-07-25 RX ADMIN — MEROPENEM 1000 MG: 1 INJECTION, POWDER, FOR SOLUTION INTRAVENOUS at 06:31

## 2022-07-25 RX ADMIN — MIDAZOLAM 0.5 MG: 1 INJECTION INTRAMUSCULAR; INTRAVENOUS at 08:40

## 2022-07-25 RX ADMIN — Medication 3 MG: at 20:02

## 2022-07-25 RX ADMIN — IOTHALAMATE MEGLUMINE 27 ML: 600 INJECTION INTRAVASCULAR at 08:50

## 2022-07-25 ASSESSMENT — PAIN SCALES - GENERAL: PAINLEVEL_OUTOF10: 0

## 2022-07-25 NOTE — PROGRESS NOTES
Hospitalist Progress Note    Patient:  Alessandra Lambert    YOB: 1955  Unit/Bed:5K-10/010-A  MRN: [id number]    Acct: [de-identified]   PCP: Justin Martino MD    Date of Admission: 7/20/2022      Assessment/Plan:  Sepsis secondary to complicated UTI. Resolved. History of ESBL. Continue meropenem day. Discussed with Dr. Amador Ashton. He dereje need to go on Ertapenem for for more days. Monitor hemodynamics. Urine culture growing providencia Stuartii, Proteus Mirabilis, and E coli ESBL. Complicated UTI secondary to obstructing left nephrolithiasis. Underwent IR guided left sided nephrostomy tube and stent placement. Will resume Lovenox after 24 Hrs. Essential hypertension. Controlled, continue 10 mg,  coreg 12.5, HZT 25 mg. Monitor. Nephrolithiasis. Outpatient stone treatment. Outpatient urology follow-up. Acute encephalopathy secondary to septicemia. Resolved. Continue to monitor  Neurogenic bladder secondary to multiple sclerosis has chronic indwelling catheter. Wound care for care site. Stage IV decubitus ulcer. Wound care for care site. NSTEMI likely type II MI secondary to demand ischemia from septicemia. Echo to look for WMA. Appreciate cardiology recommendations. Colostomy left sided-  bowel regimen. Hypokalemia likely from low magnessium- Resolved. replace and monitor. BMP   Hypomagnesemia. Resolved. Expected discharge date:  07/26/2022    Disposition:   [] Home  [] TCU  [] Rehab  [] Psych  [x] SNF  [] [facility]  [] Other-    ===================================================================      Chief Complaint: [address] Course: 81-year-old gentleman with past medical history of MS currently not on immunomodulators, essential hypertension, recurrent UTI with ESBL organisms, neurogenic bladder presented to [facility] due to altered mental status.   In ED he was found hypotensive with blood pressure of 88/67 with respiratory rate of 22 and T-max of 99.  Initial work-up was concerning for septicemia secondary to UTI. Eventually patient was found to have  Large ureteral stone, urology was consulted due to inability to place nephrostomy tube in the past IR was consulted by urology. As patient has received AC/AP, IR will need 5 days free from blood thinners. Eventually patient is planned to get IR guided nephrostomy tube placement with a stent on 7/25/2022. Subjective (past 24 hours):   Patient seen and examined ,   No overnight events. Mild troponin elevation likely from infection. EKG unremarkable. Most recent vitals bp 122/59, Pulse 195, RR 16, Temp 98.2. Remained afebrile. Labs reviewed. Troponin 0.025, hgb 12.7  He is from nursing home, no precert needed. Blood cultures remained negative. Underwent IR guided left sided nephrostomy tube and stent placement. Patient is little sleepy and drowsy. He told me he was given versed. ROS: reviewed complete ROS unchanged unless otherwise stated in hospital course/subjective portion.        Medications:  Reviewed    Infusion Medications    sodium chloride Stopped (07/25/22 1211)    sodium chloride      sodium chloride 50 mL/hr at 07/25/22 1356     Scheduled Medications    hydroCHLOROthiazide  25 mg Oral Daily    carvedilol  6.25 mg Oral BID WC    amLODIPine  10 mg Oral Daily    meropenem  1,000 mg IntraVENous Q8H    sodium chloride flush  5-40 mL IntraVENous 2 times per day    [Held by provider] enoxaparin  40 mg SubCUTAneous Daily    [Held by provider] baclofen  10 mg Oral TID    famotidine  20 mg Oral BID    [Held by provider] gabapentin  600 mg Oral TID    [Held by provider] oxybutynin  5 mg Oral BID    [Held by provider] tiZANidine  2 mg Oral TID     PRN Meds: melatonin, metoprolol, sodium chloride flush, sodium chloride, ondansetron **OR** ondansetron, polyethylene glycol, acetaminophen **OR** acetaminophen        Intake/Output Summary (Last 24 hours) at 7/25/2022 2936  Last data filed at NITRU POSITIVE 07/20/2022 09:40 AM    WBCUA 10-15 07/20/2022 09:40 AM    WBCUA >200 01/20/2012 09:00 AM    BACTERIA MODERATE 07/20/2022 09:40 AM    RBCUA > 200 07/20/2022 09:40 AM    BLOODU LARGE 07/20/2022 09:40 AM    SPECGRAV >1.030 04/22/2022 11:30 PM    GLUCOSEU NEGATIVE 07/20/2022 09:40 AM       Radiology (48 hours):  CT ABDOMEN PELVIS WO CONTRAST    Result Date: 7/20/2022  Large obstructing proximal left ureteral stone. This document has been electronically signed by: Raymona Sever, MD on 07/20/2022 11:48 PM All CTs at this facility use dose modulation techniques and iterative reconstructions, and/or weight-based dosing when appropriate to reduce radiation to a low as reasonably achievable. CT HEAD WO CONTRAST    Result Date: 7/20/2022  No acute intracranial process. . No change from prior study. **This report has been created using voice recognition software. It may contain minor errors which are inherent in voice recognition technology. ** Final report electronically signed by Dr. Alejandro Shields on 7/20/2022 10:25 AM    XR CHEST PORTABLE    Result Date: 7/20/2022  1. Very poor inflation of the lungs. Kyphotic positioning of the patient. Borderline heart size. 2. Mild atelectasis/pneumonia left lower lung field. No effusion seen. **This report has been created using voice recognition software. It may contain minor errors which are inherent in voice recognition technology. ** Final report electronically signed by Dr. Alejandro Shields on 7/20/2022 10:10 AM       DVT prophylaxis:    [] Lovenox, will hold 24 hr before procedure. Hold on morning of July 24th.   [] SCDs  [] SQ Heparin  [] Encourage ambulation   [] Already on Anticoagulation  EPC order placed. AC hold resume tomorrow. Diet: ADULT DIET; Regular after midnight   Code Status: Full Code  PT/OT: working  IVF: 50cc/h, Temi Perks tomorrow. Tried calling on [phone] to give family an update, The number is not is valid number as per . 7/24/2022. This transcription was electronically signed. Parts of this transcriptions may have been dictated by use of voice recognition software and electronically transcribed, The transcription may contain errors not detected in proofreading. Maddy Gunn MD  Internal medicine, Hospitalist Service   [address] [place] [address].

## 2022-07-25 NOTE — CONSULTS
[facility]  Cardiology Consult        Patient:  Rafael Rivera  YOB: 1955  MRN: [id number]     Acct: [de-identified]    PCP: Alfredo Moy MD    Date of Admission: 7/20/2022      Reason for Consultation:  Elevated tropnins      History Of Present Illness:    74 y.o. pleasant male with history of multiple sclerosis, hypertension, neurogenic bladder, recurrent UTI with ESBL organisms who presented to the hospital with altered mentation. In the ER he was found to be hypotensive with blood pressure of 88/67. He is currently admitted for sepsis secondary to UTI and has been on IV antibiotics. Patient also has obstructive left ureteric stone with hydronephrosis, he is post left nephrostomy tube placement. He has been evaluated by infectious disease team and he is currently on IV meropenem. Cardiology was consulted due to elevated troponins. Patient denies any chest pains, shortness of breath. At baseline he is mostly bedbound. Electrocardiogram shows sinus rhythm with nonspecific ST-T wave changes. Echocardiogram from 6/2018 showed normal LV systolic function with a EF of 50 to 55%. Laboratory work-up shows a creatinine of 0.3, troponin 0.025, H&H is 12.7/39. He underwent a stress test in 2018 which showed no ischemia. Cardiology was consulted for elevated troponins. Short of no    All labs, EKG's, diagnostic testing and images as well as cardiac cath, stress testing were reviewed during this encounter.     Past Medical History:          Diagnosis Date    Dysphagia     oropharyngeal    History of pulmonary embolism     History of urinary tract infection     Hx of blood clots     Pulmonary embolis    Hypertension     Major depressive disorder, single episode     MS (multiple sclerosis) ([facility] [address]     Neurogenic bladder 02/2012    Dr. Shine Monte placed cather    [facility])     UTI (urinary tract infection)        Past Surgical History:          Procedure Laterality Date    ANKLE SURGERY  1996    broken ankle    BLADDER SURGERY  2-    Suprapubic catheter placement    COLONOSCOPY      CYSTO/URETERO/PYELOSCOPY, CALCULUS TX Left 6/19/2019    CYSTOSCOPY, LEFT STENT insertion, bladder irragation with bladder stones performed by King Merlos MD at [address] [address] N/A 7/8/2019    CYSTO, LEFT URETERAL STENT REMOVAL, LEFT URETEROSCOPY, LASER LITHOTRIPSY, BASKET RETRIEVAL OF STONE FRAGMENTS performed by King Merlos MD at [address] [place] [address] 2/26/2021    CYSTOSCOPY, CYSTOLITHOLAPAXY, SUPRAPUBIC CATHETER EXCHANGE performed by Digna Gonsalez MD at [address] [facility] 6/15/2022    CYSTOSCOPY performed by Digna Gonsalez MD at [address] [facility] [place]  7/25/2022    IR NEPHROSTOMY CATHETER PLACEMENT 7/25/2022 [facility] SPECIAL PROCEDURES    MN LAP,SURG,COLECTOMY,W/END COLOST & CLOSUR N/A 6/13/2018    ROBOT DIVERTING COLOSTOMY performed by Loree Infante MD at [address] [facility] [facility]  child       Medications Prior to Admission:      Prior to Admission medications    Medication Sig Start Date End Date Taking? Authorizing Provider   ondansetron (ZOFRAN) 4 MG tablet Take 4 mg by mouth every 8 hours as needed for Nausea or Vomiting    Historical Provider, MD   carbamide peroxide (DEBROX) 6.5 % otic solution 5 drops 2 times daily Instill 5 drops in both ears two times daily for ear wax use for 4 days.     Historical Provider, MD   vitamin E 400 UNIT capsule Take 400 Units by mouth daily    Historical Provider, MD   aspirin 81 MG EC tablet Take 81 mg by mouth daily    Historical Provider, MD   oxybutynin (DITROPAN-XL) 5 MG extended release tablet Take 5 mg by mouth in the morning and at bedtime    Historical Provider, MD   gentamicin (GARAMYCIN) 0.1 % ointment Apply topically daily Apply to wound bed    Historical Provider, MD   erythromycin (ROMYCIN) 5 MG/GM ophthalmic ointment Place into the right eye nightly (0.25 ribbon to right eye)    Historical Provider, MD   Ascorbic Acid (VITAMIN C ER PO) Take 500 mg by mouth in the morning and at bedtime     Historical Provider, MD   baclofen (LIORESAL) 10 MG tablet Take 10 mg by mouth 3 times daily    Historical Provider, MD   gabapentin (NEURONTIN) 600 MG tablet Take 600 mg by mouth 3 times daily.      Historical Provider, MD   acetaminophen (TYLENOL) 325 MG tablet Take 650 mg by mouth every 4 hours as needed for Pain or Fever    Historical Provider, MD   ibuprofen (ADVIL;MOTRIN) 400 MG tablet Take 400 mg by mouth every 4 hours as needed for Fever (for temp > 100.5 not alleviated by acetaminophen)    Historical Provider, MD   LACTOBACILLUS PO Take 1 capsule by mouth in the morning and at bedtime     Historical Provider, MD   metoprolol tartrate (LOPRESSOR) 25 MG tablet Take 1 tablet by mouth 2 times daily 6/20/19   Karina Morgan MD   potassium chloride (KLOR-CON M) 20 MEQ TBCR extended release tablet Take 2 tablets by mouth daily 6/21/19   Karina Morgan MD   sodium chloride 1 g tablet Take 1 tablet by mouth 2 times daily (with meals) 6/20/19   Karina Morgan MD   calcium-vitamin D (Johny Danni) 500-200 MG-UNIT per tablet Take 1 tablet by mouth 2 times daily 11/16/18   Dio Boston MD   famotidine (PEPCID) 20 MG tablet Take 1 tablet by mouth 2 times daily 6/14/18   Nori Mcadams MD   docusate sodium (COLACE) 100 MG capsule Take 100 mg by mouth daily     Historical Provider, MD   tiZANidine (ZANAFLEX) 2 MG tablet Take 2 mg by mouth 3 times daily 0600;1400;2200 12/15/16   Historical Provider, MD   Multiple Vitamin (MULTIVITAMIN) tablet Take 1 tablet by mouth daily 3/28/16   Flavia Nicole MD   vitamin A 18512 UNITS capsule Take 2 capsules by mouth daily 3/28/16   Flavia Nicole MD       Current Facility-Administered Medications   Medication Dose Route Frequency Provider Last Rate Last Admin    0.45 % sodium chloride infusion   IntraVENous Continuous Della Guadalupe MD   Stopped at 07/25/22 1211    hydroCHLOROthiazide (HYDRODIURIL) tablet 25 mg  25 mg Oral Daily Leny Parkinson MD   25 mg at 07/25/22 1005    carvedilol (COREG) tablet 6.25 mg  6.25 mg Oral BID WC Leny Parkinson MD   6.25 mg at 07/25/22 1005    amLODIPine (NORVASC) tablet 10 mg  10 mg Oral Daily Leny Parkinson MD   10 mg at 07/25/22 1005    melatonin tablet 3 mg  3 mg Oral Nightly PRN Leny Parkinson MD   3 mg at 07/1955    meropenem (MERREM) 1,000 mg in sodium chloride 0.9 % 100 mL IVPB (mini-bag)  1,000 mg IntraVENous Q8H Anne Si H Le, DO   Stopped at 07/25/22 0703    metoprolol (LOPRESSOR) injection 5 mg  5 mg IntraVENous Q6H PRN Leny Parkinson MD   5 mg at 07/23/22 1550    sodium chloride flush 0.9 % injection 5-40 mL  5-40 mL IntraVENous 2 times per day Da Sauer DO   10 mL at 07/22/22 1056    sodium chloride flush 0.9 % injection 5-40 mL  5-40 mL IntraVENous PRN Da Sauer DO        0.9 % sodium chloride infusion   IntraVENous PRN Rubia Maya DO        [Held by provider] enoxaparin (LOVENOX) injection 40 mg  40 mg SubCUTAneous Daily Da Sauer DO   40 mg at 07/23/22 0951    ondansetron (ZOFRAN-ODT) disintegrating tablet 4 mg  4 mg Oral Q8H PRN Da Sauer DO        Or    ondansetron (ZOFRAN) injection 4 mg  4 mg IntraVENous Q6H PRN Da Sauer, DO        polyethylene glycol (GLYCOLAX) packet 17 g  17 g Oral Daily PRN Da Sauer DO        acetaminophen (TYLENOL) tablet 650 mg  650 mg Oral Q6H PRN Da Sauer DO   650 mg at 07/23/22 0414    Or    acetaminophen (TYLENOL) suppository 650 mg  650 mg Rectal Q6H PRN Rubia Maya DO        [Held by provider] baclofen (LIORESAL) tablet 10 mg  10 mg Oral TID Da Sauer DO   10 mg at 07/20/22 1542    famotidine (PEPCID) tablet 20 mg  20 mg Oral BID Da Rosich, DO   20 mg at 07/25/22 1005    [Held by provider] gabapentin (NEURONTIN) tablet 600 mg  600 mg Oral TID Da Rosich, DO   600 mg at 07/20/22 Danette Yepez 107 by provider] oxybutynin (DITROPAN-XL) extended release tablet 5 mg  5 mg Oral BID Da Sauer DO        [Held by provider] tiZANidine (ZANAFLEX) tablet 2 mg  2 mg Oral TID Da Sauer DO   2 mg at 07/20/22 1542    0.9 % sodium chloride infusion   IntraVENous Continuous Grayson Mckenzie MD 50 mL/hr at 07/25/22 1213 New Bag at 07/25/22 1213       Allergies:  Patient has no known allergies. Social History:    TOBACCO:   reports that he quit smoking about 40 years ago. His smoking use included cigarettes. He has quit using smokeless tobacco.  ETOH:   reports no history of alcohol use. Family History:        Problem Relation Age of Onset    Cancer Mother         Breast    Heart Disease Father 48    Arthritis Sister     Heart Disease Paternal Grandmother 48         Review of Systems -   General ROS: negative  Psychological ROS: negative  Hematological and Lymphatic ROS: No history of blood clots or bleeding disorder. Respiratory ROS: no cough, shortness of breath, or wheezing  Cardiovascular ROS: As per HPI  Gastrointestinal ROS: negative  Genito-Urinary ROS: no dysuria, trouble voiding, or hematuria  Musculoskeletal ROS: negative  Neurological ROS: no TIA or stroke symptoms  Dermatological ROS: negative    All others reviewed and are negative.        BP (!) 122/59   Pulse 95   Temp 98.2 °F (36.8 °C) (Oral)   Resp 16   Wt 201 lb (91.2 kg)   SpO2 94%   BMI 31.48 kg/m²       Physical Examination:   General appearance - alert, in no distress  Mental status - alert, oriented to person, place, and time  Neck - supple, no significant adenopathy, no JVD, or carotid bruits  Chest - clear to auscultation, no wheezes, rales or rhonchi, symmetric air entry  Heart - normal rate, regular rhythm, normal S1, S2, no murmurs, rubs, clicks or gallops  Abdomen - soft, nontender, nondistended, suprapubic catheter, left nephrostomy tube  Neurological - alert, oriented, normal speech, no focal findings or movement disorder noted  Musculoskeletal - no joint tenderness, deformity or swelling  Extremities - peripheral pulses normal, no pedal edema, no clubbing or cyanosis  Skin - normal coloration and turgor, no rashes, no suspicious skin lesions noted      LABS:    Recent Labs     07/24/22  0447 07/24/22  1825 07/25/22  0729   TROPONINT 0.017* 0.012* 0.025*     CBC:   Lab Results   Component Value Date/Time    WBC 7.4 07/25/2022 07:29 AM    RBC 4.36 07/25/2022 07:29 AM    RBC 4.20 01/20/2012 09:45 AM    HGB 12.7 07/25/2022 07:29 AM    HCT 39.2 07/25/2022 07:29 AM    MCV 89.9 07/25/2022 07:29 AM    MCH 29.1 07/25/2022 07:29 AM    MCHC 32.4 07/25/2022 07:29 AM    RDW 16.6 06/21/2019 04:40 AM     07/25/2022 07:29 AM    MPV 8.9 07/25/2022 07:29 AM     BMP:    Lab Results   Component Value Date/Time     07/25/2022 07:29 AM    K 3.6 07/25/2022 07:29 AM     07/25/2022 07:29 AM    CO2 24 07/25/2022 07:29 AM    BUN 10 07/25/2022 07:29 AM    LABALBU 3.4 07/20/2022 09:40 AM    CREATININE 0.3 07/25/2022 07:29 AM    CALCIUM 8.6 07/25/2022 07:29 AM    LABGLOM >90 07/25/2022 07:29 AM    GLUCOSE 105 07/25/2022 07:29 AM    GLUCOSE 105 06/25/2019 04:12 AM     Hepatic Function Panel:    Lab Results   Component Value Date/Time    ALKPHOS 137 07/20/2022 09:40 AM    ALT 15 07/20/2022 09:40 AM    AST 21 07/20/2022 09:40 AM    PROT 7.4 07/20/2022 09:40 AM    BILITOT 0.4 07/20/2022 09:40 AM    BILIDIR <0.2 12/27/2020 10:10 AM    LABALBU 3.4 07/20/2022 09:40 AM     Magnesium:    Lab Results   Component Value Date/Time    MG 2.0 07/24/2022 01:46 PM     Warfarin PT/INR:  No components found for: PTPATWAR, PTINRWAR  HgBA1c:    Lab Results   Component Value Date/Time    LABA1C 5.3 04/23/2022 03:51 AM     FLP:  No results found for: TRIG, HDL, LDLCALC, LDLDIRECT, LABVLDL  TSH:    Lab Results   Component Value Date/Time    TSH 0.496 02/10/2019 10:45 AM     BNP: No components found for: PRO-BNP      Assessment/Plan:    Patient secondary to infection/sepsis  Further recommendation based on echocardiogram results and clinical course    Please do note hesitate to contact me for any further questions. Thank you for the opportunity to be involved in this patient's care.     Code Status: Full Code    Electronically signed by Piter Mena MD on 7/25/2022 at 1:28 PM

## 2022-07-25 NOTE — PLAN OF CARE
Problem: Discharge Planning  Goal: Discharge to home or other facility with appropriate resources  Description: Plan is for patient to be discharged back to [facility] when medically stable. 7/25/2022 1231 by Emily Long RN  Outcome: Progressing  Flowsheets (Taken 7/24/2022 2115 by Dennie Decamp, RN)  Discharge to home or other facility with appropriate resources: Identify barriers to discharge with patient and caregiver  7/24/2022 2353 by Dennie Decamp, RN  Outcome: Progressing  Flowsheets (Taken 7/24/2022 2115)  Discharge to home or other facility with appropriate resources: Identify barriers to discharge with patient and caregiver     Problem: Pain  Goal: Verbalizes/displays adequate comfort level or baseline comfort level  Description: Pain Assessment: None - Denies Pain          Is pain goal met at this time? Yes              7/25/2022 1231 by Emily Long RN  Outcome: Progressing  Flowsheets (Taken 0/17/0862 2551 by Suly Jaramillo RN)  Verbalizes/displays adequate comfort level or baseline comfort level:   Encourage patient to monitor pain and request assistance   Assess pain using appropriate pain scale   Implement non-pharmacological measures as appropriate and evaluate response  7/24/2022 2353 by Dennie Decamp, RN  Outcome: Progressing     Problem: Safety - Adult  Goal: Free from fall injury  Description: All fall precautions in place. Bed in low position, alarm activated and appropriate use of call light.       7/25/2022 1231 by Emily Long RN  Outcome: Progressing  Flowsheets (Taken 7/57/0351 7252 by Suly Jaramillo RN)  Free From Fall Injury: Instruct family/caregiver on patient safety  7/24/2022 2353 by Dennie Decamp, RN  Outcome: Progressing     Problem: ABCDS Injury Assessment  Goal: Absence of physical injury  Description: Patient being turned and repositioned every 2 hours. Wound care consulted for further evaluation of wound.  Wound was present on admission from [address].  7/25/2022 1231 by Marina Kyle, RN  Outcome: Progressing  Flowsheets (Taken 0/82/1210 7229 by Mati Painter, RN)  Absence of Physical Injury: Implement safety measures based on patient assessment  7/24/2022 0503 by Vidal Wilson, RN  Outcome: Progressing

## 2022-07-25 NOTE — PROGRESS NOTES
Pt arrived back to 5k 10 from IR lt nephrostomy tube clean dry intact draining serosanguinous drainage, pt denies pain.  Pt alert and oriented x4

## 2022-07-25 NOTE — OP NOTE
Department of Radiology  Post Procedure Progress Note      Pre-Procedure Diagnosis:  ureteral calculus    Procedure Performed:  left nephrostomy tube and stent insertion     Anesthesia: local / versed and fentanyl    Findings: successful    Immediate Complications:  None    Estimated Blood Loss: minimal    SEE DICTATED PROCEDURE NOTE FOR COMPLETE DETAILS.     Aki Block MD   7/25/2022 8:46 AM

## 2022-07-25 NOTE — PROGRESS NOTES
Urology Progress Note    Reason for Consult:  patient with septic uti. Needs suprapubic catheter exchanged. Requesting Physician:  Rancho Montoya MD     History Obtained From:  patient     Chief Complaint: fever, AMS    Subjective: \"    Patient is resting in bed, voiding yellow urine via SPT, tolerating regular diet, denies any nausea or vomiting. There are complaints of denies pain at this time.     IR placed left nephrostomy tube with stent today  Left neph draining pink tinged urine  Ok to restart anticoags in AM if remains clear from Urology standpoint            Vitals:  BP (!) 121/59   Pulse 95   Temp 98.3 °F (36.8 °C) (Oral)   Resp 17   Wt 201 lb (91.2 kg)   SpO2 92%   BMI 31.48 kg/m²   Temp  Av.4 °F (36.9 °C)  Min: 98.2 °F (36.8 °C)  Max: 98.5 °F (36.9 °C)    Intake/Output Summary (Last 24 hours) at 2022 1614  Last data filed at 2022 1455  Gross per 24 hour   Intake 4519.61 ml   Output 3050 ml   Net 1469.61 ml         Social History     Socioeconomic History    Marital status:      Spouse name: Nya Ruggiero    Number of children: 2    Years of education: Not on file    Highest education level: Not on file   Occupational History    Not on file   Tobacco Use    Smoking status: Former     Types: Cigarettes     Quit date: 1982     Years since quittin.5    Smokeless tobacco: Former   Vaping Use    Vaping Use: Never used   Substance and Sexual Activity    Alcohol use: No     Comment: \"quit alcohol a few years ago\"    Drug use: No    Sexual activity: Yes     Partners: Female   Other Topics Concern    Not on file   Social History Narrative    Not on file     Social Determinants of Health     Financial Resource Strain: Not on file   Food Insecurity: Not on file   Transportation Needs: Not on file   Physical Activity: Not on file   Stress: Not on file   Social Connections: Not on file   Intimate Partner Violence: Not on file   Housing Stability: Not on file     Family History kidney  Moderate left hydronephrosis - 2/2 to obstructing stone, no pain, CRT normal  Septic UTI - urine sent off old SPT, new Ux discontinued  Cont abx per primary     Will plan for stone tx as outpatient  Will follow      TONO Juares - CNP, TONO  07/25/22 4:14 PM  Urology

## 2022-07-25 NOTE — PROGRESS NOTES
Mariah Mixon 62  OCCUPATIONAL THERAPY MISSED TREATMENT NOTE  [facility] ONC MED 5K  [id number]/[id number]      Date: 2022  Patient Name: Erna Smith        CSN: [id number]   : 1955  (74 y.o.)  Gender: male   Referring Practitioner: Dr. Mary oCnn MD            REASON FOR MISSED TREATMENT:  Pt off floor for nephrostomy placement, will re-attempt as able.

## 2022-07-25 NOTE — H&P
[facility]  Sedation/Analgesia History & Physical    Pt Name: Sherrill Shrestha  MRN: [id number]  YOB: 1955  Provider Performing Procedure: Madisyn Momin MD, MD  Primary Care Physician: Lise Beltran MD    PRE-PROCEDURE   DNR-CCA/DNR-CC []Yes [x]No  Brief History/Pre-Procedure Diagnosis: ureteral calculus          MEDICAL HISTORY  []CAD/Valve  []Liver Disease  []Lung Disease []Diabetes  []Hypertension []Renal Disease  []Additional information:       has a past medical history of Dysphagia, History of pulmonary embolism, History of urinary tract infection, Hx of blood clots, Hypertension, Major depressive disorder, single episode, MS (multiple sclerosis) ([facility] [address] Neurogenic bladder, Osteomyelitis ([facility] [address] and UTI (urinary tract infection). SURGICAL HISTORY   has a past surgical history that includes Tonsillectomy (child); Ankle surgery (1996); Bladder surgery (2-); Colonoscopy; pr lap,surg,colectomy,w/end colost & closur (N/A, 6/13/2018); cysto/uretero/pyeloscopy, calculus tx (Left, 6/19/2019); cysto/uretero/pyeloscopy, calculus tx (N/A, 7/8/2019); Cystoscopy (N/A, 2/26/2021); and Cystoscopy (Left, 6/15/2022).   Additional information:       ALLERGIES   Allergies as of 07/20/2022    (No Known Allergies)     Additional information:       MEDICATIONS   Coumadin Use Last 5 Days [x]No []Yes  Antiplatelet drug therapy use last 5 days  [x]No []Yes  Other anticoagulant use last 5 days  [x]No []Yes    Current Facility-Administered Medications:     midazolam (VERSED) injection 1 mg, 1 mg, IntraVENous, Once, Kenneth Mayes MD    0.45 % sodium chloride infusion, , IntraVENous, Continuous, Kenneth Mayes MD, Last Rate: 20 mL/hr at 07/25/22 0742, New Bag at 07/25/22 0742    ceFAZolin (ANCEF) 1000 mg in dextrose 5 % 50 mL IVPB (premix), 1,000 mg, IntraVENous, 60 Min Pre-Op, Kenneth Mayes MD    fentaNYL (SUBLIMAZE) injection 50 mcg, 50 mcg, IntraVENous, Once, Tracie Bottoms Feliberto Andujar MD    hydroCHLOROthiazide (HYDRODIURIL) tablet 25 mg, 25 mg, Oral, Daily, Paula Brothers MD    carvedilol (COREG) tablet 6.25 mg, 6.25 mg, Oral, BID WC, Paula Brothers MD, 6.25 mg at 07/24/22 1110    amLODIPine (NORVASC) tablet 10 mg, 10 mg, Oral, Daily, Paula Brothers MD, 10 mg at 07/24/22 8047    melatonin tablet 3 mg, 3 mg, Oral, Nightly PRN, Paula Brothers MD, 3 mg at 07/1955    meropenem (MERREM) 1,000 mg in sodium chloride 0.9 % 100 mL IVPB (mini-bag), 1,000 mg, IntraVENous, Q8H, Anne Si H DO Jennifer, Stopped at 07/25/22 0703    metoprolol (LOPRESSOR) injection 5 mg, 5 mg, IntraVENous, Q6H PRN, Paula Brothers MD, 5 mg at 07/23/22 1550    sodium chloride flush 0.9 % injection 5-40 mL, 5-40 mL, IntraVENous, 2 times per day, Da Sauer DO, 10 mL at 07/22/22 1056    sodium chloride flush 0.9 % injection 5-40 mL, 5-40 mL, IntraVENous, PRN, Da Sauer DO    0.9 % sodium chloride infusion, , IntraVENous, PRN, Da Sauer DO    [Held by provider] enoxaparin (LOVENOX) injection 40 mg, 40 mg, SubCUTAneous, Daily, Da Sauer DO, 40 mg at 07/23/22 0951    ondansetron (ZOFRAN-ODT) disintegrating tablet 4 mg, 4 mg, Oral, Q8H PRN **OR** ondansetron (ZOFRAN) injection 4 mg, 4 mg, IntraVENous, Q6H PRN, Da Sauer DO    polyethylene glycol (GLYCOLAX) packet 17 g, 17 g, Oral, Daily PRN, Da Sauer DO    acetaminophen (TYLENOL) tablet 650 mg, 650 mg, Oral, Q6H PRN, 650 mg at 07/23/22 1394 **OR** acetaminophen (TYLENOL) suppository 650 mg, 650 mg, Rectal, Q6H PRN, Da Rosich, DO    [Held by provider] baclofen (LIORESAL) tablet 10 mg, 10 mg, Oral, TID, Da Cristiane, DO, 10 mg at 07/20/22 1542    famotidine (PEPCID) tablet 20 mg, 20 mg, Oral, BID, Da Sauer DO, 20 mg at 07/24/22 2126    [Held by provider] gabapentin (NEURONTIN) tablet 600 mg, 600 mg, Oral, TID, Da Cristiane, DO, 600 mg at 07/20/22 1542    [Held by provider] oxybutynin (DITROPAN-XL) extended release tablet 5 mg, 5 mg, Oral, BID, Da Sauer DO    [Held by provider] tiZANidine (ZANAFLEX) tablet 2 mg, 2 mg, Oral, TID, Dainge Sauer DO, 2 mg at 07/20/22 1542    0.9 % sodium chloride infusion, , IntraVENous, Continuous, Chuckie Lazaro MD, Stopped at 07/25/22 1769  Prior to Admission medications    Medication Sig Start Date End Date Taking? Authorizing Provider   ondansetron (ZOFRAN) 4 MG tablet Take 4 mg by mouth every 8 hours as needed for Nausea or Vomiting    Historical Provider, MD   carbamide peroxide (DEBROX) 6.5 % otic solution 5 drops 2 times daily Instill 5 drops in both ears two times daily for ear wax use for 4 days. Historical Provider, MD   vitamin E 400 UNIT capsule Take 400 Units by mouth daily    Historical Provider, MD   aspirin 81 MG EC tablet Take 81 mg by mouth daily    Historical Provider, MD   oxybutynin (DITROPAN-XL) 5 MG extended release tablet Take 5 mg by mouth in the morning and at bedtime    Historical Provider, MD   gentamicin (GARAMYCIN) 0.1 % ointment Apply topically daily Apply to wound bed    Historical Provider, MD   erythromycin (ROMYCIN) 5 MG/GM ophthalmic ointment Place into the right eye nightly (0.25 ribbon to right eye)    Historical Provider, MD   Ascorbic Acid (VITAMIN C ER PO) Take 500 mg by mouth in the morning and at bedtime     Historical Provider, MD   baclofen (LIORESAL) 10 MG tablet Take 10 mg by mouth 3 times daily    Historical Provider, MD   gabapentin (NEURONTIN) 600 MG tablet Take 600 mg by mouth 3 times daily.      Historical Provider, MD   acetaminophen (TYLENOL) 325 MG tablet Take 650 mg by mouth every 4 hours as needed for Pain or Fever    Historical Provider, MD   ibuprofen (ADVIL;MOTRIN) 400 MG tablet Take 400 mg by mouth every 4 hours as needed for Fever (for temp > 100.5 not alleviated by acetaminophen)    Historical Provider, MD   LACTOBACILLUS PO Take 1 capsule by mouth in the morning and at bedtime     Historical Provider, MD   metoprolol tartrate (LOPRESSOR) 25 MG tablet Take 1 tablet by mouth 2 times daily 6/20/19   Lorena Liu MD   potassium chloride (KLOR-CON M) 20 MEQ TBCR extended release tablet Take 2 tablets by mouth daily 6/21/19   Lorena Liu MD   sodium chloride 1 g tablet Take 1 tablet by mouth 2 times daily (with meals) 6/20/19   Lorena Liu MD   calcium-vitamin D ([place]) 500-200 MG-UNIT per tablet Take 1 tablet by mouth 2 times daily 11/16/18   Tory Estrella MD   famotidine (PEPCID) 20 MG tablet Take 1 tablet by mouth 2 times daily 6/14/18   Chava Hansen MD   docusate sodium (COLACE) 100 MG capsule Take 100 mg by mouth daily     Historical Provider, MD   tiZANidine (ZANAFLEX) 2 MG tablet Take 2 mg by mouth 3 times daily 0600;1400;2200 12/15/16   Historical Provider, MD   Multiple Vitamin (MULTIVITAMIN) tablet Take 1 tablet by mouth daily 3/28/16   Jude Porras MD   vitamin A 89171 UNITS capsule Take 2 capsules by mouth daily 3/28/16   Jude Porras MD     Additional information:       VITAL SIGNS   Vitals:    07/25/22 0430   BP: (!) 172/79   Pulse: 93   Resp: 18   Temp: 98.2 °F (36.8 °C)   SpO2: 94%       PHYSICAL:   Heart:  [x]Regular rate and rhythm  []Other:    Lungs:  [x]Clear    []Other:    Abdomen: [x]Soft    []Other:    Mental Status: [x]Alert & Oriented  []Other:      PLANNED PROCEDURE   []Biospy []Arteriogram              []Drainage   []Mediport Insertion  []Fistulogram []IV access       []Vertebroplasty / Augmentation  []IVC filter []Dialysis catheter []Biliary drainage  []Other: []CAPD Catheter [x]Nephrostomy Tube / Stent  SEDATION  Planned agent:[x]Midazolam []Meperidine [x]Sublimaze []Dilaudid []Morphine     []Diazepam  []Other:     ASA Classification:  []1 [x]2 []3 []4 []5  Class 1: A normal healthy patient  Class 2: Pt with mild to moderate systemic disease  Class 3: Severe systemic disease or disturbance  Class 4: Severe systemic disorders that are already life threatening. Class 5: Moribund pt with little chances of survival, for more than 24 hours. Mallampati I Airway Classification:   []1 [x]2 []3 []4    [x]Pre-procedure diagnostic studies complete and results available. Comment:    [x]Previous sedation/anesthesia experiences assessed. Comment:    [x]The patient is an appropriate candidate to undergo the planned procedure sedation and anesthesia. (Refer to nursing sedation/analgesia documentation record)  [x]Formulation and discussion of sedation/procedure plan, risks, and expectations with patient and/or responsible adult completed. [x]Patient examined immediately prior to the procedure.  (Refer to nursing sedation/analgesia documentation record)    Alyce Morris MD, MD  Electronically signed 7/25/2022 at 8:15 AM

## 2022-07-25 NOTE — PLAN OF CARE
Problem: Pain  Goal: Verbalizes/displays adequate comfort level or baseline comfort level  Description: Pain Assessment: None - Denies Pain  Is pain goal met at this time? Yes  Outcome: Progressing     Problem: Discharge Planning  Goal: Discharge to home or other facility with appropriate resources  Description: Plan is for patient to be discharged back to [facility] when medically stable. Outcome: Progressing  Flowsheets (Taken 7/24/2022 2115)  Discharge to home or other facility with appropriate resources: Identify barriers to discharge with patient and caregiver     Problem: Safety - Adult  Goal: Free from fall injury  Description: All fall precautions in place. Bed in low position, alarm activated and appropriate use of call light. Outcome: Progressing   All fall precautions in place. Bed in low position, alarm activated and appropriate use of call light. Problem: ABCDS Injury Assessment  Goal: Absence of physical injury  Description: Patient being turned and repositioned every 2 hours. Wound care consulted for further evaluation of wound. Wound was present on admission from [facility]. Outcome: Progressing   Care plan reviewed with patient. Patient verbalizes understanding of the plan of care and contributes to goal setting.   Electronically signed by Vidal Wilson RN on 7/24/2022 at 11:54 PM

## 2022-07-25 NOTE — PROGRESS NOTES
[facility]  PHYSICAL THERAPY MISSED TREATMENT NOTE  [facility]    Date: 2022  Patient Name: Pamela Murdock        MRN: [id number]   : 1955  (74 y.o.)  Gender: male                REASON FOR MISSED TREATMENT:  Pt is at his baseline function  and is a whit lift transfer at [facility]. Pt is dependent for all mobility /transfers and  is nonambulatory. PT signing off at this time.

## 2022-07-25 NOTE — PROGRESS NOTES
Flushed lt nephrostomy tube with 10ml of normal saline, serosanguinous drainage returned pt tolerated well

## 2022-07-25 NOTE — PROGRESS NOTES
Progress note: Infectious diseases    Patient - Fely Craven,  Age - 74 y.o.    - 1955      Room Number - 5K-10/010-A   MRN -  [id number]   Acct # - [de-identified]  Date of Admission -  2022  9:22 AM    SUBJECTIVE:   No new issues. OBJECTIVE   VITALS    weight is 201 lb (91.2 kg). His oral temperature is 98.2 °F (36.8 °C). His blood pressure is 122/59 (abnormal) and his pulse is 95. His respiration is 16 and oxygen saturation is 94%. Wt Readings from Last 3 Encounters:   22 201 lb (91.2 kg)   22 201 lb (91.2 kg)   22 200 lb (90.7 kg)       I/O (24 Hours)    Intake/Output Summary (Last 24 hours) at 2022 1315  Last data filed at 2022 1139  Gross per 24 hour   Intake 180 ml   Output 2350 ml   Net -2170 ml         General Appearance  Awake, alert, oriented,  not  In acute distress  HEENT - normocephalic, atraumatic, slighlty pale  conjunctiva,  anicteric sclera  Neck - Supple, no mass  Lungs -  Bilateral   air entry, no rhonchi, no wheeze  Cardiovascular - Heart sounds are normal.     Abdomen - soft, not distended, nontender, suprapubic catheter, nephrostomy tube on the left side. Neurologic -oriented,quadriparesis.   Skin - No bruising or bleeding  Extremities - No edema, no cyanosis, clubbing     MEDICATIONS:      hydroCHLOROthiazide  25 mg Oral Daily    carvedilol  6.25 mg Oral BID WC    amLODIPine  10 mg Oral Daily    meropenem  1,000 mg IntraVENous Q8H    sodium chloride flush  5-40 mL IntraVENous 2 times per day    [Held by provider] enoxaparin  40 mg SubCUTAneous Daily    [Held by provider] baclofen  10 mg Oral TID    famotidine  20 mg Oral BID    [Held by provider] gabapentin  600 mg Oral TID    [Held by provider] oxybutynin  5 mg Oral BID    [Held by provider] tiZANidine  2 mg Oral TID      sodium chloride Stopped (22 1211)    sodium chloride      sodium chloride 50 mL/hr at 07/25/22 1213     melatonin, metoprolol, sodium chloride flush, sodium chloride, ondansetron **OR** ondansetron, polyethylene glycol, acetaminophen **OR** acetaminophen      LABS:     CBC:   Recent Labs     07/23/22  0542 07/25/22  0129 07/25/22  0729   WBC 10.0 8.0 7.4   HGB 12.3* 12.5* 12.7*    192 204       BMP:    Recent Labs     07/23/22  0542 07/23/22  1904 07/25/22  0129 07/25/22  0729     --   --  136   K 3.3* 3.8  --  3.6     --   --  103   CO2 21*  --   --  24   BUN 9  --   --  10   CREATININE 0.4  --  0.4 0.3*   GLUCOSE 112*  --   --  105       Calcium:  Recent Labs     07/25/22  0729   CALCIUM 8.6       Ionized Calcium:No results for input(s): IONCA in the last 72 hours. Magnesium:  Recent Labs     07/24/22  1346   MG 2.0          CULTURES:   UA: No results for input(s): SPECGRAV, PHUR, COLORU, CLARITYU, MUCUS, PROTEINU, BLOODU, RBCUA, WBCUA, BACTERIA, NITRU, GLUCOSEU, BILIRUBINUR, UROBILINOGEN, KETUA, LABCAST, LABCASTTY, AMORPHOS in the last 72 hours. Invalid input(s): CRYSTALS  Micro:   Lab Results   Component Value Date/Time    BC No growth 24 hours. No growth 48 hours. 07/21/2022 10:25 PM            Problem list of patient:     Patient Active Problem List   Diagnosis Code    Neurogenic bladder N31.9    Multiple sclerosis ([address] G35    MS (multiple sclerosis) ([address] G35    Urinary retention R33.9    Chronic suprapubic catheter ([address] Z93.59    Cystostomy malfunction ([address] N99.512    Decubitus ulcer of coccyx L89.159    Decubitus ulcer, stage 3 ([facility]) L89.93    Malnutrition ([address] E46    Decubitus ulcer of right perineal ischial region, stage 3 ([address] L89.313    Pulmonary embolism on left (HCC) P26.57    Acute metabolic encephalopathy E31.83    Speech disturbance R47.9    Infected decubitus ulcer, stage I L89.91, L08.9    Infected decubitus ulcer, stage III (HCC) L89.93, L08.9    Wound infection T14. 8XXA, L08.9    Elevated INR R79.1    Chronic osteomyelitis, pelvis, right [facility]) M86.651    Osteomyelitis ([facility] [address] M86.9    Urinary tract infection associated with indwelling urethral catheter ([facility] [address] T83.511A, N39.0    Abnormal liver function test R94.5    Chronic depression F32. A    Essential hypertension I10    History of pulmonary embolism Z86.711    Other dysphagia R13.19    Pressure injury of skin of back L89.109    Sepsis secondary to UTI ([facility]) A41.9, N39.0    Decubitus ulcer L89.90    Troponin level elevated R77.8    Anemia D64.9    Sepsis due to methicillin resistant Staphylococcus aureus (MRSA) ([facility]) A41.02    Sepsis due to urinary tract infection ([facility]) A41.9, N39.0    AMS (altered mental status) R41.82    Gross hematuria R31.0    Class 1 obesity due to excess calories without serious comorbidity with body mass index (BMI) of 31.0 to 31.9 in adult E66.09, Z68.31    Colostomy in place [facility]) Z93.3    Current use of long term anticoagulation Z79.01    Obstructive uropathy S91.5    Complicated urinary tract infection N39.0    Hypokalemia E87.6         ASSESSMENT/PLAN   UTI  Obstructing left ureteric stone with hydronephrosis  MS with functional quadriparesis  Hx of ESBL Producing E.col  Continue iv meropenm  Post left nephrostomy tube placement  Dave Briggs MD, MD, FACP 7/25/2022 1:15 PM

## 2022-07-25 NOTE — PROGRESS NOTES
Wound of buttocks cleansed with chg sioap and new opticell ag and boarded foam dressing placed pt tolerated well

## 2022-07-25 NOTE — H&P
Formulation and discussion of sedation / procedure plans, risks, benefits, side effects and alternatives with patient and/or responsible adult completed.     Electronically signed by Chong Gabriel MD on 7/25/22 at 8:15 AM EDT

## 2022-07-25 NOTE — PROGRESS NOTES
3605 Patient received in IR for left nephrostomy tube insertion with possible stent placement. 1965 This procedure has been fully reviewed with the patient and written informed consent has been obtained. 0810 Pt assisted to table in prone position. 1196 Procedure started with   6386 Lidocaine given. 4409 Procedure completed; patient tolerated well. Sutures, split gauze, Sorba view x2 applied; no bleeding noted. 0040 Patient on bed; comfort ensured and gown Changed. 9644 Patient taken to 5K via bed. Called report to [phone].

## 2022-07-26 LAB
ANION GAP SERPL CALCULATED.3IONS-SCNC: 11 MEQ/L (ref 8–16)
BASOPHILS # BLD: 0.6 %
BASOPHILS ABSOLUTE: 0 THOU/MM3 (ref 0–0.1)
BUN BLDV-MCNC: 11 MG/DL (ref 7–22)
CALCIUM SERPL-MCNC: 8.7 MG/DL (ref 8.5–10.5)
CHLORIDE BLD-SCNC: 97 MEQ/L (ref 98–111)
CO2: 25 MEQ/L (ref 23–33)
CREAT SERPL-MCNC: 0.3 MG/DL (ref 0.4–1.2)
EOSINOPHIL # BLD: 3.7 %
EOSINOPHILS ABSOLUTE: 0.2 THOU/MM3 (ref 0–0.4)
ERYTHROCYTE [DISTWIDTH] IN BLOOD BY AUTOMATED COUNT: 15.5 % (ref 11.5–14.5)
ERYTHROCYTE [DISTWIDTH] IN BLOOD BY AUTOMATED COUNT: 51.5 FL (ref 35–45)
GFR SERPL CREATININE-BSD FRML MDRD: > 90 ML/MIN/1.73M2
GLUCOSE BLD-MCNC: 87 MG/DL (ref 70–108)
HCT VFR BLD CALC: 41.2 % (ref 42–52)
HEMOGLOBIN: 13.1 GM/DL (ref 14–18)
IMMATURE GRANS (ABS): 0.05 THOU/MM3 (ref 0–0.07)
IMMATURE GRANULOCYTES: 0.8 %
LYMPHOCYTES # BLD: 21.9 %
LYMPHOCYTES ABSOLUTE: 1.4 THOU/MM3 (ref 1–4.8)
MAGNESIUM: 1.9 MG/DL (ref 1.6–2.4)
MCH RBC QN AUTO: 29.2 PG (ref 26–33)
MCHC RBC AUTO-ENTMCNC: 31.8 GM/DL (ref 32.2–35.5)
MCV RBC AUTO: 91.8 FL (ref 80–94)
MONOCYTES # BLD: 11.9 %
MONOCYTES ABSOLUTE: 0.7 THOU/MM3 (ref 0.4–1.3)
NUCLEATED RED BLOOD CELLS: 0 /100 WBC
PLATELET # BLD: 217 THOU/MM3 (ref 130–400)
PMV BLD AUTO: 8.9 FL (ref 9.4–12.4)
POTASSIUM REFLEX MAGNESIUM: 2.9 MEQ/L (ref 3.5–5.2)
POTASSIUM SERPL-SCNC: 3.7 MEQ/L (ref 3.5–5.2)
RBC # BLD: 4.49 MILL/MM3 (ref 4.7–6.1)
SEG NEUTROPHILS: 61.1 %
SEGMENTED NEUTROPHILS ABSOLUTE COUNT: 3.8 THOU/MM3 (ref 1.8–7.7)
SODIUM BLD-SCNC: 133 MEQ/L (ref 135–145)
WBC # BLD: 6.2 THOU/MM3 (ref 4.8–10.8)

## 2022-07-26 PROCEDURE — 6360000002 HC RX W HCPCS: Performed by: STUDENT IN AN ORGANIZED HEALTH CARE EDUCATION/TRAINING PROGRAM

## 2022-07-26 PROCEDURE — 85025 COMPLETE CBC W/AUTO DIFF WBC: CPT

## 2022-07-26 PROCEDURE — 6370000000 HC RX 637 (ALT 250 FOR IP)

## 2022-07-26 PROCEDURE — 83735 ASSAY OF MAGNESIUM: CPT

## 2022-07-26 PROCEDURE — 84132 ASSAY OF SERUM POTASSIUM: CPT

## 2022-07-26 PROCEDURE — 2580000003 HC RX 258: Performed by: STUDENT IN AN ORGANIZED HEALTH CARE EDUCATION/TRAINING PROGRAM

## 2022-07-26 PROCEDURE — 6370000000 HC RX 637 (ALT 250 FOR IP): Performed by: STUDENT IN AN ORGANIZED HEALTH CARE EDUCATION/TRAINING PROGRAM

## 2022-07-26 PROCEDURE — 1200000000 HC SEMI PRIVATE

## 2022-07-26 PROCEDURE — 99233 SBSQ HOSP IP/OBS HIGH 50: CPT | Performed by: STUDENT IN AN ORGANIZED HEALTH CARE EDUCATION/TRAINING PROGRAM

## 2022-07-26 PROCEDURE — 2580000003 HC RX 258

## 2022-07-26 PROCEDURE — 80048 BASIC METABOLIC PNL TOTAL CA: CPT

## 2022-07-26 PROCEDURE — 36415 COLL VENOUS BLD VENIPUNCTURE: CPT

## 2022-07-26 PROCEDURE — 99231 SBSQ HOSP IP/OBS SF/LOW 25: CPT | Performed by: NURSE PRACTITIONER

## 2022-07-26 RX ORDER — CARVEDILOL 12.5 MG/1
12.5 TABLET ORAL 2 TIMES DAILY WITH MEALS
Qty: 60 TABLET | Refills: 3 | Status: SHIPPED | OUTPATIENT
Start: 2022-07-26

## 2022-07-26 RX ORDER — CARVEDILOL 6.25 MG/1
12.5 TABLET ORAL 2 TIMES DAILY WITH MEALS
Status: DISCONTINUED | OUTPATIENT
Start: 2022-07-26 | End: 2022-07-27 | Stop reason: HOSPADM

## 2022-07-26 RX ORDER — POTASSIUM CHLORIDE 7.45 MG/ML
10 INJECTION INTRAVENOUS 2 TIMES DAILY
Status: DISCONTINUED | OUTPATIENT
Start: 2022-07-26 | End: 2022-07-26

## 2022-07-26 RX ORDER — POTASSIUM CHLORIDE 7.45 MG/ML
20 INJECTION INTRAVENOUS 2 TIMES DAILY
Status: DISCONTINUED | OUTPATIENT
Start: 2022-07-26 | End: 2022-07-26 | Stop reason: SDUPTHER

## 2022-07-26 RX ORDER — LANOLIN ALCOHOL/MO/W.PET/CERES
3 CREAM (GRAM) TOPICAL NIGHTLY PRN
Qty: 30 TABLET | Refills: 0 | Status: SHIPPED | OUTPATIENT
Start: 2022-07-26

## 2022-07-26 RX ORDER — AMLODIPINE BESYLATE 10 MG/1
10 TABLET ORAL DAILY
Qty: 30 TABLET | Refills: 3 | Status: SHIPPED | OUTPATIENT
Start: 2022-07-27

## 2022-07-26 RX ORDER — POTASSIUM CHLORIDE 20 MEQ/1
40 TABLET, EXTENDED RELEASE ORAL 2 TIMES DAILY WITH MEALS
Status: DISCONTINUED | OUTPATIENT
Start: 2022-07-26 | End: 2022-07-26

## 2022-07-26 RX ORDER — POTASSIUM CHLORIDE 20 MEQ/1
40 TABLET, EXTENDED RELEASE ORAL
Status: DISCONTINUED | OUTPATIENT
Start: 2022-07-26 | End: 2022-07-27 | Stop reason: HOSPADM

## 2022-07-26 RX ADMIN — POTASSIUM CHLORIDE 40 MEQ: 20 TABLET, EXTENDED RELEASE ORAL at 17:10

## 2022-07-26 RX ADMIN — CARVEDILOL 12.5 MG: 6.25 TABLET, FILM COATED ORAL at 10:29

## 2022-07-26 RX ADMIN — AMLODIPINE BESYLATE 10 MG: 10 TABLET ORAL at 10:08

## 2022-07-26 RX ADMIN — MEROPENEM 1000 MG: 1 INJECTION, POWDER, FOR SOLUTION INTRAVENOUS at 23:44

## 2022-07-26 RX ADMIN — Medication 3 MG: at 21:35

## 2022-07-26 RX ADMIN — POTASSIUM CHLORIDE 10 MEQ: 7.46 INJECTION, SOLUTION INTRAVENOUS at 10:36

## 2022-07-26 RX ADMIN — FAMOTIDINE 20 MG: 20 TABLET ORAL at 10:09

## 2022-07-26 RX ADMIN — POTASSIUM CHLORIDE 40 MEQ: 20 TABLET, EXTENDED RELEASE ORAL at 10:29

## 2022-07-26 RX ADMIN — MEROPENEM 1000 MG: 1 INJECTION, POWDER, FOR SOLUTION INTRAVENOUS at 06:22

## 2022-07-26 RX ADMIN — CARVEDILOL 12.5 MG: 6.25 TABLET, FILM COATED ORAL at 17:15

## 2022-07-26 RX ADMIN — CARVEDILOL 6.25 MG: 6.25 TABLET, FILM COATED ORAL at 10:43

## 2022-07-26 RX ADMIN — MEROPENEM 1000 MG: 1 INJECTION, POWDER, FOR SOLUTION INTRAVENOUS at 14:45

## 2022-07-26 RX ADMIN — SODIUM CHLORIDE, PRESERVATIVE FREE 10 ML: 5 INJECTION INTRAVENOUS at 10:12

## 2022-07-26 RX ADMIN — FAMOTIDINE 20 MG: 20 TABLET ORAL at 21:35

## 2022-07-26 ASSESSMENT — PAIN SCALES - WONG BAKER
WONGBAKER_NUMERICALRESPONSE: 0

## 2022-07-26 ASSESSMENT — PAIN SCALES - GENERAL
PAINLEVEL_OUTOF10: 0

## 2022-07-26 NOTE — PLAN OF CARE
Problem: Pain  Goal: Verbalizes/displays adequate comfort level or baseline comfort level  Description: Pain Assessment: None - Denies Pain  Is pain goal met at this time? Yes  7/26/2022 0022 by Jim Fuentes RN  Flowsheets (Taken 9/09/3388 7723 by Darrell Villafuerte RN)  Verbalizes/displays adequate comfort level or baseline comfort level:   Encourage patient to monitor pain and request assistance   Assess pain using appropriate pain scale   Implement non-pharmacological measures as appropriate and evaluate response   Outcome: Progressing Towards Goal  Note: Patient denies pain at this time. Pain assessment ongoing. Rest and repositioning provided. Problem: Safety - Adult  Goal: Free from fall injury  Description: All fall precautions in place. Bed in low position, alarm activated and appropriate use of call light.   7/26/2022 0022 by Jim Fuentes RN  Outcome: Progressing Towards Goal  Flowsheets (Taken 7/25/2022 1955)  Free From Fall Injury: Instruct family/caregiver on patient safety  Note: Patient's safety maintained. Call light and possessions within reach, bed alarm on and placed in low position, side rails upx2, hourly rounding, calls out appropriately. Problem: ABCDS Injury Assessment  Goal: Absence of physical injury  Description: Patient being turned and repositioned every 2 hours. Wound care consulted for further evaluation of wound. Wound was present on admission from [facility]. 7/26/2022 0022 by Jim Fuentes RN  Outcome: Progressing Towards Goal  Flowsheets (Taken 7/25/2022 1955)  Absence of Physical Injury: Implement safety measures based on patient assessment  Note: Patient free from injury. Fall precautions in place. Problem: Discharge Planning  Goal: Discharge to home or other facility with appropriate resources  Description: Plan is for patient to be discharged back to [facility] when medically stable.   7/26/2022 0022 by Jim Fuentes RN  Outcome: Progressing Towards

## 2022-07-26 NOTE — PROGRESS NOTES
Progress note: Infectious diseases    Patient - Denys Green,  Age - 74 y.o.    - 1955      Room Number - 5K-10/010-A   MRN -  [id number]   [facility]  Date of Admission -  2022  9:22 AM    SUBJECTIVE:   No new issues. plan for ECF. OBJECTIVE   VITALS    weight is 201 lb (91.2 kg). His oral temperature is 98.5 °F (36.9 °C). His blood pressure is 122/66 and his pulse is 97. His respiration is 18 and oxygen saturation is 92%. Wt Readings from Last 3 Encounters:   22 201 lb (91.2 kg)   22 201 lb (91.2 kg)   22 200 lb (90.7 kg)       I/O (24 Hours)    Intake/Output Summary (Last 24 hours) at 2022 1024  Last data filed at 2022 1021  Gross per 24 hour   Intake 5976.52 ml   Output 2855 ml   Net 3121.52 ml         General Appearance  Awake, alert, oriented,  not  In acute distress  HEENT - normocephalic, atraumatic, slighlty pale  conjunctiva,  anicteric sclera  Neck - Supple, no mass  Lungs -  Bilateral   air entry, no rhonchi, no wheeze  Cardiovascular - Heart sounds are normal.     Abdomen - soft, not distended, nontender, suprapubic catheter, nephrostomy tube on the left side. Neurologic -oriented,quadriparesis.   Skin - No bruising or bleeding  Extremities - No edema, no cyanosis, clubbing     MEDICATIONS:      potassium chloride  40 mEq Oral BID WC    carvedilol  12.5 mg Oral BID WC    potassium chloride  10 mEq IntraVENous BID    And    potassium chloride  10 mEq IntraVENous BID    amLODIPine  10 mg Oral Daily    meropenem  1,000 mg IntraVENous Q8H    sodium chloride flush  5-40 mL IntraVENous 2 times per day    enoxaparin  40 mg SubCUTAneous Daily    [Held by provider] baclofen  10 mg Oral TID    famotidine  20 mg Oral BID    [Held by provider] gabapentin  600 mg Oral TID    [Held by provider] oxybutynin  5 mg Oral BID    [Held by provider] tiZANidine  2 mg Oral TID sodium chloride       melatonin, sodium chloride flush, sodium chloride, ondansetron **OR** ondansetron, polyethylene glycol, acetaminophen **OR** acetaminophen      LABS:     CBC:   Recent Labs     07/25/22  0129 07/25/22  0729 07/26/22  0527   WBC 8.0 7.4 6.2   HGB 12.5* 12.7* 13.1*    204 217       BMP:    Recent Labs     07/23/22  1904 07/25/22  0129 07/25/22  0729 07/26/22  0527   NA  --   --  136 133*   K 3.8  --  3.6 2.9*   CL  --   --  103 97*   CO2  --   --  24 25   BUN  --   --  10 11   CREATININE  --  0.4 0.3* 0.3*   GLUCOSE  --   --  105 87       Calcium:  Recent Labs     07/26/22 0527   CALCIUM 8.7       Ionized Calcium:No results for input(s): IONCA in the last 72 hours. Magnesium:  Recent Labs     07/26/22 0527   MG 1.9          CULTURES:   UA: No results for input(s): SPECGRAV, PHUR, COLORU, CLARITYU, MUCUS, PROTEINU, BLOODU, RBCUA, WBCUA, BACTERIA, NITRU, GLUCOSEU, BILIRUBINUR, UROBILINOGEN, KETUA, LABCAST, LABCASTTY, AMORPHOS in the last 72 hours. Invalid input(s): CRYSTALS  Micro:   Lab Results   Component Value Date/Time    BC No growth 24 hours. No growth 48 hours. 07/21/2022 10:25 PM            Problem list of patient:     Patient Active Problem List   Diagnosis Code    Neurogenic bladder N31.9    Multiple sclerosis ([address] G35    MS (multiple sclerosis) ([address] G35    Urinary retention R33.9    Chronic suprapubic catheter ([address] Z93.59    Cystostomy malfunction ([address] N99.512    Decubitus ulcer of coccyx L89.159    Decubitus ulcer, stage 3 ([facility]) L89.93    Malnutrition ([address] E46    Decubitus ulcer of right perineal ischial region, stage 3 ([address] L89.313    Pulmonary embolism on left ([facility]) J66.58    Acute metabolic encephalopathy R41.41    Speech disturbance R47.9    Infected decubitus ulcer, stage I L89.91, L08.9    Infected decubitus ulcer, stage III (HCC) L89.93, L08.9    Wound infection T14. 8XXA, L08.9    Elevated INR R79.1    Chronic osteomyelitis, pelvis, right ([address] M86.651 Osteomyelitis ([address] M86.9    Urinary tract infection associated with indwelling urethral catheter (HCC) T83.511A, N39.0    Abnormal liver function test R94.5    Chronic depression F32. A    Essential hypertension I10    History of pulmonary embolism Z86.711    Other dysphagia R13.19    Pressure injury of skin of back L89.109    Sepsis secondary to UTI (HCC) A41.9, N39.0    Decubitus ulcer L89.90    Troponin level elevated R77.8    Anemia D64.9    Sepsis due to methicillin resistant Staphylococcus aureus (MRSA) (HCC) A41.02    Sepsis due to urinary tract infection (HCC) A41.9, N39.0    AMS (altered mental status) R41.82    Gross hematuria R31.0    Class 1 obesity due to excess calories without serious comorbidity with body mass index (BMI) of 31.0 to 31.9 in adult E66.09, Z68.31    Colostomy in place [facility]) Z93.3    Current use of long term anticoagulation Z79.01    Obstructive uropathy L74.8    Complicated urinary tract infection N39.0    Hypokalemia E87.6    Hypomagnesemia E83.42         ASSESSMENT/PLAN   UTI  Obstructing left ureteric stone with hydronephrosis  MS with functional quadriparesis  Hx of ESBL Producing E.col  He will be discharged with ertapenem for 4 more days.  Prescription written  Post left nephrostomy tube placement  Ketty Crockett MD, MD, [address] 7/26/2022 10:24 AM

## 2022-07-26 NOTE — PROGRESS NOTES
Hospitalist Progress Note    Patient:  Fely Craven    YOB: 1955  Unit/Bed:5K-10/010-A  MRN: [id number]    Acct: [de-identified]   PCP: Milton Guidry MD    Date of Admission: 7/20/2022      Assessment/Plan:  Sepsis secondary to complicated UTI. Resolved. History of ESBL. Continue meropenem day. Discussed with Dr. Enid Ruiz. He dereje need to go on Ertapenem for 4 more days. Total of 10 days of antibiotics. Monitor hemodynamics. Urine culture growing providencia Stuartii, Proteus Mirabilis, and E coli ESBL. Complicated UTI secondary to obstructing left nephrolithiasis. Underwent IR guided left sided nephrostomy tube and stent placement. Lovenox resumed today. Essential hypertension. Controlled, continue 10 mg amlodipine, Dc HZT as he developed hypokalemia. Will go up on coreg 12.5. Monitor. Nephrolithiasis. Outpatient stone treatment. Outpatient urology follow-up. Acute encephalopathy secondary to septicemia. Resolved. Continue to monitor  Neurogenic bladder secondary to multiple sclerosis has chronic indwelling catheter. Wound care for care site. Stage IV decubitus ulcer. Wound care for care site. NSTEMI likely type II MI secondary to demand ischemia from septicemia. ECHO EF 55 %negative. Appreciate cardiology recommendations. Colostomy left sided-  bowel regimen. Hypokalemia likely from Hydrochlorothiazide likley renal as Na mildly down too. Will dc HZT. K 2.9, around 200 mEq of potassium deficit. Replace with 40 mEq PO TID for 2 days, Patient unable to tolerate IV- already on potassium supplements at home. BMP daily and at discharge, with results to PCP. Hypomagnesemia. Resolved. Expected discharge date:  07/27/2022      Patient was suppose to go home today but significant drop in potassium will reevaluate in after noon with BMP.      Disposition:   [] Home  [] TCU  [] Rehab  [] Psych  [x] SNF  [] [facility]  [] Other-    ===================================================================      Chief Complaint: [address] Course: 44-year-old gentleman with past medical history of MS currently not on immunomodulators, essential hypertension, recurrent UTI with ESBL organisms, neurogenic bladder presented to [facility] due to altered mental status. In ED he was found hypotensive with blood pressure of 88/67 with respiratory rate of 22 and T-max of 99. Initial work-up was concerning for septicemia secondary to UTI. Eventually patient was found to have  Large ureteral stone, urology was consulted due to inability to place nephrostomy tube in the past IR was consulted by urology. As patient has received AC/AP, IR will need 5 days free from blood thinners. Eventually patient is planned to get IR guided nephrostomy tube placement with a stent on 7/25/2022. Subjective (past 24 hours):   Patient seen and examined ,   No overnight events. Echo reviewed    Most recent vitals bp 122/66, Pulse 94, RR 18, Temp 98.5. Remained afebrile. Labs reviewed. K 2.9, Na 133, Cr stable 0.3  He is from nursing home, no precert needed. Patient denied any fever, chills, diarrhea or abdominal pain. Lovenox resumed after 25 Hr S/p nephrostomy tube placement. ROS: reviewed complete ROS unchanged unless otherwise stated in hospital course/subjective portion.        Medications:  Reviewed    Infusion Medications    sodium chloride      sodium chloride 50 mL/hr at 07/26/22 0411     Scheduled Medications    potassium chloride  20 mEq IntraVENous BID    potassium chloride  40 mEq Oral BID WC    carvedilol  12.5 mg Oral BID WC    amLODIPine  10 mg Oral Daily    meropenem  1,000 mg IntraVENous Q8H    sodium chloride flush  5-40 mL IntraVENous 2 times per day    enoxaparin  40 mg SubCUTAneous Daily    [Held by provider] baclofen  10 mg Oral TID    famotidine  20 mg Oral BID    [Held by provider] gabapentin  600 mg Oral TID [Held by provider] oxybutynin  5 mg Oral BID    [Held by provider] tiZANidine  2 mg Oral TID     PRN Meds: melatonin, metoprolol, sodium chloride flush, sodium chloride, ondansetron **OR** ondansetron, polyethylene glycol, acetaminophen **OR** acetaminophen        Intake/Output Summary (Last 24 hours) at 7/26/2022 1004  Last data filed at 7/26/2022 8798  Gross per 24 hour   Intake 5966.52 ml   Output 2655 ml   Net 3311.52 ml         Exam:  /64   Pulse 83   Temp 98.6 °F (37 °C) (Oral)   Resp 17   Wt 201 lb (91.2 kg)   SpO2 94%   BMI 31.48 kg/m²     General: Not in apparent distress appears appropriate for the age able to maintain conversation. Eyes:  PERRL. Conjunctivae/corneas clear. HENT: Head normal appearing. Nares normal. Oral mucosa moist.  Hearing intact. Neck: Supple, with full range of motion. Trachea midline. No gross JVD appreciated. Respiratory:  Normal effort. Clear to auscultation, without rales or wheezes or rhonchi. Cardiovascular: Normal rate, regular rhythm with normal S1/S2 without murmurs. No lower extremity edema. Abdomen: Soft, non-tender, non-distended with normal bowel sounds. Has colostomy bag. Musculoskeletal: No joint swelling or tenderness. Normal tone. No abnormal movements. Skin: Warm and dry. No rashes or lesions. Stage 4 pelvic ischial ulcer. Neurologic: Bilateral lower extremity flaccid paralysis, absent reflexes. Psychiatric: Alert and oriented, normal insight and thought content. Capillary Refill: Brisk,< 3 seconds. Peripheral Pulses: +2 palpable, equal bilaterally.        Labs:   Recent Labs     07/25/22  0129 07/25/22  0729 07/26/22  0527   WBC 8.0 7.4 6.2   HGB 12.5* 12.7* 13.1*   HCT 38.7* 39.2* 41.2*    204 217       Recent Labs     07/23/22  1904 07/25/22  0129 07/25/22  0729 07/26/22  0527   NA  --   --  136 133*   K 3.8  --  3.6 2.9*   CL  --   --  103 97*   CO2  --   --  24 25   BUN  --   --  10 11   CREATININE  --  0.4 0.3* 0.3* CALCIUM  --   --  8.6 8.7       No results for input(s): AST, ALT, BILIDIR, BILITOT, ALKPHOS in the last 72 hours. Recent Labs     07/25/22  0129   INR 1.15*       No results for input(s): Fidelia Newsomeman in the last 72 hours. No results for input(s): PROCAL in the last 72 hours. Lab Results   Component Value Date/Time    NITRU POSITIVE 07/20/2022 09:40 AM    WBCUA 10-15 07/20/2022 09:40 AM    WBCUA >200 01/20/2012 09:00 AM    BACTERIA MODERATE 07/20/2022 09:40 AM    RBCUA > 200 07/20/2022 09:40 AM    BLOODU LARGE 07/20/2022 09:40 AM    SPECGRAV >1.030 04/22/2022 11:30 PM    GLUCOSEU NEGATIVE 07/20/2022 09:40 AM       Radiology (48 hours):  CT ABDOMEN PELVIS WO CONTRAST    Result Date: 7/20/2022  Large obstructing proximal left ureteral stone. This document has been electronically signed by: Jose Manuel Lyn MD on 07/20/2022 11:48 PM All CTs at this facility use dose modulation techniques and iterative reconstructions, and/or weight-based dosing when appropriate to reduce radiation to a low as reasonably achievable. CT HEAD WO CONTRAST    Result Date: 7/20/2022  No acute intracranial process. . No change from prior study. **This report has been created using voice recognition software. It may contain minor errors which are inherent in voice recognition technology. ** Final report electronically signed by Dr. Ivy De La Cruz on 7/20/2022 10:25 AM    XR CHEST PORTABLE    Result Date: 7/20/2022  1. Very poor inflation of the lungs. Kyphotic positioning of the patient. Borderline heart size. 2. Mild atelectasis/pneumonia left lower lung field. No effusion seen. **This report has been created using voice recognition software. It may contain minor errors which are inherent in voice recognition technology. ** Final report electronically signed by Dr. Ivy De La Cruz on 7/20/2022 10:10 AM       DVT prophylaxis:    [x] Lovenox  [] SCDs  [] SQ Heparin  [] Encourage ambulation   [] Already on Anticoagulation Diet: ADULT DIET; Regular after midnight   Code Status: Full Code  PT/OT: working  IVF: Dc fluids today, Dale Sayres calling on [phone] to give family an update, The number is not is valid number as per . 7/24/2022. This transcription was electronically signed. Parts of this transcriptions may have been dictated by use of voice recognition software and electronically transcribed, The transcription may contain errors not detected in proofreading. Janene Malone MD  Internal medicine, Hospitalist Service   [facility].

## 2022-07-26 NOTE — CARE COORDINATION
7/26/22, 7:13 AM EDT    DISCHARGE ON GOING EVALUATION    [facility] day: 6  Location: -10/010-A Reason for admit: Troponin level elevated [R77.8]  Sepsis secondary to UTI [facility] [A41.9, N39.0]  Altered mental status, unspecified altered mental status type [I01.68]  Acute metabolic encephalopathy [U71.02]  Urinary tract infection associated with indwelling urethral catheter, initial encounter ([facility] [J47.821U, N39.0]  Sepsis ([facility] [A41.9]   Procedure:   7/25/2022 left nephrostomy tube and stent insertion   Barriers to Discharge: K 2.9-primary RN notified to update attending. Urology and ID following, Cardiology consulted, echocardiogram, PT/OT, IV fluids, Merrem q 8  hr., prn Tylenol and Zofran, daily BMP and CBC, regular diet, wound care with NSS, SCD's, incentive spirometry, nephrostomy tube care-flushes. PCP: Fiona Meeks MD  Readmission Risk Score: 22.7%  Patient Goals/Plan/Treatment Preferences: Jagjit Vinson is from [facility]. He is planned to return at discharge.

## 2022-07-26 NOTE — PROGRESS NOTES
[facility] received discharge scripts for patient, but patient DC to SNF and we will not be filling scripts. Called unit and spoke with RN Jhony Dumont to inform. -Corby Sandoval (ext. [phone]) 7/26/2022,10:42 AM

## 2022-07-26 NOTE — PROGRESS NOTES
[facility] Wound Ostomy Continence Nurse  Progress Note       Bo Garcia  AGE: 74 y.o. GENDER: male  : 1955  UNIT: 5K-10/010-A  TODAY'S DATE:  2022  ADMISSION DATE: 2022  9:22 AM  Subjective:     Reason for [facility] Evaluation and Assessment: Patient admitted from [facility] with tunneling wound, suprapubic and colostomy bag. Bo Garcia is a 74 y.o. male referred by:   [] Physician/PA/APRN  [x] Nursing  [] Other:     Wound Identification:  Wound Type: pressure  Contributing Factors: chronic pressure, decreased mobility, and shear force    Objective:     Derrick Risk Score: Derrick Scale Score: 13    Assessment:     Encounter: Present to patient room. Patient in bed upon arrival. Assessment and photo to follow. Patient has established colostomy, with pouch in place. No evidence of leaking. Patient reports pouch was changed yesterday. SPT in place. Evidence of leaking noted on chux pad and to quick change. Assisted patient onto left side. Old dressing removed. 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 Cleansed wound with normal saline and gauze. Pat dry with clean gauze. Opticell Ag lightly packed in wound, covered with bordered foam dressing. Staff to change every other day. Chux pad and quick change changed. Patient on IsoFlex surface, not plugged in. Turned this on. BLE offloaded with pillows. Quick change pad placed under testicles for urinary incontinence. Patient in bed, call light in reach. Will continue to follow and assess wound. Call with concerns and for wound evolution. 22    Wound type: Chronic stage 4, right ischium  Wound size: 3.5cm x 0.7cm x 0.5cm  Undermining or Tunneling: None  Wound assessment/color: Pink, red  Drainage amount:  Moderate  Drainage description: Serosanguinous  Odor: None  Margins: Attached  Tawny wound: Epibole, intact  Exposed structure: None    22    Wound type: Chronic stage 4, right ischium  Wound size: 3.5cm x 0.5cm x 0.8cm  Undermining or Tunneling: None  Wound assessment/color: Pink, yellow  Drainage amount: Moderate  Drainage description: Serosanguinous  Odor: None  Margins: Attached  Tawny wound: Epibole, intact  Exposed structure: None    Response to treatment:  Well tolerated by patient. Plan:     Treatment Recommendations:   Right ischial wound- Clean wound with normal saline and gauze. Pat dry with clean gauze. Apply cut piece of Opticell Ag into wound, leaving a tail for removal. Cover with bordered foam dressing. Change every other day. If Opticell is adhered to wound, wet with saline and allow to form gel. Wipe clean with dry gauze.      Specialty Bed Required :   [] Low Air Loss   [x] Pressure Redistribution  [] Fluid Immersion- Dolphin  [] Bariatric  [] RotoProne   [] Other:     Discharge Plan:  Placement for patient upon discharge: ECF  Patient appropriate for Outpatient [address]: Follow up with wound care provider upon discharge

## 2022-07-26 NOTE — PROGRESS NOTES
Cardiology Note      Pt not seen today   Limited echo review       Limited echo   Electronically signed by Roseann Carreno MD (Interpreting   physician) on 07/25/2022 at 08:06 PM   ----------------------------------------------------------------      Findings      Mitral Valve   The mitral valve structure was normal with normal leaflet separation. DOPPLER: The transmitral velocity was within the normal range with no   evidence for mitral stenosis. There was no evidence of mitral   regurgitation. Aortic Valve   The aortic valve was trileaflet with normal thickness and cuspal   separation. DOPPLER: Transaortic velocity was within the normal range with   no evidence of aortic stenosis. There was no evidence of aortic   regurgitation. Tricuspid Valve   The tricuspid valve structure was normal with normal leaflet separation. DOPPLER: There was no evidence of tricuspid stenosis. There was no   evidence of tricuspid regurgitation. Pulmonic Valve   The pulmonic valve was not well visualized . Left Atrium   Left atrial size was normal.      Left Ventricle   Left ventricular size and systolic function is normal. Ejection fraction   was estimated at 55-60%. LV wall thickness is within normal limits and   there are no obvious wall motion abnormalities. Right Atrium   Right atrial size was normal.      Right Ventricle   The right ventricular size was normal with normal systolic function and   wall thickness. Pericardial Effusion   The pericardium was normal in appearance with no evidence of a pericardial   effusion. Pleural Effusion   No evidence of pleural effusion. Aorta / Great Vessels   IVC size is within normal limits with normal respiratory phasic changes.           AP     Elevated trop - secondary to complicated UTI    Echo WNL     Complicated UTI - continue antibiotics per ID     HTN - stable         TONO Quinones - CNP  7/26/2022  11:07 AM

## 2022-07-27 ENCOUNTER — APPOINTMENT (OUTPATIENT)
Dept: INTERVENTIONAL RADIOLOGY/VASCULAR | Age: 67
DRG: 659 | End: 2022-07-27
Payer: COMMERCIAL

## 2022-07-27 ENCOUNTER — TELEPHONE (OUTPATIENT)
Dept: UROLOGY | Age: 67
End: 2022-07-27

## 2022-07-27 VITALS
TEMPERATURE: 98.1 F | HEART RATE: 95 BPM | BODY MASS INDEX: 31.48 KG/M2 | DIASTOLIC BLOOD PRESSURE: 67 MMHG | OXYGEN SATURATION: 93 % | WEIGHT: 201 LBS | SYSTOLIC BLOOD PRESSURE: 132 MMHG | RESPIRATION RATE: 16 BRPM

## 2022-07-27 LAB
ANION GAP SERPL CALCULATED.3IONS-SCNC: 10 MEQ/L (ref 8–16)
BASOPHILS # BLD: 1 %
BASOPHILS ABSOLUTE: 0.1 THOU/MM3 (ref 0–0.1)
BLOOD CULTURE, ROUTINE: NORMAL
BLOOD CULTURE, ROUTINE: NORMAL
BUN BLDV-MCNC: 12 MG/DL (ref 7–22)
CALCIUM SERPL-MCNC: 8.8 MG/DL (ref 8.5–10.5)
CHLORIDE BLD-SCNC: 103 MEQ/L (ref 98–111)
CO2: 25 MEQ/L (ref 23–33)
CREAT SERPL-MCNC: 0.2 MG/DL (ref 0.4–1.2)
EOSINOPHIL # BLD: 5.3 %
EOSINOPHILS ABSOLUTE: 0.3 THOU/MM3 (ref 0–0.4)
ERYTHROCYTE [DISTWIDTH] IN BLOOD BY AUTOMATED COUNT: 15.4 % (ref 11.5–14.5)
ERYTHROCYTE [DISTWIDTH] IN BLOOD BY AUTOMATED COUNT: 52.5 FL (ref 35–45)
GFR SERPL CREATININE-BSD FRML MDRD: > 90 ML/MIN/1.73M2
GLUCOSE BLD-MCNC: 97 MG/DL (ref 70–108)
HCT VFR BLD CALC: 39.8 % (ref 42–52)
HEMOGLOBIN: 12.5 GM/DL (ref 14–18)
IMMATURE GRANS (ABS): 0.03 THOU/MM3 (ref 0–0.07)
IMMATURE GRANULOCYTES: 0.6 %
LYMPHOCYTES # BLD: 31.3 %
LYMPHOCYTES ABSOLUTE: 1.6 THOU/MM3 (ref 1–4.8)
MAGNESIUM: 1.8 MG/DL (ref 1.6–2.4)
MCH RBC QN AUTO: 29.2 PG (ref 26–33)
MCHC RBC AUTO-ENTMCNC: 31.4 GM/DL (ref 32.2–35.5)
MCV RBC AUTO: 93 FL (ref 80–94)
MONOCYTES # BLD: 10.8 %
MONOCYTES ABSOLUTE: 0.6 THOU/MM3 (ref 0.4–1.3)
NUCLEATED RED BLOOD CELLS: 0 /100 WBC
PLATELET # BLD: 277 THOU/MM3 (ref 130–400)
PMV BLD AUTO: 8.7 FL (ref 9.4–12.4)
POTASSIUM REFLEX MAGNESIUM: 3.3 MEQ/L (ref 3.5–5.2)
RBC # BLD: 4.28 MILL/MM3 (ref 4.7–6.1)
SEG NEUTROPHILS: 51 %
SEGMENTED NEUTROPHILS ABSOLUTE COUNT: 2.6 THOU/MM3 (ref 1.8–7.7)
SODIUM BLD-SCNC: 138 MEQ/L (ref 135–145)
WBC # BLD: 5.1 THOU/MM3 (ref 4.8–10.8)

## 2022-07-27 PROCEDURE — 2580000003 HC RX 258: Performed by: INTERNAL MEDICINE

## 2022-07-27 PROCEDURE — 36415 COLL VENOUS BLD VENIPUNCTURE: CPT

## 2022-07-27 PROCEDURE — 83735 ASSAY OF MAGNESIUM: CPT

## 2022-07-27 PROCEDURE — 0TP5X0Z REMOVAL OF DRAINAGE DEVICE FROM KIDNEY, EXTERNAL APPROACH: ICD-10-PCS | Performed by: RADIOLOGY

## 2022-07-27 PROCEDURE — 6370000000 HC RX 637 (ALT 250 FOR IP)

## 2022-07-27 PROCEDURE — 6360000002 HC RX W HCPCS: Performed by: STUDENT IN AN ORGANIZED HEALTH CARE EDUCATION/TRAINING PROGRAM

## 2022-07-27 PROCEDURE — 6360000002 HC RX W HCPCS: Performed by: INTERNAL MEDICINE

## 2022-07-27 PROCEDURE — 80048 BASIC METABOLIC PNL TOTAL CA: CPT

## 2022-07-27 PROCEDURE — 2709999900 IR GUIDED NEPHROSTOGRAM/URETEROGRAM EXISTING ACCESS

## 2022-07-27 PROCEDURE — 99239 HOSP IP/OBS DSCHRG MGMT >30: CPT | Performed by: PHYSICIAN ASSISTANT

## 2022-07-27 PROCEDURE — 85025 COMPLETE CBC W/AUTO DIFF WBC: CPT

## 2022-07-27 PROCEDURE — 50389 REMOVE RENAL TUBE W/FLUORO: CPT

## 2022-07-27 PROCEDURE — 6360000004 HC RX CONTRAST MEDICATION: Performed by: RADIOLOGY

## 2022-07-27 PROCEDURE — 2580000003 HC RX 258: Performed by: STUDENT IN AN ORGANIZED HEALTH CARE EDUCATION/TRAINING PROGRAM

## 2022-07-27 PROCEDURE — 6370000000 HC RX 637 (ALT 250 FOR IP): Performed by: STUDENT IN AN ORGANIZED HEALTH CARE EDUCATION/TRAINING PROGRAM

## 2022-07-27 PROCEDURE — 6360000002 HC RX W HCPCS

## 2022-07-27 RX ORDER — POTASSIUM CHLORIDE 20 MEQ/1
40 TABLET, EXTENDED RELEASE ORAL 2 TIMES DAILY
Qty: 60 TABLET | Refills: 0 | DISCHARGE
Start: 2022-07-27

## 2022-07-27 RX ADMIN — POTASSIUM CHLORIDE 40 MEQ: 20 TABLET, EXTENDED RELEASE ORAL at 12:04

## 2022-07-27 RX ADMIN — FAMOTIDINE 20 MG: 20 TABLET ORAL at 09:12

## 2022-07-27 RX ADMIN — IOTHALAMATE MEGLUMINE 10 ML: 600 INJECTION INTRAVASCULAR at 11:26

## 2022-07-27 RX ADMIN — ENOXAPARIN SODIUM 40 MG: 100 INJECTION SUBCUTANEOUS at 09:11

## 2022-07-27 RX ADMIN — MEROPENEM 1000 MG: 1 INJECTION, POWDER, FOR SOLUTION INTRAVENOUS at 06:04

## 2022-07-27 RX ADMIN — ERTAPENEM SODIUM 1000 MG: 1 INJECTION, POWDER, LYOPHILIZED, FOR SOLUTION INTRAMUSCULAR; INTRAVENOUS at 13:39

## 2022-07-27 RX ADMIN — CARVEDILOL 12.5 MG: 6.25 TABLET, FILM COATED ORAL at 09:12

## 2022-07-27 RX ADMIN — POTASSIUM CHLORIDE 40 MEQ: 20 TABLET, EXTENDED RELEASE ORAL at 09:11

## 2022-07-27 RX ADMIN — AMLODIPINE BESYLATE 10 MG: 10 TABLET ORAL at 09:13

## 2022-07-27 NOTE — DISCHARGE SUMMARY
Hospital Medicine Discharge Summary      Patient Identification:   Harriet Kumar   : 1955  MRN: [id number]   Account: [de-identified]      Patient's PCP: Kell Abreu MD    Admit Date: 2022     Discharge Date:   2022    Admitting Physician: Susana Velasquez MD     Discharging Physician Assistant: Ilsa Martinez PA-C     Discharge Diagnoses with Assessment/Plan:    Sepsis secondary to complicated UTI. Resolved. History of ESBL. Continue meropenem day. Discussed with Dr. Rossy Alegria. He dereje need to go on Ertapenem for 4 more days- Dr Rossy Alegria provided the prescription. Total of 10 days of antibiotics. Monitor hemodynamics. Urine culture growing providencia Stuartii, Proteus Mirabilis, and E coli ESBL. Complicated UTI secondary to obstructing left nephrolithiasis. Underwent IR guided left sided nephrostomy tube and stent placement. Lovenox resumed today. Possibly tube was removed -- urology okay with discharge with close outpatient follow up  Essential hypertension. Controlled, continue 10 mg amlodipine, Dc HZT as he developed hypokalemia. Increased coreg to 12.5. Blood pressure well controlled  Nephrolithiasis. Outpatient stone treatment. Outpatient urology follow-up. Acute encephalopathy secondary to septicemia. Resolved. Continue to monitor  Neurogenic bladder secondary to multiple sclerosis has chronic indwelling catheter. Wound care for care site. Stage IV decubitus ulcer. Wound care for care site. NSTEMI likely type II MI secondary to demand ischemia from septicemia. ECHO EF 55 %negative. Appreciate cardiology recommendations. Colostomy left sided-  bowel regimen. Hypokalemia likely from Hydrochlorothiazide    HCTZ was discontinued and replacement was given  Will need BMP on discharge with follow up with PCP  K was 3.3 today. continue with 40meq TID  Hypomagnesemia. Resolved.       The patient was seen and examined on day of discharge and this discharge summary is in intact. Neck: Supple, with full range of motion. Trachea midline. No gross JVD appreciated. Respiratory:  Normal effort. Clear to auscultation, without rales or wheezes or rhonchi. Cardiovascular: Normal rate, regular rhythm with normal S1/S2 without murmurs. No lower extremity edema. Abdomen: Soft, non-tender, non-distended with normal bowel sounds. Has colostomy bag and suprapubic catheter  Musculoskeletal: No joint swelling or tenderness. Normal tone. No abnormal movements. Skin: Warm and dry. No rashes or lesions. Stage 4 pelvic ischial ulcer. Neurologic: Bilateral lower extremity flaccid paralysis, absent reflexes. Psychiatric: Alert and oriented, normal insight and thought content. Capillary Refill: Brisk,< 3 seconds. Peripheral Pulses: +2 palpable, equal bilaterally. Labs: For convenience and continuity at follow-up the following most recent labs are provided:      CBC:    Lab Results   Component Value Date/Time    WBC 5.1 07/27/2022 05:33 AM    HGB 12.5 07/27/2022 05:33 AM    HCT 39.8 07/27/2022 05:33 AM     07/27/2022 05:33 AM       Renal:    Lab Results   Component Value Date/Time     07/27/2022 05:33 AM    K 3.3 07/27/2022 05:33 AM     07/27/2022 05:33 AM    CO2 25 07/27/2022 05:33 AM    BUN 12 07/27/2022 05:33 AM    CREATININE 0.2 07/27/2022 05:33 AM    CALCIUM 8.8 07/27/2022 05:33 AM    PHOS 3.4 06/15/2019 04:39 AM       Cardiac:   Recent Labs     07/24/22  1825 07/25/22  0729   TROPONINT 0.012* 0.025*       Significant Diagnostic Studies    Radiology:   IR GUIDED NEPHROSTOMY CATH PLACEMENT   Final Result   . IMPRESSION:   1. Status post successful double-J ureteral stent insertion on left  side. 2. Status post successful percutaneous nephrostomy tube insertion left  side. 3.  The patient will be scheduled for a repeat nephrostogram in a few days with possible nephrostomy removal if the stent is functioning well.             **This report has been created using voice recognition software. It may contain minor errors which are inherent in voice recognition technology. **      Final report electronically signed by Dr. Bernardino Howard on 7/25/2022 9:17 AM      CT ABDOMEN PELVIS WO CONTRAST   Final Result   Large obstructing proximal left ureteral stone. This document has been electronically signed by: Terri Maxwell MD on    07/20/2022 11:48 PM      All CTs at this facility use dose modulation techniques and iterative    reconstructions, and/or weight-based dosing   when appropriate to reduce radiation to a low as reasonably achievable. CT HEAD WO CONTRAST   Final Result   No acute intracranial process. . No change from prior study. **This report has been created using voice recognition software. It may contain minor errors which are inherent in voice recognition technology. **      Final report electronically signed by Dr. Bernardino Howard on 7/20/2022 10:25 AM      XR CHEST PORTABLE   Final Result   1. Very poor inflation of the lungs. Kyphotic positioning of the patient. Borderline heart size. 2. Mild atelectasis/pneumonia left lower lung field. No effusion seen. **This report has been created using voice recognition software. It may contain minor errors which are inherent in voice recognition technology. **      Final report electronically signed by Dr. Bernardino Howard on 7/20/2022 10:10 AM      IR GUIDED NEPHROSTOGRAM/URETEROGRAM EXISTING ACCESS    (Results Pending)          Consults:     IP CONSULT TO SOCIAL WORK  IP CONSULT TO UROLOGY  IP CONSULT TO INFECTIOUS DISEASES  IP CONSULT TO CARDIOLOGY    Disposition:    [] Home       [] TCU       [] Rehab       [] Psych       [x] SNF       [] [place]       [] Other-    Condition at Discharge: Stable    Code Status:  Full Code     Pending tests at discharge:          Patient Instructions:    Discharge lab work: BMP in 2 days  Activity: activity as tolerated  Diet: ADULT DIET;  Regular Follow-up visits:   SUZIE Mccoy  [address] [address]  [address], [address] [facility] [address]  [phone]    Follow up  [facility] physician to follow         Discharge Medications:        Medication List        START taking these medications      amLODIPine 10 MG tablet  Commonly known as: NORVASC  Take 1 tablet by mouth in the morning. carvedilol 12.5 MG tablet  Commonly known as: COREG  Take 1 tablet by mouth in the morning and 1 tablet in the evening. Take with meals. ertapenem  infusion  Commonly known as: INVanz  Infuse 1,000 mg intravenously every 24 hours for 4 days Compound per protocol.      melatonin 3 MG Tabs tablet  Take 1 tablet by mouth nightly as needed (when unable to sleep)            CONTINUE taking these medications      acetaminophen 325 MG tablet  Commonly known as: TYLENOL     aspirin 81 MG EC tablet     baclofen 10 MG tablet  Commonly known as: LIORESAL     calcium-vitamin D 500-200 MG-UNIT per tablet  Commonly known as: OSCAL-500  Take 1 tablet by mouth 2 times daily     carbamide peroxide 6.5 % otic solution  Commonly known as: DEBROX     docusate sodium 100 MG capsule  Commonly known as: COLACE     erythromycin 5 MG/GM ophthalmic ointment  Commonly known as: ROMYCIN     famotidine 20 MG tablet  Commonly known as: PEPCID  Take 1 tablet by mouth 2 times daily     gabapentin 600 MG tablet  Commonly known as: NEURONTIN     gentamicin 0.1 % ointment  Commonly known as: GARAMYCIN     LACTOBACILLUS PO     multivitamin tablet  Take 1 tablet by mouth daily     ondansetron 4 MG tablet  Commonly known as: ZOFRAN     oxybutynin 5 MG extended release tablet  Commonly known as: DITROPAN-XL     potassium chloride 20 MEQ Tbcr extended release tablet  Commonly known as: KLOR-CON M  Take 2 tablets by mouth daily     sodium chloride 1 g tablet  Take 1 tablet by mouth 2 times daily (with meals)     tiZANidine 2 MG tablet  Commonly known as: ZANAFLEX     vitamin A 62658 units capsule  Take 2 capsules by mouth daily     VITAMIN C ER PO     vitamin E 400 UNIT capsule            STOP taking these medications      ibuprofen 400 MG tablet  Commonly known as: ADVIL;MOTRIN     metoprolol tartrate 25 MG tablet  Commonly known as: LOPRESSOR               Where to Get Your Medications        These medications were sent to [address] [place] [address] , [address] [facility]  [address], [address] [address]      Phone: [phone]   amLODIPine 10 MG tablet  carvedilol 12.5 MG tablet  melatonin 3 MG Tabs tablet       You can get these medications from any pharmacy    Bring a paper prescription for each of these medications  ertapenem  infusion         Time Spent on discharge is more than 45 minutes in the examination, evaluation, counseling and review of medications and discharge plan. Signed: Thank you Jarrett Mar MD for the opportunity to be involved in this patient's care.     Electronically signed by Ba Cooper PA-C on 7/27/2022 at 10:30 AM

## 2022-07-27 NOTE — PROGRESS NOTES
[address]  Occupational Therapy  Discharge Note  Time:   Time In: 7374  Time Out: 5475  Timed Code Treatment Minutes: 16 Minutes  Minutes: 16          Date: 2022  Patient Name: Bo Garcia,   Gender: male      Room: [facility]/010-A  MRN: [id number]  : 1955  (74 y.o.)  Referring Practitioner: Dr. Sherryle Barrio, MD     Additional Pertinent Hx: Pt with past medical history of MS currently not on immunomodulators, essential hypertension, recurrent UTI with ESBL organisms, neurogenic bladder presented to [facility] due to altered mental status. In ED he was found hypotensive with blood pressure of 88/67 with respiratory rate of 22 and T-max of 99. Initial work-up was concerning for septicemia secondary to UTI. Eventually patient was found to have  Large ureteral stone, urology was consulted due to inability to place nephrostomy tube in the past IR was consulted by urology. As patient has received AC/AP, IR will need 5 days free from blood thinners. Eventually patient is planned to get IR guided nephrostomy tube placement with a stent on 2022. Restrictions/Precautions:  Restrictions/Precautions: Fall Risk     SUBJECTIVE: Patient lying in bed upon arrival. Agreeable to OT session. PAIN: Complains of soreness, denies pain     Vitals: Vitals not assessed per clinical judgement, see nursing flowsheet    COGNITION: Decreased Problem Solving    ADL:   No ADL's completed this session. .    ADDITIONAL ACTIVITIES:  Patient completed BUE A/AROM exercises x10 reps x1 set in all joints/planes seated upright in bed. Brief rest  breaks required. Stiffness noted in B shoulders.  Completed exercises to increase maintain joint integrity/ROM and decrease stiffness required for ADLs     ASSESSMENT:     Activity Tolerance:  Patient tolerance of  treatment: .       Discharge Recommendations: ECF without OT  Equipment Recommendations: Equipment Needed: Yes  Other: may benefit from adapted  or universal cuff for grooming and/or feeding  Plan: Discharge from OT. Patient is at baseline for function and is a whit lift transfer at [facility]. Patient is dependent for all mobility/ADLs/transfers and is nonambulatory. Patient Education  Patient Education:  A/AROM     Goals  Short Term Goals  Time Frame for Short term goals: By discharge  Short Term Goal 1: Pt will demonstrate grooming and/or feeding with setup A while using any adaptations needed to increase his independence with self care. - GOAL NOT MET   Short Term Goal 2: Pt will complete BUE ROM exercises as tolerated to decrease his muscle tightness for ease of doing self care. - GOAL MET   Short Term Goal 3: Pt will complete transfers with OTR when appropriate if pt able to tolerate in preparation for doing activities while out of bed.- GOAL NOT MET   Additional Goals?: No    Following session, patient left in safe position with all fall risk precautions in place.

## 2022-07-27 NOTE — OP NOTE
Department of Radiology  Post Procedure Progress Note      Pre-Procedure Diagnosis:  ureteral obstruction     Procedure Performed:  nephrostogram and tube removal     Anesthesia: local     Findings: successful    Immediate Complications:  None    Estimated Blood Loss: minimal    SEE DICTATED PROCEDURE NOTE FOR COMPLETE DETAILS.     Geovani Robles MD   7/27/2022 11:30 AM

## 2022-07-27 NOTE — PROGRESS NOTES
Progress note: Infectious diseases    Patient - Alessandra Lambert,  Age - 74 y.o.    - 1955      Room Number - 5K-10/010-A   MRN -  [id number]   Acct # - [de-identified]  Date of Admission -  2022  9:22 AM    SUBJECTIVE:   No new issues. discussed with social work  OBJECTIVE   VITALS    weight is 201 lb (91.2 kg). His oral temperature is 98.1 °F (36.7 °C). His blood pressure is 132/67 and his pulse is 95. His respiration is 16 and oxygen saturation is 93%. Wt Readings from Last 3 Encounters:   22 201 lb (91.2 kg)   22 201 lb (91.2 kg)   22 200 lb (90.7 kg)       I/O (24 Hours)    Intake/Output Summary (Last 24 hours) at 2022 1319  Last data filed at 2022 0330  Gross per 24 hour   Intake 10 ml   Output 1400 ml   Net -1390 ml         General Appearance  Awake, alert, oriented,  not  In acute distress  HEENT - normocephalic, atraumatic, slighlty pale  conjunctiva,  anicteric sclera  Neck - Supple, no mass  Lungs -  Bilateral   air entry, no rhonchi, no wheeze  Cardiovascular - Heart sounds are normal.     Abdomen - soft, not distended, nontender, suprapubic catheter, nephrostomy tube on the left side. Neurologic -oriented,quadriparesis.   Skin - No bruising or bleeding  Extremities - No edema, no cyanosis, clubbing     MEDICATIONS:      ertapenem (INVanz) IVPB  1,000 mg IntraVENous Once    carvedilol  12.5 mg Oral BID WC    potassium chloride  40 mEq Oral TID WC    amLODIPine  10 mg Oral Daily    sodium chloride flush  5-40 mL IntraVENous 2 times per day    enoxaparin  40 mg SubCUTAneous Daily    [Held by provider] baclofen  10 mg Oral TID    famotidine  20 mg Oral BID    [Held by provider] gabapentin  600 mg Oral TID    [Held by provider] oxybutynin  5 mg Oral BID    [Held by provider] tiZANidine  2 mg Oral TID      sodium chloride       melatonin, sodium chloride flush, sodium chloride, ondansetron **OR** ondansetron, polyethylene glycol, acetaminophen **OR** acetaminophen      LABS:     CBC:   Recent Labs     07/25/22  0729 07/26/22  0527 07/27/22  0533   WBC 7.4 6.2 5.1   HGB 12.7* 13.1* 12.5*    217 277       BMP:    Recent Labs     07/25/22  0729 07/26/22  0527 07/26/22  1636 07/27/22  0533    133*  --  138   K 3.6 2.9* 3.7 3.3*    97*  --  103   CO2 24 25  --  25   BUN 10 11  --  12   CREATININE 0.3* 0.3*  --  0.2*   GLUCOSE 105 87  --  97       Calcium:  Recent Labs     07/27/22  0533   CALCIUM 8.8       Ionized Calcium:No results for input(s): IONCA in the last 72 hours. Magnesium:  Recent Labs     07/27/22  0533   MG 1.8          CULTURES:   UA: No results for input(s): SPECGRAV, PHUR, COLORU, CLARITYU, MUCUS, PROTEINU, BLOODU, RBCUA, WBCUA, BACTERIA, NITRU, GLUCOSEU, BILIRUBINUR, UROBILINOGEN, KETUA, LABCAST, LABCASTTY, AMORPHOS in the last 72 hours. Invalid input(s): CRYSTALS  Micro:   Lab Results   Component Value Date/Time    [facility]  07/21/2022 10:25 PM     No growth 24 hours. No growth 48 hours. No growth at 5 days             Problem list of patient:     Patient Active Problem List   Diagnosis Code    Neurogenic bladder N31.9    Multiple sclerosis ([facility] G35    MS (multiple sclerosis) ([facility] G35    Urinary retention R33.9    Chronic suprapubic catheter ([facility]) Z93.59    Cystostomy malfunction ([address] N99.512    Decubitus ulcer of coccyx L89.159    Decubitus ulcer, stage 3 ([facility]) L89.93    Malnutrition ([address] E46    Decubitus ulcer of right perineal ischial region, stage 3 ([address] L89.313    Pulmonary embolism on left ([facility]) Y00.78    Acute metabolic encephalopathy U69.99    Speech disturbance R47.9    Infected decubitus ulcer, stage I L89.91, L08.9    Infected decubitus ulcer, stage III ([facility]) L89.93, L08.9    Wound infection T14. 8XXA, L08.9    Elevated INR R79.1    Chronic osteomyelitis, pelvis, right ([facility]) M86.651    Osteomyelitis ([facility]) M86.9    Urinary tract infection associated with indwelling urethral catheter (HCC) T83.511A, N39.0    Abnormal liver function test R94.5    Chronic depression F32. A    Essential hypertension I10    History of pulmonary embolism Z86.711    Other dysphagia R13.19    Pressure injury of skin of back L89.109    Sepsis secondary to UTI (HCC) A41.9, N39.0    Decubitus ulcer L89.90    Troponin level elevated R77.8    Anemia D64.9    Sepsis due to methicillin resistant Staphylococcus aureus (MRSA) (HCC) A41.02    Sepsis due to urinary tract infection (HCC) A41.9, N39.0    AMS (altered mental status) R41.82    Gross hematuria R31.0    Class 1 obesity due to excess calories without serious comorbidity with body mass index (BMI) of 31.0 to 31.9 in adult E66.09, Z68.31    Colostomy in place [facility]) Z93.3    Current use of long term anticoagulation Z79.01    Obstructive uropathy E31.7    Complicated urinary tract infection N39.0    Hypokalemia E87.6    Hypomagnesemia E83.42         ASSESSMENT/PLAN   UTI  Obstructing left ureteric stone with hydronephrosis  MS with functional quadriparesis  Hx of ESBL Producing E.col  Ok with discharge plan  Ertapenem to be given prior to discharge to [facility]  Post left nephrostomy tube placement  Dai Leal MD, MD, FACP 7/27/2022 1:19 PM

## 2022-07-27 NOTE — PROGRESS NOTES
Urology Progress Note    Reason for Consult:  patient with septic uti. Needs suprapubic catheter exchanged. Requesting Physician:  Sergey Funes MD     History Obtained From:  patient     Chief Complaint: fever, AMS    Subjective: \"    Patient is resting in bed, voiding yellow urine via SPT, tolerating regular diet, denies any nausea or vomiting. There are complaints of denies pain at this time.     IR placed left nephrostomy tube with stent, ok to remove tube today  Left neph draining yellow urine  Anticoags restarted urine is yellow  Our office will coordinate definite stone tx        Vitals:  /67   Pulse 95   Temp 98.1 °F (36.7 °C) (Oral)   Resp 16   Wt 201 lb (91.2 kg)   SpO2 93%   BMI 31.48 kg/m²   Temp  Av.2 °F (36.8 °C)  Min: 98.1 °F (36.7 °C)  Max: 98.3 °F (36.8 °C)    Intake/Output Summary (Last 24 hours) at 2022 1549  Last data filed at 2022 1445  Gross per 24 hour   Intake 537.64 ml   Output 1400 ml   Net -862.36 ml         Social History     Socioeconomic History    Marital status:      Spouse name: Sally Deleon    Number of children: 2    Years of education: Not on file    Highest education level: Not on file   Occupational History    Not on file   Tobacco Use    Smoking status: Former     Types: Cigarettes     Quit date: 1982     Years since quittin.5    Smokeless tobacco: Former   Vaping Use    Vaping Use: Never used   Substance and Sexual Activity    Alcohol use: No     Comment: \"quit alcohol a few years ago\"    Drug use: No    Sexual activity: Yes     Partners: Female   Other Topics Concern    Not on file   Social History Narrative    Not on file     Social Determinants of Health     Financial Resource Strain: Not on file   Food Insecurity: Not on file   Transportation Needs: Not on file   Physical Activity: Not on file   Stress: Not on file   Social Connections: Not on file   Intimate Partner Violence: Not on file   Housing Stability: Not on file

## 2022-07-27 NOTE — PROGRESS NOTES
200 Pt in specials radiology for left nephrostogram with possible intervention. Explained procedure to pt and pt verbalizes understanding. 1116 Pt positioned on table for comfort. 1300 [facility] Dr Nancy Amador to speak to pt.  1125 Nephrostogram complete. Pt tolerated well. 1127 Nephrostomy tube removed per Dr Nancy Amador with fluoro without difficulty. 1128 4 x 4 with op-site dressing applied to site. Site without redness, swelling or hematoma. 1130 Pt positioned on bed for comfort. 1134 Report called to Shankar 15 Pt transferred to 5K per bed.

## 2022-07-28 ENCOUNTER — TELEPHONE (OUTPATIENT)
Dept: UROLOGY | Age: 67
End: 2022-07-28

## 2022-07-28 NOTE — TELEPHONE ENCOUNTER
DO NOT TAKE ASPIRIN,  FISH OIL, COUMADIN, IBUPROFEN, MOTRIN-LIKE DRUGS AND ANY MULTIVITAMINS OR OVER THE COUNTER SUPPLEMENTS STARTING ON 7/29/2022. MUST HAVE AN ADULT OVER THE AGE OF 18 WITH YOU AT THE TIME OF THE PROCEDURE AND WITH YOU AT HOME AFTER THE PROCEDURE FOR 24 HOURS       Ryan Fam 1955 Diagnosis:     Surgical Physician: Dr. Osvaldo Bowen have been scheduled for the procedure marked below:      Surgery: Cystoscopy, left ureteroscopy, laser lithotripsy, basket retrieval of stone fragments, and possible left ureteral stent exchange          Date: 8/4/2022     Anesthesia: Anesthesiologist (General/Spinal)     Place of Service: [address] [place] [address] Second Floor Same Day Surgery         Arrive to same day surgery by:  8:00am  (Surgery time is subject to change)      INSTRUCTIONS AS MARKED BELOW:    1.  DO NOT eat or drink anything after midnight before surgery. 2.  We prefer you shower or bathe with an antibacterial soap (Dial) the morning of surgery. 3  Please bring a current medication list, photo ID and insurance card(s) with you  4. Okay to take Tylenol  5. If you take Glucophage, Metformin or Janumet, hold 48-hours prior to surgery  6. Take blood pressure or heart medication as directed, if taken in the morning take with a small sip of water  7. The office will call you in 1-2 days after your procedure to schedule a follow up.               Date: 7/28/2022

## 2022-07-28 NOTE — TELEPHONE ENCOUNTER
Patient scheduled with Dr. Fede Guevara on 8/4/2022. Surgery consent to be done upon arrival.  Surgery instructions faxed to [facility].

## 2022-07-28 NOTE — TELEPHONE ENCOUNTER
SURGERY 826  [address] [place] [address] [address] [facility] [address] [place], [address]      Phone *[phone] *[phone]   Surgical Scheduling Direct Line Phone *[phone] Fax *[address] [facility] 1955 male    [address]   [place] [address]   Marital Status:          Home Phone: [phone]      Cell Phone:    Telephone Information:   Mobile [phone]          Surgeon: Dr. Nik Gomez Surgery Date: 8/4/2022   Time: 10:00am    Procedure: Cystoscopy, left ureteroscopy, laser lithotripsy, basket retrieval of stone fragments, and possible left ureteral stent exchange     Diagnosis: Kidney Stone     Important Medical History:  In Epic    Special Inst/Equip:     CPT Codes:    35838  Latex Allergy: No     Cardiac Device:  No    Anesthesia:  General          Admission Type:  Same Day                        Admit Prior to Day of Surgery: No    Case Location:  Main OR            Preadmission Testing:  Phone Call          PAT Date and Time:______________________________________________________    PAT Confirmation #: ______________________________________________________    Post Op Visit: ___________________________________________________________    Need Preop Cardiac Clearance: No    Does Patient have Cardiologist/physician?     none    Surgery Confirmation #: __________________________________________________    : ________________________   Date: __________________________     [employer] Name: [facility] [place]

## 2022-07-29 ENCOUNTER — TELEPHONE (OUTPATIENT)
Dept: UROLOGY | Age: 67
End: 2022-07-29

## 2022-08-01 ENCOUNTER — PREP FOR PROCEDURE (OUTPATIENT)
Dept: UROLOGY | Age: 67
End: 2022-08-01

## 2022-08-01 ENCOUNTER — TELEPHONE (OUTPATIENT)
Dept: UROLOGY | Age: 67
End: 2022-08-01

## 2022-08-01 RX ORDER — IPRATROPIUM BROMIDE AND ALBUTEROL SULFATE 2.5; .5 MG/3ML; MG/3ML
1 SOLUTION RESPIRATORY (INHALATION)
Status: CANCELLED | OUTPATIENT
Start: 2022-08-04 | End: 2022-08-04

## 2022-08-01 RX ORDER — SODIUM CHLORIDE 9 MG/ML
INJECTION, SOLUTION INTRAVENOUS CONTINUOUS
Status: CANCELLED | OUTPATIENT
Start: 2022-08-04

## 2022-08-01 RX ORDER — IPRATROPIUM BROMIDE AND ALBUTEROL SULFATE 2.5; .5 MG/3ML; MG/3ML
1 SOLUTION RESPIRATORY (INHALATION)
Status: ACTIVE | OUTPATIENT
Start: 2022-08-01 | End: 2022-08-01

## 2022-08-01 NOTE — TELEPHONE ENCOUNTER
Surgery instructions hand delivered to [facility]; fax would not go through. Handed directly to Aime Blankenship in Administration.

## 2022-08-01 NOTE — PROGRESS NOTES
Called Dr Rhea Crandall office in regards to abnormal labs CBC and potassium level of 3.3. Asked if can order breathing treatment PRN on day of surgery if needed.

## 2022-08-01 NOTE — PROGRESS NOTES
Dr Fede Guevara office called back order a potassium day of surgery and ok with breathing tx. Order placed.

## 2022-08-01 NOTE — PROGRESS NOTES
Spoke to Maria Esther at [address]    Maria Esther nurse states pt has MS. Unable to lift himself. Arrival time:0800  Directions given:they are aware to be at [facility] 2nd floor at 0800  Who will be transporting? unsure  Who will be coming with patient? No one   Need to have someone with patient to sign post-op instruction sheet if MAC or    General anesthesia  NPO: take heart and BP medications am of surgery with small sip of water  Is patient oriented? A&O x3  Does patient have POA? They checked no poa   If patient has POA need to bring POA papers  Is patient ambulatory? Zahira lift     Does patient use assistive devices? Can't lift him  Is patient non-weight bearing? none   How many people does it take to move patient? 2    Height She is faxing this with [employer] is faxing this with [facility]  Fax: [facility], allergy list, medical/surgical history    General instructions:  NPO after midnight  Bring insurance info and drivers license  Wear comfortable clean clothing  Do not bring jewelry   Shower night before and morning of surgery with a liquid antibacterial soap  Follow all instructions given by your physician     No covid test needs to be done to go back to nursing home.      Maria Esther nurse is faxing [facility]

## 2022-08-02 RX ORDER — ASCORBIC ACID 500 MG
500 TABLET ORAL 2 TIMES DAILY
COMMUNITY

## 2022-08-02 RX ORDER — ERTAPENEM 1 G/1
1000 INJECTION, POWDER, LYOPHILIZED, FOR SOLUTION INTRAMUSCULAR; INTRAVENOUS EVERY 24 HOURS
Status: ON HOLD | COMMUNITY
End: 2022-08-09 | Stop reason: HOSPADM

## 2022-08-04 ENCOUNTER — ANESTHESIA EVENT (OUTPATIENT)
Dept: OPERATING ROOM | Age: 67
End: 2022-08-04
Payer: COMMERCIAL

## 2022-08-04 ENCOUNTER — HOSPITAL ENCOUNTER (OUTPATIENT)
Age: 67
Setting detail: OUTPATIENT SURGERY
Discharge: HOME | End: 2022-08-04
Attending: UROLOGY | Admitting: UROLOGY
Payer: COMMERCIAL

## 2022-08-04 ENCOUNTER — ANESTHESIA (OUTPATIENT)
Dept: OPERATING ROOM | Age: 67
End: 2022-08-04
Payer: COMMERCIAL

## 2022-08-04 VITALS
HEART RATE: 83 BPM | WEIGHT: 194.4 LBS | DIASTOLIC BLOOD PRESSURE: 59 MMHG | BODY MASS INDEX: 30.45 KG/M2 | TEMPERATURE: 96.6 F | OXYGEN SATURATION: 97 % | SYSTOLIC BLOOD PRESSURE: 112 MMHG | RESPIRATION RATE: 16 BRPM

## 2022-08-04 LAB — POTASSIUM SERPL-SCNC: 4.3 MEQ/L (ref 3.5–5.2)

## 2022-08-04 PROCEDURE — 6370000000 HC RX 637 (ALT 250 FOR IP): Performed by: UROLOGY

## 2022-08-04 PROCEDURE — 6360000002 HC RX W HCPCS: Performed by: NURSE ANESTHETIST, CERTIFIED REGISTERED

## 2022-08-04 PROCEDURE — 7100000001 HC PACU RECOVERY - ADDTL 15 MIN: Performed by: UROLOGY

## 2022-08-04 PROCEDURE — 3600000013 HC SURGERY LEVEL 3 ADDTL 15MIN: Performed by: UROLOGY

## 2022-08-04 PROCEDURE — 84132 ASSAY OF SERUM POTASSIUM: CPT

## 2022-08-04 PROCEDURE — 2709999900 HC NON-CHARGEABLE SUPPLY: Performed by: UROLOGY

## 2022-08-04 PROCEDURE — 7100000000 HC PACU RECOVERY - FIRST 15 MIN: Performed by: UROLOGY

## 2022-08-04 PROCEDURE — C2617 STENT, NON-COR, TEM W/O DEL: HCPCS | Performed by: UROLOGY

## 2022-08-04 PROCEDURE — 3600000003 HC SURGERY LEVEL 3 BASE: Performed by: UROLOGY

## 2022-08-04 PROCEDURE — C1758 CATHETER, URETERAL: HCPCS | Performed by: UROLOGY

## 2022-08-04 PROCEDURE — 2500000003 HC RX 250 WO HCPCS: Performed by: NURSE ANESTHETIST, CERTIFIED REGISTERED

## 2022-08-04 PROCEDURE — 7100000010 HC PHASE II RECOVERY - FIRST 15 MIN: Performed by: UROLOGY

## 2022-08-04 PROCEDURE — 3700000000 HC ANESTHESIA ATTENDED CARE: Performed by: UROLOGY

## 2022-08-04 PROCEDURE — 6360000002 HC RX W HCPCS: Performed by: UROLOGY

## 2022-08-04 PROCEDURE — 3700000001 HC ADD 15 MINUTES (ANESTHESIA): Performed by: UROLOGY

## 2022-08-04 PROCEDURE — 2720000010 HC SURG SUPPLY STERILE: Performed by: UROLOGY

## 2022-08-04 PROCEDURE — 36415 COLL VENOUS BLD VENIPUNCTURE: CPT

## 2022-08-04 PROCEDURE — 94640 AIRWAY INHALATION TREATMENT: CPT

## 2022-08-04 PROCEDURE — 7100000011 HC PHASE II RECOVERY - ADDTL 15 MIN: Performed by: UROLOGY

## 2022-08-04 PROCEDURE — 2580000003 HC RX 258: Performed by: UROLOGY

## 2022-08-04 PROCEDURE — C1769 GUIDE WIRE: HCPCS | Performed by: UROLOGY

## 2022-08-04 DEVICE — URETERAL STENT
Type: IMPLANTABLE DEVICE | Site: PENIS | Status: FUNCTIONAL
Brand: PERCUFLEX™ PLUS

## 2022-08-04 RX ORDER — EPHEDRINE SULFATE/0.9% NACL/PF 50 MG/5 ML
SYRINGE (ML) INTRAVENOUS PRN
Status: DISCONTINUED | OUTPATIENT
Start: 2022-08-04 | End: 2022-08-04 | Stop reason: SDUPTHER

## 2022-08-04 RX ORDER — PROPOFOL 10 MG/ML
INJECTION, EMULSION INTRAVENOUS PRN
Status: DISCONTINUED | OUTPATIENT
Start: 2022-08-04 | End: 2022-08-04 | Stop reason: SDUPTHER

## 2022-08-04 RX ORDER — IPRATROPIUM BROMIDE AND ALBUTEROL SULFATE 2.5; .5 MG/3ML; MG/3ML
1 SOLUTION RESPIRATORY (INHALATION) ONCE
Status: COMPLETED | OUTPATIENT
Start: 2022-08-04 | End: 2022-08-04

## 2022-08-04 RX ORDER — ONDANSETRON 2 MG/ML
INJECTION INTRAMUSCULAR; INTRAVENOUS PRN
Status: DISCONTINUED | OUTPATIENT
Start: 2022-08-04 | End: 2022-08-04 | Stop reason: SDUPTHER

## 2022-08-04 RX ORDER — SODIUM CHLORIDE 9 MG/ML
INJECTION, SOLUTION INTRAVENOUS CONTINUOUS
Status: DISCONTINUED | OUTPATIENT
Start: 2022-08-04 | End: 2022-08-04 | Stop reason: HOSPADM

## 2022-08-04 RX ADMIN — Medication 15 MG: at 10:11

## 2022-08-04 RX ADMIN — PROPOFOL 200 MG: 10 INJECTION, EMULSION INTRAVENOUS at 10:03

## 2022-08-04 RX ADMIN — IPRATROPIUM BROMIDE AND ALBUTEROL SULFATE 1 AMPULE: .5; 3 SOLUTION RESPIRATORY (INHALATION) at 09:21

## 2022-08-04 RX ADMIN — ONDANSETRON 4 MG: 2 INJECTION INTRAMUSCULAR; INTRAVENOUS at 10:09

## 2022-08-04 RX ADMIN — SODIUM CHLORIDE: 9 INJECTION, SOLUTION INTRAVENOUS at 09:01

## 2022-08-04 RX ADMIN — PHENYLEPHRINE HYDROCHLORIDE 200 MCG: 10 INJECTION INTRAVENOUS at 10:09

## 2022-08-04 RX ADMIN — Medication 10 MG: at 10:28

## 2022-08-04 RX ADMIN — CEFAZOLIN 2000 MG: 10 INJECTION, POWDER, FOR SOLUTION INTRAVENOUS at 10:09

## 2022-08-04 RX ADMIN — Medication 10 MG: at 10:24

## 2022-08-04 RX ADMIN — Medication 15 MG: at 10:15

## 2022-08-04 RX ADMIN — PHENYLEPHRINE HYDROCHLORIDE 100 MCG: 10 INJECTION INTRAVENOUS at 10:07

## 2022-08-04 ASSESSMENT — PAIN SCALES - GENERAL
PAINLEVEL_OUTOF10: 0
PAINLEVEL_OUTOF10: 0

## 2022-08-04 ASSESSMENT — PAIN - FUNCTIONAL ASSESSMENT: PAIN_FUNCTIONAL_ASSESSMENT: 0-10

## 2022-08-04 NOTE — DISCHARGE INSTRUCTIONS
Pt ok to discharge home in good condition  No heavy lifting, >10 lbs for today  Pt should avoid strenuous activity for today  Pt should walk moderately at home  Pt ok to shower   Pt may resume diet as tolerated  Pt should take Rx as directed  No driving while on narcotics  Please call attending physician or hospital  with questions  Call or Present to ED if fever (> 101F), intractable nausea vomiting or pain.   Rx in chart    Pt should follow up with Mague Chow MD, in 6 weeks, call to confirm appointment      Pull jeffers from urethra and stent in 7 days

## 2022-08-04 NOTE — PROGRESS NOTES
1107 Pt arrives to recovery responsive to verbal stimulation. Pt respirations are unlabored on 6 L nasal canula with nasal airway in place. Pt VSS  1115 Pt nasal airway removed at this time. Pt VSS, respirations are unlabored. Pt denies any pain. 1127 Pt status remains unchanged at this time.   1137 Pt meets criteria for discharge from recovery at this time

## 2022-08-04 NOTE — ANESTHESIA PRE PROCEDURE
Department of Anesthesiology  Preprocedure Note       Name:  Girish Power   Age:  74 y.o.  :  1955                                          MRN:  [id number]         Date:  2022      Surgeon: Marcia Dale):  Digna Gonsalez MD    Procedure: Procedure(s):  CYSTOSCOPY, LEFT URETEROSCOPY, LASER LITHOTRIPSY, BASKET RETRIEVAL OF STONE FRAGMENTS, POSS LEFT URETERAL STENT EXCHANGE    Medications prior to admission:   Prior to Admission medications    Medication Sig Start Date End Date Taking? Authorizing Provider   vitamin C (ASCORBIC ACID) 500 MG tablet Take 500 mg by mouth in the morning and 500 mg before bedtime. Historical Provider, MD   ertapenem [facility]) 1 GM injection Inject 1,000 mg into the muscle every 24 hours For 4 days start 22    Historical Provider, MD   potassium chloride (KLOR-CON M) 20 MEQ extended release tablet Take 2 tablets by mouth in the morning and 2 tablets before bedtime. 22   Kim Gomez PA-C   carvedilol (COREG) 12.5 MG tablet Take 1 tablet by mouth in the morning and 1 tablet in the evening. Take with meals. 22   Roslyn Douglas MD   amLODIPine (NORVASC) 10 MG tablet Take 1 tablet by mouth in the morning. 22   Roslyn Douglas MD   melatonin 3 MG TABS tablet Take 1 tablet by mouth nightly as needed (when unable to sleep)  Patient not taking: Reported on 2022   Roslyn Douglas MD   ondansetron (ZOFRAN) 4 MG tablet Take 4 mg by mouth every 8 hours as needed for Nausea or Vomiting  Patient not taking: Reported on 2022    Historical Provider, MD   carbamide peroxide (DEBROX) 6.5 % otic solution 5 drops 2 times daily Instill 5 drops in both ears two times daily for ear wax use for 4 days.     Historical Provider, MD   vitamin E 400 UNIT capsule Take 400 Units by mouth daily    Historical Provider, MD   aspirin 81 MG EC tablet Take 81 mg by mouth daily    Historical Provider, MD   oxybutynin (DITROPAN-XL) 5 MG extended release tablet Take 5 mg by mouth in the morning and at bedtime    Historical Provider, MD   gentamicin (GARAMYCIN) 0.1 % ointment Apply topically daily Apply to wound bed    Historical Provider, MD   erythromycin (ROMYCIN) 5 MG/GM ophthalmic ointment Place into the right eye nightly (0.25 ribbon to right eye)    Historical Provider, MD   Ascorbic Acid (VITAMIN C ER PO) Take 500 mg by mouth in the morning and at bedtime     Historical Provider, MD   baclofen (LIORESAL) 10 MG tablet Take 10 mg by mouth 3 times daily    Historical Provider, MD   gabapentin (NEURONTIN) 600 MG tablet Take 600 mg by mouth 3 times daily. Historical Provider, MD   acetaminophen (TYLENOL) 325 MG tablet Take 650 mg by mouth every 4 hours as needed for Pain or Fever    Historical Provider, MD   LACTOBACILLUS PO Take 1 capsule by mouth in the morning and at bedtime     Historical Provider, MD   sodium chloride 1 g tablet Take 1 tablet by mouth 2 times daily (with meals) 6/20/19   Kieran Carmona MD   metoprolol tartrate (LOPRESSOR) 25 MG tablet Take 1 tablet by mouth 2 times daily 6/20/19 7/26/22  Kieran Carmona MD   potassium chloride (KLOR-CON M) 20 MEQ TBCR extended release tablet Take 2 tablets by mouth daily 6/21/19 7/27/22  Kieran Carmona MD   calcium-vitamin D (Ava Concepcion) 500-200 MG-UNIT per tablet Take 1 tablet by mouth 2 times daily 11/16/18   Barbara Bourgeois MD   famotidine (PEPCID) 20 MG tablet Take 1 tablet by mouth 2 times daily 6/14/18   Roz Knott MD   docusate sodium (COLACE) 100 MG capsule Take 100 mg by mouth daily     Historical Provider, MD   tiZANidine (ZANAFLEX) 2 MG tablet Take 2 mg by mouth 3 times daily 0600;1400;2200 12/15/16   Historical Provider, MD   Multiple Vitamin (MULTIVITAMIN) tablet Take 1 tablet by mouth daily 3/28/16   Swapnil Suarez MD   vitamin A 05118 UNITS capsule Take 2 capsules by mouth daily 3/28/16   Swapnil Suarez MD       Current medications:    No current facility-administered medications for this visit.      No current outpatient medications on file. Facility-Administered Medications Ordered in Other Visits   Medication Dose Route Frequency Provider Last Rate Last Admin    0.9 % sodium chloride infusion   IntraVENous Continuous Kisha Espinoza  mL/hr at 08/04/22 0901 Restarted at 08/04/22 0957    phenylephrine (JODY-SYNEPHRINE) injection   IntraVENous PRN Doneen Francisco J, APRN - CRNA   200 mcg at 08/04/22 1009    ePHEDrine injection   IntraVENous PRN Doneen Francisco J, APRN - CRNA   10 mg at 08/04/22 1028    propofol injection   IntraVENous PRN Doneen Francisco J, APRN - CRNA   200 mg at 08/04/22 1003    ondansetron (ZOFRAN) injection   IntraVENous PRN Doneen Francisco J, APRN - CRNA   4 mg at 08/04/22 1009       Allergies:  No Known Allergies    Problem List:    Patient Active Problem List   Diagnosis Code    Neurogenic bladder N31.9    Multiple sclerosis ([address] G35    MS (multiple sclerosis) ([address] G35    Urinary retention R33.9    Chronic suprapubic catheter ([address] Z93.59    Cystostomy malfunction ([address] N99.512    Decubitus ulcer of coccyx L89.159    Decubitus ulcer, stage 3 ([address] L89.93    Malnutrition ([address] E46    Decubitus ulcer of right perineal ischial region, stage 3 ([address] L89.313    Pulmonary embolism on left ([address] I26.99    Acute metabolic encephalopathy L54.08    Speech disturbance R47.9    Infected decubitus ulcer, stage I L89.91, L08.9    Infected decubitus ulcer, stage III ([address] L89.93, L08.9    Wound infection T14. 8XXA, L08.9    Elevated INR R79.1    Chronic osteomyelitis, pelvis, right (HCC) M86.651    Osteomyelitis (HCC) M86.9    Urinary tract infection associated with indwelling urethral catheter (HCC) T83.511A, N39.0    Abnormal liver function test R94.5    Chronic depression F32. A    Essential hypertension I10    History of pulmonary embolism Z86.711    Other dysphagia R13.19    Pressure injury of skin of back L89.109    Sepsis secondary to UTI ([facility]) A41.9, N39.0    Decubitus ulcer L89.90  Troponin level elevated R77.8    Anemia D64.9    Sepsis due to methicillin resistant Staphylococcus aureus (MRSA) (HCC) A41.02    Sepsis due to urinary tract infection (HCC) A41.9, N39.0    AMS (altered mental status) R41.82    Gross hematuria R31.0    Class 1 obesity due to excess calories without serious comorbidity with body mass index (BMI) of 31.0 to 31.9 in adult E66.09, Z68.31    Colostomy in place [facility]) Z93.3    Current use of long term anticoagulation Z79.01    Obstructive uropathy H75.2    Complicated urinary tract infection N39.0    Hypokalemia E87.6    Hypomagnesemia E83.42       Past Medical History:        Diagnosis Date    Dysphagia     oropharyngeal    History of pulmonary embolism     History of urinary tract infection     Hx of blood clots     Pulmonary embolis    Hypertension     Major depressive disorder, single episode     [facility]) [facility]     Neurogenic bladder 02/2012    Dr. Shine Monte placed cather    Osteomyelitis [facility])     UTI (urinary tract infection)        Past Surgical History:        Procedure Laterality Date    ANKLE SURGERY  1996    broken ankle    BLADDER SURGERY  2-    Suprapubic catheter placement    COLONOSCOPY      CYSTO/URETERO/PYELOSCOPY, CALCULUS TX Left 6/19/2019    CYSTOSCOPY, LEFT STENT insertion, bladder irragation with bladder stones performed by Brian Mejia MD at [address] [place] [address] N/A 7/8/2019    CYSTO, LEFT URETERAL STENT REMOVAL, LEFT URETEROSCOPY, LASER LITHOTRIPSY, BASKET RETRIEVAL OF STONE FRAGMENTS performed by Brian Mejia MD at [address] [place] [address] N/A 2/26/2021    CYSTOSCOPY, CYSTOLITHOLAPAXY, SUPRAPUBIC CATHETER EXCHANGE performed by Tanna Hansen MD at [address] [place] [address] 6/15/2022    CYSTOSCOPY performed by Tanna Hansen MD at [address] [place] [address] IR NEPHROSTOMY CATHETER PLACEMENT  7/25/2022    IR NEPHROSTOMY CATHETER PLACEMENT 7/25/2022 [facility] SPECIAL PROCEDURES    AZ LAP,SURG,COLECTOMY,W/END COLOST & CLOSUR N/A 2018    ROBOT DIVERTING COLOSTOMY performed by Manon Duane, MD at [facility]       Social History:    Social History     Tobacco Use    Smoking status: Former     Types: Cigarettes     Quit date: 1982     Years since quittin.6    Smokeless tobacco: Former   Substance Use Topics    Alcohol use: No     Comment: \"quit alcohol a few years ago\"                                Counseling given: Not Answered      Vital Signs (Current): There were no vitals filed for this visit.                                            BP Readings from Last 3 Encounters:   22 111/67   22 132/67   22 110/78       NPO Status:                                                                                 BMI:   Wt Readings from Last 3 Encounters:   22 194 lb 6.4 oz (88.2 kg)   22 201 lb (91.2 kg)   22 201 lb (91.2 kg)     There is no height or weight on file to calculate BMI.    CBC:   Lab Results   Component Value Date/Time    WBC 5.1 2022 05:33 AM    RBC 4.28 2022 05:33 AM    RBC 4.20 2012 09:45 AM    HGB 12.5 2022 05:33 AM    HCT 39.8 2022 05:33 AM    MCV 93.0 2022 05:33 AM    RDW 16.6 2019 04:40 AM     2022 05:33 AM       CMP:   Lab Results   Component Value Date/Time     2022 05:33 AM    K 4.3 2022 08:39 AM    K 3.3 2022 05:33 AM     2022 05:33 AM    CO2 25 2022 05:33 AM    BUN 12 2022 05:33 AM    CREATININE 0.2 2022 05:33 AM    AGRATIO 0.7 2019 04:40 AM    LABGLOM >90 2022 05:33 AM    GLUCOSE 97 2022 05:33 AM    GLUCOSE 105 2019 04:12 AM    PROT 7.4 2022 09:40 AM    CALCIUM 8.8 2022 05:33 AM    BILITOT 0.4 2022 09:40 AM    ALKPHOS 137 2022 09:40 AM    AST 21 2022 09:40 AM    ALT 15 2022 09:40 AM       POC Tests: No results for input(s): POCGLU, POCNA, POCK, POCCL, POCBUN, POCHEMO, POCHCT in the last 72 hours. Coags:   Lab Results   Component Value Date/Time    INR 1.15 07/25/2022 01:29 AM    APTT 31.5 07/25/2022 01:29 AM       HCG (If Applicable): No results found for: PREGTESTUR, PREGSERUM, HCG, HCGQUANT     ABGs: No results found for: PHART, PO2ART, IIS7CCF, ZNL2LON, BEART, K8TAAATE     Type & Screen (If Applicable):  Lab Results   Component Value Date    LABRH POS 06/12/2018       Drug/Infectious Status (If Applicable):  No results found for: HIV, HEPCAB    COVID-19 Screening (If Applicable):   Lab Results   Component Value Date/Time    COVID19 NOT  DETECTED 07/20/2022 09:33 AM           Anesthesia Evaluation  Patient summary reviewed  Airway: Mallampati: III  TM distance: >3 FB   Neck ROM: full  Mouth opening: > = 3 FB   Dental:    (+) edentulous      Pulmonary:normal exam                               Cardiovascular:    (+) hypertension:,       ECG reviewed                        Neuro/Psych:   (+) neuromuscular disease: multiple sclerosis, psychiatric history:            GI/Hepatic/Renal:             Endo/Other:                     Abdominal:   (+) obese,           Vascular: Other Findings:             Anesthesia Plan      MAC     ASA 3       Induction: intravenous. MIPS: Postoperative opioids intended and Prophylactic antiemetics administered. Anesthetic plan and risks discussed with patient.       Plan discussed with CRNA and surgical team.                    Marisa Fernando MD   8/4/2022

## 2022-08-04 NOTE — H&P
Tyrese Painter MD  History and Physical    Patient:  Erna Smith  MRN: [id number]  YOB: 1955    HISTORY OF PRESENT ILLNESS:     The patient is a 74 y.o. male who presents with left ureteral stone. Here for procedure. Patient's old records, notes and chart reviewed and summarized above. Tyrese Painter MD independently reviewed the images and verified the radiology reports from:    CT ABDOMEN PELVIS WO CONTRAST    Result Date: 7/20/2022  Exam: CT abdomen and pelvis without IV contrast. Comparison: CT,SR - CT ABDOMEN PELVIS WO CONTRAST - 12/28/2020 08:11 AM EST Findings: Small left pleural effusion. No free air. No solid liver mass. Cholecystectomy. No clinically significant biliary ductal dilation. No splenomegaly or suspicious splenic lesions. No solid or cystic pancreatic mass. No pancreatic ductal dilation. No acute inflammatory changes. Adrenal glands without nodule. No suspicious renal mass. Moderate left hydronephrosis secondary to 9 x 8 mm proximal ureteral stone. Punctate bilateral nonobstructing renal stones. Bladder decompressed by suprapubic catheter. No bowel obstruction. No mucosal thickening. Stable partial left colectomy with left lower quadrant colostomy. No evidence for acute appendicitis. No adenopathy. No abdominal aortic aneurysm. No suspicious pelvic masses. No acute soft tissue pathology. No acute osseous pathology. Degenerative changes. Ankylosing spondylitis. Bilateral femoral head AVN. Large obstructing proximal left ureteral stone. This document has been electronically signed by: Sweetie Philip MD on 07/20/2022 11:48 PM All CTs at this facility use dose modulation techniques and iterative reconstructions, and/or weight-based dosing when appropriate to reduce radiation to a low as reasonably achievable. CT HEAD WO CONTRAST    Result Date: 7/20/2022  PROCEDURE: NONCONTRAST CT BRAIN CLINICAL INFORMATION: altered mental status. History of multiple sclerosis. Hypertension     Major depressive disorder, single episode     MS (multiple sclerosis) ([facility] [address]     Neurogenic bladder 02/2012    Dr. Anjali Lopez placed cather    Osteomyelitis [facility])     UTI (urinary tract infection)        Past Surgical History:    Past Surgical History:   Procedure Laterality Date    ANKLE SURGERY  1996    broken ankle    BLADDER SURGERY  2-    Suprapubic catheter placement    COLONOSCOPY      CYSTO/URETERO/PYELOSCOPY, CALCULUS TX Left 6/19/2019    CYSTOSCOPY, LEFT STENT insertion, bladder irragation with bladder stones performed by Markus Mendez MD at [address] [place] [address] N/A 7/8/2019    CYSTO, LEFT URETERAL STENT REMOVAL, LEFT URETEROSCOPY, LASER LITHOTRIPSY, BASKET RETRIEVAL OF STONE FRAGMENTS performed by Markus Mendez MD at [address] [place] [address] 2/26/2021    CYSTOSCOPY, CYSTOLITHOLAPAXY, SUPRAPUBIC CATHETER EXCHANGE performed by Amanda Triana MD at [facility] 19 Left 6/15/2022    CYSTOSCOPY performed by Amanda Triana MD at [address] [place] [address]  7/25/2022    IR NEPHROSTOMY CATHETER PLACEMENT 7/25/2022 [facility] SPECIAL PROCEDURES    NC LAP,SURG,COLECTOMY,W/END COLOST & CLOSUR N/A 6/13/2018    ROBOT DIVERTING COLOSTOMY performed by Sosa Roberts MD at [address] [place] [address]  child       Medications Prior to Admission:    Prior to Admission medications    Medication Sig Start Date End Date Taking? Authorizing Provider   vitamin C (ASCORBIC ACID) 500 MG tablet Take 500 mg by mouth in the morning and 500 mg before bedtime. Yes Historical Provider, MD   ertapenem (INVANZ) 1 GM injection Inject 1,000 mg into the muscle every 24 hours For 4 days start 7-28-22   Yes Historical Provider, MD   potassium chloride (KLOR-CON M) 20 MEQ extended release tablet Take 2 tablets by mouth in the morning and 2 tablets before bedtime.  7/27/22   Kim Gomez PA-C   carvedilol (COREG) 12.5 MG tablet Take 1 tablet by mouth in the morning and 1 tablet in the evening. Take with meals. 7/26/22   Roseanne Millan MD   amLODIPine (NORVASC) 10 MG tablet Take 1 tablet by mouth in the morning. 7/27/22   Roseanne Millan MD   melatonin 3 MG TABS tablet Take 1 tablet by mouth nightly as needed (when unable to sleep)  Patient not taking: Reported on 8/2/2022 7/26/22   Roseanne Millan MD   ondansetron (ZOFRAN) 4 MG tablet Take 4 mg by mouth every 8 hours as needed for Nausea or Vomiting  Patient not taking: Reported on 8/2/2022    Historical Provider, MD   carbamide peroxide (DEBROX) 6.5 % otic solution 5 drops 2 times daily Instill 5 drops in both ears two times daily for ear wax use for 4 days. Historical Provider, MD   vitamin E 400 UNIT capsule Take 400 Units by mouth daily    Historical Provider, MD   aspirin 81 MG EC tablet Take 81 mg by mouth daily    Historical Provider, MD   oxybutynin (DITROPAN-XL) 5 MG extended release tablet Take 5 mg by mouth in the morning and at bedtime    Historical Provider, MD   gentamicin (GARAMYCIN) 0.1 % ointment Apply topically daily Apply to wound bed    Historical Provider, MD   erythromycin (ROMYCIN) 5 MG/GM ophthalmic ointment Place into the right eye nightly (0.25 ribbon to right eye)    Historical Provider, MD   Ascorbic Acid (VITAMIN C ER PO) Take 500 mg by mouth in the morning and at bedtime     Historical Provider, MD   baclofen (LIORESAL) 10 MG tablet Take 10 mg by mouth 3 times daily    Historical Provider, MD   gabapentin (NEURONTIN) 600 MG tablet Take 600 mg by mouth 3 times daily.      Historical Provider, MD   acetaminophen (TYLENOL) 325 MG tablet Take 650 mg by mouth every 4 hours as needed for Pain or Fever  Patient not taking: Reported on 8/2/2022    Historical Provider, MD   LACTOBACILLUS PO Take 1 capsule by mouth in the morning and at bedtime     Historical Provider, MD   sodium chloride 1 g tablet Take 1 tablet by mouth 2 times daily (with meals) 6/20/19   Annemarie Lee MD   metoprolol tartrate (LOPRESSOR) 25 MG tablet Take 1 tablet by mouth 2 times daily 19  Nadira Potts MD   potassium chloride (KLOR-CON M) 20 MEQ TBCR extended release tablet Take 2 tablets by mouth daily 19  Nadira Potts MD   calcium-vitamin D (OSCAL-500) 500-200 MG-UNIT per tablet Take 1 tablet by mouth 2 times daily 18   Aaliyah Barbosa MD   famotidine (PEPCID) 20 MG tablet Take 1 tablet by mouth 2 times daily 18   Vinita Belcher MD   docusate sodium (COLACE) 100 MG capsule Take 100 mg by mouth daily     Historical Provider, MD   tiZANidine (ZANAFLEX) 2 MG tablet Take 2 mg by mouth 3 times daily 0600;1400;2200 12/15/16   Historical Provider, MD   Multiple Vitamin (MULTIVITAMIN) tablet Take 1 tablet by mouth daily 3/28/16   Bud Foss MD   vitamin A 71421 UNITS capsule Take 2 capsules by mouth daily 3/28/16   Bud Foss MD       Allergies:  Patient has no known allergies.     Social History:    Social History     Socioeconomic History    Marital status:      Spouse name: Beni Constantino    Number of children: 2    Years of education: Not on file    Highest education level: Not on file   Occupational History    Not on file   Tobacco Use    Smoking status: Former     Types: Cigarettes     Quit date: 1982     Years since quittin.6    Smokeless tobacco: Former   Vaping Use    Vaping Use: Never used   Substance and Sexual Activity    Alcohol use: No     Comment: \"quit alcohol a few years ago\"    Drug use: No    Sexual activity: Yes     Partners: Female   Other Topics Concern    Not on file   Social History Narrative    Not on file     Social Determinants of Health     Financial Resource Strain: Not on file   Food Insecurity: Not on file   Transportation Needs: Not on file   Physical Activity: Not on file   Stress: Not on file   Social Connections: Not on file   Intimate Partner Violence: Not on file   Housing Stability: Not on file       Family History:    Family History   Problem Relation Age of Onset Cancer Mother         Breast    Heart Disease Father 48    Arthritis Sister     Heart Disease Paternal Grandmother 48       REVIEW OF SYSTEMS:  Constitutional: negative  Eyes: negative  Respiratory: negative  Cardiovascular: negative  Gastrointestinal: negative  Genitourinary: no acute issues  Musculoskeletal: negative  Skin: negative   Neurological: negative  Hematological/Lymphatic: negative  Psychological: negative    Physical Exam:      No data found. Constitutional: Patient in no acute distress; Neuro: alert and oriented to person place and time. Psych: Mood and affect normal.  Skin: Normal  Lungs: Respiratory effort normal, CTA  Cardiovascular:  Normal peripheral pulses; no murmur. Normal rhythm  Abdomen: Soft, non-tender, non-distended with no CVA, flank pain, hepatosplenomegaly or hernia. Kidneys normal.  Bladder non-tender and not distended. LABS:   No results for input(s): WBC, HGB, HCT, MCV, PLT in the last 72 hours. No results for input(s): NA, K, CL, CO2, PHOS, BUN, CREATININE, CA in the last 72 hours. Lab Results   Component Value Date    PSA 2.66 (H) 01/20/2012         Urinalysis: No results for input(s): COLORU, PHUR, LABCAST, WBCUA, RBCUA, MUCUS, TRICHOMONAS, YEAST, BACTERIA, CLARITYU, SPECGRAV, LEUKOCYTESUR, UROBILINOGEN, [place] in the last 72 hours.     Invalid input(s): NITRATE, GLUCOSEUKETONESUAMORPHOUS     -----------------------------------------------------------------      Assessment and Plan     Impression:    Patient Active Problem List   Diagnosis    Neurogenic bladder    Multiple sclerosis ([address]    MS (multiple sclerosis) ([address]    Urinary retention    Chronic suprapubic catheter ([address]    Cystostomy malfunction ([address]    Decubitus ulcer of coccyx    Decubitus ulcer, stage 3 ([address]    Malnutrition ([address]    Decubitus ulcer of right perineal ischial region, stage 3 ([address]    Pulmonary embolism on left ([address]    Acute metabolic encephalopathy    Speech disturbance Infected decubitus ulcer, stage I    Infected decubitus ulcer, stage III (HCC)    Wound infection    Elevated INR    Chronic osteomyelitis, pelvis, right (HCC)    Osteomyelitis (HCC)    Urinary tract infection associated with indwelling urethral catheter (HCC)    Abnormal liver function test    Chronic depression    Essential hypertension    History of pulmonary embolism    Other dysphagia    Pressure injury of skin of back    Sepsis secondary to UTI (HCC)    Decubitus ulcer    Troponin level elevated    Anemia    Sepsis due to methicillin resistant Staphylococcus aureus (MRSA) (HCC)    Sepsis due to urinary tract infection (HCC)    AMS (altered mental status)    Gross hematuria    Class 1 obesity due to excess calories without serious comorbidity with body mass index (BMI) of 31.0 to 31.9 in adult    Colostomy in place [facility])    Current use of long term anticoagulation    Obstructive uropathy    Complicated urinary tract infection    Hypokalemia    Hypomagnesemia       Plan:     Consent obtained; cysto left flexible ureteroscopy in OR today    Howard Roach MD

## 2022-08-04 NOTE — ANESTHESIA POSTPROCEDURE EVALUATION
Saturation:  Stable    Cardiovascular:  Stable    Hydration:  Adequate    PONV:  Stable    Post-op Pain:  Adequate analgesia    Post-op Assessment:  No apparent anesthetic complications    Additional Follow-Up / Treatment / Comment:  None    Clara Walsh MD  August 4, 2022   12:38 PM

## 2022-08-04 NOTE — PROGRESS NOTES
Pt returned to [facility] room 16. Vitals and assessment as charted. 0.9 infusing, @400ml to count from PACU. Pt has pudding and pepsi. Pt and verbalized understanding of discharge criteria and call light use. Call light in reach.

## 2022-08-04 NOTE — PROGRESS NOTES
Patient in Same Day with no family present. Patient verified surgical and anesthesia consents. Patient arrived to OR with no hearing aides, dentures, jewelry, clothing or glasses.

## 2022-08-05 ENCOUNTER — APPOINTMENT (OUTPATIENT)
Dept: CT IMAGING | Age: 67
DRG: 698 | End: 2022-08-05
Payer: COMMERCIAL

## 2022-08-05 ENCOUNTER — APPOINTMENT (OUTPATIENT)
Dept: GENERAL RADIOLOGY | Age: 67
DRG: 698 | End: 2022-08-05
Payer: COMMERCIAL

## 2022-08-05 ENCOUNTER — HOSPITAL ENCOUNTER (INPATIENT)
Age: 67
LOS: 4 days | Discharge: NURSING, REHAB OR LONG-TERM CARE | DRG: 698 | End: 2022-08-09
Attending: INTERNAL MEDICINE | Admitting: INTERNAL MEDICINE
Payer: COMMERCIAL

## 2022-08-05 DIAGNOSIS — R09.02 HYPOXIA: Primary | ICD-10-CM

## 2022-08-05 DIAGNOSIS — J18.9 PNEUMONIA OF LOWER LOBE DUE TO INFECTIOUS ORGANISM, UNSPECIFIED LATERALITY: ICD-10-CM

## 2022-08-05 DIAGNOSIS — N20.0 KIDNEY STONE: Primary | ICD-10-CM

## 2022-08-05 PROBLEM — R06.02 SHORTNESS OF BREATH: Status: ACTIVE | Noted: 2022-08-05

## 2022-08-05 LAB
ALBUMIN SERPL-MCNC: 3.4 G/DL (ref 3.5–5.1)
ALP BLD-CCNC: 178 U/L (ref 38–126)
ALT SERPL-CCNC: 59 U/L (ref 11–66)
ANION GAP SERPL CALCULATED.3IONS-SCNC: 8 MEQ/L (ref 8–16)
AST SERPL-CCNC: 77 U/L (ref 5–40)
BACTERIA: ABNORMAL /HPF
BASOPHILS # BLD: 0.6 %
BASOPHILS ABSOLUTE: 0 THOU/MM3 (ref 0–0.1)
BILIRUB SERPL-MCNC: 0.4 MG/DL (ref 0.3–1.2)
BILIRUBIN DIRECT: < 0.2 MG/DL (ref 0–0.3)
BILIRUBIN URINE: NEGATIVE
BLOOD, URINE: ABNORMAL
BUN BLDV-MCNC: 26 MG/DL (ref 7–22)
CALCIUM SERPL-MCNC: 9.5 MG/DL (ref 8.5–10.5)
CASTS 2: ABNORMAL /LPF
CASTS UA: ABNORMAL /LPF
CHARACTER, URINE: ABNORMAL
CHLORIDE BLD-SCNC: 105 MEQ/L (ref 98–111)
CO2: 28 MEQ/L (ref 23–33)
COLOR: YELLOW
CREAT SERPL-MCNC: 0.4 MG/DL (ref 0.4–1.2)
CRYSTALS, UA: ABNORMAL
D-DIMER QUANTITATIVE: 1515 NG/ML FEU (ref 0–500)
EOSINOPHIL # BLD: 2.6 %
EOSINOPHILS ABSOLUTE: 0.2 THOU/MM3 (ref 0–0.4)
EPITHELIAL CELLS, UA: ABNORMAL /HPF
ERYTHROCYTE [DISTWIDTH] IN BLOOD BY AUTOMATED COUNT: 15.5 % (ref 11.5–14.5)
ERYTHROCYTE [DISTWIDTH] IN BLOOD BY AUTOMATED COUNT: 51.9 FL (ref 35–45)
FLU A ANTIGEN: NEGATIVE
FLU B ANTIGEN: NEGATIVE
GFR SERPL CREATININE-BSD FRML MDRD: > 90 ML/MIN/1.73M2
GLUCOSE BLD-MCNC: 132 MG/DL (ref 70–108)
GLUCOSE URINE: NEGATIVE MG/DL
HCT VFR BLD CALC: 39.7 % (ref 42–52)
HEMOGLOBIN: 12.6 GM/DL (ref 14–18)
IMMATURE GRANS (ABS): 0.03 THOU/MM3 (ref 0–0.07)
IMMATURE GRANULOCYTES: 0.4 %
KETONES, URINE: ABNORMAL
LEUKOCYTE ESTERASE, URINE: ABNORMAL
LIPASE: 18.9 U/L (ref 5.6–51.3)
LYMPHOCYTES # BLD: 14.9 %
LYMPHOCYTES ABSOLUTE: 1.2 THOU/MM3 (ref 1–4.8)
MCH RBC QN AUTO: 29.1 PG (ref 26–33)
MCHC RBC AUTO-ENTMCNC: 31.7 GM/DL (ref 32.2–35.5)
MCV RBC AUTO: 91.7 FL (ref 80–94)
MISCELLANEOUS 2: ABNORMAL
MONOCYTES # BLD: 9.3 %
MONOCYTES ABSOLUTE: 0.7 THOU/MM3 (ref 0.4–1.3)
NITRITE, URINE: NEGATIVE
NUCLEATED RED BLOOD CELLS: 0 /100 WBC
OSMOLALITY CALCULATION: 287.9 MOSMOL/KG (ref 275–300)
PH UA: 5.5 (ref 5–9)
PLATELET # BLD: 274 THOU/MM3 (ref 130–400)
PMV BLD AUTO: 8.6 FL (ref 9.4–12.4)
POTASSIUM REFLEX MAGNESIUM: 4.9 MEQ/L (ref 3.5–5.2)
PRO-BNP: 119.9 PG/ML (ref 0–900)
PROCALCITONIN: 7.97 NG/ML (ref 0.01–0.09)
PROTEIN UA: 100
RBC # BLD: 4.33 MILL/MM3 (ref 4.7–6.1)
RBC URINE: ABNORMAL /HPF
RENAL EPITHELIAL, UA: ABNORMAL
SARS-COV-2, NAAT: NOT DETECTED
SEG NEUTROPHILS: 72.2 %
SEGMENTED NEUTROPHILS ABSOLUTE COUNT: 5.8 THOU/MM3 (ref 1.8–7.7)
SODIUM BLD-SCNC: 141 MEQ/L (ref 135–145)
SPECIFIC GRAVITY, URINE: > 1.03 (ref 1–1.03)
TOTAL PROTEIN: 7.6 G/DL (ref 6.1–8)
TROPONIN T: 0.01 NG/ML
TROPONIN T: 0.02 NG/ML
UROBILINOGEN, URINE: 0.2 EU/DL (ref 0–1)
WBC # BLD: 8 THOU/MM3 (ref 4.8–10.8)
WBC UA: > 200 /HPF
YEAST: ABNORMAL

## 2022-08-05 PROCEDURE — 99223 1ST HOSP IP/OBS HIGH 75: CPT | Performed by: INTERNAL MEDICINE

## 2022-08-05 PROCEDURE — 80048 BASIC METABOLIC PNL TOTAL CA: CPT

## 2022-08-05 PROCEDURE — 6360000004 HC RX CONTRAST MEDICATION: Performed by: NURSE PRACTITIONER

## 2022-08-05 PROCEDURE — 6360000002 HC RX W HCPCS: Performed by: NURSE PRACTITIONER

## 2022-08-05 PROCEDURE — 71275 CT ANGIOGRAPHY CHEST: CPT

## 2022-08-05 PROCEDURE — 87186 SC STD MICRODIL/AGAR DIL: CPT

## 2022-08-05 PROCEDURE — 87635 SARS-COV-2 COVID-19 AMP PRB: CPT

## 2022-08-05 PROCEDURE — 36415 COLL VENOUS BLD VENIPUNCTURE: CPT

## 2022-08-05 PROCEDURE — 71045 X-RAY EXAM CHEST 1 VIEW: CPT

## 2022-08-05 PROCEDURE — 80076 HEPATIC FUNCTION PANEL: CPT

## 2022-08-05 PROCEDURE — 81001 URINALYSIS AUTO W/SCOPE: CPT

## 2022-08-05 PROCEDURE — 6370000000 HC RX 637 (ALT 250 FOR IP): Performed by: STUDENT IN AN ORGANIZED HEALTH CARE EDUCATION/TRAINING PROGRAM

## 2022-08-05 PROCEDURE — 87040 BLOOD CULTURE FOR BACTERIA: CPT

## 2022-08-05 PROCEDURE — 99285 EMERGENCY DEPT VISIT HI MDM: CPT

## 2022-08-05 PROCEDURE — 85025 COMPLETE CBC W/AUTO DIFF WBC: CPT

## 2022-08-05 PROCEDURE — 93005 ELECTROCARDIOGRAM TRACING: CPT | Performed by: NURSE PRACTITIONER

## 2022-08-05 PROCEDURE — 83880 ASSAY OF NATRIURETIC PEPTIDE: CPT

## 2022-08-05 PROCEDURE — 85379 FIBRIN DEGRADATION QUANT: CPT

## 2022-08-05 PROCEDURE — 83690 ASSAY OF LIPASE: CPT

## 2022-08-05 PROCEDURE — 6360000002 HC RX W HCPCS: Performed by: STUDENT IN AN ORGANIZED HEALTH CARE EDUCATION/TRAINING PROGRAM

## 2022-08-05 PROCEDURE — 87077 CULTURE AEROBIC IDENTIFY: CPT

## 2022-08-05 PROCEDURE — 87086 URINE CULTURE/COLONY COUNT: CPT

## 2022-08-05 PROCEDURE — 1200000000 HC SEMI PRIVATE

## 2022-08-05 PROCEDURE — 87804 INFLUENZA ASSAY W/OPTIC: CPT

## 2022-08-05 PROCEDURE — 96365 THER/PROPH/DIAG IV INF INIT: CPT

## 2022-08-05 PROCEDURE — 84145 PROCALCITONIN (PCT): CPT

## 2022-08-05 PROCEDURE — 2580000003 HC RX 258: Performed by: NURSE PRACTITIONER

## 2022-08-05 PROCEDURE — 2580000003 HC RX 258: Performed by: STUDENT IN AN ORGANIZED HEALTH CARE EDUCATION/TRAINING PROGRAM

## 2022-08-05 PROCEDURE — 84484 ASSAY OF TROPONIN QUANT: CPT

## 2022-08-05 RX ORDER — DOCUSATE SODIUM 100 MG/1
100 CAPSULE, LIQUID FILLED ORAL DAILY
Status: DISCONTINUED | OUTPATIENT
Start: 2022-08-06 | End: 2022-08-09 | Stop reason: HOSPADM

## 2022-08-05 RX ORDER — SODIUM CHLORIDE 0.9 % (FLUSH) 0.9 %
5-40 SYRINGE (ML) INJECTION PRN
Status: DISCONTINUED | OUTPATIENT
Start: 2022-08-05 | End: 2022-08-09 | Stop reason: HOSPADM

## 2022-08-05 RX ORDER — LEVOFLOXACIN 5 MG/ML
750 INJECTION, SOLUTION INTRAVENOUS ONCE
Status: COMPLETED | OUTPATIENT
Start: 2022-08-05 | End: 2022-08-05

## 2022-08-05 RX ORDER — ENOXAPARIN SODIUM 100 MG/ML
40 INJECTION SUBCUTANEOUS NIGHTLY
Status: DISCONTINUED | OUTPATIENT
Start: 2022-08-05 | End: 2022-08-05

## 2022-08-05 RX ORDER — GABAPENTIN 600 MG/1
600 TABLET ORAL 3 TIMES DAILY
Status: DISCONTINUED | OUTPATIENT
Start: 2022-08-05 | End: 2022-08-09 | Stop reason: HOSPADM

## 2022-08-05 RX ORDER — AMLODIPINE BESYLATE 10 MG/1
10 TABLET ORAL DAILY
Status: DISCONTINUED | OUTPATIENT
Start: 2022-08-05 | End: 2022-08-09 | Stop reason: HOSPADM

## 2022-08-05 RX ORDER — ASCORBIC ACID 500 MG
500 TABLET ORAL 2 TIMES DAILY
Status: DISCONTINUED | OUTPATIENT
Start: 2022-08-05 | End: 2022-08-09 | Stop reason: HOSPADM

## 2022-08-05 RX ORDER — MAGNESIUM SULFATE IN WATER 40 MG/ML
2000 INJECTION, SOLUTION INTRAVENOUS PRN
Status: DISCONTINUED | OUTPATIENT
Start: 2022-08-05 | End: 2022-08-09 | Stop reason: HOSPADM

## 2022-08-05 RX ORDER — FAMOTIDINE 20 MG/1
20 TABLET, FILM COATED ORAL 2 TIMES DAILY
Status: DISCONTINUED | OUTPATIENT
Start: 2022-08-05 | End: 2022-08-09 | Stop reason: HOSPADM

## 2022-08-05 RX ORDER — BACLOFEN 10 MG/1
10 TABLET ORAL 3 TIMES DAILY
Status: DISCONTINUED | OUTPATIENT
Start: 2022-08-05 | End: 2022-08-09 | Stop reason: HOSPADM

## 2022-08-05 RX ORDER — ASPIRIN 81 MG/1
81 TABLET ORAL DAILY
Status: DISCONTINUED | OUTPATIENT
Start: 2022-08-06 | End: 2022-08-09 | Stop reason: HOSPADM

## 2022-08-05 RX ORDER — ACETAMINOPHEN 325 MG/1
650 TABLET ORAL EVERY 6 HOURS PRN
Status: DISCONTINUED | OUTPATIENT
Start: 2022-08-05 | End: 2022-08-09 | Stop reason: HOSPADM

## 2022-08-05 RX ORDER — SODIUM CHLORIDE 9 MG/ML
INJECTION, SOLUTION INTRAVENOUS PRN
Status: DISCONTINUED | OUTPATIENT
Start: 2022-08-05 | End: 2022-08-09 | Stop reason: HOSPADM

## 2022-08-05 RX ORDER — POLYETHYLENE GLYCOL 3350 17 G/17G
17 POWDER, FOR SOLUTION ORAL DAILY PRN
Status: DISCONTINUED | OUTPATIENT
Start: 2022-08-05 | End: 2022-08-09 | Stop reason: HOSPADM

## 2022-08-05 RX ORDER — ACETAMINOPHEN 650 MG/1
650 SUPPOSITORY RECTAL EVERY 6 HOURS PRN
Status: DISCONTINUED | OUTPATIENT
Start: 2022-08-05 | End: 2022-08-09 | Stop reason: HOSPADM

## 2022-08-05 RX ORDER — ONDANSETRON 2 MG/ML
4 INJECTION INTRAMUSCULAR; INTRAVENOUS EVERY 6 HOURS PRN
Status: DISCONTINUED | OUTPATIENT
Start: 2022-08-05 | End: 2022-08-09 | Stop reason: HOSPADM

## 2022-08-05 RX ORDER — SODIUM CHLORIDE 0.9 % (FLUSH) 0.9 %
5-40 SYRINGE (ML) INJECTION EVERY 12 HOURS SCHEDULED
Status: DISCONTINUED | OUTPATIENT
Start: 2022-08-05 | End: 2022-08-09 | Stop reason: HOSPADM

## 2022-08-05 RX ORDER — POTASSIUM CHLORIDE 7.45 MG/ML
10 INJECTION INTRAVENOUS PRN
Status: DISCONTINUED | OUTPATIENT
Start: 2022-08-05 | End: 2022-08-09 | Stop reason: HOSPADM

## 2022-08-05 RX ORDER — SODIUM CHLORIDE 1000 MG
1 TABLET, SOLUBLE MISCELLANEOUS 2 TIMES DAILY WITH MEALS
Status: DISCONTINUED | OUTPATIENT
Start: 2022-08-05 | End: 2022-08-09 | Stop reason: HOSPADM

## 2022-08-05 RX ORDER — POTASSIUM CHLORIDE 20 MEQ/1
40 TABLET, EXTENDED RELEASE ORAL PRN
Status: DISCONTINUED | OUTPATIENT
Start: 2022-08-05 | End: 2022-08-09 | Stop reason: HOSPADM

## 2022-08-05 RX ORDER — CARVEDILOL 6.25 MG/1
12.5 TABLET ORAL 2 TIMES DAILY WITH MEALS
Status: DISCONTINUED | OUTPATIENT
Start: 2022-08-05 | End: 2022-08-09 | Stop reason: HOSPADM

## 2022-08-05 RX ORDER — ONDANSETRON 4 MG/1
4 TABLET, ORALLY DISINTEGRATING ORAL EVERY 8 HOURS PRN
Status: DISCONTINUED | OUTPATIENT
Start: 2022-08-05 | End: 2022-08-09 | Stop reason: HOSPADM

## 2022-08-05 RX ORDER — VITAMIN E 268 MG
400 CAPSULE ORAL DAILY
Status: DISCONTINUED | OUTPATIENT
Start: 2022-08-06 | End: 2022-08-09 | Stop reason: HOSPADM

## 2022-08-05 RX ORDER — POTASSIUM CHLORIDE 20 MEQ/1
40 TABLET, EXTENDED RELEASE ORAL 2 TIMES DAILY
Status: DISCONTINUED | OUTPATIENT
Start: 2022-08-05 | End: 2022-08-09 | Stop reason: HOSPADM

## 2022-08-05 RX ORDER — LANOLIN ALCOHOL/MO/W.PET/CERES
3 CREAM (GRAM) TOPICAL NIGHTLY PRN
Status: DISCONTINUED | OUTPATIENT
Start: 2022-08-05 | End: 2022-08-09 | Stop reason: HOSPADM

## 2022-08-05 RX ORDER — OXYBUTYNIN CHLORIDE 5 MG/1
5 TABLET, EXTENDED RELEASE ORAL 2 TIMES DAILY
Status: DISCONTINUED | OUTPATIENT
Start: 2022-08-05 | End: 2022-08-09 | Stop reason: HOSPADM

## 2022-08-05 RX ORDER — DEXTROSE MONOHYDRATE 100 MG/ML
INJECTION, SOLUTION INTRAVENOUS CONTINUOUS PRN
Status: DISCONTINUED | OUTPATIENT
Start: 2022-08-05 | End: 2022-08-09 | Stop reason: HOSPADM

## 2022-08-05 RX ADMIN — OXYBUTYNIN CHLORIDE 5 MG: 5 TABLET, EXTENDED RELEASE ORAL at 21:32

## 2022-08-05 RX ADMIN — FAMOTIDINE 20 MG: 20 TABLET ORAL at 21:39

## 2022-08-05 RX ADMIN — POTASSIUM CHLORIDE 40 MEQ: 20 TABLET, EXTENDED RELEASE ORAL at 21:39

## 2022-08-05 RX ADMIN — CARVEDILOL 12.5 MG: 6.25 TABLET, FILM COATED ORAL at 21:31

## 2022-08-05 RX ADMIN — Medication 3 MG: at 21:39

## 2022-08-05 RX ADMIN — BACLOFEN 10 MG: 10 TABLET ORAL at 21:32

## 2022-08-05 RX ADMIN — AMLODIPINE BESYLATE 10 MG: 10 TABLET ORAL at 21:31

## 2022-08-05 RX ADMIN — MEROPENEM 1000 MG: 1 INJECTION, POWDER, FOR SOLUTION INTRAVENOUS at 21:34

## 2022-08-05 RX ADMIN — GABAPENTIN 600 MG: 600 TABLET, FILM COATED ORAL at 21:31

## 2022-08-05 RX ADMIN — CEFEPIME 2000 MG: 2 INJECTION, POWDER, FOR SOLUTION INTRAVENOUS at 14:23

## 2022-08-05 RX ADMIN — SODIUM CHLORIDE 1 G: 1 TABLET ORAL at 21:31

## 2022-08-05 RX ADMIN — LEVOFLOXACIN 750 MG: 5 INJECTION, SOLUTION INTRAVENOUS at 18:35

## 2022-08-05 RX ADMIN — IOPAMIDOL 80 ML: 755 INJECTION, SOLUTION INTRAVENOUS at 12:20

## 2022-08-05 RX ADMIN — OXYCODONE HYDROCHLORIDE AND ACETAMINOPHEN 500 MG: 500 TABLET ORAL at 21:31

## 2022-08-05 ASSESSMENT — ENCOUNTER SYMPTOMS
BLOOD IN STOOL: 0
EYE PAIN: 0
CONSTIPATION: 0
WHEEZING: 0
SHORTNESS OF BREATH: 1
ABDOMINAL PAIN: 0
SINUS PRESSURE: 0
NAUSEA: 0
COUGH: 0
DIARRHEA: 0
RHINORRHEA: 0
VOMITING: 0

## 2022-08-05 ASSESSMENT — PAIN - FUNCTIONAL ASSESSMENT
PAIN_FUNCTIONAL_ASSESSMENT: NONE - DENIES PAIN
PAIN_FUNCTIONAL_ASSESSMENT: NONE - DENIES PAIN

## 2022-08-05 ASSESSMENT — PAIN SCALES - GENERAL: PAINLEVEL_OUTOF10: 0

## 2022-08-05 ASSESSMENT — LIFESTYLE VARIABLES
HOW MANY STANDARD DRINKS CONTAINING ALCOHOL DO YOU HAVE ON A TYPICAL DAY: PATIENT DOES NOT DRINK
HOW OFTEN DO YOU HAVE A DRINK CONTAINING ALCOHOL: NEVER

## 2022-08-05 NOTE — ED PROVIDER NOTES
[facility]  EMERGENCY MEDICINE     Pt Name: Guille Antonio  MRN: [id number]  [place] 1955  Date of evaluation: 8/5/2022  PCP:    Eduardo Zaldivar MD  Provider: TONO Ceja - CNP    CHIEF COMPLAINT       Chief Complaint   Patient presents with    Shortness of Breath             HISTORY OF PRESENT ILLNESS    Guille Antonio is a 74 y.o. male patient that presents to ER with concern for shortness of breath. Patient is a resident at a nursing home and patient had increased shortness of breath there today. Per EMS patient O2 saturation was 88% on room air and they placed him on 2 L. Patient is saturating 93% on 2 L nasal cannula. Patient denies other symptoms including chest pain, nausea, vomiting, diarrhea or fever. Patient does not appear to be any distress at this time. Triage notes and Nursing notes were reviewed by myself. Any discrepancies are addressed above.     PAST MEDICAL HISTORY     Past Medical History:   Diagnosis Date    Dysphagia     oropharyngeal    History of pulmonary embolism     History of urinary tract infection     Hx of blood clots     Pulmonary embolis    Hypertension     Major depressive disorder, single episode     MS (multiple sclerosis) ([facility] [address]     Neurogenic bladder 02/2012    Dr. Othel Holter placed cather    [facility])     UTI (urinary tract infection)        SURGICAL HISTORY       Past Surgical History:   Procedure Laterality Date    ANKLE SURGERY  1996    broken ankle    BLADDER SURGERY  2-    Suprapubic catheter placement    COLONOSCOPY      CYSTO/URETERO/PYELOSCOPY, CALCULUS TX Left 6/19/2019    CYSTOSCOPY, LEFT STENT insertion, bladder irragation with bladder stones performed by Stacey Blake MD at [address] [place] [address] N/A 7/8/2019    CYSTO, LEFT URETERAL STENT REMOVAL, LEFT URETEROSCOPY, LASER LITHOTRIPSY, BASKET RETRIEVAL OF STONE FRAGMENTS performed by Stacey Blake MD at [address] [place] [address] 2/26/2021    CYSTOSCOPY, CYSTOLITHOLAPAXY, SUPRAPUBIC CATHETER EXCHANGE performed by Alnie Hull MD at [address] Left 6/15/2022    CYSTOSCOPY performed by Aline Hull MD at [address] NEPHROSTOMY [address]  7/25/2022    IR NEPHROSTOMY CATHETER PLACEMENT 7/25/2022 [facility]    NE LAP,SURG,COLECTOMY,W/END COLOST & CLOSUR N/A 6/13/2018    ROBOT DIVERTING COLOSTOMY performed by Jessi Dean MD at [facility]       [address]       Current Discharge Medication List        CONTINUE these medications which have NOT CHANGED    Details   vitamin C (ASCORBIC ACID) 500 MG tablet Take 500 mg by mouth in the morning and 500 mg before bedtime. ertapenem (INVANZ) 1 GM injection Inject 1,000 mg into the muscle every 24 hours For 4 days start 7-28-22      potassium chloride (KLOR-CON M) 20 MEQ extended release tablet Take 2 tablets by mouth in the morning and 2 tablets before bedtime. Qty: 60 tablet, Refills: 0      carvedilol (COREG) 12.5 MG tablet Take 1 tablet by mouth in the morning and 1 tablet in the evening. Take with meals. Qty: 60 tablet, Refills: 3      amLODIPine (NORVASC) 10 MG tablet Take 1 tablet by mouth in the morning. Qty: 30 tablet, Refills: 3      melatonin 3 MG TABS tablet Take 1 tablet by mouth nightly as needed (when unable to sleep)  Qty: 30 tablet, Refills: 0      ondansetron (ZOFRAN) 4 MG tablet Take 4 mg by mouth every 8 hours as needed for Nausea or Vomiting      carbamide peroxide (DEBROX) 6.5 % otic solution 5 drops 2 times daily Instill 5 drops in both ears two times daily for ear wax use for 4 days.       vitamin E 400 UNIT capsule Take 400 Units by mouth daily      aspirin 81 MG EC tablet Take 81 mg by mouth daily      oxybutynin (DITROPAN-XL) 5 MG extended release tablet Take 5 mg by mouth in the morning and at bedtime      gentamicin (GARAMYCIN) 0.1 % ointment Apply topically daily Apply to wound bed      erythromycin [facility]) 5 MG/GM ophthalmic ointment Place into the right eye nightly (0.25 ribbon to right eye)      Ascorbic Acid (VITAMIN C ER PO) Take 500 mg by mouth in the morning and at bedtime       baclofen (LIORESAL) 10 MG tablet Take 10 mg by mouth 3 times daily      gabapentin (NEURONTIN) 600 MG tablet Take 600 mg by mouth 3 times daily.        acetaminophen (TYLENOL) 325 MG tablet Take 650 mg by mouth every 4 hours as needed for Pain or Fever      LACTOBACILLUS PO Take 1 capsule by mouth in the morning and at bedtime       sodium chloride 1 g tablet Take 1 tablet by mouth 2 times daily (with meals)  Qty: 90 tablet, Refills: 3      calcium-vitamin D (OSCAL-500) 500-200 MG-UNIT per tablet Take 1 tablet by mouth 2 times daily  Qty: 30 tablet, Refills: 3      famotidine (PEPCID) 20 MG tablet Take 1 tablet by mouth 2 times daily  Qty: 60 tablet, Refills: 3      docusate sodium (COLACE) 100 MG capsule Take 100 mg by mouth daily       tiZANidine (ZANAFLEX) 2 MG tablet Take 2 mg by mouth 3 times daily 0600;1400;2200      Multiple Vitamin (MULTIVITAMIN) tablet Take 1 tablet by mouth daily  Refills: 0      vitamin A 82916 UNITS capsule Take 2 capsules by mouth daily  Qty: 30 capsule, Refills: 3             ALLERGIES       No Known Allergies    FAMILY HISTORY       Family History   Problem Relation Age of Onset    Cancer Mother         Breast    Heart Disease Father 48    Arthritis Sister     Heart Disease Paternal Grandmother 48        SOCIAL HISTORY       Social History     Socioeconomic History    Marital status:      Spouse name: Zaynab Silva    Number of children: 2    Years of education: None    Highest education level: None   Tobacco Use    Smoking status: Former     Types: Cigarettes     Quit date: 1982     Years since quittin.6    Smokeless tobacco: Former   Vaping Use    Vaping Use: Never used   Substance and Sexual Activity    Alcohol use: No     Comment: \"quit alcohol a few years ago\"    Drug use: No    Sexual activity: Yes     Partners: Female       REVIEW OF SYSTEMS     Review of Systems   Constitutional:  Negative for appetite change, chills, fatigue, fever and unexpected weight change. HENT:  Negative for ear pain, rhinorrhea and sinus pressure. Eyes:  Negative for pain and visual disturbance. Respiratory:  Positive for shortness of breath. Negative for cough and wheezing. Cardiovascular:  Negative for chest pain, palpitations and leg swelling. Gastrointestinal:  Negative for abdominal pain, blood in stool, constipation, diarrhea, nausea and vomiting. Genitourinary:  Negative for dysuria, frequency and hematuria. Musculoskeletal:  Negative for arthralgias and joint swelling. Skin:  Negative for rash. Neurological:  Negative for dizziness, syncope, weakness, light-headedness and headaches. Hematological:  Does not bruise/bleed easily. Except as noted above the remainder of the review of systems was reviewed and is negative. SCREENINGS        [place] Coma Scale  Eye Opening: Spontaneous  Best Verbal Response: Oriented  Best Motor Response: Obeys commands  [place] Coma Scale Score: 15               PHYSICAL EXAM    (up to 7 for level 4, 8 or more for level 5)     ED Triage Vitals [02/19/22 1512]   BP Temp Temp Source Pulse Resp SpO2 Height Weight   (!) 134/95 98.2 °F (36.8 °C) Oral 124 16 100 % -- --       Physical Exam  Vitals and nursing note reviewed. Constitutional:       General: He is not in acute distress. Appearance: He is well-developed. He is not diaphoretic. HENT:      Head: Normocephalic and atraumatic. Eyes:      Conjunctiva/sclera: Conjunctivae normal.      Pupils: Pupils are equal, round, and reactive to light. Cardiovascular:      Rate and Rhythm: Normal rate and regular rhythm. Heart sounds: Normal heart sounds. No murmur heard. No gallop. Pulmonary:      Effort: Pulmonary effort is normal. No respiratory distress. Breath sounds: Normal breath sounds. No wheezing or rales. Abdominal:      General: Bowel sounds are normal.      Palpations: Abdomen is soft. Musculoskeletal:         General: Normal range of motion. Cervical back: Normal range of motion. Skin:     General: Skin is warm and dry. Neurological:      Mental Status: He is alert and oriented to person, place, and time. DIAGNOSTIC RESULTS     EKG:(none if blank)  All EKGs are interpreted by the Emergency Department Physician who either signs or Co-signs this chart in the absence of a cardiologist.    Normal sinus rhythm with a ventricular rate of 96. LA interval 142 ms, QRS 80 ms,  ms and  ms. RADIOLOGY: (none if blank)   I directly visualized the following images and reviewed the radiologist interpretations. Interpretation per the Radiologist below, if available at the time of this note:  CTA CHEST W WO CONTRAST - r/o Pulmonary Embolism   Final Result      1. No evidence pulmonary embolism. 2. Mild cardiomegaly. 3. Elevated left hemidiaphragm. Atelectasis or infiltrate at the left lung base. 4. Thoracic spondylosis. 5. Splenomegaly. **This report has been created using voice recognition software. It may contain minor errors which are inherent in voice recognition technology. **      Final report electronically signed by DR Donna Ross on 8/5/2022 12:30 PM      XR CHEST PORTABLE   Final Result   Small left pleural effusion. **This report has been created using voice recognition software. It may contain minor errors which are inherent in voice recognition technology. **      Final report electronically signed by Dr. Inder Rubio on 8/5/2022 11:08 AM          LABS:  Labs Reviewed   CBC WITH AUTO DIFFERENTIAL - Abnormal; Notable for the following components:       Result Value    RBC 4.33 (*)     Hemoglobin 12.6 (*)     Hematocrit 39.7 (*)     MCHC 31.7 (*)     RDW-CV 15.5 (*)     RDW-SD 51.9 (*)     MPV 8.6 (*)     All other components within normal limits   BASIC METABOLIC PANEL W/ REFLEX TO MG FOR LOW K - Abnormal; Notable for the following components:    Glucose 132 (*)     BUN 26 (*)     All other components within normal limits   HEPATIC FUNCTION PANEL - Abnormal; Notable for the following components:    Albumin 3.4 (*)     Alkaline Phosphatase 178 (*)     AST 77 (*)     All other components within normal limits   TROPONIN - Abnormal; Notable for the following components:    Troponin T 0.017 (*)     All other components within normal limits   D-DIMER, QUANTITATIVE - Abnormal; Notable for the following components:    D-Dimer, Quant 1515.00 (*)     All other components within normal limits   PROCALCITONIN - Abnormal; Notable for the following components:    Procalcitonin 7.97 (*)     All other components within normal limits   TROPONIN - Abnormal; Notable for the following components:    Troponin T 0.010 (*)     All other components within normal limits   URINE WITH REFLEXED MICRO - Abnormal; Notable for the following components:    Ketones, Urine TRACE (*)     Specific Gravity, Urine > 1.030 (*)     Blood, Urine LARGE (*)     Protein,  (*)     Leukocyte Esterase, Urine LARGE (*)     Character, Urine CLOUDY (*)     All other components within normal limits   RAPID INFLUENZA A/B ANTIGENS   COVID-19, RAPID   CULTURE, BLOOD 1   CULTURE, BLOOD 2   CULTURE, REFLEXED, URINE    Narrative:     Source: cath urine       Site:           Current Antibiotics: Cefepime   LIPASE   BRAIN NATRIURETIC PEPTIDE   ANION GAP   OSMOLALITY   GLOMERULAR FILTRATION RATE, ESTIMATED   TROPONIN   BASIC METABOLIC PANEL W/ REFLEX TO MG FOR LOW K   CBC WITH AUTO DIFFERENTIAL       All other labs were within normal range or not returned as of this dictation. Please note, any cultures that may have been sent were not resulted at the time of this patient visit.     EMERGENCY DEPARTMENT COURSE and Medical Decision Making:     Vitals:    Vitals:    08/05/22 1700 08/05/22 1701 08/05/22 1730 08/05/22 2015   BP: (!) 106/53   (!) 108/55   Pulse: 76   75   Resp: 16   18   Temp: 97.8 °F (36.6 °C)   97.9 °F (36.6 °C)   TempSrc: Oral   Oral   SpO2: 96%  94% 94%   Weight:  194 lb 9.6 oz (88.3 kg)     Height:  5' 7\" (1.702 m)         PROCEDURES: (None if blank)  Procedures       MDM     Amount and/or Complexity of Data Reviewed  Clinical lab tests: ordered and reviewed  Tests in the radiology section of CPT®: ordered and reviewed  Tests in the medicine section of CPT®: ordered and reviewed  Review and summarize past medical records: yes  Discuss the patient with other providers: yes  Independent visualization of images, tracings, or specimens: yes    Risk of Complications, Morbidity, and/or Mortality  Presenting problems: low  Diagnostic procedures: moderate  Management options: low      Patient that presents to ER with concern for shortness of breath. Differential diagnosis includes but not limited to COPD exacerbation, COVID-19, influenza, pneumonia, NSTEMI or pulmonary embolism. Labs, EKG and chest x-ray are all reassuring. CTA of the chest does show a left lobe pneumonia.   Patient was hypoxic on arrival therefore admission was requested to the hospitalist.    ED Medications administered this visit:    Medications   sodium chloride flush 0.9 % injection 5-40 mL (5 mLs IntraVENous Not Given 8/5/22 2010)   sodium chloride flush 0.9 % injection 5-40 mL (has no administration in time range)   0.9 % sodium chloride infusion (has no administration in time range)   ondansetron (ZOFRAN-ODT) disintegrating tablet 4 mg (has no administration in time range)     Or   ondansetron (ZOFRAN) injection 4 mg (has no administration in time range)   polyethylene glycol (GLYCOLAX) packet 17 g (has no administration in time range)   acetaminophen (TYLENOL) tablet 650 mg (has no administration in time range)     Or   acetaminophen (TYLENOL) suppository 650 mg (has no administration in time range)   magnesium sulfate 2000 mg in 50 mL IVPB premix (has no administration in time range)   potassium chloride (KLOR-CON M) extended release tablet 40 mEq (has no administration in time range)     Or   potassium bicarb-citric acid (EFFER-K) effervescent tablet 40 mEq (has no administration in time range)     Or   potassium chloride 10 mEq/100 mL IVPB (Peripheral Line) (has no administration in time range)   glucose chewable tablet 16 g (has no administration in time range)   dextrose bolus 10% 125 mL (has no administration in time range)     Or   dextrose bolus 10% 250 mL (has no administration in time range)   glucagon (rDNA) injection 1 mg (has no administration in time range)   dextrose 10 % infusion (has no administration in time range)   meropenem (MERREM) 1,000 mg in sodium chloride 0.9 % 100 mL IVPB (mini-bag) (has no administration in time range)   amLODIPine (NORVASC) tablet 10 mg (has no administration in time range)   aspirin EC tablet 81 mg ( Oral Automatically Held 8/9/22 0900)   baclofen (LIORESAL) tablet 10 mg (has no administration in time range)   carvedilol (COREG) tablet 12.5 mg (has no administration in time range)   docusate sodium (COLACE) capsule 100 mg (has no administration in time range)   famotidine (PEPCID) tablet 20 mg (has no administration in time range)   gabapentin (NEURONTIN) tablet 600 mg (has no administration in time range)   melatonin tablet 3 mg (has no administration in time range)   oxybutynin (DITROPAN-XL) extended release tablet 5 mg (has no administration in time range)   potassium chloride (KLOR-CON M) extended release tablet 40 mEq (has no administration in time range)   sodium chloride tablet 1 g (has no administration in time range)   vitamin A capsule 6 mg (has no administration in time range)   ascorbic acid (VITAMIN C) tablet 500 mg (has no administration in time range)   vitamin E capsule 400 Units (has no administration in time range)   iopamidol (ISOVUE-370) 76 % injection 80 mL (80 mLs IntraVENous Given 8/5/22 1220)   cefepime (MAXIPIME) 2000 mg IVPB minibag (0 mg IntraVENous Stopped 8/5/22 1453)     And   levoFLOXacin (LEVAQUIN) 750 MG/150ML infusion 750 mg (0 mg IntraVENous Stopped 8/5/22 2010)         FINAL IMPRESSION      1. Hypoxia    2. Pneumonia of lower lobe due to infectious organism, unspecified laterality          DISPOSITION/PLAN   DISPOSITION Admitted 08/05/2022 03:00:03 PM      PATIENT REFERRED TO:  No follow-up provider specified.     DISCHARGE MEDICATIONS:  Current Discharge Medication List                 TONO Escobar CNP (electronically signed)           TONO Escobar CNP  08/05/22 3171       TONO Escobar CNP  08/05/22 0917

## 2022-08-05 NOTE — CONSULTS
CONSULTATION NOTE :ID       Patient - Lois Vinson,  Age - 74 y.o.    - 1955      Room Number - 8A-11/011-A   N -  [id number]   [facility]  Date of Admission -  2022 10:39 AM  Patient's PCP: Suzie Carter MD     Requesting Physician: Tyree Messina MD    REASON FOR CONSULTATION   Concern for bacteremia: Recent cystoscopy and stent placement  CHIEF COMPLAINT   Shortness of breath    HISTORY OF PRESENT ILLNESS       This is a very pleasant 74 y.o. male who was admitted to the hospital with a chief complaints of shortness of breath. He is from nursing home. He had cystoscopy and stent placement and placement of [place] catheter yesterday. He was brought to the hospital due to shortness of breath patient was short of breath denies any chest pain. He had hematuria denies any abdominal pain. Patient has advanced MS with neurogenic bladder had history of chronic suprapubic catheter. He was recently discharged from the hospital after he was treated for UTI he is known to carry multidrug-resistant bacteria ESBL. He has no cough, no hemoptysis. His shortness of breath has improved since he came to the hospital.  His CT scan was negative for PE he has mild cardiomegaly with left hemidiaphragm and has atelectasis at the lung base.   His WBC was 8 hemoglobin of 12.6 hematocrit 39.7  He was started on broad-spectrum antibiotics  PAST MEDICAL  HISTORY       Past Medical History:   Diagnosis Date    Dysphagia     oropharyngeal    History of pulmonary embolism     History of urinary tract infection     Hx of blood clots     Pulmonary embolis    Hypertension     Major depressive disorder, single episode     MS (multiple sclerosis) ([facility] [address]     Neurogenic bladder 2012    Dr. Ryan Adamson placed cather    Osteomyelitis [facility])     UTI (urinary tract infection)        PAST SURGICAL HISTORY     Past Surgical History:   Procedure Laterality Date    [address] broken ankle    BLADDER SURGERY  2-    Suprapubic catheter placement    COLONOSCOPY      CYSTO/URETERO/PYELOSCOPY, CALCULUS TX Left 6/19/2019    CYSTOSCOPY, LEFT STENT insertion, bladder irragation with bladder stones performed by Sherice Beard MD at [address] N/A 7/8/2019    CYSTO, LEFT URETERAL STENT REMOVAL, LEFT URETEROSCOPY, LASER LITHOTRIPSY, BASKET RETRIEVAL OF STONE FRAGMENTS performed by Sherice Beard MD at [address] [facility] 2/26/2021    CYSTOSCOPY, CYSTOLITHOLAPAXY, SUPRAPUBIC CATHETER EXCHANGE performed by Cherie Stanley MD at [facility] 6/15/2022    CYSTOSCOPY performed by Cherie Stanley MD at [facility]  7/25/2022    IR NEPHROSTOMY CATHETER PLACEMENT 7/25/2022 [facility]    UT LAP,SURG,COLECTOMY,W/END COLOST & CLOSUR N/A 6/13/2018    ROBOT DIVERTING COLOSTOMY performed by Zaina Quintanilla MD at [facility]  child         MEDICATIONS:       Scheduled Meds:   levofloxacin  750 mg IntraVENous Once    sodium chloride flush  5-40 mL IntraVENous 2 times per day    meropenem  1,000 mg IntraVENous Q8H     Continuous Infusions:   sodium chloride      dextrose       PRN Meds:sodium chloride flush, sodium chloride, ondansetron **OR** ondansetron, polyethylene glycol, acetaminophen **OR** acetaminophen, magnesium sulfate, potassium chloride **OR** potassium alternative oral replacement **OR** potassium chloride, glucose, dextrose bolus **OR** dextrose bolus, glucagon (rDNA), dextrose  Allergies:   ALLERGIES:    Patient has no known allergies. SOCIAL HISTORY:     TOBACCO:   reports that he quit smoking about 40 years ago. His smoking use included cigarettes. He has quit using smokeless tobacco.     ETOH:   reports no history of alcohol use.   Patient currently lives in a nursing home      FAMILY HISTORY:         Problem Relation Age of Onset    Cancer Mother         Breast    Heart LABALBU 3.4*     UA:   Recent Labs     08/05/22  1840   PHUR 5.5   COLORU YELLOW   PROTEINU 100*   BLOODU LARGE*   RBCUA 50-75   WBCUA > 200   BACTERIA NONE SEEN   NITRU NEGATIVE   GLUCOSEU NEGATIVE   BILIRUBINUR NEGATIVE   UROBILINOGEN 0.2   KETUA TRACE*         IMAGING:    Micro:   Lab Results   Component Value Date/Time    [facility]  07/21/2022 10:25 PM     No growth 24 hours. No growth 48 hours. No growth at 5 days        Problem list of patient      Patient Active Problem List   Diagnosis Code    Neurogenic bladder N31.9    Multiple sclerosis ([address] G35    MS (multiple sclerosis) ([address] G35    Urinary retention R33.9    Chronic suprapubic catheter ([facility]) Z93.59    Cystostomy malfunction ([address] N99.512    Decubitus ulcer of coccyx L89.159    Decubitus ulcer, stage 3 ([facility]) L89.93    Malnutrition ([address] E46    Decubitus ulcer of right perineal ischial region, stage 3 ([address] L89.313    Pulmonary embolism on left ([facility]) W17.60    Acute metabolic encephalopathy G87.23    Speech disturbance R47.9    Infected decubitus ulcer, stage I L89.91, L08.9    Infected decubitus ulcer, stage III ([facility]) L89.93, L08.9    Wound infection T14. 8XXA, L08.9    Elevated INR R79.1    Chronic osteomyelitis, pelvis, right ([facility]) M86.651    Osteomyelitis ([facility]) M86.9    Urinary tract infection associated with indwelling urethral catheter ([facility]) T83.511A, N39.0    Abnormal liver function test R94.5    Chronic depression F32. A    Essential hypertension I10    History of pulmonary embolism Z86.711    Other dysphagia R13.19    Pressure injury of skin of back L89.109    Sepsis secondary to UTI ([facility]) A41.9, N39.0    Decubitus ulcer L89.90    Troponin level elevated R77.8    Anemia D64.9    Sepsis due to methicillin resistant Staphylococcus aureus (MRSA) ([facility]) A41.02    Sepsis due to urinary tract infection ([facility]) A41.9, N39.0    AMS (altered mental status) R41.82    Gross hematuria R31.0    Class 1 obesity due to excess calories without serious comorbidity with body mass index (BMI) of 31.0 to 31.9 in adult E66.09, Z68.31    Colostomy in place [facility]) Z93.3    Current use of long term anticoagulation Z79.01    Obstructive uropathy L02.7    Complicated urinary tract infection N39.0    Hypokalemia E87.6    Hypomagnesemia E83.42    Shortness of breath R06.02           Impression and Recommendation:   Shortness of breath: He has resolved since admission he has enlarged heart with congestion. I doubt it is related to pneumonia as he has only mild atelectasis  Recurrent UTI he is known to carry multidrug-resistant organism. Will continue meropenem  Follow blood culture and urine culture, further recommendation will follow based on culture report. If cultures remain negative recommend stop antibiotics in 24 to 48 hours and consider discharging patient to extended care facility. Infection Control:   Contact isolation due to history of ESBL. Discharge Planning (Coordination of out patient care: To ECF when more stable  Thank you Lyly Walker MD for allowing me to participate in this patient's care.     Jody Hampton MD, MD,FACP 8/5/2022 7:15 PM

## 2022-08-06 PROBLEM — R09.02 HYPOXIA: Status: ACTIVE | Noted: 2022-08-06

## 2022-08-06 PROBLEM — A49.9 ESBL (EXTENDED SPECTRUM BETA-LACTAMASE) PRODUCING BACTERIA INFECTION: Status: ACTIVE | Noted: 2022-08-06

## 2022-08-06 PROBLEM — Z16.12 ESBL (EXTENDED SPECTRUM BETA-LACTAMASE) PRODUCING BACTERIA INFECTION: Status: ACTIVE | Noted: 2022-08-06

## 2022-08-06 LAB
ANION GAP SERPL CALCULATED.3IONS-SCNC: 11 MEQ/L (ref 8–16)
BASOPHILS # BLD: 0.8 %
BASOPHILS ABSOLUTE: 0.1 THOU/MM3 (ref 0–0.1)
BUN BLDV-MCNC: 21 MG/DL (ref 7–22)
CALCIUM SERPL-MCNC: 8.8 MG/DL (ref 8.5–10.5)
CHLORIDE BLD-SCNC: 104 MEQ/L (ref 98–111)
CO2: 26 MEQ/L (ref 23–33)
CREAT SERPL-MCNC: 0.2 MG/DL (ref 0.4–1.2)
EKG ATRIAL RATE: 96 BPM
EKG P AXIS: 46 DEGREES
EKG P-R INTERVAL: 142 MS
EKG Q-T INTERVAL: 334 MS
EKG QRS DURATION: 80 MS
EKG QTC CALCULATION (BAZETT): 421 MS
EKG R AXIS: 40 DEGREES
EKG T AXIS: 55 DEGREES
EKG VENTRICULAR RATE: 96 BPM
EOSINOPHIL # BLD: 2.9 %
EOSINOPHILS ABSOLUTE: 0.2 THOU/MM3 (ref 0–0.4)
ERYTHROCYTE [DISTWIDTH] IN BLOOD BY AUTOMATED COUNT: 15.7 % (ref 11.5–14.5)
ERYTHROCYTE [DISTWIDTH] IN BLOOD BY AUTOMATED COUNT: 55.5 FL (ref 35–45)
GFR SERPL CREATININE-BSD FRML MDRD: > 90 ML/MIN/1.73M2
GLUCOSE BLD-MCNC: 93 MG/DL (ref 70–108)
HCT VFR BLD CALC: 37.8 % (ref 42–52)
HEMOGLOBIN: 11.3 GM/DL (ref 14–18)
IMMATURE GRANS (ABS): 0.04 THOU/MM3 (ref 0–0.07)
IMMATURE GRANULOCYTES: 0.5 %
LYMPHOCYTES # BLD: 16.5 %
LYMPHOCYTES ABSOLUTE: 1.3 THOU/MM3 (ref 1–4.8)
MCH RBC QN AUTO: 28.8 PG (ref 26–33)
MCHC RBC AUTO-ENTMCNC: 29.9 GM/DL (ref 32.2–35.5)
MCV RBC AUTO: 96.2 FL (ref 80–94)
MONOCYTES # BLD: 9.3 %
MONOCYTES ABSOLUTE: 0.7 THOU/MM3 (ref 0.4–1.3)
NUCLEATED RED BLOOD CELLS: 0 /100 WBC
PLATELET # BLD: 226 THOU/MM3 (ref 130–400)
PMV BLD AUTO: 9 FL (ref 9.4–12.4)
POTASSIUM REFLEX MAGNESIUM: 4.2 MEQ/L (ref 3.5–5.2)
RBC # BLD: 3.93 MILL/MM3 (ref 4.7–6.1)
SEG NEUTROPHILS: 70 %
SEGMENTED NEUTROPHILS ABSOLUTE COUNT: 5.3 THOU/MM3 (ref 1.8–7.7)
SODIUM BLD-SCNC: 141 MEQ/L (ref 135–145)
TROPONIN T: < 0.01 NG/ML
WBC # BLD: 7.6 THOU/MM3 (ref 4.8–10.8)

## 2022-08-06 PROCEDURE — 99232 SBSQ HOSP IP/OBS MODERATE 35: CPT | Performed by: INTERNAL MEDICINE

## 2022-08-06 PROCEDURE — 99221 1ST HOSP IP/OBS SF/LOW 40: CPT | Performed by: UROLOGY

## 2022-08-06 PROCEDURE — 6360000002 HC RX W HCPCS: Performed by: STUDENT IN AN ORGANIZED HEALTH CARE EDUCATION/TRAINING PROGRAM

## 2022-08-06 PROCEDURE — 36415 COLL VENOUS BLD VENIPUNCTURE: CPT

## 2022-08-06 PROCEDURE — 6370000000 HC RX 637 (ALT 250 FOR IP): Performed by: STUDENT IN AN ORGANIZED HEALTH CARE EDUCATION/TRAINING PROGRAM

## 2022-08-06 PROCEDURE — 2580000003 HC RX 258: Performed by: STUDENT IN AN ORGANIZED HEALTH CARE EDUCATION/TRAINING PROGRAM

## 2022-08-06 PROCEDURE — 93010 ELECTROCARDIOGRAM REPORT: CPT | Performed by: INTERNAL MEDICINE

## 2022-08-06 PROCEDURE — 85025 COMPLETE CBC W/AUTO DIFF WBC: CPT

## 2022-08-06 PROCEDURE — 1200000000 HC SEMI PRIVATE

## 2022-08-06 PROCEDURE — 80048 BASIC METABOLIC PNL TOTAL CA: CPT

## 2022-08-06 RX ADMIN — BACLOFEN 10 MG: 10 TABLET ORAL at 08:51

## 2022-08-06 RX ADMIN — SODIUM CHLORIDE, PRESERVATIVE FREE 10 ML: 5 INJECTION INTRAVENOUS at 08:53

## 2022-08-06 RX ADMIN — AMLODIPINE BESYLATE 10 MG: 10 TABLET ORAL at 08:51

## 2022-08-06 RX ADMIN — OXYCODONE HYDROCHLORIDE AND ACETAMINOPHEN 500 MG: 500 TABLET ORAL at 22:37

## 2022-08-06 RX ADMIN — CARVEDILOL 12.5 MG: 6.25 TABLET, FILM COATED ORAL at 08:51

## 2022-08-06 RX ADMIN — POTASSIUM CHLORIDE 40 MEQ: 20 TABLET, EXTENDED RELEASE ORAL at 08:51

## 2022-08-06 RX ADMIN — MEROPENEM 1000 MG: 1 INJECTION, POWDER, FOR SOLUTION INTRAVENOUS at 12:17

## 2022-08-06 RX ADMIN — SODIUM CHLORIDE 1 G: 1 TABLET ORAL at 17:05

## 2022-08-06 RX ADMIN — GABAPENTIN 600 MG: 600 TABLET, FILM COATED ORAL at 08:51

## 2022-08-06 RX ADMIN — FAMOTIDINE 20 MG: 20 TABLET ORAL at 22:37

## 2022-08-06 RX ADMIN — FAMOTIDINE 20 MG: 20 TABLET ORAL at 08:51

## 2022-08-06 RX ADMIN — MEROPENEM 1000 MG: 1 INJECTION, POWDER, FOR SOLUTION INTRAVENOUS at 18:55

## 2022-08-06 RX ADMIN — OXYBUTYNIN CHLORIDE 5 MG: 5 TABLET, EXTENDED RELEASE ORAL at 08:51

## 2022-08-06 RX ADMIN — GABAPENTIN 600 MG: 600 TABLET, FILM COATED ORAL at 21:27

## 2022-08-06 RX ADMIN — MEROPENEM 1000 MG: 1 INJECTION, POWDER, FOR SOLUTION INTRAVENOUS at 02:28

## 2022-08-06 RX ADMIN — POTASSIUM CHLORIDE 40 MEQ: 20 TABLET, EXTENDED RELEASE ORAL at 22:37

## 2022-08-06 RX ADMIN — Medication 400 UNITS: at 12:51

## 2022-08-06 RX ADMIN — BACLOFEN 10 MG: 10 TABLET ORAL at 22:37

## 2022-08-06 RX ADMIN — Medication 6 MG: at 08:51

## 2022-08-06 RX ADMIN — OXYBUTYNIN CHLORIDE 5 MG: 5 TABLET, EXTENDED RELEASE ORAL at 22:37

## 2022-08-06 RX ADMIN — SODIUM CHLORIDE, PRESERVATIVE FREE 10 ML: 5 INJECTION INTRAVENOUS at 22:40

## 2022-08-06 RX ADMIN — GABAPENTIN 600 MG: 600 TABLET, FILM COATED ORAL at 14:29

## 2022-08-06 RX ADMIN — DOCUSATE SODIUM 100 MG: 100 CAPSULE, LIQUID FILLED ORAL at 08:51

## 2022-08-06 RX ADMIN — SODIUM CHLORIDE 1 G: 1 TABLET ORAL at 08:51

## 2022-08-06 RX ADMIN — BACLOFEN 10 MG: 10 TABLET ORAL at 14:29

## 2022-08-06 RX ADMIN — OXYCODONE HYDROCHLORIDE AND ACETAMINOPHEN 500 MG: 500 TABLET ORAL at 08:51

## 2022-08-06 ASSESSMENT — PAIN SCALES - GENERAL
PAINLEVEL_OUTOF10: 0
PAINLEVEL_OUTOF10: 0

## 2022-08-06 NOTE — PROGRESS NOTES
Pt admitted to  8A11 from ED and via cart/stretcher. Complaints: None. INT in RFA. IV site free of s/s of infection or infiltration. Vital signs obtained. Assessment and data collection initiated. Oriented to room. Policies and procedures for 8A explained. All questions answered with no further questions at this time. Fall prevention and safety brochure discussed with patient. Bed alarm on. Call light in reach.

## 2022-08-06 NOTE — PROGRESS NOTES
Progress note: Infectious diseases    Patient - Ariela Murray,  Age - 74 y.o.    - 1955      Room Number - 8A-11/011-A   MRN -  [id number]   Acct # - [de-identified]  Date of Admission -  2022 10:39 AM    SUBJECTIVE:   He has no new complaints  No fever  Blood cx negative  OBJECTIVE   VITALS    height is 5' 7\" (1.702 m) and weight is 195 lb (88.5 kg). His oral temperature is 98.7 °F (37.1 °C). His blood pressure is 106/56 (abnormal) and his pulse is 80. His respiration is 18 and oxygen saturation is 95%.        Wt Readings from Last 3 Encounters:   22 195 lb (88.5 kg)   22 194 lb 6.4 oz (88.2 kg)   22 201 lb (91.2 kg)       I/O (24 Hours)    Intake/Output Summary (Last 24 hours) at 2022 1201  Last data filed at 2022 0506  Gross per 24 hour   Intake 800 ml   Output 760 ml   Net 40 ml       General Appearance  Awake, alert, oriented,  chronically sick looking  HEENT - normocephalic, atraumatic, pale  conjunctiva,  anicteric sclera  Neck - Supple, no mass  Lungs -  Bilateral  air entry, no rhonchi, no wheeze  Cardiovascular - Heart sounds are normal.     Abdomen - soft, not distended, nontender,   Neurologic -awake and oriented  Skin - No bruising or bleeding  Extremities - contracted extremites    MEDICATIONS:      sodium chloride flush  5-40 mL IntraVENous 2 times per day    meropenem  1,000 mg IntraVENous Q8H    amLODIPine  10 mg Oral Daily    [Held by provider] aspirin  81 mg Oral Daily    baclofen  10 mg Oral TID    carvedilol  12.5 mg Oral BID WC    docusate sodium  100 mg Oral Daily    famotidine  20 mg Oral BID    gabapentin  600 mg Oral TID    oxybutynin  5 mg Oral BID    potassium chloride  40 mEq Oral BID    sodium chloride  1 g Oral BID WC    vitamin A  20,000 Units Oral Daily    vitamin C  500 mg Oral BID    vitamin E  400 Units Oral Daily      sodium chloride      dextrose       sodium chloride flush, sodium chloride, ondansetron **OR** ondansetron, polyethylene glycol, acetaminophen **OR** acetaminophen, magnesium sulfate, potassium chloride **OR** potassium alternative oral replacement **OR** potassium chloride, glucose, dextrose bolus **OR** dextrose bolus, glucagon (rDNA), dextrose, melatonin      LABS:     CBC:   Recent Labs     08/05/22  1059 08/06/22  0535   WBC 8.0 7.6   HGB 12.6* 11.3*    226     BMP:    Recent Labs     08/04/22  0839 08/05/22  1059 08/06/22  0535   NA  --  141 141   K 4.3 4.9 4.2   CL  --  105 104   CO2  --  28 26   BUN  --  26* 21   CREATININE  --  0.4 0.2*   GLUCOSE  --  132* 93     Calcium:  Recent Labs     08/06/22  0535   CALCIUM 8.8      Recent Labs     08/05/22  1059   ALKPHOS 178*   ALT 59   AST 77*   PROT 7.6   BILITOT 0.4   BILIDIR <0.2   LABALBU 3.4*      CULTURES:   UA:   Recent Labs     08/05/22  1840   PHUR 5.5   COLORU YELLOW   PROTEINU 100*   BLOODU LARGE*   RBCUA 50-75   WBCUA > 200   BACTERIA NONE SEEN   NITRU NEGATIVE   GLUCOSEU NEGATIVE   BILIRUBINUR NEGATIVE   UROBILINOGEN 0.2   KETUA TRACE*     Micro:   Lab Results   Component Value Date/Time    [facility]  07/21/2022 10:25 PM     No growth 24 hours. No growth 48 hours.  No growth at 5 days           Problem list of patient:     Patient Active Problem List   Diagnosis Code    Neurogenic bladder N31.9    Multiple sclerosis ([address] G35    MS (multiple sclerosis) ([address] G35    Urinary retention R33.9    Chronic suprapubic catheter ([address] Z93.59    Cystostomy malfunction ([address] N99.512    Decubitus ulcer of coccyx L89.159    Decubitus ulcer, stage 3 ([address] L89.93    Malnutrition ([address] E46    Decubitus ulcer of right perineal ischial region, stage 3 ([address] L89.313    Pulmonary embolism on left (HCC) I26.99    Acute metabolic encephalopathy K10.07    Speech disturbance R47.9    Infected decubitus ulcer, stage I L89.91, L08.9    Infected decubitus ulcer, stage III ([facility] L89.93, L08.9    Wound infection T14. 8XXA, L08.9    Elevated INR R79.1    Chronic osteomyelitis, pelvis, right (HCC) M86.651    Osteomyelitis (HCC) M86.9    Urinary tract infection associated with indwelling urethral catheter (HCC) T83.511A, N39.0    Abnormal liver function test R94.5    Chronic depression F32. A    Essential hypertension I10    History of pulmonary embolism Z86.711    Other dysphagia R13.19    Pressure injury of skin of back L89.109    Sepsis secondary to UTI ([address] A41.9, N39.0    Decubitus ulcer L89.90    Troponin level elevated R77.8    Anemia D64.9    Sepsis due to methicillin resistant Staphylococcus aureus (MRSA) (HCC) A41.02    Sepsis due to urinary tract infection (HCC) A41.9, N39.0    AMS (altered mental status) R41.82    Gross hematuria R31.0    Class 1 obesity due to excess calories without serious comorbidity with body mass index (BMI) of 31.0 to 31.9 in adult E66.09, Z68.31    Colostomy in place [facility]) Z93.3    Current use of long term anticoagulation Z79.01    Obstructive uropathy O83.0    Complicated urinary tract infection N39.0    Hypokalemia E87.6    Hypomagnesemia E83.42    Shortness of breath R06.02    Hypoxia R09.02    ESBL (extended spectrum beta-lactamase) producing bacteria infection A49.9, Z16.12         ASSESSMENT/PLAN   Shortness of breath improved.  ?may be related to recent procedure with fluid adminstration  Blood cx remain so far negative, will follow urine cx  Continue current treatment        Dinora Miller MD, MD, FACP 8/6/2022 12:01 PM

## 2022-08-06 NOTE — CONSULTS
[address]  [address] [address]  Dept: [phone]  Loc: [phone]  Visit Date: 8/5/2022    Urology Consult Note    Reason for Consult:  Gross hematuria after cysto/laser litho/stent replacement  Requesting Physician:  medicine    History Obtained From:  patient, EMR, nurse    Chief Complaint: SOB    HISTORY OF PRESENT ILLNESS:                The patient is a 74 y.o. male with significant past medical history of multiple sclerosis, neurogenic bladder, and oropharyngeal dysphagia who presents to [facility] from [facility] due to shortness of breath. Patient had underwent a cystoscopy, laser lithotripsy and stent placement yesterday by urology. Was recently in the hospital where he was treated for multidrug-resistant ESBL E. coli bacteremia and UTI.   Chronically colonized multidrug-resistant bacteria who presents with SOB  Urology consulted for hematuria      Past Medical History:        Diagnosis Date    Dysphagia     oropharyngeal    History of pulmonary embolism     History of urinary tract infection     Hx of blood clots     Pulmonary embolis    Hypertension     Major depressive disorder, single episode     MS (multiple sclerosis) ([facility]     Neurogenic bladder 02/2012    Dr. Karen Hernandez placed cather    Osteomyelitis [facility])     UTI (urinary tract infection)      Past Surgical History:        Procedure Laterality Date    ANKLE SURGERY  1996    broken ankle    BLADDER SURGERY  2-    Suprapubic catheter placement    COLONOSCOPY      CYSTO/URETERO/PYELOSCOPY, CALCULUS TX Left 6/19/2019    CYSTOSCOPY, LEFT STENT insertion, bladder irragation with bladder stones performed by Leah Lopez MD at [address] [place] [address] N/A 7/8/2019    CYSTO, LEFT URETERAL STENT REMOVAL, LEFT URETEROSCOPY, LASER LITHOTRIPSY, BASKET RETRIEVAL OF STONE FRAGMENTS performed by Leah Lopez MD at [place] [address] 2021    CYSTOSCOPY, CYSTOLITHOLAPAXY, SUPRAPUBIC CATHETER EXCHANGE performed by Abigail Taylor MD at [address] Left 6/15/2022    CYSTOSCOPY performed by Abigail Taylor MD at [address]  2022    IR NEPHROSTOMY CATHETER PLACEMENT 2022 [facility] SPECIAL PROCEDURES    UT LAP,SURG,COLECTOMY,W/END COLOST & CLOSUR N/A 2018    ROBOT DIVERTING COLOSTOMY performed by Manon Duane, MD at [address] [facility]  child     Allergies:  Patient has no known allergies. Social History:  Social History     Socioeconomic History    Marital status:      Spouse name: Spring Renner    Number of children: 2    Years of education: Not on file    Highest education level: Not on file   Occupational History    Not on file   Tobacco Use    Smoking status: Former     Types: Cigarettes     Quit date: 1982     Years since quittin.6    Smokeless tobacco: Former   Vaping Use    Vaping Use: Never used   Substance and Sexual Activity    Alcohol use: No     Comment: \"quit alcohol a few years ago\"    Drug use: No    Sexual activity: Yes     Partners: Female   Other Topics Concern    Not on file   Social History Narrative    Not on file     Social Determinants of Health     Financial Resource Strain: Not on file   Food Insecurity: Not on file   Transportation Needs: Not on file   Physical Activity: Not on file   Stress: Not on file   Social Connections: Not on file   Intimate Partner Violence: Not on file   Housing Stability: Not on file     Family History:       Problem Relation Age of Onset    Cancer Mother         Breast    Heart Disease Father 48    Arthritis Sister     Heart Disease Paternal Grandmother 50       ROS:  Constitutional: Negative for chills, fatigue, fever, or weight loss. Eyes: Denies reported visual changes. ENT: Denies headache, difficulty swallowing, earache, and nosebleeds. Cardiovascular: Negative for chest pain, palpitations, tachycardia or edema.   Respiratory: Denies cough or SOB. GI:The patient denies abdominal or flank pain, anorexia, nausea or vomiting. : see HPI. Musculoskeletal: Patient denies low back pain or painful or reduced range of ROM. Neurological: The patient denies any symptoms of neurological impairment or TIA. Psychiatric: Denies anxiety or depression. Skin: Denies rash or lesions. All remaining ROS negative. PHYSICAL EXAM:  VITALS:  BP (!) 106/56   Pulse 80   Temp 98.7 °F (37.1 °C) (Oral)   Resp 18   Ht 5' 7\" (1.702 m)   Wt 195 lb (88.5 kg)   SpO2 95%   BMI 30.54 kg/m² . Nursing note and vitals reviewed. Constitutional: Alert and oriented times x3, no acute distress, and cooperative to examination with appropriate mood and affect. HEENT:   Head:         Normocephalic and atraumatic. Mouth/Throat:          Mucous membranes are normal.   Eyes:         EOM are normal. No scleral icterus. Nose:    The external appearance of the nose is normal  Ears: The ears appear normal to external inspection. Cardiovascular:       Normal rate, regular rhythm. Pulmonary/Chest:  Normal respiratory rate and rhthym. No use of accessory muscles. Lungs clear bilaterally. Abdominal:          Soft. No tenderness. Active bowel sounds             Genitalia:    Normal uncircumcised penis. Urethral jeffers in place draining yellow urine. String stent in place. SPT draining yellow urine  Urethral meatus is normal in size and location  Scrotal contents normal to inspection and palpation   Normal testes palpated bilaterally  Musculoskeletal:    Normal range of motion. He exhibits no edema or tenderness of lower extremities. Extremities:    No cyanosis, clubbing, or edema present. Neurological:    Alert and oriented.      DATA:  CBC:   Lab Results   Component Value Date/Time    WBC 7.6 08/06/2022 05:35 AM    RBC 3.93 08/06/2022 05:35 AM    RBC 4.20 01/20/2012 09:45 AM    HGB 11.3 08/06/2022 05:35 AM    HCT 37.8 08/06/2022 05:35 AM    MCV 96.2 08/06/2022 05:35 AM    MCH 28.8 08/06/2022 05:35 AM    MCHC 29.9 08/06/2022 05:35 AM    RDW 16.6 06/21/2019 04:40 AM     08/06/2022 05:35 AM    MPV 9.0 08/06/2022 05:35 AM     BMP:    Lab Results   Component Value Date/Time     08/06/2022 05:35 AM    K 4.2 08/06/2022 05:35 AM     08/06/2022 05:35 AM    CO2 26 08/06/2022 05:35 AM    BUN 21 08/06/2022 05:35 AM    CREATININE 0.2 08/06/2022 05:35 AM    CALCIUM 8.8 08/06/2022 05:35 AM    LABGLOM >90 08/06/2022 05:35 AM    GLUCOSE 93 08/06/2022 05:35 AM    GLUCOSE 105 06/25/2019 04:12 AM     BUN/Creatinine:    Lab Results   Component Value Date/Time    BUN 21 08/06/2022 05:35 AM    CREATININE 0.2 08/06/2022 05:35 AM     Magnesium:    Lab Results   Component Value Date/Time    MG 1.8 07/27/2022 05:33 AM     Phosphorus:    Lab Results   Component Value Date/Time    PHOS 3.4 06/15/2019 04:39 AM     PT/INR:    Lab Results   Component Value Date/Time    INR 1.15 07/25/2022 01:29 AM     U/A:    Lab Results   Component Value Date/Time    COLORU YELLOW 08/05/2022 06:40 PM    PHUR 5.5 08/05/2022 06:40 PM    LABCAST 0-4 HYALINE 04/22/2022 11:30 PM    LABCAST NONE SEEN 04/22/2022 11:30 PM    WBCUA > 200 08/05/2022 06:40 PM    WBCUA >200 01/20/2012 09:00 AM    RBCUA 50-75 08/05/2022 06:40 PM    MUCUS NONE SEEN 12/27/2020 09:50 AM    YEAST FEW BUDDING 08/05/2022 06:40 PM    BACTERIA NONE SEEN 08/05/2022 06:40 PM    CLARITYU Clear 07/05/2019 12:00 AM    SPECGRAV >1.030 04/22/2022 11:30 PM    LEUKOCYTESUR LARGE 08/05/2022 06:40 PM    UROBILINOGEN 0.2 08/05/2022 06:40 PM    BILIRUBINUR NEGATIVE 08/05/2022 06:40 PM    BILIRUBINUR NEGATIVE 01/20/2012 09:00 AM    BLOODU LARGE 08/05/2022 06:40 PM    GLUCOSEU NEGATIVE 08/05/2022 06:40 PM    AMORPHOUS PHOSPHATES 07/20/2022 09:40 AM         IMPRESSION/Plan:    Ok to restart anticoags - expect hematuria with ureteral stent in place  Discharged with urethral jeffers and SPT in place  Ok to remove string stent and urethral jeffers as outpatient - [facility] has instructions    Hematuria - resolved. Urine is yellow in SPT and urethral jeffers.   Expect hematuria with ureteral stent in place  SOB - resolved  UTI - ID on case    Ok for discharge from urology standpoint    Thank you for including us in the care of Fabi Debora 134, APRN - CNP, APRN  08/06/22 10:27 AM  Urology

## 2022-08-06 NOTE — PROGRESS NOTES
Hospitalist Progress Note    Patient:  Denys Green      Unit/Bed:8A-11/011-A    YOB: 1955    MRN: [id number]       Acct: [de-identified]     PCP: Richard Kumar MD    Date of Admission: 8/5/2022    Active Hospital Problems    Diagnosis Date Noted    Hypoxia [R09.02] 08/06/2022     Priority: Medium    ESBL (extended spectrum beta-lactamase) producing bacteria infection [A49.9, Z16.12] 08/06/2022     Priority: Medium    Shortness of breath [R06.02] 08/05/2022     Priority: Medium     Assessment/Plan:    Acute Hypoxic Resp. Failure Post Ureteral Stent and Cystoscopy Procedure: Pulse Ox 88% on RA, improved on NC 2L. Weaned to RA currently. Low suspicion for PNA as patient remains afebrile and without leukocytosis. Elevated procalcitonin likely due to recent cystoscopy, laser litho and stent replacement. 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 Improving on IV Meropenem. ID following. Complicated Jeffers Catheter Associated UTI: prior history of ESBL UTI. UA + LE and WBC. Cont IV Meropenem. ID on board. Hx of Obstructing Left Nephrolithiasis s/p Cystoscopy, Laser Lithotripsy and Ureteral Stent Placement on 8/4/22: Urology consulted, recommending remove string stent and urethral jeffers as an outpatient   Gross Hematuria: Urine is yellow in SPT and urethral jeffers. Expect hematuria with ureteral stent in place  Neurogenic bladder secondary to multiple sclerosis has chronic indwelling catheter. Wound care for care site. Stage IV decubitus ulcer. Wound care for care site. Chief Complaint: sob     Hospital Course:  Denys Green is a 74 y.o. male with PMHx of multiple sclerosis, neurogenic bladder, and oropharyngeal dysphagia who presents to [address] [facility] [address] from [facility] due to shortness of breath.   Patient had underwent a cystoscopy, laser lithotripsy and stent placement yesterday by urology. Was recently in the hospital where he was treated for multidrug-resistant ESBL E. coli bacteremia and UTI. Chronically colonized multidrug-resistant bacteria. Patient reports that his shortness of breath occurred suddenly this morning without preceding event. He denies any fevers, cough, CP, abdominal pain. Patient reportedly was found to have an O2 sat of 88% on room air when approached by EMS. Was placed on 2 L of O2 via NC. Patient does not appear in distress and states that he feels fine     ED course: Vitals on arrival showed that the patient was afebrile, nontachypneic, no tachycardia and normotensive. No leukocytosis. D-dimer elevated at 1500 with subsequent CTA chest negative for PE. The CTA did report possible atelectasis or infiltrate at the left lung base. Pneumonia was suspected and patient was given a loading dose of Maxipime and Levaquin. Subjective: no acute events overnight. Patient reports resolution of his sob this morning. No fevers, chills, flank pain. Bloody urine improving.        Medications:  Reviewed    Infusion Medications    sodium chloride      dextrose       Scheduled Medications    sodium chloride flush  5-40 mL IntraVENous 2 times per day    meropenem  1,000 mg IntraVENous Q8H    amLODIPine  10 mg Oral Daily    aspirin  81 mg Oral Daily    baclofen  10 mg Oral TID    carvedilol  12.5 mg Oral BID WC    docusate sodium  100 mg Oral Daily    famotidine  20 mg Oral BID    gabapentin  600 mg Oral TID    oxybutynin  5 mg Oral BID    potassium chloride  40 mEq Oral BID    sodium chloride  1 g Oral BID WC    vitamin A  20,000 Units Oral Daily    vitamin C  500 mg Oral BID    vitamin E  400 Units Oral Daily     PRN Meds: sodium chloride flush, sodium chloride, ondansetron **OR** ondansetron, polyethylene glycol, acetaminophen **OR** acetaminophen, magnesium sulfate, potassium chloride **OR** potassium alternative oral replacement **OR** potassium chloride, glucose, dextrose bolus **OR** dextrose bolus, glucagon (rDNA), dextrose, melatonin      Intake/Output Summary (Last 24 hours) at 8/6/2022 1609  Last data filed at 8/6/2022 1234  Gross per 24 hour   Intake 950 ml   Output 760 ml   Net 190 ml       Diet:  ADULT DIET; Regular; Low Sodium (2 gm)    Exam:  BP (!) 100/58   Pulse 77   Temp 98.4 °F (36.9 °C) (Oral)   Resp 16   Ht 5' 7\" (1.702 m)   Wt 195 lb (88.5 kg)   SpO2 94%   BMI 30.54 kg/m²     General appearance: No apparent distress. Chronically ill appearing. Movement limited. Eyes:  Conjunctivae/corneas clear. HENT: Head normal in appearance. External nares normal.  Oral mucosa moist without lesions. Hearing grossly intact. Neck: Supple, with full range of motion. Trachea midline. No gross JVD appreciated. Respiratory:  Normal respiratory effort. Clear to auscultation, bilaterally without rales or wheezes or rhonchi. Cardiovascular: Normal rate, regular rhythm with normal S1/S2 without murmurs. No lower extremity edema. Abdomen: Soft, non-tender, non-distended with normal bowel sounds. Musculoskeletal: No joint swelling or tenderness. Normal tone. No abnormal movements. Skin: Warm and dry. No rashes or lesions. Psychiatric: Alert and oriented  Capillary Refill: Brisk,< 3 seconds. Peripheral Pulses: +2 palpable, equal bilaterally. Labs:   Recent Labs     08/06/22  0535   WBC 7.6   HGB 11.3*   HCT 37.8*        Recent Labs     08/06/22  0535      K 4.2      CO2 26   BUN 21   CREATININE 0.2*   CALCIUM 8.8     Recent Labs     08/05/22  1059   AST 77*   ALT 59   BILIDIR <0.2   BILITOT 0.4   ALKPHOS 178*     No results for input(s): INR in the last 72 hours. No results for input(s): Denisha Risk in the last 72 hours.     Urinalysis:      Lab Results   Component Value Date/Time    NITRU NEGATIVE 08/05/2022 06:40 PM    WBCUA > 200 08/05/2022 06:40 PM    WBCUA >200 01/20/2012 09:00 AM    BACTERIA NONE SEEN 08/05/2022 06:40 PM    RBCUA 50-75 08/05/2022 06:40 PM    BLOODU LARGE 08/05/2022 06:40 PM    SPECGRAV >1.030 04/22/2022 11:30 PM    GLUCOSEU NEGATIVE 08/05/2022 06:40 PM       Radiology: All imaging reviewed     Diet: ADULT DIET; Regular;  Low Sodium (2 gm)      Code Status: Full Code            Electronically signed by Tyree Messina MD on 8/6/2022 at 4:09 PM

## 2022-08-07 LAB
ANION GAP SERPL CALCULATED.3IONS-SCNC: 9 MEQ/L (ref 8–16)
BASOPHILS # BLD: 1.2 %
BASOPHILS ABSOLUTE: 0.1 THOU/MM3 (ref 0–0.1)
BUN BLDV-MCNC: 18 MG/DL (ref 7–22)
CALCIUM SERPL-MCNC: 9 MG/DL (ref 8.5–10.5)
CHLORIDE BLD-SCNC: 105 MEQ/L (ref 98–111)
CO2: 26 MEQ/L (ref 23–33)
CREAT SERPL-MCNC: 0.2 MG/DL (ref 0.4–1.2)
EOSINOPHIL # BLD: 4.2 %
EOSINOPHILS ABSOLUTE: 0.2 THOU/MM3 (ref 0–0.4)
ERYTHROCYTE [DISTWIDTH] IN BLOOD BY AUTOMATED COUNT: 15.7 % (ref 11.5–14.5)
ERYTHROCYTE [DISTWIDTH] IN BLOOD BY AUTOMATED COUNT: 55 FL (ref 35–45)
GFR SERPL CREATININE-BSD FRML MDRD: > 90 ML/MIN/1.73M2
GLUCOSE BLD-MCNC: 90 MG/DL (ref 70–108)
HCT VFR BLD CALC: 40.4 % (ref 42–52)
HEMOGLOBIN: 12 GM/DL (ref 14–18)
IMMATURE GRANS (ABS): 0.02 THOU/MM3 (ref 0–0.07)
IMMATURE GRANULOCYTES: 0.4 %
LYMPHOCYTES # BLD: 27.6 %
LYMPHOCYTES ABSOLUTE: 1.4 THOU/MM3 (ref 1–4.8)
MAGNESIUM: 2.1 MG/DL (ref 1.6–2.4)
MCH RBC QN AUTO: 28.6 PG (ref 26–33)
MCHC RBC AUTO-ENTMCNC: 29.7 GM/DL (ref 32.2–35.5)
MCV RBC AUTO: 96.2 FL (ref 80–94)
MONOCYTES # BLD: 11.4 %
MONOCYTES ABSOLUTE: 0.6 THOU/MM3 (ref 0.4–1.3)
NUCLEATED RED BLOOD CELLS: 0 /100 WBC
PLATELET # BLD: 251 THOU/MM3 (ref 130–400)
PMV BLD AUTO: 9.1 FL (ref 9.4–12.4)
POTASSIUM SERPL-SCNC: 4.5 MEQ/L (ref 3.5–5.2)
RBC # BLD: 4.2 MILL/MM3 (ref 4.7–6.1)
SEG NEUTROPHILS: 55.2 %
SEGMENTED NEUTROPHILS ABSOLUTE COUNT: 2.9 THOU/MM3 (ref 1.8–7.7)
SODIUM BLD-SCNC: 140 MEQ/L (ref 135–145)
WBC # BLD: 5.2 THOU/MM3 (ref 4.8–10.8)

## 2022-08-07 PROCEDURE — 6370000000 HC RX 637 (ALT 250 FOR IP): Performed by: STUDENT IN AN ORGANIZED HEALTH CARE EDUCATION/TRAINING PROGRAM

## 2022-08-07 PROCEDURE — 6360000002 HC RX W HCPCS: Performed by: STUDENT IN AN ORGANIZED HEALTH CARE EDUCATION/TRAINING PROGRAM

## 2022-08-07 PROCEDURE — 36415 COLL VENOUS BLD VENIPUNCTURE: CPT

## 2022-08-07 PROCEDURE — 99232 SBSQ HOSP IP/OBS MODERATE 35: CPT | Performed by: INTERNAL MEDICINE

## 2022-08-07 PROCEDURE — 2580000003 HC RX 258: Performed by: STUDENT IN AN ORGANIZED HEALTH CARE EDUCATION/TRAINING PROGRAM

## 2022-08-07 PROCEDURE — 83735 ASSAY OF MAGNESIUM: CPT

## 2022-08-07 PROCEDURE — 1200000000 HC SEMI PRIVATE

## 2022-08-07 PROCEDURE — 80048 BASIC METABOLIC PNL TOTAL CA: CPT

## 2022-08-07 PROCEDURE — 85025 COMPLETE CBC W/AUTO DIFF WBC: CPT

## 2022-08-07 RX ADMIN — DOCUSATE SODIUM 100 MG: 100 CAPSULE, LIQUID FILLED ORAL at 09:34

## 2022-08-07 RX ADMIN — FAMOTIDINE 20 MG: 20 TABLET ORAL at 09:40

## 2022-08-07 RX ADMIN — POTASSIUM CHLORIDE 40 MEQ: 20 TABLET, EXTENDED RELEASE ORAL at 09:35

## 2022-08-07 RX ADMIN — MEROPENEM 1000 MG: 1 INJECTION, POWDER, FOR SOLUTION INTRAVENOUS at 12:25

## 2022-08-07 RX ADMIN — Medication 400 UNITS: at 13:14

## 2022-08-07 RX ADMIN — ASPIRIN 81 MG: 81 TABLET ORAL at 09:34

## 2022-08-07 RX ADMIN — SODIUM CHLORIDE 1 G: 1 TABLET ORAL at 09:35

## 2022-08-07 RX ADMIN — OXYCODONE HYDROCHLORIDE AND ACETAMINOPHEN 500 MG: 500 TABLET ORAL at 20:27

## 2022-08-07 RX ADMIN — SODIUM CHLORIDE, PRESERVATIVE FREE 5 ML: 5 INJECTION INTRAVENOUS at 09:38

## 2022-08-07 RX ADMIN — BACLOFEN 10 MG: 10 TABLET ORAL at 09:34

## 2022-08-07 RX ADMIN — OXYBUTYNIN CHLORIDE 5 MG: 5 TABLET, EXTENDED RELEASE ORAL at 20:27

## 2022-08-07 RX ADMIN — MEROPENEM 1000 MG: 1 INJECTION, POWDER, FOR SOLUTION INTRAVENOUS at 03:12

## 2022-08-07 RX ADMIN — SODIUM CHLORIDE, PRESERVATIVE FREE 10 ML: 5 INJECTION INTRAVENOUS at 20:26

## 2022-08-07 RX ADMIN — BACLOFEN 10 MG: 10 TABLET ORAL at 13:54

## 2022-08-07 RX ADMIN — MEROPENEM 1000 MG: 1 INJECTION, POWDER, FOR SOLUTION INTRAVENOUS at 20:18

## 2022-08-07 RX ADMIN — Medication 6 MG: at 09:37

## 2022-08-07 RX ADMIN — SODIUM CHLORIDE 1 G: 1 TABLET ORAL at 17:09

## 2022-08-07 RX ADMIN — OXYBUTYNIN CHLORIDE 5 MG: 5 TABLET, EXTENDED RELEASE ORAL at 09:34

## 2022-08-07 RX ADMIN — CARVEDILOL 12.5 MG: 6.25 TABLET, FILM COATED ORAL at 17:09

## 2022-08-07 RX ADMIN — BACLOFEN 10 MG: 10 TABLET ORAL at 20:27

## 2022-08-07 RX ADMIN — GABAPENTIN 600 MG: 600 TABLET, FILM COATED ORAL at 13:54

## 2022-08-07 RX ADMIN — GABAPENTIN 600 MG: 600 TABLET, FILM COATED ORAL at 09:35

## 2022-08-07 RX ADMIN — OXYCODONE HYDROCHLORIDE AND ACETAMINOPHEN 500 MG: 500 TABLET ORAL at 09:35

## 2022-08-07 RX ADMIN — GABAPENTIN 600 MG: 600 TABLET, FILM COATED ORAL at 20:27

## 2022-08-07 RX ADMIN — CARVEDILOL 12.5 MG: 6.25 TABLET, FILM COATED ORAL at 09:34

## 2022-08-07 RX ADMIN — FAMOTIDINE 20 MG: 20 TABLET ORAL at 20:27

## 2022-08-07 RX ADMIN — AMLODIPINE BESYLATE 10 MG: 10 TABLET ORAL at 09:34

## 2022-08-07 RX ADMIN — POTASSIUM CHLORIDE 40 MEQ: 20 TABLET, EXTENDED RELEASE ORAL at 20:27

## 2022-08-07 ASSESSMENT — PAIN SCALES - WONG BAKER
WONGBAKER_NUMERICALRESPONSE: 0

## 2022-08-07 ASSESSMENT — PAIN SCALES - GENERAL
PAINLEVEL_OUTOF10: 0

## 2022-08-07 NOTE — OP NOTE
kidney under fluoroscopic guidance. .    The flexible ureteroscope was assembled, place over one Glidewire, and advanced into the ureter carefully under fluoroscopy. The second wire remained in place as a safety. we were able to advance our scope up to the stone without difficulty. The stone was located in the midureter. We used a 200 micron laser to fragment all stones into sub 1-2 mm fragments for easy passage and clearance            Repeat nephroscopy and ureteroscopy demonstrated no further stone fragments. In addition, there were no papillary lesions within the kidney, or erythematous patches concerning for malignant disease. With the safety wire in place, the ureteroscope was slowly retracted down the ureter. The entire ureter was surveyed. There was no evidence of stricture, stone disease, ureteral trauma, or papillary lesion. To place the stent, Under flouroscopic visualization the stent was advanced until it was in proper location. The Glidewire was then removed. A curl could be seen in the Left renal pelvis under using fluoroscopic vision, and in the bladder under direct visualization. A string was left on the stent. A jeffers was placed. Both can be removed in about one week. The sp tube was exchanged     The patients bladder was drained. All instrumentation was removed. The patient was then awakened and discharged back to the PACU in good and stable condition.

## 2022-08-07 NOTE — PROGRESS NOTES
Hospitalist Progress Note    Patient:  Erna Smith      Unit/Bed:8A-11/011-A    YOB: 1955    MRN: [id number]       Acct: [de-identified]     PCP: Nils Lemus MD    Date of Admission: 8/5/2022    Active Hospital Problems    Diagnosis Date Noted    Hypoxia [R09.02] 08/06/2022     Priority: Medium    ESBL (extended spectrum beta-lactamase) producing bacteria infection [A49.9, Z16.12] 08/06/2022     Priority: Medium    Shortness of breath [R06.02] 08/05/2022     Priority: Medium     Assessment/Plan:    Acute Hypoxic Resp. Failure Post Ureteral Stent and Cystoscopy Procedure: Pulse Ox 88% on RA, improved on NC 2L. Weaned to RA currently. Low suspicion for PNA as patient remains afebrile and without leukocytosis. Elevated procalcitonin likely due to recent cystoscopy, laser litho and stent replacement. 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 Improving on IV Meropenem. ID following. Complicated Jeffers Catheter Associated UTI: prior history of ESBL UTI. UA + LE and WBC. Cont IV Meropenem. ID on board. Hx of Obstructing Left Nephrolithiasis s/p Cystoscopy, Laser Lithotripsy and Ureteral Stent Placement on 8/4/22: Urology consulted, recommending remove string stent and urethral jeffers as an outpatient   Gross Hematuria: Urine is yellow in SPT and urethral jeffers. Expect hematuria with ureteral stent in place  Neurogenic bladder secondary to multiple sclerosis has chronic indwelling catheter. Wound care for care site. Stage IV decubitus ulcer. Wound care for care site. Dispo: patient came from [facility], will need to go back to [facility].  Consult PT/OT and SW.       Chief Complaint: sob     Hospital Course:  Erna Smith is a 74 y.o. male with PMHx of multiple sclerosis, neurogenic bladder, and oropharyngeal dysphagia who presents to [address] [place] [address] from [facility] due to shortness of breath. Patient had underwent a cystoscopy, laser lithotripsy and stent placement yesterday by urology. Was recently in the hospital where he was treated for multidrug-resistant ESBL E. coli bacteremia and UTI. Chronically colonized multidrug-resistant bacteria. Patient reports that his shortness of breath occurred suddenly this morning without preceding event. He denies any fevers, cough, CP, abdominal pain. Patient reportedly was found to have an O2 sat of 88% on room air when approached by EMS. Was placed on 2 L of O2 via NC. Patient does not appear in distress and states that he feels fine     ED course: Vitals on arrival showed that the patient was afebrile, nontachypneic, no tachycardia and normotensive. No leukocytosis. D-dimer elevated at 1500 with subsequent CTA chest negative for PE. The CTA did report possible atelectasis or infiltrate at the left lung base. Pneumonia was suspected and patient was given a loading dose of Maxipime and Levaquin. Subjective: no acute events overnight. Patient reports resolution of his sob this morning. No fevers, chills, flank pain. Bloody urine improving. Overall reports feeling much better.        Medications:  Reviewed    Infusion Medications    sodium chloride      dextrose       Scheduled Medications    sodium chloride flush  5-40 mL IntraVENous 2 times per day    meropenem  1,000 mg IntraVENous Q8H    amLODIPine  10 mg Oral Daily    aspirin  81 mg Oral Daily    baclofen  10 mg Oral TID    carvedilol  12.5 mg Oral BID     docusate sodium  100 mg Oral Daily    famotidine  20 mg Oral BID    gabapentin  600 mg Oral TID    oxybutynin  5 mg Oral BID    potassium chloride  40 mEq Oral BID    sodium chloride  1 g Oral BID     vitamin A  20,000 Units Oral Daily    vitamin C  500 mg Oral BID    vitamin E  400 Units Oral Daily     PRN Meds: sodium chloride flush, sodium chloride, ondansetron **OR** ondansetron, polyethylene glycol, acetaminophen **OR** acetaminophen, magnesium sulfate, potassium chloride **OR** potassium alternative oral replacement **OR** potassium chloride, glucose, dextrose bolus **OR** dextrose bolus, glucagon (rDNA), dextrose, melatonin      Intake/Output Summary (Last 24 hours) at 8/7/2022 1313  Last data filed at 8/7/2022 1210  Gross per 24 hour   Intake 490 ml   Output --   Net 490 ml         Diet:  ADULT DIET; Regular; Low Sodium (2 gm)    Exam:  BP (!) 112/56   Pulse 73   Temp 97.8 °F (36.6 °C) (Oral)   Resp 18   Ht 5' 7\" (1.702 m)   Wt 195 lb (88.5 kg)   SpO2 96%   BMI 30.54 kg/m²     General appearance: No apparent distress. Chronically ill appearing. Movement limited. Eyes:  Conjunctivae/corneas clear. HENT: Head normal in appearance. External nares normal.  Oral mucosa moist without lesions. Hearing grossly intact. Neck: Supple, with full range of motion. Trachea midline. No gross JVD appreciated. Respiratory:  Normal respiratory effort. Clear to auscultation, bilaterally without rales or wheezes or rhonchi. Cardiovascular: Normal rate, regular rhythm with normal S1/S2 without murmurs. No lower extremity edema. Abdomen: Soft, non-tender, non-distended with normal bowel sounds. Musculoskeletal: No joint swelling or tenderness. Normal tone. No abnormal movements. Skin: Warm and dry. No rashes or lesions. Psychiatric: Alert and oriented  Capillary Refill: Brisk,< 3 seconds. Peripheral Pulses: +2 palpable, equal bilaterally. Labs:   Recent Labs     08/07/22  0545   WBC 5.2   HGB 12.0*   HCT 40.4*          Recent Labs     08/07/22  0545      K 4.5      CO2 26   BUN 18   CREATININE 0.2*   CALCIUM 9.0       Recent Labs     08/05/22  1059   AST 77*   ALT 59   BILIDIR <0.2   BILITOT 0.4   ALKPHOS 178*       No results for input(s): INR in the last 72 hours. No results for input(s): Sandra Parisian in the last 72 hours.     Urinalysis:      Lab Results   Component Value Date/Time    NITRU NEGATIVE 08/05/2022 06:40 PM    WBCUA > 200 08/05/2022 06:40 PM    WBCUA >200 01/20/2012 09:00 AM    BACTERIA NONE SEEN 08/05/2022 06:40 PM    RBCUA 50-75 08/05/2022 06:40 PM    BLOODU LARGE 08/05/2022 06:40 PM    SPECGRAV >1.030 04/22/2022 11:30 PM    GLUCOSEU NEGATIVE 08/05/2022 06:40 PM       Radiology: All imaging reviewed     Diet: ADULT DIET; Regular;  Low Sodium (2 gm)      Code Status: Full Code            Electronically signed by Kasi Jules MD on 8/7/2022 at 1:13 PM

## 2022-08-08 LAB
ORGANISM: ABNORMAL
URINE CULTURE REFLEX: ABNORMAL
URINE CULTURE REFLEX: ABNORMAL

## 2022-08-08 PROCEDURE — 99232 SBSQ HOSP IP/OBS MODERATE 35: CPT | Performed by: INTERNAL MEDICINE

## 2022-08-08 PROCEDURE — 1200000000 HC SEMI PRIVATE

## 2022-08-08 PROCEDURE — 6370000000 HC RX 637 (ALT 250 FOR IP): Performed by: STUDENT IN AN ORGANIZED HEALTH CARE EDUCATION/TRAINING PROGRAM

## 2022-08-08 PROCEDURE — 6360000002 HC RX W HCPCS: Performed by: STUDENT IN AN ORGANIZED HEALTH CARE EDUCATION/TRAINING PROGRAM

## 2022-08-08 PROCEDURE — 2580000003 HC RX 258: Performed by: STUDENT IN AN ORGANIZED HEALTH CARE EDUCATION/TRAINING PROGRAM

## 2022-08-08 RX ADMIN — Medication 6 MG: at 09:17

## 2022-08-08 RX ADMIN — OXYCODONE HYDROCHLORIDE AND ACETAMINOPHEN 500 MG: 500 TABLET ORAL at 09:17

## 2022-08-08 RX ADMIN — ASPIRIN 81 MG: 81 TABLET ORAL at 09:17

## 2022-08-08 RX ADMIN — Medication 400 UNITS: at 09:17

## 2022-08-08 RX ADMIN — POTASSIUM CHLORIDE 40 MEQ: 20 TABLET, EXTENDED RELEASE ORAL at 09:18

## 2022-08-08 RX ADMIN — CARVEDILOL 12.5 MG: 6.25 TABLET, FILM COATED ORAL at 17:30

## 2022-08-08 RX ADMIN — GABAPENTIN 600 MG: 600 TABLET, FILM COATED ORAL at 09:19

## 2022-08-08 RX ADMIN — MEROPENEM 1000 MG: 1 INJECTION, POWDER, FOR SOLUTION INTRAVENOUS at 10:05

## 2022-08-08 RX ADMIN — SODIUM CHLORIDE, PRESERVATIVE FREE 10 ML: 5 INJECTION INTRAVENOUS at 09:18

## 2022-08-08 RX ADMIN — AMLODIPINE BESYLATE 10 MG: 10 TABLET ORAL at 09:17

## 2022-08-08 RX ADMIN — GABAPENTIN 600 MG: 600 TABLET, FILM COATED ORAL at 20:17

## 2022-08-08 RX ADMIN — OXYCODONE HYDROCHLORIDE AND ACETAMINOPHEN 500 MG: 500 TABLET ORAL at 20:17

## 2022-08-08 RX ADMIN — POTASSIUM CHLORIDE 40 MEQ: 20 TABLET, EXTENDED RELEASE ORAL at 20:18

## 2022-08-08 RX ADMIN — CARVEDILOL 12.5 MG: 6.25 TABLET, FILM COATED ORAL at 09:17

## 2022-08-08 RX ADMIN — SODIUM CHLORIDE 1 G: 1 TABLET ORAL at 17:30

## 2022-08-08 RX ADMIN — MEROPENEM 1000 MG: 1 INJECTION, POWDER, FOR SOLUTION INTRAVENOUS at 03:06

## 2022-08-08 RX ADMIN — BACLOFEN 10 MG: 10 TABLET ORAL at 09:17

## 2022-08-08 RX ADMIN — BACLOFEN 10 MG: 10 TABLET ORAL at 20:17

## 2022-08-08 RX ADMIN — GABAPENTIN 600 MG: 600 TABLET, FILM COATED ORAL at 14:37

## 2022-08-08 RX ADMIN — SODIUM CHLORIDE, PRESERVATIVE FREE 10 ML: 5 INJECTION INTRAVENOUS at 20:18

## 2022-08-08 RX ADMIN — DOCUSATE SODIUM 100 MG: 100 CAPSULE, LIQUID FILLED ORAL at 09:17

## 2022-08-08 RX ADMIN — FAMOTIDINE 20 MG: 20 TABLET ORAL at 20:17

## 2022-08-08 RX ADMIN — MEROPENEM 1000 MG: 1 INJECTION, POWDER, FOR SOLUTION INTRAVENOUS at 20:10

## 2022-08-08 RX ADMIN — BACLOFEN 10 MG: 10 TABLET ORAL at 14:37

## 2022-08-08 RX ADMIN — FAMOTIDINE 20 MG: 20 TABLET ORAL at 09:17

## 2022-08-08 RX ADMIN — OXYBUTYNIN CHLORIDE 5 MG: 5 TABLET, EXTENDED RELEASE ORAL at 20:17

## 2022-08-08 RX ADMIN — OXYBUTYNIN CHLORIDE 5 MG: 5 TABLET, EXTENDED RELEASE ORAL at 09:17

## 2022-08-08 RX ADMIN — SODIUM CHLORIDE 1 G: 1 TABLET ORAL at 09:17

## 2022-08-08 ASSESSMENT — PAIN SCALES - GENERAL
PAINLEVEL_OUTOF10: 0

## 2022-08-08 ASSESSMENT — PAIN SCALES - WONG BAKER
WONGBAKER_NUMERICALRESPONSE: 0

## 2022-08-08 NOTE — PROGRESS NOTES
Urology Progress Note    Reason for Consult:  Gross hematuria after cysto/laser litho/stent replacement  Requesting Physician:  medicine     History Obtained From:  patient, EMR, nurse     Chief Complaint: SOB    Subjective: \"    Patient is resting in bed, voiding yellow urine via jeffers catheter and SPT, +flatus, +BM, ambulating with assistance, tolerating regular diet, denies any nausea or vomiting. There are complaints of no pain at this time.     ASA has been restarted, urine is yellow          Vitals:  BP (!) 101/51   Pulse 77   Temp 97.7 °F (36.5 °C)   Resp 16   Ht 5' 7\" (1.702 m)   Wt 200 lb (90.7 kg)   SpO2 93%   BMI 31.32 kg/m²   Temp  Av.7 °F (36.5 °C)  Min: 97.5 °F (36.4 °C)  Max: 97.8 °F (36.6 °C)    Intake/Output Summary (Last 24 hours) at 2022 0842  Last data filed at 2022 0549  Gross per 24 hour   Intake 448.73 ml   Output 1700 ml   Net -1251.27 ml       Social History     Socioeconomic History    Marital status:      Spouse name: [facility]    Number of children: 2    Years of education: Not on file    Highest education level: Not on file   Occupational History    Not on file   Tobacco Use    Smoking status: Former     Types: Cigarettes     Quit date: 1982     Years since quittin.6    Smokeless tobacco: Former   Vaping Use    Vaping Use: Never used   Substance and Sexual Activity    Alcohol use: No     Comment: \"quit alcohol a few years ago\"    Drug use: No    Sexual activity: Yes     Partners: Female   Other Topics Concern    Not on file   Social History Narrative    Not on file     Social Determinants of Health     Financial Resource Strain: Not on file   Food Insecurity: Not on file   Transportation Needs: Not on file   Physical Activity: Not on file   Stress: Not on file   Social Connections: Not on file   Intimate Partner Violence: Not on file   Housing Stability: Not on file     Family History   Problem Relation Age of Onset    Cancer Mother         Breast Heart Disease Father 48    Arthritis Sister     Heart Disease Paternal Grandmother 48     No Known Allergies      Constitutional: Alert and oriented times x3, no acute distress, and cooperative to examination with appropriate mood and affect. HEENT:   Head:              Normocephalic and atraumatic. Mouth/Throat:               Mucous membranes are normal.  Eyes:              EOM are normal. No scleral icterus. Nose:              The external appearance of the nose is normal  Ears: The ears appear normal to external inspection. Cardiovascular:       Normal rate, regular rhythm. Pulmonary/Chest:  Normal respiratory rate and rhthym. No use of accessory muscles. Lungs clear bilaterally. Abdominal:              Soft. No tenderness. Active bowel sounds             Genitalia:               Normal uncircumcised penis. Urethral jeffers in place draining yellow urine. String stent in place. SPT draining yellow urine  Urethral meatus is normal in size and location  Scrotal contents normal to inspection and palpation  Normal testes palpated bilaterally  Musculoskeletal:              Normal range of motion. He exhibits no edema or tenderness of lower extremities. Extremities:              No cyanosis, clubbing, or edema present. Neurological:              Alert and oriented.     Labs:  WBC:    Lab Results   Component Value Date/Time    WBC 5.2 08/07/2022 05:45 AM     Hemoglobin/Hematocrit:    Lab Results   Component Value Date/Time    HGB 12.0 08/07/2022 05:45 AM    HCT 40.4 08/07/2022 05:45 AM     BMP:    Lab Results   Component Value Date/Time     08/07/2022 05:45 AM    K 4.5 08/07/2022 05:45 AM    K 4.2 08/06/2022 05:35 AM     08/07/2022 05:45 AM    CO2 26 08/07/2022 05:45 AM    BUN 18 08/07/2022 05:45 AM    LABALBU 3.4 08/05/2022 10:59 AM    CREATININE 0.2 08/07/2022 05:45 AM    CALCIUM 9.0 08/07/2022 05:45 AM    LABGLOM >90 08/07/2022 05:45 AM           Impression/Plan:    Discharged with urethral jeffers and SPT in place  Ok to remove string stent and urethral jeffers as outpatient - [facility] has instructions     Hematuria - resolved. Urine is yellow in SPT and urethral jeffers.   Expect hematuria with ureteral stent in place  SOB - resolved  UTI - ID on case     Ok for discharge from urology standpoint  Urology signing off, please call with questions/concerns    TONO Jensen - CNP, TONO  08/08/22 8:42 AM  Urology

## 2022-08-08 NOTE — PROGRESS NOTES
[facility] [address]  PHYSICAL THERAPY MISSED TREATMENT NOTE  [facility]A    Date: 2022  Patient Name: Rafael Rivera        MRN: [id number]   : 1955  (74 y.o.)  Gender: male                REASON FOR MISSED TREATMENT:  Missed Treat. Discussion with pt and he stated was hoyerlift at [facility] and unable to sit EOB due to abdominal weakness. No PT goals identified so will discontinue at this time.  aware.

## 2022-08-08 NOTE — PROGRESS NOTES
[facility] THERAPY MISSED TREATMENT NOTE  [facility] 8A  8A-11/011-A      Date: 2022  Patient Name: Bo Garcia        CSN: [id number]   : 1955  (74 y.o.)  Gender: male                REASON FOR MISSED TREATMENT:  Pt from [facility] where he is a whit lift for transfers and has assistance for all ADL tasks. Pt requiring no skilled OT services at this time. Will discontinue OT orders.

## 2022-08-08 NOTE — CARE COORDINATION
08/08/22 0946   Readmission Assessment   Number of Days since last admission?  8-30 days   Previous Disposition SNF  (From [facility])

## 2022-08-08 NOTE — PLAN OF CARE
Problem: Discharge Planning  Goal: Discharge to home or other facility with appropriate resources  Outcome: Progressing  Flowsheets (Taken 8/8/2022 0912)  Discharge to home or other facility with appropriate resources: Identify barriers to discharge with patient and caregiver     Problem: Pain  Goal: Verbalizes/displays adequate comfort level or baseline comfort level  Outcome: Progressing  Flowsheets (Taken 8/8/2022 0912)  Verbalizes/displays adequate comfort level or baseline comfort level: Encourage patient to monitor pain and request assistance     Problem: Skin/Tissue Integrity  Goal: Absence of new skin breakdown  Description: 1. Monitor for areas of redness and/or skin breakdown  Outcome: Progressing     Problem: Skin/Tissue Integrity - Adult  Goal: Skin integrity remains intact  Outcome: Progressing     Problem: Skin/Tissue Integrity - Adult  Goal: Incisions, wounds, or drain sites healing without S/S of infection  Outcome: Progressing     Problem: Gastrointestinal - Adult  Goal: Establish and maintain optimal ostomy function  Outcome: Progressing  Flowsheets (Taken 8/8/2022 0912)  Establish and maintain optimal ostomy function: Monitor output from ostomies     Problem: Genitourinary - Adult  Goal: Urinary catheter remains patent  Outcome: Progressing  Flowsheets (Taken 8/8/2022 0912)  Urinary catheter remains patent: Assess patency of urinary catheter     Problem: Infection - Adult  Goal: Absence of infection at discharge  Outcome: Progressing  Flowsheets (Taken 8/8/2022 0912)  Absence of infection at discharge: Assess and monitor for signs and symptoms of infection    Care plan reviewed with patient. Patient verbalizes understanding of the plan of care and contribute to goal setting.

## 2022-08-08 NOTE — CARE COORDINATION
8/8/22, 9:33 AM EDT  DISCHARGE PLANNING EVALUATION:    Ignacio Steen       Admitted: 8/5/2022/ [address] day: 3   Location: 8A-11/011-A Reason for admit: Shortness of breath [R06.02]  Hypoxia [R09.02]  Pneumonia of lower lobe due to infectious organism, unspecified laterality [J18.9]   PMH:  has a past medical history of Dysphagia, History of pulmonary embolism, History of urinary tract infection, Hx of blood clots, Hypertension, Major depressive disorder, single episode, MS (multiple sclerosis) ([facility] Neurogenic bladder, Osteomyelitis ([facility] and UTI (urinary tract infection). Procedure:   8/5 CTA chest: 1. No evidence pulmonary embolism. 2. Mild cardiomegaly. 3. Elevated left hemidiaphragm. Atelectasis or infiltrate at the left lung base. 4. Thoracic spondylosis. 5. Splenomegaly. 8/5 CXR: Small left pleural effusion. Barriers to Discharge:  Presented with SOB. Hospitalist and ID following. Blood culture negative so far. Urine + ecoli, esbl. IV merrem. Ditropan. PCP: Miracle Cody MD  Readmission Risk Score: 28%  Patient's Healthcare Decision Maker: Legal Next of Kin-deferred to  as he is from [facility]. Patient Goals/Plan/Treatment Preferences: Noy Anderson is from [facility]. SW following. Transportation/Food Security/Housekeeping Addressed:  No issues identified.

## 2022-08-08 NOTE — PROGRESS NOTES
Progress note: Infectious diseases    Patient - Ryan Fam,  Age - 74 y.o.    - 1955      Room Number - 8A-11/011-A   MRN -  [id number]   Acct # - [de-identified]  Date of Admission -  2022 10:39 AM    SUBJECTIVE:   He has no new issues. OBJECTIVE   VITALS    height is 5' 7\" (1.702 m) and weight is 200 lb (90.7 kg). His temperature is 97.7 °F (36.5 °C). His blood pressure is 101/51 (abnormal) and his pulse is 77. His respiration is 16 and oxygen saturation is 93%. Wt Readings from Last 3 Encounters:   22 200 lb (90.7 kg)   22 194 lb 6.4 oz (88.2 kg)   22 201 lb (91.2 kg)       I/O (24 Hours)    Intake/Output Summary (Last 24 hours) at 2022 0909  Last data filed at 2022 0549  Gross per 24 hour   Intake 448.73 ml   Output 1700 ml   Net -1251.27 ml         General Appearance  Awake, alert, oriented,  chronically sick looking  HEENT - normocephalic, atraumatic, pale  conjunctiva,  anicteric sclera  Neck - Supple, no mass  Lungs -  Bilateral  air entry, no rhonchi, no wheeze. Cardiovascular - Heart sounds are normal.     Abdomen - soft, not distended, non tender.   Neurologic -awake and oriented  Skin - No bruising or bleeding  Extremities - contracted extremites    MEDICATIONS:      sodium chloride flush  5-40 mL IntraVENous 2 times per day    meropenem  1,000 mg IntraVENous Q8H    amLODIPine  10 mg Oral Daily    aspirin  81 mg Oral Daily    baclofen  10 mg Oral TID    carvedilol  12.5 mg Oral BID WC    docusate sodium  100 mg Oral Daily    famotidine  20 mg Oral BID    gabapentin  600 mg Oral TID    oxybutynin  5 mg Oral BID    potassium chloride  40 mEq Oral BID    sodium chloride  1 g Oral BID WC    vitamin A  20,000 Units Oral Daily    vitamin C  500 mg Oral BID    vitamin E  400 Units Oral Daily      sodium chloride      dextrose       sodium chloride flush, sodium chloride, ondansetron **OR** ondansetron, polyethylene glycol, acetaminophen **OR** acetaminophen, magnesium sulfate, potassium chloride **OR** potassium alternative oral replacement **OR** potassium chloride, glucose, dextrose bolus **OR** dextrose bolus, glucagon (rDNA), dextrose, melatonin      LABS:     CBC:   Recent Labs     08/05/22  1059 08/06/22  0535 08/07/22  0545   WBC 8.0 7.6 5.2   HGB 12.6* 11.3* 12.0*    226 251       BMP:    Recent Labs     08/05/22  1059 08/06/22  0535 08/07/22  0545    141 140   K 4.9 4.2 4.5    104 105   CO2 28 26 26   BUN 26* 21 18   CREATININE 0.4 0.2* 0.2*   GLUCOSE 132* 93 90       Calcium:  Recent Labs     08/07/22  0545   CALCIUM 9.0        Recent Labs     08/05/22  1059   ALKPHOS 178*   ALT 59   AST 77*   PROT 7.6   BILITOT 0.4   BILIDIR <0.2   LABALBU 3.4*        CULTURES:   UA:   Recent Labs     08/05/22  1840   PHUR 5.5   COLORU YELLOW   PROTEINU 100*   BLOODU LARGE*   RBCUA 50-75   WBCUA > 200   BACTERIA NONE SEEN   NITRU NEGATIVE   GLUCOSEU NEGATIVE   BILIRUBINUR NEGATIVE   UROBILINOGEN 0.2   KETUA TRACE*       Micro:   Lab Results   Component Value Date/Time    BC No growth 24 hours. No growth 48 hours. 08/05/2022 06:57 PM          Problem list of patient:     Patient Active Problem List   Diagnosis Code    Neurogenic bladder N31.9    Multiple sclerosis ([address] G35    MS (multiple sclerosis) ([address] G35    Urinary retention R33.9    Chronic suprapubic catheter ([address] Z93.59    Cystostomy malfunction ([address] N99.512    Decubitus ulcer of coccyx L89.159    Decubitus ulcer, stage 3 ([address] L89.93    Malnutrition ([address] E46    Decubitus ulcer of right perineal ischial region, stage 3 ([address] L89.313    Pulmonary embolism on left (HCC) I26.99    Acute metabolic encephalopathy J12.22    Speech disturbance R47.9    Infected decubitus ulcer, stage I L89.91, L08.9    Infected decubitus ulcer, stage III (HCC) L89.93, L08.9    Wound infection T14. 8XXA, L08.9    Elevated INR R79.1    Chronic osteomyelitis, pelvis, right (HCC) M86.651    Osteomyelitis (HCC) M86.9    Urinary tract infection associated with indwelling urethral catheter (HCC) T83.511A, N39.0    Abnormal liver function test R94.5    Chronic depression F32. A    Essential hypertension I10    History of pulmonary embolism Z86.711    Other dysphagia R13.19    Pressure injury of skin of back L89.109    Sepsis secondary to UTI ([address] A41.9, N39.0    Decubitus ulcer L89.90    Troponin level elevated R77.8    Anemia D64.9    Sepsis due to methicillin resistant Staphylococcus aureus (MRSA) (HCC) A41.02    Sepsis due to urinary tract infection (HCC) A41.9, N39.0    AMS (altered mental status) R41.82    Gross hematuria R31.0    Class 1 obesity due to excess calories without serious comorbidity with body mass index (BMI) of 31.0 to 31.9 in adult E66.09, Z68.31    Colostomy in place [facility]) Z93.3    Current use of long term anticoagulation Z79.01    Obstructive uropathy N12.8    Complicated urinary tract infection N39.0    Hypokalemia E87.6    Hypomagnesemia E83.42    Shortness of breath R06.02    Hypoxia R09.02    ESBL (extended spectrum beta-lactamase) producing bacteria infection A49.9, Z16.12         ASSESSMENT/PLAN   Shortness of breath improved. ?may be related to recent procedure with fluid adminstration  Blood cx remain so far negative,    Urine positive rof E.coli ESBL: on meropenem (less than 96232oat/ml) no symptoms at this time.   Continue current treatment        Yamileth López MD, MD, FACP 8/8/2022 9:09 AM

## 2022-08-08 NOTE — PROGRESS NOTES
Hospitalist Progress Note    Patient:  Lauro Ridley      Unit/Bed:8A-11/011-A    YOB: 1955    MRN: [id number]       Acct: [de-identified]     PCP: Amanda Barboza MD    Date of Admission: 8/5/2022    Active Hospital Problems    Diagnosis Date Noted    Hypoxia [R09.02] 08/06/2022     Priority: Medium    ESBL (extended spectrum beta-lactamase) producing bacteria infection [A49.9, Z16.12] 08/06/2022     Priority: Medium    Shortness of breath [R06.02] 08/05/2022     Priority: Medium     Assessment/Plan:    Acute Hypoxic Resp. Failure Post Ureteral Stent and Cystoscopy Procedure: Pulse Ox 88% on RA, improved on NC 2L. Weaned to RA currently. Low suspicion for PNA as patient remains afebrile and without leukocytosis. Elevated procalcitonin likely due to recent cystoscopy, laser litho and stent replacement. 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 Improving on IV Meropenem (day 3). ID following. Complicated Jeffers Catheter Associated UTI: prior history of ESBL UTI. UA + LE and WBC. Cont IV Meropenem (Day 3). ID on board. Hx of Obstructing Left Nephrolithiasis s/p Cystoscopy, Laser Lithotripsy and Ureteral Stent Placement on 8/4/22: Urology consulted, recommending remove string stent and urethral jeffers as an outpatient   Gross Hematuria: Urine is yellow in SPT and urethral jeffers. Expect hematuria with ureteral stent in place  Neurogenic bladder secondary to multiple sclerosis has chronic indwelling catheter. Wound care for care site. Stage IV decubitus ulcer. Wound care for care site. Dispo: patient came from [facility], will need to go back to Replaced by [facility]. Stable for discharge. Pending pre-cert, started today.        Chief Complaint: sob     Hospital Course:  Lauro Ridley is a 74 y.o. male with PMHx of multiple sclerosis, neurogenic bladder, and oropharyngeal dysphagia who presents to [address] from [facility] due to shortness of breath. Patient had underwent a cystoscopy, laser lithotripsy and stent placement yesterday by urology. Was recently in the hospital where he was treated for multidrug-resistant ESBL E. coli bacteremia and UTI. Chronically colonized multidrug-resistant bacteria. Patient reports that his shortness of breath occurred suddenly this morning without preceding event. He denies any fevers, cough, CP, abdominal pain. Patient reportedly was found to have an O2 sat of 88% on room air when approached by EMS. Was placed on 2 L of O2 via NC. Patient does not appear in distress and states that he feels fine     ED course: Vitals on arrival showed that the patient was afebrile, nontachypneic, no tachycardia and normotensive. No leukocytosis. D-dimer elevated at 1500 with subsequent CTA chest negative for PE. The CTA did report possible atelectasis or infiltrate at the left lung base. Pneumonia was suspected and patient was given a loading dose of Maxipime and Levaquin. Subjective: no acute events overnight. Patient reports resolution of his sob this morning. No fevers, chills, flank pain. Bloody urine improving. Overall reports feeling much better.        Medications:  Reviewed    Infusion Medications    sodium chloride      dextrose       Scheduled Medications    sodium chloride flush  5-40 mL IntraVENous 2 times per day    meropenem  1,000 mg IntraVENous Q8H    amLODIPine  10 mg Oral Daily    aspirin  81 mg Oral Daily    baclofen  10 mg Oral TID    carvedilol  12.5 mg Oral BID WC    docusate sodium  100 mg Oral Daily    famotidine  20 mg Oral BID    gabapentin  600 mg Oral TID    oxybutynin  5 mg Oral BID    potassium chloride  40 mEq Oral BID    sodium chloride  1 g Oral BID     vitamin A  20,000 Units Oral Daily    vitamin C  500 mg Oral BID    vitamin E  400 Units Oral Daily     PRN Meds: sodium chloride flush, sodium chloride, ondansetron **OR** ondansetron, polyethylene glycol, acetaminophen **OR** acetaminophen, magnesium sulfate, potassium chloride **OR** potassium alternative oral replacement **OR** potassium chloride, glucose, dextrose bolus **OR** dextrose bolus, glucagon (rDNA), dextrose, melatonin      Intake/Output Summary (Last 24 hours) at 8/8/2022 0843  Last data filed at 8/8/2022 0549  Gross per 24 hour   Intake 448.73 ml   Output 1700 ml   Net -1251.27 ml         Diet:  ADULT DIET; Regular; Low Sodium (2 gm)    Exam:  BP (!) 101/51   Pulse 77   Temp 97.7 °F (36.5 °C)   Resp 16   Ht 5' 7\" (1.702 m)   Wt 200 lb (90.7 kg)   SpO2 93%   BMI 31.32 kg/m²     General appearance: No apparent distress. Chronically ill appearing. Movement limited. Eyes:  Conjunctivae/corneas clear. HENT: Head normal in appearance. External nares normal.  Oral mucosa moist without lesions. Hearing grossly intact. Neck: Supple, with full range of motion. Trachea midline. No gross JVD appreciated. Respiratory:  Normal respiratory effort. Clear to auscultation, bilaterally without rales or wheezes or rhonchi. Cardiovascular: Normal rate, regular rhythm with normal S1/S2 without murmurs. No lower extremity edema. Abdomen: Soft, non-tender, non-distended with normal bowel sounds. Musculoskeletal: No joint swelling or tenderness. Normal tone. No abnormal movements. Skin: Warm and dry. No rashes or lesions. Psychiatric: Alert and oriented  Capillary Refill: Brisk,< 3 seconds. Peripheral Pulses: +2 palpable, equal bilaterally. Labs:   Recent Labs     08/07/22  0545   WBC 5.2   HGB 12.0*   HCT 40.4*          Recent Labs     08/07/22  0545      K 4.5      CO2 26   BUN 18   CREATININE 0.2*   CALCIUM 9.0       Recent Labs     08/05/22  1059   AST 77*   ALT 59   BILIDIR <0.2   BILITOT 0.4   ALKPHOS 178*       No results for input(s): INR in the last 72 hours.   No results for input(s): Fani Weaver in the last 72 hours. Urinalysis:      Lab Results   Component Value Date/Time    NITRU NEGATIVE 08/05/2022 06:40 PM    WBCUA > 200 08/05/2022 06:40 PM    WBCUA >200 01/20/2012 09:00 AM    BACTERIA NONE SEEN 08/05/2022 06:40 PM    RBCUA 50-75 08/05/2022 06:40 PM    BLOODU LARGE 08/05/2022 06:40 PM    SPECGRAV >1.030 04/22/2022 11:30 PM    GLUCOSEU NEGATIVE 08/05/2022 06:40 PM       Radiology: All imaging reviewed     Diet: ADULT DIET; Regular;  Low Sodium (2 gm)      Code Status: Full Code            Electronically signed by Maribell Lindquist MD on 8/8/2022 at 8:43 AM

## 2022-08-09 VITALS
OXYGEN SATURATION: 93 % | HEIGHT: 67 IN | DIASTOLIC BLOOD PRESSURE: 63 MMHG | BODY MASS INDEX: 31.17 KG/M2 | RESPIRATION RATE: 16 BRPM | HEART RATE: 76 BPM | SYSTOLIC BLOOD PRESSURE: 111 MMHG | TEMPERATURE: 97.6 F | WEIGHT: 198.6 LBS

## 2022-08-09 PROCEDURE — 51705 CHANGE OF BLADDER TUBE: CPT

## 2022-08-09 PROCEDURE — 6360000002 HC RX W HCPCS: Performed by: STUDENT IN AN ORGANIZED HEALTH CARE EDUCATION/TRAINING PROGRAM

## 2022-08-09 PROCEDURE — 2580000003 HC RX 258: Performed by: STUDENT IN AN ORGANIZED HEALTH CARE EDUCATION/TRAINING PROGRAM

## 2022-08-09 PROCEDURE — 6370000000 HC RX 637 (ALT 250 FOR IP): Performed by: STUDENT IN AN ORGANIZED HEALTH CARE EDUCATION/TRAINING PROGRAM

## 2022-08-09 PROCEDURE — 99239 HOSP IP/OBS DSCHRG MGMT >30: CPT | Performed by: PHYSICIAN ASSISTANT

## 2022-08-09 RX ADMIN — DOCUSATE SODIUM 100 MG: 100 CAPSULE, LIQUID FILLED ORAL at 09:12

## 2022-08-09 RX ADMIN — CARVEDILOL 12.5 MG: 6.25 TABLET, FILM COATED ORAL at 09:04

## 2022-08-09 RX ADMIN — MEROPENEM 1000 MG: 1 INJECTION, POWDER, FOR SOLUTION INTRAVENOUS at 11:58

## 2022-08-09 RX ADMIN — MEROPENEM 1000 MG: 1 INJECTION, POWDER, FOR SOLUTION INTRAVENOUS at 03:18

## 2022-08-09 RX ADMIN — POTASSIUM CHLORIDE 40 MEQ: 20 TABLET, EXTENDED RELEASE ORAL at 09:12

## 2022-08-09 RX ADMIN — GABAPENTIN 600 MG: 600 TABLET, FILM COATED ORAL at 15:11

## 2022-08-09 RX ADMIN — BACLOFEN 10 MG: 10 TABLET ORAL at 15:11

## 2022-08-09 RX ADMIN — SODIUM CHLORIDE 1 G: 1 TABLET ORAL at 09:04

## 2022-08-09 RX ADMIN — SODIUM CHLORIDE, PRESERVATIVE FREE 10 ML: 5 INJECTION INTRAVENOUS at 09:18

## 2022-08-09 RX ADMIN — Medication 6 MG: at 09:05

## 2022-08-09 RX ADMIN — OXYCODONE HYDROCHLORIDE AND ACETAMINOPHEN 500 MG: 500 TABLET ORAL at 09:04

## 2022-08-09 RX ADMIN — ASPIRIN 81 MG: 81 TABLET ORAL at 09:00

## 2022-08-09 RX ADMIN — Medication 400 UNITS: at 09:07

## 2022-08-09 RX ADMIN — FAMOTIDINE 20 MG: 20 TABLET ORAL at 09:12

## 2022-08-09 RX ADMIN — OXYBUTYNIN CHLORIDE 5 MG: 5 TABLET, EXTENDED RELEASE ORAL at 09:06

## 2022-08-09 RX ADMIN — GABAPENTIN 600 MG: 600 TABLET, FILM COATED ORAL at 09:05

## 2022-08-09 RX ADMIN — BACLOFEN 10 MG: 10 TABLET ORAL at 09:04

## 2022-08-09 RX ADMIN — AMLODIPINE BESYLATE 10 MG: 10 TABLET ORAL at 09:04

## 2022-08-09 ASSESSMENT — PAIN SCALES - GENERAL: PAINLEVEL_OUTOF10: 0

## 2022-08-09 NOTE — CARE COORDINATION
Late Entry    8/9/22, 11:19 AM EDT  Discharge Planning Evaluation  Social work consult received, patient from [facility]. Patient/Family preference is to return to [facility] per patient. The patient's current payor source at the facility is [facility]. Medicare skilled days available: yes  Insurance precert:  yes  Spoke with Pat at the facility. Patient bed hold: yes  Anticipated transport plan: ambulance  Do they require COVID 19 test to return to ECF: only is symptoms  Is there a required time frame which which COVID test needs done:24hrs  Patient's Healthcare Decision Maker: Legal Next of Cedric Clements at [facility]. She stated patient may be transfer to [facility] at discharge. She will update.

## 2022-08-09 NOTE — PLAN OF CARE
Problem: Discharge Planning  Goal: Discharge to home or other facility with appropriate resources  8/9/2022 0101 by Augustine Cruz RN  Outcome: Progressing  Flowsheets (Taken 8/8/2022 0912 by Silviano Najera RN)  Discharge to home or other facility with appropriate resources: Identify barriers to discharge with patient and caregiver  Note: Discharge with needed resources   8/8/2022 1956 by Silviano Najera RN  Outcome: Progressing  Flowsheets (Taken 8/8/2022 0440)  Discharge to home or other facility with appropriate resources: Identify barriers to discharge with patient and caregiver     Problem: Pain  Goal: Verbalizes/displays adequate comfort level or baseline comfort level  8/9/2022 0101 by Augustine Cruz RN  Outcome: Progressing  Flowsheets (Taken 8/8/2022 0912 by Silviano Najera RN)  Verbalizes/displays adequate comfort level or baseline comfort level: Encourage patient to monitor pain and request assistance  Note: Assessments and interventions as needed for optimal pain control  8/8/2022 1956 by Silviano Najera RN  Outcome: Progressing  Flowsheets (Taken 8/8/2022 0912)  Verbalizes/displays adequate comfort level or baseline comfort level: Encourage patient to monitor pain and request assistance     Problem: Safety - Adult  Goal: Free from fall injury  8/9/2022 0101 by Augustine Cruz RN  Outcome: Progressing  Flowsheets (Taken 8/6/2022 1341 by Ti Rodarte RN)  Free From Fall Injury: Instruct family/caregiver on patient safety  Note: Safety interventions and assessments for optimal safety   8/8/2022 1956 by Silviano Najera RN  Outcome: Progressing     Problem: ABCDS Injury Assessment  Goal: Absence of physical injury  8/9/2022 0101 by Augustine Cruz RN  Outcome: Progressing  Flowsheets (Taken 8/6/2022 1341 by Ti Rodarte RN)  Absence of Physical Injury: Implement safety measures based on patient assessment  Note: Safety interventions and assessments for optimal safety   8/8/2022 1956 by Silviano Najera RN  Outcome:

## 2022-08-09 NOTE — CARE COORDINATION
8/9/22, 11:27 AM EDT    DISCHARGE PLANNING EVALUATION    [facility] with [facility] from [facility], patient will transfer to [facility] at discharge. Pre-cert approved today. 8/9/22, 2:10 PM EDT    Patient goals/plan/ treatment preferences discussed by  and . Patient goals/plan/ treatment preferences reviewed with patient/ family. Patient/ family verbalize understanding of discharge plan and are in agreement with goal/plan/treatment preferences. Understanding was demonstrated using the teach back method. AVS provided by RN at time of discharge, which includes all necessary medical information pertaining to the patients current course of illness, treatment, post-discharge goals of care, and treatment preferences. Services At/After Discharge: [place] (SNF), Aide services, In ambulance, Nursing service, OT, and PT           Patient discharged to [facility] [place] bed. Pat at [facility] informed of discharge and 6:30 transport. Will Fax AVS and MAR when completed and RN will call report.
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
## 2022-08-09 NOTE — PROGRESS NOTES
importance of drinking it two times per day to promote wound healing; provided patient with packet of information regarding nutrition supplement; pt reports that he understands but states \"once it heals it will likely just come back again\"  GI Status: pt denies pain or discomfort around colostomy site; last BM per EMR 8/8  Pertinent Labs: Na 140, K 4.5, BUN 18, creatinine 0.2, magnesium 2.1, glucose 90, hgb 12  Pertinent Meds: vitamin C, coreg, colace, pepcid, vitamin A, vitamin E     Current Nutrition Intake & Therapies:    Average Meal Intake: 51-75%, %  Average Supplements Intake:  (to start today)  ADULT DIET; Regular; Low Sodium (2 gm)  ADULT ORAL NUTRITION SUPPLEMENT; Breakfast, Dinner; Wound Healing Oral Supplement  ADULT ORAL NUTRITION SUPPLEMENT; Breakfast, Lunch, Dinner; Low Calorie/High Protein Oral Supplement    Anthropometric Measures:  Height: 5' 7\" (170.2 cm)  Ideal Body Weight (IBW): 148 lbs (67 kg)    Admission Body Weight: 194 lb 9.6 oz (88.3 kg) (8/5)  Current Body Weight: 198 lb 9.6 oz (90.1 kg) (8/9: trace edema),   IBW. Weight Source: Standing Scale  Current BMI (kg/m2): 31.1  Usual Body Weight:  (pt reports UBW ~200 lbs; per EMR wt hx: 8/2/22: 194 lbs 6.4 oz)                       BMI Categories: Obese Class 1 (BMI 30.0-34. 9)    Estimated Daily Nutrient Needs:  Energy Requirements Based On: Kcal/kg  Weight Used for Energy Requirements: Current (8/9: 90.1 kg)  Energy (kcal/day): 6304-4891 kcals/day (15-18 kcals/kg)  Weight Used for Protein Requirements: Ideal  Protein (g/day):  g/day (1.4-1.6 g/kg IBW)         Nutrition Diagnosis:   Increased nutrient needs related to increase demand for energy/nutrients as evidenced by wounds    Nutrition Interventions:   Food and/or Nutrient Delivery: Continue Current Diet, Start Oral Nutrition Supplement  Nutrition Education/Counseling: Education initiated (Explained use of ONS and high protein foods to promote wound healing; encouraged PO intake

## 2022-08-09 NOTE — FLOWSHEET NOTE
Pt was in bed at the time of the visit. He was dealing with shortness of breath but was hopeful and wanted prayer to cope and heal. Prayer was appreciated. 08/09/22 1506   Encounter Summary   Encounter Overview/Reason  Initial Encounter   Service Provided For: Patient   Referral/Consult From: [facility] Family members   Last Encounter  08/09/22   Complexity of Encounter Low   Spiritual/Emotional needs   Type Spiritual Support   Assessment/Intervention/Outcome   Assessment Calm; Hopeful   Intervention Active listening;Empowerment

## 2022-08-09 NOTE — PROGRESS NOTES
[facility] Continence Nurse  Progress Note       Ryan Fam  AGE: 74 y.o. GENDER: male  : 1955  UNIT: 8A-11/011-A  TODAY'S DATE:  2022  ADMISSION DATE: 2022 10:39 AM  Subjective:     Reason for [address] Evaluation and Assessment: Patient admitted from [facility] with tunneling wound, suprapubic and colostomy bag. Ryan Fam is a 74 y.o. male referred by:   [] Physician/PA/APRN  [x] Nursing  [] Other:     Wound Identification:  Wound Type: pressure  Contributing Factors: chronic pressure, decreased mobility, and shear force    Objective:     Derrick Risk Score: Derrick Scale Score: 12    Assessment:     Encounter: Present to patient room. Patient in bed upon arrival. Assessment and photo to follow. Patient has established colostomy, with pouch in place. No evidence of leaking. Appears to have new colostomy pouch. Pt has suprapubic catheter and jeffers catheters in place. Evidence of leaking noted on chux pad. Assisted patient onto left side. Pt appears to have an unknown dressing in place that is saturated in drainage and urine. Pt states last wound dressing change was at the nursing home. 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 Cleansed wound with normal saline and gauze. Pat dry with clean gauze. Opticell Ag lightly packed in wound, covered with bordered foam dressing. Staff to change daily. Chux pad changed. Tucked an additional chux pad under scrotum to keep collect leaking urine and keep ischial wound dry. Patient on hercules bed, turned this on alternating pressure setting. BLE offloaded with pillows. Patient in bed, call light in reach. Will continue to follow and assess wound. Call with concerns and for wound evolution.      22    Wound type: Chronic stage 4, right ischium  Wound size: 3cm x 1.9cm x 0.4cm  Undermining or Tunneling: None  Wound assessment/color: Pink, red  Drainage amount: Moderate  Drainage description: Serosanguinous  Odor: None  Margins: Attached  Tawny wound: Epibole, maceration, redness  Exposed structure: None    7/26/22    Wound type: Chronic stage 4, right ischium  Wound size: 3.5cm x 0.7cm x 0.5cm  Undermining or Tunneling: None  Wound assessment/color: Pink, red  Drainage amount: Moderate  Drainage description: Serosanguinous  Odor: None  Margins: Attached  Tawny wound: Epibole, intact  Exposed structure: None    7/21/22    Wound type: Chronic stage 4, right ischium  Wound size: 3.5cm x 0.5cm x 0.8cm  Undermining or Tunneling: None  Wound assessment/color: Pink, yellow  Drainage amount: Moderate  Drainage description: Serosanguinous  Odor: None  Margins: Attached  Tawny wound: Epibole, intact  Exposed structure: None    Response to treatment:  Well tolerated by patient. Plan:     Treatment Recommendations:   Right ischial wound- Clean wound with normal saline and gauze. Pat dry with clean gauze. Apply cut piece of Opticell Ag into wound bed. Cover with bordered foam dressing. Change every day. If Opticell is adhered to wound, wet with saline and allow to form gel. Wipe clean with dry gauze.      Specialty Bed Required :   [] Low Air Loss   [x] Pressure Redistribution  [] Fluid Immersion- Dolphin  [] Bariatric  [] RotoProne   [] Other:     Discharge Plan:  Placement for patient upon discharge: ECF  Patient appropriate for Outpatient [address]: Follow up with wound care provider upon discharge

## 2022-08-09 NOTE — PLAN OF CARE
Problem: Discharge Planning  Goal: Discharge to home or other facility with appropriate resources  8/9/2022 1435 by Ainsley Medrano RN  Outcome: Completed     Problem: Pain  Goal: Verbalizes/displays adequate comfort level or baseline comfort level  8/9/2022 1435 by Ainsley Medrano RN  Outcome: Completed  Flowsheets (Taken 8/9/2022 1438)  Verbalizes/displays adequate comfort level or baseline comfort level: Assess pain using appropriate pain scale     Problem: Safety - Adult  Goal: Free from fall injury  8/9/2022 1435 by Ainsley Medrano RN  Outcome: Completed

## 2022-08-09 NOTE — PROGRESS NOTES
Progress note: Infectious diseases    Patient - Fely Craven,  Age - 74 y.o.    - 1955      Room Number - 8A-11/011-A   MRN -  [id number]   Acct # - [de-identified]  Date of Admission -  2022 10:39 AM    SUBJECTIVE:   He has no new issues. OBJECTIVE   VITALS    height is 5' 7\" (1.702 m) and weight is 198 lb 9.6 oz (90.1 kg). His oral temperature is 97.6 °F (36.4 °C). His blood pressure is 109/63 and his pulse is 79. His respiration is 16 and oxygen saturation is 94%. Wt Readings from Last 3 Encounters:   22 198 lb 9.6 oz (90.1 kg)   22 194 lb 6.4 oz (88.2 kg)   22 201 lb (91.2 kg)       I/O (24 Hours)    Intake/Output Summary (Last 24 hours) at 2022 0942  Last data filed at 2022 0608  Gross per 24 hour   Intake 781.52 ml   Output 750 ml   Net 31.52 ml         General Appearance  Awake, alert, oriented,  chronically sick looking  HEENT - normocephalic, atraumatic, pale  conjunctiva,  anicteric sclera  Neck - Supple, no mass  Lungs -  Bilateral  air entry, no rhonchi, no wheeze. Cardiovascular - Heart sounds are normal.     Abdomen - soft, not distended, non tender.   Neurologic -awake and oriented  Skin - No bruising or bleeding  Extremities - contracted extremites    MEDICATIONS:      sodium chloride flush  5-40 mL IntraVENous 2 times per day    meropenem  1,000 mg IntraVENous Q8H    amLODIPine  10 mg Oral Daily    aspirin  81 mg Oral Daily    baclofen  10 mg Oral TID    carvedilol  12.5 mg Oral BID WC    docusate sodium  100 mg Oral Daily    famotidine  20 mg Oral BID    gabapentin  600 mg Oral TID    oxybutynin  5 mg Oral BID    potassium chloride  40 mEq Oral BID    sodium chloride  1 g Oral BID WC    vitamin A  20,000 Units Oral Daily    vitamin C  500 mg Oral BID    vitamin E  400 Units Oral Daily      sodium chloride      dextrose       sodium chloride flush, sodium chloride, ondansetron **OR** ondansetron, polyethylene glycol, acetaminophen **OR** acetaminophen, magnesium sulfate, potassium chloride **OR** potassium alternative oral replacement **OR** potassium chloride, glucose, dextrose bolus **OR** dextrose bolus, glucagon (rDNA), dextrose, melatonin      LABS:     CBC:   Recent Labs     08/07/22  0545   WBC 5.2   HGB 12.0*          BMP:    Recent Labs     08/07/22  0545      K 4.5      CO2 26   BUN 18   CREATININE 0.2*   GLUCOSE 90       Calcium:  Recent Labs     08/07/22  0545   CALCIUM 9.0        No results for input(s): ALKPHOS, ALT, AST, PROT, BILITOT, BILIDIR, LABALBU in the last 72 hours. CULTURES:   UA:   No results for input(s): SPECGRAV, PHUR, COLORU, CLARITYU, MUCUS, PROTEINU, BLOODU, RBCUA, WBCUA, BACTERIA, NITRU, GLUCOSEU, BILIRUBINUR, UROBILINOGEN, KETUA, LABCAST, LABCASTTY, AMORPHOS in the last 72 hours. Invalid input(s): CRYSTALS    Micro:   Lab Results   Component Value Date/Time    BC No growth 24 hours. No growth 48 hours. 08/05/2022 06:57 PM          Problem list of patient:     Patient Active Problem List   Diagnosis Code    Neurogenic bladder N31.9    Multiple sclerosis ([address] G35    MS (multiple sclerosis) ([address] G35    Urinary retention R33.9    Chronic suprapubic catheter ([address] Z93.59    Cystostomy malfunction ([address] N99.512    Decubitus ulcer of coccyx L89.159    Decubitus ulcer, stage 3 ([address] L89.93    Malnutrition ([address] E46    Decubitus ulcer of right perineal ischial region, stage 3 ([address] L89.313    Pulmonary embolism on left (HCC) I26.99    Acute metabolic encephalopathy E19.86    Speech disturbance R47.9    Infected decubitus ulcer, stage I L89.91, L08.9    Infected decubitus ulcer, stage III ([facility]) L89.93, L08.9    Wound infection T14. 8XXA, L08.9    Elevated INR R79.1    Chronic osteomyelitis, pelvis, right (HCC) M86.651    Osteomyelitis (HCC) M86.9    Urinary tract infection associated with indwelling urethral catheter (HCC) T83.511A, N39.0    Abnormal liver function test R94.5    Chronic depression F32. A    Essential hypertension I10    History of pulmonary embolism Z86.711    Other dysphagia R13.19    Pressure injury of skin of back L89.109    Sepsis secondary to UTI ([address] A41.9, N39.0    Decubitus ulcer L89.90    Troponin level elevated R77.8    Anemia D64.9    Sepsis due to methicillin resistant Staphylococcus aureus (MRSA) (HCC) A41.02    Sepsis due to urinary tract infection (HCC) A41.9, N39.0    AMS (altered mental status) R41.82    Gross hematuria R31.0    Class 1 obesity due to excess calories without serious comorbidity with body mass index (BMI) of 31.0 to 31.9 in adult E66.09, Z68.31    Colostomy in place [facility]) Z93.3    Current use of long term anticoagulation Z79.01    Obstructive uropathy G17.8    Complicated urinary tract infection N39.0    Hypokalemia E87.6    Hypomagnesemia E83.42    Shortness of breath R06.02    Hypoxia R09.02    ESBL (extended spectrum beta-lactamase) producing bacteria infection A49.9, Z16.12         ASSESSMENT/PLAN   Shortness of breath improved. ?may be related to recent procedure with fluid adminstration  Blood cx remain so far negative,    Urine positive rof E.coli ESBL: on meropenem (less than [id number]/ml) no symptoms at this time.   [id number] Lory Strange with discharge plan        Zee Blevins MD, MD, Danyell Lewis 8/9/2022 9:42 AM

## 2022-08-09 NOTE — CARE COORDINATION
8/9/22, 12:22 PM EDT    DISCHARGE ON GOING EVALUATION    [facility] day: 4  Location: 8A-11/011-A Reason for admit: Shortness of breath [R06.02]  Hypoxia [R09.02]  Pneumonia of lower lobe due to infectious organism, unspecified laterality [J18.9]   Procedure:   8/5 CTA chest: 1. No evidence pulmonary embolism. 2. Mild cardiomegaly. 3. Elevated left hemidiaphragm. Atelectasis or infiltrate at the left lung base. 4. Thoracic spondylosis. 5. Splenomegaly. 8/5 CXR: Small left pleural effusion. Barriers to Discharge: Hospitalist and ID following. IV merrem. PT/OT. PCP: Alfredo Moy MD  Readmission Risk Score: 27.1%  Patient Goals/Plan/Treatment Preferences: [address]

## 2022-08-10 LAB
BLOOD CULTURE, ROUTINE: NORMAL
BLOOD CULTURE, ROUTINE: NORMAL

## 2022-08-11 ENCOUNTER — OUTSIDE SERVICES (OUTPATIENT)
Dept: FAMILY MEDICINE CLINIC | Age: 67
End: 2022-08-11
Payer: COMMERCIAL

## 2022-08-11 DIAGNOSIS — G35 MS (MULTIPLE SCLEROSIS) (HCC): Primary | ICD-10-CM

## 2022-08-11 DIAGNOSIS — I10 ESSENTIAL HYPERTENSION, BENIGN: ICD-10-CM

## 2022-08-11 DIAGNOSIS — A41.02 METHICILLIN RESISTANT STAPHYLOCOCCUS AUREUS SEPTICEMIA (HCC): ICD-10-CM

## 2022-08-11 PROCEDURE — 99304 1ST NF CARE SF/LOW MDM 25: CPT | Performed by: FAMILY MEDICINE

## 2022-08-11 NOTE — DISCHARGE SUMMARY
Hospitalist Discharge Summary    Patient: Guille Antonio  YOB: 1955  MRN: [id number]   Acct: [de-identified]    Primary Care Physician: Eduardo Zaldivar MD    Admit date  8/5/2022    Discharge date: 8/9/2022     Discharge Assessment and Plan:    Acute Hypoxic Resp. Failure Post Ureteral Stent and Cystoscopy Procedure: resolved. Pulse Ox 88% on RA, improved on NC 2L. Weaned to RA currently. Low suspicion for PNA as patient remains afebrile and without leukocytosis. Elevated procalcitonin likely due to recent cystoscopy, laser litho and stent replacement. 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 Blood cultures negative. Pt was treated with IV Meropenem x 4 days. ID following, pt okay for dc. Complicated Jeffers Catheter Associated UTI: prior history of ESBL UTI, known colonization. ID recs appreciated. Treated with IV Meropenem x 4 days    Hx of Obstructing Left Nephrolithiasis s/p Cystoscopy, Laser Lithotripsy and Ureteral Stent Placement on 8/4/22: Urology consulted, recommending remove string stent and urethral jeffers as an outpatient. Gross Hematuria: Urine is yellow in SPT and urethral jeffers. Expect hematuria with ureteral stent in place. Resolve.d     Neurogenic bladder secondary to multiple sclerosis has chronic indwelling catheter. Wound care for care site. Stage IV decubitus ulcer. Wound care for care site. Chief Complaint on presentation: SOB    Initial H&P / Hospital Course:   Initial HPI: Татьяна Dumont is a 74 y.o. male with PMHx of multiple sclerosis, neurogenic bladder, and oropharyngeal dysphagia who presents to [address] [place] [address] from [facility] due to shortness of breath. Patient had underwent a cystoscopy, laser lithotripsy and stent placement yesterday by urology.   Was recently in the hospital where he was treated for multidrug-resistant ESBL E. coli bacteremia and UTI. Chronically colonized multidrug-resistant bacteria. Patient reports that his shortness of breath occurred suddenly this morning without preceding event. He denies any fevers, cough, CP, abdominal pain. Patient reportedly was found to have an O2 sat of 88% on room air when approached by EMS. Was placed on 2 L of O2 via NC. Patient does not appear in distress and states that he feels fine     ED course: Vitals on arrival showed that the patient was afebrile, nontachypneic, no tachycardia and normotensive. No leukocytosis. D-dimer elevated at 1500 with subsequent CTA chest negative for PE. The CTA did report possible atelectasis or infiltrate at the left lung base. Pneumonia was suspected and patient was given a loading dose of Maxipime and Levaquin. \"     8/8: patient came from [facility], will need to go back to [facility]. Stable for discharge. Pending pre-cert, started today. no acute events overnight. Patient reports resolution of his sob this morning. No fevers, chills, flank pain. Bloody urine improving. Overall reports feeling much better. Subjective (day of discharge): I took over care day of planned discharge. Patient had been medically stable, awaiting precert. Patient is doing well. He denies fever/chills, sob, cp, abd pain, nvd, bladder spasms. Instructed patient that he will be MT'ed with jeffers and it will be removed as outpatient. No further abx at dc. Patient responded well to medical management. Consultants had signed off or were contacted and agree with discharge plan. The patient was discharged in stable condition with appropriate outpatient follow up arranged. Physical Exam:-  Vitals: No data found. Weight: Weight: 198 lb 9.6 oz (90.1 kg)   24 hour intake/output: No intake or output data in the 24 hours ending 08/11/22 0636    General appearance: No apparent distress, appears stated age and cooperative.    HEENT: Normal cephalic, atraumatic without obvious deformity. Pupils equal, round, and reactive to light. Extra ocular muscles intact. Conjunctivae/corneas clear. Neck: Supple, with full range of motion. No jugular venous distention. Trachea midline. Respiratory:  Normal respiratory effort. Clear to auscultation, bilaterally without Rales/Wheezes/Rhonchi. Cardiovascular: Regular rate and rhythm with normal S1/S2 without murmurs, rubs or gallops. Abdomen: Soft, non-tender, non-distended with normal bowel sounds. Musculoskeletal:  No clubbing, cyanosis or edema bilaterally. Skin: Skin color, texture, turgor normal.  No rashes or lesions. Neurologic:  Neurovascularly intact without any focal sensory/motor deficits. Cranial nerves: II-XII intact, grossly non-focal.  Psychiatric: Alert and oriented, thought content appropriate, normal insight  Capillary Refill: Brisk,< 3 seconds   Peripheral Pulses: +2 palpable, equal bilaterally     Labs :  No results found for this or any previous visit (from the past 72 hour(s)). Microbiology:    Blood culture #1:   Lab Results   Component Value Date/Time    [facility]  08/05/2022 06:57 PM     No growth 24 hours. No growth 48 hours. No growth at 5 days      Blood culture #2:No results found for: BLOODCULT2  Organism:    Lab Results   Component Value Date/Time    LABGRAM  06/17/2019 08:15 AM     Rare segmented neutrophils observed. Rare epithelial cells observed. Rare budding yeast and pseudohyphae observed. Rare gram negative bacilli. MRSA culture only:No results found for: [facility]  Urine culture:   Lab Results   Component Value Date/Time    LABURIN  04/22/2022 11:30 PM     Current antibiotic therapy ineffective in vitro for isolate. Per [place]  04/22/2022 11:30 PM     [place] count: >100,000 CFU/mL This isolate is a probable ESBL . ID consultation may be helpful in some clinical circumstances. CONTACT isolation required.       Lab Results   Component Value Date/Time    [facility] Escherichia coli 08/05/2022 06:40 PM      Respiratory culture: No results found for: CULTRESP  Aerobic and Anaerobic :  Lab Results   Component Value Date/Time    [facility]  06/17/2019 08:15 AM     Culture also yielded heavy mixed growth of multiple enteric  gram negative bacilli, including swarming Proteus species. If a true mixed infection is suspected, then broad spectrum  empiric antibiotic therapy is indicated. At least one isolate is resistant in vitro to current  regimen. [facility]  06/17/2019 08:15 AM     moderate growth  This isolate is a carbapenemase . ID  consultation may be helpful in some clinical circumstances. CONTACT isolation required. Phenotypic screen test (modified Carbapenem Inactivation  Method or mCIM) for carbapenemase production is positive and  PCR is not detected; results indicate a potentially new  carbapenemase variant or novel mechanism. Carbapenemase testing performed at [address], [facility]. [facility]  06/17/2019 08:15 AM     light growth  This is a MRSA (Methicillin Resistant Staphylococcus  aureus)isolate. Isolates of MRSA (ORSA) Methicillin (Oxacillin) Resistant  Staphylococcus aureus (coagulase positive) require patient  be placed in CONTACT isolation. Methicillin(Oxacillin)resistant strains of staphylococci  (MRSA)or(MRSE)should be considered resistant to all classes  of cephalosporins, penems and beta-lactams. In the treatment of gram positive infections, GENTAMICIN  should be CONSIDERED a SYNERGYSTIC agent ONLY. Lab Results   Component Value Date/Time    [facility]  06/17/2019 08:15 AM     Culture overgrown with Proteus sp. No further evaluation of  this specimen is possible. If a true mixed aerobic and  anaerobic infection is suspected, then broad spectrum empiric  antibiotic therapy is indicated and should include coverage  for anaerobic organisms.          Urinalysis:     Lab Results   Component Value Date/Time    NITRU NEGATIVE 08/05/2022 06:40 PM    WBCUA > 200 08/05/2022 06:40 PM    WBCUA >200 01/20/2012 09:00 AM    BACTERIA NONE SEEN 08/05/2022 06:40 PM    RBCUA 50-75 08/05/2022 06:40 PM    BLOODU LARGE 08/05/2022 06:40 PM    SPECGRAV >1.030 04/22/2022 11:30 PM    GLUCOSEU NEGATIVE 08/05/2022 06:40 PM       Radiology:  CTA CHEST W WO CONTRAST - r/o Pulmonary Embolism    Result Date: 8/5/2022  PROCEDURE: CTA CHEST W WO CONTRAST CLINICAL INFORMATION: r/o Pulmonary Embolism. COMPARISON: Chest x-ray obtained on the same day. . TECHNIQUE: 3 mm axial images were obtained through the chest after the administration of IV contrast.  A non-contrast localizer was obtained. 3D reconstructions were performed on the scanner to include MIP coronal and sagittal images through the chest. Isovue was the intravenous contrast utilized. All CT scans at this facility use dose modulation, iterative reconstruction, and/or weight-based dosing when appropriate to reduce radiation dose to as low as reasonably achievable. FINDINGS: There is adequate opacification of the pulmonary arterial system. No pulmonary emboli are present. The aorta is within acceptable limits. There is mild cardiomegaly. . There is no pericardial or pleural effusion. There is no mediastinal, axillary or hilar adenopathy. There is an elevated left hemidiaphragm and atelectasis or infiltrate at the left lung base. .  There is thoracic spondylosis. .  There is splenomegaly. 1. No evidence pulmonary embolism. 2. Mild cardiomegaly. 3. Elevated left hemidiaphragm. Atelectasis or infiltrate at the left lung base. 4. Thoracic spondylosis. 5. Splenomegaly. **This report has been created using voice recognition software. It may contain minor errors which are inherent in voice recognition technology. ** Final report electronically signed by DR Douglas Gonsalves on 8/5/2022 12:30 PM    XR CHEST PORTABLE    Result Date: 8/5/2022  PROCEDURE: XR CHEST PORTABLE CLINICAL INFORMATION: shortness of breath .  TECHNIQUE: Portable chest position not indicated COMPARISON: 7/20/2022 FINDINGS: Patient is significantly rotated. Heart size is normal. Mediastinum is not widened. No confluent infiltrates. Small left pleural effusion. Vessels are not congested. EKG leads overlie the chest.     Small left pleural effusion. **This report has been created using voice recognition software. It may contain minor errors which are inherent in voice recognition technology. ** Final report electronically signed by Dr. Rossi Giraldo on 8/5/2022 11:08 AM       Consults:   IP CONSULT TO INFECTIOUS DISEASES  IP CONSULT TO SOCIAL WORK  IP CONSULT TO UROLOGY    Discharge Medications:      Medication List        CONTINUE taking these medications      acetaminophen 325 MG tablet  Commonly known as: TYLENOL     amLODIPine 10 MG tablet  Commonly known as: NORVASC  Take 1 tablet by mouth in the morning. aspirin 81 MG EC tablet     baclofen 10 MG tablet  Commonly known as: LIORESAL     calcium-vitamin D 500-200 MG-UNIT per tablet  Commonly known as: OSCAL-500  Take 1 tablet by mouth 2 times daily     carbamide peroxide 6.5 % otic solution  Commonly known as: DEBROX     carvedilol 12.5 MG tablet  Commonly known as: COREG  Take 1 tablet by mouth in the morning and 1 tablet in the evening. Take with meals.      docusate sodium 100 MG capsule  Commonly known as: COLACE     erythromycin 5 MG/GM ophthalmic ointment  Commonly known as: ROMYCIN     famotidine 20 MG tablet  Commonly known as: PEPCID  Take 1 tablet by mouth 2 times daily     gabapentin 600 MG tablet  Commonly known as: NEURONTIN     gentamicin 0.1 % ointment  Commonly known as: GARAMYCIN     LACTOBACILLUS PO     melatonin 3 MG Tabs tablet  Take 1 tablet by mouth nightly as needed (when unable to sleep)     multivitamin tablet  Take 1 tablet by mouth daily     ondansetron 4 MG tablet  Commonly known as: ZOFRAN     oxybutynin 5 MG extended release tablet  Commonly known as: DITROPAN-XL     potassium chloride 20 MEQ extended release tablet  Commonly known as: KLOR-CON M  Take 2 tablets by mouth in the morning and 2 tablets before bedtime. sodium chloride 1 g tablet  Take 1 tablet by mouth 2 times daily (with meals)     tiZANidine 2 MG tablet  Commonly known as: ZANAFLEX     vitamin A 78764 units capsule  Take 2 capsules by mouth daily     vitamin C 500 MG tablet  Commonly known as: ASCORBIC ACID     VITAMIN C ER PO     vitamin E 400 UNIT capsule            STOP taking these medications      ertapenem 1 GM injection  Commonly known as: INVanz     metoprolol tartrate 25 MG tablet  Commonly known as: LOPRESSOR     potassium chloride 20 MEQ Tbcr extended release tablet  Commonly known as: KLOR-CON M               Patient Instructions:    Discharge lab work: none  Activity: activity as tolerated  Diet: No diet orders on file      Follow-up visits:   No follow-up provider specified. Disposition: home  Condition at Discharge: Stable    Time Spent: 40 minutes    Signed: Thank you Alfredo Moy MD for the opportunity to be involved in this patient's care.     Electronically signed by Elis Miller PA-C on 8/11/2022 at 6:36 AM  Discharging Hospitalist

## 2022-08-11 NOTE — PROGRESS NOTES
8/11/2022  Hallie Swift  1955  Subjective: Patient is an 74 y.o. male. He is    He is a Non-smoker. He is a new patient here at [address] home. Presents to us from [facility] after infection with MRSA. Has MS and was a longterm resident of [address] [place] [address] [facility] which is closing. He does not walk, uses a power wheelchair. He denies Headaches, chest pain, SOB, or abdominal pain. He does have a Past Medical History of   Past Medical History:   Diagnosis Date    Dysphagia     oropharyngeal    History of pulmonary embolism     History of urinary tract infection     Hx of blood clots     Pulmonary embolis    Hypertension     Major depressive disorder, single episode     MS (multiple sclerosis) ([address]     Neurogenic bladder 02/2012    Dr. January Vidal placed cather    Osteomyelitis [facility])     UTI (urinary tract infection)    .   He has a past surgical history of   Past Surgical History:   Procedure Laterality Date    ANKLE SURGERY  1996    broken ankle    BLADDER SURGERY  2-    Suprapubic catheter placement    COLONOSCOPY      CYSTO/URETERO/PYELOSCOPY, CALCULUS TX Left 6/19/2019    CYSTOSCOPY, LEFT STENT insertion, bladder irragation with bladder stones performed by Sherice Beard MD at [address] [address] N/A 7/8/2019    CYSTO, LEFT URETERAL STENT REMOVAL, LEFT URETEROSCOPY, LASER LITHOTRIPSY, BASKET RETRIEVAL OF STONE FRAGMENTS performed by Sherice Beard MD at [address] [place] [address] 2/26/2021    CYSTOSCOPY, CYSTOLITHOLAPAXY, SUPRAPUBIC CATHETER EXCHANGE performed by Cherie Stanley MD at [facility] Left 6/15/2022    CYSTOSCOPY performed by Cherie Stanley MD at [facility]  7/25/2022    IR NEPHROSTOMY CATHETER PLACEMENT 7/25/2022 [facility] SPECIAL PROCEDURES    [place] LAP,SURG,COLECTOMY,W/END COLOST & CLOSUR N/A 6/13/2018    ROBOT DIVERTING COLOSTOMY performed by Bradley Mccarty MD at [address] [facility]  child    URETER SURGERY Left 8/4/2022 CYSTOSCOPY, LEFT URETEROSCOPY, LASER LITHOTRIPSY,  LEFT URETERAL STENT EXCHANGE performed by Anjnaa Reynolds MD at [facility]   . He has the following allergies: Patient has no known allergies. .    Objective: Please see vitals per the patient's chart. He is well developed and well nourished and in no acute distress. The head is normocephalic/atraumatic. The pupils are round, equal, reactive to light and accommodation and extraocular muscles are intact. There is no sinus tenderness to palpation. The ears were clear. Nares were normal.  The throat is clear with no cervical lymphadenopathy and no carotid bruits were appreciated. The lungs were clear. Heart rate and rhythm regular without murmur. The abdomen is soft and nontender. Colostomy is present and functioning. Suprapubic catheter in place. Extremities are without edema. Neurologically alert and oriented. The cranial nerves are grossly intact. The skin reveals no rashes. Has chronic ulcers. Assessment:    Diagnosis Orders   1. MS (multiple sclerosis) (HCC)  Stable, controlled with zanaflex, baclofen, gabapentin      2. Methicillin resistant Staphylococcus aureus septicemia ([address]  -improved, no further antibiotics or fever. 3. Essential hypertension, benign  -stable, controlled on amlodipine and coreg        Plan: We will continue current medication regimen including zanaflex, baclofen and gabapentin for MS, and melatonin for sleep and supportive care and recheck in 1 month.        Electronically signed by Edwige Mascorro MD on 8/11/2022 at 11:19 AM

## 2022-08-25 ENCOUNTER — OUTSIDE SERVICES (OUTPATIENT)
Dept: FAMILY MEDICINE CLINIC | Age: 67
End: 2022-08-25
Payer: COMMERCIAL

## 2022-08-25 DIAGNOSIS — G35 MS (MULTIPLE SCLEROSIS) (HCC): Primary | ICD-10-CM

## 2022-08-25 DIAGNOSIS — I10 ESSENTIAL HYPERTENSION, BENIGN: ICD-10-CM

## 2022-08-25 DIAGNOSIS — Z93.3 COLOSTOMY IN PLACE (HCC): ICD-10-CM

## 2022-08-25 PROCEDURE — 99307 SBSQ NF CARE SF MDM 10: CPT | Performed by: FAMILY MEDICINE

## 2022-08-25 NOTE — PROGRESS NOTES
8/25/2022  Ty Swift  1955  Subjective:  He was in his/her room to follow up MS. Objective:   Please see vitals per chart. There is no sinus tenderness to palpation, no cervical lymphadenopathy. Lungs are clear. Heart Regular rate and rhythm without murmur. The abdomen is soft and nontender. Extremities are without edema. Assessment:   Diagnosis Orders   1. MS (multiple sclerosis) (HCC)  Stable, controlled on zanaflex, baclofen, gabapentin      2. Essential hypertension, benign  Stable, controlled on coreg      3. Colostomy in place [facility])  Stable, normal functioning        Plan: We will continue current medication regimen including zanaflex, baclofen and gabapentin for MS, melatonin for sleep and supportive care and recheck in 1 month.     Electronically signed by Elke Nicholson MD on 8/25/2022 at 10:55 AM.

## 2022-09-22 ENCOUNTER — OUTSIDE SERVICES (OUTPATIENT)
Dept: FAMILY MEDICINE CLINIC | Age: 67
End: 2022-09-22
Payer: COMMERCIAL

## 2022-09-22 DIAGNOSIS — M79.2 NERVE PAIN: Primary | ICD-10-CM

## 2022-09-22 DIAGNOSIS — I10 ESSENTIAL HYPERTENSION, BENIGN: ICD-10-CM

## 2022-09-22 DIAGNOSIS — G35 MS (MULTIPLE SCLEROSIS) (HCC): ICD-10-CM

## 2022-09-22 PROCEDURE — 99308 SBSQ NF CARE LOW MDM 20: CPT | Performed by: FAMILY MEDICINE

## 2022-09-22 NOTE — PROGRESS NOTES
9/22/2022  Alejo Swift  1955  Subjective:  He was in his/her room to follow up increase in nerve pain. Increased gabapentin to 600 mg QID, monitor effectiveness. Denies headaches, sob, cp, and abdominal pains  Objective:   Please see vitals per chart. There is no sinus tenderness to palpation, no cervical lymphadenopathy. Lungs are clear. Heart Regular rate and rhythm without murmur. The abdomen is soft and nontender. Colostomy intact and functioning. Extremities are without edema. Assessment:  1. Nerve pain  -chronic condition, exacerbated, increase gabapentin to 600 mg QID, -monitor sxs, call if not improving    2. MS (multiple sclerosis) (HCC)  Stable, controlled on zanaflex, baclofen, gabapentin      3. Essential hypertension, benign  Stable, controlled on coreg      4. Colostomy in place [facility])  Stable, normal functioning      Admit date 08/09/2022  Plan: We will continue current medication regimen including zanaflex, baclofen and gabapentin for MS, melatonin for sleep and supportive care and recheck in 1 month.     Electronically signed by Gaby Mireles MD on 9/22/2022 at 10:56 AM.

## 2022-09-28 ENCOUNTER — TELEPHONE (OUTPATIENT)
Dept: NEUROLOGY | Age: 67
End: 2022-09-28

## 2022-09-28 NOTE — TELEPHONE ENCOUNTER
Received fax from [facility] stating patient is having episode of trigeminal neuralgia on the right side of his face. Patient is taking Gabapentin 600 mg four times a day. Faxed back nursing home to see if patient had taken the Lyrica 25 mg twice a day as previously ordered. Awaiting response.

## 2022-10-10 NOTE — TELEPHONE ENCOUNTER
Office has not yet received fax back from [facility] to confirm if patient has tried Lyrica in the past as previously ordered. Attempted to contact Lindsay daniels and was transferred to patient nurse but call was never answered. Phone just kept ringing. Left voice message for nurse to return call to office.

## 2022-10-19 ENCOUNTER — TELEPHONE (OUTPATIENT)
Dept: NEUROLOGY | Age: 67
End: 2022-10-19

## 2022-10-19 RX ORDER — OXCARBAZEPINE 150 MG/1
150 TABLET, FILM COATED ORAL 2 TIMES DAILY
COMMUNITY

## 2022-10-19 NOTE — TELEPHONE ENCOUNTER
If the patient has not tried trileptal, suggest start 150 mg twice a day,   Continue with neurontin at current dosage.    Karely Ulrich MD

## 2022-10-19 NOTE — TELEPHONE ENCOUNTER
Tanja Likes, with [employer] called stating patient is taking Gabapentin 600 mg 4 times. Patient is having worsening right side facial pain. Patient is not currently taking the Lyrica. Patient is unsure if he had tried the Lyrica in the past. Please advise. Thank you.

## 2022-10-26 ENCOUNTER — TELEPHONE (OUTPATIENT)
Dept: NEUROLOGY | Age: 67
End: 2022-10-26

## 2022-10-26 NOTE — TELEPHONE ENCOUNTER
Sunny Santiago from [facility] called to update office with patient after starting Trileptal on 10/19/22. Right sided facial pain has improved since starting Trileptal. Patient has had increased yelling and confusion at night starting 2 days ago. [facility] is running a UA and labs to check for UTI. Dr Anneliese Hassan isn't sure if confusion and yelling is due to possible infection or Trileptal start and requested our office be updated. Please advise.

## 2022-10-26 NOTE — TELEPHONE ENCOUNTER
Will need to await results, of urine, and BMP, check the sodium level. If all are normal, then we needs to consider changing the Trileptal to an alternative medication.    Sindi Rodriguez MD

## 2022-10-27 ENCOUNTER — OUTSIDE SERVICES (OUTPATIENT)
Dept: FAMILY MEDICINE CLINIC | Age: 67
End: 2022-10-27
Payer: MEDICAID

## 2022-10-27 DIAGNOSIS — G35 MS (MULTIPLE SCLEROSIS) (HCC): ICD-10-CM

## 2022-10-27 DIAGNOSIS — R40.4 TRANSIENT ALTERATION OF AWARENESS: ICD-10-CM

## 2022-10-27 DIAGNOSIS — M79.2 NERVE PAIN: Primary | ICD-10-CM

## 2022-10-27 PROCEDURE — 99308 SBSQ NF CARE LOW MDM 20: CPT | Performed by: FAMILY MEDICINE

## 2022-10-27 NOTE — PROGRESS NOTES
10/27/2022  Raquel Swift  1955  Subjective:  He was in his/her room to follow up increase in nerve pain. Tripeptal was increased to 150 mg BID. Had AMS, urine was positive, treating with levaquin , awaiting C&S. Denies headaches, sob, cp, and abdominal pains  Objective:   Please see vitals per chart. There is no sinus tenderness to palpation, no cervical lymphadenopathy. Lungs are clear. Heart Regular rate and rhythm without murmur. The abdomen is soft and nontender. Colostomy intact and functioning. Extremities are without edema. Assessment:  1. Nerve pain  -chronic condition, exacerbated, increase trileptal to 150 mg BID. -monitor sxs, call if not improving    2. MS (multiple sclerosis) (HCC)  Stable, controlled on zanaflex, baclofen, gabapentin      3. Essential hypertension, benign  Stable, controlled on coreg      4. Colostomy in place [facility])  Stable, normal functioning        Plan: We will continue current medication regimen including zanaflex, baclofen and gabapentin for MS, melatonin for sleep and supportive care and recheck in 1 month.     Electronically signed by Rosalba Pelaez MD on 10/27/2022 at 10:56 AM.

## 2022-11-11 NOTE — TELEPHONE ENCOUNTER
Spoke with Kiah, from [employer] who stated patient did have UTI and was treated. Patient is no longer having outbursts or yelling. Patient is having increase in facial pain. Patient is taking Trileptal 150 twice a day and Gabapentin 600 mg 4 times a day. Patient feels facial pain has improved since started the Trileptal, but there is room for improvement. Please advise. Thank you.

## 2022-11-14 NOTE — TELEPHONE ENCOUNTER
Attempted to contact Areli Amaro at [employer]. When caller transferred call, phone just kept ringing and then call was disconnected. Will try again later.

## 2022-11-14 NOTE — TELEPHONE ENCOUNTER
Change Trileptal to 300 mg twice a day, in addition to the current Neurontin dose, No changes.    Amrit Couch MD

## 2022-11-25 ENCOUNTER — OUTSIDE SERVICES (OUTPATIENT)
Dept: FAMILY MEDICINE CLINIC | Age: 67
End: 2022-11-25
Payer: MEDICAID

## 2022-11-25 DIAGNOSIS — G35 MS (MULTIPLE SCLEROSIS) (HCC): Primary | ICD-10-CM

## 2022-11-25 DIAGNOSIS — I10 ESSENTIAL HYPERTENSION, BENIGN: ICD-10-CM

## 2022-11-25 DIAGNOSIS — Z93.3 COLOSTOMY IN PLACE (HCC): ICD-10-CM

## 2022-11-25 PROCEDURE — 99307 SBSQ NF CARE SF MDM 10: CPT | Performed by: FAMILY MEDICINE

## 2022-11-25 NOTE — PROGRESS NOTES
11/25/2022  Marilee Swift  1955  Subjective:  He was in his/her room to follow up MS. Denies any new problems, is feeling well. Denies headaches, CP, sob, or abdominal pains. Objective:   Please see vitals per chart. There is no sinus tenderness to palpation, no cervical lymphadenopathy. Lungs are clear. Heart Regular rate and rhythm without murmur. The abdomen is soft and nontender. Colostomy present and functioning. Extremities are without edema. Assessment:   Diagnosis Orders   1. MS (multiple sclerosis) (HCC)  Stable, controlled on zanaflex, baclofen, gabapentin      2. Essential hypertension, benign  Stable, controlled on coreg      3. Colostomy in place [facility])  Stable, normal functioning        Plan: We will continue current medication regimen including zanaflex, baclofen and gabapentin for MS, melatonin for sleep and supportive care and recheck in 1 month.     Electronically signed by Naima Park MD on 11/25/2022 at 10:21 AM.

## 2022-12-22 ENCOUNTER — OUTSIDE SERVICES (OUTPATIENT)
Dept: FAMILY MEDICINE CLINIC | Age: 67
End: 2022-12-22

## 2022-12-22 DIAGNOSIS — G35 MS (MULTIPLE SCLEROSIS) (HCC): Primary | ICD-10-CM

## 2022-12-22 DIAGNOSIS — Z93.3 COLOSTOMY IN PLACE (HCC): ICD-10-CM

## 2022-12-22 DIAGNOSIS — I10 ESSENTIAL HYPERTENSION, BENIGN: ICD-10-CM

## 2022-12-22 NOTE — PROGRESS NOTES
12/22/2022  Camden Swift  1955  Subjective:  He was in his/her room to follow up MS. Denies any new problems, is feeling well. Denies headaches, CP, sob, or abdominal pains. Objective:   Please see vitals per chart. There is no sinus tenderness to palpation, no cervical lymphadenopathy. Lungs are clear. Heart Regular rate and rhythm without murmur. The abdomen is soft and nontender. Colostomy present and functioning. Extremities are without edema. Assessment:   Diagnosis Orders   1. MS (multiple sclerosis) (HCC)  Stable, controlled on zanaflex, baclofen, gabapentin      2. Essential hypertension, benign  Stable, controlled on coreg      3. Colostomy in place [facility])  Stable, normal functioning        Plan: We will continue current medication regimen including zanaflex, baclofen and gabapentin for MS, melatonin for sleep and supportive care and recheck in 1 month.     Electronically signed by Vanna Velarde MD on 12/22/2022 at 10:34 AM.

## 2022-12-24 ENCOUNTER — APPOINTMENT (OUTPATIENT)
Dept: GENERAL RADIOLOGY | Age: 67
DRG: 139 | End: 2022-12-24
Payer: MEDICAID

## 2022-12-24 ENCOUNTER — APPOINTMENT (OUTPATIENT)
Dept: CT IMAGING | Age: 67
DRG: 139 | End: 2022-12-24
Payer: MEDICAID

## 2022-12-24 ENCOUNTER — HOSPITAL ENCOUNTER (INPATIENT)
Age: 67
LOS: 5 days | Discharge: NURSING, REHAB OR LONG-TERM CARE | DRG: 139 | End: 2022-12-29
Attending: EMERGENCY MEDICINE | Admitting: INTERNAL MEDICINE
Payer: MEDICAID

## 2022-12-24 DIAGNOSIS — J18.9 PNEUMONIA OF RIGHT LUNG DUE TO INFECTIOUS ORGANISM, UNSPECIFIED PART OF LUNG: ICD-10-CM

## 2022-12-24 DIAGNOSIS — J96.01 ACUTE RESPIRATORY FAILURE WITH HYPOXIA (HCC): Primary | ICD-10-CM

## 2022-12-24 DIAGNOSIS — R77.8 ELEVATED TROPONIN: ICD-10-CM

## 2022-12-24 PROBLEM — J90 PLEURAL EFFUSION ON RIGHT: Status: ACTIVE | Noted: 2022-12-24

## 2022-12-24 PROBLEM — J96.21 ACUTE ON CHRONIC RESPIRATORY FAILURE WITH HYPOXIA (HCC): Status: ACTIVE | Noted: 2022-12-24

## 2022-12-24 PROBLEM — Z86.718 HISTORY OF DVT (DEEP VEIN THROMBOSIS): Status: ACTIVE | Noted: 2022-12-24

## 2022-12-24 PROBLEM — Z86.19 HISTORY OF ESBL E. COLI INFECTION: Status: ACTIVE | Noted: 2022-12-24

## 2022-12-24 LAB
ALBUMIN SERPL-MCNC: 3.4 G/DL (ref 3.5–5.1)
ALLEN TEST: POSITIVE
ALP BLD-CCNC: 167 U/L (ref 38–126)
ALT SERPL-CCNC: 22 U/L (ref 11–66)
ANION GAP SERPL CALCULATED.3IONS-SCNC: 9 MEQ/L (ref 8–16)
AST SERPL-CCNC: 25 U/L (ref 5–40)
BACTERIA: ABNORMAL
BASE EXCESS (CALCULATED): 1.9 MMOL/L (ref -2.5–2.5)
BASOPHILS # BLD: 0.5 %
BASOPHILS ABSOLUTE: 0 THOU/MM3 (ref 0–0.1)
BILIRUB SERPL-MCNC: 0.3 MG/DL (ref 0.3–1.2)
BILIRUBIN DIRECT: < 0.2 MG/DL (ref 0–0.3)
BILIRUBIN URINE: NEGATIVE
BLOOD, URINE: ABNORMAL
BUN BLDV-MCNC: 25 MG/DL (ref 7–22)
CALCIUM SERPL-MCNC: 9.4 MG/DL (ref 8.5–10.5)
CASTS: ABNORMAL /LPF
CASTS: ABNORMAL /LPF
CHARACTER, URINE: ABNORMAL
CHLORIDE BLD-SCNC: 104 MEQ/L (ref 98–111)
CO2: 27 MEQ/L (ref 23–33)
COLLECTED BY:: ABNORMAL
COLOR: YELLOW
CREAT SERPL-MCNC: 0.3 MG/DL (ref 0.4–1.2)
CRYSTALS: ABNORMAL
D-DIMER QUANTITATIVE: 698 NG/ML FEU (ref 0–500)
DEVICE: ABNORMAL
EOSINOPHIL # BLD: 3.3 %
EOSINOPHILS ABSOLUTE: 0.3 THOU/MM3 (ref 0–0.4)
EPITHELIAL CELLS, UA: ABNORMAL /HPF
ERYTHROCYTE [DISTWIDTH] IN BLOOD BY AUTOMATED COUNT: 16.5 % (ref 11.5–14.5)
ERYTHROCYTE [DISTWIDTH] IN BLOOD BY AUTOMATED COUNT: 55.1 FL (ref 35–45)
GFR SERPL CREATININE-BSD FRML MDRD: > 60 ML/MIN/1.73M2
GLUCOSE BLD-MCNC: 119 MG/DL (ref 70–108)
GLUCOSE, URINE: NEGATIVE MG/DL
HCO3: 28 MMOL/L (ref 23–28)
HCT VFR BLD CALC: 45 % (ref 42–52)
HEMOGLOBIN: 14.4 GM/DL (ref 14–18)
IMMATURE GRANS (ABS): 0.03 THOU/MM3 (ref 0–0.07)
IMMATURE GRANULOCYTES: 0.4 %
INFLUENZA A: NOT DETECTED
INFLUENZA B: NOT DETECTED
KETONES, URINE: 80
LACTIC ACID, SEPSIS: 0.7 MMOL/L (ref 0.5–1.9)
LACTIC ACID: 1.1 MMOL/L (ref 0.5–2)
LEUKOCYTE ESTERASE, URINE: ABNORMAL
LYMPHOCYTES # BLD: 20.2 %
LYMPHOCYTES ABSOLUTE: 1.7 THOU/MM3 (ref 1–4.8)
MCH RBC QN AUTO: 29.4 PG (ref 26–33)
MCHC RBC AUTO-ENTMCNC: 32 GM/DL (ref 32.2–35.5)
MCV RBC AUTO: 91.8 FL (ref 80–94)
MISCELLANEOUS LAB TEST RESULT: ABNORMAL
MONOCYTES # BLD: 10.8 %
MONOCYTES ABSOLUTE: 0.9 THOU/MM3 (ref 0.4–1.3)
NITRITE, URINE: POSITIVE
NUCLEATED RED BLOOD CELLS: 0 /100 WBC
O2 SATURATION: 90 %
OSMOLALITY CALCULATION: 284.9 MOSMOL/KG (ref 275–300)
PCO2: 45 MMHG (ref 35–45)
PH BLOOD GAS: 7.39 (ref 7.35–7.45)
PH UA: 5.5 (ref 5–9)
PLATELET # BLD: 245 THOU/MM3 (ref 130–400)
PMV BLD AUTO: 8.9 FL (ref 9.4–12.4)
PO2: 58 MMHG (ref 71–104)
POTASSIUM SERPL-SCNC: 4.8 MEQ/L (ref 3.5–5.2)
PRO-BNP: 68.9 PG/ML (ref 0–124)
PROCALCITONIN: 0.13 NG/ML (ref 0.01–0.09)
PROTEIN UA: 100 MG/DL
RBC # BLD: 4.9 MILL/MM3 (ref 4.7–6.1)
RBC URINE: > 200 /HPF
RENAL EPITHELIAL, UA: ABNORMAL
SARS-COV-2 RNA, RT PCR: NOT DETECTED
SEG NEUTROPHILS: 64.8 %
SEGMENTED NEUTROPHILS ABSOLUTE COUNT: 5.4 THOU/MM3 (ref 1.8–7.7)
SODIUM BLD-SCNC: 140 MEQ/L (ref 135–145)
SOURCE, BLOOD GAS: ABNORMAL
SPECIFIC GRAVITY UA: 1.02 (ref 1–1.03)
TOTAL PROTEIN: 8 G/DL (ref 6.1–8)
TROPONIN T: 0.01 NG/ML
TROPONIN T: < 0.01 NG/ML
UROBILINOGEN, URINE: 0.2 EU/DL (ref 0–1)
WBC # BLD: 8.3 THOU/MM3 (ref 4.8–10.8)
WBC UA: > 200 /HPF
YEAST: ABNORMAL

## 2022-12-24 PROCEDURE — 82803 BLOOD GASES ANY COMBINATION: CPT

## 2022-12-24 PROCEDURE — 71275 CT ANGIOGRAPHY CHEST: CPT

## 2022-12-24 PROCEDURE — 85379 FIBRIN DEGRADATION QUANT: CPT

## 2022-12-24 PROCEDURE — 2060000000 HC ICU INTERMEDIATE R&B

## 2022-12-24 PROCEDURE — 81001 URINALYSIS AUTO W/SCOPE: CPT

## 2022-12-24 PROCEDURE — 36415 COLL VENOUS BLD VENIPUNCTURE: CPT

## 2022-12-24 PROCEDURE — 87077 CULTURE AEROBIC IDENTIFY: CPT

## 2022-12-24 PROCEDURE — 6370000000 HC RX 637 (ALT 250 FOR IP): Performed by: STUDENT IN AN ORGANIZED HEALTH CARE EDUCATION/TRAINING PROGRAM

## 2022-12-24 PROCEDURE — 85025 COMPLETE CBC W/AUTO DIFF WBC: CPT

## 2022-12-24 PROCEDURE — 6370000000 HC RX 637 (ALT 250 FOR IP): Performed by: EMERGENCY MEDICINE

## 2022-12-24 PROCEDURE — 83605 ASSAY OF LACTIC ACID: CPT

## 2022-12-24 PROCEDURE — 87086 URINE CULTURE/COLONY COUNT: CPT

## 2022-12-24 PROCEDURE — 99285 EMERGENCY DEPT VISIT HI MDM: CPT

## 2022-12-24 PROCEDURE — 84484 ASSAY OF TROPONIN QUANT: CPT

## 2022-12-24 PROCEDURE — 71045 X-RAY EXAM CHEST 1 VIEW: CPT

## 2022-12-24 PROCEDURE — 6360000002 HC RX W HCPCS: Performed by: STUDENT IN AN ORGANIZED HEALTH CARE EDUCATION/TRAINING PROGRAM

## 2022-12-24 PROCEDURE — 36600 WITHDRAWAL OF ARTERIAL BLOOD: CPT

## 2022-12-24 PROCEDURE — 84145 PROCALCITONIN (PCT): CPT

## 2022-12-24 PROCEDURE — 87636 SARSCOV2 & INF A&B AMP PRB: CPT

## 2022-12-24 PROCEDURE — 6360000004 HC RX CONTRAST MEDICATION: Performed by: INTERNAL MEDICINE

## 2022-12-24 PROCEDURE — 82248 BILIRUBIN DIRECT: CPT

## 2022-12-24 PROCEDURE — 87186 SC STD MICRODIL/AGAR DIL: CPT

## 2022-12-24 PROCEDURE — 94640 AIRWAY INHALATION TREATMENT: CPT

## 2022-12-24 PROCEDURE — 2580000003 HC RX 258: Performed by: STUDENT IN AN ORGANIZED HEALTH CARE EDUCATION/TRAINING PROGRAM

## 2022-12-24 PROCEDURE — 51705 CHANGE OF BLADDER TUBE: CPT

## 2022-12-24 PROCEDURE — 83880 ASSAY OF NATRIURETIC PEPTIDE: CPT

## 2022-12-24 PROCEDURE — 87040 BLOOD CULTURE FOR BACTERIA: CPT

## 2022-12-24 PROCEDURE — 93005 ELECTROCARDIOGRAM TRACING: CPT | Performed by: EMERGENCY MEDICINE

## 2022-12-24 PROCEDURE — 2700000000 HC OXYGEN THERAPY PER DAY

## 2022-12-24 PROCEDURE — 80053 COMPREHEN METABOLIC PANEL: CPT

## 2022-12-24 RX ORDER — POLYETHYLENE GLYCOL 3350 17 G/17G
17 POWDER, FOR SOLUTION ORAL DAILY PRN
Status: DISCONTINUED | OUTPATIENT
Start: 2022-12-24 | End: 2022-12-29 | Stop reason: HOSPADM

## 2022-12-24 RX ORDER — SODIUM CHLORIDE 9 MG/ML
INJECTION, SOLUTION INTRAVENOUS PRN
Status: DISCONTINUED | OUTPATIENT
Start: 2022-12-24 | End: 2022-12-29 | Stop reason: HOSPADM

## 2022-12-24 RX ORDER — ENOXAPARIN SODIUM 100 MG/ML
40 INJECTION SUBCUTANEOUS DAILY
Status: DISCONTINUED | OUTPATIENT
Start: 2022-12-24 | End: 2022-12-29 | Stop reason: HOSPADM

## 2022-12-24 RX ORDER — CEFDINIR 300 MG/1
300 CAPSULE ORAL EVERY 12 HOURS SCHEDULED
Status: DISCONTINUED | OUTPATIENT
Start: 2022-12-24 | End: 2022-12-25

## 2022-12-24 RX ORDER — 0.9 % SODIUM CHLORIDE 0.9 %
500 INTRAVENOUS SOLUTION INTRAVENOUS ONCE
Status: DISCONTINUED | OUTPATIENT
Start: 2022-12-24 | End: 2022-12-24

## 2022-12-24 RX ORDER — AZITHROMYCIN 250 MG/1
500 TABLET, FILM COATED ORAL DAILY
Status: DISCONTINUED | OUTPATIENT
Start: 2022-12-24 | End: 2022-12-25

## 2022-12-24 RX ORDER — IPRATROPIUM BROMIDE AND ALBUTEROL SULFATE 2.5; .5 MG/3ML; MG/3ML
1 SOLUTION RESPIRATORY (INHALATION) ONCE
Status: COMPLETED | OUTPATIENT
Start: 2022-12-24 | End: 2022-12-24

## 2022-12-24 RX ORDER — ACETAMINOPHEN 650 MG/1
650 SUPPOSITORY RECTAL EVERY 6 HOURS PRN
Status: DISCONTINUED | OUTPATIENT
Start: 2022-12-24 | End: 2022-12-29 | Stop reason: HOSPADM

## 2022-12-24 RX ORDER — SODIUM CHLORIDE 0.9 % (FLUSH) 0.9 %
5-40 SYRINGE (ML) INJECTION PRN
Status: DISCONTINUED | OUTPATIENT
Start: 2022-12-24 | End: 2022-12-29 | Stop reason: HOSPADM

## 2022-12-24 RX ORDER — SODIUM CHLORIDE 0.9 % (FLUSH) 0.9 %
5-40 SYRINGE (ML) INJECTION EVERY 12 HOURS SCHEDULED
Status: DISCONTINUED | OUTPATIENT
Start: 2022-12-24 | End: 2022-12-29 | Stop reason: HOSPADM

## 2022-12-24 RX ORDER — ACETAMINOPHEN 325 MG/1
650 TABLET ORAL EVERY 6 HOURS PRN
Status: DISCONTINUED | OUTPATIENT
Start: 2022-12-24 | End: 2022-12-29 | Stop reason: HOSPADM

## 2022-12-24 RX ORDER — ONDANSETRON 4 MG/1
4 TABLET, ORALLY DISINTEGRATING ORAL EVERY 8 HOURS PRN
Status: DISCONTINUED | OUTPATIENT
Start: 2022-12-24 | End: 2022-12-29 | Stop reason: HOSPADM

## 2022-12-24 RX ORDER — ONDANSETRON 2 MG/ML
4 INJECTION INTRAMUSCULAR; INTRAVENOUS EVERY 6 HOURS PRN
Status: DISCONTINUED | OUTPATIENT
Start: 2022-12-24 | End: 2022-12-29 | Stop reason: HOSPADM

## 2022-12-24 RX ORDER — FUROSEMIDE 10 MG/ML
20 INJECTION INTRAMUSCULAR; INTRAVENOUS ONCE
Status: COMPLETED | OUTPATIENT
Start: 2022-12-24 | End: 2022-12-24

## 2022-12-24 RX ORDER — IPRATROPIUM BROMIDE AND ALBUTEROL SULFATE 2.5; .5 MG/3ML; MG/3ML
1 SOLUTION RESPIRATORY (INHALATION)
Status: DISCONTINUED | OUTPATIENT
Start: 2022-12-25 | End: 2022-12-27

## 2022-12-24 RX ADMIN — AZITHROMYCIN MONOHYDRATE 500 MG: 250 TABLET ORAL at 16:40

## 2022-12-24 RX ADMIN — SODIUM CHLORIDE, PRESERVATIVE FREE 10 ML: 5 INJECTION INTRAVENOUS at 21:35

## 2022-12-24 RX ADMIN — IPRATROPIUM BROMIDE AND ALBUTEROL SULFATE 1 AMPULE: .5; 3 SOLUTION RESPIRATORY (INHALATION) at 12:48

## 2022-12-24 RX ADMIN — IOPAMIDOL 80 ML: 755 INJECTION, SOLUTION INTRAVENOUS at 22:29

## 2022-12-24 RX ADMIN — FUROSEMIDE 20 MG: 10 INJECTION, SOLUTION INTRAMUSCULAR; INTRAVENOUS at 21:35

## 2022-12-24 RX ADMIN — ENOXAPARIN SODIUM 40 MG: 100 INJECTION SUBCUTANEOUS at 16:41

## 2022-12-24 ASSESSMENT — ENCOUNTER SYMPTOMS
ABDOMINAL PAIN: 0
COUGH: 1
SHORTNESS OF BREATH: 1
RHINORRHEA: 0
WHEEZING: 1
DIARRHEA: 0

## 2022-12-24 ASSESSMENT — PAIN SCALES - GENERAL: PAINLEVEL_OUTOF10: 8

## 2022-12-24 ASSESSMENT — PAIN DESCRIPTION - LOCATION: LOCATION: BACK

## 2022-12-24 ASSESSMENT — PAIN - FUNCTIONAL ASSESSMENT: PAIN_FUNCTIONAL_ASSESSMENT: NONE - DENIES PAIN

## 2022-12-24 NOTE — FLOWSHEET NOTE
VN called into patients room and introduced myself and role. Patient answered and permitted video. Video activated. . Patient resting comfortably in bed. . Patient accepted virtual services and completed admit with VN. Patient voiced no needs or concerns at this time. Call light within reach.      12/24/22 2965   Safe Environment   Safety Measures Other (comment)  (VN Admission.)

## 2022-12-24 NOTE — PROGRESS NOTES
Pharmacy Note - Extended Infusion Beta-Lactam Adjustment    Piperacillin/Tazobactam 3375 mg IV once ordered for treatment of CAP pneumonia. Per [facility] Extended Infusion Beta-Lactam Policy, Zosyn will be changed to 4500 mg IV once. Estimated Creatinine Clearance: Estimated Creatinine Clearance: 251 mL/min (A) (based on SCr of 0.3 mg/dL (L)). Please call with any questions. Thank you,  Elena LEGGETT. Ph., [facility] 12/24/2022 1:54 PM

## 2022-12-24 NOTE — FLOWSHEET NOTE
12/24/22 1602   Safe Environment   Safety Measures Other (comment)  (VN called facility for the [facility] and was told that they sent it and we will just have to find it.  sent message to [facility].)

## 2022-12-24 NOTE — PROGRESS NOTES
Suprapubic catheter exchanged per order by resource RN. Will obtain urine samples when enough is available.

## 2022-12-24 NOTE — H&P
History & Physical      Patient: Viry Richard  YOB: 1955    MRN: [id number]  Acct: [de-identified]    PCP: Rahul Hammer MD    Date of Admission: 12/24/2022    Date of Service: Patient seen / examined on 12/24/22 and admitted to Inpatient with expected LOS greater than two midnights due to medical therapy. ASSESSMENT / PLAN:  Community Acquired Pneumonia - Cannot exclude chemical pneumonitis from aspiration. Reports new onset shortness of breath and cough. CXR with right lower lobe infiltrate and effusion.  - Azithromycin 500mg daily  - Cefdinir 300mg every 12 hours  - Encourage deep breathing and coughing  - Incentive spirometry and Acapella  - NPO and bedside swallow eval; may advance diet as toelrated    Right middle/lower lobe pleural effusion - CXR with evidence of effusion. Suspicion is secondary for community acquired pneumonia vs aspiration pneumonia. - Management as above. Acute on chronic hypoxic respiratory failure - Secondary to above. Patient uses 2LPM as needed at baseline per nursing home records. - Wean supplemental oxygen as tolerated to maintain SpO2>90%    Elevated troponin - Likely Type II demand secondary to above. Patient denies chest pain and ELG without ST or T-wave abnormalities. - Trend Troponin x1 for Delta; if not significantly increased then no additional intervention indicated    Neurogenic bladder with chronic indwelling catheter - Secondary to Multiple Sclerosis. Patient reports urinary catheter last exchanged yesterday (12/23/22)  - Exchange and send sample for UA and culture    Primary HTN - Well controlled  - Resume Amlodipine, and carvedilol with holding parameters    Hx of Multiple Sclerosis - Likely secondary progressive given diagnosed in 1990's.  Not on disease modifying therapy  - Resume home baclofen for spasticity    Paraparesis of bilateral lower extremities - Secondary to multiple sclerosis  - Wound care consulted for decubitus ulcer management  - Off load pressure point areas    Neuropathy - Secondary to multiples sclerosis  - Continue Gabapentin and Trileptal    Sacral decubitus ulcer - s/p diverting colostomy 2018 with Dr. Lg Sharp. - Wound/ostomy consulted    GERD - Resume PPI    Hx of DVT and PE - Noted in 2017 likely secondary to right arm PICC placement with DVT to right subclavian and subsequent pulmonary embolism. He was placed on therapeutic Lovenox and placed on Xarelto at discharge. Xarelto was discontinued on 6/28/22 by Dr. Peewee Ritter for hematuria while at [facility]. Xarelto does not appear to have been restarted. - Lovenox for prophylaxis  - Evaluate for [facility] outpatient prior to discharge    Chief Complaint: Shortness of breath and cough    History of Present Illness:  Dilma Goss is a 74 y.o. male with PMHx of Multiple sclerosis, GERD, paraparesis of bilateral lower extremities, HTN, neurogenic bladder with chronic jeffers, and hx of DVT/PE who presented to [facility] from nursing home with complaint of shortness of breath and cough that began 2 days ago. Today he has worsening shortness of breath and reported fever at his nursing home and required supplemental oxygen. He wears 2 lpm at baseline per nursing records. Upon EMS arrival his SpO2 was in the 80s and he was placed on NRB mask. Upon arrival to the ED the patient was transitioned to nasal cannula at 6LPM. The patient endorses continued shortness of breath but only stated his cough was mild. COVID-19 and FLU A/B were negative. The patient received a Duoneb treatment with adequate improvement in lung aeration. The patient was admitted to the hospital service for further care and management. Please see A&P above for additional information.     Past Medical History:  Past Medical History:   Diagnosis Date    Dysphagia     oropharyngeal    History of pulmonary embolism     History of urinary tract infection     Hx of blood clots     Pulmonary embolis Hypertension     Major depressive disorder, single episode     MS (multiple sclerosis) ([facility] [address]     Neurogenic bladder 02/2012    Dr. Irineo Alva placed cather    Osteomyelitis [facility])     UTI (urinary tract infection)        Past Surgical History:  Past Surgical History:   Procedure Laterality Date    ANKLE SURGERY  1996    broken ankle    BLADDER SURGERY  2-    Suprapubic catheter placement    COLONOSCOPY      CYSTO/URETERO/PYELOSCOPY, CALCULUS TX Left 6/19/2019    CYSTOSCOPY, LEFT STENT insertion, bladder irragation with bladder stones performed by Katia Dinh MD at [facility] N/A 7/8/2019    CYSTO, LEFT URETERAL STENT REMOVAL, LEFT URETEROSCOPY, LASER LITHOTRIPSY, BASKET RETRIEVAL OF STONE FRAGMENTS performed by Katia Dinh MD at [address] [place] [address] 2/26/2021    CYSTOSCOPY, CYSTOLITHOLAPAXY, SUPRAPUBIC CATHETER EXCHANGE [facility] Left 6/15/2022    CYSTOSCOPY performed by Chen Willis MD at [address] [place] [address]  7/25/2022    IR NEPHROSTOMY CATHETER PLACEMENT 7/25/2022 [facility] SPECIAL PROCEDURES    NV LAP,SURG,COLECTOMY,W/END COLOST & CLOSUR N/A 6/13/2018    ROBOT DIVERTING COLOSTOMY performed by Francesca Nguyen MD at [address] [facility]  child    [address] Left 8/4/2022    CYSTOSCOPY, LEFT URETEROSCOPY, LASER LITHOTRIPSY,  LEFT URETERAL STENT EXCHANGE performed by Chen Willis MD at [facility]       Medications Prior to Admission:  No current facility-administered medications on file prior to encounter. Current Outpatient Medications on File Prior to Encounter   Medication Sig Dispense Refill    OXcarbazepine (TRILEPTAL) 150 MG tablet Take 150 mg by mouth 2 times daily      vitamin C (ASCORBIC ACID) 500 MG tablet Take 500 mg by mouth in the morning and 500 mg before bedtime.       potassium chloride (KLOR-CON M) 20 MEQ extended release tablet Take 2 tablets by mouth in the morning and 2 tablets before bedtime. 60 tablet 0    carvedilol (COREG) 12.5 MG tablet Take 1 tablet by mouth in the morning and 1 tablet in the evening. Take with meals. 60 tablet 3    amLODIPine (NORVASC) 10 MG tablet Take 1 tablet by mouth in the morning. 30 tablet 3    melatonin 3 MG TABS tablet Take 1 tablet by mouth nightly as needed (when unable to sleep) 30 tablet 0    ondansetron (ZOFRAN) 4 MG tablet Take 4 mg by mouth every 8 hours as needed for Nausea or Vomiting      carbamide peroxide (DEBROX) 6.5 % otic solution 5 drops 2 times daily Instill 5 drops in both ears two times daily for ear wax use for 4 days. vitamin E 400 UNIT capsule Take 400 Units by mouth daily      aspirin 81 MG EC tablet Take 81 mg by mouth daily      oxybutynin (DITROPAN-XL) 5 MG extended release tablet Take 5 mg by mouth in the morning and at bedtime      gentamicin (GARAMYCIN) 0.1 % ointment Apply topically daily Apply to wound bed      erythromycin (ROMYCIN) 5 MG/GM ophthalmic ointment Place into the right eye nightly (0.25 ribbon to right eye)      Ascorbic Acid (VITAMIN C ER PO) Take 500 mg by mouth in the morning and at bedtime       baclofen (LIORESAL) 10 MG tablet Take 10 mg by mouth 3 times daily      gabapentin (NEURONTIN) 600 MG tablet Take 600 mg by mouth 3 times daily.        acetaminophen (TYLENOL) 325 MG tablet Take 650 mg by mouth every 4 hours as needed for Pain or Fever      LACTOBACILLUS PO Take 1 capsule by mouth in the morning and at bedtime       sodium chloride 1 g tablet Take 1 tablet by mouth 2 times daily (with meals) 90 tablet 3    [DISCONTINUED] metoprolol tartrate (LOPRESSOR) 25 MG tablet Take 1 tablet by mouth 2 times daily 60 tablet 3    [DISCONTINUED] potassium chloride (KLOR-CON M) 20 MEQ TBCR extended release tablet Take 2 tablets by mouth daily 60 tablet 3    calcium-vitamin D (OSCAL-500) 500-200 MG-UNIT per tablet Take 1 tablet by mouth 2 times daily 30 tablet 3    famotidine (PEPCID) 20 MG tablet Take 1 tablet by mouth 2 times daily 60 tablet 3    docusate sodium (COLACE) 100 MG capsule Take 100 mg by mouth daily       tiZANidine (ZANAFLEX) 2 MG tablet Take 2 mg by mouth 3 times daily 0600;1400;2200      Multiple Vitamin (MULTIVITAMIN) tablet Take 1 tablet by mouth daily  0    vitamin A 25781 UNITS capsule Take 2 capsules by mouth daily 30 capsule 3       Allergies:  Patient has no known allergies. Social History:  Social History     Socioeconomic History    Marital status:      Spouse name: Aj Carbone    Number of children: 2    Years of education: Not on file    Highest education level: Not on file   Occupational History    Not on file   Tobacco Use    Smoking status: Former     Types: Cigarettes     Quit date: 1982     Years since quittin.0    Smokeless tobacco: Former   Vaping Use    Vaping Use: Never used   Substance and Sexual Activity    Alcohol use: No     Comment: \"quit alcohol a few years ago\"    Drug use: No    Sexual activity: Yes     Partners: Female   Other Topics Concern    Not on file   Social History Narrative    Not on file     Social Determinants of Health     Financial Resource Strain: Not on file   Food Insecurity: Not on file   Transportation Needs: Not on file   Physical Activity: Not on file   Stress: Not on file   Social Connections: Not on file   Intimate Partner Violence: Not on file   Housing Stability: Not on file       Family History:   Family History   Problem Relation Age of Onset    Cancer Mother         Breast    Heart Disease Father 48    Arthritis Sister     Heart Disease Paternal Grandmother 48       REVIEW OF SYSTEMS:  A 14-point ROS was obtained and negative, with the exception of pertinent positives as listed above in HPI. PHYSICAL EXAM:  Vitals:    22 1141 22 1248   BP: 109/67    Pulse: 88 90   Resp: 27 19   Temp: 99.8 °F (37.7 °C)    TempSrc: Axillary    SpO2: 92% 92%   Weight: 180 lb (81.6 kg)      Physical Exam  Vitals and nursing note reviewed. Constitutional:       General: He is awake. He is not in acute distress. Appearance: Normal appearance. He is obese. He is not ill-appearing. HENT:      Head: Normocephalic and atraumatic. Right Ear: External ear normal.      Left Ear: External ear normal.      Nose: Nose normal.      Mouth/Throat:      Mouth: Mucous membranes are moist.      Pharynx: Oropharynx is clear. Eyes:      Conjunctiva/sclera: Conjunctivae normal.      Pupils: Pupils are equal, round, and reactive to light. Cardiovascular:      Rate and Rhythm: Normal rate and regular rhythm. Pulses: Normal pulses. Heart sounds: Normal heart sounds. Pulmonary:      Effort: Pulmonary effort is normal.      Breath sounds: Examination of the right-middle field reveals decreased breath sounds. Examination of the right-lower field reveals decreased breath sounds. Decreased breath sounds present. Abdominal:      General: Bowel sounds are normal.      Palpations: Abdomen is soft. Tenderness: There is no abdominal tenderness. Musculoskeletal:         General: Normal range of motion. Cervical back: Normal range of motion and neck supple. Comments: Paraparesis of bilateral lower extremities. Skin:     General: Skin is warm and dry. Capillary Refill: Capillary refill takes less than 2 seconds. Neurological:      General: No focal deficit present. Mental Status: He is alert and oriented to person, place, and time. Mental status is at baseline. GCS: GCS eye subscore is 4. GCS verbal subscore is 5. GCS motor subscore is 6. Cranial Nerves: Cranial nerves 2-12 are intact. Sensory: Sensation is intact. Psychiatric:         Attention and Perception: Attention and perception normal.         Mood and Affect: Mood and affect normal.         Speech: Speech normal.         Behavior: Behavior normal. Behavior is cooperative. Thought Content:  Thought content normal.         Cognition and Memory: Cognition and memory normal.         Judgment: Judgment normal.         Labs:  Results for orders placed or performed during the hospital encounter of 12/24/22   COVID-19 & Influenza Combo    Specimen: Nasopharyngeal Swab   Result Value Ref Range    SARS-CoV-2 RNA, RT PCR NOT DETECTED NOT DETECTED    INFLUENZA A NOT DETECTED NOT DETECTED    INFLUENZA B NOT DETECTED NOT DETECTED   CBC with Auto Differential   Result Value Ref Range    WBC 8.3 4.8 - 10.8 thou/mm3    RBC 4.90 4.70 - 6.10 mill/mm3    Hemoglobin 14.4 14.0 - 18.0 gm/dl    Hematocrit 45.0 42.0 - 52.0 %    MCV 91.8 80.0 - 94.0 fL    MCH 29.4 26.0 - 33.0 pg    MCHC 32.0 (L) 32.2 - 35.5 gm/dl    RDW-CV 16.5 (H) 11.5 - 14.5 %    RDW-SD 55.1 (H) 35.0 - 45.0 fL    Platelets 189 234 - 387 thou/mm3    MPV 8.9 (L) 9.4 - 12.4 fL    Seg Neutrophils 64.8 %    Lymphocytes 20.2 %    Monocytes 10.8 %    Eosinophils 3.3 %    Basophils 0.5 %    Immature Granulocytes 0.4 %    Segs Absolute 5.4 1.8 - 7.7 thou/mm3    Lymphocytes Absolute 1.7 1.0 - 4.8 thou/mm3    Monocytes Absolute 0.9 0.4 - 1.3 thou/mm3    Eosinophils Absolute 0.3 0.0 - 0.4 thou/mm3    Basophils Absolute 0.0 0.0 - 0.1 thou/mm3    Immature Grans (Abs) 0.03 0.00 - 0.07 thou/mm3    nRBC 0 /100 wbc   Basic Metabolic Panel   Result Value Ref Range    Sodium 140 135 - 145 meq/L    Potassium 4.8 3.5 - 5.2 meq/L    Chloride 104 98 - 111 meq/L    CO2 27 23 - 33 meq/L    Glucose 119 (H) 70 - 108 mg/dL    BUN 25 (H) 7 - 22 mg/dL    Creatinine 0.3 (L) 0.4 - 1.2 mg/dL    Calcium 9.4 8.5 - 10.5 mg/dL   Hepatic Function Panel   Result Value Ref Range    Albumin 3.4 (L) 3.5 - 5.1 g/dL    Total Bilirubin 0.3 0.3 - 1.2 mg/dL    Bilirubin, Direct <0.2 0.0 - 0.3 mg/dL    Alkaline Phosphatase 167 (H) 38 - 126 U/L    AST 25 5 - 40 U/L    ALT 22 11 - 66 U/L    Total Protein 8.0 6.1 - 8.0 g/dL   Troponin   Result Value Ref Range    Troponin T 0.015 (A) ng/ml   Brain Natriuretic Peptide   Result Value Ref Range    Pro-BNP 68.9 0.0 - 124.0 pg/mL   Lactate, Sepsis   Result Value Ref Range    Lactic Acid, Sepsis 0.7 0.5 - 1.9 mmol/L   D-Dimer, Quantitative   Result Value Ref Range    D-Dimer, Quant 698.00 (H) 0.00 - 500.00 ng/ml FEU   Anion Gap   Result Value Ref Range    Anion Gap 9.0 8.0 - 16.0 meq/L   Glomerular Filtration Rate, Estimated   Result Value Ref Range    Est, Glom Filt Rate >60 >60 ml/min/1.73m2   Osmolality   Result Value Ref Range    Osmolality Calc 284.9 275.0 - 300.0 mOsmol/kg       EKG / Radiology:    EKG:  Reviewed by me --    CXR:  Reviewed by me --    XR CHEST PORTABLE    Result Date: 12/24/2022  PROCEDURE: XR CHEST PORTABLE CLINICAL INFORMATION: cough. COMPARISON: Chest x-ray dated 8/5/2022. TECHNIQUE: AP upright view of the chest. FINDINGS: There is borderline cardiomegaly. .The mediastinum is not widened. There is infiltrate and effusion at the right lung base, new since previous study. There is atelectasis at the left lung base, unchanged. . The pulmonary vascularity is normal. There is thoracic spondylosis. 1. Borderline cardiomegaly. 2. Infiltrate and effusion at the right lung base new since previous study dated 11 of August 2022. 3. Atelectasis of the left lung base, unchanged. 4. Thoracic spondylosis. Annie Conn **This report has been created using voice recognition software. It may contain minor errors which are inherent in voice recognition technology. ** Final report electronically signed by DR Mack Ayala on 12/24/2022 12:34 PM      FEN/GI/DVT:  IVF: None  Electrolytes: Monitor and replace per protocols  Diet: General  GI PPX: On PPI for GERD  DVT Prophylaxis: Lovenox    CODE STATUS:  Full    Active Hospital Problems    Diagnosis Date Noted    Community acquired pneumonia of right lower lobe of lung [J18.9] 12/24/2022     Priority: Medium     Thank you Alicia Hall MD for the opportunity to be involved in this patient's care.     Electronically signed by Seth Shaw DO, MBA on 12/24/2022 at 2:27 PM

## 2022-12-24 NOTE — ED NOTES
Nursing home reports patient had an SpO2 in the 83% on 5L O2. Cough yesterday. Respiratory therapist was evaluating patient's lung sounds d/t nurse being concerned that his cough was worsening. This is when pt's pulse ox was checked. Pt does not normally wear any oxygen.      Jina Castillo RN  12/24/22 [id number]

## 2022-12-24 NOTE — ED PROVIDER NOTES
[place] ENCOUNTER          Pt Name: Sara Meeks  MRN: [id number]  [place] 1955  Date of evaluation: 12/24/2022  Emergency Physician: Jackson Antonio, [facility]       Chief Complaint   Patient presents with    Shortness of Breath    Cough     History obtained from the patient and nursing home. HISTORY OF PRESENT ILLNESS    HPI  Sara Meeks is a 74 y.o. male who presents to the emergency department for evaluation of shortness of breath. Patient nonambulatory due to MS resides at the nursing home. He has a history of dysphagia.  2 days ago patient developed a cough. He then had worsening difficulty in breathing. He was placed on oxygen at the nursing home. Today patient was noted to have worsening difficulty in breathing and a fever. He required increased levels of oxygen. His oxygen saturation was noted to be in the low 80s prior to EMS arrival.  EMS placed patient on nonrebreather and patient was brought to the ED. Patient reports difficulty getting a deep breath cough with congestion. He has a history of pulmonary emboli, decubitus ulcer, diverting colostomy, suprapubic catheter. The patient has no other acute complaints at this time. REVIEW OF SYSTEMS   Review of Systems   Constitutional:  Positive for fever. Negative for chills. HENT:  Negative for congestion and rhinorrhea. Respiratory:  Positive for cough, shortness of breath and wheezing. Cardiovascular:  Negative for chest pain and leg swelling. Gastrointestinal:  Negative for abdominal pain and diarrhea. Genitourinary:  Negative for decreased urine volume and dysuria. Skin:  Positive for wound. Negative for rash. Neurological:  Negative for headaches.        PAST MEDICAL AND SURGICAL HISTORY     Past Medical History:   Diagnosis Date    Dysphagia     oropharyngeal    History of pulmonary embolism     History of urinary tract infection Hx of blood clots     Pulmonary embolis    Hypertension     Major depressive disorder, single episode     MS (multiple sclerosis) ([facility] [address]     Neurogenic bladder 02/2012    Dr. Hong Manrique placed cather    Osteomyelitis [facility])     UTI (urinary tract infection)      Past Surgical History:   Procedure Laterality Date    ANKLE SURGERY  1996    broken ankle    BLADDER SURGERY  2-    Suprapubic catheter placement    COLONOSCOPY      CYSTO/URETERO/PYELOSCOPY, CALCULUS TX Left 6/19/2019    CYSTOSCOPY, LEFT STENT insertion, bladder irragation with bladder stones performed by Melba Bains MD at [address] [facility] [address] N/A 7/8/2019    CYSTO, LEFT URETERAL STENT REMOVAL, LEFT URETEROSCOPY, LASER LITHOTRIPSY, BASKET RETRIEVAL OF STONE FRAGMENTS performed by Melba Bains MD at [address] [place] [address] 2/26/2021    CYSTOSCOPY, CYSTOLITHOLAPAXY, SUPRAPUBIC CATHETER EXCHANGE performed by Coco Blas MD at [facility] 19 Left 6/15/2022    CYSTOSCOPY performed by Coco Blas MD at [facility]  7/25/2022    IR NEPHROSTOMY CATHETER PLACEMENT 7/25/2022 [facility] SPECIAL PROCEDURES    ID LAP,SURG,COLECTOMY,W/END COLOST & CLOSUR N/A 6/13/2018    ROBOT DIVERTING COLOSTOMY performed by Jose Medeiros MD at [address] [facility]  child    [address] Left 8/4/2022    CYSTOSCOPY, LEFT URETEROSCOPY, LASER LITHOTRIPSY,  LEFT URETERAL STENT EXCHANGE performed by Coco Blas MD at [address]     Current Facility-Administered Medications:     [Held by provider] piperacillin-tazobactam (ZOSYN) 4,500 mg in dextrose 5 % 100 mL IVPB (mini-bag), 4,500 mg, IntraVENous, Once, Kristian, RPH    sodium chloride flush 0.9 % injection 5-40 mL, 5-40 mL, IntraVENous, 2 times per day, Miguel Humphriesi, DO    sodium chloride flush 0.9 % injection 5-40 mL, 5-40 mL, IntraVENous, PRN, Miguel Silva,     0.9 % sodium chloride infusion, , IntraVENous, PRN, [facility] DO enoxaparin (LOVENOX) injection 40 mg, 40 mg, SubCUTAneous, Daily, Miguel M Shira, DO    ondansetron (ZOFRAN-ODT) disintegrating tablet 4 mg, 4 mg, Oral, Q8H PRN **OR** ondansetron (ZOFRAN) injection 4 mg, 4 mg, IntraVENous, Q6H PRN, Miguel M Shira, DO    polyethylene glycol (GLYCOLAX) packet 17 g, 17 g, Oral, Daily PRN, Miguel M Shira, DO    acetaminophen (TYLENOL) tablet 650 mg, 650 mg, Oral, Q6H PRN **OR** acetaminophen (TYLENOL) suppository 650 mg, 650 mg, Rectal, Q6H PRN, Miguel  Shira, DO    azithromycin (ZITHROMAX) tablet 500 mg, 500 mg, Oral, Daily, Miguel  Shira, DO    Current Outpatient Medications:     OXcarbazepine (TRILEPTAL) 150 MG tablet, Take 150 mg by mouth 2 times daily, Disp: , Rfl:     vitamin C (ASCORBIC ACID) 500 MG tablet, Take 500 mg by mouth in the morning and 500 mg before bedtime. , Disp: , Rfl:     potassium chloride (KLOR-CON M) 20 MEQ extended release tablet, Take 2 tablets by mouth in the morning and 2 tablets before bedtime. , Disp: 60 tablet, Rfl: 0    carvedilol (COREG) 12.5 MG tablet, Take 1 tablet by mouth in the morning and 1 tablet in the evening. Take with meals. , Disp: 60 tablet, Rfl: 3    amLODIPine (NORVASC) 10 MG tablet, Take 1 tablet by mouth in the morning., Disp: 30 tablet, Rfl: 3    melatonin 3 MG TABS tablet, Take 1 tablet by mouth nightly as needed (when unable to sleep), Disp: 30 tablet, Rfl: 0    ondansetron (ZOFRAN) 4 MG tablet, Take 4 mg by mouth every 8 hours as needed for Nausea or Vomiting, Disp: , Rfl:     carbamide peroxide (DEBROX) 6.5 % otic solution, 5 drops 2 times daily Instill 5 drops in both ears two times daily for ear wax use for 4 days. , Disp: , Rfl:     vitamin E 400 UNIT capsule, Take 400 Units by mouth daily, Disp: , Rfl:     aspirin 81 MG EC tablet, Take 81 mg by mouth daily, Disp: , Rfl:     oxybutynin (DITROPAN-XL) 5 MG extended release tablet, Take 5 mg by mouth in the morning and at bedtime, Disp: , Rfl:     gentamicin (GARAMYCIN) 0.1 % ointment, Apply topically daily Apply to wound bed, Disp: , Rfl:     erythromycin (ROMYCIN) 5 MG/GM ophthalmic ointment, Place into the right eye nightly (0.25 ribbon to right eye), Disp: , Rfl:     Ascorbic Acid (VITAMIN C ER PO), Take 500 mg by mouth in the morning and at bedtime , Disp: , Rfl:     baclofen (LIORESAL) 10 MG tablet, Take 10 mg by mouth 3 times daily, Disp: , Rfl:     gabapentin (NEURONTIN) 600 MG tablet, Take 600 mg by mouth 3 times daily.  , Disp: , Rfl:     acetaminophen (TYLENOL) 325 MG tablet, Take 650 mg by mouth every 4 hours as needed for Pain or Fever, Disp: , Rfl:     LACTOBACILLUS PO, Take 1 capsule by mouth in the morning and at bedtime , Disp: , Rfl:     sodium chloride 1 g tablet, Take 1 tablet by mouth 2 times daily (with meals), Disp: 90 tablet, Rfl: 3    calcium-vitamin D (OSCAL-500) 500-200 MG-UNIT per tablet, Take 1 tablet by mouth 2 times daily, Disp: 30 tablet, Rfl: 3    famotidine (PEPCID) 20 MG tablet, Take 1 tablet by mouth 2 times daily, Disp: 60 tablet, Rfl: 3    docusate sodium (COLACE) 100 MG capsule, Take 100 mg by mouth daily , Disp: , Rfl:     tiZANidine (ZANAFLEX) 2 MG tablet, Take 2 mg by mouth 3 times daily 0600;1400;2200, Disp: , Rfl:     Multiple Vitamin (MULTIVITAMIN) tablet, Take 1 tablet by mouth daily, Disp: , Rfl: 0    vitamin A 77437 UNITS capsule, Take 2 capsules by mouth daily, Disp: 30 capsule, Rfl: 3      SOCIAL HISTORY     Social History     Social History Narrative    Not on file     Social History     Tobacco Use    Smoking status: Former     Types: Cigarettes     Quit date: 1982     Years since quittin.0    Smokeless tobacco: Former   Vaping Use    Vaping Use: Never used   Substance Use Topics    Alcohol use: No     Comment: \"quit alcohol a few years ago\"    Drug use: No         ALLERGIES   No Known Allergies      FAMILY HISTORY     Family History   Problem Relation Age of Onset    Cancer Mother         Breast    Heart Disease Father 48    Arthritis Sister Heart Disease Paternal Grandmother 48         PHYSICAL EXAM     ED Triage Vitals [12/24/22 1141]   BP Temp Temp Source Heart Rate Resp SpO2 Height Weight   109/67 99.8 °F (37.7 °C) Axillary 88 27 92 % -- 180 lb (81.6 kg)         Additional Vital Signs:  Vitals:    12/24/22 1248   BP:    Pulse: 90   Resp: 19   Temp:    SpO2: 92%       Physical Exam  Vitals and nursing note reviewed. Constitutional:       General: He is not in acute distress. Appearance: He is not toxic-appearing. HENT:      Head: Normocephalic and atraumatic. Eyes:      Pupils: Pupils are equal, round, and reactive to light. Neck:      Vascular: No JVD. Cardiovascular:      Rate and Rhythm: Normal rate and regular rhythm. Pulmonary:      Effort: Pulmonary effort is normal. No accessory muscle usage or respiratory distress. Breath sounds: Examination of the right-upper field reveals wheezing. Examination of the left-upper field reveals wheezing. Examination of the right-middle field reveals rhonchi. Examination of the right-lower field reveals rhonchi. Examination of the left-lower field reveals rhonchi. Wheezing and rhonchi present. Chest:      Chest wall: No tenderness. Abdominal:      Comments: Mid abd colostomy. Musculoskeletal:      Cervical back: Normal range of motion. Right lower leg: No tenderness. No edema. Left lower leg: No tenderness. No edema. Skin:     General: Skin is warm and dry. Neurological:      Mental Status: He is alert and oriented to person, place, and time. GCS: GCS eye subscore is 4. GCS verbal subscore is 5. GCS motor subscore is 6. Initial vital signs and nursing assessment reviewed and abnormal from tachypnea improving . Pulsoximetry is abnormal per my interpretation.     MEDICAL DECISION MAKING   Initial Assessment: Given the patient's above chief complaint and findings on history and physical examination, I thought it was appropriate to consider the following emergency medical conditions: Pneumonia, COVID, flu, electrolyte abnormality, anemia, aspiration, pulmonary embolus, bronchitis, ACS, dehydration, although some of these diagnoses are unlikely they were considered in my medical decision making. Plan: CBC, BMP, ECG, LFTs, troponin, BNP chest x-ray, COVID, flu, swabs, symptomatic treatment with DuoNeb and reassess         ED RESULTS   Laboratory results:  Labs Reviewed   CBC WITH AUTO DIFFERENTIAL - Abnormal; Notable for the following components:       Result Value    MCHC 32.0 (*)     RDW-CV 16.5 (*)     RDW-SD 55.1 (*)     MPV 8.9 (*)     All other components within normal limits   BASIC METABOLIC PANEL - Abnormal; Notable for the following components:    Glucose 119 (*)     BUN 25 (*)     Creatinine 0.3 (*)     All other components within normal limits   HEPATIC FUNCTION PANEL - Abnormal; Notable for the following components:    Albumin 3.4 (*)     Alkaline Phosphatase 167 (*)     All other components within normal limits   TROPONIN - Abnormal; Notable for the following components:    Troponin T 0.015 (*)     All other components within normal limits   D-DIMER, QUANTITATIVE - Abnormal; Notable for the following components:    D-Dimer, Quant 698.00 (*)     All other components within normal limits   COVID-19 & INFLUENZA COMBO   CULTURE, BLOOD 1   CULTURE, BLOOD 1   BRAIN NATRIURETIC PEPTIDE   LACTATE, SEPSIS   ANION GAP   GLOMERULAR FILTRATION RATE, ESTIMATED   OSMOLALITY       Radiologic studies results:  XR CHEST PORTABLE   Final Result   1. Borderline cardiomegaly. 2. Infiltrate and effusion at the right lung base new since previous study dated 11 of August 2022. 3. Atelectasis of the left lung base, unchanged. 4. Thoracic spondylosis. Sallyanne Chain **This report has been created using voice recognition software. It may contain minor errors which are inherent in voice recognition technology. **      Final report electronically signed by DR Ashia Diaz on 12/24/2022 12:34 PM          ED Medications administered this visit:   Medications   piperacillin-tazobactam (ZOSYN) 4,500 mg in dextrose 5 % 100 mL IVPB (mini-bag) ( IntraVENous Automatically Held 12/24/22 1415)   sodium chloride flush 0.9 % injection 5-40 mL (has no administration in time range)   sodium chloride flush 0.9 % injection 5-40 mL (has no administration in time range)   0.9 % sodium chloride infusion (has no administration in time range)   enoxaparin (LOVENOX) injection 40 mg (has no administration in time range)   ondansetron (ZOFRAN-ODT) disintegrating tablet 4 mg (has no administration in time range)     Or   ondansetron (ZOFRAN) injection 4 mg (has no administration in time range)   polyethylene glycol (GLYCOLAX) packet 17 g (has no administration in time range)   acetaminophen (TYLENOL) tablet 650 mg (has no administration in time range)     Or   acetaminophen (TYLENOL) suppository 650 mg (has no administration in time range)   azithromycin (ZITHROMAX) tablet 500 mg (has no administration in time range)   ipratropium-albuterol (DUONEB) nebulizer solution 1 ampule (1 ampule Inhalation Given 12/24/22 1248)         ED COURSE     ED Course as of 12/24/22 1427   Sat Dec 24, 2022   1349 Troponin T(!): 0.015  Slightly elevated. EKG without acute ischemic changes. [DD]   1689 D-Dimer, Quant(!): 698.00  Patient alternative cause of pneumonia for shortness of breath. Years criteria 1000. [DD]   1350 Pro-BNP: 68.9 [DD]   1350 Lactic Acid, Sepsis: 0.7 [DD]   1350 Blood Culture 2 [DD]   1350 SARS-CoV-2 RNA, RT PCR: NOT DETECTED [DD]   1350 INFLUENZA A: NOT DETECTED [DD]   1350 XR CHEST PORTABLE  Right-sided infiltrate with associated pleural effusion. [DD]   1350 Resp: 19 [DD]   1350 SpO2: 92 % [DD]      ED Course User Index  [DD] Shay Comas, DO     Patient with signs and symptoms suggesting of pneumonia of the right lower lobe. This is likely due to patient's dysphagia.   Patient was given a breathing treatment due wheezes and rhonchi. Lower extremities without signs of DVT. D-dimer was slightly elevated. Flu and COVID were negative. BNP was negative. Troponin was slightly elevated with EKG without acute ischemic changes. Oxygen was titrated down from nonrebreather to 6 L nasal cannula. Patient x-ray showed right-sided pneumonia with associated pleural effusion. Patient was started on Zosyn and Zithromax. Lactate was 0.7. Blood cultures were sent. Discussed case with hospitalist team who graciously accepted the admission. The diagnosis, extensive differential diagnosis, laboratory and imaging findings were discussed at the bedside. The patient was an active participant in their care. They are agreeable to the plan of care. All questions and concerns were addressed at the time of the encounter. MEDICATION CHANGES     DISCHARGE MEDICATIONS:  Current Discharge Medication List               FINAL DISPOSITION     Final diagnoses:   Acute respiratory failure with hypoxia ([address]   Pneumonia of right lung due to infectious organism, unspecified part of lung   Elevated troponin     Condition: condition: Stable, fair  Dispo: Admit    PATIENT REFERRED TO:  No follow-up provider specified. Critical Care Time   None      This transcription was electronically signed. Parts of this transcriptions may have been dictated by use of voice recognition software and electronically transcribed, and parts may have been transcribed with the assistance of an ED scribe. The transcription may contain errors not detected in proofreading.     Electronically Signed: Tamiko Farias DO, 12/24/22, 2:27 PM       Tamiko Farias DO  12/24/22 4519

## 2022-12-24 NOTE — PROGRESS NOTES
Pt admitted to  [facility] via cart/stretcher. Complaints: Shortness of breath. IV none infusing into the forearm left, condition patent and no redness at a rate of 0 mls/ hour with about NA mls in the bag still. IV site free of s/s of infection or infiltration. Vital signs obtained. Assessment and data collection initiated. Two nurse skin assessment performed by [facility] RN and Alesia Green RN. Oriented to room. Policies and procedures for [facility] explained. Alanna JENKINS discussed hourly rounding with patient addressing 5 P's. Fall prevention and safety brochure discussed with patient. Bed alarm on. Call light in reach.

## 2022-12-24 NOTE — ED NOTES
ED to inpatient nurses report    Chief Complaint   Patient presents with    Shortness of Breath    Cough      Present to ED from nursing home  LOC: alert and orientated to name and place  Vital signs   Vitals:    12/24/22 1141 12/24/22 1248 12/24/22 1331   BP: 109/67  111/75   Pulse: 88 90 78   Resp: 27 19 13   Temp: 99.8 °F (37.7 °C)     TempSrc: Axillary     SpO2: 92% 92% 97%   Weight: 180 lb (81.6 kg)        Oxygen Baseline 93% on 6L O2 via NC    Current needs required 6L O2 via NC Bipap/Cpap No  LDAs:   Peripheral IV 12/24/22 Left Forearm (Active)   Site Assessment Clean, dry & intact 12/24/22 1204   Line Status Flushed;Blood return noted;Normal saline locked 12/24/22 1204   Dressing Status Clean, dry & intact 12/24/22 1204   Dressing Type Transparent 12/24/22 1204   Dressing Intervention New 12/24/22 1204     Mobility: Fully dependent  Pending ED orders: none  Present condition: stable      C-SSRS Risk of Suicide: No Risk  Swallow Screening    Preferred Language: [place]     Electronically signed by Prisca Shepherd RN on 12/24/2022 at 2:59 PM       Prisca Shepherd RN  12/24/22 1625

## 2022-12-24 NOTE — ED NOTES
Pt transported to [facility]16 by cart in stable condition. Called [facility] and informed Tamanna Asher that the patient was on their way to the unit.      Annita Alcaraz, LPN  82/82/45 [id number]

## 2022-12-25 PROBLEM — R93.89 ABNORMAL CT OF THE CHEST: Status: ACTIVE | Noted: 2022-12-25

## 2022-12-25 PROBLEM — J96.01 ACUTE RESPIRATORY FAILURE WITH HYPOXIA (HCC): Status: ACTIVE | Noted: 2022-12-24

## 2022-12-25 LAB
ALBUMIN SERPL-MCNC: 3 G/DL (ref 3.5–5.1)
ALP BLD-CCNC: 176 U/L (ref 38–126)
ALT SERPL-CCNC: 24 U/L (ref 11–66)
ANION GAP SERPL CALCULATED.3IONS-SCNC: 12 MEQ/L (ref 8–16)
AST SERPL-CCNC: 29 U/L (ref 5–40)
BASOPHILS # BLD: 0.5 %
BASOPHILS ABSOLUTE: 0 THOU/MM3 (ref 0–0.1)
BILIRUB SERPL-MCNC: 0.4 MG/DL (ref 0.3–1.2)
BILIRUBIN DIRECT: < 0.2 MG/DL (ref 0–0.3)
BUN BLDV-MCNC: 20 MG/DL (ref 7–22)
CALCIUM SERPL-MCNC: 9.1 MG/DL (ref 8.5–10.5)
CHLORIDE BLD-SCNC: 101 MEQ/L (ref 98–111)
CO2: 26 MEQ/L (ref 23–33)
CREAT SERPL-MCNC: 0.3 MG/DL (ref 0.4–1.2)
EKG ATRIAL RATE: 81 BPM
EKG P AXIS: 49 DEGREES
EKG P-R INTERVAL: 134 MS
EKG Q-T INTERVAL: 370 MS
EKG QRS DURATION: 78 MS
EKG QTC CALCULATION (BAZETT): 429 MS
EKG R AXIS: 52 DEGREES
EKG T AXIS: 69 DEGREES
EKG VENTRICULAR RATE: 81 BPM
EOSINOPHIL # BLD: 2.6 %
EOSINOPHILS ABSOLUTE: 0.2 THOU/MM3 (ref 0–0.4)
ERYTHROCYTE [DISTWIDTH] IN BLOOD BY AUTOMATED COUNT: 16.2 % (ref 11.5–14.5)
ERYTHROCYTE [DISTWIDTH] IN BLOOD BY AUTOMATED COUNT: 54.5 FL (ref 35–45)
GFR SERPL CREATININE-BSD FRML MDRD: > 60 ML/MIN/1.73M2
GLUCOSE BLD-MCNC: 113 MG/DL (ref 70–108)
GLUCOSE BLD-MCNC: 116 MG/DL (ref 70–108)
HCT VFR BLD CALC: 43.9 % (ref 42–52)
HEMOGLOBIN: 13.8 GM/DL (ref 14–18)
IMMATURE GRANS (ABS): 0.03 THOU/MM3 (ref 0–0.07)
IMMATURE GRANULOCYTES: 0.4 %
LACTIC ACID: 0.6 MMOL/L (ref 0.5–2)
LYMPHOCYTES # BLD: 18.1 %
LYMPHOCYTES ABSOLUTE: 1.5 THOU/MM3 (ref 1–4.8)
MCH RBC QN AUTO: 28.9 PG (ref 26–33)
MCHC RBC AUTO-ENTMCNC: 31.4 GM/DL (ref 32.2–35.5)
MCV RBC AUTO: 92 FL (ref 80–94)
MONOCYTES # BLD: 9.1 %
MONOCYTES ABSOLUTE: 0.8 THOU/MM3 (ref 0.4–1.3)
MRSA SCREEN RT-PCR: POSITIVE
NUCLEATED RED BLOOD CELLS: 0 /100 WBC
PLATELET # BLD: 227 THOU/MM3 (ref 130–400)
PMV BLD AUTO: 9 FL (ref 9.4–12.4)
POTASSIUM SERPL-SCNC: 3.8 MEQ/L (ref 3.5–5.2)
RBC # BLD: 4.77 MILL/MM3 (ref 4.7–6.1)
SEG NEUTROPHILS: 69.3 %
SEGMENTED NEUTROPHILS ABSOLUTE COUNT: 5.8 THOU/MM3 (ref 1.8–7.7)
SODIUM BLD-SCNC: 139 MEQ/L (ref 135–145)
TOTAL PROTEIN: 7 G/DL (ref 6.1–8)
VANCOMYCIN RESISTANT ENTEROCOCCUS: NEGATIVE
WBC # BLD: 8.3 THOU/MM3 (ref 4.8–10.8)

## 2022-12-25 PROCEDURE — 80048 BASIC METABOLIC PNL TOTAL CA: CPT

## 2022-12-25 PROCEDURE — 6370000000 HC RX 637 (ALT 250 FOR IP): Performed by: INTERNAL MEDICINE

## 2022-12-25 PROCEDURE — 2580000003 HC RX 258: Performed by: INTERNAL MEDICINE

## 2022-12-25 PROCEDURE — 94669 MECHANICAL CHEST WALL OSCILL: CPT

## 2022-12-25 PROCEDURE — 82948 REAGENT STRIP/BLOOD GLUCOSE: CPT

## 2022-12-25 PROCEDURE — 80076 HEPATIC FUNCTION PANEL: CPT

## 2022-12-25 PROCEDURE — 87899 AGENT NOS ASSAY W/OPTIC: CPT

## 2022-12-25 PROCEDURE — 51705 CHANGE OF BLADDER TUBE: CPT

## 2022-12-25 PROCEDURE — 87070 CULTURE OTHR SPECIMN AEROBIC: CPT

## 2022-12-25 PROCEDURE — 83605 ASSAY OF LACTIC ACID: CPT

## 2022-12-25 PROCEDURE — 6370000000 HC RX 637 (ALT 250 FOR IP): Performed by: STUDENT IN AN ORGANIZED HEALTH CARE EDUCATION/TRAINING PROGRAM

## 2022-12-25 PROCEDURE — 87500 VANOMYCIN DNA AMP PROBE: CPT

## 2022-12-25 PROCEDURE — 2700000000 HC OXYGEN THERAPY PER DAY

## 2022-12-25 PROCEDURE — 87641 MR-STAPH DNA AMP PROBE: CPT

## 2022-12-25 PROCEDURE — 87449 NOS EACH ORGANISM AG IA: CPT

## 2022-12-25 PROCEDURE — 85025 COMPLETE CBC W/AUTO DIFF WBC: CPT

## 2022-12-25 PROCEDURE — 6360000002 HC RX W HCPCS: Performed by: INTERNAL MEDICINE

## 2022-12-25 PROCEDURE — 94640 AIRWAY INHALATION TREATMENT: CPT

## 2022-12-25 PROCEDURE — 2060000000 HC ICU INTERMEDIATE R&B

## 2022-12-25 PROCEDURE — 6370000000 HC RX 637 (ALT 250 FOR IP)

## 2022-12-25 PROCEDURE — 94761 N-INVAS EAR/PLS OXIMETRY MLT: CPT

## 2022-12-25 PROCEDURE — 36415 COLL VENOUS BLD VENIPUNCTURE: CPT

## 2022-12-25 PROCEDURE — 2580000003 HC RX 258: Performed by: STUDENT IN AN ORGANIZED HEALTH CARE EDUCATION/TRAINING PROGRAM

## 2022-12-25 RX ORDER — DOXYCYCLINE HYCLATE 100 MG
100 TABLET ORAL EVERY 12 HOURS SCHEDULED
Status: DISCONTINUED | OUTPATIENT
Start: 2022-12-25 | End: 2022-12-29 | Stop reason: HOSPADM

## 2022-12-25 RX ORDER — FAMOTIDINE 20 MG/1
20 TABLET, FILM COATED ORAL 2 TIMES DAILY
Status: DISCONTINUED | OUTPATIENT
Start: 2022-12-25 | End: 2022-12-29 | Stop reason: HOSPADM

## 2022-12-25 RX ORDER — SODIUM CHLORIDE 9 MG/ML
INJECTION, SOLUTION INTRAVENOUS CONTINUOUS
Status: ACTIVE | OUTPATIENT
Start: 2022-12-25 | End: 2022-12-26

## 2022-12-25 RX ORDER — GABAPENTIN 600 MG/1
600 TABLET ORAL 4 TIMES DAILY
Status: DISCONTINUED | OUTPATIENT
Start: 2022-12-25 | End: 2022-12-29 | Stop reason: HOSPADM

## 2022-12-25 RX ORDER — OXYBUTYNIN CHLORIDE 5 MG/1
5 TABLET, EXTENDED RELEASE ORAL 2 TIMES DAILY
Status: DISCONTINUED | OUTPATIENT
Start: 2022-12-25 | End: 2022-12-29 | Stop reason: HOSPADM

## 2022-12-25 RX ORDER — SODIUM CHLORIDE 450 MG/100ML
INJECTION, SOLUTION INTRAVENOUS CONTINUOUS
Status: DISCONTINUED | OUTPATIENT
Start: 2022-12-26 | End: 2022-12-26

## 2022-12-25 RX ORDER — CARVEDILOL 6.25 MG/1
12.5 TABLET ORAL 2 TIMES DAILY WITH MEALS
Status: DISCONTINUED | OUTPATIENT
Start: 2022-12-25 | End: 2022-12-29 | Stop reason: HOSPADM

## 2022-12-25 RX ORDER — LINEZOLID 2 MG/ML
600 INJECTION, SOLUTION INTRAVENOUS EVERY 12 HOURS
Status: COMPLETED | OUTPATIENT
Start: 2022-12-25 | End: 2022-12-25

## 2022-12-25 RX ORDER — GUAIFENESIN 600 MG/1
600 TABLET, EXTENDED RELEASE ORAL 2 TIMES DAILY
Status: DISCONTINUED | OUTPATIENT
Start: 2022-12-25 | End: 2022-12-29 | Stop reason: HOSPADM

## 2022-12-25 RX ORDER — 0.9 % SODIUM CHLORIDE 0.9 %
500 INTRAVENOUS SOLUTION INTRAVENOUS ONCE
Status: COMPLETED | OUTPATIENT
Start: 2022-12-25 | End: 2022-12-25

## 2022-12-25 RX ORDER — OXCARBAZEPINE 300 MG/1
300 TABLET, FILM COATED ORAL 2 TIMES DAILY
Status: DISCONTINUED | OUTPATIENT
Start: 2022-12-25 | End: 2022-12-29 | Stop reason: HOSPADM

## 2022-12-25 RX ORDER — EPINEPHRINE 0.1 MG/ML
1 SYRINGE (ML) INJECTION ONCE
Status: DISCONTINUED | OUTPATIENT
Start: 2022-12-26 | End: 2022-12-29 | Stop reason: HOSPADM

## 2022-12-25 RX ORDER — BACLOFEN 10 MG/1
10 TABLET ORAL 3 TIMES DAILY
Status: DISCONTINUED | OUTPATIENT
Start: 2022-12-25 | End: 2022-12-29 | Stop reason: HOSPADM

## 2022-12-25 RX ORDER — LIDOCAINE HYDROCHLORIDE 10 MG/ML
10 INJECTION, SOLUTION INFILTRATION; PERINEURAL ONCE
Status: DISCONTINUED | OUTPATIENT
Start: 2022-12-26 | End: 2022-12-29 | Stop reason: HOSPADM

## 2022-12-25 RX ORDER — AMLODIPINE BESYLATE 10 MG/1
10 TABLET ORAL DAILY
Status: DISCONTINUED | OUTPATIENT
Start: 2022-12-25 | End: 2022-12-29 | Stop reason: HOSPADM

## 2022-12-25 RX ORDER — ENOXAPARIN SODIUM 100 MG/ML
40 INJECTION SUBCUTANEOUS ONCE
Status: COMPLETED | OUTPATIENT
Start: 2022-12-25 | End: 2022-12-25

## 2022-12-25 RX ADMIN — CEFEPIME 2000 MG: 2 INJECTION, POWDER, FOR SOLUTION INTRAVENOUS at 20:18

## 2022-12-25 RX ADMIN — GUAIFENESIN 600 MG: 600 TABLET, EXTENDED RELEASE ORAL at 20:18

## 2022-12-25 RX ADMIN — FAMOTIDINE 20 MG: 20 TABLET ORAL at 20:18

## 2022-12-25 RX ADMIN — IPRATROPIUM BROMIDE AND ALBUTEROL SULFATE 1 AMPULE: .5; 3 SOLUTION RESPIRATORY (INHALATION) at 16:30

## 2022-12-25 RX ADMIN — DOXYCYCLINE HYCLATE 100 MG: 100 TABLET, COATED ORAL at 20:18

## 2022-12-25 RX ADMIN — CEFDINIR 300 MG: 300 CAPSULE ORAL at 00:07

## 2022-12-25 RX ADMIN — BACLOFEN 10 MG: 10 TABLET ORAL at 20:18

## 2022-12-25 RX ADMIN — CEFEPIME 2000 MG: 2 INJECTION, POWDER, FOR SOLUTION INTRAVENOUS at 12:08

## 2022-12-25 RX ADMIN — ACETAMINOPHEN 650 MG: 325 TABLET ORAL at 20:19

## 2022-12-25 RX ADMIN — ACETAMINOPHEN 650 MG: 325 TABLET ORAL at 00:07

## 2022-12-25 RX ADMIN — GABAPENTIN 600 MG: 600 TABLET, FILM COATED ORAL at 17:33

## 2022-12-25 RX ADMIN — LINEZOLID 600 MG: 600 INJECTION, SOLUTION INTRAVENOUS at 14:14

## 2022-12-25 RX ADMIN — OXYBUTYNIN CHLORIDE 5 MG: 5 TABLET, EXTENDED RELEASE ORAL at 12:09

## 2022-12-25 RX ADMIN — OXCARBAZEPINE 300 MG: 300 TABLET, FILM COATED ORAL at 20:18

## 2022-12-25 RX ADMIN — BACLOFEN 10 MG: 10 TABLET ORAL at 12:09

## 2022-12-25 RX ADMIN — OXYBUTYNIN CHLORIDE 5 MG: 5 TABLET, EXTENDED RELEASE ORAL at 20:18

## 2022-12-25 RX ADMIN — DOXYCYCLINE HYCLATE 100 MG: 100 TABLET, COATED ORAL at 12:09

## 2022-12-25 RX ADMIN — IPRATROPIUM BROMIDE AND ALBUTEROL SULFATE 1 AMPULE: .5; 3 SOLUTION RESPIRATORY (INHALATION) at 22:32

## 2022-12-25 RX ADMIN — SODIUM CHLORIDE: 4.5 INJECTION, SOLUTION INTRAVENOUS at 23:25

## 2022-12-25 RX ADMIN — GABAPENTIN 600 MG: 600 TABLET, FILM COATED ORAL at 20:18

## 2022-12-25 RX ADMIN — SODIUM CHLORIDE 500 ML: 9 INJECTION, SOLUTION INTRAVENOUS at 12:06

## 2022-12-25 RX ADMIN — OXCARBAZEPINE 300 MG: 300 TABLET, FILM COATED ORAL at 12:10

## 2022-12-25 RX ADMIN — ENOXAPARIN SODIUM 40 MG: 100 INJECTION SUBCUTANEOUS at 17:32

## 2022-12-25 RX ADMIN — SODIUM CHLORIDE, PRESERVATIVE FREE 10 ML: 5 INJECTION INTRAVENOUS at 09:47

## 2022-12-25 RX ADMIN — IPRATROPIUM BROMIDE AND ALBUTEROL SULFATE 1 AMPULE: .5; 3 SOLUTION RESPIRATORY (INHALATION) at 09:26

## 2022-12-25 RX ADMIN — ACETAMINOPHEN 650 MG: 325 TABLET ORAL at 10:10

## 2022-12-25 RX ADMIN — FAMOTIDINE 20 MG: 20 TABLET ORAL at 12:09

## 2022-12-25 RX ADMIN — IPRATROPIUM BROMIDE AND ALBUTEROL SULFATE 1 AMPULE: .5; 3 SOLUTION RESPIRATORY (INHALATION) at 12:44

## 2022-12-25 ASSESSMENT — PAIN - FUNCTIONAL ASSESSMENT: PAIN_FUNCTIONAL_ASSESSMENT: PREVENTS OR INTERFERES SOME ACTIVE ACTIVITIES AND ADLS

## 2022-12-25 ASSESSMENT — PAIN SCALES - GENERAL
PAINLEVEL_OUTOF10: 5
PAINLEVEL_OUTOF10: 0
PAINLEVEL_OUTOF10: 5
PAINLEVEL_OUTOF10: 0
PAINLEVEL_OUTOF10: 0
PAINLEVEL_OUTOF10: 5
PAINLEVEL_OUTOF10: 8
PAINLEVEL_OUTOF10: 0

## 2022-12-25 ASSESSMENT — PAIN DESCRIPTION - ONSET: ONSET: GRADUAL

## 2022-12-25 ASSESSMENT — PAIN DESCRIPTION - DESCRIPTORS: DESCRIPTORS: ACHING

## 2022-12-25 ASSESSMENT — PAIN DESCRIPTION - PAIN TYPE: TYPE: CHRONIC PAIN

## 2022-12-25 ASSESSMENT — PAIN DESCRIPTION - FREQUENCY: FREQUENCY: CONTINUOUS

## 2022-12-25 ASSESSMENT — PAIN DESCRIPTION - LOCATION
LOCATION: BUTTOCKS
LOCATION: BACK

## 2022-12-25 ASSESSMENT — PAIN DESCRIPTION - ORIENTATION: ORIENTATION: MID

## 2022-12-25 NOTE — PLAN OF CARE
Problem: Respiratory - Adult  Goal: Achieves optimal ventilation and oxygenation  12/25/2022 1645 by Sharlene Pino RCP  Outcome: Not Progressing  Patient continues on metaneb and high flow; tolerating well.   Will continue to monitor patients respiratory status

## 2022-12-25 NOTE — PLAN OF CARE
Problem: Discharge Planning  Goal: Discharge to home or other facility with appropriate resources  12/25/2022 0753 by Lilia Li RN  Outcome: Progressing  Flowsheets (Taken 12/25/2022 2088)  Discharge to home or other facility with appropriate resources: Identify barriers to discharge with patient and caregiver     Problem: Skin/Tissue Integrity  Goal: Absence of new skin breakdown  Description: 1. Monitor for areas of redness and/or skin breakdown  2. Assess vascular access sites hourly  3. Every 4-6 hours minimum:  Change oxygen saturation probe site  4. Every 4-6 hours:  If on nasal continuous positive airway pressure, respiratory therapy assess nares and determine need for appliance change or resting period.   12/25/2022 0753 by Lilia Li RN  Outcome: Progressing     Problem: Safety - Adult  Goal: Free from fall injury  12/25/2022 0753 by Lilia Li RN  Outcome: Progressing  Flowsheets (Taken 12/25/2022 0728 by Alejo Adamson RN)  Free From Fall Injury: Instruct family/caregiver on patient safety     Problem: Pain  Goal: Verbalizes/displays adequate comfort level or baseline comfort level  12/25/2022 0753 by Lilia Li RN  Outcome: Progressing  Flowsheets (Taken 12/25/2022 0753)  Verbalizes/displays adequate comfort level or baseline comfort level:   Encourage patient to monitor pain and request assistance   Assess pain using appropriate pain scale   Administer analgesics based on type and severity of pain and evaluate response     Problem: ABCDS Injury Assessment  Goal: Absence of physical injury  12/25/2022 0753 by Lilia Li RN  Outcome: Progressing  Flowsheets (Taken 12/25/2022 0728 by Alejo Adamson RN)  Absence of Physical Injury: Implement safety measures based on patient assessment     Problem: Chronic Conditions and Co-morbidities  Goal: Patient's chronic conditions and co-morbidity symptoms are monitored and maintained or improved  12/25/2022 0753 by Renan Madrigal ALICE Youssef  Outcome: Progressing  Flowsheets (Taken 12/25/2022 0753)  Care Plan - Patient's Chronic Conditions and Co-Morbidity Symptoms are Monitored and Maintained or Improved:   Monitor and assess patient's chronic conditions and comorbid symptoms for stability, deterioration, or improvement   Collaborate with multidisciplinary team to address chronic and comorbid conditions and prevent exacerbation or deterioration     Problem: Infection - Adult  Goal: Absence of infection at discharge  12/25/2022 0753 by Kasi Salamanca RN  Outcome: Progressing     Problem: Infection - Adult  Goal: Absence of infection during hospitalization  12/25/2022 0753 by Kasi Salamanca RN  Outcome: Progressing     Problem: Infection - Adult  Goal: Absence of fever/infection during anticipated neutropenic period  12/25/2022 0753 by Kasi Salamanca RN  Outcome: Progressing   Care plan reviewed with patient and family. Patient and family verbalize understanding of the plan of care and contribute to goal setting.

## 2022-12-25 NOTE — RT PROTOCOL NOTE
RT Inhaler-Nebulizer Bronchodilator Protocol Note    There is a bronchodilator order in the chart from a provider indicating to follow the RT Bronchodilator Protocol and there is an Initiate RT Inhaler-Nebulizer Bronchodilator Protocol order as well (see protocol at bottom of note). CXR Findings:  XR CHEST PORTABLE    Result Date: 12/24/2022  1. Borderline cardiomegaly. 2. Infiltrate and effusion at the right lung base new since previous study dated 11 of August 2022. 3. Atelectasis of the left lung base, unchanged. 4. Thoracic spondylosis. Amol Sandhu **This report has been created using voice recognition software. It may contain minor errors which are inherent in voice recognition technology. ** Final report electronically signed by DR Yair Burgos on 12/24/2022 12:34 PM      The findings from the last RT Protocol Assessment were as follows:   History Pulmonary Disease: None or smoker <15 pack years  Respiratory Pattern: Dyspnea on exertion or RR 21-25 bpm  Breath Sounds: Inspiratory and expiratory or bilateral wheezing and/or rhonchi  Cough: Strong, spontaneous, non-productive  Indication for Bronchodilator Therapy: Decreased or absent breath sounds  Bronchodilator Assessment Score: 8    Aerosolized bronchodilator medication orders have been revised according to the RT Inhaler-Nebulizer Bronchodilator Protocol below. Respiratory Therapist to perform RT Therapy Protocol Assessment initially then follow the protocol. Repeat RT Therapy Protocol Assessment PRN for score 0-3 or on second treatment, BID, and PRN for scores above 3. No Indications - adjust the frequency to every 6 hours PRN wheezing or bronchospasm, if no treatments needed after 48 hours then discontinue using Per Protocol order mode. If indication present, adjust the RT bronchodilator orders based on the Bronchodilator Assessment Score as indicated below.   Use Inhaler orders unless patient has one or more of the following: on home nebulizer, not able to hold breath for 10 seconds, is not alert and oriented, cannot activate and use MDI correctly, or respiratory rate 25 breaths per minute or more, then use the equivalent nebulizer order(s) with same Frequency and PRN reasons based on the score. If a patient is on this medication at home then do not decrease Frequency below that used at home. 0-3 - enter or revise RT bronchodilator order(s) to equivalent RT Bronchodilator order with Frequency of every 4 hours PRN for wheezing or increased work of breathing using Per Protocol order mode. 4-6 - enter or revise RT Bronchodilator order(s) to two equivalent RT bronchodilator orders with one order with BID Frequency and one order with Frequency of every 4 hours PRN wheezing or increased work of breathing using Per Protocol order mode. 7-10 - enter or revise RT Bronchodilator order(s) to two equivalent RT bronchodilator orders with one order with TID Frequency and one order with Frequency of every 4 hours PRN wheezing or increased work of breathing using Per Protocol order mode. 11-13 - enter or revise RT Bronchodilator order(s) to one equivalent RT bronchodilator order with QID Frequency and an Albuterol order with Frequency of every 4 hours PRN wheezing or increased work of breathing using Per Protocol order mode. Greater than 13 - enter or revise RT Bronchodilator order(s) to one equivalent RT bronchodilator order with every 4 hours Frequency and an Albuterol order with Frequency of every 2 hours PRN wheezing or increased work of breathing using Per Protocol order mode. RT to enter RT Home Evaluation for COPD & MDI Assessment order using Per Protocol order mode.     Electronically signed by Pamela Jaquez RCP on 12/25/2022 at 4:42 PM

## 2022-12-25 NOTE — PLAN OF CARE
Problem: Respiratory - Adult  Goal: Achieves optimal ventilation and oxygenation  Outcome: Progressing   Patient continues on high flow; patient started on breathing treatment and acapella.   Will continue to monitor patients respiratory status

## 2022-12-25 NOTE — PROGRESS NOTES
Report called to [facility] NURSING [facility] RN 4K who will receive patient.

## 2022-12-25 NOTE — CONSULTS
[facility]      Patient - Sherrill Joe   MRN -  [id number]   [facility] # - [de-identified]   - 1955      Date of Admission -  2022 11:34 AM  Date of evaluation -  2022  Room - --A   Hospital Day - [address], DO Primary Care Physician - Arvin Simpson MD     Problem List      Active Hospital Problems    Diagnosis Date Noted    Pneumonia of right lower lobe due to infectious organism [J18.9] 2022     Priority: Medium    Acute on chronic respiratory failure with hypoxia [facility]) [J96.21] 2022     Priority: Medium    Pleural effusion on right [J90] 2022     Priority: Medium    History of DVT (deep vein thrombosis) [Z86.718] 2022     Priority: Medium    History of ESBL E. coli infection [Z86.19] 2022     Priority: Medium    Elevated troponin [R77.8] 06/10/2019    Chronic indwelling Pompa catheter [Z97.8] 2012     Reason for Consult    For management of Pneumonia and to consider for bronchoscopy. HPI   History Obtained From: Patient and electronic medical record. Sherrill Joe is a 74 y.o. male  was initially admitted under hospitalist service. Pulmonary medicine was consulted for further management of Pneumonia and to consider for bronchoscopy. He is a poor historian. Majority of the history obtained from reviewing the patient's chart. He is a 42-year-old pleasant gentleman with past medical history of multiple sclerosis, GERD, paraparesis of the bilateral lower extremities, hypertension, neurogenic bladder with a chronic Pompa, and history of DVT/pulmonary embolism presented initially to [facility] from a nursing home with a chief complaint of worsening of shortness of breath and cough of 2 days duration. Patient also had fever prior to the hospitalization at his nursing home. Patient required supplemental oxygenation.   He usually wears 2 L of oxygen at baseline at nursing home. At the time of initial evaluation by EMS patient oxygen saturation was found to be 80s. He was placed on nonrebreather mask. Patient COVID-19 test and a flu a and B test were negative. He is having cough: Yes-occasional  His cough is associated with sputum production: No   Hemoptysis:No  Diurnal variation: None. He gives a history of fever: No    He is having chest pain:No    He denies orthopnea or paroxysmal nocturnal dyspnea. During his initial work up he was found to have abnormal chest Xray with right side pneumonia. History of pulmonary tuberculosis in the past: No    Recent exposure to any patients with tuberculosis:No  Recent travel to endemic places of tuberculosis:No.  Prior PPD status: Unknown.     PMHx   Past Medical History      Diagnosis Date    Dysphagia     oropharyngeal    History of pulmonary embolism     History of urinary tract infection     Hx of blood clots     Pulmonary embolis    Hypertension     Major depressive disorder, single episode     MS (multiple sclerosis) ([address]     Neurogenic bladder 02/2012    Dr. Frank Restrepo placed cather    [facility])     UTI (urinary tract infection)       Past Surgical History        Procedure Laterality Date    ANKLE SURGERY  1996    broken ankle    BLADDER SURGERY  2-    Suprapubic catheter placement    COLONOSCOPY      CYSTO/URETERO/PYELOSCOPY, CALCULUS TX Left 6/19/2019    CYSTOSCOPY, LEFT STENT insertion, bladder irragation with bladder stones performed by Kassidy Akers MD at [address] [place] [address] N/A 7/8/2019    CYSTO, LEFT URETERAL STENT REMOVAL, LEFT URETEROSCOPY, LASER LITHOTRIPSY, BASKET RETRIEVAL OF STONE FRAGMENTS performed by Kassidy Akers MD at [place] [address] 2/26/2021    CYSTOSCOPY, CYSTOLITHOLAPAXY, SUPRAPUBIC CATHETER EXCHANGE performed by Jhonathan Barber MD at [address] [place] [address] 6/15/2022    CYSTOSCOPY performed by Jhonathan Barber MD at [address] NEPHROSTOMY CATHETER PLACEMENT  7/25/2022    IR NEPHROSTOMY CATHETER PLACEMENT 7/25/2022 [facility]    NM LAP,SURG,COLECTOMY,W/END COLOST & CLOSUR N/A 6/13/2018    ROBOT DIVERTING COLOSTOMY performed by Jones aKur MD at [address]    [address] Left 8/4/2022    CYSTOSCOPY, LEFT URETEROSCOPY, LASER LITHOTRIPSY,  LEFT URETERAL STENT EXCHANGE performed by Celestine Lopez MD at [address] [place] [address]    Current Medications    cefepime  2,000 mg IntraVENous Q12H    doxycycline hyclate  100 mg Oral 2 times per day    sodium chloride flush  5-40 mL IntraVENous 2 times per day    enoxaparin  40 mg SubCUTAneous Daily    ipratropium-albuterol  1 ampule Inhalation Q4H WA     sodium chloride flush, sodium chloride, ondansetron **OR** ondansetron, polyethylene glycol, acetaminophen **OR** acetaminophen  IV Drips/Infusions   sodium chloride       Home Medications  Medications Prior to Admission: OXcarbazepine (TRILEPTAL) 300 MG tablet, Take 300 mg by mouth 2 times daily  vitamin C (ASCORBIC ACID) 500 MG tablet, Take 500 mg by mouth in the morning and 500 mg before bedtime. potassium chloride (KLOR-CON M) 20 MEQ extended release tablet, Take 2 tablets by mouth in the morning and 2 tablets before bedtime. carvedilol (COREG) 12.5 MG tablet, Take 1 tablet by mouth in the morning and 1 tablet in the evening. Take with meals. amLODIPine (NORVASC) 10 MG tablet, Take 1 tablet by mouth in the morning. melatonin 3 MG TABS tablet, Take 1 tablet by mouth nightly as needed (when unable to sleep)  ondansetron (ZOFRAN) 4 MG tablet, Take 4 mg by mouth every 8 hours as needed for Nausea or Vomiting  carbamide peroxide (DEBROX) 6.5 % otic solution, 5 drops 2 times daily Instill 5 drops in both ears two times daily for ear wax use for 4 days.   vitamin E 400 UNIT capsule, Take 400 Units by mouth daily  aspirin 81 MG EC tablet, Take 81 mg by mouth daily  oxybutynin (DITROPAN-XL) 5 MG extended release tablet, Take 5 mg by mouth in the morning and at bedtime  gentamicin (GARAMYCIN) 0.1 % ointment, Apply topically daily Apply to wound bed  erythromycin (ROMYCIN) 5 MG/GM ophthalmic ointment, Place into the right eye nightly (0.25 ribbon to right eye)  Ascorbic Acid (VITAMIN C ER PO), Take 500 mg by mouth in the morning and at bedtime  (Patient not taking: Reported on 2022)  baclofen (LIORESAL) 10 MG tablet, Take 10 mg by mouth 3 times daily  gabapentin (NEURONTIN) 600 MG tablet, Take 600 mg by mouth in the morning, at noon, in the evening, and at bedtime. acetaminophen (TYLENOL) 325 MG tablet, Take 650 mg by mouth every 4 hours as needed for Pain or Fever  LACTOBACILLUS PO, Take 1 capsule by mouth in the morning and at bedtime   sodium chloride 1 g tablet, Take 1 tablet by mouth 2 times daily (with meals)  calcium-vitamin D (OSCAL-500) 500-200 MG-UNIT per tablet, Take 1 tablet by mouth 2 times daily  famotidine (PEPCID) 20 MG tablet, Take 1 tablet by mouth 2 times daily  docusate sodium (COLACE) 100 MG capsule, Take 100 mg by mouth daily   tiZANidine (ZANAFLEX) 2 MG tablet, Take 2 mg by mouth 3 times daily 0600;1400;2200  Multiple Vitamin (MULTIVITAMIN) tablet, Take 1 tablet by mouth daily  vitamin A 21130 UNITS capsule, Take 2 capsules by mouth daily  Diet    ADULT DIET; Regular  Allergies    Patient has no known allergies.   Family History          Problem Relation Age of Onset    Cancer Mother         Breast    Heart Disease Father 48    Arthritis Sister     Heart Disease Paternal Grandmother 48     Sleep History    Never diagnosed with sleep apnea in the past    Social History     Social History     Tobacco Use    Smoking status: Former     Types: Cigarettes     Quit date: 1982     Years since quittin.0    Smokeless tobacco: Former   Vaping Use    Vaping Use: Never used   Substance Use Topics    Alcohol use: No     Comment: \"quit alcohol a few years ago\"    Drug use: No       Riview of systems General/Constitutional:  Recent weight loss: No  Appetite changes: Decreased. Fever:No  Chills:No  HENT: Negative. Eyes: Negative. Upper respiratory tract: No nasal stuffiness with no post nasal drip. Lower respiratory tract/ lungs: See HPI for details. No hemoptysis. Cardiovascular: No palpitations or chest pain. Gastrointestinal: No nausea or vomiting. Colostomy in place  Neurological: Paraplegia with a history of multiple sclerosis  Extremities: No edema. Limb contractures noted in both lower extremities  Musculoskeletal: Paraplegia  Genitourinary: Indwelling Pompa's catheter in place  Hematological: Negative. Psychiatric/Behavioral: Negative. Skin: History of decubitus ulcer in the back. Vitals     height is 5' 7\" (1.702 m) and weight is 180 lb (81.6 kg). His oral temperature is 99.3 °F (37.4 °C). His blood pressure is 126/69 and his pulse is 82. His respiration is 19 and oxygen saturation is 96%. Body mass index is 28.19 kg/m². SUPPLEMENTAL O2: O2 Flow Rate (L/min): 60 L/min       I/O      Intake/Output Summary (Last 24 hours) at 12/25/2022 0854  Last data filed at 12/25/2022 0415  Gross per 24 hour   Intake 100 ml   Output 1525 ml   Net -1425 ml     I/O last 3 completed shifts: In: 100 [P.O.:100]  Out: 1525 [Urine:1525]   Patient Vitals for the past 96 hrs (Last 3 readings):   Weight   12/25/22 0800 180 lb (81.6 kg)   12/25/22 0415 180 lb 12.4 oz (82 kg)   12/24/22 2256 182 lb 5.1 oz (82.7 kg)       Exam   General Appearance: moderately built, moderately nourished in no acute distress on high flow at 60 L/min with 83% FiO2. HEENT: Normal, Head is normocephalic, atraumatic. Oropharynx is clear and moist.  No oral thrush. PERRL  Neck - Supple, No JVD present. No tracheal deviation. Lungs - Bilateral air entry present. Decreased breath sounds on right side of chest. No wheezes. No rales.   Cardiovascular - Heart sounds are normal.  Regular rhythm normal rate without murmur, gallop or laterally. There is mild atelectasis at the left lower lobe inferiorly. Minimal infiltrate not excluded medially at the left lower lobe. There is minimal inferior lingular hypoventilation and atelectasis. Impression: 1. No evidence of pulmonary embolism. 2. Right-sided pneumonia with lower lobe mainly impacted. Assessment   -Right lower lobe pneumonia due to community acquired pneumonia ( CAP) Vs Atypical pneumonia Vs aspiration pneumonia  -Chronic hypoxic respiratory failure due to pneumonia Vs other etiologies  -History of DVT/pulmonary embolism  -Essential hypertension on treatment medications under control.  -Major depressive disorder.  -Multiple sclerosis with paraplegia. -Decubitus ulcer S/p diverting colostomy placement  -Neurogenic bladder with chronic indwelling Pompa's catheter in place. Plan   -Follow-up pending cultures  -Continue current antibiotics per primary service  -Aspiration precautions  -Speech pathology evaluation to rule out aspiration  -N.p.o. from midnight  -Hold Lovenox for planned bronchoscopy procedure  -Patient is scheduled for diagnostic/therapeutic bronchoscopy at 10 AM tomorrow on 26 December 2022 in endoscopy suite at [address] [place] [address] with anesthesia from anaesthesilogy department to further evaluate the etiology of abnormal CT scan with right lower lobe pneumonia.  -Espiridion Seip educated and informed about bronchoscopy procedure,method, complicantions of procedure including but not limited to development of pneumothorax with requirement of chest tube placement. Due to requirement of his current high FiO2 with high flow, history of multiple sclerosis and paraplegia, he was informed that he is at very high risk for zeina and postoperative pulmonary complications including but not limited to ventilator support for a long time with requirement of tracheostomy for weaning.   At the end of the discussion patient agreed to take the all the above risks and want to proceed with the bronchoscopy procedure. He agreed for the procedure and verbalizes understanding.  -Titrate Oxygen to keep Spo2 >90%. -Vest percussion therapy every shift as tolerated  -He was advised to continue Acapella every 4 hourly as tolerated. -Please take 600 mg p.o. twice daily  -Deep Venous Thrombosis Prophylaxis: lovenox-we will hold for now for the planned bronchoscopy procedure tomorrow morning. Dunia Mackey \"Thank you for asking us to see this patient\"     -Discussed with registered nurse taking care of patient regarding patient condition and my management plan. Hong Segovia educated about my impression and plan. He verbalizes understanding. Questions and concerns addressed.       Electronically signed by   Lee Randolph MD on 12/25/2022 at 8:54 AM

## 2022-12-25 NOTE — PROGRESS NOTES
Upon taking vitals patient noted to have O2 of 75% on 5L. Patient A&O no distress noted. O2 increased to 10L O2 sat did not improve. Provider notified. New orders received. Patient placed on non rebreather at 15L O2. O2 sat improved to 88%.

## 2022-12-25 NOTE — PLAN OF CARE
Problem: Discharge Planning  Goal: Discharge to home or other facility with appropriate resources  Outcome: Progressing  Flowsheets (Taken 12/25/2022 0728)  Discharge to home or other facility with appropriate resources: Identify barriers to discharge with patient and caregiver     Problem: Skin/Tissue Integrity  Goal: Absence of new skin breakdown  Description: 1. Monitor for areas of redness and/or skin breakdown  2. Assess vascular access sites hourly  3. Every 4-6 hours minimum:  Change oxygen saturation probe site  4. Every 4-6 hours:  If on nasal continuous positive airway pressure, respiratory therapy assess nares and determine need for appliance change or resting period.   Outcome: Progressing  Note: No new skin breakdown this shift     Problem: Safety - Adult  Goal: Free from fall injury  Outcome: Progressing  Flowsheets (Taken 12/25/2022 0728)  Free From Fall Injury: Instruct family/caregiver on patient safety     Problem: Pain  Goal: Verbalizes/displays adequate comfort level or baseline comfort level  Outcome: Progressing  Flowsheets (Taken 12/25/2022 0728)  Verbalizes/displays adequate comfort level or baseline comfort level:   Encourage patient to monitor pain and request assistance   Assess pain using appropriate pain scale     Problem: ABCDS Injury Assessment  Goal: Absence of physical injury  Outcome: Progressing  Flowsheets (Taken 12/25/2022 0728)  Absence of Physical Injury: Implement safety measures based on patient assessment     Problem: Chronic Conditions and Co-morbidities  Goal: Patient's chronic conditions and co-morbidity symptoms are monitored and maintained or improved  Outcome: Progressing  Flowsheets (Taken 12/25/2022 0728)  Care Plan - Patient's Chronic Conditions and Co-Morbidity Symptoms are Monitored and Maintained or Improved: Monitor and assess patient's chronic conditions and comorbid symptoms for stability, deterioration, or improvement     Problem: Infection - Adult  Goal: Absence of infection at discharge  Outcome: Progressing  Flowsheets (Taken 12/25/2022 0728)  Absence of infection at discharge:   Assess and monitor for signs and symptoms of infection   Monitor lab/diagnostic results   Monitor all insertion sites i.e., indwelling lines, tubes and drains  Goal: Absence of infection during hospitalization  Outcome: Progressing  Flowsheets (Taken 12/25/2022 0728)  Absence of infection during hospitalization:   Assess and monitor for signs and symptoms of infection   Monitor lab/diagnostic results   Monitor all insertion sites i.e., indwelling lines, tubes and drains  Goal: Absence of fever/infection during anticipated neutropenic period  Outcome: Progressing  Flowsheets (Taken 12/25/2022 0728)  Absence of fever/infection during anticipated neutropenic period: Monitor white blood cell count   Care plan reviewed with patient. Patient verbalize understanding of the plan of care and contribute to goal setting.

## 2022-12-25 NOTE — PROGRESS NOTES
Hospitalist Progress Note    Patient:  Lam Villarreal    YOB: 1955  Unit/Bed:4K-07/007-A  MRN: [id number]    Acct: [de-identified]   PCP: Vanna Velarde MD    Date of Admission: 12/24/2022      Assessment/Plan:  RLL PNA a/w pleural effusion: covid and flu negative. Initial CXR with infiltrate and effusion rt base. CTA chest no PE with RLL affected. Cefepime and Doxy, 1x dose of Zyvox until cultures obtained  F/up blood culture, MRSA nares. Pending sputum but not procuding (await bronch samples). Check strep and legionella. Consult Pulmonary, given hypoxia and consider bronch if needed. Bronch 12/26, NPO MN, hold lovenox after today. NPO, SLP consult to r/out aspiration  IVF for support while NPO  Rt pleural effusion: noted, suspect parapneumonic, may be able to drain 12/26 after bronch and more clinically stable. Acute on chronic hypoxic respiratory failure 2/2 pneumonia: patient has o2 for prn pta. Now on HFNC, will wean as able. Borderline Hypotension: Lactic nonelevated and asymptomatic.   continue IVF, s/p 500cc bolus. Holding norvasc and coreg currently. Troponin elevation: EKG without acute changes, 0.015 to now normal, likely supply demant from hypoxia/pneumonia. Hx MS with BLE paraplegia, wheelchair/bedbound, follows Dr Dorie Montoya, not on any DMARD - awaiting resolution of decubitus ulceration. Continue baclofen. Neurogenic bladder / chronic SP catheter: exchanged and urine cx taken on arrival. No concern for infection currently. Decubitus wounds s/p diverting ostomy: wound ostomy consult. Hx DVT and PE: on [facility] until 6/2022 (hematuria, dc'ed). May benefit from low dose prophylaxis on DC due to impaired mobility.        Expected discharge date:  2-4 days    Disposition: Return to snf  [] Home  [] TCU  [] Rehab  [] Psych  [] SNF  [] [place]  [] Other-    ===================================================================      Chief Complaint: SOB    Hospital Course: Patient admitted on 12/24 for pneumonia and hypoxia, transferred to stepdown on 12/25 for worsening O2 requirements. He presents from SNF. Subjective (past 24 hours): Patient seen at bedside, he reports over the past 1 to 2 days worsening shortness of breath with a cough, denies any sputum production. No chest pain but does report dyspnea at rest.  He is feeling a bit better now on high flow and since antibiotics have started. Not sure of any fevers or chills, no nausea vomiting or change in bowel habits. Denies any choking on food that he is aware of. ROS: reviewed complete ROS unchanged unless otherwise stated in hospital course/subjective portion. Medications:  Reviewed    Infusion Medications    sodium chloride       Scheduled Medications    sodium chloride flush  5-40 mL IntraVENous 2 times per day    enoxaparin  40 mg SubCUTAneous Daily    azithromycin  500 mg Oral Daily    cefdinir  300 mg Oral 2 times per day    ipratropium-albuterol  1 ampule Inhalation Q4H WA     PRN Meds: sodium chloride flush, sodium chloride, ondansetron **OR** ondansetron, polyethylene glycol, acetaminophen **OR** acetaminophen        Intake/Output Summary (Last 24 hours) at 12/25/2022 0832  Last data filed at 12/25/2022 0415  Gross per 24 hour   Intake 100 ml   Output 1525 ml   Net -1425 ml       Exam:  /69   Pulse 82   Temp 99.3 °F (37.4 °C) (Oral)   Resp 19   Ht 5' 7\" (1.702 m)   Wt 180 lb (81.6 kg)   SpO2 96%   BMI 28.19 kg/m²     General: No distress,  Acute on chronically ill-appearing  Eyes:  PERRL. Conjunctivae/corneas clear. HENT: Head normal appearing. Nares normal. Oral mucosa moist.  Hearing intact. Neck: Supple, with full range of motion. Trachea midline. No gross JVD appreciated. Respiratory:  Normal effort. Diminished overall, decreased sounds in the right greater than the left base with mild rhonchi. No wheezing or rales.   Cardiovascular: Normal rate, regular rhythm with normal S1/S2 without murmurs. No lower extremity edema. Abdomen: Soft, non-tender, non-distended with normal bowel sounds. Ostomy and SP catheter noted, some blood surrounding the SP catheter. Musculoskeletal: Muscle wasting of BLLE. Contractured LE. Skin: Warm and dry. No rashes or lesions. Neurologic:   Cranial nerves:  grossly non-focal 2-12. BLLE Weakness, RUE>LUE Weakness. Psychiatric: Alert and oriented, normal insight and thought content. Capillary Refill: Brisk,< 3 seconds. Peripheral Pulses: +2 palpable, equal bilaterally. Labs:   Recent Labs     12/24/22  1220 12/25/22  0039   WBC 8.3 8.3   HGB 14.4 13.8*   HCT 45.0 43.9    227     Recent Labs     12/24/22  1220 12/25/22  0039    139   K 4.8 3.8    101   CO2 27 26   BUN 25* 20   CREATININE 0.3* 0.3*   CALCIUM 9.4 9.1     Recent Labs     12/24/22  1220   AST 25   ALT 22   BILIDIR <0.2   BILITOT 0.3   ALKPHOS 167*     No results for input(s): INR in the last 72 hours. No results for input(s): Michael Shai in the last 72 hours. Recent Labs     12/24/22  1220   PROCAL 0.13*      Lab Results   Component Value Date/Time    NITRU POSITIVE 12/24/2022 09:00 PM    WBCUA > 200 12/24/2022 09:00 PM    WBCUA >200 01/20/2012 09:00 AM    BACTERIA MANY 12/24/2022 09:00 PM    RBCUA > 200 12/24/2022 09:00 PM    BLOODU LARGE 12/24/2022 09:00 PM    SPECGRAV 1.020 12/24/2022 09:00 PM    GLUCOSEU NEGATIVE 08/05/2022 06:40 PM       Radiology (48 hours):  CTA CHEST W WO CONTRAST    Result Date: 12/25/2022  Impression: 1. No evidence of pulmonary embolism. 2. Right-sided pneumonia with lower lobe mainly impacted. This document has been electronically signed by: Dilshad Anthony MD on 12/25/2022 12:27 AM All CTs at this facility use dose modulation techniques and iterative reconstructions, and/or weight-based dosing when appropriate to reduce radiation to a low as reasonably achievable.  3D Post-processing was performed on this study. XR CHEST PORTABLE    Result Date: 12/24/2022  1. Borderline cardiomegaly. 2. Infiltrate and effusion at the right lung base new since previous study dated 11 of August 2022. 3. Atelectasis of the left lung base, unchanged. 4. Thoracic spondylosis. Merly Lopez **This report has been created using voice recognition software. It may contain minor errors which are inherent in voice recognition technology. ** Final report electronically signed by DR Augustine Armenta on 12/24/2022 12:34 PM       DVT prophylaxis:    [x] Lovenox  [] SCDs  [] SQ Heparin  [] Encourage ambulation   [] Already on Anticoagulation       Diet: ADULT DIET;  Regular  Code Status: Full Code  PT/OT: tbd  Tele: yes  IVF: yes    Electronically signed by Prema Castellano DO on 12/25/2022 at 8:32 AM

## 2022-12-25 NOTE — PROGRESS NOTES
Pharmacy Note - Extended Infusion Beta-Lactam Adjustment    Cefepime 2000 mg IV every 12 hours over 30 minutes ordered for treatment of CAP. Per [facility] Extended Infusion Beta-Lactam Policy, Cefepime will be changed to 2000 mg IV over 30 min x 1 dose for load, then followed by maintenance dose of cefepime 2000 mg IV Q8H over 4 hours. Estimated Creatinine Clearance: Estimated Creatinine Clearance: 251 mL/min (A) (based on SCr of 0.3 mg/dL (L)). Dialysis Status, CHARLINE, CKD: none    BMI: Body mass index is 28.19 kg/m². Rationale for Adjustment: Agent demonstrates time-dependent effect on bacterial eradication. Extended-infusion dosing strategy aims to enhance microbiologic and clinical efficacy. Pharmacy will continue to monitor renal function, cultures and sensitivities (where available) and adjust dose as necessary. Please call with any questions.     Thank you,  Angie Steele, [address]  12/25/2022 10:11 AM

## 2022-12-26 ENCOUNTER — ANESTHESIA (OUTPATIENT)
Dept: ENDOSCOPY | Age: 67
DRG: 139 | End: 2022-12-26
Payer: MEDICAID

## 2022-12-26 ENCOUNTER — ANESTHESIA EVENT (OUTPATIENT)
Dept: ENDOSCOPY | Age: 67
DRG: 139 | End: 2022-12-26
Payer: MEDICAID

## 2022-12-26 LAB
ACINETOBACTER CALCOACETICUS-BAUMANNII BY PCR: NOT DETECTED
ADENOVIRUS BY PCR: NOT DETECTED
CHLAMYDIA PNEUMONIAE BY PCR: NOT DETECTED
CYTOLOGY-NON GYN: NORMAL
ENTEROBACTER CLOACAE COMPLEX BY PCR: NOT DETECTED
ESCHERICHIA COLI BY PCR: NOT DETECTED
FUNGUS SMEAR: NORMAL
FUNGUS SMEAR: NORMAL
HAEMOPHILUS INFLUENZAE BY PCR: NOT DETECTED
INFLUENZA A BY PCR: NOT DETECTED
INFLUENZA B BY PCR: NOT DETECTED
KLEBSIELLA AEROGENES BY PCR: NOT DETECTED
KLEBSIELLA OXYTOCA BY PCR: NOT DETECTED
KLEBSIELLA PNEUMONIAE GROUP BY PCR: NOT DETECTED
LEGIONELLA PNEUMOPHILIA AG, URINE: NEGATIVE
LEGIONELLA PNEUMOPHILIA BY PCR: NOT DETECTED
METAPNEUMOVIRUS BY PCR: NOT DETECTED
MORAXELLA CATARRHALIS BY PCR: NOT DETECTED
MYCOPLASMA PNEUMONIAE BY PCR: NOT DETECTED
NON-SARS CORONAVIRUS: NOT DETECTED
ORGANISM: ABNORMAL
PARAINFLUENZA VIRUS BY PCR: NOT DETECTED
PROTEUS SPECIES BY PCR: NOT DETECTED
PSEUDOMONAS AERUGINOSA BY PCR: NOT DETECTED
RESISTANT GENE CTX-M BY PCR: NORMAL
RESISTANT GENE IMP BY PCR: NORMAL
RESISTANT GENE KPC BY PCR: NORMAL
RESISTANT GENE MECA/C & MREJ BY PCR: NORMAL
RESISTANT GENE NDM BY PCR: NORMAL
RESISTANT GENE OXA-48-LIKE BY PCR: NORMAL
RESISTANT GENE VIM BY PCR: NORMAL
RESPIRATORY SYNCYTIAL VIRUS BY PCR: NOT DETECTED
RHINOVIRUS ENTEROVIRUS PCR: NOT DETECTED
SERRATIA MARCESCENS BY PCR: NOT DETECTED
SOURCE: NORMAL
SPECIMEN ACCEPTABILITY: NORMAL
STAPH AUREUS BY PCR: NOT DETECTED
STREP AGALACTIAE BY PCR: NOT DETECTED
STREP PNEUMO AG, UR: NEGATIVE
STREP PNEUMONIAE BY PCR: NOT DETECTED
STREP PYOGENES BY PCR: NOT DETECTED
URINE CULTURE, ROUTINE: ABNORMAL

## 2022-12-26 PROCEDURE — 0B9F8ZX DRAINAGE OF RIGHT LOWER LUNG LOBE, VIA NATURAL OR ARTIFICIAL OPENING ENDOSCOPIC, DIAGNOSTIC: ICD-10-PCS | Performed by: INTERNAL MEDICINE

## 2022-12-26 PROCEDURE — 3609010800 HC BRONCHOSCOPY ALVEOLAR LAVAGE: Performed by: INTERNAL MEDICINE

## 2022-12-26 PROCEDURE — 6360000002 HC RX W HCPCS

## 2022-12-26 PROCEDURE — 0B9C8ZZ DRAINAGE OF RIGHT UPPER LUNG LOBE, VIA NATURAL OR ARTIFICIAL OPENING ENDOSCOPIC: ICD-10-PCS | Performed by: INTERNAL MEDICINE

## 2022-12-26 PROCEDURE — 94640 AIRWAY INHALATION TREATMENT: CPT

## 2022-12-26 PROCEDURE — 7100000000 HC PACU RECOVERY - FIRST 15 MIN: Performed by: INTERNAL MEDICINE

## 2022-12-26 PROCEDURE — 88312 SPECIAL STAINS GROUP 1: CPT

## 2022-12-26 PROCEDURE — 89051 BODY FLUID CELL COUNT: CPT

## 2022-12-26 PROCEDURE — 87102 FUNGUS ISOLATION CULTURE: CPT

## 2022-12-26 PROCEDURE — 3700000000 HC ANESTHESIA ATTENDED CARE: Performed by: INTERNAL MEDICINE

## 2022-12-26 PROCEDURE — 6370000000 HC RX 637 (ALT 250 FOR IP): Performed by: INTERNAL MEDICINE

## 2022-12-26 PROCEDURE — 2580000003 HC RX 258: Performed by: STUDENT IN AN ORGANIZED HEALTH CARE EDUCATION/TRAINING PROGRAM

## 2022-12-26 PROCEDURE — 7100000001 HC PACU RECOVERY - ADDTL 15 MIN: Performed by: INTERNAL MEDICINE

## 2022-12-26 PROCEDURE — 87581 M.PNEUMON DNA AMP PROBE: CPT

## 2022-12-26 PROCEDURE — 2500000003 HC RX 250 WO HCPCS

## 2022-12-26 PROCEDURE — 87496 CYTOMEG DNA AMP PROBE: CPT

## 2022-12-26 PROCEDURE — 88112 CYTOPATH CELL ENHANCE TECH: CPT

## 2022-12-26 PROCEDURE — 87206 SMEAR FLUORESCENT/ACID STAI: CPT

## 2022-12-26 PROCEDURE — 0BC18ZZ EXTIRPATION OF MATTER FROM TRACHEA, VIA NATURAL OR ARTIFICIAL OPENING ENDOSCOPIC: ICD-10-PCS | Performed by: INTERNAL MEDICINE

## 2022-12-26 PROCEDURE — 87116 MYCOBACTERIA CULTURE: CPT

## 2022-12-26 PROCEDURE — 92610 EVALUATE SWALLOWING FUNCTION: CPT

## 2022-12-26 PROCEDURE — 94669 MECHANICAL CHEST WALL OSCILL: CPT

## 2022-12-26 PROCEDURE — 88305 TISSUE EXAM BY PATHOLOGIST: CPT

## 2022-12-26 PROCEDURE — 87529 HSV DNA AMP PROBE: CPT

## 2022-12-26 PROCEDURE — 87070 CULTURE OTHR SPECIMN AEROBIC: CPT

## 2022-12-26 PROCEDURE — 36415 COLL VENOUS BLD VENIPUNCTURE: CPT

## 2022-12-26 PROCEDURE — 6370000000 HC RX 637 (ALT 250 FOR IP)

## 2022-12-26 PROCEDURE — 6360000002 HC RX W HCPCS: Performed by: INTERNAL MEDICINE

## 2022-12-26 PROCEDURE — 87255 GENET VIRUS ISOLATE HSV: CPT

## 2022-12-26 PROCEDURE — 2700000000 HC OXYGEN THERAPY PER DAY

## 2022-12-26 PROCEDURE — 87205 SMEAR GRAM STAIN: CPT

## 2022-12-26 PROCEDURE — 3700000001 HC ADD 15 MINUTES (ANESTHESIA): Performed by: INTERNAL MEDICINE

## 2022-12-26 PROCEDURE — 0B9F8ZZ DRAINAGE OF RIGHT LOWER LUNG LOBE, VIA NATURAL OR ARTIFICIAL OPENING ENDOSCOPIC: ICD-10-PCS | Performed by: INTERNAL MEDICINE

## 2022-12-26 PROCEDURE — 94761 N-INVAS EAR/PLS OXIMETRY MLT: CPT

## 2022-12-26 PROCEDURE — 0B9D8ZZ DRAINAGE OF RIGHT MIDDLE LUNG LOBE, VIA NATURAL OR ARTIFICIAL OPENING ENDOSCOPIC: ICD-10-PCS | Performed by: INTERNAL MEDICINE

## 2022-12-26 PROCEDURE — 87486 CHLMYD PNEUM DNA AMP PROBE: CPT

## 2022-12-26 PROCEDURE — 87541 LEGION PNEUMO DNA AMP PROB: CPT

## 2022-12-26 PROCEDURE — 87798 DETECT AGENT NOS DNA AMP: CPT

## 2022-12-26 PROCEDURE — 2060000000 HC ICU INTERMEDIATE R&B

## 2022-12-26 PROCEDURE — 2580000003 HC RX 258: Performed by: INTERNAL MEDICINE

## 2022-12-26 PROCEDURE — 88108 CYTOPATH CONCENTRATE TECH: CPT

## 2022-12-26 PROCEDURE — 87631 RESP VIRUS 3-5 TARGETS: CPT

## 2022-12-26 PROCEDURE — 6370000000 HC RX 637 (ALT 250 FOR IP): Performed by: STUDENT IN AN ORGANIZED HEALTH CARE EDUCATION/TRAINING PROGRAM

## 2022-12-26 RX ORDER — ONDANSETRON 2 MG/ML
INJECTION INTRAMUSCULAR; INTRAVENOUS PRN
Status: DISCONTINUED | OUTPATIENT
Start: 2022-12-26 | End: 2022-12-26 | Stop reason: SDUPTHER

## 2022-12-26 RX ORDER — MULTIVITAMIN WITH IRON
1 TABLET ORAL DAILY
Status: DISCONTINUED | OUTPATIENT
Start: 2022-12-26 | End: 2022-12-29 | Stop reason: HOSPADM

## 2022-12-26 RX ORDER — ROCURONIUM BROMIDE 10 MG/ML
INJECTION, SOLUTION INTRAVENOUS PRN
Status: DISCONTINUED | OUTPATIENT
Start: 2022-12-26 | End: 2022-12-26 | Stop reason: SDUPTHER

## 2022-12-26 RX ORDER — PROPOFOL 10 MG/ML
INJECTION, EMULSION INTRAVENOUS PRN
Status: DISCONTINUED | OUTPATIENT
Start: 2022-12-26 | End: 2022-12-26 | Stop reason: SDUPTHER

## 2022-12-26 RX ORDER — DEXAMETHASONE SODIUM PHOSPHATE 4 MG/ML
INJECTION, SOLUTION INTRA-ARTICULAR; INTRALESIONAL; INTRAMUSCULAR; INTRAVENOUS; SOFT TISSUE PRN
Status: DISCONTINUED | OUTPATIENT
Start: 2022-12-26 | End: 2022-12-26 | Stop reason: SDUPTHER

## 2022-12-26 RX ORDER — PHENYLEPHRINE HYDROCHLORIDE 10 MG/ML
INJECTION INTRAVENOUS PRN
Status: DISCONTINUED | OUTPATIENT
Start: 2022-12-26 | End: 2022-12-26 | Stop reason: SDUPTHER

## 2022-12-26 RX ORDER — SUCCINYLCHOLINE/SOD CL,ISO/PF 200MG/10ML
SYRINGE (ML) INTRAVENOUS PRN
Status: DISCONTINUED | OUTPATIENT
Start: 2022-12-26 | End: 2022-12-26 | Stop reason: SDUPTHER

## 2022-12-26 RX ORDER — ESMOLOL HYDROCHLORIDE 10 MG/ML
INJECTION INTRAVENOUS PRN
Status: DISCONTINUED | OUTPATIENT
Start: 2022-12-26 | End: 2022-12-26 | Stop reason: SDUPTHER

## 2022-12-26 RX ORDER — LIDOCAINE HYDROCHLORIDE 20 MG/ML
INJECTION, SOLUTION EPIDURAL; INFILTRATION; INTRACAUDAL; PERINEURAL PRN
Status: DISCONTINUED | OUTPATIENT
Start: 2022-12-26 | End: 2022-12-26 | Stop reason: SDUPTHER

## 2022-12-26 RX ADMIN — FAMOTIDINE 20 MG: 20 TABLET ORAL at 20:16

## 2022-12-26 RX ADMIN — GABAPENTIN 600 MG: 600 TABLET, FILM COATED ORAL at 11:25

## 2022-12-26 RX ADMIN — ESMOLOL HYDROCHLORIDE 40 MG: 10 INJECTION, SOLUTION INTRAVENOUS at 10:01

## 2022-12-26 RX ADMIN — DOXYCYCLINE HYCLATE 100 MG: 100 TABLET, COATED ORAL at 11:27

## 2022-12-26 RX ADMIN — GUAIFENESIN 600 MG: 600 TABLET, EXTENDED RELEASE ORAL at 20:16

## 2022-12-26 RX ADMIN — CEFEPIME 2000 MG: 2 INJECTION, POWDER, FOR SOLUTION INTRAVENOUS at 04:04

## 2022-12-26 RX ADMIN — IPRATROPIUM BROMIDE AND ALBUTEROL SULFATE 1 AMPULE: .5; 3 SOLUTION RESPIRATORY (INHALATION) at 12:25

## 2022-12-26 RX ADMIN — GUAIFENESIN 600 MG: 600 TABLET, EXTENDED RELEASE ORAL at 11:24

## 2022-12-26 RX ADMIN — BACLOFEN 10 MG: 10 TABLET ORAL at 12:57

## 2022-12-26 RX ADMIN — ROCURONIUM BROMIDE 5 MG: 10 INJECTION INTRAVENOUS at 10:01

## 2022-12-26 RX ADMIN — BACLOFEN 10 MG: 10 TABLET ORAL at 20:16

## 2022-12-26 RX ADMIN — OXYBUTYNIN CHLORIDE 5 MG: 5 TABLET, EXTENDED RELEASE ORAL at 20:16

## 2022-12-26 RX ADMIN — FAMOTIDINE 20 MG: 20 TABLET ORAL at 11:22

## 2022-12-26 RX ADMIN — SODIUM CHLORIDE, PRESERVATIVE FREE 10 ML: 5 INJECTION INTRAVENOUS at 20:17

## 2022-12-26 RX ADMIN — OXCARBAZEPINE 300 MG: 300 TABLET, FILM COATED ORAL at 20:16

## 2022-12-26 RX ADMIN — SODIUM CHLORIDE, PRESERVATIVE FREE 10 ML: 5 INJECTION INTRAVENOUS at 11:23

## 2022-12-26 RX ADMIN — GABAPENTIN 600 MG: 600 TABLET, FILM COATED ORAL at 20:16

## 2022-12-26 RX ADMIN — GABAPENTIN 600 MG: 600 TABLET, FILM COATED ORAL at 17:13

## 2022-12-26 RX ADMIN — IPRATROPIUM BROMIDE AND ALBUTEROL SULFATE 1 AMPULE: .5; 3 SOLUTION RESPIRATORY (INHALATION) at 07:59

## 2022-12-26 RX ADMIN — CEFEPIME 2000 MG: 2 INJECTION, POWDER, FOR SOLUTION INTRAVENOUS at 20:18

## 2022-12-26 RX ADMIN — OXYBUTYNIN CHLORIDE 5 MG: 5 TABLET, EXTENDED RELEASE ORAL at 11:26

## 2022-12-26 RX ADMIN — CEFEPIME 2000 MG: 2 INJECTION, POWDER, FOR SOLUTION INTRAVENOUS at 13:05

## 2022-12-26 RX ADMIN — Medication 1 TABLET: at 17:18

## 2022-12-26 RX ADMIN — SODIUM CHLORIDE, PRESERVATIVE FREE 10 ML: 5 INJECTION INTRAVENOUS at 13:00

## 2022-12-26 RX ADMIN — PHENYLEPHRINE HYDROCHLORIDE 200 MCG: 10 INJECTION INTRAVENOUS at 10:07

## 2022-12-26 RX ADMIN — PROPOFOL 150 MG: 10 INJECTION, EMULSION INTRAVENOUS at 10:01

## 2022-12-26 RX ADMIN — Medication 100 MG: at 10:01

## 2022-12-26 RX ADMIN — LIDOCAINE HYDROCHLORIDE 100 MG: 20 INJECTION, SOLUTION EPIDURAL; INFILTRATION; INTRACAUDAL; PERINEURAL at 10:01

## 2022-12-26 RX ADMIN — CARVEDILOL 12.5 MG: 6.25 TABLET, FILM COATED ORAL at 21:40

## 2022-12-26 RX ADMIN — IPRATROPIUM BROMIDE AND ALBUTEROL SULFATE 1 AMPULE: .5; 3 SOLUTION RESPIRATORY (INHALATION) at 23:39

## 2022-12-26 RX ADMIN — OXCARBAZEPINE 300 MG: 300 TABLET, FILM COATED ORAL at 11:26

## 2022-12-26 RX ADMIN — ONDANSETRON 4 MG: 2 INJECTION INTRAMUSCULAR; INTRAVENOUS at 10:16

## 2022-12-26 RX ADMIN — DEXAMETHASONE SODIUM PHOSPHATE 8 MG: 4 INJECTION, SOLUTION INTRAMUSCULAR; INTRAVENOUS at 10:16

## 2022-12-26 RX ADMIN — SUGAMMADEX 200 MG: 100 INJECTION, SOLUTION INTRAVENOUS at 10:16

## 2022-12-26 RX ADMIN — ROCURONIUM BROMIDE 25 MG: 10 INJECTION INTRAVENOUS at 10:07

## 2022-12-26 RX ADMIN — DOXYCYCLINE HYCLATE 100 MG: 100 TABLET, COATED ORAL at 20:16

## 2022-12-26 ASSESSMENT — PAIN DESCRIPTION - PAIN TYPE
TYPE: CHRONIC PAIN

## 2022-12-26 ASSESSMENT — PAIN DESCRIPTION - DESCRIPTORS
DESCRIPTORS: ACHING

## 2022-12-26 ASSESSMENT — PAIN DESCRIPTION - FREQUENCY
FREQUENCY: CONTINUOUS

## 2022-12-26 ASSESSMENT — PAIN DESCRIPTION - LOCATION
LOCATION: BUTTOCKS

## 2022-12-26 ASSESSMENT — PAIN SCALES - GENERAL
PAINLEVEL_OUTOF10: 4
PAINLEVEL_OUTOF10: 4
PAINLEVEL_OUTOF10: 1
PAINLEVEL_OUTOF10: 5
PAINLEVEL_OUTOF10: 0
PAINLEVEL_OUTOF10: 5

## 2022-12-26 ASSESSMENT — PAIN - FUNCTIONAL ASSESSMENT
PAIN_FUNCTIONAL_ASSESSMENT: ACTIVITIES ARE NOT PREVENTED
PAIN_FUNCTIONAL_ASSESSMENT: ACTIVITIES ARE NOT PREVENTED
PAIN_FUNCTIONAL_ASSESSMENT: NONE - DENIES PAIN
PAIN_FUNCTIONAL_ASSESSMENT: ACTIVITIES ARE NOT PREVENTED
PAIN_FUNCTIONAL_ASSESSMENT: ACTIVITIES ARE NOT PREVENTED

## 2022-12-26 ASSESSMENT — PAIN DESCRIPTION - ORIENTATION
ORIENTATION: RIGHT;LEFT

## 2022-12-26 ASSESSMENT — ENCOUNTER SYMPTOMS: SHORTNESS OF BREATH: 1

## 2022-12-26 ASSESSMENT — PAIN DESCRIPTION - ONSET
ONSET: ON-GOING

## 2022-12-26 NOTE — PRE SEDATION
[facility] Endoscopy  Sedation Pre-Procedure Note    Patient Name: Neema Parker    YOB: 1955   Room/Bed: [address]  Medical Record Number: [id number]  Date: 12/26/2022  Time: 10:50 AM     Indication:  Pain control for Flexible Fibrooptic Bronchoscopy. Consent: I have discussed with the patient and/or the patient representative the indication, alternatives, and the possible risks and/or complications of the planned procedure and the anesthesia methods. The patient and/or patient representative appear to understand and agree to proceed.     Vital Signs: /61   Pulse 84   Temp 97.1 °F (36.2 °C) (Temporal)   Resp 23   Ht 5' 7\" (1.702 m)   Wt 191 lb 2.2 oz (86.7 kg)   SpO2 98%   BMI 29.94 kg/m²       Past Medical History:  Past Medical History:   Diagnosis Date    Dysphagia     oropharyngeal    History of pulmonary embolism     History of urinary tract infection     Hx of blood clots     Pulmonary embolis    Hypertension     Major depressive disorder, single episode     MS (multiple sclerosis) ([facility] [address]     Neurogenic bladder 02/2012    Dr. Cruz Ear placed cather    Osteomyelitis [facility])     UTI (urinary tract infection)          Allergy:  No Known Allergies      Past Surgical History:  Past Surgical History:   Procedure Laterality Date    ANKLE SURGERY  1996    broken ankle    BLADDER SURGERY  2-    Suprapubic catheter placement    COLONOSCOPY      CYSTO/URETERO/PYELOSCOPY, CALCULUS TX Left 6/19/2019    CYSTOSCOPY, LEFT STENT insertion, bladder irragation with bladder stones performed by Angella Soto MD at [address] [place] [address] N/A 7/8/2019    CYSTO, LEFT URETERAL STENT REMOVAL, LEFT URETEROSCOPY, LASER LITHOTRIPSY, BASKET RETRIEVAL OF STONE FRAGMENTS performed by Angella Soto MD at [address] [place] [address] 2/26/2021    CYSTOSCOPY, CYSTOLITHOLAPAXY, SUPRAPUBIC CATHETER EXCHANGE performed by Dona Nance MD at [facility] Left 6/15/2022    CYSTOSCOPY performed by Dona Nance MD at [address] [place] [address] Avenue  7/25/2022    IR NEPHROSTOMY CATHETER PLACEMENT 7/25/2022 [facility] SPECIAL PROCEDURES    AR LAP,SURG,COLECTOMY,W/END COLOST & CLOSUR N/A 6/13/2018    ROBOT DIVERTING COLOSTOMY performed by Meryle Blade, MD at [address] [facility]  child    URETER SURGERY Left 8/4/2022    CYSTOSCOPY, LEFT URETEROSCOPY, LASER LITHOTRIPSY,  LEFT URETERAL STENT EXCHANGE performed by Dona Nance MD at [facility]       Medications:   Current Facility-Administered Medications   Medication Dose Route Frequency Provider Last Rate Last Admin    [Held by provider] amLODIPine (NORVASC) tablet 10 mg  10 mg Oral Daily Miguel  Shira, DO        baclofen (LIORESAL) tablet 10 mg  10 mg Oral TID Miguel M Shira, DO   10 mg at 12/25/22 2018    [Held by provider] carvedilol (COREG) tablet 12.5 mg  12.5 mg Oral BID  Miguel  Shira, DO        famotidine (PEPCID) tablet 20 mg  20 mg Oral BID Miguel M Shira, DO   20 mg at 12/25/22 2018    gabapentin (NEURONTIN) tablet 600 mg  600 mg Oral 4x daily Miguel M Shira, DO   600 mg at 12/25/22 2018    OXcarbazepine (TRILEPTAL) tablet 300 mg  300 mg Oral BID Miguel M Shira, DO   300 mg at 12/25/22 2018    oxybutynin (DITROPAN-XL) extended release tablet 5 mg  5 mg Oral BID [facility] Shira, DO   5 mg at 12/25/22 2018    doxycycline hyclate (VIBRA-TABS) tablet 100 mg  100 mg Oral 2 times per day Prema Castellano DO   100 mg at 12/25/22 2018    cefepime (MAXIPIME) 2000 mg IVPB minibag  2,000 mg IntraVENous Q8H Prema Castellano DO   Stopped at 12/26/22 0804    0.9 % sodium chloride infusion   IntraVENous Continuous Prema Castellano DO 75 mL/hr at 12/25/22 1444 Restarted at 12/26/22 0955    EPINEPHrine 1 MG/10ML injection 1 mg  1 mg Endotracheal Once Sreenivasa R Chanamolu, MD        lidocaine 1 % injection 10 mL  10 mL Tracheal Tube Once MD noah FelicianoaiFENesin [facility]) extended release tablet 600 mg  600 mg Oral BID Alma Sadler MD   600 mg at 12/25/22 2018    sodium chloride flush 0.9 % injection 5-40 mL  5-40 mL IntraVENous 2 times per day Miguel M Shira, DO   10 mL at 12/25/22 0947    sodium chloride flush 0.9 % injection 5-40 mL  5-40 mL IntraVENous PRN Miguel M Shira, DO        0.9 % sodium chloride infusion   IntraVENous PRN Miguel M Shira, DO        enoxaparin (LOVENOX) injection 40 mg  40 mg SubCUTAneous Daily Miguel M Shira, DO   40 mg at 12/24/22 1641    ondansetron (ZOFRAN-ODT) disintegrating tablet 4 mg  4 mg Oral Q8H PRN Miguel  Shira, DO        Or    ondansetron [facility]) injection 4 mg  4 mg IntraVENous Q6H PRN Miguel M Shira, DO        polyethylene glycol (GLYCOLAX) packet 17 g  17 g Oral Daily PRN Miguel  Shira, DO        acetaminophen (TYLENOL) tablet 650 mg  650 mg Oral Q6H PRN Miguel  Shira, DO   650 mg at 12/25/22 2019    Or    acetaminophen (TYLENOL) suppository 650 mg  650 mg Rectal Q6H PRN Miguel  Shira, DO        ipratropium-albuterol (DUONEB) nebulizer solution 1 ampule  1 ampule Inhalation Q4H TONO Almaguer CNP   1 ampule at 12/26/22 0759         Coumadin Use Last 7 Days:  no  Antiplatelet drug therapy use last 7 days: no  Other anticoagulant use last 7 days: no       Pre-Sedation Documentation and Exam:   I have personally completed a history, physical exam & review of systems for this patient (see notes). Mallampati Airway Assessment:  Please see CRNA note for details. Prior History of Anesthesia Complications:   Please see CRNA note for details. ASA Classification:  Please see CRNA note for details. Sedation/ Anesthesia Plan:   Patient was given anesthesia by CRNA Ms. Hilario Solano from anesthesiology dept. Please see detailed note by CRNA for details.     Electronically signed by Alma Sadler MD on 12/26/2022 at 10:50 AM

## 2022-12-26 NOTE — PROGRESS NOTES
[address] [address]  SPEECH THERAPY MISSED TREATMENT NOTE  [facility]      Date: 2022  Patient Name: Irish Cannon        MRN: [id number]    : 1955  (74 y.o.)    REASON FOR MISSED TREATMENT:  Attempted to see patient at 0801 for completion of clinical swallow evaluation. ALICE Davies reporting patient with needs to maintain NPO diet level given planned procedure. Will f/u on subsequent date to complete assessment as indicated.        Fatmata Steward M.A., [address] [place] [address]

## 2022-12-26 NOTE — PROGRESS NOTES
Patient in endo for bronchoscopy. Scope  used. Pictures taken. Washings and BAL completed. Specimens labeled and sent to lab. Procedure completed. Patient tolerated well.

## 2022-12-26 NOTE — PLAN OF CARE
Problem: Discharge Planning  Goal: Discharge to home or other facility with appropriate resources  Outcome: Progressing  Flowsheets  Taken 12/26/2022 0204  Discharge to home or other facility with appropriate resources:   Identify barriers to discharge with patient and caregiver   Arrange for needed discharge resources and transportation as appropriate   Identify discharge learning needs (meds, wound care, etc)   Arrange for interpreters to assist at discharge as needed   Refer to discharge planning if patient needs post-hospital services based on physician order or complex needs related to functional status, cognitive ability or social support system  Taken 12/25/2022 1921  Discharge to home or other facility with appropriate resources:   Identify barriers to discharge with patient and caregiver   Arrange for needed discharge resources and transportation as appropriate   Identify discharge learning needs (meds, wound care, etc)     Problem: Skin/Tissue Integrity  Goal: Absence of new skin breakdown  Description: 1. Monitor for areas of redness and/or skin breakdown  2. Assess vascular access sites hourly  3. Every 4-6 hours minimum:  Change oxygen saturation probe site  4. Every 4-6 hours:  If on nasal continuous positive airway pressure, respiratory therapy assess nares and determine need for appliance change or resting period. Outcome: Progressing  Note: No s/s of new skin breakdown. Will continue to monitor.      Problem: Safety - Adult  Goal: Free from fall injury  Outcome: Progressing  Flowsheets (Taken 12/26/2022 0204)  Free From Fall Injury:   Instruct family/caregiver on patient safety   Based on caregiver fall risk screen, instruct family/caregiver to ask for assistance with transferring infant if caregiver noted to have fall risk factors     Problem: Pain  Goal: Verbalizes/displays adequate comfort level or baseline comfort level  Outcome: Progressing  Flowsheets (Taken 12/26/2022 3876)  Verbalizes/displays adequate comfort level or baseline comfort level:   Encourage patient to monitor pain and request assistance   Assess pain using appropriate pain scale   Administer analgesics based on type and severity of pain and evaluate response   Implement non-pharmacological measures as appropriate and evaluate response   Consider cultural and social influences on pain and pain management   Notify Licensed Independent Practitioner if interventions unsuccessful or patient reports new pain     Problem: ABCDS Injury Assessment  Goal: Absence of physical injury  Outcome: Progressing  Flowsheets (Taken 12/26/2022 0204)  Absence of Physical Injury: Implement safety measures based on patient assessment     Problem: Chronic Conditions and Co-morbidities  Goal: Patient's chronic conditions and co-morbidity symptoms are monitored and maintained or improved  Outcome: Progressing  Flowsheets (Taken 12/25/2022 1921)  Care Plan - Patient's Chronic Conditions and Co-Morbidity Symptoms are Monitored and Maintained or Improved:   Monitor and assess patient's chronic conditions and comorbid symptoms for stability, deterioration, or improvement   Collaborate with multidisciplinary team to address chronic and comorbid conditions and prevent exacerbation or deterioration   Update acute care plan with appropriate goals if chronic or comorbid symptoms are exacerbated and prevent overall improvement and discharge     Problem: Infection - Adult  Goal: Absence of infection at discharge  Outcome: Progressing  Flowsheets  Taken 12/26/2022 0204  Absence of infection at discharge:   Assess and monitor for signs and symptoms of infection   Monitor lab/diagnostic results   Monitor all insertion sites i.e., indwelling lines, tubes and drains   Monitor endotracheal (as able) and nasal secretions for changes in amount and color   [place] appropriate cooling/warming therapies per order   Administer medications as ordered   Instruct and encourage patient and family to use good hand hygiene technique   Identify and instruct in appropriate isolation precautions for identified infection/condition  Taken 12/25/2022 1921  Absence of infection at discharge:   Assess and monitor for signs and symptoms of infection   Monitor lab/diagnostic results   Monitor all insertion sites i.e., indwelling lines, tubes and drains     Problem: Infection - Adult  Goal: Absence of infection during hospitalization  Outcome: Progressing  Flowsheets  Taken 12/26/2022 0204  Absence of infection during hospitalization:   Assess and monitor for signs and symptoms of infection   Monitor lab/diagnostic results   Monitor all insertion sites i.e., indwelling lines, tubes and drains   Monitor endotracheal (as able) and nasal secretions for changes in amount and color   [place] appropriate cooling/warming therapies per order   Administer medications as ordered   Instruct and encourage patient and family to use good hand hygiene technique   Identify and instruct in appropriate isolation precautions for identified infection/condition  Taken 12/25/2022 1921  Absence of infection during hospitalization:   Assess and monitor for signs and symptoms of infection   Monitor lab/diagnostic results   Monitor all insertion sites i.e., indwelling lines, tubes and drains     Problem: Infection - Adult  Goal: Absence of fever/infection during anticipated neutropenic period  Outcome: Progressing  Flowsheets  Taken 12/26/2022 0204  Absence of fever/infection during anticipated neutropenic period:   Monitor white blood cell count   Administer growth factors as ordered   Implement neutropenic guidelines  Taken 12/25/2022 1921  Absence of fever/infection during anticipated neutropenic period:   Monitor white blood cell count   Administer growth factors as ordered   Implement neutropenic guidelines     Problem: Respiratory - Adult  Goal: Achieves optimal ventilation and oxygenation  12/26/2022 0204 by Amanda Warner RN  Outcome: Progressing  Flowsheets (Taken 12/25/2022 1921)  Achieves optimal ventilation and oxygenation:   Assess for changes in respiratory status   Assess for changes in mentation and behavior   Position to facilitate oxygenation and minimize respiratory effort   Oxygen supplementation based on oxygen saturation or arterial blood gases   Initiate smoking cessation protocol as indicated     Problem: Respiratory - Adult  Goal: Achieves optimal ventilation and oxygenation  12/26/2022 0204 by Amanda Warner RN  Outcome: Progressing  Flowsheets (Taken 12/25/2022 1921)  Achieves optimal ventilation and oxygenation:   Assess for changes in respiratory status   Assess for changes in mentation and behavior   Position to facilitate oxygenation and minimize respiratory effort   Oxygen supplementation based on oxygen saturation or arterial blood gases   Initiate smoking cessation protocol as indicated  Care plan reviewed with patient. Patient verbalize understanding of the plan of care and contribute to goal setting.

## 2022-12-26 NOTE — OP NOTE
Therapeutic Bronchoscopy procedure note under general anesthesia for mucus plugging    Patient: Betty Martinez  : 1955      Operation: Flexible therapeutic fiberoptic bronchoscopy without fluoroscopy. During procedure following procedures were performed: Therapeutic bronchoscope was performed from right tracheobronchial tree. Mucus plugs were removed by doing therapeutic suctioning from right tracheobronchial tree. Bronchioalveolar lavage: obtained from right lower lobe. Bronch washings obtained from right tracheobronchial tree including right upper lobe, middle lobe and right lower lobe       Date of Operation: 2022    Indication for procedure: Abnormal CT scan of chest dated 2022 with a right lower lobe pneumonia/collapse with mucous plugging VS endobronchial lesions. Therapeutic bronchoscopy was planned to remove mucous plugs and to improve ongoing hypoxic respiratory failure. Pre operative diagnoses:   -Right lower lobe pneumonia due to community acquired pneumonia ( CAP) Vs Atypical pneumonia Vs aspiration pneumonia  -Chronic hypoxic respiratory failure due to pneumonia Vs other etiologies  -History of DVT/pulmonary embolism  -Essential hypertension on treatment medications under control.  -Major depressive disorder.  -Multiple sclerosis with paraplegia. -Decubitus ulcer S/p diverting colostomy placement  -Neurogenic bladder with chronic indwelling Pompa's catheter in place. Post operative diagnoses:   -Mucous plugging of the right tracheobronchial tree and left tracheobronchial tree  -Right lower lobe pneumonia due to community acquired pneumonia ( CAP) Vs Atypical pneumonia Vs aspiration pneumonia  -Chronic hypoxic respiratory failure due to pneumonia Vs other etiologies  -History of DVT/pulmonary embolism  -Essential hypertension on treatment medications under control.  -Major depressive disorder.  -Multiple sclerosis with paraplegia.   -Decubitus ulcer S/p diverting colostomy placement  -Neurogenic bladder with chronic indwelling Pompa's catheter in place. Surgeon: Chiquita Ray MD    Consent: Camille Johnson is a 74 y.o. male . The risks, benefits, complications, treatment options and expected outcomes were discussed with the patient. Consent obtained from the patient after explaining the risks and complications of the procedure. The possibilities of reaction to medication, pulmonary aspiration, perforation of a viscus, development of pneumothorax with requirement of chest tube placement,air way bleeding, failure to diagnose a condition and creating a complication leading to respiratory failure requiring mechanical ventilation, transfusion or operation were discussed with the patient who freely signed the consent. The patient was counseled at length about the risks of james Covid-19 during their perioperative period and any recovery window from their procedure. The patient was made aware that james Covid-19  may worsen their prognosis for recovering from their procedure  and lend to a higher morbidity and/or mortality risk. All material risks, benefits, and reasonable alternatives including postponing the procedure were discussed. The patient does wish to proceed with the procedure at this time. Anesthesia: Patient was given general endotracheal anesthesia by RHETT Kennedy from anesthesiology dept. Please see anesthesiologist's note for details. Description of Procedure: The patient was taken to endoscopy suite, identified as Camille Johnson and the procedure verified as Flexible Fiberoptic Bronchoscopy. A Time Out was held and the above information confirmed. After the induction of general  anesthesia by the anesthesiologist, the patient was placed in appropriate position and the Flexible Fibrooptic bronchoscope was advanced through the endotracheal tube after placing the Swivel adopter.  The lower end of endotracheal tube was found to be in appropriate position. The scope was advanced into the trachea. The kelly is sharp with no growths. Careful inspection of the tracheal lumen below the lower end of endotracheal tube was accomplished and found to have no growths. The scope was  advanced into the right main bronchus and then into the Right upper lobe, Right middle lobe, and Right lower lobe bronchi and segmental bronchi. The scope was sequentially passed into the Left main and then Left upper and lower bronchi and segmental bronchi. Therapeutic bronchoscope was performed from right tracheobronchial tree. Mucus plugs were removed by doing therapeutic suctioning from right tracheobronchial tree including right upper, middle and lower lobes. Bronchioalveolar lavage: Bronchioalveolar lavage was performed from right lower lobe. A good amount of Bronchioalveolar lavage I.e 35 ml was obtained after instilling 120 ml of sterile 0.9NS. Bronchioalveolar lavage was sent to the laboratory for further analysis. Bronchial washings: Bronchial washings were performed form right tracheobronchial tree including right upper lobe, middle lobe and lower lobe. More than 25 ml of Bronchial washings were obtained and sent to the laboratory for further analysis. After removing all the mucous plugs from right and left tracheobronchial trees,. Endobronchial findings:   Trachea: Normal mucosa. Kelly: Normal mucosa  Right main bronchus: Normal mucosa  Right upper lobe bronchus: Normal mucosa  Right Middle lobe bronchus: Normal mucosa  Right Lower lobe bronchus:Normal mucosa  Left main bronchus: Normal mucosa  Left upper lobe bronchus: Normal mucosa  Left lower lobe bronchus: Normal mucosa    No endobronchial lesions were noted. The Patient was taken to the endoscopy recovery area in satisfactory condition. Estimated Blood Loss: None    Complications: None. Recommendation/Plan:  1. F/U on culturs results  2.  F/U on cytology results    Follow up: Patient will be followed as inpatient. Attestation: I performed the procedure.     Silvia Aldana MD      Electronically signed by Silvia Aldana MD on 12/26/2022 at 10:41 AM

## 2022-12-26 NOTE — PROGRESS NOTES
1026- pt to pacu, resp easy and unlabored, VSS, pt appears in no acute distress, pt denies pain, pt stable on non-rebreather which he is on per floor, pt alert and oriented  1035- pt remains in stable condition  1040- Report called to Karolina Calle on 4K  1056- pt meets criteria for discharge from pacu, pt transported to floor by transport staff

## 2022-12-26 NOTE — ANESTHESIA PRE PROCEDURE
Department of Anesthesiology  Preprocedure Note       Name:  Sherrill Joe   Age:  74 y.o.  :  1955                                          MRN:  [id number]         Date:  2022      Surgeon: Carly Sierra):  Flower Younger MD    Procedure: Procedure(s):  BRONCHOSCOPY ALVEOLAR LAVAGE    Medications prior to admission:   Prior to Admission medications    Medication Sig Start Date End Date Taking? Authorizing Provider   OXcarbazepine (TRILEPTAL) 300 MG tablet Take 300 mg by mouth 2 times daily    Historical Provider, MD   vitamin C (ASCORBIC ACID) 500 MG tablet Take 500 mg by mouth in the morning and 500 mg before bedtime. Historical Provider, MD   potassium chloride (KLOR-CON M) 20 MEQ extended release tablet Take 2 tablets by mouth in the morning and 2 tablets before bedtime. 22   Kim Gomez PA-C   carvedilol (COREG) 12.5 MG tablet Take 1 tablet by mouth in the morning and 1 tablet in the evening. Take with meals. 22   Janene Clark MD   amLODIPine (NORVASC) 10 MG tablet Take 1 tablet by mouth in the morning. 22   Janene Clark MD   melatonin 3 MG TABS tablet Take 1 tablet by mouth nightly as needed (when unable to sleep) 22   Janene Clark MD   ondansetron (ZOFRAN) 4 MG tablet Take 4 mg by mouth every 8 hours as needed for Nausea or Vomiting    Historical Provider, MD   carbamide peroxide (DEBROX) 6.5 % otic solution 5 drops 2 times daily Instill 5 drops in both ears two times daily for ear wax use for 4 days.     Historical Provider, MD   vitamin E 400 UNIT capsule Take 400 Units by mouth daily    Historical Provider, MD   aspirin 81 MG EC tablet Take 81 mg by mouth daily    Historical Provider, MD   oxybutynin (DITROPAN-XL) 5 MG extended release tablet Take 5 mg by mouth in the morning and at bedtime    Historical Provider, MD   gentamicin (GARAMYCIN) 0.1 % ointment Apply topically daily Apply to wound bed    Historical Provider, MD   erythromycin (ROMYCIN) 5 MG/GM ophthalmic ointment Place into the right eye nightly (0.25 ribbon to right eye)    Historical Provider, MD   Ascorbic Acid (VITAMIN C ER PO) Take 500 mg by mouth in the morning and at bedtime   Patient not taking: Reported on 12/24/2022    Historical Provider, MD   baclofen (LIORESAL) 10 MG tablet Take 10 mg by mouth 3 times daily    Historical Provider, MD   gabapentin (NEURONTIN) 600 MG tablet Take 600 mg by mouth in the morning, at noon, in the evening, and at bedtime.     Historical Provider, MD   acetaminophen (TYLENOL) 325 MG tablet Take 650 mg by mouth every 4 hours as needed for Pain or Fever    Historical Provider, MD   LACTOBACILLUS PO Take 1 capsule by mouth in the morning and at bedtime     Historical Provider, MD   sodium chloride 1 g tablet Take 1 tablet by mouth 2 times daily (with meals) 6/20/19   Colleen Irby MD   metoprolol tartrate (LOPRESSOR) 25 MG tablet Take 1 tablet by mouth 2 times daily 6/20/19 7/26/22  Colleen Irby MD   potassium chloride (KLOR-CON M) 20 MEQ TBCR extended release tablet Take 2 tablets by mouth daily 6/21/19 7/27/22  Colleen Irby MD   calcium-vitamin D (Wendall Ditty) 500-200 MG-UNIT per tablet Take 1 tablet by mouth 2 times daily 11/16/18   Greyson Lipscomb MD   famotidine (PEPCID) 20 MG tablet Take 1 tablet by mouth 2 times daily 6/14/18   Kendra Black MD   docusate sodium (COLACE) 100 MG capsule Take 100 mg by mouth daily     Historical Provider, MD   tiZANidine (ZANAFLEX) 2 MG tablet Take 2 mg by mouth 3 times daily 0600;1400;2200 12/15/16   Historical Provider, MD   Multiple Vitamin (MULTIVITAMIN) tablet Take 1 tablet by mouth daily 3/28/16   Cecil Martinez MD   vitamin A 31918 UNITS capsule Take 2 capsules by mouth daily 3/28/16   Cecil Martinez MD       Current medications:    Current Facility-Administered Medications   Medication Dose Route Frequency Provider Last Rate Last Admin    [Held by provider] amLODIPine (NORVASC) tablet 10 mg  10 mg Oral Daily Júnior Haynes Shira, DO        baclofen (LIORESAL) tablet 10 mg  10 mg Oral TID Miguel M Shira, DO   10 mg at 12/25/22 2018    [Held by provider] carvedilol (COREG) tablet 12.5 mg  12.5 mg Oral BID  Miguel M Shira, DO        famotidine (PEPCID) tablet 20 mg  20 mg Oral BID Miguel M Shira, DO   20 mg at 12/25/22 2018    gabapentin (NEURONTIN) tablet 600 mg  600 mg Oral 4x daily Júnior Haynes Shira, DO   600 mg at 12/25/22 2018    OXcarbazepine (TRILEPTAL) tablet 300 mg  300 mg Oral BID Miguel M Shira, DO   300 mg at 12/25/22 2018    oxybutynin (DITROPAN-XL) extended release tablet 5 mg  5 mg Oral BID [facility] Shira, DO   5 mg at 12/25/22 2018    doxycycline hyclate (VIBRA-TABS) tablet 100 mg  100 mg Oral 2 times per day [facility], DO   100 mg at 12/25/22 2018    cefepime (MAXIPIME) 2000 mg IVPB minibag  2,000 mg IntraVENous Q8H [facility], DO   Stopped at 12/26/22 0804    0.9 % sodium chloride infusion   IntraVENous Continuous [facility] DO 75 mL/hr at 12/25/22 1444 Restarted at 12/26/22 0955    EPINEPHrine 1 MG/10ML injection 1 mg  1 mg Endotracheal Once Roseann Toussaint MD        lidocaine 1 % injection 10 mL  10 mL Tracheal Tube Once Roseann Toussaint MD        guaiFENesin [facility]) extended release tablet 600 mg  600 mg Oral BID Roseann Toussaint MD   600 mg at 12/25/22 2018    sodium chloride flush 0.9 % injection 5-40 mL  5-40 mL IntraVENous 2 times per day [facility] Shira, DO   10 mL at 12/25/22 0947    sodium chloride flush 0.9 % injection 5-40 mL  5-40 mL IntraVENous PRN Miguel M Shira, DO        0.9 % sodium chloride infusion   IntraVENous PRN Miugel  Shria, DO        [Held by provider] enoxaparin (LOVENOX) injection 40 mg  40 mg SubCUTAneous Daily Miguel  Shira, DO   40 mg at 12/24/22 1641    ondansetron (ZOFRAN-ODT) disintegrating tablet 4 mg  4 mg Oral Q8H PRN Júnior Silva DO        Or    ondansetron [facility]) injection 4 mg  4 mg IntraVENous Q6H PRN Miguel Silva DO        polyethylene glycol (GLYCOLAX) packet 17 g  17 g Oral Daily PRN Miguel Humphriesi, DO        acetaminophen (TYLENOL) tablet 650 mg  650 mg Oral Q6H PRN Scott Silva, DO   650 mg at 12/25/22 2019    Or    acetaminophen (TYLENOL) suppository 650 mg  650 mg Rectal Q6H PRN Miguel Humphriesi, DO        ipratropium-albuterol (DUONEB) nebulizer solution 1 ampule  1 ampule Inhalation Q4H ROSA ELENA [facility], APRN - CNP   1 ampule at 12/26/22 0757     Facility-Administered Medications Ordered in Other Encounters   Medication Dose Route Frequency Provider Last Rate Last Admin    phenylephrine (VAZCULEP) injection   IntraVENous PRN Yann Devon, APRN - CRNA   200 mcg at 12/26/22 1007    rocuronium (ZEMURON) injection   IntraVENous PRN Yann Devon, APRN - CRNA   25 mg at 12/26/22 1007    propofol injection   IntraVENous PRN Yann Devon, APRN - CRNA   150 mg at 12/26/22 1001    succinylcholine (ANECTINE) injection   IntraVENous PRN Yann Devon, APRN - CRNA   100 mg at 12/26/22 1001    lidocaine PF 2 % injection   IntraVENous PRN Yann Devon, APRN - CRNA   100 mg at 12/26/22 1001    esmolol (BREVIBLOC) injection   IntraVENous PRN Yann Devon, APRN - CRNA   40 mg at 12/26/22 1001    Dexamethasone Sodium Phosphate injection   IntraVENous PRN Yann Devon, APRN - CRNA   8 mg at 12/26/22 1016    ondansetron (ZOFRAN) injection   IntraVENous PRN Yann Devon, APRN - CRNA   4 mg at 12/26/22 1016    sugammadex (BRIDION) 200 MG/2ML injection   IntraVENous PRN Yann Devon, APRN - CRNA   200 mg at 12/26/22 1016       Allergies:  No Known Allergies    Problem List:    Patient Active Problem List   Diagnosis Code    Neurogenic bladder N31.9    Multiple sclerosis ([facility] G35    MS (multiple sclerosis) ([facility] G35    Urinary retention R33.9    Chronic indwelling Pompa catheter Z97.8    Cystostomy malfunction ([facility] N99.512    Decubitus ulcer of coccyx L89.159    Decubitus ulcer, stage 3 ([facility] L89.93    Malnutrition ([facility] E46    Decubitus ulcer of right perineal ischial region, stage 3 ([facility] L89.313    Pulmonary embolism on left ([facility]) I26.99    Acute metabolic encephalopathy X55.47    Speech disturbance R47.9    Infected decubitus ulcer, stage I L89.91, L08.9    Infected decubitus ulcer, stage III ([facility]) L89.93, L08.9    Wound infection T14. 8XXA, L08.9    Elevated INR R79.1    Chronic osteomyelitis, pelvis, right ([facility]) M86.651    Osteomyelitis ([facility]) M86.9    Urinary tract infection associated with indwelling urethral catheter ([facility]) T83.511A, N39.0    Abnormal liver function test R79.89    Chronic depression F32. A    Essential hypertension I10    History of pulmonary embolism Z86.711    Other dysphagia R13.19    Pressure injury of skin of back L89.109    Sepsis secondary to UTI ([facility] A41.9, N39.0    Decubitus ulcer L89.90    Elevated troponin R77.8    Anemia D64.9    Sepsis due to methicillin resistant Staphylococcus aureus (MRSA) ([facility]) A41.02    Sepsis due to urinary tract infection ([facility]) A41.9, N39.0    AMS (altered mental status) R41.82    Gross hematuria R31.0    Class 1 obesity due to excess calories without serious comorbidity with body mass index (BMI) of 31.0 to 31.9 in adult E66.09, Z68.31    Colostomy in place ([facility]) Z93.3    Current use of long term anticoagulation Z79.01    Obstructive uropathy V53.3    Complicated urinary tract infection N39.0    Hypokalemia E87.6    Hypomagnesemia E83.42    Shortness of breath R06.02    Hypoxia R09.02    ESBL (extended spectrum beta-lactamase) producing bacteria infection A49.9, Z16.12    Pneumonia of right lower lobe due to infectious organism J18.9    Acute respiratory failure with hypoxia ([facility]) J96.01    Pleural effusion on right J90    History of DVT (deep vein thrombosis) Z86.718    History of ESBL E. coli infection Z86.19    Abnormal CT of the chest R93.89       Past Medical History:        Diagnosis Date    Dysphagia     oropharyngeal    History of pulmonary embolism     History of urinary tract infection     Hx of blood clots     Pulmonary embolis    Hypertension     Major depressive disorder, single episode     MS [facility]multiple sclerosis) ([facility]     Neurogenic bladder 2012    Dr. Fahad Montoya placed cather    [facility])     UTI (urinary tract infection)        Past Surgical History:        Procedure Laterality Date    ANKLE SURGERY      broken ankle    BLADDER SURGERY  2012    Suprapubic catheter placement    COLONOSCOPY      CYSTO/URETERO/PYELOSCOPY, CALCULUS TX Left 2019    CYSTOSCOPY, LEFT STENT insertion, bladder irragation with bladder stones performed by Mo Hernandez MD at [address] [facility] N/A 2019    CYSTO, LEFT URETERAL STENT REMOVAL, LEFT URETEROSCOPY, LASER LITHOTRIPSY, BASKET RETRIEVAL OF STONE FRAGMENTS performed by Mo Hernandez MD at [address] [facility] 2021    CYSTOSCOPY, CYSTOLITHOLAPAXY, SUPRAPUBIC CATHETER EXCHANGE performed by Brisa Gould MD at [address] 6/15/2022    CYSTOSCOPY performed by Brisa Gould MD at [address] [facility]  [facility]    IR NEPHROSTOMY CATHETER PLACEMENT 2022 [facility] SPECIAL PROCEDURES    NE LAP,SURG,COLECTOMY,W/END COLOST & CLOSUR N/A 2018    ROBOT DIVERTING COLOSTOMY performed by Frank Cassidy MD at [facility]   [address] [facility] Left 2022    CYSTOSCOPY, LEFT URETEROSCOPY, LASER LITHOTRIPSY,  LEFT URETERAL STENT EXCHANGE performed by Brisa Gould MD at [place] [address] History:    Social History     Tobacco Use    Smoking status: Former     Types: Cigarettes     Quit date: 1982     Years since quittin.0    Smokeless tobacco: Former   Substance Use Topics    Alcohol use: No     Comment: \"quit alcohol a few years ago\"                                Counseling given: Not Answered      Vital Signs (Current):   Vitals:    22 0802 22 0818 22 2943 12/26/22 0928   BP:  (!) 121/59  (!) 145/73   Pulse: 85 81 88    Resp: 20 24 20    Temp:  36.9 °C (98.4 °F)     TempSrc:  Oral     SpO2:  91% 92%    Weight:       Height:                                                  BP Readings from Last 3 Encounters:   12/26/22 (!) 145/73   08/09/22 111/63   08/04/22 (!) 112/59       NPO Status: Time of last liquid consumption: 1800                        Time of last solid consumption: 1800                        Date of last liquid consumption: 12/25/22                        Date of last solid food consumption: 12/25/22    BMI:   Wt Readings from Last 3 Encounters:   12/26/22 191 lb 2.2 oz (86.7 kg)   08/09/22 198 lb 9.6 oz (90.1 kg)   08/04/22 194 lb 6.4 oz (88.2 kg)     Body mass index is 29.94 kg/m².     CBC:   Lab Results   Component Value Date/Time    WBC 8.3 12/25/2022 12:39 AM    RBC 4.77 12/25/2022 12:39 AM    RBC 4.20 01/20/2012 09:45 AM    HGB 13.8 12/25/2022 12:39 AM    HCT 43.9 12/25/2022 12:39 AM    MCV 92.0 12/25/2022 12:39 AM    RDW 16.6 06/21/2019 04:40 AM     12/25/2022 12:39 AM       CMP:   Lab Results   Component Value Date/Time     12/25/2022 12:39 AM    K 3.8 12/25/2022 12:39 AM    K 4.2 08/06/2022 05:35 AM     12/25/2022 12:39 AM    CO2 26 12/25/2022 12:39 AM    BUN 20 12/25/2022 12:39 AM    CREATININE 0.3 12/25/2022 12:39 AM    AGRATIO 0.7 06/21/2019 04:40 AM    LABGLOM >60 12/25/2022 12:39 AM    GLUCOSE 113 12/25/2022 12:39 AM    GLUCOSE 105 06/25/2019 04:12 AM    PROT 7.0 12/25/2022 02:24 PM    CALCIUM 9.1 12/25/2022 12:39 AM    BILITOT 0.4 12/25/2022 02:24 PM    ALKPHOS 176 12/25/2022 02:24 PM    AST 29 12/25/2022 02:24 PM    ALT 24 12/25/2022 02:24 PM       POC Tests:   Recent Labs     12/25/22  1642   POCGLU 116*       Coags:   Lab Results   Component Value Date/Time    INR 1.15 07/25/2022 01:29 AM    APTT 31.5 07/25/2022 01:29 AM       HCG (If Applicable): No results found for: PREGTESTUR, PREGSERUM, HCG, HCGQUANT     ABGs: No results found for: PHART, PO2ART, YHY5VVY, KHG5RUE, BEART, R5JELNFS     Type & Screen (If Applicable):  Lab Results   Component Value Date    LABRH POS 06/12/2018       Drug/Infectious Status (If Applicable):  No results found for: HIV, HEPCAB    COVID-19 Screening (If Applicable):   Lab Results   Component Value Date/Time    COVID19 NOT DETECTED 12/24/2022 12:08 PM           Anesthesia Evaluation  Nursing notes reviewed  Airway: Mallampati: III  TM distance: >3 FB   Neck ROM: full  Mouth opening: > = 3 FB   Dental:    (+) edentulous      Pulmonary:   (+) pneumonia:  shortness of breath:  rhonchi:bilateral decreased breath sounds                            Cardiovascular:    (+) hypertension:,       ECG reviewed      Echocardiogram reviewed                  Neuro/Psych:   (+) neuromuscular disease: multiple sclerosis, psychiatric history:            GI/Hepatic/Renal:   (+) GERD:,           Endo/Other: Negative Endo/Other ROS                    Abdominal:             Vascular:   + DVT, PE. Other Findings:           Anesthesia Plan      general     ASA 3             Anesthetic plan and risks discussed with patient. Plan discussed with attending.     Attending anesthesiologist reviewed and agrees with Preprocedure content                TONO Quinonez CRNA   12/26/2022

## 2022-12-26 NOTE — ANESTHESIA POSTPROCEDURE EVALUATION
Department of Anesthesiology  Postprocedure Note    Patient: Farzana Amin  MRN: [id number]  YOB: 1955  Date of evaluation: 12/26/2022      Procedure Summary     Date: 12/26/22 Room / Location: [facility] / [address] [place] [address]    Anesthesia Start: 6891 Anesthesia Stop: 1030    Procedure: BRONCHOSCOPY ALVEOLAR LAVAGE Diagnosis:       Pneumonia of right lower lobe due to infectious organism      (PNEUMONIA)    Surgeons: Jennifer Rapp MD Responsible Provider: Aron Rico DO    Anesthesia Type: general ASA Status: 3          Anesthesia Type: No value filed.     Quinn Phase I: Quinn Score: 9    Quinn Phase II:        Anesthesia Post Evaluation    Patient location during evaluation: bedside  Patient participation: complete - patient participated  Level of consciousness: awake  Airway patency: patent  Nausea & Vomiting: no vomiting and no nausea  Cardiovascular status: hemodynamically stable  Respiratory status: acceptable  Hydration status: stable

## 2022-12-26 NOTE — PLAN OF CARE
Problem: Respiratory - Adult  Goal: Achieves optimal ventilation and oxygenation  12/25/2022 2332 by Cathleen Giang RCP  Outcome: Progressing  Pt receives duoneb q4 w/a using metaneb  Vest therapy started- pt tolerates well  Patient mutually agreed on goals.

## 2022-12-26 NOTE — PROGRESS NOTES
[address] ICU STEPDOWN TELEMETRY 4K  Clinical Swallow Evaluation      SLP Individual Minutes  Time In: 4774  Time Out: 2563  Minutes: 10  Timed Code Treatment Minutes: 0 Minutes       Date: 2022  Patient Name: Dilma Goss      CSN: [id number]   : 1955  (74 y.o.)  Gender: male   Referring Physician:  Toribio Singh DO  Diagnosis: Pneumonia of right lower lobe due to infectious organism  History of Present Illness/Injury: Patient admitted to [facility] with above dx. 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 male with PMHx of Multiple sclerosis, GERD, paraparesis of bilateral lower extremities, HTN, neurogenic bladder with chronic jeffers, and hx of DVT/PE who presented to [facility] from nursing home with complaint of shortness of breath and cough that began 2 days ago. Today he has worsening shortness of breath and reported fever at his nursing home and required supplemental oxygen. He wears 2 lpm at baseline per nursing records. Upon EMS arrival his SpO2 was in the 80s and he was placed on NRB mask. Upon arrival to the ED the patient was transitioned to nasal cannula at 6LPM. The patient endorses continued shortness of breath but only stated his cough was mild. COVID-19 and FLU A/B were negative. The patient received a Duoneb treatment with adequate improvement in lung aeration. ST consulted to complete to complete clinical swallow evaluation d/t concerns for dysphagia d/t documented hx dx of oropharyngeal dysphagia. Patient reporting, \"I eat a regular diet at the nursing home. \"   Past Medical History:   Diagnosis Date    Dysphagia     oropharyngeal    History of pulmonary embolism     History of urinary tract infection     Hx of blood clots     Pulmonary embolis    Hypertension     Major depressive disorder, single episode     MS (multiple sclerosis) (HCC)     Neurogenic bladder 2012    Dr. Tangela Arias placed cather    Osteomyelitis [facility])     UTI (urinary tract infection)        SUBJECTIVE:  Patient seen at bedside; alert and pleasant. RN Noe Lopez approved ST attempt to complete clinical swallow evaluation     OBJECTIVE:    Pain:  No pain reported. Current Diet: Easy to chew diet with thin liquids; pending clinical swallow evaluation     Respiratory Status:  HFNC 60L, 60%     Behavioral Observation:  Alert    CRANIAL NERVE ASSESSMENT   CN V (Trigeminal) Closes and Opens Mandible WFL    Rotary Jaw Movement WFL      CN VII (Facial) Cheeks Hold Food out of Sulci WFL    Opens, Closes/Seals, Protrudes, Retracts Lips WFL    General Appearance WFL    Sensation Not Tested      CN X (Vagus - Pharyngeal) Raises Back of Tongue WFL      CN XI (Accessory) Lifts Soft Palate WFL      CN XII (Hypoglossal) Elevates Tongue Up and Back WFL    Protrusion   WFL    Lateralizes Tongue WFL    Sensation Not Tested      Other Observations Dentition Edentulous     Vocal Quality WFL    Cough DNT     Patient Evaluated Using: Thin Liquids, Puree, and Coarse Solids    Oral Phase:  Impaired:  Slow oral mastication with hard solids    Pharyngeal Phase: WFL:  Pharyngeal phase appears WFL but cannot rule out pharyngeal phase deficits from a bedside swallowing evaluation alone. Signs and Symptoms of Laryngeal Penetration/Aspiration: No signs/symptoms of aspiration evident in this evaluation, but cannot rule out silent aspiration. Impressions: Patient presents with mild oral dysphagia with inability to fully discern potential presence of pharyngeal phase deficits without formal instrumentation. Patient resting on HFNC; 60L, 60%. Patient denies difficulty swallowing and reports \"I eat a regular diet at the nursing home. \" Patient with edentulous dentition reporting \"I have dentures but I do not wear them. \" Patient consumed PO trials of thin liquids via cup and straw; no s/s of aspiration. PO trials of puree completed (majority of applesauce cup); no s/s of aspiration.  PO trials of hard solids completed; slow yet functional oral mastication, no s/s of aspiration. ST with concerns for appropriate endurance levels and breath/swallow coordination d/t medical necessity of HFNC. ST recommending soft and bite size diet with thin liquids. Patient would benefit from ongoing skilled ST services to target deficits with goals to return to baseline diet. *post evaluation, patient without respiratory distress upon leaving room; RN Umm Hawley notified re: clinical findings and recommendations from the assessment; verbal receptiveness noted   *Should patient require continuous BiPAP ST recommending transition to NPO for patient safety with call to ST Acute at #[phone] to re-evaluate swallow function when clinically appropriate. RECOMMENDATIONS/ASSESSMENT:  Instrumental Evaluation: Instrumental evaluation not indicated at this time. Diet Recommendations:  Soft and bite size diet with thin liquids   Strategies:  Full Upright Position and Small Bite/Sip   Rehabilitation Potential: good  Discharge Recommendations: SNF    EDUCATION:  Learner: Patient  Education:  Reviewed results and recommendations of this evaluation, Reviewed diet and strategies, Reviewed signs, symptoms and risks of aspiration, Reviewed ST goals and Plan of Care, Reviewed recommendations for follow-up, and Education Related to Potential Risks and Complications Due to Impairment/Illness/Injury  Evaluation of Education: [employer] understanding, Demonstrates with assistance, Needs further instruction, and Family not present    PLAN:  Skilled SLP intervention on acute care 3-5 x per week or until goals met and/or pt plateaus in function. Specific interventions for next session may include: dysphagia tx. PATIENT GOAL:    Did not state. Will further assess during treatment.     SHORT TERM GOALS:  Short Term Goals  Time Frame for Short Term Goals: 2 weeks  Goal 1: Patient will consume soft and bite size diet with thin liquids without s/s of apsiration in order to safely maintain adequate hydration and nutrition  Goal 2: Patient will complete advanced textures as clinically appropriate without s/s of aspiration in order to safely advance patient diet  Goal 3: Should pulmonary decline arise or s/s of aspiration present with recommended diet ST recommending completion of instrumental evaluation to furthur evaluate    LONG TERM GOALS:  No LTGs due to [facility]

## 2022-12-26 NOTE — PROGRESS NOTES
Comprehensive Nutrition Assessment    Type and Reason for Visit:  Initial, Positive Nutrition Screen, Wound    Nutrition Recommendations/Plan:   Start oral diet per SLP/physician recs. Send ensure high protein BID. Consider MVI. Malnutrition Assessment:  Malnutrition Status: At risk for malnutrition (Comment) (12/26/22 1518)    Context:  Acute Illness     Findings of the 6 clinical characteristics of malnutrition:  Energy Intake:   (per pt he has been eating atleast 50% PTA however unsure how long. I called ECF twice & unable to get ahold of their nursing staff)  Weight Loss:      2.1% wt loss since 8/6/22 ( 4 1/2 months)   Body Fat Loss:  No significant body fat loss     Muscle Mass Loss:  No significant muscle mass loss    Fluid Accumulation:  Unable to assess     Strength:  Not Performed    Nutrition Assessment:     Pt. nutritionally compromised AEB wounds. At risk for further nutrition compromise r/t increased nutrient needs for wound healing, currently NPO awaiting SLP swallow eval per nursing staff, elevated troponin, community acquired pneumonia,  acute on chronic respiratory failure with hypoxia, pleural effusion on rt., underlying medical condition ( oropharyngeal dysphagia, pulmonary embolism, major depressive disorder, MS, decubitus wounds with diverting colostomy). Nutrition Related Findings:    Pt. Report/Treatments/Miscellaneous: Pt seen ~ 1100, on high flow, just came from endo & had a mucus plug per RN. I called E[facility] ( [facility]) twice & unable to get ahold of their nursing staff. Pt edentulous, reports he doesn't usually wear his dentures, appears to have been on a regular diet  with Boost ONS BID ( per ECF transfer papers). Pt denies need for diet texture adjustment & mentions appetite is fair. Pt reports he consumes atleast 50% at meals at [facility] & states he tends to drink 1-2 cans of Boost daily. SLP swallow eval 12/26 Dysphagia Soft & Bite Sized.     GI Status: no BM noted.  Pertinent Labs: (12/25) Alk Phos 176  Pertinent Meds: Zofran, Glycolax     Wound Type:  (wound scrotum posterior, stage III buttocks rt tunneling wound)       Current Nutrition Intake & Therapies:    Average Meal Intake:Dysphagia Soft & Bite Sized ( new diet)Average Supplements Intake: NPO earlier today, new order for ONS. ADULT DIET; Dysphagia - Soft and Bite Sized  Ensure High Protein BID. Anthropometric Measures:  Height: 5' 7\" (170.2 cm)  Ideal Body Weight (IBW): 148 lbs (67 kg)    Admission Body Weight: 182 lb 5.1 oz (82.7 kg) ((12/24) nonpitting sacral edema)  Current Body Weight: 191 lb 2.2 oz (86.7 kg) ((12/26) edema N/A bedscale),   IBW. Current BMI (kg/m2): 29.9  Usual Body Weight:  ((8/9) 198# 9.6oz, (8/8) 200#, (8/6) 195# bedscale)                       BMI Categories: Overweight (BMI 25.0-29. 9)    Estimated Daily Nutrient Needs:  Energy Requirements Based On: Kcal/kg  Weight Used for Energy Requirements:  (82.7kgm (12/24))  Energy (kcal/day): 2695-7157 (20-25kcals/kgm)  Weight Used for Protein Requirements: Ideal  Protein (g/day):  grams (1.4-2 grams protein/kgm IBW)       Nutrition Diagnosis:   Inadequate oral intake related to impaired respiratory function, inadequate protein-energy intake as evidenced by NPO or clear liquid status due to medical condition    Nutrition Interventions:   Food and/or Nutrient Delivery: Continue NPO  Nutrition Education/Counseling: Education not appropriate  Coordination of Nutrition Care: Continue to monitor while inpatient, Swallow Evaluation, Interdisciplinary Rounds       Goals:     Goals: Initiate PO diet (if appropriate ( SLP to see pt))       Nutrition Monitoring and Evaluation:      Food/Nutrient Intake Outcomes: Diet Advancement/Tolerance, Vitamin/Mineral Intake  Physical Signs/Symptoms Outcomes: Biochemical Data, GI Status, Fluid Status or Edema, Hemodynamic Status, Nutrition Focused Physical Findings, Skin, Weight    Discharge Planning: Too soon to determine     Kary Trujillo RD, LD  Contact: [phone]

## 2022-12-26 NOTE — PLAN OF CARE
Problem: Discharge Planning  Goal: Discharge to home or other facility with appropriate resources  12/26/2022 1354 by Daljit Cohen RN  Outcome: Progressing  Flowsheets (Taken 12/26/2022 1354)  Discharge to home or other facility with appropriate resources:   Identify barriers to discharge with patient and caregiver   Identify discharge learning needs (meds, wound care, etc)   Refer to discharge planning if patient needs post-hospital services based on physician order or complex needs related to functional status, cognitive ability or social support system   Arrange for needed discharge resources and transportation as appropriate     Problem: Skin/Tissue Integrity  Goal: Absence of new skin breakdown  Description: 1. Monitor for areas of redness and/or skin breakdown  2. Assess vascular access sites hourly  3. Every 4-6 hours minimum:  Change oxygen saturation probe site  4. Every 4-6 hours:  If on nasal continuous positive airway pressure, respiratory therapy assess nares and determine need for appliance change or resting period. 12/26/2022 1354 by Daljit Cohen RN  Outcome: Progressing  Note: Q 2 hr turns, encourage protein intake, incontinent care, elbows and heels elevated off bed.       Problem: Safety - Adult  Goal: Free from fall injury  12/26/2022 1354 by Daljit Cohen RN  Outcome: Progressing  Flowsheets (Taken 12/26/2022 1354)  Free From Fall Injury:   Ines Pate family/caregiver on patient safety   Based on caregiver fall risk screen, instruct family/caregiver to ask for assistance with transferring infant if caregiver noted to have fall risk factors     Problem: Safety - Adult  Goal: Isolation precautions  Description: Isolation precautions, patient airborne and contact   Outcome: Progressing  Note: Patient in isolation per hospital protocol      Problem: Pain  Goal: Verbalizes/displays adequate comfort level or baseline comfort level  12/26/2022 1354 by Daljit Cohne RN  Outcome: Progressing  Flowsheets (Taken 12/26/2022 1354)  Verbalizes/displays adequate comfort level or baseline comfort level:   Encourage patient to monitor pain and request assistance   Administer analgesics based on type and severity of pain and evaluate response   Consider cultural and social influences on pain and pain management   Assess pain using appropriate pain scale   Implement non-pharmacological measures as appropriate and evaluate response  Note: Pain goal 4 on 1-10 scale      Problem: ABCDS Injury Assessment  Goal: Absence of physical injury  12/26/2022 1354 by Colleen Armstrong RN  Outcome: Progressing  Flowsheets (Taken 12/26/2022 1354)  Absence of Physical Injury: Implement safety measures based on patient assessment  Note: Hourly rounding, call light within reach, bed alarm in use      Problem: Chronic Conditions and Co-morbidities  Goal: Patient's chronic conditions and co-morbidity symptoms are monitored and maintained or improved  12/26/2022 1354 by Colleen Armstorng RN  Outcome: Progressing  Flowsheets (Taken 12/26/2022 1354)  Care Plan - Patient's Chronic Conditions and Co-Morbidity Symptoms are Monitored and Maintained or Improved:   Monitor and assess patient's chronic conditions and comorbid symptoms for stability, deterioration, or improvement   Collaborate with multidisciplinary team to address chronic and comorbid conditions and prevent exacerbation or deterioration   Update acute care plan with appropriate goals if chronic or comorbid symptoms are exacerbated and prevent overall improvement and discharge     Problem: Infection - Adult  Goal: Absence of infection at discharge  12/26/2022 1354 by Colleen Armstrong RN  Outcome: Progressing  Flowsheets (Taken 12/26/2022 1354)  Absence of infection at discharge:   Assess and monitor for signs and symptoms of infection   Monitor lab/diagnostic results   Monitor all insertion sites i.e., indwelling lines, tubes and drains   Monitor endotracheal (as able) and nasal secretions for changes in amount and color   Administer medications as ordered   Instruct and encourage patient and family to use good hand hygiene technique   Identify and instruct in appropriate isolation precautions for identified infection/condition  Note: Pt in isolation per hospital policy      Problem: Infection - Adult  Goal: Absence of infection during hospitalization  12/26/2022 1354 by Tea Perez RN  Outcome: Progressing  Flowsheets (Taken 12/26/2022 1354)  Absence of infection during hospitalization:   Assess and monitor for signs and symptoms of infection   Monitor lab/diagnostic results   Monitor all insertion sites i.e., indwelling lines, tubes and drains   Administer medications as ordered   Instruct and encourage patient and family to use good hand hygiene technique   Identify and instruct in appropriate isolation precautions for identified infection/condition     Problem: Infection - Adult  Goal: Absence of fever/infection during anticipated neutropenic period  12/26/2022 1354 by Tea Perez RN  Outcome: Progressing  Flowsheets (Taken 12/26/2022 1354)  Absence of fever/infection during anticipated neutropenic period:   Monitor white blood cell count   Administer growth factors as ordered   Implement neutropenic guidelines     Problem: Neurosensory - Adult  Goal: Achieves stable or improved neurological status  Outcome: Progressing  Flowsheets (Taken 12/26/2022 1354)  Achieves stable or improved neurological status:   Assess for and report changes in neurological status   Maintain blood pressure and fluid volume within ordered parameters to optimize cerebral perfusion and minimize risk of hemorrhage   Monitor temperature, glucose, and sodium.  Initiate appropriate interventions as ordered     Problem: Neurosensory - Adult  Goal: Achieves maximal functionality and self care  Outcome: Progressing  Flowsheets (Taken 12/26/2022 1354)  Achieves maximal functionality and self care:   Monitor swallowing and airway patency with patient fatigue and changes in neurological status   Encourage and assist patient to increase activity and self care with guidance from physical therapy/occupational therapy   Encourage visually impaired, hearing impaired and aphasic patients to use assistive/communication devices     Problem: Musculoskeletal - Adult  Goal: Return mobility to safest level of function  Outcome: Progressing  Flowsheets (Taken 12/26/2022 1354)  Return Mobility to Safest Level of Function:   Assess patient stability and activity tolerance for standing, transferring and ambulating with or without assistive devices   Assist with transfers and ambulation using safe patient handling equipment as needed   Ensure adequate protection for wounds/incisions during mobilization   Instruct patient/family in ordered activity level   Apply continuous passive motion per provider or physical therapy orders to increase flexion toward goal   Obtain physical therapy/occupational therapy consults as needed     Problem: Musculoskeletal - Adult  Goal: Maintain proper alignment of affected body part  Outcome: Progressing  Flowsheets (Taken 12/26/2022 1354)  Maintain proper alignment of affected body part:   Support and protect limb and body alignment per provider's orders   Instruct and reinforce with patient and family use of appropriate assistive device and precautions (e.g. spinal or hip dislocation precautions)     Problem: Musculoskeletal - Adult  Goal: Return ADL status to a safe level of function  Outcome: Progressing  Flowsheets (Taken 12/26/2022 1354)  Return ADL Status to a Safe Level of Function:   Administer medication as ordered   Obtain physical therapy/occupational therapy consults as needed   Assess activities of daily living deficits and provide assistive devices as needed   Assist and instruct patient to increase activity and self care as tolerated     Problem: Genitourinary - Adult  Goal: Absence of urinary retention  Outcome: Progressing  Flowsheets (Taken 12/26/2022 1354)  Absence of urinary retention:   Assess patients ability to void and empty bladder   Place urinary catheter per Licensed Independent Practitioner order if needed   Discuss catheterization for long term situations as appropriate   Monitor intake/output and perform bladder scan as needed   Discuss with Licensed Independent Practitioner  medications to alleviate retention as needed     Problem: Genitourinary - Adult  Goal: Urinary catheter remains patent  Outcome: Progressing  Flowsheets (Taken 12/26/2022 1354)  Urinary catheter remains patent:   Assess patency of urinary catheter   Irrigate catheter per Licensed Independent Practitioner order if indicated and notify Licensed Independent Practitioner if unable to irrigate     Problem: Metabolic/Fluid and Electrolytes - Adult  Goal: Electrolytes maintained within normal limits  Outcome: Progressing  Flowsheets (Taken 12/26/2022 1354)  Electrolytes maintained within normal limits:   Monitor labs and assess patient for signs and symptoms of electrolyte imbalances   Monitor response to electrolyte replacements, including repeat lab results as appropriate   Instruct patient on fluid and nutrition restrictions as appropriate   Administer electrolyte replacement as ordered     Problem: Metabolic/Fluid and Electrolytes - Adult  Goal: Hemodynamic stability and optimal renal function maintained  Outcome: Progressing  Flowsheets (Taken 12/26/2022 1354)  Hemodynamic stability and optimal renal function maintained:   Monitor intake, output and patient weight   Monitor urine specific gravity, serum osmolarity and serum sodium as indicated or ordered   Monitor response to interventions for patient's volume status, including labs, urine output, blood pressure (other measures as available)   Encourage oral intake as appropriate     Problem: Metabolic/Fluid and Electrolytes - Adult  Goal: Glucose maintained within prescribed range  Outcome: Progressing  Flowsheets (Taken 12/26/2022 1354)  Glucose maintained within prescribed range:   Monitor blood glucose as ordered   Assess for signs and symptoms of hyperglycemia and hypoglycemia   Assess barriers to adequate nutritional intake and initiate nutrition consult as needed     Problem: Hematologic - Adult  Goal: Maintains hematologic stability  Outcome: Progressing  Flowsheets (Taken 12/26/2022 1354)  Maintains hematologic stability:   Assess for signs and symptoms of bleeding or hemorrhage   Monitor labs for bleeding or clotting disorders     Problem: Skin/Tissue Integrity - Adult  Goal: Skin integrity remains intact  Outcome: Progressing  Flowsheets  Taken 12/26/2022 1040 by Manpreet Ivory RN  Skin Integrity Remains Intact:   Monitor for areas of redness and/or skin breakdown   Assess vascular access sites hourly   Every 4-6 hours: If on nasal continuous positive airway pressure, respiratory therapy assesses nares and determine need for appliance change or resting period   Every 4-6 hours minimum: Change oxygen saturation probe site  Problem: Skin/Tissue Integrity - Adult  Goal: Incisions, wounds, or drain sites healing without S/S of infection  Outcome: Progressing  Flowsheets (Taken 12/26/2022 1354)  Incisions, Wounds, or Drain Sites Healing Without Sign and Symptoms of Infection:   ADMISSION and DAILY: Assess and document risk factors for pressure ulcer development   TWICE DAILY: Assess and document skin integrity   Implement wound care per orders   Initiate isolation precautions as appropriate   Initiate pressure ulcer prevention bundle as indicated   TWICE DAILY: Assess and document dressing/incision, wound bed, drain sites and surrounding tissue     Problem: Skin/Tissue Integrity - Adult  Goal: Oral mucous membranes remain intact  Outcome: Progressing  Flowsheets (Taken 12/26/2022 1354)  Oral Mucous Membranes Remain Intact:   Assess oral mucosa and hygiene practices   Implement preventative oral hygiene regimen   Implement oral medicated treatments as ordered     Problem: Respiratory - Adult  Goal: Achieves optimal ventilation and oxygenation  12/26/2022 1354 by Bee Saunders RN  Outcome: Progressing  Flowsheets (Taken 12/26/2022 1354)  Achieves optimal ventilation and oxygenation:   Assess for changes in respiratory status   Position to facilitate oxygenation and minimize respiratory effort   Assess the need for suctioning and aspirate as needed   Respiratory therapy support as indicated   Assess for changes in mentation and behavior   Oxygen supplementation based on oxygen saturation or arterial blood gases   Encourage broncho-pulmonary hygiene including cough, deep breathe, incentive spirometry   Assess and instruct to report shortness of breath or any respiratory difficulty   Care plan reviewed with patient. Patient verbalize understanding of the plan of care and contribute to goal setting.

## 2022-12-26 NOTE — PROGRESS NOTES
[place] [facility]      Patient - Scooby De La Paz   MRN -  [id number]   [facility] # - [de-identified]   - 1955      Date of Admission -  2022 11:34 AM  Date of evaluation -  2022  Room - -007-A   Hospital Day - 9555  [facility] Primary Care Physician - Naima Park MD     Problem List      Active Hospital Problems    Diagnosis Date Noted    Abnormal CT of the chest [R93.89] 2022     Priority: Medium    Pneumonia of right lower lobe due to infectious organism [J18.9] 2022     Priority: Medium    Acute respiratory failure with hypoxia ([address] [J96.01] 2022     Priority: Medium    Pleural effusion on right [J90] 2022     Priority: Medium    History of DVT (deep vein thrombosis) [Z86.718] 2022     Priority: Medium    History of ESBL E. coli infection [Z86.19] 2022     Priority: Medium    Elevated troponin [R77.8] 06/10/2019    Chronic indwelling Pompa catheter [Z97.8] 2012     Reason for Consult    For management of Pneumonia, post bronchoscopy  HPI   History Obtained From: Patient and electronic medical record. Scooby De La Paz is a 74 y.o. male  was initially admitted under hospitalist service. Pulmonary medicine was consulted for further management of Pneumonia and to consider for bronchoscopy. He is a poor historian. Majority of the history obtained from reviewing the patient's chart. He is a 51-year-old pleasant gentleman with past medical history of multiple sclerosis, GERD, paraparesis of the bilateral lower extremities, hypertension, neurogenic bladder with a chronic Pompa, and history of DVT/pulmonary embolism presented initially to [address] [place] [address] from a nursing home with a chief complaint of worsening of shortness of breath and cough of 2 days duration. Patient also had fever prior to the hospitalization at his nursing home. Patient required supplemental oxygenation.   He usually wears 2 L of oxygen at baseline at nursing home. At the time of initial evaluation by EMS patient oxygen saturation was found to be 80s. He was placed on nonrebreather mask. Patient COVID-19 test and a flu a and B test were negative. He is having cough: Yes-occasional  His cough is associated with sputum production: No   Hemoptysis:No  Diurnal variation: None. He gives a history of fever: No    He is having chest pain:No    He denies orthopnea or paroxysmal nocturnal dyspnea. During his initial work up he was found to have abnormal chest Xray with right side pneumonia. History of pulmonary tuberculosis in the past: No    Recent exposure to any patients with tuberculosis:No  Recent travel to endemic places of tuberculosis:No.  Prior PPD status: Unknown. Past 24 hours   -Mucus plugging seen on imaging, bronchoscopy preformed today  -Bilateral mucus plugs removed  -Prior to procedure, patient stated shortness of breath and coughing  -Stable, on 60%, 60L HFNC  -All other systems reviewed ad negative  PMHx   Past Medical History      Diagnosis Date    Dysphagia     oropharyngeal    History of pulmonary embolism     History of urinary tract infection     Hx of blood clots     Pulmonary embolis    Hypertension     Major depressive disorder, single episode     MS (multiple sclerosis) ([facility]     Neurogenic bladder 2012    Dr. [facility])     UTI (urinary tract infection)      Sleep History    Never diagnosed with sleep apnea in the past    Social History     Social History     Tobacco Use    Smoking status: Former     Types: Cigarettes     Quit date: 1982     Years since quittin.0    Smokeless tobacco: Former   Vaping Use    Vaping Use: Never used   Substance Use Topics    Alcohol use: No     Comment: \"quit alcohol a few years ago\"    Drug use: No     Riview of systems   Negative unless otherwise stated above.   Vitals     height is 5' 7\" (1.702 m) and weight is 191 lb 2.2 oz (86.7 kg). His oral temperature is 98.4 °F (36.9 °C). His blood pressure is 121/59 (abnormal) and his pulse is 81. His respiration is 24 and oxygen saturation is 91%. Body mass index is 29.94 kg/m². SUPPLEMENTAL O2: O2 Flow Rate (L/min): 60 L/min     I/O      Intake/Output Summary (Last 24 hours) at 12/26/2022 0902  Last data filed at 12/26/2022 9983  Gross per 24 hour   Intake 1121.59 ml   Output 525 ml   Net 596.59 ml     I/O last 3 completed shifts: In: 1046.7 [P.O.:220; IV Piggyback:826.7]  Out: 6707 [ZFHIM:1116]   Patient Vitals for the past 96 hrs (Last 3 readings):   Weight   12/26/22 0339 191 lb 2.2 oz (86.7 kg)   12/25/22 0800 180 lb (81.6 kg)   12/25/22 0415 180 lb 12.4 oz (82 kg)       Exam   General Appearance: moderately built, moderately nourished in no acute distress on high flow at 60 L/min with 60% FiO2. HEENT: Normal, Head is normocephalic, atraumatic. Oropharynx is clear and moist.  No oral thrush. PERRL  Neck - Supple, No JVD present. No tracheal deviation. Lungs - Bilateral air entry present. Decreased breath sounds on right side of chest (Improved after Bronchoscopy was preformed on re-exam). No wheezes. No rales. Cardiovascular - Heart sounds are normal.  Regular rhythm normal rate without murmur, gallop or rub. Abdomen - Soft, nontender, nondistended, no masses or organomegaly. S/p diverting colostomy in place. Neurologic - Awake, alert, oriented. Patient able to move his both upper extremities for verbal commands. Patient not able to move his lower extremities in both sides with limb contractures. Extremities - No cyanosis, clubbing or edema. Musculoskeletal: Moving both upper extremities for verbal commands  Lymphadenopathy:  No cervical adenopathy. Psychiatric: Patient  has a normal mood and affect. Skin - No bruising or bleeding.     Labs  - Old records and notes have been reviewed in [facility]   ABG  Lab Results   Component Value Date/Time PH 7.39 12/24/2022 10:05 PM    PO2 58 12/24/2022 10:05 PM    PCO2 45 12/24/2022 10:05 PM    HCO3 28 12/24/2022 10:05 PM    O2SAT 90 12/24/2022 10:05 PM     Lab Results   Component Value Date/Time    IFIO2 2 09/05/2018 08:47 AM     CBC  Recent Labs     12/24/22  1220 12/25/22  0039   WBC 8.3 8.3   RBC 4.90 4.77   HGB 14.4 13.8*   HCT 45.0 43.9   MCV 91.8 92.0   MCH 29.4 28.9   MCHC 32.0* 31.4*    227   MPV 8.9* 9.0*      BMP  Recent Labs     12/24/22  1220 12/25/22  0039    139   K 4.8 3.8    101   CO2 27 26   BUN 25* 20   CREATININE 0.3* 0.3*   GLUCOSE 119* 113*   CALCIUM 9.4 9.1     LFT  Recent Labs     12/24/22  1220 12/25/22  1424   AST 25 29   ALT 22 24   BILITOT 0.3 0.4   ALKPHOS 167* 176*     TROP  Lab Results   Component Value Date/Time    TROPONINT < 0.010 12/24/2022 02:48 PM    TROPONINT 0.015 12/24/2022 12:20 PM    TROPONINT < 0.010 08/05/2022 11:44 PM     BNP  No results for input(s): BNP in the last 72 hours. Lactic Acid  Recent Labs     12/24/22  1855 12/25/22  0039   LACTA 1.1 0.6     INR  No results for input(s): INR, PROTIME in the last 72 hours. PTT  No results for input(s): APTT in the last 72 hours. Glucose  Recent Labs     12/25/22  1642   POCGLU 116*     UA   Recent Labs     12/24/22  2100   SPECGRAV 1.020   PHUR 5.5   COLORU YELLOW   PROTEINU 100*   BLOODU LARGE*   RBCUA > 200   WBCUA > 200   BACTERIA MANY   NITRU POSITIVE*   BILIRUBINUR NEGATIVE   UROBILINOGEN 0.2   KETUA 80*   LABCAST NONE SEEN  NONE SEEN   . PFTs   None in epic and    Sleep studies   None in epic    Cultures    COVID-19 test performed on 24 December 2022: Negative  Rapid swab for influenza A and B: Negative  Rest of the cultures are pending    EKG     Echocardiogram performed on 25 July 2022   Conclusions      Summary   Technically difficult examination   Left ventricular size and systolic function is normal. Ejection fraction   was estimated at 55-60%.  LV wall thickness is within normal limits and   there are no obvious wall motion abnormalities. The right ventricular size was normal with normal systolic function and   wall thickness. Signature      ----------------------------------------------------------------   Electronically signed by Cesar Wallace MD (Interpreting   physician) on 07/25/2022 at 08:06 PM   ----------------------------------------------------------------     Right Ventricle   The right ventricular size was normal with normal systolic function and   wall thickness. Radiology    CXR  Chest x-ray portable view performed on 24 December 2022:  PROCEDURE: XR CHEST PORTABLE   CLINICAL INFORMATION: cough. COMPARISON: Chest x-ray dated 8/5/2022. TECHNIQUE: AP upright view of the chest   1. Borderline cardiomegaly. 2. Infiltrate and effusion at the right lung base new since previous study dated 11 of August 2022. 3. Atelectasis of the left lung base, unchanged. 4. Thoracic spondylosis. .       CT Scans  (See actual reports for details)  CT angiogram of chest with IV contrast performed on 25 December 2022:  CTA chest   Comparison: 8/5/2022   Findings: Patchy infiltrate at the right apex. The right lower lobe is essentially nearly completely consolidated with minimal subsegmental sparing scattered atelectasis and infiltrate is seen to the right middle lobe laterally. There is mild atelectasis at the left lower lobe inferiorly. Minimal infiltrate not excluded medially at the left lower lobe. There is minimal inferior lingular hypoventilation and atelectasis. Impression: 1. No evidence of pulmonary embolism. 2. Right-sided pneumonia with lower lobe mainly impacted.        Assessment   -Right lower lobe pneumonia due to community acquired pneumonia ( CAP) Vs Atypical pneumonia Vs aspiration pneumonia  -Bilateral Mucus Plugs  -Chronic hypoxic respiratory failure due to pneumonia Vs other etiologies  -History of DVT/pulmonary embolism  -Essential hypertension on treatment medications under control.  -Major depressive disorder.  -Multiple sclerosis with paraplegia. -Decubitus ulcer S/p diverting colostomy placement  -Neurogenic bladder with chronic indwelling Pompa's catheter in place. Plan   -Follow-up pending cultures, MRSA Screen Positive  -Continue current antibiotics per primary service, continue Doxycycline  -Aspiration precautions  -Speech pathology evaluation to rule out aspiration  -Held Lovenox for bronchoscopy procedure  -Bilateral mucus plugs removed in bronchoscopy on 12/26, preformed by Dr. Tawanna Ahn  -Titrate Oxygen to keep Spo2 >90%, currently on HFNC.  -Vest percussion therapy every shift as tolerated  -Continue Acapella every 4 hourly as tolerated. -Continue Mucinex 600 mg p.o. twice daily  -Deep Venous Thrombosis Prophylaxis: Lovenox, may restart     -Discussed with registered nurse taking care of patient regarding patient condition and my management plan. Hong Segovia educated about my impression and plan. He verbalizes understanding. Questions and concerns addressed. Electronically signed by   Blair Morton DO on 12/26/2022 at 9:02 AM    Addendum by Dr. Tawanna Ahn MD:  Patient seen by me independently including key components of medical care. Face to face evaluation and examination was performed. Case discussed with Dr.Amy Cuate Jones, [address] [place] [address] physician. Italicized font, if present,  represents changes to the note made by me. More than 50% of the encounter time involved with patient care by the Pulmonary & Critical care service team spent by me. Please see my modifications mentioned below:  He is still on Hi Flow  For bronch today.  -Hong Segovia educated and informed about bronchoscopy procedure,method, complicantions of procedure including but not limited to development of pneumothorax with requirement of chest tube placement. He agreed for the procedure and verbalizes understanding.   I spoke with Dr. Gui Weiss MD regarding patient condition and plan including possible requirement of ICU stay in the posterior operative.     Electronically signed by   Lee Randolph MD on 12/26/2022 at 5:18 PM

## 2022-12-26 NOTE — PROGRESS NOTES
Hospitalist Progress Note    Patient:  Lam Villarreal    YOB: 1955  Unit/Bed:4K-07/007-A  MRN: [id number]    Acct: [de-identified]   PCP: Vanna Velarde MD    Date of Admission: 12/24/2022      Assessment/Plan:  RLL PNA a/w pleural effusion: covid and flu negative. Initial CXR with infiltrate and effusion rt base. CTA chest no PE with RLL affected. Cefepime and Doxy continued  1x dose of Zyvox - no further need  NG from blood culture. .   Consult Pulmonary, given hypoxia and consider bronch if needed. Bronch 12/26 - mucus plugs removed from the right tracheobronchial tree  Negative pneumonia panel from bronch, will follow cultures. Follow up cultures/cytology  SLP eval, OK with soft and thin liquids. Chest physiotherapy. Rt pleural effusion: noted, suspect parapneumonic, may be able to drain 12/26 after bronch and more clinically stable. Will monitor. Acute on chronic hypoxic respiratory failure 2/2 pneumonia: patient has o2 for prn pta. Now on HFNC, will wean as able. Borderline Hypotension, improving: Lactic non-elevated and asymptomatic. S/p  IVF, s/p 500cc bolus. Holding norvasc and coreg currently. Improving, pulse is a bit elevataed will monitor. Troponin elevation: EKG without acute changes, 0.015 to now normal, likely supply demant from hypoxia/pneumonia. Hx MS with BLE paraplegia, wheelchair/bedbound, follows Dr Dorie Montoya, not on any DMARD - awaiting resolution of decubitus ulceration. Continue baclofen. Asymptomatic bacteruria  Neurogenic bladder / chronic SP catheter: exchanged and urine cx taken on arrival. No concern for infection currently. Decubitus wounds s/p diverting ostomy: wound ostomy consult. Hx DVT and PE: on [address] until 6/2022 (hematuria, dc'ed). May benefit from low dose prophylaxis on DC due to impaired mobility.        Expected discharge date:  2-4 days    Disposition: Return to snf  [] Home  [] TCU  [] Rehab  [] Psych  [] SNF  [] Long Term Care Facility  [] Other-    ===================================================================      Chief Complaint: SOB    Hospital Course: Patient admitted on 12/24 for pneumonia and hypoxia, transferred to stepdown on 12/25 for worsening O2 requirements. He presents from SNF. Subjective (past 24 hours): Patient seen at bedside, doing okay, remains on HFNC, had bronchoscopy this am without complication, removal of mucus plugs. Coughing has improved. Was able to restart diet. Denies fevers or chills. Decreased output from ostomy, but hasnt been eating much. ROS: reviewed complete ROS unchanged unless otherwise stated in hospital course/subjective portion. Medications:  Reviewed    Infusion Medications    sodium chloride       Scheduled Medications    [Held by provider] amLODIPine  10 mg Oral Daily    baclofen  10 mg Oral TID    [Held by provider] carvedilol  12.5 mg Oral BID WC    famotidine  20 mg Oral BID    gabapentin  600 mg Oral 4x daily    OXcarbazepine  300 mg Oral BID    oxybutynin  5 mg Oral BID    doxycycline hyclate  100 mg Oral 2 times per day    cefepime  2,000 mg IntraVENous Q8H    EPINEPHrine  1 mg Endotracheal Once    lidocaine  10 mL Tracheal Tube Once    guaiFENesin  600 mg Oral BID    sodium chloride flush  5-40 mL IntraVENous 2 times per day    enoxaparin  40 mg SubCUTAneous Daily    ipratropium-albuterol  1 ampule Inhalation Q4H WA     PRN Meds: sodium chloride flush, sodium chloride, ondansetron **OR** ondansetron, polyethylene glycol, acetaminophen **OR** acetaminophen        Intake/Output Summary (Last 24 hours) at 12/26/2022 1632  Last data filed at 12/26/2022 1427  Gross per 24 hour   Intake 751.86 ml   Output 625 ml   Net 126.86 ml       Exam:  /65   Pulse 90   Temp 98.7 °F (37.1 °C) (Oral)   Resp 20   Ht 5' 7\" (1.702 m)   Wt 191 lb 2.2 oz (86.7 kg)   SpO2 98%   BMI 29.94 kg/m²     General: No distress,  Acute on chronically ill-appearing  Eyes:  PERRL. Conjunctivae/corneas clear. HENT: Head normal appearing. Nares normal. Oral mucosa moist.  Hearing intact. Neck: Supple, with full range of motion. Trachea midline. No gross JVD appreciated. Respiratory:  Normal effort. Diminished overall, decreased sounds in the bases with mild rhonchi. Improved aeration in the RLL today. No wheezing or rales. Cardiovascular: Normal rate, regular rhythm with normal S1/S2 without murmurs. No lower extremity edema. Abdomen: Soft, non-tender, non-distended with normal bowel sounds. Ostomy and SP catheter noted, some blood surrounding the SP catheter. Musculoskeletal: Muscle wasting of BLLE. Contractured LE. Skin: Warm and dry. No rashes or lesions. Neurologic:   Cranial nerves:  grossly non-focal 2-12. BLLE Weakness, RUE>LUE Weakness. Psychiatric: Alert and oriented, normal insight and thought content. Capillary Refill: Brisk,< 3 seconds. Peripheral Pulses: +2 palpable, equal bilaterally. Labs:   Recent Labs     12/24/22  1220 12/25/22  0039   WBC 8.3 8.3   HGB 14.4 13.8*   HCT 45.0 43.9    227     Recent Labs     12/24/22  1220 12/25/22  0039    139   K 4.8 3.8    101   CO2 27 26   BUN 25* 20   CREATININE 0.3* 0.3*   CALCIUM 9.4 9.1     Recent Labs     12/24/22  1220 12/25/22  1424   AST 25 29   ALT 22 24   BILIDIR <0.2 <0.2   BILITOT 0.3 0.4   ALKPHOS 167* 176*     No results for input(s): INR in the last 72 hours. No results for input(s): Randene Holes in the last 72 hours.   Recent Labs     12/24/22  1220   PROCAL 0.13*      Lab Results   Component Value Date/Time    NITRU POSITIVE 12/24/2022 09:00 PM    WBCUA > 200 12/24/2022 09:00 PM    WBCUA >200 01/20/2012 09:00 AM    BACTERIA MANY 12/24/2022 09:00 PM    RBCUA > 200 12/24/2022 09:00 PM    BLOODU LARGE 12/24/2022 09:00 PM    SPECGRAV 1.020 12/24/2022 09:00 PM    GLUCOSEU NEGATIVE 08/05/2022 06:40 PM       Radiology (48 hours):  CTA CHEST W WO CONTRAST    Result Date: 12/25/2022  Impression: 1. No evidence of pulmonary embolism. 2. Right-sided pneumonia with lower lobe mainly impacted. This document has been electronically signed by: Harsh Singh MD on 12/25/2022 12:27 AM All CTs at this facility use dose modulation techniques and iterative reconstructions, and/or weight-based dosing when appropriate to reduce radiation to a low as reasonably achievable. 3D Post-processing was performed on this study. XR CHEST PORTABLE    Result Date: 12/24/2022  1. Borderline cardiomegaly. 2. Infiltrate and effusion at the right lung base new since previous study dated 11 of August 2022. 3. Atelectasis of the left lung base, unchanged. 4. Thoracic spondylosis. Author Travon **This report has been created using voice recognition software. It may contain minor errors which are inherent in voice recognition technology. ** Final report electronically signed by DR Franco Powell on 12/24/2022 12:34 PM       DVT prophylaxis:    [x] Lovenox  [] SCDs  [] SQ Heparin  [] Encourage ambulation   [] Already on Anticoagulation       Diet: ADULT DIET;  Dysphagia - Soft and Bite Sized  ADULT ORAL NUTRITION SUPPLEMENT; Breakfast, Dinner; Standard 4 oz Oral Supplement  Code Status: Full Code  PT/OT: tbd  Tele: yes  IVF: yes    Electronically signed by Geoffrey Rust DO on 12/26/2022 at 4:32 PM

## 2022-12-27 ENCOUNTER — APPOINTMENT (OUTPATIENT)
Dept: GENERAL RADIOLOGY | Age: 67
DRG: 139 | End: 2022-12-27
Payer: MEDICAID

## 2022-12-27 LAB
ANION GAP SERPL CALCULATED.3IONS-SCNC: 11 MEQ/L (ref 8–16)
BAL CHARACTER: ABNORMAL
BAL COLLECTION SITE: ABNORMAL
BAL COLOR: COLORLESS
BUN BLDV-MCNC: 24 MG/DL (ref 7–22)
CALCIUM SERPL-MCNC: 8.3 MG/DL (ref 8.5–10.5)
CHLORIDE BLD-SCNC: 106 MEQ/L (ref 98–111)
CO2: 24 MEQ/L (ref 23–33)
CREAT SERPL-MCNC: 0.3 MG/DL (ref 0.4–1.2)
EOSINOPHILS BAL: 2 % (ref 0–1)
ERYTHROCYTE [DISTWIDTH] IN BLOOD BY AUTOMATED COUNT: 15.7 % (ref 11.5–14.5)
ERYTHROCYTE [DISTWIDTH] IN BLOOD BY AUTOMATED COUNT: 53.3 FL (ref 35–45)
GFR SERPL CREATININE-BSD FRML MDRD: > 60 ML/MIN/1.73M2
GLUCOSE BLD-MCNC: 116 MG/DL (ref 70–108)
HCT VFR BLD CALC: 39.3 % (ref 42–52)
HEMOGLOBIN: 12.3 GM/DL (ref 14–18)
LYMPHOCYTES, BAL: 27 % (ref 10–15)
MACROPHAGE/MONOCYTE BAL: 17 % (ref 86–100)
MCH RBC QN AUTO: 29 PG (ref 26–33)
MCHC RBC AUTO-ENTMCNC: 31.3 GM/DL (ref 32.2–35.5)
MCV RBC AUTO: 92.7 FL (ref 80–94)
PATHOLOGIST REVIEW: ABNORMAL
PLATELET # BLD: 223 THOU/MM3 (ref 130–400)
PMV BLD AUTO: 8.7 FL (ref 9.4–12.4)
POTASSIUM REFLEX MAGNESIUM: 4 MEQ/L (ref 3.5–5.2)
RBC # BLD: 4.24 MILL/MM3 (ref 4.7–6.1)
RBC BAL: 3010 /CUMM
SEGMENTED NEUTROPHILS, BAL: 54 % (ref 0–3)
SODIUM BLD-SCNC: 141 MEQ/L (ref 135–145)
TOTAL NUCLEATED CELLS BAL: 3323 /CUMM
TOTAL VOLUME RECEIVED BAL: 40 ML
WBC # BLD: 5.2 THOU/MM3 (ref 4.8–10.8)

## 2022-12-27 PROCEDURE — 6360000002 HC RX W HCPCS: Performed by: STUDENT IN AN ORGANIZED HEALTH CARE EDUCATION/TRAINING PROGRAM

## 2022-12-27 PROCEDURE — 6360000002 HC RX W HCPCS: Performed by: INTERNAL MEDICINE

## 2022-12-27 PROCEDURE — 94669 MECHANICAL CHEST WALL OSCILL: CPT

## 2022-12-27 PROCEDURE — 6370000000 HC RX 637 (ALT 250 FOR IP): Performed by: INTERNAL MEDICINE

## 2022-12-27 PROCEDURE — 6370000000 HC RX 637 (ALT 250 FOR IP)

## 2022-12-27 PROCEDURE — 2580000003 HC RX 258: Performed by: INTERNAL MEDICINE

## 2022-12-27 PROCEDURE — 2700000000 HC OXYGEN THERAPY PER DAY

## 2022-12-27 PROCEDURE — 85027 COMPLETE CBC AUTOMATED: CPT

## 2022-12-27 PROCEDURE — 71045 X-RAY EXAM CHEST 1 VIEW: CPT

## 2022-12-27 PROCEDURE — 2580000003 HC RX 258: Performed by: STUDENT IN AN ORGANIZED HEALTH CARE EDUCATION/TRAINING PROGRAM

## 2022-12-27 PROCEDURE — 94761 N-INVAS EAR/PLS OXIMETRY MLT: CPT

## 2022-12-27 PROCEDURE — 6370000000 HC RX 637 (ALT 250 FOR IP): Performed by: STUDENT IN AN ORGANIZED HEALTH CARE EDUCATION/TRAINING PROGRAM

## 2022-12-27 PROCEDURE — 80048 BASIC METABOLIC PNL TOTAL CA: CPT

## 2022-12-27 PROCEDURE — 36415 COLL VENOUS BLD VENIPUNCTURE: CPT

## 2022-12-27 PROCEDURE — 2060000000 HC ICU INTERMEDIATE R&B

## 2022-12-27 PROCEDURE — 6370000000 HC RX 637 (ALT 250 FOR IP): Performed by: PHYSICIAN ASSISTANT

## 2022-12-27 RX ORDER — IPRATROPIUM BROMIDE AND ALBUTEROL SULFATE 2.5; .5 MG/3ML; MG/3ML
1 SOLUTION RESPIRATORY (INHALATION)
Status: DISCONTINUED | OUTPATIENT
Start: 2022-12-27 | End: 2022-12-28

## 2022-12-27 RX ORDER — SODIUM CHLORIDE FOR INHALATION 3 %
4 VIAL, NEBULIZER (ML) INHALATION
Status: DISCONTINUED | OUTPATIENT
Start: 2022-12-27 | End: 2022-12-29 | Stop reason: HOSPADM

## 2022-12-27 RX ORDER — IPRATROPIUM BROMIDE AND ALBUTEROL SULFATE 2.5; .5 MG/3ML; MG/3ML
1 SOLUTION RESPIRATORY (INHALATION) 2 TIMES DAILY
Status: DISCONTINUED | OUTPATIENT
Start: 2022-12-27 | End: 2022-12-27

## 2022-12-27 RX ORDER — IPRATROPIUM BROMIDE AND ALBUTEROL SULFATE 2.5; .5 MG/3ML; MG/3ML
1 SOLUTION RESPIRATORY (INHALATION) EVERY 4 HOURS PRN
Status: DISCONTINUED | OUTPATIENT
Start: 2022-12-27 | End: 2022-12-27

## 2022-12-27 RX ADMIN — SODIUM CHLORIDE, PRESERVATIVE FREE 10 ML: 5 INJECTION INTRAVENOUS at 08:49

## 2022-12-27 RX ADMIN — CEFEPIME 2000 MG: 2 INJECTION, POWDER, FOR SOLUTION INTRAVENOUS at 21:16

## 2022-12-27 RX ADMIN — IPRATROPIUM BROMIDE AND ALBUTEROL SULFATE 1 AMPULE: .5; 3 SOLUTION RESPIRATORY (INHALATION) at 12:54

## 2022-12-27 RX ADMIN — DOXYCYCLINE HYCLATE 100 MG: 100 TABLET, COATED ORAL at 08:47

## 2022-12-27 RX ADMIN — BACLOFEN 10 MG: 10 TABLET ORAL at 15:23

## 2022-12-27 RX ADMIN — FAMOTIDINE 20 MG: 20 TABLET ORAL at 08:51

## 2022-12-27 RX ADMIN — SODIUM CHLORIDE, PRESERVATIVE FREE 10 ML: 5 INJECTION INTRAVENOUS at 21:12

## 2022-12-27 RX ADMIN — GABAPENTIN 600 MG: 600 TABLET, FILM COATED ORAL at 12:31

## 2022-12-27 RX ADMIN — GUAIFENESIN 600 MG: 600 TABLET, EXTENDED RELEASE ORAL at 08:48

## 2022-12-27 RX ADMIN — OXYBUTYNIN CHLORIDE 5 MG: 5 TABLET, EXTENDED RELEASE ORAL at 21:12

## 2022-12-27 RX ADMIN — DOXYCYCLINE HYCLATE 100 MG: 100 TABLET, COATED ORAL at 21:12

## 2022-12-27 RX ADMIN — OXCARBAZEPINE 300 MG: 300 TABLET, FILM COATED ORAL at 21:19

## 2022-12-27 RX ADMIN — CEFEPIME 2000 MG: 2 INJECTION, POWDER, FOR SOLUTION INTRAVENOUS at 03:56

## 2022-12-27 RX ADMIN — GUAIFENESIN 600 MG: 600 TABLET, EXTENDED RELEASE ORAL at 21:12

## 2022-12-27 RX ADMIN — BACLOFEN 10 MG: 10 TABLET ORAL at 08:47

## 2022-12-27 RX ADMIN — GABAPENTIN 600 MG: 600 TABLET, FILM COATED ORAL at 21:12

## 2022-12-27 RX ADMIN — BACLOFEN 10 MG: 10 TABLET ORAL at 21:12

## 2022-12-27 RX ADMIN — CARVEDILOL 12.5 MG: 6.25 TABLET, FILM COATED ORAL at 08:48

## 2022-12-27 RX ADMIN — CEFEPIME 2000 MG: 2 INJECTION, POWDER, FOR SOLUTION INTRAVENOUS at 12:31

## 2022-12-27 RX ADMIN — ENOXAPARIN SODIUM 40 MG: 100 INJECTION SUBCUTANEOUS at 08:49

## 2022-12-27 RX ADMIN — OXYBUTYNIN CHLORIDE 5 MG: 5 TABLET, EXTENDED RELEASE ORAL at 08:48

## 2022-12-27 RX ADMIN — OXCARBAZEPINE 300 MG: 300 TABLET, FILM COATED ORAL at 08:48

## 2022-12-27 RX ADMIN — GABAPENTIN 600 MG: 600 TABLET, FILM COATED ORAL at 08:47

## 2022-12-27 RX ADMIN — GABAPENTIN 600 MG: 600 TABLET, FILM COATED ORAL at 17:13

## 2022-12-27 RX ADMIN — Medication 1 TABLET: at 08:47

## 2022-12-27 RX ADMIN — FAMOTIDINE 20 MG: 20 TABLET ORAL at 21:12

## 2022-12-27 RX ADMIN — AMLODIPINE BESYLATE 10 MG: 10 TABLET ORAL at 08:48

## 2022-12-27 RX ADMIN — IPRATROPIUM BROMIDE AND ALBUTEROL SULFATE 1 AMPULE: .5; 3 SOLUTION RESPIRATORY (INHALATION) at 08:03

## 2022-12-27 ASSESSMENT — PAIN SCALES - GENERAL
PAINLEVEL_OUTOF10: 0

## 2022-12-27 NOTE — PROGRESS NOTES
[facility] Wound Ostomy Continence Nurse  Progress Note       Neema Parker  AGE: 74 y.o. GENDER: male  : 1955  UNIT: 4K-07/007-A  TODAY'S DATE:  2022  ADMISSION DATE: 2022 11:34 AM  Subjective:     Reason for [facility] Evaluation and Assessment: Patient admitted from nursing home with tunneling wound, suprapubic and colostomy bag. Neema Parker is a 74 y.o. male referred by:   [] Physician/PA/APRN  [x] Nursing  [] Other:      Wound Identification:  Wound Type: pressure  Contributing Factors: chronic pressure, decreased mobility, and shear force     Objective:      Derrick Risk Score: Derrick Scale Score: 12     Assessment:      Encounter: Present to patient room. Patient in bed upon arrival. Assessment and photo to follow. Patient has established colostomy, with pouch in place. No evidence of leaking. Appears to have fairly new colostomy pouch. Assisted pt to left side for wound assessment. 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 Cleansed wound with normal saline and gauze. Pat dry with clean gauze. Opticell Ag lightly applied in wound, covered with bordered foam dressing. Staff to change every other day. Pt on chux pad. Pt has catheter in place. Patient on hercules bed with AP. Pillow under right side. BLE offloaded with pillows. Patient in bed, call light in reach. Will continue to follow and assess wound. Call with concerns and for wound evolution. 22    Wound type: Chronic stage 4, right ischium  Wound size: 3.5cm x 1.1cm x 0.4cm  Undermining or Tunneling: None  Wound assessment/color: Pink, red  Drainage amount:  Moderate  Drainage description: Serosanguinous  Odor: None  Margins: Attached  Tawny wound: Epibole, maceration, redness  Exposed structure: None     22    Wound type: Chronic stage 4, right ischium  Wound size: 3cm x 1.9cm x 0.4cm  Undermining or Tunneling: None  Wound assessment/color: Pink, red  Drainage amount: Moderate  Drainage description: Serosanguinous  Odor: None  Margins: Attached  Tawny wound: Epibole, maceration, redness  Exposed structure: None     7/26/22    Wound type: Chronic stage 4, right ischium  Wound size: 3.5cm x 0.7cm x 0.5cm  Undermining or Tunneling: None  Wound assessment/color: Pink, red  Drainage amount: Moderate  Drainage description: Serosanguinous  Odor: None  Margins: Attached  Tawny wound: Epibole, intact  Exposed structure: None     7/21/22    Wound type: Chronic stage 4, right ischium  Wound size: 3.5cm x 0.5cm x 0.8cm  Undermining or Tunneling: None  Wound assessment/color: Pink, yellow  Drainage amount: Moderate  Drainage description: Serosanguinous  Odor: None  Margins: Attached  Tawny wound: Epibole, intact  Exposed structure: None     Response to treatment:  Well tolerated by patient. Plan:      Treatment Recommendations:   Right ischial wound- Clean wound with normal saline and gauze. Pat dry with clean gauze. Apply cut piece of Opticell Ag into wound bed. Cover with bordered foam dressing. Change every other day. If Opticell is adhered to wound, wet with saline and allow to form gel. Wipe clean with dry gauze.       Specialty Bed Required :   [] Low Air Loss   [x] Pressure Redistribution  [] Fluid Immersion- Dolphin  [] Bariatric  [] RotoProne   [] Other:      Discharge Plan:  Placement for patient upon discharge: ECF  Patient appropriate for Outpatient [address]: Follow up with wound care provider upon discharge

## 2022-12-27 NOTE — DISCHARGE INSTR - COC
Continuity of Care Form    Patient Name: Zena Hogue   :  1955  MRN:  [id number]    Admit date:  2022  Discharge date:  2022    Code Status Order: Full Code   Advance Directives:     Admitting Physician:  Kenna Anderson DO  PCP: Rafaela Grey MD    Discharging Nurse: GALE VAZQUEZ [facility] Unit/Room#: 4K-07/007-A  Discharging Unit Phone Number: [phone]    Emergency Contact:   Extended Emergency Contact Information  Primary Emergency Contact: [facility]  Address: [address] [place] [address], [address] [address] Phone: [phone]  Mobile Phone: [phone]  Relation: Spouse  Secondary Emergency Contact: Margaux Negron [address] Phone: [phone]  Relation: Other Relative    Past Surgical History:  Past Surgical History:   Procedure Laterality Date    ANKLE SURGERY      broken ankle    BLADDER SURGERY  2012    Suprapubic catheter placement    BRONCHOSCOPY N/A 2022    BRONCHOSCOPY ALVEOLAR LAVAGE performed by Silvia Aldana MD at [facility]    COLONOSCOPY      CYSTO/URETERO/PYELOSCOPY, CALCULUS TX Left 2019    CYSTOSCOPY, LEFT STENT insertion, bladder irragation with bladder stones performed by Tamie Rodriguez MD at [address] [place] [address] N/A 2019    CYSTO, LEFT URETERAL STENT REMOVAL, LEFT URETEROSCOPY, LASER LITHOTRIPSY, BASKET RETRIEVAL OF STONE FRAGMENTS performed by Tamie Rodriguez MD at [address] [place] [address] 2021    CYSTOSCOPY, [facility] 2, SUPRAPUBIC CATHETER EXCHANGE performed by Kel Hansen MD at [facility] 15 Left 6/15/2022    CYSTOSCOPY performed by Kel Hansen MD at [address] [facility]  2022    IR NEPHROSTOMY CATHETER PLACEMENT 2022 [facility] SPECIAL PROCEDURES    [place] LAP,SURG,COLECTOMY,W/END COLOST & CLOSUR N/A 2018    ROBOT DIVERTING COLOSTOMY performed by Rebel Martínez MD at [facility] TONSILLECTOMY  child    URETER SURGERY Left 8/4/2022    CYSTOSCOPY, LEFT URETEROSCOPY, LASER LITHOTRIPSY,  LEFT URETERAL STENT EXCHANGE performed by Coco Blas MD at [facility]       Immunization History:   Immunization History   Administered Date(s) Administered    COVID-19, PFIZER PURPLE top, DILUTE for use, (age 15 y+), 30mcg/0.3mL 12/30/2020, 01/20/2021, 10/11/2021    Hepatitis B Ped/Adol (Engerix-B, Recombivax HB) 04/05/1994, 09/20/1994    Influenza Virus Vaccine 12/20/2012, 10/01/2017, 10/06/2018    Pneumococcal Conjugate Vaccine 06/08/2013    Pneumococcal Polysaccharide ([id number]) 01/01/2010       Active Problems:  Patient Active Problem List   Diagnosis Code    Neurogenic bladder N31.9    Multiple sclerosis ([address] G35    MS (multiple sclerosis) ([address] G35    Urinary retention R33.9    Chronic indwelling Pompa catheter Z97.8    Cystostomy malfunction ([address] N99.512    Decubitus ulcer of coccyx L89.159    Decubitus ulcer, stage 3 ([facility]) L89.93    Malnutrition ([address] E46    Decubitus ulcer of right perineal ischial region, stage 3 ([address] L89.313    Pulmonary embolism on left ([facility]) J02.59    Acute metabolic encephalopathy A95.64    Speech disturbance R47.9    Infected decubitus ulcer, stage I L89.91, L08.9    Infected decubitus ulcer, stage III ([facility]) L89.93, L08.9    Wound infection T14. 8XXA, L08.9    Elevated INR R79.1    Chronic osteomyelitis, pelvis, right ([facility]) M86.651    Osteomyelitis ([facility]) M86.9    Urinary tract infection associated with indwelling urethral catheter ([facility]) T83.511A, N39.0    Abnormal liver function test R79.89    Chronic depression F32. A    Essential hypertension I10    History of pulmonary embolism Z86.711    Other dysphagia R13.19    Pressure injury of skin of back L89.109    Sepsis secondary to UTI ([facility]) A41.9, N39.0    Decubitus ulcer L89.90    Elevated troponin R77.8    Anemia D64.9    Sepsis due to methicillin resistant Staphylococcus aureus (MRSA) ([facility] A41.02    Sepsis due to urinary tract infection ([address] A41.9, N39.0    AMS (altered mental status) R41.82    Gross hematuria R31.0    Class 1 obesity due to excess calories without serious comorbidity with body mass index (BMI) of 31.0 to 31.9 in adult E66.09, Z68.31    Colostomy in place [facility]) Z93.3    Current use of long term anticoagulation Z79.01    Obstructive uropathy I53.7    Complicated urinary tract infection N39.0    Hypokalemia E87.6    Hypomagnesemia E83.42    Shortness of breath R06.02    Hypoxia R09.02    ESBL (extended spectrum beta-lactamase) producing bacteria infection A49.9, Z16.12    Pneumonia of right lower lobe due to infectious organism J18.9    Acute respiratory failure with hypoxia ([facility]) J96.01    Pleural effusion on right J90    History of DVT (deep vein thrombosis) Z86.718    History of ESBL E. coli infection Z86.19    Abnormal CT of the chest R93.89       Isolation/Infection:   Isolation            Contact  Airborne          Patient Infection Status       Infection Onset Added Last Indicated Last Indicated By Review Planned Expiration Resolved Resolved By    Pulmonary Tuberculosis (Rule Out) 12/26/22 12/26/22 12/26/22 Culture with Smear, Acid Fast Bacillius (Ordered)        MRSA 12/25/22 12/25/22 12/25/22 MRSA by PCR        PCR 12/2022    MDRO (multi-drug resistant organism)  07/25/22 07/25/22 Janny Arnett RN        E coli urine 7/2022, 8/2022    ESBL (Extended Spectrum Beta Lactamase)  04/27/22 08/05/22 Culture, Reflexed, Urine        E-coli urine 4/2022, 6/2022, 7/2022    CRE (Carbapenem-Resistant Enterobacteriaceae)  07/08/19 07/08/19 Conor Gore, RN        Pseudomonas Aeruginosa- sacrum swab - confirmed by [address] 6/2019    VRE 02/10/19 02/10/19 02/10/19 VRE Screen by PCR        PCR 2/2019    Resolved    COVID-19 (Rule Out) 12/24/22 12/24/22 12/24/22 COVID-19 & Influenza Combo (Ordered)   12/24/22 Rule-Out Test Resulted    COVID-19 (Rule Out) 08/05/22 08/05/22 08/05/22 COVID-19, Rapid (Ordered) 08/05/22 Rule-Out Test Resulted    COVID-19 (Rule Out) 12/27/20 12/27/20 12/27/20 COVID-19 (Ordered)   12/27/20 Rule-Out Test Resulted    MRSA 02/10/19 02/12/19 06/17/19 Aerobic & Anaerobic Culture   04/25/22 Gayathri Gore RN    Buttock 2/2019  Rectal 2/2019  Urine 6/2019  Sacrum 6/2019    MRSA  08/05/13 08/05/13 Petty Diaz RN   01/22/18 Gayathri Gore RN    Urine  Sacrum swab 6/2019              Nurse Assessment:  Last Vital Signs: BP (!) 113/55   Pulse 70   Temp 98.5 °F (36.9 °C) (Oral)   Resp 18   Ht 5' 7\" (1.702 m)   Wt 193 lb 12.6 oz (87.9 kg)   SpO2 91%   BMI 30.35 kg/m²     Last documented pain score (0-10 scale): Pain Level: 0  Last Weight:   Wt Readings from Last 1 Encounters:   12/27/22 193 lb 12.6 oz (87.9 kg)     Mental Status:  oriented and alert    IV Access:  - None    Nursing Mobility/ADLs:  Walking   Dependent  Transfer  Dependent  Bathing  Dependent  Dressing  Dependent  Toileting  Dependent  Feeding  Independent  Med Admin  Assisted  Med Delivery   whole    Wound Care Documentation and Therapy:  Wound 04/22/22 Buttocks Right Tunneling wound. (Active)   Wound Etiology Pressure Unstageable 12/26/22 2008   Dressing Status Clean;Dry; Intact 12/26/22 2008   Wound Cleansed Cleansed with saline 12/27/22 0445   Dressing/Treatment Dry dressing 12/27/22 0445   Wound Assessment Pink/red 12/27/22 0445   Drainage Amount Scant 12/27/22 0445   Drainage Description Yellow;Pink 12/27/22 0445   Odor None 12/27/22 0445   Tawny-wound Assessment Excoriated;Non-blanchable erythema 12/26/22 0910   Number of days: 248       Wound 08/05/22 Scrotum Posterior (Active)   Dressing/Treatment Open to air;Protective barrier 12/25/22 1539   Number of days: 143       Wound 12/26/22 Buttocks Left stagev 2 buttocks (Active)   Wound Etiology Pressure Stage 2 12/26/22 2008   Wound Cleansed Soap and water 12/27/22 0445   Dressing/Treatment Open to air;Protective barrier 12/26/22 2008   Wound Assessment Bleeding 12/26/22 2008   Drainage Amount Scant 12/26/22 2008   Drainage Description Serosanguinous 12/26/22 2008   Odor None 12/26/22 2008   Tawny-wound Assessment Non-blanchable erythema 12/26/22 2008   Number of days: 1       Wound 12/26/22 Toe (Comment  which one) Anterior;Right eschar greater right toe (Active)   Wound Etiology Pressure Unstageable 12/26/22 2008   Dressing/Treatment Open to air 12/26/22 2008   Wound Assessment Eschar dry 12/26/22 2008   Drainage Amount None 12/26/22 2008   Tawny-wound Assessment Intact 12/26/22 2008   Number of days: 1       Wound 12/26/22 Toe (Comment  which one) Anterior; Left escar middle toe (Active)   Wound Etiology Pressure Unstageable 12/26/22 2008   Dressing/Treatment Open to air 12/26/22 2008   Wound Assessment Eschar dry 12/26/22 2008   Drainage Amount None 12/26/22 2008   Tawny-wound Assessment Dry/flaky 12/26/22 2008   Number of days: 1       Wound 12/26/22 Buttocks Right;Left scattered stage 2 right and left  buttocks (Active)   Wound Etiology Pressure Stage 2 12/26/22 2008   Wound Cleansed Cleansed with saline 12/27/22 0445   Dressing/Treatment Open to air;Protective barrier 12/26/22 2008   Wound Assessment Dry 12/26/22 2008   Drainage Amount None 12/26/22 2008   Tawny-wound Assessment Excoriated;Fragile; Non-blanchable erythema 12/26/22 2008   Number of days: 1        Elimination:  Continence:    Bowel: Ostomy  Bladder: suprapubic jeffers catheter  Urinary Catheter: Indication for Use of Catheter: Bladder injury or continuous bladder irrigation   Colostomy/Ileostomy/Ileal Conduit: Yes  Colostomy LLQ-Stomal Appliance: 2 piece, Clean, dry & intact  Colostomy LLQ-Stoma  Assessment: Pink  Colostomy LLQ-Peristomal Assessment: Clean, dry & intact  Colostomy LLQ-Stool Appearance: Formed, Hard  Colostomy LLQ-Stool Color: Brown  Colostomy LLQ-Stool Amount: Large  Colostomy LLQ-Output (mL): 0 ml    Date of Last BM: 12/29/22    Intake/Output Summary (Last 24 hours) at 12/27/2022 [address] filed at 12/27/2022 0348  Gross per 24 hour   Intake 964.45 ml   Output 950 ml   Net 14.45 ml     I/O last 3 completed shifts: In: 1139.4 [P.O.:700; I.V.:286; IV Piggyback:153.4]  Out: 1275 [Urine:1275]    Safety Concerns: At Risk for Falls and Aspiration Risk    Impairments/Disabilities:      Limited movement of bilateral lower extremities     Nutrition Therapy:  Current Nutrition Therapy:   - Oral Diet:  Dysphagia- soft and bite sized. Routes of Feeding: Oral  Liquids: Thin Liquids  Daily Fluid Restriction: no  Last Modified Barium Swallow with Video (Video Swallowing Test): not done    Treatments at the Time of Hospital Discharge:   Respiratory Treatments: Duoneb and  sodium chloride 3% nebulizer solution    Oxygen Therapy:  is on oxygen at 2 L/min per nasal cannula. Ventilator:    - No ventilator support    Rehab Therapies: Speech/Language Therapy  Weight Bearing Status/Restrictions: No weight bearing restrictions  Other Medical Equipment (for information only, NOT a DME order):     Other Treatments:     Patient's personal belongings (please select all that are sent with patient):  None    RN SIGNATURE:  Electronically signed by Brent Verduzco RN on 12/29/22 at 11:11 AM EST    CASE MANAGEMENT/SOCIAL WORK SECTION    Inpatient Status Date: 12/24/2022    Readmission Risk Assessment Score:  Readmission Risk              Risk of Unplanned Readmission:  31           Discharging to Facility/ Agency   Name: [facility]  Address: [facility] [address] [address]  Phone: [phone]  Fax: [phone]    Dialysis Facility (if applicable)   Name:  Address:  Dialysis Schedule:  Phone:  Fax:    / signature: Electronically signed by TRISTIAN Holloway on 12/27/22 at 10:27 AM EST    PHYSICIAN SECTION    Prognosis: Good    Condition at Discharge: Stable    Rehab Potential (if transferring to Rehab): Good    Recommended Labs or Other Treatments After Discharge: na    Physician Certification: I certify the above information and transfer of Author Khai  is necessary for the continuing treatment of the diagnosis listed and that he requires [facility] for greater 30 days.      Update Admission H&P: No change in H&P    PHYSICIAN SIGNATURE:  Electronically signed by Deedee Roger MD on 12/29/22 at 9:29 AM EST

## 2022-12-27 NOTE — FLOWSHEET NOTE
12/27/22 0958   Safe Environment   Safety Measures Other (comment)  (Virtual Safety Check)   Patient observed resting comfortably in bed. Respirations even, unlabored. Call light in reach. No safety concerns at this time.

## 2022-12-27 NOTE — CARE COORDINATION
12/27/22, 10:25 AM EST  Discharge Planning Evaluation  Social work consult received, patient from ECF. Patient/Family preference is to return to [facility]. The patient's current payor source at the facility is Medicaid. Medicare skilled days available: No  Insurance precert:  No  Left voicemail for admissions at the facility. Patient bed hold:  Yes  Anticipated transport plan: Ambulance  Do they require COVID 19 test to return to ECF: No  Is there a required time frame which which COVID test needs done: N/A  Patient's Healthcare Decision Maker: Legal Next of Kin

## 2022-12-27 NOTE — PROGRESS NOTES
Assessment: This was a spiritual care encounter as a part of rounds. Patient, Mary Briseno, was oriented and alert. Patient appeared calm. Interventions:   provided active listening with patient.  provided information regarding  services and availability. Outcomes:  Patient debriefed hospitalization and expressed an easy-going attitude to his care and hospitalization. He expressed no spiritual needs at this time. Plan:  Spiritual care team will remain available to support patient, PRN. [facility]. [address] [place] [address] Adair Hopkins, [address]  [phone]

## 2022-12-27 NOTE — PLAN OF CARE
Problem: Discharge Planning  Goal: Discharge to home or other facility with appropriate resources  12/27/2022 1122 by José Manuel Martinez RN  Outcome: Progressing  12/27/2022 1025 by TRISTIAN Velasco  Outcome: Progressing  Flowsheets (Taken 12/27/2022 0845 by José Manuel Martinez RN)  Discharge to home or other facility with appropriate resources:   Identify barriers to discharge with patient and caregiver   Arrange for needed discharge resources and transportation as appropriate   Identify discharge learning needs (meds, wound care, etc)   Refer to discharge planning if patient needs post-hospital services based on physician order or complex needs related to functional status, cognitive ability or social support system     Problem: Skin/Tissue Integrity  Goal: Absence of new skin breakdown  Description: 1. Monitor for areas of redness and/or skin breakdown  2. Assess vascular access sites hourly  3. Every 4-6 hours minimum:  Change oxygen saturation probe site  4. Every 4-6 hours:  If on nasal continuous positive airway pressure, respiratory therapy assess nares and determine need for appliance change or resting period.   Outcome: Progressing     Problem: Safety - Adult  Goal: Free from fall injury  Outcome: Progressing  Goal: Isolation precautions  Description: Isolation precautions, patient airborne and contact   Outcome: Progressing     Problem: Pain  Goal: Verbalizes/displays adequate comfort level or baseline comfort level  Outcome: Progressing     Problem: ABCDS Injury Assessment  Goal: Absence of physical injury  Outcome: Progressing     Problem: Chronic Conditions and Co-morbidities  Goal: Patient's chronic conditions and co-morbidity symptoms are monitored and maintained or improved  Outcome: Progressing  Flowsheets (Taken 12/27/2022 0845)  Care Plan - Patient's Chronic Conditions and Co-Morbidity Symptoms are Monitored and Maintained or Improved: Monitor and assess patient's chronic conditions and comorbid symptoms for stability, deterioration, or improvement     Problem: Infection - Adult  Goal: Absence of infection at discharge  Outcome: Progressing  Flowsheets (Taken 12/27/2022 0845)  Absence of infection at discharge:   Assess and monitor for signs and symptoms of infection   Monitor lab/diagnostic results   Monitor all insertion sites i.e., indwelling lines, tubes and drains  Goal: Absence of infection during hospitalization  Outcome: Progressing  Flowsheets (Taken 12/27/2022 0845)  Absence of infection during hospitalization:   Assess and monitor for signs and symptoms of infection   Monitor lab/diagnostic results   Monitor all insertion sites i.e., indwelling lines, tubes and drains  Goal: Absence of fever/infection during anticipated neutropenic period  Outcome: Progressing  Flowsheets (Taken 12/27/2022 0845)  Absence of fever/infection during anticipated neutropenic period: Monitor white blood cell count     Problem: Respiratory - Adult  Goal: Achieves optimal ventilation and oxygenation  12/27/2022 1122 by Hilda Najera RN  Outcome: Progressing  12/27/2022 0859 by Dai Kemp RCP  Outcome: Progressing  Flowsheets (Taken 12/27/2022 0845 by Hilda Najera RN)  Achieves optimal ventilation and oxygenation:   Assess for changes in respiratory status   Assess for changes in mentation and behavior  12/27/2022 0551 by Bijal Hopkins RCP  Outcome: Progressing  Flowsheets (Taken 12/26/2022 2008 by Marina Alexander RN)  Achieves optimal ventilation and oxygenation:   Assess for changes in respiratory status   Assess for changes in mentation and behavior   Position to facilitate oxygenation and minimize respiratory effort   Oxygen supplementation based on oxygen saturation or arterial blood gases   Initiate smoking cessation protocol as indicated   Assess the need for suctioning and aspirate as needed   Assess and instruct to report shortness of breath or any respiratory difficulty Respiratory therapy support as indicated     Problem: Neurosensory - Adult  Goal: Achieves stable or improved neurological status  Outcome: Progressing  Goal: Absence of seizures  Outcome: Progressing  Goal: Remains free of injury related to seizures activity  Outcome: Progressing  Goal: Achieves maximal functionality and self care  Outcome: Progressing  Flowsheets (Taken 12/27/2022 0845)  Achieves maximal functionality and self care:   Monitor swallowing and airway patency with patient fatigue and changes in neurological status   Encourage and assist patient to increase activity and self care with guidance from physical therapy/occupational therapy   Encourage visually impaired, hearing impaired and aphasic patients to use assistive/communication devices     Problem: Musculoskeletal - Adult  Goal: Return mobility to safest level of function  Outcome: Progressing  Flowsheets (Taken 12/27/2022 0845)  Return Mobility to Safest Level of Function:   Assess patient stability and activity tolerance for standing, transferring and ambulating with or without assistive devices   Assist with transfers and ambulation using safe patient handling equipment as needed   Ensure adequate protection for wounds/incisions during mobilization  Goal: Maintain proper alignment of affected body part  Outcome: Progressing  Flowsheets (Taken 12/27/2022 0845)  Maintain proper alignment of affected body part: Support and protect limb and body alignment per provider's orders  Goal: Return ADL status to a safe level of function  Outcome: Progressing  Flowsheets (Taken 12/27/2022 0845)  Return ADL Status to a Safe Level of Function:   Administer medication as ordered   Assess activities of daily living deficits and provide assistive devices as needed     Problem: Genitourinary - Adult  Goal: Absence of urinary retention  Outcome: Progressing  Flowsheets (Taken 12/27/2022 0845)  Absence of urinary retention:   Assess patients ability to void and empty bladder   Place urinary catheter per Licensed Independent Practitioner order if needed   Monitor intake/output and perform bladder scan as needed  Goal: Urinary catheter remains patent  Outcome: Progressing  Flowsheets (Taken 12/27/2022 0845)  Urinary catheter remains patent: Assess patency of urinary catheter     Problem: Metabolic/Fluid and Electrolytes - Adult  Goal: Electrolytes maintained within normal limits  Outcome: Progressing  Flowsheets (Taken 12/27/2022 0845)  Electrolytes maintained within normal limits:   Monitor labs and assess patient for signs and symptoms of electrolyte imbalances   Administer electrolyte replacement as ordered  Goal: Hemodynamic stability and optimal renal function maintained  Outcome: Progressing  Flowsheets (Taken 12/27/2022 0845)  Hemodynamic stability and optimal renal function maintained:   Monitor intake, output and patient weight   Monitor labs and assess for signs and symptoms of volume excess or deficit  Goal: Glucose maintained within prescribed range  Outcome: Progressing  Flowsheets (Taken 12/27/2022 0845)  Glucose maintained within prescribed range:   Monitor blood glucose as ordered   Assess for signs and symptoms of hyperglycemia and hypoglycemia     Problem: Hematologic - Adult  Goal: Maintains hematologic stability  Outcome: Progressing  Flowsheets (Taken 12/27/2022 0845)  Maintains hematologic stability:   Assess for signs and symptoms of bleeding or hemorrhage   Monitor labs for bleeding or clotting disorders     Problem: Skin/Tissue Integrity - Adult  Goal: Skin integrity remains intact  Outcome: Progressing  Flowsheets (Taken 12/27/2022 0845)  Skin Integrity Remains Intact: Monitor for areas of redness and/or skin breakdown  Goal: Incisions, wounds, or drain sites healing without S/S of infection  Outcome: Progressing  Flowsheets (Taken 12/27/2022 0845)  Incisions, Wounds, or Drain Sites Healing Without Sign and Symptoms of Infection:   ADMISSION and DAILY: Assess and document risk factors for pressure ulcer development   TWICE DAILY: Assess and document skin integrity   TWICE DAILY: Assess and document dressing/incision, wound bed, drain sites and surrounding tissue  Goal: Oral mucous membranes remain intact  Outcome: Progressing  Flowsheets (Taken 12/27/2022 6403)  Oral Mucous Membranes Remain Intact:   Assess oral mucosa and hygiene practices   Implement preventative oral hygiene regimen   Implement oral medicated treatments as ordered     Problem: Nutrition Deficit:  Goal: Optimize nutritional status  Outcome: Progressing    Care plan reviewed with patient. Patient verbalize understanding of the plan of care and contribute to goal setting.

## 2022-12-27 NOTE — PLAN OF CARE
Problem: Respiratory - Adult  Goal: Achieves optimal ventilation and oxygenation  Outcome: Progressing  Flowsheets (Taken 12/26/2022 2008 by Ja Ashby RN)  Achieves optimal ventilation and oxygenation:   Assess for changes in respiratory status   Assess for changes in mentation and behavior   Position to facilitate oxygenation and minimize respiratory effort   Oxygen supplementation based on oxygen saturation or arterial blood gases   Initiate smoking cessation protocol as indicated   Assess the need for suctioning and aspirate as needed   Assess and instruct to report shortness of breath or any respiratory difficulty   Respiratory therapy support as indicated   Remains on HFNC to support breathing. Will continue weaning as tolerated. Patient mutually agreed on goals.

## 2022-12-27 NOTE — CARE COORDINATION
12/27/22, 2:03 PM EST      DISCHARGE PLANNING EVALUATION    Zena Hogue  Admitted: 12/24/2022  Hospital Day: 3    Location: -07/007-A Reason for admit: Elevated troponin [R77.8]  Acute respiratory failure with hypoxia ([facility] [address] [J96.01]  Pneumonia of right lung due to infectious organism, unspecified part of lung [J18.9]  Community acquired pneumonia of right lower lobe of lung [J18.9]    Past Medical History:   Diagnosis Date    Dysphagia     oropharyngeal    History of pulmonary embolism     History of urinary tract infection     Hx of blood clots     Pulmonary embolis    Hypertension     Major depressive disorder, single episode     MS (multiple sclerosis) ([facility] [address]     Neurogenic bladder 02/2012    Dr. Sherry Vernon [facility])     UTI (urinary tract infection)      Barriers to Discharge:   PNA/E.COLI UTI  History: Multiple Sclerosis, Paraplegia  12/26 Bronchoscopy; mucus plugs removed  IV AB, HF 40% FIO2/20L continued    PCP: Rafaela Grey MD    Readmission Risk              Risk of Unplanned Readmission:  31         Patient's Healthcare Decision Maker: Legal Next of Kin    Patient Goals/Plan/Treatment Preferences: plans return [facility] when medically cleared; has diverting ostomy, chronic SPC (changed)    Transportation/Food Security/Housekeeping Addressed: No issues identified.

## 2022-12-27 NOTE — PLAN OF CARE
Problem: Respiratory - Adult  Goal: Achieves optimal ventilation and oxygenation  12/27/2022 0859 by Silviano Mejias RCP  Outcome: Progressing   Breath sounds diminished, continuing duoneb per protocol and vest tid. Weaning HFNC as tolerated. Patient mutually agreed on goals.

## 2022-12-27 NOTE — PROGRESS NOTES
[facility]      Patient - Zena Hogue   MRN -  [id number]   [facility] # - [de-identified]   - 1955      Date of Admission -  2022 11:34 AM  Date of evaluation -  2022  Room - --A   Hospital Day - [facility] DO Ida Primary Care Physician - Rafaela Grey MD     Problem List      Active Hospital Problems    Diagnosis Date Noted    Abnormal CT of the chest [R93.89] 2022     Priority: Medium    Pneumonia of right lower lobe due to infectious organism [J18.9] 2022     Priority: Medium    Acute respiratory failure with hypoxia ([address] [J96.01] 2022     Priority: Medium    Pleural effusion on right [J90] 2022     Priority: Medium    History of DVT (deep vein thrombosis) [Z86.718] 2022     Priority: Medium    History of ESBL E. coli infection [Z86.19] 2022     Priority: Medium    Elevated troponin [R77.8] 06/10/2019    Chronic indwelling Pompa catheter [Z97.8] 2012     Reason for Consult    For management of Pneumonia, post bronchoscopy  HPI   History Obtained From: Patient and electronic medical record. Zena Hogue is a 74 y.o. male  was initially admitted under hospitalist service. Pulmonary medicine was consulted for further management of Pneumonia and to consider for bronchoscopy. He is a poor historian. Majority of the history obtained from reviewing the patient's chart. He is a 82-year-old pleasant gentleman with past medical history of multiple sclerosis, GERD, paraparesis of the bilateral lower extremities, hypertension, neurogenic bladder with a chronic Pompa, and history of DVT/pulmonary embolism presented [facility] home with a chief complaint of worsening of shortness of breath and cough of 2 days duration. Patient also had fever prior to the hospitalization at his nursing home. Patient required supplemental oxygenation.   He (1.702 m) and weight is 193 lb 12.6 oz (87.9 kg). His oral temperature is 98.1 °F (36.7 °C). His blood pressure is 113/55 (abnormal) and his pulse is 70. His respiration is 18 and oxygen saturation is 91%. Body mass index is 30.35 kg/m². SUPPLEMENTAL O2: O2 Flow Rate (L/min): 20 L/min     I/O      Intake/Output Summary (Last 24 hours) at 12/27/2022 0903  Last data filed at 12/27/2022 0348  Gross per 24 hour   Intake 964.45 ml   Output 950 ml   Net 14.45 ml     I/O last 3 completed shifts: In: 1139.4 [P.O.:700; I.V.:286; IV Piggyback:153.4]  Out: 1275 [Urine:1275]   Patient Vitals for the past 96 hrs (Last 3 readings):   Weight   12/27/22 0348 193 lb 12.6 oz (87.9 kg)   12/26/22 0339 191 lb 2.2 oz (86.7 kg)   12/25/22 0800 180 lb (81.6 kg)       Exam   General Appearance: moderately built, moderately nourished in no acute distress on 6L NC.  HEENT: Normal, Head is normocephalic, atraumatic. Oropharynx is clear and moist.  No oral thrush. PERRL  Neck - Supple, No JVD present. No tracheal deviation. Lungs - Bilateral air entry present. Improved aeration post bronchoscopy, mild diminishing on breath sound in bases. No wheezes. No rales. Cardiovascular - Heart sounds are normal.  Regular rhythm normal rate without murmur, gallop or rub. Abdomen - Soft, nontender, nondistended, no masses or organomegaly. S/p diverting colostomy in place. Neurologic - Awake, alert, oriented. Patient able to move his both upper extremities, not able to move his lower extremities in both sides with limb contractures. Extremities - No cyanosis, clubbing or edema. Musculoskeletal: Moving both upper extremities for verbal commands  Lymphadenopathy:  No cervical adenopathy. Psychiatric: Patient  has a normal mood and affect. Skin - No bruising or bleeding.     Labs  - Old records and notes have been reviewed in [facility]   ABG  Lab Results   Component Value Date/Time    PH 7.39 12/24/2022 10:05 PM    PO2 58 12/24/2022 10:05 PM PCO2 45 12/24/2022 10:05 PM    HCO3 28 12/24/2022 10:05 PM    O2SAT 90 12/24/2022 10:05 PM     Lab Results   Component Value Date/Time    IFIO2 2 09/05/2018 08:47 AM     CBC  Recent Labs     12/24/22  1220 12/25/22  0039 12/27/22  0326   WBC 8.3 8.3 5.2   RBC 4.90 4.77 4.24*   HGB 14.4 13.8* 12.3*   HCT 45.0 43.9 39.3*   MCV 91.8 92.0 92.7   MCH 29.4 28.9 29.0   MCHC 32.0* 31.4* 31.3*    227 223   MPV 8.9* 9.0* 8.7*      BMP  Recent Labs     12/24/22  1220 12/25/22  0039 12/27/22  0326    139 141   K 4.8 3.8 4.0    101 106   CO2 27 26 24   BUN 25* 20 24*   CREATININE 0.3* 0.3* 0.3*   GLUCOSE 119* 113* 116*   CALCIUM 9.4 9.1 8.3*     LFT  Recent Labs     12/24/22  1220 12/25/22  1424   AST 25 29   ALT 22 24   BILITOT 0.3 0.4   ALKPHOS 167* 176*     TROP  Lab Results   Component Value Date/Time    TROPONINT < 0.010 12/24/2022 02:48 PM    TROPONINT 0.015 12/24/2022 12:20 PM    TROPONINT < 0.010 08/05/2022 11:44 PM     BNP  No results for input(s): BNP in the last 72 hours. Lactic Acid  Recent Labs     12/24/22  1855 12/25/22  0039   LACTA 1.1 0.6     INR  No results for input(s): INR, PROTIME in the last 72 hours. PTT  No results for input(s): APTT in the last 72 hours. Glucose  Recent Labs     12/25/22  1642   POCGLU 116*     UA   Recent Labs     12/24/22  2100   SPECGRAV 1.020   PHUR 5.5   COLORU YELLOW   PROTEINU 100*   BLOODU LARGE*   RBCUA > 200   WBCUA > 200   BACTERIA MANY   NITRU POSITIVE*   BILIRUBINUR NEGATIVE   UROBILINOGEN 0.2   KETUA 80*   LABCAST NONE SEEN  NONE SEEN   .     PFTs   None in epic and    Sleep studies   None in epic    Cultures    COVID-19 test performed on 24 December 2022: Negative  Rapid swab for influenza A and B: Negative  Pneumocystis negative  Rest of the cultures are pending  EKG     Echocardiogram performed on 25 July 2022   Conclusions      Summary   Technically difficult examination   Left ventricular size and systolic function is normal. Ejection fraction was estimated at 55-60%. LV wall thickness is within normal limits and   there are no obvious wall motion abnormalities. The right ventricular size was normal with normal systolic function and   wall thickness. Signature      ----------------------------------------------------------------   Electronically signed by Nicole Hassan MD (Interpreting   physician) on 07/25/2022 at 08:06 PM   ----------------------------------------------------------------     Right Ventricle   The right ventricular size was normal with normal systolic function and   wall thickness. Radiology    CXR  PROCEDURE: XR CHEST PORTABLE      Impression  1. Very poor inflation of lungs. The heart is obscured but appears to be mildly enlarged. 2. Mild bibasilar atelectasis/pneumonia, worse on the right. 3. Overall appearance of chest improved from prior. Final report electronically signed by Dr. Sarita Brewer on 12/27/2022 10:42 AM         CT Scans  (See actual reports for details)  CT angiogram of chest with IV contrast performed on 25 December 2022:  CTA chest   Comparison: 8/5/2022   Findings: Patchy infiltrate at the right apex. The right lower lobe is essentially nearly completely consolidated with minimal subsegmental sparing scattered atelectasis and infiltrate is seen to the right middle lobe laterally. There is mild atelectasis at the left lower lobe inferiorly. Minimal infiltrate not excluded medially at the left lower lobe. There is minimal inferior lingular hypoventilation and atelectasis. Impression: 1. No evidence of pulmonary embolism. 2. Right-sided pneumonia with lower lobe mainly impacted.        Assessment   -Right lower lobe pneumonia due to community acquired pneumonia ( CAP) Vs Atypical pneumonia Vs aspiration pneumonia  -Bilateral Mucus Plugs  -Chronic hypoxic respiratory failure due to pneumonia Vs other etiologies  -History of DVT/pulmonary embolism  -Essential hypertension on treatment medications under control.  -Major depressive disorder.  -Multiple sclerosis with paraplegia. -Decubitus ulcer S/p diverting colostomy placement  -Neurogenic bladder with chronic indwelling Pompa's catheter in place. Plan   -Follow-up pending cultures, MRSA Screen Positive, Pneumocystis negative  -Continue current antibiotics per primary service, continue Doxycycline  -Aspiration precautions  -Speech pathology evaluation to rule out aspiration  -Bilateral mucus plugs removed in bronchoscopy on 12/26, preformed by Dr. Chavez Reagan  -Titrate Oxygen to keep Spo2 >90%, currently on 6 L NC.  -Vest percussion therapy every shift as tolerated  -Continue Acapella every 4 hourly as tolerated. -Continue Mucinex 600 mg p.o. twice daily  -Deep Venous Thrombosis Prophylaxis: Lovenox     -Discussed with registered nurse taking care of patient regarding patient condition and my management plan. Electronically signed by   Lan Escobedo DO on 12/27/2022 at 9:03 AM    Addendum by Dr. Chavez Reagan MD:  Patient seen by me independently including key components of medical care. Face to face evaluation and examination was performed. Case discussed with Dr.Amy Raymon Pastrana, [address] [place] [address]. Italicized font, if present,  represents changes to the note made by me. More than 50% of the encounter time involved with patient care by the Pulmonary & Critical care service team spent by me.     Please see my modifications mentioned below:  All his bronchoscopy cultures were negative  Will start patient on a 3% saline nebulization every 4 hourly while awake along with the duo nebs to improve pulmonary toileting  Continue vest percussion therapy  Improved right lower lobe opacity by chest x-ray done today      Electronically signed by   Jerry Ramon MD on 12/27/2022 at 4:45 PM

## 2022-12-27 NOTE — RT PROTOCOL NOTE
RT Inhaler-Nebulizer Bronchodilator Protocol Note    There is a bronchodilator order in the chart from a provider indicating to follow the RT Bronchodilator Protocol and there is an Initiate RT Inhaler-Nebulizer Bronchodilator Protocol order as well (see protocol at bottom of note). CXR Findings:  No results found. The findings from the last RT Protocol Assessment were as follows:   History Pulmonary Disease: None or smoker <15 pack years  Respiratory Pattern: Dyspnea on exertion or RR 21-25 bpm  Breath Sounds: Slightly diminished and/or crackles  Cough: Strong, spontaneous, non-productive  Indication for Bronchodilator Therapy: Decreased or absent breath sounds  Bronchodilator Assessment Score: 4    Aerosolized bronchodilator medication orders have been revised according to the RT Inhaler-Nebulizer Bronchodilator Protocol below. Respiratory Therapist to perform RT Therapy Protocol Assessment initially then follow the protocol. Repeat RT Therapy Protocol Assessment PRN for score 0-3 or on second treatment, BID, and PRN for scores above 3. No Indications - adjust the frequency to every 6 hours PRN wheezing or bronchospasm, if no treatments needed after 48 hours then discontinue using Per Protocol order mode. If indication present, adjust the RT bronchodilator orders based on the Bronchodilator Assessment Score as indicated below. Use Inhaler orders unless patient has one or more of the following: on home nebulizer, not able to hold breath for 10 seconds, is not alert and oriented, cannot activate and use MDI correctly, or respiratory rate 25 breaths per minute or more, then use the equivalent nebulizer order(s) with same Frequency and PRN reasons based on the score. If a patient is on this medication at home then do not decrease Frequency below that used at home.     0-3 - enter or revise RT bronchodilator order(s) to equivalent RT Bronchodilator order with Frequency of every 4 hours PRN for wheezing or increased work of breathing using Per Protocol order mode. 4-6 - enter or revise RT Bronchodilator order(s) to two equivalent RT bronchodilator orders with one order with BID Frequency and one order with Frequency of every 4 hours PRN wheezing or increased work of breathing using Per Protocol order mode. 7-10 - enter or revise RT Bronchodilator order(s) to two equivalent RT bronchodilator orders with one order with TID Frequency and one order with Frequency of every 4 hours PRN wheezing or increased work of breathing using Per Protocol order mode. 11-13 - enter or revise RT Bronchodilator order(s) to one equivalent RT bronchodilator order with QID Frequency and an Albuterol order with Frequency of every 4 hours PRN wheezing or increased work of breathing using Per Protocol order mode. Greater than 13 - enter or revise RT Bronchodilator order(s) to one equivalent RT bronchodilator order with every 4 hours Frequency and an Albuterol order with Frequency of every 2 hours PRN wheezing or increased work of breathing using Per Protocol order mode. RT to enter RT Home Evaluation for COPD & MDI Assessment order using Per Protocol order mode.     Electronically signed by Lyly Richardson RCP on 12/27/2022 at 8:18 AM

## 2022-12-27 NOTE — PROGRESS NOTES
Hospitalist Progress Note    Patient:  Nicci Coppola    YOB: 1955  Unit/Bed:4K-07/007-A  MRN: [id number]    Acct: [de-identified]   PCP: Chip Barbosa MD    Date of Admission: 12/24/2022      Assessment/Plan:  RLL PNA a/w pleural effusion: covid and flu negative. Initial CXR with infiltrate and effusion rt base. CTA chest no PE with RLL affected. Cefepime and Doxy continued, plan 7 days then stop  1x dose of Zyvox - no further need  NG from blood culture. Pulm following  Bronch 12/26 - mucus plugs removed from the right tracheobronchial tree  Negative pneumonia panel from bronch, will follow cultures. Follow up cultures/cytology  SLP eval, OK with soft and thin liquids. Chest physiotherapy. Rt pleural effusion: noted, suspect parapneumonic, may be able to drain 12/26 after bronch and more clinically stable. Repeat CXR 12/27 shows improvement. Can hold off drain for now. Acute on chronic hypoxic respiratory failure 2/2 pneumonia: patient has o2 for prn pta. HF -> 6L NC on 12/27  Continue pulm hygeine. Borderline Hypotension, improving: Lactic non-elevated and asymptomatic. Continue to hold antihypertensives for now. Troponin elevation: EKG without acute changes, 0.015 to now normal, likely supply demant from hypoxia/pneumonia. Hx MS with BLE paraplegia, wheelchair/bedbound, follows Dr Sandhya Bryant, not on any DMARD - awaiting resolution of decubitus ulceration. Continue baclofen, gabapentin. Hx of hyponatremia: on salt tabs at SNF, but not requiring here. Asymptomatic bacteruria, ESBL E. coli  Neurogenic bladder / chronic SP catheter: exchanged and urine cx taken on arrival. No concern for infection currently. Decubitus wounds s/p diverting ostomy: wound ostomy consult. Hx DVT and PE: on [address] until 6/2022 (hematuria, dc'ed). May benefit from low dose prophylaxis on DC due to impaired mobility.        Expected discharge date:  1-2 days    Disposition: Return to snf  [] Home  [] TCU  [] Rehab  [] Psych  [] SNF  [] [facility]  [] Other-    ===================================================================      Chief Complaint: SOB    Hospital Course: Patient admitted on 12/24 for pneumonia and hypoxia, transferred to stepdown on 12/25 for worsening O2 requirements. He presents from SNF. Subjective (past 24 hours): Patient seen at bedside, doing okay, high flow nasal cannula weaned to nasal cannula, reports improvement in cough but still occasional dry cough. Denies fevers or chills, no chest pain or palpitations. ROS: reviewed complete ROS unchanged unless otherwise stated in hospital course/subjective portion.        Medications:  Reviewed    Infusion Medications    sodium chloride       Scheduled Medications    ipratropium-albuterol  1 ampule Inhalation BID    multivitamin  1 tablet Oral Daily    amLODIPine  10 mg Oral Daily    baclofen  10 mg Oral TID    carvedilol  12.5 mg Oral BID WC    famotidine  20 mg Oral BID    gabapentin  600 mg Oral 4x daily    OXcarbazepine  300 mg Oral BID    oxybutynin  5 mg Oral BID    doxycycline hyclate  100 mg Oral 2 times per day    cefepime  2,000 mg IntraVENous Q8H    EPINEPHrine  1 mg Endotracheal Once    lidocaine  10 mL Tracheal Tube Once    guaiFENesin  600 mg Oral BID    sodium chloride flush  5-40 mL IntraVENous 2 times per day    enoxaparin  40 mg SubCUTAneous Daily     PRN Meds: ipratropium-albuterol, sodium chloride flush, sodium chloride, ondansetron **OR** ondansetron, polyethylene glycol, acetaminophen **OR** acetaminophen        Intake/Output Summary (Last 24 hours) at 12/27/2022 1318  Last data filed at 12/27/2022 0348  Gross per 24 hour   Intake 404.45 ml   Output 950 ml   Net -545.55 ml       Exam:  BP (!) 112/53   Pulse 73   Temp 97.5 °F (36.4 °C) (Oral)   Resp 18   Ht 5' 7\" (1.702 m)   Wt 193 lb 12.6 oz (87.9 kg)   SpO2 95%   BMI 30.35 kg/m²     General: No distress,  chronically ill-appearing  Eyes:  PERRL. Conjunctivae/corneas clear. HENT: Head normal appearing. Nares normal. Oral mucosa moist.  Hearing intact. Neck: Supple, with full range of motion. Trachea midline. No gross JVD appreciated. Respiratory:  Normal effort. Diminished overall, decreased sounds in the bases with mild rhonchi and overall improved. No wheezing or rales. Cardiovascular: Normal rate, regular rhythm with normal S1/S2 without murmurs. No lower extremity edema. Abdomen: Soft, non-tender, non-distended with normal bowel sounds. Ostomy and SP catheter noted  Musculoskeletal: Muscle wasting of BLLE. Contractured LE. Skin: Warm and dry. No rashes or lesions. Neurologic:   Cranial nerves:  grossly non-focal 2-12. BLLE Weakness, RUE>LUE Weakness. Psychiatric: Alert and oriented, normal insight and thought content. Capillary Refill: Brisk,< 3 seconds. Peripheral Pulses: +2 palpable, equal bilaterally. Labs:   Recent Labs     12/25/22  0039 12/27/22  0326   WBC 8.3 5.2   HGB 13.8* 12.3*   HCT 43.9 39.3*    223     Recent Labs     12/25/22  0039 12/27/22  0326    141   K 3.8 4.0    106   CO2 26 24   BUN 20 24*   CREATININE 0.3* 0.3*   CALCIUM 9.1 8.3*     Recent Labs     12/25/22  1424   AST 29   ALT 24   BILIDIR <0.2   BILITOT 0.4   ALKPHOS 176*     No results for input(s): INR in the last 72 hours. No results for input(s): Lucy Quezada in the last 72 hours. No results for input(s): PROCAL in the last 72 hours. Lab Results   Component Value Date/Time    NITRU POSITIVE 12/24/2022 09:00 PM    WBCUA > 200 12/24/2022 09:00 PM    WBCUA >200 01/20/2012 09:00 AM    BACTERIA MANY 12/24/2022 09:00 PM    RBCUA > 200 12/24/2022 09:00 PM    BLOODU LARGE 12/24/2022 09:00 PM    SPECGRAV 1.020 12/24/2022 09:00 PM    GLUCOSEU NEGATIVE 08/05/2022 06:40 PM       Radiology (48 hours):  CTA CHEST W WO CONTRAST    Result Date: 12/25/2022  Impression: 1.  No evidence of pulmonary embolism. 2. Right-sided pneumonia with lower lobe mainly impacted. This document has been electronically signed by: Jennifer Hunter MD on 12/25/2022 12:27 AM All CTs at this facility use dose modulation techniques and iterative reconstructions, and/or weight-based dosing when appropriate to reduce radiation to a low as reasonably achievable. 3D Post-processing was performed on this study. XR CHEST PORTABLE    Result Date: 12/24/2022  1. Borderline cardiomegaly. 2. Infiltrate and effusion at the right lung base new since previous study dated 11 of August 2022. 3. Atelectasis of the left lung base, unchanged. 4. Thoracic spondylosis. Westley Garcia **This report has been created using voice recognition software. It may contain minor errors which are inherent in voice recognition technology. ** Final report electronically signed by DR Sukhdev Ricci on 12/24/2022 12:34 PM       DVT prophylaxis:    [x] Lovenox  [] SCDs  [] SQ Heparin  [] Encourage ambulation   [] Already on Anticoagulation       Diet: ADULT DIET;  Dysphagia - Soft and Bite Sized  ADULT ORAL NUTRITION SUPPLEMENT; Breakfast, Dinner; Standard 4 oz Oral Supplement  Code Status: Full Code  PT/OT: tbd  Tele: yes  IVF: na    Electronically signed by Sabiha Philip DO on 12/27/2022 at 1:18 PM

## 2022-12-28 LAB
AEROBIC CULTURE: NORMAL
AFB CULTURE & SMEAR: NORMAL
GRAM STAIN RESULT: NORMAL
GRAM STAIN RESULT: NORMAL
RESPIRATORY CULTURE: NORMAL
RESPIRATORY CULTURE: NORMAL

## 2022-12-28 PROCEDURE — 94669 MECHANICAL CHEST WALL OSCILL: CPT

## 2022-12-28 PROCEDURE — 2580000003 HC RX 258: Performed by: INTERNAL MEDICINE

## 2022-12-28 PROCEDURE — 6370000000 HC RX 637 (ALT 250 FOR IP): Performed by: STUDENT IN AN ORGANIZED HEALTH CARE EDUCATION/TRAINING PROGRAM

## 2022-12-28 PROCEDURE — 6360000002 HC RX W HCPCS: Performed by: INTERNAL MEDICINE

## 2022-12-28 PROCEDURE — 6370000000 HC RX 637 (ALT 250 FOR IP): Performed by: INTERNAL MEDICINE

## 2022-12-28 PROCEDURE — 2700000000 HC OXYGEN THERAPY PER DAY

## 2022-12-28 PROCEDURE — 94640 AIRWAY INHALATION TREATMENT: CPT

## 2022-12-28 PROCEDURE — 6360000002 HC RX W HCPCS: Performed by: STUDENT IN AN ORGANIZED HEALTH CARE EDUCATION/TRAINING PROGRAM

## 2022-12-28 PROCEDURE — 2580000003 HC RX 258: Performed by: STUDENT IN AN ORGANIZED HEALTH CARE EDUCATION/TRAINING PROGRAM

## 2022-12-28 PROCEDURE — 1200000000 HC SEMI PRIVATE

## 2022-12-28 PROCEDURE — 94761 N-INVAS EAR/PLS OXIMETRY MLT: CPT

## 2022-12-28 RX ORDER — IPRATROPIUM BROMIDE AND ALBUTEROL SULFATE 2.5; .5 MG/3ML; MG/3ML
1 SOLUTION RESPIRATORY (INHALATION) 3 TIMES DAILY
Status: DISCONTINUED | OUTPATIENT
Start: 2022-12-29 | End: 2022-12-29 | Stop reason: HOSPADM

## 2022-12-28 RX ADMIN — GABAPENTIN 600 MG: 600 TABLET, FILM COATED ORAL at 16:17

## 2022-12-28 RX ADMIN — FAMOTIDINE 20 MG: 20 TABLET ORAL at 09:11

## 2022-12-28 RX ADMIN — GABAPENTIN 600 MG: 600 TABLET, FILM COATED ORAL at 09:11

## 2022-12-28 RX ADMIN — IPRATROPIUM BROMIDE AND ALBUTEROL SULFATE 1 AMPULE: .5; 3 SOLUTION RESPIRATORY (INHALATION) at 09:09

## 2022-12-28 RX ADMIN — SODIUM CHLORIDE SOLN NEBU 3% 4 ML: 3 NEBU SOLN at 12:39

## 2022-12-28 RX ADMIN — SODIUM CHLORIDE SOLN NEBU 3% 4 ML: 3 NEBU SOLN at 16:40

## 2022-12-28 RX ADMIN — SODIUM CHLORIDE SOLN NEBU 3% 4 ML: 3 NEBU SOLN at 22:23

## 2022-12-28 RX ADMIN — GUAIFENESIN 600 MG: 600 TABLET, EXTENDED RELEASE ORAL at 20:53

## 2022-12-28 RX ADMIN — DOXYCYCLINE HYCLATE 100 MG: 100 TABLET, COATED ORAL at 20:53

## 2022-12-28 RX ADMIN — OXYBUTYNIN CHLORIDE 5 MG: 5 TABLET, EXTENDED RELEASE ORAL at 09:12

## 2022-12-28 RX ADMIN — OXCARBAZEPINE 300 MG: 300 TABLET, FILM COATED ORAL at 09:11

## 2022-12-28 RX ADMIN — BACLOFEN 10 MG: 10 TABLET ORAL at 09:11

## 2022-12-28 RX ADMIN — BACLOFEN 10 MG: 10 TABLET ORAL at 20:53

## 2022-12-28 RX ADMIN — ENOXAPARIN SODIUM 40 MG: 100 INJECTION SUBCUTANEOUS at 09:11

## 2022-12-28 RX ADMIN — IPRATROPIUM BROMIDE AND ALBUTEROL SULFATE 1 AMPULE: .5; 3 SOLUTION RESPIRATORY (INHALATION) at 12:39

## 2022-12-28 RX ADMIN — FAMOTIDINE 20 MG: 20 TABLET ORAL at 21:01

## 2022-12-28 RX ADMIN — IPRATROPIUM BROMIDE AND ALBUTEROL SULFATE 1 AMPULE: .5; 3 SOLUTION RESPIRATORY (INHALATION) at 22:23

## 2022-12-28 RX ADMIN — SODIUM CHLORIDE, PRESERVATIVE FREE 10 ML: 5 INJECTION INTRAVENOUS at 20:54

## 2022-12-28 RX ADMIN — IPRATROPIUM BROMIDE AND ALBUTEROL SULFATE 1 AMPULE: .5; 3 SOLUTION RESPIRATORY (INHALATION) at 16:40

## 2022-12-28 RX ADMIN — GUAIFENESIN 600 MG: 600 TABLET, EXTENDED RELEASE ORAL at 09:11

## 2022-12-28 RX ADMIN — CEFEPIME 2000 MG: 2 INJECTION, POWDER, FOR SOLUTION INTRAVENOUS at 23:01

## 2022-12-28 RX ADMIN — BACLOFEN 10 MG: 10 TABLET ORAL at 13:44

## 2022-12-28 RX ADMIN — OXYBUTYNIN CHLORIDE 5 MG: 5 TABLET, EXTENDED RELEASE ORAL at 20:53

## 2022-12-28 RX ADMIN — DOXYCYCLINE HYCLATE 100 MG: 100 TABLET, COATED ORAL at 09:11

## 2022-12-28 RX ADMIN — SODIUM CHLORIDE SOLN NEBU 3% 4 ML: 3 NEBU SOLN at 09:09

## 2022-12-28 RX ADMIN — GABAPENTIN 600 MG: 600 TABLET, FILM COATED ORAL at 20:54

## 2022-12-28 RX ADMIN — GABAPENTIN 600 MG: 600 TABLET, FILM COATED ORAL at 13:44

## 2022-12-28 RX ADMIN — Medication 1 TABLET: at 09:12

## 2022-12-28 RX ADMIN — CEFEPIME 2000 MG: 2 INJECTION, POWDER, FOR SOLUTION INTRAVENOUS at 04:31

## 2022-12-28 RX ADMIN — OXCARBAZEPINE 300 MG: 300 TABLET, FILM COATED ORAL at 20:53

## 2022-12-28 RX ADMIN — CEFEPIME 2000 MG: 2 INJECTION, POWDER, FOR SOLUTION INTRAVENOUS at 16:13

## 2022-12-28 ASSESSMENT — PAIN SCALES - GENERAL
PAINLEVEL_OUTOF10: 0

## 2022-12-28 NOTE — PROGRESS NOTES
[facility]      Patient - Lonnie Philip   MRN -  [id number]   [facility] # - [de-identified]   - 1955      Date of Admission -  2022 11:34 AM  Date of evaluation -  2022  Room - -007-A   [address] [place] [address] Courtney Miles MD Primary Care Physician - Magdalena Davies MD     Problem List      Active Hospital Problems    Diagnosis Date Noted    Abnormal CT of the chest [R93.89] 2022     Priority: Medium    Pneumonia of right lower lobe due to infectious organism [J18.9] 2022     Priority: Medium    Acute respiratory failure with hypoxia ([address] [J96.01] 2022     Priority: Medium    Pleural effusion on right [J90] 2022     Priority: Medium    History of DVT (deep vein thrombosis) [Z86.718] 2022     Priority: Medium    History of ESBL E. coli infection [Z86.19] 2022     Priority: Medium    Elevated troponin [R77.8] 06/10/2019    Chronic indwelling Pompa catheter [Z97.8] 2012     Reason for Consult    For management of Pneumonia, post bronchoscopy  HPI   History Obtained From: Patient and electronic medical record. Lonnie Philip is a 74 y.o. male  was initially admitted under hospitalist service. Pulmonary medicine was consulted for further management of Pneumonia and to consider for bronchoscopy. He is a poor historian. Majority of the history obtained from reviewing the patient's chart. He is a 43-year-old pleasant gentleman with past medical history of multiple sclerosis, GERD, paraparesis of the bilateral lower extremities, hypertension, neurogenic bladder with a chronic Pompa, and history of DVT/pulmonary embolism presented initially to [address] [place] [address] from a nursing home with a chief complaint of worsening of shortness of breath and cough of 2 days duration. Patient also had fever prior to the hospitalization at his nursing home. Patient required supplemental oxygenation.   He usually wears 2 L of oxygen at baseline at nursing home. At the time of initial evaluation by EMS patient oxygen saturation was found to be 80s. He was placed on nonrebreather mask. Patient COVID-19 test and a flu a and B test were negative. He is having cough: Yes-occasional  His cough is associated with sputum production: No   Hemoptysis:No  Diurnal variation: None. He gives a history of fever: No    He is having chest pain:No    He denies orthopnea or paroxysmal nocturnal dyspnea. During his initial work up he was found to have abnormal chest Xray with right side pneumonia. History of pulmonary tuberculosis in the past: No    Recent exposure to any patients with tuberculosis:No  Recent travel to endemic places of tuberculosis:No.  Prior PPD status: Unknown. Past 24 hours   -Bronchoscopy with mucus plug removal on   -Negative for Pneumocystis, cultures are returning negative  -Improved shortness of breath, difficulty bringing mucus up, weak cough  -Stable on 4L NC  -Afebrile  -All other systems reviewed and negative  PMHx   Past Medical History      Diagnosis Date    Dysphagia     oropharyngeal    History of pulmonary embolism     History of urinary tract infection     Hx of blood clots     Pulmonary embolis    Hypertension     Major depressive disorder, single episode     MS (multiple sclerosis) ([facility] [address]     Neurogenic bladder 2012    Dr. Sherry Vernon [facility])     UTI (urinary tract infection)      Sleep History    Never diagnosed with sleep apnea in the past    Social History     Social History     Tobacco Use    Smoking status: Former     Types: Cigarettes     Quit date: 1982     Years since quittin.0    Smokeless tobacco: Former   Vaping Use    Vaping Use: Never used   Substance Use Topics    Alcohol use: No     Comment: \"quit alcohol a few years ago\"    Drug use: No     Riview of systems   Negative unless otherwise stated above.   Vitals     height is 5' 7\" (1.702 m) and weight is 197 lb 1.5 oz (89.4 kg). His oral temperature is 98.3 °F (36.8 °C). His blood pressure is 135/63 and his pulse is 78. His respiration is 18 and oxygen saturation is 93%. Body mass index is 30.87 kg/m². SUPPLEMENTAL O2: O2 Flow Rate (L/min): 3 L/min     I/O      Intake/Output Summary (Last 24 hours) at 12/28/2022 0821  Last data filed at 12/28/2022 0325  Gross per 24 hour   Intake 830 ml   Output 750 ml   Net 80 ml     I/O last 3 completed shifts: In: 940 [P.O.:930; I.V.:10]  Out: 1400 [Urine:1350; Stool:50]   Patient Vitals for the past 96 hrs (Last 3 readings):   Weight   12/28/22 0325 197 lb 1.5 oz (89.4 kg)   12/27/22 0348 193 lb 12.6 oz (87.9 kg)   12/26/22 0339 191 lb 2.2 oz (86.7 kg)       Exam   General Appearance: moderately built, moderately nourished in no acute distress on 4L NC.  HEENT: Normal, Head is normocephalic, atraumatic. Oropharynx is clear and moist.  No oral thrush. PERRL  Neck - Supple, No JVD present. No tracheal deviation. Lungs - Bilateral air entry present. Improved aeration post bronchoscopy, mild diminishing on breath sound in bases. No wheezes. No rales. Rhonchi cleared by coughing in right middle lobe area  Cardiovascular - Heart sounds are normal.  Regular rhythm normal rate without murmur, gallop or rub. Abdomen - Soft, nontender, nondistended, no masses or organomegaly. S/p diverting colostomy in place. Neurologic - Awake, alert, oriented. Patient able to move his both upper extremities, not able to move his lower extremities in both sides with limb contractures. Extremities - No cyanosis, clubbing or edema. Musculoskeletal: Moving both upper extremities for verbal commands  Lymphadenopathy:  No cervical adenopathy. Psychiatric: Patient  has a normal mood and affect. Skin - No bruising or bleeding.     Labs  - Old records and notes have been reviewed in [facility]   ABG  Lab Results   Component Value Date/Time    PH 7.39 12/24/2022 10:05 PM    PO2 58 12/24/2022 10:05 PM    PCO2 45 12/24/2022 10:05 PM    HCO3 28 12/24/2022 10:05 PM    O2SAT 90 12/24/2022 10:05 PM     Lab Results   Component Value Date/Time    IFIO2 2 09/05/2018 08:47 AM     CBC  Recent Labs     12/27/22  0326   WBC 5.2   RBC 4.24*   HGB 12.3*   HCT 39.3*   MCV 92.7   MCH 29.0   MCHC 31.3*      MPV 8.7*      BMP  Recent Labs     12/27/22  0326      K 4.0      CO2 24   BUN 24*   CREATININE 0.3*   GLUCOSE 116*   CALCIUM 8.3*     LFT  Recent Labs     12/25/22  1424   AST 29   ALT 24   BILITOT 0.4   ALKPHOS 176*     TROP  Lab Results   Component Value Date/Time    TROPONINT < 0.010 12/24/2022 02:48 PM    TROPONINT 0.015 12/24/2022 12:20 PM    TROPONINT < 0.010 08/05/2022 11:44 PM     BNP  No results for input(s): BNP in the last 72 hours. Lactic Acid  No results for input(s): LACTA in the last 72 hours. INR  No results for input(s): INR, PROTIME in the last 72 hours. PTT  No results for input(s): APTT in the last 72 hours. Glucose  Recent Labs     12/25/22  1642   POCGLU 116*     UA   No results for input(s): Mack Jillian, COLORU, CLARITYU, MUCUS, PROTEINU, BLOODU, RBCUA, WBCUA, BACTERIA, NITRU, GLUCOSEU, BILIRUBINUR, UROBILINOGEN, KETUA, LABCAST, LABCASTTY, AMORPHOS in the last 72 hours. Invalid input(s): CRYSTALS  . PFTs   None in epic and    Sleep studies   None in epic    Cultures    COVID-19 test performed on 24 December 2022: Negative  Rapid swab for influenza A and B: Negative  Pneumocystis negative  Cultures negative  EKG     Echocardiogram performed on 25 July 2022   Conclusions      Summary   Technically difficult examination   Left ventricular size and systolic function is normal. Ejection fraction   was estimated at 55-60%. LV wall thickness is within normal limits and   there are no obvious wall motion abnormalities. The right ventricular size was normal with normal systolic function and   wall thickness.       Signature ----------------------------------------------------------------   Electronically signed by Sea Lua MD (Interpreting   physician) on 07/25/2022 at 08:06 PM   ----------------------------------------------------------------     Right Ventricle   The right ventricular size was normal with normal systolic function and   wall thickness. Radiology    CXR  PROCEDURE: XR CHEST PORTABLE      Impression  1. Very poor inflation of lungs. The heart is obscured but appears to be mildly enlarged. 2. Mild bibasilar atelectasis/pneumonia, worse on the right. 3. Overall appearance of chest improved from prior. Final report electronically signed by Dr. Claudio Frias on 12/27/2022 10:42 AM         CT Scans  (See actual reports for details)  CT angiogram of chest with IV contrast performed on 25 December 2022:  CTA chest   Comparison: 8/5/2022   Findings: Patchy infiltrate at the right apex. The right lower lobe is essentially nearly completely consolidated with minimal subsegmental sparing scattered atelectasis and infiltrate is seen to the right middle lobe laterally. There is mild atelectasis at the left lower lobe inferiorly. Minimal infiltrate not excluded medially at the left lower lobe. There is minimal inferior lingular hypoventilation and atelectasis. Impression: 1. No evidence of pulmonary embolism. 2. Right-sided pneumonia with lower lobe mainly impacted. Assessment   -Right lower lobe pneumonia due to community acquired pneumonia ( CAP) Vs Atypical pneumonia Vs aspiration pneumonia  -Bilateral Mucus Plugs  -Chronic hypoxic respiratory failure due to pneumonia Vs other etiologies  -History of DVT/pulmonary embolism  -Essential hypertension on treatment medications under control.  -Major depressive disorder.  -Multiple sclerosis with paraplegia. -Decubitus ulcer S/p diverting colostomy placement  -Neurogenic bladder with chronic indwelling Pompa's catheter in place.     Plan   -Follow-up cultures negative, MRSA PCR Positive, Pneumocystis negative  -Continue current antibiotics per primary service, continue Doxycycline  -Aspiration precautions, Speech pathology evaluation to rule out aspiration  -Bilateral mucus plugs removed in bronchoscopy on 12/26, preformed by Dr. Tatiana Collins  -Titrate Oxygen to keep Spo2 >90%, currently on 4 L NC.  -Vest percussion therapy every shift as tolerated, started 3% saline nebulization to help  -Continue Acapella every 4 hourly while awake. -Continue Mucinex 600 mg p.o. twice daily  -Deep Venous Thrombosis Prophylaxis: Lovenox  -Patient is okay for discharge to nursing home when ready per primary service     -Discussed with registered nurse taking care of patient regarding patient condition and my management plan. Electronically signed by   Carolina Hollins DO on 12/28/2022 at 8:21 AM    Addendum by Dr. Tatiana Collins MD:  Patient seen by me independently including key components of medical care. Face to face evaluation and examination was performed. Case discussed with Dr.Amy Alexandria Richardson, [address] [place] [address]. Italicized font, if present,  represents changes to the note made by me. More than 50% of the encounter time involved with patient care by the Pulmonary & Critical care service team spent by me. Please see my modifications mentioned below:  He is on 3LPM via nasal cannula. Feels better. Bronch cultures were negative so far.     Electronically signed by   Anabel Estrada MD on 12/28/2022 at 8:08 PM

## 2022-12-28 NOTE — PROGRESS NOTES
Progress Note    Patient:  Espiridion Seip    Unit/Bed:4K-07/007-A  YOB: 1955  MRN: [id number]   Acct: [de-identified]   Admit date: 12/24/2022      Principal Problem:    Pneumonia of right lower lobe due to infectious organism  Active Problems:    Acute respiratory failure with hypoxia (HCC)    Pleural effusion on right    History of DVT (deep vein thrombosis)    History of ESBL E. coli infection    Abnormal CT of the chest    Chronic indwelling Pompa catheter    Elevated troponin  Resolved Problems:    * No resolved hospital problems. *    Disposition anticipate discharge to skilled nursing facility should they be ready to receive the patient 12/29/2022  Anticipate the patient will be discharged on oral anticoagulation on discharge to help prevent DVTs    Assessment and Plan:  Right lower lobe pneumonia I have reviewed bronchoscopy results and current antibiotic regimen note likely transition the patient to oral antibiotics in a.m. 12/29/2022 for additional 7 days I would likely initiate Augmentin, patient's MRSA screen appears to been positive and he has been on doxycycline and cefepime I would likely discharge patient on doxycycline oral  Right pleural effusion x-ray performed 12/27/2022 shows better aeration  Acute hypoxia secondary pneumonia and right pleural effusion appears to show improvement with antibiotic treatment patient currently on approximately 6 L/min oxygen  Constitutional hypotension hold BP meds I will likely continue to withhold them prior to discharge  Troponin elevation likely secondary demand ischemia  Chronic Pompa placement secondary neurogenic bladder it appears to been replaced on admission through ER        Patient Seen, Chart, Consults notes, Labs, Radiology studies reviewed.     Subjective: Day 4 of stay with hospital stay    Patient is interactive alert pleasant he does not seem to have any severe respiratory stress at this time I have reviewed the chart he appears to be in cleared by speech services for thin liquids and soft diet  As discussed undergone bronchoscopy 1226  I will likely transition this patient to oral antibiotics appears to be tolerating currently cefepime     All other ROS negative except noted in HPI    Past, Family, Social History unchanged from admission. Diet:  ADULT DIET; Dysphagia - Soft and Bite Sized  ADULT ORAL NUTRITION SUPPLEMENT; Breakfast, Dinner; Standard 4 oz Oral Supplement    Medications:  Scheduled Meds:   sodium chloride (Inhalant)  4 mL Nebulization Q4H WA    ipratropium-albuterol  1 ampule Inhalation Q4H WA    multivitamin  1 tablet Oral Daily    [Held by provider] amLODIPine  10 mg Oral Daily    baclofen  10 mg Oral TID    [Held by provider] carvedilol  12.5 mg Oral BID WC    famotidine  20 mg Oral BID    gabapentin  600 mg Oral 4x daily    OXcarbazepine  300 mg Oral BID    oxybutynin  5 mg Oral BID    doxycycline hyclate  100 mg Oral 2 times per day    cefepime  2,000 mg IntraVENous Q8H    EPINEPHrine  1 mg Endotracheal Once    lidocaine  10 mL Tracheal Tube Once    guaiFENesin  600 mg Oral BID    sodium chloride flush  5-40 mL IntraVENous 2 times per day    enoxaparin  40 mg SubCUTAneous Daily     Continuous Infusions:   sodium chloride       PRN Meds:sodium chloride flush, sodium chloride, ondansetron **OR** ondansetron, polyethylene glycol, acetaminophen **OR** acetaminophen    Objective:  CBC:   Recent Labs     12/27/22 0326   WBC 5.2   HGB 12.3*        BMP:    Recent Labs     12/27/22 0326      K 4.0      CO2 24   BUN 24*   CREATININE 0.3*   GLUCOSE 116*     Calcium:  Recent Labs     12/27/22 0326   CALCIUM 8.3*     Ionized Calcium:No results for input(s): IONCA in the last 72 hours. Magnesium:No results for input(s): MG in the last 72 hours. Phosphorus:No results for input(s): PHOS in the last 72 hours. BNP:No results for input(s): BNP in the last 72 hours.   Glucose:No results for input(s): POCGLU in the last 72 hours. HgbA1C: No results for input(s): LABA1C in the last 72 hours. INR: No results for input(s): INR in the last 72 hours. Hepatic: No results for input(s): ALKPHOS, ALT, AST, PROT, BILITOT, BILIDIR, LABALBU in the last 72 hours. Amylase and Lipase:No results for input(s): LACTA, AMYLASE in the last 72 hours. Lactic Acid: No results for input(s): LACTA in the last 72 hours. Troponin: No results for input(s): CKTOTAL, CKMB, TROPONINT in the last 72 hours. BNP: No results for input(s): BNP in the last 72 hours. Lipids: No results for input(s): CHOL, TRIG, HDL, LDL, LDLCALC in the last 72 hours. ABGs:   Lab Results   Component Value Date/Time    PH 7.39 12/24/2022 10:05 PM    PCO2 45 12/24/2022 10:05 PM    PO2 58 12/24/2022 10:05 PM    HCO3 28 12/24/2022 10:05 PM    O2SAT 90 12/24/2022 10:05 PM           Radiology reports as per the Radiologist  Radiology: CTA CHEST W WO CONTRAST    Result Date: 12/25/2022  CTA chest Comparison: 8/5/2022 Findings: Patchy infiltrate at the right apex. The right lower lobe is essentially nearly completely consolidated with minimal subsegmental sparing scattered atelectasis and infiltrate is seen to the right middle lobe laterally. There is mild atelectasis at the left lower lobe inferiorly. Minimal infiltrate not excluded medially at the left lower lobe. There is minimal inferior lingular hypoventilation and atelectasis. No pulmonary emboli or aortic dissection. Normal thyroid. Upper limits of normal cardiac volume. Epicardial fat and superior mediastinal lipomatosis likely accentuated cardiac volume estimates on chest radiographs. Included portions of the upper abdomen without acute incidental findings. There has been prior cholecystectomy. Mild pancreatic atrophy evident. Osteopenia seen to the visualized bony tissues. No acute skeletal pathology. Impression: 1. No evidence of pulmonary embolism.  2. Right-sided pneumonia with lower lobe mainly impacted. This document has been electronically signed by: Suzie Cunha MD on 12/25/2022 12:27 AM All CTs at this facility use dose modulation techniques and iterative reconstructions, and/or weight-based dosing when appropriate to reduce radiation to a low as reasonably achievable. 3D Post-processing was performed on this study. XR CHEST PORTABLE    Result Date: 12/27/2022  PROCEDURE: XR CHEST PORTABLE CLINICAL INFORMATION: Follow up s/p bronch and mucus plug COMPARISON: 12/24/2022 TECHNIQUE: A single mobile view of the chest was obtained. 1. Very poor inflation of lungs. The heart is obscured but appears to be mildly enlarged. 2. Mild bibasilar atelectasis/pneumonia, worse on the right. 3. Overall appearance of chest improved from prior. **This report has been created using voice recognition software. It may contain minor errors which are inherent in voice recognition technology. ** Final report electronically signed by Dr. Claudio Frias on 12/27/2022 10:42 AM    XR CHEST PORTABLE    Result Date: 12/24/2022  PROCEDURE: XR CHEST PORTABLE CLINICAL INFORMATION: cough. COMPARISON: Chest x-ray dated 8/5/2022. TECHNIQUE: AP upright view of the chest. FINDINGS: There is borderline cardiomegaly. .The mediastinum is not widened. There is infiltrate and effusion at the right lung base, new since previous study. There is atelectasis at the left lung base, unchanged. . The pulmonary vascularity is normal. There is thoracic spondylosis. 1. Borderline cardiomegaly. 2. Infiltrate and effusion at the right lung base new since previous study dated 11 of August 2022. 3. Atelectasis of the left lung base, unchanged. 4. Thoracic spondylosis. Tanda Bun **This report has been created using voice recognition software. It may contain minor errors which are inherent in voice recognition technology. ** Final report electronically signed by DR Rios Gonzalez on 12/24/2022 12:34 PM        Physical Exam:  Vitals: BP (!) 110/56 Pulse 79   Temp 98.4 °F (36.9 °C) (Oral)   Resp 18   Ht 5' 7\" (1.702 m)   Wt 197 lb 1.5 oz (89.4 kg)   SpO2 93%   BMI 30.87 kg/m²   24 hour intake/output:  Intake/Output Summary (Last 24 hours) at 12/28/2022 1822  Last data filed at 12/28/2022 1611  Gross per 24 hour   Intake 830 ml   Output 1300 ml   Net -470 ml     Last 3 weights: Wt Readings from Last 3 Encounters:   12/28/22 197 lb 1.5 oz (89.4 kg)   08/09/22 198 lb 9.6 oz (90.1 kg)   08/04/22 194 lb 6.4 oz (88.2 kg)       General appearance - alert, awake appears to be in no acute distress gross loss of muscle tone in upper and lower extremities  HEENT: Atraumatic normocephalic, no JVD. Trachea midline.    Chest - Bilateral air entry, no wheezes, crackles or rhonchi  Cardiovascular - S1S2 RRR, no murmurs or gallops  Abdomen - Soft non tender non distended, normoactive bowel sounds   Neurological - non focal, no neurological deficits noted  Integumentary - Skin color, texture, turgor normal. No Rashes or lesions  Musculoskeletal -Full ROM, No clubbing or cyanosis, hypothenar atrophy  Extremities: No richi deformity decubitus ulcerations noted on charting    DVT prophylaxis: [x] Lovenox                                 [] SCDs                                 [] SQ Heparin                                 [] Encourage ambulation           [] Already on Anticoagulation               Electronically signed by Iam Verde MD on 12/28/2022 at 6:22 PM    Rounding Hospitalist

## 2022-12-28 NOTE — PLAN OF CARE
Problem: Discharge Planning  Goal: Discharge to home or other facility with appropriate resources  12/28/2022 1128 by rOi Avery RN  Outcome: Progressing  Flowsheets (Taken 12/28/2022 0915)  Discharge to home or other facility with appropriate resources:   Identify barriers to discharge with patient and caregiver   Arrange for needed discharge resources and transportation as appropriate   Identify discharge learning needs (meds, wound care, etc)   Refer to discharge planning if patient needs post-hospital services based on physician order or complex needs related to functional status, cognitive ability or social support system  12/27/2022 2221 by Ramesh You RN  Outcome: Progressing  Flowsheets (Taken 12/27/2022 2107)  Discharge to home or other facility with appropriate resources:   Identify barriers to discharge with patient and caregiver   Arrange for needed discharge resources and transportation as appropriate   Identify discharge learning needs (meds, wound care, etc)     Problem: Skin/Tissue Integrity  Goal: Absence of new skin breakdown  Description: 1. Monitor for areas of redness and/or skin breakdown  2. Assess vascular access sites hourly  3. Every 4-6 hours minimum:  Change oxygen saturation probe site  4. Every 4-6 hours:  If on nasal continuous positive airway pressure, respiratory therapy assess nares and determine need for appliance change or resting period. 12/28/2022 1128 by Ori Avery RN  Outcome: Progressing  12/27/2022 2221 by Ramesh You RN  Outcome: Progressing  Note: No s/s of new skin breakdown. Will continue to monitor.       Problem: Safety - Adult  Goal: Free from fall injury  12/28/2022 1128 by Ori Avery RN  Outcome: Progressing  Flowsheets (Taken 12/27/2022 2221 by Ramesh You RN)  Free From Fall Injury:   Instruct family/caregiver on patient safety   Based on caregiver fall risk screen, instruct family/caregiver to ask for assistance with transferring infant if caregiver noted to have fall risk factors  12/27/2022 2221 by Sandra Bruner RN  Outcome: Progressing  [address] (Taken 12/27/2022 2221)  Free From Fall Injury:   Instruct family/caregiver on patient safety   Based on caregiver fall risk screen, instruct family/caregiver to ask for assistance with transferring infant if caregiver noted to have fall risk factors  Goal: Isolation precautions  Description: Isolation precautions, patient airborne and contact   12/28/2022 1128 by Maryann Varma RN  Outcome: Progressing  12/27/2022 2221 by Sandra Bruner RN  Outcome: Progressing  Note: Contact precautions in place.       Problem: Pain  Goal: Verbalizes/displays adequate comfort level or baseline comfort level  12/28/2022 1128 by Maryann Varma RN  Outcome: Progressing  Flowsheets  Taken 12/28/2022 0736 by Maryann Varma RN  Verbalizes/displays adequate comfort level or baseline comfort level:   Encourage patient to monitor pain and request assistance   Assess pain using appropriate pain scale   Administer analgesics based on type and severity of pain and evaluate response   Implement non-pharmacological measures as appropriate and evaluate response   Notify Licensed Independent Practitioner if interventions unsuccessful or patient reports new pain  Taken 12/27/2022 2221 by Sandra Bruner RN  Verbalizes/displays adequate comfort level or baseline comfort level:   Encourage patient to monitor pain and request assistance   Administer analgesics based on type and severity of pain and evaluate response   Consider cultural and social influences on pain and pain management   Assess pain using appropriate pain scale   Implement non-pharmacological measures as appropriate and evaluate response   Notify Licensed Independent Practitioner if interventions unsuccessful or patient reports new pain  12/27/2022 2221 by Sandra Bruner RN  Outcome: Progressing  Flowsheets (Taken 12/27/2022 2221)  Verbalizes/displays adequate comfort level or baseline comfort level:   Encourage patient to monitor pain and request assistance   Administer analgesics based on type and severity of pain and evaluate response   Consider cultural and social influences on pain and pain management   Assess pain using appropriate pain scale   Implement non-pharmacological measures as appropriate and evaluate response   Notify Licensed Independent Practitioner if interventions unsuccessful or patient reports new pain     Problem: ABCDS Injury Assessment  Goal: Absence of physical injury  12/28/2022 1128 by Celina Hoffmann RN  Outcome: Progressing  [address] (Taken 12/27/2022 2221 by Nabor Perez RN)  Absence of Physical Injury: Implement safety measures based on patient assessment  12/27/2022 2221 by Nabor Perez RN  Outcome: Progressing  Flowsheets (Taken 12/27/2022 2221)  Absence of Physical Injury: Implement safety measures based on patient assessment     Problem: Chronic Conditions and Co-morbidities  Goal: Patient's chronic conditions and co-morbidity symptoms are monitored and maintained or improved  12/28/2022 1128 by Celina Hoffmann RN  Outcome: Progressing  Flowsheets  Taken 12/28/2022 0915 by Celina Hoffmann RN  Care Plan - Patient's Chronic Conditions and Co-Morbidity Symptoms are Monitored and Maintained or Improved:   Monitor and assess patient's chronic conditions and comorbid symptoms for stability, deterioration, or improvement   Collaborate with multidisciplinary team to address chronic and comorbid conditions and prevent exacerbation or deterioration  Taken 12/27/2022 2221 by Uriel Su 36 - Patient's Chronic Conditions and Co-Morbidity Symptoms are Monitored and Maintained or Improved:   Monitor and assess patient's chronic conditions and comorbid symptoms for stability, deterioration, or improvement   Collaborate with multidisciplinary team to address chronic and comorbid conditions and prevent exacerbation or deterioration   Update acute care plan with appropriate goals if chronic or comorbid symptoms are exacerbated and prevent overall improvement and discharge  12/27/2022 2221 by Idalia Obrien RN  Outcome: Progressing  Flowsheets  Taken 12/27/2022 2221  Care Plan - Patient's Chronic Conditions and Co-Morbidity Symptoms are Monitored and Maintained or Improved:   Monitor and assess patient's chronic conditions and comorbid symptoms for stability, deterioration, or improvement   Collaborate with multidisciplinary team to address chronic and comorbid conditions and prevent exacerbation or deterioration   Update acute care plan with appropriate goals if chronic or comorbid symptoms are exacerbated and prevent overall improvement and discharge  Taken 12/27/2022 2107  Care Plan - Patient's Chronic Conditions and Co-Morbidity Symptoms are Monitored and Maintained or Improved:   Monitor and assess patient's chronic conditions and comorbid symptoms for stability, deterioration, or improvement   Collaborate with multidisciplinary team to address chronic and comorbid conditions and prevent exacerbation or deterioration   Update acute care plan with appropriate goals if chronic or comorbid symptoms are exacerbated and prevent overall improvement and discharge     Problem: Infection - Adult  Goal: Absence of infection at discharge  12/28/2022 1128 by David Ramos RN  Outcome: Progressing  Flowsheets (Taken 12/28/2022 0915)  Absence of infection at discharge:   Assess and monitor for signs and symptoms of infection   Monitor lab/diagnostic results  12/27/2022 2221 by Idalia Obrien RN  Outcome: Progressing  Flowsheets (Taken 12/27/2022 2107)  Absence of infection at discharge:   Assess and monitor for signs and symptoms of infection   Monitor lab/diagnostic results   Monitor all insertion sites i.e., indwelling lines, tubes and drains   Monitor endotracheal (as able) and nasal secretions for changes in amount and color   Centre appropriate cooling/warming therapies per order   Administer medications as ordered  Goal: Absence of infection during hospitalization  12/28/2022 1128 by Ori Avery RN  Outcome: Progressing  Flowsheets (Taken 12/28/2022 0915)  Absence of infection during hospitalization:   Assess and monitor for signs and symptoms of infection   Monitor lab/diagnostic results  12/27/2022 2221 by Ramesh You RN  Outcome: Progressing  Flowsheets (Taken 12/27/2022 2107)  Absence of infection during hospitalization:   Assess and monitor for signs and symptoms of infection   Monitor lab/diagnostic results   Monitor all insertion sites i.e., indwelling lines, tubes and drains   Monitor endotracheal (as able) and nasal secretions for changes in amount and color   Centre appropriate cooling/warming therapies per order   Administer medications as ordered  Goal: Absence of fever/infection during anticipated neutropenic period  12/28/2022 1128 by Ori Avery RN  Outcome: Progressing  Flowsheets  Taken 12/28/2022 0915 by Ori Avery RN  Absence of fever/infection during anticipated neutropenic period: Monitor white blood cell count  Taken 12/27/2022 2221 by Ramesh You RN  Absence of fever/infection during anticipated neutropenic period:   Monitor white blood cell count   Administer growth factors as ordered   Implement neutropenic guidelines  12/27/2022 2221 by Ramesh You RN  Outcome: Progressing  Flowsheets  Taken 12/27/2022 2221  Absence of fever/infection during anticipated neutropenic period:   Monitor white blood cell count   Administer growth factors as ordered   Implement neutropenic guidelines  Taken 12/27/2022 2107  Absence of fever/infection during anticipated neutropenic period:   Monitor white blood cell count   Administer growth factors as ordered   Implement neutropenic guidelines  Goal: Isolation precautions  Description: Isolation precautions  Outcome: Progressing     Problem: Respiratory - Adult  Goal: Achieves optimal ventilation and oxygenation  12/28/2022 1128 by Tasneem Henderson RN  Outcome: Progressing  12/28/2022 1044 by Linda Armas RCP  Outcome: Progressing  Flowsheets (Taken 12/28/2022 0915 by Tasneem Henderson RN)  Achieves optimal ventilation and oxygenation:   Assess for changes in respiratory status   Assess for changes in mentation and behavior  12/27/2022 2221 by Audrey Zheng RN  Outcome: Progressing  Flowsheets (Taken 12/27/2022 2107)  Achieves optimal ventilation and oxygenation:   Assess for changes in respiratory status   Assess for changes in mentation and behavior   Position to facilitate oxygenation and minimize respiratory effort   Oxygen supplementation based on oxygen saturation or arterial blood gases     Problem: Neurosensory - Adult  Goal: Achieves stable or improved neurological status  12/28/2022 1128 by Tasneem Henderson RN  Outcome: Progressing  Flowsheets (Taken 12/28/2022 0915)  Achieves stable or improved neurological status:   Assess for and report changes in neurological status   Initiate measures to prevent increased intracranial pressure   Maintain blood pressure and fluid volume within ordered parameters to optimize cerebral perfusion and minimize risk of hemorrhage  12/27/2022 2221 by Audrey Zheng RN  Outcome: Progressing  Flowsheets (Taken 12/27/2022 2107)  Achieves stable or improved neurological status:   Assess for and report changes in neurological status   Initiate measures to prevent increased intracranial pressure   Maintain blood pressure and fluid volume within ordered parameters to optimize cerebral perfusion and minimize risk of hemorrhage   Monitor temperature, glucose, and sodium.  Initiate appropriate interventions as ordered  Goal: Absence of seizures  12/28/2022 1128 by Tasneem Henderson RN  Outcome: Progressing  12/27/2022 2221 by Audrey Zheng RN  Outcome: Progressing  Flowsheets (Taken 12/27/2022 2107)  Absence of seizures:   Monitor for seizure activity.   If seizure occurs, document type and location of movements and any associated apnea   If seizure occurs, turn head to side and suction secretions as needed   Administer anticonvulsants as ordered  Goal: Remains free of injury related to seizures activity  12/28/2022 1128 by Neema Cantrell RN  Outcome: Progressing  12/27/2022 2221 by Debbi Biswas RN  Outcome: Progressing  Flowsheets (Taken 12/27/2022 2107)  Remains free of injury related to seizure activity:   Maintain airway, patient safety  and administer oxygen as ordered   Monitor patient for seizure activity, document and report duration and description of seizure to Licensed Independent Practitioner   If seizure occurs, turn patient to side and suction secretions as needed  Goal: Achieves maximal functionality and self care  12/28/2022 1128 by Neema Cantrell RN  Outcome: Progressing  12/27/2022 2221 by Debbi Biswas RN  Outcome: Progressing  Flowsheets (Taken 12/27/2022 2107)  Achieves maximal functionality and self care:   Monitor swallowing and airway patency with patient fatigue and changes in neurological status   Encourage and assist patient to increase activity and self care with guidance from physical therapy/occupational therapy   Encourage visually impaired, hearing impaired and aphasic patients to use assistive/communication devices     Problem: Musculoskeletal - Adult  Goal: Return mobility to safest level of function  12/28/2022 1128 by Neema Cantrell RN  Outcome: Progressing  Flowsheets (Taken 12/28/2022 0915)  Return Mobility to Safest Level of Function:   Assess patient stability and activity tolerance for standing, transferring and ambulating with or without assistive devices   Assist with transfers and ambulation using safe patient handling equipment as needed   Instruct patient/family in ordered activity level  12/27/2022 2221 by Arvid Simmonds, RN  Outcome: Progressing  Flowsheets (Taken 12/27/2022 2107)  Return Mobility to Safest Level of Function:   Assess patient stability and activity tolerance for standing, transferring and ambulating with or without assistive devices   Assist with transfers and ambulation using safe patient handling equipment as needed   Ensure adequate protection for wounds/incisions during mobilization   Obtain physical therapy/occupational therapy consults as needed   Apply continuous passive motion per provider or physical therapy orders to increase flexion toward goal   Instruct patient/family in ordered activity level  Goal: Maintain proper alignment of affected body part  12/28/2022 1128 by Darrell Alatorre RN  Outcome: Progressing  Flowsheets (Taken 12/28/2022 0915)  Maintain proper alignment of affected body part: Support and protect limb and body alignment per provider's orders  12/27/2022 2221 by Arvid Simmonds, RN  Outcome: Progressing  Flowsheets (Taken 12/27/2022 2107)  Maintain proper alignment of affected body part:   Support and protect limb and body alignment per provider's orders   Instruct and reinforce with patient and family use of appropriate assistive device and precautions (e.g. spinal or hip dislocation precautions)  Goal: Return ADL status to a safe level of function  12/28/2022 1128 by Darrell Alatorre RN  Outcome: Progressing  Flowsheets (Taken 12/28/2022 0915)  Return ADL Status to a Safe Level of Function:   Administer medication as ordered   Assess activities of daily living deficits and provide assistive devices as needed  12/27/2022 2221 by Arvid Simmonds, RN  Outcome: Progressing  Flowsheets (Taken 12/27/2022 2107)  Return ADL Status to a Safe Level of Function:   Administer medication as ordered   Assess activities of daily living deficits and provide assistive devices as needed   Obtain physical therapy/occupational therapy consults as needed   Assist and instruct patient to increase activity and self care as tolerated     Problem: Genitourinary - Adult  Goal: Absence of urinary retention  12/28/2022 1128 by Autumn Philip RN  Outcome: Progressing  Flowsheets (Taken 12/28/2022 0915)  Absence of urinary retention:   Assess patients ability to void and empty bladder   Monitor intake/output and perform bladder scan as needed  12/27/2022 2221 by Ranulfo Britton RN  Outcome: Progressing  Flowsheets (Taken 12/27/2022 2107)  Absence of urinary retention:   Assess patients ability to void and empty bladder   Monitor intake/output and perform bladder scan as needed  Goal: Urinary catheter remains patent  12/28/2022 1128 by Autumn Philip RN  Outcome: Progressing  Flowsheets (Taken 12/28/2022 0915)  Urinary catheter remains patent: Assess patency of urinary catheter  12/27/2022 2221 by Ranulfo Britton RN  Outcome: Progressing  Flowsheets (Taken 12/27/2022 2107)  Urinary catheter remains patent:   Assess patency of urinary catheter   Irrigate catheter per Licensed Independent Practitioner order if indicated and notify Licensed Independent Practitioner if unable to irrigate   Assess need for a larger catheter size or a 3-way catheter for continuous bladder irrigation     Problem: Metabolic/Fluid and Electrolytes - Adult  Goal: Electrolytes maintained within normal limits  12/28/2022 1128 by Autumn Philip RN  Outcome: Progressing  Flowsheets (Taken 12/28/2022 0915)  Electrolytes maintained within normal limits:   Monitor labs and assess patient for signs and symptoms of electrolyte imbalances   Administer electrolyte replacement as ordered  12/27/2022 2221 by Ranulfo Britton RN  Outcome: Progressing  Flowsheets (Taken 12/27/2022 2107)  Electrolytes maintained within normal limits:   Monitor labs and assess patient for signs and symptoms of electrolyte imbalances   Administer electrolyte replacement as ordered   Monitor response to electrolyte replacements, including repeat lab results as appropriate  Goal: Hemodynamic stability and optimal renal function maintained  12/28/2022 1128 by Shavonne Kunz RN  Outcome: Progressing  Flowsheets (Taken 12/28/2022 0915)  Hemodynamic stability and optimal renal function maintained:   Monitor labs and assess for signs and symptoms of volume excess or deficit   Monitor intake, output and patient weight  12/27/2022 2221 by Rosas Gonzalez RN  Outcome: Progressing  Flowsheets (Taken 12/27/2022 2107)  Hemodynamic stability and optimal renal function maintained:   Monitor labs and assess for signs and symptoms of volume excess or deficit   Monitor intake, output and patient weight   Monitor urine specific gravity, serum osmolarity and serum sodium as indicated or ordered   Monitor response to interventions for patient's volume status, including labs, urine output, blood pressure (other measures as available)   Encourage oral intake as appropriate   Instruct patient on fluid and nutrition restrictions as appropriate  Goal: Glucose maintained within prescribed range  12/28/2022 1128 by Shavonne Kunz RN  Outcome: Progressing  Flowsheets (Taken 12/28/2022 0915)  Glucose maintained within prescribed range:   Monitor blood glucose as ordered   Assess for signs and symptoms of hyperglycemia and hypoglycemia  12/27/2022 2221 by Rosas Gonzalez RN  Outcome: Progressing  Flowsheets (Taken 12/27/2022 2107)  Glucose maintained within prescribed range:   Monitor blood glucose as ordered   Assess for signs and symptoms of hyperglycemia and hypoglycemia   Administer ordered medications to maintain glucose within target range   Assess barriers to adequate nutritional intake and initiate nutrition consult as needed   Instruct patient on self management of diabetes and initiate consult as needed     Problem: Hematologic - Adult  Goal: Maintains hematologic stability  12/28/2022 1128 by Shavonne Kunz RN  Outcome: Progressing  Flowsheets (Taken 12/28/2022 0915)  Maintains hematologic stability:   Assess for signs and symptoms of bleeding or hemorrhage   Monitor labs for bleeding or clotting disorders  12/27/2022 2221 by Nabor Perez RN  Outcome: Progressing  Flowsheets (Taken 12/27/2022 2107)  Maintains hematologic stability:   Assess for signs and symptoms of bleeding or hemorrhage   Monitor labs for bleeding or clotting disorders   Administer blood products/factors as ordered     Problem: Skin/Tissue Integrity - Adult  Goal: Skin integrity remains intact  12/28/2022 1128 by Celina Hoffmann RN  Outcome: Progressing  Flowsheets  Taken 12/28/2022 0915 by Celina Hoffmann RN  Skin Integrity Remains Intact:   Monitor for areas of redness and/or skin breakdown   Assess vascular access sites hourly   Every 4-6 hours minimum: Change oxygen saturation probe site  Taken 12/27/2022 2225 by Nabor Perez RN  Skin Integrity Remains Intact: Monitor for areas of redness and/or skin breakdown  Taken 12/27/2022 2221 by Nabor Perez RN  Skin Integrity Remains Intact: Monitor for areas of redness and/or skin breakdown  12/27/2022 2221 by Nabor Perez RN  Outcome: Progressing  Flowsheets  Taken 12/27/2022 2221  Skin Integrity Remains Intact: Monitor for areas of redness and/or skin breakdown  Taken 12/27/2022 2107  Skin Integrity Remains Intact: Monitor for areas of redness and/or skin breakdown  Goal: Incisions, wounds, or drain sites healing without S/S of infection  12/28/2022 1128 by Cleina Hoffmann RN  Outcome: Progressing  Flowsheets  Taken 12/28/2022 0915 by Celina Hoffmann RN  Incisions, Wounds, or Drain Sites Healing Without Sign and Symptoms of Infection:   ADMISSION and DAILY: Assess and document risk factors for pressure ulcer development   TWICE DAILY: Assess and document dressing/incision, wound bed, drain sites and surrounding tissue   TWICE DAILY: Assess and document skin integrity  Taken 12/27/2022 2225 by Nabor Perez RN  Incisions, Wounds, or Drain Sites Healing Without Sign and Symptoms of Infection: ADMISSION and DAILY: Assess and document risk factors for pressure ulcer development  Taken 12/27/2022 2221 by Ja Ashby RN  Incisions, Wounds, or Drain Sites Healing Without Sign and Symptoms of Infection: ADMISSION and DAILY: Assess and document risk factors for pressure ulcer development  12/27/2022 2221 by Ja Ashby RN  Outcome: Progressing  Flowsheets  Taken 12/27/2022 2221  Incisions, Wounds, or Drain Sites Healing Without Sign and Symptoms of Infection: ADMISSION and DAILY: Assess and document risk factors for pressure ulcer development  Taken 12/27/2022 2107  Incisions, Wounds, or Drain Sites Healing Without Sign and Symptoms of Infection: ADMISSION and DAILY: Assess and document risk factors for pressure ulcer development  Goal: Oral mucous membranes remain intact  12/28/2022 1128 by Jeni Caruso RN  Outcome: Progressing  Flowsheets  Taken 12/28/2022 0915 by Jeni Caruso RN  Oral Mucous Membranes Remain Intact: Assess oral mucosa and hygiene practices  Taken 12/27/2022 2225 by Ja Ashby RN  Oral Mucous Membranes Remain Intact:   Assess oral mucosa and hygiene practices   Implement preventative oral hygiene regimen   Implement oral medicated treatments as ordered  Taken 12/27/2022 2221 by Ja Ashby RN  Oral Mucous Membranes Remain Intact:   Assess oral mucosa and hygiene practices   Implement preventative oral hygiene regimen   Implement oral medicated treatments as ordered  12/27/2022 2221 by Ja Ashby RN  Outcome: Progressing  Flowsheets  Taken 12/27/2022 2221  Oral Mucous Membranes Remain Intact:   Assess oral mucosa and hygiene practices   Implement preventative oral hygiene regimen   Implement oral medicated treatments as ordered  Taken 12/27/2022 2107  Oral Mucous Membranes Remain Intact:   Assess oral mucosa and hygiene practices   Implement preventative oral hygiene regimen   Implement oral medicated treatments as ordered Problem: Nutrition Deficit:  Goal: Optimize nutritional status  12/28/2022 1128 by Hilda Najera RN  Outcome: Progressing  Flowsheets (Taken 12/27/2022 2221 by Marina Alexander RN)  Nutrient intake appropriate for improving, restoring, or maintaining nutritional needs:   Assess nutritional status and recommend course of action   Monitor oral intake, labs, and treatment plans   Recommend appropriate diets, oral nutritional supplements, and vitamin/mineral supplements  12/27/2022 2221 by Marina Alexander RN  Outcome: Progressing  Flowsheets (Taken 12/27/2022 2221)  Nutrient intake appropriate for improving, restoring, or maintaining nutritional needs:   Assess nutritional status and recommend course of action   Monitor oral intake, labs, and treatment plans   Recommend appropriate diets, oral nutritional supplements, and vitamin/mineral supplements   Care plan reviewed with patient. Patient verbalize understanding of the plan of care and contribute to goal setting.

## 2022-12-28 NOTE — FLOWSHEET NOTE
12/28/22 1009   Safe Environment   Safety Measures Other (comment)  (Virtual nurse round complete)   Virtual nurse introduced via audio. Patient agrees to camera. Patient in bed, awake. O2 via NC noted. Patient denies any needs at this time. Call light within reach.

## 2022-12-28 NOTE — PLAN OF CARE
Problem: Respiratory - Adult  Goal: Achieves optimal ventilation and oxygenation  12/28/2022 1044 by Nunu Olsen RCP  Outcome: Progressing   Pt does metaneb via mouthpiece-- duoeb q4 w/a  Sodium chloride  q4 w/a  Pt uses acapella with encouragement    Chest therapy- vest  Patient mutually agreed on goals. 17-Apr-2019

## 2022-12-28 NOTE — CARE COORDINATION
Collaborative Discharge Planning    Claudell Caves  :  1955  MRN:  [id number]    ADMIT DATE:  2022      Discharge Planning Discharge Planning  Type of Residence: Long-Term Care  Living Arrangements: Other (Comment) (Nursing home)  Support Systems: Family Members  Current Services Prior To Admission: Long Term Acute Care  Potential Assistance Needed: N/A  Type of Home Care Services: None  Patient expects to be discharged to[de-identified] Long-term care  White Board Notes /Social Work Whiteboard Notes  /Social Work Whiteboard: ; SW - Return to [facility]. No precert.     Discharge Plan SNF  plans return [facility] when medically cleared; has diverting ostomy, chronic SPC (changed)  Discharge Milestones and Delays: Clinical status    PNA/E.COLI UTI  History: Multiple Sclerosis, Paraplegia   Bronchoscopy; mucus plugs removed  IV AB, Oxygen 3L continued    SIGNED:  Nayeli Alcocer RN   2022, 8:55 AM

## 2022-12-28 NOTE — PLAN OF CARE
Problem: Discharge Planning  Goal: Discharge to home or other facility with appropriate resources  12/27/2022 2221 by Nadira Mittal RN  Outcome: Darrel Armstrong (Taken 12/27/2022 2107)  Discharge to home or other facility with appropriate resources:   Identify barriers to discharge with patient and caregiver   Arrange for needed discharge resources and transportation as appropriate   Identify discharge learning needs (meds, wound care, etc)     Problem: Skin/Tissue Integrity  Goal: Absence of new skin breakdown  Description: 1. Monitor for areas of redness and/or skin breakdown  2. Assess vascular access sites hourly  3. Every 4-6 hours minimum:  Change oxygen saturation probe site  4. Every 4-6 hours:  If on nasal continuous positive airway pressure, respiratory therapy assess nares and determine need for appliance change or resting period. 12/27/2022 2221 by Nadira Mittal RN  Outcome: Progressing  Note: No s/s of new skin breakdown. Will continue to monitor. Problem: Safety - Adult  Goal: Free from fall injury  12/27/2022 2221 by Nadira Mittal RN  Outcome: Progressing  Flowsheets (Taken 12/27/2022 2221)  Free From Fall Injury:   Priscilla Maya family/caregiver on patient safety   Based on caregiver fall risk screen, instruct family/caregiver to ask for assistance with transferring infant if caregiver noted to have fall risk factors     Problem: Safety - Adult  Goal: Isolation precautions  Description: Isolation precautions, patient airborne and contact   12/27/2022 2221 by Nadira Mittal RN  Outcome: Progressing  Note: Contact precautions in place.       Problem: Pain  Goal: Verbalizes/displays adequate comfort level or baseline comfort level  12/27/2022 2221 by Nadira Mittal RN  Outcome: Progressing  Flowsheets (Taken 12/27/2022 2221)  Verbalizes/displays adequate comfort level or baseline comfort level:   Encourage patient to monitor pain and request assistance   Administer analgesics based on type and severity of pain and evaluate response   Consider cultural and social influences on pain and pain management   Assess pain using appropriate pain scale   Implement non-pharmacological measures as appropriate and evaluate response   Notify Licensed Independent Practitioner if interventions unsuccessful or patient reports new pain     Problem: ABCDS Injury Assessment  Goal: Absence of physical injury  12/27/2022 2221 by Joi Finley RN  Outcome: Progressing  Flowsheets (Taken 12/27/2022 2221)  Absence of Physical Injury: Implement safety measures based on patient assessment     Problem: Chronic Conditions and Co-morbidities  Goal: Patient's chronic conditions and co-morbidity symptoms are monitored and maintained or improved  12/27/2022 2221 by Joi Finley RN  Outcome: Progressing  Flowsheets  Taken 12/27/2022 [address] - Patient's Chronic Conditions and Co-Morbidity Symptoms are Monitored and Maintained or Improved:   Monitor and assess patient's chronic conditions and comorbid symptoms for stability, deterioration, or improvement   Collaborate with multidisciplinary team to address chronic and comorbid conditions and prevent exacerbation or deterioration   Update acute care plan with appropriate goals if chronic or comorbid symptoms are exacerbated and prevent overall improvement and discharge  Taken 12/27/2022 2107  Care Plan - Patient's Chronic Conditions and Co-Morbidity Symptoms are Monitored and Maintained or Improved:   Monitor and assess patient's chronic conditions and comorbid symptoms for stability, deterioration, or improvement   Collaborate with multidisciplinary team to address chronic and comorbid conditions and prevent exacerbation or deterioration   Update acute care plan with appropriate goals if chronic or comorbid symptoms are exacerbated and prevent overall improvement and discharge     Problem: Infection - Adult  Goal: Absence of infection at discharge  12/27/2022 2221 by [facility]  GALLITO Kenny Doe RN  Outcome: Progressing  Flowsheets (Taken 12/27/2022 2107)  Absence of infection at discharge:   Assess and monitor for signs and symptoms of infection   Monitor lab/diagnostic results   Monitor all insertion sites i.e., indwelling lines, tubes and drains   Monitor endotracheal (as able) and nasal secretions for changes in amount and color   [place] appropriate cooling/warming therapies per order   Administer medications as ordered     Problem: Infection - Adult  Goal: Absence of infection during hospitalization  12/27/2022 2221 by Ramy Cornell RN  Outcome: Progressing  Flowsheets (Taken 12/27/2022 2107)  Absence of infection during hospitalization:   Assess and monitor for signs and symptoms of infection   Monitor lab/diagnostic results   Monitor all insertion sites i.e., indwelling lines, tubes and drains   Monitor endotracheal (as able) and nasal secretions for changes in amount and color   [place] appropriate cooling/warming therapies per order   Administer medications as ordered     Problem: Infection - Adult  Goal: Absence of fever/infection during anticipated neutropenic period  12/27/2022 2221 by Ramy Cornell RN  Outcome: Progressing  Flowsheets  Taken 12/27/2022 2221  Absence of fever/infection during anticipated neutropenic period:   Monitor white blood cell count   Administer growth factors as ordered   Implement neutropenic guidelines  Taken 12/27/2022 2107  Absence of fever/infection during anticipated neutropenic period:   Monitor white blood cell count   Administer growth factors as ordered   Implement neutropenic guidelines     Problem: Respiratory - Adult  Goal: Achieves optimal ventilation and oxygenation  12/27/2022 2221 by Ramy Cornell RN  Outcome: Progressing  Flowsheets (Taken 12/27/2022 2107)  Achieves optimal ventilation and oxygenation:   Assess for changes in respiratory status   Assess for changes in mentation and behavior   Position to facilitate oxygenation and minimize respiratory effort   Oxygen supplementation based on oxygen saturation or arterial blood gases     Problem: Neurosensory - Adult  Goal: Achieves stable or improved neurological status  12/27/2022 2221 by Idalia Obrien RN  Outcome: Progressing  Flowsheets (Taken 12/27/2022 2107)  Achieves stable or improved neurological status:   Assess for and report changes in neurological status   Initiate measures to prevent increased intracranial pressure   Maintain blood pressure and fluid volume within ordered parameters to optimize cerebral perfusion and minimize risk of hemorrhage   Monitor temperature, glucose, and sodium. Initiate appropriate interventions as ordered     Problem: Neurosensory - Adult  Goal: Absence of seizures  12/27/2022 2221 by Idalia Obrien RN  Outcome: Progressing  Flowsheets (Taken 12/27/2022 2107)  Absence of seizures:   Monitor for seizure activity.   If seizure occurs, document type and location of movements and any associated apnea   If seizure occurs, turn head to side and suction secretions as needed   Administer anticonvulsants as ordered     Problem: Neurosensory - Adult  Goal: Remains free of injury related to seizures activity  12/27/2022 2221 by Idalia Obrien RN  Outcome: Progressing  Flowsheets (Taken 12/27/2022 2107)  Remains free of injury related to seizure activity:   Maintain airway, patient safety  and administer oxygen as ordered   Monitor patient for seizure activity, document and report duration and description of seizure to Licensed Independent Practitioner   If seizure occurs, turn patient to side and suction secretions as needed     Problem: Neurosensory - Adult  Goal: Achieves maximal functionality and self care  12/27/2022 2221 by Idalia Obrien RN  Outcome: Progressing  Flowsheets (Taken 12/27/2022 2107)  Achieves maximal functionality and self care:   Monitor swallowing and airway patency with patient fatigue and changes in neurological status   Encourage and assist patient to increase activity and self care with guidance from physical therapy/occupational therapy   Encourage visually impaired, hearing impaired and aphasic patients to use assistive/communication devices     Problem: Musculoskeletal - Adult  Goal: Return mobility to safest level of function  12/27/2022 2221 by Idalia Obrien RN  Outcome: Progressing  Flowsheets (Taken 12/27/2022 2107)  Return Mobility to Safest Level of Function:   Assess patient stability and activity tolerance for standing, transferring and ambulating with or without assistive devices   Assist with transfers and ambulation using safe patient handling equipment as needed   Ensure adequate protection for wounds/incisions during mobilization   Obtain physical therapy/occupational therapy consults as needed   Apply continuous passive motion per provider or physical therapy orders to increase flexion toward goal   Instruct patient/family in ordered activity level     Problem: Musculoskeletal - Adult  Goal: Maintain proper alignment of affected body part  12/27/2022 2221 by Idalia Obrien RN  Outcome: Progressing  Flowsheets (Taken 12/27/2022 2107)  Maintain proper alignment of affected body part:   Support and protect limb and body alignment per provider's orders   Instruct and reinforce with patient and family use of appropriate assistive device and precautions (e.g. spinal or hip dislocation precautions)     Problem: Musculoskeletal - Adult  Goal: Return ADL status to a safe level of function  12/27/2022 2221 by Idalia Obrien RN  Outcome: Progressing  Flowsheets (Taken 12/27/2022 2107)  Return ADL Status to a Safe Level of Function:   Administer medication as ordered   Assess activities of daily living deficits and provide assistive devices as needed   Obtain physical therapy/occupational therapy consults as needed   Assist and instruct patient to increase activity and self care as tolerated     Problem: Genitourinary - Adult  Goal: Absence of urinary retention  12/27/2022 2221 by Idalia Obrien RN  Outcome: Progressing  Flowsheets (Taken 12/27/2022 2107)  Absence of urinary retention:   Assess patients ability to void and empty bladder   Monitor intake/output and perform bladder scan as needed     Problem: Genitourinary - Adult  Goal: Urinary catheter remains patent  12/27/2022 2221 by Idalia Obrien RN  Outcome: Progressing  Flowsheets (Taken 12/27/2022 2107)  Urinary catheter remains patent:   Assess patency of urinary catheter   Irrigate catheter per Licensed Independent Practitioner order if indicated and notify Licensed Independent Practitioner if unable to irrigate   Assess need for a larger catheter size or a 3-way catheter for continuous bladder irrigation     Problem: Metabolic/Fluid and Electrolytes - Adult  Goal: Electrolytes maintained within normal limits  12/27/2022 2221 by Idalia Obrien RN  Outcome: Progressing  Flowsheets (Taken 12/27/2022 2107)  Electrolytes maintained within normal limits:   Monitor labs and assess patient for signs and symptoms of electrolyte imbalances   Administer electrolyte replacement as ordered   Monitor response to electrolyte replacements, including repeat lab results as appropriate     Problem: Metabolic/Fluid and Electrolytes - Adult  Goal: Hemodynamic stability and optimal renal function maintained  12/27/2022 2221 by Idalia Obrien RN  Outcome: Progressing  Flowsheets (Taken 12/27/2022 2107)  Hemodynamic stability and optimal renal function maintained:   Monitor labs and assess for signs and symptoms of volume excess or deficit   Monitor intake, output and patient weight   Monitor urine specific gravity, serum osmolarity and serum sodium as indicated or ordered   Monitor response to interventions for patient's volume status, including labs, urine output, blood pressure (other measures as available)   Encourage oral intake as appropriate   Instruct patient on fluid and nutrition restrictions as appropriate Problem: Metabolic/Fluid and Electrolytes - Adult  Goal: Glucose maintained within prescribed range  12/27/2022 2221 by Moise Claros RN  Outcome: Progressing  Flowsheets (Taken 12/27/2022 2107)  Glucose maintained within prescribed range:   Monitor blood glucose as ordered   Assess for signs and symptoms of hyperglycemia and hypoglycemia   Administer ordered medications to maintain glucose within target range   Assess barriers to adequate nutritional intake and initiate nutrition consult as needed   Instruct patient on self management of diabetes and initiate consult as needed     Problem: Hematologic - Adult  Goal: Maintains hematologic stability  12/27/2022 2221 by Moise Claros RN  Outcome: Progressing  Flowsheets (Taken 12/27/2022 2107)  Maintains hematologic stability:   Assess for signs and symptoms of bleeding or hemorrhage   Monitor labs for bleeding or clotting disorders   Administer blood products/factors as ordered     Problem: Skin/Tissue Integrity - Adult  Goal: Skin integrity remains intact  12/27/2022 2221 by Moise Claros RN  Outcome: Progressing  Flowsheets  Taken 12/27/2022 2221  Skin Integrity Remains Intact: Monitor for areas of redness and/or skin breakdown  Taken 12/27/2022 2107  Skin Integrity Remains Intact: Monitor for areas of redness and/or skin breakdown     Problem: Skin/Tissue Integrity - Adult  Goal: Incisions, wounds, or drain sites healing without S/S of infection  12/27/2022 2221 by Moise Claros RN  Outcome: Progressing  Flowsheets  Taken 12/27/2022 2221  Incisions, Wounds, or Drain Sites Healing Without Sign and Symptoms of Infection: ADMISSION and DAILY: Assess and document risk factors for pressure ulcer development  Taken 12/27/2022 2107  Incisions, Wounds, or Drain Sites Healing Without Sign and Symptoms of Infection: ADMISSION and DAILY: Assess and document risk factors for pressure ulcer development     Problem: Skin/Tissue Integrity - Adult  Goal: Oral mucous membranes remain intact  12/27/2022 2221 by Ramesh You RN  Outcome: Progressing  Flowsheets  Taken 12/27/2022 2221  Oral Mucous Membranes Remain Intact:   Assess oral mucosa and hygiene practices   Implement preventative oral hygiene regimen   Implement oral medicated treatments as ordered  Taken 12/27/2022 2107  Oral Mucous Membranes Remain Intact:   Assess oral mucosa and hygiene practices   Implement preventative oral hygiene regimen   Implement oral medicated treatments as ordered     Problem: Nutrition Deficit:  Goal: Optimize nutritional status  12/27/2022 2221 by Ramesh You RN  Outcome: Progressing  Flowsheets (Taken 12/27/2022 2221)  Nutrient intake appropriate for improving, restoring, or maintaining nutritional needs:   Assess nutritional status and recommend course of action   Monitor oral intake, labs, and treatment plans   Recommend appropriate diets, oral nutritional supplements, and vitamin/mineral supplements    Care plan reviewed with patient. Patient verbalize understanding of the plan of care and contribute to goal setting.

## 2022-12-29 VITALS
WEIGHT: 196.21 LBS | TEMPERATURE: 97.9 F | RESPIRATION RATE: 18 BRPM | BODY MASS INDEX: 30.8 KG/M2 | SYSTOLIC BLOOD PRESSURE: 113 MMHG | DIASTOLIC BLOOD PRESSURE: 58 MMHG | HEART RATE: 86 BPM | OXYGEN SATURATION: 94 % | HEIGHT: 67 IN

## 2022-12-29 LAB
BLOOD CULTURE, ROUTINE: NORMAL
BLOOD CULTURE, ROUTINE: NORMAL
HERPES SIMPLEX VIRUS SUBTYPE SOURCE: NORMAL
HSV 1 SUBTYPE BY PCR: NOT DETECTED
HSV 2 SUBTYPE BY PCR: NOT DETECTED
MISC. #1 REFERENCE GROUP TEST: NORMAL

## 2022-12-29 PROCEDURE — 6360000002 HC RX W HCPCS: Performed by: STUDENT IN AN ORGANIZED HEALTH CARE EDUCATION/TRAINING PROGRAM

## 2022-12-29 PROCEDURE — 6370000000 HC RX 637 (ALT 250 FOR IP): Performed by: INTERNAL MEDICINE

## 2022-12-29 PROCEDURE — 6370000000 HC RX 637 (ALT 250 FOR IP): Performed by: STUDENT IN AN ORGANIZED HEALTH CARE EDUCATION/TRAINING PROGRAM

## 2022-12-29 PROCEDURE — 94669 MECHANICAL CHEST WALL OSCILL: CPT

## 2022-12-29 PROCEDURE — 2580000003 HC RX 258: Performed by: INTERNAL MEDICINE

## 2022-12-29 PROCEDURE — 94761 N-INVAS EAR/PLS OXIMETRY MLT: CPT

## 2022-12-29 PROCEDURE — 6360000002 HC RX W HCPCS: Performed by: INTERNAL MEDICINE

## 2022-12-29 PROCEDURE — 92526 ORAL FUNCTION THERAPY: CPT

## 2022-12-29 PROCEDURE — 94640 AIRWAY INHALATION TREATMENT: CPT

## 2022-12-29 RX ORDER — DOXYCYCLINE HYCLATE 100 MG
100 TABLET ORAL EVERY 12 HOURS SCHEDULED
Qty: 10 TABLET | Refills: 0 | Status: SHIPPED | OUTPATIENT
Start: 2022-12-29 | End: 2023-01-03

## 2022-12-29 RX ORDER — AMOXICILLIN AND CLAVULANATE POTASSIUM 875; 125 MG/1; MG/1
1 TABLET, FILM COATED ORAL 2 TIMES DAILY
Qty: 10 TABLET | Refills: 0 | Status: SHIPPED | OUTPATIENT
Start: 2022-12-29 | End: 2023-01-03

## 2022-12-29 RX ORDER — AMOXICILLIN AND CLAVULANATE POTASSIUM 875; 125 MG/1; MG/1
1 TABLET, FILM COATED ORAL 2 TIMES DAILY
Qty: 10 TABLET | Refills: 0 | Status: SHIPPED | OUTPATIENT
Start: 2022-12-29 | End: 2022-12-29 | Stop reason: SDUPTHER

## 2022-12-29 RX ADMIN — OXCARBAZEPINE 300 MG: 300 TABLET, FILM COATED ORAL at 08:30

## 2022-12-29 RX ADMIN — CEFEPIME 2000 MG: 2 INJECTION, POWDER, FOR SOLUTION INTRAVENOUS at 06:33

## 2022-12-29 RX ADMIN — Medication 1 TABLET: at 08:30

## 2022-12-29 RX ADMIN — IPRATROPIUM BROMIDE AND ALBUTEROL SULFATE 1 AMPULE: .5; 3 SOLUTION RESPIRATORY (INHALATION) at 09:17

## 2022-12-29 RX ADMIN — SODIUM CHLORIDE SOLN NEBU 3% 4 ML: 3 NEBU SOLN at 09:17

## 2022-12-29 RX ADMIN — GABAPENTIN 600 MG: 600 TABLET, FILM COATED ORAL at 12:21

## 2022-12-29 RX ADMIN — GABAPENTIN 600 MG: 600 TABLET, FILM COATED ORAL at 08:30

## 2022-12-29 RX ADMIN — GUAIFENESIN 600 MG: 600 TABLET, EXTENDED RELEASE ORAL at 08:30

## 2022-12-29 RX ADMIN — ENOXAPARIN SODIUM 40 MG: 100 INJECTION SUBCUTANEOUS at 08:30

## 2022-12-29 RX ADMIN — FAMOTIDINE 20 MG: 20 TABLET ORAL at 08:30

## 2022-12-29 RX ADMIN — OXYBUTYNIN CHLORIDE 5 MG: 5 TABLET, EXTENDED RELEASE ORAL at 08:30

## 2022-12-29 RX ADMIN — DOXYCYCLINE HYCLATE 100 MG: 100 TABLET, COATED ORAL at 08:30

## 2022-12-29 RX ADMIN — BACLOFEN 10 MG: 10 TABLET ORAL at 12:21

## 2022-12-29 RX ADMIN — BACLOFEN 10 MG: 10 TABLET ORAL at 08:30

## 2022-12-29 NOTE — RT PROTOCOL NOTE
RT Inhaler-Nebulizer Bronchodilator Protocol Note    There is a bronchodilator order in the chart from a provider indicating to follow the RT Bronchodilator Protocol and there is an Initiate RT Inhaler-Nebulizer Bronchodilator Protocol order as well (see protocol at bottom of note). CXR Findings:  XR CHEST PORTABLE    Result Date: 12/27/2022  1. Very poor inflation of lungs. The heart is obscured but appears to be mildly enlarged. 2. Mild bibasilar atelectasis/pneumonia, worse on the right. 3. Overall appearance of chest improved from prior. **This report has been created using voice recognition software. It may contain minor errors which are inherent in voice recognition technology. ** Final report electronically signed by Dr. Ricardo Keane on 12/27/2022 10:42 AM      The findings from the last RT Protocol Assessment were as follows:   History Pulmonary Disease: None or smoker <15 pack years  Respiratory Pattern: Dyspnea on exertion or RR 21-25 bpm  Breath Sounds: Slightly diminished and/or crackles  Cough: Weak, non-productive  Indication for Bronchodilator Therapy: Decreased or absent breath sounds  Bronchodilator Assessment Score: 7    Aerosolized bronchodilator medication orders have been revised according to the RT Inhaler-Nebulizer Bronchodilator Protocol below. Respiratory Therapist to perform RT Therapy Protocol Assessment initially then follow the protocol. Repeat RT Therapy Protocol Assessment PRN for score 0-3 or on second treatment, BID, and PRN for scores above 3. No Indications - adjust the frequency to every 6 hours PRN wheezing or bronchospasm, if no treatments needed after 48 hours then discontinue using Per Protocol order mode. If indication present, adjust the RT bronchodilator orders based on the Bronchodilator Assessment Score as indicated below.   Use Inhaler orders unless patient has one or more of the following: on home nebulizer, not able to hold breath for 10 seconds, is not alert and oriented, cannot activate and use MDI correctly, or respiratory rate 25 breaths per minute or more, then use the equivalent nebulizer order(s) with same Frequency and PRN reasons based on the score. If a patient is on this medication at home then do not decrease Frequency below that used at home. 0-3 - enter or revise RT bronchodilator order(s) to equivalent RT Bronchodilator order with Frequency of every 4 hours PRN for wheezing or increased work of breathing using Per Protocol order mode. 4-6 - enter or revise RT Bronchodilator order(s) to two equivalent RT bronchodilator orders with one order with BID Frequency and one order with Frequency of every 4 hours PRN wheezing or increased work of breathing using Per Protocol order mode. 7-10 - enter or revise RT Bronchodilator order(s) to two equivalent RT bronchodilator orders with one order with TID Frequency and one order with Frequency of every 4 hours PRN wheezing or increased work of breathing using Per Protocol order mode. 11-13 - enter or revise RT Bronchodilator order(s) to one equivalent RT bronchodilator order with QID Frequency and an Albuterol order with Frequency of every 4 hours PRN wheezing or increased work of breathing using Per Protocol order mode. Greater than 13 - enter or revise RT Bronchodilator order(s) to one equivalent RT bronchodilator order with every 4 hours Frequency and an Albuterol order with Frequency of every 2 hours PRN wheezing or increased work of breathing using Per Protocol order mode. RT to enter RT Home Evaluation for COPD & MDI Assessment order using Per Protocol order mode.     Electronically signed by Juan Daniel Adler RCP on 12/28/2022 at 10:27 PM

## 2022-12-29 NOTE — PLAN OF CARE
Problem: Respiratory - Adult  Goal: Achieves optimal ventilation and oxygenation  12/29/2022 1034 by Guillermo Pham RCP  Outcome: Progressing  Patient mutually agreed on goals.

## 2022-12-29 NOTE — PROGRESS NOTES
[facility]      Patient - Diana Mendoza   MRN -  [id number]   [facility] # - [de-identified]   - 1955      Date of Admission -  2022 11:34 AM  Date of evaluation -  2022  Room - -07007-A   [address] [place] [address] Frankey Lagos, MD Primary Care Physician - Rosalba Pelaez MD     Problem List      Active Hospital Problems    Diagnosis Date Noted    Abnormal CT of the chest [R93.89] 2022     Priority: Medium    Pneumonia of right lower lobe due to infectious organism [J18.9] 2022     Priority: Medium    Acute respiratory failure with hypoxia ([address] [J96.01] 2022     Priority: Medium    Pleural effusion on right [J90] 2022     Priority: Medium    History of DVT (deep vein thrombosis) [Z86.718] 2022     Priority: Medium    History of ESBL E. coli infection [Z86.19] 2022     Priority: Medium    Elevated troponin [R77.8] 06/10/2019    Chronic indwelling Pompa catheter [Z97.8] 2012     Reason for Consult    For management of Pneumonia, post bronchoscopy  HPI   History Obtained From: Patient and electronic medical record. Diana Mendoza is a 74 y.o. male  was initially admitted under hospitalist service. Pulmonary medicine was consulted for further management of Pneumonia and to consider for bronchoscopy. He is a poor historian. Majority of the history obtained from reviewing the patient's chart. He is a 47-year-old pleasant gentleman with past medical history of multiple sclerosis, GERD, paraparesis of the bilateral lower extremities, hypertension, neurogenic bladder with a chronic Pompa, and history of DVT/pulmonary embolism presented initially to [facility] from a nursing home with a chief complaint of worsening of shortness of breath and cough of 2 days duration. Patient also had fever prior to the hospitalization at his nursing home. Patient required supplemental oxygenation.   He usually wears 2 L of oxygen at baseline at nursing home. At the time of initial evaluation by EMS patient oxygen saturation was found to be 80s. He was placed on nonrebreather mask. Patient COVID-19 test and a flu a and B test were negative. He is having cough: Yes-occasional  His cough is associated with sputum production: No   Hemoptysis:No  Diurnal variation: None. He gives a history of fever: No    He is having chest pain:No    He denies orthopnea or paroxysmal nocturnal dyspnea. During his initial work up he was found to have abnormal chest Xray with right side pneumonia. History of pulmonary tuberculosis in the past: No    Recent exposure to any patients with tuberculosis:No  Recent travel to endemic places of tuberculosis:No.  Prior PPD status: Unknown. Past 24 hours   -Bronchoscopy with mucus plug removal on   -Bronchial washing negative  -Improved cough, improved shortness of breath  -Patient feels much better  -Stable on 2L NC  -Afebrile  -All other systems reviewed and negative  PMHx   Past Medical History      Diagnosis Date    Dysphagia     oropharyngeal    History of pulmonary embolism     History of urinary tract infection     Hx of blood clots     Pulmonary embolis    Hypertension     Major depressive disorder, single episode     MS (multiple sclerosis) ([facility]     Neurogenic bladder 2012    Dr. Ana Rosa Davalos [facility])     UTI (urinary tract infection)      Sleep History    Never diagnosed with sleep apnea in the past    Social History     Social History     Tobacco Use    Smoking status: Former     Types: Cigarettes     Quit date: 1982     Years since quittin.0    Smokeless tobacco: Former   Vaping Use    Vaping Use: Never used   Substance Use Topics    Alcohol use: No     Comment: \"quit alcohol a few years ago\"    Drug use: No     Riview of systems   Negative unless otherwise stated above.   Vitals     height is 5' 7\" (1.702 m) and weight is 196 lb 3.4 oz (89 kg). His oral temperature is 97.6 °F (36.4 °C). His blood pressure is 136/71 and his pulse is 78. His respiration is 20 and oxygen saturation is 92%. Body mass index is 30.73 kg/m². SUPPLEMENTAL O2: O2 Flow Rate (L/min): 3 L/min     I/O      Intake/Output Summary (Last 24 hours) at 12/29/2022 0826  Last data filed at 12/29/2022 7486  Gross per 24 hour   Intake 580 ml   Output 1600 ml   Net -1020 ml     I/O last 3 completed shifts: In: 930 [P.O.:930]  Out: 2000 [Urine:2000]   Patient Vitals for the past 96 hrs (Last 3 readings):   Weight   12/29/22 0246 196 lb 3.4 oz (89 kg)   12/28/22 0325 197 lb 1.5 oz (89.4 kg)   12/27/22 0348 193 lb 12.6 oz (87.9 kg)       Exam   General Appearance: moderately built, moderately nourished in no acute distress on 2L NC.  HEENT: Normal, Head is normocephalic, atraumatic. Oropharynx is clear and moist.  No oral thrush. PERRL  Neck - Supple, No JVD present. No tracheal deviation. Lungs - Bilateral air entry present. Improved aeration, mild diminishing on breath sound in bases. No wheezes. No rales. Cardiovascular - Heart sounds are normal.  Regular rhythm normal rate without murmur, gallop or rub. Abdomen - Soft, nontender, nondistended, no masses or organomegaly. S/p diverting colostomy in place. Neurologic - Awake, alert, oriented. Patient able to move his both upper extremities, not able to move his lower extremities in both sides with limb contractures. Extremities - No cyanosis, clubbing or edema. Musculoskeletal: Moving both upper extremities for verbal commands  Lymphadenopathy:  No cervical adenopathy. Psychiatric: Patient  has a normal mood and affect. Skin - No bruising or bleeding.     Labs  - [facility] records and notes have been [facility]   ABG  Lab Results   Component Value Date/Time    PH 7.39 12/24/2022 10:05 PM    PO2 58 12/24/2022 10:05 PM    PCO2 45 12/24/2022 10:05 PM    HCO3 28 12/24/2022 10:05 PM    O2SAT 90 12/24/2022 10:05 PM     Lab Results   Component Value Date/Time    IFIO2 2 09/05/2018 08:47 AM     CBC  Recent Labs     12/27/22  0326   WBC 5.2   RBC 4.24*   HGB 12.3*   HCT 39.3*   MCV 92.7   MCH 29.0   MCHC 31.3*      MPV 8.7*      BMP  Recent Labs     12/27/22  0326      K 4.0      CO2 24   BUN 24*   CREATININE 0.3*   GLUCOSE 116*   CALCIUM 8.3*     LFT  No results for input(s): AST, ALT, ALB, BILITOT, ALKPHOS, LIPASE in the last 72 hours. Invalid input(s): AMYLASE    TROP  Lab Results   Component Value Date/Time    TROPONINT < 0.010 12/24/2022 02:48 PM    TROPONINT 0.015 12/24/2022 12:20 PM    TROPONINT < 0.010 08/05/2022 11:44 PM     BNP  No results for input(s): BNP in the last 72 hours. Lactic Acid  No results for input(s): LACTA in the last 72 hours. INR  No results for input(s): INR, PROTIME in the last 72 hours. PTT  No results for input(s): APTT in the last 72 hours. Glucose  No results for input(s): POCGLU in the last 72 hours. UA   No results for input(s): SPECGRAV, PHUR, COLORU, CLARITYU, MUCUS, PROTEINU, BLOODU, RBCUA, WBCUA, BACTERIA, NITRU, GLUCOSEU, BILIRUBINUR, UROBILINOGEN, KETUA, LABCAST, LABCASTTY, AMORPHOS in the last 72 hours. Invalid input(s): CRYSTALS    PFTs   None in epic. Sleep studies   None in epic. Cultures    COVID-19 test performed on 24 December 2022: Negative  Rapid swab for influenza A and B: Negative  Pneumocystis negative  Cultures negative  EKG     Echocardiogram performed on 25 July 2022   Conclusions      Summary   Technically difficult examination   Left ventricular size and systolic function is normal. Ejection fraction   was estimated at 55-60%. LV wall thickness is within normal limits and   there are no obvious wall motion abnormalities. The right ventricular size was normal with normal systolic function and   wall thickness.       Signature      ----------------------------------------------------------------   Electronically signed by Sea Lua MD (Interpreting   physician) on 07/25/2022 at 08:06 PM   ----------------------------------------------------------------     Right Ventricle   The right ventricular size was normal with normal systolic function and   wall thickness. Radiology    CXR  PROCEDURE: XR CHEST PORTABLE      Impression  1. Very poor inflation of lungs. The heart is obscured but appears to be mildly enlarged. 2. Mild bibasilar atelectasis/pneumonia, worse on the right. 3. Overall appearance of chest improved from prior. Final report electronically signed by Dr. Claudio Frias on 12/27/2022 10:42 AM         CT Scans  (See actual reports for details)  CT angiogram of chest with IV contrast performed on 25 December 2022:  CTA chest   Comparison: 8/5/2022   Findings: Patchy infiltrate at the right apex. The right lower lobe is essentially nearly completely consolidated with minimal subsegmental sparing scattered atelectasis and infiltrate is seen to the right middle lobe laterally. There is mild atelectasis at the left lower lobe inferiorly. Minimal infiltrate not excluded medially at the left lower lobe. There is minimal inferior lingular hypoventilation and atelectasis. Impression: 1. No evidence of pulmonary embolism. 2. Right-sided pneumonia with lower lobe mainly impacted. Assessment   -Right lower lobe pneumonia due to community acquired pneumonia (CAP) Vs Atypical pneumonia Vs aspiration pneumonia  -Bilateral Mucus Plugs  -Chronic hypoxic respiratory failure due to pneumonia Vs other etiologies  -History of DVT/pulmonary embolism  -Essential hypertension on treatment medications under control.  -Major depressive disorder.  -Multiple sclerosis with paraplegia.   -Decubitus ulcer S/p diverting colostomy placement  -Neurogenic bladder with chronic indwelling Pompa's catheter in place    Plan   -Follow-up cultures negative, MRSA PCR Positive, Pneumocystis negative  -Finish course of Doxycycline  -Bilateral mucus plugs removed in bronchoscopy on 12/26, preformed by Dr. Rupali Salcedo  -Titrate Oxygen to keep Spo2 >90%, currently on 2 L NC.  -Vest percussion therapy every shift as tolerated, continue 3% saline nebulization to help  -Continue Acapella every 4 hourly while awake. -Continue Mucinex 600 mg p.o. twice daily  -Deep Venous Thrombosis Prophylaxis: Lovenox  -Patient is okay for discharge to nursing home when ready per primary service, may follow with the Physician who visits the nursing home     -Discussed with registered nurse taking care of patient regarding patient condition and my management plan. Electronically signed by   Alex Watkins DO on 12/29/2022 at 8:26 AM    Addendum by Dr. Rupali Salcedo MD:  Patient seen by me independently including key components of medical care. Face to face evaluation and examination was performed. Case discussed with Dr.Amy Va Demarco, [address] [place] [address]. Italicized font, if present,  represents changes to the note made by me. More than 50% of the encounter time involved with patient care by the Pulmonary & Critical care service team spent by me. Please see my modifications mentioned below:  He is on 3 L of oxygen on nasal cannula  Improving shortness of breath  Patient educated about my impression plan.   Wean FiO2 keep saturations more than 90%  Patient to follow with his physician at the nursing home after discharge from the hospital.    Electronically signed by   Ari Evangelista MD on 12/29/2022 at 6:36 PM

## 2022-12-29 NOTE — PROGRESS NOTES
Speech Language Pathology  [address] [place] [facility]  STRZ ICU STEPDOWN TELEMETRY 4K  DAILY NOTE    TIME   SLP Individual Minutes  Time In: 4788  Time Out: 1011  Minutes: 8  Timed Code Treatment Minutes: 0 Minutes       Date: 2022  Patient Name: Loli Howard      CSN: [id number]   : 1955  (74 y.o.)  Gender: male   Referring Physician:  Lance Velazquez DO  Diagnosis: pneumonia of right lower lobe due to infectious organism  Precautions: Fall risk  Current Diet: Soft and bite sized, thin  Swallowing Strategies:  Full Upright Position and Small Bite/Sip      Date of Last MBS/FEES: Not Applicable    Pain:  No pain reported. Subjective:  RN Bushra Fan approved ST visit this date. She stated patient will be returning to SNF later this afternoon. Patient was upright in bed upon  entry. He was pleasant and agreeable to  services this date. He has been weaned from high flow nasal cannula to nasal cannula. He stated that prior to this hospital stay he was not on home oxygen. Short-Term Goals:  SHORT TERM GOAL #1:  Goal 1: Patient will consume soft and bite size diet with thin liquids without s/s of apsiration in order to safely maintain adequate hydration and nutrition  INTERVENTIONS:Patient tolerated soft and bite sized diet textures with mildly prolonged but effective mastication. He demonstrated total clearance of the bolus from oral cavity. No overt s/s of laryngeal penetration/aspiration were observed with any trialed diet textures or thin liquids. SHORT TERM GOAL #2:  Goal 2: Patient will complete advanced textures as clinically appropriate without s/s of aspiration in order to safely advance patient diet  INTERVENTIONS:Patient tolerated trials of plain ellis crackers and ellis crackers with peanut butter with prolonged but effective mastication.   He was able to clear his oral cavity of the bolus independently and did not demonstrate any overt s/s of laryngeal penetration/aspiration. At this time, it is recommended that the patient remain on a soft and bite sized diet. It is recommended that treating SLP complete skilled dietary analysis of advanced textures during a meal to ensure endurance and safe consumption of advanced diet textures. SHORT TERM GOAL #3:  Goal 3: Should pulmonary decline arise or s/s of aspiration present with recommended diet ST recommending completion of instrumental evaluation to furthur evaluate  INTERVENTIONS:Instrumental assessment is not indicated at this time. Long-Term Goals:   No long term goals established due to short ELOS. EDUCATION:  Learner: Patient  Education:  Reviewed results and recommendations of this evaluation, Reviewed diet and strategies, Reviewed signs, symptoms and risks of aspiration, and Reviewed ST goals and Plan of Care  Evaluation of Education: Verbalizes understanding    ASSESSMENT/PLAN:  Activity Tolerance:  Patient tolerance of  treatment: good. Great participation and tolerance of advanced PO trials. Assessment/Plan: Patient progressing toward established goals. Continues to require skilled care of licensed speech pathologist to progress toward achievement of established goals and plan of care. .     Plan for Next Session: dietary analysis of advanced textures during meal  Discharge Recommendations: SNF     Amee Alfaro M.A.  CCC-SLP

## 2022-12-29 NOTE — CARE COORDINATION
12/29/22, 9:31 AM EST    Patient goals/plan/ treatment preferences discussed by  and . Patient goals/plan/ treatment preferences reviewed with patient/ family. Patient/ family verbalize understanding of discharge plan and are in agreement with goal/plan/treatment preferences. Understanding was demonstrated using the teach back method. AVS provided by RN at time of discharge, which includes all necessary medical information pertaining to the patients current course of illness, treatment, post-discharge goals of care, and treatment preferences. Services At/After Discharge: [facility] ([facility]) [facility]. Patient to discharge back to [facility]. Patient is agreeable to discharge plan. SW contact transport and they will call with a time. SW notified [facility] of discharge today. RN made aware and discharge instructions on chart. 12/29/22, 11:28 AM EST  DISCHARGE PLANNING EVALUATION    SW received a call that patient will be picked up at 6:30pm.     RN made aware.

## 2022-12-29 NOTE — PLAN OF CARE
Problem: Respiratory - Adult  Goal: Achieves optimal ventilation and oxygenation  Outcome: Progressing   Patient mutually agrees upon goal. Continue with tx's and bronchial hygiene therapy as directed. Pt's O2 was able to be weaned and HFNC order discontinued per protocol.

## 2022-12-29 NOTE — PROGRESS NOTES
Voicemail left with Wife notifying her of Patient's discharge back to [facility] today around 6 pm.

## 2022-12-29 NOTE — FLOWSHEET NOTE
12/29/22 1018   Safe Environment   Safety Measures Other (comment)  (Virtual nurse round complete)   Virtual nurse introduced via audio. Patient permits camera. Patient in bed, awake. Denies any needs at this time. Call light within reach.

## 2022-12-31 LAB — VIRAL CULTURE,RAPID,RESPIRATOR: NORMAL

## 2023-01-04 NOTE — DISCHARGE SUMMARY
Discharge Summary    Patient:  Zena Hogue  YOB: 1955    MRN: [id number]   Acct: [de-identified]    Primary Care Physician: Rafaela Grey MD    Admit date:  12/24/2022    Discharge date:  12/29/2022       Discharge Diagnoses:   Pneumonia of right lower lobe due to infectious organism  Principal Problem:    Pneumonia of right lower lobe due to infectious organism  Active Problems:    Acute respiratory failure with hypoxia (HCC)    Pleural effusion on right    History of DVT (deep vein thrombosis)    History of ESBL E. coli infection    Abnormal CT of the chest    Chronic indwelling Pompa catheter    Elevated troponin  Resolved Problems:    * No resolved hospital problems. *        Admitted for: (HPI) acute hypoxia    Hospital Course: Briefly this is a 82-year-old male who was admitted to the hospital for acute hypoxia he appears baseline imaging to have had right lower lobe pneumonia and increased oxygen requirements compared to his baseline patient was placed on broad-spectrum antibiotics patient had a positive MRSA screen and was placed on IV cefepime and doxycycline the patient was evaluated by pulmonary services and underwent a bronchoscopy with mucous plug removed successfully the patient's blood pressures were within normal limits but he appears to have constitutional hypotension and his antihypertensive medications have been held on discharge appears to have chronic Pompa placement secondary neurogenic bladder, he appears to have chronic weakness and appears to be primarily bedbound the patient was cleared by speech services for thin liquids and soft diet he underwent bronchoscopy successfully 12/26/2022 on discharge she appears to be on room air interactive alert and pleasant he will be placed on oral antibiotics and discharged in good condition.     Consultants:  Patient Care Team:  Rafaela Grey MD as PCP - General (Family Medicine)    Discharge Medications:       Medication List        CONTINUE taking these medications      acetaminophen 325 MG tablet  Commonly known as: TYLENOL     amLODIPine 10 MG tablet  Commonly known as: NORVASC  Take 1 tablet by mouth in the morning. aspirin 81 MG EC tablet     baclofen 10 MG tablet  Commonly known as: LIORESAL     calcium-vitamin D 500-200 MG-UNIT per tablet  Commonly known as: OSCAL-500  Take 1 tablet by mouth 2 times daily     carbamide peroxide 6.5 % otic solution  Commonly known as: DEBROX     carvedilol 12.5 MG tablet  Commonly known as: COREG  Take 1 tablet by mouth in the morning and 1 tablet in the evening. Take with meals. docusate sodium 100 MG capsule  Commonly known as: COLACE     erythromycin 5 MG/GM ophthalmic ointment  Commonly known as: ROMYCIN     famotidine 20 MG tablet  Commonly known as: PEPCID  Take 1 tablet by mouth 2 times daily     gabapentin 600 MG tablet  Commonly known as: NEURONTIN     gentamicin 0.1 % ointment  Commonly known as: GARAMYCIN     LACTOBACILLUS PO     melatonin 3 MG Tabs tablet  Take 1 tablet by mouth nightly as needed (when unable to sleep)     multivitamin tablet  Take 1 tablet by mouth daily     ondansetron 4 MG tablet  Commonly known as: ZOFRAN     OXcarbazepine 300 MG tablet  Commonly known as: TRILEPTAL     oxybutynin 5 MG extended release tablet  Commonly known as: DITROPAN-XL     potassium chloride 20 MEQ extended release tablet  Commonly known as: KLOR-CON M  Take 2 tablets by mouth in the morning and 2 tablets before bedtime.      sodium chloride 1 g tablet  Take 1 tablet by mouth 2 times daily (with meals)     tiZANidine 2 MG tablet  Commonly known as: ZANAFLEX     vitamin A 48585 units capsule  Take 2 capsules by mouth daily     vitamin C 500 MG tablet  Commonly known as: ASCORBIC ACID     VITAMIN C ER PO     vitamin E 400 UNIT capsule            STOP taking these medications      metoprolol tartrate 25 MG tablet  Commonly known as: LOPRESSOR     potassium chloride 20 MEQ Tbcr extended release tablet  Commonly known as: Giovanni Maynard            ASK your doctor about these medications      amoxicillin-clavulanate 875-125 MG per tablet  Commonly known as: AUGMENTIN  Take 1 tablet by mouth 2 times daily for 5 days  Ask about: Should I take this medication?     doxycycline hyclate 100 MG tablet  Commonly known as: VIBRA-TABS  Take 1 tablet by mouth every 12 hours for 5 days  Ask about: Should I take this medication? Where to Get Your Medications        You can get these medications from any pharmacy    Bring a paper prescription for each of these medications  amoxicillin-clavulanate 875-125 MG per tablet  doxycycline hyclate 100 MG tablet           Physical Exam:    Vitals:  Vitals:    12/29/22 0512 12/29/22 0817 12/29/22 0917 12/29/22 1118   BP:  136/71  (!) 113/58   Pulse:  78  86   Resp:  20  18   Temp:  97.6 °F (36.4 °C)  97.9 °F (36.6 °C)   TempSrc:  Oral  Oral   SpO2: 93% 92% 97% 94%   Weight:       Height:         Weight: Weight: 196 lb 3.4 oz (89 kg)     24 hour intake/output:No intake or output data in the 24 hours ending 01/04/23 1344    General appearance - alert, awake appears to be in no acute distress gross loss of muscle tone in upper and lower extremities  HEENT: Atraumatic normocephalic, no JVD. Trachea midline.    Chest - Bilateral air entry, no wheezes, crackles or rhonchi  Cardiovascular - S1S2 RRR, no murmurs or gallops  Abdomen - Soft non tender non distended, normoactive bowel sounds   Neurological - non focal, no neurological deficits noted  Integumentary - Skin color, texture, turgor normal. No Rashes or lesions  Musculoskeletal -Full ROM, No clubbing or cyanosis, hypothenar atrophy  Extremities: No richi deformity decubitus ulcerations noted on charting    Procedures: Bronchoscopy diagnostic and therapeutic    Diagnostic Test: Bronchoscopy diagnostic and therapeutic    Radiology reports as per the Radiologist  Radiology:  CTA CHEST W WO CONTRAST    Result Date: 12/25/2022  CTA chest Comparison: 8/5/2022 Findings: Patchy infiltrate at the right apex. The right lower lobe is essentially nearly completely consolidated with minimal subsegmental sparing scattered atelectasis and infiltrate is seen to the right middle lobe laterally. There is mild atelectasis at the left lower lobe inferiorly. Minimal infiltrate not excluded medially at the left lower lobe. There is minimal inferior lingular hypoventilation and atelectasis. No pulmonary emboli or aortic dissection. Normal thyroid. Upper limits of normal cardiac volume. Epicardial fat and superior mediastinal lipomatosis likely accentuated cardiac volume estimates on chest radiographs. Included portions of the upper abdomen without acute incidental findings. There has been prior cholecystectomy. Mild pancreatic atrophy evident. Osteopenia seen to the visualized bony tissues. No acute skeletal pathology. Impression: 1. No evidence of pulmonary embolism. 2. Right-sided pneumonia with lower lobe mainly impacted. This document has been electronically signed by: Silverio Landry MD on 12/25/2022 12:27 AM All CTs at this facility use dose modulation techniques and iterative reconstructions, and/or weight-based dosing when appropriate to reduce radiation to a low as reasonably achievable. 3D Post-processing was performed on this study. XR CHEST PORTABLE    Result Date: 12/27/2022  PROCEDURE: XR CHEST PORTABLE CLINICAL INFORMATION: Follow up s/p bronch and mucus plug COMPARISON: 12/24/2022 TECHNIQUE: A single mobile view of the chest was obtained. 1. Very poor inflation of lungs. The heart is obscured but appears to be mildly enlarged. 2. Mild bibasilar atelectasis/pneumonia, worse on the right. 3. Overall appearance of chest improved from prior. **This report has been created using voice recognition software. It may contain minor errors which are inherent in voice recognition technology. ** Final report electronically signed by Dr. Claudette Purple on 12/27/2022 10:42 AM    XR CHEST PORTABLE    Result Date: 12/24/2022  PROCEDURE: XR CHEST PORTABLE CLINICAL INFORMATION: cough. COMPARISON: Chest x-ray dated 8/5/2022. TECHNIQUE: AP upright view of the chest. FINDINGS: There is borderline cardiomegaly. .The mediastinum is not widened. There is infiltrate and effusion at the right lung base, new since previous study. There is atelectasis at the left lung base, unchanged. . The pulmonary vascularity is normal. There is thoracic spondylosis. 1. Borderline cardiomegaly. 2. Infiltrate and effusion at the right lung base new since previous study dated 11 of August 2022. 3. Atelectasis of the left lung base, unchanged. 4. Thoracic spondylosis. Clari Galvan **This report has been created using voice recognition software. It may contain minor errors which are inherent in voice recognition technology. ** Final report electronically signed by DR Annemarie Najera on 12/24/2022 12:34 PM      Results for orders placed or performed during the hospital encounter of 12/24/22   COVID-19 & Influenza Combo    Specimen: Nasopharyngeal Swab   Result Value Ref Range    SARS-CoV-2 RNA, RT PCR NOT DETECTED NOT DETECTED    INFLUENZA A NOT DETECTED NOT DETECTED    INFLUENZA B NOT DETECTED NOT DETECTED   Blood Culture 1    Specimen: Blood   Result Value Ref Range    Blood Culture, Routine       No growth 24 hours. No growth 48 hours. No growth at 5 days   Blood Culture 2    Specimen: Blood   Result Value Ref Range    Blood Culture, Routine       No growth 24 hours. No growth 48 hours. No growth at 5 days   Culture, Urine    Specimen: Urine   Result Value Ref Range    Organism Escherichia coli (A)     Urine Culture, Routine       [place] count: >100,000 CFU/mL This isolate is a probable ESBL . ID consultation may be helpful in some clinical circumstances. CONTACT isolation required.        Susceptibility    Escherichia coli - BACTERIAL SUSCEPTIBILITY PANEL BY PRINCE by PCR Not Detected Not Detected    Klebsiella pneumoniae group by PCR Not Detected Not Detected    Moraxella catarrhalis by PCR Not Detected Not Detected    Proteus species by PCR Not Detected Not Detected    Pseudomonas aeruginosa by PCR Not Detected Not Detected    Serratia marcescens by PCR Not Detected Not Detected    Staph aureus by PCR Not Detected Not Detected    Strep agalactiae by PCR Not Detected Not Detected    Strep pneumoniae by PCR Not Detected Not Detected    Strep pyogenes by PCR Not Detected Not Detected    Resistant gene ctx-m by PCR N/A Not Detected    Resistant gene imp by PCR N/A Not Detected    Resistant gene kpc by PCR N/A Not Detected    Resistant gene meca/c & mrej by PCR N/A Not Detected    Resistant gene ndm by PCR N/A Not Detected    Resistant gene oxa-48-like by pcr N/A Not Detected    Resistant gene vim by PCR N/A Not Detected    Chlamydia pneumoniae By PCR Not Detected Not Detected    Legionella pneumophilia by PCR Not Detected Not Detected    Mycoplasma pneumoniae by PCR Not Detected Not Detected    Adenovirus by PCR Not Detected Not Detected    Non-SARS Coronavirus Not Detected Not Detected    Metapneumovirus by PCR Not Detected Not Detected    Rhinovirus Enterovirus PCR Not Detected Not Detected    Influenza A by PCR Not Detected Not Detected    Influenza B by PCR Not Detected Not Detected    Parainfluenza virus by PCR Not Detected Not Detected    Respiratory Syncytial Virus by PCR Not Detected Not Detected   Culture, Fungus    Specimen: Bronchial Washing; Respiratory   Result Value Ref Range    Fungus Identified No fungus isolated-preliminary     Fungus Smear No yeast or hyphae observed    Culture, Respiratory    Specimen: Bronchial Washing; Respiratory   Result Value Ref Range    Respiratory Culture No growth-preliminary No growth     Gram Stain Result       Few segmented neutrophils observed. No epithelial cells observed. No bacteria seen.    Culture with Smear, Acid Fast Bacillius    Specimen: Sputum Expectorated; Respiratory   Result Value Ref Range    AFB Culture & Smear SEE BELOW    Culture, Virus, Respiratory    Specimen: Sputum Expectorated;  Respiratory   Result Value Ref Range    Viral Culture,Rapid,Respirator SEE BELOW    PNEUMOCYSTIS STAIN    Specimen: Sputum Expectorated   Result Value Ref Range    Cytology-Non Gyn See NonGyn Rpt    HSV PCR   Result Value Ref Range    Herpes Simplex Virus Subtype Source BAL     HSV 1 SUBTYPE BY PCR Not Detected     HSV 2 SUBTYPE BY PCR Not Detected    CBC with Auto Differential   Result Value Ref Range    WBC 8.3 4.8 - 10.8 thou/mm3    RBC 4.90 4.70 - 6.10 mill/mm3    Hemoglobin 14.4 14.0 - 18.0 gm/dl    Hematocrit 45.0 42.0 - 52.0 %    MCV 91.8 80.0 - 94.0 fL    MCH 29.4 26.0 - 33.0 pg    MCHC 32.0 (L) 32.2 - 35.5 gm/dl    RDW-CV 16.5 (H) 11.5 - 14.5 %    RDW-SD 55.1 (H) 35.0 - 45.0 fL    Platelets 765 582 - 917 thou/mm3    MPV 8.9 (L) 9.4 - 12.4 fL    Seg Neutrophils 64.8 %    Lymphocytes 20.2 %    Monocytes 10.8 %    Eosinophils 3.3 %    Basophils 0.5 %    Immature Granulocytes 0.4 %    Segs Absolute 5.4 1.8 - 7.7 thou/mm3    Lymphocytes Absolute 1.7 1.0 - 4.8 thou/mm3    Monocytes Absolute 0.9 0.4 - 1.3 thou/mm3    Eosinophils Absolute 0.3 0.0 - 0.4 thou/mm3    Basophils Absolute 0.0 0.0 - 0.1 thou/mm3    Immature Grans (Abs) 0.03 0.00 - 0.07 thou/mm3    nRBC 0 /100 wbc   Basic Metabolic Panel   Result Value Ref Range    Sodium 140 135 - 145 meq/L    Potassium 4.8 3.5 - 5.2 meq/L    Chloride 104 98 - 111 meq/L    CO2 27 23 - 33 meq/L    Glucose 119 (H) 70 - 108 mg/dL    BUN 25 (H) 7 - 22 mg/dL    Creatinine 0.3 (L) 0.4 - 1.2 mg/dL    Calcium 9.4 8.5 - 10.5 mg/dL   Hepatic Function Panel   Result Value Ref Range    Albumin 3.4 (L) 3.5 - 5.1 g/dL    Total Bilirubin 0.3 0.3 - 1.2 mg/dL    Bilirubin, Direct <0.2 0.0 - 0.3 mg/dL    Alkaline Phosphatase 167 (H) 38 - 126 U/L    AST 25 5 - 40 U/L    ALT 22 11 - 66 U/L    Total Protein 8.0 6.1 - 8.0 g/dL   Troponin   Result Value Ref Range    Troponin T 0.015 (A) ng/ml   Brain Natriuretic Peptide   Result Value Ref Range    Pro-BNP 68.9 0.0 - 124.0 pg/mL   Lactate, Sepsis   Result Value Ref Range    Lactic Acid, Sepsis 0.7 0.5 - 1.9 mmol/L   D-Dimer, Quantitative   Result Value Ref Range    D-Dimer, Quant 698.00 (H) 0.00 - 500.00 ng/ml FEU   Anion Gap   Result Value Ref Range    Anion Gap 9.0 8.0 - 16.0 meq/L   Glomerular Filtration Rate, Estimated   Result Value Ref Range    Est, Glom Filt Rate >60 >60 ml/min/1.73m2   Osmolality   Result Value Ref Range    Osmolality Calc 284.9 275.0 - 300.0 mOsmol/kg   Troponin   Result Value Ref Range    Troponin T < 0.010 ng/ml   Urinalysis with Microscopic   Result Value Ref Range    Glucose, Urine NEGATIVE NEGATIVE mg/dl    Bilirubin Urine NEGATIVE NEGATIVE    Ketones, Urine 80 (A) NEGATIVE    Specific Gravity, UA 1.020 1.002 - 1.030    Blood, Urine LARGE (A) NEGATIVE    pH, UA 5.5 5.0 - 9.0    Protein,  (A) NEGATIVE mg/dl    Urobilinogen, Urine 0.2 0.0 - 1.0 eu/dl    Nitrite, Urine POSITIVE (A) NEGATIVE    Leukocyte Esterase, Urine LARGE (A) NEGATIVE    Color, UA YELLOW YELLOW-STRAW    Character, Urine TURBID (A) [id number]    RBC, UA > 200 0-2/hpf /hpf    WBC, UA > 200 0-4/hpf /hpf    Epithelial Cells, UA NONE SEEN 3-5/hpf /hpf    Bacteria, UA MANY FEW/NONE SEEN    Casts NONE SEEN NONE SEEN /lpf    Crystals NONE SEEN NONE SEEN    Renal Epithelial, UA NONE SEEN NONE SEEN    Yeast, UA NONE SEEN NONE SEEN    Casts NONE SEEN /lpf    Miscellaneous Lab Test Result NONE SEEN    Procalcitonin   Result Value Ref Range    Procalcitonin 0.13 (H) 0.01 - 0.09 ng/mL   Lactic Acid   Result Value Ref Range    Lactic Acid 1.1 0.5 - 2.0 mmol/L   Basic Metabolic Panel   Result Value Ref Range    Sodium 139 135 - 145 meq/L    Potassium 3.8 3.5 - 5.2 meq/L    Chloride 101 98 - 111 meq/L    CO2 26 23 - 33 meq/L    Glucose 113 (H) 70 - 108 mg/dL    BUN 20 7 - 22 mg/dL Creatinine 0.3 (L) 0.4 - 1.2 mg/dL    Calcium 9.1 8.5 - 10.5 mg/dL   CBC with Auto Differential   Result Value Ref Range    WBC 8.3 4.8 - 10.8 thou/mm3    RBC 4.77 4.70 - 6.10 mill/mm3    Hemoglobin 13.8 (L) 14.0 - 18.0 gm/dl    Hematocrit 43.9 42.0 - 52.0 %    MCV 92.0 80.0 - 94.0 fL    MCH 28.9 26.0 - 33.0 pg    MCHC 31.4 (L) 32.2 - 35.5 gm/dl    RDW-CV 16.2 (H) 11.5 - 14.5 %    RDW-SD 54.5 (H) 35.0 - 45.0 fL    Platelets 942 729 - 585 thou/mm3    MPV 9.0 (L) 9.4 - 12.4 fL    Seg Neutrophils 69.3 %    Lymphocytes 18.1 %    Monocytes 9.1 %    Eosinophils 2.6 %    Basophils 0.5 %    Immature Granulocytes 0.4 %    Segs Absolute 5.8 1.8 - 7.7 thou/mm3    Lymphocytes Absolute 1.5 1.0 - 4.8 thou/mm3    Monocytes Absolute 0.8 0.4 - 1.3 thou/mm3    Eosinophils Absolute 0.2 0.0 - 0.4 thou/mm3    Basophils Absolute 0.0 0.0 - 0.1 thou/mm3    Immature Grans (Abs) 0.03 0.00 - 0.07 thou/mm3    nRBC 0 /100 wbc   Blood Gas, Arterial   Result Value Ref Range    pH, Blood Gas 7.39 7.35 - 7.45    PCO2 45 35 - 45 mmhg    PO2 58 (L) 71 - 104 mmhg    HCO3 28 23 - 28 mmol/l    Base Excess (Calculated) 1.9 -2.5 - 2.5 mmol/l    O2 Sat 90 %    DEVICE NRB     Rahul Test Positive     Source: R Radial     COLLECTED BY: [id number]    Lactic Acid   Result Value Ref Range    Lactic Acid 0.6 0.5 - 2.0 mmol/L   Anion Gap   Result Value Ref Range    Anion Gap 12.0 8.0 - 16.0 meq/L   Glomerular Filtration Rate, Estimated   Result Value Ref Range    Est, Glom Filt Rate >60 >60 ml/min/1.73m2   Hepatic Function Panel   Result Value Ref Range    Albumin 3.0 (L) 3.5 - 5.1 g/dL    Total Bilirubin 0.4 0.3 - 1.2 mg/dL    Bilirubin, Direct <0.2 0.0 - 0.3 mg/dL    Alkaline Phosphatase 176 (H) 38 - 126 U/L    AST 29 5 - 40 U/L    ALT 24 11 - 66 U/L    Total Protein 7.0 6.1 - 8.0 g/dL   Cell Count with Differential, BAL Fluid   Result Value Ref Range    BAL Color COLORLESS     BAL Character CLOUDY/MILKY     BAL Collection Site BAL     Total Volume Received BAL 40 ml    Total Nucleated Cells BAL 3323 /cumm    RBC BAL 3010 /cumm    Macrophage/Monocyte BAL 17 (L) 86 - 100 %    Lymphocytes, BAL 27 (H) 10 - 15 %    Segmented Neutrophils, BAL 54 (H) 0 - 3 %    Eosinophils BAL 2 (H) 0 - 1 %    Pathologist Review ALBINO GOSNALES    CBC   Result Value Ref Range    WBC 5.2 4.8 - 10.8 thou/mm3    RBC 4.24 (L) 4.70 - 6.10 mill/mm3    Hemoglobin 12.3 (L) 14.0 - 18.0 gm/dl    Hematocrit 39.3 (L) 42.0 - 52.0 %    MCV 92.7 80.0 - 94.0 fL    MCH 29.0 26.0 - 33.0 pg    MCHC 31.3 (L) 32.2 - 35.5 gm/dl    RDW-CV 15.7 (H) 11.5 - 14.5 %    RDW-SD 53.3 (H) 35.0 - 45.0 fL    Platelets 926 155 - 733 thou/mm3    MPV 8.7 (L) 9.4 - 12.4 fL   Basic Metabolic Panel w/ Reflex to MG   Result Value Ref Range    Sodium 141 135 - 145 meq/L    Potassium reflex Magnesium 4.0 3.5 - 5.2 meq/L    Chloride 106 98 - 111 meq/L    CO2 24 23 - 33 meq/L    Glucose 116 (H) 70 - 108 mg/dL    BUN 24 (H) 7 - 22 mg/dL    Creatinine 0.3 (L) 0.4 - 1.2 mg/dL    Calcium 8.3 (L) 8.5 - 10.5 mg/dL   Anion Gap   Result Value Ref Range    Anion Gap 11.0 8.0 - 16.0 meq/L   Glomerular Filtration Rate, Estimated   Result Value Ref Range    Est, Glom Filt Rate >60 >60 ml/min/1.73m2   Miscellaneous Lab Test #1   Result Value Ref Range    MISC.  #1 REFERENCE GROUP TEST SEE BELOW    CMV by PCR Qualitative   Result Value Ref Range    CMV BY PCR, QUALITATIVE BLOOD Not Detected    POCT Glucose   Result Value Ref Range    POC Glucose 116 (H) 70 - 108 mg/dl   EKG 12 Lead   Result Value Ref Range    Ventricular Rate 81 BPM    Atrial Rate 81 BPM    P-R Interval 134 ms    QRS Duration 78 ms    Q-T Interval 370 ms    QTc Calculation (Bazett) 429 ms    P Axis 49 degrees    R Axis 52 degrees    T Axis 69 degrees       Diet:  No diet orders on file    Activity:  Activity as tolerated (Patient may move about with assist as indicated or with supervision.)    Follow-up:  in the next few days with Alicia Hall MD,  in the next few days with facility physician at skilled nursing facility    Disposition: SNF    Condition: Stable      Time Spent: 35 minutes    Electronically signed by Frankey Lagos, MD on 1/4/2023 at 1:44 PM    Discharging Hospitalist

## 2023-01-23 PROBLEM — R77.8 ELEVATED TROPONIN: Status: RESOLVED | Noted: 2019-06-10 | Resolved: 2023-01-23

## 2023-01-23 PROBLEM — R79.89 ELEVATED TROPONIN: Status: RESOLVED | Noted: 2019-06-10 | Resolved: 2023-01-23

## 2023-01-26 ENCOUNTER — OUTSIDE SERVICES (OUTPATIENT)
Dept: FAMILY MEDICINE CLINIC | Age: 68
End: 2023-01-26

## 2023-01-26 DIAGNOSIS — Z93.3 COLOSTOMY IN PLACE (HCC): ICD-10-CM

## 2023-01-26 DIAGNOSIS — J18.9 PNEUMONIA OF RIGHT LOWER LOBE DUE TO INFECTIOUS ORGANISM: Primary | ICD-10-CM

## 2023-01-26 DIAGNOSIS — I10 ESSENTIAL HYPERTENSION, BENIGN: ICD-10-CM

## 2023-01-26 DIAGNOSIS — G35 MS (MULTIPLE SCLEROSIS) (HCC): ICD-10-CM

## 2023-01-26 NOTE — PROGRESS NOTES
1/26/2023  Lashaun Swift  1955  Subjective:  He was in his/her room to follow up cough and hypoxemia, Diagnosed with RUL pneumonia. Is feeling better now, minimal cough. No fevers. Denies any new problems, is feeling well. Denies headaches, CP, sob, or abdominal pains. Objective:   Please see vitals per chart. There is no sinus tenderness to palpation, no cervical lymphadenopathy. Lungs are clear. Heart Regular rate and rhythm without murmur. The abdomen is soft and nontender. Colostomy present and functioning. Extremities are without edema. Assessment:    1. Pneumonia of right lower lobe due to infectious organism  -new problem, treated with antibiotics, improved. 2. MS (multiple sclerosis) (HCC)  Stable, controlled on zanaflex, baclofen, gabapentin      3. Essential hypertension, benign  Stable, controlled on coreg      4. Colostomy in place [facility])  Stable, normal functioning        Plan: We will continue current medication regimen including zanaflex, baclofen and gabapentin for MS, melatonin for sleep and supportive care and recheck in 1 month.     Electronically signed by Luba Lund MD on 1/26/2023 at 12:00 PM.

## 2023-02-23 ENCOUNTER — OUTSIDE SERVICES (OUTPATIENT)
Dept: FAMILY MEDICINE CLINIC | Age: 68
End: 2023-02-23

## 2023-02-23 DIAGNOSIS — I10 ESSENTIAL HYPERTENSION, BENIGN: ICD-10-CM

## 2023-02-23 DIAGNOSIS — Z93.3 COLOSTOMY IN PLACE (HCC): ICD-10-CM

## 2023-02-23 DIAGNOSIS — G35 MS (MULTIPLE SCLEROSIS) (HCC): Primary | ICD-10-CM

## 2023-02-23 NOTE — PROGRESS NOTES
2/23/2023  Raquel Swift  1955  Subjective:  He was in his/her room to follow up MS. Denies any new problems, is feeling well. Denies headaches, CP, sob, or abdominal pains. Objective:   Please see vitals per chart. There is no sinus tenderness to palpation, no cervical lymphadenopathy. Lungs are clear. Heart Regular rate and rhythm without murmur. The abdomen is soft and nontender. Colostomy present and functioning. Extremities are without edema. Assessment:   Diagnosis Orders   1. MS (multiple sclerosis) (HCC)  Stable, controlled on zanaflex, baclofen, gabapentin      2. Essential hypertension, benign  Stable, controlled on coreg      3. Colostomy in place [facility])  Stable, normal functioning        Plan: We will continue current medication regimen including zanaflex, baclofen and gabapentin for MS, melatonin for sleep and supportive care and recheck in 1 month.     Electronically signed by Rosalba Pelaez MD on 2/23/2023 at 11:31 AM.

## 2023-03-13 ENCOUNTER — OFFICE VISIT (OUTPATIENT)
Dept: NEUROLOGY | Age: 68
End: 2023-03-13
Payer: MEDICAID

## 2023-03-13 VITALS
HEART RATE: 74 BPM | DIASTOLIC BLOOD PRESSURE: 60 MMHG | OXYGEN SATURATION: 96 % | SYSTOLIC BLOOD PRESSURE: 118 MMHG | BODY MASS INDEX: 30.73 KG/M2 | HEIGHT: 67 IN

## 2023-03-13 DIAGNOSIS — G35 MULTIPLE SCLEROSIS (HCC): Primary | ICD-10-CM

## 2023-03-13 DIAGNOSIS — R51.9 RIGHT-SIDED FACE PAIN: ICD-10-CM

## 2023-03-13 DIAGNOSIS — G82.20 PARAPARESIS OF BOTH LOWER LIMBS (HCC): ICD-10-CM

## 2023-03-13 PROCEDURE — 1124F ACP DISCUSS-NO DSCNMKR DOCD: CPT | Performed by: NURSE PRACTITIONER

## 2023-03-13 PROCEDURE — 3074F SYST BP LT 130 MM HG: CPT | Performed by: NURSE PRACTITIONER

## 2023-03-13 PROCEDURE — 99213 OFFICE O/P EST LOW 20 MIN: CPT | Performed by: NURSE PRACTITIONER

## 2023-03-13 PROCEDURE — 3078F DIAST BP <80 MM HG: CPT | Performed by: NURSE PRACTITIONER

## 2023-03-13 NOTE — PATIENT INSTRUCTIONS
Continue with gabapentin at current dose  Continue with trileptal at current dose. CBC, HFP  You need to take calcium and vitamin D over the counter  Call with any new symptoms or concerns. Follow up in 8 months.

## 2023-03-13 NOTE — PROGRESS NOTES
NEUROLOGY OUT PATIENT FOLLOW UP NOTE:  3/13/241494:20 AM    Betty Martinez is here for follow up for multiple sclerosis, right face pain. No Known Allergies    Current Outpatient Medications:     OXcarbazepine (TRILEPTAL) 300 MG tablet, Take 300 mg by mouth 2 times daily, Disp: , Rfl:     vitamin C (ASCORBIC ACID) 500 MG tablet, Take 500 mg by mouth in the morning and 500 mg before bedtime. , Disp: , Rfl:     potassium chloride (KLOR-CON M) 20 MEQ extended release tablet, Take 2 tablets by mouth in the morning and 2 tablets before bedtime. , Disp: 60 tablet, Rfl: 0    carvedilol (COREG) 12.5 MG tablet, Take 1 tablet by mouth in the morning and 1 tablet in the evening. Take with meals. , Disp: 60 tablet, Rfl: 3    amLODIPine (NORVASC) 10 MG tablet, Take 1 tablet by mouth in the morning., Disp: 30 tablet, Rfl: 3    melatonin 3 MG TABS tablet, Take 1 tablet by mouth nightly as needed (when unable to sleep), Disp: 30 tablet, Rfl: 0    ondansetron (ZOFRAN) 4 MG tablet, Take 4 mg by mouth every 8 hours as needed for Nausea or Vomiting, Disp: , Rfl:     carbamide peroxide (DEBROX) 6.5 % otic solution, 5 drops 2 times daily Instill 5 drops in both ears two times daily for ear wax use for 4 days. , Disp: , Rfl:     aspirin 81 MG EC tablet, Take 81 mg by mouth daily, Disp: , Rfl:     oxybutynin (DITROPAN-XL) 5 MG extended release tablet, Take 5 mg by mouth in the morning and at bedtime, Disp: , Rfl:     gentamicin (GARAMYCIN) 0.1 % ointment, Apply topically daily Apply to wound bed, Disp: , Rfl:     erythromycin (ROMYCIN) 5 MG/GM ophthalmic ointment, Place into the right eye nightly (0.25 ribbon to right eye), Disp: , Rfl:     Ascorbic Acid (VITAMIN C ER PO), Take 500 mg by mouth in the morning and at bedtime, Disp: , Rfl:     baclofen (LIORESAL) 10 MG tablet, Take 10 mg by mouth 3 times daily, Disp: , Rfl:     gabapentin (NEURONTIN) 600 MG tablet, Take 600 mg by mouth in the morning, at noon, in the evening, and at bedtime. , Disp: , Rfl:     acetaminophen (TYLENOL) 325 MG tablet, Take 650 mg by mouth every 4 hours as needed for Pain or Fever, Disp: , Rfl:     LACTOBACILLUS PO, Take 1 capsule by mouth in the morning and at bedtime , Disp: , Rfl:     sodium chloride 1 g tablet, Take 1 tablet by mouth 2 times daily (with meals), Disp: 90 tablet, Rfl: 3    calcium-vitamin D (OSCAL-500) 500-200 MG-UNIT per tablet, Take 1 tablet by mouth 2 times daily, Disp: 30 tablet, Rfl: 3    famotidine (PEPCID) 20 MG tablet, Take 1 tablet by mouth 2 times daily, Disp: 60 tablet, Rfl: 3    docusate sodium (COLACE) 100 MG capsule, Take 100 mg by mouth daily , Disp: , Rfl:     tiZANidine (ZANAFLEX) 2 MG tablet, Take 2 mg by mouth 3 times daily 0600;1400;2200, Disp: , Rfl:     Multiple Vitamin (MULTIVITAMIN) tablet, Take 1 tablet by mouth daily, Disp: , Rfl: 0    vitamin A 71876 UNITS capsule, Take 2 capsules by mouth daily, Disp: 30 capsule, Rfl: 3    vitamin E 400 UNIT capsule, Take 400 Units by mouth daily (Patient not taking: Reported on 3/13/2023), Disp: , Rfl:     I reviewed the past medical history, allergies, medications, social history and family history. PE:   Vitals:    03/13/23 1106   BP: 118/60   Site: Right Upper Arm   Position: Sitting   Cuff Size: Medium Adult   Pulse: 74   SpO2: 96%   Height: 5' 7\" (1.702 m)     General Appearance:  awake, alert, oriented, in no acute distress  Gen: NAD, Language is Intact. Skin: no rash, lesion, dry  to touch. warm  Head: no rash, no icterus  Neck: There is no carotid bruits. The Neck is supple. There is no neck lymphadenopathy. There is LROM of neck bilaterally, worse to the right  Neuro: CN 2-12 grossly intact with no focal deficits. Power 5/5 in the bilateral upper and 0/5 in bilateral lower extremities, He has increased muscle tone in bilateral lower extremities . There is  muscle atrophy of his bilateral lower extremities. Reflexes are  symmetric. Long tracts are decreased distally. Cerebellar exam is  intact on right, ataxic on left. Sensory exam is intact to light touch. Gait is not tested, he is in power wheelchair  Musculoskeletal:  Has no hand arthritis, no limitation of ROM in any of the four extremities, except bilateral lower extremities. Lower extremities no edema          DATA:      Results for orders placed or performed during the hospital encounter of 12/24/22   COVID-19 & Influenza Combo    Specimen: Nasopharyngeal Swab   Result Value Ref Range    SARS-CoV-2 RNA, RT PCR NOT DETECTED NOT DETECTED    INFLUENZA A NOT DETECTED NOT DETECTED    INFLUENZA B NOT DETECTED NOT DETECTED   Blood Culture 1    Specimen: Blood   Result Value Ref Range    Blood Culture, Routine       No growth 24 hours. No growth 48 hours. No growth at 5 days   Blood Culture 2    Specimen: Blood   Result Value Ref Range    Blood Culture, Routine       No growth 24 hours. No growth 48 hours. No growth at 5 days   Culture, Urine    Specimen: Urine   Result Value Ref Range    Organism Escherichia coli (A)     Urine Culture, Routine       Sadler count: >100,000 CFU/mL This isolate is a probable ESBL . ID consultation may be helpful in some clinical circumstances. CONTACT isolation required.        Susceptibility    Escherichia coli - BACTERIAL SUSCEPTIBILITY PANEL BY PRINCE     amoxicillin-clavulanate 8 Resistant mcg/mL     piperacillin-tazobactam <=4 Resistant mcg/mL     cefTRIAXone >=64 Resistant mcg/mL     cefepime >=64 Resistant mcg/mL     ampicillin >=32 Resistant mcg/mL     gentamicin <=1 Sensitive mcg/mL     trimethoprim-sulfamethoxazole >=320 Resistant mcg/mL     ciprofloxacin >=4 Resistant mcg/mL     levofloxacin >=8 Resistant mcg/mL     tetracycline >=16 Resistant mcg/mL     nitrofurantoin >=512 Resistant mcg/mL   MRSA by PCR    Specimen: Rectum   Result Value Ref Range    MRSA SCREEN RT-PCR POSITIVE (A)    VRE Screen by PCR    Specimen: Rectal Swab   Result Value Ref Range    Vancomycin Resistant Enterococcus NEGATIVE    Culture, Aerobic    Specimen: Other   Result Value Ref Range    Aerobic Culture No Acinetobacter species isolated. Strep Pneumoniae Antigen    Specimen: Urine, clean catch   Result Value Ref Range    Strep pneumo Ag, Ur NEGATIVE    Legionella antigen, urine    Specimen: Urine   Result Value Ref Range    Legionella Pneumophilia Ag, Urine NEGATIVE NEG   Culture, Fungus    Specimen: BAL- Bronch. Lavage; Respiratory   Result Value Ref Range    Fungus Identified No fungus isolated-preliminary No fungus isolated. Fungus Smear No yeast or hyphae observed    Culture, Respiratory    Specimen: BAL- Bronch. Lavage; Respiratory   Result Value Ref Range    Respiratory Culture No growth-preliminary Normal cresencio     Gram Stain Result       Few segmented neutrophils observed. No epithelial cells observed. No bacteria seen. Pneumonia Panel, Molecular    Specimen: Sputum Expectorated;  Respiratory   Result Value Ref Range    Source BAL     Specimen Acceptability NA     Acinetobacter calcoaceticus-baumannii by PCR Not Detected Not Detected    Enterobacter cloacae complex by PCR Not Detected Not Detected    Escherichia coli by PCR Not Detected Not Detected    Haemophilus Influenzae by PCR Not Detected Not Detected    Klebsiella aerogenes by PCR Not Detected Not Detected    Klebsiella oxytoca by PCR Not Detected Not Detected    Klebsiella pneumoniae group by PCR Not Detected Not Detected    Moraxella catarrhalis by PCR Not Detected Not Detected    Proteus species by PCR Not Detected Not Detected    Pseudomonas aeruginosa by PCR Not Detected Not Detected    Serratia marcescens by PCR Not Detected Not Detected    Staph aureus by PCR Not Detected Not Detected    Strep agalactiae by PCR Not Detected Not Detected    Strep pneumoniae by PCR Not Detected Not Detected    Strep pyogenes by PCR Not Detected Not Detected    Resistant gene ctx-m by PCR N/A Not Detected    Resistant gene imp by PCR N/A Not Detected Resistant gene kpc by PCR N/A Not Detected    Resistant gene meca/c & mrej by PCR N/A Not Detected    Resistant gene ndm by PCR N/A Not Detected    Resistant gene oxa-48-like by pcr N/A Not Detected    Resistant gene vim by PCR N/A Not Detected    Chlamydia pneumoniae By PCR Not Detected Not Detected    Legionella pneumophilia by PCR Not Detected Not Detected    Mycoplasma pneumoniae by PCR Not Detected Not Detected    Adenovirus by PCR Not Detected Not Detected    Non-SARS Coronavirus Not Detected Not Detected    Metapneumovirus by PCR Not Detected Not Detected    Rhinovirus Enterovirus PCR Not Detected Not Detected    Influenza A by PCR Not Detected Not Detected    Influenza B by PCR Not Detected Not Detected    Parainfluenza virus by PCR Not Detected Not Detected    Respiratory Syncytial Virus by PCR Not Detected Not Detected   Culture, Fungus    Specimen: Bronchial Washing; Respiratory   Result Value Ref Range    Fungus Identified No fungus isolated-preliminary (A)     Fungus Smear No yeast or hyphae observed     Organism Candida albicans (A)    Culture, Respiratory    Specimen: Bronchial Washing; Respiratory   Result Value Ref Range    Respiratory Culture No growth-preliminary No growth     Gram Stain Result       Few segmented neutrophils observed. No epithelial cells observed. No bacteria seen. Culture with Smear, Acid Fast Bacillius    Specimen: Sputum Expectorated; Respiratory   Result Value Ref Range    AFB Culture & Smear SEE BELOW    Culture, Virus, Respiratory    Specimen: Sputum Expectorated;  Respiratory   Result Value Ref Range    Viral Culture,Rapid,Respirator SEE BELOW    PNEUMOCYSTIS STAIN    Specimen: Sputum Expectorated   Result Value Ref Range    Cytology-Non Gyn See NonGyn Rpt    HSV PCR   Result Value Ref Range    Herpes Simplex Virus Subtype Source BAL     HSV 1 SUBTYPE BY PCR Not Detected     HSV 2 SUBTYPE BY PCR Not Detected    CBC with Auto Differential   Result Value Ref Range    WBC 8.3 4.8 - 10.8 thou/mm3    RBC 4.90 4.70 - 6.10 mill/mm3    Hemoglobin 14.4 14.0 - 18.0 gm/dl    Hematocrit 45.0 42.0 - 52.0 %    MCV 91.8 80.0 - 94.0 fL    MCH 29.4 26.0 - 33.0 pg    MCHC 32.0 (L) 32.2 - 35.5 gm/dl    RDW-CV 16.5 (H) 11.5 - 14.5 %    RDW-SD 55.1 (H) 35.0 - 45.0 fL    Platelets 913 565 - 476 thou/mm3    MPV 8.9 (L) 9.4 - 12.4 fL    Seg Neutrophils 64.8 %    Lymphocytes 20.2 %    Monocytes 10.8 %    Eosinophils 3.3 %    Basophils 0.5 %    Immature Granulocytes 0.4 %    Segs Absolute 5.4 1.8 - 7.7 thou/mm3    Lymphocytes Absolute 1.7 1.0 - 4.8 thou/mm3    Monocytes Absolute 0.9 0.4 - 1.3 thou/mm3    Eosinophils Absolute 0.3 0.0 - 0.4 thou/mm3    Basophils Absolute 0.0 0.0 - 0.1 thou/mm3    Immature Grans (Abs) 0.03 0.00 - 0.07 thou/mm3    nRBC 0 /100 wbc   Basic Metabolic Panel   Result Value Ref Range    Sodium 140 135 - 145 meq/L    Potassium 4.8 3.5 - 5.2 meq/L    Chloride 104 98 - 111 meq/L    CO2 27 23 - 33 meq/L    Glucose 119 (H) 70 - 108 mg/dL    BUN 25 (H) 7 - 22 mg/dL    Creatinine 0.3 (L) 0.4 - 1.2 mg/dL    Calcium 9.4 8.5 - 10.5 mg/dL   Hepatic Function Panel   Result Value Ref Range    Albumin 3.4 (L) 3.5 - 5.1 g/dL    Total Bilirubin 0.3 0.3 - 1.2 mg/dL    Bilirubin, Direct <0.2 0.0 - 0.3 mg/dL    Alkaline Phosphatase 167 (H) 38 - 126 U/L    AST 25 5 - 40 U/L    ALT 22 11 - 66 U/L    Total Protein 8.0 6.1 - 8.0 g/dL   Troponin   Result Value Ref Range    Troponin T 0.015 (A) ng/ml   Brain Natriuretic Peptide   Result Value Ref Range    Pro-BNP 68.9 0.0 - 124.0 pg/mL   Lactate, Sepsis   Result Value Ref Range    Lactic Acid, Sepsis 0.7 0.5 - 1.9 mmol/L   D-Dimer, Quantitative   Result Value Ref Range    D-Dimer, Quant 698.00 (H) 0.00 - 500.00 ng/ml FEU   Anion Gap   Result Value Ref Range    Anion Gap 9.0 8.0 - 16.0 meq/L   Glomerular Filtration Rate, Estimated   Result Value Ref Range    Est, Glom Filt Rate >60 >60 ml/min/1.73m2   Osmolality   Result Value Ref Range    Osmolality Calc 284.9 275.0 - 300.0 mOsmol/kg   Troponin   Result Value Ref Range    Troponin T < 0.010 ng/ml   Urinalysis with Microscopic   Result Value Ref Range    Glucose, Urine NEGATIVE NEGATIVE mg/dl    Bilirubin Urine NEGATIVE NEGATIVE    Ketones, Urine 80 (A) NEGATIVE    Specific Gravity, UA 1.020 1.002 - 1.030    Blood, Urine LARGE (A) NEGATIVE    pH, UA 5.5 5.0 - 9.0    Protein,  (A) NEGATIVE mg/dl    Urobilinogen, Urine 0.2 0.0 - 1.0 eu/dl    Nitrite, Urine POSITIVE (A) NEGATIVE    Leukocyte Esterase, Urine LARGE (A) NEGATIVE    Color, UA YELLOW YELLOW-STRAW    Character, Urine TURBID (A) [id number]    RBC, UA > 200 0-2/hpf /hpf    WBC, UA > 200 0-4/hpf /hpf    Epithelial Cells, UA NONE SEEN 3-5/hpf /hpf    Bacteria, UA MANY FEW/NONE SEEN    Casts NONE SEEN NONE SEEN /lpf    Crystals NONE SEEN NONE SEEN    Renal Epithelial, UA NONE SEEN NONE SEEN    Yeast, UA NONE SEEN NONE SEEN    Casts NONE SEEN /lpf    Miscellaneous Lab Test Result NONE SEEN    Procalcitonin   Result Value Ref Range    Procalcitonin 0.13 (H) 0.01 - 0.09 ng/mL   Lactic Acid   Result Value Ref Range    Lactic Acid 1.1 0.5 - 2.0 mmol/L   Basic Metabolic Panel   Result Value Ref Range    Sodium 139 135 - 145 meq/L    Potassium 3.8 3.5 - 5.2 meq/L    Chloride 101 98 - 111 meq/L    CO2 26 23 - 33 meq/L    Glucose 113 (H) 70 - 108 mg/dL    BUN 20 7 - 22 mg/dL    Creatinine 0.3 (L) 0.4 - 1.2 mg/dL    Calcium 9.1 8.5 - 10.5 mg/dL   CBC with Auto Differential   Result Value Ref Range    WBC 8.3 4.8 - 10.8 thou/mm3    RBC 4.77 4.70 - 6.10 mill/mm3    Hemoglobin 13.8 (L) 14.0 - 18.0 gm/dl    Hematocrit 43.9 42.0 - 52.0 %    MCV 92.0 80.0 - 94.0 fL    MCH 28.9 26.0 - 33.0 pg    MCHC 31.4 (L) 32.2 - 35.5 gm/dl    RDW-CV 16.2 (H) 11.5 - 14.5 %    RDW-SD 54.5 (H) 35.0 - 45.0 fL    Platelets 463 763 - 788 thou/mm3    MPV 9.0 (L) 9.4 - 12.4 fL    Seg Neutrophils 69.3 %    Lymphocytes 18.1 %    Monocytes 9.1 %    Eosinophils 2.6 %    Basophils 0.5 %    Immature Granulocytes 0.4 %    Segs Absolute 5.8 1.8 - 7.7 thou/mm3    Lymphocytes Absolute 1.5 1.0 - 4.8 thou/mm3    Monocytes Absolute 0.8 0.4 - 1.3 thou/mm3    Eosinophils Absolute 0.2 0.0 - 0.4 thou/mm3    Basophils Absolute 0.0 0.0 - 0.1 thou/mm3    Immature Grans (Abs) 0.03 0.00 - 0.07 thou/mm3    nRBC 0 /100 wbc   Blood Gas, Arterial   Result Value Ref Range    pH, Blood Gas 7.39 7.35 - 7.45    PCO2 45 35 - 45 mmhg    PO2 58 (L) 71 - 104 mmhg    HCO3 28 23 - 28 mmol/l    Base Excess (Calculated) 1.9 -2.5 - 2.5 mmol/l    O2 Sat 90 %    DEVICE NRB     Rahul Test Positive     Source: R Radial     COLLECTED BY: [id number]    Lactic Acid   Result Value Ref Range    Lactic Acid 0.6 0.5 - 2.0 mmol/L   Anion Gap   Result Value Ref Range    Anion Gap 12.0 8.0 - 16.0 meq/L   Glomerular Filtration Rate, Estimated   Result Value Ref Range    Est, Glom Filt Rate >60 >60 ml/min/1.73m2   Hepatic Function Panel   Result Value Ref Range    Albumin 3.0 (L) 3.5 - 5.1 g/dL    Total Bilirubin 0.4 0.3 - 1.2 mg/dL    Bilirubin, Direct <0.2 0.0 - 0.3 mg/dL    Alkaline Phosphatase 176 (H) 38 - 126 U/L    AST 29 5 - 40 U/L    ALT 24 11 - 66 U/L    Total Protein 7.0 6.1 - 8.0 g/dL   Cell Count with Differential, BAL Fluid   Result Value Ref Range    BAL Color COLORLESS     BAL Character CLOUDY/MILKY     BAL Collection Site BAL     Total Volume Received BAL 40 ml    Total Nucleated Cells BAL 3323 /cumm    RBC BAL 3010 /cumm    Macrophage/Monocyte BAL 17 (L) 86 - 100 %    Lymphocytes, BAL 27 (H) 10 - 15 %    Segmented Neutrophils, BAL 54 (H) 0 - 3 %    Eosinophils BAL 2 (H) 0 - 1 %    Pathologist Review ALBINO GONSALES    CBC   Result Value Ref Range    WBC 5.2 4.8 - 10.8 thou/mm3    RBC 4.24 (L) 4.70 - 6.10 mill/mm3    Hemoglobin 12.3 (L) 14.0 - 18.0 gm/dl    Hematocrit 39.3 (L) 42.0 - 52.0 %    MCV 92.7 80.0 - 94.0 fL    MCH 29.0 26.0 - 33.0 pg    MCHC 31.3 (L) 32.2 - 35.5 gm/dl    RDW-CV 15.7 (H) 11.5 - 14.5 %    RDW-SD 53.3 (H) 35.0 - 45.0 fL Platelets 386 765 - 762 thou/mm3    MPV 8.7 (L) 9.4 - 12.4 fL   Basic Metabolic Panel w/ Reflex to MG   Result Value Ref Range    Sodium 141 135 - 145 meq/L    Potassium reflex Magnesium 4.0 3.5 - 5.2 meq/L    Chloride 106 98 - 111 meq/L    CO2 24 23 - 33 meq/L    Glucose 116 (H) 70 - 108 mg/dL    BUN 24 (H) 7 - 22 mg/dL    Creatinine 0.3 (L) 0.4 - 1.2 mg/dL    Calcium 8.3 (L) 8.5 - 10.5 mg/dL   Anion Gap   Result Value Ref Range    Anion Gap 11.0 8.0 - 16.0 meq/L   Glomerular Filtration Rate, Estimated   Result Value Ref Range    Est, Glom Filt Rate >60 >60 ml/min/1.73m2   Miscellaneous Lab Test #1   Result Value Ref Range    MISC. #1 REFERENCE GROUP TEST SEE BELOW    CMV by PCR Qualitative   Result Value Ref Range    CMV BY PCR, QUALITATIVE BLOOD Not Detected    POCT Glucose   Result Value Ref Range    POC Glucose 116 (H) 70 - 108 mg/dl   EKG 12 Lead   Result Value Ref Range    Ventricular Rate 81 BPM    Atrial Rate 81 BPM    P-R Interval 134 ms    QRS Duration 78 ms    Q-T Interval 370 ms    QTc Calculation (Bazett) 429 ms    P Axis 49 degrees    R Axis 52 degrees    T Axis 69 degrees          No results found for this or any previous visit. No results found for this or any previous visit. Results for orders placed during the hospital encounter of 04/22/22    CTA HEAD W WO CONTRAST (CODE STROKE)    Narrative  PROCEDURE: CTA HEAD W WO CONTRAST, CTA NECK W WO CONTRAST    CLINICAL INFORMATION: Stroke Symptoms . Right-sided weakness. COMPARISON: CT head from the same date. TECHNIQUE: Helical CT from the aortic arch through the head in arterial phase during fast bolus administration of 80 mL Isovue-370 injected in the right [facility] with multiplanar reconstructions to include volumetric maximum intensity projection sequences.  All  CT scans at this facility use dose modulation, iterative reconstruction, and/or weight-based dosing when appropriate to reduce radiation dose to as low as reasonably achievable. FINDINGS:    CTA HEAD:  There are mural calcifications in the cavernous and supraclinoid segments of internal carotid arteries without flow-limiting stenosis or aneurysm. The bilateral middle cerebral and anterior cerebral arteries are patent without focal abnormality  identified. The basilar artery is patent without focal stenosis or visualized aneurysm. The bilateral posterior cerebral and superior cerebellar arteries are patent without focal abnormality identified. Dural venous sinuses appear patent without focal  filling defect. No focal areas of abnormal parenchymal enhancement are identified. CTA NECK:  There is a typical 3 vessel arch. The brachiocephalic and left subclavian arteries are patent without flow-limiting stenosis. There is calcified and noncalcified mural plaque at the distal most segments of the common carotid arteries with moderate  stenosis bilaterally. Calcified and noncalcified mural plaque extends into the left carotid bulb with narrowest luminal diameter measuring 2.8 mm and a point more distal, where the walls are parallel, measuring 4.3 mm. There is no hemodynamically  significant stenosis in the right carotid bulb. Cervical segments of internal carotid arteries are otherwise patent without focal stenosis. The left vertebral artery is dominant. There is mild stenosis in the P1 segment on the left. There is moderate  stenosis in the bilateral V4 segments. Vocal cords are closely apposed limiting evaluation of the larynx. Given this caveat, mucosal surfaces of the aerodigestive tract are symmetric without focal nodular thickening or visualized mass. There are few scattered cervical chain lymph nodes  without definite cervical lymphadenopathy. The parotid, 7 nuclear and thyroid glands are unremarkable. There are mild geographic areas of groundglass opacity in the visualized portions of the lungs. Visualized osseous structures appear intact. Impression  1.  No flow-limiting stenosis or aneurysm in the intracranial circulation. 2. Slight mural plaque at the left carotid bulb with 35% stenosis by NASCET criteria. 3. Moderate stenosis at the distal most segments of the bilateral common carotid arteries. 4. Stenosis in the V4 segments of the bilateral vertebral arteries. 5. Mild geographic groundglass opacities in the visual was portions of lungs possibly on the basis of small airway or small vessel disease. **This report has been created using voice recognition software. It may contain minor errors which are inherent in voice recognition technology. **    Final report electronically signed by Dr. Jimmy Varela MD on 4/22/2022 1:20 PM    Results for orders placed during the hospital encounter of 04/22/22    CTA NECK W WO CONTRAST (CODE STROKE)    Narrative  PROCEDURE: CTA HEAD W WO CONTRAST, CTA NECK W WO CONTRAST    CLINICAL INFORMATION: Stroke Symptoms . Right-sided weakness. COMPARISON: CT head from the same date. TECHNIQUE: Helical CT from the aortic arch through the head in arterial phase during fast bolus administration of 80 mL Isovue-370 injected in the right [facility] with multiplanar reconstructions to include volumetric maximum intensity projection sequences. All  CT scans at this facility use dose modulation, iterative reconstruction, and/or weight-based dosing when appropriate to reduce radiation dose to as low as reasonably achievable. FINDINGS:    CTA HEAD:  There are mural calcifications in the cavernous and supraclinoid segments of internal carotid arteries without flow-limiting stenosis or aneurysm. The bilateral middle cerebral and anterior cerebral arteries are patent without focal abnormality  identified. The basilar artery is patent without focal stenosis or visualized aneurysm. The bilateral posterior cerebral and superior cerebellar arteries are patent without focal abnormality identified.  Dural venous sinuses appear patent without focal  filling defect. No focal areas of abnormal parenchymal enhancement are identified. CTA NECK:  There is a typical 3 vessel arch. The brachiocephalic and left subclavian arteries are patent without flow-limiting stenosis. There is calcified and noncalcified mural plaque at the distal most segments of the common carotid arteries with moderate  stenosis bilaterally. Calcified and noncalcified mural plaque extends into the left carotid bulb with narrowest luminal diameter measuring 2.8 mm and a point more distal, where the walls are parallel, measuring 4.3 mm. There is no hemodynamically  significant stenosis in the right carotid bulb. Cervical segments of internal carotid arteries are otherwise patent without focal stenosis. The left vertebral artery is dominant. There is mild stenosis in the P1 segment on the left. There is moderate  stenosis in the bilateral V4 segments. Vocal cords are closely apposed limiting evaluation of the larynx. Given this caveat, mucosal surfaces of the aerodigestive tract are symmetric without focal nodular thickening or visualized mass. There are few scattered cervical chain lymph nodes  without definite cervical lymphadenopathy. The parotid, 7 nuclear and thyroid glands are unremarkable. There are mild geographic areas of groundglass opacity in the visualized portions of the lungs. Visualized osseous structures appear intact. Impression  1. No flow-limiting stenosis or aneurysm in the intracranial circulation. 2. Slight mural plaque at the left carotid bulb with 35% stenosis by NASCET criteria. 3. Moderate stenosis at the distal most segments of the bilateral common carotid arteries. 4. Stenosis in the V4 segments of the bilateral vertebral arteries. 5. Mild geographic groundglass opacities in the visual was portions of lungs possibly on the basis of small airway or small vessel disease. **This report has been created using voice recognition software.  It may contain minor errors which are inherent in voice recognition technology. **    Final report electronically signed by Dr. Eloise Jade MD on 4/22/2022 1:20 PM    No results found for this or any previous visit. Results for orders placed during the hospital encounter of 04/22/22    MRI BRAIN W WO CONTRAST    Narrative  PROCEDURE: MRI BRAIN W WO CONTRAST    INDICATION:Weakness, Hx of MS. middle status change and right-sided weakness. COMPARISON: MR brain dated 6/11/2019. TECHNIQUE: Multiplanar and multiple spin echo T1 and T2-weighted images were obtained through the brain before and after the administration of intravenous contrast. 20 mL ProHance was injected in the right forearm. FINDINGS:  There is stable moderate to severe frontal temporal predominant volume loss. There is a small focal area of encephalomalacia in the high right paramedian frontal lobe, stable compared to prior exam. There are mild scattered focal areas of T2/FLAIR  prolongation in the periventricular, subcortical deep white matter in keeping with history of multiple sclerosis. These are stable compared to prior exam. No new/interval lesion is identified. No focal areas restricted diffusion are present. Following contrast administration, there are no focal areas of abnormal parenchymal or meningeal enhancement identified. The major vascular flow voids appear patent. Patient is status post left-sided lens extraction. Orbits are otherwise unremarkable. Paranasal sinuses and mastoid air cells are clear. Impression  1. No evidence of acute intracranial abnormality. 2. Stable patchy areas of T2/FLAIR hyperintensity in the supratentorial white matter in keeping with history of multiple sclerosis. No new/interval lesion or enhancing lesion suggest active demyelination. **This report has been created using voice recognition software.  It may contain minor errors which are inherent in voice recognition technology. **      Final report electronically signed by Dr. Cherie Salmeron MD on 4/22/2022 4:13 PM    Results for orders placed during the hospital encounter of 04/22/22    MRI CERVICAL SPINE W WO CONTRAST    Narrative  PROCEDURE: MRI CERVICAL SPINE W WO CONTRAST    CLINICAL INFORMATION: Weakness, Hx MS .    COMPARISON: No prior study. TECHNIQUE: Sagittal T1, T2 and STIR sequences were obtained through the cervical spine. Axial fast and echo and gradient echo T2-weighted images were obtained. Postcontrast axial and sagittal T1-weighted images were obtained. 20 mL ProHance was injected  in the right forearm. FINDINGS:  Images are motion degraded. There is anatomic vertebral body height and alignment. There is a focal area of hyperintense T1 and T2 signal in the C7 vertebral body as evidence for a hemangioma. Marrow signal is otherwise within normal limits. There is a  focal area of thinning of the spinal cord at the C6 level. No convincing focal areas of abnormal T2 signal are identified within the spinal cord. No focal areas of abnormal cord enhancement are identified. Paraspinal soft tissues are unremarkable. At C2-3 there is no significant spinal canal or neuroforaminal stenosis. At C3-4 there is mild left lateral recess narrowing and severe left neural foraminal stenosis and moderate severe right neural foraminal stenosis in association with uncovertebral joint degenerative change. At C4-5 there is no significant spinal canal stenosis. There is moderate bilateral neural foraminal stenosis in association with uncovertebral joint degenerative change and facet hypertrophy. At C5-6 there is no cystic and spinal canal stenosis. There is moderate bilateral neural foraminal stenosis in association with uncovertebral joint degenerative change and mild facet hypertrophy. At C6-7 there is no significant spinal canal stenosis.  There is mild right neural foraminal stenosis in association with uncovertebral joint or change and facet hypertrophy. C7-T1 there is no significant spinal canal or neuroforaminal stenosis. Impression  1. Motion degraded exam without convincing focal area of abnormal T2 signal or enhancement in the cord. 2. Focal area of cord thinning at the C6 level as evidence for myelomalacia. 3. Multilevel degenerative changes of the cervical spine most pronounced at C3-4 where there is no significant spinal canal stenosis but with left lateral recess narrowing and severe bilateral neural foraminal stenosis in association with uncovertebral  joint degenerative change. **This report has been created using voice recognition software. It may contain minor errors which are inherent in voice recognition technology. **    Final report electronically signed by Dr. Iveth Haley MD on 4/22/2022 4:20 PM    Results for orders placed during the hospital encounter of 07/20/22    CT HEAD WO CONTRAST    Narrative  PROCEDURE: NONCONTRAST CT BRAIN    CLINICAL INFORMATION: altered mental status. History of multiple sclerosis. COMPARISON: 4/22/2022    TECHNIQUE: Multiple axial 5 mm images of the brain were obtained without administration of intravenous contrast material. ALL CT SCANS AT THIS FACILITY use dose modulation, iterative reconstruction, and/or weight-based dosing when appropriate to reduce  radiation dose to as low as reasonably achievable. FINDINGS:    VENTRICLES: Mild prominence of ventricles diffusely, consistent with central and 20. Mild diffuse cerebral cortical atrophy and cerebellar cortical atrophy are also demonstrated. PARENCHYMA:  No acute infarction, mass lesion, or intracranial hemorrhage is seen. Small focus of diminished attenuation in the right basal ganglia region which could be due to an old infarct or microvascular disease. MASTOID PROCESSES: Well aerated. Normal in appearance. [facility]     PARANASAL SINUSES/CALVARIUM: Unremarkable    Impression  No acute intracranial process. . No change from prior study. **This report has been created using voice recognition software. It may contain minor errors which are inherent in voice recognition technology. **    Final report electronically signed by Dr. Garry Sicard on 7/20/2022 10:25 AM         Assessment:     Diagnosis Orders   1. Multiple sclerosis ([address]  CBC with Auto Differential    Hepatic Function Panel      2. Paraparesis of both lower limbs (HCC)  CBC with Auto Differential    Hepatic Function Panel      3. Right-sided face pain  CBC with Auto Differential    Hepatic Function Panel           Follow up for Multiple sclerosis, paraparesis of both lower limbs, right sided face pain. Upon review of records from NH, his lyrica was stopped and he is now on gabapentin. He reports he switched NH in the past 6 months. It is unclear to me why this change was made from lyrica to gabapentin. He is also on trileptal. He reports his face pain is well controlled. He denies new symptoms. His MS symptoms are stable. After detailed discussion with patient we agreed on the following plan. Plan:  Continue with gabapentin at current dose  Continue with trileptal at current dose. CBC, HFP  You need to take calcium and vitamin D over the counter  Call with any new symptoms or concerns. Follow up in 8 months.          Total time 26 min    TONO Bhandari - CNP

## 2023-03-23 ENCOUNTER — OUTSIDE SERVICES (OUTPATIENT)
Dept: FAMILY MEDICINE CLINIC | Age: 68
End: 2023-03-23

## 2023-03-23 DIAGNOSIS — Z93.3 COLOSTOMY IN PLACE (HCC): ICD-10-CM

## 2023-03-23 DIAGNOSIS — I10 ESSENTIAL HYPERTENSION, BENIGN: ICD-10-CM

## 2023-03-23 DIAGNOSIS — G35 MS (MULTIPLE SCLEROSIS) (HCC): Primary | ICD-10-CM

## 2023-03-23 NOTE — PROGRESS NOTES
3/23/2023  Teresita Swift  1955  Subjective:  He was in his/her room to follow up MS. Denies any new problems, is feeling well. Denies headaches, CP, sob, or abdominal pains. Objective:   Please see vitals per chart. There is no sinus tenderness to palpation, no cervical lymphadenopathy. Lungs are clear. Heart Regular rate and rhythm without murmur. The abdomen is soft and nontender. Colostomy present and functioning. Extremities are without edema. Assessment:   Diagnosis Orders   1. MS (multiple sclerosis) (HCC)  Stable, controlled on zanaflex, baclofen, gabapentin      2. Essential hypertension, benign  Stable, controlled on coreg      3. Colostomy in place [facility])  Stable, normal functioning        Plan: We will continue current medication regimen including zanaflex, baclofen and gabapentin for MS, melatonin for sleep and supportive care and recheck in 1 month.     Electronically signed by Christina oNel MD on 3/23/2023 at 11:25 AM.

## 2023-04-27 ENCOUNTER — OUTSIDE SERVICES (OUTPATIENT)
Dept: FAMILY MEDICINE CLINIC | Age: 68
End: 2023-04-27

## 2023-04-27 DIAGNOSIS — G35 MS (MULTIPLE SCLEROSIS) (HCC): ICD-10-CM

## 2023-04-27 DIAGNOSIS — Z93.3 COLOSTOMY IN PLACE (HCC): ICD-10-CM

## 2023-04-27 DIAGNOSIS — I10 ESSENTIAL HYPERTENSION, BENIGN: ICD-10-CM

## 2023-04-27 DIAGNOSIS — H61.23 BILATERAL IMPACTED CERUMEN: Primary | ICD-10-CM

## 2023-04-27 NOTE — PROGRESS NOTES
4/27/2023  Pan Swift  1955  Subjective:  He was in his/her room to follow up decreased hearing due to impacted cerumen, debrox protocol and irrigation was completed. Denies headaches, CP, sob, or abdominal pains. Objective:   Please see vitals per chart. There is no sinus tenderness to palpation, no cervical lymphadenopathy. Lungs are clear. Heart Regular rate and rhythm without murmur. The abdomen is soft and nontender. Colostomy present and functioning. Extremities are without edema. Assessment:  1. Bilateral impacted cerumen  -new problem, debrox and irrigation, -monitor sxs, call if not improving    2. MS (multiple sclerosis) (HCC)  Stable, controlled on zanaflex, baclofen, gabapentin      3. Essential hypertension, benign  Stable, controlled on coreg      4. Colostomy in place [facility])  Stable, normal functioning        Plan: We will continue current medication regimen including zanaflex, baclofen and gabapentin for MS, melatonin for sleep and supportive care and recheck in 1 month.     Electronically signed by Valentín Brewer MD on 4/27/2023 at 11:10 AM.

## 2023-05-25 ENCOUNTER — OUTSIDE SERVICES (OUTPATIENT)
Dept: FAMILY MEDICINE CLINIC | Age: 68
End: 2023-05-25
Payer: MEDICAID

## 2023-05-25 DIAGNOSIS — G35 MS (MULTIPLE SCLEROSIS) (HCC): Primary | ICD-10-CM

## 2023-05-25 DIAGNOSIS — Z93.3 COLOSTOMY IN PLACE (HCC): ICD-10-CM

## 2023-05-25 DIAGNOSIS — I10 ESSENTIAL HYPERTENSION, BENIGN: ICD-10-CM

## 2023-05-25 PROCEDURE — 99308 SBSQ NF CARE LOW MDM 20: CPT | Performed by: FAMILY MEDICINE

## 2023-05-25 NOTE — PROGRESS NOTES
5/25/2023  Justine Swift  1955  Subjective:  He was in his/her room to follow up MS. Denies any new problems, is feeling well. Denies headaches, CP, sob, or abdominal pains. Objective:   Please see vitals per chart. There is no sinus tenderness to palpation, no cervical lymphadenopathy. Lungs are clear. Heart Regular rate and rhythm without murmur. The abdomen is soft and nontender. Colostomy present and functioning. Extremities are without edema. Assessment:   Diagnosis Orders   1. MS (multiple sclerosis) (HCC)  Stable, controlled on zanaflex, baclofen, gabapentin      2. Essential hypertension, benign  Stable, controlled on coreg      3. Colostomy in place [facility])  Stable, normal functioning        Plan: We will continue current medication regimen including zanaflex, baclofen and gabapentin for MS, melatonin for sleep and supportive care and recheck in 1 month.     Electronically signed by Shae Kapadia MD on 5/25/2023 at 10:58 AM.

## 2023-06-13 NOTE — ED PROVIDER NOTES
[facility]  eMERGENCY dEPARTMENT eNCOUnter          CHIEF COMPLAINT       Chief Complaint   Patient presents with    Hematuria       Nurses Notes reviewed and I agree except as noted in the HPI. HISTORY OF PRESENT ILLNESS    Sia Alexis is a 61 y.o. male who presents to the Emergency Department for the evaluation of gross hematuria. Patient has a suprapubic catheter due to neurogenic bladder secondary to MS. The comes from a nursing facility. His catheter was changed last night because of obstruction. The catheter exchange was complicated and patient reports it \"got stuck\" so was taken back out and replaced again. Since the replacement, the patient has had gross hematuria in his catheter bag. Patient is asymptomatic; denies fever, headache, dizziness/lightheadedness, N/V. He is unable to ambulate independently at baseline. Colostomy bag also in place and patient reports good output. Patient is on Xarelto. The HPI was provided by the patient. REVIEW OF SYSTEMS     Review of Systems   Constitutional: Negative for chills and fever. Respiratory: Negative for shortness of breath. Cardiovascular: Negative for chest pain. Gastrointestinal: Negative for abdominal distention, abdominal pain, blood in stool, constipation, diarrhea and vomiting. Genitourinary: Positive for difficulty urinating and hematuria. Neurological: Negative for light-headedness and headaches. All other systems reviewed and are negative. PAST MEDICAL HISTORY    has a past medical history of Dysphagia, History of pulmonary embolism, History of urinary tract infection, Hx of blood clots, Hypertension, Major depressive disorder, single episode, MS (multiple sclerosis) ([address] Neurogenic bladder, Osteomyelitis ([address] and UTI (urinary tract infection). SURGICAL HISTORY      has a past surgical history that includes Tonsillectomy (child); Ankle surgery (1996); Bladder surgery (2-);  Colonoscopy; pr lap,surg,colectomy,w/end colost & closur (N/A, 6/13/2018); cysto/uretero/pyeloscopy, calculus tx (Left, 6/19/2019); cysto/uretero/pyeloscopy, calculus tx (N/A, 7/8/2019); and Cystoscopy (N/A, 2/26/2021). CURRENT MEDICATIONS       Previous Medications    ACETAMINOPHEN (TYLENOL) 325 MG TABLET    Take 650 mg by mouth every 4 hours as needed for Pain or Fever    ACETIC ACID 0.25 % IRRIGATION    Irrigate with 30 mLs as directed 2 times daily Irrigate with as directed daily. ASCORBIC ACID (VITAMIN C ER PO)    Take 500 mg by mouth in the morning and at bedtime     ASPIRIN 81 MG EC TABLET    Take 81 mg by mouth daily    BACLOFEN (LIORESAL) 10 MG TABLET    Take 10 mg by mouth 3 times daily    CALCIUM-VITAMIN D (OSCAL-500) 500-200 MG-UNIT PER TABLET    Take 1 tablet by mouth 2 times daily    DOCUSATE SODIUM (COLACE) 100 MG CAPSULE    Take 100 mg by mouth daily     ERYTHROMYCIN (ROMYCIN) 5 MG/GM OPHTHALMIC OINTMENT    Place into the right eye nightly (0.25 ribbon to right eye)    FAMOTIDINE (PEPCID) 20 MG TABLET    Take 1 tablet by mouth 2 times daily    GABAPENTIN (NEURONTIN) 600 MG TABLET    Take 600 mg by mouth 3 times daily.      GENTAMICIN (GARAMYCIN) 0.1 % OINTMENT    Apply topically daily Apply to wound bed    IBUPROFEN (ADVIL;MOTRIN) 400 MG TABLET    Take 400 mg by mouth every 4 hours as needed for Fever (for temp > 100.5 not alleviated by acetaminophen)    LACTOBACILLUS PO    Take 1 capsule by mouth in the morning and at bedtime     METOPROLOL TARTRATE (LOPRESSOR) 25 MG TABLET    Take 1 tablet by mouth 2 times daily    MULTIPLE VITAMIN (MULTIVITAMIN) TABLET    Take 1 tablet by mouth daily    OXYBUTYNIN (DITROPAN-XL) 5 MG EXTENDED RELEASE TABLET    Take 5 mg by mouth in the morning and at bedtime    POTASSIUM CHLORIDE (KLOR-CON M) 20 MEQ TBCR EXTENDED RELEASE TABLET    Take 2 tablets by mouth daily    RIVAROXABAN (XARELTO) 10 MG TABS TABLET    Take 10 mg by mouth    SODIUM CHLORIDE 1 G TABLET    Take 1 tablet by mouth 2 times daily (with meals)    TIZANIDINE (ZANAFLEX) 2 MG TABLET    Take 2 mg by mouth 3 times daily 0600;1400;2200    VITAMIN A 24691 UNITS CAPSULE    Take 2 capsules by mouth daily    VITAMIN E 400 UNIT CAPSULE    Take 400 Units by mouth daily       ALLERGIES     has No Known Allergies. FAMILY HISTORY     He indicated that his mother is . He indicated that his father is . He indicated that his sister is alive. He indicated that the status of his paternal grandmother is unknown.   family history includes Arthritis in his sister; Cancer in his mother; Heart Disease (age of onset: 48) in his father and paternal grandmother. SOCIAL HISTORY      reports that he quit smoking about 40 years ago. He has quit using smokeless tobacco. He reports that he does not drink alcohol and does not use drugs. PHYSICAL EXAM     INITIAL VITALS:  weight is 200 lb (90.7 kg). His oral temperature is 97.6 °F (36.4 °C). His blood pressure is 174/74 (abnormal) and his pulse is 72. His respiration is 18 and oxygen saturation is 98%. Physical Exam  Vitals and nursing note reviewed. Constitutional:       Appearance: Normal appearance. HENT:      Head: Normocephalic and atraumatic. Mouth/Throat:      Mouth: Mucous membranes are moist.   Eyes:      Conjunctiva/sclera: Conjunctivae normal.   Cardiovascular:      Rate and Rhythm: Normal rate. Pulmonary:      Effort: Pulmonary effort is normal.   Abdominal:      General: The ostomy site is clean. There is no distension. Palpations: Abdomen is soft. Tenderness: There is no abdominal tenderness. Genitourinary:     Comments: Dark red urine in catheter bag  Skin:     General: Skin is warm and dry. Neurological:      General: No focal deficit present. Mental Status: He is alert and oriented to person, place, and time.    Psychiatric:         Mood and Affect: Mood normal.         Behavior: Behavior normal.         DIFFERENTIAL DIAGNOSIS: Differential diagnoses are discussed    DIAGNOSTIC RESULTS     RADIOLOGY: non-plain film images(s) such as CT, Ultrasound and MRI are read by the radiologist.    No orders to display       LABS:      Labs Reviewed   URINE WITH REFLEXED MICRO - Abnormal; Notable for the following components:       Result Value    Blood, Urine LARGE (*)     Protein,  (*)     Nitrite, Urine POSITIVE (*)     Leukocyte Esterase, Urine MODERATE (*)     Color, UA RED (*)     Character, Urine TURBID (*)     All other components within normal limits   CBC WITH AUTO DIFFERENTIAL - Abnormal; Notable for the following components:    RDW-CV 15.8 (*)     RDW-SD 51.4 (*)     MPV 9.1 (*)     All other components within normal limits   COMPREHENSIVE METABOLIC PANEL W/ REFLEX TO MG FOR LOW K - Abnormal; Notable for the following components:    Glucose 118 (*)     CREATININE 0.3 (*)     CO2 22 (*)     Alkaline Phosphatase 144 (*)     Albumin 3.4 (*)     Total Bilirubin 0.2 (*)     All other components within normal limits   CULTURE, REFLEXED, URINE    Narrative:     Source: cath urine       Site: suprapubic asp. Current Antibiotics: none   ANION GAP   GLOMERULAR FILTRATION RATE, ESTIMATED   OSMOLALITY       EMERGENCY DEPARTMENT COURSE:   Vitals:    Vitals:    06/11/22 1508 06/11/22 1608 06/11/22 1708 06/11/22 1808   BP: (!) 163/79 (!) 155/69 (!) 163/62 (!) 174/74   Pulse: 70 68 69 72   Resp: 16 16 18 18   Temp:       TempSrc:       SpO2: 96% 98% 98% 98%   Weight:          2:47 PM EDT: The patient was seen and evaluated. Patient presents with reassuring vital signs for complaints of hematuria. Catheter bag contains dark red urine. Suprapubic site appears clean and intact. Patient is showing no sings of anemia on exam. UA reveals large amount of hematuria, moderate leukocytes, positive nitrite, greater than 200 red blood cells and greater than 100 white blood cells with many bacteria.   This is comparable with his baseline and he has no abdominal pain, fevers or elevated white blood cell count to indicate acute infection. Hemoglobin stable. Attempts at manual irrigation were unsuccessful and decision was made to initiate continuous bladder irrigation. Patient agreeable with this plan and he will be admitted for continued irrigation and further management. CRITICAL CARE:   None     CONSULTS:  Hospitalist    PROCEDURES:  None    FINAL IMPRESSION      1. Gross hematuria    2. Anticoagulant long-term use          DISPOSITION/PLAN   Admit    PATIENT REFERRED TO:  No follow-up provider specified.     DISCHARGEMEDICATIONS:  New Prescriptions    No medications on file       (Please note that portions of this note were completedwith a voice recognition program.  Efforts were made to edit the dictations but occasionally words are mis-transcribed.)        Fernanda Brown PA-C  06/11/22 2100 Yes - the patient is able to be screened

## 2023-06-22 ENCOUNTER — OUTSIDE SERVICES (OUTPATIENT)
Dept: FAMILY MEDICINE CLINIC | Age: 68
End: 2023-06-22

## 2023-06-22 DIAGNOSIS — H61.23 BILATERAL HEARING LOSS DUE TO CERUMEN IMPACTION: Primary | ICD-10-CM

## 2023-06-22 DIAGNOSIS — G35 MS (MULTIPLE SCLEROSIS) (HCC): ICD-10-CM

## 2023-06-22 DIAGNOSIS — Z93.3 COLOSTOMY IN PLACE (HCC): ICD-10-CM

## 2023-06-22 DIAGNOSIS — I10 ESSENTIAL HYPERTENSION, BENIGN: ICD-10-CM

## 2023-06-22 NOTE — PROGRESS NOTES
6/22/2023  Isrrael Swift  1955  Subjective:  He was in his/her room to follow up decreased hearing due to wax build up. Debrox protocol was ordered successfully. Otherwise is feeling well. Denies headaches, CP, sob, or abdominal pains. Objective:   Please see vitals per chart. There is no sinus tenderness to palpation, no cervical lymphadenopathy. Lungs are clear. Heart Regular rate and rhythm without murmur. The abdomen is soft and nontender. Colostomy present and functioning. Extremities are without edema. Assessment:    1. Bilateral hearing loss due to cerumen impaction  -new problem, added debrox drops and irrigation     Diagnosis Orders   2 MS (multiple sclerosis) ([facility])  Stable, controlled on zanaflex, baclofen, gabapentin      3. Essential hypertension, benign  Stable, controlled on coreg      4. Colostomy in place [facility])  Stable, normal functioning        Plan: We will continue current medication regimen including zanaflex, baclofen and gabapentin for MS, melatonin for sleep and supportive care and recheck in 1 month.     Electronically signed by Aurelia Daigle MD on 6/22/2023 at 10:39 AM.

## 2023-07-27 ENCOUNTER — OUTSIDE SERVICES (OUTPATIENT)
Dept: FAMILY MEDICINE CLINIC | Age: 68
End: 2023-07-27

## 2023-07-27 DIAGNOSIS — I10 ESSENTIAL HYPERTENSION, BENIGN: ICD-10-CM

## 2023-07-27 DIAGNOSIS — Z93.3 COLOSTOMY IN PLACE (HCC): ICD-10-CM

## 2023-07-27 DIAGNOSIS — G35 MS (MULTIPLE SCLEROSIS) (HCC): Primary | ICD-10-CM

## 2023-07-27 NOTE — PROGRESS NOTES
7/27/2023  Radha Swift  1955  Subjective:  He was in his/her room to follow up MS. Denies any new problems, is feeling well. Denies headaches, CP, sob, or abdominal pains. Objective:   Please see vitals per chart. There is no sinus tenderness to palpation, no cervical lymphadenopathy. Lungs are clear. Heart Regular rate and rhythm without murmur. The abdomen is soft and nontender. Colostomy present and functioning. Extremities are without edema. [place] is present. Assessment:   Diagnosis Orders   1. MS (multiple sclerosis) (HCC)  Stable, controlled on zanaflex, baclofen, gabapentin      2. Essential hypertension, benign  Stable, controlled on coreg      3. Colostomy in place [facility])  Stable, normal functioning        Plan: We will continue current medication regimen including zanaflex, baclofen and gabapentin for MS, melatonin for sleep and supportive care and recheck in 1 month.     Electronically signed by Irish Severe, MD on 7/27/2023 at 10:27 AM.

## 2023-08-21 ENCOUNTER — APPOINTMENT (OUTPATIENT)
Dept: GENERAL RADIOLOGY | Age: 68
DRG: 659 | End: 2023-08-21
Payer: MEDICARE

## 2023-08-21 ENCOUNTER — HOSPITAL ENCOUNTER (INPATIENT)
Age: 68
LOS: 11 days | Discharge: NURSING, REHAB OR LONG-TERM CARE | DRG: 659 | End: 2023-09-01
Attending: EMERGENCY MEDICINE | Admitting: INTERNAL MEDICINE
Payer: MEDICARE

## 2023-08-21 DIAGNOSIS — J18.9 PNEUMONIA OF LEFT LUNG DUE TO INFECTIOUS ORGANISM, UNSPECIFIED PART OF LUNG: ICD-10-CM

## 2023-08-21 DIAGNOSIS — R31.9 URINARY TRACT INFECTION WITH HEMATURIA, SITE UNSPECIFIED: ICD-10-CM

## 2023-08-21 DIAGNOSIS — A49.9 ESBL (EXTENDED SPECTRUM BETA-LACTAMASE) PRODUCING BACTERIA INFECTION: ICD-10-CM

## 2023-08-21 DIAGNOSIS — R77.8 ELEVATED TROPONIN: ICD-10-CM

## 2023-08-21 DIAGNOSIS — R06.02 SHORTNESS OF BREATH: ICD-10-CM

## 2023-08-21 DIAGNOSIS — R09.02 HYPOXIA: ICD-10-CM

## 2023-08-21 DIAGNOSIS — Z16.12 ESBL (EXTENDED SPECTRUM BETA-LACTAMASE) PRODUCING BACTERIA INFECTION: ICD-10-CM

## 2023-08-21 DIAGNOSIS — I25.10 CAD, MULTIPLE VESSEL: ICD-10-CM

## 2023-08-21 DIAGNOSIS — N39.0 URINARY TRACT INFECTION WITH HEMATURIA, SITE UNSPECIFIED: ICD-10-CM

## 2023-08-21 DIAGNOSIS — A41.9 SEPSIS SECONDARY TO UTI (HCC): ICD-10-CM

## 2023-08-21 DIAGNOSIS — R65.21 SEPTIC SHOCK (HCC): Primary | ICD-10-CM

## 2023-08-21 DIAGNOSIS — A41.9 SEPTIC SHOCK (HCC): Primary | ICD-10-CM

## 2023-08-21 DIAGNOSIS — R40.0 SOMNOLENCE: ICD-10-CM

## 2023-08-21 DIAGNOSIS — N39.0 SEPSIS SECONDARY TO UTI (HCC): ICD-10-CM

## 2023-08-21 LAB
ALBUMIN SERPL BCG-MCNC: 3 G/DL (ref 3.5–5.1)
ALP SERPL-CCNC: 230 U/L (ref 38–126)
ALT SERPL W/O P-5'-P-CCNC: 52 U/L (ref 11–66)
AMMONIA PLAS-MCNC: 50 UMOL/L (ref 11–60)
AMORPH SED URNS QL MICRO: ABNORMAL
AMPHETAMINES UR QL SCN: NEGATIVE
ANION GAP SERPL CALC-SCNC: 10 MEQ/L (ref 8–16)
ANION GAP SERPL CALC-SCNC: 13 MEQ/L (ref 8–16)
APTT PPP: 35.8 SECONDS (ref 22–38)
ARTERIAL PATENCY WRIST A: POSITIVE
AST SERPL-CCNC: 80 U/L (ref 5–40)
BACTERIA URNS QL MICRO: ABNORMAL /HPF
BARBITURATES UR QL SCN: NEGATIVE
BASE EXCESS BLDA CALC-SCNC: -1.5 MMOL/L (ref -2.5–2.5)
BASOPHILS ABSOLUTE: 0 THOU/MM3 (ref 0–0.1)
BASOPHILS NFR BLD AUTO: 0.3 %
BDY SITE: NORMAL
BENZODIAZ UR QL SCN: NEGATIVE
BILIRUB SERPL-MCNC: 1.5 MG/DL (ref 0.3–1.2)
BILIRUB UR QL STRIP.AUTO: NEGATIVE
BUN SERPL-MCNC: 23 MG/DL (ref 7–22)
BUN SERPL-MCNC: 26 MG/DL (ref 7–22)
BZE UR QL SCN: NEGATIVE
CALCIUM SERPL-MCNC: 8 MG/DL (ref 8.5–10.5)
CALCIUM SERPL-MCNC: 9.4 MG/DL (ref 8.5–10.5)
CANNABINOIDS UR QL SCN: NEGATIVE
CASTS #/AREA URNS LPF: ABNORMAL /LPF
CHARACTER UR: ABNORMAL
CHLORIDE SERPL-SCNC: 104 MEQ/L (ref 98–111)
CHLORIDE SERPL-SCNC: 111 MEQ/L (ref 98–111)
CO2 SERPL-SCNC: 21 MEQ/L (ref 23–33)
CO2 SERPL-SCNC: 25 MEQ/L (ref 23–33)
COLLECTED BY:: NORMAL
COLOR: ABNORMAL
CREAT SERPL-MCNC: 0.4 MG/DL (ref 0.4–1.2)
CREAT SERPL-MCNC: 0.5 MG/DL (ref 0.4–1.2)
CRYSTALS URNS MICRO: ABNORMAL
DEPRECATED RDW RBC AUTO: 57.7 FL (ref 35–45)
DEVICE: NORMAL
EOSINOPHIL NFR BLD AUTO: 0.2 %
EOSINOPHILS ABSOLUTE: 0 THOU/MM3 (ref 0–0.4)
EPITHELIAL CELLS, UA: ABNORMAL /HPF
ERYTHROCYTE [DISTWIDTH] IN BLOOD BY AUTOMATED COUNT: 16 % (ref 11.5–14.5)
FENTANYL: NEGATIVE
FIO2 ON VENT O2 ANALYZER: 4 %
FLUAV RNA RESP QL NAA+PROBE: NOT DETECTED
FLUBV RNA RESP QL NAA+PROBE: NOT DETECTED
GFR SERPL CREATININE-BSD FRML MDRD: > 60 ML/MIN/1.73M2
GFR SERPL CREATININE-BSD FRML MDRD: > 60 ML/MIN/1.73M2
GLUCOSE SERPL-MCNC: 118 MG/DL (ref 70–108)
GLUCOSE SERPL-MCNC: 143 MG/DL (ref 70–108)
GLUCOSE UR QL STRIP.AUTO: NEGATIVE MG/DL
HCO3 BLDA-SCNC: 24 MMOL/L (ref 23–28)
HCT VFR BLD AUTO: 54.6 % (ref 42–52)
HGB BLD-MCNC: 17.4 GM/DL (ref 14–18)
HGB UR QL STRIP.AUTO: ABNORMAL
IMM GRANULOCYTES # BLD AUTO: 0.01 THOU/MM3 (ref 0–0.07)
IMM GRANULOCYTES NFR BLD AUTO: 0.2 %
INR PPP: 1.46 (ref 0.85–1.13)
KETONES UR QL STRIP.AUTO: NEGATIVE
LACTATE SERPL-SCNC: 1.6 MMOL/L (ref 0.5–2)
LACTIC ACID, SEPSIS: 1.8 MMOL/L (ref 0.5–1.9)
LACTIC ACID, SEPSIS: 3.9 MMOL/L (ref 0.5–1.9)
LYMPHOCYTES ABSOLUTE: 0.3 THOU/MM3 (ref 1–4.8)
LYMPHOCYTES NFR BLD AUTO: 5.4 %
MAGNESIUM SERPL-MCNC: 1.9 MG/DL (ref 1.6–2.4)
MCH RBC QN AUTO: 31.4 PG (ref 26–33)
MCHC RBC AUTO-ENTMCNC: 31.9 GM/DL (ref 32.2–35.5)
MCV RBC AUTO: 98.4 FL (ref 80–94)
MONOCYTES ABSOLUTE: 0 THOU/MM3 (ref 0.4–1.3)
MONOCYTES NFR BLD AUTO: 0.5 %
NEUTROPHILS NFR BLD AUTO: 93.4 %
NITRITE UR QL STRIP: POSITIVE
NRBC BLD AUTO-RTO: 0 /100 WBC
OPIATES UR QL SCN: NEGATIVE
OSMOLALITY SERPL CALC.SUM OF ELEC: 287.9 MOSMOL/KG (ref 275–300)
OXYCODONE: NEGATIVE
PCO2 BLDA: 42 MMHG (ref 35–45)
PCP UR QL SCN: NEGATIVE
PH BLDA: 7.36 [PH] (ref 7.35–7.45)
PH UR STRIP.AUTO: 7 [PH] (ref 5–9)
PHOSPHATE SERPL-MCNC: 2.6 MG/DL (ref 2.4–4.7)
PLATELET # BLD AUTO: 161 THOU/MM3 (ref 130–400)
PMV BLD AUTO: 8.8 FL (ref 9.4–12.4)
PO2 BLDA: 77 MMHG (ref 71–104)
POTASSIUM SERPL-SCNC: 4.1 MEQ/L (ref 3.5–5.2)
POTASSIUM SERPL-SCNC: 4.8 MEQ/L (ref 3.5–5.2)
PROCALCITONIN SERPL IA-MCNC: 23.08 NG/ML (ref 0.01–0.09)
PROT SERPL-MCNC: 6.5 G/DL (ref 6.1–8)
PROT UR STRIP.AUTO-MCNC: 100 MG/DL
RBC # BLD AUTO: 5.55 MILL/MM3 (ref 4.7–6.1)
RBC URINE: ABNORMAL /HPF
REASON FOR REJECTION: NORMAL
REJECTED TEST: NORMAL
SAO2 % BLDA: 95 %
SARS-COV-2 RNA RESP QL NAA+PROBE: NOT DETECTED
SEGMENTED NEUTROPHILS ABSOLUTE COUNT: 5.7 THOU/MM3 (ref 1.8–7.7)
SODIUM SERPL-SCNC: 142 MEQ/L (ref 135–145)
SODIUM SERPL-SCNC: 142 MEQ/L (ref 135–145)
SP GR UR REFRACT.AUTO: 1.02 (ref 1–1.03)
TROPONIN, HIGH SENSITIVITY: 200 NG/L (ref 0–12)
TROPONIN, HIGH SENSITIVITY: 309 NG/L (ref 0–12)
TROPONIN, HIGH SENSITIVITY: 387 NG/L (ref 0–12)
TSH SERPL DL<=0.005 MIU/L-ACNC: 0.78 UIU/ML (ref 0.4–4.2)
UROBILINOGEN, URINE: 1 EU/DL (ref 0–1)
WBC # BLD AUTO: 6.1 THOU/MM3 (ref 4.8–10.8)
WBC #/AREA URNS HPF: > 200 /HPF
WBC #/AREA URNS HPF: ABNORMAL /[HPF]

## 2023-08-21 PROCEDURE — 85025 COMPLETE CBC W/AUTO DIFF WBC: CPT

## 2023-08-21 PROCEDURE — 85610 PROTHROMBIN TIME: CPT

## 2023-08-21 PROCEDURE — 83735 ASSAY OF MAGNESIUM: CPT

## 2023-08-21 PROCEDURE — 96365 THER/PROPH/DIAG IV INF INIT: CPT

## 2023-08-21 PROCEDURE — 6360000002 HC RX W HCPCS: Performed by: NURSE PRACTITIONER

## 2023-08-21 PROCEDURE — 85730 THROMBOPLASTIN TIME PARTIAL: CPT

## 2023-08-21 PROCEDURE — 71045 X-RAY EXAM CHEST 1 VIEW: CPT

## 2023-08-21 PROCEDURE — 6360000002 HC RX W HCPCS

## 2023-08-21 PROCEDURE — 87070 CULTURE OTHR SPECIMN AEROBIC: CPT

## 2023-08-21 PROCEDURE — 83605 ASSAY OF LACTIC ACID: CPT

## 2023-08-21 PROCEDURE — 87801 DETECT AGNT MULT DNA AMPLI: CPT

## 2023-08-21 PROCEDURE — 84145 PROCALCITONIN (PCT): CPT

## 2023-08-21 PROCEDURE — 93005 ELECTROCARDIOGRAM TRACING: CPT | Performed by: EMERGENCY MEDICINE

## 2023-08-21 PROCEDURE — 87086 URINE CULTURE/COLONY COUNT: CPT

## 2023-08-21 PROCEDURE — 36620 INSERTION CATHETER ARTERY: CPT

## 2023-08-21 PROCEDURE — 93005 ELECTROCARDIOGRAM TRACING: CPT

## 2023-08-21 PROCEDURE — 84484 ASSAY OF TROPONIN QUANT: CPT

## 2023-08-21 PROCEDURE — 2580000003 HC RX 258: Performed by: EMERGENCY MEDICINE

## 2023-08-21 PROCEDURE — 36415 COLL VENOUS BLD VENIPUNCTURE: CPT

## 2023-08-21 PROCEDURE — 2500000003 HC RX 250 WO HCPCS

## 2023-08-21 PROCEDURE — 96367 TX/PROPH/DG ADDL SEQ IV INF: CPT

## 2023-08-21 PROCEDURE — 2000000000 HC ICU R&B

## 2023-08-21 PROCEDURE — 87186 SC STD MICRODIL/AGAR DIL: CPT

## 2023-08-21 PROCEDURE — 36600 WITHDRAWAL OF ARTERIAL BLOOD: CPT

## 2023-08-21 PROCEDURE — 80053 COMPREHEN METABOLIC PANEL: CPT

## 2023-08-21 PROCEDURE — 2580000003 HC RX 258

## 2023-08-21 PROCEDURE — 84100 ASSAY OF PHOSPHORUS: CPT

## 2023-08-21 PROCEDURE — 80307 DRUG TEST PRSMV CHEM ANLYZR: CPT

## 2023-08-21 PROCEDURE — 87077 CULTURE AEROBIC IDENTIFY: CPT

## 2023-08-21 PROCEDURE — 96374 THER/PROPH/DIAG INJ IV PUSH: CPT

## 2023-08-21 PROCEDURE — 81001 URINALYSIS AUTO W/SCOPE: CPT

## 2023-08-21 PROCEDURE — 87636 SARSCOV2 & INF A&B AMP PRB: CPT

## 2023-08-21 PROCEDURE — 82803 BLOOD GASES ANY COMBINATION: CPT

## 2023-08-21 PROCEDURE — 87040 BLOOD CULTURE FOR BACTERIA: CPT

## 2023-08-21 PROCEDURE — 93010 ELECTROCARDIOGRAM REPORT: CPT | Performed by: INTERNAL MEDICINE

## 2023-08-21 PROCEDURE — 02HV33Z INSERTION OF INFUSION DEVICE INTO SUPERIOR VENA CAVA, PERCUTANEOUS APPROACH: ICD-10-PCS | Performed by: UROLOGY

## 2023-08-21 PROCEDURE — 6370000000 HC RX 637 (ALT 250 FOR IP)

## 2023-08-21 PROCEDURE — 99285 EMERGENCY DEPT VISIT HI MDM: CPT

## 2023-08-21 PROCEDURE — 96361 HYDRATE IV INFUSION ADD-ON: CPT

## 2023-08-21 PROCEDURE — 80048 BASIC METABOLIC PNL TOTAL CA: CPT

## 2023-08-21 PROCEDURE — 82140 ASSAY OF AMMONIA: CPT

## 2023-08-21 PROCEDURE — 87641 MR-STAPH DNA AMP PROBE: CPT

## 2023-08-21 PROCEDURE — 84443 ASSAY THYROID STIM HORMONE: CPT

## 2023-08-21 RX ORDER — FENTANYL CITRATE 50 UG/ML
50 INJECTION, SOLUTION INTRAMUSCULAR; INTRAVENOUS ONCE
Status: COMPLETED | OUTPATIENT
Start: 2023-08-21 | End: 2023-08-21

## 2023-08-21 RX ORDER — 0.9 % SODIUM CHLORIDE 0.9 %
500 INTRAVENOUS SOLUTION INTRAVENOUS ONCE
Status: COMPLETED | OUTPATIENT
Start: 2023-08-21 | End: 2023-08-21

## 2023-08-21 RX ORDER — FENTANYL CITRATE 50 UG/ML
INJECTION, SOLUTION INTRAMUSCULAR; INTRAVENOUS
Status: COMPLETED
Start: 2023-08-21 | End: 2023-08-21

## 2023-08-21 RX ORDER — ACETAMINOPHEN 325 MG/1
650 TABLET ORAL EVERY 6 HOURS PRN
Status: DISCONTINUED | OUTPATIENT
Start: 2023-08-21 | End: 2023-09-01 | Stop reason: HOSPADM

## 2023-08-21 RX ORDER — SODIUM CHLORIDE 0.9 % (FLUSH) 0.9 %
5-40 SYRINGE (ML) INJECTION EVERY 12 HOURS SCHEDULED
Status: DISCONTINUED | OUTPATIENT
Start: 2023-08-22 | End: 2023-09-01 | Stop reason: HOSPADM

## 2023-08-21 RX ORDER — ACETAMINOPHEN 650 MG/1
650 SUPPOSITORY RECTAL ONCE
Status: COMPLETED | OUTPATIENT
Start: 2023-08-21 | End: 2023-08-21

## 2023-08-21 RX ORDER — ACETAMINOPHEN 650 MG/1
650 SUPPOSITORY RECTAL EVERY 6 HOURS PRN
Status: DISCONTINUED | OUTPATIENT
Start: 2023-08-21 | End: 2023-09-01 | Stop reason: HOSPADM

## 2023-08-21 RX ORDER — NOREPINEPHRINE BITARTRATE 0.06 MG/ML
1-100 INJECTION, SOLUTION INTRAVENOUS CONTINUOUS
Status: DISCONTINUED | OUTPATIENT
Start: 2023-08-21 | End: 2023-08-25

## 2023-08-21 RX ORDER — SODIUM CHLORIDE, SODIUM LACTATE, POTASSIUM CHLORIDE, CALCIUM CHLORIDE 600; 310; 30; 20 MG/100ML; MG/100ML; MG/100ML; MG/100ML
INJECTION, SOLUTION INTRAVENOUS CONTINUOUS
Status: DISCONTINUED | OUTPATIENT
Start: 2023-08-22 | End: 2023-09-01 | Stop reason: HOSPADM

## 2023-08-21 RX ORDER — SODIUM CHLORIDE 0.9 % (FLUSH) 0.9 %
5-40 SYRINGE (ML) INJECTION PRN
Status: DISCONTINUED | OUTPATIENT
Start: 2023-08-21 | End: 2023-09-01 | Stop reason: HOSPADM

## 2023-08-21 RX ORDER — SODIUM CHLORIDE 9 MG/ML
INJECTION, SOLUTION INTRAVENOUS PRN
Status: DISCONTINUED | OUTPATIENT
Start: 2023-08-21 | End: 2023-09-01 | Stop reason: HOSPADM

## 2023-08-21 RX ORDER — 0.9 % SODIUM CHLORIDE 0.9 %
30 INTRAVENOUS SOLUTION INTRAVENOUS ONCE
Status: COMPLETED | OUTPATIENT
Start: 2023-08-21 | End: 2023-08-21

## 2023-08-21 RX ORDER — DEXTROSE MONOHYDRATE 100 MG/ML
INJECTION, SOLUTION INTRAVENOUS CONTINUOUS PRN
Status: DISCONTINUED | OUTPATIENT
Start: 2023-08-21 | End: 2023-09-01 | Stop reason: HOSPADM

## 2023-08-21 RX ORDER — ENOXAPARIN SODIUM 100 MG/ML
40 INJECTION SUBCUTANEOUS DAILY
Status: DISCONTINUED | OUTPATIENT
Start: 2023-08-22 | End: 2023-09-01 | Stop reason: HOSPADM

## 2023-08-21 RX ADMIN — ACETAMINOPHEN 650 MG: 650 SUPPOSITORY RECTAL at 14:05

## 2023-08-21 RX ADMIN — FENTANYL CITRATE 50 MCG: 50 INJECTION, SOLUTION INTRAMUSCULAR; INTRAVENOUS at 22:48

## 2023-08-21 RX ADMIN — FENTANYL CITRATE 50 MCG: 50 INJECTION, SOLUTION INTRAMUSCULAR; INTRAVENOUS at 22:12

## 2023-08-21 RX ADMIN — Medication 5 MCG/MIN: at 17:57

## 2023-08-21 RX ADMIN — VANCOMYCIN HYDROCHLORIDE 2000 MG: 1 INJECTION, POWDER, LYOPHILIZED, FOR SOLUTION INTRAVENOUS at 17:40

## 2023-08-21 RX ADMIN — SODIUM CHLORIDE 500 ML: 9 INJECTION, SOLUTION INTRAVENOUS at 18:30

## 2023-08-21 RX ADMIN — FENTANYL CITRATE 50 MCG: 50 INJECTION, SOLUTION INTRAMUSCULAR; INTRAVENOUS at 23:11

## 2023-08-21 RX ADMIN — SODIUM CHLORIDE 2256 ML: 9 INJECTION, SOLUTION INTRAVENOUS at 14:04

## 2023-08-21 RX ADMIN — PIPERACILLIN AND TAZOBACTAM 4500 MG: 4; .5 INJECTION, POWDER, LYOPHILIZED, FOR SOLUTION INTRAVENOUS at 16:49

## 2023-08-21 ASSESSMENT — PAIN - FUNCTIONAL ASSESSMENT: PAIN_FUNCTIONAL_ASSESSMENT: CRITICAL CARE PAIN OBSERVATION TOOL (CPOT)

## 2023-08-21 NOTE — PROGRESS NOTES
Pharmacy Note - Extended Infusion Beta-Lactam Adjustment    Piperacillin/Tazobactam  3375 mg x 1  for treatment of Sepsis of unknown etiology. Per [facility] Extended Infusion Beta-Lactam Policy, piperacillin/tazobactam will be changed to  4500 mg x 1         Please call with any questions.     Thank you,    Popeye Owusu, [facility]

## 2023-08-21 NOTE — ED NOTES
Primary RN Trae Carlson [facility] to receive report about patient. Nurse reports patient normally A/O x4, but this morning around 0800 not responding to painful stimulation and had elevated temperature.      Chandu Guidry RN  08/21/23 1603

## 2023-08-21 NOTE — ED TRIAGE NOTES
Patient presents via [facility] to ER with complaints of fever and altered mental status that started this morning. Patient alert to verbal stimulation, but words incomprehensible. EKG obtained. Telemetry applied. [facility] reports blood glucose 122.

## 2023-08-21 NOTE — ED NOTES
Pt and vs reassessed. RR easy and unlabored. Pt resting in bed with eyes closed and responds to voice. Pt repositioned in bed. Open bed sore noted to pts sacral area.  Pt stable at this time     Ariadna Montes  08/21/23 [id number]

## 2023-08-21 NOTE — ED NOTES
Pt and vs reassessed. Pt resting in bed with eyes closed and responds to voice. Pt stable at this time.       Maggy Sanchez RN  08/21/23 2318

## 2023-08-21 NOTE — ED NOTES
Pt and vs reassessed. RR easy and unlabored.  Pt stable at this time     Ariadna London  08/21/23 1535

## 2023-08-21 NOTE — ED NOTES
Pt and vs reassessed. Pt resting in bed with eyes closed and responds to voice.  Pt stable at this time     Guy Duffy, [address]  08/21/23 3378

## 2023-08-21 NOTE — ED PROVIDER NOTES
[facility] DEPT  EMERGENCY DEPARTMENT ENCOUNTER          Pt Name: Elisa Lipscomb  MRN: [id number]  [address] [facility] 1955  Date of evaluation: 8/21/2023  Resident Physician: Merly Pedroza MD EM Resident PGY-2  Attending Physician: Dr. Megan Najera       Chief Complaint   Patient presents with    Fever    Altered Mental Status         HISTORY OF PRESENT ILLNESS    HPI  Elisa Lipscomb is a 79 y.o. male who presents to the emergency department from nursing home, brought in by EMS for evaluation of mental status. On arrival patient found to be febrile, tachycardic. Patient was reported from nursing home to typically be alert and oriented x4 today patient is arousable to voice and sternal rub opening eyes look around answers \"what? \". Per documentation from nursing home at bedside patient is full code. The patient has no other acute complaints at this time. ROS negative except as stated above.     PAST MEDICAL AND SURGICAL HISTORY     Past Medical History:   Diagnosis Date    Dysphagia     oropharyngeal    History of pulmonary embolism     History of urinary tract infection     Hx of blood clots     Pulmonary embolis    Hypertension     Major depressive disorder, single episode     MS (multiple sclerosis) ([address])     Neurogenic bladder 02/2012    Dr. Ludy Herrmann placed cather    [facility])     UTI (urinary tract infection)      Past Surgical History:   Procedure Laterality Date    ANKLE SURGERY  1996    broken ankle    BLADDER SURGERY  2-    Suprapubic catheter placement    BRONCHOSCOPY N/A 12/26/2022    BRONCHOSCOPY ALVEOLAR LAVAGE performed by Joey Allen MD at [facility]    COLONOSCOPY      CYSTO/URETERO/PYELOSCOPY, CALCULUS TX Left 6/19/2019    CYSTOSCOPY, LEFT STENT insertion, bladder irragation with bladder stones performed by Shruthi Hogue MD at [address] [place] [address] N/A 7/8/2019    CYSTO, LEFT 08/21/2023 06:10:33 PM      This transcription was electronically signed. It was dictated by use of voice recognition software and electronically transcribed. The transcription may contain errors not detected in proofreading.       Matthew Grimes MD  Resident  08/21/23 2500

## 2023-08-21 NOTE — ED PROVIDER NOTES

## 2023-08-21 NOTE — ED NOTES
Provider Dr. Shiela Pelletier notified about abnormal vitals.      Lore Cunningham RN  08/21/23 2579

## 2023-08-21 NOTE — ED NOTES
Pt and vs reassessed. RR easy and unlabored. Pt resting in bed with ys closed and responds to voice.  Pt stable a this time     Gypsy Guevara RN  08/21/23 4650

## 2023-08-21 NOTE — ED NOTES
Pt and vs reassessed. Pt resting in bed with eyes closed and responds to voice.  Pt stable at this time     Ariadna Acosta  08/21/23 1942

## 2023-08-22 ENCOUNTER — APPOINTMENT (OUTPATIENT)
Dept: GENERAL RADIOLOGY | Age: 68
DRG: 659 | End: 2023-08-22
Payer: MEDICARE

## 2023-08-22 ENCOUNTER — APPOINTMENT (OUTPATIENT)
Dept: CT IMAGING | Age: 68
DRG: 659 | End: 2023-08-22
Payer: MEDICARE

## 2023-08-22 ENCOUNTER — ANESTHESIA (OUTPATIENT)
Dept: OPERATING ROOM | Age: 68
End: 2023-08-22
Payer: MEDICARE

## 2023-08-22 ENCOUNTER — ANESTHESIA EVENT (OUTPATIENT)
Dept: OPERATING ROOM | Age: 68
End: 2023-08-22
Payer: MEDICARE

## 2023-08-22 LAB
ACB COMPLEX DNA BLD POS QL NAA+NON-PROBE: NOT DETECTED
ACINETOBACTER CALCOACETICUS-BAUMANNII BY PCR: NOT DETECTED
ALBUMIN SERPL BCG-MCNC: 2.9 G/DL (ref 3.5–5.1)
ALP SERPL-CCNC: 367 U/L (ref 38–126)
ALT SERPL W/O P-5'-P-CCNC: 61 U/L (ref 11–66)
AMMONIA PLAS-MCNC: 39 UMOL/L (ref 11–60)
ANION GAP SERPL CALC-SCNC: 11 MEQ/L (ref 8–16)
ANION GAP SERPL CALC-SCNC: 12 MEQ/L (ref 8–16)
ARTERIAL PATENCY WRIST A: ABNORMAL
AST SERPL-CCNC: 85 U/L (ref 5–40)
B FRAGILIS DNA BLD POS QL NAA+NON-PROBE: NOT DETECTED
BACTERIA UR CULT: ABNORMAL
BASE EXCESS BLDA CALC-SCNC: -1.5 MMOL/L (ref -2–3)
BASE EXCESS BLDA CALC-SCNC: -5.1 MMOL/L (ref -2.5–2.5)
BASOPHILS ABSOLUTE: 0 THOU/MM3 (ref 0–0.1)
BASOPHILS ABSOLUTE: 0.1 THOU/MM3 (ref 0–0.1)
BASOPHILS NFR BLD AUTO: 0.3 %
BASOPHILS NFR BLD AUTO: 0.4 %
BDY SITE: ABNORMAL
BILIRUB SERPL-MCNC: 2.4 MG/DL (ref 0.3–1.2)
BLACTX-M ISLT/SPM QL: DETECTED
BLACTX-M ISLT/SPM QL: NORMAL
BLAIMP ISLT/SPM QL: NORMAL
BLAIMP ISLT/SPM QL: NOT DETECTED
BLAKPC ISLT/SPM QL: NORMAL
BLAKPC ISLT/SPM QL: NOT DETECTED
BLAOXA-48-LIKE ISLT/SPM QL: NORMAL
BLAOXA-48-LIKE ISLT/SPM QL: NOT DETECTED
BLAVIM ISLT/SPM QL: NORMAL
BLAVIM ISLT/SPM QL: NOT DETECTED
BOTTLE TYPE: ABNORMAL
BUN SERPL-MCNC: 23 MG/DL (ref 7–22)
BUN SERPL-MCNC: 24 MG/DL (ref 7–22)
C ALBICANS DNA BLD POS QL NAA+NON-PROBE: NOT DETECTED
C AURIS DNA BLD POS QL NAA+NON-PROBE: NOT DETECTED
C GATTII+NEOFOR DNA BLD POS QL NAA+N-PRB: NOT DETECTED
C GLABRATA DNA BLD POS QL NAA+NON-PROBE: NOT DETECTED
C KRUSEI DNA BLD POS QL NAA+NON-PROBE: NOT DETECTED
C PARAP DNA BLD POS QL NAA+NON-PROBE: NOT DETECTED
C PNEUM DNA LOWER RESP QL NAA+NON-PROBE: NOT DETECTED
C TROPICLS DNA BLD POS QL NAA+NON-PROBE: NOT DETECTED
CALCIUM SERPL-MCNC: 8.4 MG/DL (ref 8.5–10.5)
CALCIUM SERPL-MCNC: 8.5 MG/DL (ref 8.5–10.5)
CHLORIDE SERPL-SCNC: 109 MEQ/L (ref 98–111)
CHLORIDE SERPL-SCNC: 109 MEQ/L (ref 98–111)
CK SERPL-CCNC: 268 U/L (ref 55–170)
CO2 SERPL-SCNC: 21 MEQ/L (ref 23–33)
CO2 SERPL-SCNC: 22 MEQ/L (ref 23–33)
COAG NEG STAPH DNA BLD QL NAA+PROBE: NOT DETECTED
COLISTIN RES MCR-1 ISLT/SPM QL: NOT DETECTED
COLLECTED BY:: ABNORMAL
COLLECTED BY:: ABNORMAL
CREAT SERPL-MCNC: 0.3 MG/DL (ref 0.4–1.2)
CREAT SERPL-MCNC: 0.4 MG/DL (ref 0.4–1.2)
DEPRECATED RDW RBC AUTO: 56.7 FL (ref 35–45)
DEPRECATED RDW RBC AUTO: 58 FL (ref 35–45)
DEVICE: ABNORMAL
E CLOAC COMP DNA BLD POS NAA+NON-PROBE: NOT DETECTED
E COLI DNA BLD POS QL NAA+NON-PROBE: DETECTED
E FAECALIS DNA BLD POS QL NAA+NON-PROBE: NOT DETECTED
E FAECIUM DNA BLD POS QL NAA+NON-PROBE: NOT DETECTED
ENTEROBACTER CLOACAE COMPLEX BY PCR: NOT DETECTED
ENTEROBACTERALES DNA BLD POS NAA+N-PRB: DETECTED
EOSINOPHIL NFR BLD AUTO: 0 %
EOSINOPHIL NFR BLD AUTO: 1.5 %
EOSINOPHILS ABSOLUTE: 0 THOU/MM3 (ref 0–0.4)
EOSINOPHILS ABSOLUTE: 0.2 THOU/MM3 (ref 0–0.4)
ERYTHROCYTE [DISTWIDTH] IN BLOOD BY AUTOMATED COUNT: 16 % (ref 11.5–14.5)
ERYTHROCYTE [DISTWIDTH] IN BLOOD BY AUTOMATED COUNT: 16 % (ref 11.5–14.5)
ESCHERICHIA COLI BY PCR: NOT DETECTED
FIO2 ON VENT O2 ANALYZER: 6 %
FLUAV RNA LOWER RESP QL NAA+NON-PROBE: NOT DETECTED
FLUBV RNA LOWER RESP QL NAA+NON-PROBE: NOT DETECTED
GFR SERPL CREATININE-BSD FRML MDRD: > 60 ML/MIN/1.73M2
GFR SERPL CREATININE-BSD FRML MDRD: > 60 ML/MIN/1.73M2
GLUCOSE BLD STRIP.AUTO-MCNC: 104 MG/DL (ref 70–108)
GLUCOSE BLD STRIP.AUTO-MCNC: 133 MG/DL (ref 70–108)
GLUCOSE BLD-MCNC: 107 MG/DL (ref 70–108)
GLUCOSE SERPL-MCNC: 138 MG/DL (ref 70–108)
GLUCOSE SERPL-MCNC: 97 MG/DL (ref 70–108)
GP B STREP DNA SPEC QL NAA+PROBE: NOT DETECTED
GP B STREP DNA SPEC QL NAA+PROBE: NOT DETECTED
HADV DNA LOWER RESP QL NAA+NON-PROBE: NOT DETECTED
HAEM INFLU DNA BLD POS QL NAA+NON-PROBE: NOT DETECTED
HAEMOPHILUS INFLUENZAE BY PCR: NOT DETECTED
HCO3 BLDA-SCNC: 19 MMOL/L (ref 23–28)
HCO3 BLDA-SCNC: 26 MMOL/L (ref 23–28)
HCOV RNA LOWER RESP QL NAA+NON-PROBE: NOT DETECTED
HCT VFR BLD AUTO: 42.6 % (ref 42–52)
HCT VFR BLD AUTO: 43 % (ref 42–52)
HGB BLD-MCNC: 13.6 GM/DL (ref 14–18)
HGB BLD-MCNC: 13.7 GM/DL (ref 14–18)
HMPV RNA LOWER RESP QL NAA+NON-PROBE: NOT DETECTED
HPIV RNA LOWER RESP QL NAA+NON-PROBE: NOT DETECTED
IMM GRANULOCYTES # BLD AUTO: 0.18 THOU/MM3 (ref 0–0.07)
IMM GRANULOCYTES # BLD AUTO: 0.34 THOU/MM3 (ref 0–0.07)
IMM GRANULOCYTES NFR BLD AUTO: 1 %
IMM GRANULOCYTES NFR BLD AUTO: 2.2 %
K OXYTOCA DNA BLD POS QL NAA+NON-PROBE: NOT DETECTED
K OXYTOCA DNA BLD POS QL NAA+NON-PROBE: NOT DETECTED
KLEBSIELLA AEROGENES BY PCR: NOT DETECTED
KLEBSIELLA OXYTOCA BY PCR: NOT DETECTED
KLEBSIELLA PNEUMONIAE GROUP BY PCR: NOT DETECTED
KLEBSIELLA SP DNA BLD POS QL NAA+NON-PRB: NOT DETECTED
L MONOCYTOG DNA BLD POS QL NAA+NON-PROBE: NOT DETECTED
L PNEUMO DNA LOWER RESP QL NAA+NON-PROBE: NOT DETECTED
L PNEUMO1 AG UR QL IA.RAPID: NEGATIVE
LACTATE SERPL-SCNC: 4.3 MMOL/L (ref 0.5–2)
LACTIC ACID, SEPSIS: 1.7 MMOL/L (ref 0.5–1.9)
LACTIC ACID, SEPSIS: 1.8 MMOL/L (ref 0.5–1.9)
LV EF: 40 %
LVEF MODALITY: NORMAL
LYMPHOCYTES ABSOLUTE: 0.8 THOU/MM3 (ref 1–4.8)
LYMPHOCYTES ABSOLUTE: 0.8 THOU/MM3 (ref 1–4.8)
LYMPHOCYTES NFR BLD AUTO: 4.5 %
LYMPHOCYTES NFR BLD AUTO: 5.2 %
M PNEUMO DNA LOWER RESP QL NAA+NON-PROBE: NOT DETECTED
MAGNESIUM SERPL-MCNC: 2.2 MG/DL (ref 1.6–2.4)
MCH RBC QN AUTO: 31 PG (ref 26–33)
MCH RBC QN AUTO: 31.6 PG (ref 26–33)
MCHC RBC AUTO-ENTMCNC: 31.9 GM/DL (ref 32.2–35.5)
MCHC RBC AUTO-ENTMCNC: 31.9 GM/DL (ref 32.2–35.5)
MCV RBC AUTO: 97 FL (ref 80–94)
MCV RBC AUTO: 99.1 FL (ref 80–94)
MECA ISLT/SPM QL: ABNORMAL
MECA+MECC+MREJ ISLT/SPM QL: ABNORMAL
MONOCYTES ABSOLUTE: 0.7 THOU/MM3 (ref 0.4–1.3)
MONOCYTES ABSOLUTE: 0.8 THOU/MM3 (ref 0.4–1.3)
MONOCYTES NFR BLD AUTO: 4.2 %
MONOCYTES NFR BLD AUTO: 4.7 %
MORAXELLA CATARRHALIS BY PCR: NOT DETECTED
MRSA DNA SPEC QL NAA+PROBE: POSITIVE
N MEN DNA BLD POS QL NAA+NON-PROBE: NOT DETECTED
NDM: NOT DETECTED
NEUTROPHILS NFR BLD AUTO: 86.6 %
NEUTROPHILS NFR BLD AUTO: 89.4 %
NRBC BLD AUTO-RTO: 0 /100 WBC
NRBC BLD AUTO-RTO: 0 /100 WBC
ORGANISM: ABNORMAL
P AERUGINOSA DNA BLD POS NAA+NON-PROBE: NOT DETECTED
PCO2 BLDA: 32 MMHG (ref 35–45)
PCO2 TEMP ADJ BLDMV: 53 MMHG (ref 41–51)
PH BLDA: 7.38 [PH] (ref 7.35–7.45)
PH BLDMV: 7.3 [PH] (ref 7.31–7.41)
PLATELET # BLD AUTO: 115 THOU/MM3 (ref 130–400)
PLATELET # BLD AUTO: 129 THOU/MM3 (ref 130–400)
PLATELET BLD QL SMEAR: ABNORMAL
PMV BLD AUTO: 9 FL (ref 9.4–12.4)
PMV BLD AUTO: 9.1 FL (ref 9.4–12.4)
PO2 BLDA: 61 MMHG (ref 71–104)
PO2 BLDMV: 49 MMHG (ref 25–40)
POTASSIUM SERPL-SCNC: 3.7 MEQ/L (ref 3.5–5.2)
POTASSIUM SERPL-SCNC: 3.9 MEQ/L (ref 3.5–5.2)
PROT SERPL-MCNC: 7 G/DL (ref 6.1–8)
PROTEUS SPECIES BY PCR: NOT DETECTED
PROTEUS SPP: NOT DETECTED
PSEUDOMONAS AERUGINOSA BY PCR: NOT DETECTED
RBC # BLD AUTO: 4.34 MILL/MM3 (ref 4.7–6.1)
RBC # BLD AUTO: 4.39 MILL/MM3 (ref 4.7–6.1)
RESISTANT GENE MECA/C & MREJ BY PCR: NORMAL
RESISTANT GENE NDM BY PCR: NORMAL
RSV RNA LOWER RESP QL NAA+NON-PROBE: NOT DETECTED
RV+EV RNA LOWER RESP QL NAA+NON-PROBE: NOT DETECTED
S AUREUS DNA BLD POS QL NAA+NON-PROBE: NOT DETECTED
S EPIDERMIDIS DNA BLD POS QL NAA+NON-PRB: NOT DETECTED
S LUGDUNENSIS DNA BLD POS QL NAA+NON-PRB: NOT DETECTED
S MALTOPHILIA DNA BLD POS QL NAA+NON-PRB: NOT DETECTED
S MARCESCENS DNA BLD POS NAA+NON-PROBE: NOT DETECTED
S PYO DNA THROAT QL NAA+PROBE: NOT DETECTED
SALMONELLA DNA BLD POS QL NAA+NON-PROBE: NOT DETECTED
SAO2 % BLDA: 91 %
SAO2 % BLDMV: 80 %
SCAN OF BLOOD SMEAR: NORMAL
SEGMENTED NEUTROPHILS ABSOLUTE COUNT: 13.5 THOU/MM3 (ref 1.8–7.7)
SEGMENTED NEUTROPHILS ABSOLUTE COUNT: 15.8 THOU/MM3 (ref 1.8–7.7)
SERRATIA MARCESCENS BY PCR: NOT DETECTED
SODIUM SERPL-SCNC: 142 MEQ/L (ref 135–145)
SODIUM SERPL-SCNC: 142 MEQ/L (ref 135–145)
SOURCE OF BLOOD CULTURE: ABNORMAL
SOURCE: NORMAL
SPECIMEN ACCEPTABILITY: NORMAL
STAPH AUREUS BY PCR: NOT DETECTED
STREP AGALACTIAE BY PCR: NOT DETECTED
STREP PNEUMO AG, UR: NEGATIVE
STREP PNEUMONIAE BY PCR: NOT DETECTED
STREP PYOGENES BY PCR: NOT DETECTED
STREPTOCOCCUS DNA BLD QL NAA+PROBE: NOT DETECTED
TROPONIN, HIGH SENSITIVITY: 387 NG/L (ref 0–12)
TROPONIN, HIGH SENSITIVITY: 395 NG/L (ref 0–12)
TROPONIN, HIGH SENSITIVITY: 408 NG/L (ref 0–12)
TROPONIN, HIGH SENSITIVITY: 488 NG/L (ref 0–12)
VANA+VANB ISLT/SPM QL: ABNORMAL
WBC # BLD AUTO: 15.6 THOU/MM3 (ref 4.8–10.8)
WBC # BLD AUTO: 17.7 THOU/MM3 (ref 4.8–10.8)

## 2023-08-22 PROCEDURE — 85025 COMPLETE CBC W/AUTO DIFF WBC: CPT

## 2023-08-22 PROCEDURE — 82550 ASSAY OF CK (CPK): CPT

## 2023-08-22 PROCEDURE — 2700000000 HC OXYGEN THERAPY PER DAY

## 2023-08-22 PROCEDURE — 71275 CT ANGIOGRAPHY CHEST: CPT

## 2023-08-22 PROCEDURE — C1769 GUIDE WIRE: HCPCS | Performed by: UROLOGY

## 2023-08-22 PROCEDURE — 36415 COLL VENOUS BLD VENIPUNCTURE: CPT

## 2023-08-22 PROCEDURE — 87449 NOS EACH ORGANISM AG IA: CPT

## 2023-08-22 PROCEDURE — 5A09357 ASSISTANCE WITH RESPIRATORY VENTILATION, LESS THAN 24 CONSECUTIVE HOURS, CONTINUOUS POSITIVE AIRWAY PRESSURE: ICD-10-PCS | Performed by: NURSE PRACTITIONER

## 2023-08-22 PROCEDURE — 93306 TTE W/DOPPLER COMPLETE: CPT

## 2023-08-22 PROCEDURE — 84484 ASSAY OF TROPONIN QUANT: CPT

## 2023-08-22 PROCEDURE — 83605 ASSAY OF LACTIC ACID: CPT

## 2023-08-22 PROCEDURE — 82803 BLOOD GASES ANY COMBINATION: CPT

## 2023-08-22 PROCEDURE — 2500000003 HC RX 250 WO HCPCS: Performed by: NURSE PRACTITIONER

## 2023-08-22 PROCEDURE — 2580000003 HC RX 258: Performed by: STUDENT IN AN ORGANIZED HEALTH CARE EDUCATION/TRAINING PROGRAM

## 2023-08-22 PROCEDURE — 6360000002 HC RX W HCPCS

## 2023-08-22 PROCEDURE — 3700000000 HC ANESTHESIA ATTENDED CARE: Performed by: UROLOGY

## 2023-08-22 PROCEDURE — 3600000012 HC SURGERY LEVEL 2 ADDTL 15MIN: Performed by: UROLOGY

## 2023-08-22 PROCEDURE — 82948 REAGENT STRIP/BLOOD GLUCOSE: CPT

## 2023-08-22 PROCEDURE — 83735 ASSAY OF MAGNESIUM: CPT

## 2023-08-22 PROCEDURE — 87899 AGENT NOS ASSAY W/OPTIC: CPT

## 2023-08-22 PROCEDURE — 93005 ELECTROCARDIOGRAM TRACING: CPT | Performed by: NURSE PRACTITIONER

## 2023-08-22 PROCEDURE — 94761 N-INVAS EAR/PLS OXIMETRY MLT: CPT

## 2023-08-22 PROCEDURE — 6360000002 HC RX W HCPCS: Performed by: NURSE ANESTHETIST, CERTIFIED REGISTERED

## 2023-08-22 PROCEDURE — 87205 SMEAR GRAM STAIN: CPT

## 2023-08-22 PROCEDURE — 36620 INSERTION CATHETER ARTERY: CPT

## 2023-08-22 PROCEDURE — 6360000002 HC RX W HCPCS: Performed by: STUDENT IN AN ORGANIZED HEALTH CARE EDUCATION/TRAINING PROGRAM

## 2023-08-22 PROCEDURE — 0BH17EZ INSERTION OF ENDOTRACHEAL AIRWAY INTO TRACHEA, VIA NATURAL OR ARTIFICIAL OPENING: ICD-10-PCS | Performed by: NURSE PRACTITIONER

## 2023-08-22 PROCEDURE — 87798 DETECT AGENT NOS DNA AMP: CPT

## 2023-08-22 PROCEDURE — 2709999900 HC NON-CHARGEABLE SUPPLY: Performed by: UROLOGY

## 2023-08-22 PROCEDURE — A4216 STERILE WATER/SALINE, 10 ML: HCPCS | Performed by: NURSE PRACTITIONER

## 2023-08-22 PROCEDURE — 71045 X-RAY EXAM CHEST 1 VIEW: CPT

## 2023-08-22 PROCEDURE — 3600000002 HC SURGERY LEVEL 2 BASE: Performed by: UROLOGY

## 2023-08-22 PROCEDURE — 95819 EEG AWAKE AND ASLEEP: CPT

## 2023-08-22 PROCEDURE — 87581 M.PNEUMON DNA AMP PROBE: CPT

## 2023-08-22 PROCEDURE — 31500 INSERT EMERGENCY AIRWAY: CPT | Performed by: INTERNAL MEDICINE

## 2023-08-22 PROCEDURE — 82140 ASSAY OF AMMONIA: CPT

## 2023-08-22 PROCEDURE — 0T2BX0Z CHANGE DRAINAGE DEVICE IN BLADDER, EXTERNAL APPROACH: ICD-10-PCS | Performed by: UROLOGY

## 2023-08-22 PROCEDURE — 6360000004 HC RX CONTRAST MEDICATION: Performed by: UROLOGY

## 2023-08-22 PROCEDURE — 99291 CRITICAL CARE FIRST HOUR: CPT | Performed by: INTERNAL MEDICINE

## 2023-08-22 PROCEDURE — 6360000002 HC RX W HCPCS: Performed by: NURSE PRACTITIONER

## 2023-08-22 PROCEDURE — 87541 LEGION PNEUMO DNA AMP PROB: CPT

## 2023-08-22 PROCEDURE — 89220 SPUTUM SPECIMEN COLLECTION: CPT

## 2023-08-22 PROCEDURE — 2500000003 HC RX 250 WO HCPCS: Performed by: STUDENT IN AN ORGANIZED HEALTH CARE EDUCATION/TRAINING PROGRAM

## 2023-08-22 PROCEDURE — 31500 INSERT EMERGENCY AIRWAY: CPT

## 2023-08-22 PROCEDURE — C2617 STENT, NON-COR, TEM W/O DEL: HCPCS | Performed by: UROLOGY

## 2023-08-22 PROCEDURE — 99252 IP/OBS CONSLTJ NEW/EST SF 35: CPT | Performed by: UROLOGY

## 2023-08-22 PROCEDURE — 2000000000 HC ICU R&B

## 2023-08-22 PROCEDURE — 87486 CHLMYD PNEUM DNA AMP PROBE: CPT

## 2023-08-22 PROCEDURE — 36556 INSERT NON-TUNNEL CV CATH: CPT

## 2023-08-22 PROCEDURE — 6370000000 HC RX 637 (ALT 250 FOR IP): Performed by: NURSE PRACTITIONER

## 2023-08-22 PROCEDURE — 87070 CULTURE OTHR SPECIMN AEROBIC: CPT

## 2023-08-22 PROCEDURE — 94002 VENT MGMT INPAT INIT DAY: CPT

## 2023-08-22 PROCEDURE — 82947 ASSAY GLUCOSE BLOOD QUANT: CPT

## 2023-08-22 PROCEDURE — 37799 UNLISTED PX VASCULAR SURGERY: CPT

## 2023-08-22 PROCEDURE — 2500000003 HC RX 250 WO HCPCS

## 2023-08-22 PROCEDURE — 74177 CT ABD & PELVIS W/CONTRAST: CPT

## 2023-08-22 PROCEDURE — 3700000001 HC ADD 15 MINUTES (ANESTHESIA): Performed by: UROLOGY

## 2023-08-22 PROCEDURE — 93005 ELECTROCARDIOGRAM TRACING: CPT | Performed by: STUDENT IN AN ORGANIZED HEALTH CARE EDUCATION/TRAINING PROGRAM

## 2023-08-22 PROCEDURE — 2720000010 HC SURG SUPPLY STERILE: Performed by: UROLOGY

## 2023-08-22 PROCEDURE — 0TCB8ZZ EXTIRPATION OF MATTER FROM BLADDER, VIA NATURAL OR ARTIFICIAL OPENING ENDOSCOPIC: ICD-10-PCS | Performed by: UROLOGY

## 2023-08-22 PROCEDURE — 80053 COMPREHEN METABOLIC PANEL: CPT

## 2023-08-22 PROCEDURE — 95816 EEG AWAKE AND DROWSY: CPT | Performed by: PSYCHIATRY & NEUROLOGY

## 2023-08-22 PROCEDURE — 5A1935Z RESPIRATORY VENTILATION, LESS THAN 24 CONSECUTIVE HOURS: ICD-10-PCS | Performed by: NURSE PRACTITIONER

## 2023-08-22 PROCEDURE — 0T778DZ DILATION OF LEFT URETER WITH INTRALUMINAL DEVICE, VIA NATURAL OR ARTIFICIAL OPENING ENDOSCOPIC: ICD-10-PCS | Performed by: UROLOGY

## 2023-08-22 PROCEDURE — 87631 RESP VIRUS 3-5 TARGETS: CPT

## 2023-08-22 PROCEDURE — 2580000003 HC RX 258: Performed by: NURSE PRACTITIONER

## 2023-08-22 PROCEDURE — 94660 CPAP INITIATION&MGMT: CPT

## 2023-08-22 DEVICE — URETERAL STENT
Type: IMPLANTABLE DEVICE | Status: FUNCTIONAL
Brand: PERCUFLEX™ PLUS

## 2023-08-22 RX ORDER — OXCARBAZEPINE 300 MG/1
300 TABLET, FILM COATED ORAL 2 TIMES DAILY
Status: DISCONTINUED | OUTPATIENT
Start: 2023-08-22 | End: 2023-09-01 | Stop reason: HOSPADM

## 2023-08-22 RX ORDER — ONDANSETRON 2 MG/ML
INJECTION INTRAMUSCULAR; INTRAVENOUS PRN
Status: DISCONTINUED | OUTPATIENT
Start: 2023-08-22 | End: 2023-08-22 | Stop reason: SDUPTHER

## 2023-08-22 RX ORDER — MAGNESIUM SULFATE 1 G/100ML
1000 INJECTION INTRAVENOUS ONCE
Status: COMPLETED | OUTPATIENT
Start: 2023-08-22 | End: 2023-08-22

## 2023-08-22 RX ORDER — GABAPENTIN 600 MG/1
600 TABLET ORAL 4 TIMES DAILY
Status: DISCONTINUED | OUTPATIENT
Start: 2023-08-22 | End: 2023-09-01 | Stop reason: HOSPADM

## 2023-08-22 RX ORDER — BACLOFEN 10 MG/1
10 TABLET ORAL 3 TIMES DAILY
Status: DISCONTINUED | OUTPATIENT
Start: 2023-08-22 | End: 2023-09-01 | Stop reason: HOSPADM

## 2023-08-22 RX ORDER — FENTANYL CITRATE-0.9 % NACL/PF 10 MCG/ML
25-200 PLASTIC BAG, INJECTION (ML) INTRAVENOUS CONTINUOUS
Status: DISCONTINUED | OUTPATIENT
Start: 2023-08-22 | End: 2023-08-23

## 2023-08-22 RX ORDER — PROPOFOL 10 MG/ML
INJECTION, EMULSION INTRAVENOUS
Status: COMPLETED
Start: 2023-08-22 | End: 2023-08-22

## 2023-08-22 RX ORDER — PHENYLEPHRINE HCL IN 0.9% NACL 1 MG/10 ML
SYRINGE (ML) INTRAVENOUS PRN
Status: DISCONTINUED | OUTPATIENT
Start: 2023-08-22 | End: 2023-08-22 | Stop reason: SDUPTHER

## 2023-08-22 RX ORDER — DEXMEDETOMIDINE HYDROCHLORIDE 4 UG/ML
.1-1.5 INJECTION, SOLUTION INTRAVENOUS CONTINUOUS
Status: DISCONTINUED | OUTPATIENT
Start: 2023-08-22 | End: 2023-08-25

## 2023-08-22 RX ORDER — DEXAMETHASONE SODIUM PHOSPHATE 4 MG/ML
INJECTION, SOLUTION INTRA-ARTICULAR; INTRALESIONAL; INTRAMUSCULAR; INTRAVENOUS; SOFT TISSUE PRN
Status: DISCONTINUED | OUTPATIENT
Start: 2023-08-22 | End: 2023-08-22 | Stop reason: SDUPTHER

## 2023-08-22 RX ORDER — PROPOFOL 10 MG/ML
5-50 INJECTION, EMULSION INTRAVENOUS CONTINUOUS
Status: DISCONTINUED | OUTPATIENT
Start: 2023-08-22 | End: 2023-08-24

## 2023-08-22 RX ADMIN — PROPOFOL 40 MCG/KG/MIN: 10 INJECTION, EMULSION INTRAVENOUS at 09:10

## 2023-08-22 RX ADMIN — BACLOFEN 10 MG: 10 TABLET ORAL at 20:54

## 2023-08-22 RX ADMIN — MAGNESIUM SULFATE HEPTAHYDRATE 1000 MG: 1 INJECTION, SOLUTION INTRAVENOUS at 00:27

## 2023-08-22 RX ADMIN — ACETAMINOPHEN 650 MG: 325 TABLET ORAL at 11:42

## 2023-08-22 RX ADMIN — ONDANSETRON 4 MG: 2 INJECTION INTRAMUSCULAR; INTRAVENOUS at 13:15

## 2023-08-22 RX ADMIN — OXCARBAZEPINE 300 MG: 300 TABLET, FILM COATED ORAL at 11:41

## 2023-08-22 RX ADMIN — OXCARBAZEPINE 300 MG: 300 TABLET, FILM COATED ORAL at 20:54

## 2023-08-22 RX ADMIN — MEROPENEM 1000 MG: 1 INJECTION, POWDER, FOR SOLUTION INTRAVENOUS at 16:26

## 2023-08-22 RX ADMIN — FAMOTIDINE 20 MG: 10 INJECTION INTRAVENOUS at 20:54

## 2023-08-22 RX ADMIN — Medication 150 MCG: at 13:38

## 2023-08-22 RX ADMIN — GABAPENTIN 600 MG: 600 TABLET, FILM COATED ORAL at 20:54

## 2023-08-22 RX ADMIN — Medication 0.2 MCG/KG/HR: at 11:38

## 2023-08-22 RX ADMIN — GABAPENTIN 600 MG: 600 TABLET, FILM COATED ORAL at 16:25

## 2023-08-22 RX ADMIN — SODIUM CHLORIDE, POTASSIUM CHLORIDE, SODIUM LACTATE AND CALCIUM CHLORIDE: 600; 310; 30; 20 INJECTION, SOLUTION INTRAVENOUS at 00:24

## 2023-08-22 RX ADMIN — GABAPENTIN 600 MG: 600 TABLET, FILM COATED ORAL at 11:41

## 2023-08-22 RX ADMIN — BACLOFEN 10 MG: 10 TABLET ORAL at 11:41

## 2023-08-22 RX ADMIN — MEROPENEM 1000 MG: 1 INJECTION, POWDER, FOR SOLUTION INTRAVENOUS at 01:57

## 2023-08-22 RX ADMIN — VASOPRESSIN 0.04 UNITS/MIN: 20 INJECTION INTRAVENOUS at 12:33

## 2023-08-22 RX ADMIN — GABAPENTIN 600 MG: 600 TABLET, FILM COATED ORAL at 14:12

## 2023-08-22 RX ADMIN — FENTANYL CITRATE 50 MCG/HR: 50 INJECTION INTRAVENOUS at 11:37

## 2023-08-22 RX ADMIN — Medication 28 MCG/MIN: at 15:29

## 2023-08-22 RX ADMIN — SODIUM CHLORIDE, POTASSIUM CHLORIDE, SODIUM LACTATE AND CALCIUM CHLORIDE: 600; 310; 30; 20 INJECTION, SOLUTION INTRAVENOUS at 22:59

## 2023-08-22 RX ADMIN — DEXAMETHASONE SODIUM PHOSPHATE 5 MG: 4 INJECTION, SOLUTION INTRAMUSCULAR; INTRAVENOUS at 13:15

## 2023-08-22 RX ADMIN — BACLOFEN 10 MG: 10 TABLET ORAL at 14:12

## 2023-08-22 RX ADMIN — FAMOTIDINE 20 MG: 10 INJECTION INTRAVENOUS at 00:16

## 2023-08-22 RX ADMIN — FAMOTIDINE 20 MG: 10 INJECTION INTRAVENOUS at 09:42

## 2023-08-22 RX ADMIN — Medication 0.4 MCG/KG/HR: at 23:00

## 2023-08-22 RX ADMIN — HYDROCORTISONE SODIUM SUCCINATE 100 MG: 100 INJECTION, POWDER, FOR SOLUTION INTRAMUSCULAR; INTRAVENOUS at 00:16

## 2023-08-22 RX ADMIN — VASOPRESSIN 0.03 UNITS/MIN: 20 INJECTION INTRAVENOUS at 23:01

## 2023-08-22 RX ADMIN — SODIUM CHLORIDE, PRESERVATIVE FREE 10 ML: 5 INJECTION INTRAVENOUS at 11:42

## 2023-08-22 RX ADMIN — IOPAMIDOL 85 ML: 755 INJECTION, SOLUTION INTRAVENOUS at 11:05

## 2023-08-22 RX ADMIN — SODIUM CHLORIDE, PRESERVATIVE FREE 10 ML: 5 INJECTION INTRAVENOUS at 20:55

## 2023-08-22 RX ADMIN — MEROPENEM 1000 MG: 1 INJECTION, POWDER, FOR SOLUTION INTRAVENOUS at 09:51

## 2023-08-22 ASSESSMENT — PULMONARY FUNCTION TESTS
PIF_VALUE: 3
PIF_VALUE: 23
PIF_VALUE: 26
PIF_VALUE: 26

## 2023-08-22 NOTE — PROGRESS NOTES
Wound ostomy consulted for \"colostomy in place since 2018\". Nursing to change colostomy bag with hospital pouching systems (unless patient has own) 1-2 times weekly. If there area issues with leaking or difficult pouching change, contact wound ostomy.

## 2023-08-22 NOTE — PROGRESS NOTES
Pt was in bed and alone at the time of the visit. He was dealing with septic shock. He had mask on and it was difficult for him to talk. He was encouraged and blessed.     08/22/23 1440   Encounter Summary   Encounter Overview/Reason  Initial Encounter   Service Provided For: Patient   Referral/Consult From: [address] Family members   Last Encounter  08/22/23   Complexity of Encounter Low   Begin Time 0837   End Time  0843   Total Time Calculated 6 min   Spiritual/Emotional needs   Type Spiritual Support   Assessment/Intervention/Outcome   Assessment Hopeful   Intervention Empowerment   Outcome Encouraged

## 2023-08-22 NOTE — ANESTHESIA PRE PROCEDURE
Department of Anesthesiology  Preprocedure Note       Name:  Jono Dickerson   Age:  79 y.o.  :  1955                                          MRN:  [id number]         Date:  2023      Surgeon: Pao Penny):  Ron Craven MD    Procedure: Procedure(s):  CYSTOSCOPY LEFT URETERAL STENT INSERTION    Medications prior to admission:   Prior to Admission medications    Medication Sig Start Date End Date Taking? Authorizing Provider   OXcarbazepine (TRILEPTAL) 300 MG tablet Take 1 tablet by mouth 2 times daily    Historical Provider, MD   vitamin C (ASCORBIC ACID) 500 MG tablet Take 1 tablet by mouth 2 times daily    Historical Provider, MD   potassium chloride (KLOR-CON M) 20 MEQ extended release tablet Take 2 tablets by mouth in the morning and 2 tablets before bedtime. 22   Kim Gomez PA-C   carvedilol (COREG) 12.5 MG tablet Take 1 tablet by mouth in the morning and 1 tablet in the evening. Take with meals. 22   Ramandeep Perales MD   amLODIPine (NORVASC) 10 MG tablet Take 1 tablet by mouth in the morning. 22   Ramandeep Perales MD   melatonin 3 MG TABS tablet Take 1 tablet by mouth nightly as needed (when unable to sleep) 22   Ramandeep Perales MD   ondansetron (ZOFRAN) 4 MG tablet Take 4 mg by mouth every 8 hours as needed for Nausea or Vomiting    Historical Provider, MD   carbamide peroxide (DEBROX) 6.5 % otic solution 5 drops 2 times daily Instill 5 drops in both ears two times daily for ear wax use for 4 days.     Historical Provider, MD   vitamin E 400 UNIT capsule Take 1 capsule by mouth daily    Historical Provider, MD   aspirin 81 MG EC tablet Take 1 tablet by mouth daily    Historical Provider, MD   oxybutynin (DITROPAN-XL) 5 MG extended release tablet Take 5 mg by mouth in the morning and at bedtime    Historical Provider, MD   gentamicin (GARAMYCIN) 0.1 % ointment Apply topically daily Apply to wound bed    Historical Provider, MD   erythromycin (ROMYCIN) 5 MG/GM ophthalmic

## 2023-08-22 NOTE — PROCEDURES
Intubation    Date/Time: 8/22/2023 8:45 AM  Performed by: Patricia Shine DO  Authorized by: Patricia Shine DO   Consent: The procedure was performed in an emergent situation. Verbal consent not obtained. Written consent not obtained. Patient understanding: patient does not state understanding of the procedure being performed  Patient consent: the patient's understanding of the procedure does not match consent given  Procedure consent: procedure consent does not match procedure scheduled  Relevant documents: relevant documents not present or verified  Test results: test results not available  Site marked: the operative site was not marked  Imaging studies: imaging studies not available  Patient identity confirmed: arm band  Time out: Immediately prior to procedure a \"time out\" was called to verify the correct patient, procedure, equipment, support staff and site/side marked as required. Indications: airway protection and hypoxemia  Intubation method: fiberoptic oral  Patient status: sedated  Preoxygenation: BVM  Pretreatment medications: midazolam  Sedatives: propofol  Paralytic: none  Laryngoscope size: Mac 2  Tube size: 7.5 mm  Tube type: cuffed  Number of attempts: 1  Cricoid pressure: no  Cords visualized: yes  Post-procedure assessment: chest rise and CO2 detector  Breath sounds: equal  Cuff inflated: yes  ETT to lip: 23 cm  Tube secured with: ETT manuel  Chest x-ray interpreted by me. Chest x-ray findings: endotracheal tube in appropriate position  Comments: Et tube in Appropriate position after repositioning. Supervised by Dr. Desiree Caballero I proctored the provider throughout the whole procedure.   Electronically signed by Julissa Chappell MD on 8/23/2023 at 4:07 PM

## 2023-08-22 NOTE — PROGRESS NOTES
8141 - Dr. Samreen Mccoy and Dr. Elvia Torres at bedside preparing for intubation. Patient is on bipap at this time. Propofol gtt @ 40 mcg/kg/min started per Dr. Samreen Mccoy. 4 mg versed given by Dr. Samreen Mccoy. [address], RT at bedside. 50mg bolus of propofol by Dr. Samreen Mccoy    0916 - 7.5 @ 22 cm. Positive color change. No gurgling over epigastrium, breath sounds equal bilaterally. 2095 - levophed gtt restarted.

## 2023-08-22 NOTE — PROCEDURES
ARTERIAL LINE PROCEDURE NOTES  [facility]  Department of Critical Care Medicine      PATIENT: Héctor Bowen 1955, 79 y.o., male    MRN: [id number]    DATE: August 21, 2023    Nurse Practitioner: TONO Watkins CNP    Procedure: Right radial arterial line placement. Indications: Continuous monitoring of blood pressure in a patient with hypotension + shock, on Levophed. Anesthesia: Local infiltration of 1% lidocaine. Consent:  The procedure was performed in an emergent situation. Technique: Time Out: Immediately prior to the procedure a \"timeout\" was called to verify the correct patient and procedure. Procedure was done using strict aseptic technique. Rahul's test was performed and was normal. Right radial site was cleaned with chloraprep and draped. Radial artery was identified, then Lidocaine 1% was infiltrated locally. Radial arterial line was inserted, a good blood flow was obtained, after which guidewire was inserted all the way with no resistance. Then the canula was inserted and needle with guidewire was withdrawn. Pulsatile bright red blood flow was observed. The canula was connected to BP monitoring apparatus and a good quality waveform was noted. Then the canula was secured with 2 stay sutures of 3-0 silk after Lidocaine infiltration, following which dressing was applied. Number of sticks: 2. Number of Kits used: 1. Complications: No immediate complication. Estimated blood loss: About 1 ml. Comment: Patient tolerated the procedure well.      ALBERTO WatkinsBC   Critical Care  8/21/2023 11:36 PM

## 2023-08-22 NOTE — ANESTHESIA POSTPROCEDURE EVALUATION
Department of Anesthesiology  Postprocedure Note    Patient: Radha Watters  MRN: [id number]  YOB: 1955  Date of evaluation: 8/22/2023      Procedure Summary     Date: 08/22/23 [facility]    Anesthesia Start: 1250 Anesthesia Stop: 5919    Procedure: CYSTOSCOPY LEFT URETERAL STENT INSERTION; bladder stone extraction, suprapubic cath exchange (Left) Diagnosis:       Somnolence      (Somnolence [R40.0])    Surgeons: Janith Kawasaki, MD Responsible Provider: Sydnee Kearns MD    Anesthesia Type: general ASA Status: 4          Anesthesia Type: No value filed.     Quinn Phase I:      Quinn Phase II:        Anesthesia Post Evaluation    Patient location during evaluation: PACU  Patient participation: complete - patient participated  Level of consciousness: awake and alert  Airway patency: patent  Nausea & Vomiting: no nausea  Complications: no  Cardiovascular status: blood pressure returned to baseline and hemodynamically stable  Respiratory status: acceptable and spontaneous ventilation  Hydration status: euvolemic  Pain management: adequate

## 2023-08-22 NOTE — PROGRESS NOTES
Comprehensive Nutrition Assessment    Type and Reason for Visit:  Consult, Initial (tube feed recommendation per vent protocol; poor intake 5 days or more; Tube Feed recommendation only per Dr Kane Flynn)    Nutrition Recommendations/Plan:   When medically appropriate to begin tube feed, recommend Pivot at 15ml/ hour, increase by 10ml every 6 hours as tolerated to goal 45ml/ hour (=1831 kcals including diprivan, 101 grams protein/ 24 hours); additional free water flush as per Physician      Malnutrition Assessment:  Malnutrition Status:  Insufficient data (08/22/23 1697)    Context:  Acute Illness     Findings of the 6 clinical characteristics of malnutrition:  Energy Intake:   (poor intake reported on admit; currently intubated)  Weight Loss:  No significant weight loss     Body Fat Loss:  Unable to assess     Muscle Mass Loss:  Unable to assess    Fluid Accumulation:  Unable to assess     Strength:  Not Performed    Nutrition Assessment:     Pt. nutritionally compromised AEB NPO status due to intubation 8/22. At risk for further nutrition compromise r/t admit with septic shock, UTI, increased nutrient needs to support wound healing and underlying medical condition (paraplegia, multiple sclerosis, dysphagia, HTN).        Nutrition Related Findings:    Pt. Report/Treatments/Miscellaneous: intubated earlier today, OG tube, off unit for ureteral stent this afternoon, will not begin tube feed today per Dr Kane Flynn, spoke with RN  GI Status: colostomy with formed stool output  Pertinent Labs: sodium 142, potassium 3.7, BUN 23, Creatinine 0.3, Glucose 97, Ammonia 39  Pertinent Meds: merrem, precedex, fentanyl, vasopressin, diprivan     Wound Type:  (sacrum stage I, buttock stage II, scrotum stage II, buttocks stage III)       Current Nutrition Intake & Therapies:          Diet NPO  Additional Calorie Sources:  Diprivan at 8ml/ hour provides 211 lipid kcals/ 24 hours    Anthropometric Measures:  Height: 5' 7\" (170.2

## 2023-08-22 NOTE — PROCEDURES
CENTRAL LINE PROCEDURE NOTES  [facility]  Department of Critical Care Medicine    PATIENT: Rubia Bradford, 1955, 79 y.o., male    MRN: [id number]    DATE: August 21, 2023    PRE-OP DIAGNOSIS: hemodynamic instability, poor venous access       POST-OP DIAGNOSIS: Same    PROCEDURE: Placement of right internal jugular vein central venous catheter under ultrasound guidance     Nurse Practitioner: TONO Mei CNP    ANESTHESIA: Local 1% Xylocaine, ~5mL in total    ESTIMATED BLOOD LOSS:  Less then 5 ml           COMPLICATIONS: none           CONDITION / DISPOSITION:  Stable    CONSENT: The procedure was performed in an emergent situation. INDICATIONS: Rubia Bradford is a 79 y.o. male presenting with hemodynamic instability, poor venous access, and need for central venous access. The risks, benefits, potential complications and possible alternatives of the procedure were discussed in detail to the patient. Unable to relocate family at this time, and procedure was performed in an emergent situation. PROCEDURE IN DETAIL:   After informed consent was obtained, a pre-procedural time-out was conducted, and the patient was placed in Trendelenburg position. The right neck was prepped and draped in the usual sterile fashion with chlorhexidine. The right internal jugular vein was identified. A small amount of 1% lidocaine without epinephrine was infiltrated into the subcutaneous tissues overlying the vein. A finder needle was then used to access the vein under ultrasound guidance. Dark, venous, non pulsatile blood flow was observed. A guide wire was passed without difficulty. A skin nick was made. The finder needle was removed over the guidewire and a dilator was passed without difficulty. A 7F triple lumen central venous catheter was passed over the wire using Seldinger technique and secured in place at 15 cm with silk suture. The guidewire was removed.  Each of the catheter

## 2023-08-22 NOTE — PROCEDURES
Insert Arterial Line    Date/Time: 8/22/2023 4:25 PM  Performed by: Julio Espinosa DO  Authorized by: Julio Espinosa DO   Consent: The procedure was performed in an emergent situation. Verbal consent not obtained. Written consent not obtained. Patient understanding: patient does not state understanding of the procedure being performed  Patient consent: the patient's understanding of the procedure does not match consent given  Procedure consent: procedure consent does not match procedure scheduled  Relevant documents: relevant documents not present or verified  Test results: test results not available  Site marked: the operative site was not marked  Imaging studies: imaging studies not available  Patient identity confirmed: arm band  Time out: Immediately prior to procedure a \"time out\" was called to verify the correct patient, procedure, equipment, support staff and site/side marked as required. Preparation: Patient was prepped and draped in the usual sterile fashion. Indications: hemodynamic monitoring  Location: left radial    Sedation:  Patient sedated: yes  Sedatives: fentanyl and propofol  Sedation start date/time: 8/22/2023 8:40 AM  Vitals: Vital signs were monitored during sedation. Rahul's test normal: yes  Needle gauge: 20  Seldinger technique: Seldinger technique used  Cutdown: cutdown required  Number of attempts: 1  Post-procedure: line sutured and dressing applied  Post-procedure CMS: normal  Patient tolerance: patient tolerated the procedure well with no immediate complications  Comments: Supervised by Dr. Beatrice Gonzalez. Dr. Virgie Goldsmith in the room with me.

## 2023-08-22 NOTE — PROGRESS NOTES
0645: patient noted to have worsening mental status and unable to respond to command. Dr. Young Benoit updated and at bedside  0700: patient having increased work of breathing and increased RR. O2 increased to 6L. Dr. Young Benoit notified see orders.   0715: RT and Dr. Young Benoit at bedside  4144: report given to Bautista Toscano, [address] [place] [address]

## 2023-08-22 NOTE — PROGRESS NOTES
[address] [facility] Laboratory Technician Worksheet      EEG Date: 2023    Name: Angely Davies   : 1955   Age: 79 y.o. SEX: male    ROOM: [facility] MRN: [id number]           CSN: [id number]      Ordering Provider: Palma Mena  EEG Number: [id number] Time of Test:  8684    Hand: intubated   Sedation: yes - fentanyl, precedex    H.V. Done: No  intubated  Photic: Yes    Sleep: Yes  Drowsy: No   Sleep Deprived: No    Seizures observed: no    Mentality: obtunded      Clinical History:pt having worsening mentation, taken to CT and a 5.6mm stone in left ureter. Sedated on Fentanyl and precedex. CT of the  head 2022  IMPRESSION:  No acute intracranial process. . No change from prior study. CT of the abdomen 2023     IMPRESSION:  1. Left-sided hydronephrosis and hydroureter secondary to a 5.6 mm stone in the left mid ureter. 1.9 x 1.9 cm cystic lesion in the upper pole of left kidney with associated calcification. .  2. Punctate stones in the lower pole of the right kidney. An 0.3 cm cyst arising from the upper pole of the right kidney. 3. Status post cholecystectomy. 4. Atrophic pancreas. 5. Pompa catheter within the bladder. 6. Enteric tube in place. 7. Atherosclerotic calcification in the abdominal aorta and iliac arteries. 8. Lumbar spondylosis. Calcification along the anterior longitudinal ligament which could be secondary to ankylosing spondylitis.   Past Medical History:       Diagnosis Date    Dysphagia     oropharyngeal    History of pulmonary embolism     History of urinary tract infection     Hx of blood clots     Pulmonary embolis    Hypertension     Major depressive disorder, single episode     MS (multiple sclerosis) ([facility])     Neurogenic bladder 2012    Dr. Marly Reynolds placed cather    [facility])     UTI (urinary tract infection)        Scheduled Meds:   baclofen  10 mg Oral TID    gabapentin  600 mg Oral 4x daily    OXcarbazepine  300 mg Oral BID sodium chloride flush  5-40 mL IntraVENous 2 times per day    enoxaparin  40 mg SubCUTAneous Daily    famotidine (PEPCID) injection  20 mg IntraVENous BID    meropenem  1,000 mg IntraVENous Q8H     Continuous Infusions:   fentaNYL 50 mcg/hr (08/22/23 1250)    vasopressin (Septic Shock) infusion 0.03 Units/min (08/22/23 1317)    dexmedetomidine 0.2 mcg/kg/hr (08/22/23 1250)    propofol 15 mcg/kg/min (08/22/23 1147)    norepinephrine 28 mcg/min (08/22/23 1250)    sodium chloride      lactated ringers IV soln 75 mL/hr at 08/22/23 0501    dextrose       PRN Meds:.sodium chloride flush, sodium chloride, acetaminophen **OR** acetaminophen, glucose, dextrose bolus **OR** dextrose bolus, glucagon (rDNA), dextrose    Technician: Abi Durham 8/22/2023

## 2023-08-22 NOTE — H&P
Department of Critical Care Medicine                  History & Physical        Patient:  Elyssa Harper    Unit/Bed:4D-07/007-A  YOB: 1955  MRN: [id number]   PCP: Estelle Leavitt MD  Date of Admission: 8/21/2023  Chief Complaint: AMS, Fever    Assessment and Plan:    Septic shock: secondary to bacteremia and UTI. Febrile (Tmax 102.2). S/P 30 mL/kg IVF bolus. Continue maintenance IVF. Was on Levo on ICU arrival, now Levo is OFF. Lactic acid was 3.9 now normalized. ProCal 23.08. On Meropenem. Pending blood cx. Complicated UTI: secondary to 2/2 chronic indwelling catheter d/t neurogenic bladder. Known hx/o Multiple Sclerosis. Febrile. Leukocytosis (17.7). UA  shows + Nitrite and esterase w/ large amt pyuria. Urine w/ strong foul odor. Urine cx 12/24/22 and 4/22/22 shows E. Coli detected and sensitive to Meropenem. Pending urine strep and legionella, Urine cx. Bacteremia: Molecular Blood ID shows CTX-M, Enterobacterales, and E. Coli. Continu antibiotics above. Pending blood culture. NSTEMI: suspect type II demand in setting of sepsis. Troponin, high sensitivity: 200 --> 309-->387. SVO2 80%. Patient denied chest pain. 12-lead ECG shows ST, rate 103, Otherwise normal ECG. No ST elevations nor depressions noted. Continue trending trop. Suspected, Pneumonia: CXR shows new airspace opacity at the left lung base is likely atelectasis and/or infiltrate. No respiratory distress noted. Breathing stable on 3 L O2 support via NC. Continue Merrem. Pending PNA panel PCR, respiratory cx. Primary HTN, hx of: Holding all home BP meds for now d/t hypotensive episode. Continue to monitor BP trends. Multiple Sclerosis with paraplegia of bilateral lower extremities: Secondary progressive given diagnosed in 1990's. Not on disease modifying therapy. Resume home Baclofen for spasticity. Wound care consulted for decubitus ulcer management. 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  Continue - Transfer Planned to listed location:  [] HOSPITALIST CONTACTED-        Case and plan discussed with Dr. Davon Banks.     Electronically signed by TONO Watkins - CNP  CRITICAL CARE SPECIALIST

## 2023-08-22 NOTE — CONSULTS
[facility] [place] ICU 4D  [address] [place]  Dept: [phone]  Loc: [phone]  Visit Date: 8/21/2023    Urology Consult Note    Reason for Consult:  pt with UTI. suprapubic catheter not draining properly. Requesting Physician:  Ambreen Reilly    History Obtained From:  patient, electronic medical record    Chief Complaint: ams, fever    HISTORY OF PRESENT ILLNESS:                The patient is a 79 y.o. male with significant past medical history of UTI secondary to neurogenic bladder with chronic jeffers, multiple sclerosis, GERD, paraparesis of bilateral lower extremities, HTN, , and hx of DVT/PE who presented to [facility] from nursing home for further evaluation of fever and worsening confusion.  Reports that patient was found to be febrile at 102.2 and tachycardia upon ED arrival.   Hx of NGB, bladder stones, intermittent hematuria, kidney stones    Past Medical History:        Diagnosis Date    Dysphagia     oropharyngeal    History of pulmonary embolism     History of urinary tract infection     Hx of blood clots     Pulmonary embolis    Hypertension     Major depressive disorder, single episode     MS (multiple sclerosis) ([address])     Neurogenic bladder 02/2012    Dr. Rebecca Celestin placed cather    Osteomyelitis [facility])     UTI (urinary tract infection)      Past Surgical History:        Procedure Laterality Date    ANKLE SURGERY  1996    broken ankle    BLADDER SURGERY  2-    Suprapubic catheter placement    BRONCHOSCOPY N/A 12/26/2022    BRONCHOSCOPY ALVEOLAR LAVAGE performed by Citlalli Giraldo MD at [facility]    COLONOSCOPY      CYSTO/URETERO/PYELOSCOPY, CALCULUS TX Left 6/19/2019    CYSTOSCOPY, LEFT STENT insertion, bladder irragation with bladder stones performed by Dedra Orlando MD at [address] [place] [address], [address] [place] [address] N/A 7/8/2019    CYSTO, LEFT URETERAL STENT REMOVAL, LEFT URETEROSCOPY, LASER LITHOTRIPSY, BASKET RETRIEVAL OF STONE 08/22/2023 04:40 AM     Magnesium:    Lab Results   Component Value Date/Time    MG 2.2 08/22/2023 04:40 AM     Phosphorus:    Lab Results   Component Value Date/Time    PHOS 2.6 08/21/2023 10:45 PM     PT/INR:    Lab Results   Component Value Date/Time    INR 1.46 08/21/2023 10:45 PM     U/A:    Lab Results   Component Value Date/Time    COLORU DK YELLOW 08/21/2023 03:55 PM    PHUR 7.0 08/21/2023 03:55 PM    LABCAST NONE SEEN 12/24/2022 09:00 PM    LABCAST NONE SEEN 12/24/2022 09:00 PM    WBCUA > 200 08/21/2023 03:55 PM    WBCUA >200 01/20/2012 09:00 AM    RBCUA 5-10 08/21/2023 03:55 PM    MUCUS NONE SEEN 12/27/2020 09:50 AM    YEAST NONE SEEN 12/24/2022 09:00 PM    BACTERIA MANY 08/21/2023 03:55 PM    CLARITYU Clear 07/05/2019 12:00 AM    SPECGRAV 1.020 12/24/2022 09:00 PM    LEUKOCYTESUR LARGE 08/21/2023 03:55 PM    UROBILINOGEN 1.0 08/21/2023 03:55 PM    BILIRUBINUR NEGATIVE 08/21/2023 03:55 PM    BILIRUBINUR NEGATIVE 01/20/2012 09:00 AM    BLOODU LARGE 08/21/2023 03:55 PM    GLUCOSEU NEGATIVE 08/21/2023 03:55 PM    AMORPHOUS DEBRIS 08/21/2023 03:55 PM       Imaging:   Ct shows   PROCEDURE: CT ABDOMEN PELVIS W IV CONTRAST     CLINICAL INFORMATION: sepsis likely secondary to UTI . COMPARISON: Nephrostogram dated 27th of July 2022. .     TECHNIQUE: Axial 5 mm CT images were obtained through the abdomen and pelvis after the administration of intravenous contrast. Coronal and sagittal reconstructions were obtained. All CT scans at this facility use dose modulation, iterative reconstruction, and/or weight-based dosing when appropriate to reduce radiation dose to as low as reasonably achievable. FINDINGS:       There is abnormal density in the right and left lower lobes. There are small bilateral pleural effusions. .   The base of the heart is within appropriate limits. There is an enteric tube in place.      The liver is normal. The spleen is normal. The adrenals glands are normal. There is an atrophic you for including us in the care of Rhina PeterTONO - RJ, TONO  08/22/23 8:41 AM  Urology  1210 addendum  Ct shows left obstructing stone  Plan for left stent    Cystoscopy, left ureteral stent placement per Dr Greyson Velez. I described the procedure in detail and also described the associated risks and benefits at length. We discussed possible alternative therapies. We discussed the risks and benefits of not undergoing therapy. Patient understands these risks and benefits and desires to proceed. I described the procedure in detail and also described the associated risks and benefits at length. We discussed possible alternative therapies. We discussed the risks and benefits of not undergoing therapy. Patient understands these risks and benefits and desires to proceed. Post-op expectations were discussed; stent pain, urinary frequency and urgency secondary to the stent, dysuria which should improve 1-2 days after procedure, and intermittent hematuria can be expected as long as stent is in place.      No answer from # listed  Needs emergent intervention  Emergent consent signed

## 2023-08-22 NOTE — CARE COORDINATION
8/22/23, 2:20 PM EDT    DISCHARGE PLANNING EVALUATION    This SW spoke with Néstor Gonzalez from [facility], she reports that patient is long-term at their facility and can return at any time. SW left a message for patient's wife Debbie Smith to confirm this, she was not in patient's room when SW visited.

## 2023-08-22 NOTE — PROCEDURES
Date: 8/22/2023  Referring physician: Amanda Blank, DO    Indication  Patient aged 79 y with encephalopathy. EEG done to assess for epileptiform activity. Introduction  This routine 20-minute EEG was recorded using the International 10-20 System on a [facility] workstation at 256 samples/s. Automated spike and seizure detection algorithms were applied. Description  The background consistent of none reactive theta dn delta, intermixed with generalized periodic discharges of triphasic wave morphology. No consistent focal slowing or interhemispheric asymmetry was noted. Stage I and stage II sleep were not observed. There were rare left parietal sharp discharges. Activations  Hyperventilation was not performed. Intermittent photic stimulation was performed and demonstrated no posterior driving response. Impression  Abnormal awake EEG. The slowing mentioned above suggests moderate non specific encephalopathy. The presence of left parietal sharp increases patient risk for focal seizures. The presence of triphasic wave discharges suggest underlying metabolic derangement. Aravind Nichole MD  Epilepsy Board Certified. Neurology Board Certified.     Electronically Signed

## 2023-08-23 ENCOUNTER — APPOINTMENT (OUTPATIENT)
Dept: GENERAL RADIOLOGY | Age: 68
DRG: 659 | End: 2023-08-23
Payer: MEDICARE

## 2023-08-23 LAB
ANION GAP SERPL CALC-SCNC: 16 MEQ/L (ref 8–16)
BACTERIA BLD AEROBE CULT: ABNORMAL
BACTERIA BLD AEROBE CULT: ABNORMAL
BACTERIA SPEC AEROBE CULT: NORMAL
BASOPHILS ABSOLUTE: 0 THOU/MM3 (ref 0–0.1)
BASOPHILS NFR BLD AUTO: 0.1 %
BUN SERPL-MCNC: 24 MG/DL (ref 7–22)
CALCIUM SERPL-MCNC: 8.3 MG/DL (ref 8.5–10.5)
CHLORIDE SERPL-SCNC: 108 MEQ/L (ref 98–111)
CO2 SERPL-SCNC: 18 MEQ/L (ref 23–33)
CREAT SERPL-MCNC: 0.3 MG/DL (ref 0.4–1.2)
DEPRECATED RDW RBC AUTO: 59.5 FL (ref 35–45)
EOSINOPHIL NFR BLD AUTO: 0 %
EOSINOPHILS ABSOLUTE: 0 THOU/MM3 (ref 0–0.4)
ERYTHROCYTE [DISTWIDTH] IN BLOOD BY AUTOMATED COUNT: 16.1 % (ref 11.5–14.5)
GFR SERPL CREATININE-BSD FRML MDRD: > 60 ML/MIN/1.73M2
GLUCOSE BLD STRIP.AUTO-MCNC: 112 MG/DL (ref 70–108)
GLUCOSE BLD STRIP.AUTO-MCNC: 157 MG/DL (ref 70–108)
GLUCOSE BLD STRIP.AUTO-MCNC: 161 MG/DL (ref 70–108)
GLUCOSE BLD STRIP.AUTO-MCNC: 169 MG/DL (ref 70–108)
GLUCOSE SERPL-MCNC: 138 MG/DL (ref 70–108)
HCT VFR BLD AUTO: 37.6 % (ref 42–52)
HGB BLD-MCNC: 11.8 GM/DL (ref 14–18)
IMM GRANULOCYTES # BLD AUTO: 0.22 THOU/MM3 (ref 0–0.07)
IMM GRANULOCYTES NFR BLD AUTO: 1.6 %
LYMPHOCYTES ABSOLUTE: 0.9 THOU/MM3 (ref 1–4.8)
LYMPHOCYTES NFR BLD AUTO: 6.1 %
MCH RBC QN AUTO: 31.5 PG (ref 26–33)
MCHC RBC AUTO-ENTMCNC: 31.4 GM/DL (ref 32.2–35.5)
MCV RBC AUTO: 100.3 FL (ref 80–94)
MONOCYTES ABSOLUTE: 0.8 THOU/MM3 (ref 0.4–1.3)
MONOCYTES NFR BLD AUTO: 5.7 %
NEUTROPHILS NFR BLD AUTO: 86.5 %
NRBC BLD AUTO-RTO: 0 /100 WBC
ORGANISM: ABNORMAL
ORGANISM: ABNORMAL
PLATELET # BLD AUTO: 99 THOU/MM3 (ref 130–400)
PLATELET BLD QL SMEAR: ABNORMAL
PMV BLD AUTO: 10.1 FL (ref 9.4–12.4)
POTASSIUM SERPL-SCNC: 3.8 MEQ/L (ref 3.5–5.2)
RBC # BLD AUTO: 3.75 MILL/MM3 (ref 4.7–6.1)
REASON FOR REJECTION: NORMAL
REJECTED TEST: NORMAL
SCAN OF BLOOD SMEAR: NORMAL
SEGMENTED NEUTROPHILS ABSOLUTE COUNT: 12.2 THOU/MM3 (ref 1.8–7.7)
SODIUM SERPL-SCNC: 142 MEQ/L (ref 135–145)
WBC # BLD AUTO: 14.1 THOU/MM3 (ref 4.8–10.8)

## 2023-08-23 PROCEDURE — 6360000002 HC RX W HCPCS: Performed by: STUDENT IN AN ORGANIZED HEALTH CARE EDUCATION/TRAINING PROGRAM

## 2023-08-23 PROCEDURE — 2500000003 HC RX 250 WO HCPCS: Performed by: STUDENT IN AN ORGANIZED HEALTH CARE EDUCATION/TRAINING PROGRAM

## 2023-08-23 PROCEDURE — 71045 X-RAY EXAM CHEST 1 VIEW: CPT

## 2023-08-23 PROCEDURE — 6370000000 HC RX 637 (ALT 250 FOR IP): Performed by: NURSE PRACTITIONER

## 2023-08-23 PROCEDURE — 80048 BASIC METABOLIC PNL TOTAL CA: CPT

## 2023-08-23 PROCEDURE — 2500000003 HC RX 250 WO HCPCS

## 2023-08-23 PROCEDURE — 85025 COMPLETE CBC W/AUTO DIFF WBC: CPT

## 2023-08-23 PROCEDURE — 51798 US URINE CAPACITY MEASURE: CPT

## 2023-08-23 PROCEDURE — 99231 SBSQ HOSP IP/OBS SF/LOW 25: CPT | Performed by: UROLOGY

## 2023-08-23 PROCEDURE — 6360000002 HC RX W HCPCS: Performed by: NURSE PRACTITIONER

## 2023-08-23 PROCEDURE — 2580000003 HC RX 258: Performed by: NURSE PRACTITIONER

## 2023-08-23 PROCEDURE — 2000000000 HC ICU R&B

## 2023-08-23 PROCEDURE — 93010 ELECTROCARDIOGRAM REPORT: CPT | Performed by: INTERNAL MEDICINE

## 2023-08-23 PROCEDURE — 2500000003 HC RX 250 WO HCPCS: Performed by: NURSE PRACTITIONER

## 2023-08-23 PROCEDURE — 36415 COLL VENOUS BLD VENIPUNCTURE: CPT

## 2023-08-23 PROCEDURE — 6360000002 HC RX W HCPCS

## 2023-08-23 PROCEDURE — 94761 N-INVAS EAR/PLS OXIMETRY MLT: CPT

## 2023-08-23 PROCEDURE — A4216 STERILE WATER/SALINE, 10 ML: HCPCS | Performed by: NURSE PRACTITIONER

## 2023-08-23 PROCEDURE — 82948 REAGENT STRIP/BLOOD GLUCOSE: CPT

## 2023-08-23 PROCEDURE — 94003 VENT MGMT INPAT SUBQ DAY: CPT

## 2023-08-23 PROCEDURE — 2700000000 HC OXYGEN THERAPY PER DAY

## 2023-08-23 PROCEDURE — 99291 CRITICAL CARE FIRST HOUR: CPT | Performed by: INTERNAL MEDICINE

## 2023-08-23 RX ORDER — MORPHINE SULFATE 2 MG/ML
2 INJECTION, SOLUTION INTRAMUSCULAR; INTRAVENOUS
Status: DISCONTINUED | OUTPATIENT
Start: 2023-08-23 | End: 2023-08-23

## 2023-08-23 RX ORDER — MORPHINE SULFATE 2 MG/ML
2 INJECTION, SOLUTION INTRAMUSCULAR; INTRAVENOUS
Status: DISCONTINUED | OUTPATIENT
Start: 2023-08-23 | End: 2023-09-01 | Stop reason: HOSPADM

## 2023-08-23 RX ADMIN — ENOXAPARIN SODIUM 40 MG: 100 INJECTION SUBCUTANEOUS at 08:23

## 2023-08-23 RX ADMIN — SODIUM CHLORIDE, POTASSIUM CHLORIDE, SODIUM LACTATE AND CALCIUM CHLORIDE: 600; 310; 30; 20 INJECTION, SOLUTION INTRAVENOUS at 12:27

## 2023-08-23 RX ADMIN — MEROPENEM 1000 MG: 1 INJECTION, POWDER, FOR SOLUTION INTRAVENOUS at 08:27

## 2023-08-23 RX ADMIN — MEROPENEM 1000 MG: 1 INJECTION, POWDER, FOR SOLUTION INTRAVENOUS at 01:04

## 2023-08-23 RX ADMIN — MORPHINE SULFATE 2 MG: 2 INJECTION, SOLUTION INTRAMUSCULAR; INTRAVENOUS at 11:47

## 2023-08-23 RX ADMIN — Medication 6 MCG/MIN: at 11:18

## 2023-08-23 RX ADMIN — MORPHINE SULFATE 2 MG: 2 INJECTION, SOLUTION INTRAMUSCULAR; INTRAVENOUS at 06:51

## 2023-08-23 RX ADMIN — BACLOFEN 10 MG: 10 TABLET ORAL at 08:25

## 2023-08-23 RX ADMIN — OXCARBAZEPINE 300 MG: 300 TABLET, FILM COATED ORAL at 08:25

## 2023-08-23 RX ADMIN — FAMOTIDINE 20 MG: 10 INJECTION INTRAVENOUS at 20:25

## 2023-08-23 RX ADMIN — OXCARBAZEPINE 300 MG: 300 TABLET, FILM COATED ORAL at 20:25

## 2023-08-23 RX ADMIN — FAMOTIDINE 20 MG: 10 INJECTION INTRAVENOUS at 08:23

## 2023-08-23 RX ADMIN — GABAPENTIN 600 MG: 600 TABLET, FILM COATED ORAL at 20:25

## 2023-08-23 RX ADMIN — SODIUM CHLORIDE, PRESERVATIVE FREE 10 ML: 5 INJECTION INTRAVENOUS at 20:26

## 2023-08-23 RX ADMIN — MEROPENEM 1000 MG: 1 INJECTION, POWDER, FOR SOLUTION INTRAVENOUS at 16:53

## 2023-08-23 RX ADMIN — BACLOFEN 10 MG: 10 TABLET ORAL at 14:07

## 2023-08-23 RX ADMIN — Medication 0.3 MCG/KG/HR: at 11:48

## 2023-08-23 RX ADMIN — MORPHINE SULFATE 2 MG: 2 INJECTION, SOLUTION INTRAMUSCULAR; INTRAVENOUS at 20:39

## 2023-08-23 RX ADMIN — GABAPENTIN 600 MG: 600 TABLET, FILM COATED ORAL at 16:51

## 2023-08-23 RX ADMIN — MORPHINE SULFATE 2 MG: 2 INJECTION, SOLUTION INTRAMUSCULAR; INTRAVENOUS at 09:33

## 2023-08-23 RX ADMIN — SODIUM CHLORIDE, PRESERVATIVE FREE 10 ML: 5 INJECTION INTRAVENOUS at 08:25

## 2023-08-23 RX ADMIN — BACLOFEN 10 MG: 10 TABLET ORAL at 20:25

## 2023-08-23 RX ADMIN — GABAPENTIN 600 MG: 600 TABLET, FILM COATED ORAL at 14:07

## 2023-08-23 RX ADMIN — GABAPENTIN 600 MG: 600 TABLET, FILM COATED ORAL at 08:25

## 2023-08-23 ASSESSMENT — PULMONARY FUNCTION TESTS
PIF_VALUE: 23
PIF_VALUE: 19
PIF_VALUE: 23
PIF_VALUE: 20
PIF_VALUE: 17
PIF_VALUE: 20

## 2023-08-23 ASSESSMENT — PAIN SCALES - GENERAL
PAINLEVEL_OUTOF10: 3
PAINLEVEL_OUTOF10: 3
PAINLEVEL_OUTOF10: 5

## 2023-08-23 NOTE — CARE COORDINATION
8/23/23, 1:37 PM EDT  Discharge Planning Evaluation  Social work consult received, patient from [facility]. Pts. Wife's preference is to return to [facility], [place]. The patient's current payor source at the facility is Medicaid. Medicare skilled days available: n/a  Insurance precert:  No  Spoke with Larisa (HCF) at the facility. Confirmed by Kat Amaya. Patient bed hold: Yes  Anticipated transport plan: Ambulance, pt is reported as being bed bound. Patient's Healthcare Decision Maker: Legal Next of Kin    Readmission Risk Low 0-14, Mod 15-19), High > 20: Readmission Risk Score: 18.7    Current PCP: Liz Lewis MD  PCP verified by CM? Yes    Patient Orientation: Alert and Oriented    Patient Cognition: Alert  History Provided by: Significant Other, Medical Record    Advance Directives:      Code Status: Full Code   Patient's Primary Decision Maker is: Legal Next of Kin       Discharge Planning:    Patient lives with:   Type of Home: [address]  Primary Care Giver: Other (Comment) ([facility], [place])  Patient Support Systems include: Spouse/Significant Other   Current Financial resources: Medicaid  Current community resources: ECF/Home Care  Current services prior to admission: [address]            Current DME:              Type of Home Care services:       ADLS  Prior functional level: Assistance with the following:  Current functional level: Assistance with the following:    Family can provide assistance at DC: Would you like Case Management to discuss the discharge plan with any other family members/significant others, and if so, who? Plans to Return to Present Housing: Yes  Other Identified Issues/Barriers to RETURNING to current housing: None reported.   Potential Assistance needed at discharge: [address], Transportation            Potential DME:    Patient expects to discharge to: [address] [place] [address] for transportation at discharge:

## 2023-08-23 NOTE — PLAN OF CARE
Problem: Respiratory - Adult  Goal: Achieves optimal ventilation and oxygenation  Outcome: Progressing                                                Patient Weaning Progress    The patient's vent settings was able to be weaned this shift. Ventilator settings that were weaned              [] Mode   [] Pressure support weaned   [x] Fio2 weaned   [] Peep weaned      Spontaneous weaning trial  was not attempted. due to defined parameters for SBT (spontaneous breathing trial) not being met. Evac tube was  hooked up with continuous low suction(20-30mmHg)      Cuff was not  deflated to determine cuff leak. Unable to get agreement for goals because no family is present and patient cannot respond.

## 2023-08-23 NOTE — PROGRESS NOTES
Patient Weaning Progress    The patient's vent settings was able to be weaned this shift. Ventilator settings that were weaned              [] Mode   [x] Pressure support weaned   [x] Fio2 weaned   [x] Peep weaned      Spontaneous weaning trial  was attempted. due to defined parameters for SBT (spontaneous breathing trial) not being met. Evac tube was  hooked up with continuous low suction(20-30mmHg)              Unable to get agreement for goals because no family is present and patient cannot respond.

## 2023-08-23 NOTE — CARE COORDINATION
8/23/23, 3:31 PM EDT    DISCHARGE ON GOING EVALUATION    [facility]  [facility] 2  Location: 4D-07/007-A Reason for admit: Somnolence [R40.0]  Elevated troponin [R77.8]  Septic shock (HCC) [A41.9, R65.21]  Urinary tract infection with hematuria, site unspecified [N39.0, R31.9]  Pneumonia of left lung due to infectious organism, unspecified part of lung [J18.9]   Procedure:   8/21 CXR: Left midlung atelectasis/infiltrate  8/21 CVC RIJ  8/22 Intubated  8/22 CTA Chest: No evidence of pulmonary emboli; Abnormal density throughout both lung fields which could be secondary to ARDS, bilateral pneumonia or congestive heart failure; Mild cardiomegaly; Thoracic spondylosis. .   8/22 CT Abd/pelvis: Left-sided hydronephrosis and hydroureter secondary to a 5.6 mm stone in the left mid ureter. 1.9 x 1.9 cm cystic lesion in the upper pole of left kidney with associated calcification; Punctate stones in the lower pole of the right kidney. An 0.3 cm cyst arising from the upper pole of the right kidney; Status post cholecystectomy; Atrophic pancreas; Pompa catheter within the bladder; Enteric tube in place; Atherosclerotic calcification in the abdominal aorta and iliac arteries; Lumbar spondylosis. Calcification along the anterior longitudinal ligament which could be secondary to ankylosing spondylitis  8/22 EEG 20 min: suggests moderate non specific encephalopathy; presence of left parietal sharp increases patient risk for focal seizures; presence of triphasic wave discharges suggest underlying metabolic derangement  6/34 Echo with EF 40%; Hypokinetic motion of the mid anteroseptal wall noted in the left ventricle  8/22 Suprapubic catheter exchanged  8/22 CYSTOSCOPY LEFT URETERAL STENT INSERTION  8/23 CXR: 1. Mild megaly. ET tube, NG tube, and right jugular line all remain in good position. An additional esophageal tube has been inserted, possibly a pH monitor. The tip of this catheter is also within the stomach.   2. Patient:  Francisco JACOBSON Viky Date:  2021   :  1954 Attending:  Carmelo Mock MD   67 year old male        GI Note       Consult Diagnosis:  Heme + stool, anemia, h/o EV    Subjective:  Hemoglobin stable. No nausea, vomiting or abdominal pain. States he is currently only moving his bowel small amounts.     Consult HPI:  67 year old male with a history of ETOH cirrhosis with hx of esophageal varices, depression, chronic pain admitted after presenting due to seizure and for ETOH detox.  He was noted in ER to have Heme + stool, green and GI consult requested.  Patient seen with sitter at bedside, suicide precautions.  He is a very poor historian, dozing off repeatedly during interview.  He states he noted a black stool about 2 weeks ago and intermittently has BRB on the toilet paper after BM, but denies hematochezia.  Denies abdominal pain, N/V.  Last ETOH use yesterday, intoxicated on presentation.      EGD 2020- large esophageal varices s/p banding    Colonoscopy 2020- large internal hemorrhoids    EGD in 2017 noted small to medium size esophageal varices with some scarring from previous banding, hemorrhagic portal hypertensive gastropathy & mild antral gastritis.      Prior EGD in 2016 revealed evidence of 2 banding sites in esophagus with resolving ulceration, 2 columns of trace esophageal varices seen - flattened with insufflation, Gastroesophageal junction at 36cm from the gums, evidence of edema suspect related to food impaction - though spontaneous passage prior to scope passage, 2 cm hiatal hernia. No gastric varices seen. Portal hypertensive gastropathy. Normal appearing duodenal mucosa     Prior EGD in 2016 with moderate portal hypertensive gastropathy, Non obstructing Schatzki ring with mucosal tear s/p band placed & Two columns of Medium sized esophageal varices s/p banding    PMHx, SHX, FHX, Meds and Allergies reviewed.     Past Medical History:    Hyperlipidemia                                                 Alcoholic cirrhosis (CMS/HCC)                                 Wears glasses                                                 Use of cane as ambulatory aid                                   Comment: At times    Chronic pain                                                  Depression                                                    Colon polyp                                     08/12/2015      Comment: Tubular adenoma    Internal hemorrhoids                                          Portal hypertensive gastropathy (CMS/HCC)                     Dysphagia                                                       Comment: Trouble swallowing bread and meat sometimes    Esophageal varices (CMS/HCC)                                  Gout                                            2007          Failed conscious sedation during procedure                      Comment: With EGD & colonoscopy 6/24/2015    Alcohol abuse                                                 Umbilical hernia                                              Left inguinal hernia                                          Suicidal ideation                               10/2016         Comment: depressed due to girlfriend kicking him out    Right inguinal hernia                                         Seizure (CMS/HCC)                                             Diabetes mellitus (CMS/HCC)                                   Alcohol dependence in early full remission (CM* 6/4/2014        Scheduled Inpatient Meds:   potassium CHLORIDE  40 mEq Oral Once    folic acid  1 mg Oral Daily    thiamine  100 mg Oral Daily    pantoprazole  40 mg Oral 2 times per day    sucralfate  1 g Oral 4x Daily AC & HS    ciprofloxacin  500 mg Oral QAM AC    Potassium Standard Replacement Protocol   Does not apply See Admin Instructions    Magnesium Standard Replacement Protocol   Does not apply See Admin Instructions    atorvastatin  80 mg Oral Daily     Moderate pneumonia/pulmonary edema involving both lungs relatively diffusely, worse on the left. Small effusion left side. 3. There appears to have been slight improvement since yesterday    Barriers to Discharge: POD #1. Taken to surgery yesterday by Urology and had cysto with left ureteral stent placement. Remains on vent w/ETT on AC/PC, peep 6, FIO2 40%, sats 92%. Tmax 100. NSR. Follows commands with BUEs. Hx of BLE paraplegia. Wound Care consulted for sacral decub ulcer. Dietitian consulted. +MRSA nares. Blood cultures +ESBL EColi. Urine + mixed growth. Telemetry, ricardo, CVC, OG w/TF @ 15 ml/hr, ostomy w/stoma pink/moist, SPC. IVF, precedex @ 0.3 mcg/kg/hr, levo @ 6 mcg/min, diprivan @ 5 mcg/kg/min, prn tylenol, baclofen, lovenox, IV pepcid, neurontin, IV merrem, prn IV morphine, trileptal. WBC 14.1, hgb 11.8, plt 99. PCP: Ella Pool MD  Readmission Risk Score: 18.7%  Patient Goals/Plan/Treatment Preferences: Return to [facility]. No precert required. SW on case. fluticasone  2 spray Each Nare Daily    levETIRAcetam  1,000 mg Oral BID    rifAXIMin  550 mg Oral 2 times per day    sodium chloride (PF)  2 mL Intracatheter 2 times per day    gabapentin  300 mg Oral 3 times per day       Review of Systems:     Denies CP, SOB, HA     Physical Exam:   Vitals:    Visit Vitals  /60   Pulse 62   Temp 97.8 °F (36.6 °C) (Oral)   Resp 16   Ht 5' 6\" (1.676 m)   Wt 66.6 kg   SpO2 100%   BMI 23.70 kg/m²      General Appearance:  Awake, alert, NAD  Lungs:  Clear to auscultation bilaterally anterior chest.  Heart:  Regular rate and rhythm, S1 and S2 normal.  No murmur  Abdomen:  Soft, mild epigastric tenderness, no guarding, BS+, nondistended.  No masses or organomegaly.  Neurologic:  No tremors       Labs:  Recent Labs   Lab 06/28/21  0319 06/27/21  1046 06/27/21  0338 06/26/21  0353   WBC 4.1*  --  3.8* 4.4   HCT 26.2*  --  27.5* 26.2*   HGB 8.3*  --  8.8* 8.6*   MCV 94.9  --  95.5 94.6   PLT 68*  --  64* 61*   SODIUM 140  --  140 138   POTASSIUM 3.5 4.0 3.5 3.4   GLUCOSE 86  --  104* 77   BUN 5*  --  4* 4*   CREATININE 0.96  --  0.91 0.82   AST 27  --  35 38*   GPT 21  --  21 22   ALKPT 98  --  89 85   BILIRUBIN 1.6*  --  1.7* 1.9*   ALBUMIN 2.8*  --  2.9* 2.9*   MELD-Na score: 11 at 6/22/2021  3:32 AM  MELD score: 11 at 6/22/2021  3:32 AM  Calculated from:  Serum Creatinine: 0.72 mg/dL (Using min of 1 mg/dL) at 6/22/2021  3:32 AM  Serum Sodium: 141 mmol/L (Using max of 137 mmol/L) at 6/22/2021  3:32 AM  Total Bilirubin: 1.5 mg/dL at 6/22/2021  3:32 AM  INR(ratio): 1.3 at 6/20/2021 11:59 PM  Age: 67 years      Radiology Findings:  CT Scan: 6/21/2021    IMPRESSION:      1.  No acute traumatic injury.   2.  Cirrhotic morphology of liver with sequela of portal hypertension with  a small amount of ascites.  3.  Circumferential thickening of the bladder is nonspecific. Recommend  correlation with urinalysis to rule out cystitis.  4.  Large left inguinal hernia with a loop of descending  colon, similar to  5/26/2019.        Pathology Findings:  Not applicable    Assessment:     1.  67 year old male with h/o ETOH cirrhosis, MELD NA 10, admitted with ETOH intoxication and seizures.    2.  Heme + stool in ER-  Baseline Hgb ~10.  Does report black stool x 1 about 2 weeks ago and intermittent BRB with BMs.     - no overt bleeding since admission.  Hgb currently stable   S/p EGD 6/24/2021  GAVE s/p APC  Severe portal hypertensive gastropathy  Esophageal varices status post banding  3.  Portal HTN with h/o Esophageal Varices- s/p EGD 6/2020 with large EV s/p banding, has failed to followup for subsequent endoscopy and is overdue for EV surveillance   4.  Chronic Anemia/Pancytopenia   5.  Large internal hemorrhoids on Colonoscopy 6/2020  6.  ETOH withdrawal, improved       Plans/Recommendations:     1.  Monitor stools, watch for bleeding  2.  Continue PPI b.i.d. and Carafate 1 g q.i.d.  3.  Monitor H/H, transfuse prn  4. diet as tolerated  5.  ETOH cessation, CIWA protocol  6.  Repeat EGD in 4-6 weeks  7. Start lactulose 20grams PO once daily for constipation/ hx of HE in the past.   8. Continue Cipro BID for a total of 7 days in the setting of GI bleed/cirrhosis (cipro started 6/25)     Isabel Myrick PA-C/ Dr. Brennan    GI Staff    Patient seen and examined.  PMHx/SHx/SocHx/FHx/Meds/Allergies reviewed.  Agree with above assessment and participated in the development of the treatment plan.      Chest discomfort resolved  Tolerating diet  We will follow peripherally.  Please contact us if there are any further concerns or questions.    Refugio Brennan MD, [facility] [place]

## 2023-08-23 NOTE — PROGRESS NOTES
08/23/23 0807   Encounter Summary   Encounter Overview/Reason  Spiritual/Emotional Needs   Service Provided For: Patient   Referral/Consult From: [facility]   Support System Spouse; Family members   Last Encounter  08/23/23   Complexity of Encounter Low   Begin Time 0802   End Time  0807   Total Time Calculated 5 min   Spiritual/Emotional needs   Type Spiritual Support   Assessment/Intervention/Outcome   Assessment Unable to assess   Intervention Prayer (assurance of)/[place]     In my encounter with the 79  yr old patient, I attempted to see the patient in ICU, but the patient was unresponsive at this time. No family was present in the room. I offered a prayer at the pt's side. A  will attempt to see the patient at a later time as a follow up. The pt was admitted due to septic shock.

## 2023-08-23 NOTE — PLAN OF CARE
Problem: Discharge Planning  Goal: Discharge to home or other facility with appropriate resources  Outcome: Progressing  Flowsheets (Taken 8/22/2023 2000)  Discharge to home or other facility with appropriate resources: Identify barriers to discharge with patient and caregiver     Problem: Pain  Goal: Verbalizes/displays adequate comfort level or baseline comfort level  Outcome: Progressing  Note: Continuing to monitor pain and discomfort. Monitoring pain level on scale of 0-10. Non- pharmacological measures encouraged to reduce discomfort/pain. PRN pain meds administeration continues when/if applicable as ordered by physician. Problem: Safety - Adult  Goal: Free from fall injury  Outcome: Progressing  Note: Falling star program remains in place. Call light and personal belongings within reach. Frequent visual monitoring continues. Toileting program inplace. Patient assisted in turning/repositioning at least once every 2 hours, and on a prn basis. Problem: Nutrition Deficit:  Goal: Optimize nutritional status  8/23/2023 0214 by Ty Sen RN  Outcome: Progressing    Problem: Skin/Tissue Integrity  Goal: Absence of new skin breakdown  Description: 1. Monitor for areas of redness and/or skin breakdown  2. Assess vascular access sites hourly  3. Every 4-6 hours minimum:  Change oxygen saturation probe site  4. Every 4-6 hours:  If on nasal continuous positive airway pressure, respiratory therapy assess nares and determine need for appliance change or resting period. Outcome: Progressing  Note: Monitoring patient skin integrity for skin breakdown, turning and repositioning q2h per protocol.

## 2023-08-23 NOTE — PLAN OF CARE
Problem: Respiratory - Adult  Goal: Achieves optimal ventilation and oxygenation  8/23/2023 1319 by Mahi Isabel RCP  Outcome: Progressing    Achieves optimal ventilation and oxygenation:   Oxygen supplementation based on oxygen saturation or arterial blood gases   Assess for changes in mentation and behavior   Assess for changes in respiratory status

## 2023-08-23 NOTE — PLAN OF CARE
Problem: Respiratory - Adult  Goal: Achieves optimal ventilation and oxygenation  8/23/2023 1559 by Ya Tellez RCP  Outcome: Progressing    Achieves optimal ventilation and oxygenation:   Oxygen supplementation based on oxygen saturation or arterial blood gases   Assess for changes in mentation and behavior   Assess for changes in respiratory status

## 2023-08-24 LAB
ANION GAP SERPL CALC-SCNC: 7 MEQ/L (ref 8–16)
BACTERIA SPEC RESP CULT: NORMAL
BASOPHILS ABSOLUTE: 0 THOU/MM3 (ref 0–0.1)
BASOPHILS NFR BLD AUTO: 0.3 %
BUN SERPL-MCNC: 22 MG/DL (ref 7–22)
CALCIUM SERPL-MCNC: 8 MG/DL (ref 8.5–10.5)
CHLORIDE SERPL-SCNC: 111 MEQ/L (ref 98–111)
CO2 SERPL-SCNC: 27 MEQ/L (ref 23–33)
CREAT SERPL-MCNC: 0.2 MG/DL (ref 0.4–1.2)
DEPRECATED RDW RBC AUTO: 56.5 FL (ref 35–45)
EOSINOPHIL NFR BLD AUTO: 0.5 %
EOSINOPHILS ABSOLUTE: 0.1 THOU/MM3 (ref 0–0.4)
ERYTHROCYTE [DISTWIDTH] IN BLOOD BY AUTOMATED COUNT: 15.6 % (ref 11.5–14.5)
GFR SERPL CREATININE-BSD FRML MDRD: > 60 ML/MIN/1.73M2
GLUCOSE BLD STRIP.AUTO-MCNC: 178 MG/DL (ref 70–108)
GLUCOSE BLD STRIP.AUTO-MCNC: 182 MG/DL (ref 70–108)
GLUCOSE BLD STRIP.AUTO-MCNC: 94 MG/DL (ref 70–108)
GLUCOSE SERPL-MCNC: 182 MG/DL (ref 70–108)
GRAM STN SPEC: NORMAL
HCT VFR BLD AUTO: 37 % (ref 42–52)
HGB BLD-MCNC: 12.1 GM/DL (ref 14–18)
IMM GRANULOCYTES # BLD AUTO: 0.06 THOU/MM3 (ref 0–0.07)
IMM GRANULOCYTES NFR BLD AUTO: 0.5 %
LYMPHOCYTES ABSOLUTE: 0.9 THOU/MM3 (ref 1–4.8)
LYMPHOCYTES NFR BLD AUTO: 7.8 %
MAGNESIUM SERPL-MCNC: 2 MG/DL (ref 1.6–2.4)
MCH RBC QN AUTO: 31.7 PG (ref 26–33)
MCHC RBC AUTO-ENTMCNC: 32.7 GM/DL (ref 32.2–35.5)
MCV RBC AUTO: 96.9 FL (ref 80–94)
MONOCYTES ABSOLUTE: 0.7 THOU/MM3 (ref 0.4–1.3)
MONOCYTES NFR BLD AUTO: 6.5 %
NEUTROPHILS NFR BLD AUTO: 84.4 %
NRBC BLD AUTO-RTO: 0 /100 WBC
NT-PROBNP SERPL IA-MCNC: 3264 PG/ML (ref 0–124)
PLATELET # BLD AUTO: 107 THOU/MM3 (ref 130–400)
PMV BLD AUTO: 9.9 FL (ref 9.4–12.4)
POTASSIUM SERPL-SCNC: 3.4 MEQ/L (ref 3.5–5.2)
RBC # BLD AUTO: 3.82 MILL/MM3 (ref 4.7–6.1)
SEGMENTED NEUTROPHILS ABSOLUTE COUNT: 9.5 THOU/MM3 (ref 1.8–7.7)
SODIUM SERPL-SCNC: 145 MEQ/L (ref 135–145)
WBC # BLD AUTO: 11.3 THOU/MM3 (ref 4.8–10.8)

## 2023-08-24 PROCEDURE — 82948 REAGENT STRIP/BLOOD GLUCOSE: CPT

## 2023-08-24 PROCEDURE — 2500000003 HC RX 250 WO HCPCS: Performed by: NURSE PRACTITIONER

## 2023-08-24 PROCEDURE — 36415 COLL VENOUS BLD VENIPUNCTURE: CPT

## 2023-08-24 PROCEDURE — 2000000000 HC ICU R&B

## 2023-08-24 PROCEDURE — 6370000000 HC RX 637 (ALT 250 FOR IP): Performed by: NURSE PRACTITIONER

## 2023-08-24 PROCEDURE — 94761 N-INVAS EAR/PLS OXIMETRY MLT: CPT

## 2023-08-24 PROCEDURE — 99231 SBSQ HOSP IP/OBS SF/LOW 25: CPT | Performed by: UROLOGY

## 2023-08-24 PROCEDURE — 92610 EVALUATE SWALLOWING FUNCTION: CPT

## 2023-08-24 PROCEDURE — A4216 STERILE WATER/SALINE, 10 ML: HCPCS | Performed by: NURSE PRACTITIONER

## 2023-08-24 PROCEDURE — 80048 BASIC METABOLIC PNL TOTAL CA: CPT

## 2023-08-24 PROCEDURE — 6370000000 HC RX 637 (ALT 250 FOR IP): Performed by: STUDENT IN AN ORGANIZED HEALTH CARE EDUCATION/TRAINING PROGRAM

## 2023-08-24 PROCEDURE — 2500000003 HC RX 250 WO HCPCS: Performed by: STUDENT IN AN ORGANIZED HEALTH CARE EDUCATION/TRAINING PROGRAM

## 2023-08-24 PROCEDURE — 94003 VENT MGMT INPAT SUBQ DAY: CPT

## 2023-08-24 PROCEDURE — 85025 COMPLETE CBC W/AUTO DIFF WBC: CPT

## 2023-08-24 PROCEDURE — 6360000002 HC RX W HCPCS: Performed by: NURSE PRACTITIONER

## 2023-08-24 PROCEDURE — 83880 ASSAY OF NATRIURETIC PEPTIDE: CPT

## 2023-08-24 PROCEDURE — 83735 ASSAY OF MAGNESIUM: CPT

## 2023-08-24 PROCEDURE — 2580000003 HC RX 258: Performed by: STUDENT IN AN ORGANIZED HEALTH CARE EDUCATION/TRAINING PROGRAM

## 2023-08-24 PROCEDURE — 99291 CRITICAL CARE FIRST HOUR: CPT | Performed by: INTERNAL MEDICINE

## 2023-08-24 PROCEDURE — 6360000002 HC RX W HCPCS

## 2023-08-24 PROCEDURE — 2580000003 HC RX 258: Performed by: NURSE PRACTITIONER

## 2023-08-24 RX ORDER — BUMETANIDE 0.25 MG/ML
1 INJECTION INTRAMUSCULAR; INTRAVENOUS ONCE
Status: COMPLETED | OUTPATIENT
Start: 2023-08-24 | End: 2023-08-24

## 2023-08-24 RX ORDER — BUMETANIDE 0.25 MG/ML
1 INJECTION INTRAMUSCULAR; INTRAVENOUS 2 TIMES DAILY
Status: DISCONTINUED | OUTPATIENT
Start: 2023-08-24 | End: 2023-08-26

## 2023-08-24 RX ORDER — POTASSIUM CHLORIDE 7.45 MG/ML
10 INJECTION INTRAVENOUS PRN
Status: DISCONTINUED | OUTPATIENT
Start: 2023-08-24 | End: 2023-09-01 | Stop reason: HOSPADM

## 2023-08-24 RX ORDER — POTASSIUM CHLORIDE 20 MEQ/1
40 TABLET, EXTENDED RELEASE ORAL PRN
Status: DISCONTINUED | OUTPATIENT
Start: 2023-08-24 | End: 2023-09-01 | Stop reason: HOSPADM

## 2023-08-24 RX ADMIN — BACLOFEN 10 MG: 10 TABLET ORAL at 20:53

## 2023-08-24 RX ADMIN — MEROPENEM 1000 MG: 1 INJECTION, POWDER, FOR SOLUTION INTRAVENOUS at 16:24

## 2023-08-24 RX ADMIN — ENOXAPARIN SODIUM 40 MG: 100 INJECTION SUBCUTANEOUS at 08:18

## 2023-08-24 RX ADMIN — SODIUM CHLORIDE, POTASSIUM CHLORIDE, SODIUM LACTATE AND CALCIUM CHLORIDE: 600; 310; 30; 20 INJECTION, SOLUTION INTRAVENOUS at 01:09

## 2023-08-24 RX ADMIN — POTASSIUM BICARBONATE 40 MEQ: 391 TABLET, EFFERVESCENT ORAL at 06:14

## 2023-08-24 RX ADMIN — Medication 0.4 MCG/KG/HR: at 02:22

## 2023-08-24 RX ADMIN — OXCARBAZEPINE 300 MG: 300 TABLET, FILM COATED ORAL at 20:53

## 2023-08-24 RX ADMIN — BACLOFEN 10 MG: 10 TABLET ORAL at 08:18

## 2023-08-24 RX ADMIN — OXCARBAZEPINE 300 MG: 300 TABLET, FILM COATED ORAL at 08:18

## 2023-08-24 RX ADMIN — ACETAMINOPHEN 650 MG: 650 SUPPOSITORY RECTAL at 14:12

## 2023-08-24 RX ADMIN — BUMETANIDE 1 MG: 0.25 INJECTION INTRAMUSCULAR; INTRAVENOUS at 17:03

## 2023-08-24 RX ADMIN — BUMETANIDE 1 MG: 0.25 INJECTION INTRAMUSCULAR; INTRAVENOUS at 10:00

## 2023-08-24 RX ADMIN — MORPHINE SULFATE 2 MG: 2 INJECTION, SOLUTION INTRAMUSCULAR; INTRAVENOUS at 17:01

## 2023-08-24 RX ADMIN — MEROPENEM 1000 MG: 1 INJECTION, POWDER, FOR SOLUTION INTRAVENOUS at 01:11

## 2023-08-24 RX ADMIN — SODIUM CHLORIDE, PRESERVATIVE FREE 10 ML: 5 INJECTION INTRAVENOUS at 08:20

## 2023-08-24 RX ADMIN — SODIUM CHLORIDE, PRESERVATIVE FREE 10 ML: 5 INJECTION INTRAVENOUS at 20:52

## 2023-08-24 RX ADMIN — GABAPENTIN 600 MG: 600 TABLET, FILM COATED ORAL at 12:39

## 2023-08-24 RX ADMIN — BUMETANIDE 1 MG: 0.25 INJECTION INTRAMUSCULAR; INTRAVENOUS at 20:53

## 2023-08-24 RX ADMIN — SODIUM CHLORIDE, POTASSIUM CHLORIDE, SODIUM LACTATE AND CALCIUM CHLORIDE: 600; 310; 30; 20 INJECTION, SOLUTION INTRAVENOUS at 16:25

## 2023-08-24 RX ADMIN — FAMOTIDINE 20 MG: 10 INJECTION INTRAVENOUS at 08:18

## 2023-08-24 RX ADMIN — FAMOTIDINE 20 MG: 10 INJECTION INTRAVENOUS at 20:52

## 2023-08-24 RX ADMIN — GABAPENTIN 600 MG: 600 TABLET, FILM COATED ORAL at 08:18

## 2023-08-24 RX ADMIN — GABAPENTIN 600 MG: 600 TABLET, FILM COATED ORAL at 20:53

## 2023-08-24 RX ADMIN — MEROPENEM 1000 MG: 1 INJECTION, POWDER, FOR SOLUTION INTRAVENOUS at 08:20

## 2023-08-24 RX ADMIN — BACLOFEN 10 MG: 10 TABLET ORAL at 12:39

## 2023-08-24 ASSESSMENT — PULMONARY FUNCTION TESTS
PIF_VALUE: 19
PIF_VALUE: 17
PIF_VALUE: 19

## 2023-08-24 ASSESSMENT — PAIN SCALES - GENERAL: PAINLEVEL_OUTOF10: 0

## 2023-08-24 NOTE — PLAN OF CARE
Problem: Discharge Planning  Goal: Discharge to home or other facility with appropriate resources  Outcome: Progressing     Problem: Pain  Goal: Verbalizes/displays adequate comfort level or baseline comfort level  Outcome: Progressing  Note: Continuing to monitor pain and discomfort. Monitoring pain level on scale of 0-10. Non- pharmacological measures encouraged to reduce discomfort/pain. PRN pain meds administeration continues when/if applicable as ordered by physician. Problem: Safety - Adult  Goal: Free from fall injury  Outcome: Progressing  Note: Falling star program remains in place. Call light and personal belongings within reach. Frequent visual monitoring continues. Toileting program inplace. Patient assisted in turning/repositioning at least once every 2 hours, and on a prn basis. Problem: Nutrition Deficit:  Goal: Optimize nutritional status  Outcome: Progressing     Problem: Skin/Tissue Integrity  Goal: Absence of new skin breakdown  Description: 1. Monitor for areas of redness and/or skin breakdown  2. Assess vascular access sites hourly  3. Every 4-6 hours minimum:  Change oxygen saturation probe site  4. Every 4-6 hours:  If on nasal continuous positive airway pressure, respiratory therapy assess nares and determine need for appliance change or resting period. Outcome: Progressing  Note: Monitoring patient skin integrity for skin breakdown, turning and repositioning q2h per protocol.       Problem: Respiratory - Adult  Goal: Achieves optimal ventilation and oxygenation  8/24/2023 0340 by Mahsa Davalos RN  Outcome: Progressing

## 2023-08-24 NOTE — PROGRESS NOTES
[address] [facility] [address] ICU 4D  Clinical Swallow Evaluation      SLP Individual Minutes  Time In: 9  Time Out: 1783  Minutes: 10  Timed Code Treatment Minutes: 0 Minutes       Date: 2023  Patient Name: Tara Gaytan      CSN: [id number]   : 1955  (79 y.o.)  Gender: male   Referring Physician:  Veronika Dumont DO    Diagnosis: Septic shock [facility])    History of Present Illness/Injury: Patient presents to [facility] with above dx. Per physician H+P:\"Greyson Woods is a 79 y.o. male with past medical history of chronic UTI secondary to neurogenic bladder with chronic jeffers, multiple sclerosis, GERD, paraparesis of bilateral lower extremities, HTN, , and hx of DVT/PE who presented to [facility] from nursing home for further evaluation of fever and worsening confusion. Reports that patient was found to be febrile at 102.2 and tachycardia upon ED arrival. ED work up shows that patient sepsis secondary to UTI. Patient was initially normotensive, then became hypotensive and somnolent but arousable to voice. Patient received IVF bolus and empiric antibiotics: Zosyn and Vancomycin. Patient got admitted to ICU for further evaluation, hemodynamic close monitoring, and management care. On ICU, arrival, patient is awake, alert, oriented, follow commands, and levophed continuously infusing at 20 mcg. Elevated troponin 200 noted but patient denied chest pain and any other pain. Arterial line and central line placement emergently placed, and pressor able to wean off. SVO2 check for suspected cardiogenic shock, it was 80%. Patient appears improving at this time. \"  Complex medical course includin/21 CXR: Left midlung atelectasis/infiltrate   CVC RIJ   Intubated   CTA Chest: No evidence of pulmonary emboli; Abnormal density throughout both lung fields which could be secondary to ARDS, bilateral pneumonia or congestive heart failure; Mild cardiomegaly;  Thoracic 8LPM    Behavioral Observation:  Alert and Oriented    CRANIAL NERVE ASSESSMENT   CN V (Trigeminal) Closes and Opens Mandible WFL    Rotary Jaw Movement WFL      CN VII (Facial) Cheeks Hold Food out of Sulci WFL    Opens, Closes/Seals, Protrudes, Retracts Lips WFL    General Appearance WFL    Sensation WFL      CN X (Vagus - Pharyngeal) Raises Back of Tongue WFL      CN XI (Accessory) Lifts Soft Palate WFL      CN XII (Hypoglossal) Elevates Tongue Up and Back WFL     Protrusion   Consistent lingual thrust when presented with utensil    Lateralizes Tongue WFL    Sensation WFL      Other Observations Dentition edentulous    Vocal Quality hoarse    Cough productive     Patient Evaluated Using: Ice Chips, Thin Liquids, Puree, and Coarse Solids    Oral Phase:  Impaired:  Impaired Mastication    Pharyngeal Phase: WFL:  Pharyngeal phase appears WFL but cannot rule out pharyngeal phase deficits from a bedside swallowing evaluation alone. Signs and Symptoms of Laryngeal Penetration/Aspiration: No signs/symptoms of aspiration evident in this evaluation, but cannot rule out silent aspiration. Aspiration Pneumonia Predictors:  Limited Mobility, Chronic Medical Issues/Pertinent Co-Morbidities, and Compromised Pulmonary Status    Impressions: Patient presents with mod-I oral dysphagia with inability to fully discern potential presence of pharyngeal phase deficits without formal instrumentation. Decreased mastication of solids s/t edentulous state; however, patient reports not utilizing dentures at baseline. ?patient's stamina for safe return to baseline of regular diet d/t signs of fatigue (I.e., sweat on brow, head back on pillow). No overt s/s of aspiration or pulmonary distress at time of assessment; however, certainly cannot fully assess pharyngeal deficits without supportive imaging which is not clinically indicated.  At this time recommend patient consume minced and moist diet + thin liquids with close pulmonary

## 2023-08-24 NOTE — PROGRESS NOTES
Patient has been successfully weaned from Mechanical Ventilation. Patient extubated and placed on 6 liters/min via nasal cannula. Post extubation SpO2 is 92% with HR  92 bpm and RR 24 breaths/min. Patient had mild cough that was non-productive. Extubation Well tolerated by patient. Ailyn Guthrie

## 2023-08-24 NOTE — PLAN OF CARE
Problem: Discharge Planning  Goal: Discharge to home or other facility with appropriate resources  8/24/2023 0942 by Kim Garcia RN  Outcome: Progressing  Flowsheets (Taken 8/24/2023 0800)  Discharge to home or other facility with appropriate resources:   Identify discharge learning needs (meds, wound care, etc)   Arrange for needed discharge resources and transportation as appropriate   Identify barriers to discharge with patient and caregiver     Problem: Pain  Goal: Verbalizes/displays adequate comfort level or baseline comfort level  8/24/2023 0942 by Kim Garcia RN  Outcome: Progressing  Flowsheets (Taken 8/24/2023 0800)  Verbalizes/displays adequate comfort level or baseline comfort level:   Encourage patient to monitor pain and request assistance   Assess pain using appropriate pain scale   Administer analgesics based on type and severity of pain and evaluate response   Implement non-pharmacological measures as appropriate and evaluate response     Problem: Safety - Adult  Goal: Free from fall injury  8/24/2023 0942 by Kim Garcia RN  Outcome: Progressing     Problem: Nutrition Deficit:  Goal: Optimize nutritional status  8/24/2023 0942 by Kim Garcia RN  Outcome: Progressing     Problem: Skin/Tissue Integrity  Goal: Absence of new skin breakdown  Description: 1. Monitor for areas of redness and/or skin breakdown  2. Assess vascular access sites hourly  3. Every 4-6 hours minimum:  Change oxygen saturation probe site  4. Every 4-6 hours:  If on nasal continuous positive airway pressure, respiratory therapy assess nares and determine need for appliance change or resting period.   8/24/2023 6182 by Kim Garcia RN  Outcome: Progressing     Problem: Respiratory - Adult  Goal: Achieves optimal ventilation and oxygenation  8/24/2023 0942 by Kim Garcia RN  Outcome: Progressing  Flowsheets (Taken 8/24/2023 0800)  Achieves optimal ventilation and oxygenation:   Assess for changes in mentation

## 2023-08-24 NOTE — SIGNIFICANT EVENT
vasopressin (Septic Shock) infusion Stopped (08/23/23 0448)    dexmedetomidine 0.4 mcg/kg/hr (08/24/23 3015)    propofol 5 mcg/kg/min (08/22/23 1217)    norepinephrine Stopped (08/24/23 0651)    sodium chloride      lactated ringers IV soln 45 mL/hr at 08/24/23 1237    dextrose        bumetanide  1 mg IntraVENous BID    baclofen  10 mg Oral TID    gabapentin  600 mg Oral 4x daily    OXcarbazepine  300 mg Oral BID    sodium chloride flush  5-40 mL IntraVENous 2 times per day    enoxaparin  40 mg SubCUTAneous Daily    famotidine (PEPCID) injection  20 mg IntraVENous BID    meropenem  1,000 mg IntraVENous Q8H    Patient seen by me including key components of medical care. Case discussed with resident physician. Patient doing well with SBT. Will proceed with extubation. If distress develops, trial of bipap. RASS 0 to -1. Stop propofol. .  Italicized font, if present,  represents changes to the note made by me. CC time 35 minutes. Time was discontiguous. Time does not include procedure. Time does include my direct assessment of the patient and coordination of care. Time represents more than 50% of the time involved with patient care by the [address] [place] [address] team.  Electronically signed by Johana Nunez.  Soco De Dios MD.

## 2023-08-24 NOTE — PROGRESS NOTES
Pt has been having bloody urine output from suprapubic jeffers since day shift. Pt still have bloody UOA with some clots. Melltierra Gutierrez, NP updated, No new orders at this time.

## 2023-08-24 NOTE — PLAN OF CARE
Problem: Respiratory - Adult  Goal: Achieves optimal ventilation and oxygenation  8/24/2023 0503 by Nguyễn Rodríguez RCP  Outcome: Progressing                                                Patient Weaning Progress    The patient's vent settings was able to be weaned this shift. Ventilator settings that were weaned              [x] Mode   [] Pressure support weaned   [x] Fio2 weaned   [] Peep weaned      Spontaneous weaning trial  was attempted. due to defined parameters for SBT (spontaneous breathing trial) not being met. The patient was able to tolerate SBT. RSBI  was 41 with EtCO2 of 30 and SpO2 of 94 on 50% FiO2. Spontanteous VT was 425 and RR 16 breaths/min. T    Evac tube was  hooked up with continuous low suction(20-30mmHg)      Cuff was  deflated to determine cuff leak. A leak  was not detected. Patient mutually agreed on goals.

## 2023-08-24 NOTE — CARE COORDINATION
8/24/23, 2:02 PM EDT    DISCHARGE ON GOING EVALUATION    [address] [facility] [address] [address] day: 3  Location: 4D-07/007-A Reason for admit: Somnolence [R40.0]  Elevated troponin [R77.8]  Septic shock (HCC) [A41.9, R65.21]  Urinary tract infection with hematuria, site unspecified [N39.0, R31.9]  Pneumonia of left lung due to infectious organism, unspecified part of lung [J18.9]   Procedure:   8/21 CXR: Left midlung atelectasis/infiltrate  8/21 CVC RIJ  8/22 Intubated  8/22 CTA Chest: No evidence of pulmonary emboli; Abnormal density throughout both lung fields which could be secondary to ARDS, bilateral pneumonia or congestive heart failure; Mild cardiomegaly; Thoracic spondylosis. .   8/22 CT Abd/pelvis: Left-sided hydronephrosis and hydroureter secondary to a 5.6 mm stone in the left mid ureter. 1.9 x 1.9 cm cystic lesion in the upper pole of left kidney with associated calcification; Punctate stones in the lower pole of the right kidney. An 0.3 cm cyst arising from the upper pole of the right kidney; Status post cholecystectomy; Atrophic pancreas; Pompa catheter within the bladder; Enteric tube in place; Atherosclerotic calcification in the abdominal aorta and iliac arteries; Lumbar spondylosis. Calcification along the anterior longitudinal ligament which could be secondary to ankylosing spondylitis  8/22 EEG 20 min: suggests moderate non specific encephalopathy; presence of left parietal sharp increases patient risk for focal seizures; presence of triphasic wave discharges suggest underlying metabolic derangement  3/28 Echo with EF 40%; Hypokinetic motion of the mid anteroseptal wall noted in the left ventricle  8/22 Suprapubic catheter exchanged  8/22 CYSTOSCOPY LEFT URETERAL STENT INSERTION  8/24 Extubated    Barriers to Discharge: POD #2. Extubated today to bipap, now on nasal cannula O2. Started on IV bumex. Sats 91% on 6L O2. Tmax 100. NSR. Ox3, not time. Follows commands with BUEs. SLP.  Hx of BLE paraplegia. Wound Care consulted for sacral decub ulcer. Dietitian consulted. +MRSA nares. Blood cultures +ESBL EColi. Urine + mixed growth. Telemetry, CVC, ostomy w/stoma pink/moist, SPC. Precedex @ 0.4 mcg/kg/hr, IVF, prn tylenol, baclofen, IV bumex 1 mg bid, lovenox, IV pepcid, neurontin, IV merrem, prn IV morphine, trileptal, Electrolyte replacement protocols. Received 1 mg IV bumex x1 today. K+ 3.4, pro-bnp 3264, wbc 11.3, hgb 12.1, plt 107. PCP: Taylor Daniels MD  Readmission Risk Score: 19.2%  Patient Goals/Plan/Treatment Preferences: Return to [facility]. No precert required. SW on case.

## 2023-08-25 ENCOUNTER — APPOINTMENT (OUTPATIENT)
Dept: GENERAL RADIOLOGY | Age: 68
DRG: 659 | End: 2023-08-25
Payer: MEDICARE

## 2023-08-25 LAB
ANION GAP SERPL CALC-SCNC: 9 MEQ/L (ref 8–16)
BASOPHILS ABSOLUTE: 0 THOU/MM3 (ref 0–0.1)
BASOPHILS NFR BLD AUTO: 0.5 %
BUN SERPL-MCNC: 18 MG/DL (ref 7–22)
CALCIUM SERPL-MCNC: 8.2 MG/DL (ref 8.5–10.5)
CHLORIDE SERPL-SCNC: 103 MEQ/L (ref 98–111)
CO2 SERPL-SCNC: 35 MEQ/L (ref 23–33)
CREAT SERPL-MCNC: < 0.2 MG/DL (ref 0.4–1.2)
DEPRECATED RDW RBC AUTO: 54.6 FL (ref 35–45)
EOSINOPHIL NFR BLD AUTO: 3 %
EOSINOPHILS ABSOLUTE: 0.2 THOU/MM3 (ref 0–0.4)
ERYTHROCYTE [DISTWIDTH] IN BLOOD BY AUTOMATED COUNT: 15.4 % (ref 11.5–14.5)
GFR SERPL CREATININE-BSD FRML MDRD: > 60 ML/MIN/1.73M2
GLUCOSE BLD STRIP.AUTO-MCNC: 161 MG/DL (ref 70–108)
GLUCOSE SERPL-MCNC: 86 MG/DL (ref 70–108)
HCT VFR BLD AUTO: 36.7 % (ref 42–52)
HGB BLD-MCNC: 11.6 GM/DL (ref 14–18)
IMM GRANULOCYTES # BLD AUTO: 0.01 THOU/MM3 (ref 0–0.07)
IMM GRANULOCYTES NFR BLD AUTO: 0.2 %
LYMPHOCYTES ABSOLUTE: 1.2 THOU/MM3 (ref 1–4.8)
LYMPHOCYTES NFR BLD AUTO: 20.2 %
MCH RBC QN AUTO: 30.4 PG (ref 26–33)
MCHC RBC AUTO-ENTMCNC: 31.6 GM/DL (ref 32.2–35.5)
MCV RBC AUTO: 96.1 FL (ref 80–94)
MONOCYTES ABSOLUTE: 0.6 THOU/MM3 (ref 0.4–1.3)
MONOCYTES NFR BLD AUTO: 9.8 %
NEUTROPHILS NFR BLD AUTO: 66.3 %
NRBC BLD AUTO-RTO: 0 /100 WBC
NT-PROBNP SERPL IA-MCNC: 3689 PG/ML (ref 0–124)
PLATELET # BLD AUTO: 96 THOU/MM3 (ref 130–400)
PMV BLD AUTO: 10.1 FL (ref 9.4–12.4)
POTASSIUM SERPL-SCNC: 2.9 MEQ/L (ref 3.5–5.2)
POTASSIUM SERPL-SCNC: 3.5 MEQ/L (ref 3.5–5.2)
RBC # BLD AUTO: 3.82 MILL/MM3 (ref 4.7–6.1)
SEGMENTED NEUTROPHILS ABSOLUTE COUNT: 3.9 THOU/MM3 (ref 1.8–7.7)
SODIUM SERPL-SCNC: 147 MEQ/L (ref 135–145)
WBC # BLD AUTO: 5.9 THOU/MM3 (ref 4.8–10.8)

## 2023-08-25 PROCEDURE — 2580000003 HC RX 258: Performed by: NURSE PRACTITIONER

## 2023-08-25 PROCEDURE — 36415 COLL VENOUS BLD VENIPUNCTURE: CPT

## 2023-08-25 PROCEDURE — 36592 COLLECT BLOOD FROM PICC: CPT

## 2023-08-25 PROCEDURE — 83880 ASSAY OF NATRIURETIC PEPTIDE: CPT

## 2023-08-25 PROCEDURE — 74230 X-RAY XM SWLNG FUNCJ C+: CPT

## 2023-08-25 PROCEDURE — 82948 REAGENT STRIP/BLOOD GLUCOSE: CPT

## 2023-08-25 PROCEDURE — 92611 MOTION FLUOROSCOPY/SWALLOW: CPT

## 2023-08-25 PROCEDURE — 6360000002 HC RX W HCPCS: Performed by: NURSE PRACTITIONER

## 2023-08-25 PROCEDURE — 1200000003 HC TELEMETRY R&B

## 2023-08-25 PROCEDURE — 71045 X-RAY EXAM CHEST 1 VIEW: CPT

## 2023-08-25 PROCEDURE — 6360000002 HC RX W HCPCS: Performed by: INTERNAL MEDICINE

## 2023-08-25 PROCEDURE — 2500000003 HC RX 250 WO HCPCS: Performed by: STUDENT IN AN ORGANIZED HEALTH CARE EDUCATION/TRAINING PROGRAM

## 2023-08-25 PROCEDURE — 94660 CPAP INITIATION&MGMT: CPT

## 2023-08-25 PROCEDURE — 94761 N-INVAS EAR/PLS OXIMETRY MLT: CPT

## 2023-08-25 PROCEDURE — 2700000000 HC OXYGEN THERAPY PER DAY

## 2023-08-25 PROCEDURE — 2500000003 HC RX 250 WO HCPCS: Performed by: INTERNAL MEDICINE

## 2023-08-25 PROCEDURE — A4216 STERILE WATER/SALINE, 10 ML: HCPCS | Performed by: NURSE PRACTITIONER

## 2023-08-25 PROCEDURE — 80048 BASIC METABOLIC PNL TOTAL CA: CPT

## 2023-08-25 PROCEDURE — 6370000000 HC RX 637 (ALT 250 FOR IP): Performed by: NURSE PRACTITIONER

## 2023-08-25 PROCEDURE — 85025 COMPLETE CBC W/AUTO DIFF WBC: CPT

## 2023-08-25 PROCEDURE — 2580000003 HC RX 258: Performed by: INTERNAL MEDICINE

## 2023-08-25 PROCEDURE — 99291 CRITICAL CARE FIRST HOUR: CPT | Performed by: INTERNAL MEDICINE

## 2023-08-25 PROCEDURE — 6370000000 HC RX 637 (ALT 250 FOR IP): Performed by: INTERNAL MEDICINE

## 2023-08-25 PROCEDURE — 6360000002 HC RX W HCPCS: Performed by: STUDENT IN AN ORGANIZED HEALTH CARE EDUCATION/TRAINING PROGRAM

## 2023-08-25 PROCEDURE — 2500000003 HC RX 250 WO HCPCS: Performed by: NURSE PRACTITIONER

## 2023-08-25 PROCEDURE — 1200000000 HC SEMI PRIVATE

## 2023-08-25 PROCEDURE — 84132 ASSAY OF SERUM POTASSIUM: CPT

## 2023-08-25 PROCEDURE — 92526 ORAL FUNCTION THERAPY: CPT

## 2023-08-25 RX ORDER — SPIRONOLACTONE 25 MG/1
25 TABLET ORAL DAILY
Status: DISCONTINUED | OUTPATIENT
Start: 2023-08-25 | End: 2023-09-01 | Stop reason: HOSPADM

## 2023-08-25 RX ORDER — POTASSIUM CHLORIDE 29.8 MG/ML
20 INJECTION INTRAVENOUS PRN
Status: DISCONTINUED | OUTPATIENT
Start: 2023-08-25 | End: 2023-09-01 | Stop reason: HOSPADM

## 2023-08-25 RX ORDER — LISINOPRIL 2.5 MG/1
2.5 TABLET ORAL DAILY
Status: DISCONTINUED | OUTPATIENT
Start: 2023-08-25 | End: 2023-09-01 | Stop reason: HOSPADM

## 2023-08-25 RX ADMIN — MEROPENEM 1000 MG: 1 INJECTION, POWDER, FOR SOLUTION INTRAVENOUS at 08:32

## 2023-08-25 RX ADMIN — SODIUM CHLORIDE, PRESERVATIVE FREE 10 ML: 5 INJECTION INTRAVENOUS at 23:04

## 2023-08-25 RX ADMIN — OXCARBAZEPINE 300 MG: 300 TABLET, FILM COATED ORAL at 23:10

## 2023-08-25 RX ADMIN — BACLOFEN 10 MG: 10 TABLET ORAL at 12:59

## 2023-08-25 RX ADMIN — LISINOPRIL 2.5 MG: 2.5 TABLET ORAL at 08:21

## 2023-08-25 RX ADMIN — MEROPENEM 1000 MG: 1 INJECTION, POWDER, FOR SOLUTION INTRAVENOUS at 02:03

## 2023-08-25 RX ADMIN — GABAPENTIN 600 MG: 600 TABLET, FILM COATED ORAL at 23:10

## 2023-08-25 RX ADMIN — GABAPENTIN 600 MG: 600 TABLET, FILM COATED ORAL at 16:35

## 2023-08-25 RX ADMIN — POTASSIUM CHLORIDE 20 MEQ: 29.8 INJECTION, SOLUTION INTRAVENOUS at 16:34

## 2023-08-25 RX ADMIN — BUMETANIDE 1 MG: 0.25 INJECTION INTRAMUSCULAR; INTRAVENOUS at 08:22

## 2023-08-25 RX ADMIN — SODIUM CHLORIDE, PRESERVATIVE FREE 10 ML: 5 INJECTION INTRAVENOUS at 08:23

## 2023-08-25 RX ADMIN — BARIUM SULFATE 100 ML: 0.81 POWDER, FOR SUSPENSION ORAL at 09:32

## 2023-08-25 RX ADMIN — ENOXAPARIN SODIUM 40 MG: 100 INJECTION SUBCUTANEOUS at 08:22

## 2023-08-25 RX ADMIN — BACLOFEN 10 MG: 10 TABLET ORAL at 08:22

## 2023-08-25 RX ADMIN — BARIUM SULFATE 50 ML: 400 SUSPENSION ORAL at 09:32

## 2023-08-25 RX ADMIN — FAMOTIDINE 20 MG: 10 INJECTION INTRAVENOUS at 08:22

## 2023-08-25 RX ADMIN — FAMOTIDINE 20 MG: 10 INJECTION INTRAVENOUS at 23:09

## 2023-08-25 RX ADMIN — POTASSIUM CHLORIDE 20 MEQ: 29.8 INJECTION, SOLUTION INTRAVENOUS at 14:43

## 2023-08-25 RX ADMIN — BARIUM SULFATE 30 ML: 400 PASTE ORAL at 09:32

## 2023-08-25 RX ADMIN — OXCARBAZEPINE 300 MG: 300 TABLET, FILM COATED ORAL at 08:22

## 2023-08-25 RX ADMIN — BUMETANIDE 1 MG: 0.25 INJECTION INTRAMUSCULAR; INTRAVENOUS at 23:09

## 2023-08-25 RX ADMIN — POTASSIUM CHLORIDE 20 MEQ: 29.8 INJECTION, SOLUTION INTRAVENOUS at 15:33

## 2023-08-25 RX ADMIN — BACLOFEN 10 MG: 10 TABLET ORAL at 23:10

## 2023-08-25 RX ADMIN — MEROPENEM 1000 MG: 1 INJECTION, POWDER, FOR SOLUTION INTRAVENOUS at 16:31

## 2023-08-25 RX ADMIN — GABAPENTIN 600 MG: 600 TABLET, FILM COATED ORAL at 12:59

## 2023-08-25 RX ADMIN — SPIRONOLACTONE 25 MG: 25 TABLET ORAL at 08:22

## 2023-08-25 RX ADMIN — GABAPENTIN 600 MG: 600 TABLET, FILM COATED ORAL at 08:22

## 2023-08-25 ASSESSMENT — PAIN SCALES - GENERAL
PAINLEVEL_OUTOF10: 0

## 2023-08-25 NOTE — PLAN OF CARE
Problem: Respiratory - Adult  Goal: Achieves optimal ventilation and oxygenation  8/24/2023 2021 by Suze Barth RCP  Outcome: Progressing  Flowsheets (Taken 8/24/2023 2021)  Achieves optimal ventilation and oxygenation:   Assess for changes in respiratory status   Position to facilitate oxygenation and minimize respiratory effort   Assess the need for suctioning and aspirate as needed   Respiratory therapy support as indicated   Assess for changes in mentation and behavior   Oxygen supplementation based on oxygen saturation or arterial blood gases   Encourage broncho-pulmonary hygiene including cough, deep breathe, incentive spirometry   Assess and instruct to report shortness of breath or any respiratory difficulty

## 2023-08-25 NOTE — PLAN OF CARE
75yo M w/significant PMHx of HTN, MS w/BLE paraplegia (follows with Dr. Dilip Horowitz), Neurogenic bladder w/chronic SP catheter (hx MDRO infections), Hx of RUE DVT due to PICC and left main pulm artery PE in 2017 (previously on xarelto per chart review 2017 - 2018 - stopped 6/2022 at [facility] for hematuria), H/o recurrent renal calculi multiple cystoscopies/ureteral stenting, H/o Dysphagia w/severe malnutrition / atrophy, H/o of hip OM, and h/o decubitus ulcers. Pt presented to [facility] ED from NH on 8/21/23 for worsening confusion / fever. Went to the ICU for Septic Shock / CHFe 2/2 ESBL E coli bacteremia on Merrem (blood culture x 2 on 8/21 + ESBL E coli). Was being managed for Acte pulmonary edema w/Acute Hypoxic RF. Noted to have new onset EF drop / CMP and elevated cardiac enzymes HsT [id number] w/pro-BNP [phone]). Thrombocytopenia noted 2/2 to significant sepsis/bacteremia w/shock. Pt stable for transfer form ICU to floor. Will c/w medical mgmt at this time and have Cardiology consulted for possible cardiac cath. Hospitalist to take over care.     Electronically signed by Corrinne Cost, MD on 8/25/23 at 2:48 PM EDT

## 2023-08-25 NOTE — PROGRESS NOTES
[address] [place] [address] Drive THERAPY  [facility] ICU 4D  DAILY NOTE    TIME   SLP Individual Minutes  Time In: 2381  Time Out: 0848  Minutes: 10  Timed Code Treatment Minutes: 0 Minutes       Date: 2023  Patient Name: Tamar Peabody      CSN: [id number]   : 1955  (79 y.o.)  Gender: male   Referring Physician:  Rl Mason DO    Diagnosis: Septic shock ([address])  Precautions: aspiration  Current Diet: Minced and moist and thin   Respiratory Status: Nasal Canula: 2LPM  Swallowing Strategies: Standard Universal Swallow Precautions  Date of Last MBS/FEES: Not Applicable    Pain:  No pain reported. Subjective:  Patient seen with RN Lisette's approval. RN reports night shift noted coughing with thin liquids. RN requesting ST re-assess patient swallow function to further determine POC. Patient upright in bed upon ST arrival. Pleasant and cooperative with ST assessment     Short-Term Goals:  SHORT TERM GOAL #1:  Goal 1: Patient will consume minced and moist diet (advanced trials as clinically indicated) with no overt s/s of aspiration or pulmonary decline to maintain adequate nutrition adn hydration measures  INTERVENTIONS:Conservative trials of ice chips and thin liquids completed on this date. Patient demonstrated functional oral acceptance and suspected functional manipulation. Delayed cough present x1 following thin liquid trial.   D/t demonstrated and reported s/s of aspiration and inability to fully assess pharyngeal phase of swallow at bedside, recommend completion of MBS to further develop POC. Long-Term Goals:   No LTGs established d/t short ELOS         EDUCATION:  Learner: Patient  Education:  Reviewed ST goals and Plan of Care and Reviewed recommendations for follow-up  Evaluation of Education: Guy Kan understanding    ASSESSMENT/PLAN:  Activity Tolerance:  Patient tolerance of  treatment: good.  Active participant     Assessment/Plan: Patient progressing toward established

## 2023-08-25 NOTE — PROGRESS NOTES
[address] [facility] ICU 4D  Modified Barium Swallow+ Treatment     SLP Individual Minutes  Time In: 2569  Time Out: 0920  Minutes: 17  Timed Code Treatment Minutes: 0 Minutes       Date: 2023  Patient Name: Sandra Rubio      CSN: [id number]   : 1955  (79 y.o.)  Gender: male   Referring Physician:  Karen Degroot MD  Diagnosis: Septic shock [facility])     History of Present Illness/Injury: Patient presents to [facility] with above dx. Per physician H+P:\"Larry Clarene Meckel is a 79 y.o. male with past medical history of chronic UTI secondary to neurogenic bladder with chronic jeffers, multiple sclerosis, GERD, paraparesis of bilateral lower extremities, HTN, , and hx of DVT/PE who presented to [facility] from nursing home for further evaluation of fever and worsening confusion. Reports that patient was found to be febrile at 102.2 and tachycardia upon ED arrival. ED work up shows that patient sepsis secondary to UTI. Patient was initially normotensive, then became hypotensive and somnolent but arousable to voice. Patient received IVF bolus and empiric antibiotics: Zosyn and Vancomycin. Patient got admitted to ICU for further evaluation, hemodynamic close monitoring, and management care. On ICU, arrival, patient is awake, alert, oriented, follow commands, and levophed continuously infusing at 20 mcg. Elevated troponin 200 noted but patient denied chest pain and any other pain. Arterial line and central line placement emergently placed, and pressor able to wean off. SVO2 check for suspected cardiogenic shock, it was 80%. Patient appears improving at this time. \"  Complex medical course includin/21 CXR: Left midlung atelectasis/infiltrate   CVC RIJ   Intubated   CTA Chest: No evidence of pulmonary emboli; Abnormal density throughout both lung fields which could be secondary to ARDS, bilateral pneumonia or congestive heart failure; Mild cardiomegaly;  Thoracic folds without cough with two or more consistencies, Aspiration with two consistencies with weak or no reflexive cough, Aspiration of one consistency, no cough and airway penetration with one consistency, no cough    EVIDENCE FOR LARYNGEAL PENETRATION AND/OR ASPIRATION:  Laryngeal penetration evident with mildly thick liquids  Silent aspiration evident with thin liquids via tsp, cup + soft solid     PENETRATION-ASPIRATION SCALE (PAS): Thin Liquids: 8 = Material enters the airway, passes below the vocal folds, and no effort is made to eject  Mildly Thick Liquids:  2 = Material enters the airway, remains above vocal folds, and is ejected from the airway  Puree:  1 = Material does not enter the airway  Soft Solid:  8 = Material enters the airway, passes below the vocal folds, and no effort is made to eject    ESOPHAGEAL PHASE:   No significant findings    ATTEMPTED TECHNIQUES:  Small Bolus Size Ineffective    Straw Effective    Cup Effective and Ineffective    Large Drinks Not Attempted    Consecutive Drinks Not Attempted    Chin Tuck No significant improvements noted    Head Turn Not Attempted    Spoon Presentations Ineffective    Volitional Cough Ineffective    Spontaneous Cough Not Attempted           DIAGNOSTIC IMPRESSIONS:  Patient presents with mild oral dysphagia and moderate pharyngeal dysphagia as evidenced by the skilled findings outlined above. Oral phase of swallow characterized by decreased mastication abilities st edentulous state and impaired bolus control with premature spillage to lateral channels. Pharyngeal phase of swallow demonstrated timely swallow onset with diminished TBR leading to residuals along BOT + within valleculae. Reduced hyrolaryngeal elevation + thyrohyoid approximation negatively impacting complete and timely epiglottic inversion + UES opening. Transient laryngeal penetration present with mildly thick liquids via cup.  SILENT, tracheal aspiration present with thin liquids via spoon

## 2023-08-25 NOTE — PROGRESS NOTES
Comprehensive Nutrition Assessment    Type and Reason for Visit:  Reassess (ONS initiation)    Nutrition Recommendations/Plan:   Diet as per SLP recommendations  Begin ONS BID   TF discontinued  Monitoring for further nutrition interventions     Malnutrition Assessment:  Malnutrition Status: At risk for malnutrition (Comment) (08/25/23 8827)    Context:  Acute Illness     Findings of the 6 clinical characteristics of malnutrition:  Energy Intake:  Mild decrease in energy intake (Comment) (per pt. pta - some days not so hungry)  Weight Loss:  No significant weight loss     Body Fat Loss:  No significant body fat loss     Muscle Mass Loss:  No significant muscle mass loss    Fluid Accumulation:  No significant fluid accumulation     Strength:  Not Performed    Nutrition Assessment:     Pt. nutritionally compromised AEB need for modified textures s/p intubation 8/22 and extubation 8/24. At risk for further nutrition compromise r/t admit with septic shock, UTI, respiratory failure, increased nutrient needs to support wound healing, LOS day 4 and underlying medical condition (paraplegia, multiple sclerosis, dysphagia, HTN). Nutrition Related Findings:    Pt. Report/Treatments/Miscellaneous: pt. Seen this am; mouth appears dry; on pureed textures per SLP MBS 8/25; discussed ONS options; UO 5075ml; pt. Reports he ate eggs, peaches and juice at breakfast; TF was discontinued with extubation- tolerated Pivot at 45ml/hour  GI Status:  colostomy output 150ml  Pertinent Labs: glucose 86  BUN 18  creatinine 0.2  K+ 2.9  Na 147  Pertinent Meds: bumex,  ATB    Wound Type:  (sacrum stage I, buttock stage II, scrotum stage II, buttocks stage III)       Current Nutrition Intake & Therapies:    Average Meal Intake: 26-50%  Average Supplements Intake:  (starting 8/25)  ADULT DIET;  Dysphagia - Pureed; Mildly Thick (Nectar)  ADULT ORAL NUTRITION SUPPLEMENT; Lunch, Dinner; Frozen Oral Supplement    Anthropometric Measures:  Height: 5' 7\" (170.2 cm)  Ideal Body Weight (IBW): 148 lbs (67 kg)    Admission Body Weight: 195 lb 5.2 oz (88.6 kg) (8/22; no edema)  Current Body Weight: 198 lb (89.8 kg) (8/25 with no edema), 132 %Weight Source: Bed Scale  Current BMI (kg/m2): 31  Usual Body Weight:  (per EMR: 12/29/22 196# 3oz, 8/9/22 198# 10oz)  Weight Adjustment For: Paraplegia  Total Adjusted Percentage (Calculated): 7.5  Adjusted Ideal Body Weight (lbs) (Calculated): 136.9 lbs  Adjusted Ideal Body Weight (kg) (Calculated): 62.23 kg   BMI Categories: Obese Class 1 (BMI 30.0-34. 9)    Estimated Daily Nutrient Needs:  Energy Requirements Based On: Kcal/kg  Weight Used for Energy Requirements:  (89kgm on 8/22)  Energy (kcal/day): 0639-2504 kcals (17-20)  Weight Used for Protein Requirements: Ideal (67kgm)  Protein (g/day):  grams (1.2-2)  Method Used for Fluid Requirements: Other (Comment)  Fluid (ml/day): as per Physician    Nutrition Diagnosis:   Swallowing difficulty related to cognitive or neurological impairment as evidenced by swallow study results    Nutrition Interventions:   Food and/or Nutrient Delivery: Start Oral Diet, Start Oral Nutrition Supplement, Discontinue Tube Feeding  Nutrition Education/Counseling: Education initiated (8/25 spoke with pt. re: ONS options and texture modifications)  Coordination of Nutrition Care: Continue to monitor while inpatient, Interdisciplinary Rounds       Goals:     Goals: PO intake 75% or greater, by next RD assessment       Nutrition Monitoring and Evaluation:      Food/Nutrient Intake Outcomes: Diet Advancement/Tolerance, Food and Nutrient Intake, Supplement Intake  Physical Signs/Symptoms Outcomes: Biochemical Data, Chewing or Swallowing, GI Status, Fluid Status or Edema, Hemodynamic Status, Nutrition Focused Physical Findings, Skin, Weight    Discharge Planning:     Too soon to determine     Jimmy Agrawal RD, LD  Contact: [phone]

## 2023-08-25 NOTE — CARE COORDINATION
8/25/23, 3:57 PM EDT    DISCHARGE ON GOING EVALUATION    [address] [facility] day: 4  Location: -07/007-A Reason for admit: Somnolence [R40.0]  Elevated troponin [R77.8]  Septic shock (HCC) [A41.9, R65.21]  Urinary tract infection with hematuria, site unspecified [N39.0, R31.9]  Pneumonia of left lung due to infectious organism, unspecified part of lung [J18.9]   Procedure:   8/21 CXR: Left midlung atelectasis/infiltrate  8/21 CVC RIJ  8/22 Intubated  8/22 CTA Chest: No evidence of pulmonary emboli; Abnormal density throughout both lung fields which could be secondary to ARDS, bilateral pneumonia or congestive heart failure; Mild cardiomegaly; Thoracic spondylosis. .   8/22 CT Abd/pelvis: Left-sided hydronephrosis and hydroureter secondary to a 5.6 mm stone in the left mid ureter. 1.9 x 1.9 cm cystic lesion in the upper pole of left kidney with associated calcification; Punctate stones in the lower pole of the right kidney. An 0.3 cm cyst arising from the upper pole of the right kidney; Status post cholecystectomy; Atrophic pancreas; Pompa catheter within the bladder; Enteric tube in place; Atherosclerotic calcification in the abdominal aorta and iliac arteries; Lumbar spondylosis. Calcification along the anterior longitudinal ligament which could be secondary to ankylosing spondylitis  8/22 EEG 20 min: suggests moderate non specific encephalopathy; presence of left parietal sharp increases patient risk for focal seizures; presence of triphasic wave discharges suggest underlying metabolic derangement  4/56 Echo with EF 40%;   Hypokinetic motion of the mid anteroseptal wall noted in the left ventricle  8/22 Suprapubic catheter exchanged  8/22 CYSTOSCOPY LEFT URETERAL STENT INSERTION  8/24 Extubated  8/25 MBS: Laryngeal penetration of thin, mildly thick and soft solid barium with aspiration of thin and soft solid barium  8/25 CXR: Improving cardiopulmonary findings status post extubation    Barriers to

## 2023-08-26 ENCOUNTER — APPOINTMENT (OUTPATIENT)
Dept: GENERAL RADIOLOGY | Age: 68
DRG: 659 | End: 2023-08-26
Payer: MEDICARE

## 2023-08-26 LAB
ALBUMIN SERPL BCG-MCNC: 2.6 G/DL (ref 3.5–5.1)
ALP SERPL-CCNC: 153 U/L (ref 38–126)
ALT SERPL W/O P-5'-P-CCNC: 20 U/L (ref 11–66)
ANION GAP SERPL CALC-SCNC: 9 MEQ/L (ref 8–16)
AST SERPL-CCNC: 20 U/L (ref 5–40)
BASOPHILS ABSOLUTE: 0 THOU/MM3 (ref 0–0.1)
BASOPHILS NFR BLD AUTO: 0.5 %
BILIRUB CONJ SERPL-MCNC: < 0.2 MG/DL (ref 0–0.3)
BILIRUB SERPL-MCNC: 0.6 MG/DL (ref 0.3–1.2)
BUN SERPL-MCNC: 15 MG/DL (ref 7–22)
CALCIUM SERPL-MCNC: 8.4 MG/DL (ref 8.5–10.5)
CHLORIDE SERPL-SCNC: 103 MEQ/L (ref 98–111)
CO2 SERPL-SCNC: 30 MEQ/L (ref 23–33)
CREAT SERPL-MCNC: < 0.2 MG/DL (ref 0.4–1.2)
DEPRECATED RDW RBC AUTO: 53.7 FL (ref 35–45)
EKG ATRIAL RATE: 103 BPM
EKG ATRIAL RATE: 104 BPM
EKG ATRIAL RATE: 77 BPM
EKG ATRIAL RATE: 90 BPM
EKG P AXIS: 38 DEGREES
EKG P AXIS: 40 DEGREES
EKG P AXIS: 46 DEGREES
EKG P AXIS: 46 DEGREES
EKG P-R INTERVAL: 138 MS
EKG P-R INTERVAL: 140 MS
EKG P-R INTERVAL: 146 MS
EKG P-R INTERVAL: 150 MS
EKG Q-T INTERVAL: 328 MS
EKG Q-T INTERVAL: 334 MS
EKG Q-T INTERVAL: 382 MS
EKG Q-T INTERVAL: 406 MS
EKG QRS DURATION: 76 MS
EKG QRS DURATION: 80 MS
EKG QRS DURATION: 84 MS
EKG QRS DURATION: 86 MS
EKG QTC CALCULATION (BAZETT): 431 MS
EKG QTC CALCULATION (BAZETT): 437 MS
EKG QTC CALCULATION (BAZETT): 459 MS
EKG QTC CALCULATION (BAZETT): 467 MS
EKG R AXIS: 23 DEGREES
EKG R AXIS: 23 DEGREES
EKG R AXIS: 37 DEGREES
EKG R AXIS: 38 DEGREES
EKG T AXIS: 53 DEGREES
EKG T AXIS: 56 DEGREES
EKG T AXIS: 57 DEGREES
EKG T AXIS: 71 DEGREES
EKG VENTRICULAR RATE: 103 BPM
EKG VENTRICULAR RATE: 104 BPM
EKG VENTRICULAR RATE: 77 BPM
EKG VENTRICULAR RATE: 90 BPM
EOSINOPHIL NFR BLD AUTO: 6.7 %
EOSINOPHILS ABSOLUTE: 0.4 THOU/MM3 (ref 0–0.4)
ERYTHROCYTE [DISTWIDTH] IN BLOOD BY AUTOMATED COUNT: 15 % (ref 11.5–14.5)
GFR SERPL CREATININE-BSD FRML MDRD: > 60 ML/MIN/1.73M2
GLUCOSE BLD STRIP.AUTO-MCNC: 102 MG/DL (ref 70–108)
GLUCOSE SERPL-MCNC: 95 MG/DL (ref 70–108)
HCT VFR BLD AUTO: 39.7 % (ref 42–52)
HGB BLD-MCNC: 12.7 GM/DL (ref 14–18)
IMM GRANULOCYTES # BLD AUTO: 0.02 THOU/MM3 (ref 0–0.07)
IMM GRANULOCYTES NFR BLD AUTO: 0.3 %
LYMPHOCYTES ABSOLUTE: 1.3 THOU/MM3 (ref 1–4.8)
LYMPHOCYTES NFR BLD AUTO: 22.7 %
MCH RBC QN AUTO: 31.1 PG (ref 26–33)
MCHC RBC AUTO-ENTMCNC: 32 GM/DL (ref 32.2–35.5)
MCV RBC AUTO: 97.1 FL (ref 80–94)
MONOCYTES ABSOLUTE: 0.7 THOU/MM3 (ref 0.4–1.3)
MONOCYTES NFR BLD AUTO: 11.4 %
NEUTROPHILS NFR BLD AUTO: 58.4 %
NRBC BLD AUTO-RTO: 0 /100 WBC
NT-PROBNP SERPL IA-MCNC: 3240 PG/ML (ref 0–124)
PLATELET # BLD AUTO: 123 THOU/MM3 (ref 130–400)
PMV BLD AUTO: 9.6 FL (ref 9.4–12.4)
POTASSIUM SERPL-SCNC: 3.6 MEQ/L (ref 3.5–5.2)
PROT SERPL-MCNC: 6.5 G/DL (ref 6.1–8)
RBC # BLD AUTO: 4.09 MILL/MM3 (ref 4.7–6.1)
SEGMENTED NEUTROPHILS ABSOLUTE COUNT: 3.4 THOU/MM3 (ref 1.8–7.7)
SODIUM SERPL-SCNC: 142 MEQ/L (ref 135–145)
WBC # BLD AUTO: 5.9 THOU/MM3 (ref 4.8–10.8)

## 2023-08-26 PROCEDURE — 6370000000 HC RX 637 (ALT 250 FOR IP): Performed by: INTERNAL MEDICINE

## 2023-08-26 PROCEDURE — 6370000000 HC RX 637 (ALT 250 FOR IP): Performed by: STUDENT IN AN ORGANIZED HEALTH CARE EDUCATION/TRAINING PROGRAM

## 2023-08-26 PROCEDURE — 83880 ASSAY OF NATRIURETIC PEPTIDE: CPT

## 2023-08-26 PROCEDURE — 82948 REAGENT STRIP/BLOOD GLUCOSE: CPT

## 2023-08-26 PROCEDURE — 6360000002 HC RX W HCPCS: Performed by: INTERNAL MEDICINE

## 2023-08-26 PROCEDURE — 1200000000 HC SEMI PRIVATE

## 2023-08-26 PROCEDURE — 2580000003 HC RX 258: Performed by: INTERNAL MEDICINE

## 2023-08-26 PROCEDURE — 99233 SBSQ HOSP IP/OBS HIGH 50: CPT | Performed by: INTERNAL MEDICINE

## 2023-08-26 PROCEDURE — 85025 COMPLETE CBC W/AUTO DIFF WBC: CPT

## 2023-08-26 PROCEDURE — 82248 BILIRUBIN DIRECT: CPT

## 2023-08-26 PROCEDURE — 71045 X-RAY EXAM CHEST 1 VIEW: CPT

## 2023-08-26 PROCEDURE — A4216 STERILE WATER/SALINE, 10 ML: HCPCS | Performed by: NURSE PRACTITIONER

## 2023-08-26 PROCEDURE — 2500000003 HC RX 250 WO HCPCS: Performed by: NURSE PRACTITIONER

## 2023-08-26 PROCEDURE — 6360000002 HC RX W HCPCS: Performed by: NURSE PRACTITIONER

## 2023-08-26 PROCEDURE — 87040 BLOOD CULTURE FOR BACTERIA: CPT

## 2023-08-26 PROCEDURE — 36415 COLL VENOUS BLD VENIPUNCTURE: CPT

## 2023-08-26 PROCEDURE — 80053 COMPREHEN METABOLIC PANEL: CPT

## 2023-08-26 PROCEDURE — 2500000003 HC RX 250 WO HCPCS: Performed by: STUDENT IN AN ORGANIZED HEALTH CARE EDUCATION/TRAINING PROGRAM

## 2023-08-26 PROCEDURE — 2580000003 HC RX 258: Performed by: NURSE PRACTITIONER

## 2023-08-26 PROCEDURE — 1200000003 HC TELEMETRY R&B

## 2023-08-26 PROCEDURE — 6370000000 HC RX 637 (ALT 250 FOR IP): Performed by: NURSE PRACTITIONER

## 2023-08-26 RX ORDER — POTASSIUM CHLORIDE 20 MEQ/1
20 TABLET, EXTENDED RELEASE ORAL 2 TIMES DAILY WITH MEALS
Status: DISCONTINUED | OUTPATIENT
Start: 2023-08-26 | End: 2023-08-26

## 2023-08-26 RX ORDER — METOPROLOL SUCCINATE 25 MG/1
12.5 TABLET, EXTENDED RELEASE ORAL ONCE
Status: COMPLETED | OUTPATIENT
Start: 2023-08-26 | End: 2023-08-26

## 2023-08-26 RX ORDER — METOPROLOL SUCCINATE 25 MG/1
25 TABLET, EXTENDED RELEASE ORAL DAILY
Status: DISCONTINUED | OUTPATIENT
Start: 2023-08-27 | End: 2023-08-29

## 2023-08-26 RX ORDER — BUMETANIDE 1 MG/1
1 TABLET ORAL 2 TIMES DAILY
Status: DISCONTINUED | OUTPATIENT
Start: 2023-08-26 | End: 2023-09-01 | Stop reason: HOSPADM

## 2023-08-26 RX ORDER — BUMETANIDE 1 MG/1
1 TABLET ORAL DAILY
Status: DISCONTINUED | OUTPATIENT
Start: 2023-08-26 | End: 2023-08-26

## 2023-08-26 RX ORDER — FAMOTIDINE 20 MG/1
20 TABLET, FILM COATED ORAL 2 TIMES DAILY
Status: DISCONTINUED | OUTPATIENT
Start: 2023-08-26 | End: 2023-09-01 | Stop reason: HOSPADM

## 2023-08-26 RX ADMIN — ENOXAPARIN SODIUM 40 MG: 100 INJECTION SUBCUTANEOUS at 09:33

## 2023-08-26 RX ADMIN — BUMETANIDE 1 MG: 0.25 INJECTION INTRAMUSCULAR; INTRAVENOUS at 09:33

## 2023-08-26 RX ADMIN — MEROPENEM 1000 MG: 1 INJECTION, POWDER, FOR SOLUTION INTRAVENOUS at 17:15

## 2023-08-26 RX ADMIN — BACLOFEN 10 MG: 10 TABLET ORAL at 09:33

## 2023-08-26 RX ADMIN — BUMETANIDE 1 MG: 1 TABLET ORAL at 23:59

## 2023-08-26 RX ADMIN — BACLOFEN 10 MG: 10 TABLET ORAL at 21:56

## 2023-08-26 RX ADMIN — GABAPENTIN 600 MG: 600 TABLET, FILM COATED ORAL at 21:56

## 2023-08-26 RX ADMIN — METOPROLOL SUCCINATE 12.5 MG: 25 TABLET, EXTENDED RELEASE ORAL at 21:59

## 2023-08-26 RX ADMIN — SPIRONOLACTONE 25 MG: 25 TABLET ORAL at 09:33

## 2023-08-26 RX ADMIN — SODIUM CHLORIDE, PRESERVATIVE FREE 10 ML: 5 INJECTION INTRAVENOUS at 09:43

## 2023-08-26 RX ADMIN — FAMOTIDINE 20 MG: 10 INJECTION INTRAVENOUS at 09:33

## 2023-08-26 RX ADMIN — GABAPENTIN 600 MG: 600 TABLET, FILM COATED ORAL at 09:33

## 2023-08-26 RX ADMIN — GABAPENTIN 600 MG: 600 TABLET, FILM COATED ORAL at 14:02

## 2023-08-26 RX ADMIN — SODIUM CHLORIDE, PRESERVATIVE FREE 10 ML: 5 INJECTION INTRAVENOUS at 21:57

## 2023-08-26 RX ADMIN — OXCARBAZEPINE 300 MG: 300 TABLET, FILM COATED ORAL at 21:55

## 2023-08-26 RX ADMIN — MEROPENEM 1000 MG: 1 INJECTION, POWDER, FOR SOLUTION INTRAVENOUS at 00:36

## 2023-08-26 RX ADMIN — MEROPENEM 1000 MG: 1 INJECTION, POWDER, FOR SOLUTION INTRAVENOUS at 09:32

## 2023-08-26 RX ADMIN — BACLOFEN 10 MG: 10 TABLET ORAL at 14:02

## 2023-08-26 RX ADMIN — OXCARBAZEPINE 300 MG: 300 TABLET, FILM COATED ORAL at 09:33

## 2023-08-26 RX ADMIN — POTASSIUM BICARBONATE 20 MEQ: 782 TABLET, EFFERVESCENT ORAL at 12:18

## 2023-08-26 RX ADMIN — FAMOTIDINE 20 MG: 20 TABLET, FILM COATED ORAL at 21:56

## 2023-08-26 RX ADMIN — GABAPENTIN 600 MG: 600 TABLET, FILM COATED ORAL at 17:17

## 2023-08-26 RX ADMIN — POTASSIUM BICARBONATE 20 MEQ: 782 TABLET, EFFERVESCENT ORAL at 17:16

## 2023-08-26 RX ADMIN — LISINOPRIL 2.5 MG: 2.5 TABLET ORAL at 09:33

## 2023-08-26 ASSESSMENT — PAIN DESCRIPTION - DESCRIPTORS: DESCRIPTORS: ACHING

## 2023-08-26 ASSESSMENT — PAIN SCALES - GENERAL
PAINLEVEL_OUTOF10: 0
PAINLEVEL_OUTOF10: 0
PAINLEVEL_OUTOF10: 1

## 2023-08-26 ASSESSMENT — PAIN DESCRIPTION - ORIENTATION: ORIENTATION: OTHER (COMMENT)

## 2023-08-26 ASSESSMENT — PAIN DESCRIPTION - LOCATION: LOCATION: GENERALIZED

## 2023-08-26 ASSESSMENT — PAIN - FUNCTIONAL ASSESSMENT: PAIN_FUNCTIONAL_ASSESSMENT: ACTIVITIES ARE NOT PREVENTED

## 2023-08-26 NOTE — PROGRESS NOTES
Hospitalist Progress Note    Patient:  Elisa Lipscomb    Unit/Bed:[facility]25/025-A  YOB: 1955  MRN: [id number]    Acct: [de-identified]   PCP: Taylor Daniels MD    Date of Admission: 8/21/2023    Assessment/Plan:    Acute Hypoxic RF 2/2 Septic shock / CMP: Pt initially needing escalating O2 requirements, became fatigued on BiPAP, intubated on 8/22, extubated 8/24 to 6L NC. Currently at 2L NC w/SpO2 97%. Wean as tolerated for goal SpO2 >90%  IS / Acapella / body positioning to maximize respiratory mechanics  ESBL E coli Bacteremia (Septic Shock resolved): 2/2 Complicated UTI / left Ureteral stone s/p Cyst, stenting, stone removal, and SP-cath removal. On arrival, Tmax 102.2, HR >100, BP initially stable and then precipitously dropped in 70's/40's. WBC > 15, PCT 23.08, LA 3.9. Blood cultures (+) ESBL E coli and Urine showing UTI w/culture (+) mixed growth. S/p Sepsis IVF, Started on Levophed, weaned off on 8/24. Pt started on Merrem 8/22 (Day # 5/7). Has been afebrile w/improving VS / Labs - continue to monitor for signs of flaring infection  C/w Merrem Abx at this time - will need a prolonged course   Repeat blood cultures x 2 ordered 8/26  New onset CMP / Systolic CHF w/exacerbation: HFrEF, Echo 8/21/23 shows new EF drop to 40%, Hypokinetic motion of mid-anteroseptal wall of LV. Noted to have elevated cardiac enzymes HsT 488-408 w/pro-BNP 5178-8162. All in the setting of Septic shock. Initial concern was for NSTEMI, but no signs of active ischemia noted. Likely 2/2 to overwhelming metabolic demand and hypotension, but cannot r/o Ischemic insult at this time. Was on IV Bumex for aggressive diuresis.    Bumex changed to 1mg PO BID - adjust accordingly as clinical status improves  Will need GDMT initiation / optimization prior to d/c - On Lisinopril 2.5mg + Aldactone 25mg + Bumex 1mg PO BID   Added on Toprol XL 25mg daily (Start 8/27) since pt has been off Levophed for 2+ days - will give 12.5mg dose 1x today (8/26)  Will titrate to tolerable levels as clinical course progresses and plan to transition over to [address] [place] [address] (w/36hr w/o) + SGLT2i   Cardiology consult now that acuity of illness has stabilized  HTN: Coreg and Norvasc, held 2/2 hypotension  Hx MS with BLE paraplegia, wheelchair/bedbound, follows Dr Ashtyn Bojorquez, not on any DMARD -noted  Neurogenic bladder / chronic SP catheter: exchanged and urine cx taken on arrival. Per nursing catheter was unclean and filled with sediment. Additional urine issues as mentioned above   Hx DVT and PE: on [address] until 6/2022 (hematuria, dc'ed). May benefit from low dose prophylaxis on DC due to impaired mobility. Expected discharge date:  TBD    Disposition: TBD    Chief Complaint: Worsening Confusion / Fever    Hospital Course: 73yo M w/significant PMHx of HTN, MS w/BLE paraplegia (follows with Dr. Ashtyn Bojorquez), Neurogenic bladder w/chronic SP catheter (hx MDRO infections), Hx of RUE DVT due to PICC and left main pulm artery PE in 2017 (previously on xarelto per chart review 2017 - 2018 - stopped 6/2022 at [facility] for hematuria), H/o recurrent renal calculi multiple cystoscopies/ureteral stenting, H/o Dysphagia w/severe malnutrition / atrophy, H/o of hip OM, and h/o decubitus ulcers. Pt presented to [facility] ED from NH on 8/21/23 for worsening confusion / fever. Went to the ICU for Septic Shock / CHFe 2/2 ESBL E coli bacteremia on Merrem (blood culture x 2 on 8/21 + ESBL E coli). Was being managed for Acute pulmonary edema w/Acute Hypoxic RF. Noted to have new onset EF drop / CMP and elevated cardiac enzymes HsT 488-408 w/pro-BNP [phone]). Thrombocytopenia noted 2/2 to significant sepsis/bacteremia w/shock. Pt stable for transfer form ICU to floor. Will c/w medical mgmt at this time and have Cardiology consulted for possible cardiac cath. Hospitalist took over care on 8/25/23. Subjective (past 24 hours): Pt seen and evaluated in NAD. On NC.  Denies CP, abdominal pain,

## 2023-08-27 LAB
ANION GAP SERPL CALC-SCNC: 10 MEQ/L (ref 8–16)
BASOPHILS ABSOLUTE: 0 THOU/MM3 (ref 0–0.1)
BASOPHILS NFR BLD AUTO: 0.5 %
BUN SERPL-MCNC: 18 MG/DL (ref 7–22)
CALCIUM SERPL-MCNC: 8.3 MG/DL (ref 8.5–10.5)
CHLORIDE SERPL-SCNC: 104 MEQ/L (ref 98–111)
CO2 SERPL-SCNC: 29 MEQ/L (ref 23–33)
CREAT SERPL-MCNC: < 0.2 MG/DL (ref 0.4–1.2)
DEPRECATED RDW RBC AUTO: 53.2 FL (ref 35–45)
EOSINOPHIL NFR BLD AUTO: 8.6 %
EOSINOPHILS ABSOLUTE: 0.5 THOU/MM3 (ref 0–0.4)
ERYTHROCYTE [DISTWIDTH] IN BLOOD BY AUTOMATED COUNT: 15 % (ref 11.5–14.5)
GFR SERPL CREATININE-BSD FRML MDRD: > 60 ML/MIN/1.73M2
GLUCOSE SERPL-MCNC: 95 MG/DL (ref 70–108)
HCT VFR BLD AUTO: 39.3 % (ref 42–52)
HGB BLD-MCNC: 12.6 GM/DL (ref 14–18)
IMM GRANULOCYTES # BLD AUTO: 0.03 THOU/MM3 (ref 0–0.07)
IMM GRANULOCYTES NFR BLD AUTO: 0.5 %
LYMPHOCYTES ABSOLUTE: 1.4 THOU/MM3 (ref 1–4.8)
LYMPHOCYTES NFR BLD AUTO: 22.5 %
MCH RBC QN AUTO: 30.9 PG (ref 26–33)
MCHC RBC AUTO-ENTMCNC: 32.1 GM/DL (ref 32.2–35.5)
MCV RBC AUTO: 96.3 FL (ref 80–94)
MONOCYTES ABSOLUTE: 0.8 THOU/MM3 (ref 0.4–1.3)
MONOCYTES NFR BLD AUTO: 13.1 %
NEUTROPHILS NFR BLD AUTO: 54.8 %
NRBC BLD AUTO-RTO: 0 /100 WBC
PLATELET # BLD AUTO: 144 THOU/MM3 (ref 130–400)
PMV BLD AUTO: 9.6 FL (ref 9.4–12.4)
POTASSIUM SERPL-SCNC: 3.8 MEQ/L (ref 3.5–5.2)
RBC # BLD AUTO: 4.08 MILL/MM3 (ref 4.7–6.1)
SEGMENTED NEUTROPHILS ABSOLUTE COUNT: 3.3 THOU/MM3 (ref 1.8–7.7)
SODIUM SERPL-SCNC: 143 MEQ/L (ref 135–145)
WBC # BLD AUTO: 6.1 THOU/MM3 (ref 4.8–10.8)

## 2023-08-27 PROCEDURE — 80048 BASIC METABOLIC PNL TOTAL CA: CPT

## 2023-08-27 PROCEDURE — 85025 COMPLETE CBC W/AUTO DIFF WBC: CPT

## 2023-08-27 PROCEDURE — 6360000002 HC RX W HCPCS: Performed by: NURSE PRACTITIONER

## 2023-08-27 PROCEDURE — 2580000003 HC RX 258: Performed by: INTERNAL MEDICINE

## 2023-08-27 PROCEDURE — 1200000000 HC SEMI PRIVATE

## 2023-08-27 PROCEDURE — 6370000000 HC RX 637 (ALT 250 FOR IP): Performed by: INTERNAL MEDICINE

## 2023-08-27 PROCEDURE — 36415 COLL VENOUS BLD VENIPUNCTURE: CPT

## 2023-08-27 PROCEDURE — 6370000000 HC RX 637 (ALT 250 FOR IP): Performed by: STUDENT IN AN ORGANIZED HEALTH CARE EDUCATION/TRAINING PROGRAM

## 2023-08-27 PROCEDURE — 1200000003 HC TELEMETRY R&B

## 2023-08-27 PROCEDURE — 6370000000 HC RX 637 (ALT 250 FOR IP): Performed by: NURSE PRACTITIONER

## 2023-08-27 PROCEDURE — 6360000002 HC RX W HCPCS: Performed by: INTERNAL MEDICINE

## 2023-08-27 PROCEDURE — 2580000003 HC RX 258: Performed by: NURSE PRACTITIONER

## 2023-08-27 PROCEDURE — 99233 SBSQ HOSP IP/OBS HIGH 50: CPT | Performed by: INTERNAL MEDICINE

## 2023-08-27 RX ADMIN — LISINOPRIL 2.5 MG: 2.5 TABLET ORAL at 09:25

## 2023-08-27 RX ADMIN — SPIRONOLACTONE 25 MG: 25 TABLET ORAL at 09:25

## 2023-08-27 RX ADMIN — SODIUM CHLORIDE, PRESERVATIVE FREE 10 ML: 5 INJECTION INTRAVENOUS at 21:01

## 2023-08-27 RX ADMIN — GABAPENTIN 600 MG: 600 TABLET, FILM COATED ORAL at 16:30

## 2023-08-27 RX ADMIN — ENOXAPARIN SODIUM 40 MG: 100 INJECTION SUBCUTANEOUS at 09:25

## 2023-08-27 RX ADMIN — BACLOFEN 10 MG: 10 TABLET ORAL at 21:01

## 2023-08-27 RX ADMIN — SODIUM CHLORIDE, PRESERVATIVE FREE 10 ML: 5 INJECTION INTRAVENOUS at 09:26

## 2023-08-27 RX ADMIN — GABAPENTIN 600 MG: 600 TABLET, FILM COATED ORAL at 09:25

## 2023-08-27 RX ADMIN — FAMOTIDINE 20 MG: 20 TABLET, FILM COATED ORAL at 09:25

## 2023-08-27 RX ADMIN — BACLOFEN 10 MG: 10 TABLET ORAL at 09:25

## 2023-08-27 RX ADMIN — GABAPENTIN 600 MG: 600 TABLET, FILM COATED ORAL at 13:38

## 2023-08-27 RX ADMIN — GABAPENTIN 600 MG: 600 TABLET, FILM COATED ORAL at 21:01

## 2023-08-27 RX ADMIN — BACLOFEN 10 MG: 10 TABLET ORAL at 13:38

## 2023-08-27 RX ADMIN — METOPROLOL SUCCINATE 25 MG: 25 TABLET, EXTENDED RELEASE ORAL at 09:25

## 2023-08-27 RX ADMIN — FAMOTIDINE 20 MG: 20 TABLET, FILM COATED ORAL at 21:01

## 2023-08-27 RX ADMIN — MEROPENEM 1000 MG: 1 INJECTION, POWDER, FOR SOLUTION INTRAVENOUS at 00:00

## 2023-08-27 RX ADMIN — OXCARBAZEPINE 300 MG: 300 TABLET, FILM COATED ORAL at 09:25

## 2023-08-27 RX ADMIN — OXCARBAZEPINE 300 MG: 300 TABLET, FILM COATED ORAL at 21:01

## 2023-08-27 ASSESSMENT — PAIN SCALES - GENERAL: PAINLEVEL_OUTOF10: 0

## 2023-08-27 NOTE — PROGRESS NOTES
Demographic information:  Mel Nogueira  1955  [id number]      Assessment/Plan:  Acute Hypoxic RF 2/2 Septic shock / CMP: Pt initially needing escalating O2 requirements, became fatigued on BiPAP, intubated on 8/22, extubated 8/24 to 6L NC. Down to 1L  Wean as tolerated for goal SpO2 >90%  IS / Acapella / body positioning to maximize respiratory mechanics  ESBL E coli Bacteremia (Septic Shock resolved): 2/2 Complicated UTI / left Ureteral stone s/p cysto, stenting, stone removal, and SP-cath exchanged. Pt started on Merrem 8/22   Plan 2 weeks abx. Likely transition to [address] on DC  Will need midline, can do Monday. Follow-up with urology and will need definitive stone management as outpatient  New onset CMP / Systolic CHF w/exacerbation: HFrEF, Echo 8/21/23 shows new EF drop to 40%, Hypokinetic motion of mid-anteroseptal wall of LV. Bumex changed to 1mg PO BID - adjust accordingly as clinical status improves  Will need GDMT initiation / optimization prior to d/c - On Lisinopril 2.5mg + Aldactone 25mg + Bumex 1mg PO BID   Added on Toprol XL 25mg daily (Start 8/27)  CHARLINE on arrival, mild: Creatinine of 0.5 on arrival and now has improved to less than 0.2. Due to hypotension and septic shock as above. Likely type 2 NSTEMI 2/2 septic shock. Noted to have elevated cardiac enzymes HsT 488-408 w/pro-BNP 7043-4543. All in the setting of Septic shock. Initial concern was for NSTEMI, but no signs of active ischemia noted. Likely 2/2 to overwhelming metabolic demand and hypotension, but cannot r/o Ischemic insult at this time. Was on IV Bumex for aggressive diuresis.     Cardiology consult now that acuity of illness has stabilized  Thrombocytopenia, mild: Suspect related to sepsis, is improving  HTN: Coreg and Norvasc, held 2/2 hypotension  Hx MS with BLE paraplegia, severe deconditioning, wheelchair/bedbound, follows Dr Nigel Glover, not on any DMARD -noted  PT/OT and return to snf  Neurogenic bladder / chronic SP catheter: exchanged and urine cx taken on arrival. Per nursing catheter was unclean and filled with sediment. Additional urine issues as mentioned above   Hx DVT and PE: on [address] until 6/2022 (hematuria, dc'ed). May benefit from low dose prophylaxis on DC due to impaired mobility. Continue Lovenox prophylaxis for now    Subjective: Patient seen at bedside, he reports he is doing well, denies any acute issues or concerns. Still feels overall a bit weak but notes improvement every day. No shortness of breath, no flank pain and denies any bladder spasms. No hematuria seen. HPI: Reviewed     **ROS reviewed and no changes from previous unless stated in subjective     Objective:  General: Pleasant, chronically debilitated male. Chronically ill-appearing  Eyes:  PERRL Conjunctivae/corneas clear. HENT: NCAT. External nares normal.  Oral mucosa moist without lesions. Neck: Supple. Trachea midline. No gross JVD appreciated. Respiratory:  Normal respiratory effort. No wheezing, rhonchi, or rales. Shallow effort  Cardiovascular: Normal rate, regular rhythm without murmurs. No lower extremity edema. Abdomen: Soft, non-tender, non-distended with normal bowel sounds. Suprapubic catheter without any significant drainage noted in the suprapubic region. Colostomy noted soft brown stool. Musculoskeletal: Increased tone in the bilateral lower extremities, muscle wasting is evident. Skin: Warm and dry. No rashes or lesions. Neurologic: Contractures of the bilateral lower extremities, slow speech. General weakness in the upper extremities. Psychiatric: Alert and oriented, normal insight and thought content. Capillary Refill: Brisk,< 3 seconds. Peripheral Pulses: +2 palpable, equal bilaterally. Fluids: Completed  PT/OT: Yes  Dispo: Return to facility in 1 to 2 days.     Michael Roberson DO  8/27/2023  6:44 AM

## 2023-08-27 NOTE — OP NOTE
Operative Note      Patient: Jude Hatch  YOB: 1955  MRN: [id number]    Date of Procedure: 8/22/2023    Pre Op Diagnosis: left ureteral stone    Post-Op Diagnosis: Same       Procedure(s):  CYSTOSCOPY LEFT URETERAL STENT INSERTION; bladder stone extraction, suprapubic cath exchange    Surgeon(s):  Patria Higgins MD    Assistant:   * No surgical staff found *    Anesthesia: Monitor Anesthesia Care    Estimated Blood Loss (mL): Minimal    Complications: None    Specimens:   * No specimens in log *    Implants:  Implant Name Type Inv. Item Serial No.  Lot No. LRB No. Used Action   STENT URET 6FR L26CM HYDR+ PGTL Suad Maker LO - [id number]  [facility] 6FR L26CM HYDR+ PGTL TAPR TIP Bebe Mack MRK LO  [employer]- [id number] Left 1 Implanted         Drains:   Colostomy LLQ (Active)   Stomal Appliance 1 piece 08/27/23 0915   Stoma  Assessment Pink;Moist 08/27/23 0915   Peristomal Assessment Clean, dry & intact 08/27/23 0915   Stool Appearance Soft;Loose 08/27/23 1117   Stool Color Brown 08/27/23 0915   Stool Amount Small 08/27/23 1117   Output (mL) 10 ml 08/27/23 1117       Suprapubic Catheter (Active)   Site Assessment Other (Comment) 08/27/23 0536   Urine Color Doris 08/27/23 1559   Urine Appearance Sediment 08/27/23 0536   Urine Odor Malodorous 08/27/23 0536   Collection Container Standard 08/27/23 0536   Securement Method Securing device (Describe) 08/27/23 0536   Catheter Care  Perineal wipes 08/27/23 1117   Catheter Best Practices  Drainage tube clipped to bed;Catheter secured to thigh; Tamper seal intact; Bag below bladder;Bag not on floor; Lack of dependent loop in tubing;Drainage bag less than half full 08/27/23 0536   Status Draining;Leaking;Patent 08/27/23 0536   Output (mL) 300 mL 08/27/23 1559       [REMOVED] NG/OG/NJ/NE Tube Orogastric Center mouth (Removed)   Surrounding Skin Clean, dry & intact 08/24/23 0820   Securement device Tape 08/24/23 0820   Status

## 2023-08-27 NOTE — PROGRESS NOTES
See previous Note for initial head to toe assessment. Changes on reassessment are that Lung sounds are clear throughout and Diminished in Upper lobes.   Gil Dale SN/RSC

## 2023-08-27 NOTE — PROGRESS NOTES
Patient alert and oriented x4. PERRLA 5mm to 4mm. Speech clear and appropriate. Mucous membranes pink and dry. Central line capped in the Right jugular. Upper extremities pink warm and dry. INT in the Left wrist. Clean dry and intact. Capillary refill less than 3 seconds. Skin turgor less than 3 seconds. Hand grasps weak bilaterally. Patient states he has chronic numbness and tingling in hands. 1+ radial pulses bilaterally. Scattered ecchymosis and abrasions. Heart regular rate and rhythm. Respirations unlabored on 1 liter nasal cannula. Lung sounds clear in upper and crackles in the Lower lobes. Abdomen soft and rounded. Active in all 4 quadrants. Patient denies tenderness. No palpable masses. Colostomy in the Left upper quadrant. Clean dry and intact. Stoma beefy red. Suprapubic catheter Left lower quadrant. Clean and dry with occasional leakage around the insertion site. Redness in groin and scrotum. 2+ edema in the scrotum. Redness on groin bilaterally. Right Lower extremity pale, warm and dry. Left has vascular discoloration. Wound on the Left shin, foam protective dressing changed. Bilateral lower extremities flaccid. Right lower extremity contracture. Pedal push and pull absent bilaterally. Patient has numbness and tingling in bilateral lower extremities. Capillary refill less than 3 seconds bilaterally. 2+ Left posterior tibialis. 1+ Right posterior tibialis. 2+ dorsalis pedis pulses bilaterally. Heels red, flaky and dry. Coccyx red but blanches. Stage 3 would on coccyx, covered with foam dressing. Stage 2 on Left buttocks, Zinc cream applied. Patient has no further needs at this time. Call light in reach.   Irasema Kt SN/RSC  Anil Ruiz /C

## 2023-08-27 NOTE — PLAN OF CARE
Problem: Discharge Planning  Goal: Discharge to home or other facility with appropriate resources  Outcome: Progressing  Flowsheets (Taken 8/26/2023 2147)  Discharge to home or other facility with appropriate resources:   Identify barriers to discharge with patient and caregiver   Arrange for needed discharge resources and transportation as appropriate   Identify discharge learning needs (meds, wound care, etc)   Refer to discharge planning if patient needs post-hospital services based on physician order or complex needs related to functional status, cognitive ability or social support system     Problem: Pain  Goal: Verbalizes/displays adequate comfort level or baseline comfort level  Outcome: Progressing  Flowsheets (Taken 8/26/2023 2147)  Verbalizes/displays adequate comfort level or baseline comfort level:   Encourage patient to monitor pain and request assistance   Assess pain using appropriate pain scale   Administer analgesics based on type and severity of pain and evaluate response   Implement non-pharmacological measures as appropriate and evaluate response   Consider cultural and social influences on pain and pain management   Notify Licensed Independent Practitioner if interventions unsuccessful or patient reports new pain     Problem: Safety - Adult  Goal: Free from fall injury  Outcome: Progressing  Flowsheets (Taken 8/27/2023 0021)  Free From Fall Injury: Instruct family/caregiver on patient safety     Problem: Nutrition Deficit:  Goal: Optimize nutritional status  Outcome: Progressing  Flowsheets (Taken 8/22/2023 1629 by Terrence Etienne, [place]ET[place], LD)  Nutrient intake appropriate for improving, restoring, or maintaining nutritional needs: Assess nutritional status and recommend course of action     Problem: Skin/Tissue Integrity  Goal: Absence of new skin breakdown  Description: 1. Monitor for areas of redness and/or skin breakdown  2. Assess vascular access sites hourly  3.   Every 4-6 hours minimum: Change oxygen saturation probe site  4. Every 4-6 hours:  If on nasal continuous positive airway pressure, respiratory therapy assess nares and determine need for appliance change or resting period. Outcome: Progressing  Note: Routine skin assessments, purposeful hourly rounding. Pt encouraged and assisted to turn at least every 2 hours. Bath and hygiene care assistance offered to pt daily. Nutritional intake encouraged as ordered and appropriate. Problem: Respiratory - Adult  Goal: Achieves optimal ventilation and oxygenation  Outcome: Progressing  Flowsheets (Taken 8/26/2023 2147)  Achieves optimal ventilation and oxygenation:   Assess for changes in respiratory status   Assess for changes in mentation and behavior   Position to facilitate oxygenation and minimize respiratory effort   Oxygen supplementation based on oxygen saturation or arterial blood gases   Encourage broncho-pulmonary hygiene including cough, deep breathe, incentive spirometry   Assess the need for suctioning and aspirate as needed   Assess and instruct to report shortness of breath or any respiratory difficulty   Respiratory therapy support as indicated       Care plan reviewed with patient. Patient verbalized understanding of the plan of care and contributed to goal setting.

## 2023-08-28 LAB
ANION GAP SERPL CALC-SCNC: 9 MEQ/L (ref 8–16)
BASOPHILS ABSOLUTE: 0 THOU/MM3 (ref 0–0.1)
BASOPHILS NFR BLD AUTO: 0.7 %
BUN SERPL-MCNC: 17 MG/DL (ref 7–22)
CALCIUM SERPL-MCNC: 8.6 MG/DL (ref 8.5–10.5)
CHLORIDE SERPL-SCNC: 104 MEQ/L (ref 98–111)
CO2 SERPL-SCNC: 25 MEQ/L (ref 23–33)
CREAT SERPL-MCNC: < 0.2 MG/DL (ref 0.4–1.2)
DEPRECATED RDW RBC AUTO: 53.9 FL (ref 35–45)
EOSINOPHIL NFR BLD AUTO: 9 %
EOSINOPHILS ABSOLUTE: 0.5 THOU/MM3 (ref 0–0.4)
ERYTHROCYTE [DISTWIDTH] IN BLOOD BY AUTOMATED COUNT: 15 % (ref 11.5–14.5)
GFR SERPL CREATININE-BSD FRML MDRD: > 60 ML/MIN/1.73M2
GLUCOSE BLD STRIP.AUTO-MCNC: 113 MG/DL (ref 70–108)
GLUCOSE BLD STRIP.AUTO-MCNC: 150 MG/DL (ref 70–108)
GLUCOSE BLD STRIP.AUTO-MCNC: 83 MG/DL (ref 70–108)
GLUCOSE BLD STRIP.AUTO-MCNC: 87 MG/DL (ref 70–108)
GLUCOSE SERPL-MCNC: 97 MG/DL (ref 70–108)
HCT VFR BLD AUTO: 40.1 % (ref 42–52)
HGB BLD-MCNC: 12.6 GM/DL (ref 14–18)
IMM GRANULOCYTES # BLD AUTO: 0.06 THOU/MM3 (ref 0–0.07)
IMM GRANULOCYTES NFR BLD AUTO: 1.1 %
LYMPHOCYTES ABSOLUTE: 1.6 THOU/MM3 (ref 1–4.8)
LYMPHOCYTES NFR BLD AUTO: 28.5 %
MCH RBC QN AUTO: 30.5 PG (ref 26–33)
MCHC RBC AUTO-ENTMCNC: 31.4 GM/DL (ref 32.2–35.5)
MCV RBC AUTO: 97.1 FL (ref 80–94)
MONOCYTES ABSOLUTE: 0.7 THOU/MM3 (ref 0.4–1.3)
MONOCYTES NFR BLD AUTO: 12.2 %
NEUTROPHILS NFR BLD AUTO: 48.5 %
NRBC BLD AUTO-RTO: 0 /100 WBC
PLATELET # BLD AUTO: 180 THOU/MM3 (ref 130–400)
PMV BLD AUTO: 9.1 FL (ref 9.4–12.4)
POTASSIUM SERPL-SCNC: 4.1 MEQ/L (ref 3.5–5.2)
RBC # BLD AUTO: 4.13 MILL/MM3 (ref 4.7–6.1)
SEGMENTED NEUTROPHILS ABSOLUTE COUNT: 2.7 THOU/MM3 (ref 1.8–7.7)
SODIUM SERPL-SCNC: 138 MEQ/L (ref 135–145)
TROPONIN, HIGH SENSITIVITY: 281 NG/L (ref 0–12)
WBC # BLD AUTO: 5.5 THOU/MM3 (ref 4.8–10.8)

## 2023-08-28 PROCEDURE — 80048 BASIC METABOLIC PNL TOTAL CA: CPT

## 2023-08-28 PROCEDURE — 6370000000 HC RX 637 (ALT 250 FOR IP): Performed by: NURSE PRACTITIONER

## 2023-08-28 PROCEDURE — 99232 SBSQ HOSP IP/OBS MODERATE 35: CPT | Performed by: INTERNAL MEDICINE

## 2023-08-28 PROCEDURE — 85025 COMPLETE CBC W/AUTO DIFF WBC: CPT

## 2023-08-28 PROCEDURE — C1751 CATH, INF, PER/CENT/MIDLINE: HCPCS

## 2023-08-28 PROCEDURE — 6360000002 HC RX W HCPCS: Performed by: STUDENT IN AN ORGANIZED HEALTH CARE EDUCATION/TRAINING PROGRAM

## 2023-08-28 PROCEDURE — 2580000003 HC RX 258: Performed by: STUDENT IN AN ORGANIZED HEALTH CARE EDUCATION/TRAINING PROGRAM

## 2023-08-28 PROCEDURE — 82948 REAGENT STRIP/BLOOD GLUCOSE: CPT

## 2023-08-28 PROCEDURE — 6370000000 HC RX 637 (ALT 250 FOR IP): Performed by: STUDENT IN AN ORGANIZED HEALTH CARE EDUCATION/TRAINING PROGRAM

## 2023-08-28 PROCEDURE — 92526 ORAL FUNCTION THERAPY: CPT

## 2023-08-28 PROCEDURE — 84484 ASSAY OF TROPONIN QUANT: CPT

## 2023-08-28 PROCEDURE — 2580000003 HC RX 258: Performed by: NURSE PRACTITIONER

## 2023-08-28 PROCEDURE — 76937 US GUIDE VASCULAR ACCESS: CPT

## 2023-08-28 PROCEDURE — 36415 COLL VENOUS BLD VENIPUNCTURE: CPT

## 2023-08-28 PROCEDURE — 6370000000 HC RX 637 (ALT 250 FOR IP): Performed by: INTERNAL MEDICINE

## 2023-08-28 PROCEDURE — 6360000002 HC RX W HCPCS: Performed by: NURSE PRACTITIONER

## 2023-08-28 PROCEDURE — 1200000000 HC SEMI PRIVATE

## 2023-08-28 RX ORDER — ASPIRIN 81 MG/1
81 TABLET, CHEWABLE ORAL DAILY
Status: DISCONTINUED | OUTPATIENT
Start: 2023-08-28 | End: 2023-09-01 | Stop reason: HOSPADM

## 2023-08-28 RX ORDER — SODIUM CHLORIDE 9 MG/ML
25 INJECTION, SOLUTION INTRAVENOUS PRN
Status: DISCONTINUED | OUTPATIENT
Start: 2023-08-28 | End: 2023-09-01 | Stop reason: HOSPADM

## 2023-08-28 RX ORDER — SODIUM CHLORIDE 0.9 % (FLUSH) 0.9 %
5-40 SYRINGE (ML) INJECTION EVERY 12 HOURS SCHEDULED
Status: DISCONTINUED | OUTPATIENT
Start: 2023-08-28 | End: 2023-09-01 | Stop reason: HOSPADM

## 2023-08-28 RX ORDER — POLYVINYL ALCOHOL 14 MG/ML
1 SOLUTION/ DROPS OPHTHALMIC 4 TIMES DAILY
Status: DISCONTINUED | OUTPATIENT
Start: 2023-08-28 | End: 2023-09-01 | Stop reason: HOSPADM

## 2023-08-28 RX ORDER — LIDOCAINE HYDROCHLORIDE 10 MG/ML
5 INJECTION, SOLUTION EPIDURAL; INFILTRATION; INTRACAUDAL; PERINEURAL ONCE
Status: DISCONTINUED | OUTPATIENT
Start: 2023-08-28 | End: 2023-09-01 | Stop reason: HOSPADM

## 2023-08-28 RX ORDER — SODIUM CHLORIDE 0.9 % (FLUSH) 0.9 %
5-40 SYRINGE (ML) INJECTION PRN
Status: DISCONTINUED | OUTPATIENT
Start: 2023-08-28 | End: 2023-09-01 | Stop reason: HOSPADM

## 2023-08-28 RX ADMIN — SODIUM CHLORIDE, PRESERVATIVE FREE 10 ML: 5 INJECTION INTRAVENOUS at 09:21

## 2023-08-28 RX ADMIN — BACLOFEN 10 MG: 10 TABLET ORAL at 09:21

## 2023-08-28 RX ADMIN — MEROPENEM 1000 MG: 1 INJECTION, POWDER, FOR SOLUTION INTRAVENOUS at 23:01

## 2023-08-28 RX ADMIN — ENOXAPARIN SODIUM 40 MG: 100 INJECTION SUBCUTANEOUS at 09:21

## 2023-08-28 RX ADMIN — FAMOTIDINE 20 MG: 20 TABLET, FILM COATED ORAL at 23:05

## 2023-08-28 RX ADMIN — OXCARBAZEPINE 300 MG: 300 TABLET, FILM COATED ORAL at 23:05

## 2023-08-28 RX ADMIN — GABAPENTIN 600 MG: 600 TABLET, FILM COATED ORAL at 23:05

## 2023-08-28 RX ADMIN — POLYVINYL ALCOHOL 1 DROP: 14 SOLUTION/ DROPS OPHTHALMIC at 13:02

## 2023-08-28 RX ADMIN — POLYVINYL ALCOHOL 1 DROP: 14 SOLUTION/ DROPS OPHTHALMIC at 23:05

## 2023-08-28 RX ADMIN — GABAPENTIN 600 MG: 600 TABLET, FILM COATED ORAL at 09:21

## 2023-08-28 RX ADMIN — BACLOFEN 10 MG: 10 TABLET ORAL at 23:05

## 2023-08-28 RX ADMIN — SODIUM CHLORIDE, PRESERVATIVE FREE 10 ML: 5 INJECTION INTRAVENOUS at 23:05

## 2023-08-28 RX ADMIN — OXCARBAZEPINE 300 MG: 300 TABLET, FILM COATED ORAL at 09:20

## 2023-08-28 RX ADMIN — SPIRONOLACTONE 25 MG: 25 TABLET ORAL at 09:20

## 2023-08-28 RX ADMIN — POLYVINYL ALCOHOL 1 DROP: 14 SOLUTION/ DROPS OPHTHALMIC at 18:03

## 2023-08-28 RX ADMIN — GABAPENTIN 600 MG: 600 TABLET, FILM COATED ORAL at 13:02

## 2023-08-28 RX ADMIN — METOPROLOL SUCCINATE 25 MG: 25 TABLET, EXTENDED RELEASE ORAL at 09:21

## 2023-08-28 RX ADMIN — ASPIRIN 81 MG 81 MG: 81 TABLET ORAL at 18:54

## 2023-08-28 RX ADMIN — LISINOPRIL 2.5 MG: 2.5 TABLET ORAL at 09:21

## 2023-08-28 RX ADMIN — FAMOTIDINE 20 MG: 20 TABLET, FILM COATED ORAL at 09:21

## 2023-08-28 RX ADMIN — BACLOFEN 10 MG: 10 TABLET ORAL at 13:02

## 2023-08-28 RX ADMIN — GABAPENTIN 600 MG: 600 TABLET, FILM COATED ORAL at 18:03

## 2023-08-28 ASSESSMENT — PAIN SCALES - GENERAL
PAINLEVEL_OUTOF10: 3
PAINLEVEL_OUTOF10: 0

## 2023-08-28 ASSESSMENT — PAIN DESCRIPTION - DESCRIPTORS: DESCRIPTORS: ACHING

## 2023-08-28 ASSESSMENT — PAIN DESCRIPTION - LOCATION: LOCATION: LEG

## 2023-08-28 ASSESSMENT — PAIN DESCRIPTION - ORIENTATION: ORIENTATION: RIGHT

## 2023-08-28 NOTE — DISCHARGE INSTR - COC
Continuity of Care Form    Patient Name: Nuno Cabrera   :  1955  MRN:  [id number]    Admit date:  2023  Discharge date:  23    Code Status Order: Full Code   Advance Directives:     Admitting Physician:  Tian Vilchis MD  PCP: Liliana Panda MD    Discharging Nurse: Stu Lincoln RN  [facility] Unit/Room#: 5K-25/025-A  Discharging Unit Phone Number: [phone]    Emergency Contact:   Extended Emergency Contact Information  Primary Emergency Contact: [facility]  Address: [address] [facility] [address] [facility]  Relation: Spouse  Secondary Emergency Contact: Claudell Man[place] [address] [place] Phone: [phone]  Relation: Other Relative    Past Surgical History:  Past Surgical History:   Procedure Laterality Date    ANKLE SURGERY      broken ankle    BLADDER SURGERY  2012    Suprapubic catheter placement    BRONCHOSCOPY N/A 2022    BRONCHOSCOPY ALVEOLAR LAVAGE performed by Lupe Shipley MD at [facility]    COLONOSCOPY      CYSTO/URETERO/PYELOSCOPY, CALCULUS TX Left 2019    CYSTOSCOPY, LEFT STENT insertion, bladder irragation with bladder stones performed by Justin Alvarado MD at [address] [place] [address], [address] [place] [address] N/A 2019    CYSTO, LEFT URETERAL STENT REMOVAL, LEFT URETEROSCOPY, LASER LITHOTRIPSY, BASKET RETRIEVAL OF STONE FRAGMENTS performed by Justin Alvarado MD at [address] [facility] 2021    CYSTOSCOPY, CYSTOLITHOLAPAXY, SUPRAPUBIC CATHETER EXCHANGE performed by Roma Machado MD at [address] [place] [address] Left 6/15/2022    CYSTOSCOPY performed by Roma Machado MD at [address] [place] [address] Left 2023    CYSTOSCOPY LEFT URETERAL STENT INSERTION; bladder stone extraction, suprapubic cath exchange performed by Ryan King MD at [address] [place] [address]  2022    IR NEPHROSTOMY CATHETER PLACEMENT 2022 [facility]    [place] LAPS Name: [facility]  [address], [address] hospitals:[phone]  Fax:[phone]    Dialysis Facility (if applicable)   Name:  Address:  Dialysis Schedule:  Phone:  Fax:    / signature: Electronically signed by TRISTIAN Peace on 8/28/23 at 2:00 PM EDT    PHYSICIAN SECTION    Prognosis: Fair    Condition at Discharge: Stable    Rehab Potential (if transferring to Rehab): Good    Recommended Labs or Other Treatments After Discharge: N/A    Physician Certification: I certify the above information and transfer of Ismael Caputo  is necessary for the continuing treatment of the diagnosis listed and that he requires [address] for greater 30 days.      Update Admission H&P: No change in H&P    PHYSICIAN SIGNATURE:  Electronically signed by Tarun Robison DO on 9/1/23 at 3:48 PM EDT

## 2023-08-28 NOTE — PROGRESS NOTES
Midline insertion Procedure Note    Héctor Bowen   Admitted- 8/21/2023 12:44 PM  Admission diagnosis- Somnolence [R40.0]  Elevated troponin [R77.8]  Septic shock ([address]) [A41.9, R65.21]  Urinary tract infection with hematuria, site unspecified [N39.0, R31.9]  Pneumonia of left lung due to infectious organism, unspecified part of lung [J18.9]      Attending Physician- Elise Rm DO  Ordering Physician- Duy Jacques MD  Indication for Insertion: Antibiotic Therapy    Catheter Insertion Date- 8/28/2023   Catheter Brand-BARD  Lot Number- [id number]  Gauge-4  Lumen-dual    Insertion Site- TYLER Basilic  Vein Diameter- 5.78 m  Catheter Length- 17 cm  Internal Length- 17 cm  Exposed Catheter Length- 0cm   Midline Tip Terminates in the Axillary- Yes  Upper Arm Circumference- 28cm  Easy insertion- Yes  Able to Aspirate blood- Yes  Easy Flush- Yes    Midline insertion successful- Yes  Ultrasound- yes    Okay To Use Midline- Yes    Electronically signed by Eric Morgan, RN, RN on 8/28/2023 at 4:58 PM

## 2023-08-28 NOTE — CARE COORDINATION
8/28/23, 1:15 PM EDT    DISCHARGE ON GOING EVALUATION    [address] day: 7  Location: -25/025-A Reason for admit: Somnolence [R40.0]  Elevated troponin [R77.8]  Septic shock (HCC) [A41.9, R65.21]  Urinary tract infection with hematuria, site unspecified [N39.0, R31.9]  Pneumonia of left lung due to infectious organism, unspecified part of lung [J18.9]   Procedure:   8/21 CXR: Left midlung atelectasis/infiltrate  8/21 CVC RIJ  8/22 Intubated  8/22 CTA Chest: No evidence of pulmonary emboli; Abnormal density throughout both lung fields which could be secondary to ARDS, bilateral pneumonia or congestive heart failure; Mild cardiomegaly; Thoracic spondylosis. .   8/22 CT Abd/pelvis: Left-sided hydronephrosis and hydroureter secondary to a 5.6 mm stone in the left mid ureter. 1.9 x 1.9 cm cystic lesion in the upper pole of left kidney with associated calcification; Punctate stones in the lower pole of the right kidney. An 0.3 cm cyst arising from the upper pole of the right kidney; Status post cholecystectomy; Atrophic pancreas; Pompa catheter within the bladder; Enteric tube in place; Atherosclerotic calcification in the abdominal aorta and iliac arteries; Lumbar spondylosis. Calcification along the anterior longitudinal ligament which could be secondary to ankylosing spondylitis  8/22 EEG 20 min: suggests moderate non specific encephalopathy; presence of left parietal sharp increases patient risk for focal seizures; presence of triphasic wave discharges suggest underlying metabolic derangement  3/47 Echo with EF 40%;   Hypokinetic motion of the mid anteroseptal wall noted in the left ventricle  8/22 Suprapubic catheter exchanged  8/22 CYSTOSCOPY LEFT URETERAL STENT INSERTION  8/24 Extubated  8/25 MBS: Laryngeal penetration of thin, mildly thick and soft solid barium with aspiration of thin and soft solid barium  8/25 CXR: Improving cardiopulmonary findings status post extubation  Barriers to

## 2023-08-28 NOTE — CARE COORDINATION
8/28/23, 7:25 AM EDT    DISCHARGE BARRIERS        Patient transferred to 5 25. Report given to unit Amina Zheng, regarding discharge plan for this patient.

## 2023-08-28 NOTE — PROGRESS NOTES
[facility]  INPATIENT SPEECH THERAPY  STRZ ONC MED 5K  DAILY NOTE    TIME   SLP Individual Minutes  Time In: 2544  Time Out: 1250  Minutes: 16  Timed Code Treatment Minutes: 0 Minutes       Date: 2023  Patient Name: Sage Cooper      CSN: [id number]   : 1955  (79 y.o.)  Gender: male   Referring Physician:  Axel Mascorro MD  Diagnosis: Somnolence   Precautions: Fall risk, aspiration precautions, contact precautions   Current Diet: Puree, mildly thick liquids - upgraded 23 to minced and moist, mildly thick liquids   Respiratory Status: Room Air  Swallowing Strategies:  Full Upright Position, Small Bite/Sip, Pulmonary Monitoring, Medications Crushed with Puree, Alternate Solids and Liquids, and Monitor for Fatigue  Date of Last MBS/FEES:  MBS 23    Pain:  No pain reported. Subjective:  Spoke with RN Kimo Orozco for approval of ST interventions. Upon arrival, patient sitting upright in bed. Alert and cooperative. Short-Term Goals:  SHORT TERM GOAL #1:  Goal 1: PAtient will consume puree diet and mildly thick liquids (advanced texture trials with ST only) with no overt s/s of pulmonary decline to maintain adequate nutrition and hydration measures GOAL MET, NEW GOAL 1: Patient will consume minced and moist diet and mildly thick liquids (advanced texture trials with ST only) with no overt s/s of pulmonary decline to maintain adequate nutrition and hydration measures   INTERVENTIONS:Patient consumed trials of crushed ellis crackers in puree this date. Patient demonstrated adequate bolus control and manipulation. Independent utilization of lingual sweep to clear residuals from buccal cavity. No overt s/s of aspiration noted, but certainly cannot rule out pharyngeal dysphagia at bedside alone. Recommend diet advancement to minced and moist diet, mildly thick liquids with CLOSE pulmonary monitoring.      SHORT TERM GOAL #2:  Goal 2: Patient will complete pharyngeal strengthening

## 2023-08-29 LAB
ANION GAP SERPL CALC-SCNC: 11 MEQ/L (ref 8–16)
BASOPHILS ABSOLUTE: 0 THOU/MM3 (ref 0–0.1)
BASOPHILS NFR BLD AUTO: 0.7 %
BUN SERPL-MCNC: 16 MG/DL (ref 7–22)
CALCIUM SERPL-MCNC: 8.8 MG/DL (ref 8.5–10.5)
CHLORIDE SERPL-SCNC: 104 MEQ/L (ref 98–111)
CO2 SERPL-SCNC: 24 MEQ/L (ref 23–33)
CREAT SERPL-MCNC: < 0.2 MG/DL (ref 0.4–1.2)
DEPRECATED RDW RBC AUTO: 52.9 FL (ref 35–45)
EOSINOPHIL NFR BLD AUTO: 6.7 %
EOSINOPHILS ABSOLUTE: 0.4 THOU/MM3 (ref 0–0.4)
ERYTHROCYTE [DISTWIDTH] IN BLOOD BY AUTOMATED COUNT: 14.7 % (ref 11.5–14.5)
GFR SERPL CREATININE-BSD FRML MDRD: > 60 ML/MIN/1.73M2
GLUCOSE BLD STRIP.AUTO-MCNC: 74 MG/DL (ref 70–108)
GLUCOSE BLD STRIP.AUTO-MCNC: 90 MG/DL (ref 70–108)
GLUCOSE BLD STRIP.AUTO-MCNC: 92 MG/DL (ref 70–108)
GLUCOSE BLD STRIP.AUTO-MCNC: 96 MG/DL (ref 70–108)
GLUCOSE SERPL-MCNC: 92 MG/DL (ref 70–108)
HCT VFR BLD AUTO: 40.9 % (ref 42–52)
HGB BLD-MCNC: 12.9 GM/DL (ref 14–18)
IMM GRANULOCYTES # BLD AUTO: 0.04 THOU/MM3 (ref 0–0.07)
IMM GRANULOCYTES NFR BLD AUTO: 0.7 %
LYMPHOCYTES ABSOLUTE: 1.5 THOU/MM3 (ref 1–4.8)
LYMPHOCYTES NFR BLD AUTO: 25.8 %
MCH RBC QN AUTO: 30.9 PG (ref 26–33)
MCHC RBC AUTO-ENTMCNC: 31.5 GM/DL (ref 32.2–35.5)
MCV RBC AUTO: 97.8 FL (ref 80–94)
MONOCYTES ABSOLUTE: 0.7 THOU/MM3 (ref 0.4–1.3)
MONOCYTES NFR BLD AUTO: 12.4 %
NEUTROPHILS NFR BLD AUTO: 53.7 %
NRBC BLD AUTO-RTO: 0 /100 WBC
PLATELET # BLD AUTO: 246 THOU/MM3 (ref 130–400)
PMV BLD AUTO: 9.2 FL (ref 9.4–12.4)
POTASSIUM SERPL-SCNC: 4.2 MEQ/L (ref 3.5–5.2)
RBC # BLD AUTO: 4.18 MILL/MM3 (ref 4.7–6.1)
SEGMENTED NEUTROPHILS ABSOLUTE COUNT: 3.1 THOU/MM3 (ref 1.8–7.7)
SODIUM SERPL-SCNC: 139 MEQ/L (ref 135–145)
WBC # BLD AUTO: 5.7 THOU/MM3 (ref 4.8–10.8)

## 2023-08-29 PROCEDURE — 99255 IP/OBS CONSLTJ NEW/EST HI 80: CPT | Performed by: INTERNAL MEDICINE

## 2023-08-29 PROCEDURE — 82948 REAGENT STRIP/BLOOD GLUCOSE: CPT

## 2023-08-29 PROCEDURE — 6370000000 HC RX 637 (ALT 250 FOR IP): Performed by: INTERNAL MEDICINE

## 2023-08-29 PROCEDURE — 2580000003 HC RX 258: Performed by: NURSE PRACTITIONER

## 2023-08-29 PROCEDURE — 2580000003 HC RX 258: Performed by: STUDENT IN AN ORGANIZED HEALTH CARE EDUCATION/TRAINING PROGRAM

## 2023-08-29 PROCEDURE — 6360000002 HC RX W HCPCS: Performed by: STUDENT IN AN ORGANIZED HEALTH CARE EDUCATION/TRAINING PROGRAM

## 2023-08-29 PROCEDURE — 1200000000 HC SEMI PRIVATE

## 2023-08-29 PROCEDURE — 36415 COLL VENOUS BLD VENIPUNCTURE: CPT

## 2023-08-29 PROCEDURE — 1200000003 HC TELEMETRY R&B

## 2023-08-29 PROCEDURE — 6370000000 HC RX 637 (ALT 250 FOR IP): Performed by: NURSE PRACTITIONER

## 2023-08-29 PROCEDURE — 2580000003 HC RX 258: Performed by: INTERNAL MEDICINE

## 2023-08-29 PROCEDURE — 85025 COMPLETE CBC W/AUTO DIFF WBC: CPT

## 2023-08-29 PROCEDURE — 99233 SBSQ HOSP IP/OBS HIGH 50: CPT | Performed by: INTERNAL MEDICINE

## 2023-08-29 PROCEDURE — 80048 BASIC METABOLIC PNL TOTAL CA: CPT

## 2023-08-29 PROCEDURE — 92526 ORAL FUNCTION THERAPY: CPT

## 2023-08-29 PROCEDURE — 6360000002 HC RX W HCPCS: Performed by: NURSE PRACTITIONER

## 2023-08-29 RX ORDER — SODIUM CHLORIDE 0.9 % (FLUSH) 0.9 %
5-40 SYRINGE (ML) INJECTION EVERY 12 HOURS SCHEDULED
Status: DISCONTINUED | OUTPATIENT
Start: 2023-08-29 | End: 2023-09-01 | Stop reason: HOSPADM

## 2023-08-29 RX ORDER — SODIUM CHLORIDE 0.9 % (FLUSH) 0.9 %
5-40 SYRINGE (ML) INJECTION PRN
Status: DISCONTINUED | OUTPATIENT
Start: 2023-08-29 | End: 2023-09-01 | Stop reason: HOSPADM

## 2023-08-29 RX ORDER — SODIUM CHLORIDE 9 MG/ML
INJECTION, SOLUTION INTRAVENOUS PRN
Status: DISCONTINUED | OUTPATIENT
Start: 2023-08-29 | End: 2023-09-01 | Stop reason: HOSPADM

## 2023-08-29 RX ORDER — METOPROLOL SUCCINATE 50 MG/1
50 TABLET, EXTENDED RELEASE ORAL DAILY
Status: DISCONTINUED | OUTPATIENT
Start: 2023-08-29 | End: 2023-08-31

## 2023-08-29 RX ADMIN — FAMOTIDINE 20 MG: 20 TABLET, FILM COATED ORAL at 23:21

## 2023-08-29 RX ADMIN — LISINOPRIL 2.5 MG: 2.5 TABLET ORAL at 10:05

## 2023-08-29 RX ADMIN — SPIRONOLACTONE 25 MG: 25 TABLET ORAL at 10:05

## 2023-08-29 RX ADMIN — GABAPENTIN 600 MG: 600 TABLET, FILM COATED ORAL at 18:11

## 2023-08-29 RX ADMIN — BACLOFEN 10 MG: 10 TABLET ORAL at 23:21

## 2023-08-29 RX ADMIN — MEROPENEM 1000 MG: 1 INJECTION, POWDER, FOR SOLUTION INTRAVENOUS at 13:53

## 2023-08-29 RX ADMIN — GABAPENTIN 600 MG: 600 TABLET, FILM COATED ORAL at 23:21

## 2023-08-29 RX ADMIN — SODIUM CHLORIDE, PRESERVATIVE FREE 10 ML: 5 INJECTION INTRAVENOUS at 23:42

## 2023-08-29 RX ADMIN — SODIUM CHLORIDE, PRESERVATIVE FREE 10 ML: 5 INJECTION INTRAVENOUS at 23:44

## 2023-08-29 RX ADMIN — MEROPENEM 1000 MG: 1 INJECTION, POWDER, FOR SOLUTION INTRAVENOUS at 05:43

## 2023-08-29 RX ADMIN — OXCARBAZEPINE 300 MG: 300 TABLET, FILM COATED ORAL at 23:21

## 2023-08-29 RX ADMIN — OXCARBAZEPINE 300 MG: 300 TABLET, FILM COATED ORAL at 10:06

## 2023-08-29 RX ADMIN — ENOXAPARIN SODIUM 40 MG: 100 INJECTION SUBCUTANEOUS at 10:06

## 2023-08-29 RX ADMIN — ASPIRIN 81 MG 81 MG: 81 TABLET ORAL at 10:05

## 2023-08-29 RX ADMIN — MEROPENEM 1000 MG: 1 INJECTION, POWDER, FOR SOLUTION INTRAVENOUS at 23:41

## 2023-08-29 RX ADMIN — SODIUM CHLORIDE, PRESERVATIVE FREE 30 ML: 5 INJECTION INTRAVENOUS at 23:22

## 2023-08-29 RX ADMIN — GABAPENTIN 600 MG: 600 TABLET, FILM COATED ORAL at 10:06

## 2023-08-29 RX ADMIN — FAMOTIDINE 20 MG: 20 TABLET, FILM COATED ORAL at 10:06

## 2023-08-29 RX ADMIN — POLYVINYL ALCOHOL 1 DROP: 14 SOLUTION/ DROPS OPHTHALMIC at 23:22

## 2023-08-29 RX ADMIN — GABAPENTIN 600 MG: 600 TABLET, FILM COATED ORAL at 13:54

## 2023-08-29 RX ADMIN — BACLOFEN 10 MG: 10 TABLET ORAL at 10:05

## 2023-08-29 ASSESSMENT — PAIN SCALES - GENERAL
PAINLEVEL_OUTOF10: 0
PAINLEVEL_OUTOF10: 3
PAINLEVEL_OUTOF10: 0

## 2023-08-29 NOTE — PROGRESS NOTES
Report called to [facility] on 8a. Patient transported to [address] [place] [address] 14.

## 2023-08-29 NOTE — PROGRESS NOTES
Order received for CVC removal per Dr. Navi Gipson . Patient placed in bed. Transparent dressing removed. Skin accessed appears clean and dry. Sutures removed, area cleaned with chloro prep, sterile vaseline gauze and  sterile gauze placed over site, CVC removed with tip intact, pressure held with sterile gauze for 3 minutes. New transparent dressing applied. Educated the patient on remaining in bed for 30 mins, dressing stays on for 24 hours, signs and symptoms of infection and notify primary nurse if any blood or oozing. Germain RN notified.

## 2023-08-29 NOTE — PROGRESS NOTES
[facility]  INPATIENT SPEECH THERAPY  STRZ ONC MED 5K  DAILY NOTE    TIME   SLP Individual Minutes  Time In: 08  Time Out: 9270  Minutes: 9  Timed Code Treatment Minutes: 0 Minutes       Date: 2023  Patient Name: Tamar Peabody      CSN: [id number]   : 1955  (79 y.o.)  Gender: male   Referring Physician:  Feliz Lombard, MD  Diagnosis: Somnolence   Precautions: Fall risk, aspiration precautions, contact precautions   Current Diet: NPO for procedure 23, resume minced and moist, mildly thick liquids once cleared for PO intake. Respiratory Status: Room Air  Swallowing Strategies:  Full Upright Position, Small Bite/Sip, Pulmonary Monitoring, Medications Crushed with Puree, Alternate Solids and Liquids, and Monitor for Fatigue  Date of Last MBS/FEES:  MBS 23    Pain:  No pain reported. Subjective:  Spoke with RN Germain for approval of ST interventions. Upon arrival, patient laying in bed. Alert and cooperative. Short-Term Goals:  SHORT TERM GOAL #1:  Goal 1: Patient will consume minced and moist diet and mildly thick liquids (advanced texture trials with ST only) with no overt s/s of pulmonary decline to maintain adequate nutrition and hydration measures   INTERVENTIONS: DNT due to being NPO for procedure this date. Patient reports no overt difficulties with diet advancement. SHORT TERM GOAL #2:  Goal 2: Patient will complete pharyngeal strengthening exercises x10 with adequate success to improve airway protection  INTERVENTIONS:Completed the following pharyngeal exercises in conjunction with skilled demonstration and rationale on proper technique. Effortful swallow -- x10 - 2 sets - fair success, patient fatigued quickly. *Encouraged nightly completion outside of therapy day, patient verbalized understanding      Long-Term Goals:  No LTGs established due to short ELOS.         EDUCATION:  Learner: Patient  Education:  Reviewed diet and strategies, Reviewed signs,

## 2023-08-29 NOTE — CARE COORDINATION
8/29/23, 3:15 PM EDT    DISCHARGE ON GOING EVALUATION    [address] day: 8  Location: -25/025-A Reason for admit: Somnolence [R40.0]  Elevated troponin [R77.8]  Septic shock (HCC) [A41.9, R65.21]  Urinary tract infection with hematuria, site unspecified [N39.0, R31.9]  Pneumonia of left lung due to infectious organism, unspecified part of lung [J18.9]   Procedure:   8/21 CXR: Left midlung atelectasis/infiltrate  8/21 CVC RIJ  8/22 Intubated  8/22 CTA Chest: No evidence of pulmonary emboli; Abnormal density throughout both lung fields which could be secondary to ARDS, bilateral pneumonia or congestive heart failure; Mild cardiomegaly; Thoracic spondylosis. .   8/22 CT Abd/pelvis: Left-sided hydronephrosis and hydroureter secondary to a 5.6 mm stone in the left mid ureter. 1.9 x 1.9 cm cystic lesion in the upper pole of left kidney with associated calcification; Punctate stones in the lower pole of the right kidney. An 0.3 cm cyst arising from the upper pole of the right kidney; Status post cholecystectomy; Atrophic pancreas; Pompa catheter within the bladder; Enteric tube in place; Atherosclerotic calcification in the abdominal aorta and iliac arteries; Lumbar spondylosis. Calcification along the anterior longitudinal ligament which could be secondary to ankylosing spondylitis  8/22 EEG 20 min: suggests moderate non specific encephalopathy; presence of left parietal sharp increases patient risk for focal seizures; presence of triphasic wave discharges suggest underlying metabolic derangement  3/43 Echo with EF 40%; Hypokinetic motion of the mid anteroseptal wall noted in the left ventricle  8/22 Suprapubic catheter exchanged  8/22 CYSTOSCOPY LEFT URETERAL STENT INSERTION  8/24 Extubated  8/25 MBS: Laryngeal penetration of thin, mildly thick and soft solid barium with aspiration of thin and soft solid barium  8/28 Midline right basilic    Barriers to Discharge: POD #7.  Plan for cardiac cath

## 2023-08-29 NOTE — CONSULTS
[facility] [address] [place] [address]    Patient's Name/Date of Birth: Sebastian Moyer / 1955 (45 y.o.)    Date: August 29, 2023     Referring Provider: Veronica Ball DO    CHIEF COMPLAINT: Fever and confusion    Consulted for: Status post ICU stay for septic shock, troponins in the 400s with a new EF drop      HPI: This is a pleasant 79 y.o. male presents with    ED note displays the patient was febrile with a temperature of 102.2 and tachycardic in the ED on arrival.  Work-up revealed patient was septic secondary to UTI. Presentation patient was normotensive but became hypotensive difficult to arouse. Patient was then placed on IV fluids and admitted to the ICU for Levophed drip. Patient also had an elevated troponin on admission around 200 this escalated to a peak value of 488 during the time of patient's septic shock. During the time patient denied any kind of chest pain or any other pains. Last troponin taken 8/28/2023 shows that the patient's troponins have dropped to 281. At this time he is still not complaining of any chest pains. Echo:   Cardiac echo 8/22/2023  Summary   Ejection fraction is visually estimated at 40%. Hypokinetic motion of the mid anteroseptal wall noted in the left ventricle. The aortic valve leaflets were not well visualized. The aortic valve appears to be trileaflet with good leaflet separation. Trivial aortic regurgitation is noted. Cardiac echo 7/20/2022  Summary   Technically difficult examination   Left ventricular size and systolic function is normal. Ejection fraction   was estimated at 55-60%. LV wall thickness is within normal limits and   there are no obvious wall motion abnormalities. The right ventricular size was normal with normal systolic function and wall thickness.        All labs, EKG's, diagnostic testing and images as well as cardiac cath, stress testing were reviewed during this encounter    Past Medical History:   Diagnosis

## 2023-08-29 NOTE — PROGRESS NOTES
Hospitalist Progress Note    Patient:  Samuel Santos    Unit/Bed:5K-25/025-A  YOB: 1955  MRN: [id number]    Acct: [de-identified]   PCP: Medhat Leone MD    Date of Admission: 8/21/2023    Assessment/Plan:    Acute Hypoxic RF 2/2 Septic shock / CMP: Pt initially needing escalating O2 requirements, became fatigued on BiPAP, intubated on 8/22, extubated 8/24 to 6L NC. Currently at 2L NC w/SpO2 97%. Wean as tolerated for goal SpO2 >90%  IS / Acapella / body positioning to maximize respiratory mechanics  ESBL E coli Bacteremia (Septic Shock resolved): 2/2 Complicated UTI / left Ureteral stone s/p Cyst, stenting, stone removal, and SP-cath removal. On arrival, Tmax 102.2, HR >100, BP initially stable and then precipitously dropped in 70's/40's. WBC > 15, PCT 23.08, LA 3.9. Blood cultures (+) ESBL E coli and Urine showing UTI w/culture (+) mixed growth. S/p Sepsis IVF, Started on Levophed, weaned off on 8/24. Pt started on Merrem 8/22 (Day # 5/7). Has been afebrile w/improving VS / Labs - continue to monitor for signs of flaring infection  C/w Merrem Abx at this time - will need a prolonged course   Repeat blood cultures x 2 ordered 8/26 - NGTD   Midline order placed - will need 2 weeks total Abx, to finish w/IV Invanz on D/c   New onset CMP / Systolic CHF w/exacerbation: HFrEF, Echo 8/21/23 shows new EF drop to 40%, Hypokinetic motion of mid-anteroseptal wall of LV. Noted to have elevated cardiac enzymes HsT 488-408 w/pro-BNP [id number]. All in the setting of Septic shock. Initial concern was for NSTEMI, but no signs of active ischemia noted. Likely 2/2 to overwhelming metabolic demand and hypotension, but cannot r/o Ischemic insult at this time. Was on IV Bumex for aggressive diuresis.    Bumex changed to 1mg PO BID - adjust accordingly as clinical status improves  Will need GDMT initiation / optimization prior to d/c - On Lisinopril 2.5mg + Aldactone 25mg + Bumex 1mg PO BID   Added on Toprol XL Site: Peripheral Vein            Current Antibiotics: none    Pneumonia Panel, Molecular [[id number]] Collected: 08/22/23 1004    Order Status: Completed Specimen: BAL- Bronch. Lavage Updated: 08/22/23 1245     Source BAL     Specimen Acceptability NA     Acinetobacter calcoaceticus-baumannii by PCR Not Detected     Enterobacter cloacae complex by PCR Not Detected     Escherichia coli by PCR Not Detected     Haemophilus Influenzae by PCR Not Detected     Klebsiella aerogenes by PCR Not Detected     Klebsiella oxytoca by PCR Not Detected     Klebsiella pneumoniae group by PCR Not Detected     Moraxella catarrhalis by PCR Not Detected     Proteus species by PCR Not Detected     Pseudomonas aeruginosa by PCR Not Detected     Serratia marcescens by PCR Not Detected     Staph aureus by PCR Not Detected     Strep agalactiae by PCR Not Detected     Strep pneumoniae by PCR Not Detected     Strep pyogenes by PCR Not Detected     Resistant gene ctx-m by PCR N/A     Resistant gene imp by PCR N/A     Resistant gene kpc by PCR N/A     Resistant gene meca/c & mrej by PCR N/A     Resistant gene ndm by PCR N/A     Resistant gene oxa-48-like by pcr N/A     Resistant gene vim by PCR N/A     Chlamydia pneumoniae By PCR Not Detected     Legionella pneumophilia by PCR Not Detected     Mycoplasma pneumoniae by PCR Not Detected     Adenovirus by PCR Not Detected     Non-SARS Coronavirus Not Detected     Metapneumovirus by PCR Not Detected     Rhinovirus Enterovirus PCR Not Detected     Influenza A by PCR Not Detected     Influenza B by PCR Not Detected     Parainfluenza virus by PCR Not Detected     Respiratory Syncytial Virus by PCR Not Detected     Comment: NOTE: Detection of bacterial nucleic acid may be indicative of colonizing or  normal respiratory cresencio and may not indicate the causative agent of  pneumonia. Antimicrobial resistance can occur via multiple mechanisms.  A NOT  DETECTED result for a genetic marker of antimicrobial

## 2023-08-29 NOTE — CARE COORDINATION
8/29/23, 8:32 AM EDT    DISCHARGE PLANNING EVALUATION    Call to Evan Hayden with [facility], updated on plans for IV Invanz 2 weeks from 8/26. They will plan for this. 11:23 AM EDT  Spoke with nursing, plans for heart cath today, SW spoke with Evan Hayden with [facility] and updated on this.

## 2023-08-30 ENCOUNTER — APPOINTMENT (OUTPATIENT)
Dept: CARDIAC CATH/INVASIVE PROCEDURES | Age: 68
DRG: 659 | End: 2023-08-30
Payer: MEDICARE

## 2023-08-30 LAB
ANION GAP SERPL CALC-SCNC: 16 MEQ/L (ref 8–16)
BASOPHILS ABSOLUTE: 0.1 THOU/MM3 (ref 0–0.1)
BASOPHILS NFR BLD AUTO: 0.8 %
BUN SERPL-MCNC: 13 MG/DL (ref 7–22)
CALCIUM SERPL-MCNC: 8.8 MG/DL (ref 8.5–10.5)
CHLORIDE SERPL-SCNC: 103 MEQ/L (ref 98–111)
CO2 SERPL-SCNC: 23 MEQ/L (ref 23–33)
CREAT SERPL-MCNC: 0.2 MG/DL (ref 0.4–1.2)
DEPRECATED RDW RBC AUTO: 51.7 FL (ref 35–45)
EOSINOPHIL NFR BLD AUTO: 3.2 %
EOSINOPHILS ABSOLUTE: 0.2 THOU/MM3 (ref 0–0.4)
ERYTHROCYTE [DISTWIDTH] IN BLOOD BY AUTOMATED COUNT: 14.5 % (ref 11.5–14.5)
GFR SERPL CREATININE-BSD FRML MDRD: > 60 ML/MIN/1.73M2
GLUCOSE BLD STRIP.AUTO-MCNC: 77 MG/DL (ref 70–108)
GLUCOSE BLD STRIP.AUTO-MCNC: 80 MG/DL (ref 70–108)
GLUCOSE SERPL-MCNC: 69 MG/DL (ref 70–108)
HCT VFR BLD AUTO: 39.9 % (ref 42–52)
HGB BLD-MCNC: 12.7 GM/DL (ref 14–18)
IMM GRANULOCYTES # BLD AUTO: 0.03 THOU/MM3 (ref 0–0.07)
IMM GRANULOCYTES NFR BLD AUTO: 0.5 %
LYMPHOCYTES ABSOLUTE: 1.5 THOU/MM3 (ref 1–4.8)
LYMPHOCYTES NFR BLD AUTO: 22.6 %
MCH RBC QN AUTO: 30.8 PG (ref 26–33)
MCHC RBC AUTO-ENTMCNC: 31.8 GM/DL (ref 32.2–35.5)
MCV RBC AUTO: 96.8 FL (ref 80–94)
MONOCYTES ABSOLUTE: 0.7 THOU/MM3 (ref 0.4–1.3)
MONOCYTES NFR BLD AUTO: 10 %
NEUTROPHILS NFR BLD AUTO: 62.9 %
NRBC BLD AUTO-RTO: 0 /100 WBC
PLATELET # BLD AUTO: 366 THOU/MM3 (ref 130–400)
PMV BLD AUTO: 9 FL (ref 9.4–12.4)
POTASSIUM SERPL-SCNC: 4.3 MEQ/L (ref 3.5–5.2)
RBC # BLD AUTO: 4.12 MILL/MM3 (ref 4.7–6.1)
SEGMENTED NEUTROPHILS ABSOLUTE COUNT: 4.2 THOU/MM3 (ref 1.8–7.7)
SODIUM SERPL-SCNC: 142 MEQ/L (ref 135–145)
WBC # BLD AUTO: 6.6 THOU/MM3 (ref 4.8–10.8)

## 2023-08-30 PROCEDURE — C1760 CLOSURE DEV, VASC: HCPCS

## 2023-08-30 PROCEDURE — 1200000003 HC TELEMETRY R&B

## 2023-08-30 PROCEDURE — 6370000000 HC RX 637 (ALT 250 FOR IP): Performed by: NURSE PRACTITIONER

## 2023-08-30 PROCEDURE — 2580000003 HC RX 258: Performed by: STUDENT IN AN ORGANIZED HEALTH CARE EDUCATION/TRAINING PROGRAM

## 2023-08-30 PROCEDURE — 99232 SBSQ HOSP IP/OBS MODERATE 35: CPT | Performed by: INTERNAL MEDICINE

## 2023-08-30 PROCEDURE — 93458 L HRT ARTERY/VENTRICLE ANGIO: CPT | Performed by: INTERNAL MEDICINE

## 2023-08-30 PROCEDURE — 6360000002 HC RX W HCPCS: Performed by: STUDENT IN AN ORGANIZED HEALTH CARE EDUCATION/TRAINING PROGRAM

## 2023-08-30 PROCEDURE — C1769 GUIDE WIRE: HCPCS

## 2023-08-30 PROCEDURE — 4A023N7 MEASUREMENT OF CARDIAC SAMPLING AND PRESSURE, LEFT HEART, PERCUTANEOUS APPROACH: ICD-10-PCS | Performed by: INTERNAL MEDICINE

## 2023-08-30 PROCEDURE — 6360000002 HC RX W HCPCS

## 2023-08-30 PROCEDURE — 6360000002 HC RX W HCPCS: Performed by: NURSE PRACTITIONER

## 2023-08-30 PROCEDURE — 80048 BASIC METABOLIC PNL TOTAL CA: CPT

## 2023-08-30 PROCEDURE — 2580000003 HC RX 258: Performed by: NURSE PRACTITIONER

## 2023-08-30 PROCEDURE — C1894 INTRO/SHEATH, NON-LASER: HCPCS

## 2023-08-30 PROCEDURE — 85025 COMPLETE CBC W/AUTO DIFF WBC: CPT

## 2023-08-30 PROCEDURE — 6360000004 HC RX CONTRAST MEDICATION: Performed by: INTERNAL MEDICINE

## 2023-08-30 PROCEDURE — 2500000003 HC RX 250 WO HCPCS

## 2023-08-30 PROCEDURE — 36415 COLL VENOUS BLD VENIPUNCTURE: CPT

## 2023-08-30 PROCEDURE — 6370000000 HC RX 637 (ALT 250 FOR IP): Performed by: INTERNAL MEDICINE

## 2023-08-30 PROCEDURE — 82948 REAGENT STRIP/BLOOD GLUCOSE: CPT

## 2023-08-30 PROCEDURE — 2580000003 HC RX 258: Performed by: INTERNAL MEDICINE

## 2023-08-30 PROCEDURE — 027035Z DILATION OF CORONARY ARTERY, ONE ARTERY WITH TWO DRUG-ELUTING INTRALUMINAL DEVICES, PERCUTANEOUS APPROACH: ICD-10-PCS | Performed by: INTERNAL MEDICINE

## 2023-08-30 PROCEDURE — B2111ZZ FLUOROSCOPY OF MULTIPLE CORONARY ARTERIES USING LOW OSMOLAR CONTRAST: ICD-10-PCS | Performed by: INTERNAL MEDICINE

## 2023-08-30 PROCEDURE — 1200000000 HC SEMI PRIVATE

## 2023-08-30 PROCEDURE — 93458 L HRT ARTERY/VENTRICLE ANGIO: CPT

## 2023-08-30 RX ORDER — ROSUVASTATIN CALCIUM 20 MG/1
20 TABLET, COATED ORAL NIGHTLY
Status: DISCONTINUED | OUTPATIENT
Start: 2023-08-30 | End: 2023-09-01 | Stop reason: HOSPADM

## 2023-08-30 RX ADMIN — SODIUM CHLORIDE, PRESERVATIVE FREE 10 ML: 5 INJECTION INTRAVENOUS at 23:23

## 2023-08-30 RX ADMIN — POLYVINYL ALCOHOL 1 DROP: 14 SOLUTION/ DROPS OPHTHALMIC at 14:47

## 2023-08-30 RX ADMIN — BACLOFEN 10 MG: 10 TABLET ORAL at 14:47

## 2023-08-30 RX ADMIN — SODIUM CHLORIDE, PRESERVATIVE FREE 10 ML: 5 INJECTION INTRAVENOUS at 23:25

## 2023-08-30 RX ADMIN — FAMOTIDINE 20 MG: 20 TABLET, FILM COATED ORAL at 23:25

## 2023-08-30 RX ADMIN — FAMOTIDINE 20 MG: 20 TABLET, FILM COATED ORAL at 10:12

## 2023-08-30 RX ADMIN — MEROPENEM 1000 MG: 1 INJECTION, POWDER, FOR SOLUTION INTRAVENOUS at 23:29

## 2023-08-30 RX ADMIN — MEROPENEM 1000 MG: 1 INJECTION, POWDER, FOR SOLUTION INTRAVENOUS at 14:48

## 2023-08-30 RX ADMIN — BACLOFEN 10 MG: 10 TABLET ORAL at 10:12

## 2023-08-30 RX ADMIN — POLYVINYL ALCOHOL 1 DROP: 14 SOLUTION/ DROPS OPHTHALMIC at 23:25

## 2023-08-30 RX ADMIN — GABAPENTIN 600 MG: 600 TABLET, FILM COATED ORAL at 23:25

## 2023-08-30 RX ADMIN — GABAPENTIN 600 MG: 600 TABLET, FILM COATED ORAL at 10:12

## 2023-08-30 RX ADMIN — POLYVINYL ALCOHOL 1 DROP: 14 SOLUTION/ DROPS OPHTHALMIC at 18:49

## 2023-08-30 RX ADMIN — ENOXAPARIN SODIUM 40 MG: 100 INJECTION SUBCUTANEOUS at 10:12

## 2023-08-30 RX ADMIN — SPIRONOLACTONE 25 MG: 25 TABLET ORAL at 10:12

## 2023-08-30 RX ADMIN — OXCARBAZEPINE 300 MG: 300 TABLET, FILM COATED ORAL at 10:12

## 2023-08-30 RX ADMIN — IOPAMIDOL 40 ML: 755 INJECTION, SOLUTION INTRAVENOUS at 07:50

## 2023-08-30 RX ADMIN — GABAPENTIN 600 MG: 600 TABLET, FILM COATED ORAL at 18:49

## 2023-08-30 RX ADMIN — LISINOPRIL 2.5 MG: 2.5 TABLET ORAL at 10:12

## 2023-08-30 RX ADMIN — POLYVINYL ALCOHOL 1 DROP: 14 SOLUTION/ DROPS OPHTHALMIC at 10:13

## 2023-08-30 RX ADMIN — OXCARBAZEPINE 300 MG: 300 TABLET, FILM COATED ORAL at 23:25

## 2023-08-30 RX ADMIN — METOPROLOL SUCCINATE 50 MG: 50 TABLET, EXTENDED RELEASE ORAL at 10:12

## 2023-08-30 RX ADMIN — MEROPENEM 1000 MG: 1 INJECTION, POWDER, FOR SOLUTION INTRAVENOUS at 06:56

## 2023-08-30 RX ADMIN — BACLOFEN 10 MG: 10 TABLET ORAL at 23:25

## 2023-08-30 RX ADMIN — GABAPENTIN 600 MG: 600 TABLET, FILM COATED ORAL at 14:47

## 2023-08-30 RX ADMIN — SODIUM CHLORIDE, PRESERVATIVE FREE 10 ML: 5 INJECTION INTRAVENOUS at 23:24

## 2023-08-30 RX ADMIN — ROSUVASTATIN CALCIUM 20 MG: 20 TABLET, FILM COATED ORAL at 23:25

## 2023-08-30 RX ADMIN — ASPIRIN 81 MG 81 MG: 81 TABLET ORAL at 10:12

## 2023-08-30 ASSESSMENT — PAIN SCALES - GENERAL
PAINLEVEL_OUTOF10: 0
PAINLEVEL_OUTOF10: 0

## 2023-08-30 NOTE — PROGRESS NOTES
[address] THERAPY MISSED TREATMENT NOTE  STR MED SURG 8A      Date: 2023  Patient Name: Nohelia Sliva        MRN: [id number]    : 1955  (79 y.o.)    REASON FOR MISSED TREATMENT: Per RN, patient had cardiac cath this morning and was on positional restrictions until 1200. Per chart review, patient still with NPO diet order and RN had not yet received communication from physician to re-order prior diet. ST interventions require oral intake for dysphagia management, therefore no skilled ST services provided at this time. Will f/u as clinically appropriate.        Jackie Hayes M.S., [place]

## 2023-08-30 NOTE — CARE COORDINATION
8/30/23, 7:29 AM EDT    DISCHARGE BARRIERS        Patient transferred to 8A 14. Report given to unit Ravin Bar, regarding discharge plan for this patient.

## 2023-08-30 NOTE — H&P
[address]  Sedation/Analgesia History & Physical    Pt Name: Abe Prieto  Account number: [de-identified]  MRN: [id number]  YOB: 1955  Provider Performing Procedure: Thierno Maurice MD  Referring Provider: Juan J Schultz DO   Primary Care Physician: Mariusz Avila MD  Date: 8/30/2023    PRE-PROCEDURE    Code Status: FULL CODE  Brief History/Pre-Procedure Diagnosis:   - Elevated Troponins  - HFrEF, EF 40%, 8/21/23. Hypokinetic motion of the mid anteroseptal wall seen on TTE. Prior echo 7/21/22 EF of 55-60%     Consent: : I have discussed with the patient risks, benefits, and alternatives (and relevant risks, benefits, and side effects related to alternatives or not receiving care), and likelihood of the success. The patient and/or representative appear to understand and agree to proceed. The discussion encompasses risks, benefits, and side effects related to the alternatives and the risks related to not receiving the proposed care, treatment, and services. The indication, risks and benefits of the procedure and possible therapeutic consequences and alternatives were discussed with the patient. The patient was given the opportunity to ask questions and to have them answered to his/her satisfaction.  Risks of the procedure include but are not limited to the following: Bleeding, hematoma including retroperitoneal hemmorhage, infection, pain and discomfort, injury to the aorta and other blood vessels, rhythm disturbance, low blood pressure, myocardial infarction, stroke, kidney damage/failure, myocardial perforation, allergic reactions to sedatives/contrast material, loss of pulse/vascular compromise requiring surgery, aneurysm/pseudoaneurysm formation, possible loss of a limb/hand/leg, needing blood transfusion, requiring emergent open heart surgery or emergent coronary intervention, the need for intubation/respiratory support, the requirement for defibrillation/cardioversion, and Oral, 4x daily, TONO Urban - CNP, 600 mg at 08/29/23 2321    OXcarbazepine (TRILEPTAL) tablet 300 mg, 300 mg, Oral, BID, TONO Youssef - CNP, 300 mg at 08/29/23 2321    sodium chloride flush 0.9 % injection 5-40 mL, 5-40 mL, IntraVENous, 2 times per day, TONO Urban - CNP, 10 mL at 08/29/23 2344    sodium chloride flush 0.9 % injection 5-40 mL, 5-40 mL, IntraVENous, PRN, TONO Youssef - CNP    0.9 % sodium chloride infusion, , IntraVENous, PRN, TONO Urban - CNP    enoxaparin (LOVENOX) injection 40 mg, 40 mg, SubCUTAneous, Daily, TONO Urban - CNP, 40 mg at 08/29/23 1006    acetaminophen (TYLENOL) tablet 650 mg, 650 mg, Oral, Q6H PRN, 650 mg at 08/22/23 1142 **OR** acetaminophen (TYLENOL) suppository 650 mg, 650 mg, Rectal, Q6H PRN, TONO Urban - CNP, 650 mg at 08/24/23 1412    [Held by provider] lactated ringers IV soln infusion, , IntraVENous, Continuous, Sabrina Bowen DO, Stopped at 08/25/23 2305    glucose chewable tablet 16 g, 4 tablet, Oral, PRN, TONO Youssef - CNP    dextrose bolus 10% 125 mL, 125 mL, IntraVENous, PRN **OR** dextrose bolus 10% 250 mL, 250 mL, IntraVENous, PRN, Jacquelyn Camejo APRN - CNP    glucagon (rDNA) injection 1 mg, 1 mg, SubCUTAneous, PRN, TONO Youssef - CNP    dextrose 10 % infusion, , IntraVENous, Continuous PRN, TONO Urban - CNP  Prior to Admission medications    Medication Sig Start Date End Date Taking? Authorizing Provider   OXcarbazepine (TRILEPTAL) 300 MG tablet Take 1 tablet by mouth 2 times daily    Historical Provider, MD   vitamin C (ASCORBIC ACID) 500 MG tablet Take 1 tablet by mouth 2 times daily    Historical Provider, MD   potassium chloride (KLOR-CON M) 20 MEQ extended release tablet Take 2 tablets by mouth in the morning and 2 tablets before bedtime.  7/27/22   Kim Gomez PA-C   carvedilol (COREG) 12.5 MG tablet Take 1 tablet by mouth in the morning and 1 tablet in the

## 2023-08-30 NOTE — PROGRESS NOTES
Physician Progress Note      PATIENT:               Malorie Flood  CSN #:                  [id number]  :                       1955  ADMIT DATE:       2023 12:44 PM  [address] [facility] DATE:  RESPONDING  PROVIDER #:        Corrinne Cost          QUERY TEXT:    Patient admitted with septic shock. The H&P states, \"Complicated UTI:   secondary to 2/2 chronic indwelling catheter d/t neurogenic bladder,\" however   CAUTI has not been repeated since. If possible, please document in the   progress notes and discharge summary if the CAUTI was: The medical record reflects the following:  Risk Factors: chronic indwelling catheter, neurogenic bladder  Clinical Indicators: H&P states, \"Complicated UTI: secondary to 2/2 chronic   indwelling catheter d/t neurogenic bladder\"  Treatment: IV ATBs, IVF, Tylenol, pressors, ICU monitoring, labs, imaging,   Urology consult, catheter exchanged    Thank you! Lois Staples RN, BSN, RHIT, CCDS  Clinical   Options provided:  -- CAUTI POA confirmed after study  -- UTI unrelated to urinary catheter  -- Other - I will add my own diagnosis  -- Disagree - Not applicable / Not valid  -- Disagree - Clinically unable to determine / Unknown  -- Refer to Clinical Documentation Reviewer    PROVIDER RESPONSE TEXT:    CAUTI POA confirmed after study.     Query created by: Lois Staples on 2023 1:01 PM      Electronically signed by:  Corrinne Cost 2023 3:43 PM

## 2023-08-30 NOTE — PLAN OF CARE
Problem: Discharge Planning  Goal: Discharge to home or other facility with appropriate resources  Outcome: Progressing     Problem: Pain  Goal: Verbalizes/displays adequate comfort level or baseline comfort level  Outcome: Progressing     Problem: Safety - Adult  Goal: Free from fall injury  Outcome: Progressing     Problem: Nutrition Deficit:  Goal: Optimize nutritional status  Outcome: Progressing     Problem: Skin/Tissue Integrity  Goal: Absence of new skin breakdown  Description: 1. Monitor for areas of redness and/or skin breakdown  2. Assess vascular access sites hourly  3. Every 4-6 hours minimum:  Change oxygen saturation probe site  4. Every 4-6 hours:  If on nasal continuous positive airway pressure, respiratory therapy assess nares and determine need for appliance change or resting period.   Outcome: Progressing     Problem: Respiratory - Adult  Goal: Achieves optimal ventilation and oxygenation  Outcome: Progressing     Problem: Skin/Tissue Integrity - Adult  Goal: Skin integrity remains intact  Outcome: Progressing     Problem: Musculoskeletal - Adult  Goal: Return mobility to safest level of function  Outcome: Progressing     Problem: Gastrointestinal - Adult  Goal: Minimal or absence of nausea and vomiting  Outcome: Progressing     Problem: Genitourinary - Adult  Goal: Absence of urinary retention  Outcome: Progressing     Problem: Infection - Adult  Goal: Absence of infection at discharge  Outcome: Progressing

## 2023-08-30 NOTE — PROCEDURES
[place] [address]                            CARDIAC CATHETERIZATION    PATIENT NAME: Mabel Jean                   :        1955  MED REC NO:   [id number]                           ROOM:       0014  ACCOUNT NO:   [de-identified]                           ADMIT DATE: 2023  PROVIDER:     Pattie Hazel MD    DATE OF PROCEDURE:  2023    SURGEON:  Pattie Hazel MD    INDICATION FOR PROCEDURE:  NSTEMI with recent history of sepsis with new  cardiomyopathy. DESCRIPTION OF PROCEDURE:  After informed consent was obtained from the  patient, he was brought to the cardiac catheterization laboratory and  prepped and draped in a sterile fashion. Left femoral artery was chosen  as the primary point of access due to poor radial artery access and  right lower extremity contracture. After infiltration of the left  inguinal region with 2% lidocaine using micropuncture and modified  Seldinger technique under fluoroscopic guidance and ultrasound guidance,  I was able to insert a 5-Grenadian sheath in to the left femoral artery. I  then performed diagnostic coronary angiography using 5-Grenadian JL4 and  5-Grenadian JR4 catheters. CORONARY ANGIOGRAM:  LEFT MAIN:  Patent without any significant obstruction. LAD:  There appears to be significant calcification and plaque rupture  in the mid segment of the LAD. That in totality equals about 80%  stenosis. There is about a 90% stenosis in the mid to distal LAD. The  LAD itself has no significant obstruction. There is a large diagonal  branch with ostial 60% to 70% stenosis. LEFT CIRCUMFLEX:  Left circumflex appears to be almost . The  proximal vessel is heavily diseased in the mid OM. The OM beyond that  it is heavily disease, it almost functions as a ramus intermedius. RCA:  RCA has 95% stenosis in the mid section. The distal PDA and PLV  are severely diseased.   The ostium of the PDA and the distal RCA totally  have 90% stenosis. The PLV has about a tubular 60% stenosis from the  ostium all the way into the mid section of the vessel. LV:  LV systolic pressure 574. No significant LV to AO systolic  gradient. LVEDP is 8. IMMEDIATE COMPLICATIONS:  None. MEDICATIONS:  See EMR. ACCESS:  Tomie Mussel is used for hemostasis. ESTIMATED BLOOD LOSS:  Less than 50 mL. SUMMARY:  Severe multivessel CAD with ischemic cardiomyopathy. PLAN:  1. Bedrest.  2.  Optimal medical therapy. 3.  Risk factor management. 4.  Routine access site care. 5.  IV fluids. 6.  CT Surgery consultation. 7.  Restart heparin four hours postprocedure. 8.  Await CT Surgery evaluation prior to proceeding with  revascularization. All the above was explained to the patient and the patient's family. They were agreeable and amenable to the above plan.         Meghan Stoll MD    D: 08/30/2023 9:53:51       T: 08/30/2023 11:02:04     [email or web address]  Job#: [id number]     Doc#: [id number]    CC:

## 2023-08-30 NOTE — BRIEF OP NOTE
[facility]  Sedation/Analgesia Post Sedation Record    Pt Name: Zachary Forte  Account number: [de-identified]  MRN: [id number]  YOB: 1955  Procedure Performed By: MD MD Flaco Haro [facility]  Primary Care Physician: Mike Zendejas MD  Date: 8/30/2023    POST-PROCEDURE    Physicians/Assistants: MD MD Flaco Haro RPVI    Procedure Performed:Cath      Sedation/Anesthesia: Versed/ Fentanyl and 2% xylocaine local anesthesia. Estimated Blood Loss: < 50 ml. Specimens Removed: None         Disposition of Specimen: N/A        Complications: No Immediate Complications.        Post-procedure Diagnosis/Findings:       3VD  CTS vs PCI          MD MD Flaco Haro, CARIDAD  Electronically signed 8/30/2023 at 7:40 AM  Interventional Cardiology

## 2023-08-30 NOTE — PROGRESS NOTES
Hospitalist Progress Note    Patient:  Alexander Teixeira    Unit/Bed:8A-14/014-A  YOB: 1955  MRN: [id number]    Acct: [de-identified]   PCP: Linda Redman MD    Date of Admission: 8/21/2023    Assessment/Plan:    New onset Ischemic CMP / Systolic CHF w/exacerbation: HFrEF, Echo 8/21/23 shows new EF drop to 40%, Hypokinetic motion of mid-anteroseptal wall of LV. Noted to have elevated cardiac enzymes HsT 488-408 w/pro-BNP 5632-2815. All in the setting of Septic shock. Initial concern was for NSTEMI, but no signs of active ischemia noted. Likely 2/2 to overwhelming metabolic demand and hypotension, but cannot r/o Ischemic insult at this time. Was on IV Bumex for aggressive diuresis. Bumex changed to 1mg PO BID - adjust accordingly as clinical status improves  Will need GDMT initiation / optimization prior to d/c - On Lisinopril 2.5mg + Aldactone 25mg + Bumex 1mg PO BID   Added on Toprol XL 25mg daily (Start 8/27) since pt has been off Levophed for 2+ days - will give 12.5mg dose 1x today (8/26)  Will titrate to tolerable levels as clinical course progresses and plan to transition over to ARNi (w/36hr w/o) + SGLT2i   Cardiology consulted - [address] [place] [address] completed today   3VD - CTS vs PCI   Obstructive 3V-CAD s/p LHC: Significant calc / plaque rupture mid-LAD w/80% stenosis, 90% mid-distal LAD, 70% Ostial stenosis, LCX , RCA 95% stenosis, w/significant disease in several branching vessels. C/w ASA / BB / Lipitor   CTS to evaluate pt for potential CABG  ESBL E coli Bacteremia (Septic Shock resolved): 2/2 Complicated UTI / left Ureteral stone s/p Cyst, stenting, stone removal, and SP-cath removal. On arrival, Tmax 102.2, HR >100, BP initially stable and then precipitously dropped in 70's/40's. WBC > 15, PCT 23.08, LA 3.9. Blood cultures (+) ESBL E coli and Urine showing UTI w/culture (+) mixed growth. S/p Sepsis IVF, Started on Levophed, weaned off on 8/24. Pt started on Merrem 8/22 (Day # 5/7).    Has been spondylosis. Calcification along the anterior longitudinal ligament which could be secondary to ankylosing spondylitis. **This report has been created using voice recognition software. It may contain minor errors which are inherent in voice recognition technology. **      Final report electronically signed by DR Angela Jovel on 8/22/2023 11:45 AM      XR CHEST PORTABLE   Final Result   1. The endotracheal tube terminates 3.5 cm above the kelly. The right internal jugular venous catheter terminates overlying the region of the cavoatrial junction. The nasogastric tube terminates below the diaphragm likely within stomach. 2. Small bilateral pleural effusions. 3. Worsening of bilateral airspace disease. 4. Left retrocardiac opacity can relate to atelectasis, infiltrate, and/or pleural effusion. 5. Otherwise, there has been no other significant interval change in the radiographic appearance of the chest  from earlier today at 0735 hours. **This report has been created using voice recognition software. It may contain minor errors which are inherent in voice recognition technology. **      Final report electronically signed by Dr Rudy Porter on 8/22/2023 9:52 AM      XR CHEST PORTABLE   Final Result   Worsening bilateral patchy pulmonary infiltrates suggestive of prominent edema. **This report has been created using voice recognition software. It may contain minor errors which are inherent in voice recognition technology. **      Final report electronically signed by Dr. Arabella Spear on 8/22/2023 7:48 AM      XR CHEST PORTABLE   Final Result   Impression:   New airspace opacity at the left lung base is likely atelectasis and/or    infiltrate. This document has been electronically signed by: Danna Hairston MD on    08/21/2023 11:53 PM      XR CHEST PORTABLE   Final Result      Left midlung atelectasis/infiltrate.                **This report has been created using voice

## 2023-08-30 NOTE — PLAN OF CARE
Problem: Discharge Planning  Goal: Discharge to home or other facility with appropriate resources  8/30/2023 1259 by Rufus Jimenez RN  Outcome: Progressing     Problem: Pain  Goal: Verbalizes/displays adequate comfort level or baseline comfort level  8/30/2023 1259 by Rufus Jimenez RN  Outcome: Progressing     Problem: Safety - Adult  Goal: Free from fall injury  8/30/2023 1259 by Rufus Jimenez RN  Outcome: Progressing     Problem: Nutrition Deficit:  Goal: Optimize nutritional status  8/30/2023 1259 by Rufus Jimenez RN  Outcome: Progressing     Problem: Skin/Tissue Integrity  Goal: Absence of new skin breakdown  Description: 1. Monitor for areas of redness and/or skin breakdown  2. Assess vascular access sites hourly  3. Every 4-6 hours minimum:  Change oxygen saturation probe site  4. Every 4-6 hours:  If on nasal continuous positive airway pressure, respiratory therapy assess nares and determine need for appliance change or resting period. 8/30/2023 1259 by Rufus Jimenez RN  Outcome: Progressing     Problem: Respiratory - Adult  Goal: Achieves optimal ventilation and oxygenation  8/30/2023 1259 by Rufus Jimenez RN  Outcome: Progressing     Problem: Skin/Tissue Integrity - Adult  Goal: Skin integrity remains intact  8/30/2023 1259 by Rufus Jimenez RN  Outcome: Progressing     Problem: Musculoskeletal - Adult  Goal: Return mobility to safest level of function  8/30/2023 1259 by Rufus Jimenez RN  Outcome: Progressing     Problem: Gastrointestinal - Adult  Goal: Minimal or absence of nausea and vomiting  8/30/2023 1259 by Rufus Jimenez RN  Outcome: Progressing     Problem: Genitourinary - Adult  Goal: Absence of urinary retention  8/30/2023 1259 by Rufus Jimenez RN  Outcome: Progressing     Problem: Infection - Adult  Goal: Absence of infection at discharge  8/30/2023 1259 by Rufus Jimenez RN  Outcome: Progressing    Care plan reviewed with patient.   Patient verbalizes understanding of the care plan and

## 2023-08-31 ENCOUNTER — APPOINTMENT (OUTPATIENT)
Dept: CARDIAC CATH/INVASIVE PROCEDURES | Age: 68
DRG: 659 | End: 2023-08-31
Payer: MEDICARE

## 2023-08-31 PROBLEM — I25.10 CAD, MULTIPLE VESSEL: Status: ACTIVE | Noted: 2023-08-31

## 2023-08-31 LAB
ACTIVATED CLOTTING TIME: 299 SECONDS (ref 1–150)
ALBUMIN SERPL BCG-MCNC: 3.1 G/DL (ref 3.5–5.1)
ALP SERPL-CCNC: 155 U/L (ref 38–126)
ALT SERPL W/O P-5'-P-CCNC: 19 U/L (ref 11–66)
ANION GAP SERPL CALC-SCNC: 16 MEQ/L (ref 8–16)
AST SERPL-CCNC: 16 U/L (ref 5–40)
BACTERIA BLD AEROBE CULT: NORMAL
BACTERIA BLD AEROBE CULT: NORMAL
BILIRUB SERPL-MCNC: 0.4 MG/DL (ref 0.3–1.2)
BUN SERPL-MCNC: 14 MG/DL (ref 7–22)
CALCIUM SERPL-MCNC: 8.9 MG/DL (ref 8.5–10.5)
CHLORIDE SERPL-SCNC: 102 MEQ/L (ref 98–111)
CHOLEST SERPL-MCNC: 179 MG/DL (ref 100–199)
CO2 SERPL-SCNC: 22 MEQ/L (ref 23–33)
CREAT SERPL-MCNC: < 0.2 MG/DL (ref 0.4–1.2)
DEPRECATED MEAN GLUCOSE BLD GHB EST-ACNC: 102 MG/DL (ref 70–126)
GFR SERPL CREATININE-BSD FRML MDRD: > 60 ML/MIN/1.73M2
GLUCOSE BLD STRIP.AUTO-MCNC: 70 MG/DL (ref 70–108)
GLUCOSE BLD STRIP.AUTO-MCNC: 74 MG/DL (ref 70–108)
GLUCOSE BLD STRIP.AUTO-MCNC: 76 MG/DL (ref 70–108)
GLUCOSE BLD STRIP.AUTO-MCNC: 88 MG/DL (ref 70–108)
GLUCOSE SERPL-MCNC: 70 MG/DL (ref 70–108)
HBA1C MFR BLD HPLC: 5.4 % (ref 4.4–6.4)
HDLC SERPL-MCNC: 28 MG/DL
LDLC SERPL CALC-MCNC: 133 MG/DL
POTASSIUM SERPL-SCNC: 4 MEQ/L (ref 3.5–5.2)
PROT SERPL-MCNC: 7.2 G/DL (ref 6.1–8)
SODIUM SERPL-SCNC: 140 MEQ/L (ref 135–145)
T4 FREE SERPL-MCNC: 1.48 NG/DL (ref 0.93–1.76)
TRIGL SERPL-MCNC: 90 MG/DL (ref 0–199)

## 2023-08-31 PROCEDURE — C9600 PERC DRUG-EL COR STENT SING: HCPCS

## 2023-08-31 PROCEDURE — 82948 REAGENT STRIP/BLOOD GLUCOSE: CPT

## 2023-08-31 PROCEDURE — 6370000000 HC RX 637 (ALT 250 FOR IP): Performed by: NURSE PRACTITIONER

## 2023-08-31 PROCEDURE — 36415 COLL VENOUS BLD VENIPUNCTURE: CPT

## 2023-08-31 PROCEDURE — 99232 SBSQ HOSP IP/OBS MODERATE 35: CPT | Performed by: INTERNAL MEDICINE

## 2023-08-31 PROCEDURE — 6370000000 HC RX 637 (ALT 250 FOR IP)

## 2023-08-31 PROCEDURE — 6360000002 HC RX W HCPCS: Performed by: STUDENT IN AN ORGANIZED HEALTH CARE EDUCATION/TRAINING PROGRAM

## 2023-08-31 PROCEDURE — 2500000003 HC RX 250 WO HCPCS

## 2023-08-31 PROCEDURE — 6370000000 HC RX 637 (ALT 250 FOR IP): Performed by: INTERNAL MEDICINE

## 2023-08-31 PROCEDURE — 84439 ASSAY OF FREE THYROXINE: CPT

## 2023-08-31 PROCEDURE — 85347 COAGULATION TIME ACTIVATED: CPT

## 2023-08-31 PROCEDURE — 2500000003 HC RX 250 WO HCPCS: Performed by: INTERNAL MEDICINE

## 2023-08-31 PROCEDURE — 80053 COMPREHEN METABOLIC PANEL: CPT

## 2023-08-31 PROCEDURE — 92928 PRQ TCAT PLMT NTRAC ST 1 LES: CPT | Performed by: INTERNAL MEDICINE

## 2023-08-31 PROCEDURE — 2580000003 HC RX 258: Performed by: INTERNAL MEDICINE

## 2023-08-31 PROCEDURE — 6360000004 HC RX CONTRAST MEDICATION: Performed by: INTERNAL MEDICINE

## 2023-08-31 PROCEDURE — 80061 LIPID PANEL: CPT

## 2023-08-31 PROCEDURE — 83036 HEMOGLOBIN GLYCOSYLATED A1C: CPT

## 2023-08-31 PROCEDURE — 6360000002 HC RX W HCPCS

## 2023-08-31 PROCEDURE — 1200000003 HC TELEMETRY R&B

## 2023-08-31 PROCEDURE — 2580000003 HC RX 258: Performed by: STUDENT IN AN ORGANIZED HEALTH CARE EDUCATION/TRAINING PROGRAM

## 2023-08-31 RX ORDER — SODIUM CHLORIDE 0.9 % (FLUSH) 0.9 %
5-40 SYRINGE (ML) INJECTION EVERY 12 HOURS SCHEDULED
Status: DISCONTINUED | OUTPATIENT
Start: 2023-08-31 | End: 2023-09-01 | Stop reason: HOSPADM

## 2023-08-31 RX ORDER — ATROPINE SULFATE 0.4 MG/ML
0.5 INJECTION, SOLUTION INTRAVENOUS
Status: ACTIVE | OUTPATIENT
Start: 2023-08-31 | End: 2023-09-01

## 2023-08-31 RX ORDER — DEXTROSE MONOHYDRATE, SODIUM CHLORIDE, AND POTASSIUM CHLORIDE 50; 1.49; 4.5 G/1000ML; G/1000ML; G/1000ML
INJECTION, SOLUTION INTRAVENOUS CONTINUOUS
Status: DISCONTINUED | OUTPATIENT
Start: 2023-08-31 | End: 2023-09-01 | Stop reason: HOSPADM

## 2023-08-31 RX ORDER — ACETAMINOPHEN 325 MG/1
650 TABLET ORAL EVERY 4 HOURS PRN
Status: DISCONTINUED | OUTPATIENT
Start: 2023-08-31 | End: 2023-09-01 | Stop reason: HOSPADM

## 2023-08-31 RX ORDER — SODIUM CHLORIDE 9 MG/ML
INJECTION, SOLUTION INTRAVENOUS PRN
Status: DISCONTINUED | OUTPATIENT
Start: 2023-08-31 | End: 2023-09-01 | Stop reason: HOSPADM

## 2023-08-31 RX ORDER — SODIUM CHLORIDE 9 MG/ML
INJECTION, SOLUTION INTRAVENOUS CONTINUOUS
Status: DISCONTINUED | OUTPATIENT
Start: 2023-08-31 | End: 2023-09-01 | Stop reason: HOSPADM

## 2023-08-31 RX ORDER — SODIUM CHLORIDE 0.9 % (FLUSH) 0.9 %
5-40 SYRINGE (ML) INJECTION PRN
Status: DISCONTINUED | OUTPATIENT
Start: 2023-08-31 | End: 2023-09-01 | Stop reason: HOSPADM

## 2023-08-31 RX ORDER — METOPROLOL SUCCINATE 50 MG/1
50 TABLET, EXTENDED RELEASE ORAL DAILY
Status: DISCONTINUED | OUTPATIENT
Start: 2023-08-31 | End: 2023-09-01

## 2023-08-31 RX ORDER — SODIUM CHLORIDE 9 MG/ML
INJECTION, SOLUTION INTRAVENOUS CONTINUOUS
Status: DISCONTINUED | OUTPATIENT
Start: 2023-08-31 | End: 2023-08-31 | Stop reason: SDUPTHER

## 2023-08-31 RX ADMIN — POLYVINYL ALCOHOL 1 DROP: 14 SOLUTION/ DROPS OPHTHALMIC at 22:47

## 2023-08-31 RX ADMIN — ACETAMINOPHEN 650 MG: 325 TABLET ORAL at 20:03

## 2023-08-31 RX ADMIN — SODIUM CHLORIDE: 9 INJECTION, SOLUTION INTRAVENOUS at 16:26

## 2023-08-31 RX ADMIN — ASPIRIN 81 MG 81 MG: 81 TABLET ORAL at 11:25

## 2023-08-31 RX ADMIN — GABAPENTIN 600 MG: 600 TABLET, FILM COATED ORAL at 22:47

## 2023-08-31 RX ADMIN — MEROPENEM 1000 MG: 1 INJECTION, POWDER, FOR SOLUTION INTRAVENOUS at 22:47

## 2023-08-31 RX ADMIN — BACLOFEN 10 MG: 10 TABLET ORAL at 22:47

## 2023-08-31 RX ADMIN — ROSUVASTATIN CALCIUM 20 MG: 20 TABLET, FILM COATED ORAL at 22:47

## 2023-08-31 RX ADMIN — POTASSIUM CHLORIDE, DEXTROSE MONOHYDRATE AND SODIUM CHLORIDE: 150; 5; 450 INJECTION, SOLUTION INTRAVENOUS at 11:52

## 2023-08-31 RX ADMIN — OXCARBAZEPINE 300 MG: 300 TABLET, FILM COATED ORAL at 22:47

## 2023-08-31 RX ADMIN — GABAPENTIN 600 MG: 600 TABLET, FILM COATED ORAL at 16:31

## 2023-08-31 RX ADMIN — METOPROLOL SUCCINATE 50 MG: 50 TABLET, EXTENDED RELEASE ORAL at 16:31

## 2023-08-31 RX ADMIN — MEROPENEM 1000 MG: 1 INJECTION, POWDER, FOR SOLUTION INTRAVENOUS at 06:28

## 2023-08-31 RX ADMIN — FAMOTIDINE 20 MG: 20 TABLET, FILM COATED ORAL at 22:47

## 2023-08-31 RX ADMIN — MEROPENEM 1000 MG: 1 INJECTION, POWDER, FOR SOLUTION INTRAVENOUS at 15:33

## 2023-08-31 RX ADMIN — IOPAMIDOL 50 ML: 755 INJECTION, SOLUTION INTRAVENOUS at 14:44

## 2023-08-31 ASSESSMENT — PAIN SCALES - GENERAL
PAINLEVEL_OUTOF10: 0
PAINLEVEL_OUTOF10: 0
PAINLEVEL_OUTOF10: 6
PAINLEVEL_OUTOF10: 0
PAINLEVEL_OUTOF10: 6

## 2023-08-31 ASSESSMENT — PAIN - FUNCTIONAL ASSESSMENT
PAIN_FUNCTIONAL_ASSESSMENT: PREVENTS OR INTERFERES SOME ACTIVE ACTIVITIES AND ADLS
PAIN_FUNCTIONAL_ASSESSMENT: ACTIVITIES ARE NOT PREVENTED

## 2023-08-31 ASSESSMENT — PAIN DESCRIPTION - LOCATION
LOCATION: COCCYX

## 2023-08-31 ASSESSMENT — PAIN DESCRIPTION - FREQUENCY: FREQUENCY: INTERMITTENT

## 2023-08-31 ASSESSMENT — PAIN DESCRIPTION - DESCRIPTORS
DESCRIPTORS: DISCOMFORT;ACHING
DESCRIPTORS: ACHING
DESCRIPTORS: ACHING

## 2023-08-31 ASSESSMENT — PAIN DESCRIPTION - ONSET: ONSET: GRADUAL

## 2023-08-31 ASSESSMENT — PAIN DESCRIPTION - PAIN TYPE: TYPE: ACUTE PAIN

## 2023-08-31 NOTE — PROGRESS NOTES
[address] THERAPY MISSED TREATMENT NOTE  [facility] MED SURG 8A      Date: 2023  Patient Name: Mardee Bernheim        MRN: [id number]    : 1955  (79 y.o.)    REASON FOR MISSED TREATMENT:      ST attempted to complete follow up dysphagia therapy services. ALICE Talamantes deferred ST services at this time as patient remains NPO for possible cardiac procedure this date. ST to continue to follow and re-attempt services at a later time/date. Taya Reynaga M.A.  CCC-SLP

## 2023-08-31 NOTE — H&P
[facility]  Sedation/Analgesia History & Physical    Pt Name: Abe Prieto  Account number: [de-identified]  MRN: [id number]  YOB: 1955  Provider Performing Procedure: Thierno Maurice MD MD  Referring Provider: Juan J Schultz DO   Primary Care Physician: Mariusz Avila MD  Date: 8/31/2023    PRE-PROCEDURE    Code Status: FULL CODE  Brief History/Pre-Procedure Diagnosis:   NSTEMI  Severe LAD stenosis    Consent: : I have discussed with the patient risks, benefits, and alternatives (and relevant risks, benefits, and side effects related to alternatives or not receiving care), and likelihood of the success. The patient and/or representative appear to understand and agree to proceed. The discussion encompasses risks, benefits, and side effects related to the alternatives and the risks related to not receiving the proposed care, treatment, and services. The indication, risks and benefits of the procedure and possible therapeutic consequences and alternatives were discussed with the patient. The patient was given the opportunity to ask questions and to have them answered to his/her satisfaction. Risks of the procedure include but are not limited to the following: Bleeding, hematoma including retroperitoneal hemmorhage, infection, pain and discomfort, injury to the aorta and other blood vessels, rhythm disturbance, low blood pressure, myocardial infarction, stroke, kidney damage/failure, myocardial perforation, allergic reactions to sedatives/contrast material, loss of pulse/vascular compromise requiring surgery, aneurysm/pseudoaneurysm formation, possible loss of a limb/hand/leg, needing blood transfusion, requiring emergent open heart surgery or emergent coronary intervention, the need for intubation/respiratory support, the requirement for defibrillation/cardioversion, and death. Alternatives to and omission of the suggested procedure were discussed.  The patient had no further []4    [x]Pre-procedure diagnostic studies complete and results available. Comment:    [x]Previous sedation/anesthesia experiences assessed. Comment:    [x]The patient is an appropriate candidate to undergo the planned procedure sedation and anesthesia. (Refer to nursing sedation/analgesia documentation record)  [x]Formulation and discussion of sedation/procedure plan, risks, and expectations with patient and/or responsible adult completed. [x]Patient examined immediately prior to the procedure.  (Refer to nursing sedation/analgesia documentation record)    Milka Bonner MD MD   Electronically signed 8/31/2023 at 7:12 AM

## 2023-08-31 NOTE — PROGRESS NOTES
Patient has deep tunneling wound on left buttocks. Pink sacral patch removed off wound. Buttocks cleansed with saline, moist gauze packed in tunneling wound and covered with dry gauze and ABD pad. EPC cream applied to buttocks. Wound ostomy consult placed.

## 2023-08-31 NOTE — BRIEF OP NOTE
[address]  Sedation/Analgesia Post Sedation Record    Pt Name: Nya Gutierrez  Account number: [de-identified]  MRN: [id number]  YOB: 1955  Procedure Performed By: Dennie Medicus, MD MD Amparo Pesa, [facility]  Primary Care Physician: Lissa Pierre MD  Date: 8/31/2023    POST-PROCEDURE    Physicians/Assistants: Dennie Medicus, MD MD Amparo Pesa, RPVI    Procedure Performed:PCI      Sedation/Anesthesia: Versed/ Fentanyl and 2% xylocaine local anesthesia. Estimated Blood Loss: < 50 ml. Specimens Removed: None         Disposition of Specimen: N/A        Complications: No Immediate Complications.        Post-procedure Diagnosis/Findings:       PCI of LAD proximally x 1 DEANA, LAD mid x 1 DEANA           Dennie Medicus, MD MD Amparo Pesa, RPVI  Electronically signed 8/31/2023 at 2:49 PM  Interventional Cardiology

## 2023-09-01 VITALS
BODY MASS INDEX: 28.69 KG/M2 | OXYGEN SATURATION: 96 % | SYSTOLIC BLOOD PRESSURE: 116 MMHG | DIASTOLIC BLOOD PRESSURE: 62 MMHG | HEART RATE: 94 BPM | HEIGHT: 67 IN | TEMPERATURE: 99 F | WEIGHT: 182.8 LBS | RESPIRATION RATE: 18 BRPM

## 2023-09-01 LAB
GLUCOSE BLD STRIP.AUTO-MCNC: 111 MG/DL (ref 70–108)
GLUCOSE BLD STRIP.AUTO-MCNC: 113 MG/DL (ref 70–108)
GLUCOSE BLD STRIP.AUTO-MCNC: 74 MG/DL (ref 70–108)

## 2023-09-01 PROCEDURE — 99232 SBSQ HOSP IP/OBS MODERATE 35: CPT | Performed by: REGISTERED NURSE

## 2023-09-01 PROCEDURE — 6370000000 HC RX 637 (ALT 250 FOR IP): Performed by: STUDENT IN AN ORGANIZED HEALTH CARE EDUCATION/TRAINING PROGRAM

## 2023-09-01 PROCEDURE — C1874 STENT, COATED/COV W/DEL SYS: HCPCS

## 2023-09-01 PROCEDURE — 6360000002 HC RX W HCPCS: Performed by: NURSE PRACTITIONER

## 2023-09-01 PROCEDURE — 99239 HOSP IP/OBS DSCHRG MGMT >30: CPT | Performed by: INTERNAL MEDICINE

## 2023-09-01 PROCEDURE — C1769 GUIDE WIRE: HCPCS

## 2023-09-01 PROCEDURE — 92526 ORAL FUNCTION THERAPY: CPT

## 2023-09-01 PROCEDURE — 6360000002 HC RX W HCPCS: Performed by: STUDENT IN AN ORGANIZED HEALTH CARE EDUCATION/TRAINING PROGRAM

## 2023-09-01 PROCEDURE — 6370000000 HC RX 637 (ALT 250 FOR IP): Performed by: INTERNAL MEDICINE

## 2023-09-01 PROCEDURE — 2580000003 HC RX 258: Performed by: INTERNAL MEDICINE

## 2023-09-01 PROCEDURE — 82948 REAGENT STRIP/BLOOD GLUCOSE: CPT

## 2023-09-01 PROCEDURE — C1894 INTRO/SHEATH, NON-LASER: HCPCS

## 2023-09-01 PROCEDURE — 6370000000 HC RX 637 (ALT 250 FOR IP): Performed by: NURSE PRACTITIONER

## 2023-09-01 PROCEDURE — 6370000000 HC RX 637 (ALT 250 FOR IP): Performed by: REGISTERED NURSE

## 2023-09-01 PROCEDURE — C1725 CATH, TRANSLUMIN NON-LASER: HCPCS

## 2023-09-01 PROCEDURE — C1760 CLOSURE DEV, VASC: HCPCS

## 2023-09-01 PROCEDURE — 2580000003 HC RX 258: Performed by: STUDENT IN AN ORGANIZED HEALTH CARE EDUCATION/TRAINING PROGRAM

## 2023-09-01 PROCEDURE — 6360000002 HC RX W HCPCS: Performed by: INTERNAL MEDICINE

## 2023-09-01 PROCEDURE — C1887 CATHETER, GUIDING: HCPCS

## 2023-09-01 RX ORDER — SPIRONOLACTONE 25 MG/1
25 TABLET ORAL DAILY
Qty: 30 TABLET | Refills: 3 | Status: CANCELLED | OUTPATIENT
Start: 2023-09-02

## 2023-09-01 RX ORDER — LISINOPRIL 2.5 MG/1
2.5 TABLET ORAL DAILY
Qty: 30 TABLET | Refills: 3 | DISCHARGE
Start: 2023-09-02

## 2023-09-01 RX ORDER — POLYVINYL ALCOHOL 14 MG/ML
1 SOLUTION/ DROPS OPHTHALMIC 4 TIMES DAILY
Refills: 4 | DISCHARGE
Start: 2023-09-01 | End: 2023-10-01

## 2023-09-01 RX ORDER — LISINOPRIL 2.5 MG/1
2.5 TABLET ORAL DAILY
Qty: 30 TABLET | Refills: 3 | Status: CANCELLED | OUTPATIENT
Start: 2023-09-02

## 2023-09-01 RX ORDER — METOPROLOL SUCCINATE 50 MG/1
50 TABLET, EXTENDED RELEASE ORAL 2 TIMES DAILY
Status: DISCONTINUED | OUTPATIENT
Start: 2023-09-01 | End: 2023-09-01 | Stop reason: HOSPADM

## 2023-09-01 RX ORDER — SPIRONOLACTONE 25 MG/1
25 TABLET ORAL DAILY
Qty: 30 TABLET | Refills: 3 | DISCHARGE
Start: 2023-09-02

## 2023-09-01 RX ORDER — METOPROLOL SUCCINATE 50 MG/1
50 TABLET, EXTENDED RELEASE ORAL 2 TIMES DAILY
Qty: 30 TABLET | Refills: 3 | DISCHARGE
Start: 2023-09-01

## 2023-09-01 RX ORDER — NITROGLYCERIN 0.4 MG/1
0.4 TABLET SUBLINGUAL EVERY 5 MIN PRN
Qty: 25 TABLET | Refills: 3 | Status: CANCELLED | OUTPATIENT
Start: 2023-09-01

## 2023-09-01 RX ORDER — ROSUVASTATIN CALCIUM 20 MG/1
20 TABLET, COATED ORAL NIGHTLY
Qty: 30 TABLET | Refills: 3 | DISCHARGE
Start: 2023-09-01

## 2023-09-01 RX ORDER — NITROGLYCERIN 0.4 MG/1
0.4 TABLET SUBLINGUAL EVERY 5 MIN PRN
Qty: 25 TABLET | Refills: 3 | Status: SHIPPED | OUTPATIENT
Start: 2023-09-01

## 2023-09-01 RX ADMIN — BACLOFEN 10 MG: 10 TABLET ORAL at 14:44

## 2023-09-01 RX ADMIN — OXCARBAZEPINE 300 MG: 300 TABLET, FILM COATED ORAL at 11:12

## 2023-09-01 RX ADMIN — FAMOTIDINE 20 MG: 20 TABLET, FILM COATED ORAL at 11:12

## 2023-09-01 RX ADMIN — LISINOPRIL 2.5 MG: 2.5 TABLET ORAL at 11:12

## 2023-09-01 RX ADMIN — GABAPENTIN 600 MG: 600 TABLET, FILM COATED ORAL at 17:35

## 2023-09-01 RX ADMIN — ENOXAPARIN SODIUM 40 MG: 100 INJECTION SUBCUTANEOUS at 11:04

## 2023-09-01 RX ADMIN — SODIUM CHLORIDE: 9 INJECTION, SOLUTION INTRAVENOUS at 05:56

## 2023-09-01 RX ADMIN — BACLOFEN 10 MG: 10 TABLET ORAL at 11:12

## 2023-09-01 RX ADMIN — ASPIRIN 81 MG 81 MG: 81 TABLET ORAL at 11:03

## 2023-09-01 RX ADMIN — TICAGRELOR 90 MG: 90 TABLET ORAL at 11:03

## 2023-09-01 RX ADMIN — ERTAPENEM SODIUM 1000 MG: 1 INJECTION INTRAMUSCULAR; INTRAVENOUS at 14:42

## 2023-09-01 RX ADMIN — SPIRONOLACTONE 25 MG: 25 TABLET ORAL at 11:11

## 2023-09-01 RX ADMIN — GABAPENTIN 600 MG: 600 TABLET, FILM COATED ORAL at 14:44

## 2023-09-01 RX ADMIN — GABAPENTIN 600 MG: 600 TABLET, FILM COATED ORAL at 11:12

## 2023-09-01 RX ADMIN — MEROPENEM 1000 MG: 1 INJECTION, POWDER, FOR SOLUTION INTRAVENOUS at 05:53

## 2023-09-01 ASSESSMENT — PAIN SCALES - GENERAL
PAINLEVEL_OUTOF10: 0
PAINLEVEL_OUTOF10: 0

## 2023-09-01 NOTE — PROGRESS NOTES
[facility]  INPATIENT SPEECH THERAPY  [facility] MED SURG 8A  DAILY NOTE    TIME   SLP Individual Minutes  Time In: 0848  Time Out: 0900  Minutes: 12  Timed Code Treatment Minutes: 0 Minutes       Date: 2023  Patient Name: Tamar Peabody      CSN: [id number]   : 1955  (79 y.o.)  Gender: male   Referring Physician:  Feliz Lombard, MD  Diagnosis: Somnolence   Precautions: Fall risk, aspiration precautions, contact precautions   Current Diet: Minced and moist diet and thin liquids  Respiratory Status: Room Air  Swallowing Strategies:  Full Upright Position, Small Bite/Sip, Pulmonary Monitoring, Medications Crushed with Puree, Alternate Solids and Liquids, and Monitor for Fatigue  Date of Last MBS/FEES:  MBS 23    Pain:  No pain reported. Subjective:  ALICE Zavala approved session. Patient sitting upright in bed eating breakfast upon ST arrival. Agreeable to skilled ST services. No family present. Pleasant and cooperative throughout. Short-Term Goals:  SHORT TERM GOAL #1:  Goal 1: Patient will consume minced and moist diet and mildly thick liquids (advanced texture trials with ST only) with no overt s/s of pulmonary decline to maintain adequate nutrition and hydration measures   INTERVENTIONS:   ST completed skilled dietary analysis of breakfast meal tray consisting of minced and moist sausage, scrambled eggs, pureed pancakes, and mildly thick liquids via straw. Patient with good bolus control/manipulation. No oral residue noted across PO trials. Patient edentulous with reports of not wearing dentures often at home with meals; reports he \"picks and chooses\" softer foods. No signs/symptoms of aspiration this date. Of course, cannot rule out pharyngeal dysfunction at bedside alone. Recommend patient continue minced and moist diet and mildly thick liquids with use of compensatory swallow strategies.      SHORT TERM GOAL #2:  Goal 2: Patient will complete pharyngeal strengthening exercises x10 with adequate success to improve airway protection  INTERVENTIONS:  Pharyngeal Strengthening Exercises  ST provided skilled education re: rationale and proper technique for pharyngeal strengthening exercises   Effortful swallow -- x20 reps; x2 swallows per bite/sip    *ST encouraged patient to complete as HEP      Long-Term Goals:  No LTGs established due to short ELOS. EDUCATION:  Learner: Patient  Education:  Reviewed diet and strategies, Reviewed signs, symptoms and risks of aspiration, and Reviewed ST goals and Plan of Care  Evaluation of Education: Verbalizes understanding    ASSESSMENT/PLAN:  Activity Tolerance:  Patient tolerance of  treatment: good. Assessment/Plan: Patient progressing toward established goals. Continues to require skilled care of licensed speech pathologist to progress toward achievement of established goals and plan of care. .     Plan for Next Session: Dysphagia management  Discharge Recommendations: SNF       Ervin Naidu MS, CF-SLP [id number]

## 2023-09-01 NOTE — PROGRESS NOTES
[facility] Wound Ostomy Continence Nurse  Progress Note       Kaiden Mills  AGE: 79 y.o. GENDER: male  : 1955  UNIT: 8A-14/014-A  TODAY'S DATE:  2023  ADMISSION DATE: 2023 12:44 PM  Subjective:     Reason for [address] Evaluation and Assessment: wounds on buttocks, tunneling      Kaiden Mills is a 79 y.o. male referred by:   [] Physician/PA/APRN  [x] Nursing  [] Other:     Wound Identification:  Wound Type: pressure  Contributing Factors: chronic pressure and decreased mobility    Objective:     Derrick Risk Score: Derrick Scale Score: 14    Assessment:     Encounter: Present to patient room. Patient in bed upon arrival with primary nurse at bedside. Assessment and photo to follow. Assisted patient to turn to left side. Removed soiled chux pad and old dressing. Cleansed wound with normal saline and gauze. Pat dry with clean gauze. Loosely filled wound with saline wet to dry dressing, applied EPC cream to periwound and skin irritation. Covered with ABD pad. Do not recommend using tape to secure in place. Assisted patient to turn to right side to change out sheet and chux pad. Offload patient with pillows. Placed pillows under right side per patient request. Bed in low, call light in reach. Will continue to follow and assess wound. Call with concerns and for wound evolution. 23    Wound type: Right ischium, pressure injury stage 4  Wound size: 2 cm  x 2.5 cm x 1  Undermining or Tunneling: tunnel at 12 of 3cm  Wound assessment/color: pink, red  Drainage amount: large  Drainage description: serosanguineous  Odor: none  Margins: attached  Tawny wound: excoriated, blanchable redness, macerated  Exposed structure: palpable bone         Plan:     Treatment Recommendations:   Right ischium - Cleanse wound with normal saline or wound cleanser and gauze. Pat dry with clean gauze. Loosely pack with saline wet to dry. Apply EPC cream to tawny wound. Cover with ABD pad. Do not secure with tape.  Change BID or

## 2023-09-01 NOTE — PROCEDURES
[place] [address]                            CARDIAC CATHETERIZATION    PATIENT NAME: Rubia Bradford                   :        1955  MED REC NO:   [id number]                           ROOM:       0014  ACCOUNT NO:   [de-identified]                           ADMIT DATE: 2023  PROVIDER:     Sherif Pacheco MD    DATE OF PROCEDURE:  2023    SURGEON:  Sherif Pacheco MD    INDICATION FOR PROCEDURE:  NSTEMI, not a candidate for an open heart  surgery. DESCRIPTION OF PROCEDURE:  After informed consent was obtained from the  patient, he was brought to the cardiac catheterization laboratory and  prepped in sterile fashion. Left femoral artery was chosen as the  primary point of access. Pre-procedure time-out was completed. After  infiltration of left inguinal region with 2% lidocaine using  micropuncture, modified Seldinger technique under fluoroscopic guidance  and ultrasound guidance, I was able to insert a 6-Vietnamese sheath into the  left femoral artery. Preprocedure angiography was performed. Plan was  to proceed with intervention of the LAD and then bring him back to the  intervention of the RCA on a later date. I cannulated the left main with the 6-Vietnamese VL3.5 guiding catheter. Heparin IV was given. ACT was confirmed to be above 250 seconds. I  wired the lesion using a Runthrough wire. I predilated the lesion using  2.5 NC balloon. I then used the Notranje Gorice catheter for extra support. I then stented the distal LAD with a 2.0 x 30 [place] DEANA at 8  atmospheres and then the proximal LAD with a 3.0 x 22 [place]rie [place] DEANA  at 7 atmospheres. I postdilated the stent distally with 2.0 x 20 NC  balloon up to 20 atmospheres and proximally to 3.0 x 8 NC balloon up to  20 atmospheres.   Post PCI angiography demonstrated REGLA-3 flow without  any perforation, dissection, distal embolization, side branch

## 2023-09-01 NOTE — PLAN OF CARE
Problem: Discharge Planning  Goal: Discharge to home or other facility with appropriate resources  Outcome: Progressing     Problem: Pain  Goal: Verbalizes/displays adequate comfort level or baseline comfort level  Outcome: Progressing     Problem: Safety - Adult  Goal: Free from fall injury  Outcome: Progressing     Problem: Nutrition Deficit:  Goal: Optimize nutritional status  Outcome: Progressing     Problem: Skin/Tissue Integrity  Goal: Absence of new skin breakdown  Description: 1. Monitor for areas of redness and/or skin breakdown  2. Assess vascular access sites hourly  3. Every 4-6 hours minimum:  Change oxygen saturation probe site  4. Every 4-6 hours:  If on nasal continuous positive airway pressure, respiratory therapy assess nares and determine need for appliance change or resting period.   Outcome: Progressing     Problem: Respiratory - Adult  Goal: Achieves optimal ventilation and oxygenation  Outcome: Progressing     Problem: Skin/Tissue Integrity - Adult  Goal: Skin integrity remains intact  Outcome: Progressing  Goal: Incisions, wounds, or drain sites healing without S/S of infection  Outcome: Progressing     Problem: Musculoskeletal - Adult  Goal: Return mobility to safest level of function  Outcome: Progressing     Problem: Gastrointestinal - Adult  Goal: Minimal or absence of nausea and vomiting  Outcome: Progressing     Problem: Genitourinary - Adult  Goal: Absence of urinary retention  Outcome: Progressing     Problem: Infection - Adult  Goal: Absence of infection at discharge  Outcome: Progressing     Problem: Cardiovascular - Adult  Goal: Maintains optimal cardiac output and hemodynamic stability  Outcome: Progressing  Flowsheets (Taken 8/31/2023 1945)  Maintains optimal cardiac output and hemodynamic stability:   Monitor blood pressure and heart rate   Monitor urine output and notify Licensed Independent Practitioner for values outside of normal range   Assess for signs of decreased cardiac output  Goal: Absence of cardiac dysrhythmias or at baseline  Outcome: Progressing     Problem: Metabolic/Fluid and Electrolytes - Adult  Goal: Hemodynamic stability and optimal renal function maintained  Outcome: Progressing

## 2023-09-01 NOTE — PROGRESS NOTES
Comprehensive Nutrition Assessment    Type and Reason for Visit:  Reassess, Consult, Patient Education (CAD, NSTEMI)    Nutrition Recommendations/Plan:   Recommend diet as per SLP. Trial Magic Cups TID. Recommend MVI. Encouraged po, good nutrition at best efforts. Discussed diet per SLP. Recommend cardiac diet (in addition to SLP recommendations) at [facility]. Briefly discussed basics of cardiac diet. Stressed good po intake as first priority. Malnutrition Assessment:  Malnutrition Status: At risk for malnutrition (Comment) (08/25/23 3378)    Context:  Acute Illness     Findings of the 6 clinical characteristics of malnutrition:  Energy Intake:  Mild decrease in energy intake (Comment) (per pt. pta - some days not so hungry)  Weight Loss:  No significant weight loss     Body Fat Loss:  No significant body fat loss     Muscle Mass Loss:  No significant muscle mass loss    Fluid Accumulation:  No significant fluid accumulation     Strength:  Not Performed    Nutrition Assessment:     At risk for further nutrition compromise r/t dysphagia, admit with septic shock, UTI, respiratory failure, increased nutrient needs to support wound healing and underlying medical condition (paraplegia, multiple sclerosis, dysphagia, HTN). Nutrition Related Findings:      Wound Type:  (sacrum stage I, buttock stage II, scrotum stage II, buttocks stage III); wound care c/s 8/31-wounds on buttocks, tunneling  Pt. Report/Treatments/Miscellaneous: pt. Seen - states appetite is \"a work in [employer]"; denies any  nausea or abdominal pain; states trying to get used to textures; SLP following; from ECF; s/p PCI- staged intervention; pt.  Denies any diet restrictions at [facility] - states appetite was pretty good pta  GI Status: colostomy - amount of output not documented - but having output 8/31  Pertinent Labs: Bumex, Pepcid, Aldactone  Pertinent Meds: 8/31: Glucose 70, BUN 14, Cr <0.2; Cholesterol 179, HDL 28, , Triglycerides 90, HgbA1c 5.4%      Current Nutrition Intake & Therapies:    Average Meal Intake: 1-25%, 26-50%  Average Supplements Intake:  (initiated)  ADULT DIET; Dysphagia - Minced and Moist; Mildly Thick (Nectar)  ADULT ORAL NUTRITION SUPPLEMENT; Breakfast, Lunch, Dinner; Frozen Oral Supplement    Anthropometric Measures:  Height: 5' 7\" (170.2 cm)  Ideal Body Weight (IBW): 148 lbs (67 kg)    Admission Body Weight: 195 lb 5.2 oz (88.6 kg) (8/22; no edema)  Current Body Weight: 182 lb 12.8 oz (82.9 kg) (9/1 +1 edema, bedscale; 8/25: 198# 3.1 oz (+1 edema), 8/30: 179# 3.2 oz (+1 edema)), 123.5 % IBW. Weight Source: Bed Scale  Current BMI (kg/m2): 28.6  Usual Body Weight:  (per EMR: 12/29/22 196# 3oz, 8/9/22 198# 10oz)     Weight Adjustment For: Paraplegia  Total Adjusted Percentage (Calculated): 7.5  Adjusted Ideal Body Weight (lbs) (Calculated): 136.9 lbs  Adjusted Ideal Body Weight (kg) (Calculated): 62.23 kg  Adjusted % Ideal Body Weight (Calculated): 133.5  Adjusted BMI (kg/m2) (Calculated): 30.7  BMI Categories: Overweight (BMI 25.0-29. 9)    Estimated Daily Nutrient Needs:  Energy Requirements Based On: Kcal/kg (83)  Weight Used for Energy Requirements:  (89kgm on 8/22)  Energy (kcal/day): 6110-7124 kcals (20-25)  Weight Used for Protein Requirements: Ideal (adjusted IBW: 62)  Protein (g/day): 87+ grams (1.4+)  Method Used for Fluid Requirements: Other (Comment)    Nutrition Diagnosis:   Swallowing difficulty related to cognitive or neurological impairment as evidenced by swallow study results    Nutrition Interventions:   Food and/or Nutrient Delivery: Continue Current Diet, Start Oral Nutrition Supplement  Nutrition Education/Counseling: Education initiated (Encouraged po, good nutrition at best efforts. Discussed diet per SLP.   Recommend cardiac diet (in addition to SLP recommendations) at [facility].)  Coordination of Nutrition Care: Continue to monitor while inpatient       Goals:  Previous Goal Met: Progressing toward Goal(s)  Goals: PO intake 75% or greater, by next RD assessment       Nutrition Monitoring and Evaluation:      Food/Nutrient Intake Outcomes: Diet Advancement/Tolerance, Food and Nutrient Intake, Supplement Intake  Physical Signs/Symptoms Outcomes: Biochemical Data, Chewing or Swallowing, GI Status, Fluid Status or Edema, Nutrition Focused Physical Findings, Skin, Weight    Discharge Planning:     Too soon to determine     Racquel Quiñones RD, LD  Contact: [phone]

## 2023-09-01 NOTE — CARE COORDINATION
9/1/23, 1:58 PM EDT    Patient goals/plan/ treatment preferences discussed by  and . Patient goals/plan/ treatment preferences reviewed with patient/ family. Patient/ family verbalize understanding of discharge plan and are in agreement with goal/plan/treatment preferences. Understanding was demonstrated using the teach back method. AVS provided by RN at time of discharge, which includes all necessary medical information pertaining to the patients current course of illness, treatment, post-discharge goals of care, and treatment preferences. Services At/After Discharge: [facility] (SNF), Aide services, In ambulance, IV Therapy, and Nursing service       IMM Letter  IMM Letter given to Patient/Family/Significant other/Guardian/POA/by[de-identified] Dottie Barbosa RN CM  IMM Letter date given[de-identified] 09/01/23  IMM Letter time given[de-identified] 0827     Patient discharged to return to [facility], medicaid bed. Deepti Orozco at [facility] informed of discharge and 7:30 PM transport. She is aware of Invanz IV and Brilinta. Attempted to call Miryam ARGUETA, no answer and mailbox is full.

## 2023-09-01 NOTE — DISCHARGE SUMMARY
Hospital Medicine Discharge Summary      Patient Identification:   Nya Gutierrez   : 1955  MRN: [id number]   Account: [de-identified]      Patient's PCP: Lissa Pierre MD    Admit Date: 2023     Discharge Date:  2023    Admitting Physician: Christy Linn MD     Discharge Physician: Vidhi Schaefer DO     Discharge Diagnoses:    1) New onset Ischemic CMP/Systolic CHF w/exacerbation: HFrEF, Echo 23 shows new EF drop to 40%, Hypokinetic motion of mid-anteroseptal wall of LV. Noted to have elevated cardiac enzymes HsT 488-408 w/pro-BNP 7625-1584. All in the setting of Septic shock. Initial concern was for NSTEMI, but no signs of active ischemia noted. Likely 2/2 to overwhelming metabolic demand and hypotension, but cannot r/o Ischemic insult at this time. Was on IV Bumex for aggressive diuresis. LHC completed on 23,  s/p 2x DEANA to LAD (prox / mid)  Cardiology was following inpatient. Continue Toprol 50 mg oral BID, Lisinopril 2.5 mg, and Aldactone 25 mg. Per cardio, will hold off ARNI initiation in setting of borderline BP and need for HR optimization with BB, and don't recommend stating SGLT2i in setting of recurrent UTI and urosepsis   Follow up with Dr. Esme Contreras in 1 week for planned PCI of RCA  Follow-up with PCP    2) Obstructive 3V-CAD s/p LHC: Significant calc/plaque rupture mid-LAD w/80% stenosis, 90% mid-distal LAD, 70% Ostial stenosis, LCX , RCA 95% stenosis, w/significant disease in several branching vessels.  CTS evaluation - PCI vs medical mgmt due to anatomical hindrance Staged PCI on 23; s/p 2x DEANA to LAD (prox/mid)  Continue with ASA / [place] [employer] / Lipitor / Sharia Jumbo on discharge   Follow up with Dr. Esme Contreras in 1 week for planned PCI of RCA    3) ESBL E. coli Bacteremia (Septic Shock resolved): 2/2 Complicated UTI/ left Ureteral stone s/p Cyst, stenting, stone removal, and SP-cath removal. On arrival, Tmax 102.2, HR >100, BP initially stable and then precipitously dropped in may contain minor errors which are inherent in voice recognition technology. **      Final report electronically signed by Dr. Domenic Phoenix on 8/21/2023 1:35 PM             Consults:     IP CONSULT TO DIETITIAN  IP CONSULT TO WOUND CARE PROVIDER  IP CONSULT TO UROLOGY  IP CONSULT TO SOCIAL WORK  IP CONSULT TO DIETITIAN  IP CONSULT TO CARDIOLOGY  IP CONSULT TO CARDIOTHORACIC SURGERY  IP CONSULT TO DIETITIAN    Disposition: SNF  Condition at Discharge: Stable    Code Status:  Full Code     Patient Instructions:    Discharge lab work: BMP  Activity: activity as tolerated  Diet: ADULT DIET; Dysphagia - Minced and Moist; Mildly Thick (Nectar)  ADULT ORAL NUTRITION SUPPLEMENT; Breakfast, Lunch, Dinner; Frozen Oral Supplement      Follow-up visits:   CM  [address] [facility] [address]  [address] [phone]    Follow up in 2 week(s)  Hospital follow-up    Brtini Mejia MD  [address] [address] [phone]    Follow up in 1 week(s)  Hospital follow-up, needs staged PCI for RCA    Farhan Connell MD  [address] [facility]  [address] [facility]  [phone]    Schedule an appointment as soon as possible for a visit in 1 week(s)  Hospital follow-up       Discharge Medications:        Medication List        START taking these medications      ertapenem  infusion  Commonly known as: INVanz  Infuse 1,000 mg intravenously every 24 hours for 4 days Compound per protocol.   Start taking on: September 2, 2023     lisinopril 2.5 MG tablet  Commonly known as: PRINIVIL;ZESTRIL  Take 1 tablet by mouth daily  Start taking on: September 2, 2023     metoprolol succinate 50 MG extended release tablet  Commonly known as: TOPROL XL  Take 1 tablet by mouth in the morning and at bedtime     nitroGLYCERIN 0.4 MG SL tablet  Commonly known as: Nitrostat  Place 1 tablet under the tongue every 5 minutes as needed for Chest

## 2023-09-01 NOTE — PROGRESS NOTES
Report called to Robert Gonzalez at [facility]. AVS reviewed. Printed prescriptions and hard copies of AVS, CHICO, and last dose [facility] sent with patient. Discharged via cart/stretcher to skilled nursing per EMS transportation.

## 2023-09-05 ENCOUNTER — TELEPHONE (OUTPATIENT)
Dept: UROLOGY | Age: 68
End: 2023-09-05

## 2023-09-05 DIAGNOSIS — N20.0 KIDNEY STONE: Primary | ICD-10-CM

## 2023-09-05 DIAGNOSIS — Z01.818 PRE-OP TESTING: ICD-10-CM

## 2023-09-05 NOTE — TELEPHONE ENCOUNTER
Pt scheduled for hospital f/u with Dr Liu Coon on 9/26. Needing clearance. PCI on 8/31/23    Can patient be cleared? Move hospital f/u appt up?

## 2023-09-05 NOTE — TELEPHONE ENCOUNTER
MEDICAL CLEARANCE FORM    Clearance From  [facility] Doctor   Appointment Date   Time       Coco Barros  1955  Surgeon:  Dr Lelia Tejada    Procedure:  Cystoscopy, left ureteroscopy, laser lithotripsy, basket retrieval of stone fragments, and possible left ureteral stent exchange    Date:  9/18/23  Facility: [facility].  MEDICAL HISTORY  DM CAD PVD CVA DVT/PE MI CHFMalignant Hyperthemia HTN Tobacco/ETOH Sleep Apnea GERD Hyperlipidemia Renal Insufficiency COPD/Asthma Bleeding Disorder Pacemaker/AICD  II. CURRENT MEDICATIONS: Attach list or complete  Pt is on following meds that need special instructions for surgery:  Anticoagulants Heart Meds ASA Insulin Oral anti-diabetics NSAIDS Diuretics     K replacements  III. ALLERGIES:   IV.  FUNCTIONAL CAPACITY  >4 METS (CAN VACUUM/HOUSEWORK,CLIMB FLIGHT STAIRS WITHOUT DYSPNEA)  <4METS (FLIGHT OF STEPS CAUSES DYSPNEA/CARDIAC SYMPTOMS)   Stress Test Recommended:    Stress tests or Cardiac Cath in last 5 years:  Yes (attach report)  No   Results: WNL   ABN  Any Change in Cardiac symptoms: Yes  NO  Comments:    Revascularization in last 5 years: Yes  NO  CABG: Yes  No   Comments:     Stents:  Date: ________  Any change in cardiac symptoms  Yes  NO  Comments:   V.  REVIEW OF SYSTEMS:  (Pertinent positive or negative)    VI. PHYSICIAL EXAM  HEENT:1.  Dentations   Good Poor          HT:_______ WT:______      2. Neck Pathology: Rheumatoid DDD C-spine  BP:______ P:________  PULMONARY:  CARDIAC  ABDOMEN  EXTREMITIES  OTHER  VII.   Testing Ordered by Surgeon  Reviewed by Clearance Physician  Test      Result  Plan,if Abnormal  ___CBS     WNL  ABN____________________  ___BMP/BUN/CR    WNL  ABN____________________  ___K+      WNL  ABN____________________  ___UA      WNL  ABN____________________  ___CXR     WNL  ABN____________________  ___EKG     WNL  ABN____________________  ___MRSA     WNL  ABN____________________  VIII.  ___Acceptable risk for surgery ___Risk

## 2023-09-05 NOTE — TELEPHONE ENCOUNTER
Patient scheduled for a Cystoscopy, left ureteroscopy, laser lithotripsy, basket retrieval of stone fragments, and possible left ureteral stent exchange with Dr Rivka Martinez on 9/18/23. We are asking for clearance. Patient is at [facility] in [facility].

## 2023-09-05 NOTE — TELEPHONE ENCOUNTER
Patient is scheduled for a Cystoscopy, left ureteroscopy, laser lithotripsy, basket retrieval of stone fragments, and possible left ureteral stent exchange with Dr Meri Harvey on 9/18/23. We are asking for clearance. Patient resides at [facility].

## 2023-09-05 NOTE — TELEPHONE ENCOUNTER
Patient is scheduled for surgery with  on 9/18/23. Surgery consent to be done on arrival. Dr. Rivka Oconnor to clear. Dr Veronica Mcneill to clear. Patient has a SP Cath Patient resides at [facility].  # [phone]  Fax # [phone]  Surgery instructions faxed to the [facility]      Patient informed an adult over the age of 25 must be with them at the time of surgery, upon discharge and at home for 24 hours after the procedure.

## 2023-09-05 NOTE — TELEPHONE ENCOUNTER
DO NOT TAKE  FISH OIL, MOBIC, IBUPROFEN, MOTRIN-LIKE DRUGS AND ANY MULTIVITAMINS OR OVER THE COUNTER SUPPLEMENTS 14 DAYS PRIOR TO SURGERY. PATIENT TO REMAIN ON HIS BRILINTA AND ASPIRIN      MUST HAVE AN ADULT OVER THE AGE OF 18 WITH YOU AT THE TIME OF THE DISCHARGE AND WITH YOU AT HOME AFTER THE PROCEDURE FOR 24 HOURS          Mel Jero 1955 Diagnosis: Kidney Stone    Surgical Physician: Dr. Azell Dakin have been scheduled for the procedure marked below:      Surgery: Cystoscopy, left ureteroscopy, laser lithotripsy, basket retrieval of stone fragments, and possible left ureteral stent exchange         Date: 9/18/23     Anesthesia: Anesthesiologist (General/Spinal)     Place of Service: [facility] Second Floor Same Day Surgery         Arrive to same day surgery at:  9:00 am  (Surgery time is subject to change)      INSTRUCTIONS AS MARKED BELOW:    1.  DO NOT eat or drink anything after midnight before surgery. 2.  We prefer you shower or bathe with an antibacterial soap (Dial) the morning of surgery. 3  Please bring a current medication list, photo ID and insurance card(s) with you  4. Okay to take Tylenol  5. If you take Glucophage, Metformin or Janumet, hold 48-hours prior to surgery  6  Take blood pressure or heart medication as directed, if taken in the morning take with a small sip of water  7. The office will call you in 1-2 days after your procedure to schedule a follow up.                 Date: 9/5/2023 EMT/paramedic

## 2023-09-05 NOTE — TELEPHONE ENCOUNTER
Cannot proceed  He has residual CAD and needs further PCI  If they are willing to continue DAPT then we can otherwise high risk for stent thrombosis

## 2023-09-05 NOTE — TELEPHONE ENCOUNTER
SURGERY [address] [facility] [address] [facility], [address] [facility]      Phone *[phone] *[phone]   Surgical Scheduling Direct Line Phone *[phone] Fax *[address]  1955 male    [facility] [address] Cisneros [address]   Marital Status:          Home Phone: [phone]      Cell Phone:    Telephone Information:   Mobile [phone]          Surgeon: Dr. Bel Gudino Surgery Date: 9/18/23   Time: 11:00 am    Procedure: Cystoscopy, left ureteroscopy, laser lithotripsy, basket retrieval of stone fragments, and possible left ureteral stent exchange    Diagnosis: Kidney Stone     Important Medical History:  In Epic                      PATIENT AT [facility]    Special Inst/Equip:     CPT Codes:    51676,42629  Latex Allergy: No     Cardiac Device:  No    Anesthesia:  General          Admission Type:  Same Day                        Admit Prior to Day of Surgery: No    Case Location:  Main OR            Preadmission Testing:  Phone Call          PAT Date and Time:______________________________________________________    PAT Confirmation #: ______________________________________________________    Post Op Visit: ___________________________________________________________    Need Preop Cardiac Clearance: Yes    Does Patient have Cardiologist/physician?      Dr Sherri Sandhu, Dr Thony Peter Confirmation #: __________________________________________________    Tracey Petrin: ________________________   Date: __________________________     [employer] Name: Medicare

## 2023-09-07 NOTE — TELEPHONE ENCOUNTER
Connie see note below from Dr. Esme Contreras:     If they are willing to continue DAPT then we can otherwise high risk for stent thrombosis     Form out for signature

## 2023-09-07 NOTE — TELEPHONE ENCOUNTER
Dr Rhiannon Joel said the patient can remain on the Brilinta and ASA. He did want me to clarify that he is ok cardiac wise to proceed with the planned procedure as long as he remains on his blood thinners?

## 2023-09-11 NOTE — PROGRESS NOTES
Notified anesthesia that cardiology indicated patient is a high risk. Per Dr. Brianna Stein, anesthesia ok for patient to proceed with surgery.

## 2023-09-12 ENCOUNTER — PREP FOR PROCEDURE (OUTPATIENT)
Dept: UROLOGY | Age: 68
End: 2023-09-12

## 2023-09-14 ENCOUNTER — OUTSIDE SERVICES (OUTPATIENT)
Dept: FAMILY MEDICINE CLINIC | Age: 68
End: 2023-09-14
Payer: MEDICARE

## 2023-09-14 DIAGNOSIS — A41.9 SEPTIC SHOCK (HCC): Primary | ICD-10-CM

## 2023-09-14 DIAGNOSIS — R65.21 SEPTIC SHOCK (HCC): Primary | ICD-10-CM

## 2023-09-14 DIAGNOSIS — I21.4 NSTEMI (NON-ST ELEVATED MYOCARDIAL INFARCTION) (HCC): ICD-10-CM

## 2023-09-14 DIAGNOSIS — L89.313: ICD-10-CM

## 2023-09-14 PROBLEM — I26.99 PULMONARY EMBOLISM ON LEFT (HCC): Status: RESOLVED | Noted: 2017-12-30 | Resolved: 2023-09-14

## 2023-09-14 PROCEDURE — 99309 SBSQ NF CARE MODERATE MDM 30: CPT | Performed by: FAMILY MEDICINE

## 2023-09-14 RX ORDER — CIPROFLOXACIN 500 MG/1
500 TABLET, FILM COATED ORAL 2 TIMES DAILY
Qty: 14 TABLET | Refills: 0 | Status: SHIPPED | OUTPATIENT
Start: 2023-09-14 | End: 2023-09-21

## 2023-09-14 NOTE — TELEPHONE ENCOUNTER
Verbal order for cipro 500mg 1 tablet bid for 7 days called into [facility]. Spoke to [employer].

## 2023-09-14 NOTE — TELEPHONE ENCOUNTER
Please review urine culture on 9/11/23. Surgery with Brittaney on 9/18/23 for a [employer].  PATIENT RESIDES AT [facility]-

## 2023-09-14 NOTE — TELEPHONE ENCOUNTER
Send to Joya Caputo with the directions. She is covering for [facility] and she can call the nursing home with the instructions.

## 2023-09-14 NOTE — PROGRESS NOTES
9/14/2023  Elton Swift  1955  Subjective:  He was in his/her room to follow up hospitalization for septic shock and NSTEMI. Had stent placement. He is sleepy today, did not awake up to examiner. Denies headaches, CP, sob, or abdominal pains. Objective:   Please see vitals per chart. There is no sinus tenderness to palpation, no cervical lymphadenopathy. Lungs are clear. Heart Regular rate and rhythm without murmur. The abdomen is soft and nontender. Colostomy present and functioning. Extremities are without edema. Pompa is present. Assessment:  1. Septic shock ([address])  -unstable, ICU admission, improved with fluids, pressors, abx    2. Decubitus ulcer of right perineal ischial region, stage 3 (HCC)  -stable, follows with wound doctor. 3. NSTEMI (non-ST elevated myocardial infarction) ([address])  -new problem, follow up with cardiology, stent placement, controlled on metoprolol, brilinta, lisinopril and crestor      Plan: We will continue current medication regimen including zanaflex, baclofen and gabapentin for MS, melatonin for sleep and supportive care and recheck in 1 month.     Electronically signed by Aldair Perry MD on 9/14/2023 at 11:22 AM.

## 2023-09-18 ENCOUNTER — ANESTHESIA EVENT (OUTPATIENT)
Dept: OPERATING ROOM | Age: 68
End: 2023-09-18
Payer: MEDICARE

## 2023-09-18 ENCOUNTER — ANESTHESIA (OUTPATIENT)
Dept: OPERATING ROOM | Age: 68
End: 2023-09-18
Payer: MEDICARE

## 2023-09-18 ENCOUNTER — HOSPITAL ENCOUNTER (OUTPATIENT)
Age: 68
Setting detail: OUTPATIENT SURGERY
Discharge: NURSING, REHAB OR LONG-TERM CARE | End: 2023-09-18
Attending: UROLOGY | Admitting: UROLOGY
Payer: MEDICARE

## 2023-09-18 VITALS
DIASTOLIC BLOOD PRESSURE: 70 MMHG | HEIGHT: 67 IN | BODY MASS INDEX: 27.78 KG/M2 | SYSTOLIC BLOOD PRESSURE: 133 MMHG | TEMPERATURE: 96.3 F | RESPIRATION RATE: 16 BRPM | OXYGEN SATURATION: 98 % | WEIGHT: 177 LBS | HEART RATE: 73 BPM

## 2023-09-18 LAB — INR PPP: 1.07 (ref 0.85–1.13)

## 2023-09-18 PROCEDURE — 3600000003 HC SURGERY LEVEL 3 BASE: Performed by: UROLOGY

## 2023-09-18 PROCEDURE — 7100000011 HC PHASE II RECOVERY - ADDTL 15 MIN: Performed by: UROLOGY

## 2023-09-18 PROCEDURE — 36415 COLL VENOUS BLD VENIPUNCTURE: CPT

## 2023-09-18 PROCEDURE — 2580000003 HC RX 258: Performed by: UROLOGY

## 2023-09-18 PROCEDURE — 2709999900 HC NON-CHARGEABLE SUPPLY: Performed by: UROLOGY

## 2023-09-18 PROCEDURE — 7100000010 HC PHASE II RECOVERY - FIRST 15 MIN: Performed by: UROLOGY

## 2023-09-18 PROCEDURE — C2617 STENT, NON-COR, TEM W/O DEL: HCPCS | Performed by: UROLOGY

## 2023-09-18 PROCEDURE — C1747 HC ENDOSCOPE, SINGLE, URINARY TRACT: HCPCS | Performed by: UROLOGY

## 2023-09-18 PROCEDURE — 3700000000 HC ANESTHESIA ATTENDED CARE: Performed by: UROLOGY

## 2023-09-18 PROCEDURE — 3700000001 HC ADD 15 MINUTES (ANESTHESIA): Performed by: UROLOGY

## 2023-09-18 PROCEDURE — 85610 PROTHROMBIN TIME: CPT

## 2023-09-18 PROCEDURE — 2500000003 HC RX 250 WO HCPCS: Performed by: NURSE ANESTHETIST, CERTIFIED REGISTERED

## 2023-09-18 PROCEDURE — 7100000000 HC PACU RECOVERY - FIRST 15 MIN: Performed by: UROLOGY

## 2023-09-18 PROCEDURE — C1769 GUIDE WIRE: HCPCS | Performed by: UROLOGY

## 2023-09-18 PROCEDURE — 2720000010 HC SURG SUPPLY STERILE: Performed by: UROLOGY

## 2023-09-18 PROCEDURE — 7100000001 HC PACU RECOVERY - ADDTL 15 MIN: Performed by: UROLOGY

## 2023-09-18 PROCEDURE — 3600000013 HC SURGERY LEVEL 3 ADDTL 15MIN: Performed by: UROLOGY

## 2023-09-18 PROCEDURE — C1758 CATHETER, URETERAL: HCPCS | Performed by: UROLOGY

## 2023-09-18 PROCEDURE — 6360000002 HC RX W HCPCS: Performed by: NURSE ANESTHETIST, CERTIFIED REGISTERED

## 2023-09-18 DEVICE — URETERAL STENT
Type: IMPLANTABLE DEVICE | Status: FUNCTIONAL
Brand: PERCUFLEX™ PLUS

## 2023-09-18 RX ORDER — EPHEDRINE SULFATE/0.9% NACL/PF 50 MG/5 ML
SYRINGE (ML) INTRAVENOUS PRN
Status: DISCONTINUED | OUTPATIENT
Start: 2023-09-18 | End: 2023-09-18 | Stop reason: SDUPTHER

## 2023-09-18 RX ORDER — SODIUM CHLORIDE 9 MG/ML
INJECTION, SOLUTION INTRAVENOUS PRN
Status: DISCONTINUED | OUTPATIENT
Start: 2023-09-18 | End: 2023-09-18 | Stop reason: HOSPADM

## 2023-09-18 RX ORDER — PHENYLEPHRINE HCL IN 0.9% NACL 1 MG/10 ML
SYRINGE (ML) INTRAVENOUS PRN
Status: DISCONTINUED | OUTPATIENT
Start: 2023-09-18 | End: 2023-09-18 | Stop reason: SDUPTHER

## 2023-09-18 RX ORDER — CARVEDILOL 12.5 MG/1
12.5 TABLET ORAL 2 TIMES DAILY WITH MEALS
COMMUNITY

## 2023-09-18 RX ORDER — FENTANYL CITRATE 50 UG/ML
50 INJECTION, SOLUTION INTRAMUSCULAR; INTRAVENOUS EVERY 5 MIN PRN
Status: DISCONTINUED | OUTPATIENT
Start: 2023-09-18 | End: 2023-09-18 | Stop reason: HOSPADM

## 2023-09-18 RX ORDER — SODIUM CHLORIDE 0.9 % (FLUSH) 0.9 %
5-40 SYRINGE (ML) INJECTION PRN
Status: CANCELLED | OUTPATIENT
Start: 2023-09-18

## 2023-09-18 RX ORDER — LIDOCAINE HCL/PF 100 MG/5ML
SYRINGE (ML) INJECTION PRN
Status: DISCONTINUED | OUTPATIENT
Start: 2023-09-18 | End: 2023-09-18 | Stop reason: SDUPTHER

## 2023-09-18 RX ORDER — TIZANIDINE 2 MG/1
2 TABLET ORAL EVERY 6 HOURS PRN
COMMUNITY

## 2023-09-18 RX ORDER — DEXAMETHASONE SODIUM PHOSPHATE 4 MG/ML
INJECTION, SOLUTION INTRA-ARTICULAR; INTRALESIONAL; INTRAMUSCULAR; INTRAVENOUS; SOFT TISSUE PRN
Status: DISCONTINUED | OUTPATIENT
Start: 2023-09-18 | End: 2023-09-18 | Stop reason: SDUPTHER

## 2023-09-18 RX ORDER — LABETALOL HYDROCHLORIDE 5 MG/ML
10 INJECTION INTRAVENOUS
Status: DISCONTINUED | OUTPATIENT
Start: 2023-09-18 | End: 2023-09-18 | Stop reason: HOSPADM

## 2023-09-18 RX ORDER — POTASSIUM CHLORIDE 20 MEQ/1
20 TABLET, EXTENDED RELEASE ORAL 2 TIMES DAILY
COMMUNITY

## 2023-09-18 RX ORDER — CEFAZOLIN SODIUM 1 G/3ML
INJECTION, POWDER, FOR SOLUTION INTRAMUSCULAR; INTRAVENOUS PRN
Status: DISCONTINUED | OUTPATIENT
Start: 2023-09-18 | End: 2023-09-18 | Stop reason: SDUPTHER

## 2023-09-18 RX ORDER — PHENAZOPYRIDINE HYDROCHLORIDE 100 MG/1
100 TABLET, FILM COATED ORAL 3 TIMES DAILY PRN
Qty: 9 TABLET | Refills: 0 | Status: SHIPPED | OUTPATIENT
Start: 2023-09-18 | End: 2023-09-21

## 2023-09-18 RX ORDER — ONDANSETRON 2 MG/ML
INJECTION INTRAMUSCULAR; INTRAVENOUS PRN
Status: DISCONTINUED | OUTPATIENT
Start: 2023-09-18 | End: 2023-09-18 | Stop reason: SDUPTHER

## 2023-09-18 RX ORDER — SODIUM CHLORIDE 0.9 % (FLUSH) 0.9 %
5-40 SYRINGE (ML) INJECTION PRN
Status: DISCONTINUED | OUTPATIENT
Start: 2023-09-18 | End: 2023-09-18 | Stop reason: HOSPADM

## 2023-09-18 RX ORDER — SODIUM CHLORIDE 9 MG/ML
INJECTION, SOLUTION INTRAVENOUS PRN
Status: CANCELLED | OUTPATIENT
Start: 2023-09-18

## 2023-09-18 RX ORDER — DOXYCYCLINE HYCLATE 100 MG
100 TABLET ORAL 2 TIMES DAILY
Qty: 6 TABLET | Refills: 0 | Status: SHIPPED | OUTPATIENT
Start: 2023-09-18 | End: 2023-09-21

## 2023-09-18 RX ORDER — MORPHINE SULFATE 2 MG/ML
2 INJECTION, SOLUTION INTRAMUSCULAR; INTRAVENOUS EVERY 5 MIN PRN
Status: DISCONTINUED | OUTPATIENT
Start: 2023-09-18 | End: 2023-09-18 | Stop reason: HOSPADM

## 2023-09-18 RX ORDER — SODIUM CHLORIDE 0.9 % (FLUSH) 0.9 %
5-40 SYRINGE (ML) INJECTION EVERY 12 HOURS SCHEDULED
Status: CANCELLED | OUTPATIENT
Start: 2023-09-18

## 2023-09-18 RX ORDER — KETOROLAC TROMETHAMINE 10 MG/1
10 TABLET, FILM COATED ORAL EVERY 6 HOURS PRN
Qty: 15 TABLET | Refills: 0 | Status: SHIPPED | OUTPATIENT
Start: 2023-09-18

## 2023-09-18 RX ORDER — FENTANYL CITRATE 50 UG/ML
INJECTION, SOLUTION INTRAMUSCULAR; INTRAVENOUS PRN
Status: DISCONTINUED | OUTPATIENT
Start: 2023-09-18 | End: 2023-09-18 | Stop reason: SDUPTHER

## 2023-09-18 RX ORDER — SODIUM CHLORIDE 0.9 % (FLUSH) 0.9 %
5-40 SYRINGE (ML) INJECTION EVERY 12 HOURS SCHEDULED
Status: DISCONTINUED | OUTPATIENT
Start: 2023-09-18 | End: 2023-09-18 | Stop reason: HOSPADM

## 2023-09-18 RX ORDER — PROPOFOL 10 MG/ML
INJECTION, EMULSION INTRAVENOUS PRN
Status: DISCONTINUED | OUTPATIENT
Start: 2023-09-18 | End: 2023-09-18 | Stop reason: SDUPTHER

## 2023-09-18 RX ADMIN — Medication 10 MG: at 11:44

## 2023-09-18 RX ADMIN — Medication 20 MG: at 11:34

## 2023-09-18 RX ADMIN — Medication 10 MG: at 11:47

## 2023-09-18 RX ADMIN — FENTANYL CITRATE 100 MCG: 50 INJECTION, SOLUTION INTRAMUSCULAR; INTRAVENOUS at 11:29

## 2023-09-18 RX ADMIN — Medication 100 MCG: at 11:36

## 2023-09-18 RX ADMIN — DEXAMETHASONE SODIUM PHOSPHATE 8 MG: 4 INJECTION, SOLUTION INTRAMUSCULAR; INTRAVENOUS at 11:40

## 2023-09-18 RX ADMIN — CEFAZOLIN 2 G: 1 INJECTION, POWDER, FOR SOLUTION INTRAMUSCULAR; INTRAVENOUS at 11:28

## 2023-09-18 RX ADMIN — SODIUM CHLORIDE: 9 INJECTION, SOLUTION INTRAVENOUS at 12:00

## 2023-09-18 RX ADMIN — ONDANSETRON 4 MG: 2 INJECTION INTRAMUSCULAR; INTRAVENOUS at 11:58

## 2023-09-18 RX ADMIN — SODIUM CHLORIDE: 9 INJECTION, SOLUTION INTRAVENOUS at 09:48

## 2023-09-18 RX ADMIN — Medication 100 MG: at 11:29

## 2023-09-18 RX ADMIN — Medication 10 MG: at 11:33

## 2023-09-18 RX ADMIN — Medication 200 MCG: at 11:48

## 2023-09-18 RX ADMIN — PROPOFOL 50 MG: 10 INJECTION, EMULSION INTRAVENOUS at 11:30

## 2023-09-18 RX ADMIN — PROPOFOL 100 MG: 10 INJECTION, EMULSION INTRAVENOUS at 11:29

## 2023-09-18 ASSESSMENT — PAIN SCALES - GENERAL: PAINLEVEL_OUTOF10: 0

## 2023-09-18 ASSESSMENT — PAIN - FUNCTIONAL ASSESSMENT: PAIN_FUNCTIONAL_ASSESSMENT: 0-10

## 2023-09-18 NOTE — DISCHARGE INSTRUCTIONS
Discharge instructions: Ureteroscopy lithotripsy and stent placement (with string): You may see blood in the urine after the procedure. This should resolve over the next couple days. Please stay hydrated. You may see intermittent blood in the urine while the stent is in place. This is expected. You may experience flank pain, and/or frequency/urgency of urination while the stent is in place. Please use Oxybutynin and Flomax (Tamsulosin) to help with these symptoms. Pt ok to discharge home in good condition  No heavy lifting, >10 lbs for today  Pt should avoid strenuous activity for today  Pt should walk moderately at home  Pt ok to shower   Pt may resume diet as tolerated  Pt should take Rx as directed  No driving while on narcotics  Please call attending physician or hospital  with questions  Call or Present to ED if fever (> 101F), intractable nausea vomiting or pain. Rx in chart    Pt should follow up with Dr. Renetta Vines MD, in 3 months with PARVIN and layne, call to confirm appointment. Pt should Pull stent in the morning of 9/20. There may be some pain associated with the stent removal, which is usually self limiting. We suggest using the pain medication prescribed for you and a nonsteroidal anti-inflammatory such as Ibuprofen, if you are able to take this medication, to control symptoms. Take Ibuprofen as directed for 24 hrs after stent pull. Please stay hydrated. Please call with questions.

## 2023-09-18 NOTE — PROGRESS NOTES
Patient was given blood thinners yesterday. Okay to continue with surgery today per Dr. Lenin Montero.

## 2023-09-18 NOTE — H&P
stent    Risks benefits and alternative procedures are explained, informed consent is obtained, and the patient elects to proceed.

## 2023-09-18 NOTE — OP NOTE
FACILITY:  [address] [place] [address], [facility].   1955  [id number]    DATE: 09/18/23  SURGEON:  Dr. Lul Robles MD , MD  ASSISTANT: Dr. Lul Robles MD MD  PREOPERATIVE DIAGNOSIS: Left sided kidney stone  POSTOPERATIVE DIAGNOSIS: Left sided kidney stone  PROCEDURES PERFORMED:  1. Cystoscopy. 2. Left sided ureteroscopy with holmium laser lithotripsy  3. Left sided stent exchang3. DRAINS: A Left sided 6x26 double J ureteral stent (with string)  SPECIMEN: none  ANESTHESIA: General  ESTIMATED BLOOD LOSS: None. COMPLICATIONS: None. INDICATIONS FOR PROCEDURE:  Nohelia Silva is a 79 y.o. male presents for Left sided calculus. After the risks, benefits, alternatives, of the procedure were discussed with the patient, informed consent was obtained. The patient elected to proceed. DETAILS OF THE PROCEDURE:  The patient was brought back from the preoperative holding area to the  operating suite, and was transferred to the operating table where the patient lay in  supine position. EPC's were in place, connected to the machine and the machine was turned on before induction. General endotracheal anesthesia was induced, and patient was prepped and draped in standard surgical fashion after being placed in dorsolithotomy position. A proper timeout was performed, preoperative antibiotics were given. We began by inserting a cystoscope with a 22 Tamazight sheath and 30 degree lens into the patient's urethral meatus and advancing into the bladder without complication. A pan cystoscopy was preformed and the bladder appeared unremarkable. We then focused our attention on the Left ureteral orifice, removed the stent, which we cannulated with our glidewire, advanced up to renal pelvis. We then used a dual lumen catheter to place a second wire. Once in good position, we advanced our flexible ureteroscope over the working wire to the renal pelvis under direct visualization.   We identified the

## 2023-09-18 NOTE — ANESTHESIA POSTPROCEDURE EVALUATION
Department of Anesthesiology  Postprocedure Note    Patient: Radha Watters  MRN: [id number]  YOB: 1955  Date of evaluation: 9/18/2023      Procedure Summary     Date: 09/18/23 Room / Location: [facility]  / Lisette Holt    Anesthesia Start: 1119 Anesthesia Stop: 6374    Procedure: Cystoscopy, Left Ureteroscopy, Laser Lithotripsy, , And Left Ureteral Stent Exchange (Left) Diagnosis:       Kidney stone      (Kidney stone [N20.0])    Surgeons: Mere Kaplan MD Responsible Provider: Matthew Ball MD    Anesthesia Type: general ASA Status: 4          Anesthesia Type: No value filed.     Quinn Phase I: Quinn Score: 9    Quinn Phase II: Quinn Score: 10      Anesthesia Post Evaluation    Patient location during evaluation: PACU  Patient participation: complete - patient participated  Level of consciousness: awake  Airway patency: patent  Nausea & Vomiting: no vomiting and no nausea  Complications: no  Cardiovascular status: hemodynamically stable  Respiratory status: acceptable and nasal cannula  Hydration status: stable  Pain management: adequate

## 2023-09-18 NOTE — PROGRESS NOTES
Pt has met discharge criteria and states he is ready for discharge to home. IV removed, gauze and tape applied. Dressed in own clothes and personal belongings gathered. Discharge instructions reviewed with nursing home. Paperwork along with medications given to patients Alessio choe. . Pt transported to discharge lobby by Miki choe.

## 2023-09-18 NOTE — ANESTHESIA PRE PROCEDURE
(If Applicable): No results found for: \"PREGTESTUR\", \"PREGSERUM\", \"HCG\", \"HCGQUANT\"     ABGs: No results found for: \"PHART\", \"PO2ART\", \"ALS8HLJ\", \"VUE4DKA\", \"BEART\", \"G0LXQSHQ\"     Type & Screen (If Applicable):  Lab Results   Component Value Date    LABRH POS 06/12/2018       Drug/Infectious Status (If Applicable):  No results found for: \"HIV\", \"HEPCAB\"    COVID-19 Screening (If Applicable):   Lab Results   Component Value Date/Time    COVID19 NOT DETECTED 08/21/2023 02:22 PM           Anesthesia Evaluation    Airway: Mallampati: II  TM distance: >3 FB   Neck ROM: full  Mouth opening: > = 3 FB   Dental:          Pulmonary: breath sounds clear to auscultation                             Cardiovascular:    (+) hypertension:, CAD:,         Rhythm: regular                      Neuro/Psych:   (+) neuromuscular disease: multiple sclerosis, psychiatric history:            GI/Hepatic/Renal:             Endo/Other:                     Abdominal:   (+) obese,           Vascular: Other Findings:           Anesthesia Plan      general     ASA 4     (Whhel chair bound)  Induction: intravenous. MIPS: Postoperative opioids intended and Prophylactic antiemetics administered. Anesthetic plan and risks discussed with patient. Plan discussed with CRNA.                     Zoya Hubbard MD   9/18/2023

## 2023-09-18 NOTE — PROGRESS NOTES
1217 Pt to PACU, alert to voice. Resp easy and unlabored on room air. Pt denies pain. 1230 Pt resting in bed, continues to deny pain. Resp easy and unlabored on room air. VSS.    1245 Pt continues to rest in bed with eyes closed. Resp easy and unlabored, pt denies pain. 1247  Pt meets criteria for discharge from PACU at this time. Transported to [facility] in stable condition.      1251 report given to [facility]

## 2023-09-19 DIAGNOSIS — N20.0 KIDNEY STONE: Primary | ICD-10-CM

## 2023-09-26 ENCOUNTER — OFFICE VISIT (OUTPATIENT)
Dept: CARDIOLOGY CLINIC | Age: 68
End: 2023-09-26
Payer: MEDICARE

## 2023-09-26 VITALS — SYSTOLIC BLOOD PRESSURE: 107 MMHG | HEART RATE: 68 BPM | DIASTOLIC BLOOD PRESSURE: 64 MMHG

## 2023-09-26 DIAGNOSIS — I25.10 CAD, MULTIPLE VESSEL: ICD-10-CM

## 2023-09-26 DIAGNOSIS — I25.5 ISCHEMIC CARDIOMYOPATHY: ICD-10-CM

## 2023-09-26 DIAGNOSIS — I25.118 CORONARY ARTERY DISEASE INVOLVING NATIVE CORONARY ARTERY OF NATIVE HEART WITH OTHER FORM OF ANGINA PECTORIS (HCC): Primary | ICD-10-CM

## 2023-09-26 PROCEDURE — 3017F COLORECTAL CA SCREEN DOC REV: CPT | Performed by: INTERNAL MEDICINE

## 2023-09-26 PROCEDURE — 99214 OFFICE O/P EST MOD 30 MIN: CPT | Performed by: INTERNAL MEDICINE

## 2023-09-26 PROCEDURE — 3078F DIAST BP <80 MM HG: CPT | Performed by: INTERNAL MEDICINE

## 2023-09-26 PROCEDURE — 1123F ACP DISCUSS/DSCN MKR DOCD: CPT | Performed by: INTERNAL MEDICINE

## 2023-09-26 PROCEDURE — 1036F TOBACCO NON-USER: CPT | Performed by: INTERNAL MEDICINE

## 2023-09-26 PROCEDURE — 3074F SYST BP LT 130 MM HG: CPT | Performed by: INTERNAL MEDICINE

## 2023-09-26 PROCEDURE — G8417 CALC BMI ABV UP PARAM F/U: HCPCS | Performed by: INTERNAL MEDICINE

## 2023-09-26 PROCEDURE — 1111F DSCHRG MED/CURRENT MED MERGE: CPT | Performed by: INTERNAL MEDICINE

## 2023-09-26 PROCEDURE — G8427 DOCREV CUR MEDS BY ELIG CLIN: HCPCS | Performed by: INTERNAL MEDICINE

## 2023-09-26 NOTE — PROGRESS NOTES
[address] [place]  Dept: [phone]  Dept Fax: [phone]  Loc: [phone]    Visit Date: 9/26/2023    Mr. Valencia Oconnor is a 79 y.o. male  who presented for:  Chief Complaint   Patient presents with    Follow-Up from Hospital       HPI:   HPI   80 yo M with ICM, recent CHF exacerbations, found to have MV CAD, had PCI of the LAD, still has severe RCA stenosis presents for follow-up. He has MS s/p BLE paraplegia. He has HTN, waiting for urology to deal with neurogenic bladder. He has a drop in EF. He wa salso septic but is improving. Complete revasc not completed due to dye load and CHF. Current Outpatient Medications:     carvedilol (COREG) 12.5 MG tablet, Take 1 tablet by mouth 2 times daily (with meals), Disp: , Rfl:     potassium chloride (KLOR-CON M) 20 MEQ extended release tablet, Take 1 tablet by mouth 2 times daily, Disp: , Rfl:     tiZANidine (ZANAFLEX) 2 MG tablet, Take 1 tablet by mouth every 6 hours as needed, Disp: , Rfl:     ketorolac (TORADOL) 10 MG tablet, Take 1 tablet by mouth every 6 hours as needed for Pain, Disp: 15 tablet, Rfl: 0    nitroGLYCERIN (NITROSTAT) 0.4 MG SL tablet, Place 1 tablet under the tongue every 5 minutes as needed for Chest pain up to max of 3 total doses.  If no relief after 1 dose, call 911., Disp: 25 tablet, Rfl: 3    ticagrelor (BRILINTA) 90 MG TABS tablet, Take 1 tablet by mouth 2 times daily, Disp: 60 tablet, Rfl: 3    rosuvastatin (CRESTOR) 20 MG tablet, Take 1 tablet by mouth nightly, Disp: 30 tablet, Rfl: 3    lisinopril (PRINIVIL;ZESTRIL) 2.5 MG tablet, Take 1 tablet by mouth daily, Disp: 30 tablet, Rfl: 3    metoprolol succinate (TOPROL XL) 50 MG extended release tablet, Take 1 tablet by mouth in the morning and at bedtime, Disp: 30 tablet, Rfl: 3    spironolactone (ALDACTONE) 25 MG tablet, Take 1 tablet by mouth daily, Disp: 30 tablet, Rfl: 3

## 2023-09-29 PROBLEM — R79.89 ELEVATED TROPONIN: Status: RESOLVED | Noted: 2019-06-10 | Resolved: 2023-09-29

## 2023-10-04 ENCOUNTER — PREP FOR PROCEDURE (OUTPATIENT)
Dept: CARDIOLOGY | Age: 68
End: 2023-10-04

## 2023-10-04 RX ORDER — ASPIRIN 325 MG
325 TABLET ORAL ONCE
Status: CANCELLED | OUTPATIENT
Start: 2023-10-04 | End: 2023-10-04

## 2023-10-04 RX ORDER — SODIUM CHLORIDE 9 MG/ML
INJECTION, SOLUTION INTRAVENOUS PRN
Status: CANCELLED | OUTPATIENT
Start: 2023-10-04

## 2023-10-04 RX ORDER — SODIUM CHLORIDE 0.9 % (FLUSH) 0.9 %
5-40 SYRINGE (ML) INJECTION PRN
Status: CANCELLED | OUTPATIENT
Start: 2023-10-04

## 2023-10-04 RX ORDER — SODIUM CHLORIDE 0.9 % (FLUSH) 0.9 %
5-40 SYRINGE (ML) INJECTION EVERY 12 HOURS SCHEDULED
Status: CANCELLED | OUTPATIENT
Start: 2023-10-04

## 2023-10-04 RX ORDER — NITROGLYCERIN 0.4 MG/1
0.4 TABLET SUBLINGUAL EVERY 5 MIN PRN
Status: CANCELLED | OUTPATIENT
Start: 2023-10-04

## 2023-10-04 RX ORDER — SODIUM CHLORIDE 9 MG/ML
INJECTION, SOLUTION INTRAVENOUS CONTINUOUS
Status: CANCELLED | OUTPATIENT
Start: 2023-10-04

## 2023-10-05 ENCOUNTER — HOSPITAL ENCOUNTER (OUTPATIENT)
Dept: INPATIENT UNIT | Age: 68
Discharge: HOME | End: 2023-10-05

## 2023-10-05 ENCOUNTER — TELEPHONE (OUTPATIENT)
Dept: CARDIOLOGY CLINIC | Age: 68
End: 2023-10-05

## 2023-10-05 ENCOUNTER — TELEPHONE (OUTPATIENT)
Dept: UROLOGY | Age: 68
End: 2023-10-05

## 2023-10-05 NOTE — TELEPHONE ENCOUNTER
Vee Gonzalez at [facility], [facility]. [phone] called to have urine culture reviewed. Dr Jin Novoa would like this office to treat.

## 2023-10-05 NOTE — TELEPHONE ENCOUNTER
Pt was scheduled for PCI today with Dr. Mitchell Dexter. Ozzie Rushing from [facility] called stating patient tested positive for UTI and will be placed on antibiotic but nothing has been started yet. Per Dr. Giron May long as patient not having any cardiac symptoms we need to reschedule. I called [facility] back and spoke to Ozzie Rushing who states patient denies having any chest pain, pressure, shortness of breath or palpitations. Order given to [facility] in scheduling to get procedure rescheduled. Chanda Lennox to call [facility] and speak to [place].

## 2023-10-05 NOTE — TELEPHONE ENCOUNTER
Please make referral to ID. Rose Mary Diallo at [facility] of referral to ID and if patient develops fever or symptoms to be evaluated in the ER. She voiced understanding.

## 2023-10-06 DIAGNOSIS — Z16.12 EXTENDED SPECTRUM BETA LACTAMASE (ESBL) RESISTANCE: Primary | ICD-10-CM

## 2023-10-12 ENCOUNTER — OUTSIDE SERVICES (OUTPATIENT)
Dept: FAMILY MEDICINE CLINIC | Age: 68
End: 2023-10-12

## 2023-10-12 ENCOUNTER — TELEPHONE (OUTPATIENT)
Dept: INFECTIOUS DISEASES | Age: 68
End: 2023-10-12

## 2023-10-12 DIAGNOSIS — G35 MS (MULTIPLE SCLEROSIS) (HCC): ICD-10-CM

## 2023-10-12 DIAGNOSIS — B96.20 E. COLI UTI: Primary | ICD-10-CM

## 2023-10-12 DIAGNOSIS — N39.0 E. COLI UTI: Primary | ICD-10-CM

## 2023-10-12 DIAGNOSIS — H61.23 BILATERAL IMPACTED CERUMEN: ICD-10-CM

## 2023-10-12 NOTE — PROGRESS NOTES
10/12/2023  Elvin Swift  1955  Subjective:  He was in his/her room to follow up increased lethargy, Urine grew E. Coli, treated with levaquin. Also wax build up in his ears, debrox protocol and irrigation. Denies headaches, CP, sob, or abdominal pains. Objective:   Please see vitals per chart. There is no sinus tenderness to palpation, no cervical lymphadenopathy. Lungs are clear. Heart Regular rate and rhythm without murmur. The abdomen is soft and nontender. Colostomy present and functioning. Extremities are without edema. Pompa is present. Assessment:  1. E. coli UTI  -new problem, add levaquin, -monitor sxs, call if not improving      2. Bilateral impacted cerumen  -new problem, debrox per protocol and ear irrigation, -monitor sxs, call if not improving      3. MS (multiple sclerosis) ([facility])  -stable, controlled on zanaflex, baclofen and gabapentin    Plan: We will continue current medication regimen including zanaflex, baclofen and gabapentin for MS, melatonin for sleep and supportive care and recheck in 1 month.     Electronically signed by Liliana Panda MD on 10/12/2023 at 12:20 PM.

## 2023-10-26 ENCOUNTER — OUTSIDE SERVICES (OUTPATIENT)
Dept: FAMILY MEDICINE CLINIC | Age: 68
End: 2023-10-26
Payer: MEDICARE

## 2023-10-26 DIAGNOSIS — G35 MS (MULTIPLE SCLEROSIS) (HCC): ICD-10-CM

## 2023-10-26 DIAGNOSIS — A49.8 INFECTION DUE TO ACINETOBACTER SPECIES: ICD-10-CM

## 2023-10-26 DIAGNOSIS — A49.02 MRSA INFECTION: Primary | ICD-10-CM

## 2023-10-26 PROCEDURE — 99309 SBSQ NF CARE MODERATE MDM 30: CPT | Performed by: FAMILY MEDICINE

## 2023-10-26 NOTE — PROGRESS NOTES
10/26/2023  Lashanda Fairgrove Jamison  1955  Subjective:  He was in his/her room to follow up multi organism UTI, MRSA treated with vancomycin and acinetobacter treated with cefepime both IV. Denies headaches, CP, sob, or abdominal pains. Objective:   Please see vitals per chart. There is no sinus tenderness to palpation, no cervical lymphadenopathy. Lungs are clear. Heart Regular rate and rhythm without murmur. The abdomen is soft and nontender. Colostomy present and functioning. Extremities are without edema. Pompa is present. Assessment:  1. MRSA infection  -new problem, urine, IV vancomycin, -monitor sxs, call if not improving      2. Infection due to Acinetobacter species  -new problem, urine, IV cefepime, -monitor sxs, call if not improving    3. MS (multiple sclerosis) ([facility])  -stable, controlled on zanaflex, baclofen and gabapentin    Plan: We will continue current medication regimen including zanaflex, baclofen and gabapentin for MS, melatonin for sleep and supportive care and recheck in 1 month.     Electronically signed by Priya Hopper MD on 10/26/2023 at 10:33 AM.

## 2023-11-01 ENCOUNTER — TELEPHONE (OUTPATIENT)
Dept: INFECTIOUS DISEASES | Age: 68
End: 2023-11-01

## 2023-11-01 NOTE — TELEPHONE ENCOUNTER
Called [facility] in regards to Ad, patient,  to schedule appointment for Id clinic per referral.   Appointment, Monday December 11, @ 10:45.

## 2023-11-13 ENCOUNTER — OFFICE VISIT (OUTPATIENT)
Dept: NEUROLOGY | Age: 68
End: 2023-11-13
Payer: MEDICARE

## 2023-11-13 VITALS
OXYGEN SATURATION: 97 % | HEIGHT: 67 IN | DIASTOLIC BLOOD PRESSURE: 64 MMHG | SYSTOLIC BLOOD PRESSURE: 107 MMHG | HEART RATE: 77 BPM | BODY MASS INDEX: 27.72 KG/M2

## 2023-11-13 DIAGNOSIS — R29.898 BILATERAL ARM WEAKNESS: ICD-10-CM

## 2023-11-13 DIAGNOSIS — G82.20 PARAPARESIS OF BOTH LOWER LIMBS (HCC): ICD-10-CM

## 2023-11-13 DIAGNOSIS — G35 MULTIPLE SCLEROSIS (HCC): Primary | ICD-10-CM

## 2023-11-13 DIAGNOSIS — R51.9 RIGHT-SIDED FACE PAIN: ICD-10-CM

## 2023-11-13 PROCEDURE — G8417 CALC BMI ABV UP PARAM F/U: HCPCS | Performed by: PSYCHIATRY & NEUROLOGY

## 2023-11-13 PROCEDURE — 1036F TOBACCO NON-USER: CPT | Performed by: PSYCHIATRY & NEUROLOGY

## 2023-11-13 PROCEDURE — 99214 OFFICE O/P EST MOD 30 MIN: CPT | Performed by: PSYCHIATRY & NEUROLOGY

## 2023-11-13 PROCEDURE — 3017F COLORECTAL CA SCREEN DOC REV: CPT | Performed by: PSYCHIATRY & NEUROLOGY

## 2023-11-13 PROCEDURE — 1123F ACP DISCUSS/DSCN MKR DOCD: CPT | Performed by: PSYCHIATRY & NEUROLOGY

## 2023-11-13 PROCEDURE — 3074F SYST BP LT 130 MM HG: CPT | Performed by: PSYCHIATRY & NEUROLOGY

## 2023-11-13 PROCEDURE — G8484 FLU IMMUNIZE NO ADMIN: HCPCS | Performed by: PSYCHIATRY & NEUROLOGY

## 2023-11-13 PROCEDURE — 3078F DIAST BP <80 MM HG: CPT | Performed by: PSYCHIATRY & NEUROLOGY

## 2023-11-13 PROCEDURE — G8428 CUR MEDS NOT DOCUMENT: HCPCS | Performed by: PSYCHIATRY & NEUROLOGY

## 2023-11-13 RX ORDER — ACETIC ACID 20.65 MG/ML
4 SOLUTION AURICULAR (OTIC) EVERY MORNING
COMMUNITY

## 2023-11-13 RX ORDER — ONDANSETRON 4 MG/1
4 TABLET, FILM COATED ORAL EVERY 8 HOURS PRN
COMMUNITY

## 2023-11-13 NOTE — PATIENT INSTRUCTIONS
MRI cervical spine with and without contrast, (multiple sclerosis, bilateral arm weakness worsening). Continue with gabapentin at current dose  Continue with trileptal at current dose. You need to take calcium and vitamin D over the counter  Call with any new symptoms or concerns.    Follow up in 3 weeks, shelley.

## 2023-11-13 NOTE — PROGRESS NOTES
right forearm. FINDINGS:  There is stable moderate to severe frontal temporal predominant volume loss. There is a small focal area of encephalomalacia in the high right paramedian frontal lobe, stable compared to prior exam. There are mild scattered focal areas of T2/FLAIR  prolongation in the periventricular, subcortical deep white matter in keeping with history of multiple sclerosis. These are stable compared to prior exam. No new/interval lesion is identified. No focal areas restricted diffusion are present. Following contrast administration, there are no focal areas of abnormal parenchymal or meningeal enhancement identified. The major vascular flow voids appear patent. Patient is status post left-sided lens extraction. Orbits are otherwise unremarkable. Paranasal sinuses and mastoid air cells are clear. Impression  1. No evidence of acute intracranial abnormality. 2. Stable patchy areas of T2/FLAIR hyperintensity in the supratentorial white matter in keeping with history of multiple sclerosis. No new/interval lesion or enhancing lesion suggest active demyelination. **This report has been created using voice recognition software. It may contain minor errors which are inherent in voice recognition technology. **  Final report electronically signed by Dr. Garen Riedel, MD on 4/22/2022 4:13 PM      MRI CERVICAL SPINE W WO CONTRAST  Narrative  PROCEDURE: MRI CERVICAL SPINE W WO CONTRAST  CLINICAL INFORMATION: Weakness, Hx MS .  COMPARISON: No prior study. TECHNIQUE: Sagittal T1, T2 and STIR sequences were obtained through the cervical spine. Axial fast and echo and gradient echo T2-weighted images were obtained. Postcontrast axial and sagittal T1-weighted images were obtained. 20 mL ProHance was injected  in the right forearm. FINDINGS:  Images are motion degraded. There is anatomic vertebral body height and alignment.  There is a focal area of hyperintense T1 and T2 signal in the C7 vertebral body as

## 2023-11-22 ENCOUNTER — OUTSIDE SERVICES (OUTPATIENT)
Dept: FAMILY MEDICINE CLINIC | Age: 68
End: 2023-11-22
Payer: MEDICARE

## 2023-11-22 DIAGNOSIS — G35 MS (MULTIPLE SCLEROSIS) (HCC): ICD-10-CM

## 2023-11-22 DIAGNOSIS — H61.23 BILATERAL HEARING LOSS DUE TO CERUMEN IMPACTION: Primary | ICD-10-CM

## 2023-11-22 DIAGNOSIS — Z93.3 COLOSTOMY IN PLACE (HCC): ICD-10-CM

## 2023-11-22 PROCEDURE — 99309 SBSQ NF CARE MODERATE MDM 30: CPT | Performed by: FAMILY MEDICINE

## 2023-11-22 NOTE — PROGRESS NOTES
11/22/2023  [facility]  1955  Subjective:  He was in his/her room to follow up ear plugged and decreased hearing. Debrox protocol. Would not wake up to examiner today. Denies headaches, CP, sob, or abdominal pains. Objective:   Please see vitals per chart. There is no sinus tenderness to palpation, no cervical lymphadenopathy. Lungs are clear. Heart Regular rate and rhythm without murmur. The abdomen is soft and nontender. Colostomy present and functioning. Extremities are without edema. Pompa is present. Assessment:  1. Bilateral hearing loss due to cerumen impaction  -new problem, debrox protocol, irrigate til clear. 2. Colostomy in place ([address])  -stable, functioning appropriately    3. MS (multiple sclerosis) ([address])  -stable, controlled on zanaflex, baclofen and gabapentin    Plan: We will continue current medication regimen including zanaflex, baclofen and gabapentin for MS, melatonin for sleep and supportive care and recheck in 1 month.     Electronically signed by Lisbeth Damian MD on 11/22/2023 at 11:14 AM.

## 2023-11-29 ENCOUNTER — HOSPITAL ENCOUNTER (OUTPATIENT)
Age: 68
Discharge: HOME | End: 2023-11-30
Attending: INTERNAL MEDICINE | Admitting: INTERNAL MEDICINE
Payer: MEDICARE

## 2023-11-29 DIAGNOSIS — I25.10 CORONARY ARTERY DISEASE: ICD-10-CM

## 2023-11-29 PROBLEM — Z98.61 POST PTCA: Status: ACTIVE | Noted: 2023-11-29

## 2023-11-29 LAB
ABO: NORMAL
ACTIVATED CLOTTING TIME: 179 SECONDS (ref 1–150)
ACTIVATED CLOTTING TIME: 217 SECONDS (ref 1–150)
ACTIVATED CLOTTING TIME: 277 SECONDS (ref 1–150)
ACTIVATED CLOTTING TIME: 304 SECONDS (ref 1–150)
ANION GAP SERPL CALC-SCNC: 8 MEQ/L (ref 8–16)
ANTIBODY SCREEN: NORMAL
APTT PPP: 22.6 SECONDS (ref 22–38)
BUN SERPL-MCNC: 21 MG/DL (ref 7–22)
CALCIUM SERPL-MCNC: 9.6 MG/DL (ref 8.5–10.5)
CHLORIDE SERPL-SCNC: 100 MEQ/L (ref 98–111)
CHOLEST SERPL-MCNC: 112 MG/DL (ref 100–199)
CO2 SERPL-SCNC: 30 MEQ/L (ref 23–33)
CREAT SERPL-MCNC: 0.3 MG/DL (ref 0.4–1.2)
DEPRECATED RDW RBC AUTO: 51.7 FL (ref 35–45)
ECHO BSA: 1.96 M2
EKG ATRIAL RATE: 63 BPM
EKG P AXIS: 46 DEGREES
EKG P-R INTERVAL: 158 MS
EKG Q-T INTERVAL: 432 MS
EKG QRS DURATION: 86 MS
EKG QTC CALCULATION (BAZETT): 442 MS
EKG R AXIS: 40 DEGREES
EKG T AXIS: 62 DEGREES
EKG VENTRICULAR RATE: 63 BPM
ERYTHROCYTE [DISTWIDTH] IN BLOOD BY AUTOMATED COUNT: 14.4 % (ref 11.5–14.5)
GFR SERPL CREATININE-BSD FRML MDRD: > 60 ML/MIN/1.73M2
GLUCOSE SERPL-MCNC: 85 MG/DL (ref 70–108)
HCT VFR BLD AUTO: 44.7 % (ref 42–52)
HDLC SERPL-MCNC: 38 MG/DL
HGB BLD-MCNC: 14 GM/DL (ref 14–18)
INR PPP: 1.06 (ref 0.85–1.13)
LDLC SERPL CALC-MCNC: 52 MG/DL
MCH RBC QN AUTO: 31.1 PG (ref 26–33)
MCHC RBC AUTO-ENTMCNC: 31.3 GM/DL (ref 32.2–35.5)
MCV RBC AUTO: 99.3 FL (ref 80–94)
PLATELET # BLD AUTO: 200 THOU/MM3 (ref 130–400)
PMV BLD AUTO: 8.6 FL (ref 9.4–12.4)
POTASSIUM SERPL-SCNC: 4.6 MEQ/L (ref 3.5–5.2)
RBC # BLD AUTO: 4.5 MILL/MM3 (ref 4.7–6.1)
RH FACTOR: NORMAL
SODIUM SERPL-SCNC: 138 MEQ/L (ref 135–145)
TRIGL SERPL-MCNC: 112 MG/DL (ref 0–199)
WBC # BLD AUTO: 5.4 THOU/MM3 (ref 4.8–10.8)

## 2023-11-29 PROCEDURE — 2580000003 HC RX 258: Performed by: PHYSICIAN ASSISTANT

## 2023-11-29 PROCEDURE — 99152 MOD SED SAME PHYS/QHP 5/>YRS: CPT | Performed by: INTERNAL MEDICINE

## 2023-11-29 PROCEDURE — C1769 GUIDE WIRE: HCPCS | Performed by: INTERNAL MEDICINE

## 2023-11-29 PROCEDURE — 86850 RBC ANTIBODY SCREEN: CPT

## 2023-11-29 PROCEDURE — 80061 LIPID PANEL: CPT

## 2023-11-29 PROCEDURE — 36415 COLL VENOUS BLD VENIPUNCTURE: CPT

## 2023-11-29 PROCEDURE — 93010 ELECTROCARDIOGRAM REPORT: CPT | Performed by: INTERNAL MEDICINE

## 2023-11-29 PROCEDURE — 6360000002 HC RX W HCPCS: Performed by: INTERNAL MEDICINE

## 2023-11-29 PROCEDURE — C1894 INTRO/SHEATH, NON-LASER: HCPCS | Performed by: INTERNAL MEDICINE

## 2023-11-29 PROCEDURE — 80048 BASIC METABOLIC PNL TOTAL CA: CPT

## 2023-11-29 PROCEDURE — 6360000004 HC RX CONTRAST MEDICATION: Performed by: INTERNAL MEDICINE

## 2023-11-29 PROCEDURE — 85610 PROTHROMBIN TIME: CPT

## 2023-11-29 PROCEDURE — 6370000000 HC RX 637 (ALT 250 FOR IP): Performed by: INTERNAL MEDICINE

## 2023-11-29 PROCEDURE — C1725 CATH, TRANSLUMIN NON-LASER: HCPCS | Performed by: INTERNAL MEDICINE

## 2023-11-29 PROCEDURE — 2580000003 HC RX 258: Performed by: INTERNAL MEDICINE

## 2023-11-29 PROCEDURE — 92928 PRQ TCAT PLMT NTRAC ST 1 LES: CPT | Performed by: INTERNAL MEDICINE

## 2023-11-29 PROCEDURE — 93005 ELECTROCARDIOGRAM TRACING: CPT | Performed by: PHYSICIAN ASSISTANT

## 2023-11-29 PROCEDURE — 86901 BLOOD TYPING SEROLOGIC RH(D): CPT

## 2023-11-29 PROCEDURE — 85347 COAGULATION TIME ACTIVATED: CPT

## 2023-11-29 PROCEDURE — 2709999900 HC NON-CHARGEABLE SUPPLY: Performed by: INTERNAL MEDICINE

## 2023-11-29 PROCEDURE — 93005 ELECTROCARDIOGRAM TRACING: CPT | Performed by: INTERNAL MEDICINE

## 2023-11-29 PROCEDURE — 86900 BLOOD TYPING SEROLOGIC ABO: CPT

## 2023-11-29 PROCEDURE — C1887 CATHETER, GUIDING: HCPCS | Performed by: INTERNAL MEDICINE

## 2023-11-29 PROCEDURE — 85730 THROMBOPLASTIN TIME PARTIAL: CPT

## 2023-11-29 PROCEDURE — C9600 PERC DRUG-EL COR STENT SING: HCPCS | Performed by: INTERNAL MEDICINE

## 2023-11-29 PROCEDURE — C1874 STENT, COATED/COV W/DEL SYS: HCPCS | Performed by: INTERNAL MEDICINE

## 2023-11-29 PROCEDURE — 2500000003 HC RX 250 WO HCPCS: Performed by: INTERNAL MEDICINE

## 2023-11-29 PROCEDURE — 85027 COMPLETE CBC AUTOMATED: CPT

## 2023-11-29 PROCEDURE — 99153 MOD SED SAME PHYS/QHP EA: CPT | Performed by: INTERNAL MEDICINE

## 2023-11-29 DEVICE — STENT ONYXNG35008UX ONYX 3.50X08RX
Type: IMPLANTABLE DEVICE | Status: FUNCTIONAL
Brand: ONYX FRONTIER™

## 2023-11-29 RX ORDER — SODIUM CHLORIDE 9 MG/ML
INJECTION, SOLUTION INTRAVENOUS CONTINUOUS
Status: DISCONTINUED | OUTPATIENT
Start: 2023-11-29 | End: 2023-11-30 | Stop reason: HOSPADM

## 2023-11-29 RX ORDER — POLYVINYL ALCOHOL 14 MG/ML
4 SOLUTION/ DROPS OPHTHALMIC
COMMUNITY

## 2023-11-29 RX ORDER — ONDANSETRON 4 MG/1
4 TABLET, FILM COATED ORAL EVERY 8 HOURS PRN
Status: DISCONTINUED | OUTPATIENT
Start: 2023-11-29 | End: 2023-11-30 | Stop reason: HOSPADM

## 2023-11-29 RX ORDER — ASPIRIN 81 MG/1
81 TABLET, CHEWABLE ORAL DAILY
Status: DISCONTINUED | OUTPATIENT
Start: 2023-11-30 | End: 2023-11-30 | Stop reason: HOSPADM

## 2023-11-29 RX ORDER — ACETAMINOPHEN 325 MG/1
650 TABLET ORAL EVERY 6 HOURS PRN
Status: DISCONTINUED | OUTPATIENT
Start: 2023-11-29 | End: 2023-11-29 | Stop reason: SDUPTHER

## 2023-11-29 RX ORDER — ACETAMINOPHEN 325 MG/1
650 TABLET ORAL EVERY 4 HOURS PRN
Status: DISCONTINUED | OUTPATIENT
Start: 2023-11-29 | End: 2023-11-30 | Stop reason: HOSPADM

## 2023-11-29 RX ORDER — LANOLIN ALCOHOL/MO/W.PET/CERES
3 CREAM (GRAM) TOPICAL NIGHTLY PRN
Status: DISCONTINUED | OUTPATIENT
Start: 2023-11-29 | End: 2023-11-30 | Stop reason: HOSPADM

## 2023-11-29 RX ORDER — NITROGLYCERIN 20 MG/100ML
5-200 INJECTION INTRAVENOUS CONTINUOUS
Status: DISCONTINUED | OUTPATIENT
Start: 2023-11-29 | End: 2023-11-30 | Stop reason: HOSPADM

## 2023-11-29 RX ORDER — ERYTHROMYCIN 5 MG/G
OINTMENT OPHTHALMIC NIGHTLY
Status: DISCONTINUED | OUTPATIENT
Start: 2023-11-29 | End: 2023-11-30 | Stop reason: HOSPADM

## 2023-11-29 RX ORDER — HEPARIN SODIUM 1000 [USP'U]/ML
INJECTION, SOLUTION INTRAVENOUS; SUBCUTANEOUS PRN
Status: DISCONTINUED | OUTPATIENT
Start: 2023-11-29 | End: 2023-11-29 | Stop reason: HOSPADM

## 2023-11-29 RX ORDER — GABAPENTIN 600 MG/1
600 TABLET ORAL 4 TIMES DAILY
Status: DISCONTINUED | OUTPATIENT
Start: 2023-11-29 | End: 2023-11-30 | Stop reason: HOSPADM

## 2023-11-29 RX ORDER — SODIUM CHLORIDE 0.9 % (FLUSH) 0.9 %
5-40 SYRINGE (ML) INJECTION EVERY 12 HOURS SCHEDULED
Status: DISCONTINUED | OUTPATIENT
Start: 2023-11-29 | End: 2023-11-30 | Stop reason: HOSPADM

## 2023-11-29 RX ORDER — FENTANYL CITRATE 50 UG/ML
INJECTION, SOLUTION INTRAMUSCULAR; INTRAVENOUS PRN
Status: DISCONTINUED | OUTPATIENT
Start: 2023-11-29 | End: 2023-11-29 | Stop reason: HOSPADM

## 2023-11-29 RX ORDER — OXCARBAZEPINE 300 MG/1
450 TABLET, FILM COATED ORAL 2 TIMES DAILY
Status: DISCONTINUED | OUTPATIENT
Start: 2023-11-29 | End: 2023-11-30 | Stop reason: HOSPADM

## 2023-11-29 RX ORDER — ASCORBIC ACID 500 MG
500 TABLET ORAL 2 TIMES DAILY
Status: DISCONTINUED | OUTPATIENT
Start: 2023-11-29 | End: 2023-11-30 | Stop reason: HOSPADM

## 2023-11-29 RX ORDER — ASPIRIN 325 MG
325 TABLET ORAL ONCE
Status: DISCONTINUED | OUTPATIENT
Start: 2023-11-29 | End: 2023-11-30 | Stop reason: HOSPADM

## 2023-11-29 RX ORDER — ATORVASTATIN CALCIUM 80 MG/1
80 TABLET, FILM COATED ORAL NIGHTLY
Status: DISCONTINUED | OUTPATIENT
Start: 2023-11-29 | End: 2023-11-29 | Stop reason: ALTCHOICE

## 2023-11-29 RX ORDER — LISINOPRIL 2.5 MG/1
2.5 TABLET ORAL DAILY
Status: DISCONTINUED | OUTPATIENT
Start: 2023-11-29 | End: 2023-11-30 | Stop reason: HOSPADM

## 2023-11-29 RX ORDER — ACETIC ACID 0.25 G/100ML
60 IRRIGANT IRRIGATION
Status: DISCONTINUED | OUTPATIENT
Start: 2023-11-29 | End: 2023-11-30 | Stop reason: HOSPADM

## 2023-11-29 RX ORDER — METOPROLOL SUCCINATE 50 MG/1
50 TABLET, EXTENDED RELEASE ORAL 2 TIMES DAILY
Status: DISCONTINUED | OUTPATIENT
Start: 2023-11-29 | End: 2023-11-30 | Stop reason: HOSPADM

## 2023-11-29 RX ORDER — SPIRONOLACTONE 25 MG/1
25 TABLET ORAL DAILY
Status: DISCONTINUED | OUTPATIENT
Start: 2023-11-29 | End: 2023-11-30 | Stop reason: HOSPADM

## 2023-11-29 RX ORDER — SODIUM CHLORIDE 9 MG/ML
INJECTION, SOLUTION INTRAVENOUS PRN
Status: DISCONTINUED | OUTPATIENT
Start: 2023-11-29 | End: 2023-11-30 | Stop reason: HOSPADM

## 2023-11-29 RX ORDER — NITROGLYCERIN 0.4 MG/1
0.4 TABLET SUBLINGUAL EVERY 5 MIN PRN
Status: DISCONTINUED | OUTPATIENT
Start: 2023-11-29 | End: 2023-11-29 | Stop reason: SDUPTHER

## 2023-11-29 RX ORDER — POLYVINYL ALCOHOL 14 MG/ML
4 SOLUTION/ DROPS OPHTHALMIC
Status: DISCONTINUED | OUTPATIENT
Start: 2023-11-29 | End: 2023-11-30 | Stop reason: HOSPADM

## 2023-11-29 RX ORDER — ROSUVASTATIN CALCIUM 20 MG/1
20 TABLET, COATED ORAL NIGHTLY
Status: DISCONTINUED | OUTPATIENT
Start: 2023-11-29 | End: 2023-11-30 | Stop reason: HOSPADM

## 2023-11-29 RX ORDER — SODIUM CHLORIDE 0.9 % (FLUSH) 0.9 %
5-40 SYRINGE (ML) INJECTION PRN
Status: DISCONTINUED | OUTPATIENT
Start: 2023-11-29 | End: 2023-11-30 | Stop reason: HOSPADM

## 2023-11-29 RX ORDER — ASPIRIN 81 MG/1
81 TABLET ORAL DAILY
Status: DISCONTINUED | OUTPATIENT
Start: 2023-11-29 | End: 2023-11-29 | Stop reason: SDUPTHER

## 2023-11-29 RX ORDER — FAMOTIDINE 20 MG/1
20 TABLET, FILM COATED ORAL 2 TIMES DAILY
Status: DISCONTINUED | OUTPATIENT
Start: 2023-11-29 | End: 2023-11-30 | Stop reason: HOSPADM

## 2023-11-29 RX ORDER — BACLOFEN 10 MG/1
10 TABLET ORAL 3 TIMES DAILY
Status: DISCONTINUED | OUTPATIENT
Start: 2023-11-29 | End: 2023-11-30 | Stop reason: HOSPADM

## 2023-11-29 RX ORDER — VITAMIN E 268 MG
400 CAPSULE ORAL DAILY
Status: DISCONTINUED | OUTPATIENT
Start: 2023-11-29 | End: 2023-11-30 | Stop reason: HOSPADM

## 2023-11-29 RX ORDER — MIDAZOLAM HYDROCHLORIDE 1 MG/ML
INJECTION INTRAMUSCULAR; INTRAVENOUS PRN
Status: DISCONTINUED | OUTPATIENT
Start: 2023-11-29 | End: 2023-11-29 | Stop reason: HOSPADM

## 2023-11-29 RX ORDER — NITROGLYCERIN 0.4 MG/1
0.4 TABLET SUBLINGUAL EVERY 5 MIN PRN
Status: DISCONTINUED | OUTPATIENT
Start: 2023-11-29 | End: 2023-11-30 | Stop reason: HOSPADM

## 2023-11-29 RX ORDER — ACETIC ACID 0.25 G/100ML
60 IRRIGANT IRRIGATION
COMMUNITY

## 2023-11-29 RX ORDER — OXYBUTYNIN CHLORIDE 5 MG/1
5 TABLET, EXTENDED RELEASE ORAL 2 TIMES DAILY
Status: DISCONTINUED | OUTPATIENT
Start: 2023-11-29 | End: 2023-11-30 | Stop reason: HOSPADM

## 2023-11-29 RX ORDER — DOCUSATE SODIUM 100 MG/1
100 CAPSULE, LIQUID FILLED ORAL DAILY
Status: DISCONTINUED | OUTPATIENT
Start: 2023-11-29 | End: 2023-11-30 | Stop reason: HOSPADM

## 2023-11-29 RX ADMIN — SODIUM CHLORIDE, PRESERVATIVE FREE 10 ML: 5 INJECTION INTRAVENOUS at 23:12

## 2023-11-29 RX ADMIN — LISINOPRIL 2.5 MG: 2.5 TABLET ORAL at 23:09

## 2023-11-29 RX ADMIN — SODIUM CHLORIDE: 9 INJECTION, SOLUTION INTRAVENOUS at 13:26

## 2023-11-29 RX ADMIN — Medication 6 MG: at 23:10

## 2023-11-29 RX ADMIN — NITROGLYCERIN 20 MCG/MIN: 20 INJECTION INTRAVENOUS at 19:51

## 2023-11-29 RX ADMIN — FAMOTIDINE 20 MG: 20 TABLET ORAL at 23:10

## 2023-11-29 RX ADMIN — Medication 3 MG: at 23:11

## 2023-11-29 RX ADMIN — OXCARBAZEPINE 450 MG: 300 TABLET, FILM COATED ORAL at 23:09

## 2023-11-29 RX ADMIN — Medication 1 TABLET: at 23:10

## 2023-11-29 RX ADMIN — OXYBUTYNIN CHLORIDE 5 MG: 5 TABLET, EXTENDED RELEASE ORAL at 23:11

## 2023-11-29 RX ADMIN — Medication 400 UNITS: at 23:11

## 2023-11-29 RX ADMIN — OXYCODONE HYDROCHLORIDE AND ACETAMINOPHEN 500 MG: 500 TABLET ORAL at 23:09

## 2023-11-29 RX ADMIN — TICAGRELOR 90 MG: 90 TABLET ORAL at 23:09

## 2023-11-29 RX ADMIN — BACLOFEN 10 MG: 10 TABLET ORAL at 23:10

## 2023-11-29 RX ADMIN — METOPROLOL SUCCINATE 50 MG: 50 TABLET, EXTENDED RELEASE ORAL at 23:11

## 2023-11-29 RX ADMIN — ROSUVASTATIN CALCIUM 20 MG: 20 TABLET, FILM COATED ORAL at 23:11

## 2023-11-29 RX ADMIN — SODIUM CHLORIDE: 9 INJECTION, SOLUTION INTRAVENOUS at 19:43

## 2023-11-29 RX ADMIN — GABAPENTIN 600 MG: 600 TABLET, FILM COATED ORAL at 23:10

## 2023-11-29 RX ADMIN — SPIRONOLACTONE 25 MG: 25 TABLET ORAL at 23:11

## 2023-11-29 NOTE — H&P
[facility]  Sedation/Analgesia History & Physical    Pt Name: Elisa Lipscomb  Account number: [de-identified]  MRN: [id number]  YOB: 1955  Provider Performing Procedure: Preethi Jansen MD MD  Referring Provider: Preethi Jansen MD   Primary Care Physician: Taylor Daniels MD  Date: 11/29/2023    PRE-PROCEDURE    Code Status: FULL CODE  Brief History/Pre-Procedure Diagnosis:   Severe RCA stenosis  On GDMT      Consent: : I have discussed with the patient risks, benefits, and alternatives (and relevant risks, benefits, and side effects related to alternatives or not receiving care), and likelihood of the success. The patient and/or representative appear to understand and agree to proceed. The discussion encompasses risks, benefits, and side effects related to the alternatives and the risks related to not receiving the proposed care, treatment, and services. The indication, risks and benefits of the procedure and possible therapeutic consequences and alternatives were discussed with the patient. The patient was given the opportunity to ask questions and to have them answered to his/her satisfaction. Risks of the procedure include but are not limited to the following: Bleeding, hematoma including retroperitoneal hemmorhage, infection, pain and discomfort, injury to the aorta and other blood vessels, rhythm disturbance, low blood pressure, myocardial infarction, stroke, kidney damage/failure, myocardial perforation, allergic reactions to sedatives/contrast material, loss of pulse/vascular compromise requiring surgery, aneurysm/pseudoaneurysm formation, possible loss of a limb/hand/leg, needing blood transfusion, requiring emergent open heart surgery or emergent coronary intervention, the need for intubation/respiratory support, the requirement for defibrillation/cardioversion, and death. Alternatives to and omission of the suggested procedure were discussed.  The patient had no

## 2023-11-29 NOTE — PROGRESS NOTES
1155 Patient admitted to 2E14  Ambulatory for PCI. Patient NPO. Patient accompanied by self, states no one coming for the day. Vital signs obtained. Assessment and data collection intiated. Oriented to room. Policies and procedures for 2E explained. All questions answered with no further questions at this time. Fall prevention and safety precautions discussed with patient. 1350 Maury Ochoa reviewed with patient . Patient verbalize understanding of the plan of care and contribute to goal setting. Patient here in motorized wheelchair, whit lift used to get patient in bed with help of rehab. Patient states no one coming today for procedure. Coccyx red, patient states he thinks this area is healed, white cream noted on coccyx. Colostomy bag intact to abdomin. Pompa draining yellow urine with white sediment. (Suprapubic cath)    1353 To cath lab per bed, stable condition. 1515 Care taken over from cath lab, left groin stable . Patient reinstructed on bedrest, instructed to keep legs straight, voices understanding. 5 called Dr Catalina Lynch for orders. [address] pull with quickclot  and immediate pressure, patient tolerating procedure well   1828 Pressure released. Site dressed with folded 4x4 and opsite, site soft, patient denies complaints. 1830 Attempted to call report to [facility] . 1833 EKG done. 4651 Report to [facility]. 1849 Patient ate a few bites, decided he wants to wait till he can sit up to eat. 1900 To 8b29 per bed with transport, transport has patient belongings and electric wheelchair with them.

## 2023-11-29 NOTE — PROGRESS NOTES
#6 Short sheath pulled from left groin per Jeison Calhoun RN. Pressure being held. Patient tolerating  well.

## 2023-11-30 VITALS
HEART RATE: 87 BPM | BODY MASS INDEX: 28.1 KG/M2 | TEMPERATURE: 98.1 F | SYSTOLIC BLOOD PRESSURE: 101 MMHG | RESPIRATION RATE: 18 BRPM | HEIGHT: 67 IN | WEIGHT: 179.01 LBS | OXYGEN SATURATION: 94 % | DIASTOLIC BLOOD PRESSURE: 56 MMHG

## 2023-11-30 LAB
ANION GAP SERPL CALC-SCNC: 13 MEQ/L (ref 8–16)
BUN SERPL-MCNC: 21 MG/DL (ref 7–22)
CALCIUM SERPL-MCNC: 9.2 MG/DL (ref 8.5–10.5)
CHLORIDE SERPL-SCNC: 102 MEQ/L (ref 98–111)
CHOLEST SERPL-MCNC: 97 MG/DL (ref 100–199)
CO2 SERPL-SCNC: 26 MEQ/L (ref 23–33)
CREAT SERPL-MCNC: 0.3 MG/DL (ref 0.4–1.2)
DEPRECATED RDW RBC AUTO: 50.1 FL (ref 35–45)
EKG ATRIAL RATE: 69 BPM
EKG P AXIS: 68 DEGREES
EKG P-R INTERVAL: 162 MS
EKG Q-T INTERVAL: 406 MS
EKG QRS DURATION: 78 MS
EKG QTC CALCULATION (BAZETT): 435 MS
EKG R AXIS: 43 DEGREES
EKG T AXIS: 73 DEGREES
EKG VENTRICULAR RATE: 69 BPM
ERYTHROCYTE [DISTWIDTH] IN BLOOD BY AUTOMATED COUNT: 14.4 % (ref 11.5–14.5)
GFR SERPL CREATININE-BSD FRML MDRD: > 60 ML/MIN/1.73M2
GLUCOSE SERPL-MCNC: 83 MG/DL (ref 70–108)
HCT VFR BLD AUTO: 37.1 % (ref 42–52)
HDLC SERPL-MCNC: 33 MG/DL
HGB BLD-MCNC: 12 GM/DL (ref 14–18)
LDLC SERPL CALC-MCNC: 44 MG/DL
MCH RBC QN AUTO: 31.2 PG (ref 26–33)
MCHC RBC AUTO-ENTMCNC: 32.3 GM/DL (ref 32.2–35.5)
MCV RBC AUTO: 96.4 FL (ref 80–94)
PLATELET # BLD AUTO: 180 THOU/MM3 (ref 130–400)
PMV BLD AUTO: 8.7 FL (ref 9.4–12.4)
POTASSIUM SERPL-SCNC: 4.7 MEQ/L (ref 3.5–5.2)
RBC # BLD AUTO: 3.85 MILL/MM3 (ref 4.7–6.1)
SODIUM SERPL-SCNC: 141 MEQ/L (ref 135–145)
TRIGL SERPL-MCNC: 99 MG/DL (ref 0–199)
WBC # BLD AUTO: 5.9 THOU/MM3 (ref 4.8–10.8)

## 2023-11-30 PROCEDURE — 93010 ELECTROCARDIOGRAM REPORT: CPT | Performed by: INTERNAL MEDICINE

## 2023-11-30 PROCEDURE — 80061 LIPID PANEL: CPT

## 2023-11-30 PROCEDURE — 6370000000 HC RX 637 (ALT 250 FOR IP): Performed by: INTERNAL MEDICINE

## 2023-11-30 PROCEDURE — 85027 COMPLETE CBC AUTOMATED: CPT

## 2023-11-30 PROCEDURE — 80048 BASIC METABOLIC PNL TOTAL CA: CPT

## 2023-11-30 PROCEDURE — 99239 HOSP IP/OBS DSCHRG MGMT >30: CPT | Performed by: REGISTERED NURSE

## 2023-11-30 PROCEDURE — 36415 COLL VENOUS BLD VENIPUNCTURE: CPT

## 2023-11-30 RX ADMIN — OXCARBAZEPINE 450 MG: 300 TABLET, FILM COATED ORAL at 08:16

## 2023-11-30 RX ADMIN — GABAPENTIN 600 MG: 600 TABLET, FILM COATED ORAL at 08:16

## 2023-11-30 RX ADMIN — GABAPENTIN 600 MG: 600 TABLET, FILM COATED ORAL at 12:36

## 2023-11-30 RX ADMIN — SPIRONOLACTONE 25 MG: 25 TABLET ORAL at 08:16

## 2023-11-30 RX ADMIN — LISINOPRIL 2.5 MG: 2.5 TABLET ORAL at 08:16

## 2023-11-30 RX ADMIN — TICAGRELOR 90 MG: 90 TABLET ORAL at 08:16

## 2023-11-30 RX ADMIN — Medication 6 MG: at 08:16

## 2023-11-30 RX ADMIN — Medication 1 TABLET: at 08:16

## 2023-11-30 RX ADMIN — DOCUSATE SODIUM 100 MG: 100 CAPSULE, LIQUID FILLED ORAL at 08:16

## 2023-11-30 RX ADMIN — ASPIRIN 81 MG: 81 TABLET, CHEWABLE ORAL at 08:17

## 2023-11-30 RX ADMIN — OXYBUTYNIN CHLORIDE 5 MG: 5 TABLET, EXTENDED RELEASE ORAL at 08:16

## 2023-11-30 RX ADMIN — Medication 400 UNITS: at 08:16

## 2023-11-30 RX ADMIN — BACLOFEN 10 MG: 10 TABLET ORAL at 12:36

## 2023-11-30 RX ADMIN — BACLOFEN 10 MG: 10 TABLET ORAL at 08:17

## 2023-11-30 RX ADMIN — METOPROLOL SUCCINATE 50 MG: 50 TABLET, EXTENDED RELEASE ORAL at 08:16

## 2023-11-30 RX ADMIN — FAMOTIDINE 20 MG: 20 TABLET ORAL at 08:16

## 2023-11-30 RX ADMIN — OXYCODONE HYDROCHLORIDE AND ACETAMINOPHEN 500 MG: 500 TABLET ORAL at 08:16

## 2023-11-30 ASSESSMENT — PAIN SCALES - GENERAL
PAINLEVEL_OUTOF10: 0
PAINLEVEL_OUTOF10: 0

## 2023-11-30 NOTE — PROGRESS NOTES
Pt discharge to [facility] transported by facility transportation scheduled for 1300; Discharge packet reviewed with pt regarding: medications, follow ups, discharge instructions, and pt education with no further questions or concerns voiced at this time. AVS & Last dose [facility] faxed; Report given to Caryn Ng LPN. Heart attack teaching covered with patient and/or family or significant other:  Signs and symptoms of a heart attack. When to call 911 and the importance of calling 911. Personal risk factors and ways to lower their risk. 4.   Importance of quitting smoking if applicable. Heart attack booklet given to the patient and/or family or significant other. Reviewed: How to take Nitroglycerin. The importance of participating in Cardiac Rehab and hours of operation. Heart Healthy Diet. Risk factor modification. (Overweight, Obesity, Diabetes, Hypertension, Smoking, High Cholesterol, Stress)  Discharge instructions for Cath/Intervention procedure site if applicable. Packet given and reviewed prior to discharge.

## 2023-11-30 NOTE — PLAN OF CARE
therapy   Encourage visually impaired, hearing impaired and aphasic patients to use assistive/communication devices     Problem: Respiratory - Adult  Goal: Achieves optimal ventilation and oxygenation  Outcome: Progressing  Flowsheets (Taken 11/30/2023 1123)  Achieves optimal ventilation and oxygenation:   Assess for changes in respiratory status   Assess for changes in mentation and behavior   Position to facilitate oxygenation and minimize respiratory effort   Oxygen supplementation based on oxygen saturation or arterial blood gases   Initiate smoking cessation protocol as indicated   Encourage broncho-pulmonary hygiene including cough, deep breathe, incentive spirometry   Assess the need for suctioning and aspirate as needed   Assess and instruct to report shortness of breath or any respiratory difficulty   Respiratory therapy support as indicated     Problem: Cardiovascular - Adult  Goal: Maintains optimal cardiac output and hemodynamic stability  Outcome: Progressing  Flowsheets (Taken 11/30/2023 1123)  Maintains optimal cardiac output and hemodynamic stability:   Monitor blood pressure and heart rate   Monitor urine output and notify Licensed Independent Practitioner for values outside of normal range   Assess for signs of decreased cardiac output   Administer fluid and/or volume expanders as ordered   Administer vasoactive medications as ordered  Goal: Absence of cardiac dysrhythmias or at baseline  Outcome: Progressing  Flowsheets (Taken 11/30/2023 1123)  Absence of cardiac dysrhythmias or at baseline:   Monitor cardiac rate and rhythm   Assess for signs of decreased cardiac output   Administer antiarrhythmia medication and electrolyte replacement as ordered     Problem: Skin/Tissue Integrity - Adult  Goal: Skin integrity remains intact  Outcome: Progressing  Flowsheets (Taken 11/30/2023 1123)  Skin Integrity Remains Intact:   Monitor for areas of redness and/or skin breakdown   Assess vascular access sites hourly  Goal: Incisions, wounds, or drain sites healing without S/S of infection  Outcome: Progressing  Flowsheets (Taken 11/30/2023 1123)  Incisions, Wounds, or Drain Sites Healing Without Sign and Symptoms of Infection:   ADMISSION and DAILY: Assess and document risk factors for pressure ulcer development   TWICE DAILY: Assess and document skin integrity   TWICE DAILY: Assess and document dressing/incision, wound bed, drain sites and surrounding tissue   Implement wound care per orders  Goal: Oral mucous membranes remain intact  Outcome: Progressing  Flowsheets (Taken 11/30/2023 1123)  Oral Mucous Membranes Remain Intact:   Assess oral mucosa and hygiene practices   Implement preventative oral hygiene regimen   Implement oral medicated treatments as ordered     Problem: Gastrointestinal - Adult  Goal: Establish and maintain optimal ostomy function  Outcome: Progressing  Flowsheets (Taken 11/30/2023 1123)  Establish and maintain optimal ostomy function:   Monitor output from ostomies   Administer IV fluids and TPN as ordered   Introduce and advance enteral feedings as ordered   Nutrition consult   Gastric suctioning as ordered   Infuse IV Fluids/TPN as ordered     Problem: Metabolic/Fluid and Electrolytes - Adult  Goal: Electrolytes maintained within normal limits  Outcome: Progressing  Flowsheets (Taken 11/30/2023 1123)  Electrolytes maintained within normal limits:   Monitor labs and assess patient for signs and symptoms of electrolyte imbalances   Administer electrolyte replacement as ordered   Monitor response to electrolyte replacements, including repeat lab results as appropriate   Fluid restriction as ordered   Instruct patient on fluid and nutrition restrictions as appropriate  Goal: Hemodynamic stability and optimal renal function maintained  Outcome: Progressing  Flowsheets (Taken 11/30/2023 1123)  Hemodynamic stability and optimal renal function maintained:   Monitor labs and assess for signs and

## 2023-11-30 NOTE — DISCHARGE INSTR - DIET

## 2023-11-30 NOTE — PROGRESS NOTES
Inpatient Cardiac Rehabilitation Consult    Received consult for Phase II Cardiac Rehabilitation. Patient needs cardiac rehab due to PCI on 11/29/23. Reviewed chart, patient is not a candidate for cardiac rehab due to paraparesis of both lower limbs, and he is here from [facility].

## 2023-11-30 NOTE — PLAN OF CARE
Problem: ABCDS Injury Assessment  Goal: Absence of physical injury  Outcome: Progressing  Flowsheets (Taken 11/30/2023 0126)  Absence of Physical Injury: Implement safety measures based on patient assessment     Problem: Skin/Tissue Integrity  Goal: Absence of new skin breakdown  Description: 1. Monitor for areas of redness and/or skin breakdown  2. Assess vascular access sites hourly  3. Every 4-6 hours minimum:  Change oxygen saturation probe site  4. Every 4-6 hours:  If on nasal continuous positive airway pressure, respiratory therapy assess nares and determine need for appliance change or resting period. Outcome: Progressing     Problem: Discharge Planning  Goal: Discharge to home or other facility with appropriate resources  Outcome: Progressing  Flowsheets (Taken 11/30/2023 0126)  Discharge to home or other facility with appropriate resources:   Identify barriers to discharge with patient and caregiver   Arrange for needed discharge resources and transportation as appropriate   Identify discharge learning needs (meds, wound care, etc)   Arrange for interpreters to assist at discharge as needed   Refer to discharge planning if patient needs post-hospital services based on physician order or complex needs related to functional status, cognitive ability or social support system     Problem: Safety - Adult  Goal: Free from fall injury  Outcome: Progressing  Flowsheets (Taken 11/30/2023 0126)  Free From Fall Injury:   Instruct family/caregiver on patient safety   Based on caregiver fall risk screen, instruct family/caregiver to ask for assistance with transferring infant if caregiver noted to have fall risk factors   Care plan reviewed with patient. Patient verbalizes understanding of the care plan and contributed to goal setting.

## 2023-11-30 NOTE — DISCHARGE SUMMARY
mg/dL    Triglycerides 99 0 - 199 mg/dL    HDL 33 mg/dL    LDL Calculated 44 mg/dL   Anion Gap    Collection Time: 11/30/23  5:53 AM   Result Value Ref Range    Anion Gap 13.0 8.0 - 16.0 meq/L   Glomerular Filtration Rate, Estimated    Collection Time: 11/30/23  5:53 AM   Result Value Ref Range    Est, Glom Filt Rate >60 >60 ml/min/1.73m2       Patient and Practitioner mutually agreed upon goal:   Patient and provider goals: Feel better and have more energy    Patient Instructions:    Discharge lab work: None  Activity:   Activity   You may resume normal activity in 2 days, including driving, letting pain be your     guide. Limit lifting over 5 pounds (half gallon of milk) to one week or until site heals. Limit vigorous activity (contact sports) to two weeks time. You will be able to return to work in 1-3 days. Diet: Cardiac; 2gm Na     Cardiac Rehab: Yes    Follow-up visits:   Jose Manuel Gloria MD  [facility]  [phone]    Follow up in 3 week(s)  Office will contact [facility] for a 3 week follow up appointment. [facility] [address] [facility] [address] [place] [address]  [phone]           Discharge condition: fair  Disposition: Home/ Resident at [facility]  Time spent on discharge: >30 min      Discharge Medications for PCI/MI (performed or attempted):   ASA: yes  Statin: yes  P2Y12 Inhibitor: yes  Beta Blocker: yes  Nitro SL: yes  ACE Inhibitor/ARB: yes      Discharge Medications for ICD, Cardiomyopathy, CHF:  Beta Blocker: yes  ACE Inhibitor/ARB: yes        Mr. Silvio Quiroga is seen AM post LHC/PCI of RCA. Remained stable post-procedure. Cath site intact. Discharge medications reviewed. He is stable for discharge back to SNF. Will have follow in cardiology office in 2-4 weeks.      Electronically signed by TONO De Jesus CNP on 11/30/2023 at 5:21 PM

## 2023-11-30 NOTE — CARE COORDINATION
11/30/23, 12:41 PM EST  Discharge Planning Evaluation  Social work consult received, [facility] from [facility]. Patient/Family preference is to return to [facility], per patient. The patient's current payor source at the facility is Medicaid. Medicare skilled days available: yes  Medicare does the patient have a three midnight qualifying stay? No - Outpatient in a bed  Insurance precert:  no  Spoke with Connie at the facility. Patient bed hold: yes  Anticipated transport plan: [facility] Legal Next of [address] [place] [address]    11/30/23, 12:43 PM EST    Patient goals/plan/ treatment preferences discussed by  and . Patient goals/plan/ treatment preferences reviewed with patient/ family. Patient/ family verbalize understanding of discharge plan and are in agreement with goal/plan/treatment preferences. Understanding was demonstrated using the teach back method. AVS provided by RN at time of discharge, which includes all necessary medical information pertaining to the patients current course of illness, treatment, post-discharge goals of care, and treatment preferences. Services At/After Discharge: [address] [facility] [address] [place] [address], Aide services, and Nursing service           Patient discharged to return to [facility]. Long term bed. Spoke with Hakeem Crane at [facility], informed of discharge. [facility] will  patient at 1:00, will have him at the discharge door. Faxed AVS and MAR. RN aware to call report. Patient informed of 1:00 [facility] transport. Left voicemail message for patient wife Daniele Contreras informing of discharge to [address] [place] [address] with 1:00 transport.

## 2023-12-05 ENCOUNTER — HOSPITAL ENCOUNTER (OUTPATIENT)
Dept: MRI IMAGING | Age: 68
Discharge: HOME | End: 2023-12-05
Attending: PSYCHIATRY & NEUROLOGY
Payer: MEDICARE

## 2023-12-05 DIAGNOSIS — R29.898 BILATERAL ARM WEAKNESS: ICD-10-CM

## 2023-12-05 DIAGNOSIS — G35 MULTIPLE SCLEROSIS (HCC): ICD-10-CM

## 2023-12-05 DIAGNOSIS — R51.9 RIGHT-SIDED FACE PAIN: ICD-10-CM

## 2023-12-05 DIAGNOSIS — G82.20 PARAPARESIS OF BOTH LOWER LIMBS (HCC): ICD-10-CM

## 2023-12-05 PROCEDURE — A9579 GAD-BASE MR CONTRAST NOS,1ML: HCPCS | Performed by: PSYCHIATRY & NEUROLOGY

## 2023-12-05 PROCEDURE — 6360000004 HC RX CONTRAST MEDICATION: Performed by: PSYCHIATRY & NEUROLOGY

## 2023-12-05 PROCEDURE — 72156 MRI NECK SPINE W/O & W/DYE: CPT

## 2023-12-05 RX ADMIN — GADOTERIDOL 15 ML: 279.3 INJECTION, SOLUTION INTRAVENOUS at 12:22

## 2023-12-06 ENCOUNTER — TELEPHONE (OUTPATIENT)
Dept: INFECTIOUS DISEASES | Age: 68
End: 2023-12-06

## 2023-12-11 ENCOUNTER — OFFICE VISIT (OUTPATIENT)
Dept: INFECTIOUS DISEASES | Age: 68
End: 2023-12-11
Payer: MEDICARE

## 2023-12-11 VITALS
SYSTOLIC BLOOD PRESSURE: 94 MMHG | OXYGEN SATURATION: 99 % | TEMPERATURE: 97.4 F | HEART RATE: 79 BPM | RESPIRATION RATE: 18 BRPM | DIASTOLIC BLOOD PRESSURE: 60 MMHG

## 2023-12-11 DIAGNOSIS — Z16.24: ICD-10-CM

## 2023-12-11 DIAGNOSIS — Z87.440 HISTORY OF RECURRENT UTIS: Primary | ICD-10-CM

## 2023-12-11 PROBLEM — R65.21 SEPTIC SHOCK (HCC): Status: RESOLVED | Noted: 2023-08-21 | Resolved: 2023-12-11

## 2023-12-11 PROBLEM — L89.90 DECUBITUS ULCER: Status: RESOLVED | Noted: 2019-02-10 | Resolved: 2023-12-11

## 2023-12-11 PROBLEM — R31.0 GROSS HEMATURIA: Status: RESOLVED | Noted: 2022-06-11 | Resolved: 2023-12-11

## 2023-12-11 PROBLEM — A41.9 SEPSIS DUE TO URINARY TRACT INFECTION (HCC): Status: RESOLVED | Noted: 2020-12-27 | Resolved: 2023-12-11

## 2023-12-11 PROBLEM — N39.0 SEPSIS DUE TO URINARY TRACT INFECTION (HCC): Status: RESOLVED | Noted: 2020-12-27 | Resolved: 2023-12-11

## 2023-12-11 PROBLEM — N39.0 SEPSIS SECONDARY TO UTI (HCC): Status: RESOLVED | Noted: 2019-02-10 | Resolved: 2023-12-11

## 2023-12-11 PROBLEM — L89.91 INFECTED DECUBITUS ULCER, STAGE I: Status: RESOLVED | Noted: 2018-06-08 | Resolved: 2023-12-11

## 2023-12-11 PROBLEM — J18.9 PNEUMONIA OF RIGHT LOWER LOBE DUE TO INFECTIOUS ORGANISM: Status: RESOLVED | Noted: 2022-12-24 | Resolved: 2023-12-11

## 2023-12-11 PROBLEM — A41.9 SEPSIS SECONDARY TO UTI (HCC): Status: RESOLVED | Noted: 2019-02-10 | Resolved: 2023-12-11

## 2023-12-11 PROBLEM — L08.9 INFECTED DECUBITUS ULCER, STAGE I: Status: RESOLVED | Noted: 2018-06-08 | Resolved: 2023-12-11

## 2023-12-11 PROBLEM — A41.9 SEPTIC SHOCK (HCC): Status: RESOLVED | Noted: 2023-08-21 | Resolved: 2023-12-11

## 2023-12-11 PROBLEM — A49.9 ESBL (EXTENDED SPECTRUM BETA-LACTAMASE) PRODUCING BACTERIA INFECTION: Status: RESOLVED | Noted: 2022-08-06 | Resolved: 2023-12-11

## 2023-12-11 PROBLEM — N39.0 COMPLICATED URINARY TRACT INFECTION: Status: RESOLVED | Noted: 2022-07-22 | Resolved: 2023-12-11

## 2023-12-11 PROBLEM — L89.93 INFECTED DECUBITUS ULCER, STAGE III (HCC): Status: RESOLVED | Noted: 2018-06-08 | Resolved: 2023-12-11

## 2023-12-11 PROBLEM — Z16.12 ESBL (EXTENDED SPECTRUM BETA-LACTAMASE) PRODUCING BACTERIA INFECTION: Status: RESOLVED | Noted: 2022-08-06 | Resolved: 2023-12-11

## 2023-12-11 PROBLEM — L08.9 INFECTED DECUBITUS ULCER, STAGE III (HCC): Status: RESOLVED | Noted: 2018-06-08 | Resolved: 2023-12-11

## 2023-12-11 PROCEDURE — 3017F COLORECTAL CA SCREEN DOC REV: CPT | Performed by: NURSE PRACTITIONER

## 2023-12-11 PROCEDURE — 1123F ACP DISCUSS/DSCN MKR DOCD: CPT | Performed by: NURSE PRACTITIONER

## 2023-12-11 PROCEDURE — 1036F TOBACCO NON-USER: CPT | Performed by: NURSE PRACTITIONER

## 2023-12-11 PROCEDURE — 3078F DIAST BP <80 MM HG: CPT | Performed by: NURSE PRACTITIONER

## 2023-12-11 PROCEDURE — G8484 FLU IMMUNIZE NO ADMIN: HCPCS | Performed by: NURSE PRACTITIONER

## 2023-12-11 PROCEDURE — G8427 DOCREV CUR MEDS BY ELIG CLIN: HCPCS | Performed by: NURSE PRACTITIONER

## 2023-12-11 PROCEDURE — 3074F SYST BP LT 130 MM HG: CPT | Performed by: NURSE PRACTITIONER

## 2023-12-11 PROCEDURE — 99212 OFFICE O/P EST SF 10 MIN: CPT | Performed by: NURSE PRACTITIONER

## 2023-12-11 PROCEDURE — G8417 CALC BMI ABV UP PARAM F/U: HCPCS | Performed by: NURSE PRACTITIONER

## 2023-12-11 NOTE — PATIENT INSTRUCTIONS
Visit Discharge/Physician Orders:    No indications of infection today or need for any antibiotics. Recommend always obtaining urine sample from new catheter when concern for infection and obtaining sample. Recommend only treating when symptomatic. Keep next scheduled appointment. Please give 24 hour notice if unable to keep appointment. [phone]    If you experience any new onset or worsening symptoms, please call the Infectious Disease Clinic at [phone]. This line is monitored twice weekly. If you need medical attention outside of business hours, please contact your Primary Care Doctor or go to the nearest emergency room.

## 2023-12-11 NOTE — PROGRESS NOTES
[facility]         Consult Note      Tamar Peabody  MEDICAL RECORD NUMBER:  [id number]  AGE: 79 y.o. GENDER: male  : 1955  EPISODE DATE:  2023    Subjective:     Chief Complaint   Patient presents with    New Patient     [facility] Leeann Maldonado HISTORY of PRESENT ILLNESS HPI     Tamar Peabody is a 79 y.o. male New patient referred by Vu Crow CNP urology, who presents today for infectious disease evaluation. History of Infectious Disease Context:     Patient presents today due to history of multi-drug resistant UTI. Patient is a resident of [facility]. Has history of MS with with neurogenic bladder had history of chronic suprapubic catheter. Review of chart shows multi-year history of multi-drug resistant UTI. Recent urine vixtcya51/20/23, completed after cystoscopy with laser lithotripsy secondary to stone showed MRSA and Acinetobacter species for which he received IV Vancomycin and Cefepime at the [facility] per Dr. Marcio Manzano. Patient states that, since receiving infusions he has been feeling well. Denies any symptoms of infection currently. He states that he has routine flush of his catheter with acetic acid. Denies any recent issues with his suprapubic catheter. He denies any fevers, chills, fatigue, malaise. States he has been feeling at his baseline. He states that he thinks that the kidney stones have resolved. He is scheduled for re-evaluation per urology 23. He denies any further needs or concerns.         PAST MEDICAL HISTORY        Diagnosis Date    Dysphagia     oropharyngeal    History of ESBL E. coli infection     History of pulmonary embolism     History of urinary tract infection     Hx of blood clots     Pulmonary embolis    Hypertension     Left ureteral stone 2023    s/p stent placement    Major depressive disorder, single episode     MS (multiple sclerosis) (HCC)     Multiple sclerosis

## 2023-12-26 ENCOUNTER — OFFICE VISIT (OUTPATIENT)
Dept: CARDIOLOGY CLINIC | Age: 68
End: 2023-12-26
Payer: MEDICARE

## 2023-12-26 VITALS
BODY MASS INDEX: 28.27 KG/M2 | SYSTOLIC BLOOD PRESSURE: 107 MMHG | WEIGHT: 180.13 LBS | HEIGHT: 67 IN | HEART RATE: 76 BPM | DIASTOLIC BLOOD PRESSURE: 69 MMHG

## 2023-12-26 DIAGNOSIS — Z98.61 CAD S/P PERCUTANEOUS CORONARY ANGIOPLASTY: Primary | ICD-10-CM

## 2023-12-26 DIAGNOSIS — I25.10 CORONARY ARTERY DISEASE INVOLVING NATIVE CORONARY ARTERY OF NATIVE HEART WITHOUT ANGINA PECTORIS: ICD-10-CM

## 2023-12-26 DIAGNOSIS — I25.5 ISCHEMIC CARDIOMYOPATHY: ICD-10-CM

## 2023-12-26 DIAGNOSIS — I25.10 CAD S/P PERCUTANEOUS CORONARY ANGIOPLASTY: Primary | ICD-10-CM

## 2023-12-26 DIAGNOSIS — Z86.718 HISTORY OF DVT (DEEP VEIN THROMBOSIS): ICD-10-CM

## 2023-12-26 DIAGNOSIS — I10 ESSENTIAL HYPERTENSION: ICD-10-CM

## 2023-12-26 DIAGNOSIS — I25.118 CORONARY ARTERY DISEASE INVOLVING NATIVE CORONARY ARTERY OF NATIVE HEART WITH OTHER FORM OF ANGINA PECTORIS (HCC): ICD-10-CM

## 2023-12-26 PROCEDURE — G8427 DOCREV CUR MEDS BY ELIG CLIN: HCPCS | Performed by: NURSE PRACTITIONER

## 2023-12-26 PROCEDURE — 3074F SYST BP LT 130 MM HG: CPT | Performed by: NURSE PRACTITIONER

## 2023-12-26 PROCEDURE — 1123F ACP DISCUSS/DSCN MKR DOCD: CPT | Performed by: NURSE PRACTITIONER

## 2023-12-26 PROCEDURE — 3017F COLORECTAL CA SCREEN DOC REV: CPT | Performed by: NURSE PRACTITIONER

## 2023-12-26 PROCEDURE — 3078F DIAST BP <80 MM HG: CPT | Performed by: NURSE PRACTITIONER

## 2023-12-26 PROCEDURE — G8417 CALC BMI ABV UP PARAM F/U: HCPCS | Performed by: NURSE PRACTITIONER

## 2023-12-26 PROCEDURE — 99214 OFFICE O/P EST MOD 30 MIN: CPT | Performed by: NURSE PRACTITIONER

## 2023-12-26 PROCEDURE — G8484 FLU IMMUNIZE NO ADMIN: HCPCS | Performed by: NURSE PRACTITIONER

## 2023-12-26 PROCEDURE — 1036F TOBACCO NON-USER: CPT | Performed by: NURSE PRACTITIONER

## 2023-12-26 RX ORDER — IPRATROPIUM BROMIDE AND ALBUTEROL SULFATE 2.5; .5 MG/3ML; MG/3ML
1 SOLUTION RESPIRATORY (INHALATION) EVERY 6 HOURS PRN
COMMUNITY

## 2023-12-26 RX ORDER — PHENAZOPYRIDINE HYDROCHLORIDE 100 MG/1
100 TABLET, FILM COATED ORAL 3 TIMES DAILY PRN
COMMUNITY

## 2023-12-26 NOTE — PATIENT INSTRUCTIONS
Continue current medications as prescribed. Have the follow-up echo to reassess the left ventricular pumping function done the end of February as scheduled. Follow-up with your PCP as scheduled. Follow-up with Dr. Criss Sheets or Magaly DE LA ROSA in 3 months (post echo) as scheduled or sooner if need.

## 2023-12-26 NOTE — PROGRESS NOTES
Pt here for [facility]    Pt states no c/o
cardiologist/PCP in two to four weeks post procedure   Coronary Findings    Diagnostic  Dominance: Right  Right Coronary Artery   Mid RCA lesion, 90% stenosed. Intervention     Mid RCA lesion   Angioplasty   Angioplasty was performed prior to stent deployment. Supplies used: CATH BLLN ANGIO 3.50X8MM NC EUPHORIA RX   Angioplasty   Supplies used: CATH BLLN ANGIO 3.50X8MM NC EUPHORIA RX   Stent   A single stent was placed. Supplies used: STENT CORONARY [employer] FRONTIER RX 3.5X8 MM ZOTAROLIMUS ELUT        Cath: 8/31/2023  INDICATION FOR PROCEDURE:  NSTEMI, not a candidate for an open heart  surgery. SUMMARY:  Successful PCI of the LAD with 2 non-overlapping drug-eluting  stents. Sim Luna MD  D: 09/01/2023    Cath: 8/30/2023  INDICATION FOR PROCEDURE:  NSTEMI with recent history of sepsis with new  cardiomyopathy. Procedure Note    INDICATION FOR PROCEDURE:  NSTEMI with recent history of sepsis with new  cardiomyopathy. CORONARY ANGIOGRAM:  LEFT MAIN:  Patent without any significant obstruction. LAD:  There appears to be significant calcification and plaque rupture  in the mid segment of the LAD. That in totality equals about 80%  stenosis. There is about a 90% stenosis in the mid to distal LAD. The  LAD itself has no significant obstruction. There is a large diagonal  branch with ostial 60% to 70% stenosis. LEFT CIRCUMFLEX:  Left circumflex appears to be almost . The  proximal vessel is heavily diseased in the mid OM. The OM beyond that  it is heavily disease, it almost functions as a ramus intermedius. RCA:  RCA has 95% stenosis in the mid section. The distal PDA and PLV  are severely diseased. The ostium of the PDA and the distal RCA totally  have 90% stenosis. The PLV has about a tubular 60% stenosis from the  ostium all the way into the mid section of the vessel. LV:  LV systolic pressure 520. No significant LV to AO systolic  gradient. LVEDP is 8.   SUMMARY:  Severe

## 2023-12-28 ENCOUNTER — OUTSIDE SERVICES (OUTPATIENT)
Dept: FAMILY MEDICINE CLINIC | Age: 68
End: 2023-12-28
Payer: MEDICARE

## 2023-12-28 DIAGNOSIS — R31.9 HEMATURIA, UNSPECIFIED TYPE: ICD-10-CM

## 2023-12-28 DIAGNOSIS — G35 MS (MULTIPLE SCLEROSIS) (HCC): ICD-10-CM

## 2023-12-28 DIAGNOSIS — I25.10 CORONARY ARTERY DISEASE INVOLVING NATIVE CORONARY ARTERY OF NATIVE HEART WITHOUT ANGINA PECTORIS: Primary | ICD-10-CM

## 2023-12-28 DIAGNOSIS — Z93.3 COLOSTOMY IN PLACE (HCC): ICD-10-CM

## 2023-12-28 PROCEDURE — 99309 SBSQ NF CARE MODERATE MDM 30: CPT | Performed by: FAMILY MEDICINE

## 2023-12-28 NOTE — PROGRESS NOTES
12/28/2023  Mikel Swift  1955  Subjective:  He was in his/her room to follow up CAD with recent stent of RCA. Also had hematuria but has resolved. Denies headaches, CP, sob, or abdominal pains. Objective:   Please see vitals per chart. There is no sinus tenderness to palpation, no cervical lymphadenopathy. Lungs are clear. Heart Regular rate and rhythm without murmur. The abdomen is soft and nontender. Colostomy present and functioning. Extremities are without edema. Pompa is present. Assessment:  1. Coronary artery disease involving native coronary artery of native heart without angina pectoris  -unstable, recent stent placement, no CPs, controlled on crestor, lisinopril, metoprolol, brilinta and asa.    2. Hematuria, unspecified type  -new problem, resolved on its own, possible catheter trauma. -monitor sxs, call if not improving    3. Colostomy in place [facility])  -stable, functioning appropriately    4. MS (multiple sclerosis) ([address])  -stable, controlled on zanaflex, baclofen and gabapentin    Plan: We will continue current medication regimen including zanaflex, baclofen and gabapentin for MS, melatonin for sleep and supportive care and recheck in 1 month.     Electronically signed by Raquel Medina MD on 12/28/2023 at 11:25 AM.

## 2024-01-03 ENCOUNTER — OFFICE VISIT (OUTPATIENT)
Dept: NEUROLOGY | Age: 69
End: 2024-01-03
Payer: MEDICARE

## 2024-01-03 VITALS
HEIGHT: 67 IN | HEART RATE: 77 BPM | DIASTOLIC BLOOD PRESSURE: 59 MMHG | SYSTOLIC BLOOD PRESSURE: 102 MMHG | BODY MASS INDEX: 28.21 KG/M2 | OXYGEN SATURATION: 95 %

## 2024-01-03 DIAGNOSIS — G35 MULTIPLE SCLEROSIS (HCC): Primary | ICD-10-CM

## 2024-01-03 DIAGNOSIS — R51.9 RIGHT-SIDED FACE PAIN: ICD-10-CM

## 2024-01-03 DIAGNOSIS — G82.20 PARAPARESIS OF BOTH LOWER LIMBS (HCC): ICD-10-CM

## 2024-01-03 PROCEDURE — G8427 DOCREV CUR MEDS BY ELIG CLIN: HCPCS | Performed by: NURSE PRACTITIONER

## 2024-01-03 PROCEDURE — 3078F DIAST BP <80 MM HG: CPT | Performed by: NURSE PRACTITIONER

## 2024-01-03 PROCEDURE — 1036F TOBACCO NON-USER: CPT | Performed by: NURSE PRACTITIONER

## 2024-01-03 PROCEDURE — 99213 OFFICE O/P EST LOW 20 MIN: CPT | Performed by: NURSE PRACTITIONER

## 2024-01-03 PROCEDURE — G8417 CALC BMI ABV UP PARAM F/U: HCPCS | Performed by: NURSE PRACTITIONER

## 2024-01-03 PROCEDURE — 1123F ACP DISCUSS/DSCN MKR DOCD: CPT | Performed by: NURSE PRACTITIONER

## 2024-01-03 PROCEDURE — 3017F COLORECTAL CA SCREEN DOC REV: CPT | Performed by: NURSE PRACTITIONER

## 2024-01-03 PROCEDURE — 3074F SYST BP LT 130 MM HG: CPT | Performed by: NURSE PRACTITIONER

## 2024-01-03 PROCEDURE — G8484 FLU IMMUNIZE NO ADMIN: HCPCS | Performed by: NURSE PRACTITIONER

## 2024-01-03 NOTE — PATIENT INSTRUCTIONS
Continue with Gabapentin at current dose  Continue with Trileptal at current dose.   Follow through with physical and occupational therapy recommendations   You need to take calcium and vitamin D over the counter  Call with any new symptoms or concerns.   Follow up in 6 months or sooner if needed

## 2024-01-03 NOTE — PROGRESS NOTES
voice recognition technology.**    Final report electronically signed by Dr. Dariana Chacko on 12/5/2023 2:43 PM    Results for orders placed during the hospital encounter of 07/20/22    CT HEAD WO CONTRAST    Narrative  PROCEDURE: NONCONTRAST CT BRAIN    CLINICAL INFORMATION: altered mental status. History of multiple sclerosis.    COMPARISON: 4/22/2022    TECHNIQUE: Multiple axial 5 mm images of the brain were obtained without administration of intravenous contrast material. ALL CT SCANS AT THIS FACILITY use dose modulation, iterative reconstruction, and/or weight-based dosing when appropriate to reduce  radiation dose to as low as reasonably achievable.      FINDINGS:    VENTRICLES: Mild prominence of ventricles diffusely, consistent with central and 20. Mild diffuse cerebral cortical atrophy and cerebellar cortical atrophy are also demonstrated.    PARENCHYMA:  No acute infarction, mass lesion, or intracranial hemorrhage is seen. Small focus of diminished attenuation in the right basal ganglia region which could be due to an old infarct or microvascular disease.    MASTOID PROCESSES: Well aerated. Normal in appearance..    PARANASAL SINUSES/CALVARIUM: Unremarkable    Impression  No acute intracranial process.. No change from prior study.      **This report has been created using voice recognition software.  It may contain minor errors which are inherent in voice recognition technology.**    Final report electronically signed by Dr. Amol Gill on 7/20/2022 10:25 AM         Assessment:     Diagnosis Orders   1. Multiple sclerosis (HCC)        2. Paraparesis of both lower limbs (HCC)        3. Right-sided face pain             Follow up for Multiple sclerosis, paraparesis of both lower limbs, right sided face pain.  He is doing better. His bilateral arm weakness has improved. I shared with him that his MRI cervical spine W/WO contrast showed No evidence of active demyelination in the cervical spinal cord.  He

## 2024-01-15 ENCOUNTER — APPOINTMENT (OUTPATIENT)
Dept: GENERAL RADIOLOGY | Age: 69
DRG: 698 | End: 2024-01-15
Payer: MEDICARE

## 2024-01-15 ENCOUNTER — HOSPITAL ENCOUNTER (INPATIENT)
Age: 69
LOS: 8 days | Discharge: HOME | DRG: 698 | End: 2024-01-23
Attending: EMERGENCY MEDICINE | Admitting: PHYSICIAN ASSISTANT
Payer: MEDICARE

## 2024-01-15 DIAGNOSIS — N30.00 ACUTE CYSTITIS WITHOUT HEMATURIA: Primary | ICD-10-CM

## 2024-01-15 DIAGNOSIS — R40.0 SOMNOLENCE: ICD-10-CM

## 2024-01-15 PROBLEM — N39.0 UTI (URINARY TRACT INFECTION): Status: ACTIVE | Noted: 2024-01-15

## 2024-01-15 LAB
ALBUMIN SERPL BCG-MCNC: 3.6 G/DL (ref 3.5–5.1)
ALP SERPL-CCNC: 177 U/L (ref 38–126)
ALT SERPL W/O P-5'-P-CCNC: 13 U/L (ref 11–66)
ANION GAP SERPL CALC-SCNC: 9 MEQ/L (ref 8–16)
ARTERIAL PATENCY WRIST A: POSITIVE
AST SERPL-CCNC: 18 U/L (ref 5–40)
BACTERIA URNS QL MICRO: ABNORMAL /HPF
BASE EXCESS BLDA CALC-SCNC: -1 MMOL/L (ref -2.5–2.5)
BASOPHILS ABSOLUTE: 0 THOU/MM3 (ref 0–0.1)
BASOPHILS NFR BLD AUTO: 0.3 %
BDY SITE: NORMAL
BILIRUB CONJ SERPL-MCNC: < 0.2 MG/DL (ref 0–0.3)
BILIRUB SERPL-MCNC: 0.3 MG/DL (ref 0.3–1.2)
BILIRUB UR QL STRIP.AUTO: NEGATIVE
BUN SERPL-MCNC: 33 MG/DL (ref 7–22)
CALCIUM SERPL-MCNC: 9.9 MG/DL (ref 8.5–10.5)
CASTS #/AREA URNS LPF: ABNORMAL /LPF
CASTS 2: ABNORMAL /LPF
CHARACTER UR: ABNORMAL
CHLORIDE SERPL-SCNC: 100 MEQ/L (ref 98–111)
CO2 SERPL-SCNC: 27 MEQ/L (ref 23–33)
COLLECTED BY:: NORMAL
COLOR: ABNORMAL
CREAT SERPL-MCNC: 0.6 MG/DL (ref 0.4–1.2)
CRYSTALS URNS MICRO: ABNORMAL
DEPRECATED RDW RBC AUTO: 56.7 FL (ref 35–45)
DEVICE: NORMAL
EOSINOPHIL NFR BLD AUTO: 0.5 %
EOSINOPHILS ABSOLUTE: 0.1 THOU/MM3 (ref 0–0.4)
EPITHELIAL CELLS, UA: ABNORMAL /HPF
ERYTHROCYTE [DISTWIDTH] IN BLOOD BY AUTOMATED COUNT: 15.4 % (ref 11.5–14.5)
FIO2 ON VENT O2 ANALYZER: 32 %
FLUAV RNA RESP QL NAA+PROBE: NOT DETECTED
FLUBV RNA RESP QL NAA+PROBE: NOT DETECTED
GFR SERPL CREATININE-BSD FRML MDRD: > 60 ML/MIN/1.73M2
GLUCOSE BLD STRIP.AUTO-MCNC: 120 MG/DL (ref 70–108)
GLUCOSE BLD STRIP.AUTO-MCNC: 129 MG/DL (ref 70–108)
GLUCOSE SERPL-MCNC: 114 MG/DL (ref 70–108)
GLUCOSE UR QL STRIP.AUTO: NEGATIVE MG/DL
HCO3 BLDA-SCNC: 25 MMOL/L (ref 23–28)
HCT VFR BLD AUTO: 42.5 % (ref 42–52)
HGB BLD-MCNC: 13.1 GM/DL (ref 14–18)
HGB UR QL STRIP.AUTO: ABNORMAL
IMM GRANULOCYTES # BLD AUTO: 0.04 THOU/MM3 (ref 0–0.07)
IMM GRANULOCYTES NFR BLD AUTO: 0.3 %
KETONES UR QL STRIP.AUTO: ABNORMAL
LIPASE SERPL-CCNC: 17.8 U/L (ref 5.6–51.3)
LYMPHOCYTES ABSOLUTE: 1.6 THOU/MM3 (ref 1–4.8)
LYMPHOCYTES NFR BLD AUTO: 14.1 %
MCH RBC QN AUTO: 30.8 PG (ref 26–33)
MCHC RBC AUTO-ENTMCNC: 30.8 GM/DL (ref 32.2–35.5)
MCV RBC AUTO: 99.8 FL (ref 80–94)
MISCELLANEOUS 2: ABNORMAL
MONOCYTES ABSOLUTE: 0.9 THOU/MM3 (ref 0.4–1.3)
MONOCYTES NFR BLD AUTO: 7.9 %
NEUTROPHILS NFR BLD AUTO: 76.9 %
NITRITE UR QL STRIP: POSITIVE
NRBC BLD AUTO-RTO: 0 /100 WBC
NT-PROBNP SERPL IA-MCNC: 126.9 PG/ML (ref 0–124)
OSMOLALITY SERPL CALC.SUM OF ELEC: 280.1 MOSMOL/KG (ref 275–300)
PCO2 BLDA: 45 MMHG (ref 35–45)
PH BLDA: 7.35 [PH] (ref 7.35–7.45)
PH UR STRIP.AUTO: 6 [PH] (ref 5–9)
PLATELET # BLD AUTO: 199 THOU/MM3 (ref 130–400)
PMV BLD AUTO: 8.6 FL (ref 9.4–12.4)
PO2 BLDA: 83 MMHG (ref 71–104)
POTASSIUM SERPL-SCNC: 4.7 MEQ/L (ref 3.5–5.2)
PROT SERPL-MCNC: 8.2 G/DL (ref 6.1–8)
PROT UR STRIP.AUTO-MCNC: 300 MG/DL
RBC # BLD AUTO: 4.26 MILL/MM3 (ref 4.7–6.1)
RBC URINE: ABNORMAL /HPF
RENAL EPI CELLS #/AREA URNS HPF: ABNORMAL /[HPF]
SAO2 % BLDA: 96 %
SARS-COV-2 RNA RESP QL NAA+PROBE: NOT DETECTED
SEGMENTED NEUTROPHILS ABSOLUTE COUNT: 9 THOU/MM3 (ref 1.8–7.7)
SODIUM SERPL-SCNC: 136 MEQ/L (ref 135–145)
SP GR UR REFRACT.AUTO: 1.02 (ref 1–1.03)
TROPONIN, HIGH SENSITIVITY: 44 NG/L (ref 0–12)
UROBILINOGEN, URINE: 0.2 EU/DL (ref 0–1)
WBC # BLD AUTO: 11.7 THOU/MM3 (ref 4.8–10.8)
WBC #/AREA URNS HPF: > 200 /HPF
WBC #/AREA URNS HPF: ABNORMAL /[HPF]
YEAST LIKE FUNGI URNS QL MICRO: ABNORMAL

## 2024-01-15 PROCEDURE — 2580000003 HC RX 258

## 2024-01-15 PROCEDURE — 36415 COLL VENOUS BLD VENIPUNCTURE: CPT

## 2024-01-15 PROCEDURE — 1200000003 HC TELEMETRY R&B

## 2024-01-15 PROCEDURE — 2580000003 HC RX 258: Performed by: STUDENT IN AN ORGANIZED HEALTH CARE EDUCATION/TRAINING PROGRAM

## 2024-01-15 PROCEDURE — 82248 BILIRUBIN DIRECT: CPT

## 2024-01-15 PROCEDURE — 82948 REAGENT STRIP/BLOOD GLUCOSE: CPT

## 2024-01-15 PROCEDURE — 83690 ASSAY OF LIPASE: CPT

## 2024-01-15 PROCEDURE — 1200000000 HC SEMI PRIVATE

## 2024-01-15 PROCEDURE — 71045 X-RAY EXAM CHEST 1 VIEW: CPT

## 2024-01-15 PROCEDURE — 84484 ASSAY OF TROPONIN QUANT: CPT

## 2024-01-15 PROCEDURE — 82803 BLOOD GASES ANY COMBINATION: CPT

## 2024-01-15 PROCEDURE — 6360000002 HC RX W HCPCS: Performed by: STUDENT IN AN ORGANIZED HEALTH CARE EDUCATION/TRAINING PROGRAM

## 2024-01-15 PROCEDURE — 81001 URINALYSIS AUTO W/SCOPE: CPT

## 2024-01-15 PROCEDURE — 87186 SC STD MICRODIL/AGAR DIL: CPT

## 2024-01-15 PROCEDURE — 93005 ELECTROCARDIOGRAM TRACING: CPT | Performed by: STUDENT IN AN ORGANIZED HEALTH CARE EDUCATION/TRAINING PROGRAM

## 2024-01-15 PROCEDURE — 99223 1ST HOSP IP/OBS HIGH 75: CPT

## 2024-01-15 PROCEDURE — 83880 ASSAY OF NATRIURETIC PEPTIDE: CPT

## 2024-01-15 PROCEDURE — 96365 THER/PROPH/DIAG IV INF INIT: CPT

## 2024-01-15 PROCEDURE — 80053 COMPREHEN METABOLIC PANEL: CPT

## 2024-01-15 PROCEDURE — 85025 COMPLETE CBC W/AUTO DIFF WBC: CPT

## 2024-01-15 PROCEDURE — 87077 CULTURE AEROBIC IDENTIFY: CPT

## 2024-01-15 PROCEDURE — 99285 EMERGENCY DEPT VISIT HI MDM: CPT

## 2024-01-15 PROCEDURE — 93010 ELECTROCARDIOGRAM REPORT: CPT | Performed by: NUCLEAR MEDICINE

## 2024-01-15 PROCEDURE — 87040 BLOOD CULTURE FOR BACTERIA: CPT

## 2024-01-15 PROCEDURE — 87086 URINE CULTURE/COLONY COUNT: CPT

## 2024-01-15 PROCEDURE — 87636 SARSCOV2 & INF A&B AMP PRB: CPT

## 2024-01-15 PROCEDURE — 36600 WITHDRAWAL OF ARTERIAL BLOOD: CPT

## 2024-01-15 RX ORDER — ENOXAPARIN SODIUM 100 MG/ML
40 INJECTION SUBCUTANEOUS DAILY
Status: DISCONTINUED | OUTPATIENT
Start: 2024-01-16 | End: 2024-01-23 | Stop reason: HOSPADM

## 2024-01-15 RX ORDER — POTASSIUM CHLORIDE 20 MEQ/1
40 TABLET, EXTENDED RELEASE ORAL PRN
Status: DISCONTINUED | OUTPATIENT
Start: 2024-01-15 | End: 2024-01-23 | Stop reason: HOSPADM

## 2024-01-15 RX ORDER — POTASSIUM CHLORIDE 7.45 MG/ML
10 INJECTION INTRAVENOUS PRN
Status: DISCONTINUED | OUTPATIENT
Start: 2024-01-15 | End: 2024-01-23 | Stop reason: HOSPADM

## 2024-01-15 RX ORDER — ONDANSETRON 2 MG/ML
4 INJECTION INTRAMUSCULAR; INTRAVENOUS EVERY 6 HOURS PRN
Status: DISCONTINUED | OUTPATIENT
Start: 2024-01-15 | End: 2024-01-23 | Stop reason: HOSPADM

## 2024-01-15 RX ORDER — MAGNESIUM SULFATE IN WATER 40 MG/ML
2000 INJECTION, SOLUTION INTRAVENOUS PRN
Status: DISCONTINUED | OUTPATIENT
Start: 2024-01-15 | End: 2024-01-23 | Stop reason: HOSPADM

## 2024-01-15 RX ORDER — ACETAMINOPHEN 325 MG/1
650 TABLET ORAL EVERY 6 HOURS PRN
Status: DISCONTINUED | OUTPATIENT
Start: 2024-01-15 | End: 2024-01-23 | Stop reason: HOSPADM

## 2024-01-15 RX ORDER — ACETAMINOPHEN 650 MG/1
650 SUPPOSITORY RECTAL EVERY 6 HOURS PRN
Status: DISCONTINUED | OUTPATIENT
Start: 2024-01-15 | End: 2024-01-23 | Stop reason: HOSPADM

## 2024-01-15 RX ORDER — 0.9 % SODIUM CHLORIDE 0.9 %
1000 INTRAVENOUS SOLUTION INTRAVENOUS ONCE
Status: COMPLETED | OUTPATIENT
Start: 2024-01-15 | End: 2024-01-15

## 2024-01-15 RX ORDER — SODIUM CHLORIDE 0.9 % (FLUSH) 0.9 %
5-40 SYRINGE (ML) INJECTION PRN
Status: DISCONTINUED | OUTPATIENT
Start: 2024-01-15 | End: 2024-01-23 | Stop reason: HOSPADM

## 2024-01-15 RX ORDER — SODIUM CHLORIDE 9 MG/ML
INJECTION, SOLUTION INTRAVENOUS PRN
Status: DISCONTINUED | OUTPATIENT
Start: 2024-01-15 | End: 2024-01-23 | Stop reason: HOSPADM

## 2024-01-15 RX ORDER — POLYETHYLENE GLYCOL 3350 17 G/17G
17 POWDER, FOR SOLUTION ORAL DAILY PRN
Status: DISCONTINUED | OUTPATIENT
Start: 2024-01-15 | End: 2024-01-23 | Stop reason: HOSPADM

## 2024-01-15 RX ORDER — ONDANSETRON 4 MG/1
4 TABLET, ORALLY DISINTEGRATING ORAL EVERY 8 HOURS PRN
Status: DISCONTINUED | OUTPATIENT
Start: 2024-01-15 | End: 2024-01-23 | Stop reason: HOSPADM

## 2024-01-15 RX ORDER — SODIUM CHLORIDE 0.9 % (FLUSH) 0.9 %
5-40 SYRINGE (ML) INJECTION EVERY 12 HOURS SCHEDULED
Status: DISCONTINUED | OUTPATIENT
Start: 2024-01-15 | End: 2024-01-23 | Stop reason: HOSPADM

## 2024-01-15 RX ADMIN — SODIUM CHLORIDE, PRESERVATIVE FREE 10 ML: 5 INJECTION INTRAVENOUS at 23:36

## 2024-01-15 RX ADMIN — SODIUM CHLORIDE 1000 ML: 9 INJECTION, SOLUTION INTRAVENOUS at 17:35

## 2024-01-15 RX ADMIN — CEFEPIME 2000 MG: 2 INJECTION, POWDER, FOR SOLUTION INTRAVENOUS at 17:36

## 2024-01-15 ASSESSMENT — PAIN - FUNCTIONAL ASSESSMENT
PAIN_FUNCTIONAL_ASSESSMENT: NONE - DENIES PAIN

## 2024-01-15 NOTE — ED PROVIDER NOTES
[TM]   1718 Placed on 6NL [TM]   1720 Nitrite, Urine(!): POSITIVE [TM]   1720 Leukocyte Esterase, Urine(!): LARGE [TM]   1725 SpO2(!): 68 %  Occurred while patient snoring. Could be TAL. Placed on supplemental O2 by nursing [TM]   1743 XR CHEST PORTABLE  Impression:  Cardiomegaly, no acute pulmonary finding.   [TM]   1825 Patient somnolent with snoring respirations. Responds to pain. Able to answer \"no\" to if he is in pain. Obtaining VBG to evaluate for CO2 narcoisis. Repeat BGL WNL. Patient's cough reflex is intact [TM]   1914 PCO2: 45  WNL [TM]      ED Course User Index  [TM] Chriss Pablo MD     Vitals reviewed:  ED Triage Vitals [01/15/24 1633]   BP Temp Temp Source Pulse Respirations SpO2 Height Weight - Scale   (!) 104/47 98.6 °F (37 °C) Rectal 64 13 96 % 1.702 m (5' 7\") 81.6 kg (180 lb)       Differential Diagnosis includes (but not limited to):  UTI, metabolic encephalopathy, sepsis, ACS, hypoglycemia, CO2 narcosis, TAL  Although some of these diagnoses are unlikely, they were consider in my medical decision making.          Summary of Patient Presentation (see ED course if left blank):      68-year-old male  Per nursing home, patient usually alert and fairly verbal  Today patient was somnolent which prompted visit to ED  Sonorous respirations in ED however he was able to awaken and answer questions  UA nitrite positive, cefepime started as this was sensitive on the prior culture.      ED Medications administered this visit:  (None if blank)  Medications   sodium chloride 0.9 % bolus 1,000 mL (1,000 mLs IntraVENous New Bag 1/15/24 1735)   ceFEPIme (MAXIPIME) 2,000 mg in sodium chloride 0.9 % 100 mL IVPB (mini-bag) (0 mg IntraVENous Stopped 1/15/24 1833)       PROCEDURES: (None if blank)  Procedures:     CRITICAL CARE: (Please see Attending note / Attestation regarding Critical Care Time. )        FINAL IMPRESSION      1. Acute cystitis without hematuria    2. Somnolence          DISPOSITION/PLAN

## 2024-01-16 LAB
ANION GAP SERPL CALC-SCNC: 11 MEQ/L (ref 8–16)
BASOPHILS ABSOLUTE: 0 THOU/MM3 (ref 0–0.1)
BASOPHILS NFR BLD AUTO: 0.5 %
BUN SERPL-MCNC: 32 MG/DL (ref 7–22)
CALCIUM SERPL-MCNC: 9.5 MG/DL (ref 8.5–10.5)
CHLORIDE SERPL-SCNC: 104 MEQ/L (ref 98–111)
CO2 SERPL-SCNC: 22 MEQ/L (ref 23–33)
CREAT SERPL-MCNC: 0.4 MG/DL (ref 0.4–1.2)
DEPRECATED RDW RBC AUTO: 55.1 FL (ref 35–45)
EOSINOPHIL NFR BLD AUTO: 2.8 %
EOSINOPHILS ABSOLUTE: 0.2 THOU/MM3 (ref 0–0.4)
ERYTHROCYTE [DISTWIDTH] IN BLOOD BY AUTOMATED COUNT: 15.3 % (ref 11.5–14.5)
GFR SERPL CREATININE-BSD FRML MDRD: > 60 ML/MIN/1.73M2
GLUCOSE SERPL-MCNC: 81 MG/DL (ref 70–108)
HCT VFR BLD AUTO: 38.3 % (ref 42–52)
HGB BLD-MCNC: 12.2 GM/DL (ref 14–18)
IMM GRANULOCYTES # BLD AUTO: 0.02 THOU/MM3 (ref 0–0.07)
IMM GRANULOCYTES NFR BLD AUTO: 0.3 %
LYMPHOCYTES ABSOLUTE: 1.6 THOU/MM3 (ref 1–4.8)
LYMPHOCYTES NFR BLD AUTO: 20.9 %
MCH RBC QN AUTO: 31.3 PG (ref 26–33)
MCHC RBC AUTO-ENTMCNC: 31.9 GM/DL (ref 32.2–35.5)
MCV RBC AUTO: 98.2 FL (ref 80–94)
MONOCYTES ABSOLUTE: 0.7 THOU/MM3 (ref 0.4–1.3)
MONOCYTES NFR BLD AUTO: 9 %
NEUTROPHILS NFR BLD AUTO: 66.5 %
NRBC BLD AUTO-RTO: 0 /100 WBC
PLATELET # BLD AUTO: 182 THOU/MM3 (ref 130–400)
PMV BLD AUTO: 8.9 FL (ref 9.4–12.4)
POTASSIUM SERPL-SCNC: 3.6 MEQ/L (ref 3.5–5.2)
RBC # BLD AUTO: 3.9 MILL/MM3 (ref 4.7–6.1)
SEGMENTED NEUTROPHILS ABSOLUTE COUNT: 5.1 THOU/MM3 (ref 1.8–7.7)
SODIUM SERPL-SCNC: 137 MEQ/L (ref 135–145)
WBC # BLD AUTO: 7.6 THOU/MM3 (ref 4.8–10.8)

## 2024-01-16 PROCEDURE — 6360000002 HC RX W HCPCS

## 2024-01-16 PROCEDURE — 1200000003 HC TELEMETRY R&B

## 2024-01-16 PROCEDURE — 85025 COMPLETE CBC W/AUTO DIFF WBC: CPT

## 2024-01-16 PROCEDURE — 2580000003 HC RX 258

## 2024-01-16 PROCEDURE — 80048 BASIC METABOLIC PNL TOTAL CA: CPT

## 2024-01-16 PROCEDURE — 93010 ELECTROCARDIOGRAM REPORT: CPT | Performed by: NUCLEAR MEDICINE

## 2024-01-16 PROCEDURE — 93005 ELECTROCARDIOGRAM TRACING: CPT

## 2024-01-16 PROCEDURE — 36415 COLL VENOUS BLD VENIPUNCTURE: CPT

## 2024-01-16 PROCEDURE — 6370000000 HC RX 637 (ALT 250 FOR IP)

## 2024-01-16 PROCEDURE — 1200000000 HC SEMI PRIVATE

## 2024-01-16 PROCEDURE — 99232 SBSQ HOSP IP/OBS MODERATE 35: CPT | Performed by: PHYSICIAN ASSISTANT

## 2024-01-16 RX ORDER — LACTOBACILLUS RHAMNOSUS GG 10B CELL
1 CAPSULE ORAL 2 TIMES DAILY
Status: DISCONTINUED | OUTPATIENT
Start: 2024-01-16 | End: 2024-01-23 | Stop reason: HOSPADM

## 2024-01-16 RX ORDER — LISINOPRIL 2.5 MG/1
2.5 TABLET ORAL DAILY
Status: DISCONTINUED | OUTPATIENT
Start: 2024-01-16 | End: 2024-01-23 | Stop reason: HOSPADM

## 2024-01-16 RX ORDER — OXYBUTYNIN CHLORIDE 5 MG/1
5 TABLET, EXTENDED RELEASE ORAL 2 TIMES DAILY
Status: DISCONTINUED | OUTPATIENT
Start: 2024-01-16 | End: 2024-01-23 | Stop reason: HOSPADM

## 2024-01-16 RX ORDER — OXCARBAZEPINE 150 MG/1
150 TABLET, FILM COATED ORAL 2 TIMES DAILY
COMMUNITY

## 2024-01-16 RX ORDER — FAMOTIDINE 20 MG/1
20 TABLET, FILM COATED ORAL 2 TIMES DAILY
Status: DISCONTINUED | OUTPATIENT
Start: 2024-01-16 | End: 2024-01-23 | Stop reason: HOSPADM

## 2024-01-16 RX ORDER — OXCARBAZEPINE 300 MG/1
450 TABLET, FILM COATED ORAL 2 TIMES DAILY
Status: DISCONTINUED | OUTPATIENT
Start: 2024-01-16 | End: 2024-01-23 | Stop reason: HOSPADM

## 2024-01-16 RX ORDER — ASCORBIC ACID 500 MG
500 TABLET ORAL 2 TIMES DAILY
Status: DISCONTINUED | OUTPATIENT
Start: 2024-01-16 | End: 2024-01-23 | Stop reason: HOSPADM

## 2024-01-16 RX ORDER — IPRATROPIUM BROMIDE AND ALBUTEROL SULFATE 2.5; .5 MG/3ML; MG/3ML
1 SOLUTION RESPIRATORY (INHALATION) EVERY 6 HOURS PRN
Status: DISCONTINUED | OUTPATIENT
Start: 2024-01-16 | End: 2024-01-23 | Stop reason: HOSPADM

## 2024-01-16 RX ORDER — ROSUVASTATIN CALCIUM 20 MG/1
20 TABLET, COATED ORAL NIGHTLY
Status: DISCONTINUED | OUTPATIENT
Start: 2024-01-16 | End: 2024-01-23 | Stop reason: HOSPADM

## 2024-01-16 RX ORDER — SPIRONOLACTONE 25 MG/1
25 TABLET ORAL DAILY
Status: DISCONTINUED | OUTPATIENT
Start: 2024-01-16 | End: 2024-01-23 | Stop reason: HOSPADM

## 2024-01-16 RX ORDER — ACETIC ACID 0.25 G/100ML
60 IRRIGANT IRRIGATION
Status: DISCONTINUED | OUTPATIENT
Start: 2024-01-17 | End: 2024-01-23 | Stop reason: HOSPADM

## 2024-01-16 RX ORDER — PHENAZOPYRIDINE HYDROCHLORIDE 100 MG/1
100 TABLET, FILM COATED ORAL 3 TIMES DAILY PRN
Status: DISCONTINUED | OUTPATIENT
Start: 2024-01-16 | End: 2024-01-23 | Stop reason: HOSPADM

## 2024-01-16 RX ORDER — BACLOFEN 10 MG/1
10 TABLET ORAL 3 TIMES DAILY
Status: DISCONTINUED | OUTPATIENT
Start: 2024-01-16 | End: 2024-01-23 | Stop reason: HOSPADM

## 2024-01-16 RX ORDER — ASPIRIN 81 MG/1
81 TABLET ORAL DAILY
Status: DISCONTINUED | OUTPATIENT
Start: 2024-01-16 | End: 2024-01-23 | Stop reason: HOSPADM

## 2024-01-16 RX ORDER — GABAPENTIN 600 MG/1
600 TABLET ORAL 4 TIMES DAILY
Status: DISCONTINUED | OUTPATIENT
Start: 2024-01-16 | End: 2024-01-21

## 2024-01-16 RX ORDER — ERYTHROMYCIN 5 MG/G
OINTMENT OPHTHALMIC NIGHTLY
Status: DISPENSED | OUTPATIENT
Start: 2024-01-16 | End: 2024-01-21

## 2024-01-16 RX ORDER — METOPROLOL SUCCINATE 50 MG/1
50 TABLET, EXTENDED RELEASE ORAL 2 TIMES DAILY
Status: DISCONTINUED | OUTPATIENT
Start: 2024-01-16 | End: 2024-01-23 | Stop reason: HOSPADM

## 2024-01-16 RX ORDER — VITAMIN E 268 MG
400 CAPSULE ORAL DAILY
Status: DISCONTINUED | OUTPATIENT
Start: 2024-01-16 | End: 2024-01-23 | Stop reason: HOSPADM

## 2024-01-16 RX ORDER — MULTIVITAMIN WITH IRON
1 TABLET ORAL DAILY
Status: DISCONTINUED | OUTPATIENT
Start: 2024-01-16 | End: 2024-01-23 | Stop reason: HOSPADM

## 2024-01-16 RX ORDER — LANOLIN ALCOHOL/MO/W.PET/CERES
3 CREAM (GRAM) TOPICAL NIGHTLY PRN
Status: DISCONTINUED | OUTPATIENT
Start: 2024-01-16 | End: 2024-01-23 | Stop reason: HOSPADM

## 2024-01-16 RX ADMIN — LISINOPRIL 2.5 MG: 2.5 TABLET ORAL at 10:24

## 2024-01-16 RX ADMIN — ROSUVASTATIN CALCIUM 20 MG: 20 TABLET, FILM COATED ORAL at 20:41

## 2024-01-16 RX ADMIN — Medication 1 TABLET: at 20:41

## 2024-01-16 RX ADMIN — Medication 400 UNITS: at 10:26

## 2024-01-16 RX ADMIN — OXYCODONE HYDROCHLORIDE AND ACETAMINOPHEN 500 MG: 500 TABLET ORAL at 10:24

## 2024-01-16 RX ADMIN — OXYBUTYNIN CHLORIDE 5 MG: 5 TABLET, EXTENDED RELEASE ORAL at 10:26

## 2024-01-16 RX ADMIN — CEFEPIME 2000 MG: 2 INJECTION, POWDER, FOR SOLUTION INTRAVENOUS at 17:55

## 2024-01-16 RX ADMIN — OXCARBAZEPINE 450 MG: 300 TABLET, FILM COATED ORAL at 20:41

## 2024-01-16 RX ADMIN — BACLOFEN 10 MG: 10 TABLET ORAL at 20:41

## 2024-01-16 RX ADMIN — Medication 6 MG: at 10:26

## 2024-01-16 RX ADMIN — METOPROLOL SUCCINATE 50 MG: 50 TABLET, EXTENDED RELEASE ORAL at 10:26

## 2024-01-16 RX ADMIN — Medication 1 TABLET: at 10:24

## 2024-01-16 RX ADMIN — FAMOTIDINE 20 MG: 20 TABLET ORAL at 20:41

## 2024-01-16 RX ADMIN — GABAPENTIN 600 MG: 600 TABLET, FILM COATED ORAL at 10:24

## 2024-01-16 RX ADMIN — BACLOFEN 10 MG: 10 TABLET ORAL at 16:05

## 2024-01-16 RX ADMIN — OXCARBAZEPINE 450 MG: 300 TABLET, FILM COATED ORAL at 06:41

## 2024-01-16 RX ADMIN — OXYBUTYNIN CHLORIDE 5 MG: 5 TABLET, EXTENDED RELEASE ORAL at 20:58

## 2024-01-16 RX ADMIN — TICAGRELOR 90 MG: 90 TABLET ORAL at 20:41

## 2024-01-16 RX ADMIN — Medication 1 CAPSULE: at 10:24

## 2024-01-16 RX ADMIN — ENOXAPARIN SODIUM 40 MG: 100 INJECTION SUBCUTANEOUS at 10:25

## 2024-01-16 RX ADMIN — FAMOTIDINE 20 MG: 20 TABLET ORAL at 10:24

## 2024-01-16 RX ADMIN — OXYCODONE HYDROCHLORIDE AND ACETAMINOPHEN 500 MG: 500 TABLET ORAL at 20:41

## 2024-01-16 RX ADMIN — ACETAMINOPHEN 650 MG: 325 TABLET ORAL at 10:25

## 2024-01-16 RX ADMIN — ASPIRIN 81 MG: 81 TABLET, COATED ORAL at 10:24

## 2024-01-16 RX ADMIN — TICAGRELOR 90 MG: 90 TABLET ORAL at 06:51

## 2024-01-16 RX ADMIN — BACLOFEN 10 MG: 10 TABLET ORAL at 10:24

## 2024-01-16 RX ADMIN — Medication 1 CAPSULE: at 20:41

## 2024-01-16 RX ADMIN — GABAPENTIN 600 MG: 600 TABLET, FILM COATED ORAL at 20:41

## 2024-01-16 RX ADMIN — SPIRONOLACTONE 25 MG: 25 TABLET ORAL at 10:25

## 2024-01-16 RX ADMIN — GABAPENTIN 600 MG: 600 TABLET, FILM COATED ORAL at 16:05

## 2024-01-16 ASSESSMENT — PAIN DESCRIPTION - DESCRIPTORS
DESCRIPTORS: ACHING
DESCRIPTORS: ACHING

## 2024-01-16 ASSESSMENT — PAIN SCALES - GENERAL
PAINLEVEL_OUTOF10: 4
PAINLEVEL_OUTOF10: 4

## 2024-01-16 ASSESSMENT — PAIN DESCRIPTION - LOCATION
LOCATION: JAW
LOCATION: JAW

## 2024-01-16 ASSESSMENT — PAIN - FUNCTIONAL ASSESSMENT
PAIN_FUNCTIONAL_ASSESSMENT: ACTIVITIES ARE NOT PREVENTED
PAIN_FUNCTIONAL_ASSESSMENT: PREVENTS OR INTERFERES SOME ACTIVE ACTIVITIES AND ADLS

## 2024-01-16 ASSESSMENT — PAIN DESCRIPTION - ONSET: ONSET: ON-GOING

## 2024-01-16 ASSESSMENT — PAIN DESCRIPTION - PAIN TYPE
TYPE: ACUTE PAIN
TYPE: ACUTE PAIN

## 2024-01-16 ASSESSMENT — PAIN DESCRIPTION - FREQUENCY
FREQUENCY: CONTINUOUS
FREQUENCY: INTERMITTENT

## 2024-01-16 NOTE — DISCHARGE INSTR - COC
Continuity of Care Form    Patient Name: Greyson Swift   :  1955  MRN:  [id number]    Admit date:  1/15/2024  Discharge date:  24    Code Status Order: Full Code   Advance Directives:     Admitting Physician:  Parveen Munoz PA-C  PCP: Noah Benitez MD    Discharging Nurse: Germain JENKINS  Discharging Hospital Unit/Room#: 5K-21/021-A  Discharging Unit Phone Number: [phone]    Emergency Contact:   Extended Emergency Contact Information  Primary Emergency Contact: Makayla Swift  Address: [address] [place] [id number] [facility]  Home Phone: [phone]  Mobile Phone: [phone]  Relation: Spouse  Secondary Emergency Contact: Aleida Stern   [facility]  Home Phone: [phone]  Relation: Other Relative    Past Surgical History:  Past Surgical History:   Procedure Laterality Date    ANKLE SURGERY      broken ankle    BLADDER SURGERY  2012    Suprapubic catheter placement    BRONCHOSCOPY N/A 2022    BRONCHOSCOPY ALVEOLAR LAVAGE performed by Bronson Garg MD at [facility]    CARDIAC PROCEDURE N/A 2023    Coronary angiography performed by Aren Magaña MD at [facility] CARDIAC CATH LAB    CARDIAC PROCEDURE N/A 2023    Percutaneous coronary intervention performed by Aren Magaña MD at [facility] CARDIAC CATH LAB    COLONOSCOPY      CYSTO/URETERO/PYELOSCOPY, CALCULUS TX Left 2019    CYSTOSCOPY, LEFT STENT insertion, bladder irragation with bladder stones performed by Kameron Cr MD at [facility] OR    CYSTO/URETERO/PYELOSCOPY, CALCULUS TX N/A 2019    CYSTO, LEFT URETERAL STENT REMOVAL, LEFT URETEROSCOPY, LASER LITHOTRIPSY, BASKET RETRIEVAL OF STONE FRAGMENTS performed by Kameron Cr MD at [facility] OR    CYSTOSCOPY N/A 2021    CYSTOSCOPY, CYSTOLITHOLAPAXY, SUPRAPUBIC CATHETER EXCHANGE performed by Suman Lee MD at [facility] OR    CYSTOSCOPY Left 6/15/2022    CYSTOSCOPY performed by Suman Lee MD at

## 2024-01-16 NOTE — CARE COORDINATION
1/16/24, 2:10 PM EST  Discharge Planning Evaluation  Social work consult received, patient from [facility].    Patient preference is to return to [facility].    The patient's current payor source at the facility is Medicaid.   Medicare skilled days available:   Medicare does the patient have a three midnight qualifying stay? Admitted inpatient 1/15  Insurance precert:  no  Spoke with Connie at the facility.  Patient bed hold: yes  Anticipated transport plan: ambulance  Patient's Healthcare Decision Maker: Legal Next of Kin      SW met with Greyson this morning, confirms plans to return to [facility], pt declines needing SW to contact wife. Pt reports he has 2 children (one in [place] and one in [facility]).     Pt reports he uses a whit lift for transfers and has a motorized wheelchair.     Readmission Risk Low 0-14, Mod 15-19), High > 20: Readmission Risk Score: 17.5    Current PCP: Noah Benitez MD  PCP verified by CM? Yes    Patient Orientation: Alert and Oriented    Patient Cognition: Alert  History Provided by: Patient    Advance Directives:      Code Status: Full Code   Patient's Primary Decision Maker is: Legal Next of Kin       Discharge Planning:    Patient lives with: Other (Comment) (SNF) Type of Home: Skilled Nursing Facility  Primary Care Giver: Other (Comment) ([facility])  Patient Support Systems include: Spouse/Significant Other, Children, Other (Comment) ([facility])   Current Financial resources:    Current community resources:    Current services prior to admission: Skilled Nursing Facility            Current DME:              Type of Home Care services:  None    ADLS  Prior functional level: Assistance with the following:, Bathing, Dressing, Toileting, Cooking, Shopping, Mobility, Housework  Current functional level: Assistance with the following:, Bathing, Dressing, Toileting, Cooking, Housework, Shopping, Mobility    Family can provide assistance at DC: No  Would you like Case

## 2024-01-16 NOTE — ED NOTES
Pt arrives to the ED via [facility] for altered mental status and decreased urination over the course of today. Pt resides at [facility] and has been increasingly lethargic over the last day. Pt has a hx of UTI and was confirmed to have one today. Pt has a suprapubic catheter that was changed today. Pt on arrival to the ED remains lethargic however is able to intermittent questions appropriately. Pt is unable to stay awake for conversation. Has hx of MS. Denies any pain. Reparations are unlabored, VSS  
Pt continues to lay in bed and sleep. Wakes to voice. Voiced no needs at this time. Call light in reach.  
Pt continues to sleep with no s/s of distress noted. Wakes to voice. Updated on plan of care. Voiced no needs. Call light in reach.  
Pt in bed lightly sleeping at this time. Wakes to voice. Denies pain. Updated pt on plan of care. Voiced no needs. Call light in reach.  
Pt. Resting in bed with even and unlabored respirations. Pt. Pt no longer responsive to voice and is now responsive to pain. Pt will answer intermittent questions and then quickly falls asleep. Pt ozzy and dr. Pablo at bedside. Pt. Has no further concerns, questions or needs at this time. Call light within reach.     
Report given to Elpidio butcher  
placement    BRONCHOSCOPY N/A 12/26/2022    BRONCHOSCOPY ALVEOLAR LAVAGE performed by Bronson Garg MD at [facility]    CARDIAC PROCEDURE N/A 11/29/2023    Coronary angiography performed by Aren Magaña MD at [facility] CARDIAC CATH LAB    CARDIAC PROCEDURE N/A 11/29/2023    Percutaneous coronary intervention performed by Aren Magaña MD at [facility] CARDIAC CATH LAB    COLONOSCOPY      CYSTO/URETERO/PYELOSCOPY, CALCULUS TX Left 6/19/2019    CYSTOSCOPY, LEFT STENT insertion, bladder irragation with bladder stones performed by Kameron Cr MD at [facility] OR    CYSTO/URETERO/PYELOSCOPY, CALCULUS TX N/A 7/8/2019    CYSTO, LEFT URETERAL STENT REMOVAL, LEFT URETEROSCOPY, LASER LITHOTRIPSY, BASKET RETRIEVAL OF STONE FRAGMENTS performed by Kameron Cr MD at [facility] OR    CYSTOSCOPY N/A 2/26/2021    CYSTOSCOPY, CYSTOLITHOLAPAXY, SUPRAPUBIC CATHETER EXCHANGE performed by Suman Lee MD at [facility] OR    CYSTOSCOPY Left 6/15/2022    CYSTOSCOPY performed by Suman Lee MD at [facility] OR    CYSTOSCOPY Left 8/22/2023    CYSTOSCOPY LEFT URETERAL STENT INSERTION; bladder stone extraction, suprapubic cath exchange performed by Reinaldo Miller MD at [facility] OR    IR NEPHROSTOMY CATHETER PLACEMENT  7/25/2022    IR NEPHROSTOMY CATHETER PLACEMENT 7/25/2022 [facility] SPECIAL PROCEDURES    NM LAPS COLECTOMY PRTL W/END CLST & CLSR DSTL SGM N/A 6/13/2018    ROBOT DIVERTING COLOSTOMY performed by Fani Cruz MD at [facility] OR    TONSILLECTOMY  child    URETER SURGERY Left 8/4/2022    CYSTOSCOPY, LEFT URETEROSCOPY, LASER LITHOTRIPSY,  LEFT URETERAL STENT EXCHANGE performed by Suman Lee MD at [facility] OR    URETER SURGERY Left 9/18/2023    Cystoscopy, Left Ureteroscopy, Laser Lithotripsy, , And Left Ureteral Stent Exchange performed by Harvinder Quispe Jr., MD at [facility] OR       PAST MEDICAL HISTORY       Past Medical History:   Diagnosis Date    Dysphagia     oropharyngeal    History of ESBL E. coli infection     History of pulmonary embolism

## 2024-01-16 NOTE — PLAN OF CARE
Problem: Confusion  Goal: Confusion, delirium, dementia, or psychosis is improved or at baseline  Description: INTERVENTIONS:  1. Assess for possible contributors to thought disturbance, including medications, impaired vision or hearing, underlying metabolic abnormalities, dehydration, psychiatric diagnoses, and notify attending LIP  2. [place] high risk fall precautions, as indicated  3. Provide frequent short contacts to provide reality reorientation, refocusing and direction  4. Decrease environmental stimuli, including noise as appropriate  5. Monitor and intervene to maintain adequate nutrition, hydration, elimination, sleep and activity  6. If unable to ensure safety without constant attention obtain sitter and review sitter guidelines with assigned personnel  7. Initiate Psychosocial CNS and Spiritual Care consult, as indicated  Recent Flowsheet Documentation  Taken 1/15/2024 2240 by Marquita Fitzgerald, RN  Effect of thought disturbance (confusion, delirium, dementia, or psychosis) are managed with adequate functional status: Provide frequent short contacts to provide reality reorientation, refocusing and direction

## 2024-01-16 NOTE — PLAN OF CARE
Problem: Discharge Planning  Goal: Discharge to home or other facility with appropriate resources  Outcome: Progressing   SW consult received. See SW note 1/16/24.

## 2024-01-16 NOTE — H&P
Hospitalist History and Physical          Patient Info:   Name: Greyson Swift, : 1955, PCP: Noah Benitez MD  Unit/Bed:16/016A      Admitted on: 1/15/2024  Date of Service: Pt seen/examined on 01/15/24 and Admitted to Inpatient with expected LOS greater than two midnights due to medical therapy.       Assessment and Plan:  UTI, POA: UA showed Large blood, 300 protein, positive nitrite, large leukocytes, many bacteria, > 200 WBC. CBC showed WBC 11.7. Due to altered mental status will treat patient as patient has history of MDRO, thus symptomatic and will likely need antibiotics to clear infection.  Continue antibiotics at this time.  Consider ID consult pending clinical course.  Trend CBC daily and tailor antibiotics pending cultures    Altered mental status: Likely 2/2 #1. Patient is responsive, but more lethargic than usual. ABG normal.  Consider neuro imaging pending clinical course. Patient does awake to voice and follows commands. No immediate need for further imaging at this time.    CAD: continue home medications with hold parameters    Hyperlipidemia: resume home statin    Primary hypertension: resume home medications with hold parameters    MS: continue home therapy    Neurogenic bladder: Patient has suprapubic catheter. Resume home maintenance medications to decrease further risk of infection.    GERD: continue home Pepcid    Noted history of paraplegia      Data reviewed  EKG:  I have reviewed the EKG with the following interpretation: pending  Imaging: I have reviewed Chest x-ray with the following interpretation: No acute cardiopulmonary processes noted      ===================================================================      Chief Complaint:  altered mental status    History Of Present Illness:  Greyson Swift is a 68 y.o. male with PMHx of MS, CAD, hypertension, neurogenic bladder, paraplegic, GERD who presents to [facility] with altered mental status.  Patient

## 2024-01-17 LAB
ANION GAP SERPL CALC-SCNC: 15 MEQ/L (ref 8–16)
BACTERIA UR CULT: ABNORMAL
BACTERIA UR CULT: ABNORMAL
BASOPHILS ABSOLUTE: 0 THOU/MM3 (ref 0–0.1)
BASOPHILS NFR BLD AUTO: 0.5 %
BUN SERPL-MCNC: 25 MG/DL (ref 7–22)
CALCIUM SERPL-MCNC: 9.3 MG/DL (ref 8.5–10.5)
CHLORIDE SERPL-SCNC: 106 MEQ/L (ref 98–111)
CO2 SERPL-SCNC: 23 MEQ/L (ref 23–33)
CREAT SERPL-MCNC: 0.3 MG/DL (ref 0.4–1.2)
DEPRECATED RDW RBC AUTO: 54.3 FL (ref 35–45)
EOSINOPHIL NFR BLD AUTO: 4.9 %
EOSINOPHILS ABSOLUTE: 0.3 THOU/MM3 (ref 0–0.4)
ERYTHROCYTE [DISTWIDTH] IN BLOOD BY AUTOMATED COUNT: 14.8 % (ref 11.5–14.5)
GFR SERPL CREATININE-BSD FRML MDRD: > 60 ML/MIN/1.73M2
GLUCOSE SERPL-MCNC: 85 MG/DL (ref 70–108)
HCT VFR BLD AUTO: 37.3 % (ref 42–52)
HGB BLD-MCNC: 11.6 GM/DL (ref 14–18)
IMM GRANULOCYTES # BLD AUTO: 0.01 THOU/MM3 (ref 0–0.07)
IMM GRANULOCYTES NFR BLD AUTO: 0.2 %
LYMPHOCYTES ABSOLUTE: 1.3 THOU/MM3 (ref 1–4.8)
LYMPHOCYTES NFR BLD AUTO: 22.8 %
MCH RBC QN AUTO: 30.9 PG (ref 26–33)
MCHC RBC AUTO-ENTMCNC: 31.1 GM/DL (ref 32.2–35.5)
MCV RBC AUTO: 99.5 FL (ref 80–94)
MONOCYTES ABSOLUTE: 0.6 THOU/MM3 (ref 0.4–1.3)
MONOCYTES NFR BLD AUTO: 10.2 %
NEUTROPHILS NFR BLD AUTO: 61.4 %
NRBC BLD AUTO-RTO: 0 /100 WBC
ORGANISM: ABNORMAL
PLATELET # BLD AUTO: 180 THOU/MM3 (ref 130–400)
PMV BLD AUTO: 8.6 FL (ref 9.4–12.4)
POTASSIUM SERPL-SCNC: 3.9 MEQ/L (ref 3.5–5.2)
RBC # BLD AUTO: 3.75 MILL/MM3 (ref 4.7–6.1)
SEGMENTED NEUTROPHILS ABSOLUTE COUNT: 3.6 THOU/MM3 (ref 1.8–7.7)
SODIUM SERPL-SCNC: 144 MEQ/L (ref 135–145)
WBC # BLD AUTO: 5.9 THOU/MM3 (ref 4.8–10.8)

## 2024-01-17 PROCEDURE — 6360000002 HC RX W HCPCS: Performed by: PHYSICIAN ASSISTANT

## 2024-01-17 PROCEDURE — 85025 COMPLETE CBC W/AUTO DIFF WBC: CPT

## 2024-01-17 PROCEDURE — 2580000003 HC RX 258

## 2024-01-17 PROCEDURE — 1200000000 HC SEMI PRIVATE

## 2024-01-17 PROCEDURE — 1200000003 HC TELEMETRY R&B

## 2024-01-17 PROCEDURE — 6370000000 HC RX 637 (ALT 250 FOR IP): Performed by: PHYSICIAN ASSISTANT

## 2024-01-17 PROCEDURE — 6360000002 HC RX W HCPCS

## 2024-01-17 PROCEDURE — 2580000003 HC RX 258: Performed by: PHYSICIAN ASSISTANT

## 2024-01-17 PROCEDURE — 80048 BASIC METABOLIC PNL TOTAL CA: CPT

## 2024-01-17 PROCEDURE — 36415 COLL VENOUS BLD VENIPUNCTURE: CPT

## 2024-01-17 PROCEDURE — 6370000000 HC RX 637 (ALT 250 FOR IP)

## 2024-01-17 PROCEDURE — 99233 SBSQ HOSP IP/OBS HIGH 50: CPT | Performed by: PHYSICIAN ASSISTANT

## 2024-01-17 RX ADMIN — Medication 1 CAPSULE: at 19:53

## 2024-01-17 RX ADMIN — Medication 1 CAPSULE: at 10:38

## 2024-01-17 RX ADMIN — FAMOTIDINE 20 MG: 20 TABLET ORAL at 19:53

## 2024-01-17 RX ADMIN — MEROPENEM 1000 MG: 1 INJECTION, POWDER, FOR SOLUTION INTRAVENOUS at 10:59

## 2024-01-17 RX ADMIN — Medication 1 TABLET: at 10:35

## 2024-01-17 RX ADMIN — ROSUVASTATIN CALCIUM 20 MG: 20 TABLET, FILM COATED ORAL at 19:52

## 2024-01-17 RX ADMIN — OXCARBAZEPINE 450 MG: 300 TABLET, FILM COATED ORAL at 10:41

## 2024-01-17 RX ADMIN — FAMOTIDINE 20 MG: 20 TABLET ORAL at 10:39

## 2024-01-17 RX ADMIN — GABAPENTIN 600 MG: 600 TABLET, FILM COATED ORAL at 16:52

## 2024-01-17 RX ADMIN — GABAPENTIN 600 MG: 600 TABLET, FILM COATED ORAL at 10:39

## 2024-01-17 RX ADMIN — OXYBUTYNIN CHLORIDE 5 MG: 5 TABLET, EXTENDED RELEASE ORAL at 19:53

## 2024-01-17 RX ADMIN — GABAPENTIN 600 MG: 600 TABLET, FILM COATED ORAL at 21:10

## 2024-01-17 RX ADMIN — Medication 6 MG: at 14:01

## 2024-01-17 RX ADMIN — Medication 1 TABLET: at 10:38

## 2024-01-17 RX ADMIN — OXYCODONE HYDROCHLORIDE AND ACETAMINOPHEN 500 MG: 500 TABLET ORAL at 10:30

## 2024-01-17 RX ADMIN — BACLOFEN 10 MG: 10 TABLET ORAL at 19:52

## 2024-01-17 RX ADMIN — OXYBUTYNIN CHLORIDE 5 MG: 5 TABLET, EXTENDED RELEASE ORAL at 10:36

## 2024-01-17 RX ADMIN — LISINOPRIL 2.5 MG: 2.5 TABLET ORAL at 10:33

## 2024-01-17 RX ADMIN — BACLOFEN 10 MG: 10 TABLET ORAL at 14:01

## 2024-01-17 RX ADMIN — TICAGRELOR 90 MG: 90 TABLET ORAL at 10:37

## 2024-01-17 RX ADMIN — ASPIRIN 81 MG: 81 TABLET, COATED ORAL at 10:35

## 2024-01-17 RX ADMIN — CEFEPIME 2000 MG: 2 INJECTION, POWDER, FOR SOLUTION INTRAVENOUS at 05:02

## 2024-01-17 RX ADMIN — MEROPENEM 1000 MG: 1 INJECTION, POWDER, FOR SOLUTION INTRAVENOUS at 18:33

## 2024-01-17 RX ADMIN — BACLOFEN 10 MG: 10 TABLET ORAL at 10:41

## 2024-01-17 RX ADMIN — OXCARBAZEPINE 450 MG: 300 TABLET, FILM COATED ORAL at 21:10

## 2024-01-17 RX ADMIN — Medication 1 TABLET: at 19:52

## 2024-01-17 RX ADMIN — METOPROLOL SUCCINATE 50 MG: 50 TABLET, EXTENDED RELEASE ORAL at 10:34

## 2024-01-17 RX ADMIN — OXYCODONE HYDROCHLORIDE AND ACETAMINOPHEN 500 MG: 500 TABLET ORAL at 19:58

## 2024-01-17 RX ADMIN — Medication 400 UNITS: at 10:32

## 2024-01-17 RX ADMIN — TICAGRELOR 90 MG: 90 TABLET ORAL at 19:53

## 2024-01-17 RX ADMIN — ERYTHROMYCIN: 5 OINTMENT OPHTHALMIC at 21:12

## 2024-01-17 RX ADMIN — SPIRONOLACTONE 25 MG: 25 TABLET ORAL at 10:28

## 2024-01-17 RX ADMIN — GABAPENTIN 600 MG: 600 TABLET, FILM COATED ORAL at 12:35

## 2024-01-17 RX ADMIN — Medication 1 DROP: at 21:30

## 2024-01-17 RX ADMIN — ENOXAPARIN SODIUM 40 MG: 100 INJECTION SUBCUTANEOUS at 10:42

## 2024-01-17 NOTE — CONSULTS
at [facility] CARDIAC CATH LAB    CARDIAC PROCEDURE N/A 11/29/2023    Percutaneous coronary intervention performed by Aren Magaña MD at [facility] CARDIAC CATH LAB    COLONOSCOPY      CYSTO/URETERO/PYELOSCOPY, CALCULUS TX Left 6/19/2019    CYSTOSCOPY, LEFT STENT insertion, bladder irragation with bladder stones performed by Kameron Cr MD at [facility] OR    CYSTO/URETERO/PYELOSCOPY, CALCULUS TX N/A 7/8/2019    CYSTO, LEFT URETERAL STENT REMOVAL, LEFT URETEROSCOPY, LASER LITHOTRIPSY, BASKET RETRIEVAL OF STONE FRAGMENTS performed by Kameron Cr MD at [facility] OR    CYSTOSCOPY N/A 2/26/2021    CYSTOSCOPY, CYSTOLITHOLAPAXY, SUPRAPUBIC CATHETER EXCHANGE performed by Suman Lee MD at [facility] OR    CYSTOSCOPY Left 6/15/2022    CYSTOSCOPY performed by Suman Lee MD at [facility] OR    CYSTOSCOPY Left 8/22/2023    CYSTOSCOPY LEFT URETERAL STENT INSERTION; bladder stone extraction, suprapubic cath exchange performed by Reinaldo Miller MD at [facility] OR    IR NEPHROSTOMY CATHETER PLACEMENT  7/25/2022    IR NEPHROSTOMY CATHETER PLACEMENT 7/25/2022 [facility] SPECIAL PROCEDURES    NJ LAPS COLECTOMY PRTL W/END CLST & CLSR DSTL SGM N/A 6/13/2018    ROBOT DIVERTING COLOSTOMY performed by Fani Cruz MD at [facility] OR    TONSILLECTOMY  child    URETER SURGERY Left 8/4/2022    CYSTOSCOPY, LEFT URETEROSCOPY, LASER LITHOTRIPSY,  LEFT URETERAL STENT EXCHANGE performed by Suman Lee MD at [facility] OR    URETER SURGERY Left 9/18/2023    Cystoscopy, Left Ureteroscopy, Laser Lithotripsy, , And Left Ureteral Stent Exchange performed by Harvinder Quispe Jr., MD at [facility] OR         MEDICATIONS:       Scheduled Meds:   meropenem  1,000 mg IntraVENous Q8H    baclofen  10 mg Oral TID    oyster shell calcium w/D  1 tablet Oral BID    famotidine  20 mg Oral BID    gabapentin  600 mg Oral 4x daily    acetic acid  60 mL Irrigation Once per day on Mon Wed Fri    aspirin  81 mg Oral Daily    erythromycin   Right Eye Nightly    lactobacillus  1 capsule Oral BID    lisinopril  2.5 mg

## 2024-01-17 NOTE — PLAN OF CARE
Problem: Confusion  Goal: Confusion, delirium, dementia, or psychosis is improved or at baseline  Description: INTERVENTIONS:  1. Assess for possible contributors to thought disturbance, including medications, impaired vision or hearing, underlying metabolic abnormalities, dehydration, psychiatric diagnoses, and notify attending LIP  2. [place] high risk fall precautions, as indicated  3. Provide frequent short contacts to provide reality reorientation, refocusing and direction  4. Decrease environmental stimuli, including noise as appropriate  5. Monitor and intervene to maintain adequate nutrition, hydration, elimination, sleep and activity  6. If unable to ensure safety without constant attention obtain sitter and review sitter guidelines with assigned personnel  7. Initiate Psychosocial CNS and Spiritual Care consult, as indicated  1/17/2024 1142 by Cande Stern, RN  Outcome: Progressing  Flowsheets (Taken 1/17/2024 1142)  Effect of thought disturbance (confusion, delirium, dementia, or psychosis) are managed with adequate functional status: Assess for contributors to thought disturbance, including medications, impaired vision or hearing, underlying metabolic abnormalities, dehydration, psychiatric diagnoses, notify LIP     Problem: Skin/Tissue Integrity  Goal: Absence of new skin breakdown  Description: 1.  Monitor for areas of redness and/or skin breakdown  2.  Assess vascular access sites hourly  3.  Every 4-6 hours minimum:  Change oxygen saturation probe site  4.  Every 4-6 hours:  If on nasal continuous positive airway pressure, respiratory therapy assess nares and determine need for appliance change or resting period.  1/17/2024 1142 by Cande Stern, RN  Outcome: Progressing     Problem: Safety - Adult  Goal: Free from fall injury  1/17/2024 1142 by Cande Stern, RN  Outcome: Progressing  Flowsheets (Taken 1/17/2024 1142)  Free From Fall Injury: Instruct family/caregiver on patient safety

## 2024-01-17 NOTE — PLAN OF CARE
Problem: ABCDS Injury Assessment  Goal: Absence of physical injury  Outcome: Progressing     Problem: Pain  Goal: Verbalizes/displays adequate comfort level or baseline comfort level  Outcome: Progressing   Pain Assessment: 0-10  Pain Level: 4   Patient's Stated Pain Goal: 0 - No pain   Is pain goal met at this time?  Yes     Non-Pharmaceutical Pain Intervention(s): Rest    Problem: Discharge Planning  Goal: Discharge to home or other facility with appropriate resources  1/16/2024 2145 by Danelle Collins RN  Outcome: Progressing     Problem: Genitourinary - Adult  Goal: Absence of urinary retention  Outcome: Progressing     Problem: Genitourinary - Adult  Goal: Urinary catheter remains patent  Outcome: Progressing     Problem: Infection - Adult  Goal: Absence of infection at discharge  Outcome: Progressing     Problem: Skin/Tissue Integrity  Goal: Absence of new skin breakdown  Description: 1.  Monitor for areas of redness and/or skin breakdown  2.  Assess vascular access sites hourly  3.  Every 4-6 hours minimum:  Change oxygen saturation probe site  4.  Every 4-6 hours:  If on nasal continuous positive airway pressure, respiratory therapy assess nares and determine need for appliance change or resting period.  Outcome: Progressing     Problem: Safety - Adult  Goal: Free from fall injury  Outcome: Progressing   All fall precautions in place. Bed in low position, alarm activated and appropriate use of call light.     Care plan reviewed with patient.  Patient verbalize understanding of the plan of care and contribute to goal setting.

## 2024-01-18 LAB
ANION GAP SERPL CALC-SCNC: 8 MEQ/L (ref 8–16)
BASOPHILS ABSOLUTE: 0 THOU/MM3 (ref 0–0.1)
BASOPHILS NFR BLD AUTO: 0.5 %
BUN SERPL-MCNC: 24 MG/DL (ref 7–22)
CALCIUM SERPL-MCNC: 9.1 MG/DL (ref 8.5–10.5)
CHLORIDE SERPL-SCNC: 107 MEQ/L (ref 98–111)
CO2 SERPL-SCNC: 26 MEQ/L (ref 23–33)
CREAT SERPL-MCNC: 0.3 MG/DL (ref 0.4–1.2)
DEPRECATED RDW RBC AUTO: 53.5 FL (ref 35–45)
EKG ATRIAL RATE: 62 BPM
EKG P AXIS: 31 DEGREES
EKG P-R INTERVAL: 164 MS
EKG Q-T INTERVAL: 406 MS
EKG QRS DURATION: 84 MS
EKG QTC CALCULATION (BAZETT): 412 MS
EKG R AXIS: 35 DEGREES
EKG T AXIS: 53 DEGREES
EKG VENTRICULAR RATE: 62 BPM
EOSINOPHIL NFR BLD AUTO: 5.7 %
EOSINOPHILS ABSOLUTE: 0.3 THOU/MM3 (ref 0–0.4)
ERYTHROCYTE [DISTWIDTH] IN BLOOD BY AUTOMATED COUNT: 14.7 % (ref 11.5–14.5)
GFR SERPL CREATININE-BSD FRML MDRD: > 60 ML/MIN/1.73M2
GLUCOSE SERPL-MCNC: 90 MG/DL (ref 70–108)
HCT VFR BLD AUTO: 36.1 % (ref 42–52)
HGB BLD-MCNC: 11.3 GM/DL (ref 14–18)
IMM GRANULOCYTES # BLD AUTO: 0.02 THOU/MM3 (ref 0–0.07)
IMM GRANULOCYTES NFR BLD AUTO: 0.4 %
LYMPHOCYTES ABSOLUTE: 1.8 THOU/MM3 (ref 1–4.8)
LYMPHOCYTES NFR BLD AUTO: 32.9 %
MCH RBC QN AUTO: 30.5 PG (ref 26–33)
MCHC RBC AUTO-ENTMCNC: 31.3 GM/DL (ref 32.2–35.5)
MCV RBC AUTO: 97.3 FL (ref 80–94)
MONOCYTES ABSOLUTE: 0.6 THOU/MM3 (ref 0.4–1.3)
MONOCYTES NFR BLD AUTO: 10.1 %
NEUTROPHILS NFR BLD AUTO: 50.4 %
NRBC BLD AUTO-RTO: 0 /100 WBC
PLATELET # BLD AUTO: 181 THOU/MM3 (ref 130–400)
PMV BLD AUTO: 8.8 FL (ref 9.4–12.4)
POTASSIUM SERPL-SCNC: 3.7 MEQ/L (ref 3.5–5.2)
RBC # BLD AUTO: 3.71 MILL/MM3 (ref 4.7–6.1)
SEGMENTED NEUTROPHILS ABSOLUTE COUNT: 2.8 THOU/MM3 (ref 1.8–7.7)
SODIUM SERPL-SCNC: 141 MEQ/L (ref 135–145)
WBC # BLD AUTO: 5.6 THOU/MM3 (ref 4.8–10.8)

## 2024-01-18 PROCEDURE — 6360000002 HC RX W HCPCS: Performed by: PHYSICIAN ASSISTANT

## 2024-01-18 PROCEDURE — 1200000003 HC TELEMETRY R&B

## 2024-01-18 PROCEDURE — 1200000000 HC SEMI PRIVATE

## 2024-01-18 PROCEDURE — 2580000003 HC RX 258: Performed by: PHYSICIAN ASSISTANT

## 2024-01-18 PROCEDURE — 6370000000 HC RX 637 (ALT 250 FOR IP)

## 2024-01-18 PROCEDURE — 80048 BASIC METABOLIC PNL TOTAL CA: CPT

## 2024-01-18 PROCEDURE — 6370000000 HC RX 637 (ALT 250 FOR IP): Performed by: PHYSICIAN ASSISTANT

## 2024-01-18 PROCEDURE — 2580000003 HC RX 258

## 2024-01-18 PROCEDURE — 85025 COMPLETE CBC W/AUTO DIFF WBC: CPT

## 2024-01-18 PROCEDURE — 36415 COLL VENOUS BLD VENIPUNCTURE: CPT

## 2024-01-18 PROCEDURE — 6360000002 HC RX W HCPCS

## 2024-01-18 PROCEDURE — 99231 SBSQ HOSP IP/OBS SF/LOW 25: CPT | Performed by: PHYSICIAN ASSISTANT

## 2024-01-18 RX ADMIN — GABAPENTIN 600 MG: 600 TABLET, FILM COATED ORAL at 08:58

## 2024-01-18 RX ADMIN — OXCARBAZEPINE 450 MG: 300 TABLET, FILM COATED ORAL at 21:49

## 2024-01-18 RX ADMIN — METOPROLOL SUCCINATE 50 MG: 50 TABLET, EXTENDED RELEASE ORAL at 08:58

## 2024-01-18 RX ADMIN — MEROPENEM 1000 MG: 1 INJECTION, POWDER, FOR SOLUTION INTRAVENOUS at 02:37

## 2024-01-18 RX ADMIN — OXCARBAZEPINE 450 MG: 300 TABLET, FILM COATED ORAL at 08:55

## 2024-01-18 RX ADMIN — Medication 1 DROP: at 21:52

## 2024-01-18 RX ADMIN — ROSUVASTATIN CALCIUM 20 MG: 20 TABLET, FILM COATED ORAL at 21:48

## 2024-01-18 RX ADMIN — BACLOFEN 10 MG: 10 TABLET ORAL at 13:52

## 2024-01-18 RX ADMIN — Medication 6 MG: at 08:57

## 2024-01-18 RX ADMIN — Medication 1 CAPSULE: at 21:50

## 2024-01-18 RX ADMIN — TICAGRELOR 90 MG: 90 TABLET ORAL at 21:48

## 2024-01-18 RX ADMIN — MEROPENEM 1000 MG: 1 INJECTION, POWDER, FOR SOLUTION INTRAVENOUS at 22:08

## 2024-01-18 RX ADMIN — Medication 1 DROP: at 17:35

## 2024-01-18 RX ADMIN — SPIRONOLACTONE 25 MG: 25 TABLET ORAL at 08:56

## 2024-01-18 RX ADMIN — FAMOTIDINE 20 MG: 20 TABLET ORAL at 21:50

## 2024-01-18 RX ADMIN — BACLOFEN 10 MG: 10 TABLET ORAL at 08:55

## 2024-01-18 RX ADMIN — GABAPENTIN 600 MG: 600 TABLET, FILM COATED ORAL at 21:51

## 2024-01-18 RX ADMIN — OXYCODONE HYDROCHLORIDE AND ACETAMINOPHEN 500 MG: 500 TABLET ORAL at 21:49

## 2024-01-18 RX ADMIN — Medication 1 TABLET: at 08:55

## 2024-01-18 RX ADMIN — OXYBUTYNIN CHLORIDE 5 MG: 5 TABLET, EXTENDED RELEASE ORAL at 21:49

## 2024-01-18 RX ADMIN — GABAPENTIN 600 MG: 600 TABLET, FILM COATED ORAL at 17:35

## 2024-01-18 RX ADMIN — Medication 1 CAPSULE: at 08:55

## 2024-01-18 RX ADMIN — OXYBUTYNIN CHLORIDE 5 MG: 5 TABLET, EXTENDED RELEASE ORAL at 08:59

## 2024-01-18 RX ADMIN — SODIUM CHLORIDE, PRESERVATIVE FREE 10 ML: 5 INJECTION INTRAVENOUS at 09:01

## 2024-01-18 RX ADMIN — Medication 1 DROP: at 13:52

## 2024-01-18 RX ADMIN — SODIUM CHLORIDE, PRESERVATIVE FREE 10 ML: 5 INJECTION INTRAVENOUS at 21:50

## 2024-01-18 RX ADMIN — Medication 1 TABLET: at 08:56

## 2024-01-18 RX ADMIN — Medication 1 TABLET: at 21:48

## 2024-01-18 RX ADMIN — TICAGRELOR 90 MG: 90 TABLET ORAL at 08:55

## 2024-01-18 RX ADMIN — ACETAMINOPHEN 650 MG: 325 TABLET ORAL at 08:54

## 2024-01-18 RX ADMIN — BACLOFEN 10 MG: 10 TABLET ORAL at 21:49

## 2024-01-18 RX ADMIN — FAMOTIDINE 20 MG: 20 TABLET ORAL at 08:55

## 2024-01-18 RX ADMIN — Medication 400 UNITS: at 08:55

## 2024-01-18 RX ADMIN — ERYTHROMYCIN: 5 OINTMENT OPHTHALMIC at 21:52

## 2024-01-18 RX ADMIN — ENOXAPARIN SODIUM 40 MG: 100 INJECTION SUBCUTANEOUS at 09:00

## 2024-01-18 RX ADMIN — METOPROLOL SUCCINATE 50 MG: 50 TABLET, EXTENDED RELEASE ORAL at 21:49

## 2024-01-18 RX ADMIN — GABAPENTIN 600 MG: 600 TABLET, FILM COATED ORAL at 13:52

## 2024-01-18 RX ADMIN — ASPIRIN 81 MG: 81 TABLET, COATED ORAL at 08:54

## 2024-01-18 RX ADMIN — OXYCODONE HYDROCHLORIDE AND ACETAMINOPHEN 500 MG: 500 TABLET ORAL at 08:56

## 2024-01-18 RX ADMIN — Medication 1 DROP: at 08:59

## 2024-01-18 RX ADMIN — MEROPENEM 1000 MG: 1 INJECTION, POWDER, FOR SOLUTION INTRAVENOUS at 11:05

## 2024-01-18 ASSESSMENT — PAIN SCALES - GENERAL
PAINLEVEL_OUTOF10: 5
PAINLEVEL_OUTOF10: 0
PAINLEVEL_OUTOF10: 3

## 2024-01-18 ASSESSMENT — PAIN DESCRIPTION - DESCRIPTORS
DESCRIPTORS: ACHING
DESCRIPTORS: ACHING

## 2024-01-18 ASSESSMENT — PAIN DESCRIPTION - LOCATION
LOCATION: GENERALIZED
LOCATION: GENERALIZED

## 2024-01-18 NOTE — CARE COORDINATION
1/18/24, 8:01 AM EST      DISCHARGE PLANNING EVALUATION    Greyson Swift  Admitted: 1/15/2024  Hospital Day: 3    Location: -21/021-A Reason for admit: Somnolence [R40.0]  UTI (urinary tract infection) [N39.0]  Acute cystitis without hematuria [N30.00]    Past Medical History:   Diagnosis Date    Dysphagia     oropharyngeal    History of ESBL E. coli infection     History of pulmonary embolism     History of urinary tract infection     Hx of blood clots     Pulmonary embolis    Hypertension     Left ureteral stone 08/22/2023    s/p stent placement    Major depressive disorder, single episode     MS (multiple sclerosis) ([facility])     Multiple sclerosis ([facility]) 1994    Neurogenic bladder 02/2012    Dr. Mitchell placed cather    NSTEMI (non-ST elevated myocardial infarction) ([facility]) 08/2023    Staged PCI    Osteomyelitis ([facility])     UTI (urinary tract infection)        Procedure: N/A  Barriers to Discharge: Patient presents due to altered mental status. Blood and urine cultures pending, urine positive for nitrites. ID following, Lovenox, Merrem, prn Tylenol and Zofran, Magnesium and Potassium replacement protocol, regular diet, turn q 2 hr., telemetry, up with assistance, wound care.      PCP: Noah Benitez MD    Readmission Risk Low 0-14, Mod 15-19), High > 20: Readmission Risk Score: 18.2      Advance Directives:      Code Status: Full Code   Patient's Primary Decision Maker is: Legal Next of Kin      Patient Goals/Plan/Treatment Preferences: Greyson is from [facility]. SS following for his return.     Transportation/Food Security/Housekeeping Addressed: No issues identified.     If patient is discharged prior to next notation, then this note serves as note for discharge by case management.

## 2024-01-18 NOTE — PLAN OF CARE
Problem: Skin/Tissue Integrity  Goal: Absence of new skin breakdown  Description: 1.  Monitor for areas of redness and/or skin breakdown  2.  Assess vascular access sites hourly  3.  Every 4-6 hours minimum:  Change oxygen saturation probe site  4.  Every 4-6 hours:  If on nasal continuous positive airway pressure, respiratory therapy assess nares and determine need for appliance change or resting period.  1/17/2024 2320 by Danelle Collins RN  Outcome: Progressing       Problem: Skin/Tissue Integrity  Goal: Absence of new skin breakdown  Description: 1.  Monitor for areas of redness and/or skin breakdown  2.  Assess vascular access sites hourly  3.  Every 4-6 hours minimum:  Change oxygen saturation probe site  4.  Every 4-6 hours:  If on nasal continuous positive airway pressure, respiratory therapy assess nares and determine need for appliance change or resting period.  1/17/2024 2320 by Danelle Collins RN  Outcome: Progressing       Problem: Safety - Adult  Goal: Free from fall injury  1/17/2024 2320 by Danelle Collins RN  Outcome: Progressing   All fall precautions in place. Bed in low position, alarm activated and appropriate use of call light.     Problem: Safety - Adult  Goal: Isolation precautions  Description: Isolation precautions  Outcome: Progressing     Problem: ABCDS Injury Assessment  Goal: Absence of physical injury  1/17/2024 2320 by Danelle Collins RN  Outcome: Progressing     Problem: Pain  Goal: Verbalizes/displays adequate comfort level or baseline comfort level  1/17/2024 2320 by Danelle Collins RN  Outcome: Progressing   Pain Assessment: None - Denies Pain  Pain Level: 4   Patient's Stated Pain Goal: 0 - No pain   Is pain goal met at this time?  Yes     Non-Pharmaceutical Pain Intervention(s): Rest    Problem: Discharge Planning  Goal: Discharge to home or other facility with appropriate resources  1/17/2024 2320 by Danelle Collins RN  Outcome: Progressing     Problem: Genitourinary -

## 2024-01-18 NOTE — PLAN OF CARE
Problem: Skin/Tissue Integrity  Goal: Absence of new skin breakdown  Description: 1.  Monitor for areas of redness and/or skin breakdown  2.  Assess vascular access sites hourly  3.  Every 4-6 hours minimum:  Change oxygen saturation probe site  4.  Every 4-6 hours:  If on nasal continuous positive airway pressure, respiratory therapy assess nares and determine need for appliance change or resting period.  Outcome: Progressing  No  new skin breakdown this shift. Patient being assisted with turning. Patients states understanding of repositioning every two hours.     Problem: Safety - Adult  Goal: Free from fall injury  Outcome: Progressing  Fall assessment completed. Patient using call light appropriately to call for assistance.  Personal items within reach. Patient is also compliant with use of non-skid slippers.      Problem: Safety - Adult  Goal: Isolation precautions  Description: Isolation precautions  Outcome: Progressing   Patient placed in contact isolation.    Problem: ABCDS Injury Assessment  Goal: Absence of physical injury  Outcome: Progressing.  No falls noted this shift.  Bed kept in low position. Safe environment maintained. Bedside table & call light in reach. Uses call light appropriately when needing assistance.      Problem: Pain  Goal: Verbalizes/displays adequate comfort level or baseline comfort level  Outcome: Progressing  Patient states pain relief from PRN pain medications. Pain reassessed one hour post PRN pain medication given.  Patient rates pain 3 on MO 0-10 scale.     Problem: Discharge Planning  Goal: Discharge to home or other facility with appropriate resources  Outcome: Progressing  Discharge plan is in process. Plan discharge to ECF.     Problem: Genitourinary - Adult  Goal: Absence of urinary retention  Outcome: Progressing  Patient voiding adequate amounts without difficulty.     Problem: Genitourinary - Adult  Goal: Urinary catheter remains patent  Outcome: Progressing

## 2024-01-19 PROCEDURE — 2580000003 HC RX 258: Performed by: PHYSICIAN ASSISTANT

## 2024-01-19 PROCEDURE — 99231 SBSQ HOSP IP/OBS SF/LOW 25: CPT | Performed by: PHYSICIAN ASSISTANT

## 2024-01-19 PROCEDURE — 6360000002 HC RX W HCPCS: Performed by: PHYSICIAN ASSISTANT

## 2024-01-19 PROCEDURE — 1200000000 HC SEMI PRIVATE

## 2024-01-19 PROCEDURE — 6370000000 HC RX 637 (ALT 250 FOR IP): Performed by: PHYSICIAN ASSISTANT

## 2024-01-19 PROCEDURE — 6370000000 HC RX 637 (ALT 250 FOR IP)

## 2024-01-19 PROCEDURE — 2500000003 HC RX 250 WO HCPCS

## 2024-01-19 PROCEDURE — 94640 AIRWAY INHALATION TREATMENT: CPT

## 2024-01-19 PROCEDURE — 6360000002 HC RX W HCPCS

## 2024-01-19 RX ADMIN — ROSUVASTATIN CALCIUM 20 MG: 20 TABLET, FILM COATED ORAL at 21:12

## 2024-01-19 RX ADMIN — IPRATROPIUM BROMIDE AND ALBUTEROL SULFATE 1 DOSE: .5; 3 SOLUTION RESPIRATORY (INHALATION) at 14:03

## 2024-01-19 RX ADMIN — MEROPENEM 1000 MG: 1 INJECTION, POWDER, FOR SOLUTION INTRAVENOUS at 11:21

## 2024-01-19 RX ADMIN — Medication 6 MG: at 09:54

## 2024-01-19 RX ADMIN — GABAPENTIN 600 MG: 600 TABLET, FILM COATED ORAL at 13:36

## 2024-01-19 RX ADMIN — ERYTHROMYCIN: 5 OINTMENT OPHTHALMIC at 21:00

## 2024-01-19 RX ADMIN — GABAPENTIN 600 MG: 600 TABLET, FILM COATED ORAL at 21:12

## 2024-01-19 RX ADMIN — GABAPENTIN 600 MG: 600 TABLET, FILM COATED ORAL at 17:28

## 2024-01-19 RX ADMIN — LISINOPRIL 2.5 MG: 2.5 TABLET ORAL at 10:22

## 2024-01-19 RX ADMIN — OXYBUTYNIN CHLORIDE 5 MG: 5 TABLET, EXTENDED RELEASE ORAL at 09:53

## 2024-01-19 RX ADMIN — BACLOFEN 10 MG: 10 TABLET ORAL at 13:36

## 2024-01-19 RX ADMIN — METOPROLOL SUCCINATE 50 MG: 50 TABLET, EXTENDED RELEASE ORAL at 09:53

## 2024-01-19 RX ADMIN — Medication 400 UNITS: at 09:54

## 2024-01-19 RX ADMIN — SPIRONOLACTONE 25 MG: 25 TABLET ORAL at 09:53

## 2024-01-19 RX ADMIN — ENOXAPARIN SODIUM 40 MG: 100 INJECTION SUBCUTANEOUS at 09:53

## 2024-01-19 RX ADMIN — BACLOFEN 10 MG: 10 TABLET ORAL at 10:22

## 2024-01-19 RX ADMIN — OXCARBAZEPINE 450 MG: 300 TABLET, FILM COATED ORAL at 20:59

## 2024-01-19 RX ADMIN — OXCARBAZEPINE 450 MG: 300 TABLET, FILM COATED ORAL at 09:53

## 2024-01-19 RX ADMIN — OXYBUTYNIN CHLORIDE 5 MG: 5 TABLET, EXTENDED RELEASE ORAL at 21:12

## 2024-01-19 RX ADMIN — Medication: at 13:37

## 2024-01-19 RX ADMIN — Medication 1 CAPSULE: at 09:53

## 2024-01-19 RX ADMIN — Medication 1 CAPSULE: at 20:59

## 2024-01-19 RX ADMIN — Medication 1 TABLET: at 21:00

## 2024-01-19 RX ADMIN — OXYCODONE HYDROCHLORIDE AND ACETAMINOPHEN 500 MG: 500 TABLET ORAL at 20:59

## 2024-01-19 RX ADMIN — Medication 1 DROP: at 10:23

## 2024-01-19 RX ADMIN — Medication 1 TABLET: at 09:53

## 2024-01-19 RX ADMIN — TICAGRELOR 90 MG: 90 TABLET ORAL at 09:53

## 2024-01-19 RX ADMIN — FAMOTIDINE 20 MG: 20 TABLET ORAL at 20:59

## 2024-01-19 RX ADMIN — Medication 1 DROP: at 17:27

## 2024-01-19 RX ADMIN — MEROPENEM 1000 MG: 1 INJECTION, POWDER, FOR SOLUTION INTRAVENOUS at 17:30

## 2024-01-19 RX ADMIN — OXYCODONE HYDROCHLORIDE AND ACETAMINOPHEN 500 MG: 500 TABLET ORAL at 09:49

## 2024-01-19 RX ADMIN — FAMOTIDINE 20 MG: 20 TABLET ORAL at 09:53

## 2024-01-19 RX ADMIN — BACLOFEN 10 MG: 10 TABLET ORAL at 20:59

## 2024-01-19 RX ADMIN — Medication 1 DROP: at 21:12

## 2024-01-19 RX ADMIN — GABAPENTIN 600 MG: 600 TABLET, FILM COATED ORAL at 09:53

## 2024-01-19 RX ADMIN — ASPIRIN 81 MG: 81 TABLET, COATED ORAL at 09:53

## 2024-01-19 RX ADMIN — ACETIC ACID 60 ML: 0.25 IRRIGANT IRRIGATION at 21:15

## 2024-01-19 RX ADMIN — TICAGRELOR 90 MG: 90 TABLET ORAL at 20:59

## 2024-01-19 RX ADMIN — MEROPENEM 1000 MG: 1 INJECTION, POWDER, FOR SOLUTION INTRAVENOUS at 02:55

## 2024-01-19 ASSESSMENT — PAIN SCALES - GENERAL: PAINLEVEL_OUTOF10: 1

## 2024-01-19 NOTE — CARE COORDINATION
1/19/24, 10:48 AM EST    DISCHARGE PLANNING EVALUATION    SW met with pt this morning, reports he is doing okay.  SW did ask if needing SW to contact family. Pt declines, reports wife has her own medical issues, reports she has MS as well. Will continue to plans to return to [facility] when medically ready.     Call to [facility], spoke with Connie, updated on possible weekend return.     1/19/24, 11:18 AM EST    Patient goals/plan/ treatment preferences discussed by  and .  Patient goals/plan/ treatment preferences reviewed with patient/ family.  Patient/ family verbalize understanding of discharge plan and are in agreement with goal/plan/treatment preferences.  Understanding was demonstrated using the teach back method.  AVS provided by RN at time of discharge, which includes all necessary medical information pertaining to the patients current course of illness, treatment, post-discharge goals of care, and treatment preferences.     Services At/After Discharge: Skilled Nursing Facility (SNF) and In ambulance [facility]       IMM Letter  IMM Letter given to Patient/Family/Significant other/Guardian/POA/by:: Pt Access  IMM Letter date given:: 01/15/24  IMM Letter time given:: 2026

## 2024-01-19 NOTE — PLAN OF CARE
Problem: Skin/Tissue Integrity  Goal: Absence of new skin breakdown  Outcome: Progressing     Problem: Safety - Adult  Goal: Free from fall injury  Outcome: Progressing  Flowsheets (Taken 1/19/2024 1641)  Free From Fall Injury: Instruct family/caregiver on patient safety     Problem: Safety - Adult  Goal: Isolation precautions  Outcome: Progressing     Problem: Pain  Goal: Verbalizes/displays adequate comfort level or baseline comfort level  Outcome: Progressing  Flowsheets (Taken 1/19/2024 1641)  Verbalizes/displays adequate comfort level or baseline comfort level:   Encourage patient to monitor pain and request assistance   Assess pain using appropriate pain scale   Administer analgesics based on type and severity of pain and evaluate response   Implement non-pharmacological measures as appropriate and evaluate response     Problem: Discharge Planning  Goal: Discharge to home or other facility with appropriate resources  Outcome: Progressing  Flowsheets (Taken 1/19/2024 1641)  Discharge to home or other facility with appropriate resources: Identify barriers to discharge with patient and caregiver     Problem: Genitourinary - Adult  Goal: Absence of urinary retention  Outcome: Progressing  Flowsheets (Taken 1/19/2024 1641)  Absence of urinary retention:   Assess patient’s ability to void and empty bladder   Monitor intake/output and perform bladder scan as needed

## 2024-01-19 NOTE — RT PROTOCOL NOTE
4 hours PRN for wheezing or increased work of breathing using Per Protocol order mode.        4-6 - enter or revise RT Bronchodilator order(s) to two equivalent RT bronchodilator orders with one order with BID Frequency and one order with Frequency of every 4 hours PRN wheezing or increased work of breathing using Per Protocol order mode.        7-10 - enter or revise RT Bronchodilator order(s) to two equivalent RT bronchodilator orders with one order with TID Frequency and one order with Frequency of every 4 hours PRN wheezing or increased work of breathing using Per Protocol order mode.       11-13 - enter or revise RT Bronchodilator order(s) to one equivalent RT bronchodilator order with QID Frequency and an Albuterol order with Frequency of every 4 hours PRN wheezing or increased work of breathing using Per Protocol order mode.      Greater than 13 - enter or revise RT Bronchodilator order(s) to one equivalent RT bronchodilator order with every 4 hours Frequency and an Albuterol order with Frequency of every 2 hours PRN wheezing or increased work of breathing using Per Protocol order mode.     RT to enter RT Home Evaluation for COPD & MDI Assessment order using Per Protocol order mode.    Electronically signed by Huong Fields RCP on 1/19/2024 at 2:07 PM

## 2024-01-20 LAB
BACTERIA BLD AEROBE CULT: NORMAL
BACTERIA BLD AEROBE CULT: NORMAL
GLUCOSE BLD STRIP.AUTO-MCNC: 102 MG/DL (ref 70–108)
GLUCOSE BLD STRIP.AUTO-MCNC: 138 MG/DL (ref 70–108)
GLUCOSE BLD STRIP.AUTO-MCNC: 151 MG/DL (ref 70–108)
GLUCOSE BLD STRIP.AUTO-MCNC: 164 MG/DL (ref 70–108)

## 2024-01-20 PROCEDURE — 99231 SBSQ HOSP IP/OBS SF/LOW 25: CPT | Performed by: PHYSICIAN ASSISTANT

## 2024-01-20 PROCEDURE — 2580000003 HC RX 258

## 2024-01-20 PROCEDURE — 6360000002 HC RX W HCPCS

## 2024-01-20 PROCEDURE — 6360000002 HC RX W HCPCS: Performed by: PHYSICIAN ASSISTANT

## 2024-01-20 PROCEDURE — 2580000003 HC RX 258: Performed by: PHYSICIAN ASSISTANT

## 2024-01-20 PROCEDURE — 82948 REAGENT STRIP/BLOOD GLUCOSE: CPT

## 2024-01-20 PROCEDURE — 6370000000 HC RX 637 (ALT 250 FOR IP)

## 2024-01-20 PROCEDURE — 1200000000 HC SEMI PRIVATE

## 2024-01-20 RX ADMIN — Medication 1 CAPSULE: at 20:11

## 2024-01-20 RX ADMIN — METOPROLOL SUCCINATE 50 MG: 50 TABLET, EXTENDED RELEASE ORAL at 20:11

## 2024-01-20 RX ADMIN — OXYBUTYNIN CHLORIDE 5 MG: 5 TABLET, EXTENDED RELEASE ORAL at 10:59

## 2024-01-20 RX ADMIN — Medication 1 CAPSULE: at 10:48

## 2024-01-20 RX ADMIN — GABAPENTIN 600 MG: 600 TABLET, FILM COATED ORAL at 10:55

## 2024-01-20 RX ADMIN — TICAGRELOR 90 MG: 90 TABLET ORAL at 20:11

## 2024-01-20 RX ADMIN — OXYCODONE HYDROCHLORIDE AND ACETAMINOPHEN 500 MG: 500 TABLET ORAL at 20:11

## 2024-01-20 RX ADMIN — OXCARBAZEPINE 450 MG: 300 TABLET, FILM COATED ORAL at 20:11

## 2024-01-20 RX ADMIN — GABAPENTIN 600 MG: 600 TABLET, FILM COATED ORAL at 18:09

## 2024-01-20 RX ADMIN — Medication 1 DROP: at 15:48

## 2024-01-20 RX ADMIN — MEROPENEM 1000 MG: 1 INJECTION, POWDER, FOR SOLUTION INTRAVENOUS at 03:06

## 2024-01-20 RX ADMIN — ENOXAPARIN SODIUM 40 MG: 100 INJECTION SUBCUTANEOUS at 10:48

## 2024-01-20 RX ADMIN — ERYTHROMYCIN: 5 OINTMENT OPHTHALMIC at 20:11

## 2024-01-20 RX ADMIN — Medication 6 MG: at 13:22

## 2024-01-20 RX ADMIN — Medication 1 TABLET: at 10:56

## 2024-01-20 RX ADMIN — ROSUVASTATIN CALCIUM 20 MG: 20 TABLET, FILM COATED ORAL at 20:14

## 2024-01-20 RX ADMIN — SODIUM CHLORIDE, PRESERVATIVE FREE 10 ML: 5 INJECTION INTRAVENOUS at 20:12

## 2024-01-20 RX ADMIN — Medication 1 TABLET: at 10:55

## 2024-01-20 RX ADMIN — Medication 1 TABLET: at 20:11

## 2024-01-20 RX ADMIN — OXYCODONE HYDROCHLORIDE AND ACETAMINOPHEN 500 MG: 500 TABLET ORAL at 10:56

## 2024-01-20 RX ADMIN — GABAPENTIN 600 MG: 600 TABLET, FILM COATED ORAL at 20:14

## 2024-01-20 RX ADMIN — OXCARBAZEPINE 450 MG: 300 TABLET, FILM COATED ORAL at 11:00

## 2024-01-20 RX ADMIN — Medication 1 DROP: at 18:09

## 2024-01-20 RX ADMIN — Medication 1 DROP: at 10:59

## 2024-01-20 RX ADMIN — MEROPENEM 1000 MG: 1 INJECTION, POWDER, FOR SOLUTION INTRAVENOUS at 15:51

## 2024-01-20 RX ADMIN — ASPIRIN 81 MG: 81 TABLET, COATED ORAL at 10:48

## 2024-01-20 RX ADMIN — GABAPENTIN 600 MG: 600 TABLET, FILM COATED ORAL at 15:47

## 2024-01-20 RX ADMIN — FAMOTIDINE 20 MG: 20 TABLET ORAL at 20:11

## 2024-01-20 RX ADMIN — BACLOFEN 10 MG: 10 TABLET ORAL at 15:47

## 2024-01-20 RX ADMIN — BACLOFEN 10 MG: 10 TABLET ORAL at 20:11

## 2024-01-20 RX ADMIN — FAMOTIDINE 20 MG: 20 TABLET ORAL at 10:48

## 2024-01-20 RX ADMIN — BACLOFEN 10 MG: 10 TABLET ORAL at 10:56

## 2024-01-20 RX ADMIN — OXYBUTYNIN CHLORIDE 5 MG: 5 TABLET, EXTENDED RELEASE ORAL at 20:15

## 2024-01-20 RX ADMIN — Medication 400 UNITS: at 10:56

## 2024-01-20 RX ADMIN — TICAGRELOR 90 MG: 90 TABLET ORAL at 10:48

## 2024-01-20 RX ADMIN — Medication 1 DROP: at 20:12

## 2024-01-20 ASSESSMENT — PAIN SCALES - GENERAL: PAINLEVEL_OUTOF10: 0

## 2024-01-20 NOTE — PLAN OF CARE
Problem: Skin/Tissue Integrity  Goal: Absence of new skin breakdown  Description: 1.  Monitor for areas of redness and/or skin breakdown  2.  Assess vascular access sites hourly  3.  Every 4-6 hours minimum:  Change oxygen saturation probe site  4.  Every 4-6 hours:  If on nasal continuous positive airway pressure, respiratory therapy assess nares and determine need for appliance change or resting period.  Outcome: Progressing   No skin breakdown this shift. Patient being assisted with turning and pillow support. Patients states understanding of repositioning every two hours. Dressings changed per order.    Problem: Safety - Adult  Goal: Free from fall injury  Outcome: Progressing   Patient remains free from falls. All safety precautions remain in place.    Problem: Safety - Adult  Goal: Isolation precautions  Description: Isolation precautions  Outcome: Progressing   Strict contact isolation precautions maintained.    Problem: ABCDS Injury Assessment  Goal: Absence of physical injury  Outcome: Progressing   Patient remains free from injury.    Problem: Pain  Goal: Verbalizes/displays adequate comfort level or baseline comfort level  Outcome: Progressing  Flowsheets (Taken 1/20/2024 1045)  Verbalizes/displays adequate comfort level or baseline comfort level:   Encourage patient to monitor pain and request assistance   Assess pain using appropriate pain scale   Administer analgesics based on type and severity of pain and evaluate response   Implement non-pharmacological measures as appropriate and evaluate response   Pain Assessment: 0-10  Pain Level: 0   Patient's Stated Pain Goal: 0 - No pain   Is pain goal met at this time?  Yes     Non-Pharmaceutical Pain Intervention(s): Rest    Problem: Discharge Planning  Goal: Discharge to home or other facility with appropriate resources  Outcome: Progressing  Flowsheets (Taken 1/20/2024 1045)  Discharge to home or other facility with appropriate resources:   Identify

## 2024-01-20 NOTE — PLAN OF CARE
Problem: Skin/Tissue Integrity  Goal: Absence of new skin breakdown  Description: 1.  Monitor for areas of redness and/or skin breakdown  2.  Assess vascular access sites hourly  3.  Every 4-6 hours minimum:  Change oxygen saturation probe site  4.  Every 4-6 hours:  If on nasal continuous positive airway pressure, respiratory therapy assess nares and determine need for appliance change or resting period.  1/20/2024 0005 by Danelle Collins RN  Outcome: Progressing     Problem: Safety - Adult  Goal: Free from fall injury  1/20/2024 0005 by Danelle Collins RN  Outcome: Progressing   All fall precautions in place. Bed in low position, alarm activated and appropriate use of call light.     Problem: Safety - Adult  Goal: Isolation precautions  Description: Isolation precautions  1/20/2024 0005 by Danelle Collins RN  Outcome: Progressing     Problem: Pain  Goal: Verbalizes/displays adequate comfort level or baseline comfort level  1/20/2024 0005 by Danelle Collins RN  Outcome: Progressing   Pain Assessment: None - Denies Pain  Pain Level: 1   Patient's Stated Pain Goal: 0 - No pain   Is pain goal met at this time?  Yes     Non-Pharmaceutical Pain Intervention(s): Rest    Problem: Discharge Planning  Goal: Discharge to home or other facility with appropriate resources  1/20/2024 0005 by Danelle Collins RN  Outcome: Progressing     Problem: Genitourinary - Adult  Goal: Absence of urinary retention  1/20/2024 0005 by Danelle Collins RN  Outcome: Progressing     Problem: Genitourinary - Adult  Goal: Urinary catheter remains patent  Outcome: Progressing     Problem: Infection - Adult  Goal: Absence of infection at discharge  Outcome: Progressing     Care plan reviewed with patient.  Patient verbalize understanding of the plan of care and contribute to goal setting.

## 2024-01-21 LAB
EKG ATRIAL RATE: 57 BPM
EKG P AXIS: 56 DEGREES
EKG P-R INTERVAL: 158 MS
EKG Q-T INTERVAL: 418 MS
EKG QRS DURATION: 86 MS
EKG QTC CALCULATION (BAZETT): 406 MS
EKG R AXIS: 51 DEGREES
EKG T AXIS: 67 DEGREES
EKG VENTRICULAR RATE: 57 BPM
GLUCOSE BLD STRIP.AUTO-MCNC: 148 MG/DL (ref 70–108)
GLUCOSE BLD STRIP.AUTO-MCNC: 161 MG/DL (ref 70–108)
GLUCOSE BLD STRIP.AUTO-MCNC: 95 MG/DL (ref 70–108)
GLUCOSE BLD STRIP.AUTO-MCNC: 97 MG/DL (ref 70–108)

## 2024-01-21 PROCEDURE — 6370000000 HC RX 637 (ALT 250 FOR IP)

## 2024-01-21 PROCEDURE — 6360000002 HC RX W HCPCS: Performed by: PHYSICIAN ASSISTANT

## 2024-01-21 PROCEDURE — 82948 REAGENT STRIP/BLOOD GLUCOSE: CPT

## 2024-01-21 PROCEDURE — 6370000000 HC RX 637 (ALT 250 FOR IP): Performed by: PHYSICIAN ASSISTANT

## 2024-01-21 PROCEDURE — 6360000002 HC RX W HCPCS

## 2024-01-21 PROCEDURE — 1200000000 HC SEMI PRIVATE

## 2024-01-21 PROCEDURE — 99232 SBSQ HOSP IP/OBS MODERATE 35: CPT | Performed by: PHYSICIAN ASSISTANT

## 2024-01-21 PROCEDURE — 2580000003 HC RX 258: Performed by: PHYSICIAN ASSISTANT

## 2024-01-21 PROCEDURE — 2580000003 HC RX 258

## 2024-01-21 RX ORDER — GABAPENTIN 600 MG/1
900 TABLET ORAL 4 TIMES DAILY
Status: DISCONTINUED | OUTPATIENT
Start: 2024-01-21 | End: 2024-01-23 | Stop reason: HOSPADM

## 2024-01-21 RX ADMIN — Medication 1 CAPSULE: at 10:50

## 2024-01-21 RX ADMIN — OXYCODONE HYDROCHLORIDE AND ACETAMINOPHEN 500 MG: 500 TABLET ORAL at 21:46

## 2024-01-21 RX ADMIN — Medication 1 CAPSULE: at 21:45

## 2024-01-21 RX ADMIN — OXCARBAZEPINE 450 MG: 300 TABLET, FILM COATED ORAL at 10:50

## 2024-01-21 RX ADMIN — MEROPENEM 1000 MG: 1 INJECTION, POWDER, FOR SOLUTION INTRAVENOUS at 23:50

## 2024-01-21 RX ADMIN — GABAPENTIN 600 MG: 600 TABLET, FILM COATED ORAL at 10:51

## 2024-01-21 RX ADMIN — GABAPENTIN 900 MG: 600 TABLET, FILM COATED ORAL at 15:38

## 2024-01-21 RX ADMIN — OXYCODONE HYDROCHLORIDE AND ACETAMINOPHEN 500 MG: 500 TABLET ORAL at 10:50

## 2024-01-21 RX ADMIN — ROSUVASTATIN CALCIUM 20 MG: 20 TABLET, FILM COATED ORAL at 21:45

## 2024-01-21 RX ADMIN — MEROPENEM 1000 MG: 1 INJECTION, POWDER, FOR SOLUTION INTRAVENOUS at 00:03

## 2024-01-21 RX ADMIN — GABAPENTIN 900 MG: 600 TABLET, FILM COATED ORAL at 21:46

## 2024-01-21 RX ADMIN — Medication 1 DROP: at 15:39

## 2024-01-21 RX ADMIN — SODIUM CHLORIDE, PRESERVATIVE FREE 10 ML: 5 INJECTION INTRAVENOUS at 10:51

## 2024-01-21 RX ADMIN — BACLOFEN 10 MG: 10 TABLET ORAL at 21:46

## 2024-01-21 RX ADMIN — Medication 1 TABLET: at 10:51

## 2024-01-21 RX ADMIN — TICAGRELOR 90 MG: 90 TABLET ORAL at 10:50

## 2024-01-21 RX ADMIN — Medication 400 UNITS: at 10:51

## 2024-01-21 RX ADMIN — Medication 6 MG: at 10:57

## 2024-01-21 RX ADMIN — MEROPENEM 1000 MG: 1 INJECTION, POWDER, FOR SOLUTION INTRAVENOUS at 11:00

## 2024-01-21 RX ADMIN — Medication 1 DROP: at 11:02

## 2024-01-21 RX ADMIN — FAMOTIDINE 20 MG: 20 TABLET ORAL at 21:46

## 2024-01-21 RX ADMIN — Medication 1 DROP: at 21:44

## 2024-01-21 RX ADMIN — GABAPENTIN 900 MG: 600 TABLET, FILM COATED ORAL at 18:08

## 2024-01-21 RX ADMIN — ASPIRIN 81 MG: 81 TABLET, COATED ORAL at 10:51

## 2024-01-21 RX ADMIN — ENOXAPARIN SODIUM 40 MG: 100 INJECTION SUBCUTANEOUS at 10:51

## 2024-01-21 RX ADMIN — FAMOTIDINE 20 MG: 20 TABLET ORAL at 10:50

## 2024-01-21 RX ADMIN — TICAGRELOR 90 MG: 90 TABLET ORAL at 21:46

## 2024-01-21 RX ADMIN — Medication 1 TABLET: at 21:45

## 2024-01-21 RX ADMIN — MEROPENEM 1000 MG: 1 INJECTION, POWDER, FOR SOLUTION INTRAVENOUS at 19:04

## 2024-01-21 RX ADMIN — BACLOFEN 10 MG: 10 TABLET ORAL at 15:39

## 2024-01-21 RX ADMIN — OXYBUTYNIN CHLORIDE 5 MG: 5 TABLET, EXTENDED RELEASE ORAL at 10:51

## 2024-01-21 RX ADMIN — OXYBUTYNIN CHLORIDE 5 MG: 5 TABLET, EXTENDED RELEASE ORAL at 21:45

## 2024-01-21 RX ADMIN — Medication 1 DROP: at 18:07

## 2024-01-21 RX ADMIN — BACLOFEN 10 MG: 10 TABLET ORAL at 10:50

## 2024-01-21 RX ADMIN — OXCARBAZEPINE 450 MG: 300 TABLET, FILM COATED ORAL at 21:45

## 2024-01-21 ASSESSMENT — PAIN DESCRIPTION - DESCRIPTORS: DESCRIPTORS: ACHING

## 2024-01-21 ASSESSMENT — PAIN DESCRIPTION - LOCATION
LOCATION: JAW
LOCATION: JAW

## 2024-01-21 ASSESSMENT — PAIN SCALES - GENERAL
PAINLEVEL_OUTOF10: 0
PAINLEVEL_OUTOF10: 4
PAINLEVEL_OUTOF10: 2

## 2024-01-21 ASSESSMENT — PAIN - FUNCTIONAL ASSESSMENT: PAIN_FUNCTIONAL_ASSESSMENT: ACTIVITIES ARE NOT PREVENTED

## 2024-01-21 NOTE — PLAN OF CARE
Problem: Skin/Tissue Integrity  Goal: Absence of new skin breakdown  Description: 1.  Monitor for areas of redness and/or skin breakdown  2.  Assess vascular access sites hourly  3.  Every 4-6 hours minimum:  Change oxygen saturation probe site  4.  Every 4-6 hours:  If on nasal continuous positive airway pressure, respiratory therapy assess nares and determine need for appliance change or resting period.  Outcome: Progressing   No skin breakdown this shift. Patient being assisted with turning and pillow support. Patients states understanding of repositioning every two hours. Dressing changes per order and protective cream to excoriated scrotum.    Problem: Safety - Adult  Goal: Free from fall injury  Outcome: Progressing  Fall assessment completed. Patient using call light appropriately to call for assistance. All safety precautions maintained.    Problem: Safety - Adult  Goal: Isolation precautions  Description: Isolation precautions  Outcome: Progressing  Strict contact isolation precautions maintained.     Problem: ABCDS Injury Assessment  Goal: Absence of physical injury  Outcome: Progressing  Patient remains free from injury.     Problem: Pain  Goal: Verbalizes/displays adequate comfort level or baseline comfort level  Outcome: Progressing  Flowsheets (Taken 1/21/2024 1035)  Verbalizes/displays adequate comfort level or baseline comfort level:   Encourage patient to monitor pain and request assistance   Assess pain using appropriate pain scale   Administer analgesics based on type and severity of pain and evaluate response   Implement non-pharmacological measures as appropriate and evaluate response   Pain Assessment: 0-10  Pain Level: 2   Patient's Stated Pain Goal: 0 - No pain   Is pain goal met at this time?  No     Non-Pharmaceutical Pain Intervention(s): Rest    Problem: Discharge Planning  Goal: Discharge to home or other facility with appropriate resources  Outcome: Progressing  Flowsheets (Taken

## 2024-01-21 NOTE — PLAN OF CARE
Problem: Skin/Tissue Integrity  Goal: Absence of new skin breakdown  Description: 1.  Monitor for areas of redness and/or skin breakdown  2.  Assess vascular access sites hourly  3.  Every 4-6 hours minimum:  Change oxygen saturation probe site  4.  Every 4-6 hours:  If on nasal continuous positive airway pressure, respiratory therapy assess nares and determine need for appliance change or resting period.  1/21/2024 0147 by Danelle Collins RN  Outcome: Progressing     Problem: Safety - Adult  Goal: Free from fall injury  1/21/2024 0147 by Danelle Collins RN  Outcome: Progressing   All fall precautions in place. Bed in low position, alarm activated and appropriate use of call light.     Problem: Safety - Adult  Goal: Isolation precautions  Description: Isolation precautions  1/21/2024 0147 by Danelle Collins RN  Outcome: Progressing     Problem: ABCDS Injury Assessment  Goal: Absence of physical injury  1/21/2024 0147 by Danelle Collins RN  Outcome: Progressing     Problem: Pain  Goal: Verbalizes/displays adequate comfort level or baseline comfort level  1/21/2024 0147 by Danelle Collins RN  Outcome: Progressing   Pain Assessment: None - Denies Pain  Pain Level: 0   Patient's Stated Pain Goal: 0 - No pain   Is pain goal met at this time?  Yes     Non-Pharmaceutical Pain Intervention(s): Rest    Problem: Discharge Planning  Goal: Discharge to home or other facility with appropriate resources  1/21/2024 0147 by Danelle Collins RN  Outcome: Progressing     Problem: Genitourinary - Adult  Goal: Absence of urinary retention  1/21/2024 0147 by Danelle Collins RN  Outcome: Progressing     Problem: Genitourinary - Adult  Goal: Urinary catheter remains patent  1/21/2024 0147 by Danelle Collins RN  Outcome: Progressing     Problem: Infection - Adult  Goal: Absence of infection at discharge  1/21/2024 0147 by Danelle Collins RN  Outcome: Progressing     Problem: Nutrition Deficit:  Goal: Optimize nutritional status  1/21/2024

## 2024-01-22 LAB
GLUCOSE BLD STRIP.AUTO-MCNC: 117 MG/DL (ref 70–108)
GLUCOSE BLD STRIP.AUTO-MCNC: 156 MG/DL (ref 70–108)

## 2024-01-22 PROCEDURE — 1200000000 HC SEMI PRIVATE

## 2024-01-22 PROCEDURE — 82948 REAGENT STRIP/BLOOD GLUCOSE: CPT

## 2024-01-22 PROCEDURE — 6370000000 HC RX 637 (ALT 250 FOR IP)

## 2024-01-22 PROCEDURE — 6360000002 HC RX W HCPCS: Performed by: PHYSICIAN ASSISTANT

## 2024-01-22 PROCEDURE — 2500000003 HC RX 250 WO HCPCS

## 2024-01-22 PROCEDURE — 6370000000 HC RX 637 (ALT 250 FOR IP): Performed by: PHYSICIAN ASSISTANT

## 2024-01-22 PROCEDURE — 2580000003 HC RX 258: Performed by: PHYSICIAN ASSISTANT

## 2024-01-22 PROCEDURE — 2580000003 HC RX 258

## 2024-01-22 PROCEDURE — 6360000002 HC RX W HCPCS

## 2024-01-22 PROCEDURE — 99231 SBSQ HOSP IP/OBS SF/LOW 25: CPT | Performed by: PHYSICIAN ASSISTANT

## 2024-01-22 RX ADMIN — Medication 1 TABLET: at 09:48

## 2024-01-22 RX ADMIN — METOPROLOL SUCCINATE 50 MG: 50 TABLET, EXTENDED RELEASE ORAL at 09:48

## 2024-01-22 RX ADMIN — ENOXAPARIN SODIUM 40 MG: 100 INJECTION SUBCUTANEOUS at 09:48

## 2024-01-22 RX ADMIN — Medication 1 DROP: at 19:55

## 2024-01-22 RX ADMIN — TICAGRELOR 90 MG: 90 TABLET ORAL at 09:48

## 2024-01-22 RX ADMIN — FAMOTIDINE 20 MG: 20 TABLET ORAL at 19:52

## 2024-01-22 RX ADMIN — OXYBUTYNIN CHLORIDE 5 MG: 5 TABLET, EXTENDED RELEASE ORAL at 09:48

## 2024-01-22 RX ADMIN — METOPROLOL SUCCINATE 50 MG: 50 TABLET, EXTENDED RELEASE ORAL at 19:52

## 2024-01-22 RX ADMIN — MEROPENEM 1000 MG: 1 INJECTION, POWDER, FOR SOLUTION INTRAVENOUS at 19:47

## 2024-01-22 RX ADMIN — ACETIC ACID 60 ML: 0.25 IRRIGANT IRRIGATION at 23:04

## 2024-01-22 RX ADMIN — SODIUM CHLORIDE, PRESERVATIVE FREE 10 ML: 5 INJECTION INTRAVENOUS at 09:52

## 2024-01-22 RX ADMIN — GABAPENTIN 900 MG: 600 TABLET, FILM COATED ORAL at 17:20

## 2024-01-22 RX ADMIN — ROSUVASTATIN CALCIUM 20 MG: 20 TABLET, FILM COATED ORAL at 19:52

## 2024-01-22 RX ADMIN — GABAPENTIN 900 MG: 600 TABLET, FILM COATED ORAL at 19:52

## 2024-01-22 RX ADMIN — Medication 1 DROP: at 09:48

## 2024-01-22 RX ADMIN — OXYCODONE HYDROCHLORIDE AND ACETAMINOPHEN 500 MG: 500 TABLET ORAL at 19:52

## 2024-01-22 RX ADMIN — Medication 1 CAPSULE: at 09:51

## 2024-01-22 RX ADMIN — Medication 1 DROP: at 17:20

## 2024-01-22 RX ADMIN — TICAGRELOR 90 MG: 90 TABLET ORAL at 19:52

## 2024-01-22 RX ADMIN — GABAPENTIN 900 MG: 600 TABLET, FILM COATED ORAL at 09:48

## 2024-01-22 RX ADMIN — Medication 1 TABLET: at 19:55

## 2024-01-22 RX ADMIN — FAMOTIDINE 20 MG: 20 TABLET ORAL at 09:48

## 2024-01-22 RX ADMIN — MEROPENEM 1000 MG: 1 INJECTION, POWDER, FOR SOLUTION INTRAVENOUS at 11:39

## 2024-01-22 RX ADMIN — OXCARBAZEPINE 450 MG: 300 TABLET, FILM COATED ORAL at 19:52

## 2024-01-22 RX ADMIN — SPIRONOLACTONE 25 MG: 25 TABLET ORAL at 09:50

## 2024-01-22 RX ADMIN — Medication 6 MG: at 10:57

## 2024-01-22 RX ADMIN — BACLOFEN 10 MG: 10 TABLET ORAL at 19:52

## 2024-01-22 RX ADMIN — BACLOFEN 10 MG: 10 TABLET ORAL at 17:20

## 2024-01-22 RX ADMIN — BACLOFEN 10 MG: 10 TABLET ORAL at 09:51

## 2024-01-22 RX ADMIN — Medication 400 UNITS: at 09:49

## 2024-01-22 RX ADMIN — LISINOPRIL 2.5 MG: 2.5 TABLET ORAL at 09:50

## 2024-01-22 RX ADMIN — OXYBUTYNIN CHLORIDE 5 MG: 5 TABLET, EXTENDED RELEASE ORAL at 19:52

## 2024-01-22 RX ADMIN — ASPIRIN 81 MG: 81 TABLET, COATED ORAL at 09:48

## 2024-01-22 RX ADMIN — OXYCODONE HYDROCHLORIDE AND ACETAMINOPHEN 500 MG: 500 TABLET ORAL at 09:50

## 2024-01-22 RX ADMIN — OXCARBAZEPINE 450 MG: 300 TABLET, FILM COATED ORAL at 09:48

## 2024-01-22 RX ADMIN — Medication 1 CAPSULE: at 19:51

## 2024-01-22 ASSESSMENT — PAIN DESCRIPTION - LOCATION: LOCATION: JAW

## 2024-01-22 ASSESSMENT — PAIN SCALES - GENERAL: PAINLEVEL_OUTOF10: 2

## 2024-01-22 NOTE — CARE COORDINATION
1/22/24, 3:10 PM EST    DISCHARGE ON GOING EVALUATION    Greyson MORAN [facility] day: 7  Location: -21/021-A Reason for admit: Somnolence [R40.0]  UTI (urinary tract infection) [N39.0]  Acute cystitis without hematuria [N30.00]   Barriers to Discharge: ID following, Lovenox, Merrem q 8 hr., prn Tylenol and Zofran, regular diet, wound care, turn q 2 hr.   PCP: Noah Benitez MD  Readmission Risk Score: 16.8%  Patient Goals/Plan/Treatment Preferences: Greyson is from [facility].

## 2024-01-22 NOTE — PLAN OF CARE
Problem: Skin/Tissue Integrity  Goal: Absence of new skin breakdown  Description: 1.  Monitor for areas of redness and/or skin breakdown  2.  Assess vascular access sites hourly  3.  Every 4-6 hours minimum:  Change oxygen saturation probe site  4.  Every 4-6 hours:  If on nasal continuous positive airway pressure, respiratory therapy assess nares and determine need for appliance change or resting period.  1/21/2024 2309 by Shameka Bolanos RN  Outcome: Progressing     Problem: Safety - Adult  Goal: Free from fall injury  1/21/2024 2309 by Shameka Bolanos RN  Outcome: Progressing  Flowsheets (Taken 1/21/2024 2309)  Free From Fall Injury: Instruct family/caregiver on patient safety     Problem: ABCDS Injury Assessment  Goal: Absence of physical injury  1/21/2024 2309 by Shameka Bolanos RN  Outcome: Progressing  Flowsheets (Taken 1/21/2024 2309)  Absence of Physical Injury: Implement safety measures based on patient assessment     Problem: Pain  Goal: Verbalizes/displays adequate comfort level or baseline comfort level  1/21/2024 2309 by Shameka Bolanos RN  Outcome: Progressing  Flowsheets (Taken 1/21/2024 2309)  Verbalizes/displays adequate comfort level or baseline comfort level:   Encourage patient to monitor pain and request assistance   Assess pain using appropriate pain scale   Implement non-pharmacological measures as appropriate and evaluate response     Problem: Discharge Planning  Goal: Discharge to home or other facility with appropriate resources  1/21/2024 2309 by Shameka Bolanos RN  Outcome: Progressing  Flowsheets (Taken 1/21/2024 2309)  Discharge to home or other facility with appropriate resources:   Identify barriers to discharge with patient and caregiver   Arrange for needed discharge resources and transportation as appropriate     Problem: Genitourinary - Adult  Goal: Absence of urinary retention  1/21/2024 2309 by Shameka Bolanos RN  Outcome: Progressing

## 2024-01-23 VITALS
HEART RATE: 79 BPM | BODY MASS INDEX: 28.72 KG/M2 | WEIGHT: 182.98 LBS | RESPIRATION RATE: 16 BRPM | DIASTOLIC BLOOD PRESSURE: 82 MMHG | OXYGEN SATURATION: 92 % | SYSTOLIC BLOOD PRESSURE: 126 MMHG | HEIGHT: 67 IN | TEMPERATURE: 97.9 F

## 2024-01-23 LAB
ANION GAP SERPL CALC-SCNC: 9 MEQ/L (ref 8–16)
BUN SERPL-MCNC: 23 MG/DL (ref 7–22)
CALCIUM SERPL-MCNC: 9.4 MG/DL (ref 8.5–10.5)
CHLORIDE SERPL-SCNC: 103 MEQ/L (ref 98–111)
CO2 SERPL-SCNC: 29 MEQ/L (ref 23–33)
CREAT SERPL-MCNC: 0.2 MG/DL (ref 0.4–1.2)
DEPRECATED RDW RBC AUTO: 52.9 FL (ref 35–45)
ERYTHROCYTE [DISTWIDTH] IN BLOOD BY AUTOMATED COUNT: 14.6 % (ref 11.5–14.5)
GFR SERPL CREATININE-BSD FRML MDRD: > 60 ML/MIN/1.73M2
GLUCOSE BLD STRIP.AUTO-MCNC: 124 MG/DL (ref 70–108)
GLUCOSE BLD STRIP.AUTO-MCNC: 150 MG/DL (ref 70–108)
GLUCOSE BLD STRIP.AUTO-MCNC: 95 MG/DL (ref 70–108)
GLUCOSE SERPL-MCNC: 85 MG/DL (ref 70–108)
HCT VFR BLD AUTO: 40.3 % (ref 42–52)
HGB BLD-MCNC: 12.6 GM/DL (ref 14–18)
MCH RBC QN AUTO: 30.6 PG (ref 26–33)
MCHC RBC AUTO-ENTMCNC: 31.3 GM/DL (ref 32.2–35.5)
MCV RBC AUTO: 97.8 FL (ref 80–94)
PLATELET # BLD AUTO: 202 THOU/MM3 (ref 130–400)
PMV BLD AUTO: 8.6 FL (ref 9.4–12.4)
POTASSIUM SERPL-SCNC: 4.2 MEQ/L (ref 3.5–5.2)
RBC # BLD AUTO: 4.12 MILL/MM3 (ref 4.7–6.1)
SODIUM SERPL-SCNC: 141 MEQ/L (ref 135–145)
WBC # BLD AUTO: 5.4 THOU/MM3 (ref 4.8–10.8)

## 2024-01-23 PROCEDURE — 85027 COMPLETE CBC AUTOMATED: CPT

## 2024-01-23 PROCEDURE — 82948 REAGENT STRIP/BLOOD GLUCOSE: CPT

## 2024-01-23 PROCEDURE — 6360000002 HC RX W HCPCS

## 2024-01-23 PROCEDURE — 2580000003 HC RX 258: Performed by: PHYSICIAN ASSISTANT

## 2024-01-23 PROCEDURE — 6370000000 HC RX 637 (ALT 250 FOR IP)

## 2024-01-23 PROCEDURE — 36415 COLL VENOUS BLD VENIPUNCTURE: CPT

## 2024-01-23 PROCEDURE — 6360000002 HC RX W HCPCS: Performed by: PHYSICIAN ASSISTANT

## 2024-01-23 PROCEDURE — 6370000000 HC RX 637 (ALT 250 FOR IP): Performed by: PHYSICIAN ASSISTANT

## 2024-01-23 PROCEDURE — 80048 BASIC METABOLIC PNL TOTAL CA: CPT

## 2024-01-23 PROCEDURE — 99238 HOSP IP/OBS DSCHRG MGMT 30/<: CPT | Performed by: PHYSICIAN ASSISTANT

## 2024-01-23 RX ADMIN — ASPIRIN 81 MG: 81 TABLET, COATED ORAL at 10:17

## 2024-01-23 RX ADMIN — TICAGRELOR 90 MG: 90 TABLET ORAL at 10:17

## 2024-01-23 RX ADMIN — FAMOTIDINE 20 MG: 20 TABLET ORAL at 10:17

## 2024-01-23 RX ADMIN — SPIRONOLACTONE 25 MG: 25 TABLET ORAL at 10:17

## 2024-01-23 RX ADMIN — Medication 400 UNITS: at 10:17

## 2024-01-23 RX ADMIN — BACLOFEN 10 MG: 10 TABLET ORAL at 10:17

## 2024-01-23 RX ADMIN — GABAPENTIN 900 MG: 600 TABLET, FILM COATED ORAL at 13:53

## 2024-01-23 RX ADMIN — OXCARBAZEPINE 450 MG: 300 TABLET, FILM COATED ORAL at 10:16

## 2024-01-23 RX ADMIN — Medication 1 TABLET: at 10:17

## 2024-01-23 RX ADMIN — GABAPENTIN 900 MG: 600 TABLET, FILM COATED ORAL at 10:16

## 2024-01-23 RX ADMIN — OXYBUTYNIN CHLORIDE 5 MG: 5 TABLET, EXTENDED RELEASE ORAL at 10:17

## 2024-01-23 RX ADMIN — MEROPENEM 1000 MG: 1 INJECTION, POWDER, FOR SOLUTION INTRAVENOUS at 11:06

## 2024-01-23 RX ADMIN — ENOXAPARIN SODIUM 40 MG: 100 INJECTION SUBCUTANEOUS at 10:17

## 2024-01-23 RX ADMIN — METOPROLOL SUCCINATE 50 MG: 50 TABLET, EXTENDED RELEASE ORAL at 10:17

## 2024-01-23 RX ADMIN — Medication 1 CAPSULE: at 10:17

## 2024-01-23 RX ADMIN — OXYCODONE HYDROCHLORIDE AND ACETAMINOPHEN 500 MG: 500 TABLET ORAL at 10:17

## 2024-01-23 RX ADMIN — GABAPENTIN 900 MG: 600 TABLET, FILM COATED ORAL at 17:51

## 2024-01-23 RX ADMIN — LISINOPRIL 2.5 MG: 2.5 TABLET ORAL at 10:17

## 2024-01-23 RX ADMIN — BACLOFEN 10 MG: 10 TABLET ORAL at 13:53

## 2024-01-23 RX ADMIN — Medication 1 DROP: at 13:53

## 2024-01-23 RX ADMIN — MEROPENEM 1000 MG: 1 INJECTION, POWDER, FOR SOLUTION INTRAVENOUS at 03:11

## 2024-01-23 NOTE — PLAN OF CARE
Problem: Skin/Tissue Integrity  Goal: Absence of new skin breakdown  Description: 1.  Monitor for areas of redness and/or skin breakdown  2.  Assess vascular access sites hourly  3.  Every 4-6 hours minimum:  Change oxygen saturation probe site  4.  Every 4-6 hours:  If on nasal continuous positive airway pressure, respiratory therapy assess nares and determine need for appliance change or resting period.  Outcome: Progressing   Patient remains free from new skin breakdown. Scrotum excoriated and red triad cream applied. Patient assisted with turning every 2 hours. Dressing changes per order.    Problem: Safety - Adult  Goal: Free from fall injury  Outcome: Progressing   Fall assessment completed. Patient using call light appropriately to call for assistance with ambulation to bathroom. Personal items within reach. Patient is also compliant with use of non-skid slippers.     Problem: Safety - Adult  Goal: Isolation precautions  Description: Isolation precautions  Outcome: Progressing   Strict contact isolation precautions maintained.    Problem: ABCDS Injury Assessment  Goal: Absence of physical injury  Outcome: Progressing   Patient remains free from injury. All safety precautions maintained.    Problem: Pain  Goal: Verbalizes/displays adequate comfort level or baseline comfort level  Outcome: Progressing  Flowsheets (Taken 1/22/2024 1159)  Verbalizes/displays adequate comfort level or baseline comfort level:   Encourage patient to monitor pain and request assistance   Assess pain using appropriate pain scale   Administer analgesics based on type and severity of pain and evaluate response   Implement non-pharmacological measures as appropriate and evaluate response   Pain Assessment: None - Denies Pain  Pain Level: 2   Patient's Stated Pain Goal: 0 - No pain   Is pain goal met at this time?  No     Non-Pharmaceutical Pain Intervention(s): Rest    Problem: Discharge Planning  Goal: Discharge to home or other

## 2024-01-23 NOTE — CARE COORDINATION
1/23/24, 11:34 AM EST    Patient goals/plan/ treatment preferences discussed by  and .  Patient goals/plan/ treatment preferences reviewed with patient/ family.  Patient/ family verbalize understanding of discharge plan and are in agreement with goal/plan/treatment preferences.  Understanding was demonstrated using the teach back method.  AVS provided by RN at time of discharge, which includes all necessary medical information pertaining to the patients current course of illness, treatment, post-discharge goals of care, and treatment preferences.     Services At/After Discharge: Skilled Nursing Facility (SNF) and In ambulance [facility]       IMM Letter  IMM Letter given to Patient/Family/Significant other/Guardian/POA/by:: Gali Perez RN Case Mgr.  IMM Letter date given:: 01/23/24  IMM Letter time given:: 1115     Call to [facility], ambulance transport set for 6:30pm. SW faxed transport paperwork to [facility]. SW met with pt, aware of transport time, pt denies needing SW to update wife, reports he will call her when he get back. Nurse aware of transport time. [facility] [place], updated on plans for discharge today and transport time.

## 2024-01-23 NOTE — DISCHARGE SUMMARY
Hospital Medicine Discharge Summary      Patient Identification:   Greyson Swift   : 1955  MRN: [id number]   Account: [id number]      Patient's PCP: Noah Benitez MD    Admit Date: 1/15/2024     Discharge Date: 2024    Admitting Physician: Parveen Munoz PA-C     Discharge Physician: Parveen Munoz PA-C     Greyson Swift is a 68 y.o. male admitted to [facility] on 1/15/2024.      HPI On Admission From H&P:    \"Greyson Swift is a 68 y.o. male with PMHx of MS, CAD, hypertension, neurogenic bladder, paraplegic, GERD who presents to [facility] with altered mental status.  Patient presented to ED from nursing home due to altered mental status. Patient does have a history of MDRO UTIs and was found to have a UTI on this admission. Patient does have a chronic suprapubic catheter from neurogenic bladder. Patient states that he has been more sleepy lately, but denies any abdominal pain. Denies shortness of breath, chest pain, chills and rigors. It was noted that he had a fever while at the nursing home prior to arrival\"    Assessment/Plan With Discharge Diagnoses:    UTI, POA: Urine culture with enteric gram negative bacilli, sensitivities pending  Culture growing ESBL  Antibiotics changed to Merrem  Infectious disease consulted, following, patient completed 7 total days of abx     Altered mental status: Likely 2/2 toxic encephalopathy 2/2 #1  Continue to monitor     CAD:   Continue home medications with hold parameters     Hyperlipidemia:   Continue home statin     Primary hypertension:   Continue home medications with hold parameters     MS:   Continue home therapy     Neurogenic bladder: Patient has suprapubic catheter.  Continue home maintenance medications     GERD:   Continue home Pepcid     Noted history of paraplegia    Exam:     Vitals:  Vitals:    24 1945 24 0312 24 0600 24 1000   BP: 111/61 (!) 106/56  126/82   Pulse: (!)

## 2024-01-23 NOTE — PLAN OF CARE
Problem: Skin/Tissue Integrity  Goal: Absence of new skin breakdown  Description: 1.  Monitor for areas of redness and/or skin breakdown  2.  Assess vascular access sites hourly  3.  Every 4-6 hours minimum:  Change oxygen saturation probe site  4.  Every 4-6 hours:  If on nasal continuous positive airway pressure, respiratory therapy assess nares and determine need for appliance change or resting period.  1/23/2024 0205 by Danelle Collins RN  Outcome: Progressing     Problem: Safety - Adult  Goal: Free from fall injury  1/23/2024 0205 by Danelle Collins RN  Outcome: Progressing   All fall precautions in place. Bed in low position, alarm activated and appropriate use of call light.     Problem: Safety - Adult  Goal: Isolation precautions  Description: Isolation precautions  1/23/2024 0205 by Danelle Collins RN  Outcome: Progressing     Problem: ABCDS Injury Assessment  Goal: Absence of physical injury  1/23/2024 0205 by Danelle Collins RN  Outcome: Progressing     Problem: Pain  Goal: Verbalizes/displays adequate comfort level or baseline comfort level  1/23/2024 0205 by Danelle Collins RN  Outcome: Progressing   Pain Assessment: None - Denies Pain  Pain Level: 2   Patient's Stated Pain Goal: 0 - No pain   Is pain goal met at this time?  Yes     Non-Pharmaceutical Pain Intervention(s): Rest    Problem: Discharge Planning  Goal: Discharge to home or other facility with appropriate resources  1/23/2024 0205 by Danelle Collins RN  Outcome: Progressing     Problem: Genitourinary - Adult  Goal: Absence of urinary retention  1/23/2024 0205 by Danelle Collins RN  Outcome: Progressing     Problem: Genitourinary - Adult  Goal: Urinary catheter remains patent  1/23/2024 0205 by Danelle Collins RN  Outcome: Progressing     Problem: Infection - Adult  Goal: Absence of infection at discharge  1/23/2024 0205 by Danelle Collins RN  Outcome: Progressing     Problem: Nutrition Deficit:  Goal: Optimize nutritional status  1/23/2024

## 2024-01-23 NOTE — DISCHARGE INSTRUCTIONS
Right ischial wounds: Cleansed wounds with normal saline and gauze. Pat dry with clean gauze. Apply triad to skin around wounds. Cover wound areas with opticell ag. Cover with sacral bordered foam dressing. Change every other day and as needed.     Right lateral leg wound: Cleanse wound with normal saline or wound cleanser and gauze. Pat dry with clean gauze. Apply betadine moist gauze to wound bed only. Cover with ABD pad, secure with kerlix. Change dressing daily and as needed.

## 2024-01-23 NOTE — PLAN OF CARE
Problem: Skin/Tissue Integrity  Goal: Absence of new skin breakdown  Description: 1.  Monitor for areas of redness and/or skin breakdown  2.  Assess vascular access sites hourly  3.  Every 4-6 hours minimum:  Change oxygen saturation probe site  4.  Every 4-6 hours:  If on nasal continuous positive airway pressure, respiratory therapy assess nares and determine need for appliance change or resting period.  Outcome: Adequate for Discharge     Problem: Safety - Adult  Goal: Free from fall injury  Outcome: Adequate for Discharge     Problem: Safety - Adult  Goal: Isolation precautions  Description: Isolation precautions  Outcome: Adequate for Discharge     Problem: ABCDS Injury Assessment  Goal: Absence of physical injury  Outcome: Adequate for Discharge     Problem: Pain  Goal: Verbalizes/displays adequate comfort level or baseline comfort level  Outcome: Adequate for Discharge     Problem: Discharge Planning  Goal: Discharge to home or other facility with appropriate resources  Outcome: Adequate for Discharge     Problem: Genitourinary - Adult  Goal: Absence of urinary retention  Outcome: Adequate for Discharge     Problem: Genitourinary - Adult  Goal: Urinary catheter remains patent  Outcome: Adequate for Discharge     Problem: Infection - Adult  Goal: Absence of infection at discharge  Outcome: Adequate for Discharge     Problem: Neurosensory - Adult  Goal: Achieves stable or improved neurological status  Outcome: Adequate for Discharge     Problem: Skin/Tissue Integrity - Adult  Goal: Incisions, wounds, or drain sites healing without S/S of infection  Outcome: Adequate for Discharge     Problem: Musculoskeletal - Adult  Goal: Return mobility to safest level of function  Outcome: Adequate for Discharge     Problem: Gastrointestinal - Adult  Goal: Minimal or absence of nausea and vomiting  Outcome: Adequate for Discharge     Problem: Nutrition Deficit:  Goal: Optimize nutritional status  Outcome: Adequate for

## 2024-01-24 NOTE — PROGRESS NOTES
Progress note: Infectious diseases    Patient - Greyson Swift,  Age - 68 y.o.    - 1955      Room Number - 5K-21/021-A   MRN -  [id number]   [facility] # - [id number]  Date of Admission -  1/15/2024  4:20 PM    SUBJECTIVE:   No new issues.  OBJECTIVE   VITALS    height is 1.702 m (5' 7\") and weight is 83.1 kg (183 lb 3.2 oz). His oral temperature is 97.8 °F (36.6 °C). His blood pressure is 117/65 and his pulse is 73. His respiration is 16 and oxygen saturation is 96%.       Wt Readings from Last 3 Encounters:   24 83.1 kg (183 lb 3.2 oz)   23 81.7 kg (180 lb 2 oz)   23 81.2 kg (179 lb)       I/O (24 Hours)    Intake/Output Summary (Last 24 hours) at 2024 1254  Last data filed at 2024 0952  Gross per 24 hour   Intake 810 ml   Output 951 ml   Net -141 ml         General Appearance  Awake, alert, oriented,  not  In acute distress  HEENT - normocephalic, atraumatic, pale conjunctiva,  anicteric sclera  Neck - Supple, no mass  Lungs -  Bilateral   air entry, no rhonchi, no wheeze  Cardiovascular - Heart sounds are normal.     Abdomen - soft, not distended, nontender,   Neurologic -oriented  Skin - No bruising or bleeding  Extremities - contracted right lower leg  Open wound coccyx /ischial wound    MEDICATIONS:      gabapentin  900 mg Oral 4x daily    meropenem  1,000 mg IntraVENous Q8H    baclofen  10 mg Oral TID    oyster shell calcium w/D  1 tablet Oral BID    famotidine  20 mg Oral BID    acetic acid  60 mL Irrigation Once per day on     aspirin  81 mg Oral Daily    lactobacillus  1 capsule Oral BID    lisinopril  2.5 mg Oral Daily    metoprolol succinate  50 mg Oral BID    multivitamin  1 tablet Oral Daily    OXcarbazepine  450 mg Oral BID    oxyBUTYnin  5 mg Oral BID    rosuvastatin  20 mg Oral Nightly    spironolactone  25 mg Oral Daily    ticagrelor  90 mg Oral BID    vitamin C  
    Hospitalist Progress Note    Patient:  Greyson Swift      Unit/Bed:5K-21/021-A    YOB: 1955    MRN: [id number]       Acct: [id number]     PCP: Noah Benitez MD    Date of Admission: 1/15/2024    Assessment/Plan:    UTI, POA: Urine culture with enteric gram negative bacilli, sensitivities pending  Culture growing ESBL  Antibiotics changed to Merrem  Infectious disease consulted, following, recommend 7 total days of abx     Altered mental status: Likely 2/2 toxic encephalopathy 2/2 #1  Continue to monitor     CAD:   Continue home medications with hold parameters     Hyperlipidemia:   Continue home statin     Primary hypertension:   Continue home medications with hold parameters     MS:   Continue home therapy     Neurogenic bladder: Patient has suprapubic catheter.  Continue home maintenance medications to decrease further risk of infection.     GERD:   Continue home Pepcid     Noted history of paraplegia    Chief Complaint: altered mental status       Subjective: 68 y.o. male admitted to the hospitalist service for altered mental status. Patient sleeping. Continue antibiotics for 7 total days per ID.    Medications:    Infusion Medications    sodium chloride       Scheduled Medications    meropenem  1,000 mg IntraVENous Q8H    baclofen  10 mg Oral TID    oyster shell calcium w/D  1 tablet Oral BID    famotidine  20 mg Oral BID    gabapentin  600 mg Oral 4x daily    acetic acid  60 mL Irrigation Once per day on Mon Wed Fri    aspirin  81 mg Oral Daily    erythromycin   Right Eye Nightly    lactobacillus  1 capsule Oral BID    lisinopril  2.5 mg Oral Daily    metoprolol succinate  50 mg Oral BID    multivitamin  1 tablet Oral Daily    OXcarbazepine  450 mg Oral BID    oxyBUTYnin  5 mg Oral BID    rosuvastatin  20 mg Oral Nightly    spironolactone  25 mg Oral Daily    ticagrelor  90 mg Oral BID    vitamin C  500 mg Oral BID    vitamin E  400 Units Oral Daily    vitamin A  20,000 Units Oral 
    Hospitalist Progress Note    Patient:  Greyson Swift      Unit/Bed:5K-21/021-A    YOB: 1955    MRN: [id number]       Acct: [id number]     PCP: Noah Benitez MD    Date of Admission: 1/15/2024    Assessment/Plan:    UTI, POA: Urine culture with enteric gram negative bacilli, sensitivities pending  Culture growing ESBL  Antibiotics changed to Merrem  Infectious disease consulted, following, recommend 7 total days of abx, will complete 1/23/24     Altered mental status: Likely 2/2 toxic encephalopathy 2/2 #1  Continue to monitor     CAD:   Continue home medications with hold parameters     Hyperlipidemia:   Continue home statin     Primary hypertension:   Continue home medications with hold parameters     MS:   Continue home therapy     Neurogenic bladder: Patient has suprapubic catheter.  Continue home maintenance medications to decrease further risk of infection.     GERD:   Continue home Pepcid     Noted history of paraplegia    Chief Complaint: altered mental status       Subjective: 68 y.o. male admitted to the hospitalist service for altered mental status. Patient denies chest pain. Patient appears to be sleeping. Patient to finish antibiotics tomorrow.    Medications:    Infusion Medications    sodium chloride       Scheduled Medications    gabapentin  900 mg Oral 4x daily    meropenem  1,000 mg IntraVENous Q8H    baclofen  10 mg Oral TID    oyster shell calcium w/D  1 tablet Oral BID    famotidine  20 mg Oral BID    acetic acid  60 mL Irrigation Once per day on Mon Wed Fri    aspirin  81 mg Oral Daily    lactobacillus  1 capsule Oral BID    lisinopril  2.5 mg Oral Daily    metoprolol succinate  50 mg Oral BID    multivitamin  1 tablet Oral Daily    OXcarbazepine  450 mg Oral BID    oxyBUTYnin  5 mg Oral BID    rosuvastatin  20 mg Oral Nightly    spironolactone  25 mg Oral Daily    ticagrelor  90 mg Oral BID    vitamin C  500 mg Oral BID    vitamin E  400 Units Oral Daily    vitamin A 
    Hospitalist Progress Note    Patient:  Greyson Swift      Unit/Bed:5K-21/021-A    YOB: 1955    MRN: [id number]       Acct: [id number]     PCP: Noah Benitez MD    Date of Admission: 1/15/2024    Assessment/Plan:    UTI, POA: Urine culture with enteric gram negative bacilli, sensitivities pending  Culture growing ESBL  Antibiotics changed to Merrem  Infectious disease consulted, following     Altered mental status: Likely 2/2 toxic encephalopathy 2/2 #1  Continue to monitor     CAD:   Continue home medications with hold parameters     Hyperlipidemia:   Continue home statin     Primary hypertension:   Continue home medications with hold parameters     MS:   Continue home therapy     Neurogenic bladder: Patient has suprapubic catheter.  Continue home maintenance medications to decrease further risk of infection.     GERD:   Continue home Pepcid     Noted history of paraplegia    Chief Complaint: altered mental status       Subjective: 68 y.o. male admitted to the hospitalist service for altered mental status. Patient sleeping. Continue antibiotics.     Medications:    Infusion Medications    sodium chloride       Scheduled Medications    meropenem  1,000 mg IntraVENous Q8H    baclofen  10 mg Oral TID    oyster shell calcium w/D  1 tablet Oral BID    famotidine  20 mg Oral BID    gabapentin  600 mg Oral 4x daily    acetic acid  60 mL Irrigation Once per day on Mon Wed Fri    aspirin  81 mg Oral Daily    erythromycin   Right Eye Nightly    lactobacillus  1 capsule Oral BID    lisinopril  2.5 mg Oral Daily    metoprolol succinate  50 mg Oral BID    multivitamin  1 tablet Oral Daily    OXcarbazepine  450 mg Oral BID    oxyBUTYnin  5 mg Oral BID    rosuvastatin  20 mg Oral Nightly    spironolactone  25 mg Oral Daily    ticagrelor  90 mg Oral BID    vitamin C  500 mg Oral BID    vitamin E  400 Units Oral Daily    vitamin A  20,000 Units Oral Daily    Polyethylene Glycol 400  1 drop Ophthalmic 4x 
    Hospitalist Progress Note    Patient:  Greyson Swift      Unit/Bed:5K-21/021-A    YOB: 1955    MRN: [id number]       Acct: [id number]     PCP: Noah Benitez MD    Date of Admission: 1/15/2024    Assessment/Plan:    UTI, POA: Urine culture with enteric gram negative bacilli, sensitivities pending  Culture growing ESBL  Antibiotics changed to Merrem  Infectious disease consulted  Consider suprapubic catheter exchange, patient reports possibly exchanged in ED?     Altered mental status: Likely 2/2 toxic encephalopathy 2/2 #1  Continue to monitor     CAD:   Continue home medications with hold parameters     Hyperlipidemia:   Continue home statin     Primary hypertension:   Continue home medications with hold parameters     MS:   Continue home therapy     Neurogenic bladder: Patient has suprapubic catheter.  Continue home maintenance medications to decrease further risk of infection.     GERD:   Continue home Pepcid     Noted history of paraplegia    Chief Complaint: altered mental status       Subjective: 68 y.o. male admitted to the hospitalist service for altered mental status. Patient denies chest pain. He denies nausea or vomiting. Patient reports jaw pain. He rates a 5/10 in severity when he talks or eats. He denies SOB.    Medications:    Infusion Medications    sodium chloride       Scheduled Medications    meropenem  1,000 mg IntraVENous Once    Followed by    meropenem  1,000 mg IntraVENous Q8H    baclofen  10 mg Oral TID    oyster shell calcium w/D  1 tablet Oral BID    famotidine  20 mg Oral BID    gabapentin  600 mg Oral 4x daily    acetic acid  60 mL Irrigation Once per day on Mon Wed Fri    aspirin  81 mg Oral Daily    erythromycin   Right Eye Nightly    lactobacillus  1 capsule Oral BID    lisinopril  2.5 mg Oral Daily    metoprolol succinate  50 mg Oral BID    multivitamin  1 tablet Oral Daily    OXcarbazepine  450 mg Oral BID    oxyBUTYnin  5 mg Oral BID    rosuvastatin  20 
    Hospitalist Progress Note    Patient:  Greyson Swift      Unit/Bed:5K-21/021-A    YOB: 1955    MRN: [id number]       Acct: [id number]     PCP: Noah Benitez MD    Date of Admission: 1/15/2024    Assessment/Plan:    UTI, POA: Urine culture with enteric gram negative bacilli, sensitivities pending  Culture growing ESBL  Antibiotics changed to Merrem  Infectious disease consulted, following, recommend 7 total days of abx, will complete 1/23/24     Altered mental status: Likely 2/2 toxic encephalopathy 2/2 #1  Continue to monitor     CAD:   Continue home medications with hold parameters     Hyperlipidemia:   Continue home statin     Primary hypertension:   Continue home medications with hold parameters     MS:   Continue home therapy     Neurogenic bladder: Patient has suprapubic catheter.  Continue home maintenance medications to decrease further risk of infection.     GERD:   Continue home Pepcid     Noted history of paraplegia    Chief Complaint: altered mental status       Subjective: 68 y.o. male admitted to the hospitalist service for altered mental status. Patient denies chest pain. He denies nausea or vomiting. Patient reports his jaw is still hurting him. He rates his jaw pain a 5/10. Gabapentin increased.    Medications:    Infusion Medications    sodium chloride       Scheduled Medications    meropenem  1,000 mg IntraVENous Q8H    baclofen  10 mg Oral TID    oyster shell calcium w/D  1 tablet Oral BID    famotidine  20 mg Oral BID    gabapentin  600 mg Oral 4x daily    acetic acid  60 mL Irrigation Once per day on Mon Wed Fri    aspirin  81 mg Oral Daily    erythromycin   Right Eye Nightly    lactobacillus  1 capsule Oral BID    lisinopril  2.5 mg Oral Daily    metoprolol succinate  50 mg Oral BID    multivitamin  1 tablet Oral Daily    OXcarbazepine  450 mg Oral BID    oxyBUTYnin  5 mg Oral BID    rosuvastatin  20 mg Oral Nightly    spironolactone  25 mg Oral Daily    ticagrelor  
    Hospitalist Progress Note    Patient:  Greyson Swift      Unit/Bed:5K-21/021-A    YOB: 1955    MRN: [id number]       Acct: [id number]     PCP: Noah Benitez MD    Date of Admission: 1/15/2024    Assessment/Plan:    UTI, POA: Urine culture with enteric gram negative bacilli, sensitivities pending  Culture growing ESBL  Antibiotics changed to Merrem  Infectious disease consulted, following, recommend 7 total days of abx     Altered mental status: Likely 2/2 toxic encephalopathy 2/2 #1  Continue to monitor     CAD:   Continue home medications with hold parameters     Hyperlipidemia:   Continue home statin     Primary hypertension:   Continue home medications with hold parameters     MS:   Continue home therapy     Neurogenic bladder: Patient has suprapubic catheter.  Continue home maintenance medications to decrease further risk of infection.     GERD:   Continue home Pepcid     Noted history of paraplegia    Chief Complaint: altered mental status       Subjective: 68 y.o. male admitted to the hospitalist service for altered mental status. Patient denies chest pain. He denies nausea or vomiting.    Medications:    Infusion Medications    sodium chloride       Scheduled Medications    meropenem  1,000 mg IntraVENous Q8H    baclofen  10 mg Oral TID    oyster shell calcium w/D  1 tablet Oral BID    famotidine  20 mg Oral BID    gabapentin  600 mg Oral 4x daily    acetic acid  60 mL Irrigation Once per day on Mon Wed Fri    aspirin  81 mg Oral Daily    erythromycin   Right Eye Nightly    lactobacillus  1 capsule Oral BID    lisinopril  2.5 mg Oral Daily    metoprolol succinate  50 mg Oral BID    multivitamin  1 tablet Oral Daily    OXcarbazepine  450 mg Oral BID    oxyBUTYnin  5 mg Oral BID    rosuvastatin  20 mg Oral Nightly    spironolactone  25 mg Oral Daily    ticagrelor  90 mg Oral BID    vitamin C  500 mg Oral BID    vitamin E  400 Units Oral Daily    vitamin A  20,000 Units Oral Daily    
   01/23/24 1400   Encounter Summary   Encounter Overview/Reason  Initial Encounter   Service Provided For: Patient   Referral/Consult From: Rounding   Support System Spouse;Family members   Last Encounter  01/23/24   Complexity of Encounter Moderate   Begin Time 1350   End Time  1400   Total Time Calculated 10 min   Spiritual/Emotional needs   Type Spiritual Support   Assessment/Intervention/Outcome   Assessment Coping   Intervention Nurtured Hope;Prayer (assurance of)/Chicken;Sustaining Presence/Ministry of presence   Outcome Comfort     Assessment:  In my encounter with the 68 yr old patient, while rounding the unit 5K,  I provided spiritual care to patient through conversation, I also came to assess the patient's spiritual needs present. The pt was admitted due to UTI/ urinary tract infection.     Interventions:  I provided prayer, emotional support and words of comfort.  provided a listening presence and encouraged pt to share their beliefs and how they support them during their hospitalization.     Outcomes:  The patient was encouraged and didn't share any further spiritual needs at this time.     Plan:  Chaplains will follow-up at a later time for assessment of any spiritual care needs present.  
  Physician Progress Note      PATIENT:               ALEXI STEWART  CSN #:                  [id number]  :                       1955  ADMIT DATE:       1/15/2024 4:20 PM  DISCH DATE:  RESPONDING  PROVIDER #:        Parveen Munoz PA-C          QUERY TEXT:    Pt admitted with UTI.  Pt noted to have Suprapubic catheter documented in ED   01/15. If possible, please document in the progress notes and discharge   summary if you are evaluating and/or treating any of the following:    The medical record reflects the following:  Risk Factors: Suprapubic catheter  Clinical Indicators: ED 01/15 \"Suprapubic catheter in place. PSH-IR   Nephrostomy Catheter Placement\" H&P 01/15 \"UTI-UA showed Large blood, 300   protein, positive nitrite, large leukocytes, many bacteria, > 200 WBC\"  Treatment: IV Maxipime, Labs, Ur Cxs    Thank you,  Sarita MANCINI RN  Options provided:  -- UTI due to suprapubic catheter  -- UTI not due to suprapubic catheter  -- Other - I will add my own diagnosis  -- Disagree - Not applicable / Not valid  -- Disagree - Clinically unable to determine / Unknown  -- Refer to Clinical Documentation Reviewer    PROVIDER RESPONSE TEXT:    UTI is due to suprapubic catheter.    Query created by: Eloise Duff on 2024 1:44 PM      Electronically signed by:  Parveen Munoz PA-C 2024 6:18 PM          
  Pt resting in bed, watching TV. A&O x4. Pupils equal, round, reactive to light, 4 to 2mm, upper extremities pink, warm, dry, tingling felt in right arm, no numbness in left arm, skin turgor and cap refill <3 seconds, hand grasps strong and equal. Heart sounds regular rhythm and rate, lung sounds clear throughout rhythm regular, depth regular, chest movements moderate. Abdomen rounded, sounds heard in all 4 quads, no masses noted. Lower extremities warm, dry, pink, no numbness/tingling, no edema, pedal pulses present, pedal push/pull no movement. Call light is within reach for pt.      
Comprehensive Nutrition Assessment    Type and Reason for Visit:  Initial, Positive Nutrition Screen, Wound    Nutrition Recommendations/Plan:   Will send Ensure Enlive TID; Sebastian BID.  Recommend continue MVI.  Encouraged po, protein intake at best efforts for healing.       Malnutrition Assessment:  Malnutrition Status:  At risk for malnutrition (Comment) (01/18/24 1154)    Context:  Acute Illness     Findings of the 6 clinical characteristics of malnutrition:  Energy Intake:  No significant decrease in energy intake  Weight Loss:  No significant weight loss     Body Fat Loss:  No significant body fat loss     Muscle Mass Loss:  Unable to assess (MS, functional paraplegia)    Fluid Accumulation:  Unable to assess     Strength:  Not Performed    Nutrition Assessment:     Pt. nutritionally compromised AEB wounds.  At risk for further nutrition compromise r/t increased nutrient needs for wound healing, admit with UTI and underlying medical condition (hx CAD, HLD, GERD, MS, functional paraplegia).       Nutrition Related Findings:      Wound Type: Stage II, Unstageable (healing stage IV)     Pt. Report/Treatments/Miscellaneous: pt. Seen - reports appetite is pretty good; reports good appetite pta; states drinks \"a couple of Boost\" daily pta; agrees to trial Ensure and Sebastian: ID following; from [facility]  GI Status: 1 BM noted past 48 hours  Pertinent Labs: 1/18: Glucose 90, BUN 24, Cr 0.3  Pertinent Meds: ATB, MVI, Vitamin A, C, E, Culturelle, Glycolax, Zofran      Current Nutrition Intake & Therapies:          ADULT DIET; Regular  ADULT ORAL NUTRITION SUPPLEMENT; Breakfast, Dinner; Wound Healing Oral Supplement  ADULT ORAL NUTRITION SUPPLEMENT; Breakfast, Lunch, Dinner; Standard High Calorie/High Protein Oral Supplement    Anthropometric Measures:  Height: 170.2 cm (5' 7\")  Ideal Body Weight (IBW): 148 lbs (67 kg)    Admission Body Weight: 81.6 kg (179 lb 14.3 oz) (1/16 no edema)  Current Body Weight: 83 kg (182 lb 15.7 
Discharge education and instructions provided. Patient verbalized understanding at this time. No questions asked. IV access removed. All personal belongings, AVS  present with patient. Transport to [place] via [facility].     
I have recommended that this patient turned every 2 hours but he declines at this time. He wanted to lay on his back. I have discussed the risks and benefits of this examination with him. The patient verbalizes understanding.   
Pt resting in bed with eyes closed. A&O x4. Pupils equal, round, reactive to light, 4 to 2mm, upper extremities pink, warm, dry, tingling felt in right arm, no numbness in left arm, skin turgor and cap refill <3 seconds, hand grasps strong and equal. Heart sounds regular rhythm and rate, lung sounds clear throughout rhythm regular, depth regular, chest movements moderate. Abdomen rounded, sounds heard in all 4 quads, no masses noted. Lower extremities warm, dry, pink, no numbness/tingling, no edema, pedal pulses present, pedal push/pull no movement. Call light is within reach for pt.   
Report called to Leigh at [facility]. No further questions at this time. Patient Paperwork faxed to facility. Patient pickup time with [facility] scheduled at 1830. IV access removed  
Report from nurse outgoing.  
Report off with nurse. Vit. A still has not come down from pharmacy.  
2,000 mg IntraVENous Q12H    sodium chloride flush  5-40 mL IntraVENous 2 times per day    enoxaparin  40 mg SubCUTAneous Daily     PRN Meds: ipratropium 0.5 mg-albuterol 2.5 mg, melatonin, phenazopyridine, sodium chloride flush, sodium chloride, potassium chloride **OR** potassium alternative oral replacement **OR** potassium chloride, magnesium sulfate, ondansetron **OR** ondansetron, polyethylene glycol, acetaminophen **OR** acetaminophen      Intake/Output Summary (Last 24 hours) at 1/16/2024 1045  Last data filed at 1/16/2024 0656  Gross per 24 hour   Intake 1220 ml   Output 500 ml   Net 720 ml       Diet:  ADULT DIET; Regular    Exam:  /61   Pulse 81   Temp 98.2 °F (36.8 °C) (Oral)   Resp 18   Ht 1.702 m (5' 7\")   Wt 81.6 kg (179 lb 14.3 oz)   SpO2 96%   BMI 28.18 kg/m²     Physical Exam  Constitutional:       Interventions: He is not intubated.  Cardiovascular:      Rate and Rhythm: Normal rate and regular rhythm.   Pulmonary:      Effort: He is not intubated.   Abdominal:      Comments: Colostomy   Genitourinary:     Comments: Suprapubic catheter  Neurological:      Mental Status: He is alert.   Psychiatric:         Speech: He is communicative.        Labs:   Recent Labs     01/15/24  1645 01/16/24  0451   WBC 11.7* 7.6   HGB 13.1* 12.2*   HCT 42.5 38.3*    182     Recent Labs     01/15/24  1734 01/16/24  0451    137   K 4.7 3.6    104   CO2 27 22*   BUN 33* 32*   CREATININE 0.6 0.4   CALCIUM 9.9 9.5     Recent Labs     01/15/24  1734   AST 18   ALT 13   BILIDIR <0.2   BILITOT 0.3   ALKPHOS 177*     No results for input(s): \"INR\" in the last 72 hours.  No results for input(s): \"CKTOTAL\", \"TROPONINI\" in the last 72 hours.    Urinalysis:      Lab Results   Component Value Date/Time    NITRU POSITIVE 01/15/2024 04:45 PM    WBCUA > 200 01/15/2024 04:45 PM    WBCUA >200 01/20/2012 09:00 AM    BACTERIA MANY 01/15/2024 04:45 PM    RBCUA  01/15/2024 04:45 PM    BLOODU LARGE 
mg Oral BID    vitamin E  400 Units Oral Daily    vitamin A  20,000 Units Oral Daily    Polyethylene Glycol 400  1 drop Ophthalmic 4x daily    sodium chloride flush  5-40 mL IntraVENous 2 times per day    enoxaparin  40 mg SubCUTAneous Daily      sodium chloride       ipratropium 0.5 mg-albuterol 2.5 mg, melatonin, phenazopyridine, sodium chloride flush, sodium chloride, potassium chloride **OR** potassium alternative oral replacement **OR** potassium chloride, magnesium sulfate, ondansetron **OR** ondansetron, polyethylene glycol, acetaminophen **OR** acetaminophen      LABS:     CBC:   Recent Labs     01/23/24  0945   WBC 5.4   HGB 12.6*          BMP:    Recent Labs     01/23/24  0945      K 4.2      CO2 29   BUN 23*   CREATININE 0.2*   GLUCOSE 85       Calcium:  Recent Labs     01/23/24  0945   CALCIUM 9.4        Glucose:  Recent Labs     01/22/24  2038 01/23/24  0814 01/23/24  1157   POCGLU 156* 95 150*        Hepatic:   No results for input(s): \"ALKPHOS\", \"ALT\", \"AST\", \"PROT\", \"BILITOT\", \"BILIDIR\", \"LABALBU\" in the last 72 hours.       CULTURES:   UA:   No results for input(s): \"SPECGRAV\", \"PHUR\", \"COLORU\", \"CLARITYU\", \"MUCUS\", \"PROTEINU\", \"BLOODU\", \"RBCUA\", \"WBCUA\", \"BACTERIA\", \"NITRU\", \"GLUCOSEU\", \"BILIRUBINUR\", \"UROBILINOGEN\", \"KETUA\", \"LABCAST\", \"LABCASTTY\", \"AMORPHOS\" in the last 72 hours.    Invalid input(s): \"CRYSTALS\"    Micro:   Lab Results   Component Value Date/Time    BC  01/15/2024 05:34 PM     No growth 24 hours. No growth 48 hours. No growth at 5 days            Problem list of patient:     Patient Active Problem List   Diagnosis Code    Neurogenic bladder N31.9    Multiple sclerosis (HCC) G35    MS (multiple sclerosis) (HCC) G35    Urinary retention R33.9    Chronic indwelling [place] catheter Z97.8    Decubitus ulcer of coccyx L89.159    Decubitus ulcer, stage 3 (HCC) L89.93    Decubitus ulcer of right perineal ischial region, stage 3 (HCC) L89.313    Acute metabolic 
x 2cm x 0.05cm  Lateral area 1.5cm x 1.5cm x 0.6cm  Undermining or Tunneling: none  Wound assessment/color: red  Drainage amount: small-moderate  Drainage description: serosanguinous  Odor: none  Margins: attached  T[place]y wound: intact, scarring  Exposed structure: none        Plan:     Treatment Recommendations:   Right ischial wounds: Cleansed wounds with normal saline and gauze. Pat dry with clean gauze. Apply triad to skin around wounds. Cover wound areas with opticell ag. Cover with sacral bordered foam dressing. Change every other day and as needed.    Right lateral leg wound: Cleanse wound with normal saline or wound cleanser and gauze. Pat dry with clean gauze. Apply betadine moist gauze to wound bed only. Cover with ABD pad, secure with kerlix. Change dressing daily and as needed.    Specialty Bed Required :   [x] Low Air Loss   [x] Pressure Redistribution  [] Fluid Immersion- Dolphin  [] Bariatric  [] RotoProne   [] Other:     Discharge Plan:  Placement for patient upon discharge:   [] Home  [] Inpatient Rehab  [x] SNF  [] ECF  [] [facility]/ [facility]  Patient appropriate for Outpatient [facility] Center: yes follow up for wounds     
L89.93    Decubitus ulcer of right perineal ischial region, stage 3 ([facility]) L89.313    Acute metabolic encephalopathy G93.41    Speech disturbance R47.9    Wound infection T14.8XXA, L08.9    Elevated INR R79.1    Chronic osteomyelitis, pelvis, right ([facility]) M86.651    Osteomyelitis ([facility]) M86.9    Urinary tract infection associated with indwelling urethral catheter ([facility]) T83.511A, N39.0    Abnormal liver function test R79.89    Chronic depression F32.A    Essential hypertension I10    History of pulmonary embolism Z86.711    Other dysphagia R13.19    Pressure injury of skin of back L89.109    Anemia D64.9    AMS (altered mental status) R41.82    Class 1 obesity due to excess calories without serious comorbidity with body mass index (BMI) of 31.0 to 31.9 in adult E66.09, Z68.31    Colostomy in place ([facility]) Z93.3    Current use of long term anticoagulation Z79.01    Obstructive uropathy N13.9    Hypokalemia E87.6    Hypomagnesemia E83.42    Shortness of breath R06.02    Hypoxia R09.02    Acute respiratory failure with hypoxia ([facility]) J96.01    Pleural effusion on right J90    History of DVT (deep vein thrombosis) Z86.718    History of ESBL E. coli infection Z86.19    Abnormal CT of the chest R93.89    Coronary artery disease I25.10    Post PTCA Z98.61    UTI (urinary tract infection) N39.0         ASSESSMENT/PLAN   Change in mental state: better  MS with functional quadriplegia  Non healing coccyx wound: will continue on foam dressing and protective cream  Continue current treatment      Bautista Mitchell MD, MD, FACP 1/18/2024 9:28 AM

## 2024-01-25 ENCOUNTER — OUTSIDE SERVICES (OUTPATIENT)
Dept: FAMILY MEDICINE CLINIC | Age: 69
End: 2024-01-25
Payer: MEDICARE

## 2024-01-25 DIAGNOSIS — I25.118 CORONARY ARTERY DISEASE INVOLVING NATIVE CORONARY ARTERY OF NATIVE HEART WITH OTHER FORM OF ANGINA PECTORIS (HCC): ICD-10-CM

## 2024-01-25 DIAGNOSIS — R40.4 TRANSIENT ALTERATION OF AWARENESS: Primary | ICD-10-CM

## 2024-01-25 DIAGNOSIS — G35 MS (MULTIPLE SCLEROSIS) (HCC): ICD-10-CM

## 2024-01-25 DIAGNOSIS — Z93.3 COLOSTOMY IN PLACE (HCC): ICD-10-CM

## 2024-01-25 DIAGNOSIS — N30.00 ACUTE CYSTITIS WITHOUT HEMATURIA: ICD-10-CM

## 2024-01-25 PROCEDURE — 99309 SBSQ NF CARE MODERATE MDM 30: CPT | Performed by: FAMILY MEDICINE

## 2024-01-25 NOTE — PROGRESS NOTES
1/25/2024  Greyson Swift  1955  Subjective:  He was in his/her room to follow up altered mental status and UTI, recent hospitalization.  He is improving and feeling close to baseline.     Denies headaches, CP, sob, or abdominal pains.    Objective:   Please see vitals per chart.  There is no sinus tenderness to palpation, no cervical lymphadenopathy.  Lungs are clear.  Heart Regular rate and rhythm without murmur.  The abdomen is soft and nontender.  Colostomy present and functioning.  Extremities are without edema.  Pompa is present.      Assessment:    1. Transient alteration of awareness  -improved, likely due to UTI sxs, treated with IV antibiotics.      2. Acute cystitis without hematuria  -improved, treated with IV antibiotics, -monitor sxs, call if not improving    3. Coronary artery disease involving native coronary artery of native heart without angina pectoris  -unstable, recent stent placement, no CPs, controlled on crestor, lisinopril, metoprolol, brilinta and asa.    4. Colostomy in place (HCC)  -stable, functioning appropriately    5. MS (multiple sclerosis) (HCC)  -stable, controlled on zanaflex, baclofen and gabapentin    Plan:  We will continue current medication regimen including zanaflex, baclofen and gabapentin for MS, melatonin for sleep and supportive care and recheck in 1 month.    Electronically signed by Noah Benitez MD on 1/25/2024 at 11:11 AM.

## 2024-02-14 PROBLEM — N39.0 UTI (URINARY TRACT INFECTION): Status: RESOLVED | Noted: 2024-01-15 | Resolved: 2024-02-14

## 2024-02-22 ENCOUNTER — OUTSIDE SERVICES (OUTPATIENT)
Dept: FAMILY MEDICINE CLINIC | Age: 69
End: 2024-02-22
Payer: MEDICARE

## 2024-02-22 DIAGNOSIS — Z93.3 COLOSTOMY IN PLACE (HCC): ICD-10-CM

## 2024-02-22 DIAGNOSIS — G35 MS (MULTIPLE SCLEROSIS) (HCC): Primary | ICD-10-CM

## 2024-02-22 DIAGNOSIS — I25.118 CORONARY ARTERY DISEASE INVOLVING NATIVE CORONARY ARTERY OF NATIVE HEART WITH OTHER FORM OF ANGINA PECTORIS (HCC): ICD-10-CM

## 2024-02-22 PROCEDURE — 99308 SBSQ NF CARE LOW MDM 20: CPT | Performed by: FAMILY MEDICINE

## 2024-02-27 ENCOUNTER — HOSPITAL ENCOUNTER (OUTPATIENT)
Age: 69
Discharge: HOME | End: 2024-02-29
Attending: NURSE PRACTITIONER
Payer: MEDICARE

## 2024-02-27 VITALS
HEIGHT: 67 IN | DIASTOLIC BLOOD PRESSURE: 82 MMHG | WEIGHT: 182 LBS | SYSTOLIC BLOOD PRESSURE: 126 MMHG | BODY MASS INDEX: 28.56 KG/M2

## 2024-02-27 DIAGNOSIS — I25.10 CAD S/P PERCUTANEOUS CORONARY ANGIOPLASTY: ICD-10-CM

## 2024-02-27 DIAGNOSIS — I25.5 ISCHEMIC CARDIOMYOPATHY: ICD-10-CM

## 2024-02-27 DIAGNOSIS — I25.10 CORONARY ARTERY DISEASE INVOLVING NATIVE CORONARY ARTERY OF NATIVE HEART WITHOUT ANGINA PECTORIS: ICD-10-CM

## 2024-02-27 DIAGNOSIS — I25.118 CORONARY ARTERY DISEASE INVOLVING NATIVE CORONARY ARTERY OF NATIVE HEART WITH OTHER FORM OF ANGINA PECTORIS (HCC): ICD-10-CM

## 2024-02-27 DIAGNOSIS — Z98.61 CAD S/P PERCUTANEOUS CORONARY ANGIOPLASTY: ICD-10-CM

## 2024-02-27 LAB
ECHO AO ASC DIAM: 2.8 CM
ECHO AO ASCENDING AORTA INDEX: 1.44 CM/M2
ECHO AV CUSP MM: 1.5 CM
ECHO BSA: 1.98 M2
ECHO LA AREA 2C: 10.6 CM2
ECHO LA AREA 4C: 13 CM2
ECHO LA DIAMETER INDEX: 1.6 CM/M2
ECHO LA DIAMETER: 3.1 CM
ECHO LA MAJOR AXIS: 5 CM
ECHO LA MINOR AXIS: 4.3 CM
ECHO LA VOL BP: 26 ML (ref 18–58)
ECHO LA VOL MOD A2C: 22 ML (ref 18–58)
ECHO LA VOL MOD A4C: 27 ML (ref 18–58)
ECHO LA VOL/BSA BIPLANE: 13 ML/M2 (ref 16–34)
ECHO LA VOLUME INDEX MOD A2C: 11 ML/M2 (ref 16–34)
ECHO LV FRACTIONAL SHORTENING: 26 % (ref 28–44)
ECHO LV INTERNAL DIMENSION DIASTOLE INDEX: 1.96 CM/M2
ECHO LV INTERNAL DIMENSION DIASTOLIC: 3.8 CM (ref 4.2–5.9)
ECHO LV INTERNAL DIMENSION SYSTOLIC INDEX: 1.44 CM/M2
ECHO LV INTERNAL DIMENSION SYSTOLIC: 2.8 CM
ECHO LV IVSD: 1.1 CM (ref 0.6–1)
ECHO LV MASS 2D: 125.8 G (ref 88–224)
ECHO LV MASS INDEX 2D: 64.9 G/M2 (ref 49–115)
ECHO LV POSTERIOR WALL DIASTOLIC: 1 CM (ref 0.6–1)
ECHO LV RELATIVE WALL THICKNESS RATIO: 0.53
ECHO RV INTERNAL DIMENSION: 2.8 CM

## 2024-02-27 PROCEDURE — 93307 TTE W/O DOPPLER COMPLETE: CPT

## 2024-02-28 ENCOUNTER — TELEPHONE (OUTPATIENT)
Dept: CARDIOLOGY CLINIC | Age: 69
End: 2024-02-28

## 2024-02-28 NOTE — TELEPHONE ENCOUNTER
----- Message from TONO Freeman CNP sent at 2/28/2024  9:58 AM EST -----  Regarding: FW:  Please let him know that his ejection fraction has improved to 55% from 40%. No further actions at this time.  Thank you  ----- Message -----  From: Aren Magaña MD  Sent: 2/27/2024   6:09 PM EST  To: TONO Freeman CNP      I called [facility] and spoke to Raina who verbalized understanding.

## 2024-03-26 ENCOUNTER — OFFICE VISIT (OUTPATIENT)
Dept: CARDIOLOGY CLINIC | Age: 69
End: 2024-03-26
Payer: MEDICARE

## 2024-03-26 VITALS
SYSTOLIC BLOOD PRESSURE: 102 MMHG | DIASTOLIC BLOOD PRESSURE: 62 MMHG | BODY MASS INDEX: 28.51 KG/M2 | HEIGHT: 67 IN | HEART RATE: 96 BPM

## 2024-03-26 DIAGNOSIS — I50.30 HEART FAILURE WITH PRESERVED EJECTION FRACTION, UNSPECIFIED HF CHRONICITY (HCC): ICD-10-CM

## 2024-03-26 DIAGNOSIS — I25.10 CORONARY ARTERY DISEASE INVOLVING NATIVE CORONARY ARTERY OF NATIVE HEART WITHOUT ANGINA PECTORIS: Primary | ICD-10-CM

## 2024-03-26 DIAGNOSIS — I25.5 ISCHEMIC CARDIOMYOPATHY: ICD-10-CM

## 2024-03-26 DIAGNOSIS — I10 ESSENTIAL HYPERTENSION: ICD-10-CM

## 2024-03-26 DIAGNOSIS — Z98.61 CAD S/P PERCUTANEOUS CORONARY ANGIOPLASTY: ICD-10-CM

## 2024-03-26 DIAGNOSIS — I25.10 CAD S/P PERCUTANEOUS CORONARY ANGIOPLASTY: ICD-10-CM

## 2024-03-26 PROCEDURE — G8484 FLU IMMUNIZE NO ADMIN: HCPCS | Performed by: NURSE PRACTITIONER

## 2024-03-26 PROCEDURE — 1036F TOBACCO NON-USER: CPT | Performed by: NURSE PRACTITIONER

## 2024-03-26 PROCEDURE — G8417 CALC BMI ABV UP PARAM F/U: HCPCS | Performed by: NURSE PRACTITIONER

## 2024-03-26 PROCEDURE — 3078F DIAST BP <80 MM HG: CPT | Performed by: NURSE PRACTITIONER

## 2024-03-26 PROCEDURE — G8427 DOCREV CUR MEDS BY ELIG CLIN: HCPCS | Performed by: NURSE PRACTITIONER

## 2024-03-26 PROCEDURE — 1123F ACP DISCUSS/DSCN MKR DOCD: CPT | Performed by: NURSE PRACTITIONER

## 2024-03-26 PROCEDURE — 3017F COLORECTAL CA SCREEN DOC REV: CPT | Performed by: NURSE PRACTITIONER

## 2024-03-26 PROCEDURE — 3074F SYST BP LT 130 MM HG: CPT | Performed by: NURSE PRACTITIONER

## 2024-03-26 PROCEDURE — 99214 OFFICE O/P EST MOD 30 MIN: CPT | Performed by: NURSE PRACTITIONER

## 2024-03-26 NOTE — PROGRESS NOTES
[facility] [place] [facility]  [facility] CARDIOLOGY  [address]  [address]  [place] [address]  Dept: [phone]  Dept Fax: [phone]  Loc: [phone]    Visit Date: 3/26/2024    Mr. Swift is a 68 y.o. male  who presented for: 3 month follow-up    Primary Cardiologist: Aren Magaña MD    Chief Complaint   Patient presents with    Check-Up    Coronary Artery Disease       HPI:   HPI   Last seen in office on 12/26/23 per this writer. Per office note:  Assessment/Plan   Residual severe RCA stenosis s/p PCI RCA with DEANA x 1 on 11/29/23  Recent LAD PCI   ESBL sepsis - resolved  ICM, NYHA II: ICM EF 40% 8/2023  Hx of DVT  Hx of HTN  Hx of MS with bilateral paraplegia  Hx of DVT/PE  Hx of diverting colostomy due to ulcer on ischium       PCI 11/29/2023  Severe RCA stenosis, CCS 3 anginal equivalent on OMT   Successful PCI of the RCA x 1 DEANA     Recommendations    1.  Bedrest.  2.  Optimal medical therapy.  3.  Routine access site care.  4.  Gentle diuresis if indicated  5.  DAPT.  6.  Guideline therapy for CAD/CHF.  7.  Uptitrate antianginals.  8.  Cardiac rehab (if indicated)  9.  Follow up with primary cardiologist/PCP in two to four weeks post procedure     Last seen in office on 9/26/2023 per Dr. Magaña. Per office note:  69 yo M with ICM, recent CHF exacerbations, found to have MV CAD, had PCI of the LAD, still has severe RCA stenosis presents for follow-up.  He has MS s/p BLE paraplegia.  He has HTN, waiting for urology to deal with neurogenic bladder.  He has a drop in EF.  He wa salso septic but is improving.  Complete revasc not completed due to dye load and CHF.    Assessment/Plan   Residual severe RCA stenosis  Recent LAD PCI   ESBL sepsis - resolved  ICM, NYHA II  Hx of DVT  Hx of HTN  Hx of MS with bilateral paraplegia  Hx of DVT/PE  Hx of diverting colostomy due to ulcer on ischemia  Patient doing well, has ICM, awaiting RCA PCI.  He has improved and recovered from

## 2024-03-26 NOTE — PATIENT INSTRUCTIONS
Continue current medications as prescribed.    Continue with therapies to maintain strength that you have.     Follow-up with your PCP as scheduled.    Follow-up with Dr. Magaña or Magaly DE LA ROSA in 6 months as scheduled or sooner if need.

## 2024-03-28 ENCOUNTER — OUTSIDE SERVICES (OUTPATIENT)
Dept: FAMILY MEDICINE CLINIC | Age: 69
End: 2024-03-28
Payer: MEDICARE

## 2024-03-28 DIAGNOSIS — G35 MS (MULTIPLE SCLEROSIS) (HCC): ICD-10-CM

## 2024-03-28 DIAGNOSIS — R40.4 TRANSIENT ALTERATION OF AWARENESS: Primary | ICD-10-CM

## 2024-03-28 DIAGNOSIS — I25.118 CORONARY ARTERY DISEASE INVOLVING NATIVE CORONARY ARTERY OF NATIVE HEART WITH OTHER FORM OF ANGINA PECTORIS (HCC): ICD-10-CM

## 2024-03-28 DIAGNOSIS — Z93.3 COLOSTOMY IN PLACE (HCC): ICD-10-CM

## 2024-03-28 PROBLEM — J96.01 ACUTE RESPIRATORY FAILURE WITH HYPOXIA (HCC): Status: RESOLVED | Noted: 2022-12-24 | Resolved: 2024-03-28

## 2024-03-28 PROCEDURE — 99309 SBSQ NF CARE MODERATE MDM 30: CPT | Performed by: FAMILY MEDICINE

## 2024-04-03 ENCOUNTER — TELEPHONE (OUTPATIENT)
Dept: UROLOGY | Age: 69
End: 2024-04-03

## 2024-04-03 NOTE — TELEPHONE ENCOUNTER
Faith from [facility] [phone] left a message.They are having trouble with sp catheter. It was exchanged and irrigated.    Attempted to call her back and left a voicemail to call the office.

## 2024-04-03 NOTE — TELEPHONE ENCOUNTER
Message left with staff to inform Faith if the catheter is occluded and not functioning patient would need to be evaluated in the ER due to office closing.

## 2024-04-04 NOTE — TELEPHONE ENCOUNTER
Faith advised of the recommendations and voiced understanding. He is getting oxybutynin 10 mg xl daily due to insurance not covering 5 mg BID. Dr Benitez gave the ok for the change.    There is a culture pending and will fax the results. He has been getting the acetic acid irrigation for several months most likely ordered after a hospital stay.    She moved up the follow up appointment

## 2024-04-04 NOTE — TELEPHONE ENCOUNTER
Physical Therapy Visit    Visit Type: Daily Treatment Note  Visit: 2  Referring Provider: Isidoro Patterson DO  Medical Diagnosis (from order): Diagnosis Information    Diagnosis  V45.89, V15.51 (ICD-9-CM) - Z98.890, Z87.81 (ICD-10-CM) - Status post open reduction with internal fixation (ORIF) of fracture of ankle         SUBJECTIVE                                                                                                               Visit 12/15  Pt arrives to session ambulatory without AD, wearing R ankle brace, reports improved ability to ambulate community distances and manage stairs with new brace.      OBJECTIVE                                                                                                                                       Treatment     Therapeutic Exercise  SE lvl 5 6min    Standing heel raise 10 x 2  Standing Toe raise 10 x 2  Standing gastroc stretch on incline board 30sh x 10  Standing solues stretch on incline board 30sh x 10  Standing ankle circles on 360deg wobble board x20 CW x20CCW    Neuromuscular Re-Education  SLS even surface 30sh x 5 ea with UE support  Tilt board A-P taps  20 xw2ith UE support  Tilt board AP center holds 30sh x 5 with UE support    Gait Training  Gait training with ankle brace in shoe without AD, facilitated even step length and even weight distribution  Tandem walking 30ft x 4  Heel walking 30ft x 4  Toe walking 30ft x 4    Skilled input: verbal instruction/cues, tactile instruction/cues, posture correction, facilitation and demonstration    Writer verbally educated and received verbal consent for hand placement, positioning of patient, and techniques to be performed today from patient for hand placement and palpation for techniques and therapist position for techniques as described above and how they are pertinent to the patient's plan of care.    Home Exercise Program  Continue as issued  Added heel rasie, toe raise, heel walking, toe  Will follow culture.  If issues return they are to call office or got to ER.  Keep follow up appointment scheduled.    walking      ASSESSMENT                                                                                                            Session focused on R ankle AROM/strength via closed chain exercises, R ankle multi-directional stability via standing dynamic balance and gait activities on firm and compliant surfaces, and normalizing gait pattern via gait training.  Programming advanced from even split between seated and standing activities to a majority of session spent in standing/closed chain, pt displayed good tolerance of 40min of near-consecutive standing. Overall session tolerated well.  Pain/symptoms after session (out of 10): 0  Education:   - Results of above outlined education: Verbalizes understanding and Demonstrates understanding    PLAN                                                                                                                           Suggestions for next session as indicated: Progress per plan of care, advance dynamic ankle stability and dynamic balance activities       Therapy procedure time and total treatment time can be found documented on the Time Entry flowsheet

## 2024-04-04 NOTE — TELEPHONE ENCOUNTER
Have them increase oxybutyin XL to 10 mg daily  Did they do urine culture when catheter was exchanged.  If not we should collect one from a new catheter if still having symptoms.  How long have they been doing acetic acid irrigation as I did not see that mentioned in last office note 12/2023  Continue to flush SPT 60 to 120 ml PRN    If they are concerned it is not emptying or clogged then it needs exchanged and if still having issues either need  to ER or come to office for evaluation.     If any fevers, chills, no urinary output, worsening pain needs to go to ER.

## 2024-04-04 NOTE — TELEPHONE ENCOUNTER
Patient is having bladder spasms daily. He is taking oxybutynin.     When the catheter is flushing with acetic acid it comes out the penis along with the catheter. He is also incontinent of urine from the penis which has increased more the last week.    The catheter is draining 200 ml dark yellow currently.     The last catheter exchange was this week. They have been irrigating daily with saline since he started having issues and acetic acid 3 times a week.

## 2024-04-05 ENCOUNTER — TELEPHONE (OUTPATIENT)
Dept: UROLOGY | Age: 69
End: 2024-04-05

## 2024-04-05 NOTE — TELEPHONE ENCOUNTER
Cx not suggestive of growth    F/u as scheduled    Present to the ER if catheter is occluded and unable to irrigate to regain patency    The patient should go to the ED should they develop fever, chills, nausea, vomiting, chest pain, SOB, calf pain, feelings of incomplete emptying, or should they otherwise feel they need evaluated

## 2024-04-18 ENCOUNTER — OFFICE VISIT (OUTPATIENT)
Dept: UROLOGY | Age: 69
End: 2024-04-18
Payer: MEDICARE

## 2024-04-18 VITALS — BODY MASS INDEX: 26.68 KG/M2 | HEIGHT: 67 IN | RESPIRATION RATE: 20 BRPM | WEIGHT: 170 LBS

## 2024-04-18 DIAGNOSIS — N32.89 BLADDER SPASMS: Primary | ICD-10-CM

## 2024-04-18 DIAGNOSIS — Z93.59 CHRONIC SUPRAPUBIC CATHETER (HCC): ICD-10-CM

## 2024-04-18 DIAGNOSIS — N20.0 NEPHROLITHIASIS: ICD-10-CM

## 2024-04-18 DIAGNOSIS — N31.9 NEUROGENIC BLADDER: ICD-10-CM

## 2024-04-18 PROCEDURE — 1036F TOBACCO NON-USER: CPT

## 2024-04-18 PROCEDURE — 99213 OFFICE O/P EST LOW 20 MIN: CPT

## 2024-04-18 PROCEDURE — G8427 DOCREV CUR MEDS BY ELIG CLIN: HCPCS

## 2024-04-18 PROCEDURE — G8417 CALC BMI ABV UP PARAM F/U: HCPCS

## 2024-04-18 PROCEDURE — 3017F COLORECTAL CA SCREEN DOC REV: CPT

## 2024-04-18 PROCEDURE — 1123F ACP DISCUSS/DSCN MKR DOCD: CPT

## 2024-04-18 RX ORDER — OXYBUTYNIN CHLORIDE 10 MG/1
10 TABLET, EXTENDED RELEASE ORAL DAILY
Qty: 90 TABLET | Refills: 4 | Status: SHIPPED | OUTPATIENT
Start: 2024-04-18

## 2024-04-18 NOTE — PROGRESS NOTES
Suprapubic catheter placement    BRONCHOSCOPY N/A 12/26/2022    BRONCHOSCOPY ALVEOLAR LAVAGE performed by Bronson Garg MD at [facility]    CARDIAC PROCEDURE N/A 11/29/2023    Coronary angiography performed by Aren Magaña MD at [facility] CARDI[facility] CATH LAB    CARDIAC PROCEDURE N/A 11/29/2023    Percutaneous coronary intervention performed by Aren Magaña MD at [facility] CARDI[facility] CATH LAB    COLONOSCOPY      CYSTO/URETERO/PYELOSCOPY, CALCULUS TX Left 6/19/2019    CYSTOSCOPY, LEFT STENT insertion, bladder irragation with bladder stones performed by Kameron Cr MD at [facility]    CYSTO/URETERO/PYELOSCOPY, CALCULUS TX N/A 7/8/2019    CYSTO, LEFT URETERAL STENT REMOVAL, LEFT URETEROSCOPY, LASER LITHOTRIPSY, BASKET RETRIEVAL OF STONE FRAGMENTS performed by Kameron Cr MD at [facility]    CYSTOSCOPY N/A 2/26/2021    CYSTOSCOPY, CYSTOLITHOLAPAXY, SUPRAPUBIC CATHETER EXCHANGE performed by Suman Lee MD at [facility]    CYSTOSCOPY Left 6/15/2022    CYSTOSCOPY performed by Suman Lee MD at [facility]    CYSTOSCOPY Left 8/22/2023    CYSTOSCOPY LEFT URETERAL STENT INSERTION; bladder stone extraction, suprapubic cath exchange performed by Reinaldo Miller MD at [facility]    IR NEPHROSTOMY CATHETER PLACEMENT  7/25/2022    IR NEPHROSTOMY CATHETER PLACEMENT 7/25/2022 [facility]    NV LAPS COLECTOMY PRTL W/END CLST & CLSR DSTL SGM N/A 6/13/2018    ROBOT DIVERTING COLOSTOMY performed by Fani Cruz MD at [facility]    TONSILLECTOMY  child    URETER SURGERY Left 8/4/2022    CYSTOSCOPY, LEFT URETEROSCOPY, LASER LITHOTRIPSY,  LEFT URETERAL STENT EXCHANGE performed by Suman Lee MD at [facility]    URETER SURGERY Left 9/18/2023    Cystoscopy, Left Ureteroscopy, Laser Lithotripsy, , And Left Ureteral Stent Exchange performed by Harvinder Quispe Jr., MD at [facility]     Family History   Problem Relation Age of Onset    Cancer Mother         Breast    Heart Disease Father 50    Arthritis Sister     Heart Disease Paternal

## 2024-04-18 NOTE — PATIENT INSTRUCTIONS
Continue as is with increased Oxybutynin XL 10 mg. Hold jeffers in place at insertion site when flushing.    Chucks to absorb leaking from the urethra when flushing.

## 2024-04-25 ENCOUNTER — OUTSIDE SERVICES (OUTPATIENT)
Dept: FAMILY MEDICINE CLINIC | Age: 69
End: 2024-04-25
Payer: MEDICARE

## 2024-04-25 DIAGNOSIS — N30.00 ACUTE CYSTITIS WITHOUT HEMATURIA: ICD-10-CM

## 2024-04-25 DIAGNOSIS — Z93.3 COLOSTOMY IN PLACE (HCC): ICD-10-CM

## 2024-04-25 DIAGNOSIS — G35 MS (MULTIPLE SCLEROSIS) (HCC): ICD-10-CM

## 2024-04-25 DIAGNOSIS — R41.0 CONFUSION: Primary | ICD-10-CM

## 2024-04-25 DIAGNOSIS — I25.118 CORONARY ARTERY DISEASE INVOLVING NATIVE CORONARY ARTERY OF NATIVE HEART WITH OTHER FORM OF ANGINA PECTORIS (HCC): ICD-10-CM

## 2024-04-25 PROCEDURE — 99309 SBSQ NF CARE MODERATE MDM 30: CPT | Performed by: FAMILY MEDICINE

## 2024-04-25 NOTE — PROGRESS NOTES
4/25/2024  Greyson Swift  1955  Subjective:  He was in his/her room to follow up confusion, fever, added levaquin.  Sleepy today, did not awaken to examiner.    Denies headaches, CP, sob, or abdominal pains.    Objective:   Please see vitals per chart.  There is no sinus tenderness to palpation, no cervical lymphadenopathy.  Lungs are clear.  Heart Regular rate and rhythm without murmur.  The abdomen is soft and nontender.  Colostomy present and functioning.  Extremities are without edema.  Pompa is present.      Assessment:  1. Confusion  -acute problem, likely due to UTI.-monitor sxs, call if not improving      2. Acute cystitis without hematuria  -acute problem, add levaquin, -monitor sxs, call if not improving    3. Coronary artery disease involving native coronary artery of native heart without angina pectoris  -stable,  controlled on crestor, lisinopril, metoprolol, brilinta and asa.    4. Colostomy in place (HCC)  -stable, functioning appropriately    5. MS (multiple sclerosis) (HCC)  -stable, controlled on zanaflex, baclofen and gabapentin    Plan:  We will continue current medication regimen including zanaflex, baclofen and gabapentin for MS, melatonin for sleep and supportive care and recheck in 1 month.    Electronically signed by Noah Benitez MD on 4/25/2024 at 11:34 AM.

## 2024-05-23 ENCOUNTER — OUTSIDE SERVICES (OUTPATIENT)
Dept: FAMILY MEDICINE CLINIC | Age: 69
End: 2024-05-23
Payer: MEDICARE

## 2024-05-23 DIAGNOSIS — Z93.3 COLOSTOMY IN PLACE (HCC): ICD-10-CM

## 2024-05-23 DIAGNOSIS — I25.118 CORONARY ARTERY DISEASE INVOLVING NATIVE CORONARY ARTERY OF NATIVE HEART WITH OTHER FORM OF ANGINA PECTORIS (HCC): ICD-10-CM

## 2024-05-23 DIAGNOSIS — R41.0 CONFUSION: Primary | ICD-10-CM

## 2024-05-23 DIAGNOSIS — G35 MS (MULTIPLE SCLEROSIS) (HCC): ICD-10-CM

## 2024-05-23 PROCEDURE — 99309 SBSQ NF CARE MODERATE MDM 30: CPT | Performed by: FAMILY MEDICINE

## 2024-05-23 NOTE — PROGRESS NOTES
5/23/2024  Greyson Swift  1955  Subjective:  He was in his/her room to follow up confusion, lethargy and decrased appetite.  Urine was positive.  C&S ordered. Looks good today, talkative.    Denies headaches, CP, sob, or abdominal pains.    Objective:   Please see vitals per chart.  There is no sinus tenderness to palpation, no cervical lymphadenopathy.  Lungs are clear.  Heart Regular rate and rhythm without murmur.  The abdomen is soft and nontender.  Colostomy present and functioning.  Extremities are without edema.  Pompa is present.      Assessment:  1. Confusion  -acute problem, positive urine dip, await C&S    2. Coronary artery disease involving native coronary artery of native heart without angina pectoris  -stable,  controlled on crestor, lisinopril, metoprolol, brilinta and asa.    3. Colostomy in place (HCC)  -stable, functioning appropriately    4. MS (multiple sclerosis) (HCC)  -stable, controlled on zanaflex, baclofen and gabapentin    Plan:  We will continue current medication regimen including zanaflex, baclofen and gabapentin for MS, melatonin for sleep and supportive care and recheck in 1 month.    Electronically signed by Noah Benitez MD on 5/23/2024 at 10:41 AM.

## 2024-06-27 ENCOUNTER — HOSPITAL ENCOUNTER (INPATIENT)
Age: 69
LOS: 6 days | Discharge: NURSING, REHAB OR LONG-TERM CARE | DRG: 698 | End: 2024-07-03
Attending: EMERGENCY MEDICINE
Payer: MEDICARE

## 2024-06-27 ENCOUNTER — OUTSIDE SERVICES (OUTPATIENT)
Dept: FAMILY MEDICINE CLINIC | Age: 69
End: 2024-06-27
Payer: MEDICARE

## 2024-06-27 ENCOUNTER — APPOINTMENT (OUTPATIENT)
Dept: CT IMAGING | Age: 69
DRG: 698 | End: 2024-06-27
Payer: MEDICARE

## 2024-06-27 ENCOUNTER — APPOINTMENT (OUTPATIENT)
Dept: GENERAL RADIOLOGY | Age: 69
DRG: 698 | End: 2024-06-27
Payer: MEDICARE

## 2024-06-27 DIAGNOSIS — A41.9 SEPSIS WITHOUT ACUTE ORGAN DYSFUNCTION, DUE TO UNSPECIFIED ORGANISM (HCC): Primary | ICD-10-CM

## 2024-06-27 DIAGNOSIS — R41.0 DISORIENTATION: ICD-10-CM

## 2024-06-27 DIAGNOSIS — G35 MS (MULTIPLE SCLEROSIS) (HCC): Primary | ICD-10-CM

## 2024-06-27 DIAGNOSIS — I25.118 CORONARY ARTERY DISEASE INVOLVING NATIVE CORONARY ARTERY OF NATIVE HEART WITH OTHER FORM OF ANGINA PECTORIS (HCC): ICD-10-CM

## 2024-06-27 DIAGNOSIS — N30.01 ACUTE CYSTITIS WITH HEMATURIA: ICD-10-CM

## 2024-06-27 DIAGNOSIS — Z93.3 COLOSTOMY IN PLACE (HCC): ICD-10-CM

## 2024-06-27 PROBLEM — N39.0 COMPLICATED UTI (URINARY TRACT INFECTION): Status: ACTIVE | Noted: 2024-06-27

## 2024-06-27 LAB
ALBUMIN SERPL BCG-MCNC: 3.5 G/DL (ref 3.5–5.1)
ALP SERPL-CCNC: 119 U/L (ref 38–126)
ALT SERPL W/O P-5'-P-CCNC: 8 U/L (ref 11–66)
AMMONIA PLAS-MCNC: 68 UMOL/L (ref 11–60)
ANION GAP SERPL CALC-SCNC: 10 MEQ/L (ref 8–16)
AST SERPL-CCNC: 11 U/L (ref 5–40)
BACTERIA URNS QL MICRO: ABNORMAL /HPF
BASOPHILS ABSOLUTE: 0 THOU/MM3 (ref 0–0.1)
BASOPHILS NFR BLD AUTO: 0.4 %
BILIRUB CONJ SERPL-MCNC: < 0.1 MG/DL (ref 0.1–13.8)
BILIRUB SERPL-MCNC: 0.2 MG/DL (ref 0.3–1.2)
BILIRUB UR QL STRIP.AUTO: NEGATIVE
BUN SERPL-MCNC: 31 MG/DL (ref 7–22)
CALCIUM SERPL-MCNC: 10.1 MG/DL (ref 8.5–10.5)
CASTS #/AREA URNS LPF: ABNORMAL /LPF
CASTS 2: ABNORMAL /LPF
CHARACTER UR: ABNORMAL
CHLORIDE SERPL-SCNC: 103 MEQ/L (ref 98–111)
CO2 SERPL-SCNC: 28 MEQ/L (ref 23–33)
COLOR, UA: YELLOW
CREAT SERPL-MCNC: 0.4 MG/DL (ref 0.4–1.2)
CRYSTALS URNS MICRO: ABNORMAL
DEPRECATED RDW RBC AUTO: 55.8 FL (ref 35–45)
EOSINOPHIL NFR BLD AUTO: 2.2 %
EOSINOPHILS ABSOLUTE: 0.2 THOU/MM3 (ref 0–0.4)
EPITHELIAL CELLS, UA: ABNORMAL /HPF
ERYTHROCYTE [DISTWIDTH] IN BLOOD BY AUTOMATED COUNT: 16.6 % (ref 11.5–14.5)
GFR SERPL CREATININE-BSD FRML MDRD: > 90 ML/MIN/1.73M2
GLUCOSE BLD STRIP.AUTO-MCNC: 143 MG/DL (ref 70–108)
GLUCOSE SERPL-MCNC: 141 MG/DL (ref 70–108)
GLUCOSE UR QL STRIP.AUTO: NEGATIVE MG/DL
HCT VFR BLD AUTO: 36.3 % (ref 42–52)
HGB BLD-MCNC: 11.5 GM/DL (ref 14–18)
HGB UR QL STRIP.AUTO: ABNORMAL
IMM GRANULOCYTES # BLD AUTO: 0.03 THOU/MM3 (ref 0–0.07)
IMM GRANULOCYTES NFR BLD AUTO: 0.3 %
KETONES UR QL STRIP.AUTO: NEGATIVE
LACTIC ACID, SEPSIS: 0.9 MMOL/L (ref 0.5–1.9)
LYMPHOCYTES ABSOLUTE: 1.4 THOU/MM3 (ref 1–4.8)
LYMPHOCYTES NFR BLD AUTO: 15.9 %
MAGNESIUM SERPL-MCNC: 2.1 MG/DL (ref 1.6–2.4)
MCH RBC QN AUTO: 29.4 PG (ref 26–33)
MCHC RBC AUTO-ENTMCNC: 31.7 GM/DL (ref 32.2–35.5)
MCV RBC AUTO: 92.8 FL (ref 80–94)
MISCELLANEOUS 2: ABNORMAL
MONOCYTES ABSOLUTE: 0.8 THOU/MM3 (ref 0.4–1.3)
MONOCYTES NFR BLD AUTO: 8.9 %
NEUTROPHILS ABSOLUTE: 6.6 THOU/MM3 (ref 1.8–7.7)
NEUTROPHILS NFR BLD AUTO: 72.3 %
NITRITE UR QL STRIP: POSITIVE
NRBC BLD AUTO-RTO: 0 /100 WBC
OSMOLALITY SERPL CALC.SUM OF ELEC: 290.2 MOSMOL/KG (ref 275–300)
PH UR STRIP.AUTO: 8 [PH] (ref 5–9)
PLATELET # BLD AUTO: 243 THOU/MM3 (ref 130–400)
PMV BLD AUTO: 8.8 FL (ref 9.4–12.4)
POTASSIUM SERPL-SCNC: 4.7 MEQ/L (ref 3.5–5.2)
PROT SERPL-MCNC: 7.9 G/DL (ref 6.1–8)
PROT UR STRIP.AUTO-MCNC: 100 MG/DL
RBC # BLD AUTO: 3.91 MILL/MM3 (ref 4.7–6.1)
RBC URINE: > 200 /HPF
RENAL EPI CELLS #/AREA URNS HPF: ABNORMAL /[HPF]
SODIUM SERPL-SCNC: 141 MEQ/L (ref 135–145)
SP GR UR REFRACT.AUTO: 1.02 (ref 1–1.03)
T4 FREE SERPL-MCNC: 1.11 NG/DL (ref 0.93–1.68)
TROPONIN, HIGH SENSITIVITY: 34 NG/L (ref 0–12)
TSH SERPL DL<=0.005 MIU/L-ACNC: 1.16 UIU/ML (ref 0.4–4.2)
UROBILINOGEN, URINE: 0.2 EU/DL (ref 0–1)
WBC # BLD AUTO: 9.1 THOU/MM3 (ref 4.8–10.8)
WBC #/AREA URNS HPF: > 100 /HPF
WBC #/AREA URNS HPF: ABNORMAL /[HPF]
YEAST LIKE FUNGI URNS QL MICRO: ABNORMAL

## 2024-06-27 PROCEDURE — 96375 TX/PRO/DX INJ NEW DRUG ADDON: CPT

## 2024-06-27 PROCEDURE — 83735 ASSAY OF MAGNESIUM: CPT

## 2024-06-27 PROCEDURE — 84439 ASSAY OF FREE THYROXINE: CPT

## 2024-06-27 PROCEDURE — 70450 CT HEAD/BRAIN W/O DYE: CPT

## 2024-06-27 PROCEDURE — 93005 ELECTROCARDIOGRAM TRACING: CPT | Performed by: EMERGENCY MEDICINE

## 2024-06-27 PROCEDURE — 99285 EMERGENCY DEPT VISIT HI MDM: CPT

## 2024-06-27 PROCEDURE — 82948 REAGENT STRIP/BLOOD GLUCOSE: CPT

## 2024-06-27 PROCEDURE — 99223 1ST HOSP IP/OBS HIGH 75: CPT

## 2024-06-27 PROCEDURE — 36415 COLL VENOUS BLD VENIPUNCTURE: CPT

## 2024-06-27 PROCEDURE — 82248 BILIRUBIN DIRECT: CPT

## 2024-06-27 PROCEDURE — 87040 BLOOD CULTURE FOR BACTERIA: CPT

## 2024-06-27 PROCEDURE — 85025 COMPLETE CBC W/AUTO DIFF WBC: CPT

## 2024-06-27 PROCEDURE — 85651 RBC SED RATE NONAUTOMATED: CPT

## 2024-06-27 PROCEDURE — 99308 SBSQ NF CARE LOW MDM 20: CPT | Performed by: FAMILY MEDICINE

## 2024-06-27 PROCEDURE — 96365 THER/PROPH/DIAG IV INF INIT: CPT

## 2024-06-27 PROCEDURE — 87086 URINE CULTURE/COLONY COUNT: CPT

## 2024-06-27 PROCEDURE — 1200000003 HC TELEMETRY R&B

## 2024-06-27 PROCEDURE — 2580000003 HC RX 258: Performed by: EMERGENCY MEDICINE

## 2024-06-27 PROCEDURE — 71045 X-RAY EXAM CHEST 1 VIEW: CPT

## 2024-06-27 PROCEDURE — 6360000002 HC RX W HCPCS: Performed by: EMERGENCY MEDICINE

## 2024-06-27 PROCEDURE — 96361 HYDRATE IV INFUSION ADD-ON: CPT

## 2024-06-27 PROCEDURE — 51702 INSERT TEMP BLADDER CATH: CPT

## 2024-06-27 PROCEDURE — 84443 ASSAY THYROID STIM HORMONE: CPT

## 2024-06-27 PROCEDURE — 83605 ASSAY OF LACTIC ACID: CPT

## 2024-06-27 PROCEDURE — 84484 ASSAY OF TROPONIN QUANT: CPT

## 2024-06-27 PROCEDURE — 82140 ASSAY OF AMMONIA: CPT

## 2024-06-27 PROCEDURE — 80053 COMPREHEN METABOLIC PANEL: CPT

## 2024-06-27 PROCEDURE — 81001 URINALYSIS AUTO W/SCOPE: CPT

## 2024-06-27 RX ORDER — 0.9 % SODIUM CHLORIDE 0.9 %
1000 INTRAVENOUS SOLUTION INTRAVENOUS ONCE
Status: COMPLETED | OUTPATIENT
Start: 2024-06-27 | End: 2024-06-27

## 2024-06-27 RX ORDER — 0.9 % SODIUM CHLORIDE 0.9 %
1000 INTRAVENOUS SOLUTION INTRAVENOUS ONCE
Status: COMPLETED | OUTPATIENT
Start: 2024-06-28 | End: 2024-06-28

## 2024-06-27 RX ADMIN — VANCOMYCIN HYDROCHLORIDE 2000 MG: 1 INJECTION, POWDER, LYOPHILIZED, FOR SOLUTION INTRAVENOUS at 22:48

## 2024-06-27 RX ADMIN — SODIUM CHLORIDE 1000 ML: 9 INJECTION, SOLUTION INTRAVENOUS at 21:23

## 2024-06-27 RX ADMIN — CEFEPIME 2000 MG: 2 INJECTION, POWDER, FOR SOLUTION INTRAVENOUS at 22:09

## 2024-06-27 RX ADMIN — SODIUM CHLORIDE 1000 ML: 9 INJECTION, SOLUTION INTRAVENOUS at 23:58

## 2024-06-27 ASSESSMENT — PAIN - FUNCTIONAL ASSESSMENT
PAIN_FUNCTIONAL_ASSESSMENT: NONE - DENIES PAIN
PAIN_FUNCTIONAL_ASSESSMENT: FACE, LEGS, ACTIVITY, CRY, AND CONSOLABILITY (FLACC)

## 2024-06-27 ASSESSMENT — PAIN SCALES - WONG BAKER
WONGBAKER_NUMERICALRESPONSE: HURTS EVEN MORE
WONGBAKER_NUMERICALRESPONSE: HURTS EVEN MORE

## 2024-06-27 NOTE — PROGRESS NOTES
6/27/2024  Greyson Merrittker  1955  Subjective:  He was in his/her room to follow up MS.  No new problems, is feeling well.   Denies headaches, CP, sob, or abdominal pains.    Objective:   Please see vitals per chart.  There is no sinus tenderness to palpation, no cervical lymphadenopathy.  Lungs are clear.  Heart Regular rate and rhythm without murmur.  The abdomen is soft and nontender.  Colostomy present and functioning.  Extremities are without edema.  Pompa is present.      Assessment:    1. Coronary artery disease involving native coronary artery of native heart without angina pectoris  -stable,  controlled on crestor, lisinopril, metoprolol, brilinta and asa.    2. Colostomy in place (HCC)  -stable, functioning appropriately    3. MS (multiple sclerosis) (HCC)  -stable, controlled on zanaflex, baclofen and gabapentin    Plan:  We will continue current medication regimen including zanaflex, baclofen and gabapentin for MS, melatonin for sleep and supportive care and recheck in 1 month.    Electronically signed by Noah Benitez MD on 6/27/2024 at 10:54 AM.

## 2024-06-28 LAB
ANION GAP SERPL CALC-SCNC: 9 MEQ/L (ref 8–16)
APTT PPP: 33.1 SECONDS (ref 22–38)
BASOPHILS ABSOLUTE: 0 THOU/MM3 (ref 0–0.1)
BASOPHILS NFR BLD AUTO: 0.4 %
BUN SERPL-MCNC: 26 MG/DL (ref 7–22)
CALCIUM SERPL-MCNC: 8.9 MG/DL (ref 8.5–10.5)
CHLORIDE SERPL-SCNC: 108 MEQ/L (ref 98–111)
CO2 SERPL-SCNC: 25 MEQ/L (ref 23–33)
CREAT SERPL-MCNC: 0.2 MG/DL (ref 0.4–1.2)
CRP SERPL-MCNC: 9.75 MG/DL (ref 0–1)
DEPRECATED RDW RBC AUTO: 55.6 FL (ref 35–45)
EKG ATRIAL RATE: 77 BPM
EKG ATRIAL RATE: 81 BPM
EKG P AXIS: 34 DEGREES
EKG P AXIS: 41 DEGREES
EKG P-R INTERVAL: 154 MS
EKG P-R INTERVAL: 158 MS
EKG Q-T INTERVAL: 374 MS
EKG Q-T INTERVAL: 380 MS
EKG QRS DURATION: 80 MS
EKG QRS DURATION: 82 MS
EKG QTC CALCULATION (BAZETT): 430 MS
EKG QTC CALCULATION (BAZETT): 434 MS
EKG R AXIS: 33 DEGREES
EKG R AXIS: 35 DEGREES
EKG T AXIS: 43 DEGREES
EKG T AXIS: 54 DEGREES
EKG VENTRICULAR RATE: 77 BPM
EKG VENTRICULAR RATE: 81 BPM
EOSINOPHIL NFR BLD AUTO: 3 %
EOSINOPHILS ABSOLUTE: 0.2 THOU/MM3 (ref 0–0.4)
ERYTHROCYTE [DISTWIDTH] IN BLOOD BY AUTOMATED COUNT: 16.4 % (ref 11.5–14.5)
ERYTHROCYTE [SEDIMENTATION RATE] IN BLOOD BY WESTERGREN METHOD: 103 MM/HR (ref 0–10)
GFR SERPL CREATININE-BSD FRML MDRD: > 90 ML/MIN/1.73M2
GLUCOSE SERPL-MCNC: 94 MG/DL (ref 70–108)
HCT VFR BLD AUTO: 34.5 % (ref 42–52)
HGB BLD-MCNC: 10.7 GM/DL (ref 14–18)
IMM GRANULOCYTES # BLD AUTO: 0.02 THOU/MM3 (ref 0–0.07)
IMM GRANULOCYTES NFR BLD AUTO: 0.3 %
INR PPP: 1.25 (ref 0.85–1.13)
LYMPHOCYTES ABSOLUTE: 1 THOU/MM3 (ref 1–4.8)
LYMPHOCYTES NFR BLD AUTO: 14.1 %
MCH RBC QN AUTO: 29.2 PG (ref 26–33)
MCHC RBC AUTO-ENTMCNC: 31 GM/DL (ref 32.2–35.5)
MCV RBC AUTO: 94 FL (ref 80–94)
MONOCYTES ABSOLUTE: 0.6 THOU/MM3 (ref 0.4–1.3)
MONOCYTES NFR BLD AUTO: 8.6 %
NEUTROPHILS ABSOLUTE: 5.4 THOU/MM3 (ref 1.8–7.7)
NEUTROPHILS NFR BLD AUTO: 73.6 %
NRBC BLD AUTO-RTO: 0 /100 WBC
OSMOLALITY SERPL CALC.SUM OF ELEC: 287.6 MOSMOL/KG (ref 275–300)
PLATELET # BLD AUTO: 200 THOU/MM3 (ref 130–400)
PMV BLD AUTO: 8.4 FL (ref 9.4–12.4)
POTASSIUM SERPL-SCNC: 3.8 MEQ/L (ref 3.5–5.2)
PROCALCITONIN SERPL IA-MCNC: 0.13 NG/ML (ref 0.01–0.09)
RBC # BLD AUTO: 3.67 MILL/MM3 (ref 4.7–6.1)
SODIUM SERPL-SCNC: 142 MEQ/L (ref 135–145)
TROPONIN, HIGH SENSITIVITY: 34 NG/L (ref 0–12)
WBC # BLD AUTO: 7.3 THOU/MM3 (ref 4.8–10.8)

## 2024-06-28 PROCEDURE — 2580000003 HC RX 258

## 2024-06-28 PROCEDURE — 93010 ELECTROCARDIOGRAM REPORT: CPT | Performed by: NUCLEAR MEDICINE

## 2024-06-28 PROCEDURE — 84145 PROCALCITONIN (PCT): CPT

## 2024-06-28 PROCEDURE — 36415 COLL VENOUS BLD VENIPUNCTURE: CPT

## 2024-06-28 PROCEDURE — 99232 SBSQ HOSP IP/OBS MODERATE 35: CPT

## 2024-06-28 PROCEDURE — 80048 BASIC METABOLIC PNL TOTAL CA: CPT

## 2024-06-28 PROCEDURE — 6360000002 HC RX W HCPCS

## 2024-06-28 PROCEDURE — 6370000000 HC RX 637 (ALT 250 FOR IP)

## 2024-06-28 PROCEDURE — 84484 ASSAY OF TROPONIN QUANT: CPT

## 2024-06-28 PROCEDURE — 87205 SMEAR GRAM STAIN: CPT

## 2024-06-28 PROCEDURE — 85025 COMPLETE CBC W/AUTO DIFF WBC: CPT

## 2024-06-28 PROCEDURE — 86140 C-REACTIVE PROTEIN: CPT

## 2024-06-28 PROCEDURE — 85610 PROTHROMBIN TIME: CPT

## 2024-06-28 PROCEDURE — 85730 THROMBOPLASTIN TIME PARTIAL: CPT

## 2024-06-28 PROCEDURE — 93005 ELECTROCARDIOGRAM TRACING: CPT

## 2024-06-28 PROCEDURE — 1200000003 HC TELEMETRY R&B

## 2024-06-28 RX ORDER — SODIUM CHLORIDE 0.9 % (FLUSH) 0.9 %
5-40 SYRINGE (ML) INJECTION PRN
Status: DISCONTINUED | OUTPATIENT
Start: 2024-06-27 | End: 2024-07-03 | Stop reason: HOSPADM

## 2024-06-28 RX ORDER — OXCARBAZEPINE 300 MG/1
450 TABLET, FILM COATED ORAL 2 TIMES DAILY
Status: DISCONTINUED | OUTPATIENT
Start: 2024-06-28 | End: 2024-07-03 | Stop reason: HOSPADM

## 2024-06-28 RX ORDER — BACLOFEN 10 MG/1
10 TABLET ORAL 3 TIMES DAILY
Status: DISCONTINUED | OUTPATIENT
Start: 2024-06-28 | End: 2024-07-03 | Stop reason: HOSPADM

## 2024-06-28 RX ORDER — ASCORBIC ACID 500 MG
500 TABLET ORAL 2 TIMES DAILY
Status: DISCONTINUED | OUTPATIENT
Start: 2024-06-28 | End: 2024-07-03 | Stop reason: HOSPADM

## 2024-06-28 RX ORDER — OXYBUTYNIN CHLORIDE 10 MG/1
10 TABLET, EXTENDED RELEASE ORAL DAILY
Status: DISCONTINUED | OUTPATIENT
Start: 2024-06-28 | End: 2024-07-03 | Stop reason: HOSPADM

## 2024-06-28 RX ORDER — SPIRONOLACTONE 25 MG/1
25 TABLET ORAL DAILY
Status: DISCONTINUED | OUTPATIENT
Start: 2024-06-28 | End: 2024-07-03 | Stop reason: HOSPADM

## 2024-06-28 RX ORDER — SODIUM CHLORIDE 0.9 % (FLUSH) 0.9 %
5-40 SYRINGE (ML) INJECTION EVERY 12 HOURS SCHEDULED
Status: DISCONTINUED | OUTPATIENT
Start: 2024-06-28 | End: 2024-07-03 | Stop reason: HOSPADM

## 2024-06-28 RX ORDER — ACETAMINOPHEN 650 MG/1
650 SUPPOSITORY RECTAL EVERY 6 HOURS PRN
Status: DISCONTINUED | OUTPATIENT
Start: 2024-06-27 | End: 2024-07-03 | Stop reason: HOSPADM

## 2024-06-28 RX ORDER — ASPIRIN 81 MG/1
81 TABLET ORAL DAILY
Status: DISCONTINUED | OUTPATIENT
Start: 2024-06-28 | End: 2024-07-03 | Stop reason: HOSPADM

## 2024-06-28 RX ORDER — LACTOBACILLUS RHAMNOSUS GG 10B CELL
1 CAPSULE ORAL 2 TIMES DAILY
Status: DISCONTINUED | OUTPATIENT
Start: 2024-06-28 | End: 2024-07-03 | Stop reason: HOSPADM

## 2024-06-28 RX ORDER — ERYTHROMYCIN 5 MG/G
OINTMENT OPHTHALMIC NIGHTLY
Status: DISCONTINUED | OUTPATIENT
Start: 2024-06-28 | End: 2024-07-03 | Stop reason: HOSPADM

## 2024-06-28 RX ORDER — POLYVINYL ALCOHOL 14 MG/ML
4 SOLUTION/ DROPS OPHTHALMIC
Status: DISCONTINUED | OUTPATIENT
Start: 2024-06-28 | End: 2024-07-03 | Stop reason: HOSPADM

## 2024-06-28 RX ORDER — ROSUVASTATIN CALCIUM 20 MG/1
20 TABLET, COATED ORAL NIGHTLY
Status: DISCONTINUED | OUTPATIENT
Start: 2024-06-28 | End: 2024-07-03 | Stop reason: HOSPADM

## 2024-06-28 RX ORDER — ENOXAPARIN SODIUM 100 MG/ML
40 INJECTION SUBCUTANEOUS DAILY
Status: DISCONTINUED | OUTPATIENT
Start: 2024-06-28 | End: 2024-07-03 | Stop reason: HOSPADM

## 2024-06-28 RX ORDER — SODIUM CHLORIDE 9 MG/ML
INJECTION, SOLUTION INTRAVENOUS CONTINUOUS
Status: ACTIVE | OUTPATIENT
Start: 2024-06-28 | End: 2024-06-28

## 2024-06-28 RX ORDER — ACETAMINOPHEN 325 MG/1
650 TABLET ORAL EVERY 6 HOURS PRN
Status: DISCONTINUED | OUTPATIENT
Start: 2024-06-27 | End: 2024-07-03 | Stop reason: HOSPADM

## 2024-06-28 RX ORDER — DOCUSATE SODIUM 100 MG/1
100 CAPSULE, LIQUID FILLED ORAL DAILY
Status: DISCONTINUED | OUTPATIENT
Start: 2024-06-28 | End: 2024-07-03 | Stop reason: HOSPADM

## 2024-06-28 RX ORDER — CHOLECALCIFEROL (VITAMIN D3) 125 MCG
20000 CAPSULE ORAL DAILY
Status: DISCONTINUED | OUTPATIENT
Start: 2024-06-28 | End: 2024-07-03 | Stop reason: HOSPADM

## 2024-06-28 RX ORDER — MULTIVITAMIN WITH IRON
1 TABLET ORAL DAILY
Status: DISCONTINUED | OUTPATIENT
Start: 2024-06-28 | End: 2024-07-03 | Stop reason: HOSPADM

## 2024-06-28 RX ORDER — VITAMIN E 268 MG
400 CAPSULE ORAL DAILY
Status: DISCONTINUED | OUTPATIENT
Start: 2024-06-28 | End: 2024-07-03 | Stop reason: HOSPADM

## 2024-06-28 RX ORDER — METOPROLOL SUCCINATE 50 MG/1
50 TABLET, EXTENDED RELEASE ORAL 2 TIMES DAILY
Status: DISCONTINUED | OUTPATIENT
Start: 2024-06-28 | End: 2024-07-03 | Stop reason: HOSPADM

## 2024-06-28 RX ORDER — ACETIC ACID 0.25 G/100ML
60 IRRIGANT IRRIGATION
Status: DISCONTINUED | OUTPATIENT
Start: 2024-06-28 | End: 2024-06-28 | Stop reason: RX

## 2024-06-28 RX ORDER — OXCARBAZEPINE 300 MG/1
150 TABLET, FILM COATED ORAL 2 TIMES DAILY
Status: DISCONTINUED | OUTPATIENT
Start: 2024-06-28 | End: 2024-06-28 | Stop reason: SDUPTHER

## 2024-06-28 RX ORDER — FAMOTIDINE 20 MG/1
20 TABLET, FILM COATED ORAL 2 TIMES DAILY
Status: DISCONTINUED | OUTPATIENT
Start: 2024-06-28 | End: 2024-07-03 | Stop reason: HOSPADM

## 2024-06-28 RX ORDER — SODIUM CHLORIDE 9 MG/ML
INJECTION, SOLUTION INTRAVENOUS PRN
Status: DISCONTINUED | OUTPATIENT
Start: 2024-06-27 | End: 2024-07-03 | Stop reason: HOSPADM

## 2024-06-28 RX ORDER — GABAPENTIN 600 MG/1
600 TABLET ORAL 4 TIMES DAILY
Status: DISCONTINUED | OUTPATIENT
Start: 2024-06-28 | End: 2024-07-03 | Stop reason: HOSPADM

## 2024-06-28 RX ADMIN — FAMOTIDINE 20 MG: 20 TABLET, FILM COATED ORAL at 21:18

## 2024-06-28 RX ADMIN — BACLOFEN 10 MG: 10 TABLET ORAL at 09:15

## 2024-06-28 RX ADMIN — GABAPENTIN 600 MG: 600 TABLET, FILM COATED ORAL at 09:15

## 2024-06-28 RX ADMIN — ENOXAPARIN SODIUM 40 MG: 100 INJECTION SUBCUTANEOUS at 09:18

## 2024-06-28 RX ADMIN — BACLOFEN 10 MG: 10 TABLET ORAL at 21:18

## 2024-06-28 RX ADMIN — METOPROLOL SUCCINATE 50 MG: 50 TABLET, EXTENDED RELEASE ORAL at 09:15

## 2024-06-28 RX ADMIN — DOCUSATE SODIUM 100 MG: 100 CAPSULE, LIQUID FILLED ORAL at 09:15

## 2024-06-28 RX ADMIN — FAMOTIDINE 20 MG: 20 TABLET, FILM COATED ORAL at 09:09

## 2024-06-28 RX ADMIN — METOPROLOL SUCCINATE 50 MG: 50 TABLET, EXTENDED RELEASE ORAL at 21:18

## 2024-06-28 RX ADMIN — MEROPENEM 1000 MG: 1 INJECTION INTRAVENOUS at 09:27

## 2024-06-28 RX ADMIN — SODIUM CHLORIDE: 9 INJECTION, SOLUTION INTRAVENOUS at 04:30

## 2024-06-28 RX ADMIN — TICAGRELOR 90 MG: 90 TABLET ORAL at 21:18

## 2024-06-28 RX ADMIN — OXCARBAZEPINE 450 MG: 300 TABLET, FILM COATED ORAL at 09:15

## 2024-06-28 RX ADMIN — Medication 1 TABLET: at 09:15

## 2024-06-28 RX ADMIN — OXYCODONE HYDROCHLORIDE AND ACETAMINOPHEN 500 MG: 500 TABLET ORAL at 09:15

## 2024-06-28 RX ADMIN — Medication 1 CAPSULE: at 21:17

## 2024-06-28 RX ADMIN — SPIRONOLACTONE 25 MG: 25 TABLET ORAL at 09:16

## 2024-06-28 RX ADMIN — OXCARBAZEPINE 450 MG: 300 TABLET, FILM COATED ORAL at 21:18

## 2024-06-28 RX ADMIN — OXYBUTYNIN CHLORIDE 10 MG: 10 TABLET, EXTENDED RELEASE ORAL at 09:15

## 2024-06-28 RX ADMIN — GABAPENTIN 600 MG: 600 TABLET, FILM COATED ORAL at 21:18

## 2024-06-28 RX ADMIN — ASPIRIN 81 MG: 81 TABLET, COATED ORAL at 09:15

## 2024-06-28 RX ADMIN — TICAGRELOR 90 MG: 90 TABLET ORAL at 09:15

## 2024-06-28 RX ADMIN — OXYCODONE HYDROCHLORIDE AND ACETAMINOPHEN 500 MG: 500 TABLET ORAL at 21:18

## 2024-06-28 RX ADMIN — Medication 400 UNITS: at 09:09

## 2024-06-28 RX ADMIN — Medication 1 CAPSULE: at 09:16

## 2024-06-28 RX ADMIN — ROSUVASTATIN CALCIUM 20 MG: 20 TABLET, FILM COATED ORAL at 21:17

## 2024-06-28 RX ADMIN — ERYTHROMYCIN: 5 OINTMENT OPHTHALMIC at 21:18

## 2024-06-28 RX ADMIN — SODIUM CHLORIDE, PRESERVATIVE FREE 10 ML: 5 INJECTION INTRAVENOUS at 21:33

## 2024-06-28 RX ADMIN — MEROPENEM 1000 MG: 1 INJECTION INTRAVENOUS at 04:33

## 2024-06-28 RX ADMIN — POLYVINYL ALCOHOL 4 DROP: 1.4 SOLUTION/ DROPS OPHTHALMIC at 09:20

## 2024-06-28 RX ADMIN — Medication 6 MG: at 09:16

## 2024-06-28 ASSESSMENT — PAIN - FUNCTIONAL ASSESSMENT
PAIN_FUNCTIONAL_ASSESSMENT: NONE - DENIES PAIN
PAIN_FUNCTIONAL_ASSESSMENT: NONE - DENIES PAIN

## 2024-06-28 NOTE — ED NOTES
Pt resting on cot; easy breathing and unlabored. Vitals updated. Call light remains within reach.

## 2024-06-28 NOTE — ED NOTES
Patient resting in bed, eyes closed. Respirations easy and unlabored. No distress noted. Call light within reach.

## 2024-06-28 NOTE — ED NOTES
Patient having periods of apnea and O2 saturation dropping down to the 60s-70s. Provider notified. Patient placed on 4L NC. Patient recovers quickly. Inpatient provider contacted and notified of situation and ask for orders at this time.

## 2024-06-28 NOTE — ED PROVIDER NOTES
PATIENT NAME: Greyson Swift  MRN: [id number]  : 1955  BECKHAM: 2024    I performed a history and physical examination of the patient and discussed management with the Resident. I reviewed the Resident's note and agree with the documented findings and plan of care. Any areas of disagreement are noted on the chart. I was personally present for the key portions of any procedures and have documented in the chart those procedures where I was not present during the key portions. I have reviewed the emergency nurses triage note and agree with the chief complaint, past medical history, past surgical history, allergies, medications, social and family history as documented unless otherwise noted below.    MEDICAL DEDISION MAKING (MDM)     Greyson Swift is a 68 y.o. male who presents to Emergency Department with Unresponsive     PMHx of MS, CAD, HTN. He was brought in for lethargy and unresponsive episodes.   ED workup reveals complicated UTI with hematuria and metabolic encephalopathy  IV Zosyn is given in the ED.  Full CODE STATUS, discussed with and admitted by hospital medicine service    EKG interpreted me as NSR, VR 81 bpm, WV interval 158 ms, QRS duration 80 ms,  ms, no acute ischemic changes    Vitals:    24 2116   BP: 116/76   Pulse: 81   Resp: 15   TempSrc: Oral   SpO2: 94%   Weight: 77.1 kg (170 lb)   Height: 1.702 m (5' 7\")     Labs Reviewed   CBC WITH AUTO DIFFERENTIAL - Abnormal; Notable for the following components:       Result Value    RBC 3.91 (*)     Hemoglobin 11.5 (*)     Hematocrit 36.3 (*)     MCHC 31.7 (*)     RDW-CV 16.6 (*)     RDW-SD 55.8 (*)     MPV 8.8 (*)     All other components within normal limits   BASIC METABOLIC PANEL - Abnormal; Notable for the following components:    Glucose 141 (*)     BUN 31 (*)     All other components within normal limits   TROPONIN - Abnormal; Notable for the following components:    Troponin, High Sensitivity 34 (*)     All other components  within normal limits   AMMONIA - Abnormal; Notable for the following components:    Ammonia 68 (*)     All other components within normal limits   HEPATIC FUNCTION PANEL - Abnormal; Notable for the following components:    Total Bilirubin 0.2 (*)     ALT 8 (*)     All other components within normal limits   URINE WITH REFLEXED MICRO - Abnormal; Notable for the following components:    Blood, Urine LARGE (*)     Protein,  (*)     Nitrite, Urine POSITIVE (*)     Leukocyte Esterase, Urine LARGE (*)     Character, Urine TURBID (*)     All other components within normal limits   TROPONIN - Abnormal; Notable for the following components:    Troponin, High Sensitivity 34 (*)     All other components within normal limits   PROCALCITONIN - Abnormal; Notable for the following components:    Procalcitonin 0.13 (*)     All other components within normal limits   SEDIMENTATION RATE - Abnormal; Notable for the following components:    Sed Rate, Automated 103 (*)     All other components within normal limits   C-REACTIVE PROTEIN - Abnormal; Notable for the following components:    CRP 9.75 (*)     All other components within normal limits   POCT GLUCOSE - Abnormal; Notable for the following components:    POC Glucose 143 (*)     All other components within normal limits   CULTURE, BLOOD 1   CULTURE, BLOOD 1   CULTURE, REFLEXED, URINE    Narrative:     Source: urine, clean catch       Site:           Current Antibiotics: not stated   CULTURE, ANAEROBIC AND AEROBIC   TSH   T4, FREE   MAGNESIUM   LACTATE, SEPSIS   ANION GAP   GLOMERULAR FILTRATION RATE, ESTIMATED   OSMOLALITY   PROTIME-INR   APTT   CBC WITH AUTO DIFFERENTIAL   BASIC METABOLIC PANEL W/ REFLEX TO MG FOR LOW K       CT Head W/O Contrast   Final Result   1. No acute intracranial abnormality.   2. Additional findings as described.      This document has been electronically signed by: Miguel Gallardo MD on    06/27/2024 10:07 PM      All CTs at this facility use dose

## 2024-06-28 NOTE — ED NOTES
ED nurse-to-nurse bedside report    Chief Complaint   Patient presents with    Unresponsive      LOC: alert and orientated to name, place, date  Vital signs   Vitals:    06/28/24 0457 06/28/24 0558 06/28/24 0653 06/28/24 0745   BP: (!) 126/54 133/61 (!) 132/54 (!) 140/67   Pulse: 62 77 79 71   Resp: 13 13 15 14   Temp:       TempSrc:       SpO2: 95% 100% 100% 96%   Weight:       Height:          Pain:    Pain Interventions: AN  Pain Goal: 0  Oxygen: Yes    Current needs required 1L   Telemetry: Yes  LDAs:   Peripheral IV 06/27/24 Right Forearm (Active)   Site Assessment Clean, dry & intact 06/28/24 0655   Line Status Infusing 06/28/24 0655   Phlebitis Assessment No symptoms 06/28/24 0655   Infiltration Assessment 0 06/28/24 0655   Dressing Status Clean, dry & intact 06/28/24 0655     Continuous Infusions:    sodium chloride      sodium chloride 100 mL/hr at 06/28/24 0430     Mobility: Fully dependent  Sheldon Fall Risk Score:        No data to display              Fall Interventions: call light in reach, bed in low position with wheels locked, side rails up x2  Report given to: Inge JENKINS

## 2024-06-28 NOTE — ED NOTES
VS stable. Telemetry in place. Call light in reach. Patient denies pain at this time. Patient awakens when spoken to.

## 2024-06-28 NOTE — ED NOTES
Report from ALICE Boyd. Pt resting on cot. No distress noted. RR even and unlabored. VSS. Call light in reach. Pt awakens with loud stimuli.

## 2024-06-28 NOTE — H&P
Hospitalist History & Physical    Patient:  Greyson Swift    Unit/Bed:02/002A  YOB: 1955  MRN: [id number]   Acct: [id number]   PCP: Noah Benitez MD  Code Status: Prior    Date of Service: Pt seen/examined on 06/27/24 and admitted to Inpatient with expected LOS greater than two midnights due to medical therapy.     Chief Complaint: unresponsive    Assessment/Plan:    Complicated UTI with hematuria: Patient has a chronic SP catheter related to MS. SIRS 0/4. LA 0.9. UA shows large leukocytes, positive nitrites, 100 protein, large blood, many bacteria. ED initiated Cefepime, Vancomycin, 1L fluid bolus.    Confirm with ED that SP cath was changed prior to UA being sent, if not change cath and resend UA  Contact Isolation  BC x2 pending  Procal, ESR, CRP pending  Wound culture pending  Follow urine culture, history of MDRO, ESBL-previous positive for E. Coli  Change to Merrem as this is sensitive to above culture and was used during last admit   Continue IVF  Consider ID consult if no improvement    Metabolic encephalopathy, improving: Difficult to arouse on arrival, not responsive to verbal stimuli or sternal rub per EMS. Sent to ED for AMS. Likely due to above. Patient alert to name, place on assessment. Able to answer questions appropriately.   CT Head without acute intracranial abnormality.   Ammonia 68  Neuro checks Q4H  Telemetry monitoring  NPO until more alert then may advance diet as tolerated    HFpEF: 2/2024 Echo: EF 50-55%, indeterminate diastolic function. Not on any home medications. No signs of acute exacerbation.   Daily weights, I&O  Low sodium diet when able    CAD: s/p PCI RCA with DEANA x 1 on 11/29/23. Follows with Dr. Magaña as OP.   Continue ASA, statin, Brillinta    Essential HTN:   Continue metoprolol with holding paramenters  Continue spironolactone    MS with bilateral paraplegia: Bedbound. Follows with Neurology as OP.   Continue baclofen, gabapentin,  atelectasis. This document has been electronically signed by: Miguel Gallardo MD on 06/27/2024 09:41 PM            Thank you Noah Benitez MD for the opportunity to be involved in this patient's care.    Electronically signed by TONO Orona CNP on 6/27/2024 at 11:27 PM

## 2024-06-28 NOTE — ED NOTES
Pt resting on cot; denies any needs at this time. No visible signs of distress. Repositioned on cot. Call light remains within reach.

## 2024-06-28 NOTE — ED NOTES
Patient resting on cot. VS stable. Patient medicated per MAR. Patient able to tell this RN name and birthday at this time. Telemetry in place.

## 2024-06-28 NOTE — ED NOTES
VS stable. Telemetry in place. Call light in reach. Patient able to tell this RN his name and date of birth. Patient having difficulty staying awake but responding intermittently to staff voice.

## 2024-06-28 NOTE — ED PROVIDER NOTES
[facility] EMERGENCY DEPT      EMERGENCY MEDICINE     Pt Name: Greyson Swift  MRN: [id number]  Birthdate 1955  Date of evaluation: 6/27/2024  Provider: Seamus Mccullough DO  Supervising Physician: Pierce Marshall MD    CHIEF COMPLAINT       Chief Complaint   Patient presents with    Unresponsive   AMS  HISTORY OF PRESENT ILLNESS   Greyson Swift is a 68 y.o. male with a h/o MS, ESBL, PE, HTN, neurogenic bladder who presents to the emergency department from SNF via EMS for evaluation of altered mental status.  Reportedly patient's last known normal was noon.  Facility and EMS note that he was very difficult to arouse, no known fall or recent medication changes.  Patient was not verbal or responsive to sternal rub per EMS staff.    PASTMEDICAL HISTORY     Past Medical History:   Diagnosis Date    Dysphagia     oropharyngeal    History of ESBL E. coli infection     History of pulmonary embolism     History of urinary tract infection     Hx of blood clots     Pulmonary embolis    Hypertension     Left ureteral stone 08/22/2023    s/p stent placement    Major depressive disorder, single episode     MS (multiple sclerosis) ([facility])     Multiple sclerosis ([facility]) 1994    Neurogenic bladder 02/2012    Dr. Mitchell placed cather    NSTEMI (non-ST elevated myocardial infarction) ([facility]) 08/2023    Staged PCI    Osteomyelitis ([facility])     UTI (urinary tract infection)        Patient Active Problem List   Diagnosis Code    Neurogenic bladder N31.9    Multiple sclerosis ([facility]) G35    MS (multiple sclerosis) ([facility]) G35    Urinary retention R33.9    Chronic indwelling Pompa catheter Z97.8    Decubitus ulcer of coccyx L89.159    Acute metabolic encephalopathy G93.41    Speech disturbance R47.9    Wound infection T14.8XXA, L08.9    Elevated INR R79.1    Chronic osteomyelitis, pelvis, right ([facility]) M86.651    Osteomyelitis ([facility]) M86.9    Urinary tract infection associated with indwelling urethral catheter ([facility]) T83.511A, N39.0    Abnormal  Prescriptions    No medications on file       FINAL IMPRESSION      1. Sepsis without acute organ dysfunction, due to unspecified organism (HCC)    2. Acute cystitis with hematuria    3. Disorientation          DISPOSITION/PLAN   DISPOSITION Decision To Admit 06/27/2024 11:20:58 PM      OUTPATIENT FOLLOW UP THE PATIENT:  No follow-up provider specified.    Seamus Mccullough DO    This transcription was electronically signed. Parts of this transcriptions may have been dictated by use of voice recognition software and electronically transcribed, and parts may have been transcribed with the assistance of an ED scribe. The transcription may contain errors not detected in proofreading.  Please refer to my supervising physician's documentation if my documentation differs.    Electronically Signed: Seamus Mccullough DO, 06/27/24, 11:48 PM

## 2024-06-28 NOTE — ED NOTES
ED to inpatient nurses report      Chief Complaint:  Chief Complaint   Patient presents with    Unresponsive     Present to ED from: [facility]    MOA:     LOC: alert to only name  Mobility: Fully dependent  Oxygen Baseline: room air    Current needs required: room air     Code Status:   Prior    What abnormal results were found and what did you give/do to treat them? Vancomycin, Cefepime.  Any procedures or intervention occur? NA    Mental Status:  Level of Consciousness: Responds to voice (1)    Psych Assessment:        Vitals:  Patient Vitals for the past 24 hrs:   BP Temp Temp src Pulse Resp SpO2 Height Weight   06/27/24 2308 (!) 123/48 -- -- 68 15 -- -- --   06/27/24 2211 116/76 -- -- 61 13 96 % -- --   06/27/24 2116 116/76 98.8 °F (37.1 °C) Rectal 81 15 94 % 1.702 m (5' 7\") 77.1 kg (170 lb)        LDAs:   Peripheral IV 06/27/24 Right Forearm (Active)   Site Assessment Clean, dry & intact 06/27/24 2309   Line Status Infusing 06/27/24 2309   Phlebitis Assessment No symptoms 06/27/24 2309   Infiltration Assessment 0 06/27/24 2309   Dressing Status Clean, dry & intact 06/27/24 2309       Ambulatory Status:  No data recorded    Diagnosis:  DISPOSITION Decision To Admit 06/27/2024 11:20:58 PM   Final diagnoses:   Sepsis without acute organ dysfunction, due to unspecified organism (HCC)   Acute cystitis with hematuria   Disorientation        Consults:  PHARMACY TO DOSE VANCOMYCIN     Pain Score:  Pain Assessment  Pain Assessment: Face, Legs, Activity, Cry, and Consolability (FLACC)  Winters-Baker Pain Rating: Hurts even more    C-SSRS:        Sepsis Screening:       Mike Fall Risk:       Swallow Screening        Preferred Language:   English      ALLERGIES     Patient has no known allergies.    SURGICAL HISTORY       Past Surgical History:   Procedure Laterality Date    ANKLE SURGERY  1996    broken ankle    BLADDER SURGERY  2-    Suprapubic catheter placement    BRONCHOSCOPY N/A 12/26/2022    BRONCHOSCOPY

## 2024-06-28 NOTE — ED NOTES
Spoke with 8A/B Kelsie to approve pt transport to [facility]. In stable condition.

## 2024-06-28 NOTE — PROGRESS NOTES
Hospitalist Progress Note      Patient:  Greyson Swift 68 y.o. male       : 1955  Unit/Bed:002A    Date of Admission: 2024      ASSESSMENT AND PLAN    Active Problems  Complicated UTI with hematuria, chronic suprapubic catheter: UA shows leukocytes, positive nitrites, many bacteria.  Initiated cefepime, bank, 1 L fluid bolus.  Discussed with RN, contact resource for suprapubic catheter exchange and send new culture  ABX changed to Merrem given last culture results with ESBL, MDRO  Follow urine culture, blood culture    Acute hypoxic respiratory failure: Periods of apnea with sleeping, dropped to the 70s.  Placed on 4 L nasal cannula, weaned to 1 L nasal cannula.  Encourage pulmonary hygiene and wean as tolerated      Resolved Problems  Metabolic encephalopathy, resolved: Difficult to arouse on arrival, not responsive to verbal stimuli or sternal rub per EMS.  Likely due to acute infection.  Patient alert and oriented x 4 on my assessment.  CTh without acute intracranial abnormality  Ammonia 68    Chronic Conditions (reviewed, stable, and home medications resumed, unless otherwise stated)  Chronic HFpEF: Echo 2023 EF 50 to 55% with indeterminate diastolic function.  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

## 2024-06-28 NOTE — ED NOTES
Patient opens eyes when this RN is speaking with patient. Patient denies any pain at this time. VS stable. Telemetry in place. IV fluids infusing. Call light in reach. Patient denies needs at this time.

## 2024-06-28 NOTE — ED TRIAGE NOTES
Patient presents to ED from [facility] via EMS with C/O unresponsive. Dr Mccullough at patient bedside at this time. Dr Mccullough sternal rubbing patient to get a response from patient. Dr Marshall at bedside. Nursing staff at [facility] report that the patient LKW is before noon. Nursing staff at [facility] report he has history of MS and an ostomy and the last time he was in this state he was septic. VS stable. EKG complete at this time. Patient bedside glucose obtained; 143.

## 2024-06-29 LAB
ANION GAP SERPL CALC-SCNC: 10 MEQ/L (ref 8–16)
BACTERIA UR CULT: ABNORMAL
BUN SERPL-MCNC: 19 MG/DL (ref 7–22)
CALCIUM SERPL-MCNC: 8.6 MG/DL (ref 8.5–10.5)
CHLORIDE SERPL-SCNC: 104 MEQ/L (ref 98–111)
CO2 SERPL-SCNC: 25 MEQ/L (ref 23–33)
CREAT SERPL-MCNC: 0.2 MG/DL (ref 0.4–1.2)
DEPRECATED RDW RBC AUTO: 56.9 FL (ref 35–45)
ERYTHROCYTE [DISTWIDTH] IN BLOOD BY AUTOMATED COUNT: 16.2 % (ref 11.5–14.5)
GFR SERPL CREATININE-BSD FRML MDRD: > 90 ML/MIN/1.73M2
GLUCOSE SERPL-MCNC: 80 MG/DL (ref 70–108)
HCT VFR BLD AUTO: 34 % (ref 42–52)
HGB BLD-MCNC: 10.4 GM/DL (ref 14–18)
MCH RBC QN AUTO: 29.1 PG (ref 26–33)
MCHC RBC AUTO-ENTMCNC: 30.6 GM/DL (ref 32.2–35.5)
MCV RBC AUTO: 95 FL (ref 80–94)
ORGANISM: ABNORMAL
PLATELET # BLD AUTO: 227 THOU/MM3 (ref 130–400)
PMV BLD AUTO: 9 FL (ref 9.4–12.4)
POTASSIUM SERPL-SCNC: 4 MEQ/L (ref 3.5–5.2)
RBC # BLD AUTO: 3.58 MILL/MM3 (ref 4.7–6.1)
SODIUM SERPL-SCNC: 139 MEQ/L (ref 135–145)
WBC # BLD AUTO: 5.3 THOU/MM3 (ref 4.8–10.8)

## 2024-06-29 PROCEDURE — 87186 SC STD MICRODIL/AGAR DIL: CPT

## 2024-06-29 PROCEDURE — 36415 COLL VENOUS BLD VENIPUNCTURE: CPT

## 2024-06-29 PROCEDURE — 87086 URINE CULTURE/COLONY COUNT: CPT

## 2024-06-29 PROCEDURE — 6360000002 HC RX W HCPCS

## 2024-06-29 PROCEDURE — 99232 SBSQ HOSP IP/OBS MODERATE 35: CPT

## 2024-06-29 PROCEDURE — 2700000000 HC OXYGEN THERAPY PER DAY

## 2024-06-29 PROCEDURE — 5A09357 ASSISTANCE WITH RESPIRATORY VENTILATION, LESS THAN 24 CONSECUTIVE HOURS, CONTINUOUS POSITIVE AIRWAY PRESSURE: ICD-10-PCS | Performed by: PHYSICIAN ASSISTANT

## 2024-06-29 PROCEDURE — 87147 CULTURE TYPE IMMUNOLOGIC: CPT

## 2024-06-29 PROCEDURE — 2580000003 HC RX 258

## 2024-06-29 PROCEDURE — 87075 CULTR BACTERIA EXCEPT BLOOD: CPT

## 2024-06-29 PROCEDURE — 94660 CPAP INITIATION&MGMT: CPT

## 2024-06-29 PROCEDURE — 87070 CULTURE OTHR SPECIMN AEROBIC: CPT

## 2024-06-29 PROCEDURE — 87077 CULTURE AEROBIC IDENTIFY: CPT

## 2024-06-29 PROCEDURE — 6370000000 HC RX 637 (ALT 250 FOR IP)

## 2024-06-29 PROCEDURE — 1200000000 HC SEMI PRIVATE

## 2024-06-29 PROCEDURE — 80048 BASIC METABOLIC PNL TOTAL CA: CPT

## 2024-06-29 PROCEDURE — 94761 N-INVAS EAR/PLS OXIMETRY MLT: CPT

## 2024-06-29 PROCEDURE — 1200000003 HC TELEMETRY R&B

## 2024-06-29 PROCEDURE — 85027 COMPLETE CBC AUTOMATED: CPT

## 2024-06-29 RX ORDER — ACETIC ACID 0.25 G/100ML
60 IRRIGANT IRRIGATION
Status: DISCONTINUED | OUTPATIENT
Start: 2024-07-01 | End: 2024-07-03 | Stop reason: HOSPADM

## 2024-06-29 RX ADMIN — MEROPENEM 1000 MG: 1 INJECTION INTRAVENOUS at 10:06

## 2024-06-29 RX ADMIN — FAMOTIDINE 20 MG: 20 TABLET, FILM COATED ORAL at 21:13

## 2024-06-29 RX ADMIN — POLYVINYL ALCOHOL 4 DROP: 1.4 SOLUTION/ DROPS OPHTHALMIC at 01:10

## 2024-06-29 RX ADMIN — SODIUM CHLORIDE, PRESERVATIVE FREE 10 ML: 5 INJECTION INTRAVENOUS at 10:11

## 2024-06-29 RX ADMIN — METOPROLOL SUCCINATE 50 MG: 50 TABLET, EXTENDED RELEASE ORAL at 10:10

## 2024-06-29 RX ADMIN — Medication 6 MG: at 10:10

## 2024-06-29 RX ADMIN — Medication 1 CAPSULE: at 10:10

## 2024-06-29 RX ADMIN — OXYBUTYNIN CHLORIDE 10 MG: 10 TABLET, EXTENDED RELEASE ORAL at 10:10

## 2024-06-29 RX ADMIN — POLYVINYL ALCOHOL 4 DROP: 1.4 SOLUTION/ DROPS OPHTHALMIC at 13:19

## 2024-06-29 RX ADMIN — POLYVINYL ALCOHOL 4 DROP: 1.4 SOLUTION/ DROPS OPHTHALMIC at 21:13

## 2024-06-29 RX ADMIN — ROSUVASTATIN CALCIUM 20 MG: 20 TABLET, FILM COATED ORAL at 21:14

## 2024-06-29 RX ADMIN — GABAPENTIN 600 MG: 600 TABLET, FILM COATED ORAL at 18:23

## 2024-06-29 RX ADMIN — OXCARBAZEPINE 450 MG: 300 TABLET, FILM COATED ORAL at 10:10

## 2024-06-29 RX ADMIN — FAMOTIDINE 20 MG: 20 TABLET, FILM COATED ORAL at 10:13

## 2024-06-29 RX ADMIN — Medication 400 UNITS: at 10:10

## 2024-06-29 RX ADMIN — ERYTHROMYCIN: 5 OINTMENT OPHTHALMIC at 21:13

## 2024-06-29 RX ADMIN — GABAPENTIN 600 MG: 600 TABLET, FILM COATED ORAL at 10:12

## 2024-06-29 RX ADMIN — TICAGRELOR 90 MG: 90 TABLET ORAL at 21:13

## 2024-06-29 RX ADMIN — POLYVINYL ALCOHOL 4 DROP: 1.4 SOLUTION/ DROPS OPHTHALMIC at 10:09

## 2024-06-29 RX ADMIN — Medication 1 CAPSULE: at 21:13

## 2024-06-29 RX ADMIN — METOPROLOL SUCCINATE 50 MG: 50 TABLET, EXTENDED RELEASE ORAL at 21:13

## 2024-06-29 RX ADMIN — ASPIRIN 81 MG: 81 TABLET, COATED ORAL at 10:10

## 2024-06-29 RX ADMIN — MEROPENEM 1000 MG: 1 INJECTION INTRAVENOUS at 01:11

## 2024-06-29 RX ADMIN — GABAPENTIN 600 MG: 600 TABLET, FILM COATED ORAL at 21:13

## 2024-06-29 RX ADMIN — GABAPENTIN 600 MG: 600 TABLET, FILM COATED ORAL at 13:18

## 2024-06-29 RX ADMIN — BACLOFEN 10 MG: 10 TABLET ORAL at 13:18

## 2024-06-29 RX ADMIN — OXYCODONE HYDROCHLORIDE AND ACETAMINOPHEN 500 MG: 500 TABLET ORAL at 10:10

## 2024-06-29 RX ADMIN — MEROPENEM 1000 MG: 1 INJECTION INTRAVENOUS at 18:22

## 2024-06-29 RX ADMIN — SODIUM CHLORIDE: 9 INJECTION, SOLUTION INTRAVENOUS at 18:21

## 2024-06-29 RX ADMIN — BACLOFEN 10 MG: 10 TABLET ORAL at 10:10

## 2024-06-29 RX ADMIN — OXCARBAZEPINE 450 MG: 300 TABLET, FILM COATED ORAL at 21:13

## 2024-06-29 RX ADMIN — OXYCODONE HYDROCHLORIDE AND ACETAMINOPHEN 500 MG: 500 TABLET ORAL at 21:13

## 2024-06-29 RX ADMIN — POLYVINYL ALCOHOL 4 DROP: 1.4 SOLUTION/ DROPS OPHTHALMIC at 18:23

## 2024-06-29 RX ADMIN — TICAGRELOR 90 MG: 90 TABLET ORAL at 10:10

## 2024-06-29 RX ADMIN — ENOXAPARIN SODIUM 40 MG: 100 INJECTION SUBCUTANEOUS at 10:09

## 2024-06-29 RX ADMIN — DOCUSATE SODIUM 100 MG: 100 CAPSULE, LIQUID FILLED ORAL at 10:12

## 2024-06-29 RX ADMIN — Medication 1 TABLET: at 10:10

## 2024-06-29 RX ADMIN — BACLOFEN 10 MG: 10 TABLET ORAL at 21:13

## 2024-06-29 NOTE — PROGRESS NOTES
Dressing changed to unstagable  wound to right hip. Tolerated well.  Cultures was retrieved at this time.

## 2024-06-29 NOTE — PROGRESS NOTES
Suprapubic catheter exchanged per request from Fani España CNP. After insertion the catheter needed to be irrigated due to large amounts of sediment. Severe hypergranulation that is inflamed was noted around catheter prior to exchange. Patient tolerated the exchange well with minimal discomfort on insertion; however, some minor resistance was met with insertion about 0.5 inch in. Balloon was inflated with no discomfort. Fani Dai CNP updated.

## 2024-06-29 NOTE — PROGRESS NOTES
Hospitalist Progress Note      Patient:  Greyson Swift    Unit/Bed:8A-17/017-A  YOB: 1955  MRN: [id number]   Acct: [id number]   PCP: Noah Benitez MD  Date of Admission: 6/27/2024    ASSESSMENT AND PLAN  Active Problems  Complicated UTI with hematuria, chronic suprapubic catheter: UA shows leukocytes, positive nitrites, many bacteria.  Initiated cefepime, vanc in ED, 1 L fluid bolus.  ABX changed to Merrem given last culture results with ESBL, MDRO  Initial urine culture showing mixed growth.  6/29 suprapubic catheter exchanged with new urine sent.  Continue meropenem at this time.  Follow repeat urine culture.  Blood cultures NGTD.  Urology consulted for erythema and hypergranulation around stoma of suprapubic catheter.  Discussed with urology.  Continue Ditropan, hand irrigate with sterile solution as needed to maintain patency of catheter.     Acute hypoxic respiratory failure, unclear etiology: Periods of apnea with sleeping, dropped to the 70s.  No history of sleep apnea per chart review.  Placed on 4 L nasal cannula.  Chest x-ray on admission showed mild left lower lobe atelectasis.  Discussed with [facility].  Patient is not typically on any oxygen or BiPAP at night.  He has no other respiratory complaints.  WBC is within normal limits. Encourage pulmonary hygiene and wean as tolerated.  Currently 97% on 2 L nasal cannula.  If unable to be weaned, consider repeat imaging or additional testing.          Resolved Problems  Metabolic encephalopathy, resolved: Difficult to arouse on arrival, not responsive to verbal stimuli or sternal rub per EMS.  Likely due to acute infection.  Patient alert and oriented x 4 on my assessment.  CTh without acute intracranial abnormality  Ammonia 68  6/29 alert and oriented x 4     Chronic Conditions (reviewed, stable, and home medications resumed, unless otherwise stated)  Chronic    when appropriate to reduce radiation to a low as reasonably achievable.      XR CHEST PORTABLE   Final Result   Mild left lower lobe atelectasis.      This document has been electronically signed by: Miguel Gallardo MD on    06/27/2024 09:41 PM        CT Head W/O Contrast    Result Date: 6/27/2024  EXAM: CT Head Without Intravenous Contrast COMPARISON: CT head 07/20/2022 FINDINGS: BRAIN AND EXTRA-AXIAL SPACES: Redemonstrated lacunar infarcts in the right basal ganglia. Scattered subcortical and periventricular hypoattenuation, likely in keeping with chronic small vessel ischemic disease. Parenchymal volume loss with compensatory prominence of the ventricles and CSF spaces. No acute territorial infarction, intracranial hemorrhage, midline shift or hydrocephalus. BONES/JOINTS: Unremarkable. No acute fracture. SOFT TISSUES: Unremarkable. SINUSES: Unremarkable as visualized. No acute sinusitis. MASTOID AIR CELLS: Unremarkable as visualized. No mastoid effusion. ORBITS: Left lens extraction.     1. No acute intracranial abnormality. 2. Additional findings as described. This document has been electronically signed by: Miguel Gallardo MD on 06/27/2024 10:07 PM All CTs at this facility use dose modulation techniques and iterative reconstructions, and/or weight-based dosing when appropriate to reduce radiation to a low as reasonably achievable.    XR CHEST PORTABLE    Result Date: 6/27/2024  1 view chest x-ray Comparison: Chest x-ray 01/15/2024 Findings: Mild left lower lobe atelectasis. No significant pleural effusion or pneumothorax. Normal size heart. No acute fracture.     Mild left lower lobe atelectasis. This document has been electronically signed by: Miguel Gallardo MD on 06/27/2024 09:41 PM      Electronically signed by TONO Irby CNP on 6/29/2024 at 4:19 PM

## 2024-06-29 NOTE — CARE COORDINATION
06/29/24 1117   Service Assessment   Patient Orientation Alert and Oriented;Person;Place;Situation;Self   Cognition Alert   History Provided By Patient   Primary Caregiver Self   Accompanied By/Relationship N/A   Support Systems Other (Comment)  ([facility])   PCP Verified by CM No  ([facility])   Prior Functional Level Assistance with the following:;Dressing;Toileting;Bathing;Shopping;Mobility;Cooking;Housework;Feeding   Current Functional Level Assistance with the following:;Bathing;Dressing;Toileting;Feeding;Cooking;Housework;Shopping;Mobility   Can patient return to prior living arrangement Yes   Ability to make needs known: Good   Family able to assist with home care needs: Other (comment)  ([facility])   Would you like for me to discuss the discharge plan with any other family members/significant others, and if so, who? Yes  (Spouse)   Financial Resources Medicaid;Medicare   Community Resources Other (Comment)  ([facility])   CM/SW Referral Other (see comment)  ([facility])   Discharge Planning   Type of Residence Long-Term Acute Care   Living Arrangements Other (Comment)  ([facility])   Current Services Prior To Admission Durable Medical Equipment   Current DME Prior to Arrival Shower Chair;Wheelchair   Potential Assistance Needed N/A   DME Ordered? No   Potential Assistance Purchasing Medications No   Type of Home Care Services None   Patient expects to be discharged to: Long-term care   Follow Up Appointment: Best Day/Time  Tuesday AM   One/Two Story Residence One story   History of falls? 0   Services At/After Discharge   Transition of Care Consult (CM Consult) Long Term Care   Services At/After Discharge None   [place] Resource Information Provided? No   Mode of Transport at Discharge ALS   Condition of Participation: Discharge Planning   The Plan for Transition of Care is related to the following treatment goals: To return to [facility]   The Patient and/or Patient  Representative was provided with a Choice of Provider? Patient   The Patient and/Or Patient Representative agree with the Discharge Plan? Yes   Freedom of Choice list was provided with basic dialogue that supports the patient's individualized plan of care/goals, treatment preferences, and shares the quality data associated with the providers?  No  (Already at [facility])     Patient Goals/Plan/Treatment Preferences: To return to [facility]     If patient is discharged prior to next notation, then this note serves as note for discharge by case management.

## 2024-06-29 NOTE — PROGRESS NOTES
Suprapubic cath was irrigated with 180 ml of sterile water and 140 ml of clear yellow urine was returned.

## 2024-06-30 ENCOUNTER — APPOINTMENT (OUTPATIENT)
Dept: GENERAL RADIOLOGY | Age: 69
DRG: 698 | End: 2024-06-30
Payer: MEDICARE

## 2024-06-30 LAB
FLUAV RNA RESP QL NAA+PROBE: NOT DETECTED
FLUBV RNA RESP QL NAA+PROBE: NOT DETECTED
SARS-COV-2 RNA RESP QL NAA+PROBE: NOT DETECTED

## 2024-06-30 PROCEDURE — 1200000000 HC SEMI PRIVATE

## 2024-06-30 PROCEDURE — 6370000000 HC RX 637 (ALT 250 FOR IP)

## 2024-06-30 PROCEDURE — 87636 SARSCOV2 & INF A&B AMP PRB: CPT

## 2024-06-30 PROCEDURE — 6360000002 HC RX W HCPCS

## 2024-06-30 PROCEDURE — 99221 1ST HOSP IP/OBS SF/LOW 40: CPT | Performed by: UROLOGY

## 2024-06-30 PROCEDURE — 2580000003 HC RX 258

## 2024-06-30 PROCEDURE — 71045 X-RAY EXAM CHEST 1 VIEW: CPT

## 2024-06-30 PROCEDURE — 1200000003 HC TELEMETRY R&B

## 2024-06-30 RX ADMIN — TICAGRELOR 90 MG: 90 TABLET ORAL at 20:19

## 2024-06-30 RX ADMIN — BACLOFEN 10 MG: 10 TABLET ORAL at 15:08

## 2024-06-30 RX ADMIN — FAMOTIDINE 20 MG: 20 TABLET, FILM COATED ORAL at 10:10

## 2024-06-30 RX ADMIN — BACLOFEN 10 MG: 10 TABLET ORAL at 10:06

## 2024-06-30 RX ADMIN — Medication 1 CAPSULE: at 10:08

## 2024-06-30 RX ADMIN — ASPIRIN 81 MG: 81 TABLET, COATED ORAL at 10:09

## 2024-06-30 RX ADMIN — POLYVINYL ALCOHOL 4 DROP: 1.4 SOLUTION/ DROPS OPHTHALMIC at 10:11

## 2024-06-30 RX ADMIN — OXYBUTYNIN CHLORIDE 10 MG: 10 TABLET, EXTENDED RELEASE ORAL at 10:08

## 2024-06-30 RX ADMIN — TICAGRELOR 90 MG: 90 TABLET ORAL at 10:08

## 2024-06-30 RX ADMIN — Medication 1 TABLET: at 10:08

## 2024-06-30 RX ADMIN — SPIRONOLACTONE 25 MG: 25 TABLET ORAL at 10:08

## 2024-06-30 RX ADMIN — ENOXAPARIN SODIUM 40 MG: 100 INJECTION SUBCUTANEOUS at 10:09

## 2024-06-30 RX ADMIN — ERYTHROMYCIN: 5 OINTMENT OPHTHALMIC at 20:19

## 2024-06-30 RX ADMIN — MEROPENEM 1000 MG: 1 INJECTION INTRAVENOUS at 17:03

## 2024-06-30 RX ADMIN — OXCARBAZEPINE 450 MG: 300 TABLET, FILM COATED ORAL at 20:19

## 2024-06-30 RX ADMIN — GABAPENTIN 600 MG: 600 TABLET, FILM COATED ORAL at 14:27

## 2024-06-30 RX ADMIN — FAMOTIDINE 20 MG: 20 TABLET, FILM COATED ORAL at 20:19

## 2024-06-30 RX ADMIN — SODIUM CHLORIDE, PRESERVATIVE FREE 10 ML: 5 INJECTION INTRAVENOUS at 10:09

## 2024-06-30 RX ADMIN — ROSUVASTATIN CALCIUM 20 MG: 20 TABLET, FILM COATED ORAL at 20:19

## 2024-06-30 RX ADMIN — METOPROLOL SUCCINATE 50 MG: 50 TABLET, EXTENDED RELEASE ORAL at 20:23

## 2024-06-30 RX ADMIN — MEROPENEM 1000 MG: 1 INJECTION INTRAVENOUS at 09:57

## 2024-06-30 RX ADMIN — POLYVINYL ALCOHOL 4 DROP: 1.4 SOLUTION/ DROPS OPHTHALMIC at 20:19

## 2024-06-30 RX ADMIN — BACLOFEN 10 MG: 10 TABLET ORAL at 20:19

## 2024-06-30 RX ADMIN — GABAPENTIN 600 MG: 600 TABLET, FILM COATED ORAL at 10:08

## 2024-06-30 RX ADMIN — MEROPENEM 1000 MG: 1 INJECTION INTRAVENOUS at 01:07

## 2024-06-30 RX ADMIN — OXYCODONE HYDROCHLORIDE AND ACETAMINOPHEN 500 MG: 500 TABLET ORAL at 10:09

## 2024-06-30 RX ADMIN — POLYVINYL ALCOHOL 4 DROP: 1.4 SOLUTION/ DROPS OPHTHALMIC at 15:08

## 2024-06-30 RX ADMIN — Medication 1 CAPSULE: at 20:19

## 2024-06-30 RX ADMIN — DOCUSATE SODIUM 100 MG: 100 CAPSULE, LIQUID FILLED ORAL at 10:06

## 2024-06-30 RX ADMIN — POLYVINYL ALCOHOL 4 DROP: 1.4 SOLUTION/ DROPS OPHTHALMIC at 12:00

## 2024-06-30 RX ADMIN — GABAPENTIN 600 MG: 600 TABLET, FILM COATED ORAL at 20:19

## 2024-06-30 RX ADMIN — Medication 400 UNITS: at 10:04

## 2024-06-30 RX ADMIN — Medication 6 MG: at 10:04

## 2024-06-30 RX ADMIN — OXCARBAZEPINE 450 MG: 300 TABLET, FILM COATED ORAL at 10:05

## 2024-06-30 RX ADMIN — OXYCODONE HYDROCHLORIDE AND ACETAMINOPHEN 500 MG: 500 TABLET ORAL at 20:19

## 2024-06-30 RX ADMIN — GABAPENTIN 600 MG: 600 TABLET, FILM COATED ORAL at 17:03

## 2024-06-30 RX ADMIN — METOPROLOL SUCCINATE 50 MG: 50 TABLET, EXTENDED RELEASE ORAL at 10:09

## 2024-06-30 NOTE — PROGRESS NOTES
Hospitalist Progress Note      Patient:  Greyson Swift    Unit/Bed:8A-17/017-A  YOB: 1955  MRN: [id number]   Acct: [id number]   PCP: Noah Benitez MD  Date of Admission: 6/27/2024    ASSESSMENT AND PLAN  Active Problems  Complicated UTI with hematuria, chronic suprapubic catheter: UA shows leukocytes, positive nitrites, many bacteria.  Initiated cefepime, vanc in ED, 1 L fluid bolus.  ABX changed to Merrem given last culture results with ESBL, MDRO  Initial urine culture showing mixed growth.  6/29 suprapubic catheter exchanged with new urine sent.  Continue meropenem at this time.  Follow repeat urine culture.  Blood cultures NGTD.  Urology consulted for erythema and hypergranulation around stoma of suprapubic catheter.  Discussed with urology.  Continue Ditropan, hand irrigate with sterile solution as needed to maintain patency of catheter.  6/30 repeat urine culture s/p catheter exchange no growth preliminary.  Continue to follow.     Acute hypoxic respiratory failure, unclear etiology: Periods of apnea with sleeping, dropped to the 70s.  No history of sleep apnea per chart review.  Placed on 4 L nasal cannula.  Chest x-ray on admission showed mild left lower lobe atelectasis.  Discussed with [facility].  Patient is not typically on any oxygen or BiPAP at night.  He has no other respiratory complaints.  WBC is within normal limits. Encourage pulmonary hygiene and wean as tolerated.  Currently 97% on 2 L nasal cannula. 6/30 repeat chest x-ray this morning shows atelectasis or infiltrate and effusion at the left lung base.  Currently on meropenem due to #1.  Will also check flu and COVID panel.  Patient continues to deny shortness of breath, cough.          Resolved Problems  Metabolic encephalopathy, resolved: Difficult to arouse on arrival, not responsive to verbal stimuli or sternal rub per EMS.  Likely due to

## 2024-06-30 NOTE — CONSULTS
[facility] MED SURG 8AB  [address] [facility] [address]  Dept: [phone]  Loc: [phone]  Visit Date: 6/27/2024    Urology Consult Note    Reason for Consult:  erythema, hypergranulation around suprapubic stoma, resistance and sediment when exchanged, UTI   Requesting Physician:  medicine    History Obtained From:  patient, electronic medical record    Chief Complaint: unresponsive     HISTORY OF PRESENT ILLNESS:                The patient is a 68 y.o. male with significant past medical history of PMHx of MS, CAD, HTN who presented to [facility] with chief complaint of unresponsive. Patient alert and pleasant on exam. Denies any pain. Denies headache, dizziness, or lightheadedness. Denies fever or chills. Denies any recent illness or cough. Denies chest pain or SOB. Denies abdominal pain or nausea.    Hx of NGB, managed with SPT  SPT exchanged yesterday by resource nurse  Uro consulted for above    Past Medical History:        Diagnosis Date    Dysphagia     oropharyngeal    History of ESBL E. coli infection     History of pulmonary embolism     History of urinary tract infection     Hx of blood clots     Pulmonary embolis    Hypertension     Left ureteral stone 08/22/2023    s/p stent placement    Major depressive disorder, single episode     MS (multiple sclerosis) ([facility])     Multiple sclerosis ([facility]) 1994    Neurogenic bladder 02/2012    Dr. Mitchell placed cather    NSTEMI (non-ST elevated myocardial infarction) ([facility]) 08/2023    Staged PCI    Osteomyelitis ([facility])     UTI (urinary tract infection)      Past Surgical History:        Procedure Laterality Date    ANKLE SURGERY  1996    broken ankle    BLADDER SURGERY  2-    Suprapubic catheter placement    BRONCHOSCOPY N/A 12/26/2022    BRONCHOSCOPY ALVEOLAR LAVAGE performed by Bronson Garg MD at [facility]    CARDIAC PROCEDURE N/A 11/29/2023    Coronary angiography performed by Aren Magaña MD  1982     Years since quittin.5     Passive exposure: Past    Smokeless tobacco: Former   Vaping Use    Vaping Use: Never used   Substance and Sexual Activity    Alcohol use: No     Comment: \"quit alcohol a few years ago\"    Drug use: No    Sexual activity: Yes     Partners: Female   Other Topics Concern    Not on file   Social History Narrative    Not on file     Social Determinants of Health     Financial Resource Strain: Not on file   Food Insecurity: No Food Insecurity (2024)    Hunger Vital Sign     Worried About Running Out of Food in the Last Year: Never true     Ran Out of Food in the Last Year: Never true   Transportation Needs: No Transportation Needs (2024)    PRAPARE - Transportation     Lack of Transportation (Medical): No     Lack of Transportation (Non-Medical): No   Physical Activity: Not on file   Stress: Not on file   Social Connections: Not on file   Intimate Partner Violence: Not on file   Housing Stability: Low Risk  (2024)    Housing Stability Vital Sign     Unable to Pay for Housing in the Last Year: No     Number of Places Lived in the Last Year: 1     Unstable Housing in the Last Year: No     Family History:       Problem Relation Age of Onset    Cancer Mother         Breast    Heart Disease Father 50    Arthritis Sister     Heart Disease Paternal Grandmother 50       ROS:  Constitutional: Negative for chills, fatigue, fever, or weight loss.  Eyes: Denies reported visual changes.  ENT: Denies headache, difficulty swallowing, earache, and nosebleeds.  Cardiovascular: Negative for chest pain, palpitations, tachycardia or edema.  Respiratory: Denies cough or SOB.  GI:The patient denies abdominal or flank pain, anorexia, nausea or vomiting.  : see HPI.  Musculoskeletal: Patient denies low back pain or painful or reduced range of ROM.  Neurological: The patient denies any symptoms of neurological impairment or TIA.  Psychiatric: Denies anxiety or depression.  Skin: Denies

## 2024-06-30 NOTE — PROGRESS NOTES
Patient has been weaned off oxygen via NC at the beginning of this shift.     Patient has been turned several times during this shift.     Adryan from Urology assessed the patient's Suprapubic catheter during this shift, states  looks fine urine looks good. Patient has spasms that causes skin breakdown but patient's skin is at their baseline. Protect the patient skin and use a split gauze around the catheter. States patient's catheter needs to be changed every 3-4 weeks.    Dressing to right hip/buttocks was changed. Tolerated well. Colostomy is intact no stool needs to be emptied at this time.

## 2024-07-01 PROBLEM — E44.1 MILD MALNUTRITION (HCC): Status: ACTIVE | Noted: 2024-07-01

## 2024-07-01 PROCEDURE — 6370000000 HC RX 637 (ALT 250 FOR IP)

## 2024-07-01 PROCEDURE — 2580000003 HC RX 258

## 2024-07-01 PROCEDURE — 1200000000 HC SEMI PRIVATE

## 2024-07-01 PROCEDURE — 1200000003 HC TELEMETRY R&B

## 2024-07-01 PROCEDURE — 6360000002 HC RX W HCPCS

## 2024-07-01 PROCEDURE — 99232 SBSQ HOSP IP/OBS MODERATE 35: CPT | Performed by: PHYSICIAN ASSISTANT

## 2024-07-01 RX ADMIN — TICAGRELOR 90 MG: 90 TABLET ORAL at 09:20

## 2024-07-01 RX ADMIN — ERYTHROMYCIN: 5 OINTMENT OPHTHALMIC at 20:54

## 2024-07-01 RX ADMIN — METOPROLOL SUCCINATE 50 MG: 50 TABLET, EXTENDED RELEASE ORAL at 20:53

## 2024-07-01 RX ADMIN — FAMOTIDINE 20 MG: 20 TABLET, FILM COATED ORAL at 09:19

## 2024-07-01 RX ADMIN — MEROPENEM 1000 MG: 1 INJECTION INTRAVENOUS at 16:41

## 2024-07-01 RX ADMIN — POLYVINYL ALCOHOL 4 DROP: 1.4 SOLUTION/ DROPS OPHTHALMIC at 19:30

## 2024-07-01 RX ADMIN — SPIRONOLACTONE 25 MG: 25 TABLET ORAL at 09:19

## 2024-07-01 RX ADMIN — OXYCODONE HYDROCHLORIDE AND ACETAMINOPHEN 500 MG: 500 TABLET ORAL at 20:54

## 2024-07-01 RX ADMIN — OXYBUTYNIN CHLORIDE 10 MG: 10 TABLET, EXTENDED RELEASE ORAL at 09:19

## 2024-07-01 RX ADMIN — BACLOFEN 10 MG: 10 TABLET ORAL at 20:53

## 2024-07-01 RX ADMIN — Medication 1 CAPSULE: at 09:19

## 2024-07-01 RX ADMIN — TICAGRELOR 90 MG: 90 TABLET ORAL at 20:53

## 2024-07-01 RX ADMIN — MEROPENEM 1000 MG: 1 INJECTION INTRAVENOUS at 09:15

## 2024-07-01 RX ADMIN — BACLOFEN 10 MG: 10 TABLET ORAL at 12:58

## 2024-07-01 RX ADMIN — ROSUVASTATIN CALCIUM 20 MG: 20 TABLET, FILM COATED ORAL at 20:53

## 2024-07-01 RX ADMIN — GABAPENTIN 600 MG: 600 TABLET, FILM COATED ORAL at 09:19

## 2024-07-01 RX ADMIN — GABAPENTIN 600 MG: 600 TABLET, FILM COATED ORAL at 20:54

## 2024-07-01 RX ADMIN — Medication 400 UNITS: at 09:19

## 2024-07-01 RX ADMIN — GABAPENTIN 600 MG: 600 TABLET, FILM COATED ORAL at 16:42

## 2024-07-01 RX ADMIN — GABAPENTIN 600 MG: 600 TABLET, FILM COATED ORAL at 12:58

## 2024-07-01 RX ADMIN — POLYVINYL ALCOHOL 4 DROP: 1.4 SOLUTION/ DROPS OPHTHALMIC at 09:20

## 2024-07-01 RX ADMIN — OXYCODONE HYDROCHLORIDE AND ACETAMINOPHEN 500 MG: 500 TABLET ORAL at 09:20

## 2024-07-01 RX ADMIN — ENOXAPARIN SODIUM 40 MG: 100 INJECTION SUBCUTANEOUS at 09:20

## 2024-07-01 RX ADMIN — POLYVINYL ALCOHOL 4 DROP: 1.4 SOLUTION/ DROPS OPHTHALMIC at 16:41

## 2024-07-01 RX ADMIN — Medication 6 MG: at 09:19

## 2024-07-01 RX ADMIN — BACLOFEN 10 MG: 10 TABLET ORAL at 09:20

## 2024-07-01 RX ADMIN — OXCARBAZEPINE 450 MG: 300 TABLET, FILM COATED ORAL at 09:19

## 2024-07-01 RX ADMIN — OXCARBAZEPINE 450 MG: 300 TABLET, FILM COATED ORAL at 20:53

## 2024-07-01 RX ADMIN — Medication 1 TABLET: at 09:19

## 2024-07-01 RX ADMIN — Medication 1 CAPSULE: at 20:53

## 2024-07-01 RX ADMIN — POLYVINYL ALCOHOL 4 DROP: 1.4 SOLUTION/ DROPS OPHTHALMIC at 12:58

## 2024-07-01 RX ADMIN — FAMOTIDINE 20 MG: 20 TABLET, FILM COATED ORAL at 20:53

## 2024-07-01 RX ADMIN — DOCUSATE SODIUM 100 MG: 100 CAPSULE, LIQUID FILLED ORAL at 09:20

## 2024-07-01 RX ADMIN — ASPIRIN 81 MG: 81 TABLET, COATED ORAL at 09:19

## 2024-07-01 RX ADMIN — MEROPENEM 1000 MG: 1 INJECTION INTRAVENOUS at 01:11

## 2024-07-01 RX ADMIN — METOPROLOL SUCCINATE 50 MG: 50 TABLET, EXTENDED RELEASE ORAL at 09:19

## 2024-07-01 NOTE — PROGRESS NOTES
[facility]  OCCUPATIONAL THERAPY MISSED TREATMENT NOTE  [facility] MED SURG 8AB  8A-17/017-A      Date: 2024  Patient Name: Greyson Swift        CSN: [id number]   : 1955  (68 y.o.)  Gender: male                REASON FOR MISSED TREATMENT:  Pt is a long term resident at [facility]. Pt is whit dependent and requires assist with all ADLs/IADLs. Pt is at baseline of function and requires no further OT intervention. Anette JACOB, confirms no OT notes needed for discharge.

## 2024-07-01 NOTE — PLAN OF CARE
Problem: Safety - Adult  Goal: Free from fall injury  Outcome: Progressing     Problem: Discharge Planning  Goal: Discharge to home or other facility with appropriate resources  7/1/2024 1635 by Kelsie Bone RN  Outcome: Progressing  7/1/2024 1017 by Anette Norris LSW  Outcome: Progressing     Problem: Skin/Tissue Integrity  Goal: Absence of new skin breakdown  Description: 1.  Monitor for areas of redness and/or skin breakdown  2.  Assess vascular access sites hourly  3.  Every 4-6 hours minimum:  Change oxygen saturation probe site  4.  Every 4-6 hours:  If on nasal continuous positive airway pressure, respiratory therapy assess nares and determine need for appliance change or resting period.  Outcome: Progressing     Problem: ABCDS Injury Assessment  Goal: Absence of physical injury  Outcome: Progressing     Problem: Nutrition Deficit:  Goal: Optimize nutritional status  7/1/2024 1635 by Kelsie Bone, RN  Outcome: Progressing  7/1/2024 1307 by Sherrie Guaman RD, LD  Flowsheets (Taken 7/1/2024 1307)  Nutrient intake appropriate for improving, restoring, or maintaining nutritional needs:   Assess nutritional status and recommend course of action   Monitor oral intake, labs, and treatment plans   Recommend appropriate diets, oral nutritional supplements, and vitamin/mineral supplements

## 2024-07-01 NOTE — DISCHARGE INSTR - COC
days.     Update Admission H&P: No change in H&P    PHYSICIAN SIGNATURE:  Electronically signed by Corine Larsen PA-C on 7/3/24 at 3:28 PM EDT

## 2024-07-01 NOTE — PROGRESS NOTES
[facility] Continence Nurse  Progress Note       Greyson Swift  AGE: 68 y.o.   GENDER: male  : 1955  UNIT: 8A-17/017-A  TODAY'S DATE:  2024  ADMISSION DATE: 2024  9:10 PM    Subjective   Reason for [facility] Evaluation and Assessment: Several unstagable wounds, pressure injuries to buttocks, colostomy       Greyson Swift is a 68 y.o. male referred by:   [] Physician/PA/APRN  [x] Nursing  [] Other:     Wound Identification:  Wound Type:pressure  Wound Location: sacrum, right ischium  Modifying factors:chronic pressure and decreased mobility    Objective     Derrick Risk Score: Derrick Scale Score: 14      Assessment     Encounter: Present to patient room. Patient in bed upon arrival. Patient stated he has a provider at his facility that sees his wounds once a week and nurses change his dressings daily. Assessment and photo to follow.  Assisted patient to turn to right side. Patient noted to have evolving deep tissue injury. Cleansed wound with normal saline and gauze. Pat dry with clean gauze. Applied sacral foam to wound. Applied triad to Changed out chux pad.  Assisted patient to turn to left side. Removed dressing from right hip/greater trochanter area, stage 4 pressure injury noted. Cleansed wound with normal saline or wound cleanser and gauze. Pat dry with clean gauze. Applied saline moist to dry to wound and covered with ABD pad and tape. Assisted patient back to supine position with offloading pillows. Bed in low, call light in reach. Will continue to follow and assess wound. Call with concerns and for wound evolution.          Wound type: healing right hip/greater trochanter area stage 4   and [healed right ischium pictured only]  Wound size: 6 cm x 6.5 cm x 1 cm  Undermining or Tunneling: none  Wound assessment/color: red, yellow  Drainage amount: moderate  Drainage description: serosanguineous  Odor: none  Margins: attached  Tawny wound: dry/intact  Exposed structure: none        Wound type: evolving DTI to sacrum  Wound size: 2.5 cm x 3 cm x 0  Undermining or Tunneling: n/a  Wound assessment/color: purple/ red  Drainage amount: none  Drainage description: n/a  Odor: none  Margins: attached  Tawny wound: dry, intact  Exposed structure: n/a       Left posterior thigh wound        Plan     Treatment Recommendations:   Sacrum - Cleanse wound with normal saline or wound cleanser and gauze. Pat dry with clean gauze. Apply sacral silicone bordered foam. Change daily.     Right hip - Cleanse wound with normal saline or wound cleanser and gauze. Pat dry with clean gauze. Apply saline moist to dry to wound. Cover with ABD. Secure with tape. Change once per shift.    Right ischium - Cleanse wound with normal saline or wound cleanser and gauze. Pat dry with clean gauze. Apply Triad barrier cream to wounds twice daily and PRN. If cream becomes soiled, wipe off top layer and reapply.      Left posterior thigh - Cleanse wound with normal saline or wound cleanser and gauze. Pat dry with clean gauze. Apply Triad barrier cream to wounds twice daily and PRN. If cream becomes soiled, wipe off top layer and reapply.      Specialty Bed Required :   [x] Low Air Loss   [x] Pressure Redistribution  [] Fluid Immersion- Dolphin  [] Bariatric  [] RotoProne   [] Other:     Discharge Plan:  Placement for patient upon discharge:   [] Home  [] Inpatient Rehab  [x] SNF  [] ECF  [] [facility]/ LT[facility]  Patient appropriate for [facility]: patient from skilled facility with wound care provider

## 2024-07-01 NOTE — PROGRESS NOTES
Hospitalist Progress Note      Patient:  Greyson Swift 68 y.o. male     : 1955  Unit/Bed:[facility]017-A  Date of Admission: 2024        ASSESSMENT AND PLAN    Active Problems  Complicated UTI, POA and patient with chronic suprapubic catheter  Continue meropenem  Urine culture from  with mixed growth.  Repeat from  after suprapubic catheter exchange negative to date  Blood cultures negative to date  Urology following-continue with Ditropan, hand irrigate  Acute hypoxic respiratory failure-resolving  Currently back on room air.  Has been weaned from 4 L  Chest x-ray from  and  with atelectasis versus infiltrate left lung base.  No pulmonary vascular congestion  Pulmonary hygiene  No other signs or symptoms associated with pneumonia.  On meropenem for complicated UTI  Resolved Problems  Metabolic encephalopathy-resolved with treatment of infection  Chronic Conditions (reviewed and stable unless otherwise stated)  Chronic HFpEF: Echo 2023 EF 50 to 55% with indeterminate diastolic function.  CAD: HST 34 on admission, flat trend. Less than baseline. Denies chest pain. Monitor on telemetry.  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  Chronic normocytic anemia      LDA: []CVC / []PICC / []Midline / [x][place]pa- suprapubic / []Drains / []Mediport / [x]None  Antibiotics: merrem  Steroids: no  Labs (still needed?): [x]Yes / []No  IVF (still needed?): []Yes / []No    Level of care: []Step Down / [x]Med-Surg  Bed Status: [x]Inpatient / []Observation  Telemetry: [x]Yes / []No  PT/OT: [x]Yes / []No    DVT Prophylaxis: [x] Lovenox / [] Heparin / [] SCDs / [] Already on Systemic Anticoagulation / [] None     Expected discharge date:   pending course   Disposition: SNF  Code status: Full Code     ===================================================================    Chief Complaint:

## 2024-07-01 NOTE — CARE COORDINATION
7/1/24, 10:03 AM EDT  Discharge Planning Evaluation  Social work consult received, patient from [facility].    Patient/Family preference is to return to [facility], per patient. .    The patient's current payor source at the facility is Medicaid.   Medicare skilled days available: yes  Medicare does the patient have a three midnight qualifying stay? yes  Insurance precert:  no  Spoke with Connie at the facility.  Patient bed hold: yes  Anticipated transport plan: ambulance  Patient's Healthcare Decision Maker: Legal Next of Kin

## 2024-07-01 NOTE — PROGRESS NOTES
Physician Progress Note      PATIENT:               ALEXI STEWART  CSN #:                  [id number]  :                       1955  ADMIT DATE:       2024 9:10 PM  DISCH DATE:  RESPONDING  PROVIDER #:        Kim Coates PA-C          QUERY TEXT:    Pt admitted with complicated UTI.  Pt noted to have  chronic  suprapubic   catheter. If possible, please document in the progress notes and discharge   summary if you are evaluating and/or treating any of the following:    The medical record reflects the following:  Risk Factors:  chronic suprapubic catheter, MS,  bedbound  Clinical Indicators: UA shows leukocytes, positive nitrites, many bacteria.     suprapubic catheter exchanged with new urine sent.  Patient has a chronic   SP catheter related to MS. Urology: Continue 3-4 wk exchanges at ECF or   office.  Treatment:  ABX changed to Merrem, urology consulted    Thanks MILAGROS Baltazar  Options provided:  -- UTI due to suprapubic catheter  -- UTI not due to suprapubic catheter  -- Other - I will add my own diagnosis  -- Disagree - Not applicable / Not valid  -- Disagree - Clinically unable to determine / Unknown  -- Refer to Clinical Documentation Reviewer    PROVIDER RESPONSE TEXT:    UTI is due to suprapubic catheter.    Query created by: Joanne Romeo on 2024 8:57 AM      Electronically signed by:  Kim Coates PA-C 2024 9:45 AM

## 2024-07-01 NOTE — PROGRESS NOTES
Comprehensive Nutrition Assessment    Type and Reason for Visit:  Initial, Consult (wound healing)    Nutrition Recommendations/Plan:   Consider heart failure diet when po intake improves.   Send Ensure High Protein BID.  Send Sebastian BID.     Malnutrition Assessment:  Malnutrition Status:  Mild malnutrition (07/01/24 1256)    Context:  Acute Illness     Findings of the 6 clinical characteristics of malnutrition:  Energy Intake:  75% or less of estimated energy requirements for 7 or more days  Weight Loss:  No significant weight loss     Body Fat Loss:  No significant body fat loss     Muscle Mass Loss:  No significant muscle mass loss    Fluid Accumulation:  No significant fluid accumulation     Strength:  Not Performed    Nutrition Assessment:     Pt. mildy malnourished AEB criteria as listed above.  At risk for further nutrition compromise r/t admit with complicated UTI with hematuria, metabolic encephalopathy, HF and underlying medical condition (CAD, HTN, MS with Bilateral Paraplegia, Neurogenic Bladder, Diverting Colostomy, Normocytic Anemia Chronic, Oropharyngeal Dysphagia, EColi, Major Depressive Disorder, NSTEMI, Osteomyelitis ).        Nutrition Related Findings:    Pt. Report/Treatments/Miscellaneous: Pt seen, mentions fair appetite & reports he consumed almost 3/4 breakfast. He mentions he has no teeth & doesn't wear dentures& denies difficulty chewing/swallowing foods/liquids.  I spoke with Lindsay Kasper & they mention pt was eating poorly ~ 50% for ~ the last week. She mentions pt was having difficulty getting silverware t reach his mouth & reports pt would not let them try to feed him. She reports pt likes Boost High Protein & tends to drink most of them.   GI Status: BM x 1 (6/30)  Pertinent Labs: (6/27) POC Glucose 143, Ammonia 68, (6/28) CRP 9.75, (6/29) Hemoglobin 10.4  Pertinent Meds:  Vitmain C, Colace, Culturelle, MVI, Vitamin A, Vitamin E    Wound Type: Multiple (per conversation with wound

## 2024-07-01 NOTE — PROGRESS NOTES
[facility]  PHYSICAL THERAPY MISSED TREATMENT NOTE  [facility] MED SURG 8AB    Date: 2024  Patient Name: Greyson Swift        MRN: [id number]   : 1955  (68 y.o.)  Gender: male                REASON FOR MISSED TREATMENT:  Pt is a long term resident at [facility]. Pt is whit dependent and requires assist with all ADLs/IADLs/Mobility. Pt is at baseline of function and requires no further PT intervention. Anette JACOB, confirms no PT notes needed for discharge.      Sherrie Hammond, MPT [id number]

## 2024-07-02 LAB
BACTERIA BLD AEROBE CULT: NORMAL
BACTERIA BLD AEROBE CULT: NORMAL
BACTERIA SPEC AEROBE CULT: ABNORMAL
BACTERIA SPEC ANAEROBE CULT: ABNORMAL
BACTERIA UR CULT: ABNORMAL
BACTERIA UR CULT: ABNORMAL
GRAM STN SPEC: ABNORMAL
ORGANISM: ABNORMAL

## 2024-07-02 PROCEDURE — 99232 SBSQ HOSP IP/OBS MODERATE 35: CPT | Performed by: PHYSICIAN ASSISTANT

## 2024-07-02 PROCEDURE — 2580000003 HC RX 258

## 2024-07-02 PROCEDURE — 6370000000 HC RX 637 (ALT 250 FOR IP)

## 2024-07-02 PROCEDURE — 6360000002 HC RX W HCPCS

## 2024-07-02 PROCEDURE — 1200000000 HC SEMI PRIVATE

## 2024-07-02 RX ADMIN — ASPIRIN 81 MG: 81 TABLET, COATED ORAL at 08:40

## 2024-07-02 RX ADMIN — METOPROLOL SUCCINATE 50 MG: 50 TABLET, EXTENDED RELEASE ORAL at 08:39

## 2024-07-02 RX ADMIN — Medication 1 TABLET: at 08:40

## 2024-07-02 RX ADMIN — FAMOTIDINE 20 MG: 20 TABLET, FILM COATED ORAL at 19:38

## 2024-07-02 RX ADMIN — FAMOTIDINE 20 MG: 20 TABLET, FILM COATED ORAL at 08:39

## 2024-07-02 RX ADMIN — TICAGRELOR 90 MG: 90 TABLET ORAL at 08:40

## 2024-07-02 RX ADMIN — METOPROLOL SUCCINATE 50 MG: 50 TABLET, EXTENDED RELEASE ORAL at 19:38

## 2024-07-02 RX ADMIN — Medication 400 UNITS: at 08:39

## 2024-07-02 RX ADMIN — ENOXAPARIN SODIUM 40 MG: 100 INJECTION SUBCUTANEOUS at 08:39

## 2024-07-02 RX ADMIN — Medication 1 CAPSULE: at 08:39

## 2024-07-02 RX ADMIN — GABAPENTIN 600 MG: 600 TABLET, FILM COATED ORAL at 16:15

## 2024-07-02 RX ADMIN — BACLOFEN 10 MG: 10 TABLET ORAL at 08:40

## 2024-07-02 RX ADMIN — MEROPENEM 1000 MG: 1 INJECTION INTRAVENOUS at 00:12

## 2024-07-02 RX ADMIN — GABAPENTIN 600 MG: 600 TABLET, FILM COATED ORAL at 12:56

## 2024-07-02 RX ADMIN — ERYTHROMYCIN: 5 OINTMENT OPHTHALMIC at 19:38

## 2024-07-02 RX ADMIN — Medication 6 MG: at 08:39

## 2024-07-02 RX ADMIN — OXCARBAZEPINE 450 MG: 300 TABLET, FILM COATED ORAL at 19:38

## 2024-07-02 RX ADMIN — OXYCODONE HYDROCHLORIDE AND ACETAMINOPHEN 500 MG: 500 TABLET ORAL at 08:40

## 2024-07-02 RX ADMIN — GABAPENTIN 600 MG: 600 TABLET, FILM COATED ORAL at 19:38

## 2024-07-02 RX ADMIN — OXYCODONE HYDROCHLORIDE AND ACETAMINOPHEN 500 MG: 500 TABLET ORAL at 19:38

## 2024-07-02 RX ADMIN — Medication 1 CAPSULE: at 19:38

## 2024-07-02 RX ADMIN — MEROPENEM 1000 MG: 1 INJECTION INTRAVENOUS at 16:14

## 2024-07-02 RX ADMIN — POLYVINYL ALCOHOL 4 DROP: 1.4 SOLUTION/ DROPS OPHTHALMIC at 12:56

## 2024-07-02 RX ADMIN — POLYVINYL ALCOHOL 4 DROP: 1.4 SOLUTION/ DROPS OPHTHALMIC at 08:39

## 2024-07-02 RX ADMIN — TICAGRELOR 90 MG: 90 TABLET ORAL at 19:38

## 2024-07-02 RX ADMIN — OXCARBAZEPINE 450 MG: 300 TABLET, FILM COATED ORAL at 08:39

## 2024-07-02 RX ADMIN — MEROPENEM 1000 MG: 1 INJECTION INTRAVENOUS at 08:42

## 2024-07-02 RX ADMIN — GABAPENTIN 600 MG: 600 TABLET, FILM COATED ORAL at 08:39

## 2024-07-02 RX ADMIN — SODIUM CHLORIDE, PRESERVATIVE FREE 10 ML: 5 INJECTION INTRAVENOUS at 19:38

## 2024-07-02 RX ADMIN — POLYVINYL ALCOHOL 4 DROP: 1.4 SOLUTION/ DROPS OPHTHALMIC at 19:38

## 2024-07-02 RX ADMIN — BACLOFEN 10 MG: 10 TABLET ORAL at 19:38

## 2024-07-02 RX ADMIN — ROSUVASTATIN CALCIUM 20 MG: 20 TABLET, FILM COATED ORAL at 19:38

## 2024-07-02 RX ADMIN — POLYVINYL ALCOHOL 4 DROP: 1.4 SOLUTION/ DROPS OPHTHALMIC at 16:15

## 2024-07-02 RX ADMIN — BACLOFEN 10 MG: 10 TABLET ORAL at 12:56

## 2024-07-02 RX ADMIN — OXYBUTYNIN CHLORIDE 10 MG: 10 TABLET, EXTENDED RELEASE ORAL at 08:39

## 2024-07-02 RX ADMIN — SPIRONOLACTONE 25 MG: 25 TABLET ORAL at 08:39

## 2024-07-02 RX ADMIN — DOCUSATE SODIUM 100 MG: 100 CAPSULE, LIQUID FILLED ORAL at 08:39

## 2024-07-02 NOTE — PLAN OF CARE
Problem: Safety - Adult  Goal: Free from fall injury  7/1/2024 2235 by Rich Krueger RN  Outcome: Progressing  7/1/2024 1635 by Kelsie Bone RN  Outcome: Progressing     Problem: Discharge Planning  Goal: Discharge to home or other facility with appropriate resources  7/1/2024 2235 by Rich Krueger RN  Outcome: Progressing  Flowsheets (Taken 7/1/2024 2000)  Discharge to home or other facility with appropriate resources: Identify barriers to discharge with patient and caregiver  7/1/2024 1635 by Kelsie Bone RN  Outcome: Progressing  7/1/2024 1017 by Anette Norris LSW  Outcome: Progressing     Problem: Skin/Tissue Integrity  Goal: Absence of new skin breakdown  Description: 1.  Monitor for areas of redness and/or skin breakdown  2.  Assess vascular access sites hourly  3.  Every 4-6 hours minimum:  Change oxygen saturation probe site  4.  Every 4-6 hours:  If on nasal continuous positive airway pressure, respiratory therapy assess nares and determine need for appliance change or resting period.  7/1/2024 2235 by Rich Krueger RN  Outcome: Progressing  7/1/2024 1635 by Kelsie Bone RN  Outcome: Progressing     Problem: ABCDS Injury Assessment  Goal: Absence of physical injury  7/1/2024 2235 by Rich Krueger RN  Outcome: Progressing  7/1/2024 1635 by Kelsie Bone RN  Outcome: Progressing     Problem: Nutrition Deficit:  Goal: Optimize nutritional status  7/1/2024 2235 by Rich Krueger RN  Outcome: Progressing  7/1/2024 1635 by Kelsie Bone RN  Outcome: Progressing  7/1/2024 1307 by Sherrie Guaman, EVERETT, LD  Flowsheets (Taken 7/1/2024 1307)  Nutrient intake appropriate for improving, restoring, or maintaining nutritional needs:   Assess nutritional status and recommend course of action   Monitor oral intake, labs, and treatment plans   Recommend appropriate diets, oral nutritional supplements, and vitamin/mineral supplements

## 2024-07-02 NOTE — PROGRESS NOTES
Discharge being delayed due to patient needing further IV antibx treatment due to culture results    [facility] called and transport for 4:30 PM cancelled    Lindsay called and updated patient will not be returning tonight     Message left for Anette     Message left for wife Makayla

## 2024-07-02 NOTE — PROGRESS NOTES
Hospitalist Progress Note      Patient:  Greyson Swift 68 y.o. male     : 1955  Unit/Bed:[facility]17/017-A  Date of Admission: 2024        ASSESSMENT AND PLAN    Active Problems  Complicated UTI, POA and patient with chronic suprapubic catheter  Continue meropenem  Urine culture from  with mixed growth.  Repeat from  after suprapubic catheter with Enterococcus facialis less than 10K CFU's  Given that patient has had significant improvement with meropenem and I do not have any adequate urine cultures for sensitivity guidance, discussed case with internal medicine colleagues and we feel it is in the best interest of the patient to complete 7-day course of meropenem.  Will keep inpatient for an additional 2 days to complete this therapy  Blood cultures negative to date  Urology following-continue with Ditropan, hand irrigate  Acute hypoxic respiratory failure-resolved  Currently back on room air.  Has been weaned from 4 L  Chest x-ray from  and  with atelectasis versus infiltrate left lung base.  No pulmonary vascular congestion  Pulmonary hygiene  No other signs or symptoms associated with pneumonia.  On meropenem for complicated UTI  Chronic buttock and sacral wounds  Wound culture was obtained and growing multiple bacteria as expected.  Unclear reason as to why culture was obtained in the first place.  No clinical signs of infection of large right buttock wound.  Wound and ostomy following.  Recommend outpatient wound clinic follow-up on discharge  Resolved Problems  Metabolic encephalopathy-resolved with treatment of infection  Chronic Conditions (reviewed and stable unless otherwise stated)  Chronic HFpEF: Echo 2023 EF 50 to 55% with indeterminate diastolic function.  CAD: HST 34 on admission, flat trend. Less than baseline. Denies chest pain. Monitor on telemetry.  Essential HTN  MS with bilateral paraplegia  Neurogenic bladder with chronic indwelling catheter  Diverting

## 2024-07-02 NOTE — PLAN OF CARE

## 2024-07-02 NOTE — CARE COORDINATION
7/2/24, 1:04 PM EDT    Patient goals/plan/ treatment preferences discussed by  and .  Patient goals/plan/ treatment preferences reviewed with patient/ family.  Patient/ family verbalize understanding of discharge plan and are in agreement with goal/plan/treatment preferences.  Understanding was demonstrated using the teach back method.  AVS provided by RN at time of discharge, which includes all necessary medical information pertaining to the patients current course of illness, treatment, post-discharge goals of care, and treatment preferences.     Services At/After Discharge: Long Term Care, Aide services, In ambulance, and Nursing service       Patient discharged to return to [facility] long term medicaid bed.  Daisy at [facility] informed of discharge and 4:30 transport.  Will fax AVS and MAR when completed and RN will call report. Spoke with patient, informed of discharge and 4:30 transport.  Left message with wife informing of discharge and 4:30 transport.

## 2024-07-03 VITALS
OXYGEN SATURATION: 96 % | HEIGHT: 67 IN | RESPIRATION RATE: 16 BRPM | BODY MASS INDEX: 29.69 KG/M2 | WEIGHT: 189.15 LBS | SYSTOLIC BLOOD PRESSURE: 109 MMHG | DIASTOLIC BLOOD PRESSURE: 62 MMHG | TEMPERATURE: 97.6 F | HEART RATE: 78 BPM

## 2024-07-03 PROCEDURE — 6360000002 HC RX W HCPCS

## 2024-07-03 PROCEDURE — 6370000000 HC RX 637 (ALT 250 FOR IP)

## 2024-07-03 PROCEDURE — 6360000002 HC RX W HCPCS: Performed by: PHYSICIAN ASSISTANT

## 2024-07-03 PROCEDURE — 99239 HOSP IP/OBS DSCHRG MGMT >30: CPT | Performed by: PHYSICIAN ASSISTANT

## 2024-07-03 PROCEDURE — 2580000003 HC RX 258

## 2024-07-03 PROCEDURE — 2580000003 HC RX 258: Performed by: PHYSICIAN ASSISTANT

## 2024-07-03 RX ADMIN — OXCARBAZEPINE 450 MG: 300 TABLET, FILM COATED ORAL at 09:44

## 2024-07-03 RX ADMIN — ENOXAPARIN SODIUM 40 MG: 100 INJECTION SUBCUTANEOUS at 09:45

## 2024-07-03 RX ADMIN — ERTAPENEM SODIUM 1000 MG: 1 INJECTION INTRAMUSCULAR; INTRAVENOUS at 15:29

## 2024-07-03 RX ADMIN — BACLOFEN 10 MG: 10 TABLET ORAL at 13:48

## 2024-07-03 RX ADMIN — MEROPENEM 1000 MG: 1 INJECTION INTRAVENOUS at 00:39

## 2024-07-03 RX ADMIN — GABAPENTIN 600 MG: 600 TABLET, FILM COATED ORAL at 09:45

## 2024-07-03 RX ADMIN — POLYVINYL ALCOHOL 4 DROP: 1.4 SOLUTION/ DROPS OPHTHALMIC at 09:49

## 2024-07-03 RX ADMIN — Medication 1 CAPSULE: at 09:45

## 2024-07-03 RX ADMIN — DOCUSATE SODIUM 100 MG: 100 CAPSULE, LIQUID FILLED ORAL at 09:46

## 2024-07-03 RX ADMIN — SODIUM CHLORIDE, PRESERVATIVE FREE 10 ML: 5 INJECTION INTRAVENOUS at 09:46

## 2024-07-03 RX ADMIN — OXYCODONE HYDROCHLORIDE AND ACETAMINOPHEN 500 MG: 500 TABLET ORAL at 09:45

## 2024-07-03 RX ADMIN — OXYBUTYNIN CHLORIDE 10 MG: 10 TABLET, EXTENDED RELEASE ORAL at 09:45

## 2024-07-03 RX ADMIN — FAMOTIDINE 20 MG: 20 TABLET, FILM COATED ORAL at 09:45

## 2024-07-03 RX ADMIN — METOPROLOL SUCCINATE 50 MG: 50 TABLET, EXTENDED RELEASE ORAL at 09:44

## 2024-07-03 RX ADMIN — MEROPENEM 1000 MG: 1 INJECTION INTRAVENOUS at 09:57

## 2024-07-03 RX ADMIN — Medication 400 UNITS: at 09:45

## 2024-07-03 RX ADMIN — Medication 1 TABLET: at 09:45

## 2024-07-03 RX ADMIN — Medication 6 MG: at 09:45

## 2024-07-03 RX ADMIN — ASPIRIN 81 MG: 81 TABLET, COATED ORAL at 09:45

## 2024-07-03 RX ADMIN — SPIRONOLACTONE 25 MG: 25 TABLET ORAL at 09:44

## 2024-07-03 RX ADMIN — POLYVINYL ALCOHOL 4 DROP: 1.4 SOLUTION/ DROPS OPHTHALMIC at 12:00

## 2024-07-03 RX ADMIN — BACLOFEN 10 MG: 10 TABLET ORAL at 09:45

## 2024-07-03 RX ADMIN — GABAPENTIN 600 MG: 600 TABLET, FILM COATED ORAL at 13:48

## 2024-07-03 RX ADMIN — TICAGRELOR 90 MG: 90 TABLET ORAL at 09:48

## 2024-07-03 NOTE — PLAN OF CARE
Problem: Safety - Adult  Goal: Free from fall injury  7/3/2024 1031 by Consuelo Bang RN  Outcome: Progressing  7/2/2024 2136 by Rich Krueger RN  Outcome: Progressing     Problem: Discharge Planning  Goal: Discharge to home or other facility with appropriate resources  7/3/2024 1031 by Consuelo Bang RN  Outcome: Progressing  7/2/2024 2136 by Rich Krueger RN  Outcome: Progressing  Flowsheets (Taken 7/2/2024 1942)  Discharge to home or other facility with appropriate resources: Identify barriers to discharge with patient and caregiver     Problem: Skin/Tissue Integrity  Goal: Absence of new skin breakdown  Description: 1.  Monitor for areas of redness and/or skin breakdown  2.  Assess vascular access sites hourly  3.  Every 4-6 hours minimum:  Change oxygen saturation probe site  4.  Every 4-6 hours:  If on nasal continuous positive airway pressure, respiratory therapy assess nares and determine need for appliance change or resting period.  7/3/2024 1031 by Consuelo Bang RN  Outcome: Progressing  7/2/2024 2136 by Rich Krueger RN  Outcome: Progressing     Problem: ABCDS Injury Assessment  Goal: Absence of physical injury  7/3/2024 1031 by Consuelo Bang RN  Outcome: Progressing  7/2/2024 2136 by Rich Krueger RN  Outcome: Progressing     Problem: Nutrition Deficit:  Goal: Optimize nutritional status  7/3/2024 1031 by Consuelo Bang RN  Outcome: Progressing  7/2/2024 2136 by Rich Krueger RN  Outcome: Progressing

## 2024-07-03 NOTE — DISCHARGE INSTR - DIET

## 2024-07-03 NOTE — PLAN OF CARE
Problem: Safety - Adult  Goal: Free from fall injury  7/2/2024 2136 by Rich Krueger RN  Outcome: Progressing  7/2/2024 1722 by Kelsie Bone RN  Outcome: Progressing     Problem: Discharge Planning  Goal: Discharge to home or other facility with appropriate resources  7/2/2024 2136 by Rich Krueger RN  Outcome: Progressing  Flowsheets (Taken 7/2/2024 1942)  Discharge to home or other facility with appropriate resources: Identify barriers to discharge with patient and caregiver  7/2/2024 1722 by Kelsie Bone RN  Outcome: Progressing     Problem: Skin/Tissue Integrity  Goal: Absence of new skin breakdown  Description: 1.  Monitor for areas of redness and/or skin breakdown  2.  Assess vascular access sites hourly  3.  Every 4-6 hours minimum:  Change oxygen saturation probe site  4.  Every 4-6 hours:  If on nasal continuous positive airway pressure, respiratory therapy assess nares and determine need for appliance change or resting period.  7/2/2024 2136 by Rich Krueger RN  Outcome: Progressing  7/2/2024 1722 by Kelsie Bone RN  Outcome: Progressing     Problem: ABCDS Injury Assessment  Goal: Absence of physical injury  7/2/2024 2136 by Rich Krueger RN  Outcome: Progressing  7/2/2024 1722 by Keslie Bone RN  Outcome: Progressing     Problem: Nutrition Deficit:  Goal: Optimize nutritional status  7/2/2024 2136 by Rich Krueger RN  Outcome: Progressing  7/2/2024 1722 by Kelsie Bone RN  Outcome: Progressing

## 2024-07-03 NOTE — CARE COORDINATION
7/3/24, 1:36 PM EDT    DISCHARGE PLANNING EVALUATION    Spoke with Connie at [facility], advised patient will need one dose of Invanz tomorrow.  They are able to take patient today with one does of Invanz tomorrow with peripheral line.     7/3/24, 1:38 PM EDT    Patient goals/plan/ treatment preferences discussed by  and .  Patient goals/plan/ treatment preferences reviewed with patient/ family.  Patient/ family verbalize understanding of discharge plan and are in agreement with goal/plan/treatment preferences.  Understanding was demonstrated using the teach back method.  AVS provided by RN at time of discharge, which includes all necessary medical information pertaining to the patients current course of illness, treatment, post-discharge goals of care, and treatment preferences.     Services At/After Discharge: Long Term Care, Aide services, In ambulance, and Nursing service, IV    Patient discharged today to return to [facility] long term Medicaid bed with one dose of IV Invanz tomorrow.  Spoke with Connie at [facility], informed of discharge today with 4:00 transport. Spoke wit patient, informed of discharge with 4:00 transport.  Left message with wife informing of discharge with 4:00 transport.

## 2024-07-04 NOTE — DISCHARGE SUMMARY
Hospitalist Discharge Note      Patient:  Greyson Swift    Unit/Bed:8A-17/017-A  YOB: 1955  MRN: [id number]   Acct: [id number]     PCP: Noah Benitez MD  Date of Admission: 6/27/2024      Discharge date: 7/3/2024  4:29 PM    Chief Complaint on presentation :-  unresponsive at ECF     Discharge Assessment and Plan:-   Complicated UTI, POA in setting of chronic suprapubic catheter  Continue meropenem - changed to Invanz x1 dose in hospital and to complete last dose at [facility]  Urine culture from 6/27 with mixed growth.  Repeat from 6/29 after suprapubic catheter with Enterococcus facialis less than 10K CFU's  Given that patient has had significant improvement with meropenem and without any adequate urine cultures for sensitivity guidance, discussed case with internal medicine colleagues and we feel it is in the best interest of the patient to complete 7-day course of a carbapenem  Blood cultures negative to date  Urology following-continue with Ditropan, hand irrigate  Acute hypoxic respiratory failure-resolved  Currently back on room air.  Has been weaned from 4 L  Chest x-ray from 6/27 and 6/30 with atelectasis versus infiltrate left lung base.  No pulmonary vascular congestion  Pulmonary hygiene  No other signs or symptoms associated with pneumonia.  On meropenem for complicated UTI  Chronic buttock and sacral wounds  Wound culture was obtained and growing multiple bacteria as expected.  Unclear reason as to why culture was obtained in the first place.  No clinical signs of infection of large right buttock wound.  Wound and ostomy following.  Recommend outpatient wound clinic follow-up on discharge  Resolved Problems  Metabolic encephalopathy-resolved with treatment of infection  Chronic Conditions (reviewed and stable unless otherwise stated)  Chronic HFpEF: Echo 12/2023 EF 50 to 55% with indeterminate diastolic function.  CAD: HST 34 on admission, flat trend. Less than  be placed in CONTACT isolation. Methicillin(Oxacillin)resistant strains of staphylococci (MRSA)or(MRSE)should be considered resistant to all classes of cephalosporins, penems and beta-lactams. In the treatment of gram positive infections, GENTAMICIN should be CONSIDERED a SYNERGYSTIC agent ONLY.       LABAERO  06/29/2024 06:35 PM     moderate growth Group B streptococci are susceptible to ampicillin, penicillin and cefazolin, but may be erythromycin and/or clindamycin resistant. Contact microbiology if erythromycin and/or clindamycin testing is necessary.     Lab Results   Component Value Date/Time    LABANAE  06/29/2024 06:35 PM     Culture overgrown by swarming Proteus species. No further evaluation possible. If a true mixed aerobic and anaerobic infection is suspected, then broad spectrum empiric antibiotic therapy is indicated and should include coverage for anaerobic organisms.       Urinalysis:      Lab Results   Component Value Date/Time    NITRU POSITIVE 06/27/2024 10:08 PM    WBCUA > 100 06/27/2024 10:08 PM    WBCUA >200 01/20/2012 09:00 AM    BACTERIA MANY 06/27/2024 10:08 PM    RBCUA > 200 06/27/2024 10:08 PM    BLOODU LARGE 06/27/2024 10:08 PM    GLUCOSEU NEGATIVE 06/27/2024 10:08 PM       Radiology:-  CT Head W/O Contrast    Result Date: 6/27/2024  EXAM: CT Head Without Intravenous Contrast COMPARISON: CT head 07/20/2022 FINDINGS: BRAIN AND EXTRA-AXIAL SPACES: Redemonstrated lacunar infarcts in the right basal ganglia. Scattered subcortical and periventricular hypoattenuation, likely in keeping with chronic small vessel ischemic disease. Parenchymal volume loss with compensatory prominence of the ventricles and CSF spaces. No acute territorial infarction, intracranial hemorrhage, midline shift or hydrocephalus. BONES/JOINTS: Unremarkable. No acute fracture. SOFT TISSUES: Unremarkable. SINUSES: Unremarkable as visualized. No acute sinusitis. MASTOID AIR CELLS: Unremarkable as visualized. No mastoid

## 2024-07-08 ENCOUNTER — OFFICE VISIT (OUTPATIENT)
Dept: NEUROLOGY | Age: 69
End: 2024-07-08
Payer: MEDICARE

## 2024-07-08 VITALS
OXYGEN SATURATION: 97 % | DIASTOLIC BLOOD PRESSURE: 60 MMHG | HEART RATE: 79 BPM | HEIGHT: 67 IN | BODY MASS INDEX: 29.63 KG/M2 | SYSTOLIC BLOOD PRESSURE: 100 MMHG

## 2024-07-08 DIAGNOSIS — R51.9 RIGHT-SIDED FACE PAIN: ICD-10-CM

## 2024-07-08 DIAGNOSIS — G82.20 PARAPARESIS OF BOTH LOWER LIMBS (HCC): ICD-10-CM

## 2024-07-08 DIAGNOSIS — G35 MULTIPLE SCLEROSIS (HCC): Primary | ICD-10-CM

## 2024-07-08 PROCEDURE — 1111F DSCHRG MED/CURRENT MED MERGE: CPT | Performed by: PSYCHIATRY & NEUROLOGY

## 2024-07-08 PROCEDURE — 99214 OFFICE O/P EST MOD 30 MIN: CPT | Performed by: PSYCHIATRY & NEUROLOGY

## 2024-07-08 PROCEDURE — 1123F ACP DISCUSS/DSCN MKR DOCD: CPT | Performed by: PSYCHIATRY & NEUROLOGY

## 2024-07-08 PROCEDURE — 3074F SYST BP LT 130 MM HG: CPT | Performed by: PSYCHIATRY & NEUROLOGY

## 2024-07-08 PROCEDURE — G8427 DOCREV CUR MEDS BY ELIG CLIN: HCPCS | Performed by: PSYCHIATRY & NEUROLOGY

## 2024-07-08 PROCEDURE — 3017F COLORECTAL CA SCREEN DOC REV: CPT | Performed by: PSYCHIATRY & NEUROLOGY

## 2024-07-08 PROCEDURE — G8417 CALC BMI ABV UP PARAM F/U: HCPCS | Performed by: PSYCHIATRY & NEUROLOGY

## 2024-07-08 PROCEDURE — 1036F TOBACCO NON-USER: CPT | Performed by: PSYCHIATRY & NEUROLOGY

## 2024-07-08 PROCEDURE — 3078F DIAST BP <80 MM HG: CPT | Performed by: PSYCHIATRY & NEUROLOGY

## 2024-07-08 RX ORDER — LISINOPRIL 2.5 MG/1
2.5 TABLET ORAL DAILY
COMMUNITY

## 2024-07-08 NOTE — PATIENT INSTRUCTIONS
Continue with Gabapentin at current dose  Continue with Trileptal at current dose.   Follow physical and occupational therapy recommendations   You need to take calcium and vitamin D over the counter  Call with any new symptoms or concerns.   Follow up in 6 months or sooner if needed

## 2024-07-08 NOTE — PROGRESS NOTES
(HCC)        2. Paraparesis of both lower limbs (HCC)        3. Right-sided face pain             Follow up for Multiple sclerosis, paraparesis of both lower limbs, right sided face pain.  He is doing better.  Had recent hospitalization for UTI, he has an indwelling catheter now. He reports doing better. His right side face pain is better, very occasional does it bother him. No dysphagia, chewing food, no vision changes. The bilateral arm weakness has improved. RI cervical spine W/WO contrast showed No evidence of active demyelination in the cervical spinal cord.  He denies any falls.. He is not on immune modifying medications due to history of open wounds and follows with would clinic that will limit starting him on immune modifiers for his condition. After a discussion with patient we agreed on the following plan.       Plan:  Continue with Gabapentin at current dose  Continue with Trileptal at current dose.   Follow physical and occupational therapy recommendations   Follow up MRI brain with and without contrast prior to next visit.   You need to take calcium and vitamin D over the counter  Call with any new symptoms or concerns.   Follow up in 6 months or sooner if needed         Total time 33 min    Rome Malhotra MD

## 2024-07-11 NOTE — PROGRESS NOTES
Physician Progress Note      PATIENT:               ALEXI STEWART  [facility] #:                  [id number]  :                       1955  ADMIT DATE:       2024 9:10 PM  DISCH DATE:        7/3/2024 4:29 PM  RESPONDING  PROVIDER #:        Kim Coates PA-C          QUERY TEXT:    Pt admitted with complicated UTI  and has mild malnutrition documented in    nutrition note. Please documentation in the medical record if in agreement.    The medical record reflects the following:  Risk Factors: complicated UTI with hematuria, metabolic encephalopathy, CHF,   MS with Bilateral Paraplegia, Neurogenic Bladder, Diverting Colostomy.  Clinical Indicators:    75% or less of estimated energy requirements for 7 or   more days. no teeth & doesn't wear dentures& denies difficulty   chewing/swallowing foods/liquids. SNF reports eating poorly    50% for    the last week. Current BMI (kg/m2): 29.9.   Inadequate protein-energy intake   related to cognitive or neurological impairment, inadequate protein-energy   intake as evidenced by poor intake prior to admission, intake 0-25%, intake   51-75%  Treatment: nutritional assessment. Diet Advancement/Tolerance, Food and   Nutrient Intake    ASPEN Criteria:    https://aspenjournals.onlinelibrary.larsen.com/doi/full/10.1177/298289372303233  5    Thank you, Delaney CDS  Options provided:  -- Mild Malnutrition  -- Other - I will add my own diagnosis  -- Disagree - Not applicable / Not valid  -- Disagree - Clinically unable to determine / Unknown  -- Refer to Clinical Documentation Reviewer    PROVIDER RESPONSE TEXT:    This patient has mild malnutrition.    Query created by: Joanne Romeo on 2024 10:29 AM      Electronically signed by:  Kim Coates PA-C 2024 7:19 AM

## 2024-07-25 ENCOUNTER — OUTSIDE SERVICES (OUTPATIENT)
Dept: FAMILY MEDICINE CLINIC | Age: 69
End: 2024-07-25
Payer: MEDICARE

## 2024-07-25 DIAGNOSIS — I25.118 CORONARY ARTERY DISEASE INVOLVING NATIVE CORONARY ARTERY OF NATIVE HEART WITH OTHER FORM OF ANGINA PECTORIS (HCC): ICD-10-CM

## 2024-07-25 DIAGNOSIS — R40.4 UNRESPONSIVE EPISODE: Primary | ICD-10-CM

## 2024-07-25 DIAGNOSIS — G35 MS (MULTIPLE SCLEROSIS) (HCC): ICD-10-CM

## 2024-07-25 DIAGNOSIS — Z93.3 COLOSTOMY IN PLACE (HCC): ICD-10-CM

## 2024-07-25 PROCEDURE — 99309 SBSQ NF CARE MODERATE MDM 30: CPT | Performed by: FAMILY MEDICINE

## 2024-07-25 NOTE — PROGRESS NOTES
7/25/2024  Greyson Swift  1955  Subjective:  He was in his/her room to follow up unresponsive episode, seen in ED and hospitalized, complicated UTI and metabolic encephalopathy  No new problems, is feeling well. Seems back to baseline.    Denies headaches, CP, sob, or abdominal pains.    Objective:   Please see vitals per chart.  There is no sinus tenderness to palpation, no cervical lymphadenopathy.  Lungs are clear.  Heart Regular rate and rhythm without murmur.  The abdomen is soft and nontender.  Colostomy present and functioning.  Extremities are without edema.  Pompa is present.      Assessment:    1. Unresponsive episode  -unstable, recent hospitalization, complicated UTI treated with IV antibiotics.  -monitor sxs, call if not improving    2. Coronary artery disease involving native coronary artery of native heart without angina pectoris  -stable,  controlled on crestor, lisinopril, metoprolol, brilinta and asa.    3. Colostomy in place (HCC)  -stable, functioning appropriately    4. MS (multiple sclerosis) (HCC)  -stable, controlled on zanaflex, baclofen and gabapentin    Plan:  We will continue current medication regimen including zanaflex, baclofen and gabapentin for MS, melatonin for sleep and supportive care and recheck in 1 month.    Electronically signed by Noah Benitez MD on 7/25/2024 at 10:00 AM.

## 2024-08-22 ENCOUNTER — OUTSIDE SERVICES (OUTPATIENT)
Dept: FAMILY MEDICINE CLINIC | Age: 69
End: 2024-08-22
Payer: MEDICARE

## 2024-08-22 DIAGNOSIS — U07.1 COVID: Primary | ICD-10-CM

## 2024-08-22 DIAGNOSIS — Z93.3 COLOSTOMY IN PLACE (HCC): ICD-10-CM

## 2024-08-22 DIAGNOSIS — G35 MS (MULTIPLE SCLEROSIS) (HCC): ICD-10-CM

## 2024-08-22 DIAGNOSIS — I25.118 CORONARY ARTERY DISEASE INVOLVING NATIVE CORONARY ARTERY OF NATIVE HEART WITH OTHER FORM OF ANGINA PECTORIS (HCC): ICD-10-CM

## 2024-08-22 PROCEDURE — 99309 SBSQ NF CARE MODERATE MDM 30: CPT | Performed by: FAMILY MEDICINE

## 2024-08-22 NOTE — PROGRESS NOTES
8/22/2024  Greyson MORAN Jamison  1955  Subjective:  He was in his/her room to follow up testing positive for covid, minimal sxs, out of isolation now.    Denies headaches, CP, sob, or abdominal pains.    Objective:   Please see vitals per chart.  There is no sinus tenderness to palpation, no cervical lymphadenopathy.  Lungs are clear.  Heart Regular rate and rhythm without murmur.  The abdomen is soft and nontender.  Colostomy present and functioning.  Extremities are without edema.  Pompa is present.      Assessment:    1. COVID  -acute problem, supportive care, -monitor sxs, call if not improving    2. Coronary artery disease involving native coronary artery of native heart without angina pectoris  -stable,  controlled on crestor, lisinopril, metoprolol, brilinta and asa.    3. Colostomy in place (HCC)  -stable, functioning appropriately    4. MS (multiple sclerosis) (HCC)  -stable, controlled on zanaflex, baclofen and gabapentin    Plan:  We will continue current medication regimen including zanaflex, baclofen and gabapentin for MS, melatonin for sleep and supportive care and recheck in 1 month.    Electronically signed by Noah Benitez MD on 8/22/2024 at 10:59 AM.

## 2024-09-11 NOTE — TELEPHONE ENCOUNTER
1/14/22 called [facility] and left message with the  to call us back to schedule a follow up VV with Dr Abner Carter in a year. rupa jain. See scanned Qtel report.

## 2024-09-26 ENCOUNTER — OUTSIDE SERVICES (OUTPATIENT)
Dept: FAMILY MEDICINE CLINIC | Age: 69
End: 2024-09-26
Payer: MEDICARE

## 2024-09-26 DIAGNOSIS — G35 MS (MULTIPLE SCLEROSIS) (HCC): ICD-10-CM

## 2024-09-26 DIAGNOSIS — I25.118 CORONARY ARTERY DISEASE INVOLVING NATIVE CORONARY ARTERY OF NATIVE HEART WITH OTHER FORM OF ANGINA PECTORIS (HCC): Primary | ICD-10-CM

## 2024-09-26 DIAGNOSIS — Z93.3 COLOSTOMY IN PLACE (HCC): ICD-10-CM

## 2024-09-26 PROCEDURE — 99308 SBSQ NF CARE LOW MDM 20: CPT | Performed by: FAMILY MEDICINE

## 2024-10-08 ENCOUNTER — OFFICE VISIT (OUTPATIENT)
Dept: CARDIOLOGY CLINIC | Age: 69
End: 2024-10-08

## 2024-10-08 VITALS
HEIGHT: 67 IN | HEART RATE: 93 BPM | DIASTOLIC BLOOD PRESSURE: 61 MMHG | WEIGHT: 174 LBS | BODY MASS INDEX: 27.31 KG/M2 | SYSTOLIC BLOOD PRESSURE: 106 MMHG

## 2024-10-08 DIAGNOSIS — I10 ESSENTIAL HYPERTENSION: ICD-10-CM

## 2024-10-08 DIAGNOSIS — I25.5 ISCHEMIC CARDIOMYOPATHY: ICD-10-CM

## 2024-10-08 DIAGNOSIS — Z93.3 COLOSTOMY IN PLACE (HCC): ICD-10-CM

## 2024-10-08 DIAGNOSIS — I25.10 CORONARY ARTERY DISEASE INVOLVING NATIVE CORONARY ARTERY OF NATIVE HEART WITHOUT ANGINA PECTORIS: Primary | ICD-10-CM

## 2024-10-08 DIAGNOSIS — G35 MULTIPLE SCLEROSIS (HCC): ICD-10-CM

## 2024-10-08 DIAGNOSIS — Z86.718 HISTORY OF DVT (DEEP VEIN THROMBOSIS): ICD-10-CM

## 2024-10-08 RX ORDER — IPRATROPIUM BROMIDE AND ALBUTEROL SULFATE 2.5; .5 MG/3ML; MG/3ML
1 SOLUTION RESPIRATORY (INHALATION) EVERY 4 HOURS
COMMUNITY

## 2024-10-08 NOTE — PROGRESS NOTES
Pt here for 6 mo follow up.    Pt denies all cardiac symptoms.  
The  proximal vessel is heavily diseased in the mid OM.  The OM beyond that  it is heavily disease, it almost functions as a ramus intermedius.  RCA:  RCA has 95% stenosis in the mid section.  The distal PDA and PLV  are severely diseased.  The ostium of the PDA and the distal RCA totally  have 90% stenosis.  The PLV has about a tubular 60% stenosis from the  ostium all the way into the mid section of the vessel.  LV:  LV systolic pressure 125.  No significant LV to AO systolic  gradient.  LVEDP is 8.  SUMMARY:  Severe multivessel CAD with ischemic cardiomyopathy.    ORLANDO MORGAN MD  D: 08/30/2023             Assessment/Plan   Residual severe RCA stenosis s/p PCI RCA with DEANA x 1 on 11/29/23  Recent LAD PCI   ESBL sepsis - resolved  ICM, NYHA II: ICM EF 40% 8/2023; now improved to 50 - 55% as of 2/24  Hx of DVT  Hx of HTN  Hx of MS with bilateral paraplegia  Hx of DVT/PE  Hx of diverting colostomy due to ulcer on ischemia    Doing well. No angina or evidence of decompensated HF, euvolemic on exam. Tolerating meds. No bleeding on ASA and Brilinta. Breathing stable. Feeling well. No c/o or concerns today.     PLAN/patient instructions:    Continue current medications as prescribed.  Continue to be up and participate in therapies as pertinent to you.    Eat heart healthy diet.     Follow-up with your PCP as scheduled.    Follow-up with Dr. Morgan or Magaly DE LA ROSA in 6 months as scheduled or sooner if need.

## 2024-10-08 NOTE — PATIENT INSTRUCTIONS
Continue current medications as prescribed.    Continue to be up and participate in therapies as pertinent to you.    Eat heart healthy diet.     Follow-up with your PCP as scheduled.    Follow-up with Dr. Magaña or Magaly DE LA ROSA in 6 months as scheduled or sooner if need.

## 2024-10-24 ENCOUNTER — OUTSIDE SERVICES (OUTPATIENT)
Dept: FAMILY MEDICINE CLINIC | Age: 69
End: 2024-10-24
Payer: MEDICARE

## 2024-10-24 DIAGNOSIS — G35 MS (MULTIPLE SCLEROSIS) (HCC): ICD-10-CM

## 2024-10-24 DIAGNOSIS — I25.118 CORONARY ARTERY DISEASE INVOLVING NATIVE CORONARY ARTERY OF NATIVE HEART WITH OTHER FORM OF ANGINA PECTORIS (HCC): Primary | ICD-10-CM

## 2024-10-24 DIAGNOSIS — Z93.3 COLOSTOMY IN PLACE (HCC): ICD-10-CM

## 2024-10-24 PROCEDURE — 99308 SBSQ NF CARE LOW MDM 20: CPT | Performed by: FAMILY MEDICINE

## 2024-10-24 NOTE — PROGRESS NOTES
10/24/2024  Greyson Swift  1955  Subjective:  He was in his/her room to follow up CAD.  No new problems or concerns.  Denies headaches, CP, sob, or abdominal pains.    Objective:   Please see vitals per chart.  There is no sinus tenderness to palpation, no cervical lymphadenopathy.  Lungs are clear.  Heart Regular rate and rhythm without murmur.  The abdomen is soft and nontender.  Colostomy present and functioning.  Extremities are without edema.  Pompa is present.      Assessment:    1. Coronary artery disease involving native coronary artery of native heart without angina pectoris  -stable,  controlled on crestor, lisinopril, metoprolol, brilinta and asa.    2. Colostomy in place (HCC)  -stable, functioning appropriately    3. MS (multiple sclerosis) (HCC)  -stable, controlled on zanaflex, baclofen and gabapentin    Plan:  We will continue current medication regimen including zanaflex, baclofen and gabapentin for MS, melatonin for sleep and supportive care and recheck in 1 month.    Electronically signed by Noah Benitez MD on 10/24/2024 at 9:51 AM.

## 2024-11-29 ENCOUNTER — OUTSIDE SERVICES (OUTPATIENT)
Dept: FAMILY MEDICINE CLINIC | Age: 69
End: 2024-11-29

## 2024-11-29 DIAGNOSIS — I25.118 CORONARY ARTERY DISEASE INVOLVING NATIVE CORONARY ARTERY OF NATIVE HEART WITH OTHER FORM OF ANGINA PECTORIS (HCC): Primary | ICD-10-CM

## 2024-11-29 DIAGNOSIS — G35 MS (MULTIPLE SCLEROSIS) (HCC): ICD-10-CM

## 2024-11-29 DIAGNOSIS — Z93.3 COLOSTOMY IN PLACE (HCC): ICD-10-CM

## 2024-11-29 NOTE — PROGRESS NOTES
11/29/2024  Greyson Swift  1955  Subjective:  He was in his/her room to follow up CAD.  No new problems or concerns.  Denies headaches, CP, sob, or abdominal pains.    Objective:   Please see vitals per chart.  There is no sinus tenderness to palpation, no cervical lymphadenopathy.  Lungs are clear.  Heart Regular rate and rhythm without murmur.  The abdomen is soft and nontender.  Colostomy present and functioning.  Extremities are without edema.  Pompa is present.      Assessment:    1. Coronary artery disease involving native coronary artery of native heart without angina pectoris  -stable,  controlled on crestor, lisinopril, metoprolol, brilinta and asa.    2. Colostomy in place (HCC)  -stable, functioning appropriately    3. MS (multiple sclerosis) (HCC)  -stable, controlled on zanaflex, baclofen and gabapentin    Plan:  We will continue current medication regimen including zanaflex, baclofen and gabapentin for MS, melatonin for sleep and supportive care and recheck in 1 month.    Electronically signed by Noah Benitez MD on 11/29/2024 at 10:33 AM.

## 2024-12-17 LAB — PROSTATE SPECIFIC ANTIGEN: 1.62 NG/ML

## 2024-12-18 ENCOUNTER — OFFICE VISIT (OUTPATIENT)
Dept: UROLOGY | Age: 69
End: 2024-12-18
Payer: MEDICARE

## 2024-12-18 VITALS — HEIGHT: 67 IN | BODY MASS INDEX: 27.31 KG/M2 | RESPIRATION RATE: 16 BRPM | WEIGHT: 174 LBS

## 2024-12-18 DIAGNOSIS — N40.1 BENIGN PROSTATIC HYPERPLASIA WITH URINARY OBSTRUCTION: ICD-10-CM

## 2024-12-18 DIAGNOSIS — N31.9 NEUROGENIC BLADDER: ICD-10-CM

## 2024-12-18 DIAGNOSIS — N20.0 NEPHROLITHIASIS: Primary | ICD-10-CM

## 2024-12-18 DIAGNOSIS — N40.0 BPH WITHOUT OBSTRUCTION/LOWER URINARY TRACT SYMPTOMS: ICD-10-CM

## 2024-12-18 DIAGNOSIS — N13.8 BENIGN PROSTATIC HYPERPLASIA WITH URINARY OBSTRUCTION: ICD-10-CM

## 2024-12-18 DIAGNOSIS — Z93.59 CHRONIC SUPRAPUBIC CATHETER (HCC): ICD-10-CM

## 2024-12-18 PROCEDURE — 1159F MED LIST DOCD IN RCRD: CPT

## 2024-12-18 PROCEDURE — 3017F COLORECTAL CA SCREEN DOC REV: CPT

## 2024-12-18 PROCEDURE — G8427 DOCREV CUR MEDS BY ELIG CLIN: HCPCS

## 2024-12-18 PROCEDURE — 1123F ACP DISCUSS/DSCN MKR DOCD: CPT

## 2024-12-18 PROCEDURE — 99214 OFFICE O/P EST MOD 30 MIN: CPT

## 2024-12-18 PROCEDURE — G8484 FLU IMMUNIZE NO ADMIN: HCPCS

## 2024-12-18 PROCEDURE — G8417 CALC BMI ABV UP PARAM F/U: HCPCS

## 2024-12-18 PROCEDURE — 1036F TOBACCO NON-USER: CPT

## 2024-12-18 NOTE — PROGRESS NOTES
Patient states no changes that he is aware of for his medications. Packet was left at nursing facility.  
SURGERY  1996    broken ankle    BLADDER SURGERY  2-    Suprapubic catheter placement    BRONCHOSCOPY N/A 12/26/2022    BRONCHOSCOPY ALVEOLAR LAVAGE performed by Bronson Garg MD at [facility] Endoscopy    CARDIAC PROCEDURE N/A 11/29/2023    Coronary angiography performed by Aren Magaña MD at [facility] CARDIAC CATH LAB    CARDIAC PROCEDURE N/A 11/29/2023    Percutaneous coronary intervention performed by Aren Magaña MD at [facility] CARDIAC CATH LAB    COLONOSCOPY      CYSTO/URETERO/PYELOSCOPY, CALCULUS TX Left 6/19/2019    CYSTOSCOPY, LEFT STENT insertion, bladder irragation with bladder stones performed by Kameron Cr MD at [facility] OR    CYSTO/URETERO/PYELOSCOPY, CALCULUS TX N/A 7/8/2019    CYSTO, LEFT URETERAL STENT REMOVAL, LEFT URETEROSCOPY, LASER LITHOTRIPSY, BASKET RETRIEVAL OF STONE FRAGMENTS performed by Kameron Cr MD at [facility] OR    CYSTOSCOPY N/A 2/26/2021    CYSTOSCOPY, CYSTOLITHOLAPAXY, SUPRAPUBIC CATHETER EXCHANGE performed by Suman Lee MD at [facility] OR    CYSTOSCOPY Left 6/15/2022    CYSTOSCOPY performed by Suman Lee MD at [facility] OR    CYSTOSCOPY Left 8/22/2023    CYSTOSCOPY LEFT URETERAL STENT INSERTION; bladder stone extraction, suprapubic cath exchange performed by Reinaldo Miller MD at [facility] OR    IR GUIDED NEPHROSTOMY CATH PLACEMENT  7/25/2022    IR NEPHROSTOMY CATHETER PLACEMENT 7/25/2022 [facility] SPECIAL PROCEDURES    NC LAPS COLECTOMY PRTL W/END CLST & CLSR DSTL SGM N/A 6/13/2018    ROBOT DIVERTING COLOSTOMY performed by Fani Cruz MD at [facility] OR    TONSILLECTOMY  child    URETER SURGERY Left 8/4/2022    CYSTOSCOPY, LEFT URETEROSCOPY, LASER LITHOTRIPSY,  LEFT URETERAL STENT EXCHANGE performed by Suman Lee MD at [facility] OR    URETER SURGERY Left 9/18/2023    Cystoscopy, Left Ureteroscopy, Laser Lithotripsy, , And Left Ureteral Stent Exchange performed by Harvinder Quispe Jr., MD at [facility] OR     Family History   Problem Relation Age of Onset    Cancer Mother         Breast    Heart

## 2024-12-18 NOTE — PATIENT INSTRUCTIONS
Continue Oxybutynin XL 10 mg for bladder spasms.    [facility]

## 2024-12-26 ENCOUNTER — OUTSIDE SERVICES (OUTPATIENT)
Dept: FAMILY MEDICINE CLINIC | Age: 69
End: 2024-12-26
Payer: MEDICARE

## 2024-12-26 DIAGNOSIS — I25.118 CORONARY ARTERY DISEASE INVOLVING NATIVE CORONARY ARTERY OF NATIVE HEART WITH OTHER FORM OF ANGINA PECTORIS (HCC): ICD-10-CM

## 2024-12-26 DIAGNOSIS — Z93.3 COLOSTOMY IN PLACE (HCC): ICD-10-CM

## 2024-12-26 DIAGNOSIS — N39.0 E. COLI UTI: Primary | ICD-10-CM

## 2024-12-26 DIAGNOSIS — B96.20 E. COLI UTI: Primary | ICD-10-CM

## 2024-12-26 DIAGNOSIS — G35 MS (MULTIPLE SCLEROSIS) (HCC): ICD-10-CM

## 2024-12-26 LAB
COMMENT: NORMAL
COMMENT: NORMAL
GENTAMICIN: 4.4 MCG/ML (ref 0.5–2)

## 2024-12-26 PROCEDURE — 99309 SBSQ NF CARE MODERATE MDM 30: CPT | Performed by: FAMILY MEDICINE

## 2024-12-26 NOTE — PROGRESS NOTES
12/26/2024  Greyson Swift  1955  Subjective:  He was in his/her room to follow up dysuria and hematuria.  Urine culture growing E.coli, morganella and proteus.  Currently on IV gentamicin due to resistances.    Denies headaches, CP, sob, or abdominal pains.    Objective:   Please see vitals per chart.  There is no sinus tenderness to palpation, no cervical lymphadenopathy.  Lungs are clear.  Heart Regular rate and rhythm without murmur.  The abdomen is soft and nontender.  Colostomy present and functioning.  Extremities are without edema.  Pompa is present.      Assessment:    1. E. coli UTI  -acute condition, on IV abx, -monitor sxs, call if not improving  -dosing per pharmacy.      2. Coronary artery disease involving native coronary artery of native heart without angina pectoris  -stable,  controlled on crestor, lisinopril, metoprolol, brilinta and asa.    3. Colostomy in place (HCC)  -stable, functioning appropriately    4. MS (multiple sclerosis) ([facility])  -stable, controlled on zanaflex, baclofen and gabapentin    Plan:  We will continue current medication regimen including zanaflex, baclofen and gabapentin for MS, melatonin for sleep and supportive care and recheck in 1 month.    Electronically signed by Noah Benitez MD on 12/26/2024 at 10:25 AM.

## 2024-12-31 LAB
GENTAMICIN PEAK: NORMAL MCG/ML (ref 4–10)
GENTAMICIN: 1.3 MCG/ML (ref 0.5–2)

## 2025-01-05 ENCOUNTER — HOSPITAL ENCOUNTER (INPATIENT)
Age: 70
LOS: 8 days | Discharge: HOME | DRG: 853 | End: 2025-01-13
Attending: STUDENT IN AN ORGANIZED HEALTH CARE EDUCATION/TRAINING PROGRAM | Admitting: STUDENT IN AN ORGANIZED HEALTH CARE EDUCATION/TRAINING PROGRAM
Payer: MEDICARE

## 2025-01-05 ENCOUNTER — APPOINTMENT (OUTPATIENT)
Dept: CT IMAGING | Age: 70
DRG: 853 | End: 2025-01-05
Payer: MEDICARE

## 2025-01-05 DIAGNOSIS — T14.8XXA OPEN WOUND: Primary | ICD-10-CM

## 2025-01-05 DIAGNOSIS — A41.9 SEPSIS, DUE TO UNSPECIFIED ORGANISM, UNSPECIFIED WHETHER ACUTE ORGAN DYSFUNCTION PRESENT (HCC): ICD-10-CM

## 2025-01-05 DIAGNOSIS — L89.200: ICD-10-CM

## 2025-01-05 PROBLEM — Z93.59 CHRONIC SUPRAPUBIC CATHETER (HCC): Status: ACTIVE | Noted: 2025-01-05

## 2025-01-05 PROBLEM — I50.32 HEART FAILURE WITH IMPROVED EJECTION FRACTION (HFIMPEF) (HCC): Status: ACTIVE | Noted: 2025-01-05

## 2025-01-05 PROBLEM — G92.8 TOXIC METABOLIC ENCEPHALOPATHY: Status: ACTIVE | Noted: 2025-01-05

## 2025-01-05 PROBLEM — I25.10 CORONARY ARTERY DISEASE INVOLVING NATIVE CORONARY ARTERY OF NATIVE HEART WITHOUT ANGINA PECTORIS: Status: ACTIVE | Noted: 2025-01-05

## 2025-01-05 PROBLEM — G82.20 PARAPLEGIA (HCC): Status: ACTIVE | Noted: 2025-01-05

## 2025-01-05 PROBLEM — L89.214 DECUBITUS ULCER OF RIGHT HIP, STAGE 4 (HCC): Status: ACTIVE | Noted: 2025-01-05

## 2025-01-05 LAB
ALBUMIN SERPL BCG-MCNC: 3 G/DL (ref 3.5–5.1)
ALP SERPL-CCNC: 127 U/L (ref 38–126)
ALT SERPL W/O P-5'-P-CCNC: 34 U/L (ref 11–66)
ANION GAP SERPL CALC-SCNC: 14 MEQ/L (ref 8–16)
AST SERPL-CCNC: 24 U/L (ref 5–40)
BACTERIA URNS QL MICRO: ABNORMAL /HPF
BASOPHILS ABSOLUTE: 0 THOU/MM3 (ref 0–0.1)
BASOPHILS NFR BLD AUTO: 0.3 %
BILIRUB SERPL-MCNC: 0.2 MG/DL (ref 0.3–1.2)
BILIRUB UR QL STRIP.AUTO: NEGATIVE
BUN SERPL-MCNC: 23 MG/DL (ref 7–22)
CALCIUM SERPL-MCNC: 9.4 MG/DL (ref 8.5–10.5)
CASTS #/AREA URNS LPF: ABNORMAL /LPF
CHARACTER UR: ABNORMAL
CHLORIDE SERPL-SCNC: 95 MEQ/L (ref 98–111)
CO2 SERPL-SCNC: 23 MEQ/L (ref 23–33)
COLOR, UA: YELLOW
CREAT SERPL-MCNC: 0.3 MG/DL (ref 0.4–1.2)
CRP SERPL-MCNC: 19.04 MG/DL (ref 0–1)
CRYSTALS URNS MICRO: ABNORMAL
DEPRECATED RDW RBC AUTO: 51.4 FL (ref 35–45)
EKG ATRIAL RATE: 113 BPM
EKG P AXIS: 27 DEGREES
EKG P-R INTERVAL: 130 MS
EKG Q-T INTERVAL: 326 MS
EKG QRS DURATION: 76 MS
EKG QTC CALCULATION (BAZETT): 447 MS
EKG R AXIS: 36 DEGREES
EKG T AXIS: 56 DEGREES
EKG VENTRICULAR RATE: 113 BPM
EOSINOPHIL NFR BLD AUTO: 1 %
EOSINOPHILS ABSOLUTE: 0.1 THOU/MM3 (ref 0–0.4)
EPITHELIAL CELLS, UA: ABNORMAL /HPF
ERYTHROCYTE [DISTWIDTH] IN BLOOD BY AUTOMATED COUNT: 16.5 % (ref 11.5–14.5)
FLUAV RNA RESP QL NAA+PROBE: NOT DETECTED
FLUBV RNA RESP QL NAA+PROBE: NOT DETECTED
GFR SERPL CREATININE-BSD FRML MDRD: > 90 ML/MIN/1.73M2
GLUCOSE SERPL-MCNC: 98 MG/DL (ref 70–108)
GLUCOSE UR QL STRIP.AUTO: NEGATIVE MG/DL
HCT VFR BLD AUTO: 29.7 % (ref 42–52)
HCT VFR BLD CALC: 32.4 %
HGB BLD-MCNC: 9.4 GM/DL (ref 14–18)
HGB UR QL STRIP.AUTO: ABNORMAL
IMM GRANULOCYTES # BLD AUTO: 0.02 THOU/MM3 (ref 0–0.07)
IMM GRANULOCYTES NFR BLD AUTO: 0.3 %
KETONES UR QL STRIP.AUTO: 15
LACTIC ACID, SEPSIS: 0.6 MMOL/L (ref 0.5–1.9)
LYMPHOCYTES ABSOLUTE: 1.3 THOU/MM3 (ref 1–4.8)
LYMPHOCYTES NFR BLD AUTO: 16.4 %
MAGNESIUM SERPL-MCNC: 2.1 MG/DL (ref 1.6–2.4)
MCH RBC QN AUTO: 27.3 PG (ref 26–33)
MCHC RBC AUTO-ENTMCNC: 31.6 GM/DL (ref 32.2–35.5)
MCV RBC AUTO: 86.3 FL (ref 80–94)
MISCELLANEOUS 2: ABNORMAL
MONOCYTES ABSOLUTE: 0.8 THOU/MM3 (ref 0.4–1.3)
MONOCYTES NFR BLD AUTO: 9.9 %
NEUTROPHILS ABSOLUTE: 5.6 THOU/MM3 (ref 1.8–7.7)
NEUTROPHILS NFR BLD AUTO: 72.1 %
NITRITE UR QL STRIP: NEGATIVE
NRBC BLD AUTO-RTO: 0 /100 WBC
NT-PROBNP SERPL IA-MCNC: 86.2 PG/ML (ref 0–124)
OSMOLALITY SERPL CALC.SUM OF ELEC: 268.2 MOSMOL/KG (ref 275–300)
PH UR STRIP.AUTO: 6.5 [PH] (ref 5–9)
PLATELET # BLD AUTO: 365 THOU/MM3 (ref 130–400)
PMV BLD AUTO: 7.9 FL (ref 9.4–12.4)
POTASSIUM SERPL-SCNC: 4.3 MEQ/L (ref 3.5–5.2)
PROCALCITONIN SERPL IA-MCNC: 0.15 NG/ML (ref 0.01–0.09)
PROT SERPL-MCNC: 7.8 G/DL (ref 6.1–8)
PROT UR STRIP.AUTO-MCNC: 100 MG/DL
RBC # BLD AUTO: 3.44 MILL/MM3 (ref 4.7–6.1)
RBC FOLATE: 874 NG/ML RBC
RBC URINE: ABNORMAL /HPF
RENAL EPI CELLS #/AREA URNS HPF: ABNORMAL /[HPF]
SARS-COV-2 RNA RESP QL NAA+PROBE: NOT DETECTED
SODIUM SERPL-SCNC: 132 MEQ/L (ref 135–145)
SP GR UR REFRACT.AUTO: > 1.03 (ref 1–1.03)
TROPONIN, HIGH SENSITIVITY: 66 NG/L (ref 0–12)
TROPONIN, HIGH SENSITIVITY: 73 NG/L (ref 0–12)
TSH SERPL DL<=0.005 MIU/L-ACNC: 0.68 UIU/ML (ref 0.4–4.2)
UROBILINOGEN, URINE: 0.2 EU/DL (ref 0–1)
WBC # BLD AUTO: 7.8 THOU/MM3 (ref 4.8–10.8)
WBC #/AREA URNS HPF: > 200 /HPF
WBC #/AREA URNS HPF: ABNORMAL /[HPF]
YEAST LIKE FUNGI URNS QL MICRO: ABNORMAL

## 2025-01-05 PROCEDURE — 93010 ELECTROCARDIOGRAM REPORT: CPT | Performed by: INTERNAL MEDICINE

## 2025-01-05 PROCEDURE — 96365 THER/PROPH/DIAG IV INF INIT: CPT

## 2025-01-05 PROCEDURE — 83735 ASSAY OF MAGNESIUM: CPT

## 2025-01-05 PROCEDURE — 85025 COMPLETE CBC W/AUTO DIFF WBC: CPT

## 2025-01-05 PROCEDURE — 6360000002 HC RX W HCPCS

## 2025-01-05 PROCEDURE — 99285 EMERGENCY DEPT VISIT HI MDM: CPT

## 2025-01-05 PROCEDURE — 6370000000 HC RX 637 (ALT 250 FOR IP)

## 2025-01-05 PROCEDURE — 87186 SC STD MICRODIL/AGAR DIL: CPT

## 2025-01-05 PROCEDURE — 70450 CT HEAD/BRAIN W/O DYE: CPT

## 2025-01-05 PROCEDURE — 81001 URINALYSIS AUTO W/SCOPE: CPT

## 2025-01-05 PROCEDURE — 2580000003 HC RX 258

## 2025-01-05 PROCEDURE — 84484 ASSAY OF TROPONIN QUANT: CPT

## 2025-01-05 PROCEDURE — 2580000003 HC RX 258: Performed by: EMERGENCY MEDICINE

## 2025-01-05 PROCEDURE — 87040 BLOOD CULTURE FOR BACTERIA: CPT

## 2025-01-05 PROCEDURE — 93005 ELECTROCARDIOGRAM TRACING: CPT | Performed by: EMERGENCY MEDICINE

## 2025-01-05 PROCEDURE — 86140 C-REACTIVE PROTEIN: CPT

## 2025-01-05 PROCEDURE — 84145 PROCALCITONIN (PCT): CPT

## 2025-01-05 PROCEDURE — 83880 ASSAY OF NATRIURETIC PEPTIDE: CPT

## 2025-01-05 PROCEDURE — 71275 CT ANGIOGRAPHY CHEST: CPT

## 2025-01-05 PROCEDURE — 1200000003 HC TELEMETRY R&B

## 2025-01-05 PROCEDURE — 87086 URINE CULTURE/COLONY COUNT: CPT

## 2025-01-05 PROCEDURE — 6360000002 HC RX W HCPCS: Performed by: EMERGENCY MEDICINE

## 2025-01-05 PROCEDURE — 87636 SARSCOV2 & INF A&B AMP PRB: CPT

## 2025-01-05 PROCEDURE — 99222 1ST HOSP IP/OBS MODERATE 55: CPT | Performed by: STUDENT IN AN ORGANIZED HEALTH CARE EDUCATION/TRAINING PROGRAM

## 2025-01-05 PROCEDURE — 87077 CULTURE AEROBIC IDENTIFY: CPT

## 2025-01-05 PROCEDURE — 51705 CHANGE OF BLADDER TUBE: CPT

## 2025-01-05 PROCEDURE — 6370000000 HC RX 637 (ALT 250 FOR IP): Performed by: EMERGENCY MEDICINE

## 2025-01-05 PROCEDURE — 36415 COLL VENOUS BLD VENIPUNCTURE: CPT

## 2025-01-05 PROCEDURE — 96361 HYDRATE IV INFUSION ADD-ON: CPT

## 2025-01-05 PROCEDURE — 0TPBX0Z REMOVAL OF DRAINAGE DEVICE FROM BLADDER, EXTERNAL APPROACH: ICD-10-PCS | Performed by: EMERGENCY MEDICINE

## 2025-01-05 PROCEDURE — 84443 ASSAY THYROID STIM HORMONE: CPT

## 2025-01-05 PROCEDURE — 6360000004 HC RX CONTRAST MEDICATION: Performed by: STUDENT IN AN ORGANIZED HEALTH CARE EDUCATION/TRAINING PROGRAM

## 2025-01-05 PROCEDURE — 0T9B30Z DRAINAGE OF BLADDER WITH DRAINAGE DEVICE, PERCUTANEOUS APPROACH: ICD-10-PCS | Performed by: EMERGENCY MEDICINE

## 2025-01-05 PROCEDURE — 1200000000 HC SEMI PRIVATE

## 2025-01-05 PROCEDURE — 74177 CT ABD & PELVIS W/CONTRAST: CPT

## 2025-01-05 PROCEDURE — 83605 ASSAY OF LACTIC ACID: CPT

## 2025-01-05 PROCEDURE — 80053 COMPREHEN METABOLIC PANEL: CPT

## 2025-01-05 PROCEDURE — 96375 TX/PRO/DX INJ NEW DRUG ADDON: CPT

## 2025-01-05 PROCEDURE — 2500000003 HC RX 250 WO HCPCS

## 2025-01-05 RX ORDER — OXCARBAZEPINE 300 MG/1
450 TABLET, FILM COATED ORAL 2 TIMES DAILY
Status: DISCONTINUED | OUTPATIENT
Start: 2025-01-05 | End: 2025-01-13 | Stop reason: HOSPADM

## 2025-01-05 RX ORDER — FAMOTIDINE 20 MG/1
20 TABLET, FILM COATED ORAL 2 TIMES DAILY
Status: DISCONTINUED | OUTPATIENT
Start: 2025-01-05 | End: 2025-01-13 | Stop reason: HOSPADM

## 2025-01-05 RX ORDER — METOPROLOL SUCCINATE 50 MG/1
50 TABLET, EXTENDED RELEASE ORAL 2 TIMES DAILY
Status: DISCONTINUED | OUTPATIENT
Start: 2025-01-05 | End: 2025-01-06

## 2025-01-05 RX ORDER — SODIUM CHLORIDE 0.9 % (FLUSH) 0.9 %
5-40 SYRINGE (ML) INJECTION EVERY 12 HOURS SCHEDULED
Status: DISCONTINUED | OUTPATIENT
Start: 2025-01-05 | End: 2025-01-13 | Stop reason: HOSPADM

## 2025-01-05 RX ORDER — ACETAMINOPHEN 650 MG/1
650 SUPPOSITORY RECTAL ONCE
Status: COMPLETED | OUTPATIENT
Start: 2025-01-05 | End: 2025-01-05

## 2025-01-05 RX ORDER — SODIUM CHLORIDE 9 MG/ML
INJECTION, SOLUTION INTRAVENOUS CONTINUOUS
Status: ACTIVE | OUTPATIENT
Start: 2025-01-05 | End: 2025-01-06

## 2025-01-05 RX ORDER — SODIUM CHLORIDE 0.9 % (FLUSH) 0.9 %
5-40 SYRINGE (ML) INJECTION PRN
Status: DISCONTINUED | OUTPATIENT
Start: 2025-01-05 | End: 2025-01-13 | Stop reason: HOSPADM

## 2025-01-05 RX ORDER — ASPIRIN 81 MG/1
81 TABLET ORAL DAILY
Status: DISCONTINUED | OUTPATIENT
Start: 2025-01-05 | End: 2025-01-13 | Stop reason: HOSPADM

## 2025-01-05 RX ORDER — IPRATROPIUM BROMIDE AND ALBUTEROL SULFATE 2.5; .5 MG/3ML; MG/3ML
1 SOLUTION RESPIRATORY (INHALATION)
Status: DISCONTINUED | OUTPATIENT
Start: 2025-01-05 | End: 2025-01-05

## 2025-01-05 RX ORDER — DOCUSATE SODIUM 100 MG/1
100 CAPSULE, LIQUID FILLED ORAL DAILY
Status: DISCONTINUED | OUTPATIENT
Start: 2025-01-05 | End: 2025-01-13 | Stop reason: HOSPADM

## 2025-01-05 RX ORDER — OXCARBAZEPINE 300 MG/1
300 TABLET, FILM COATED ORAL 2 TIMES DAILY
Status: DISCONTINUED | OUTPATIENT
Start: 2025-01-06 | End: 2025-01-05

## 2025-01-05 RX ORDER — 0.9 % SODIUM CHLORIDE 0.9 %
1000 INTRAVENOUS SOLUTION INTRAVENOUS ONCE
Status: COMPLETED | OUTPATIENT
Start: 2025-01-05 | End: 2025-01-05

## 2025-01-05 RX ORDER — ACETAMINOPHEN 325 MG/1
650 TABLET ORAL EVERY 6 HOURS PRN
Status: DISCONTINUED | OUTPATIENT
Start: 2025-01-05 | End: 2025-01-13 | Stop reason: HOSPADM

## 2025-01-05 RX ORDER — CHOLECALCIFEROL (VITAMIN D3) 125 MCG
20000 CAPSULE ORAL DAILY
Status: DISCONTINUED | OUTPATIENT
Start: 2025-01-05 | End: 2025-01-13 | Stop reason: HOSPADM

## 2025-01-05 RX ORDER — ACETAMINOPHEN 650 MG/1
650 SUPPOSITORY RECTAL EVERY 6 HOURS PRN
Status: DISCONTINUED | OUTPATIENT
Start: 2025-01-05 | End: 2025-01-13 | Stop reason: HOSPADM

## 2025-01-05 RX ORDER — BACLOFEN 10 MG/1
10 TABLET ORAL 3 TIMES DAILY
Status: DISCONTINUED | OUTPATIENT
Start: 2025-01-05 | End: 2025-01-13 | Stop reason: HOSPADM

## 2025-01-05 RX ORDER — CALCIUM CARBONATE/VITAMIN D3 500MG-5MCG
1 TABLET ORAL 2 TIMES DAILY
COMMUNITY

## 2025-01-05 RX ORDER — IOPAMIDOL 755 MG/ML
80 INJECTION, SOLUTION INTRAVASCULAR
Status: COMPLETED | OUTPATIENT
Start: 2025-01-05 | End: 2025-01-05

## 2025-01-05 RX ORDER — LISINOPRIL 2.5 MG/1
2.5 TABLET ORAL DAILY
Status: DISCONTINUED | OUTPATIENT
Start: 2025-01-05 | End: 2025-01-13 | Stop reason: HOSPADM

## 2025-01-05 RX ORDER — OXYBUTYNIN CHLORIDE 10 MG/1
10 TABLET, EXTENDED RELEASE ORAL DAILY
Status: DISCONTINUED | OUTPATIENT
Start: 2025-01-06 | End: 2025-01-13 | Stop reason: HOSPADM

## 2025-01-05 RX ORDER — VITAMIN E 268 MG
400 CAPSULE ORAL DAILY
Status: DISCONTINUED | OUTPATIENT
Start: 2025-01-05 | End: 2025-01-13 | Stop reason: HOSPADM

## 2025-01-05 RX ORDER — ACETAMINOPHEN 325 MG/1
650 TABLET ORAL EVERY 4 HOURS PRN
Status: DISCONTINUED | OUTPATIENT
Start: 2025-01-05 | End: 2025-01-05 | Stop reason: SDUPTHER

## 2025-01-05 RX ORDER — ROSUVASTATIN CALCIUM 20 MG/1
20 TABLET, COATED ORAL NIGHTLY
Status: DISCONTINUED | OUTPATIENT
Start: 2025-01-05 | End: 2025-01-13 | Stop reason: HOSPADM

## 2025-01-05 RX ORDER — IPRATROPIUM BROMIDE AND ALBUTEROL SULFATE 2.5; .5 MG/3ML; MG/3ML
1 SOLUTION RESPIRATORY (INHALATION) EVERY 4 HOURS PRN
Status: DISCONTINUED | OUTPATIENT
Start: 2025-01-05 | End: 2025-01-13 | Stop reason: HOSPADM

## 2025-01-05 RX ORDER — SPIRONOLACTONE 25 MG/1
25 TABLET ORAL DAILY
Status: DISCONTINUED | OUTPATIENT
Start: 2025-01-05 | End: 2025-01-13 | Stop reason: HOSPADM

## 2025-01-05 RX ORDER — ASCORBIC ACID 500 MG
500 TABLET ORAL 2 TIMES DAILY
Status: DISCONTINUED | OUTPATIENT
Start: 2025-01-05 | End: 2025-01-13 | Stop reason: HOSPADM

## 2025-01-05 RX ORDER — GABAPENTIN 600 MG/1
600 TABLET ORAL 4 TIMES DAILY
Status: DISCONTINUED | OUTPATIENT
Start: 2025-01-06 | End: 2025-01-13 | Stop reason: HOSPADM

## 2025-01-05 RX ORDER — SODIUM CHLORIDE 9 MG/ML
INJECTION, SOLUTION INTRAVENOUS PRN
Status: DISCONTINUED | OUTPATIENT
Start: 2025-01-05 | End: 2025-01-13 | Stop reason: HOSPADM

## 2025-01-05 RX ADMIN — IPRATROPIUM BROMIDE AND ALBUTEROL SULFATE 1 DOSE: .5; 3 SOLUTION RESPIRATORY (INHALATION) at 17:33

## 2025-01-05 RX ADMIN — IOPAMIDOL 80 ML: 755 INJECTION, SOLUTION INTRAVENOUS at 12:13

## 2025-01-05 RX ADMIN — Medication 6 MG: at 21:15

## 2025-01-05 RX ADMIN — ACETAMINOPHEN 650 MG: 650 SUPPOSITORY RECTAL at 11:55

## 2025-01-05 RX ADMIN — SODIUM CHLORIDE: 9 INJECTION, SOLUTION INTRAVENOUS at 17:48

## 2025-01-05 RX ADMIN — Medication 400 UNITS: at 21:15

## 2025-01-05 RX ADMIN — SODIUM CHLORIDE, PRESERVATIVE FREE 10 ML: 5 INJECTION INTRAVENOUS at 21:11

## 2025-01-05 RX ADMIN — OXYCODONE HYDROCHLORIDE AND ACETAMINOPHEN 500 MG: 500 TABLET ORAL at 21:16

## 2025-01-05 RX ADMIN — VANCOMYCIN HYDROCHLORIDE 2000 MG: 1 INJECTION, POWDER, LYOPHILIZED, FOR SOLUTION INTRAVENOUS at 16:15

## 2025-01-05 RX ADMIN — PIPERACILLIN AND TAZOBACTAM 4500 MG: 4; .5 INJECTION, POWDER, LYOPHILIZED, FOR SOLUTION INTRAVENOUS at 15:19

## 2025-01-05 RX ADMIN — ROSUVASTATIN CALCIUM 20 MG: 20 TABLET, FILM COATED ORAL at 21:12

## 2025-01-05 RX ADMIN — DOCUSATE SODIUM 100 MG: 100 CAPSULE, LIQUID FILLED ORAL at 21:25

## 2025-01-05 RX ADMIN — FAMOTIDINE 20 MG: 20 TABLET, FILM COATED ORAL at 21:25

## 2025-01-05 RX ADMIN — BACLOFEN 10 MG: 10 TABLET ORAL at 21:12

## 2025-01-05 RX ADMIN — Medication 1 TABLET: at 21:12

## 2025-01-05 RX ADMIN — SODIUM CHLORIDE 1000 ML: 9 INJECTION, SOLUTION INTRAVENOUS at 11:20

## 2025-01-05 RX ADMIN — OXCARBAZEPINE 450 MG: 300 TABLET, FILM COATED ORAL at 21:15

## 2025-01-05 RX ADMIN — MEROPENEM 1000 MG: 1 INJECTION INTRAVENOUS at 21:09

## 2025-01-05 RX ADMIN — Medication 3 MG: at 21:25

## 2025-01-05 ASSESSMENT — PAIN DESCRIPTION - DESCRIPTORS: DESCRIPTORS: DISCOMFORT

## 2025-01-05 ASSESSMENT — PAIN DESCRIPTION - ORIENTATION: ORIENTATION: MID

## 2025-01-05 ASSESSMENT — PAIN SCALES - WONG BAKER: WONGBAKER_NUMERICALRESPONSE: NO HURT

## 2025-01-05 ASSESSMENT — PAIN - FUNCTIONAL ASSESSMENT: PAIN_FUNCTIONAL_ASSESSMENT: ACTIVITIES ARE NOT PREVENTED

## 2025-01-05 ASSESSMENT — PAIN SCALES - GENERAL
PAINLEVEL_OUTOF10: 0
PAINLEVEL_OUTOF10: 4
PAINLEVEL_OUTOF10: 0
PAINLEVEL_OUTOF10: 1

## 2025-01-05 ASSESSMENT — PAIN DESCRIPTION - LOCATION: LOCATION: COCCYX

## 2025-01-05 NOTE — ED PROVIDER NOTES
Pt Name: Greyson Swift  MRN: [id number]  Birthdate 1955  Date of evaluation: 1/5/2025  ED Resident: Candy Ferrell MD  ED Attending: Dr. Guevara    CHIEF COMPLAINT       Chief Complaint   Patient presents with    Altered Mental Status    Fever     History obtained from patient's chart    HISTORY OF PRESENT ILLNESS    HPI  Greyson Swift is a 69 y.o. male who presents to the emergency department for evaluation of altered mental status, fever.    Patient presents via EMS from nursing home for altered mental status.  Nursing staff found the patient this morning confused, febrile.  Last known well yesterday.     Patient is aware that he is at [facility], responds to pain and voice.    The patient has no other acute complaints at this time.    PAST MEDICAL AND SURGICAL HISTORY     Past Medical History:   Diagnosis Date    Dysphagia     oropharyngeal    History of ESBL E. coli infection     History of pulmonary embolism     History of urinary tract infection     Hx of blood clots     Pulmonary embolis    Hypertension     Left ureteral stone 08/22/2023    s/p stent placement    Major depressive disorder, single episode     MS (multiple sclerosis) ([facility])     Multiple sclerosis ([facility]) 1994    Neurogenic bladder 02/2012    Dr. Mitchell placed cather    NSTEMI (non-ST elevated myocardial infarction) ([facility]) 08/2023    Staged PCI    Osteomyelitis     UTI (urinary tract infection)      Past Surgical History:   Procedure Laterality Date    ANKLE SURGERY  1996    broken ankle    BLADDER SURGERY  2-    Suprapubic catheter placement    BRONCHOSCOPY N/A 12/26/2022    BRONCHOSCOPY ALVEOLAR LAVAGE performed by Bronson Garg MD at [facility]    CARDIAC PROCEDURE N/A 11/29/2023    Coronary angiography performed by Aren Magaña MD at [facility] CARDIAC CATH LAB    CARDIAC PROCEDURE N/A 11/29/2023    Percutaneous coronary intervention performed by Aren Magaña MD at [facility] CARDIAC CATH LAB

## 2025-01-05 NOTE — ED NOTES
Pt transported to [facility] in stable condition. Spoke to May prior to arrival.

## 2025-01-05 NOTE — H&P
NEPHROSTOMY CATHETER PLACEMENT 2022 [facility] SPECIAL PROCEDURES    DC LAPS COLECTOMY PRTL W/END CLST & CLSR DSTL SGM N/A 2018    ROBOT DIVERTING COLOSTOMY performed by Fani Cruz MD at [facility] OR    TONSILLECTOMY  child    URETER SURGERY Left 2022    CYSTOSCOPY, LEFT URETEROSCOPY, LASER LITHOTRIPSY,  LEFT URETERAL STENT EXCHANGE performed by Suman Lee MD at [facility] OR    URETER SURGERY Left 2023    Cystoscopy, Left Ureteroscopy, Laser Lithotripsy, , And Left Ureteral Stent Exchange performed by Harvinder Quispe Jr., MD at [facility] OR         Problem Relation Age of Onset    Cancer Mother         Breast    Heart Disease Father 50    Arthritis Sister     Heart Disease Paternal Grandmother 50     Social History     Socioeconomic History    Marital status:      Spouse name: Makayla    Number of children: 2    Years of education: None    Highest education level: None   Tobacco Use    Smoking status: Former     Current packs/day: 0.00     Types: Cigarettes     Quit date: 1982     Years since quittin.0     Passive exposure: Past    Smokeless tobacco: Former   Vaping Use    Vaping status: Never Used   Substance and Sexual Activity    Alcohol use: No     Comment: \"quit alcohol a few years ago\"    Drug use: No    Sexual activity: Yes     Partners: Female     Social Determinants of Health     Food Insecurity: No Food Insecurity (2025)    Hunger Vital Sign     Worried About Running Out of Food in the Last Year: Never true     Ran Out of Food in the Last Year: Never true   Transportation Needs: No Transportation Needs (2025)    PRAPARE - Transportation     Lack of Transportation (Medical): No     Lack of Transportation (Non-Medical): No   Housing Stability: Low Risk  (2025)    Housing Stability Vital Sign     Unable to Pay for Housing in the Last Year: No     Number of Times Moved in the Last Year: 0     Homeless in the Last Year: No        Code status: Full Code     Electronically signed by

## 2025-01-06 LAB
ANION GAP SERPL CALC-SCNC: 14 MEQ/L (ref 8–16)
BASOPHILS ABSOLUTE: 0 THOU/MM3 (ref 0–0.1)
BASOPHILS NFR BLD AUTO: 0.1 %
BUN SERPL-MCNC: 16 MG/DL (ref 7–22)
CALCIUM SERPL-MCNC: 8.6 MG/DL (ref 8.5–10.5)
CHLORIDE SERPL-SCNC: 103 MEQ/L (ref 98–111)
CO2 SERPL-SCNC: 21 MEQ/L (ref 23–33)
CREAT SERPL-MCNC: 0.2 MG/DL (ref 0.4–1.2)
DEPRECATED RDW RBC AUTO: 53.9 FL (ref 35–45)
EOSINOPHIL NFR BLD AUTO: 1.3 %
EOSINOPHILS ABSOLUTE: 0.1 THOU/MM3 (ref 0–0.4)
ERYTHROCYTE [DISTWIDTH] IN BLOOD BY AUTOMATED COUNT: 16.4 % (ref 11.5–14.5)
ERYTHROCYTE [SEDIMENTATION RATE] IN BLOOD BY WESTERGREN METHOD: 134 MM/HR (ref 0–10)
GFR SERPL CREATININE-BSD FRML MDRD: > 90 ML/MIN/1.73M2
GLUCOSE SERPL-MCNC: 102 MG/DL (ref 70–108)
HCT VFR BLD AUTO: 29.1 % (ref 42–52)
HGB BLD-MCNC: 8.8 GM/DL (ref 14–18)
IMM GRANULOCYTES # BLD AUTO: 0.02 THOU/MM3 (ref 0–0.07)
IMM GRANULOCYTES NFR BLD AUTO: 0.3 %
LYMPHOCYTES ABSOLUTE: 0.9 THOU/MM3 (ref 1–4.8)
LYMPHOCYTES NFR BLD AUTO: 13 %
MAGNESIUM SERPL-MCNC: 2 MG/DL (ref 1.6–2.4)
MCH RBC QN AUTO: 26.9 PG (ref 26–33)
MCHC RBC AUTO-ENTMCNC: 30.2 GM/DL (ref 32.2–35.5)
MCV RBC AUTO: 89 FL (ref 80–94)
MONOCYTES ABSOLUTE: 0.8 THOU/MM3 (ref 0.4–1.3)
MONOCYTES NFR BLD AUTO: 11 %
NEUTROPHILS ABSOLUTE: 5.3 THOU/MM3 (ref 1.8–7.7)
NEUTROPHILS NFR BLD AUTO: 74.3 %
NRBC BLD AUTO-RTO: 0 /100 WBC
PLATELET # BLD AUTO: 331 THOU/MM3 (ref 130–400)
PMV BLD AUTO: 8.1 FL (ref 9.4–12.4)
POTASSIUM SERPL-SCNC: 3.9 MEQ/L (ref 3.5–5.2)
RBC # BLD AUTO: 3.27 MILL/MM3 (ref 4.7–6.1)
SODIUM SERPL-SCNC: 138 MEQ/L (ref 135–145)
WBC # BLD AUTO: 7.1 THOU/MM3 (ref 4.8–10.8)

## 2025-01-06 PROCEDURE — 1200000003 HC TELEMETRY R&B

## 2025-01-06 PROCEDURE — 85025 COMPLETE CBC W/AUTO DIFF WBC: CPT

## 2025-01-06 PROCEDURE — 99233 SBSQ HOSP IP/OBS HIGH 50: CPT | Performed by: INTERNAL MEDICINE

## 2025-01-06 PROCEDURE — 83735 ASSAY OF MAGNESIUM: CPT

## 2025-01-06 PROCEDURE — 85651 RBC SED RATE NONAUTOMATED: CPT

## 2025-01-06 PROCEDURE — 6370000000 HC RX 637 (ALT 250 FOR IP): Performed by: INTERNAL MEDICINE

## 2025-01-06 PROCEDURE — 80048 BASIC METABOLIC PNL TOTAL CA: CPT

## 2025-01-06 PROCEDURE — 99222 1ST HOSP IP/OBS MODERATE 55: CPT | Performed by: NURSE PRACTITIONER

## 2025-01-06 PROCEDURE — 6360000002 HC RX W HCPCS

## 2025-01-06 PROCEDURE — 6370000000 HC RX 637 (ALT 250 FOR IP)

## 2025-01-06 PROCEDURE — 2580000003 HC RX 258

## 2025-01-06 PROCEDURE — 36415 COLL VENOUS BLD VENIPUNCTURE: CPT

## 2025-01-06 PROCEDURE — 1200000000 HC SEMI PRIVATE

## 2025-01-06 RX ORDER — METOPROLOL SUCCINATE 25 MG/1
25 TABLET, EXTENDED RELEASE ORAL 2 TIMES DAILY
Status: DISCONTINUED | OUTPATIENT
Start: 2025-01-06 | End: 2025-01-13 | Stop reason: HOSPADM

## 2025-01-06 RX ORDER — SODIUM HYPOCHLORITE 1.25 MG/ML
SOLUTION TOPICAL 2 TIMES DAILY
Status: DISCONTINUED | OUTPATIENT
Start: 2025-01-06 | End: 2025-01-13 | Stop reason: HOSPADM

## 2025-01-06 RX ADMIN — OXCARBAZEPINE 450 MG: 300 TABLET, FILM COATED ORAL at 08:31

## 2025-01-06 RX ADMIN — DAKIN'S SOLUTION 0.125% (QUARTER STRENGTH): 0.12 SOLUTION at 21:44

## 2025-01-06 RX ADMIN — FAMOTIDINE 20 MG: 20 TABLET, FILM COATED ORAL at 21:43

## 2025-01-06 RX ADMIN — Medication 400 UNITS: at 08:31

## 2025-01-06 RX ADMIN — DAKIN'S SOLUTION 0.125% (QUARTER STRENGTH): 0.12 SOLUTION at 14:14

## 2025-01-06 RX ADMIN — OXYBUTYNIN CHLORIDE 10 MG: 10 TABLET, EXTENDED RELEASE ORAL at 08:31

## 2025-01-06 RX ADMIN — Medication 6 MG: at 08:32

## 2025-01-06 RX ADMIN — GABAPENTIN 600 MG: 600 TABLET, FILM COATED ORAL at 08:32

## 2025-01-06 RX ADMIN — BACLOFEN 10 MG: 10 TABLET ORAL at 08:32

## 2025-01-06 RX ADMIN — Medication 1 TABLET: at 21:41

## 2025-01-06 RX ADMIN — BACLOFEN 10 MG: 10 TABLET ORAL at 21:43

## 2025-01-06 RX ADMIN — OXCARBAZEPINE 450 MG: 300 TABLET, FILM COATED ORAL at 21:42

## 2025-01-06 RX ADMIN — OXYCODONE HYDROCHLORIDE AND ACETAMINOPHEN 500 MG: 500 TABLET ORAL at 21:43

## 2025-01-06 RX ADMIN — OXYCODONE HYDROCHLORIDE AND ACETAMINOPHEN 500 MG: 500 TABLET ORAL at 08:32

## 2025-01-06 RX ADMIN — DOCUSATE SODIUM 100 MG: 100 CAPSULE, LIQUID FILLED ORAL at 08:32

## 2025-01-06 RX ADMIN — Medication 1250 MG: at 17:33

## 2025-01-06 RX ADMIN — BACLOFEN 10 MG: 10 TABLET ORAL at 14:18

## 2025-01-06 RX ADMIN — MEROPENEM 1000 MG: 1 INJECTION INTRAVENOUS at 14:15

## 2025-01-06 RX ADMIN — Medication 1250 MG: at 03:48

## 2025-01-06 RX ADMIN — GABAPENTIN 600 MG: 600 TABLET, FILM COATED ORAL at 14:17

## 2025-01-06 RX ADMIN — ROSUVASTATIN CALCIUM 20 MG: 20 TABLET, FILM COATED ORAL at 21:43

## 2025-01-06 RX ADMIN — ACETAMINOPHEN 650 MG: 325 TABLET ORAL at 00:17

## 2025-01-06 RX ADMIN — MEROPENEM 1000 MG: 1 INJECTION INTRAVENOUS at 05:32

## 2025-01-06 RX ADMIN — ACETAMINOPHEN 650 MG: 325 TABLET ORAL at 21:42

## 2025-01-06 RX ADMIN — GABAPENTIN 600 MG: 600 TABLET, FILM COATED ORAL at 21:41

## 2025-01-06 RX ADMIN — GABAPENTIN 600 MG: 600 TABLET, FILM COATED ORAL at 17:33

## 2025-01-06 RX ADMIN — FAMOTIDINE 20 MG: 20 TABLET, FILM COATED ORAL at 08:32

## 2025-01-06 RX ADMIN — Medication 3 MG: at 21:41

## 2025-01-06 RX ADMIN — Medication 1 TABLET: at 08:31

## 2025-01-06 RX ADMIN — MEROPENEM 1000 MG: 1 INJECTION INTRAVENOUS at 21:41

## 2025-01-06 ASSESSMENT — PAIN SCALES - GENERAL
PAINLEVEL_OUTOF10: 4
PAINLEVEL_OUTOF10: 0
PAINLEVEL_OUTOF10: 3
PAINLEVEL_OUTOF10: 0

## 2025-01-06 ASSESSMENT — PAIN DESCRIPTION - LOCATION
LOCATION: BUTTOCKS
LOCATION: BUTTOCKS

## 2025-01-06 ASSESSMENT — PAIN SCALES - WONG BAKER
WONGBAKER_NUMERICALRESPONSE: 0;2
WONGBAKER_NUMERICALRESPONSE: NO HURT

## 2025-01-06 ASSESSMENT — PAIN DESCRIPTION - ORIENTATION
ORIENTATION: RIGHT;MID
ORIENTATION: MID;RIGHT

## 2025-01-06 ASSESSMENT — PAIN DESCRIPTION - PAIN TYPE: TYPE: ACUTE PAIN

## 2025-01-06 ASSESSMENT — PAIN DESCRIPTION - ONSET: ONSET: ON-GOING

## 2025-01-06 ASSESSMENT — PAIN DESCRIPTION - DESCRIPTORS
DESCRIPTORS: ACHING;DISCOMFORT
DESCRIPTORS: ACHING;DISCOMFORT

## 2025-01-06 ASSESSMENT — PAIN DESCRIPTION - FREQUENCY: FREQUENCY: CONTINUOUS

## 2025-01-06 NOTE — CONSULTS
[facility]  General Surgery Consultation - Noy Cavazos, APRN - CNP  On behalf of Dr. Radha Cruz    Pt Name: Greyson Swift  MRN: [id number]  YOB: 1955  Date of evaluation: 1/6/2025  Primary Care Physician: Noah Benitez MD  Patient evaluated at the request of  ED Provider  Reason for evaluation: wound   IMPRESSIONS:   Right Ischial Wound   Hx of MS   Hx dysphagia      has a past medical history of Dysphagia, History of ESBL E. coli infection, History of pulmonary embolism, History of urinary tract infection, Hx of blood clots, Hypertension, Left ureteral stone, Major depressive disorder, single episode, MS (multiple sclerosis) ([facility]), Multiple sclerosis (HCC), Neurogenic bladder, NSTEMI (non-ST elevated myocardial infarction) ([facility]), Osteomyelitis, and UTI (urinary tract infection).  RECOMMENDATIONS:   IV antibiotics and wound care per Dr Mitchell  Analgesia and antiemetics as needed  Contact isolation - MRDO, MRSA, ESBL, CRE, VRE  Continue to hold Brilinta recommend a 5 day washout   Okay for diet from a surgical standpoint, no plans for surgery today. Will plan for Friday January 10.    SUBJECTIVE:   History of Chief Complaint:    Greyson is a 69 y.o.male who presents with mental status changes and febrile from SNF. He has a chronic right ischial wound that is necrotic and extends down to bone per CT. he has a history of MS and is bedbound and electric wheelchair.  He has extensive history of infection including MRDO, MRSA, ESBL, CRE, VRE and requires contact isolation.  He does have a neurogenic bladder with her suprapubic catheter which was exchanged in ED. past medical history: Dysphagia, pulmonary embolism, urinary tract infections, hypertension, left ureteral stone, major depressive disorder, NSTEMI on Brilinta, osteomyelitis.  Past surgical history: Ankle surgery, bladder surgery, bronchoscopy, cardiac procedure, colonoscopy, cystoscopy, nephrostomy catheter placement, diverting 
 06/27/2024 09:39 PM     No growth 24 hours. No growth 48 hours. No growth at 5 days       Problem list of patient      Patient Active Problem List   Diagnosis Code    Neurogenic bladder N31.9    Multiple sclerosis ([facility]) G35    MS (multiple sclerosis) ([facility]) G35    Urinary retention R33.9    Chronic indwelling Pompa catheter Z97.8    Decubitus ulcer of coccyx L89.159    Acute metabolic encephalopathy G93.41    Speech disturbance R47.9    Wound infection T14.8XXA, L08.9    Elevated INR R79.1    Chronic osteomyelitis, pelvis, right M86.651    Osteomyelitis M86.9    Urinary tract infection associated with indwelling urethral catheter ([facility]) T83.511A, N39.0    Abnormal liver function test R79.89    Chronic depression F32.A    Essential hypertension I10    History of pulmonary embolism Z86.711    Other dysphagia R13.19    Pressure injury of skin of back L89.109    Anemia D64.9    AMS (altered mental status) R41.82    Class 1 obesity due to excess calories without serious comorbidity with body mass index (BMI) of 31.0 to 31.9 in adult E66.811, E66.09, Z68.31    Colostomy in place ([facility]) Z93.3    Current use of long term anticoagulation Z79.01    Obstructive uropathy N13.9    Hypokalemia E87.6    Hypomagnesemia E83.42    Shortness of breath R06.02    Hypoxia R09.02    Pleural effusion on right J90    History of DVT (deep vein thrombosis) Z86.718    History of ESBL E. coli infection Z86.19    Abnormal CT of the chest R93.89    Coronary artery disease involving native coronary artery of native heart with other form of angina pectoris ([facility]) I25.118    Post PTCA Z98.61    Complicated UTI (urinary tract infection) N39.0    Mild malnutrition ([facility]) E44.1    Sepsis ([facility]) A41.9    Decubitus ulcer of right hip, stage 4 ([facility]) L89.214    Paraplegia ([facility]) G82.20    Heart failure with improved ejection fraction (HFimpEF) ([facility]) I50.32    Coronary artery disease involving native coronary artery of native heart without angina pectoris I25.10

## 2025-01-06 NOTE — FLOWSHEET NOTE
01/06/25 1120   Treatment Team Notification   Reason for Communication Review case   Name of Team Member Notified Yoseph Singh   Treatment Team Role Attending Provider   Method of Communication Secure Message   Response No new orders   Notification Time 1120     In the ED notes and during shift report this RN was told that general surgery was supposed to see the patient for a possible debridement, however, there are no consults for general surgery and nobody is signed onto the patients case.

## 2025-01-07 PROBLEM — L89.316 PRESSURE INJURY OF DEEP TISSUE OF RIGHT BUTTOCK: Status: ACTIVE | Noted: 2025-01-07

## 2025-01-07 LAB
ANION GAP SERPL CALC-SCNC: 10 MEQ/L (ref 8–16)
BACTERIA UR CULT: ABNORMAL
BASOPHILS ABSOLUTE: 0 THOU/MM3 (ref 0–0.1)
BASOPHILS NFR BLD AUTO: 0.3 %
BUN SERPL-MCNC: 14 MG/DL (ref 7–22)
CALCIUM SERPL-MCNC: 8.6 MG/DL (ref 8.5–10.5)
CHLORIDE SERPL-SCNC: 107 MEQ/L (ref 98–111)
CO2 SERPL-SCNC: 23 MEQ/L (ref 23–33)
CREAT SERPL-MCNC: 0.4 MG/DL (ref 0.4–1.2)
DEPRECATED RDW RBC AUTO: 52.9 FL (ref 35–45)
EOSINOPHIL NFR BLD AUTO: 3.5 %
EOSINOPHILS ABSOLUTE: 0.2 THOU/MM3 (ref 0–0.4)
ERYTHROCYTE [DISTWIDTH] IN BLOOD BY AUTOMATED COUNT: 16.4 % (ref 11.5–14.5)
GFR SERPL CREATININE-BSD FRML MDRD: > 90 ML/MIN/1.73M2
GLUCOSE SERPL-MCNC: 134 MG/DL (ref 70–108)
HCT VFR BLD AUTO: 28.1 % (ref 42–52)
HGB BLD-MCNC: 8.6 GM/DL (ref 14–18)
IMM GRANULOCYTES # BLD AUTO: 0.04 THOU/MM3 (ref 0–0.07)
IMM GRANULOCYTES NFR BLD AUTO: 0.6 %
LYMPHOCYTES ABSOLUTE: 1 THOU/MM3 (ref 1–4.8)
LYMPHOCYTES NFR BLD AUTO: 15.4 %
MCH RBC QN AUTO: 26.7 PG (ref 26–33)
MCHC RBC AUTO-ENTMCNC: 30.6 GM/DL (ref 32.2–35.5)
MCV RBC AUTO: 87.3 FL (ref 80–94)
MONOCYTES ABSOLUTE: 0.6 THOU/MM3 (ref 0.4–1.3)
MONOCYTES NFR BLD AUTO: 9.1 %
NEUTROPHILS ABSOLUTE: 4.4 THOU/MM3 (ref 1.8–7.7)
NEUTROPHILS NFR BLD AUTO: 71.1 %
NRBC BLD AUTO-RTO: 0 /100 WBC
ORGANISM: ABNORMAL
PLATELET # BLD AUTO: 314 THOU/MM3 (ref 130–400)
PMV BLD AUTO: 8.1 FL (ref 9.4–12.4)
POTASSIUM SERPL-SCNC: 3.5 MEQ/L (ref 3.5–5.2)
RBC # BLD AUTO: 3.22 MILL/MM3 (ref 4.7–6.1)
SODIUM SERPL-SCNC: 140 MEQ/L (ref 135–145)
VANCOMYCIN SERPL-MCNC: 40.5 UG/ML (ref 10–39.9)
WBC # BLD AUTO: 6.2 THOU/MM3 (ref 4.8–10.8)

## 2025-01-07 PROCEDURE — 99231 SBSQ HOSP IP/OBS SF/LOW 25: CPT | Performed by: NURSE PRACTITIONER

## 2025-01-07 PROCEDURE — 6370000000 HC RX 637 (ALT 250 FOR IP): Performed by: STUDENT IN AN ORGANIZED HEALTH CARE EDUCATION/TRAINING PROGRAM

## 2025-01-07 PROCEDURE — 85025 COMPLETE CBC W/AUTO DIFF WBC: CPT

## 2025-01-07 PROCEDURE — 2580000003 HC RX 258

## 2025-01-07 PROCEDURE — 36415 COLL VENOUS BLD VENIPUNCTURE: CPT

## 2025-01-07 PROCEDURE — 80048 BASIC METABOLIC PNL TOTAL CA: CPT

## 2025-01-07 PROCEDURE — 80202 ASSAY OF VANCOMYCIN: CPT

## 2025-01-07 PROCEDURE — 6360000002 HC RX W HCPCS

## 2025-01-07 PROCEDURE — 1200000000 HC SEMI PRIVATE

## 2025-01-07 PROCEDURE — 1200000003 HC TELEMETRY R&B

## 2025-01-07 PROCEDURE — 6370000000 HC RX 637 (ALT 250 FOR IP)

## 2025-01-07 PROCEDURE — 2500000003 HC RX 250 WO HCPCS

## 2025-01-07 RX ORDER — POTASSIUM CHLORIDE 1500 MG/1
40 TABLET, EXTENDED RELEASE ORAL PRN
Status: DISCONTINUED | OUTPATIENT
Start: 2025-01-07 | End: 2025-01-13 | Stop reason: HOSPADM

## 2025-01-07 RX ORDER — POTASSIUM CHLORIDE 7.45 MG/ML
10 INJECTION INTRAVENOUS PRN
Status: DISCONTINUED | OUTPATIENT
Start: 2025-01-07 | End: 2025-01-13 | Stop reason: HOSPADM

## 2025-01-07 RX ADMIN — SODIUM CHLORIDE, PRESERVATIVE FREE 10 ML: 5 INJECTION INTRAVENOUS at 09:32

## 2025-01-07 RX ADMIN — MEROPENEM 1000 MG: 1 INJECTION INTRAVENOUS at 21:21

## 2025-01-07 RX ADMIN — FAMOTIDINE 20 MG: 20 TABLET, FILM COATED ORAL at 09:32

## 2025-01-07 RX ADMIN — Medication 1 TABLET: at 09:32

## 2025-01-07 RX ADMIN — GABAPENTIN 600 MG: 600 TABLET, FILM COATED ORAL at 13:15

## 2025-01-07 RX ADMIN — BACLOFEN 10 MG: 10 TABLET ORAL at 09:32

## 2025-01-07 RX ADMIN — BACLOFEN 10 MG: 10 TABLET ORAL at 15:16

## 2025-01-07 RX ADMIN — FAMOTIDINE 20 MG: 20 TABLET, FILM COATED ORAL at 21:13

## 2025-01-07 RX ADMIN — DAKIN'S SOLUTION 0.125% (QUARTER STRENGTH): 0.12 SOLUTION at 09:33

## 2025-01-07 RX ADMIN — Medication 400 UNITS: at 09:32

## 2025-01-07 RX ADMIN — DAKIN'S SOLUTION 0.125% (QUARTER STRENGTH): 0.12 SOLUTION at 21:13

## 2025-01-07 RX ADMIN — OXCARBAZEPINE 450 MG: 300 TABLET, FILM COATED ORAL at 21:13

## 2025-01-07 RX ADMIN — OXYBUTYNIN CHLORIDE 10 MG: 10 TABLET, EXTENDED RELEASE ORAL at 09:32

## 2025-01-07 RX ADMIN — MEROPENEM 1000 MG: 1 INJECTION INTRAVENOUS at 15:19

## 2025-01-07 RX ADMIN — OXCARBAZEPINE 450 MG: 300 TABLET, FILM COATED ORAL at 09:32

## 2025-01-07 RX ADMIN — OXYCODONE HYDROCHLORIDE AND ACETAMINOPHEN 500 MG: 500 TABLET ORAL at 09:32

## 2025-01-07 RX ADMIN — BACLOFEN 10 MG: 10 TABLET ORAL at 21:13

## 2025-01-07 RX ADMIN — GABAPENTIN 600 MG: 600 TABLET, FILM COATED ORAL at 21:13

## 2025-01-07 RX ADMIN — Medication 1250 MG: at 03:29

## 2025-01-07 RX ADMIN — MEROPENEM 1000 MG: 1 INJECTION INTRAVENOUS at 05:15

## 2025-01-07 RX ADMIN — Medication 6 MG: at 09:32

## 2025-01-07 RX ADMIN — GABAPENTIN 600 MG: 600 TABLET, FILM COATED ORAL at 18:22

## 2025-01-07 RX ADMIN — ROSUVASTATIN CALCIUM 20 MG: 20 TABLET, FILM COATED ORAL at 21:13

## 2025-01-07 RX ADMIN — DOCUSATE SODIUM 100 MG: 100 CAPSULE, LIQUID FILLED ORAL at 09:32

## 2025-01-07 RX ADMIN — OXYCODONE HYDROCHLORIDE AND ACETAMINOPHEN 500 MG: 500 TABLET ORAL at 21:13

## 2025-01-07 RX ADMIN — SODIUM CHLORIDE, PRESERVATIVE FREE 10 ML: 5 INJECTION INTRAVENOUS at 21:13

## 2025-01-07 RX ADMIN — Medication 1 TABLET: at 21:13

## 2025-01-07 RX ADMIN — POTASSIUM CHLORIDE 40 MEQ: 1500 TABLET, EXTENDED RELEASE ORAL at 18:22

## 2025-01-07 RX ADMIN — GABAPENTIN 600 MG: 600 TABLET, FILM COATED ORAL at 09:32

## 2025-01-07 ASSESSMENT — PAIN SCALES - GENERAL
PAINLEVEL_OUTOF10: 0
PAINLEVEL_OUTOF10: 0

## 2025-01-07 NOTE — DISCHARGE INSTR - COC
01/05/25 Culture, Reflexed, Urine        E-coli urine 4/2022, 6/2022, 7/2022, 10/2023, 1/2024  E coli blood 8/2023    CRE (Carbapenem-Resistant Enterobacteriaceae)  07/08/19 07/08/19 Eloise Gore RN        Pseudomonas Aeruginosa- sacrum swab - confirmed by [facility] 6/2019    VRE 02/10/19 02/10/19 02/10/19 VRE Screen by PCR        PCR 2/2019    Resolved    Acinetobacter, MDRO 09/11/23 09/14/23 09/11/23 Culture, Urine   09/29/23 Eloise Gore RN    Urine 9/2023 - Confirmed by [facility]    MRSA 02/10/19 02/12/19 06/17/19 Aerobic & Anaerobic Culture   04/25/22 Eloise Gore RN    Buttock 2/2019  Rectal 2/2019  Urine 6/2019  Sacrum 6/2019    MRSA  08/05/13 08/05/13 Margie Saenz RN   01/22/18 Eloise Gore RN    Urine  Sacrum swab 6/2019                         Nurse Assessment:  Last Vital Signs: /69   Pulse 92   Temp 98.5 °F (36.9 °C) (Oral)   Resp 18   Wt 68.5 kg (151 lb 0.2 oz)   SpO2 95%   BMI 23.65 kg/m²     Last documented pain score (0-10 scale): Pain Level: 0  Last Weight:   Wt Readings from Last 1 Encounters:   01/07/25 68.5 kg (151 lb 0.2 oz)     Mental Status:  oriented and alert    IV Access:  - None    Nursing Mobility/ADLs:  Walking   Dependent  Transfer  Dependent  Bathing  Dependent  Dressing  Dependent  Toileting  Dependent  Feeding  Assisted  Med Admin  Assisted  Med Delivery   whole and prefers mixed with applesauce     Wound Care Documentation and Therapy:  Wound 04/22/22 Buttocks Right Tunneling wound. (Active)   Number of days: 990       Wound 08/05/22 Scrotum Posterior (Active)   Number of days: 885       Wound 12/26/22 Buttocks Left stagev 2 buttocks (Active)   Number of days: 743       Wound 12/26/22 Toe (Comment  which one) Anterior;Right eschar greater right toe (Active)   Number of days: 743       Wound 12/26/22 Toe (Comment  which one) Anterior;Left escar middle toe (Active)   Number of days: 743       Wound 12/26/22 Buttocks Right;Left

## 2025-01-08 LAB
ANION GAP SERPL CALC-SCNC: 10 MEQ/L (ref 8–16)
BASOPHILS ABSOLUTE: 0 THOU/MM3 (ref 0–0.1)
BASOPHILS NFR BLD AUTO: 0.3 %
BUN SERPL-MCNC: 12 MG/DL (ref 7–22)
CALCIUM SERPL-MCNC: 8.6 MG/DL (ref 8.5–10.5)
CHLORIDE SERPL-SCNC: 103 MEQ/L (ref 98–111)
CO2 SERPL-SCNC: 25 MEQ/L (ref 23–33)
CREAT SERPL-MCNC: 0.3 MG/DL (ref 0.4–1.2)
DEPRECATED RDW RBC AUTO: 52.5 FL (ref 35–45)
DEPRECATED RDW RBC AUTO: 54.8 FL (ref 35–45)
EOSINOPHIL NFR BLD AUTO: 4.1 %
EOSINOPHILS ABSOLUTE: 0.3 THOU/MM3 (ref 0–0.4)
ERYTHROCYTE [DISTWIDTH] IN BLOOD BY AUTOMATED COUNT: 16.6 % (ref 11.5–14.5)
ERYTHROCYTE [DISTWIDTH] IN BLOOD BY AUTOMATED COUNT: 16.8 % (ref 11.5–14.5)
GFR SERPL CREATININE-BSD FRML MDRD: > 90 ML/MIN/1.73M2
GLUCOSE SERPL-MCNC: 111 MG/DL (ref 70–108)
HCT VFR BLD AUTO: 28 % (ref 42–52)
HCT VFR BLD AUTO: 28.4 % (ref 42–52)
HGB BLD-MCNC: 8.4 GM/DL (ref 14–18)
HGB BLD-MCNC: 8.8 GM/DL (ref 14–18)
IMM GRANULOCYTES # BLD AUTO: 0.02 THOU/MM3 (ref 0–0.07)
IMM GRANULOCYTES NFR BLD AUTO: 0.3 %
LYMPHOCYTES ABSOLUTE: 1.3 THOU/MM3 (ref 1–4.8)
LYMPHOCYTES NFR BLD AUTO: 20 %
MCH RBC QN AUTO: 26.8 PG (ref 26–33)
MCH RBC QN AUTO: 26.9 PG (ref 26–33)
MCHC RBC AUTO-ENTMCNC: 30 GM/DL (ref 32.2–35.5)
MCHC RBC AUTO-ENTMCNC: 31 GM/DL (ref 32.2–35.5)
MCV RBC AUTO: 86.6 FL (ref 80–94)
MCV RBC AUTO: 89.7 FL (ref 80–94)
MONOCYTES ABSOLUTE: 0.6 THOU/MM3 (ref 0.4–1.3)
MONOCYTES NFR BLD AUTO: 9.1 %
NEUTROPHILS ABSOLUTE: 4.2 THOU/MM3 (ref 1.8–7.7)
NEUTROPHILS NFR BLD AUTO: 66.2 %
NRBC BLD AUTO-RTO: 0 /100 WBC
PLATELET # BLD AUTO: 349 THOU/MM3 (ref 130–400)
PMV BLD AUTO: 8 FL (ref 9.4–12.4)
POTASSIUM SERPL-SCNC: 4.5 MEQ/L (ref 3.5–5.2)
RBC # BLD AUTO: 3.12 MILL/MM3 (ref 4.7–6.1)
RBC # BLD AUTO: 3.28 MILL/MM3 (ref 4.7–6.1)
REASON FOR REJECTION: NORMAL
REJECTED TEST: NORMAL
SODIUM SERPL-SCNC: 138 MEQ/L (ref 135–145)
WBC # BLD AUTO: 6.3 THOU/MM3 (ref 4.8–10.8)
WBC # BLD AUTO: 6.4 THOU/MM3 (ref 4.8–10.8)

## 2025-01-08 PROCEDURE — 6360000002 HC RX W HCPCS: Performed by: PHARMACIST

## 2025-01-08 PROCEDURE — 6360000002 HC RX W HCPCS

## 2025-01-08 PROCEDURE — 85025 COMPLETE CBC W/AUTO DIFF WBC: CPT

## 2025-01-08 PROCEDURE — 36415 COLL VENOUS BLD VENIPUNCTURE: CPT

## 2025-01-08 PROCEDURE — 99232 SBSQ HOSP IP/OBS MODERATE 35: CPT | Performed by: INTERNAL MEDICINE

## 2025-01-08 PROCEDURE — 80048 BASIC METABOLIC PNL TOTAL CA: CPT

## 2025-01-08 PROCEDURE — 6370000000 HC RX 637 (ALT 250 FOR IP)

## 2025-01-08 PROCEDURE — 2500000003 HC RX 250 WO HCPCS

## 2025-01-08 PROCEDURE — 2580000003 HC RX 258: Performed by: PHARMACIST

## 2025-01-08 PROCEDURE — 1200000000 HC SEMI PRIVATE

## 2025-01-08 PROCEDURE — 2580000003 HC RX 258

## 2025-01-08 RX ADMIN — BACLOFEN 10 MG: 10 TABLET ORAL at 13:38

## 2025-01-08 RX ADMIN — OXCARBAZEPINE 450 MG: 300 TABLET, FILM COATED ORAL at 22:29

## 2025-01-08 RX ADMIN — MEROPENEM 1000 MG: 1 INJECTION INTRAVENOUS at 13:37

## 2025-01-08 RX ADMIN — Medication 1500 MG: at 09:20

## 2025-01-08 RX ADMIN — Medication 1 TABLET: at 09:16

## 2025-01-08 RX ADMIN — GABAPENTIN 600 MG: 600 TABLET, FILM COATED ORAL at 13:38

## 2025-01-08 RX ADMIN — Medication 400 UNITS: at 09:15

## 2025-01-08 RX ADMIN — MEROPENEM 1000 MG: 1 INJECTION INTRAVENOUS at 04:19

## 2025-01-08 RX ADMIN — GABAPENTIN 600 MG: 600 TABLET, FILM COATED ORAL at 17:36

## 2025-01-08 RX ADMIN — DAKIN'S SOLUTION 0.125% (QUARTER STRENGTH): 0.12 SOLUTION at 09:16

## 2025-01-08 RX ADMIN — GABAPENTIN 600 MG: 600 TABLET, FILM COATED ORAL at 09:14

## 2025-01-08 RX ADMIN — MEROPENEM 1000 MG: 1 INJECTION INTRAVENOUS at 22:34

## 2025-01-08 RX ADMIN — FAMOTIDINE 20 MG: 20 TABLET, FILM COATED ORAL at 22:29

## 2025-01-08 RX ADMIN — DOCUSATE SODIUM 100 MG: 100 CAPSULE, LIQUID FILLED ORAL at 09:14

## 2025-01-08 RX ADMIN — FAMOTIDINE 20 MG: 20 TABLET, FILM COATED ORAL at 09:20

## 2025-01-08 RX ADMIN — OXYBUTYNIN CHLORIDE 10 MG: 10 TABLET, EXTENDED RELEASE ORAL at 09:15

## 2025-01-08 RX ADMIN — ROSUVASTATIN CALCIUM 20 MG: 20 TABLET, FILM COATED ORAL at 22:30

## 2025-01-08 RX ADMIN — OXYCODONE HYDROCHLORIDE AND ACETAMINOPHEN 500 MG: 500 TABLET ORAL at 22:29

## 2025-01-08 RX ADMIN — BACLOFEN 10 MG: 10 TABLET ORAL at 22:29

## 2025-01-08 RX ADMIN — OXYCODONE HYDROCHLORIDE AND ACETAMINOPHEN 500 MG: 500 TABLET ORAL at 09:14

## 2025-01-08 RX ADMIN — OXCARBAZEPINE 450 MG: 300 TABLET, FILM COATED ORAL at 09:14

## 2025-01-08 RX ADMIN — ACETAMINOPHEN 650 MG: 325 TABLET ORAL at 04:19

## 2025-01-08 RX ADMIN — Medication 1 TABLET: at 22:29

## 2025-01-08 RX ADMIN — BACLOFEN 10 MG: 10 TABLET ORAL at 09:15

## 2025-01-08 RX ADMIN — SODIUM CHLORIDE, PRESERVATIVE FREE 10 ML: 5 INJECTION INTRAVENOUS at 22:30

## 2025-01-08 RX ADMIN — Medication 6 MG: at 09:15

## 2025-01-08 RX ADMIN — SODIUM CHLORIDE, PRESERVATIVE FREE 10 ML: 5 INJECTION INTRAVENOUS at 09:16

## 2025-01-08 RX ADMIN — GABAPENTIN 600 MG: 600 TABLET, FILM COATED ORAL at 22:29

## 2025-01-08 ASSESSMENT — PAIN SCALES - GENERAL: PAINLEVEL_OUTOF10: 0

## 2025-01-09 LAB
MRSA DNA SPEC QL NAA+PROBE: POSITIVE
VANCOMYCIN TROUGH SERPL-MCNC: 8.3 UG/ML (ref 10–20)

## 2025-01-09 PROCEDURE — 1200000000 HC SEMI PRIVATE

## 2025-01-09 PROCEDURE — 6370000000 HC RX 637 (ALT 250 FOR IP)

## 2025-01-09 PROCEDURE — 80202 ASSAY OF VANCOMYCIN: CPT

## 2025-01-09 PROCEDURE — 2500000003 HC RX 250 WO HCPCS

## 2025-01-09 PROCEDURE — 2580000003 HC RX 258: Performed by: PHYSICIAN ASSISTANT

## 2025-01-09 PROCEDURE — 87641 MR-STAPH DNA AMP PROBE: CPT

## 2025-01-09 PROCEDURE — 99232 SBSQ HOSP IP/OBS MODERATE 35: CPT | Performed by: INTERNAL MEDICINE

## 2025-01-09 PROCEDURE — 2580000003 HC RX 258

## 2025-01-09 PROCEDURE — 36415 COLL VENOUS BLD VENIPUNCTURE: CPT

## 2025-01-09 PROCEDURE — 2580000003 HC RX 258: Performed by: PHARMACIST

## 2025-01-09 PROCEDURE — 6360000002 HC RX W HCPCS: Performed by: PHARMACIST

## 2025-01-09 PROCEDURE — 6360000002 HC RX W HCPCS: Performed by: INTERNAL MEDICINE

## 2025-01-09 PROCEDURE — 6360000002 HC RX W HCPCS

## 2025-01-09 RX ORDER — 0.9 % SODIUM CHLORIDE 0.9 %
1000 INTRAVENOUS SOLUTION INTRAVENOUS ONCE
Status: COMPLETED | OUTPATIENT
Start: 2025-01-09 | End: 2025-01-10

## 2025-01-09 RX ORDER — ENOXAPARIN SODIUM 100 MG/ML
40 INJECTION SUBCUTANEOUS DAILY
Status: DISCONTINUED | OUTPATIENT
Start: 2025-01-09 | End: 2025-01-13 | Stop reason: HOSPADM

## 2025-01-09 RX ADMIN — FAMOTIDINE 20 MG: 20 TABLET, FILM COATED ORAL at 21:36

## 2025-01-09 RX ADMIN — OXYCODONE HYDROCHLORIDE AND ACETAMINOPHEN 500 MG: 500 TABLET ORAL at 09:13

## 2025-01-09 RX ADMIN — GABAPENTIN 600 MG: 600 TABLET, FILM COATED ORAL at 21:36

## 2025-01-09 RX ADMIN — OXCARBAZEPINE 450 MG: 300 TABLET, FILM COATED ORAL at 09:12

## 2025-01-09 RX ADMIN — DOCUSATE SODIUM 100 MG: 100 CAPSULE, LIQUID FILLED ORAL at 09:13

## 2025-01-09 RX ADMIN — FAMOTIDINE 20 MG: 20 TABLET, FILM COATED ORAL at 09:12

## 2025-01-09 RX ADMIN — ROSUVASTATIN CALCIUM 20 MG: 20 TABLET, FILM COATED ORAL at 21:37

## 2025-01-09 RX ADMIN — MEROPENEM 1000 MG: 1 INJECTION INTRAVENOUS at 14:03

## 2025-01-09 RX ADMIN — OXYBUTYNIN CHLORIDE 10 MG: 10 TABLET, EXTENDED RELEASE ORAL at 09:11

## 2025-01-09 RX ADMIN — Medication 1500 MG: at 10:29

## 2025-01-09 RX ADMIN — BACLOFEN 10 MG: 10 TABLET ORAL at 13:59

## 2025-01-09 RX ADMIN — ENOXAPARIN SODIUM 40 MG: 100 INJECTION SUBCUTANEOUS at 09:11

## 2025-01-09 RX ADMIN — Medication 6 MG: at 09:11

## 2025-01-09 RX ADMIN — GABAPENTIN 600 MG: 600 TABLET, FILM COATED ORAL at 17:08

## 2025-01-09 RX ADMIN — BACLOFEN 10 MG: 10 TABLET ORAL at 09:11

## 2025-01-09 RX ADMIN — Medication 400 UNITS: at 09:11

## 2025-01-09 RX ADMIN — OXYCODONE HYDROCHLORIDE AND ACETAMINOPHEN 500 MG: 500 TABLET ORAL at 21:36

## 2025-01-09 RX ADMIN — Medication 1 TABLET: at 21:37

## 2025-01-09 RX ADMIN — DAKIN'S SOLUTION 0.125% (QUARTER STRENGTH): 0.12 SOLUTION at 09:13

## 2025-01-09 RX ADMIN — GABAPENTIN 600 MG: 600 TABLET, FILM COATED ORAL at 13:59

## 2025-01-09 RX ADMIN — SODIUM CHLORIDE, PRESERVATIVE FREE 10 ML: 5 INJECTION INTRAVENOUS at 09:13

## 2025-01-09 RX ADMIN — Medication 1 TABLET: at 09:12

## 2025-01-09 RX ADMIN — DAKIN'S SOLUTION 0.125% (QUARTER STRENGTH): 0.12 SOLUTION at 00:42

## 2025-01-09 RX ADMIN — GABAPENTIN 600 MG: 600 TABLET, FILM COATED ORAL at 09:12

## 2025-01-09 RX ADMIN — OXCARBAZEPINE 450 MG: 300 TABLET, FILM COATED ORAL at 21:36

## 2025-01-09 RX ADMIN — MEROPENEM 1000 MG: 1 INJECTION INTRAVENOUS at 05:30

## 2025-01-09 RX ADMIN — BACLOFEN 10 MG: 10 TABLET ORAL at 21:36

## 2025-01-09 RX ADMIN — SODIUM CHLORIDE 1000 ML: 9 INJECTION, SOLUTION INTRAVENOUS at 23:57

## 2025-01-09 RX ADMIN — MEROPENEM 1000 MG: 1 INJECTION INTRAVENOUS at 23:59

## 2025-01-10 ENCOUNTER — ANESTHESIA EVENT (OUTPATIENT)
Dept: OPERATING ROOM | Age: 70
End: 2025-01-10
Payer: MEDICARE

## 2025-01-10 ENCOUNTER — ANESTHESIA (OUTPATIENT)
Dept: OPERATING ROOM | Age: 70
End: 2025-01-10
Payer: MEDICARE

## 2025-01-10 LAB
ANION GAP SERPL CALC-SCNC: 16 MEQ/L (ref 8–16)
BACTERIA BLD AEROBE CULT: NORMAL
BACTERIA BLD AEROBE CULT: NORMAL
BASOPHILS ABSOLUTE: 0 THOU/MM3 (ref 0–0.1)
BASOPHILS NFR BLD AUTO: 0.4 %
BUN SERPL-MCNC: 9 MG/DL (ref 7–22)
CALCIUM SERPL-MCNC: 9 MG/DL (ref 8.5–10.5)
CHLORIDE SERPL-SCNC: 105 MEQ/L (ref 98–111)
CO2 SERPL-SCNC: 22 MEQ/L (ref 23–33)
CREAT SERPL-MCNC: 0.2 MG/DL (ref 0.4–1.2)
DEPRECATED RDW RBC AUTO: 51.8 FL (ref 35–45)
EOSINOPHIL NFR BLD AUTO: 4.9 %
EOSINOPHILS ABSOLUTE: 0.2 THOU/MM3 (ref 0–0.4)
ERYTHROCYTE [DISTWIDTH] IN BLOOD BY AUTOMATED COUNT: 16.4 % (ref 11.5–14.5)
GFR SERPL CREATININE-BSD FRML MDRD: > 90 ML/MIN/1.73M2
GLUCOSE SERPL-MCNC: 98 MG/DL (ref 70–108)
HCT VFR BLD AUTO: 27.8 % (ref 42–52)
HGB BLD-MCNC: 8.8 GM/DL (ref 14–18)
IMM GRANULOCYTES # BLD AUTO: 0.01 THOU/MM3 (ref 0–0.07)
IMM GRANULOCYTES NFR BLD AUTO: 0.2 %
LYMPHOCYTES ABSOLUTE: 1 THOU/MM3 (ref 1–4.8)
LYMPHOCYTES NFR BLD AUTO: 22.2 %
MCH RBC QN AUTO: 27.4 PG (ref 26–33)
MCHC RBC AUTO-ENTMCNC: 31.7 GM/DL (ref 32.2–35.5)
MCV RBC AUTO: 86.6 FL (ref 80–94)
MONOCYTES ABSOLUTE: 0.5 THOU/MM3 (ref 0.4–1.3)
MONOCYTES NFR BLD AUTO: 9.9 %
NEUTROPHILS ABSOLUTE: 2.9 THOU/MM3 (ref 1.8–7.7)
NEUTROPHILS NFR BLD AUTO: 62.4 %
NRBC BLD AUTO-RTO: 0 /100 WBC
PLATELET # BLD AUTO: 417 THOU/MM3 (ref 130–400)
PMV BLD AUTO: 9.2 FL (ref 9.4–12.4)
POTASSIUM SERPL-SCNC: 3.9 MEQ/L (ref 3.5–5.2)
RBC # BLD AUTO: 3.21 MILL/MM3 (ref 4.7–6.1)
SODIUM SERPL-SCNC: 143 MEQ/L (ref 135–145)
WBC # BLD AUTO: 4.7 THOU/MM3 (ref 4.8–10.8)

## 2025-01-10 PROCEDURE — 87205 SMEAR GRAM STAIN: CPT

## 2025-01-10 PROCEDURE — 6360000002 HC RX W HCPCS: Performed by: SURGERY

## 2025-01-10 PROCEDURE — 2580000003 HC RX 258: Performed by: SURGERY

## 2025-01-10 PROCEDURE — 6360000002 HC RX W HCPCS

## 2025-01-10 PROCEDURE — APPSS30 APP SPLIT SHARED TIME 16-30 MINUTES: Performed by: NURSE PRACTITIONER

## 2025-01-10 PROCEDURE — 6360000002 HC RX W HCPCS: Performed by: NURSE PRACTITIONER

## 2025-01-10 PROCEDURE — 99232 SBSQ HOSP IP/OBS MODERATE 35: CPT | Performed by: INTERNAL MEDICINE

## 2025-01-10 PROCEDURE — 6360000002 HC RX W HCPCS: Performed by: PHARMACIST

## 2025-01-10 PROCEDURE — 2500000003 HC RX 250 WO HCPCS: Performed by: NURSE ANESTHETIST, CERTIFIED REGISTERED

## 2025-01-10 PROCEDURE — 3600000002 HC SURGERY LEVEL 2 BASE: Performed by: SURGERY

## 2025-01-10 PROCEDURE — 11043 DBRDMT MUSC&/FSCA 1ST 20/<: CPT | Performed by: SURGERY

## 2025-01-10 PROCEDURE — 7100000000 HC PACU RECOVERY - FIRST 15 MIN: Performed by: SURGERY

## 2025-01-10 PROCEDURE — 6360000002 HC RX W HCPCS: Performed by: NURSE ANESTHETIST, CERTIFIED REGISTERED

## 2025-01-10 PROCEDURE — 3700000001 HC ADD 15 MINUTES (ANESTHESIA): Performed by: SURGERY

## 2025-01-10 PROCEDURE — 3700000000 HC ANESTHESIA ATTENDED CARE: Performed by: SURGERY

## 2025-01-10 PROCEDURE — 85025 COMPLETE CBC W/AUTO DIFF WBC: CPT

## 2025-01-10 PROCEDURE — 87075 CULTR BACTERIA EXCEPT BLOOD: CPT

## 2025-01-10 PROCEDURE — 7100000001 HC PACU RECOVERY - ADDTL 15 MIN: Performed by: SURGERY

## 2025-01-10 PROCEDURE — 2500000003 HC RX 250 WO HCPCS: Performed by: NURSE PRACTITIONER

## 2025-01-10 PROCEDURE — 87077 CULTURE AEROBIC IDENTIFY: CPT

## 2025-01-10 PROCEDURE — 2500000003 HC RX 250 WO HCPCS

## 2025-01-10 PROCEDURE — 36415 COLL VENOUS BLD VENIPUNCTURE: CPT

## 2025-01-10 PROCEDURE — 0KBN0ZZ EXCISION OF RIGHT HIP MUSCLE, OPEN APPROACH: ICD-10-PCS | Performed by: SURGERY

## 2025-01-10 PROCEDURE — 2580000003 HC RX 258: Performed by: NURSE ANESTHETIST, CERTIFIED REGISTERED

## 2025-01-10 PROCEDURE — 6370000000 HC RX 637 (ALT 250 FOR IP): Performed by: INTERNAL MEDICINE

## 2025-01-10 PROCEDURE — 80048 BASIC METABOLIC PNL TOTAL CA: CPT

## 2025-01-10 PROCEDURE — 2580000003 HC RX 258

## 2025-01-10 PROCEDURE — 11046 DBRDMT MUSC&/FSCA EA ADDL: CPT | Performed by: SURGERY

## 2025-01-10 PROCEDURE — 3600000012 HC SURGERY LEVEL 2 ADDTL 15MIN: Performed by: SURGERY

## 2025-01-10 PROCEDURE — 1200000000 HC SEMI PRIVATE

## 2025-01-10 PROCEDURE — 2709999900 HC NON-CHARGEABLE SUPPLY: Performed by: SURGERY

## 2025-01-10 PROCEDURE — 6370000000 HC RX 637 (ALT 250 FOR IP)

## 2025-01-10 PROCEDURE — 87070 CULTURE OTHR SPECIMN AEROBIC: CPT

## 2025-01-10 RX ORDER — SODIUM CHLORIDE 0.9 % (FLUSH) 0.9 %
5-40 SYRINGE (ML) INJECTION EVERY 12 HOURS SCHEDULED
Status: CANCELLED | OUTPATIENT
Start: 2025-01-10

## 2025-01-10 RX ORDER — SODIUM CHLORIDE 0.9 % (FLUSH) 0.9 %
5-40 SYRINGE (ML) INJECTION PRN
Status: CANCELLED | OUTPATIENT
Start: 2025-01-10

## 2025-01-10 RX ORDER — SODIUM CHLORIDE 9 MG/ML
INJECTION, SOLUTION INTRAVENOUS PRN
Status: CANCELLED | OUTPATIENT
Start: 2025-01-10

## 2025-01-10 RX ORDER — FENTANYL CITRATE 50 UG/ML
50 INJECTION, SOLUTION INTRAMUSCULAR; INTRAVENOUS EVERY 5 MIN PRN
Status: CANCELLED | OUTPATIENT
Start: 2025-01-10

## 2025-01-10 RX ORDER — SODIUM CHLORIDE 9 MG/ML
INJECTION, SOLUTION INTRAVENOUS
Status: DISCONTINUED | OUTPATIENT
Start: 2025-01-10 | End: 2025-01-10 | Stop reason: SDUPTHER

## 2025-01-10 RX ORDER — NALOXONE HYDROCHLORIDE 0.4 MG/ML
INJECTION, SOLUTION INTRAMUSCULAR; INTRAVENOUS; SUBCUTANEOUS PRN
Status: CANCELLED | OUTPATIENT
Start: 2025-01-10

## 2025-01-10 RX ORDER — ROCURONIUM BROMIDE 10 MG/ML
INJECTION, SOLUTION INTRAVENOUS
Status: DISCONTINUED | OUTPATIENT
Start: 2025-01-10 | End: 2025-01-10 | Stop reason: SDUPTHER

## 2025-01-10 RX ORDER — PROPOFOL 10 MG/ML
INJECTION, EMULSION INTRAVENOUS
Status: DISCONTINUED | OUTPATIENT
Start: 2025-01-10 | End: 2025-01-10 | Stop reason: SDUPTHER

## 2025-01-10 RX ORDER — DEXAMETHASONE SODIUM PHOSPHATE 10 MG/ML
INJECTION, EMULSION INTRAMUSCULAR; INTRAVENOUS
Status: DISCONTINUED | OUTPATIENT
Start: 2025-01-10 | End: 2025-01-10 | Stop reason: SDUPTHER

## 2025-01-10 RX ORDER — FENTANYL CITRATE 50 UG/ML
INJECTION, SOLUTION INTRAMUSCULAR; INTRAVENOUS
Status: DISCONTINUED | OUTPATIENT
Start: 2025-01-10 | End: 2025-01-10 | Stop reason: SDUPTHER

## 2025-01-10 RX ORDER — ONDANSETRON 2 MG/ML
INJECTION INTRAMUSCULAR; INTRAVENOUS
Status: DISCONTINUED | OUTPATIENT
Start: 2025-01-10 | End: 2025-01-10 | Stop reason: SDUPTHER

## 2025-01-10 RX ORDER — LIDOCAINE HCL/PF 100 MG/5ML
SYRINGE (ML) INJECTION
Status: DISCONTINUED | OUTPATIENT
Start: 2025-01-10 | End: 2025-01-10 | Stop reason: SDUPTHER

## 2025-01-10 RX ADMIN — Medication 40 MG: at 14:24

## 2025-01-10 RX ADMIN — DAKIN'S SOLUTION 0.125% (QUARTER STRENGTH): 0.12 SOLUTION at 04:43

## 2025-01-10 RX ADMIN — PROPOFOL 150 MG: 10 INJECTION, EMULSION INTRAVENOUS at 14:24

## 2025-01-10 RX ADMIN — OXYCODONE HYDROCHLORIDE AND ACETAMINOPHEN 500 MG: 500 TABLET ORAL at 09:34

## 2025-01-10 RX ADMIN — ROCURONIUM BROMIDE 30 MG: 10 INJECTION INTRAVENOUS at 14:25

## 2025-01-10 RX ADMIN — BACLOFEN 10 MG: 10 TABLET ORAL at 09:34

## 2025-01-10 RX ADMIN — SODIUM CHLORIDE, PRESERVATIVE FREE 10 ML: 5 INJECTION INTRAVENOUS at 09:34

## 2025-01-10 RX ADMIN — OXYBUTYNIN CHLORIDE 10 MG: 10 TABLET, EXTENDED RELEASE ORAL at 09:34

## 2025-01-10 RX ADMIN — SODIUM CHLORIDE: 9 INJECTION, SOLUTION INTRAVENOUS at 14:16

## 2025-01-10 RX ADMIN — MEROPENEM 1000 MG: 1 INJECTION INTRAVENOUS at 05:46

## 2025-01-10 RX ADMIN — DEXAMETHASONE SODIUM PHOSPHATE 10 MG: 10 INJECTION, EMULSION INTRAMUSCULAR; INTRAVENOUS at 14:35

## 2025-01-10 RX ADMIN — SODIUM CHLORIDE, PRESERVATIVE FREE 10 ML: 5 INJECTION INTRAVENOUS at 21:02

## 2025-01-10 RX ADMIN — DAKIN'S SOLUTION 0.125% (QUARTER STRENGTH): 0.12 SOLUTION at 09:35

## 2025-01-10 RX ADMIN — DOCUSATE SODIUM 100 MG: 100 CAPSULE, LIQUID FILLED ORAL at 09:34

## 2025-01-10 RX ADMIN — OXYCODONE HYDROCHLORIDE AND ACETAMINOPHEN 500 MG: 500 TABLET ORAL at 21:03

## 2025-01-10 RX ADMIN — MEROPENEM 1000 MG: 1 INJECTION, POWDER, FOR SOLUTION INTRAVENOUS at 16:42

## 2025-01-10 RX ADMIN — Medication 6 MG: at 09:34

## 2025-01-10 RX ADMIN — BACLOFEN 10 MG: 10 TABLET ORAL at 21:03

## 2025-01-10 RX ADMIN — BACLOFEN 10 MG: 10 TABLET ORAL at 16:38

## 2025-01-10 RX ADMIN — Medication 1 TABLET: at 09:34

## 2025-01-10 RX ADMIN — Medication 1500 MG: at 09:43

## 2025-01-10 RX ADMIN — FAMOTIDINE 20 MG: 20 TABLET, FILM COATED ORAL at 21:02

## 2025-01-10 RX ADMIN — WATER 2000 MG: 1 INJECTION INTRAMUSCULAR; INTRAVENOUS; SUBCUTANEOUS at 14:47

## 2025-01-10 RX ADMIN — ONDANSETRON 4 MG: 2 INJECTION INTRAMUSCULAR; INTRAVENOUS at 14:30

## 2025-01-10 RX ADMIN — FENTANYL CITRATE 50 MCG: 50 INJECTION, SOLUTION INTRAMUSCULAR; INTRAVENOUS at 14:23

## 2025-01-10 RX ADMIN — FAMOTIDINE 20 MG: 20 TABLET, FILM COATED ORAL at 09:34

## 2025-01-10 RX ADMIN — GABAPENTIN 600 MG: 600 TABLET, FILM COATED ORAL at 16:37

## 2025-01-10 RX ADMIN — SUGAMMADEX 200 MG: 100 INJECTION, SOLUTION INTRAVENOUS at 15:05

## 2025-01-10 RX ADMIN — ROSUVASTATIN CALCIUM 20 MG: 20 TABLET, FILM COATED ORAL at 21:03

## 2025-01-10 RX ADMIN — OXCARBAZEPINE 450 MG: 300 TABLET, FILM COATED ORAL at 21:03

## 2025-01-10 RX ADMIN — MEROPENEM 1000 MG: 1 INJECTION INTRAVENOUS at 23:33

## 2025-01-10 RX ADMIN — GABAPENTIN 600 MG: 600 TABLET, FILM COATED ORAL at 09:34

## 2025-01-10 RX ADMIN — GABAPENTIN 600 MG: 600 TABLET, FILM COATED ORAL at 21:03

## 2025-01-10 RX ADMIN — FENTANYL CITRATE 50 MCG: 50 INJECTION, SOLUTION INTRAMUSCULAR; INTRAVENOUS at 15:00

## 2025-01-10 RX ADMIN — Medication 1 TABLET: at 21:03

## 2025-01-10 RX ADMIN — OXCARBAZEPINE 450 MG: 300 TABLET, FILM COATED ORAL at 09:34

## 2025-01-10 RX ADMIN — DAKIN'S SOLUTION 0.125% (QUARTER STRENGTH): 0.12 SOLUTION at 21:02

## 2025-01-10 RX ADMIN — Medication 400 UNITS: at 09:34

## 2025-01-10 ASSESSMENT — ENCOUNTER SYMPTOMS: SHORTNESS OF BREATH: 1

## 2025-01-10 NOTE — ANESTHESIA PRE PROCEDURE
BMI:   Wt Readings from Last 3 Encounters:   01/10/25 74.5 kg (164 lb 3.9 oz)   12/18/24 78.9 kg (174 lb)   10/08/24 78.9 kg (174 lb)     Body mass index is 25.72 kg/m².    CBC:   Lab Results   Component Value Date/Time    WBC 4.7 01/10/2025 08:00 AM    RBC 3.21 01/10/2025 08:00 AM    RBC 4.20 01/20/2012 09:45 AM    HGB 8.8 01/10/2025 08:00 AM    HGB 12.8 01/20/2012 09:45 AM    HCT 27.8 01/10/2025 08:00 AM    MCV 86.6 01/10/2025 08:00 AM    RDW 16.6 06/21/2019 04:40 AM     01/10/2025 08:00 AM       CMP:   Lab Results   Component Value Date/Time     01/10/2025 08:00 AM    K 3.9 01/10/2025 08:00 AM    K 4.0 06/29/2024 05:59 AM     01/10/2025 08:00 AM    CO2 22 01/10/2025 08:00 AM    BUN 9 01/10/2025 08:00 AM    CREATININE 0.2 01/10/2025 08:00 AM    AGRATIO 0.7 06/21/2019 04:40 AM    LABGLOM > 90 01/10/2025 08:00 AM    LABGLOM >60 01/23/2024 09:45 AM    GLUCOSE 98 01/10/2025 08:00 AM    GLUCOSE 105 06/25/2019 04:12 AM    CALCIUM 9.0 01/10/2025 08:00 AM    BILITOT 0.2 01/05/2025 11:29 AM    ALKPHOS 127 01/05/2025 11:29 AM    AST 24 01/05/2025 11:29 AM    ALT 34 01/05/2025 11:29 AM       POC Tests: No results for input(s): \"POCGLU\", \"POCNA\", \"POCK\", \"POCCL\", \"POCBUN\", \"POCHEMO\", \"POCHCT\" in the last 72 hours.    Coags:   Lab Results   Component Value Date/Time    INR 1.25 06/28/2024 07:14 AM    APTT 33.1 06/28/2024 07:14 AM       HCG (If Applicable): No results found for: \"PREGTESTUR\", \"PREGSERUM\", \"HCG\", \"HCGQUANT\"     ABGs: No results found for: \"PHART\", \"PO2ART\", \"EIE5DOH\", \"LFO5WVP\", \"BEART\", \"I7HCBVOV\"     Type & Screen (If Applicable):  Lab Results   Component Value Date    LABANTI NEG 11/29/2023       Drug/Infectious Status (If Applicable):  No results found for: \"HIV\", \"HEPCAB\"    COVID-19 Screening (If Applicable):   Lab Results   Component Value Date/Time    COVID19 NOT DETECTED 01/05/2025 11:30 AM           Anesthesia

## 2025-01-10 NOTE — OP NOTE
PACU in stable condition all counts were correct    Electronically signed by Radha Cruz MD on 1/10/2025 at 5:01 PM

## 2025-01-10 NOTE — ANESTHESIA POSTPROCEDURE EVALUATION
Department of Anesthesiology  Postprocedure Note    Patient: Greyson Swift  MRN: [id number]  YOB: 1955  Date of evaluation: 1/10/2025    Procedure Summary       Date: 01/10/25 Room / Location: [facility] OR 06 / [facility] OR    Anesthesia Start: 1416 Anesthesia Stop: 1512    Procedure: RIGHT HIP DECUBITUS ULCER DEBRIDEMENT (Right) Diagnosis:       Pressure injury of hip, unstageable, unspecified laterality (HCC)      (Pressure injury of hip, unstageable, unspecified laterality (HCC) [L89.200])    Surgeons: Radha Cruz MD Responsible Provider: Bright Grewal DO    Anesthesia Type: general ASA Status: 3            Anesthesia Type: No value filed.    Quinn Phase I: Quinn Score: 8    Quinn Phase II:      Anesthesia Post Evaluation    Patient location during evaluation: bedside  Patient participation: complete - patient participated  Level of consciousness: awake  Airway patency: patent  Nausea & Vomiting: no nausea and no vomiting  Cardiovascular status: hemodynamically stable  Respiratory status: acceptable  Hydration status: stable  Pain management: adequate    No notable events documented.

## 2025-01-11 LAB
GLUCOSE BLD STRIP.AUTO-MCNC: 146 MG/DL (ref 70–108)
GLUCOSE BLD STRIP.AUTO-MCNC: 165 MG/DL (ref 70–108)
VANCOMYCIN SERPL-MCNC: 10 UG/ML (ref 10–39.9)

## 2025-01-11 PROCEDURE — 2580000003 HC RX 258

## 2025-01-11 PROCEDURE — 6360000002 HC RX W HCPCS

## 2025-01-11 PROCEDURE — 6370000000 HC RX 637 (ALT 250 FOR IP)

## 2025-01-11 PROCEDURE — 1200000000 HC SEMI PRIVATE

## 2025-01-11 PROCEDURE — 36415 COLL VENOUS BLD VENIPUNCTURE: CPT

## 2025-01-11 PROCEDURE — 2500000003 HC RX 250 WO HCPCS

## 2025-01-11 PROCEDURE — 80202 ASSAY OF VANCOMYCIN: CPT

## 2025-01-11 PROCEDURE — 99232 SBSQ HOSP IP/OBS MODERATE 35: CPT | Performed by: INTERNAL MEDICINE

## 2025-01-11 PROCEDURE — 6360000002 HC RX W HCPCS: Performed by: INTERNAL MEDICINE

## 2025-01-11 PROCEDURE — 82948 REAGENT STRIP/BLOOD GLUCOSE: CPT

## 2025-01-11 RX ADMIN — OXCARBAZEPINE 450 MG: 300 TABLET, FILM COATED ORAL at 11:19

## 2025-01-11 RX ADMIN — Medication 1 TABLET: at 21:43

## 2025-01-11 RX ADMIN — ROSUVASTATIN CALCIUM 20 MG: 20 TABLET, FILM COATED ORAL at 21:43

## 2025-01-11 RX ADMIN — FAMOTIDINE 20 MG: 20 TABLET, FILM COATED ORAL at 11:18

## 2025-01-11 RX ADMIN — DOCUSATE SODIUM 100 MG: 100 CAPSULE, LIQUID FILLED ORAL at 11:18

## 2025-01-11 RX ADMIN — OXYCODONE HYDROCHLORIDE AND ACETAMINOPHEN 500 MG: 500 TABLET ORAL at 21:43

## 2025-01-11 RX ADMIN — BACLOFEN 10 MG: 10 TABLET ORAL at 11:18

## 2025-01-11 RX ADMIN — Medication 400 UNITS: at 11:18

## 2025-01-11 RX ADMIN — GABAPENTIN 600 MG: 600 TABLET, FILM COATED ORAL at 11:21

## 2025-01-11 RX ADMIN — SODIUM CHLORIDE, PRESERVATIVE FREE 5 ML: 5 INJECTION INTRAVENOUS at 11:20

## 2025-01-11 RX ADMIN — BACLOFEN 10 MG: 10 TABLET ORAL at 17:31

## 2025-01-11 RX ADMIN — Medication 1500 MG: at 11:27

## 2025-01-11 RX ADMIN — OXYCODONE HYDROCHLORIDE AND ACETAMINOPHEN 500 MG: 500 TABLET ORAL at 11:19

## 2025-01-11 RX ADMIN — FAMOTIDINE 20 MG: 20 TABLET, FILM COATED ORAL at 21:42

## 2025-01-11 RX ADMIN — OXCARBAZEPINE 450 MG: 300 TABLET, FILM COATED ORAL at 21:43

## 2025-01-11 RX ADMIN — SODIUM CHLORIDE, PRESERVATIVE FREE 10 ML: 5 INJECTION INTRAVENOUS at 21:43

## 2025-01-11 RX ADMIN — Medication 1 TABLET: at 11:19

## 2025-01-11 RX ADMIN — MEROPENEM 1000 MG: 1 INJECTION INTRAVENOUS at 13:20

## 2025-01-11 RX ADMIN — OXYBUTYNIN CHLORIDE 10 MG: 10 TABLET, EXTENDED RELEASE ORAL at 11:21

## 2025-01-11 RX ADMIN — GABAPENTIN 600 MG: 600 TABLET, FILM COATED ORAL at 21:42

## 2025-01-11 RX ADMIN — MEROPENEM 1000 MG: 1 INJECTION INTRAVENOUS at 22:55

## 2025-01-11 RX ADMIN — SODIUM CHLORIDE: 9 INJECTION, SOLUTION INTRAVENOUS at 22:53

## 2025-01-11 RX ADMIN — SODIUM CHLORIDE 50 ML/HR: 9 INJECTION, SOLUTION INTRAVENOUS at 13:17

## 2025-01-11 RX ADMIN — Medication 6 MG: at 11:18

## 2025-01-11 RX ADMIN — BACLOFEN 10 MG: 10 TABLET ORAL at 21:43

## 2025-01-11 ASSESSMENT — PAIN SCALES - GENERAL: PAINLEVEL_OUTOF10: 0

## 2025-01-12 LAB
GLUCOSE BLD STRIP.AUTO-MCNC: 108 MG/DL (ref 70–108)
GLUCOSE BLD STRIP.AUTO-MCNC: 171 MG/DL (ref 70–108)

## 2025-01-12 PROCEDURE — 99232 SBSQ HOSP IP/OBS MODERATE 35: CPT | Performed by: INTERNAL MEDICINE

## 2025-01-12 PROCEDURE — 82948 REAGENT STRIP/BLOOD GLUCOSE: CPT

## 2025-01-12 PROCEDURE — 6370000000 HC RX 637 (ALT 250 FOR IP)

## 2025-01-12 PROCEDURE — 1200000000 HC SEMI PRIVATE

## 2025-01-12 PROCEDURE — 6360000002 HC RX W HCPCS

## 2025-01-12 PROCEDURE — 2500000003 HC RX 250 WO HCPCS

## 2025-01-12 PROCEDURE — 2580000003 HC RX 258

## 2025-01-12 RX ADMIN — OXYCODONE HYDROCHLORIDE AND ACETAMINOPHEN 500 MG: 500 TABLET ORAL at 21:28

## 2025-01-12 RX ADMIN — MEROPENEM 1000 MG: 1 INJECTION INTRAVENOUS at 02:37

## 2025-01-12 RX ADMIN — GABAPENTIN 600 MG: 600 TABLET, FILM COATED ORAL at 14:08

## 2025-01-12 RX ADMIN — OXCARBAZEPINE 450 MG: 300 TABLET, FILM COATED ORAL at 21:28

## 2025-01-12 RX ADMIN — SODIUM CHLORIDE, PRESERVATIVE FREE 10 ML: 5 INJECTION INTRAVENOUS at 21:29

## 2025-01-12 RX ADMIN — GABAPENTIN 600 MG: 600 TABLET, FILM COATED ORAL at 21:28

## 2025-01-12 RX ADMIN — Medication 1 TABLET: at 21:28

## 2025-01-12 RX ADMIN — BACLOFEN 10 MG: 10 TABLET ORAL at 14:08

## 2025-01-12 RX ADMIN — BACLOFEN 10 MG: 10 TABLET ORAL at 09:00

## 2025-01-12 RX ADMIN — BACLOFEN 10 MG: 10 TABLET ORAL at 21:28

## 2025-01-12 RX ADMIN — GABAPENTIN 600 MG: 600 TABLET, FILM COATED ORAL at 17:01

## 2025-01-12 RX ADMIN — OXYBUTYNIN CHLORIDE 10 MG: 10 TABLET, EXTENDED RELEASE ORAL at 09:00

## 2025-01-12 RX ADMIN — DAKIN'S SOLUTION 0.125% (QUARTER STRENGTH): 0.12 SOLUTION at 21:28

## 2025-01-12 RX ADMIN — GABAPENTIN 600 MG: 600 TABLET, FILM COATED ORAL at 09:01

## 2025-01-12 RX ADMIN — FAMOTIDINE 20 MG: 20 TABLET, FILM COATED ORAL at 09:02

## 2025-01-12 RX ADMIN — FAMOTIDINE 20 MG: 20 TABLET, FILM COATED ORAL at 21:28

## 2025-01-12 RX ADMIN — MEROPENEM 1000 MG: 1 INJECTION INTRAVENOUS at 23:38

## 2025-01-12 RX ADMIN — OXCARBAZEPINE 450 MG: 300 TABLET, FILM COATED ORAL at 09:00

## 2025-01-12 RX ADMIN — Medication 400 UNITS: at 09:01

## 2025-01-12 RX ADMIN — OXYCODONE HYDROCHLORIDE AND ACETAMINOPHEN 500 MG: 500 TABLET ORAL at 08:59

## 2025-01-12 RX ADMIN — Medication 1 TABLET: at 09:00

## 2025-01-12 RX ADMIN — MEROPENEM 1000 MG: 1 INJECTION INTRAVENOUS at 09:06

## 2025-01-12 RX ADMIN — DOCUSATE SODIUM 100 MG: 100 CAPSULE, LIQUID FILLED ORAL at 09:02

## 2025-01-12 RX ADMIN — ROSUVASTATIN CALCIUM 20 MG: 20 TABLET, FILM COATED ORAL at 21:28

## 2025-01-12 RX ADMIN — MEROPENEM 1000 MG: 1 INJECTION INTRAVENOUS at 16:57

## 2025-01-12 RX ADMIN — Medication 6 MG: at 09:00

## 2025-01-12 ASSESSMENT — PAIN SCALES - GENERAL
PAINLEVEL_OUTOF10: 0
PAINLEVEL_OUTOF10: 0

## 2025-01-13 VITALS
WEIGHT: 175.71 LBS | OXYGEN SATURATION: 93 % | BODY MASS INDEX: 27.52 KG/M2 | DIASTOLIC BLOOD PRESSURE: 71 MMHG | RESPIRATION RATE: 18 BRPM | TEMPERATURE: 97.9 F | SYSTOLIC BLOOD PRESSURE: 137 MMHG | HEART RATE: 104 BPM

## 2025-01-13 LAB
ALBUMIN SERPL BCG-MCNC: 2.5 G/DL (ref 3.5–5.1)
ALP SERPL-CCNC: 101 U/L (ref 38–126)
ALT SERPL W/O P-5'-P-CCNC: 16 U/L (ref 11–66)
ANION GAP SERPL CALC-SCNC: 8 MEQ/L (ref 8–16)
AST SERPL-CCNC: 13 U/L (ref 5–40)
BASOPHILS ABSOLUTE: 0 THOU/MM3 (ref 0–0.1)
BASOPHILS NFR BLD AUTO: 0.6 %
BILIRUB SERPL-MCNC: 0.2 MG/DL (ref 0.3–1.2)
BUN SERPL-MCNC: 9 MG/DL (ref 7–22)
CALCIUM SERPL-MCNC: 8.9 MG/DL (ref 8.5–10.5)
CHLORIDE SERPL-SCNC: 100 MEQ/L (ref 98–111)
CO2 SERPL-SCNC: 30 MEQ/L (ref 23–33)
CREAT SERPL-MCNC: < 0.2 MG/DL (ref 0.4–1.2)
DEPRECATED RDW RBC AUTO: 52.8 FL (ref 35–45)
EOSINOPHIL NFR BLD AUTO: 4.5 %
EOSINOPHILS ABSOLUTE: 0.2 THOU/MM3 (ref 0–0.4)
ERYTHROCYTE [DISTWIDTH] IN BLOOD BY AUTOMATED COUNT: 16.8 % (ref 11.5–14.5)
GFR SERPL CREATININE-BSD FRML MDRD: > 90 ML/MIN/1.73M2
GLUCOSE BLD STRIP.AUTO-MCNC: 110 MG/DL (ref 70–108)
GLUCOSE SERPL-MCNC: 99 MG/DL (ref 70–108)
HCT VFR BLD AUTO: 29.4 % (ref 42–52)
HGB BLD-MCNC: 8.9 GM/DL (ref 14–18)
IMM GRANULOCYTES # BLD AUTO: 0.03 THOU/MM3 (ref 0–0.07)
IMM GRANULOCYTES NFR BLD AUTO: 0.6 %
LYMPHOCYTES ABSOLUTE: 1.2 THOU/MM3 (ref 1–4.8)
LYMPHOCYTES NFR BLD AUTO: 22.3 %
MCH RBC QN AUTO: 26.3 PG (ref 26–33)
MCHC RBC AUTO-ENTMCNC: 30.3 GM/DL (ref 32.2–35.5)
MCV RBC AUTO: 86.7 FL (ref 80–94)
MONOCYTES ABSOLUTE: 0.5 THOU/MM3 (ref 0.4–1.3)
MONOCYTES NFR BLD AUTO: 10 %
NEUTROPHILS ABSOLUTE: 3.3 THOU/MM3 (ref 1.8–7.7)
NEUTROPHILS NFR BLD AUTO: 62 %
NRBC BLD AUTO-RTO: 0 /100 WBC
PLATELET # BLD AUTO: 362 THOU/MM3 (ref 130–400)
PMV BLD AUTO: 7.9 FL (ref 9.4–12.4)
POTASSIUM SERPL-SCNC: 3.8 MEQ/L (ref 3.5–5.2)
PROT SERPL-MCNC: 6.8 G/DL (ref 6.1–8)
RBC # BLD AUTO: 3.39 MILL/MM3 (ref 4.7–6.1)
SODIUM SERPL-SCNC: 138 MEQ/L (ref 135–145)
WBC # BLD AUTO: 5.3 THOU/MM3 (ref 4.8–10.8)

## 2025-01-13 PROCEDURE — 82948 REAGENT STRIP/BLOOD GLUCOSE: CPT

## 2025-01-13 PROCEDURE — 99239 HOSP IP/OBS DSCHRG MGMT >30: CPT | Performed by: INTERNAL MEDICINE

## 2025-01-13 PROCEDURE — 80053 COMPREHEN METABOLIC PANEL: CPT

## 2025-01-13 PROCEDURE — 85025 COMPLETE CBC W/AUTO DIFF WBC: CPT

## 2025-01-13 PROCEDURE — 6360000002 HC RX W HCPCS

## 2025-01-13 PROCEDURE — 36415 COLL VENOUS BLD VENIPUNCTURE: CPT

## 2025-01-13 PROCEDURE — 2580000003 HC RX 258

## 2025-01-13 PROCEDURE — 6370000000 HC RX 637 (ALT 250 FOR IP)

## 2025-01-13 RX ORDER — SODIUM HYPOCHLORITE 1.25 MG/ML
SOLUTION TOPICAL 2 TIMES DAILY
Qty: 1 EACH | Refills: 0
Start: 2025-01-13

## 2025-01-13 RX ADMIN — OXYCODONE HYDROCHLORIDE AND ACETAMINOPHEN 500 MG: 500 TABLET ORAL at 09:30

## 2025-01-13 RX ADMIN — GABAPENTIN 600 MG: 600 TABLET, FILM COATED ORAL at 09:29

## 2025-01-13 RX ADMIN — BACLOFEN 10 MG: 10 TABLET ORAL at 09:30

## 2025-01-13 RX ADMIN — Medication 400 UNITS: at 09:30

## 2025-01-13 RX ADMIN — DAKIN'S SOLUTION 0.125% (QUARTER STRENGTH): 0.12 SOLUTION at 09:30

## 2025-01-13 RX ADMIN — FAMOTIDINE 20 MG: 20 TABLET, FILM COATED ORAL at 09:29

## 2025-01-13 RX ADMIN — MEROPENEM 1000 MG: 1 INJECTION INTRAVENOUS at 09:28

## 2025-01-13 RX ADMIN — OXYBUTYNIN CHLORIDE 10 MG: 10 TABLET, EXTENDED RELEASE ORAL at 09:29

## 2025-01-13 RX ADMIN — OXCARBAZEPINE 450 MG: 300 TABLET, FILM COATED ORAL at 09:29

## 2025-01-13 RX ADMIN — Medication 1 TABLET: at 09:30

## 2025-01-13 RX ADMIN — Medication 6 MG: at 09:29

## 2025-01-13 RX ADMIN — DOCUSATE SODIUM 100 MG: 100 CAPSULE, LIQUID FILLED ORAL at 09:29

## 2025-01-13 NOTE — CARE COORDINATION
1/10/25, 3:16 PM EST    DISCHARGE ON GOING EVALUATION    [facility] day: 5  Location: [facility] OR (General) POOL R* Reason for admit: Open wound [T14.8XXA]  Sepsis (HCC) [A41.9]  Sepsis, due to unspecified organism, unspecified whether acute organ dysfunction present (HCC) [A41.9]     Procedures: 1/10 RIGHT HIP DECUBITUS ULCER DEBRIDEMENT     Imaging since last note: none    Barriers to Discharge: Hospitalist, General surgery, and ID continue to follow. To OR for debridement today.   Continue IV Merrem and Vancocin.     PCP: Noah Benitez MD  Readmission Risk Score: 19.2    Patient Goals/Plan/Treatment Preferences: Plan to return to [facility].     
1/13/25, 11:33 AM EST    Patient goals/plan/ treatment preferences discussed by  and .  Patient goals/plan/ treatment preferences reviewed with patient/ family.  Patient/ family verbalize understanding of discharge plan and are in agreement with goal/plan/treatment preferences.  Understanding was demonstrated using the teach back method.  AVS provided by RN at time of discharge, which includes all necessary medical information pertaining to the patients current course of illness, treatment, post-discharge goals of care, and treatment preferences.     Services At/After Discharge: Long Term Care, Aide services, In ambulance, and Nursing service       Patient discharged to return to [facility] long term medicaid bed.  Spoke with Connie from [facility], informed of discharge and transport around noon.  Faxed AVS and MAR, RN aware to call report. Left message for ELLIE Shin informed of discharge.  
1/7/25, 4:05 PM EST    DISCHARGE ON GOING EVALUATION    Gre[facility] day: 2  Location: 8A-14/014-A Reason for admit: Open wound [T14.8XXA]  Sepsis (HCC) [A41.9]  Sepsis, due to unspecified organism, unspecified whether acute organ dysfunction present (HCC) [A41.9]     Procedures:   Right hip decubitus ulcer debridement scheduled 1/10    Imaging since last note: no new    Barriers to Discharge: Hospitalist, ID, General surgery continue to follow.   Blood cultures with no growth at 48 hours. Urine culture grew proteus mirabilis.   BID wet to dry dressing with Dakins.   IV Vancocin and Merrem.   Brillinta washout for 5 days.   Planning debridement Friday 1/10.     PCP: Noah Benitez MD  Readmission Risk Score: 19.1    Patient Goals/Plan/Treatment Preferences: From [facility].     
Case Management Assessment Initial Evaluation    Date/Time of Evaluation: 2025 10:55 AM  Assessment Completed by: Corine Mukherjee RN    If patient is discharged prior to next notation, then this note serves as note for discharge by case management.    Patient Name: Greyson Swift                   YOB: 1955  Diagnosis: Open wound [T14.8XXA]  Sepsis (HCC) [A41.9]  Sepsis, due to unspecified organism, unspecified whether acute organ dysfunction present (HCC) [A41.9]                   Date / Time: 2025 11:08 AM  Location: [facility]-     Patient Admission Status: Inpatient   Readmission Risk Low 0-14, Mod 15-19), High > 20: Readmission Risk Score: 20.8    Current PCP: Noah Benitez MD  Health Care Decision Makers:     Additional Case Management Notes: Admitted from ER with fever and AMS. R hip decubitus ulcer. General surgery consulted. Complicated UTI, jeffers exchanged per H&P. Consult to ID.   CRP 19.04. Sed Rate 134. Urine growing gram negative bacilli. T max 100.5. IV Merrem and Vancocin.     Procedures: none    Imagin/5 CT Abd/Plv W: 1. New large wound overlying the posterior right hip. There is surrounding fat   stranding and soft tissue thickening which can represent cellulitis. This   extends to the bone.   2. Thick walled urinary bladder which is collapsed around a Jeffers catheter.     Patient Goals/Plan/Treatment Preferences: From [facility]. SW completing full assessment.           
discharge: Long Term Acute Care            Potential DME:    Patient expects to discharge to: Skilled nursing facility  Plan for transportation at discharge: ambulance     Financial  Payor: MEDICARE / Plan: MEDICARE PART A AND B / Product Type: *No Product type* /     Potential assistance Purchasing Medications:

## 2025-01-13 NOTE — DISCHARGE SUMMARY
sensitive to meropenem.  -------     1/12/2025: Patient with history of multiple sclerosis, functional quadriplegia and neurogenic bladder with suprapubic catheter underwent I&D by surgery for stage IV decubitus ulcer on 1/10/2025.  Patient denies chest pain, shortness of breath or cough.     On meropenem and vancomycin.  Followed by ID. UTI, Proteus mirabilis sensitive to meropenem.  ------     1/13/2025: Patient with multiple sclerosis, functional quadriplegia plegia and neurogenic bladder with suprapubic catheter underwent I&D by surgery for stage IV decubitus ulcer 1/10/2025.  Patient has no complaints.  Denies chest pain shortness of breath or cough.     Patient on meropenem and vancomycin since 1/5/2025. UTI, Proteus mirabilis 1/5/2025 sensitive to meropenem. Positive MRSA by PCR 1/9/2025. Per ID today may discontinue antibiotics.      Disposition: SNF. [facility].     Local wound care with dakins soaked dressing.     Discharged in stable and satisfactory condition.    Advise follow up appointment with PCP in one to two weeks.    Disposition upon discharge:   [x] SNF (Skilled Nursing Facility)             Medication List        START taking these medications      sodium hypochlorite 0.125 % Soln external solution  Commonly known as: DAKINS  Apply topically in the morning and at bedtime            CONTINUE taking these medications      acetaminophen 325 MG tablet  Commonly known as: TYLENOL     baclofen 10 MG tablet  Commonly known as: LIORESAL     BOOST HIGH PROTEIN PO     Calcium 500 + D3 500-5 MG-MCG Tabs  Generic drug: Calcium Carb-Cholecalciferol     docusate sodium 100 MG capsule  Commonly known as: COLACE     erythromycin 5 MG/GM ophthalmic ointment  Commonly known as: ROMYCIN     famotidine 20 MG tablet  Commonly known as: PEPCID  Take 1 tablet by mouth 2 times daily     gabapentin 600 MG tablet  Commonly known as: NEURONTIN     ipratropium 0.5 mg-albuterol 2.5 mg 0.5-2.5 (3) MG/3ML Soln nebulizer

## 2025-01-13 NOTE — PROGRESS NOTES
Progress note: Infectious diseases    Patient - Greyson Swift,  Age - 69 y.o.    - 1955      Room Number - 8A-14/014-A   N -  [id number]   [facility] # - [id number]  Date of Admission -  2025 11:08 AM    SUBJECTIVE:   No new issues. He was asleep at the time of evaluation  He was snoring  OBJECTIVE   VITALS    weight is 72.3 kg (159 lb 6.3 oz). His oral temperature is 97.8 °F (36.6 °C). His blood pressure is 97/55 (abnormal) and his pulse is 95. His respiration is 16 and oxygen saturation is 96%.       Wt Readings from Last 3 Encounters:   25 72.3 kg (159 lb 6.3 oz)   24 78.9 kg (174 lb)   10/08/24 78.9 kg (174 lb)       I/O (24 Hours)    Intake/Output Summary (Last 24 hours) at 2025 1339  Last data filed at 2025 0854  Gross per 24 hour   Intake 818 ml   Output 1125 ml   Net -307 ml       General Appearance  chronically sick looking  HEENT - normocephalic, atraumatic, pale  conjunctiva,  anicteric sclera  Neck - Supple, no mass  Lungs -  Bilateral  air entry, no rhonchi, no wheeze  Cardiovascular - Heart sounds are normal.     Abdomen - soft, not distended, nontender, has colostomy and suprapubic  catheter  Neurologic -weakness of extremites  Skin - No bruising or bleeding  Extremities - contracted extremites  Open coccyx wound  MEDICATIONS:      vancomycin  1,500 mg IntraVENous Q24H    sodium hypochlorite   Irrigation BID    [Held by provider] metoprolol succinate  25 mg Oral BID    sodium chloride flush  5-40 mL IntraVENous 2 times per day    [Held by provider] aspirin  81 mg Oral Daily    baclofen  10 mg Oral TID    oyster shell calcium w/D  1 tablet Oral BID    docusate sodium  100 mg Oral Daily    [Held by provider] ticagrelor  90 mg Oral BID    [Held by provider] spironolactone  25 mg Oral Daily    rosuvastatin  20 mg Oral Nightly    oxyBUTYnin  10 mg Oral Daily    famotidine  20 mg Oral BID 
                                                                                          Progress note: Infectious diseases    Patient - Greyson Swift,  Age - 69 y.o.    - 1955      Room Number - 8A-14/014-A   N -  [id number]   [facility] # - [id number]  Date of Admission -  2025 11:08 AM    SUBJECTIVE:   No new complaints  OBJECTIVE   VITALS    weight is 74.5 kg (164 lb 3.9 oz). His oral temperature is 97.9 °F (36.6 °C). His blood pressure is 116/60 and his pulse is 93. His respiration is 16 and oxygen saturation is 96%.       Wt Readings from Last 3 Encounters:   01/10/25 74.5 kg (164 lb 3.9 oz)   24 78.9 kg (174 lb)   10/08/24 78.9 kg (174 lb)       I/O (24 Hours)    Intake/Output Summary (Last 24 hours) at 1/10/2025 0828  Last data filed at 1/10/2025 0608  Gross per 24 hour   Intake 1902.47 ml   Output 550 ml   Net 1352.47 ml       General Appearance  chronically sick looking.  HEENT - normocephalic, atraumatic, pale  conjunctiva,  anicteric sclera  Neck - Supple, no mass  Lungs -  Bilateral  air entry, no rhonchi, no wheeze  Cardiovascular - Heart sounds are normal.     Abdomen - soft, not distended, nontender, has colostomy and suprapubic  catheter  Neurologic -weakness of extremites  Skin - No bruising or bleeding  Extremities - contracted extremites  Open coccyx wound there is fibrous tissue with devitalized tissue     MEDICATIONS:      [Held by provider] enoxaparin  40 mg SubCUTAneous Daily    vancomycin  1,500 mg IntraVENous Q24H    sodium hypochlorite   Irrigation BID    [Held by provider] metoprolol succinate  25 mg Oral BID    sodium chloride flush  5-40 mL IntraVENous 2 times per day    [Held by provider] aspirin  81 mg Oral Daily    baclofen  10 mg Oral TID    oyster shell calcium w/D  1 tablet Oral BID    docusate sodium  100 mg Oral Daily    [Held by provider] ticagrelor  90 mg Oral BID    [Held by provider] spironolactone  25 mg Oral Daily    rosuvastatin  20 mg Oral Nightly    
                                                                                          Progress note: Infectious diseases    Patient - Greyson Swift,  Age - 69 y.o.    - 1955      Room Number - 8A-14/014-A   N -  [id number]   [facility] # - [id number]  Date of Admission -  2025 11:08 AM    SUBJECTIVE:   No new issues  OBJECTIVE   VITALS    weight is 75.1 kg (165 lb 9.1 oz). His oral temperature is 98.7 °F (37.1 °C). His blood pressure is 120/65 and his pulse is 106 (abnormal). His respiration is 15 and oxygen saturation is 98%.       Wt Readings from Last 3 Encounters:   25 75.1 kg (165 lb 9.1 oz)   24 78.9 kg (174 lb)   10/08/24 78.9 kg (174 lb)       I/O (24 Hours)    Intake/Output Summary (Last 24 hours) at 2025 1319  Last data filed at 2025 1311  Gross per 24 hour   Intake 333.5 ml   Output 450 ml   Net -116.5 ml       General Appearance  chronically sick looking.  HEENT - normocephalic, atraumatic, pale  conjunctiva,  anicteric sclera  Neck - Supple, no mass  Lungs -  Bilateral  air entry, no rhonchi, no wheeze  Cardiovascular - Heart sounds are normal.     Abdomen - soft, not distended, nontender, has colostomy and suprapubic  catheter  Neurologic -weakness of extremites  Skin - No bruising or bleeding  Extremities - contracted extremites  Dressed coccyx wound  MEDICATIONS:      [Held by provider] enoxaparin  40 mg SubCUTAneous Daily    sodium hypochlorite   Irrigation BID    [Held by provider] metoprolol succinate  25 mg Oral BID    sodium chloride flush  5-40 mL IntraVENous 2 times per day    [Held by provider] aspirin  81 mg Oral Daily    baclofen  10 mg Oral TID    oyster shell calcium w/D  1 tablet Oral BID    docusate sodium  100 mg Oral Daily    [Held by provider] ticagrelor  90 mg Oral BID    [Held by provider] spironolactone  25 mg Oral Daily    rosuvastatin  20 mg Oral Nightly    oxyBUTYnin  10 mg Oral Daily    famotidine  20 mg Oral BID    gabapentin  600 mg Oral 
                                                                                          Progress note: Infectious diseases    Patient - Greyson Swift,  Age - 69 y.o.    - 1955      Room Number - 8A-14/014-A   N -  [id number]   [facility] # - [id number]  Date of Admission -  2025 11:08 AM    SUBJECTIVE:   No new issues  OBJECTIVE   VITALS    weight is 79.7 kg (175 lb 11.3 oz). His axillary temperature is 98.6 °F (37 °C). His blood pressure is 122/61 and his pulse is 91. His respiration is 15 and oxygen saturation is 98%.       Wt Readings from Last 3 Encounters:   25 79.7 kg (175 lb 11.3 oz)   24 78.9 kg (174 lb)   10/08/24 78.9 kg (174 lb)       I/O (24 Hours)    Intake/Output Summary (Last 24 hours) at 2025 1045  Last data filed at 2025 0910  Gross per 24 hour   Intake 180 ml   Output 600 ml   Net -420 ml       General Appearance  chronically sick looking.  HEENT - normocephalic, atraumatic, pale  conjunctiva,  anicteric sclera  Neck - Supple, no mass  Lungs -  Bilateral  air entry, no rhonchi, no wheeze  Cardiovascular - Heart sounds are normal.     Abdomen - soft, not distended, nontender, has colostomy and suprapubic  catheter  Neurologic -weakness of extremites  Skin - No bruising or bleeding  Extremities - contracted extremites  The redness around the right ischial wound has resolved. The wound is clean with no purulent drainage  Packing changed.  MEDICATIONS:      [Held by provider] enoxaparin  40 mg SubCUTAneous Daily    sodium hypochlorite   Irrigation BID    [Held by provider] metoprolol succinate  25 mg Oral BID    sodium chloride flush  5-40 mL IntraVENous 2 times per day    [Held by provider] aspirin  81 mg Oral Daily    baclofen  10 mg Oral TID    oyster shell calcium w/D  1 tablet Oral BID    docusate sodium  100 mg Oral Daily    [Held by provider] ticagrelor  90 mg Oral BID    [Held by provider] spironolactone  25 mg Oral Daily    rosuvastatin  20 mg Oral Nightly    
                                                                                          Progress note: Infectious diseases    Patient - Greyson Swift,  Age - 69 y.o.    - 1955      Room Number - 8A-14/014-A   N -  [id number]   [facility] # - [id number]  Date of Admission -  2025 11:08 AM    SUBJECTIVE:   He has no new complaints  OBJECTIVE   VITALS    weight is 75.1 kg (165 lb 9.1 oz). His oral temperature is 97.4 °F (36.3 °C). His blood pressure is 103/57 (abnormal) and his pulse is 92. His respiration is 20 and oxygen saturation is 94%.       Wt Readings from Last 3 Encounters:   25 75.1 kg (165 lb 9.1 oz)   24 78.9 kg (174 lb)   10/08/24 78.9 kg (174 lb)       I/O (24 Hours)    Intake/Output Summary (Last 24 hours) at 2025 1103  Last data filed at 2025 0322  Gross per 24 hour   Intake 710 ml   Output 1475 ml   Net -765 ml       General Appearance  chronically sick looking.  HEENT - normocephalic, atraumatic, pale  conjunctiva,  anicteric sclera  Neck - Supple, no mass  Lungs -  Bilateral  air entry, no rhonchi, no wheeze  Cardiovascular - Heart sounds are normal.     Abdomen - soft, not distended, nontender, has colostomy and suprapubic  catheter  Neurologic -weakness of extremites  Skin - No bruising or bleeding  Extremities - contracted extremites  Dressed coccyx wound  MEDICATIONS:      [Held by provider] enoxaparin  40 mg SubCUTAneous Daily    vancomycin  1,500 mg IntraVENous Q24H    sodium hypochlorite   Irrigation BID    [Held by provider] metoprolol succinate  25 mg Oral BID    sodium chloride flush  5-40 mL IntraVENous 2 times per day    [Held by provider] aspirin  81 mg Oral Daily    baclofen  10 mg Oral TID    oyster shell calcium w/D  1 tablet Oral BID    docusate sodium  100 mg Oral Daily    [Held by provider] ticagrelor  90 mg Oral BID    [Held by provider] spironolactone  25 mg Oral Daily    rosuvastatin  20 mg Oral Nightly    oxyBUTYnin  10 mg Oral Daily    
    Hospitalist Progress Note  Internal Medicine Resident      Patient: Greyson Swift 69 y.o. male      Unit/Bed: 8A-14/014-A    Admit Date: 1/5/2025      ASSESSMENT AND PLAN        Urinary retention  -History of neurogenic bladder  -Difficulty overnight reinserting Pompa catheter  -Bladder ultrasound revealed 750 cc overnight  -Urology consulted for further evaluation  -Patient will need chronic Pompa  -Continue oxybutynin  -Continue to monitor urinary output      Sepsis 2/2 posterior right hip decubitus ulcer: SOFA: 2.  SIRS 3/4.  Given 1 L NS bolus in ED, started on vancomycin and Zosyn. Extensive history of infection including MDRO Acinetobacter from anaerobic and aerobic culture, MRSA from the buttocks 6/2024 showing MRSA, MDRO E. coli on urine culture most recent on 1/2024, ESBL on urine culture with latest result on 1/2024, carbapenem resistant Pseudomonas on sacral swab in 6/2019.  Lactate on admission 0.6.  Procalcitonin elevated at 0.15.  CRP elevated 19.04.  COVID and flu negative.  CTA chest with contrast shows no PE.  CT abdomen/pelvis with contrast shows large wound overlying posterior right hip with surrounding fat stranding and soft tissue thickening extending to bone.  Transition to meropenem considering history of resistant pathogens  Continue meropenem, de-escalate based on cultures  Blood cultures x 2; Day 1 no growth  General Surgery consulted ; Dr. Cruz ; Will be discussing with family for plan  N.p.o. at midnight  Infectious Disease Consulted they will be following; Dr. Mitchell   Aspirin and Plavix held for anticipation of surgery     Complicated UTI in setting of chronic suprapubic catheter w/ microscopic hematuria: POA.  Patient has suprapubic Pompa catheter that was exchanged in ED.  Urine sample was obtained after Pompa exchange.  UA shows 100 protein, large blood, 15 ketones, moderate LE, cloudy yellow urine, 5-10 RBC, greater than 200 WBC, 0-2 epithelial cells, few 
    Hospitalist Progress Note  Internal Medicine Resident      Patient: Greyson Swift 69 y.o. male      Unit/Bed: 8A-14/014-A    Admit Date: 1/5/2025      ASSESSMENT AND PLAN      Urinary retention  -History of neurogenic bladder  -Difficulty reinserting Pompa catheter  -Urology following  -Continue oxybutynin  -Continue to monitor urinary output        Sepsis 2/2 posterior right hip decubitus ulcer: SOFA: 2.  SIRS 3/4.  Given 1 L NS bolus in ED, started on vancomycin and Zosyn. Extensive history of infection including MDRO Acinetobacter from anaerobic and aerobic culture, MRSA from the buttocks 6/2024 showing MRSA, MDRO E. coli on urine culture most recent on 1/2024, ESBL on urine culture with latest result on 1/2024, carbapenem resistant Pseudomonas on sacral swab in 6/2019.  Lactate on admission 0.6.  Procalcitonin elevated at 0.15.  CRP elevated 19.04.  COVID and flu negative.  CTA chest with contrast shows no PE.  CT abdomen/pelvis with contrast shows large wound overlying posterior right hip with surrounding fat stranding and soft tissue thickening extending to bone.  Transition to meropenem considering history of resistant pathogens  Continue meropenem, de-escalate based on cultures  Blood cultures x 2; Day 1 no growth  General Surgery consulted ; Dr. Cruz ; surgery scheduled for Friday  N.p.o. at midnight; Thursday night  Infectious Disease following; Dr. Mitchell   Aspirin and Plavix held for anticipation of surgery  Continue vancomycin and Zosyn     Complicated UTI in setting of chronic suprapubic catheter w/ microscopic hematuria: POA.  Patient has suprapubic Pompa catheter that was exchanged in ED.  Urine sample was obtained after Pompa exchange.  UA shows 100 protein, large blood, 15 ketones, moderate LE, cloudy yellow urine, 5-10 RBC, greater than 200 WBC, 0-2 epithelial cells, few bacteria.  Continue meropenem  Blood cultures x 2; no growth day 1   Urine cultures; pending   Urology consulted for 
   Pharmacy Antibiotic Time Adjustment for Surgery    Greyson Swift is a 69 y.o. male scheduled for surgery. Pharmacy will adjust pre-op antibiotic to optimize timing in relation to surgery per [employer] approved policy.    Weight:  Wt Readings from Last 1 Encounters:   01/10/25 74.5 kg (164 lb 3.9 oz)     Body mass index is 25.72 kg/m².    Plan:   Meropenem has been re-timed to be given as pre-op antibiotic coverage                          Patt Walters PharmD, [facility] 1/10/2025 9:55 AM    
  [facility]  General Surgery Consultation - Noy Cavazos, APRN - CNP  On behalf of Dr. Radha Cruz    Pt Name: Greyson Swift  MRN: [id number]  YOB: 1955  Date of evaluation: 1/7/2025  Primary Care Physician: Noah Benitez MD  Patient evaluated at the request of  ED Provider  Reason for evaluation: wound   IMPRESSIONS:   Right Ischial Wound   Hx of MS   Hx dysphagia      has a past medical history of Dysphagia, History of ESBL E. coli infection, History of pulmonary embolism, History of urinary tract infection, Hx of blood clots, Hypertension, Left ureteral stone, Major depressive disorder, single episode, MS (multiple sclerosis) (HCC), Multiple sclerosis (HCC), Neurogenic bladder, NSTEMI (non-ST elevated myocardial infarction) (HCC), Osteomyelitis, and UTI (urinary tract infection).  RECOMMENDATIONS:   IV antibiotics and wound care per Dr Mitchell  Analgesia and antiemetics as needed  Contact isolation - MRDO, MRSA, ESBL, CRE, VRE  Continue to hold Brilinta recommend a 5 day washout   Okay for diet from a surgical standpoint  Will plan for Friday January 10 at 1400  Obtain consent, on Merrem, NPO after midnight on Thursday preop orders placed   SUBJECTIVE:   Greyson was eating breakfast this am.  No new complaints.  Discussed surgery plans on Friday     Past Medical History   has a past medical history of Dysphagia, History of ESBL E. coli infection, History of pulmonary embolism, History of urinary tract infection, Hx of blood clots, Hypertension, Left ureteral stone, Major depressive disorder, single episode, MS (multiple sclerosis) (HCC), Multiple sclerosis (HCC), Neurogenic bladder, NSTEMI (non-ST elevated myocardial infarction) (HCC), Osteomyelitis, and UTI (urinary tract infection).  Past Surgical History   has a past surgical history that includes Tonsillectomy (child); Ankle surgery (1996); Bladder surgery (2-); Colonoscopy; pr laps colectomy prtl w/end clst & clsr dstl sgm 
  [facility]  General Surgery Consultation - Noy Cavazos, APRN - CNP  On behalf of Dr. Radha Cruz    Pt Name: Greyson Swift  MRN: [id number]  YOB: 1955  Date of evaluation: 1/9/2025  Primary Care Physician: Noah Benitez MD  Patient evaluated at the request of  ED Provider  Reason for evaluation: wound   IMPRESSIONS:   Right Ischial Wound   Hx of MS   Hx dysphagia      has a past medical history of Dysphagia, History of ESBL E. coli infection, History of pulmonary embolism, History of urinary tract infection, Hx of blood clots, Hypertension, Left ureteral stone, Major depressive disorder, single episode, MS (multiple sclerosis) (HCC), Multiple sclerosis (HCC), Neurogenic bladder, NSTEMI (non-ST elevated myocardial infarction) (HCC), Osteomyelitis, and UTI (urinary tract infection).  RECOMMENDATIONS:   IV antibiotics and wound care per Dr Mitchell  Analgesia and antiemetics as needed  Contact isolation - MRDO, MRSA, ESBL, CRE, VRE  Continue to hold Brilinta recommend a 5 day washout   Okay for diet from a surgical standpoint  Will plan for Friday January 10 at 1500  Obtain consent, on Merrem, NPO after midnight on Thursday preop orders placed hold Lovenox in am   SUBJECTIVE:   Greyson was eating breakfast this am.  No new complaints.  Discussed surgery plans on Friday.  Updated Primary RN, chart has been reviewed     Past Medical History   has a past medical history of Dysphagia, History of ESBL E. coli infection, History of pulmonary embolism, History of urinary tract infection, Hx of blood clots, Hypertension, Left ureteral stone, Major depressive disorder, single episode, MS (multiple sclerosis) (HCC), Multiple sclerosis (HCC), Neurogenic bladder, NSTEMI (non-ST elevated myocardial infarction) (HCC), Osteomyelitis, and UTI (urinary tract infection).  Past Surgical History   has a past surgical history that includes Tonsillectomy (child); Ankle surgery (1996); Bladder surgery (2-); 
1513: Pt arrives to pacu, awakens to verbal stimuli. Pt on simple mask, respirations easy and unlabored. VSS    1525: Pt resting off and on with eyes closed, easily awakens to voice. Denies pain currently.     1530: Report given to Frida on 8ab    1543: Pt meets criteria for discharge from pacu, awaiting transport    1553: Pt transported back to 8ab in stable condition. VSS      
6 [facility]--HOSPITALIST GROUP    Hospitalist PROGRESS NOTE dictated by Gaviota Dixon MD on 1/10/2025    Patient ID: Greyson Swift is 69 y.o. and presently in room [address] [place] [address]-A  : 1955 MRN: [id number]    Admit date: 2025  Primary Care Physician: Noah Benitez MD   Patient Care Team:  Noah Benitez MD as PCP - General (Family Medicine)  No[facility]  Tel: [phone]  Admitting Physician: Bunny De La Fuente MD   Code Status:  Full Code    Greyson Swift is a 69 y.o.  male who presented with Altered Mental Status and Fever    Admit date: 2025  [facility]    Chief Complaint: Altered mental status     History Of Present Illness:  Greyson Swift is a 69 y.o. male with PMHx of MS with bilateral paraplegia, CAD, hypertension, neurogenic bladder with chronic indwelling catheter who presents to [facility] with altered mental status from SNF.  Patient's daughter stated that they found the patient this morning confused and febrile.  Staff reports this last known well was yesterday.  CT head without contrast on admission shows no acute findings.  A new posterior right hip decubitus ulcer was noted with central necrotic tissue down to the bone.  General surgery was consulted in the ED and aware.  Patient has an extensive history of infection including MDRO Acinetobacter from anaerobic and aerobic culture, MRSA from the buttocks 2024 showing MRSA, MDRO E. coli on urine culture most recent on 2024, ESBL on urine culture with latest result on 2024, carbapenem resistant Pseudomonas on sacral swab in 2019.  Patient has suprapubic catheter that was exchanged in the ED and subsequent urine sample obtained.  Upon evaluation in the ED, patient was alert and oriented x 4.  Patient has history of chronic buttock and sacral wounds.     ED course: 1 L NS, Vanco, Zosyn  --------------      70yo M 
6 [facility]--HOSPITALIST GROUP    Hospitalist PROGRESS NOTE dictated by Gaviota Dixon MD on 2025    Patient ID: Greyson Swift is 69 y.o. and presently in room [address] [place] [address]-A  : 1955 MRN: [id number]    Admit date: 2025  Primary Care Physician: Noah Benitez MD   Patient Care Team:  Noah Benitez MD as PCP - General (Family Medicine)  Noah Benitez MD [facility]CP [facility]  Tel: [phone]  Admitting Physician: Bunny De La Fuente MD   Code Status:  Full Code    Greyson Swift is a 69 y.o.  male who presented with Altered Mental Status and Fever    Admit date: 2025  [facility]    Chief Complaint: Altered mental status     History Of Present Illness:  Greyson Swift is a 69 y.o. male with PMHx of MS with bilateral paraplegia, CAD, hypertension, neurogenic bladder with chronic indwelling catheter who presents to [facility] with altered mental status from SNF.  Patient's daughter stated that they found the patient this morning confused and febrile.  Staff reports this last known well was yesterday.  CT head without contrast on admission shows no acute findings.  A new posterior right hip decubitus ulcer was noted with central necrotic tissue down to the bone.  General surgery was consulted in the ED and aware.  Patient has an extensive history of infection including MDRO Acinetobacter from anaerobic and aerobic culture, MRSA from the buttocks 2024 showing MRSA, MDRO E. coli on urine culture most recent on 2024, ESBL on urine culture with latest result on 2024, carbapenem resistant Pseudomonas on sacral swab in 2019.  Patient has suprapubic catheter that was exchanged in the ED and subsequent urine sample obtained.  Upon evaluation in the ED, patient was alert and oriented x 4.  Patient has history of chronic buttock and sacral wounds.     ED course: 1 L NS, Vanco, Zosyn  --------------      70yo M w/PMHx 
Comprehensive Nutrition Assessment    Type and Reason for Visit: Initial, Wound    Nutrition Recommendations/Plan:   Continue diet as ordered.   Initiate Ensure Plus BID and Sebastian BID.   Recommend MVI to aid in wound healing.      Malnutrition Assessment:  Malnutrition Status: No malnutrition  Context: Acute Illness       Findings of the 6 clinical characteristics of malnutrition:  Energy Intake:  No decrease in energy intake  Weight Loss:  No weight loss     Body Fat Loss:  No body fat loss     Muscle Mass Loss:  No muscle mass loss    Fluid Accumulation:  No fluid accumulation     Strength:  Not Performed    Nutrition Assessment:    Pt. nutritionally compromised AEB wounds. At risk for further nutrition compromise r/t increased nutrient needs for wound healing, admitted d/t AMS from SNF.  A new posterior right hip decubitus ulcer was noted with central necrotic tissue down to the bone. General surgery was consulted- plan for wound washout on 1/10. ID following- managing IV abx. Noted underlying medical condition (PMHx dysphagia, MDD, MS- bilateral paraplegia, neurogenic bladder, osteomyelitis, UTI).     Nutrition Related Findings:    Pt. Report/Treatments/Miscellaneous: Patient seen, sitting up in bed. He reports that his appetite has been pretty good- stable. He usually eats 3 meals per day without any issues. He reports he usually drinks 1 Boost per day- patient agreeable to trial Ensure Plus and Sebastian to promote wound healing. Patient denies any nausea/ vomiting/ diarrhea/ constipation.   GI Status: Last BM 1/5  Wound: Pressure Injury (chronic right ischial wound that is necrotic and extends down to bone)   Edema: none, per flowsheet   Pertinent Labs:   Lab Results   Component Value Date    LABA1C 5.4 08/31/2023    LABA1C 5.3 04/23/2022    LABA1C 4.7 02/10/2019     Recent Labs     01/05/25  1129 01/06/25  0606 01/07/25  0601   * 138 140   K 4.3 3.9 3.5   CL 95* 103 107   GLUCOSE 98 102 134*   BUN 23* 
[facility]   Pharmacy Pharmacokinetic Monitoring Service - Vancomycin    Indication: SSTI  Target Concentration: Goal AUC/PRINCE 400-600 mg*hr/L  Day of Therapy: 5  Additional Antimicrobials: Merrem    Pertinent Laboratory Values:   Wt Readings from Last 1 Encounters:   01/09/25 72.3 kg (159 lb 6.3 oz)     Temp Readings from Last 1 Encounters:   01/09/25 98 °F (36.7 °C) (Oral)     Estimated Creatinine Clearance: 223 mL/min (A) (based on SCr of 0.3 mg/dL (L)).  Recent Labs     01/07/25  0601 01/08/25  0617 01/08/25  0738 01/08/25  0853   CREATININE 0.4  --  0.3*  --    BUN 14  --  12  --    WBC 6.2 6.3  --  6.4     Recent vancomycin administrations                     vancomycin (VANCOCIN) 1500 mg in sodium chloride 0.9 % 250 mL IVPB (mg) 1,500 mg New Bag 01/09/25 1029     1,500 mg New Bag 01/08/25 0920    vancomycin (VANCOCIN) 1250 mg in sodium chloride 0.9% 250 mL IVPB (mg) 1,250 mg New Bag 01/07/25 0329     1,250 mg New Bag 01/06/25 1733                    Assessment:  Date/Time Current Dose Concentration Timing of Concentration (hr) AUC C trough,ss   1/9/25 @ 0816 1500mg q24h 8.3 mcg/mL 22h 56 M post dose 437 8.1   Note: Serum concentrations collected for AUC dosing may appear elevated if collected in close proximity to the dose administered, this is not necessarily an indication of toxicity    Plan:  Current dosing regimen is therapeutic  Continue current dose of 1500mg q24h  Pharmacy will monitor renal function daily and adjust therapy as indicated.    Thank you for the consult,  Jud LEGGETT.Ph., BCPS., 1/9/2025,11:47 AM      
[facility]   Pharmacy Pharmacokinetic Monitoring Service - Vancomycin    Indication: SSTI  Target Concentration: Goal AUC/PRINCE 400-600 mg*hr/L  Day of Therapy: 3 of 7  Additional Antimicrobials: Merrem    Pertinent Laboratory Values:   Wt Readings from Last 1 Encounters:   01/07/25 68.5 kg (151 lb 0.2 oz)     Temp Readings from Last 1 Encounters:   01/07/25 98.5 °F (36.9 °C) (Oral)     Estimated Creatinine Clearance: 168 mL/min (based on SCr of 0.4 mg/dL).  Recent Labs     01/06/25  0606 01/07/25  0601   CREATININE 0.2* 0.4   BUN 16 14   WBC 7.1 6.2     Pertinent Cultures:  Date Source Results   1/5/25 BCx2 ngtd   1/5/25 UC ESBL Proteus       Recent vancomycin administrations                     vancomycin (VANCOCIN) 1250 mg in sodium chloride 0.9% 250 mL IVPB (mg) 1,250 mg New Bag 01/07/25 0329     1,250 mg New Bag 01/06/25 1733     1,250 mg New Bag  0348    vancomycin (VANCOCIN) 2000 mg in 0.9% sodium chloride 500 mL IVPB (mg) 2,000 mg New Bag 01/05/25 1615                    Assessment:  Date/Time Current Dose Concentration Timing of Concentration (hr) AUC C trough,ss   1/7/25 @ 1005 1250mg q12h 40.5 6H 36M post dose 753 22   Note: Serum concentrations collected for AUC dosing may appear elevated if collected in close proximity to the dose administered, this is not necessarily an indication of toxicity    Plan:  Current dosing regimen is supra-therapeutic  Decrease dose to 1500mg q24h for estimated AUC of  455 & trough of 9.2  Pharmacy will monitor renal function daily and adjust therapy as indicated.    Thank you for the consult,  Jud LEGGETT.Ph., BCPS., 1/7/2025,11:39 AM      
[facility]   Pharmacy Pharmacokinetic Monitoring Service - Vancomycin     Greyson Swift is a 69 y.o. male starting on vancomycin therapy for SSTI. Pharmacy consulted by Denys Sumner for monitoring and adjustment.    Target Concentration: Goal AUC/PRINCE 400-600 mg*hr/L    Additional Antimicrobials: Zosyn    Pertinent Laboratory Values:   Wt Readings from Last 1 Encounters:   01/05/25 78.9 kg (174 lb)     Temp Readings from Last 1 Encounters:   01/05/25 97.9 °F (36.6 °C) (Axillary)     Estimated Creatinine Clearance: 223 mL/min (A) (based on SCr of 0.3 mg/dL (L)).  Recent Labs     01/05/25  1129   CREATININE 0.3*   BUN 23*   WBC 7.8     Pertinent Cultures:  Date Source Results   1/5/25 BC x2 Sent   MRSA Nasal Swab: N/A. Non-respiratory infection.    Plan:  Dosing recommendations based on Bayesian software  Vancomycin 2000 mg x1 (25 mg/kg) loading dose given in ED 1/5/25 at 1615  Start vancomycin 1250 mg q12h  Anticipated AUC of 568 and trough concentration of 16.5 at steady state   (used Scr of 0.7 for calculation due to possible overestimation with actual Scr of 0.3)  Renal labs as indicated   Vancomycin concentration not yet ordered  Pharmacy will continue to monitor patient and adjust therapy as indicated    Thank you for the consult,  Joi Lange, PharmD 1/5/2025 5:29 PM     
New dressing applied to sacrum and buttocks 1/12/25  
[facility]   PROGRESS NOTE      Patient: Greyson Swift  Room #: 8A-14/014-A            YOB: 1955  Age: 69 y.o.  Gender: male            Admit Date & Time: 1/5/2025 11:08 AM    Assessment:    The patient was asleep at the time of this attempted visit.     Interventions:  The patient was provided silent prayer.    Outcomes:  The patient remains sleeping at the end of the visit.     Plan:  1.Spiritual care will continue to follow the patient according to [facility] spiritual care SOP.       Electronically signed by Chaplain Jessie, on 1/8/2025 at 2:15 PM.  Spiritual Care Department  [facility]  [phone]    
Patient's suprapubic catheter exchanged per nursing communication order. A 24 Lithuanian jeffers catheter was placed with assistance from Resource RN.   
Pharmacy Medication History Note      List of current medications patient is taking is complete.    Source of information: MAR    Changes made to medication list:  Medications removed (include reason, ex. therapy complete or physician discontinued):  None    Medications added/doses adjusted:  Calcium- Vitamin D 500 mg- 5 mcg BID  Changed Trileptal 300 mg from 1.5 tablets to 1 tablet BID.  Patient takes a total of 450 mg BID    Other notes (ex. Recent course of antibiotics, Coumadin dosing):  Patient recently finished Gentamicin on 1/3/25 for UTI  Denies use of other OTC or herbal medications.      Allergies reviewed      Electronically signed by Fani Toribio RPH on 1/5/2025 at 3:10 PM                                                     
Pt arrived to the unit via stretcher. Patient alert and oriented times 4. Patient answered all orientation questions appropriately. Patient has colostomy to LLQ and jeffers in place. Patient has a buttocks and coccyx wound present on admission. Patient has limited extremity movement and due to MS. Patient uses a electric wheelchair at baseline.   
Pt. Discharged back to [facility] with LACP.   
Pt. Very unmotivated to eat and drink. Pt. Offered every hour food and or drink of water. Pt. Also educated on importance oral hydration and protein for healthy wound healing.   
Report called over to Roberto at [facility]. AVS faxed over to [phone] per nurse request due to other fax number being administrative.   
This RN attempted to return call received from Aracely at [facility] regarding this pt, at the number provided: [phone]. There was no answer, and no message was left.   
Wound dressing changed 1/13/25, Dr. Mitchell at bedside during dressing change. Dressing clean dry an intact. No new drainage noted.  
mg Oral 4x daily    [Held by provider] lisinopril  2.5 mg Oral Daily    vitamin A  20,000 Units Oral Daily    vitamin C  500 mg Oral BID    vitamin E  400 Units Oral Daily    vancomycin (VANCOCIN) intermittent dosing (placeholder)   Other RX Placeholder    meropenem  1,000 mg IntraVENous Q8H    OXcarbazepine  450 mg Oral BID      sodium chloride       sodium chloride flush, sodium chloride, acetaminophen **OR** acetaminophen, melatonin, ipratropium 0.5 mg-albuterol 2.5 mg      LABS:     CBC:   Recent Labs     01/05/25  1129 01/06/25  0606 01/07/25  0601   WBC 7.8 7.1 6.2   HGB 9.4* 8.8* 8.6*    331 314     BMP:    Recent Labs     01/05/25  1129 01/06/25  0606 01/07/25  0601   * 138 140   K 4.3 3.9 3.5   CL 95* 103 107   CO2 23 21* 23   BUN 23* 16 14   CREATININE 0.3* 0.2* 0.4   GLUCOSE 98 102 134*     Calcium:  Recent Labs     01/07/25  0601   CALCIUM 8.6     Ionized Calcium:Invalid input(s): \"IONCA\"  Magnesium:  Recent Labs     01/06/25  0606   MG 2.0      Hepatic:   Recent Labs     01/05/25  1129   ALKPHOS 127*   ALT 34   AST 24   BILITOT 0.2*        CULTURES:   UA:   Recent Labs     01/05/25  1540   PHUR 6.5   COLORU YELLOW   PROTEINU 100*   BLOODU LARGE*   RBCUA 5-10   WBCUA > 200   BACTERIA FEW   NITRU NEGATIVE   GLUCOSEU NEGATIVE   BILIRUBINUR NEGATIVE   UROBILINOGEN 0.2   KETUA 15*     Micro:   Lab Results   Component Value Date/Time    BC No growth 24 hours. No growth 48 hours. 01/05/2025 11:29 AM          Problem list of patient:     Patient Active Problem List   Diagnosis Code    Neurogenic bladder N31.9    Multiple sclerosis (HCC) G35    MS (multiple sclerosis) (HCC) G35    Urinary retention R33.9    Chronic indwelling Pom[place] catheter Z97.8    Decubitus ulcer of coccyx L89.159    Acute metabolic encephalopathy G93.41    Speech disturbance R47.9    Open wound T14.8XXA    Elevated INR R79.1    Chronic osteomyelitis, pelvis, right M86.651    Osteomyelitis M86.9    Urinary tract infection 
associated with indwelling urethral catheter ([facility]) T83.511A, N39.0    Abnormal liver function test R79.89    Chronic depression F32.A    Essential hypertension I10    History of pulmonary embolism Z86.711    Other dysphagia R13.19    Pressure injury of skin of back L89.109    Anemia D64.9    AMS (altered mental status) R41.82    Class 1 obesity due to excess calories without serious comorbidity with body mass index (BMI) of 31.0 to 31.9 in adult E66.811, E66.09, Z68.31    Colostomy in place ([facility]) Z93.3    Current use of long term anticoagulation Z79.01    Obstructive uropathy N13.9    Hypokalemia E87.6    Hypomagnesemia E83.42    Shortness of breath R06.02    Hypoxia R09.02    Pleural effusion on right J90    History of DVT (deep vein thrombosis) Z86.718    History of ESBL E. coli infection Z86.19    Abnormal CT of the chest R93.89    Coronary artery disease involving native coronary artery of native heart with other form of angina pectoris ([facility]) I25.118    Post PTCA Z98.61    Complicated UTI (urinary tract infection) N39.0    Mild malnutrition ([facility]) E44.1    Sepsis ([facility]) A41.9    Decubitus ulcer of right hip, stage 4 ([facility]) L89.214    Paraplegia ([facility]) G82.20    Heart failure with improved ejection fraction (HFimpEF) ([facility]) I50.32    Coronary artery disease involving native coronary artery of native heart without angina pectoris I25.10    Chronic suprapubic catheter ([facility]) Z93.59    Toxic metabolic encephalopathy G92.8    Pressure injury of deep tissue of right buttock L89.316         ASSESSMENT/PLAN   Right ischial infected stage 4 wound with surrounding cellulites: will continue broad spectrum antibiotic. He was evaluated by surgery. Plan for debridement on Friday  MS with functional quadriplegia  Neurogenic bladder : has suprapubic catehter  Continue local wound care with dakins soaked dressing      Bautista Mitchell MD, 1/9/2025 12:13 PM   
that they found the patient this morning confused and febrile.  Staff reports this last known well was yesterday.  CT head without contrast on admission shows no acute findings.  A new posterior right hip decubitus ulcer was noted with central necrotic tissue down to the bone.  General surgery was consulted in the ED and aware.  Patient has an extensive history of infection including MDRO Acinetobacter from anaerobic and aerobic culture, MRSA from the buttocks 6/2024 showing MRSA, MDRO E. coli on urine culture most recent on 1/2024, ESBL on urine culture with latest result on 1/2024, carbapenem resistant Pseudomonas on sacral swab in 6/2019.  Patient has suprapubic catheter that was exchanged in the ED and subsequent urine sample obtained.  Upon evaluation in the ED, patient was alert and oriented x 4.  Patient has history of chronic buttock and sacral wounds.       Medications:    Infusion Medications    sodium chloride      Scheduled Medications    vancomycin  1,500 mg IntraVENous Q24H    sodium hypochlorite   Irrigation BID    [Held by provider] metoprolol succinate  25 mg Oral BID    sodium chloride flush  5-40 mL IntraVENous 2 times per day    [Held by provider] aspirin  81 mg Oral Daily    baclofen  10 mg Oral TID    oyster shell calcium w/D  1 tablet Oral BID    docusate sodium  100 mg Oral Daily    [Held by provider] ticagrelor  90 mg Oral BID    [Held by provider] spironolactone  25 mg Oral Daily    rosuvastatin  20 mg Oral Nightly    oxyBUTYnin  10 mg Oral Daily    famotidine  20 mg Oral BID    gabapentin  600 mg Oral 4x daily    [Held by provider] lisinopril  2.5 mg Oral Daily    vitamin A  20,000 Units Oral Daily    vitamin C  500 mg Oral BID    vitamin E  400 Units Oral Daily    vancomycin (VANCOCIN) intermittent dosing (placeholder)   Other RX Placeholder    meropenem  1,000 mg IntraVENous Q8H    OXcarbazepine  450 mg Oral BID    PRN Meds: potassium chloride **OR** potassium alternative oral 
the morning and at bedtime 9/1/23  Yes Talya Harris DO   spironolactone (ALDACTONE) 25 MG tablet Take 1 tablet by mouth daily 9/2/23  Yes Talya Harris DO   OXcarbazepine (TRILEPTAL) 300 MG tablet Take 1 tablet by mouth 2 times daily 300 mg tab and 150 mg tab   Yes Megan Cobos MD   vitamin C (ASCORBIC ACID) 500 MG tablet Take 1 tablet by mouth 2 times daily   Yes Megan Cobos MD   vitamin E 400 UNIT capsule Take 1 capsule by mouth daily   Yes Megan Cobos MD   aspirin 81 MG EC tablet Take 1 tablet by mouth daily   Yes Megan Cobos MD   erythromycin (ROMYCIN) 5 MG/GM ophthalmic ointment Place into the right eye nightly (0.25 ribbon to right eye)   Yes Megan Cobos MD   baclofen (LIORESAL) 10 MG tablet Take 1 tablet by mouth 3 times daily   Yes Megan Cobos MD   gabapentin (NEURONTIN) 600 MG tablet Take 1 tablet by mouth in the morning, at noon, in the evening, and at bedtime.   Yes Megan Cobos MD   LACTOBACILLUS PO Take 1 capsule by mouth in the morning and at bedtime    Yes Megan Cobos MD   famotidine (PEPCID) 20 MG tablet Take 1 tablet by mouth 2 times daily 6/14/18  Yes Rodrigo Smiley MD   docusate sodium (COLACE) 100 MG capsule Take 1 capsule by mouth daily   Yes Megan Cobos MD   vitamin A 51512 UNITS capsule Take 2 capsules by mouth daily 3/28/16  Yes Mayco Rincon MD   ipratropium 0.5 mg-albuterol 2.5 mg (DUONEB) 0.5-2.5 (3) MG/3ML SOLN nebulizer solution Inhale 3 mLs into the lungs every 4 hours    Megan Cobos MD   ondansetron (ZOFRAN) 4 MG tablet Take 1 tablet by mouth every 8 hours as needed for Nausea or Vomiting    Megan Cobos MD   nitroGLYCERIN (NITROSTAT) 0.4 MG SL tablet Place 1 tablet under the tongue every 5 minutes as needed for Chest pain up to max of 3 total doses. If no relief after 1 dose, call 911. 9/1/23   Dariana Rojas APRN - CNP   melatonin 3 MG TABS tablet Take 1 tablet by 
achievable. FINDINGS: There is adequate opacification of the pulmonary arterial system. There is some respiratory motion. There are no pulmonary emboli in the proximal pulmonary arterial branches. The distal branches are affected by motion artifact. The aorta is within acceptable limits. The heart size is normal. There are coronary artery calcifications. There are no pericardial or pleural effusions. There is no mediastinal, axillary or hilar adenopathy. There is some mild atelectasis in both lung bases. There is some respiratory motion. No infiltrates are identified. There are degenerative changes in the thoracic spine. No suspicious osseous lesions are present.  There are no suspicious findings in the imaged aspects of the upper abdomen.     1. No pulmonary emboli or pulmonary infiltrates. 2. Mild bibasilar atelectasis. **This report has been created using voice recognition software. It may contain minor errors which are inherent in voice recognition technology.** Electronically signed by Dr. Dariana Chacko    CT HEAD WO CONTRAST    Result Date: 1/5/2025  PROCEDURE: CT HEAD WO CONTRAST CLINICAL INFORMATION: AMS. Altered mental status. COMPARISON: Head CT 6/27/2024. TECHNIQUE: Noncontrast 5 mm axial images were obtained through the brain. Sagittal and coronal reconstructions were obtained. . All CT scans at this facility use dose modulation, iterative reconstruction, and/or weight-based dosing when appropriate to reduce radiation dose to as low as reasonably achievable. FINDINGS: There is stable global volume loss. There is no hemorrhage. There are no intra-or extra-axial collections.  There is no hydrocephalus, midline shift or mass effect.  The gray-white matter differentiation is preserved. The paranasal sinuses and mastoid air cells are normally aerated.  There is no suspicious calvarial abnormality.      No evidence of an acute process. No change from prior. **This report has been created using voice 
localizer was obtained.  3D reconstructions were performed on the scanner to include MIP coronal and sagittal images through the chest. Isovue was the intravenous contrast utilized. All CT scans at this facility use dose modulation, iterative reconstruction, and/or weight-based dosing when appropriate to reduce radiation dose to as low as reasonably achievable. FINDINGS: There is adequate opacification of the pulmonary arterial system. There is some respiratory motion. There are no pulmonary emboli in the proximal pulmonary arterial branches. The distal branches are affected by motion artifact. The aorta is within acceptable limits. The heart size is normal. There are coronary artery calcifications. There are no pericardial or pleural effusions. There is no mediastinal, axillary or hilar adenopathy. There is some mild atelectasis in both lung bases. There is some respiratory motion. No infiltrates are identified. There are degenerative changes in the thoracic spine. No suspicious osseous lesions are present.  There are no suspicious findings in the imaged aspects of the upper abdomen.     1. No pulmonary emboli or pulmonary infiltrates. 2. Mild bibasilar atelectasis. **This report has been created using voice recognition software. It may contain minor errors which are inherent in voice recognition technology.** Electronically signed by Dr. Dariana Chacko    CT HEAD WO CONTRAST    Result Date: 1/5/2025  PROCEDURE: CT HEAD WO CONTRAST CLINICAL INFORMATION: AMS. Altered mental status. COMPARISON: Head CT 6/27/2024. TECHNIQUE: Noncontrast 5 mm axial images were obtained through the brain. Sagittal and coronal reconstructions were obtained. . All CT scans at this facility use dose modulation, iterative reconstruction, and/or weight-based dosing when appropriate to reduce radiation dose to as low as reasonably achievable. FINDINGS: There is stable global volume loss. There is no hemorrhage. There are no intra-or 
CONTRAST    Result Date: 1/5/2025  PROCEDURE: CT HEAD WO CONTRAST CLINICAL INFORMATION: AMS. Altered mental status. COMPARISON: Head CT 6/27/2024. TECHNIQUE: Noncontrast 5 mm axial images were obtained through the brain. Sagittal and coronal reconstructions were obtained. . All CT scans at this facility use dose modulation, iterative reconstruction, and/or weight-based dosing when appropriate to reduce radiation dose to as low as reasonably achievable. FINDINGS: There is stable global volume loss. There is no hemorrhage. There are no intra-or extra-axial collections.  There is no hydrocephalus, midline shift or mass effect.  The gray-white matter differentiation is preserved. The paranasal sinuses and mastoid air cells are normally aerated.  There is no suspicious calvarial abnormality.      No evidence of an acute process. No change from prior. **This report has been created using voice recognition software. It may contain minor errors which are inherent in voice recognition technology.** Electronically signed by Dr. Dariana Chacko      This note was dictated using M*Modal Fluency Direct so please excuse any grammatical or syntax errors as no guarantees can be provided that every mistake has been identified and corrected by editing.      Electronically signed by Gaviota Dixon MD on 1/13/2025 at 9:35 AM

## 2025-01-13 NOTE — PLAN OF CARE
Problem: Discharge Planning  Goal: Discharge to home or other facility with appropriate resources  1/7/2025 1333 by May Dugan, RN  Outcome: Progressing  Flowsheets (Taken 1/7/2025 1333)  Discharge to home or other facility with appropriate resources: Identify barriers to discharge with patient and caregiver  1/7/2025 1009 by Anette Norris LSW  Outcome: Progressing     Problem: Skin/Tissue Integrity  Goal: Absence of new skin breakdown  Description: 1.  Monitor for areas of redness and/or skin breakdown  2.  Assess vascular access sites hourly  3.  Every 4-6 hours minimum:  Change oxygen saturation probe site  4.  Every 4-6 hours:  If on nasal continuous positive airway pressure, respiratory therapy assess nares and determine need for appliance change or resting period.  Outcome: Progressing  Note: Monitor for new skin breakdown and continue to use skin breakdown prevention measures.     Problem: Safety - Adult  Goal: Free from fall injury  Outcome: Progressing  Flowsheets (Taken 1/7/2025 1333)  Free From Fall Injury: Instruct family/caregiver on patient safety     Problem: Pain  Goal: Verbalizes/displays adequate comfort level or baseline comfort level  Outcome: Progressing  Flowsheets (Taken 1/7/2025 1333)  Verbalizes/displays adequate comfort level or baseline comfort level: Encourage patient to monitor pain and request assistance     Problem: Chronic Conditions and Co-morbidities  Goal: Patient's chronic conditions and co-morbidity symptoms are monitored and maintained or improved  Outcome: Progressing  Flowsheets (Taken 1/7/2025 1333)  Care Plan - Patient's Chronic Conditions and Co-Morbidity Symptoms are Monitored and Maintained or Improved: Monitor and assess patient's chronic conditions and comorbid symptoms for stability, deterioration, or improvement     Problem: Nutrition Deficit:  Goal: Optimize nutritional status  Outcome: Progressing  Flowsheets (Taken 1/7/2025 1333)  Nutrient intake 
  Problem: Discharge Planning  Goal: Discharge to home or other facility with appropriate resources  1/7/2025 2219 by Emmy Perez, RN  Outcome: Progressing  1/7/2025 1727 by May Dugan RN  Outcome: Progressing  Flowsheets (Taken 1/7/2025 1727)  Discharge to home or other facility with appropriate resources: Identify barriers to discharge with patient and caregiver  1/7/2025 1333 by May Dugan RN  Outcome: Progressing  Flowsheets (Taken 1/7/2025 1333)  Discharge to home or other facility with appropriate resources: Identify barriers to discharge with patient and caregiver  1/7/2025 1009 by Anette Norris LSW  Outcome: Progressing     Problem: Skin/Tissue Integrity  Goal: Absence of new skin breakdown  Description: 1.  Monitor for areas of redness and/or skin breakdown  2.  Assess vascular access sites hourly  3.  Every 4-6 hours minimum:  Change oxygen saturation probe site  4.  Every 4-6 hours:  If on nasal continuous positive airway pressure, respiratory therapy assess nares and determine need for appliance change or resting period.  1/7/2025 2219 by Emmy Perez, RN  Outcome: Progressing  1/7/2025 1727 by May Dugan RN  Outcome: Progressing  1/7/2025 1333 by May Dugan RN  Outcome: Progressing  Note: Monitor for new skin breakdown and continue to use skin breakdown prevention measures.     Problem: Safety - Adult  Goal: Free from fall injury  1/7/2025 2219 by Emmy Perez, RN  Outcome: Progressing  1/7/2025 1727 by May Dugan RN  Outcome: Progressing  Flowsheets (Taken 1/7/2025 1727)  Free From Fall Injury: Instruct family/caregiver on patient safety  1/7/2025 1333 by May Dugan RN  Outcome: Progressing  Flowsheets (Taken 1/7/2025 1333)  Free From Fall Injury: Instruct family/caregiver on patient safety     Problem: Pain  Goal: Verbalizes/displays adequate comfort level or baseline comfort level  1/7/2025 2219 by Emmy Perez 
  Problem: Discharge Planning  Goal: Discharge to home or other facility with appropriate resources  1/8/2025 0848 by Trixie Cleveland, RN  Outcome: Progressing     Problem: Skin/Tissue Integrity  Goal: Absence of new skin breakdown  Description: 1.  Monitor for areas of redness and/or skin breakdown  2.  Assess vascular access sites hourly  3.  Every 4-6 hours minimum:  Change oxygen saturation probe site  4.  Every 4-6 hours:  If on nasal continuous positive airway pressure, respiratory therapy assess nares and determine need for appliance change or resting period.  1/8/2025 0848 by Trixie Cleveland, RN  Outcome: Progressing     Problem: Safety - Adult  Goal: Free from fall injury  1/8/2025 0848 by Trixie Cleveland, RN  Outcome: Progressing     
  Problem: Discharge Planning  Goal: Discharge to home or other facility with appropriate resources  Outcome: Progressing     Problem: Skin/Tissue Integrity  Goal: Absence of new skin breakdown  Description: 1.  Monitor for areas of redness and/or skin breakdown  2.  Assess vascular access sites hourly  3.  Every 4-6 hours minimum:  Change oxygen saturation probe site  4.  Every 4-6 hours:  If on nasal continuous positive airway pressure, respiratory therapy assess nares and determine need for appliance change or resting period.  Outcome: Progressing     Problem: Safety - Adult  Goal: Free from fall injury  Outcome: Progressing     Problem: Pain  Goal: Verbalizes/displays adequate comfort level or baseline comfort level  Outcome: Progressing     Problem: Chronic Conditions and Co-morbidities  Goal: Patient's chronic conditions and co-morbidity symptoms are monitored and maintained or improved  Outcome: Progressing     
  Problem: Discharge Planning  Goal: Discharge to home or other facility with appropriate resources  Outcome: Progressing     Problem: Skin/Tissue Integrity  Goal: Absence of new skin breakdown  Description: 1.  Monitor for areas of redness and/or skin breakdown  2.  Assess vascular access sites hourly  3.  Every 4-6 hours minimum:  Change oxygen saturation probe site  4.  Every 4-6 hours:  If on nasal continuous positive airway pressure, respiratory therapy assess nares and determine need for appliance change or resting period.  Outcome: Progressing     Problem: Safety - Adult  Goal: Free from fall injury  Outcome: Progressing     Problem: Pain  Goal: Verbalizes/displays adequate comfort level or baseline comfort level  Outcome: Progressing     Problem: Chronic Conditions and Co-morbidities  Goal: Patient's chronic conditions and co-morbidity symptoms are monitored and maintained or improved  Outcome: Progressing     Problem: Nutrition Deficit:  Goal: Optimize nutritional status  Outcome: Progressing     Problem: Neurosensory - Adult  Goal: Achieves stable or improved neurological status  Outcome: Progressing  Goal: Achieves maximal functionality and self care  Outcome: Progressing     Problem: Musculoskeletal - Adult  Goal: Return mobility to safest level of function  Outcome: Progressing  Goal: Return ADL status to a safe level of function  Outcome: Progressing     Problem: Gastrointestinal - Adult  Goal: Minimal or absence of nausea and vomiting  Outcome: Progressing  Goal: Maintains or returns to baseline bowel function  Outcome: Progressing  Goal: Maintains adequate nutritional intake  Outcome: Progressing  Goal: Establish and maintain optimal ostomy function  Outcome: Progressing     Problem: Genitourinary - Adult  Goal: Absence of urinary retention  Outcome: Progressing  Goal: Urinary catheter remains patent  Outcome: Progressing     Problem: Infection - Adult  Goal: Absence of infection at 
  Problem: Discharge Planning  Goal: Discharge to home or other facility with appropriate resources  Outcome: Progressing     Problem: Skin/Tissue Integrity  Goal: Absence of new skin breakdown  Description: 1.  Monitor for areas of redness and/or skin breakdown  2.  Assess vascular access sites hourly  3.  Every 4-6 hours minimum:  Change oxygen saturation probe site  4.  Every 4-6 hours:  If on nasal continuous positive airway pressure, respiratory therapy assess nares and determine need for appliance change or resting period.  Outcome: Progressing     Problem: Safety - Adult  Goal: Free from fall injury  Outcome: Progressing     Problem: Pain  Goal: Verbalizes/displays adequate comfort level or baseline comfort level  Outcome: Progressing     Problem: Chronic Conditions and Co-morbidities  Goal: Patient's chronic conditions and co-morbidity symptoms are monitored and maintained or improved  Outcome: Progressing     Problem: Nutrition Deficit:  Goal: Optimize nutritional status  Outcome: Progressing     Problem: Neurosensory - Adult  Goal: Achieves stable or improved neurological status  Outcome: Progressing  Goal: Achieves maximal functionality and self care  Outcome: Progressing     Problem: Musculoskeletal - Adult  Goal: Return mobility to safest level of function  Outcome: Progressing  Goal: Return ADL status to a safe level of function  Outcome: Progressing     Problem: Gastrointestinal - Adult  Goal: Minimal or absence of nausea and vomiting  Outcome: Progressing  Goal: Maintains or returns to baseline bowel function  Outcome: Progressing  Goal: Maintains adequate nutritional intake  Outcome: Progressing  Goal: Establish and maintain optimal ostomy function  Outcome: Progressing     Problem: Genitourinary - Adult  Goal: Absence of urinary retention  Outcome: Progressing  Goal: Urinary catheter remains patent  Outcome: Progressing     Problem: Infection - Adult  Goal: Absence of infection at 
  Problem: Discharge Planning  Goal: Discharge to home or other facility with appropriate resources  Outcome: Progressing  Flowsheets (Taken 1/5/2025 9502)  Discharge to home or other facility with appropriate resources: Identify barriers to discharge with patient and caregiver     Problem: Skin/Tissue Integrity  Goal: Absence of new skin breakdown  Description: 1.  Monitor for areas of redness and/or skin breakdown  2.  Assess vascular access sites hourly  3.  Every 4-6 hours minimum:  Change oxygen saturation probe site  4.  Every 4-6 hours:  If on nasal continuous positive airway pressure, respiratory therapy assess nares and determine need for appliance change or resting period.  Outcome: Progressing     Problem: Safety - Adult  Goal: Free from fall injury  Outcome: Progressing     
Consult received see sw note dated 1/7/2025.  
Progressing  Goal: Maintains or returns to baseline bowel function  Outcome: Progressing  Goal: Maintains adequate nutritional intake  Outcome: Progressing  Goal: Establish and maintain optimal ostomy function  Outcome: Progressing     Problem: Genitourinary - Adult  Goal: Absence of urinary retention  Outcome: Progressing  Goal: Urinary catheter remains patent  Outcome: Progressing     Problem: Infection - Adult  Goal: Absence of infection at discharge  Outcome: Progressing  Goal: Absence of infection during hospitalization  Outcome: Progressing     
comfort level or baseline comfort level  Outcome: Progressing  Flowsheets (Taken 1/5/2025 2103)  Verbalizes/displays adequate comfort level or baseline comfort level:   Encourage patient to monitor pain and request assistance   Assess pain using appropriate pain scale   Administer analgesics based on type and severity of pain and evaluate response   Implement non-pharmacological measures as appropriate and evaluate response   Consider cultural and social influences on pain and pain management   Notify Licensed Independent Practitioner if interventions unsuccessful or patient reports new pain     Problem: Chronic Conditions and Co-morbidities  Goal: Patient's chronic conditions and co-morbidity symptoms are monitored and maintained or improved  Outcome: Progressing  Flowsheets (Taken 1/5/2025 2103)  Care Plan - Patient's Chronic Conditions and Co-Morbidity Symptoms are Monitored and Maintained or Improved:   Monitor and assess patient's chronic conditions and comorbid symptoms for stability, deterioration, or improvement   Collaborate with multidisciplinary team to address chronic and comorbid conditions and prevent exacerbation or deterioration   Update acute care plan with appropriate goals if chronic or comorbid symptoms are exacerbated and prevent overall improvement and discharge       Care plan reviewed with patient.  Patient verbalized understanding of the plan of care and contributed to goal setting.    
request assistance  1/7/2025 1333 by May Dugan RN  Outcome: Progressing  Flowsheets (Taken 1/7/2025 1333)  Verbalizes/displays adequate comfort level or baseline comfort level: Encourage patient to monitor pain and request assistance     Problem: Chronic Conditions and Co-morbidities  Goal: Patient's chronic conditions and co-morbidity symptoms are monitored and maintained or improved  1/7/2025 1727 by May Dugan RN  Outcome: Progressing  Flowsheets (Taken 1/7/2025 1727)  Care Plan - Patient's Chronic Conditions and Co-Morbidity Symptoms are Monitored and Maintained or Improved: Monitor and assess patient's chronic conditions and comorbid symptoms for stability, deterioration, or improvement  1/7/2025 1333 by May Dugan RN  Outcome: Progressing  Flowsheets (Taken 1/7/2025 1333)  Care Plan - Patient's Chronic Conditions and Co-Morbidity Symptoms are Monitored and Maintained or Improved: Monitor and assess patient's chronic conditions and comorbid symptoms for stability, deterioration, or improvement     Problem: Nutrition Deficit:  Goal: Optimize nutritional status  1/7/2025 1727 by May Dugan RN  Outcome: Progressing  Flowsheets (Taken 1/7/2025 1727)  Nutrient intake appropriate for improving, restoring, or maintaining nutritional needs: Assess nutritional status and recommend course of action  1/7/2025 1333 by May Dugan RN  Outcome: Progressing  Flowsheets (Taken 1/7/2025 1333)  Nutrient intake appropriate for improving, restoring, or maintaining nutritional needs: Assess nutritional status and recommend course of action

## 2025-01-15 LAB
BACTERIA SPEC AEROBE CULT: ABNORMAL
BACTERIA SPEC ANAEROBE CULT: ABNORMAL
BACTERIA SPEC ANAEROBE CULT: ABNORMAL
GRAM STN SPEC: ABNORMAL
ORGANISM: ABNORMAL

## 2025-01-23 ENCOUNTER — OUTSIDE SERVICES (OUTPATIENT)
Dept: FAMILY MEDICINE CLINIC | Age: 70
End: 2025-01-23

## 2025-01-23 DIAGNOSIS — G35 MS (MULTIPLE SCLEROSIS) (HCC): ICD-10-CM

## 2025-01-23 DIAGNOSIS — Z93.3 COLOSTOMY IN PLACE (HCC): ICD-10-CM

## 2025-01-23 DIAGNOSIS — L89.313: Primary | ICD-10-CM

## 2025-01-23 DIAGNOSIS — I25.118 CORONARY ARTERY DISEASE INVOLVING NATIVE CORONARY ARTERY OF NATIVE HEART WITH OTHER FORM OF ANGINA PECTORIS (HCC): ICD-10-CM

## 2025-01-23 NOTE — PROGRESS NOTES
1/23/2025  Greyson Swift  1955  Subjective:  He was in his/her room to follow up AMS, hospitalized for sepsis due to complicated UTI and large decubitus, requiring surgery.    Denies headaches, CP, sob, or abdominal pains.    Objective:   Please see vitals per chart.  There is no sinus tenderness to palpation, no cervical lymphadenopathy.  Lungs are clear.  Heart Regular rate and rhythm without murmur.  The abdomen is soft and nontender.  Colostomy present and functioning.  Extremities are without edema.  Pompa is present.  Wound per wound doctor.      Assessment:    1. 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 Coronary artery disease involving native coronary artery of native heart without angina pectoris  -stable,  controlled on crestor, lisinopril, metoprolol, brilinta and asa.    3. Colostomy in place (HCC)  -stable, functioning appropriately    4. MS (multiple sclerosis) (HCC)  -stable, controlled on zanaflex, baclofen and gabapentin    Plan:  We will continue current medication regimen including oxcarbazepine, baclofen and gabapentin for MS, melatonin for sleep and supportive care and recheck in 1 month.    Electronically signed by Noah Benitez MD on 1/23/2025 at 10:48 AM.

## 2025-02-27 ENCOUNTER — OUTSIDE SERVICES (OUTPATIENT)
Dept: FAMILY MEDICINE CLINIC | Age: 70
End: 2025-02-27

## 2025-02-27 DIAGNOSIS — G35 MS (MULTIPLE SCLEROSIS) (HCC): ICD-10-CM

## 2025-02-27 DIAGNOSIS — L89.313: ICD-10-CM

## 2025-02-27 DIAGNOSIS — R22.0 MOUTH SWELLING: Primary | ICD-10-CM

## 2025-02-27 DIAGNOSIS — I25.118 CORONARY ARTERY DISEASE INVOLVING NATIVE CORONARY ARTERY OF NATIVE HEART WITH OTHER FORM OF ANGINA PECTORIS: ICD-10-CM

## 2025-02-27 DIAGNOSIS — Z93.3 COLOSTOMY IN PLACE (HCC): ICD-10-CM

## 2025-02-27 NOTE — PROGRESS NOTES
2/27/2025  Greyson Swift  1955  Subjective:  He was in his/her room to follow up swelling and pain in his mouth.  Started on Keflex, is improving slowly.   Denies headaches, CP, sob, or abdominal pains.    Objective:   Please see vitals per chart.  Right sided facial swelling.   There is no sinus tenderness to palpation, no cervical lymphadenopathy.  Lungs are clear.  Heart Regular rate and rhythm without murmur.  The abdomen is soft and nontender.  Colostomy present and functioning.  Extremities are without edema.  Pompa is present.  Wound per wound doctor.      Assessment:  1. Mouth swelling  -acute problem, add abx, -monitor sxs, call if not improving    2. Decubitus ulcer of right perineal ischial region, stage 3 (HCC)  -stable, recent hospitalization and surgery, follow with wound specialist    3. Coronary artery disease involving native coronary artery of native heart without angina pectoris  -stable,  controlled on crestor, lisinopril, metoprolol, brilinta and asa.    4. Colostomy in place (HCC)  -stable, functioning appropriately    5. MS (multiple sclerosis) (HCC)  -stable, controlled on zanaflex, baclofen and gabapentin    Plan:  We will continue current medication regimen including oxcarbazepine, baclofen and gabapentin for MS, melatonin for sleep and supportive care and recheck in 1 month.    Electronically signed by Noah Benitez MD on 2/27/2025 at 10:28 AM.

## 2025-03-21 ENCOUNTER — TELEPHONE (OUTPATIENT)
Dept: NEUROLOGY | Age: 70
End: 2025-03-21

## 2025-03-21 NOTE — TELEPHONE ENCOUNTER
Roberto from [facility], [place] called regarding gradual decline of patient. Patient is having right sided facial / jaw pain x 2 wks. Stopped eating last Toan 3/16.     Current medications:   Trileptal 150 - 1 tab by mouth 2 times daily.  Trileptal 300 - 1 tab by mouth 2 times daily.   Gabapentin 6000 mg - 1 tab by mouth in the morning, at noon and in the evening and at bedtime.       Last seen 7/8/24 by Dr. Malhotra:  Plan:  Continue with Gabapentin at current dose  Continue with Trileptal at current dose.   Follow physical and occupational therapy recommendations   Follow up MRI brain with and without contrast prior to next visit.   You need to take calcium and vitamin D over the counter  Call with any new symptoms or concerns.   Follow up in 6 months or sooner if needed    6 month follow up appointment was canceled due to patient being admitted at [facility] in January for Sepsis.   No order on file for MRI Brain.     Please advise.

## 2025-03-21 NOTE — TELEPHONE ENCOUNTER
Tried to contact Roberto back at [facility] with number she provider. No answer, no voicemail option.

## 2025-03-27 ENCOUNTER — OUTSIDE SERVICES (OUTPATIENT)
Dept: FAMILY MEDICINE CLINIC | Age: 70
End: 2025-03-27
Payer: MEDICARE

## 2025-03-27 DIAGNOSIS — R68.84 JAW PAIN: ICD-10-CM

## 2025-03-27 DIAGNOSIS — Z93.3 COLOSTOMY IN PLACE (HCC): ICD-10-CM

## 2025-03-27 DIAGNOSIS — G35 MS (MULTIPLE SCLEROSIS) (HCC): ICD-10-CM

## 2025-03-27 DIAGNOSIS — I25.118 CORONARY ARTERY DISEASE INVOLVING NATIVE CORONARY ARTERY OF NATIVE HEART WITH OTHER FORM OF ANGINA PECTORIS: ICD-10-CM

## 2025-03-27 DIAGNOSIS — L89.313: Primary | ICD-10-CM

## 2025-03-27 PROCEDURE — 99309 SBSQ NF CARE MODERATE MDM 30: CPT | Performed by: FAMILY MEDICINE

## 2025-03-27 NOTE — PROGRESS NOTES
3/27/2025  Greyson Swift  1955  Subjective:  He was in his/her room to follow up jaw pain.  Felt to be a MS flare.  Treated with medrol dose pack with improvement.   Denies headaches, CP, sob, or abdominal pains.    Objective:   Please see vitals per chart.    There is no sinus tenderness to palpation, no cervical lymphadenopathy.  Lungs are clear.  Heart Regular rate and rhythm without murmur.  The abdomen is soft and nontender.  Colostomy present and functioning.  Extremities are without edema.  Pompa is present.  Wound per wound doctor.      Assessment:    1. Decubitus ulcer of right perineal ischial region, stage 3 (HCC)  -stable, recent hospitalization and surgery, follow with wound specialist    2. Coronary artery disease involving native coronary artery of native heart without angina pectoris  -stable,  controlled on crestor, lisinopril, metoprolol, brilinta and asa.    3. Colostomy in place (HCC)  -stable, functioning appropriately    4. MS (multiple sclerosis) (HCC)  -stable, controlled on zanaflex, baclofen and gabapentin    5. Jaw pain  -acute problem, likely MS flare, improved with steroid, -monitor sxs, call if not improving      Plan:  We will continue current medication regimen including oxcarbazepine, baclofen and gabapentin for MS and supportive care and recheck in 1 month.    Electronically signed by Noah Benitez MD on 3/27/2025 at 10:33 AM.

## 2025-04-16 ENCOUNTER — OFFICE VISIT (OUTPATIENT)
Dept: CARDIOLOGY CLINIC | Age: 70
End: 2025-04-16
Payer: MEDICARE

## 2025-04-16 VITALS
DIASTOLIC BLOOD PRESSURE: 70 MMHG | BODY MASS INDEX: 27.47 KG/M2 | OXYGEN SATURATION: 98 % | SYSTOLIC BLOOD PRESSURE: 128 MMHG | WEIGHT: 175 LBS | HEART RATE: 84 BPM | HEIGHT: 67 IN

## 2025-04-16 DIAGNOSIS — I50.32 HEART FAILURE WITH IMPROVED EJECTION FRACTION (HFIMPEF) (HCC): ICD-10-CM

## 2025-04-16 DIAGNOSIS — I25.10 CORONARY ARTERY DISEASE INVOLVING NATIVE CORONARY ARTERY OF NATIVE HEART WITHOUT ANGINA PECTORIS: Primary | ICD-10-CM

## 2025-04-16 DIAGNOSIS — I25.10 CAD S/P PERCUTANEOUS CORONARY ANGIOPLASTY: ICD-10-CM

## 2025-04-16 DIAGNOSIS — Z98.61 CAD S/P PERCUTANEOUS CORONARY ANGIOPLASTY: ICD-10-CM

## 2025-04-16 PROCEDURE — G8427 DOCREV CUR MEDS BY ELIG CLIN: HCPCS | Performed by: PHYSICIAN ASSISTANT

## 2025-04-16 PROCEDURE — 3074F SYST BP LT 130 MM HG: CPT | Performed by: PHYSICIAN ASSISTANT

## 2025-04-16 PROCEDURE — 1123F ACP DISCUSS/DSCN MKR DOCD: CPT | Performed by: PHYSICIAN ASSISTANT

## 2025-04-16 PROCEDURE — 1159F MED LIST DOCD IN RCRD: CPT | Performed by: PHYSICIAN ASSISTANT

## 2025-04-16 PROCEDURE — 99214 OFFICE O/P EST MOD 30 MIN: CPT | Performed by: PHYSICIAN ASSISTANT

## 2025-04-16 PROCEDURE — 3017F COLORECTAL CA SCREEN DOC REV: CPT | Performed by: PHYSICIAN ASSISTANT

## 2025-04-16 PROCEDURE — G8417 CALC BMI ABV UP PARAM F/U: HCPCS | Performed by: PHYSICIAN ASSISTANT

## 2025-04-16 PROCEDURE — 3078F DIAST BP <80 MM HG: CPT | Performed by: PHYSICIAN ASSISTANT

## 2025-04-16 PROCEDURE — 1036F TOBACCO NON-USER: CPT | Performed by: PHYSICIAN ASSISTANT

## 2025-04-16 NOTE — PROGRESS NOTES
[email or web address]  [id number]       Stress Freya:   Results for orders placed or performed during the hospital encounter of 18   Stress Test, Lexiscan    Narrative    Cardiac Perfusion Imaging      Demographics      Patient Name    TERRY TRUONG  Gender                 Male                   M      MR #            [id number]       Race                                                         Ethnicity      Account #       [id number]       Room Number            0013      Accession       [id number]       Date of study          2018   Number      Date of Birth   1955      Referring Physician    Leydi MORAN MD      Age             62 year(s)      NM Technologist        Sharon Montes [facility]      Stress Staff    Devika Wolf Sandeep MD                   [facility]             Cardiologist      The procedure was explained in detail to the patient. Risks,   complications and alternative treatments were reviewed. Written consent   was obtained.     Procedure  Procedure Type:      Nuclear Stress Test:Pharmacological, Regadenoson     Indications: Pre-op.    Medical History:Patient history obtained by: TEVIN Ramirez.  The patient has no history of chest pain.  Family history of heart disease.     Risk Factors      The patient risk factors include:former tobacco use, treated hypertension   and ( years not smokin).     Stress Protocols      Resting ECG   NSR      Resting HR:80 bpm                Resting BP:185/82 mmHg     Stress Protocol:Pharmacologic - Lexiscan  +--------+-----+------+-------+-----------+--+----------+------+-----------+  !Stage # !Time !Dosage!Heart  !Blood      !CP!Pain      !Pain  !Pain Action!  !        !     !      !Rate   !Pressure   !  !Location  !Type  !           !  +--------+-----+------+-------+-----------+--+----------+------+-----------+  !1.0

## 2025-04-21 LAB
A/G RATIO: 0.6 RATIO (ref 0.8–2.6)
ALBUMIN: 2.9 G/DL (ref 3.5–5.2)
ALP BLD-CCNC: 124 U/L (ref 23–144)
ALT SERPL-CCNC: 24 U/L (ref 0–60)
AST SERPL-CCNC: 27 U/L (ref 0–55)
BILIRUB SERPL-MCNC: <0.2 MG/DL (ref 0–1.2)
BUN / CREAT RATIO: 57 (ref 7–25)
BUN BLDV-MCNC: 17 MG/DL (ref 3–29)
CALCIUM SERPL-MCNC: 8.9 MG/DL (ref 8.5–10.5)
CHLORIDE BLD-SCNC: 98 MEQ/L (ref 96–110)
CO2: 30 MEQ/L (ref 19–32)
CREAT SERPL-MCNC: 0.3 MG/DL (ref 0.5–1.2)
ESTIMATED GLOMERULAR FILTRATION RATE CREATININE EQUATION: 130 MLS/MIN/1.73M2
FASTING STATUS: ABNORMAL
GLOBULIN: 4.7 G/DL (ref 1.9–3.6)
GLUCOSE BLD-MCNC: 83 MG/DL (ref 70–99)
HCT VFR BLD CALC: 33.1 % (ref 37.5–51)
HEMOGLOBIN: 10 G/DL (ref 13–17.7)
MCH RBC QN AUTO: 25.1 PG (ref 26–34)
MCHC RBC AUTO-ENTMCNC: 30.2 G/DL (ref 30.7–35.5)
MCV RBC AUTO: 83.2 FL (ref 80–100)
PDW BLD-RTO: 17.3 %
PLATELET # BLD: 250 K/UL (ref 140–400)
PMV BLD AUTO: 9.5 FL (ref 7.2–11.7)
POTASSIUM SERPL-SCNC: 4 MEQ/L (ref 3.4–5.3)
RBC # BLD: 3.98 M/UL (ref 4.14–5.8)
SODIUM BLD-SCNC: 137 MEQ/L (ref 135–148)
TOTAL PROTEIN: 7.6 G/DL (ref 6–8.3)
WBC # BLD: 5.7 K/UL (ref 3.5–10.9)

## 2025-04-24 ENCOUNTER — OUTSIDE SERVICES (OUTPATIENT)
Dept: FAMILY MEDICINE CLINIC | Age: 70
End: 2025-04-24
Payer: MEDICARE

## 2025-04-24 DIAGNOSIS — R50.9 FEVER, UNSPECIFIED FEVER CAUSE: ICD-10-CM

## 2025-04-24 DIAGNOSIS — L89.313: Primary | ICD-10-CM

## 2025-04-24 DIAGNOSIS — I25.118 CORONARY ARTERY DISEASE INVOLVING NATIVE CORONARY ARTERY OF NATIVE HEART WITH OTHER FORM OF ANGINA PECTORIS: ICD-10-CM

## 2025-04-24 DIAGNOSIS — G35 MS (MULTIPLE SCLEROSIS) (HCC): ICD-10-CM

## 2025-04-24 DIAGNOSIS — Z93.3 COLOSTOMY IN PLACE (HCC): ICD-10-CM

## 2025-04-24 PROCEDURE — 99309 SBSQ NF CARE MODERATE MDM 30: CPT | Performed by: FAMILY MEDICINE

## 2025-04-24 NOTE — PROGRESS NOTES
4/24/2025  Greyson Swift  1955  Subjective:  He was in his/her room to follow up fever and redness in extremities.  CBC and CMP are ok.  Urine culture is pending.  Was started on levaquin, he is feeling better.  Denies headaches, CP, sob, or abdominal pains.    Objective:   Please see vitals per chart.    There is no sinus tenderness to palpation, no cervical lymphadenopathy.  Lungs are clear.  Heart Regular rate and rhythm without murmur.  The abdomen is soft and nontender.  Colostomy present and functioning.  Extremities are without edema.  Pompa is present.  Wound per wound doctor.      Assessment:    1. Decubitus ulcer of right perineal ischial region, stage 3 (HCC)  -stable, recent hospitalization and surgery, follow with wound specialist    2. Coronary artery disease involving native coronary artery of native heart without angina pectoris  -stable,  controlled on crestor, lisinopril, metoprolol, brilinta and asa.    3. Colostomy in place (HCC)  -stable, functioning appropriately    4. MS (multiple sclerosis) (HCC)  -stable, controlled on zanaflex, baclofen and gabapentin    5. Fever, unspecified fever cause  -new problem, unstable, blood work is ok, C&S is pending, on levaquin, -monitor sxs, call if not improving    Plan:  We will continue current medication regimen including oxcarbazepine, baclofen and gabapentin for MS and supportive care and recheck in 1 month.    Electronically signed by Noah Benitez MD on 4/24/2025 at 10:34 AM.

## 2025-05-02 NOTE — H&P
May 2, 2025    To Whom It May Concern:         This is confirmation that Hermelindo Hobbs attended his scheduled appointment with ANN Dong on 5/02/25.         Please excuse any tardies or absences from school 5/2/25-5/9/25.    Sincerely,      ELAINE Dong.  [phone]                   Hospitalist - History and Physical    Patient: Bobbi Obrien  : 1955  MRN: [id number]     Acct: [de-identified]    PCP: Jarrett Mar MD  Date of Admission: 2022  Date of Service: Pt seen/examined on 22    Assessment and Plan:  Acute Conditions (hospital treatment)  Shortness of Breath - Resolved. Weaned to RA. Low suspicion for PNA as patient remains afebrile and without leukocytosis. Elevated procalcitonin likely due to recent cystoscopy, laser litho and stent replacement. 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 Start Merrem every 8 hours  Blood cultures x2 ordered  Obtain urinalysis  Dr. Linda Duran was consulted, recommends continuing antibiotic therapy until culture reports result. If negative can stop antibiotics. Gross Hematuria - Underwent  procedure as stated above on 2022. Pompa bag noted to have dark red colored urine. H&H stable. Urology consult placed   History of Recurrent Multi-Drug Resistant ESBL Bacteria  As above, treated with Merrem until further cultures result    Chronic Conditions (reviewed and stable unless otherwise stated)  Multiple Sclerosis - Continue Baclofen, Neurontin   Primary HTN - Continue OP dose of Norvasc and Coreg  Neurogenic Bladder - Continue OP dose of Oxybutynin   GERD/Dysphagia - Continue with OP dose of Pepcid     =======================================================================    Chief Complaint:  Shortness of breath    History Of Present Illness:  Bobbi Obrien is a 74 y.o. male with PMHx of multiple sclerosis, neurogenic bladder, and oropharyngeal dysphagia who presents to [facility] from [facility] due to shortness of breath. Patient had underwent a cystoscopy, laser lithotripsy and stent placement yesterday by urology.   Was recently in the hospital where he was treated for 6/15/2022    CYSTOSCOPY performed by Edna Peter MD at [address]  7/25/2022    IR NEPHROSTOMY CATHETER PLACEMENT 7/25/2022 [facility] SPECIAL PROCEDURES    HI LAP,SURG,COLECTOMY,W/END COLOST & CLOSUR N/A 6/13/2018    ROBOT DIVERTING COLOSTOMY performed by Marina Rivero MD at [address] [facility]  child       Medications Prior to Admission:   Prior to Admission medications    Medication Sig Start Date End Date Taking? Authorizing Provider   vitamin C (ASCORBIC ACID) 500 MG tablet Take 500 mg by mouth in the morning and 500 mg before bedtime. Historical Provider, MD   ertapenem [facility]) 1 GM injection Inject 1,000 mg into the muscle every 24 hours For 4 days start 7-28-22    Historical Provider, MD   potassium chloride (KLOR-CON M) 20 MEQ extended release tablet Take 2 tablets by mouth in the morning and 2 tablets before bedtime. 7/27/22   Kim Gomez PA-C   carvedilol (COREG) 12.5 MG tablet Take 1 tablet by mouth in the morning and 1 tablet in the evening. Take with meals. 7/26/22   Collin Wyatt MD   amLODIPine (NORVASC) 10 MG tablet Take 1 tablet by mouth in the morning. 7/27/22   Collin Wyatt MD   melatonin 3 MG TABS tablet Take 1 tablet by mouth nightly as needed (when unable to sleep) 7/26/22   Collin Wyatt MD   ondansetron (ZOFRAN) 4 MG tablet Take 4 mg by mouth every 8 hours as needed for Nausea or Vomiting    Historical Provider, MD   carbamide peroxide (DEBROX) 6.5 % otic solution 5 drops 2 times daily Instill 5 drops in both ears two times daily for ear wax use for 4 days.     Historical Provider, MD   vitamin E 400 UNIT capsule Take 400 Units by mouth daily    Historical Provider, MD   aspirin 81 MG EC tablet Take 81 mg by mouth daily    Historical Provider, MD   oxybutynin (DITROPAN-XL) 5 MG extended release tablet Take 5 mg by mouth in the morning and at bedtime    Historical Provider, MD   gentamicin (GARAMYCIN) 0.1 % ointment Apply topically daily Apply to wound bed    Historical Provider, MD   erythromycin [facility]) 5 MG/GM ophthalmic ointment Place into the right eye nightly (0.25 ribbon to right eye)    Historical Provider, MD   Ascorbic Acid (VITAMIN C ER PO) Take 500 mg by mouth in the morning and at bedtime     Historical Provider, MD   baclofen (LIORESAL) 10 MG tablet Take 10 mg by mouth 3 times daily    Historical Provider, MD   gabapentin (NEURONTIN) 600 MG tablet Take 600 mg by mouth 3 times daily. Historical Provider, MD   acetaminophen (TYLENOL) 325 MG tablet Take 650 mg by mouth every 4 hours as needed for Pain or Fever    Historical Provider, MD   LACTOBACILLUS PO Take 1 capsule by mouth in the morning and at bedtime     Historical Provider, MD   sodium chloride 1 g tablet Take 1 tablet by mouth 2 times daily (with meals) 6/20/19   Lisa Conklin MD   metoprolol tartrate (LOPRESSOR) 25 MG tablet Take 1 tablet by mouth 2 times daily 6/20/19 7/26/22  Lisa Conklin MD   potassium chloride (KLOR-CON M) 20 MEQ TBCR extended release tablet Take 2 tablets by mouth daily 6/21/19 7/27/22  Lisa Conklin MD   calcium-vitamin D ([place]) 500-200 MG-UNIT per tablet Take 1 tablet by mouth 2 times daily 11/16/18   Justice Barger MD   famotidine (PEPCID) 20 MG tablet Take 1 tablet by mouth 2 times daily 6/14/18   Roberta Navas MD   docusate sodium (COLACE) 100 MG capsule Take 100 mg by mouth daily     Historical Provider, MD   tiZANidine (ZANAFLEX) 2 MG tablet Take 2 mg by mouth 3 times daily 0600;1400;2200 12/15/16   Historical Provider, MD   Multiple Vitamin (MULTIVITAMIN) tablet Take 1 tablet by mouth daily 3/28/16   Johnny Barney MD   vitamin A 23802 UNITS capsule Take 2 capsules by mouth daily 3/28/16   Johnny Barney MD     Allergies:  Patient has no known allergies. Social History:    The patient currently lives in nursing home   Tobacco use:   reports that he quit smoking about 40 years ago. His smoking use included cigarettes.  He has quit using smokeless tobacco.  Alcohol use:   reports no history of alcohol use. Drug use:  reports no history of drug use. Family History:        Problem Relation Age of Onset    Cancer Mother         Breast    Heart Disease Father 48    Arthritis Sister     Heart Disease Paternal Grandmother 48     Review of Systems:   Pertinent positives and negatives as noted in the HPI. Otherwise complete ROS negative. Physical Exam:    /60   Pulse 88   Temp 98.4 °F (36.9 °C)   Resp 14   Wt 194 lb (88 kg)   SpO2 95%   BMI 30.38 kg/m²     General appearance: No apparent distress. Chronically ill appearing. Movement limited. Eyes:  Conjunctivae/corneas clear. HENT: Head normal in appearance. External nares normal.  Oral mucosa moist without lesions. Hearing grossly intact. Neck: Supple, with full range of motion. Trachea midline. No gross JVD appreciated. Respiratory:  Normal respiratory effort. Clear to auscultation, bilaterally without rales or wheezes or rhonchi. Cardiovascular: Normal rate, regular rhythm with normal S1/S2 without murmurs. No lower extremity edema. Abdomen: Soft, non-tender, non-distended with normal bowel sounds. Musculoskeletal: No joint swelling or tenderness. Normal tone. No abnormal movements. Skin: Warm and dry. No rashes or lesions. Psychiatric: Alert and oriented  Capillary Refill: Brisk,< 3 seconds. Peripheral Pulses: +2 palpable, equal bilaterally. Labs:     Recent Labs     08/05/22  1059   WBC 8.0   HGB 12.6*   HCT 39.7*        Recent Labs     08/04/22  0839 08/05/22  1059   NA  --  141   K 4.3 4.9   CL  --  105   CO2  --  28   BUN  --  26*   CREATININE  --  0.4   CALCIUM  --  9.5     Recent Labs     08/05/22  1059   AST 77*   ALT 59   BILIDIR <0.2   BILITOT 0.4   ALKPHOS 178*     No results for input(s): INR in the last 72 hours. No results for input(s): [place] in the last 72 hours.   Lab Results   Component Value Date/Time    NITRU POSITIVE 07/20/2022 09:40 AM    WBCUA 10-15 07/20/2022 09:40 AM    WBCUA >200 01/20/2012 09:00 AM    BACTERIA MODERATE 07/20/2022 09:40 AM    RBCUA > 200 07/20/2022 09:40 AM    BLOODU LARGE 07/20/2022 09:40 AM    SPECGRAV >1.030 04/22/2022 11:30 PM    GLUCOSEU NEGATIVE 07/20/2022 09:40 AM     Radiology:     CTA CHEST W WO CONTRAST - r/o Pulmonary Embolism    Result Date: 8/5/2022  1. No evidence pulmonary embolism. 2. Mild cardiomegaly. 3. Elevated left hemidiaphragm. Atelectasis or infiltrate at the left lung base. 4. Thoracic spondylosis. 5. Splenomegaly. **This report has been created using voice recognition software. It may contain minor errors which are inherent in voice recognition technology. ** Final report electronically signed by DR Joann Feldman on 8/5/2022 12:30 PM    XR CHEST PORTABLE    Result Date: 8/5/2022  Small left pleural effusion. **This report has been created using voice recognition software. It may contain minor errors which are inherent in voice recognition technology. ** Final report electronically signed by Dr. Patricia Rhodes on 8/5/2022 11:08 AM     EKG:  I have reviewed the EKG with the following interpretation: NSR, no acute ischemic changes     PT/OT Eval Status: will be assessed  Diet: ADULT DIET; Regular; Low Sodium (2 gm)  DVT prophylaxis: SCDs  Code Status: Full Code  Disposition: admit to med/surg    Thank you Dinora Martinez MD for the opportunity to be involved in this patient's care.     Electronically signed by Any Dave MD on 8/5/2022 at [address] [facility] PM.   Internal Medicine Resident PGY-3

## 2025-05-22 ENCOUNTER — OUTSIDE SERVICES (OUTPATIENT)
Dept: FAMILY MEDICINE CLINIC | Age: 70
End: 2025-05-22
Payer: MEDICARE

## 2025-05-22 DIAGNOSIS — L89.313: Primary | ICD-10-CM

## 2025-05-22 DIAGNOSIS — I25.118 CORONARY ARTERY DISEASE INVOLVING NATIVE CORONARY ARTERY OF NATIVE HEART WITH OTHER FORM OF ANGINA PECTORIS: ICD-10-CM

## 2025-05-22 DIAGNOSIS — Z93.3 COLOSTOMY IN PLACE (HCC): ICD-10-CM

## 2025-05-22 DIAGNOSIS — G35 MS (MULTIPLE SCLEROSIS) (HCC): ICD-10-CM

## 2025-05-22 PROCEDURE — 99309 SBSQ NF CARE MODERATE MDM 30: CPT | Performed by: FAMILY MEDICINE

## 2025-05-22 NOTE — PROGRESS NOTES
5/22/2025  Greyson Swift  1955  Subjective:  He was in his/her room to follow up MS and decubitus ulcer.  No new problems today.   Denies headaches, CP, sob, or abdominal pains.    Objective:   Please see vitals per chart.    There is no sinus tenderness to palpation, no cervical lymphadenopathy.  Lungs are clear.  Heart Regular rate and rhythm without murmur.  The abdomen is soft and nontender.  Colostomy present and functioning.  Extremities are without edema.  Pompa is present.  Wound per wound doctor.      Assessment:    1. Decubitus ulcer of right perineal ischial region, stage 3 (HCC)  -stable, recent hospitalization and surgery, follow with wound specialist    2. Coronary artery disease involving native coronary artery of native heart without angina pectoris  -stable,  controlled on crestor, lisinopril, metoprolol, brilinta and asa.    3. Colostomy in place (HCC)  -stable, functioning appropriately    4. MS (multiple sclerosis) (HCC)  -stable, controlled on zanaflex, baclofen and gabapentin    Plan:  We will continue current medication regimen including oxcarbazepine, baclofen and gabapentin for MS and supportive care and recheck in 1 month.    Electronically signed by Noah Benitez MD on 5/22/2025 at 10:19 AM.

## 2025-06-26 ENCOUNTER — OUTSIDE SERVICES (OUTPATIENT)
Dept: FAMILY MEDICINE CLINIC | Age: 70
End: 2025-06-26
Payer: MEDICARE

## 2025-06-26 DIAGNOSIS — I25.118 CORONARY ARTERY DISEASE INVOLVING NATIVE CORONARY ARTERY OF NATIVE HEART WITH OTHER FORM OF ANGINA PECTORIS: ICD-10-CM

## 2025-06-26 DIAGNOSIS — Z93.3 COLOSTOMY IN PLACE (HCC): ICD-10-CM

## 2025-06-26 DIAGNOSIS — L89.313: Primary | ICD-10-CM

## 2025-06-26 DIAGNOSIS — G35 MS (MULTIPLE SCLEROSIS) (HCC): ICD-10-CM

## 2025-06-26 PROCEDURE — 99309 SBSQ NF CARE MODERATE MDM 30: CPT | Performed by: FAMILY MEDICINE

## 2025-06-26 NOTE — PROGRESS NOTES
6/26/2025  Greyson Swift  1955  Subjective:  He was in his/her room to follow up MS and decubitus ulcer.  Had MS flare with jaw pain, treated with prednisone.  Denies headaches, CP, sob, or abdominal pains.    Objective:   Please see vitals per chart.    There is no sinus tenderness to palpation, no cervical lymphadenopathy.  Lungs are clear.  Heart Regular rate and rhythm without murmur.  The abdomen is soft and nontender.  Colostomy present and functioning.  Extremities are without edema.  Pompa is present.  Wound per wound doctor.      Assessment:    1. Decubitus ulcer of right perineal ischial region, stage 3 (HCC)  -stable, recent hospitalization and surgery, follow with wound specialist    2. Coronary artery disease involving native coronary artery of native heart without angina pectoris  -stable,  controlled on crestor, lisinopril, metoprolol, brilinta and asa.    3. Colostomy in place (HCC)  -stable, functioning appropriately    4. MS (multiple sclerosis) (HCC)  -unstable, controlled on zanaflex, baclofen and gabapentin, flare treated with prednisone, -monitor sxs, call if not improving      Plan:  We will continue current medication regimen including oxcarbazepine, baclofen and gabapentin for MS and supportive care and recheck in 1 month.    Electronically signed by Noah Benitez MD on 6/26/2025 at 11:04 AM.

## 2025-07-24 ENCOUNTER — OUTSIDE SERVICES (OUTPATIENT)
Dept: FAMILY MEDICINE CLINIC | Age: 70
End: 2025-07-24
Payer: MEDICARE

## 2025-07-24 DIAGNOSIS — L89.313: Primary | ICD-10-CM

## 2025-07-24 DIAGNOSIS — G35 MS (MULTIPLE SCLEROSIS) (HCC): ICD-10-CM

## 2025-07-24 DIAGNOSIS — Z93.3 COLOSTOMY IN PLACE (HCC): ICD-10-CM

## 2025-07-24 DIAGNOSIS — L21.0 DANDRUFF IN ADULT: ICD-10-CM

## 2025-07-24 DIAGNOSIS — I25.118 CORONARY ARTERY DISEASE INVOLVING NATIVE CORONARY ARTERY OF NATIVE HEART WITH OTHER FORM OF ANGINA PECTORIS: ICD-10-CM

## 2025-07-24 PROCEDURE — 99309 SBSQ NF CARE MODERATE MDM 30: CPT | Performed by: FAMILY MEDICINE

## 2025-07-24 NOTE — PROGRESS NOTES
7/24/2025  Greyson Swift  1955  Subjective:  He was in his/her room to follow up MS and decubitus ulcer. Is feeling ok, headache today though.  Dandruff was treated with nizoral shampoo.  Denies headaches, CP, sob, or abdominal pains.    Objective:   Please see vitals per chart.    There is no sinus tenderness to palpation, no cervical lymphadenopathy.  Lungs are clear.  Heart Regular rate and rhythm without murmur.  The abdomen is soft and nontender.  Colostomy present and functioning.  Extremities are without edema.  Pompa is present.  Wound per wound doctor.      Assessment:    1. Decubitus ulcer of right perineal ischial region, stage 3 (HCC)  -stable, recent hospitalization and surgery, follow with wound specialist    2. Coronary artery disease involving native coronary artery of native heart without angina pectoris  -stable,  controlled on crestor, lisinopril, metoprolol, brilinta and asa.    3. Colostomy in place (HCC)  -stable, functioning appropriately    4. MS (multiple sclerosis) (HCC)  -unstable, controlled on zanaflex, baclofen and gabapentin, flare treated with prednisone, -monitor sxs, call if not improving    5. Dandruff in adult  -new, added nizoral shampoo, -monitor sxs, call if not improving    Plan:  We will continue current medication regimen including oxcarbazepine, baclofen and gabapentin for MS and supportive care and recheck in 1 month.    Electronically signed by Noah Benitez MD on 7/24/2025 at 10:43 AM.

## 2025-08-04 LAB
A/G RATIO: 1.1 RATIO (ref 0.8–2.6)
ALBUMIN: 3.9 G/DL (ref 3.5–5.2)
ALP BLD-CCNC: 160 U/L (ref 23–144)
ALT SERPL-CCNC: 17 U/L (ref 0–60)
AST SERPL-CCNC: 15 U/L (ref 0–55)
BASOPHILS ABSOLUTE: 0 K/UL (ref 0–0.3)
BASOPHILS RELATIVE PERCENT: 0.7 % (ref 0–2)
BILIRUB SERPL-MCNC: 0.2 MG/DL (ref 0–1.2)
BUN / CREAT RATIO: 73 (ref 7–25)
BUN BLDV-MCNC: 22 MG/DL (ref 3–29)
CALCIUM SERPL-MCNC: 9.5 MG/DL (ref 8.5–10.5)
CHLORIDE BLD-SCNC: 104 MEQ/L (ref 96–110)
CHOLESTEROL, TOTAL: 103 MG/DL
CO2: 31 MEQ/L (ref 19–32)
CREAT SERPL-MCNC: 0.3 MG/DL (ref 0.5–1.2)
DIFFERENTIAL COUNT: ABNORMAL
EOSINOPHILS ABSOLUTE: 0.4 K/UL (ref 0–0.5)
EOSINOPHILS RELATIVE PERCENT: 6.8 % (ref 0–5)
ESTIMATED GLOMERULAR FILTRATION RATE CREATININE EQUATION: 130 MLS/MIN/1.73M2
FASTING STATUS: ABNORMAL
GLOBULIN: 3.5 G/DL (ref 1.9–3.6)
GLUCOSE BLD-MCNC: 72 MG/DL (ref 70–99)
HCT VFR BLD CALC: 41.4 % (ref 37.5–51)
HDLC SERPL-MCNC: 41 MG/DL
HEMOGLOBIN: 12.7 G/DL (ref 13–17.7)
IMMATURE GRANS (ABS): 0 K/UL (ref 0–0.1)
IMMATURE GRANULOCYTES %: 0.2 %
LDL CHOLESTEROL: 53 MG/DL
LDL/HDL RATIO: 2.5 RATIO
LYMPHOCYTES ABSOLUTE: 2 K/UL (ref 0.9–4.1)
LYMPHOCYTES RELATIVE PERCENT: 34.2 % (ref 14–51)
MCH RBC QN AUTO: 26.3 PG (ref 26–34)
MCHC RBC AUTO-ENTMCNC: 30.7 G/DL (ref 30.7–35.5)
MCV RBC AUTO: 85.9 FL (ref 80–100)
MONOCYTES ABSOLUTE: 0.6 K/UL (ref 0.2–1)
MONOCYTES RELATIVE PERCENT: 9.7 % (ref 4–12)
NEUTROPHILS ABSOLUTE: 2.8 K/UL (ref 1.8–7.5)
NEUTROPHILS RELATIVE PERCENT: 48.4 % (ref 42–80)
PDW BLD-RTO: 18.3 %
PLATELET # BLD: 236 K/UL (ref 140–400)
PMV BLD AUTO: 9.5 FL (ref 7.2–11.7)
POTASSIUM SERPL-SCNC: 4 MEQ/L (ref 3.4–5.3)
PROSTATE SPECIFIC ANTIGEN: 2.63 NG/ML
RBC # BLD: 4.82 M/UL (ref 4.14–5.8)
RETICULOCYTE ABSOLUTE COUNT: 0 /100 WBC
SODIUM BLD-SCNC: 146 MEQ/L (ref 135–148)
TOTAL PROTEIN: 7.4 G/DL (ref 6–8.3)
TRIGL SERPL-MCNC: 43 MG/DL
VLDLC SERPL CALC-MCNC: 9 MG/DL (ref 4–38)
WBC # BLD: 5.8 K/UL (ref 3.5–10.9)

## 2025-08-28 ENCOUNTER — OUTSIDE SERVICES (OUTPATIENT)
Dept: FAMILY MEDICINE CLINIC | Age: 70
End: 2025-08-28

## 2025-08-28 DIAGNOSIS — G35 MS (MULTIPLE SCLEROSIS) (HCC): ICD-10-CM

## 2025-08-28 DIAGNOSIS — I25.118 CORONARY ARTERY DISEASE INVOLVING NATIVE CORONARY ARTERY OF NATIVE HEART WITH OTHER FORM OF ANGINA PECTORIS: ICD-10-CM

## 2025-08-28 DIAGNOSIS — Z93.3 COLOSTOMY IN PLACE (HCC): ICD-10-CM

## 2025-08-28 DIAGNOSIS — L89.313: Primary | ICD-10-CM

## (undated) DEVICE — SPONGE GZ W4XL4IN COT 12 PLY TYP VII WVN C FLD DSGN

## (undated) DEVICE — CATH BLLN ANGIO 3X8MM NC EUPHORIA RX

## (undated) DEVICE — TIP COVER ACCESSORY

## (undated) DEVICE — CATHETER GUID 6FR L100CM GRN PTFE JR4 TRUELUMEN HYBRID

## (undated) DEVICE — SOLUTION IV 1000ML 0.9% SOD CHL PH 5 INJ USP VIAFLX PLAS

## (undated) DEVICE — SHEET,DRAPE,3/4,53X77,STERILE: Brand: MEDLINE

## (undated) DEVICE — Device

## (undated) DEVICE — DRAPE KIT RAMAPR RADIATION SHIELD

## (undated) DEVICE — SYSTEM PMP VAC SYR 10CC CONT FLO SGL ACT 1 W VLV SAPS

## (undated) DEVICE — CARDIAC CATH LAB PACK LF

## (undated) DEVICE — SOLUTION IRRIG 3000ML 0.9% SOD CHL USP UROMATIC PLAS CONT

## (undated) DEVICE — CATHETER,FOLEY,SILVER,16FR,30ML,LF: Brand: MEDLINE

## (undated) DEVICE — STRAP,CATHETER,ELASTIC,HOOK&LOOP: Brand: MEDLINE

## (undated) DEVICE — VALVE HEMSTAS W/ GWIRE INSRTN TOOL GRDIAN II NC

## (undated) DEVICE — PACK PROCEDURE SURG SET UP SRMC

## (undated) DEVICE — VESSEL SEALER: Brand: ENDOWRIST

## (undated) DEVICE — GLOVE SURG SZ 65 THK91MIL LTX FREE SYN POLYISOPRENE

## (undated) DEVICE — GUIDEWIRE ANG TIP 0.035INX150CM

## (undated) DEVICE — SYSTEM PMP SGL ACT W/ 10CC VAC SYR 1 W VLV FOR ENDOSCP SURG

## (undated) DEVICE — GLOVE ORANGE PI 7   MSG9070

## (undated) DEVICE — [HIGH FLOW INSUFFLATOR,  DO NOT USE IF PACKAGE IS DAMAGED,  KEEP DRY,  KEEP AWAY FROM SUNLIGHT,  PROTECT FROM HEAT AND RADIOACTIVE SOURCES.]: Brand: PNEUMOSURE

## (undated) DEVICE — COLUMN DRAPE

## (undated) DEVICE — PAD,ABDOMINAL,5"X9",ST,LF,25/BX: Brand: MEDLINE INDUSTRIES, INC.

## (undated) DEVICE — GUIDEWIRE URO L150CM DIA0.035IN STIFF NIT HYDRPHLC STR TIP

## (undated) DEVICE — 35 ML SYRINGE LUER-LOCK TIP: Brand: MONOJECT

## (undated) DEVICE — Z INACTIVE USE 2635503 SOLUTION IRRIG 3000ML ST H2O USP UROMATIC PLAS CONT

## (undated) DEVICE — OPEN-END FLEXI-TIP URETERAL CATHETER: Brand: FLEXI-TIP

## (undated) DEVICE — SOLUTION IV IRRIG WATER 1000ML POUR BRL 2F7114

## (undated) DEVICE — GUIDEWIRE VASC L150CM DIA0.035IN FLX END L7CM J 3MM PTFE

## (undated) DEVICE — TROCAR: Brand: KII FIOS FIRST ENTRY

## (undated) DEVICE — SINGLE-USE DIGITAL FLEXIBLE URETEROSCOPE: Brand: LITHOVUE

## (undated) DEVICE — 3M™ STERI-STRIP™ COMPOUND BENZOIN TINCTURE 40 BAGS/CARTON 4 CARTONS/CASE C1544: Brand: 3M™ STERI-STRIP™

## (undated) DEVICE — URETERAL STENT
Type: IMPLANTABLE DEVICE | Status: NON-FUNCTIONAL
Brand: PERCUFLEX™ PLUS

## (undated) DEVICE — Y-TYPE TUR/BLADDER IRRIGATION SET, REGULATING CLAMP

## (undated) DEVICE — BASIC SINGLE BASIN BTC-LF: Brand: MEDLINE INDUSTRIES, INC.

## (undated) DEVICE — OFF - ST. RITAS VASC: Brand: MEDLINE INDUSTRIES, INC.

## (undated) DEVICE — GUIDEWIRE ENDOSCP L150CM DIA0.035IN TIP 3CM PTFE NIT

## (undated) DEVICE — SOLUTION SCRB 4OZ 4% CHG H2O AIDED FOR PREOPERATIVE SKIN

## (undated) DEVICE — DRAPE,ROBOTICS,STERILE: Brand: MEDLINE

## (undated) DEVICE — NITINOL STONE RETRIEVAL BASKET: Brand: ZERO TIP

## (undated) DEVICE — GOWN,SIRUS,NON REINFRCD,LARGE,SET IN SL: Brand: MEDLINE

## (undated) DEVICE — ARM DRAPE

## (undated) DEVICE — URETERAL ACCESS SHEATH SET: Brand: NAVIGATOR HD

## (undated) DEVICE — ADAPTER URO SCP UROLOK LL

## (undated) DEVICE — CANISTER, RIGID, 2000CC: Brand: MEDLINE INDUSTRIES, INC.

## (undated) DEVICE — GUIDEWIRE ENDOSCP ANGLED 8 CM 0.035 INX150 CM BENT ZIPWIRE

## (undated) DEVICE — 2-PIECE OSTOMY SKIN BARRIER, CERAPLUS: Brand: NEW IMAGE

## (undated) DEVICE — GARMENT,MEDLINE,DVT,INT,CALF,MED, GEN2: Brand: MEDLINE

## (undated) DEVICE — COVER ARMBRD W13XL28.5IN IMPERV BLU FOR OP RM

## (undated) DEVICE — CATHETER URETH PED 14FR BLLN 5CC 2 W F INF CTRL BARDX

## (undated) DEVICE — CYSTO PACK: Brand: MEDLINE INDUSTRIES, INC.

## (undated) DEVICE — OPTIVIEW, SACRUM, 7"X7": Brand: MEDLINE

## (undated) DEVICE — 1-PIECE DRAINABLE OSTOMY POUCH, CERAPLUS: Brand: PREMIER

## (undated) DEVICE — NEEDLE SPINAL 22GA L3.5IN SPINOCAN

## (undated) DEVICE — CYSTO: Brand: MEDLINE INDUSTRIES, INC.

## (undated) DEVICE — DRAINBAG,ANTI-REFLUX TOWER,L/F,2000ML,LL: Brand: MEDLINE

## (undated) DEVICE — EVACUATOR URO BLDR W/ ADPT UROVAC

## (undated) DEVICE — CHLORAPREP 26ML ORANGE

## (undated) DEVICE — CATHETER,FOLEY,SILI-ELAST,LTX,18FR,10ML: Brand: MEDLINE

## (undated) DEVICE — COAGULATOR ELECSURG BLADE 10 FTX1 IN PTFE STRL ULTRACLEAN

## (undated) DEVICE — PINNACLE INTRODUCER SHEATH: Brand: PINNACLE

## (undated) DEVICE — SOLUTION IRRIG 1000ML STRL H2O USP PLAS POUR BTL

## (undated) DEVICE — TAPE SURG W4INXL11YD 2IN PERF LINERLESS NONWOVEN MEDFIX EZ

## (undated) DEVICE — GLOVE SURG SZ 6 THK91MIL LTX FREE SYN POLYISOPRENE ANTI

## (undated) DEVICE — Z DISCONTINUED USE 2272117 DRAPE SURG 3 QTR N INVASIVE 2 LAYR DISP

## (undated) DEVICE — CORE TRUMPET FOR SINGLE SOLUTION BAG: Brand: CORE DYNAMICS

## (undated) DEVICE — LASER SURG FIBER 1000 MH RND TIP HOLM SMARTSCOPE DISP

## (undated) DEVICE — CATHETER URETH 24FR BLLN 30CC LTX HYDRGEL 2 W BARDX LUB F

## (undated) DEVICE — DUAL LUMEN URETERAL CATHETER

## (undated) DEVICE — CATH BLLN ANGIO 3.50X8MM NC EUPHORIA RX

## (undated) DEVICE — Z INACTIVE USE 2660664 SOLUTION IRRIG 3000ML 0.9% SOD CHL USP UROMATIC PLAS CONT

## (undated) DEVICE — Z DISCONTINUED BY MEDLINE USE 2711682 TRAY SKIN PREP DRY W/ PREM GLV

## (undated) DEVICE — GUIDEWIRE URO L150CM DIA .035IN STIFF NIT HYDRPHL STR TIP

## (undated) DEVICE — 3M™ WARMING BLANKET, UPPER BODY, 10 PER CASE, 42268: Brand: BAIR HUGGER™

## (undated) DEVICE — GLOVE ORANGE PI 7 1/2   MSG9075

## (undated) DEVICE — ENDOSCOPIC VALVE WITH ADAPTER.: Brand: SURSEAL® II

## (undated) DEVICE — 4-PORT MANIFOLD: Brand: NEPTUNE 2

## (undated) DEVICE — SPONGE LAP W18XL18IN WHT COT 4 PLY FLD STRUNG RADPQ DISP ST

## (undated) DEVICE — Z DUP USE 2641840 CLIP INT L POLYMER LOK LIG HEM O LOK

## (undated) DEVICE — STAPLER INT L60MM DIA12MM STD TISS TI LNAR CUT LN 6 ROW

## (undated) DEVICE — PREP SOL PVP IODINE 4%  4 OZ/BTL

## (undated) DEVICE — DEVICE INFL 20ML 30ATM POLYCARB BRL ABS PLUNG DGT DISPLAY

## (undated) DEVICE — PREMIUM DRY TRAY LF: Brand: MEDLINE INDUSTRIES, INC.

## (undated) DEVICE — SOLUTION PREP PAINT POV IOD FOR SKIN MUCOUS MEM

## (undated) DEVICE — GENERAL LAPAROSCOPY PACK-LF: Brand: MEDLINE INDUSTRIES, INC.

## (undated) DEVICE — KIT MICROINTRODUCER 4FR ECHOGENIC NDL L7CM 21GA STIFF COAX

## (undated) DEVICE — BREAST HERNIA: Brand: MEDLINE INDUSTRIES, INC.

## (undated) DEVICE — RUNTHROUGH NS EXTRA FLOPPY PTCA GUIDEWIRE: Brand: RUNTHROUGH

## (undated) DEVICE — SOLUTION ANTIFOG VIS SYS CLEARIFY LAPSCP

## (undated) DEVICE — PACK PROC LAP II AURORA

## (undated) DEVICE — SKIN AFFIX SURG ADHESIVE 72/CS 0.55ML: Brand: MEDLINE

## (undated) DEVICE — GLOVE ORANGE PI 8   MSG9080

## (undated) DEVICE — CANNULA SEAL

## (undated) DEVICE — CATHETER URETH BLLN 30CC 22FR SIL ELASTMR F 3 W SPEC M RND

## (undated) DEVICE — GAUZE,SPONGE,4"X4",12PLY,STERILE,LF,2'S: Brand: MEDLINE

## (undated) DEVICE — PENCIL SMK EVAC ALL IN 1 DSGN ENH VISIBILITY IMPROVED AIR

## (undated) DEVICE — SINGLE ACTION PUMPING SYSTEM

## (undated) DEVICE — ELECTRO LUBE IS A SINGLE PATIENT USE DEVICE THAT IS INTENDED TO BE USED ON ELECTROSURGICAL ELECTRODES TO REDUCE STICKING.: Brand: KEY SURGICAL ELECTRO LUBE

## (undated) DEVICE — BLADELESS OBTURATOR: Brand: WECK VISTA

## (undated) DEVICE — GUIDEWIRE VASC STR 025X150 STIFF GLIDEWIRE ZIPWIRE

## (undated) DEVICE — SOLUTION SCRB 4OZ 7.5% POVIDONE IOD ANTIMIC BTL

## (undated) DEVICE — TUBING, SUCTION, 9/32" X 20', STRAIGHT: Brand: MEDLINE INDUSTRIES, INC.

## (undated) DEVICE — 2-PIECE DRAINABLE OSTOMY POUCH: Brand: NEW IMAGE

## (undated) DEVICE — TIBURON NEONATAL DRAPE: Brand: CONVERTORS

## (undated) DEVICE — TUBING FLTR PLUME AWAY EVAC W/ SUCT DEV DISP PUREVIEW

## (undated) DEVICE — RADIFOCUS TORQUE DEVICE MULTI-TORQUE VISE: Brand: RADIFOCUS TORQUE DEVICE